# Patient Record
Sex: MALE | Race: BLACK OR AFRICAN AMERICAN | NOT HISPANIC OR LATINO
[De-identification: names, ages, dates, MRNs, and addresses within clinical notes are randomized per-mention and may not be internally consistent; named-entity substitution may affect disease eponyms.]

---

## 2017-05-02 ENCOUNTER — RX RENEWAL (OUTPATIENT)
Age: 32
End: 2017-05-02

## 2017-05-26 ENCOUNTER — INPATIENT (INPATIENT)
Facility: HOSPITAL | Age: 32
LOS: 10 days | Discharge: HOME | DRG: 252 | End: 2017-06-06
Attending: SURGERY | Admitting: SURGERY
Payer: MEDICAID

## 2017-05-26 VITALS
SYSTOLIC BLOOD PRESSURE: 125 MMHG | DIASTOLIC BLOOD PRESSURE: 73 MMHG | OXYGEN SATURATION: 100 % | HEART RATE: 77 BPM | RESPIRATION RATE: 20 BRPM | TEMPERATURE: 98 F | HEIGHT: 75 IN

## 2017-05-26 DIAGNOSIS — I77.0 ARTERIOVENOUS FISTULA, ACQUIRED: Chronic | ICD-10-CM

## 2017-05-26 LAB
ANION GAP SERPL CALC-SCNC: 15 MMOL/L — SIGNIFICANT CHANGE UP (ref 5–17)
APTT BLD: 27.7 SEC — SIGNIFICANT CHANGE UP (ref 27.5–37.4)
BASOPHILS # BLD AUTO: 0 K/UL — SIGNIFICANT CHANGE UP (ref 0–0.2)
BASOPHILS NFR BLD AUTO: 0.5 % — SIGNIFICANT CHANGE UP (ref 0–2)
BUN SERPL-MCNC: 30 MG/DL — HIGH (ref 7–23)
CALCIUM SERPL-MCNC: 9.8 MG/DL — SIGNIFICANT CHANGE UP (ref 8.4–10.5)
CHLORIDE SERPL-SCNC: 97 MMOL/L — SIGNIFICANT CHANGE UP (ref 96–108)
CO2 SERPL-SCNC: 28 MMOL/L — SIGNIFICANT CHANGE UP (ref 22–31)
CREAT SERPL-MCNC: 11.69 MG/DL — HIGH (ref 0.5–1.3)
EOSINOPHIL # BLD AUTO: 0.1 K/UL — SIGNIFICANT CHANGE UP (ref 0–0.5)
EOSINOPHIL NFR BLD AUTO: 2.4 % — SIGNIFICANT CHANGE UP (ref 0–6)
GLUCOSE SERPL-MCNC: 92 MG/DL — SIGNIFICANT CHANGE UP (ref 70–99)
HCT VFR BLD CALC: 43.9 % — SIGNIFICANT CHANGE UP (ref 39–50)
HGB BLD-MCNC: 14.4 G/DL — SIGNIFICANT CHANGE UP (ref 13–17)
INR BLD: 1.04 RATIO — SIGNIFICANT CHANGE UP (ref 0.88–1.16)
LYMPHOCYTES # BLD AUTO: 1.6 K/UL — SIGNIFICANT CHANGE UP (ref 1–3.3)
LYMPHOCYTES # BLD AUTO: 34.3 % — SIGNIFICANT CHANGE UP (ref 13–44)
MCHC RBC-ENTMCNC: 32.8 GM/DL — SIGNIFICANT CHANGE UP (ref 32–36)
MCHC RBC-ENTMCNC: 32.9 PG — SIGNIFICANT CHANGE UP (ref 27–34)
MCV RBC AUTO: 100 FL — SIGNIFICANT CHANGE UP (ref 80–100)
MONOCYTES # BLD AUTO: 0.6 K/UL — SIGNIFICANT CHANGE UP (ref 0–0.9)
MONOCYTES NFR BLD AUTO: 11.5 % — SIGNIFICANT CHANGE UP (ref 2–14)
NEUTROPHILS # BLD AUTO: 2.5 K/UL — SIGNIFICANT CHANGE UP (ref 1.8–7.4)
NEUTROPHILS NFR BLD AUTO: 51.3 % — SIGNIFICANT CHANGE UP (ref 43–77)
PLATELET # BLD AUTO: 159 K/UL — SIGNIFICANT CHANGE UP (ref 150–400)
POTASSIUM SERPL-MCNC: 5.1 MMOL/L — SIGNIFICANT CHANGE UP (ref 3.5–5.3)
POTASSIUM SERPL-SCNC: 5.1 MMOL/L — SIGNIFICANT CHANGE UP (ref 3.5–5.3)
PROTHROM AB SERPL-ACNC: 11.3 SEC — SIGNIFICANT CHANGE UP (ref 9.8–12.7)
RBC # BLD: 4.38 M/UL — SIGNIFICANT CHANGE UP (ref 4.2–5.8)
RBC # FLD: 12.6 % — SIGNIFICANT CHANGE UP (ref 10.3–14.5)
SODIUM SERPL-SCNC: 140 MMOL/L — SIGNIFICANT CHANGE UP (ref 135–145)
WBC # BLD: 4.8 K/UL — SIGNIFICANT CHANGE UP (ref 3.8–10.5)
WBC # FLD AUTO: 4.8 K/UL — SIGNIFICANT CHANGE UP (ref 3.8–10.5)

## 2017-05-26 PROCEDURE — 93990 DOPPLER FLOW TESTING: CPT | Mod: 26

## 2017-05-26 PROCEDURE — 99285 EMERGENCY DEPT VISIT HI MDM: CPT

## 2017-05-26 NOTE — ED ADULT NURSE NOTE - PMH
Chronic renal failure    Hemodialysis access, AV graft    HTN (hypertension)    Leg fracture, right    Morbid obesity    Sleep apnea    Stroke  age 10

## 2017-05-26 NOTE — ED ADULT NURSE NOTE - OBJECTIVE STATEMENT
32 yo male pt with history of chronic renal failure on dialysis, left AV graft HTN presents to ed complaining of AVF malfunction. as per pt he went to dialysis this morning around 11am where he finished his session and when he got home he "could not feel my fistula so I came to the ed". pt states he may have slept on his arm and now is scared he may have a clot. pt complaining of pain to the area. cap refill brisk. no redness noted to left arm. + pulse, -thrill. pt denies fever/chills/n/v/numbness/tingling/chest pain/sob. breath sounds clear and equal bilaterally. skin warm dry and intact. pt ambulates with steady gait. abd nontender and nondistended. 32 yo male pt with history of chronic renal failure on dialysis, left AV graft HTN presents to ed complaining of AVF malfunction. as per pt he went to dialysis this morning around 11am where he finished his session and when he got home he "could not feel my fistula so I came to the ed". pt states he may have slept on his arm and now is scared he may have a clot. pt complaining of pain to the area. cap refill brisk. no redness noted to left arm. + pulse, -thrill. pt denies fever/chills/n/v/numbness/tingling/chest pain/sob. breath sounds clear and equal bilaterally. skin warm dry and intact. pt ambulates with steady gait. abd nontender and nondistended. pink band placed on left arm.

## 2017-05-26 NOTE — ED PROVIDER NOTE - MEDICAL DECISION MAKING DETAILS
31M with ESRD on HD p/w AVF malfunction. Pt likely with thrombosis. Obtain basic labs, Doppler of AV Fistula. Likely Vasc surgery c/s

## 2017-05-26 NOTE — ED PROVIDER NOTE - OBJECTIVE STATEMENT
31M with history of ESRD on HD, HTN, morbid obesity p/w concern of AVF malfunction. 31M with history of ESRD on HD, HTN, morbid obesity p/w concern of AVF malfunction. Pt states that he may have slept on his arm now he is afraid he has a clot. He reports that he had HD today but it was painful in that area. He notes tenderness over the AVF site. Pt denies fevers, chills, sob, n/v/d, or chest pain.    Renal: Donta Aponte Sx: Dr. Ibrahim.

## 2017-05-26 NOTE — ED PROVIDER NOTE - SKIN, MLM
Skin normal color for race, warm, dry and intact. No evidence of rash. No thrill heard over AVF site. Noted to have hard palpable cord.

## 2017-05-26 NOTE — ED PROVIDER NOTE - ATTENDING CONTRIBUTION TO CARE
I have seen and evaluated this patient with the resident.   I agree with the findings  unless other wise stated.  I have made appropriate changes in documentations where needed, After my face to face bedside evaluation, I am further  notinM with ESRD on HD p/w AVF malfunction. Pt likely with thrombosis. Obtain basic labs, Doppler of AV Fistula. Likely Vasc surgery eval done admit for definitive care--Niño

## 2017-05-27 DIAGNOSIS — T82.868A THROMBOSIS DUE TO VASCULAR PROSTHETIC DEVICES, IMPLANTS AND GRAFTS, INITIAL ENCOUNTER: ICD-10-CM

## 2017-05-27 LAB
BLD GP AB SCN SERPL QL: NEGATIVE — SIGNIFICANT CHANGE UP
RH IG SCN BLD-IMP: POSITIVE — SIGNIFICANT CHANGE UP

## 2017-05-27 PROCEDURE — 99232 SBSQ HOSP IP/OBS MODERATE 35: CPT

## 2017-05-27 RX ORDER — MECLIZINE HCL 12.5 MG
25 TABLET ORAL THREE TIMES A DAY
Qty: 0 | Refills: 0 | Status: DISCONTINUED | OUTPATIENT
Start: 2017-05-27 | End: 2017-05-28

## 2017-05-27 RX ORDER — HEPARIN SODIUM 5000 [USP'U]/ML
5000 INJECTION INTRAVENOUS; SUBCUTANEOUS ONCE
Qty: 0 | Refills: 0 | Status: COMPLETED | OUTPATIENT
Start: 2017-05-27 | End: 2017-05-27

## 2017-05-27 RX ORDER — LABETALOL HCL 100 MG
200 TABLET ORAL
Qty: 0 | Refills: 0 | Status: DISCONTINUED | OUTPATIENT
Start: 2017-05-27 | End: 2017-05-28

## 2017-05-27 RX ORDER — HEPARIN SODIUM 5000 [USP'U]/ML
1500 INJECTION INTRAVENOUS; SUBCUTANEOUS
Qty: 25000 | Refills: 0 | Status: DISCONTINUED | OUTPATIENT
Start: 2017-05-27 | End: 2017-05-28

## 2017-05-27 RX ORDER — SEVELAMER CARBONATE 2400 MG/1
1600 POWDER, FOR SUSPENSION ORAL
Qty: 0 | Refills: 0 | Status: DISCONTINUED | OUTPATIENT
Start: 2017-05-27 | End: 2017-05-28

## 2017-05-27 RX ORDER — SODIUM CHLORIDE 9 MG/ML
1000 INJECTION INTRAMUSCULAR; INTRAVENOUS; SUBCUTANEOUS
Qty: 0 | Refills: 0 | Status: DISCONTINUED | OUTPATIENT
Start: 2017-05-27 | End: 2017-05-27

## 2017-05-27 RX ORDER — HEPARIN SODIUM 5000 [USP'U]/ML
5000 INJECTION INTRAVENOUS; SUBCUTANEOUS EVERY 8 HOURS
Qty: 0 | Refills: 0 | Status: DISCONTINUED | OUTPATIENT
Start: 2017-05-27 | End: 2017-05-27

## 2017-05-27 RX ADMIN — HEPARIN SODIUM 5000 UNIT(S): 5000 INJECTION INTRAVENOUS; SUBCUTANEOUS at 18:51

## 2017-05-27 RX ADMIN — SODIUM CHLORIDE 30 MILLILITER(S): 9 INJECTION INTRAMUSCULAR; INTRAVENOUS; SUBCUTANEOUS at 06:34

## 2017-05-27 RX ADMIN — Medication 200 MILLIGRAM(S): at 06:29

## 2017-05-27 RX ADMIN — SODIUM CHLORIDE 30 MILLILITER(S): 9 INJECTION INTRAMUSCULAR; INTRAVENOUS; SUBCUTANEOUS at 02:19

## 2017-05-27 RX ADMIN — HEPARIN SODIUM 5000 UNIT(S): 5000 INJECTION INTRAVENOUS; SUBCUTANEOUS at 14:57

## 2017-05-27 RX ADMIN — HEPARIN SODIUM 15 UNIT(S)/HR: 5000 INJECTION INTRAVENOUS; SUBCUTANEOUS at 18:51

## 2017-05-27 NOTE — H&P ADULT. - HISTORY OF PRESENT ILLNESS
31M with history of HTN, ESRD on HD (MWF) p/w bulge over AVF. He received HD yesterday without incident. He went to sleep and woke up with a bulge and pulsating sensation over the fistula. No pain or weakness in the hand. He said he could not feel a thrill.

## 2017-05-27 NOTE — H&P ADULT. - EXTREMITIES COMMENTS
L wrist radiocephalic AVF, no thrill, only pulse over proximal fistula, no signs of steal    +doppler signal over vasquez arch and digits

## 2017-05-28 LAB
ANION GAP SERPL CALC-SCNC: 19 MMOL/L — HIGH (ref 5–17)
APTT BLD: 56.3 SEC — HIGH (ref 27.5–37.4)
APTT BLD: 74.3 SEC — HIGH (ref 27.5–37.4)
BLD GP AB SCN SERPL QL: NEGATIVE — SIGNIFICANT CHANGE UP
BUN SERPL-MCNC: 67 MG/DL — HIGH (ref 7–23)
CALCIUM SERPL-MCNC: 9.4 MG/DL — SIGNIFICANT CHANGE UP (ref 8.4–10.5)
CHLORIDE SERPL-SCNC: 96 MMOL/L — SIGNIFICANT CHANGE UP (ref 96–108)
CO2 SERPL-SCNC: 23 MMOL/L — SIGNIFICANT CHANGE UP (ref 22–31)
CREAT SERPL-MCNC: 17.55 MG/DL — HIGH (ref 0.5–1.3)
GLUCOSE SERPL-MCNC: 92 MG/DL — SIGNIFICANT CHANGE UP (ref 70–99)
HCT VFR BLD CALC: 38.3 % — LOW (ref 39–50)
HCT VFR BLD CALC: 40.8 % — SIGNIFICANT CHANGE UP (ref 39–50)
HGB BLD-MCNC: 13.2 G/DL — SIGNIFICANT CHANGE UP (ref 13–17)
HGB BLD-MCNC: 13.4 G/DL — SIGNIFICANT CHANGE UP (ref 13–17)
MAGNESIUM SERPL-MCNC: 2.2 MG/DL — SIGNIFICANT CHANGE UP (ref 1.6–2.6)
MCHC RBC-ENTMCNC: 32.8 PG — SIGNIFICANT CHANGE UP (ref 27–34)
MCHC RBC-ENTMCNC: 32.9 GM/DL — SIGNIFICANT CHANGE UP (ref 32–36)
MCHC RBC-ENTMCNC: 34.5 GM/DL — SIGNIFICANT CHANGE UP (ref 32–36)
MCHC RBC-ENTMCNC: 34.6 PG — HIGH (ref 27–34)
MCV RBC AUTO: 100 FL — SIGNIFICANT CHANGE UP (ref 80–100)
MCV RBC AUTO: 99.9 FL — SIGNIFICANT CHANGE UP (ref 80–100)
PHOSPHATE SERPL-MCNC: 6.3 MG/DL — HIGH (ref 2.5–4.5)
PLATELET # BLD AUTO: 143 K/UL — LOW (ref 150–400)
PLATELET # BLD AUTO: 154 K/UL — SIGNIFICANT CHANGE UP (ref 150–400)
POTASSIUM SERPL-MCNC: 5.1 MMOL/L — SIGNIFICANT CHANGE UP (ref 3.5–5.3)
POTASSIUM SERPL-SCNC: 5.1 MMOL/L — SIGNIFICANT CHANGE UP (ref 3.5–5.3)
RBC # BLD: 3.82 M/UL — LOW (ref 4.2–5.8)
RBC # BLD: 4.09 M/UL — LOW (ref 4.2–5.8)
RBC # FLD: 12.5 % — SIGNIFICANT CHANGE UP (ref 10.3–14.5)
RBC # FLD: 12.6 % — SIGNIFICANT CHANGE UP (ref 10.3–14.5)
RH IG SCN BLD-IMP: POSITIVE — SIGNIFICANT CHANGE UP
SODIUM SERPL-SCNC: 138 MMOL/L — SIGNIFICANT CHANGE UP (ref 135–145)
WBC # BLD: 6.2 K/UL — SIGNIFICANT CHANGE UP (ref 3.8–10.5)
WBC # BLD: 6.4 K/UL — SIGNIFICANT CHANGE UP (ref 3.8–10.5)
WBC # FLD AUTO: 6.2 K/UL — SIGNIFICANT CHANGE UP (ref 3.8–10.5)
WBC # FLD AUTO: 6.4 K/UL — SIGNIFICANT CHANGE UP (ref 3.8–10.5)

## 2017-05-28 PROCEDURE — 76937 US GUIDE VASCULAR ACCESS: CPT | Mod: 26,GC

## 2017-05-28 PROCEDURE — 75820 VEIN X-RAY ARM/LEG: CPT | Mod: 26,GC

## 2017-05-28 RX ORDER — ONDANSETRON 8 MG/1
4 TABLET, FILM COATED ORAL ONCE
Qty: 0 | Refills: 0 | Status: DISCONTINUED | OUTPATIENT
Start: 2017-05-28 | End: 2017-05-28

## 2017-05-28 RX ORDER — LABETALOL HCL 100 MG
200 TABLET ORAL
Qty: 0 | Refills: 0 | Status: DISCONTINUED | OUTPATIENT
Start: 2017-05-28 | End: 2017-05-29

## 2017-05-28 RX ORDER — FENTANYL CITRATE 50 UG/ML
50 INJECTION INTRAVENOUS
Qty: 0 | Refills: 0 | Status: DISCONTINUED | OUTPATIENT
Start: 2017-05-28 | End: 2017-05-28

## 2017-05-28 RX ORDER — ACETAMINOPHEN 500 MG
1000 TABLET ORAL ONCE
Qty: 0 | Refills: 0 | Status: COMPLETED | OUTPATIENT
Start: 2017-05-28 | End: 2017-05-28

## 2017-05-28 RX ORDER — ALTEPLASE 100 MG
2 KIT INTRAVENOUS ONCE
Qty: 0 | Refills: 0 | Status: DISCONTINUED | OUTPATIENT
Start: 2017-05-28 | End: 2017-05-31

## 2017-05-28 RX ORDER — SEVELAMER CARBONATE 2400 MG/1
800 POWDER, FOR SUSPENSION ORAL
Qty: 0 | Refills: 0 | Status: DISCONTINUED | OUTPATIENT
Start: 2017-05-28 | End: 2017-05-31

## 2017-05-28 RX ORDER — HYDROMORPHONE HYDROCHLORIDE 2 MG/ML
0.5 INJECTION INTRAMUSCULAR; INTRAVENOUS; SUBCUTANEOUS
Qty: 0 | Refills: 0 | Status: DISCONTINUED | OUTPATIENT
Start: 2017-05-28 | End: 2017-05-28

## 2017-05-28 RX ADMIN — Medication 400 MILLIGRAM(S): at 20:15

## 2017-05-28 RX ADMIN — Medication 200 MILLIGRAM(S): at 22:49

## 2017-05-28 RX ADMIN — Medication 1000 MILLIGRAM(S): at 20:45

## 2017-05-28 RX ADMIN — HEPARIN SODIUM 17 UNIT(S)/HR: 5000 INJECTION INTRAVENOUS; SUBCUTANEOUS at 01:26

## 2017-05-28 RX ADMIN — Medication 200 MILLIGRAM(S): at 05:55

## 2017-05-28 RX ADMIN — HEPARIN SODIUM 17 UNIT(S)/HR: 5000 INJECTION INTRAVENOUS; SUBCUTANEOUS at 07:41

## 2017-05-28 NOTE — BRIEF OPERATIVE NOTE - OPERATION/FINDINGS
failed AVF thrombectomy  pt has palp radial Fistulagram, AVF thrombectomy,  Failed attempt, pt has palp radial, will need brachio-cephalic fistula

## 2017-05-29 LAB
ANION GAP SERPL CALC-SCNC: 21 MMOL/L — HIGH (ref 5–17)
APTT BLD: 25 SEC — LOW (ref 27.5–37.4)
BUN SERPL-MCNC: 85 MG/DL — HIGH (ref 7–23)
CALCIUM SERPL-MCNC: 9.2 MG/DL — SIGNIFICANT CHANGE UP (ref 8.4–10.5)
CHLORIDE SERPL-SCNC: 95 MMOL/L — LOW (ref 96–108)
CO2 SERPL-SCNC: 23 MMOL/L — SIGNIFICANT CHANGE UP (ref 22–31)
CREAT SERPL-MCNC: 20.44 MG/DL — HIGH (ref 0.5–1.3)
GLUCOSE SERPL-MCNC: 97 MG/DL — SIGNIFICANT CHANGE UP (ref 70–99)
HCT VFR BLD CALC: 39.1 % — SIGNIFICANT CHANGE UP (ref 39–50)
HCT VFR BLD CALC: 39.2 % — SIGNIFICANT CHANGE UP (ref 39–50)
HGB BLD-MCNC: 12.7 G/DL — LOW (ref 13–17)
HGB BLD-MCNC: 13 G/DL — SIGNIFICANT CHANGE UP (ref 13–17)
INR BLD: 1 RATIO — SIGNIFICANT CHANGE UP (ref 0.88–1.16)
MAGNESIUM SERPL-MCNC: 2.5 MG/DL — SIGNIFICANT CHANGE UP (ref 1.6–2.6)
MCHC RBC-ENTMCNC: 32.5 GM/DL — SIGNIFICANT CHANGE UP (ref 32–36)
MCHC RBC-ENTMCNC: 33 GM/DL — SIGNIFICANT CHANGE UP (ref 32–36)
MCHC RBC-ENTMCNC: 33 PG — SIGNIFICANT CHANGE UP (ref 27–34)
MCHC RBC-ENTMCNC: 33.1 PG — SIGNIFICANT CHANGE UP (ref 27–34)
MCV RBC AUTO: 100 FL — SIGNIFICANT CHANGE UP (ref 80–100)
MCV RBC AUTO: 101 FL — HIGH (ref 80–100)
PHOSPHATE SERPL-MCNC: 4.7 MG/DL — HIGH (ref 2.5–4.5)
PLATELET # BLD AUTO: 164 K/UL — SIGNIFICANT CHANGE UP (ref 150–400)
PLATELET # BLD AUTO: 177 K/UL — SIGNIFICANT CHANGE UP (ref 150–400)
POTASSIUM SERPL-MCNC: 5.7 MMOL/L — HIGH (ref 3.5–5.3)
POTASSIUM SERPL-SCNC: 5.7 MMOL/L — HIGH (ref 3.5–5.3)
PROTHROM AB SERPL-ACNC: 10.9 SEC — SIGNIFICANT CHANGE UP (ref 9.8–12.7)
RBC # BLD: 3.85 M/UL — LOW (ref 4.2–5.8)
RBC # BLD: 3.92 M/UL — LOW (ref 4.2–5.8)
RBC # FLD: 12.5 % — SIGNIFICANT CHANGE UP (ref 10.3–14.5)
RBC # FLD: 12.6 % — SIGNIFICANT CHANGE UP (ref 10.3–14.5)
SODIUM SERPL-SCNC: 139 MMOL/L — SIGNIFICANT CHANGE UP (ref 135–145)
WBC # BLD: 8.1 K/UL — SIGNIFICANT CHANGE UP (ref 3.8–10.5)
WBC # BLD: 9.2 K/UL — SIGNIFICANT CHANGE UP (ref 3.8–10.5)
WBC # FLD AUTO: 8.1 K/UL — SIGNIFICANT CHANGE UP (ref 3.8–10.5)
WBC # FLD AUTO: 9.2 K/UL — SIGNIFICANT CHANGE UP (ref 3.8–10.5)

## 2017-05-29 PROCEDURE — 99232 SBSQ HOSP IP/OBS MODERATE 35: CPT

## 2017-05-29 PROCEDURE — 71010: CPT | Mod: 26

## 2017-05-29 PROCEDURE — 99223 1ST HOSP IP/OBS HIGH 75: CPT | Mod: GC

## 2017-05-29 RX ORDER — LABETALOL HCL 100 MG
100 TABLET ORAL
Qty: 0 | Refills: 0 | Status: DISCONTINUED | OUTPATIENT
Start: 2017-05-29 | End: 2017-05-31

## 2017-05-29 RX ORDER — SODIUM CHLORIDE 9 MG/ML
250 INJECTION INTRAMUSCULAR; INTRAVENOUS; SUBCUTANEOUS ONCE
Qty: 0 | Refills: 0 | Status: COMPLETED | OUTPATIENT
Start: 2017-05-29 | End: 2017-05-29

## 2017-05-29 RX ORDER — ACETAMINOPHEN 500 MG
650 TABLET ORAL ONCE
Qty: 0 | Refills: 0 | Status: DISCONTINUED | OUTPATIENT
Start: 2017-05-29 | End: 2017-05-31

## 2017-05-29 RX ADMIN — Medication 200 MILLIGRAM(S): at 05:27

## 2017-05-29 RX ADMIN — SODIUM CHLORIDE 500 MILLILITER(S): 9 INJECTION INTRAMUSCULAR; INTRAVENOUS; SUBCUTANEOUS at 19:35

## 2017-05-29 NOTE — PROVIDER CONTACT NOTE (OTHER) - ASSESSMENT
alert and oriented x 4. vitals: temp 98.1, heart rate 52, bp 88/43, resp rate 18, O2 sat 100% on room air. pt states "I feel dizzy"

## 2017-05-30 LAB
ANION GAP SERPL CALC-SCNC: 21 MMOL/L — HIGH (ref 5–17)
APTT BLD: 24.5 SEC — LOW (ref 27.5–37.4)
BUN SERPL-MCNC: 76 MG/DL — HIGH (ref 7–23)
CALCIUM SERPL-MCNC: 9 MG/DL — SIGNIFICANT CHANGE UP (ref 8.4–10.5)
CHLORIDE SERPL-SCNC: 94 MMOL/L — LOW (ref 96–108)
CO2 SERPL-SCNC: 22 MMOL/L — SIGNIFICANT CHANGE UP (ref 22–31)
CREAT SERPL-MCNC: 18.54 MG/DL — HIGH (ref 0.5–1.3)
GLUCOSE SERPL-MCNC: 84 MG/DL — SIGNIFICANT CHANGE UP (ref 70–99)
HCT VFR BLD CALC: 36.5 % — LOW (ref 39–50)
HGB BLD-MCNC: 12.5 G/DL — LOW (ref 13–17)
INR BLD: 1.04 RATIO — SIGNIFICANT CHANGE UP (ref 0.88–1.16)
MAGNESIUM SERPL-MCNC: 2.2 MG/DL — SIGNIFICANT CHANGE UP (ref 1.6–2.6)
MCHC RBC-ENTMCNC: 34.3 GM/DL — SIGNIFICANT CHANGE UP (ref 32–36)
MCHC RBC-ENTMCNC: 34.7 PG — HIGH (ref 27–34)
MCV RBC AUTO: 101 FL — HIGH (ref 80–100)
PHOSPHATE SERPL-MCNC: 8.3 MG/DL — HIGH (ref 2.5–4.5)
PLATELET # BLD AUTO: 150 K/UL — SIGNIFICANT CHANGE UP (ref 150–400)
POTASSIUM SERPL-MCNC: 5.3 MMOL/L — SIGNIFICANT CHANGE UP (ref 3.5–5.3)
POTASSIUM SERPL-SCNC: 5.3 MMOL/L — SIGNIFICANT CHANGE UP (ref 3.5–5.3)
PROTHROM AB SERPL-ACNC: 11.2 SEC — SIGNIFICANT CHANGE UP (ref 9.8–12.7)
RBC # BLD: 3.61 M/UL — LOW (ref 4.2–5.8)
RBC # FLD: 12.7 % — SIGNIFICANT CHANGE UP (ref 10.3–14.5)
SODIUM SERPL-SCNC: 137 MMOL/L — SIGNIFICANT CHANGE UP (ref 135–145)
WBC # BLD: 6.2 K/UL — SIGNIFICANT CHANGE UP (ref 3.8–10.5)
WBC # FLD AUTO: 6.2 K/UL — SIGNIFICANT CHANGE UP (ref 3.8–10.5)

## 2017-05-30 PROCEDURE — 99231 SBSQ HOSP IP/OBS SF/LOW 25: CPT | Mod: 57

## 2017-05-30 RX ORDER — HEPARIN SODIUM 5000 [USP'U]/ML
5000 INJECTION INTRAVENOUS; SUBCUTANEOUS EVERY 8 HOURS
Qty: 0 | Refills: 0 | Status: DISCONTINUED | OUTPATIENT
Start: 2017-05-30 | End: 2017-05-31

## 2017-05-30 RX ORDER — CALCIUM CARBONATE 500(1250)
1 TABLET ORAL THREE TIMES A DAY
Qty: 0 | Refills: 0 | Status: DISCONTINUED | OUTPATIENT
Start: 2017-05-30 | End: 2017-05-31

## 2017-05-30 RX ADMIN — Medication 1 TABLET(S): at 17:40

## 2017-05-30 RX ADMIN — Medication 100 MILLIGRAM(S): at 05:13

## 2017-05-30 RX ADMIN — HEPARIN SODIUM 5000 UNIT(S): 5000 INJECTION INTRAVENOUS; SUBCUTANEOUS at 14:42

## 2017-05-30 RX ADMIN — HEPARIN SODIUM 5000 UNIT(S): 5000 INJECTION INTRAVENOUS; SUBCUTANEOUS at 22:02

## 2017-05-31 LAB
ANION GAP SERPL CALC-SCNC: 18 MMOL/L — HIGH (ref 5–17)
ANION GAP SERPL CALC-SCNC: 22 MMOL/L — HIGH (ref 5–17)
APTT BLD: 27.2 SEC — LOW (ref 27.5–37.4)
APTT BLD: 27.9 SEC — SIGNIFICANT CHANGE UP (ref 27.5–37.4)
BLD GP AB SCN SERPL QL: NEGATIVE — SIGNIFICANT CHANGE UP
BUN SERPL-MCNC: 64 MG/DL — HIGH (ref 7–23)
BUN SERPL-MCNC: 99 MG/DL — HIGH (ref 7–23)
CALCIUM SERPL-MCNC: 9.3 MG/DL — SIGNIFICANT CHANGE UP (ref 8.4–10.5)
CALCIUM SERPL-MCNC: 9.6 MG/DL — SIGNIFICANT CHANGE UP (ref 8.4–10.5)
CHLORIDE SERPL-SCNC: 94 MMOL/L — LOW (ref 96–108)
CHLORIDE SERPL-SCNC: 97 MMOL/L — SIGNIFICANT CHANGE UP (ref 96–108)
CO2 SERPL-SCNC: 24 MMOL/L — SIGNIFICANT CHANGE UP (ref 22–31)
CO2 SERPL-SCNC: 24 MMOL/L — SIGNIFICANT CHANGE UP (ref 22–31)
CREAT SERPL-MCNC: 16.68 MG/DL — HIGH (ref 0.5–1.3)
CREAT SERPL-MCNC: 22.6 MG/DL — HIGH (ref 0.5–1.3)
GLUCOSE SERPL-MCNC: 82 MG/DL — SIGNIFICANT CHANGE UP (ref 70–99)
GLUCOSE SERPL-MCNC: 91 MG/DL — SIGNIFICANT CHANGE UP (ref 70–99)
HCT VFR BLD CALC: 36.9 % — LOW (ref 39–50)
HCT VFR BLD CALC: 39.4 % — SIGNIFICANT CHANGE UP (ref 39–50)
HGB BLD-MCNC: 12.3 G/DL — LOW (ref 13–17)
HGB BLD-MCNC: 12.9 G/DL — LOW (ref 13–17)
INR BLD: 1.02 RATIO — SIGNIFICANT CHANGE UP (ref 0.88–1.16)
INR BLD: 1.07 RATIO — SIGNIFICANT CHANGE UP (ref 0.88–1.16)
MAGNESIUM SERPL-MCNC: 2.3 MG/DL — SIGNIFICANT CHANGE UP (ref 1.6–2.6)
MAGNESIUM SERPL-MCNC: 3.1 MG/DL — HIGH (ref 1.6–2.6)
MCHC RBC-ENTMCNC: 32.6 GM/DL — SIGNIFICANT CHANGE UP (ref 32–36)
MCHC RBC-ENTMCNC: 33.2 PG — SIGNIFICANT CHANGE UP (ref 27–34)
MCHC RBC-ENTMCNC: 33.3 GM/DL — SIGNIFICANT CHANGE UP (ref 32–36)
MCHC RBC-ENTMCNC: 33.4 PG — SIGNIFICANT CHANGE UP (ref 27–34)
MCV RBC AUTO: 100 FL — SIGNIFICANT CHANGE UP (ref 80–100)
MCV RBC AUTO: 102 FL — HIGH (ref 80–100)
PHOSPHATE SERPL-MCNC: 6.4 MG/DL — HIGH (ref 2.5–4.5)
PHOSPHATE SERPL-MCNC: 8.8 MG/DL — HIGH (ref 2.5–4.5)
PLATELET # BLD AUTO: 160 K/UL — SIGNIFICANT CHANGE UP (ref 150–400)
PLATELET # BLD AUTO: 160 K/UL — SIGNIFICANT CHANGE UP (ref 150–400)
POTASSIUM SERPL-MCNC: 3.7 MMOL/L — SIGNIFICANT CHANGE UP (ref 3.5–5.3)
POTASSIUM SERPL-MCNC: 5.5 MMOL/L — HIGH (ref 3.5–5.3)
POTASSIUM SERPL-MCNC: 6 MMOL/L — HIGH (ref 3.5–5.3)
POTASSIUM SERPL-SCNC: 3.7 MMOL/L — SIGNIFICANT CHANGE UP (ref 3.5–5.3)
POTASSIUM SERPL-SCNC: 5.5 MMOL/L — HIGH (ref 3.5–5.3)
POTASSIUM SERPL-SCNC: 6 MMOL/L — HIGH (ref 3.5–5.3)
PROTHROM AB SERPL-ACNC: 11.1 SEC — SIGNIFICANT CHANGE UP (ref 9.8–12.7)
PROTHROM AB SERPL-ACNC: 11.6 SEC — SIGNIFICANT CHANGE UP (ref 9.8–12.7)
RBC # BLD: 3.68 M/UL — LOW (ref 4.2–5.8)
RBC # BLD: 3.87 M/UL — LOW (ref 4.2–5.8)
RBC # FLD: 12.6 % — SIGNIFICANT CHANGE UP (ref 10.3–14.5)
RBC # FLD: 12.8 % — SIGNIFICANT CHANGE UP (ref 10.3–14.5)
RH IG SCN BLD-IMP: POSITIVE — SIGNIFICANT CHANGE UP
SODIUM SERPL-SCNC: 139 MMOL/L — SIGNIFICANT CHANGE UP (ref 135–145)
SODIUM SERPL-SCNC: 140 MMOL/L — SIGNIFICANT CHANGE UP (ref 135–145)
WBC # BLD: 6 K/UL — SIGNIFICANT CHANGE UP (ref 3.8–10.5)
WBC # BLD: 7.8 K/UL — SIGNIFICANT CHANGE UP (ref 3.8–10.5)
WBC # FLD AUTO: 6 K/UL — SIGNIFICANT CHANGE UP (ref 3.8–10.5)
WBC # FLD AUTO: 7.8 K/UL — SIGNIFICANT CHANGE UP (ref 3.8–10.5)

## 2017-05-31 PROCEDURE — 36821 AV FUSION DIRECT ANY SITE: CPT | Mod: GC

## 2017-05-31 PROCEDURE — 90935 HEMODIALYSIS ONE EVALUATION: CPT

## 2017-05-31 RX ORDER — SEVELAMER CARBONATE 2400 MG/1
1600 POWDER, FOR SUSPENSION ORAL
Qty: 0 | Refills: 0 | Status: DISCONTINUED | OUTPATIENT
Start: 2017-05-31 | End: 2017-06-06

## 2017-05-31 RX ORDER — SEVELAMER CARBONATE 2400 MG/1
1600 POWDER, FOR SUSPENSION ORAL
Qty: 0 | Refills: 0 | Status: DISCONTINUED | OUTPATIENT
Start: 2017-05-31 | End: 2017-05-31

## 2017-05-31 RX ORDER — HEPARIN SODIUM 5000 [USP'U]/ML
5000 INJECTION INTRAVENOUS; SUBCUTANEOUS EVERY 8 HOURS
Qty: 0 | Refills: 0 | Status: DISCONTINUED | OUTPATIENT
Start: 2017-05-31 | End: 2017-06-06

## 2017-05-31 RX ORDER — ONDANSETRON 8 MG/1
4 TABLET, FILM COATED ORAL ONCE
Qty: 0 | Refills: 0 | Status: DISCONTINUED | OUTPATIENT
Start: 2017-05-31 | End: 2017-05-31

## 2017-05-31 RX ORDER — HYDROMORPHONE HYDROCHLORIDE 2 MG/ML
0.5 INJECTION INTRAMUSCULAR; INTRAVENOUS; SUBCUTANEOUS
Qty: 0 | Refills: 0 | Status: DISCONTINUED | OUTPATIENT
Start: 2017-05-31 | End: 2017-05-31

## 2017-05-31 RX ORDER — ACETAMINOPHEN 500 MG
650 TABLET ORAL ONCE
Qty: 0 | Refills: 0 | Status: DISCONTINUED | OUTPATIENT
Start: 2017-05-31 | End: 2017-06-06

## 2017-05-31 RX ORDER — CALCIUM CARBONATE 500(1250)
1 TABLET ORAL THREE TIMES A DAY
Qty: 0 | Refills: 0 | Status: DISCONTINUED | OUTPATIENT
Start: 2017-05-31 | End: 2017-06-04

## 2017-05-31 RX ORDER — LABETALOL HCL 100 MG
100 TABLET ORAL
Qty: 0 | Refills: 0 | Status: DISCONTINUED | OUTPATIENT
Start: 2017-05-31 | End: 2017-06-06

## 2017-05-31 RX ORDER — SODIUM CHLORIDE 9 MG/ML
1000 INJECTION INTRAMUSCULAR; INTRAVENOUS; SUBCUTANEOUS
Qty: 0 | Refills: 0 | Status: DISCONTINUED | OUTPATIENT
Start: 2017-05-31 | End: 2017-06-01

## 2017-05-31 RX ADMIN — HEPARIN SODIUM 5000 UNIT(S): 5000 INJECTION INTRAVENOUS; SUBCUTANEOUS at 21:49

## 2017-05-31 RX ADMIN — HEPARIN SODIUM 5000 UNIT(S): 5000 INJECTION INTRAVENOUS; SUBCUTANEOUS at 06:15

## 2017-06-01 LAB
ANION GAP SERPL CALC-SCNC: 18 MMOL/L — HIGH (ref 5–17)
ANION GAP SERPL CALC-SCNC: 20 MMOL/L — HIGH (ref 5–17)
BASOPHILS # BLD AUTO: 0 K/UL — SIGNIFICANT CHANGE UP (ref 0–0.2)
BASOPHILS NFR BLD AUTO: 0.6 % — SIGNIFICANT CHANGE UP (ref 0–2)
BUN SERPL-MCNC: 72 MG/DL — HIGH (ref 7–23)
BUN SERPL-MCNC: 75 MG/DL — HIGH (ref 7–23)
CALCIUM SERPL-MCNC: 9 MG/DL — SIGNIFICANT CHANGE UP (ref 8.4–10.5)
CALCIUM SERPL-MCNC: 9.5 MG/DL — SIGNIFICANT CHANGE UP (ref 8.4–10.5)
CHLORIDE SERPL-SCNC: 95 MMOL/L — LOW (ref 96–108)
CHLORIDE SERPL-SCNC: 96 MMOL/L — SIGNIFICANT CHANGE UP (ref 96–108)
CO2 SERPL-SCNC: 21 MMOL/L — LOW (ref 22–31)
CO2 SERPL-SCNC: 26 MMOL/L — SIGNIFICANT CHANGE UP (ref 22–31)
CREAT SERPL-MCNC: 17.36 MG/DL — HIGH (ref 0.5–1.3)
CREAT SERPL-MCNC: 18.7 MG/DL — HIGH (ref 0.5–1.3)
EOSINOPHIL # BLD AUTO: 0.1 K/UL — SIGNIFICANT CHANGE UP (ref 0–0.5)
EOSINOPHIL NFR BLD AUTO: 1.7 % — SIGNIFICANT CHANGE UP (ref 0–6)
GLUCOSE SERPL-MCNC: 111 MG/DL — HIGH (ref 70–99)
GLUCOSE SERPL-MCNC: 76 MG/DL — SIGNIFICANT CHANGE UP (ref 70–99)
HCT VFR BLD CALC: 36.1 % — LOW (ref 39–50)
HGB BLD-MCNC: 12.2 G/DL — LOW (ref 13–17)
LYMPHOCYTES # BLD AUTO: 1.6 K/UL — SIGNIFICANT CHANGE UP (ref 1–3.3)
LYMPHOCYTES # BLD AUTO: 26.6 % — SIGNIFICANT CHANGE UP (ref 13–44)
MCHC RBC-ENTMCNC: 33.9 GM/DL — SIGNIFICANT CHANGE UP (ref 32–36)
MCHC RBC-ENTMCNC: 34 PG — SIGNIFICANT CHANGE UP (ref 27–34)
MCV RBC AUTO: 100 FL — SIGNIFICANT CHANGE UP (ref 80–100)
MONOCYTES # BLD AUTO: 0.8 K/UL — SIGNIFICANT CHANGE UP (ref 0–0.9)
MONOCYTES NFR BLD AUTO: 12.2 % — SIGNIFICANT CHANGE UP (ref 2–14)
NEUTROPHILS # BLD AUTO: 3.6 K/UL — SIGNIFICANT CHANGE UP (ref 1.8–7.4)
NEUTROPHILS NFR BLD AUTO: 58.8 % — SIGNIFICANT CHANGE UP (ref 43–77)
PLATELET # BLD AUTO: 162 K/UL — SIGNIFICANT CHANGE UP (ref 150–400)
POTASSIUM SERPL-MCNC: 4.7 MMOL/L — SIGNIFICANT CHANGE UP (ref 3.5–5.3)
POTASSIUM SERPL-MCNC: 5.8 MMOL/L — HIGH (ref 3.5–5.3)
POTASSIUM SERPL-SCNC: 4.7 MMOL/L — SIGNIFICANT CHANGE UP (ref 3.5–5.3)
POTASSIUM SERPL-SCNC: 5.8 MMOL/L — HIGH (ref 3.5–5.3)
RBC # BLD: 3.6 M/UL — LOW (ref 4.2–5.8)
RBC # FLD: 12.6 % — SIGNIFICANT CHANGE UP (ref 10.3–14.5)
SODIUM SERPL-SCNC: 137 MMOL/L — SIGNIFICANT CHANGE UP (ref 135–145)
SODIUM SERPL-SCNC: 139 MMOL/L — SIGNIFICANT CHANGE UP (ref 135–145)
WBC # BLD: 6.2 K/UL — SIGNIFICANT CHANGE UP (ref 3.8–10.5)
WBC # FLD AUTO: 6.2 K/UL — SIGNIFICANT CHANGE UP (ref 3.8–10.5)

## 2017-06-01 PROCEDURE — 99233 SBSQ HOSP IP/OBS HIGH 50: CPT | Mod: GC

## 2017-06-01 PROCEDURE — 93010 ELECTROCARDIOGRAM REPORT: CPT

## 2017-06-01 PROCEDURE — 93971 EXTREMITY STUDY: CPT | Mod: 26

## 2017-06-01 RX ORDER — CALCIUM GLUCONATE 100 MG/ML
1 VIAL (ML) INTRAVENOUS ONCE
Qty: 0 | Refills: 0 | Status: COMPLETED | OUTPATIENT
Start: 2017-06-01 | End: 2017-06-01

## 2017-06-01 RX ORDER — DEXTROSE 50 % IN WATER 50 %
50 SYRINGE (ML) INTRAVENOUS ONCE
Qty: 0 | Refills: 0 | Status: COMPLETED | OUTPATIENT
Start: 2017-06-01 | End: 2017-06-01

## 2017-06-01 RX ORDER — INSULIN HUMAN 100 [IU]/ML
10 INJECTION, SOLUTION SUBCUTANEOUS ONCE
Qty: 0 | Refills: 0 | Status: COMPLETED | OUTPATIENT
Start: 2017-06-01 | End: 2017-06-01

## 2017-06-01 RX ADMIN — Medication 100 MILLIGRAM(S): at 05:45

## 2017-06-01 RX ADMIN — Medication 100 MILLIGRAM(S): at 18:16

## 2017-06-01 RX ADMIN — Medication 200 GRAM(S): at 05:16

## 2017-06-01 RX ADMIN — INSULIN HUMAN 10 UNIT(S): 100 INJECTION, SOLUTION SUBCUTANEOUS at 05:09

## 2017-06-01 RX ADMIN — Medication 1 TABLET(S): at 08:44

## 2017-06-01 RX ADMIN — Medication 50 MILLILITER(S): at 05:01

## 2017-06-02 ENCOUNTER — TRANSCRIPTION ENCOUNTER (OUTPATIENT)
Age: 32
End: 2017-06-02

## 2017-06-02 LAB
ANION GAP SERPL CALC-SCNC: 16 MMOL/L — SIGNIFICANT CHANGE UP (ref 5–17)
ANION GAP SERPL CALC-SCNC: 22 MMOL/L — HIGH (ref 5–17)
APTT BLD: 27.8 SEC — SIGNIFICANT CHANGE UP (ref 27.5–37.4)
BLD GP AB SCN SERPL QL: NEGATIVE — SIGNIFICANT CHANGE UP
BUN SERPL-MCNC: 54 MG/DL — HIGH (ref 7–23)
BUN SERPL-MCNC: 97 MG/DL — HIGH (ref 7–23)
CALCIUM SERPL-MCNC: 8.9 MG/DL — SIGNIFICANT CHANGE UP (ref 8.4–10.5)
CALCIUM SERPL-MCNC: 9.1 MG/DL — SIGNIFICANT CHANGE UP (ref 8.4–10.5)
CHLORIDE SERPL-SCNC: 92 MMOL/L — LOW (ref 96–108)
CHLORIDE SERPL-SCNC: 95 MMOL/L — LOW (ref 96–108)
CO2 SERPL-SCNC: 22 MMOL/L — SIGNIFICANT CHANGE UP (ref 22–31)
CO2 SERPL-SCNC: 25 MMOL/L — SIGNIFICANT CHANGE UP (ref 22–31)
CREAT SERPL-MCNC: 13.98 MG/DL — HIGH (ref 0.5–1.3)
CREAT SERPL-MCNC: 22.2 MG/DL — HIGH (ref 0.5–1.3)
GLUCOSE SERPL-MCNC: 125 MG/DL — HIGH (ref 70–99)
GLUCOSE SERPL-MCNC: 75 MG/DL — SIGNIFICANT CHANGE UP (ref 70–99)
HCT VFR BLD CALC: 38.6 % — LOW (ref 39–50)
HGB BLD-MCNC: 13 G/DL — SIGNIFICANT CHANGE UP (ref 13–17)
INR BLD: 0.99 RATIO — SIGNIFICANT CHANGE UP (ref 0.88–1.16)
MAGNESIUM SERPL-MCNC: 2 MG/DL — SIGNIFICANT CHANGE UP (ref 1.6–2.6)
MAGNESIUM SERPL-MCNC: 2.5 MG/DL — SIGNIFICANT CHANGE UP (ref 1.6–2.6)
MCHC RBC-ENTMCNC: 33.6 GM/DL — SIGNIFICANT CHANGE UP (ref 32–36)
MCHC RBC-ENTMCNC: 34 PG — SIGNIFICANT CHANGE UP (ref 27–34)
MCV RBC AUTO: 101 FL — HIGH (ref 80–100)
PHOSPHATE SERPL-MCNC: 6 MG/DL — HIGH (ref 2.5–4.5)
PHOSPHATE SERPL-MCNC: 9.5 MG/DL — HIGH (ref 2.5–4.5)
PLATELET # BLD AUTO: 171 K/UL — SIGNIFICANT CHANGE UP (ref 150–400)
POTASSIUM SERPL-MCNC: 4.9 MMOL/L — SIGNIFICANT CHANGE UP (ref 3.5–5.3)
POTASSIUM SERPL-MCNC: 7.3 MMOL/L — CRITICAL HIGH (ref 3.5–5.3)
POTASSIUM SERPL-SCNC: 4.9 MMOL/L — SIGNIFICANT CHANGE UP (ref 3.5–5.3)
POTASSIUM SERPL-SCNC: 7.3 MMOL/L — CRITICAL HIGH (ref 3.5–5.3)
PROTHROM AB SERPL-ACNC: 10.7 SEC — SIGNIFICANT CHANGE UP (ref 9.8–12.7)
RBC # BLD: 3.81 M/UL — LOW (ref 4.2–5.8)
RBC # FLD: 12.7 % — SIGNIFICANT CHANGE UP (ref 10.3–14.5)
RH IG SCN BLD-IMP: POSITIVE — SIGNIFICANT CHANGE UP
SODIUM SERPL-SCNC: 136 MMOL/L — SIGNIFICANT CHANGE UP (ref 135–145)
SODIUM SERPL-SCNC: 136 MMOL/L — SIGNIFICANT CHANGE UP (ref 135–145)
WBC # BLD: 5.6 K/UL — SIGNIFICANT CHANGE UP (ref 3.8–10.5)
WBC # FLD AUTO: 5.6 K/UL — SIGNIFICANT CHANGE UP (ref 3.8–10.5)

## 2017-06-02 PROCEDURE — 99233 SBSQ HOSP IP/OBS HIGH 50: CPT | Mod: GC

## 2017-06-02 RX ADMIN — Medication 100 MILLIGRAM(S): at 17:15

## 2017-06-03 ENCOUNTER — TRANSCRIPTION ENCOUNTER (OUTPATIENT)
Age: 32
End: 2017-06-03

## 2017-06-03 LAB
ANION GAP SERPL CALC-SCNC: 18 MMOL/L — HIGH (ref 5–17)
BUN SERPL-MCNC: 67 MG/DL — HIGH (ref 7–23)
CALCIUM SERPL-MCNC: 9.9 MG/DL — SIGNIFICANT CHANGE UP (ref 8.4–10.5)
CHLORIDE SERPL-SCNC: 96 MMOL/L — SIGNIFICANT CHANGE UP (ref 96–108)
CO2 SERPL-SCNC: 25 MMOL/L — SIGNIFICANT CHANGE UP (ref 22–31)
CREAT SERPL-MCNC: 15.79 MG/DL — HIGH (ref 0.5–1.3)
GLUCOSE SERPL-MCNC: 108 MG/DL — HIGH (ref 70–99)
HCT VFR BLD CALC: 35.3 % — LOW (ref 39–50)
HGB BLD-MCNC: 12.1 G/DL — LOW (ref 13–17)
MAGNESIUM SERPL-MCNC: 2.3 MG/DL — SIGNIFICANT CHANGE UP (ref 1.6–2.6)
MCHC RBC-ENTMCNC: 34.1 PG — HIGH (ref 27–34)
MCHC RBC-ENTMCNC: 34.2 GM/DL — SIGNIFICANT CHANGE UP (ref 32–36)
MCV RBC AUTO: 99.9 FL — SIGNIFICANT CHANGE UP (ref 80–100)
PHOSPHATE SERPL-MCNC: 7.7 MG/DL — HIGH (ref 2.5–4.5)
PLATELET # BLD AUTO: 155 K/UL — SIGNIFICANT CHANGE UP (ref 150–400)
POTASSIUM SERPL-MCNC: 5.3 MMOL/L — SIGNIFICANT CHANGE UP (ref 3.5–5.3)
POTASSIUM SERPL-SCNC: 5.3 MMOL/L — SIGNIFICANT CHANGE UP (ref 3.5–5.3)
RBC # BLD: 3.54 M/UL — LOW (ref 4.2–5.8)
RBC # FLD: 12.5 % — SIGNIFICANT CHANGE UP (ref 10.3–14.5)
SODIUM SERPL-SCNC: 139 MMOL/L — SIGNIFICANT CHANGE UP (ref 135–145)
WBC # BLD: 5.6 K/UL — SIGNIFICANT CHANGE UP (ref 3.8–10.5)
WBC # FLD AUTO: 5.6 K/UL — SIGNIFICANT CHANGE UP (ref 3.8–10.5)

## 2017-06-03 RX ADMIN — Medication 1 TABLET(S): at 08:38

## 2017-06-04 LAB
ANION GAP SERPL CALC-SCNC: 19 MMOL/L — HIGH (ref 5–17)
ANION GAP SERPL CALC-SCNC: 20 MMOL/L — HIGH (ref 5–17)
APTT BLD: 31 SEC — SIGNIFICANT CHANGE UP (ref 27.5–37.4)
BUN SERPL-MCNC: 57 MG/DL — HIGH (ref 7–23)
BUN SERPL-MCNC: 93 MG/DL — HIGH (ref 7–23)
CALCIUM SERPL-MCNC: 9.1 MG/DL — SIGNIFICANT CHANGE UP (ref 8.4–10.5)
CALCIUM SERPL-MCNC: 9.2 MG/DL — SIGNIFICANT CHANGE UP (ref 8.4–10.5)
CHLORIDE SERPL-SCNC: 95 MMOL/L — LOW (ref 96–108)
CHLORIDE SERPL-SCNC: 95 MMOL/L — LOW (ref 96–108)
CO2 SERPL-SCNC: 22 MMOL/L — SIGNIFICANT CHANGE UP (ref 22–31)
CO2 SERPL-SCNC: 27 MMOL/L — SIGNIFICANT CHANGE UP (ref 22–31)
CREAT SERPL-MCNC: 13.12 MG/DL — HIGH (ref 0.5–1.3)
CREAT SERPL-MCNC: 20.04 MG/DL — HIGH (ref 0.5–1.3)
GLUCOSE SERPL-MCNC: 104 MG/DL — HIGH (ref 70–99)
GLUCOSE SERPL-MCNC: 78 MG/DL — SIGNIFICANT CHANGE UP (ref 70–99)
HCT VFR BLD CALC: 34.4 % — LOW (ref 39–50)
HGB BLD-MCNC: 11.6 G/DL — LOW (ref 13–17)
INR BLD: 0.99 RATIO — SIGNIFICANT CHANGE UP (ref 0.88–1.16)
MAGNESIUM SERPL-MCNC: 2.1 MG/DL — SIGNIFICANT CHANGE UP (ref 1.6–2.6)
MAGNESIUM SERPL-MCNC: 2.5 MG/DL — SIGNIFICANT CHANGE UP (ref 1.6–2.6)
MCHC RBC-ENTMCNC: 33.6 PG — SIGNIFICANT CHANGE UP (ref 27–34)
MCHC RBC-ENTMCNC: 33.7 GM/DL — SIGNIFICANT CHANGE UP (ref 32–36)
MCV RBC AUTO: 99.8 FL — SIGNIFICANT CHANGE UP (ref 80–100)
PHOSPHATE SERPL-MCNC: 5.7 MG/DL — HIGH (ref 2.5–4.5)
PHOSPHATE SERPL-MCNC: 9.1 MG/DL — HIGH (ref 2.5–4.5)
PLATELET # BLD AUTO: 159 K/UL — SIGNIFICANT CHANGE UP (ref 150–400)
POTASSIUM SERPL-MCNC: 4.8 MMOL/L — SIGNIFICANT CHANGE UP (ref 3.5–5.3)
POTASSIUM SERPL-MCNC: 7.2 MMOL/L — CRITICAL HIGH (ref 3.5–5.3)
POTASSIUM SERPL-SCNC: 4.8 MMOL/L — SIGNIFICANT CHANGE UP (ref 3.5–5.3)
POTASSIUM SERPL-SCNC: 7.2 MMOL/L — CRITICAL HIGH (ref 3.5–5.3)
PROTHROM AB SERPL-ACNC: 10.7 SEC — SIGNIFICANT CHANGE UP (ref 9.8–12.7)
RBC # BLD: 3.44 M/UL — LOW (ref 4.2–5.8)
RBC # FLD: 12.4 % — SIGNIFICANT CHANGE UP (ref 10.3–14.5)
SODIUM SERPL-SCNC: 137 MMOL/L — SIGNIFICANT CHANGE UP (ref 135–145)
SODIUM SERPL-SCNC: 141 MMOL/L — SIGNIFICANT CHANGE UP (ref 135–145)
WBC # BLD: 6.5 K/UL — SIGNIFICANT CHANGE UP (ref 3.8–10.5)
WBC # FLD AUTO: 6.5 K/UL — SIGNIFICANT CHANGE UP (ref 3.8–10.5)

## 2017-06-04 PROCEDURE — 93010 ELECTROCARDIOGRAM REPORT: CPT

## 2017-06-04 PROCEDURE — 99233 SBSQ HOSP IP/OBS HIGH 50: CPT | Mod: GC

## 2017-06-04 RX ORDER — CALCIUM CARBONATE 500(1250)
2 TABLET ORAL THREE TIMES A DAY
Qty: 0 | Refills: 0 | Status: DISCONTINUED | OUTPATIENT
Start: 2017-06-04 | End: 2017-06-06

## 2017-06-04 RX ORDER — DEXTROSE 50 % IN WATER 50 %
50 SYRINGE (ML) INTRAVENOUS ONCE
Qty: 0 | Refills: 0 | Status: COMPLETED | OUTPATIENT
Start: 2017-06-04 | End: 2017-06-04

## 2017-06-04 RX ORDER — FUROSEMIDE 40 MG
80 TABLET ORAL ONCE
Qty: 0 | Refills: 0 | Status: COMPLETED | OUTPATIENT
Start: 2017-06-04 | End: 2017-06-04

## 2017-06-04 RX ORDER — SODIUM POLYSTYRENE SULFONATE 4.1 MEQ/G
30 POWDER, FOR SUSPENSION ORAL ONCE
Qty: 0 | Refills: 0 | Status: DISCONTINUED | OUTPATIENT
Start: 2017-06-04 | End: 2017-06-04

## 2017-06-04 RX ORDER — CALCIUM GLUCONATE 100 MG/ML
1 VIAL (ML) INTRAVENOUS ONCE
Qty: 0 | Refills: 0 | Status: COMPLETED | OUTPATIENT
Start: 2017-06-04 | End: 2017-06-04

## 2017-06-04 RX ORDER — INSULIN HUMAN 100 [IU]/ML
10 INJECTION, SOLUTION SUBCUTANEOUS ONCE
Qty: 0 | Refills: 0 | Status: COMPLETED | OUTPATIENT
Start: 2017-06-04 | End: 2017-06-04

## 2017-06-04 RX ADMIN — Medication 50 MILLILITER(S): at 10:00

## 2017-06-04 RX ADMIN — Medication 1 TABLET(S): at 08:21

## 2017-06-04 RX ADMIN — Medication 200 GRAM(S): at 10:13

## 2017-06-04 RX ADMIN — Medication 80 MILLIGRAM(S): at 09:51

## 2017-06-04 RX ADMIN — INSULIN HUMAN 10 UNIT(S): 100 INJECTION, SOLUTION SUBCUTANEOUS at 10:16

## 2017-06-04 RX ADMIN — HEPARIN SODIUM 5000 UNIT(S): 5000 INJECTION INTRAVENOUS; SUBCUTANEOUS at 21:44

## 2017-06-04 RX ADMIN — Medication 2 TABLET(S): at 18:21

## 2017-06-04 RX ADMIN — Medication 100 MILLIGRAM(S): at 18:21

## 2017-06-04 RX ADMIN — Medication 100 MILLIGRAM(S): at 05:38

## 2017-06-05 ENCOUNTER — TRANSCRIPTION ENCOUNTER (OUTPATIENT)
Age: 32
End: 2017-06-05

## 2017-06-05 LAB
ANION GAP SERPL CALC-SCNC: 17 MMOL/L — SIGNIFICANT CHANGE UP (ref 5–17)
APTT BLD: 28.2 SEC — SIGNIFICANT CHANGE UP (ref 27.5–37.4)
BUN SERPL-MCNC: 73 MG/DL — HIGH (ref 7–23)
CALCIUM SERPL-MCNC: 9 MG/DL — SIGNIFICANT CHANGE UP (ref 8.4–10.5)
CHLORIDE SERPL-SCNC: 96 MMOL/L — SIGNIFICANT CHANGE UP (ref 96–108)
CO2 SERPL-SCNC: 25 MMOL/L — SIGNIFICANT CHANGE UP (ref 22–31)
CREAT SERPL-MCNC: 15.64 MG/DL — HIGH (ref 0.5–1.3)
GLUCOSE SERPL-MCNC: 80 MG/DL — SIGNIFICANT CHANGE UP (ref 70–99)
HCT VFR BLD CALC: 33.2 % — LOW (ref 39–50)
HGB BLD-MCNC: 11 G/DL — LOW (ref 13–17)
INR BLD: 1.05 RATIO — SIGNIFICANT CHANGE UP (ref 0.88–1.16)
MAGNESIUM SERPL-MCNC: 2.5 MG/DL — SIGNIFICANT CHANGE UP (ref 1.6–2.6)
MCHC RBC-ENTMCNC: 33.2 GM/DL — SIGNIFICANT CHANGE UP (ref 32–36)
MCHC RBC-ENTMCNC: 33.2 PG — SIGNIFICANT CHANGE UP (ref 27–34)
MCV RBC AUTO: 99.8 FL — SIGNIFICANT CHANGE UP (ref 80–100)
PHOSPHATE SERPL-MCNC: 7.6 MG/DL — HIGH (ref 2.5–4.5)
PLATELET # BLD AUTO: 156 K/UL — SIGNIFICANT CHANGE UP (ref 150–400)
POTASSIUM SERPL-MCNC: 5.5 MMOL/L — HIGH (ref 3.5–5.3)
POTASSIUM SERPL-SCNC: 5.5 MMOL/L — HIGH (ref 3.5–5.3)
PROTHROM AB SERPL-ACNC: 11.5 SEC — SIGNIFICANT CHANGE UP (ref 9.8–12.7)
RBC # BLD: 3.32 M/UL — LOW (ref 4.2–5.8)
RBC # FLD: 12.3 % — SIGNIFICANT CHANGE UP (ref 10.3–14.5)
SODIUM SERPL-SCNC: 138 MMOL/L — SIGNIFICANT CHANGE UP (ref 135–145)
WBC # BLD: 5.7 K/UL — SIGNIFICANT CHANGE UP (ref 3.8–10.5)
WBC # FLD AUTO: 5.7 K/UL — SIGNIFICANT CHANGE UP (ref 3.8–10.5)

## 2017-06-05 PROCEDURE — 80048 BASIC METABOLIC PNL TOTAL CA: CPT

## 2017-06-05 PROCEDURE — 85730 THROMBOPLASTIN TIME PARTIAL: CPT

## 2017-06-05 PROCEDURE — 86850 RBC ANTIBODY SCREEN: CPT

## 2017-06-05 PROCEDURE — 93971 EXTREMITY STUDY: CPT

## 2017-06-05 PROCEDURE — 93990 DOPPLER FLOW TESTING: CPT

## 2017-06-05 PROCEDURE — 84132 ASSAY OF SERUM POTASSIUM: CPT

## 2017-06-05 PROCEDURE — 86900 BLOOD TYPING SEROLOGIC ABO: CPT

## 2017-06-05 PROCEDURE — C1769: CPT

## 2017-06-05 PROCEDURE — 99285 EMERGENCY DEPT VISIT HI MDM: CPT | Mod: 25

## 2017-06-05 PROCEDURE — 83735 ASSAY OF MAGNESIUM: CPT

## 2017-06-05 PROCEDURE — 77001 FLUOROGUIDE FOR VEIN DEVICE: CPT

## 2017-06-05 PROCEDURE — 85610 PROTHROMBIN TIME: CPT

## 2017-06-05 PROCEDURE — 84100 ASSAY OF PHOSPHORUS: CPT

## 2017-06-05 PROCEDURE — C1889: CPT

## 2017-06-05 PROCEDURE — 36558 INSERT TUNNELED CV CATH: CPT

## 2017-06-05 PROCEDURE — 93005 ELECTROCARDIOGRAM TRACING: CPT

## 2017-06-05 PROCEDURE — C1887: CPT

## 2017-06-05 PROCEDURE — 99261: CPT

## 2017-06-05 PROCEDURE — C1894: CPT

## 2017-06-05 PROCEDURE — 76937 US GUIDE VASCULAR ACCESS: CPT

## 2017-06-05 PROCEDURE — C1750: CPT

## 2017-06-05 PROCEDURE — 85027 COMPLETE CBC AUTOMATED: CPT

## 2017-06-05 PROCEDURE — 77001 FLUOROGUIDE FOR VEIN DEVICE: CPT | Mod: 26

## 2017-06-05 PROCEDURE — 76937 US GUIDE VASCULAR ACCESS: CPT | Mod: 26

## 2017-06-05 PROCEDURE — 76000 FLUOROSCOPY <1 HR PHYS/QHP: CPT

## 2017-06-05 PROCEDURE — 86901 BLOOD TYPING SEROLOGIC RH(D): CPT

## 2017-06-05 PROCEDURE — 71045 X-RAY EXAM CHEST 1 VIEW: CPT

## 2017-06-05 RX ORDER — OXYCODONE HYDROCHLORIDE 5 MG/1
1 TABLET ORAL
Qty: 25 | Refills: 0 | OUTPATIENT
Start: 2017-06-05

## 2017-06-05 RX ORDER — CALCIUM CARBONATE 500(1250)
2 TABLET ORAL
Qty: 0 | Refills: 0 | COMMUNITY
Start: 2017-06-05

## 2017-06-05 RX ADMIN — Medication 100 MILLIGRAM(S): at 05:32

## 2017-06-05 RX ADMIN — HEPARIN SODIUM 5000 UNIT(S): 5000 INJECTION INTRAVENOUS; SUBCUTANEOUS at 14:22

## 2017-06-05 RX ADMIN — Medication 2 TABLET(S): at 14:18

## 2017-06-05 RX ADMIN — HEPARIN SODIUM 5000 UNIT(S): 5000 INJECTION INTRAVENOUS; SUBCUTANEOUS at 05:33

## 2017-06-05 NOTE — DISCHARGE NOTE ADULT - MEDICATION SUMMARY - MEDICATIONS TO TAKE
I will START or STAY ON the medications listed below when I get home from the hospital:    calcium carbonate 500 mg (200 mg elemental calcium) oral tablet, chewable  -- 2 tab(s) by mouth 3 times a day  -- Indication: For dialysis    meclizine 25 mg oral tablet  -- 1 tab(s) by mouth 3 times a day  -- May cause drowsiness.  Alcohol may intensify this effect.  Use care when operating dangerous machinery.    -- Indication: For dizziness    labetalol 200 mg oral tablet  -- 1 tab(s) by mouth 2 times a day  -- Indication: For high blood pressure    Renagel 800 mg oral tablet  -- 2 tab(s) by mouth 3 times a day (with meals)  -- Indication: For dialysis    ergocalciferol 50,000 intl units oral capsule  -- 1 cap(s) by mouth once a week on Thursday  -- Indication: For Anemia

## 2017-06-05 NOTE — DISCHARGE NOTE ADULT - CARE PROVIDER_API CALL
Rufina Farr), Surgery  [address] [place] [address]  Phone: [phone]  Fax: [phone] Rufina Farr), Surgery  [address] [place] [address]  Phone: [phone]  Fax: [phone]

## 2017-06-05 NOTE — DISCHARGE NOTE ADULT - PLAN OF CARE
recover from surgery Follow up with Dr. Farr (Vascular surgeon) in one week . Please call to schedule an appointment.   NOTIFY YOUR SURGEON IF: You have any bleeding that does not stop, any pus draining from your wound, any fever (over 100.4 F) or chills, persistent nausea/vomiting, persistent diarrhea, or if your pain is not controlled on your discharge pain medications. Please follow up with your primary care physician regarding your hospitalization. Please schedule an appointment with your primary care provider within two weeks after your discharge to review your hospital course.

## 2017-06-05 NOTE — DISCHARGE NOTE ADULT - NS AS ACTIVITY OBS
Stairs allowed/Walking-Indoors allowed/Showering allowed/No Heavy lifting/straining/Walking-Outdoors allowed

## 2017-06-05 NOTE — DISCHARGE NOTE ADULT - CARE PLAN
Principal Discharge DX:	AV fistula thrombosis  Goal:	recover from surgery  Instructions for follow-up, activity and diet:	Follow up with Dr. Farr (Vascular surgeon) in one week . Please call to schedule an appointment.   NOTIFY YOUR SURGEON IF: You have any bleeding that does not stop, any pus draining from your wound, any fever (over 100.4 F) or chills, persistent nausea/vomiting, persistent diarrhea, or if your pain is not controlled on your discharge pain medications.  Instructions for follow-up, activity and diet:	Please follow up with your primary care physician regarding your hospitalization. Please schedule an appointment with your primary care provider within two weeks after your discharge to review your hospital course.

## 2017-06-05 NOTE — DISCHARGE NOTE ADULT - PATIENT PORTAL LINK FT
“You can access the [facility] Patient Portal, offered by [facility], by registering with the following website: [email or web address]”

## 2017-06-05 NOTE — DISCHARGE NOTE ADULT - HOSPITAL COURSE
31M with history of HTN, ESRD on HD (MWF) p/w bulge over AVF. He received HD yesterday without incident. He went to sleep and woke up with a bulge and pulsating sensation over the fistula. No pain or weakness in the hand. He said he could not feel a thrill. Duplex revealed Occluded AV fistula with thrombus extending from the anastomosis at the venous outflow tract extending into the left cephalic vein to the level of the mid forearm.    Repeat duplex on 6/1 revealed Partially occlusive thrombus is seen along the posterior wall of the right internal jugular vein. The appearance suggests chronic venous change and scarring.   Pt underwent IR permacath on 6/5/2017. 31M with history of HTN, ESRD on HD (MWF) p/w bulge over AVF. He received HD yesterday without incident. He went to sleep and woke up with a bulge and pulsating sensation over the fistula. No pain or weakness in the hand. He said he could not feel a thrill. Duplex revealed Occluded AV fistula with thrombus extending from the anastomosis at the venous outflow tract extending into the left cephalic vein to the level of the mid forearm. Pt was taken to the OR on 5/28 and underwent a fistulogram, AVF thrombectomy which failed. Renal consulted to start HD. Negarley placed for access. Pt had an episode of hypotension on 5/29 after dialysis requiring a 250 cc bolus with resolution of hypotension. Pt underwent a LUE AVF creation on 5/31. He tolerated the procedure well. Repeat duplex on 6/1 revealed Partially occlusive thrombus is seen along the posterior wall of the right internal jugular vein. The appearance suggests chronic venous change and scarring. Pt underwent IR permacath on 6/5/2017. Pt currently tolerating a regular diet, ambulating, voiding and pain controlled. Pt stable for discharge. Shiley removed prior to d/c home. He was instructed to follow up with his nephrologist and Dr. Farr.

## 2017-06-05 NOTE — DISCHARGE NOTE ADULT - CONDITIONS AT DISCHARGE
Pt a/ox4, vss, no s/s of distress, Pt educated on discharge information and medications. Pt with L forearm fistula not in use, with sutures at L wrist, and L upper arm AV fistula with strong bruit and thrill, and sutures at L AC. L permacath in place, dressing c/d/i - pt reports that it is "uncomfortable" - MD Nina at bedside, no interventions required. Gauze and tegaderm to R groin from shiley removal site, c/d/i Pt a/ox4, vss, no s/s of distress, Pt educated on discharge information and medications. Pt with L forearm fistula not in use, with sutures at L wrist, and L upper arm AV fistula with strong bruit and thrill, and sutures at L AC. L permacath in place, dressing c/d/i - pt reports that it is "uncomfortable" - MD Nina at bedside, no interventions required. Gauze and tegaderm to R groin from shiley removal site, c/d/i.

## 2017-06-06 VITALS
SYSTOLIC BLOOD PRESSURE: 116 MMHG | DIASTOLIC BLOOD PRESSURE: 76 MMHG | RESPIRATION RATE: 17 BRPM | OXYGEN SATURATION: 99 % | TEMPERATURE: 99 F | HEART RATE: 81 BPM

## 2017-06-12 ENCOUNTER — EMERGENCY (EMERGENCY)
Facility: HOSPITAL | Age: 32
LOS: 1 days | Discharge: HOME | End: 2017-06-12
Attending: EMERGENCY MEDICINE | Admitting: INTERNAL MEDICINE
Payer: MEDICAID

## 2017-06-12 VITALS
SYSTOLIC BLOOD PRESSURE: 130 MMHG | HEART RATE: 78 BPM | OXYGEN SATURATION: 98 % | RESPIRATION RATE: 18 BRPM | TEMPERATURE: 98 F | DIASTOLIC BLOOD PRESSURE: 71 MMHG

## 2017-06-12 VITALS
SYSTOLIC BLOOD PRESSURE: 131 MMHG | TEMPERATURE: 99 F | DIASTOLIC BLOOD PRESSURE: 59 MMHG | RESPIRATION RATE: 16 BRPM | HEART RATE: 79 BPM | OXYGEN SATURATION: 98 %

## 2017-06-12 DIAGNOSIS — I77.0 ARTERIOVENOUS FISTULA, ACQUIRED: Chronic | ICD-10-CM

## 2017-06-12 DIAGNOSIS — R07.9 CHEST PAIN, UNSPECIFIED: ICD-10-CM

## 2017-06-12 LAB
ALBUMIN SERPL ELPH-MCNC: 4.4 G/DL — SIGNIFICANT CHANGE UP (ref 3.3–5)
ALP SERPL-CCNC: 88 U/L — SIGNIFICANT CHANGE UP (ref 40–120)
ALT FLD-CCNC: 12 U/L — SIGNIFICANT CHANGE UP (ref 4–41)
APTT BLD: 40.4 SEC — HIGH (ref 27.5–37.4)
AST SERPL-CCNC: 55 U/L — HIGH (ref 4–40)
BASE EXCESS BLDV CALC-SCNC: 1.8 MMOL/L — SIGNIFICANT CHANGE UP
BASOPHILS # BLD AUTO: 0.03 K/UL — SIGNIFICANT CHANGE UP (ref 0–0.2)
BASOPHILS NFR BLD AUTO: 0.5 % — SIGNIFICANT CHANGE UP (ref 0–2)
BILIRUB SERPL-MCNC: 0.5 MG/DL — SIGNIFICANT CHANGE UP (ref 0.2–1.2)
BLOOD GAS VENOUS - CREATININE: 15 MG/DL — HIGH (ref 0.5–1.3)
BUN SERPL-MCNC: 48 MG/DL — HIGH (ref 7–23)
CALCIUM SERPL-MCNC: 9.7 MG/DL — SIGNIFICANT CHANGE UP (ref 8.4–10.5)
CHLORIDE BLDV-SCNC: 100 MMOL/L — SIGNIFICANT CHANGE UP (ref 96–108)
CHLORIDE SERPL-SCNC: 97 MMOL/L — LOW (ref 98–107)
CK MB BLD-MCNC: 0.1 — SIGNIFICANT CHANGE UP (ref 0–2.5)
CK MB BLD-MCNC: 4 NG/ML — SIGNIFICANT CHANGE UP (ref 1–6.6)
CK MB BLD-MCNC: 4.34 NG/ML — SIGNIFICANT CHANGE UP (ref 1–6.6)
CK SERPL-CCNC: 6214 U/L — HIGH (ref 30–200)
CK SERPL-CCNC: 7895 U/L — HIGH (ref 30–200)
CO2 SERPL-SCNC: 24 MMOL/L — SIGNIFICANT CHANGE UP (ref 22–31)
CREAT SERPL-MCNC: 14.07 MG/DL — HIGH (ref 0.5–1.3)
EOSINOPHIL # BLD AUTO: 0.14 K/UL — SIGNIFICANT CHANGE UP (ref 0–0.5)
EOSINOPHIL NFR BLD AUTO: 2.5 % — SIGNIFICANT CHANGE UP (ref 0–6)
GAS PNL BLDV: 139 MMOL/L — SIGNIFICANT CHANGE UP (ref 136–146)
GLUCOSE BLDV-MCNC: 79 — SIGNIFICANT CHANGE UP (ref 70–99)
GLUCOSE SERPL-MCNC: 79 MG/DL — SIGNIFICANT CHANGE UP (ref 70–99)
HCO3 BLDV-SCNC: 25 MMOL/L — SIGNIFICANT CHANGE UP (ref 20–27)
HCT VFR BLD CALC: 36.2 % — LOW (ref 39–50)
HCT VFR BLDV CALC: 36.9 % — LOW (ref 39–51)
HGB BLD-MCNC: 11.8 G/DL — LOW (ref 13–17)
HGB BLDV-MCNC: 12 G/DL — LOW (ref 13–17)
IMM GRANULOCYTES NFR BLD AUTO: 0.2 % — SIGNIFICANT CHANGE UP (ref 0–1.5)
INR BLD: 1.04 — SIGNIFICANT CHANGE UP (ref 0.88–1.17)
LACTATE BLDV-MCNC: 1 MMOL/L — SIGNIFICANT CHANGE UP (ref 0.5–2)
LYMPHOCYTES # BLD AUTO: 1.64 K/UL — SIGNIFICANT CHANGE UP (ref 1–3.3)
LYMPHOCYTES # BLD AUTO: 29.8 % — SIGNIFICANT CHANGE UP (ref 13–44)
MCHC RBC-ENTMCNC: 32.1 PG — SIGNIFICANT CHANGE UP (ref 27–34)
MCHC RBC-ENTMCNC: 32.6 % — SIGNIFICANT CHANGE UP (ref 32–36)
MCV RBC AUTO: 98.4 FL — SIGNIFICANT CHANGE UP (ref 80–100)
MONOCYTES # BLD AUTO: 0.44 K/UL — SIGNIFICANT CHANGE UP (ref 0–0.9)
MONOCYTES NFR BLD AUTO: 8 % — SIGNIFICANT CHANGE UP (ref 2–14)
NEUTROPHILS # BLD AUTO: 3.24 K/UL — SIGNIFICANT CHANGE UP (ref 1.8–7.4)
NEUTROPHILS NFR BLD AUTO: 59 % — SIGNIFICANT CHANGE UP (ref 43–77)
PCO2 BLDV: 49 MMHG — SIGNIFICANT CHANGE UP (ref 41–51)
PH BLDV: 7.36 PH — SIGNIFICANT CHANGE UP (ref 7.32–7.43)
PLATELET # BLD AUTO: 232 K/UL — SIGNIFICANT CHANGE UP (ref 150–400)
PMV BLD: 10.8 FL — SIGNIFICANT CHANGE UP (ref 7–13)
PO2 BLDV: 45 MMHG — HIGH (ref 35–40)
POTASSIUM BLDV-SCNC: 3.9 MMOL/L — SIGNIFICANT CHANGE UP (ref 3.4–4.5)
POTASSIUM SERPL-MCNC: 4.1 MMOL/L — SIGNIFICANT CHANGE UP (ref 3.5–5.3)
POTASSIUM SERPL-SCNC: 4.1 MMOL/L — SIGNIFICANT CHANGE UP (ref 3.5–5.3)
PROT SERPL-MCNC: 8.3 G/DL — SIGNIFICANT CHANGE UP (ref 6–8.3)
PROTHROM AB SERPL-ACNC: 11.7 SEC — SIGNIFICANT CHANGE UP (ref 9.8–13.1)
RBC # BLD: 3.68 M/UL — LOW (ref 4.2–5.8)
RBC # FLD: 13.3 % — SIGNIFICANT CHANGE UP (ref 10.3–14.5)
SAO2 % BLDV: 74.2 % — SIGNIFICANT CHANGE UP (ref 60–85)
SODIUM SERPL-SCNC: 141 MMOL/L — SIGNIFICANT CHANGE UP (ref 135–145)
TROPONIN T SERPL-MCNC: 0.06 NG/ML — SIGNIFICANT CHANGE UP (ref 0–0.06)
TROPONIN T SERPL-MCNC: 0.06 NG/ML — SIGNIFICANT CHANGE UP (ref 0–0.06)
WBC # BLD: 5.5 K/UL — SIGNIFICANT CHANGE UP (ref 3.8–10.5)
WBC # FLD AUTO: 5.5 K/UL — SIGNIFICANT CHANGE UP (ref 3.8–10.5)

## 2017-06-12 PROCEDURE — 93010 ELECTROCARDIOGRAM REPORT: CPT

## 2017-06-12 PROCEDURE — 99285 EMERGENCY DEPT VISIT HI MDM: CPT | Mod: 25

## 2017-06-12 NOTE — ED ADULT NURSE NOTE - OBJECTIVE STATEMENT
Patient received into room 22 c/o chest pain that started after 3h of dialysis, pt. was scheduled for 5hrs. Pt. states CP lasted approximately 10m and resolved with no interventions. VSS on RA. NSR upon scope of monitor. Patient presents to ED with right CW and L AVF that is maturing. Patient ambulates independently. 20g PIV in place to right AC, labs drawn - will continue to monitor.

## 2017-06-12 NOTE — ED PROVIDER NOTE - OBJECTIVE STATEMENT
30 y/o male with pmhx of ESRD on hemodialysis (M/W/F) secondary to HTN, AV fistula, presents to ED with episode of CP and SOB while having hemodialysis. 32 y/o male with pmhx of ESRD on hemodialysis (M/W/F) secondary to HTN, AV fistula, obesity, presents to ED with episode of sternal CP and SOB while having hemodialysis at rest this morning. Described as a "heaviness" associated with diaphoresis. Non-pleuritic, no radiation, nonsmoker. States was about 3 hours into 5 hour session, cp and sob lasted 10 minutes and resolved on its own without intervention. Pt states he had AV fistula repair 10 days ago, was admitted to hospital for 8 days. No fever, chills, cp, sob, cough, palpitations, headache, abdominal pain, n/v/d, weakness, leg swelling or calf pain, recent travel, family hx of MI or blood clots. 30 y/o male with pmhx of ESRD on hemodialysis (M/W/F) secondary to HTN, AV fistula, obesity, presents to ED with episode of sternal CP and SOB while having hemodialysis at rest this morning. Described as a "heaviness" associated with diaphoresis. Non-pleuritic, no radiation, nonsmoker. States was about 3 hours into 5 hour session, cp and sob lasted 10 minutes and resolved on its own without intervention. Pt states he had AV fistula repair 10 days ago, was admitted to hospital for 8 days. Pt took 2 baby aspirin. No fever, chills, cp, sob, cough, palpitations, headache, abdominal pain, n/v/d, weakness, leg swelling or calf pain, recent travel, family hx of MI or blood clots.

## 2017-06-12 NOTE — ED PROVIDER NOTE - PROGRESS NOTE DETAILS
MAXWELL Carnes - discussed ase with Dr. Gurrola, agrees with plan, suggesting no dimer necessary at this time. pt able. awaiting results. MAXWELL Carnes - spoke with head nurse at Dr. Maral Castaneda's office (dialysis center). aware pt most likely will be admitted, confirmed pt had 3 hours of dialysis of 5 hour session. pt's dialysis time increased to 5 hours due to creatine levels. MAXWELL Carnes - as per lab, machine was broken. awaiting cardiac enzymes, states should be resulted shortly within 15-30 minutes. MAXWELL Carnes - spoke with Dr. Mcdowell, will admit to tele. Tele PA aware. MAXWELL Carnes - pt was admitted to tele and eloped without being seen or evaluated by Dr. Mcdowell or tele PA in the ED. Will change tele admission to eloped as per Dr. Cameron.

## 2017-06-12 NOTE — ED PROVIDER NOTE - ATTENDING CONTRIBUTION TO CARE
Locurto  pt ESRD  HD episode of chest tightness SOB while on dialysis  lasted about 10 min  no h/o prior pain in past      exam  pt in NAD clear lungs  shunt in LUE with thrill  no LE tendeness no abd tenderness EKG  no acute STTW changes

## 2017-06-12 NOTE — ED ADULT NURSE NOTE - CHIEF COMPLAINT QUOTE
Patient brought to Er from dialysis center by EMS for c/o chest pain. Pt had 3hours and 10 minutes out of 5 hours of dialysis. Pt had 2 baby aspirin but the chest pain lasted 10 minutes.

## 2017-06-12 NOTE — ED PROVIDER NOTE - MEDICAL DECISION MAKING DETAILS
32 y/o male with pmhx of ESRD on hemodialysis (M/W/F) secondary to HTN, AV fistula, obesity, presents to ED with episode of sternal CP and SOB while having hemodialysis at rest this morning. r/o ACS. Plan: labs, cxr, ekg, CE, cardiology consult

## 2017-06-12 NOTE — ED ADULT TRIAGE NOTE - CHIEF COMPLAINT QUOTE
Patient brought to Er from dialysis center by EMS for c/o chest pain. Pt had 3hours and 10 minutes out of 5 hours of dialysis. Patient brought to Er from dialysis center by EMS for c/o chest pain. Pt had 3hours and 10 minutes out of 5 hours of dialysis. Pt had 2 baby aspirin but the chest pain lasted 10 minutes.

## 2017-06-20 ENCOUNTER — APPOINTMENT (OUTPATIENT)
Dept: VASCULAR SURGERY | Facility: CLINIC | Age: 32
End: 2017-06-20

## 2017-06-27 ENCOUNTER — EMERGENCY (EMERGENCY)
Facility: HOSPITAL | Age: 32
LOS: 1 days | Discharge: HOME | End: 2017-06-27
Attending: EMERGENCY MEDICINE | Admitting: EMERGENCY MEDICINE
Payer: MEDICAID

## 2017-06-27 VITALS
HEART RATE: 98 BPM | SYSTOLIC BLOOD PRESSURE: 140 MMHG | TEMPERATURE: 100 F | RESPIRATION RATE: 18 BRPM | OXYGEN SATURATION: 96 % | DIASTOLIC BLOOD PRESSURE: 60 MMHG

## 2017-06-27 VITALS
RESPIRATION RATE: 17 BRPM | TEMPERATURE: 99 F | OXYGEN SATURATION: 96 % | HEART RATE: 90 BPM | SYSTOLIC BLOOD PRESSURE: 165 MMHG | DIASTOLIC BLOOD PRESSURE: 75 MMHG

## 2017-06-27 DIAGNOSIS — I77.0 ARTERIOVENOUS FISTULA, ACQUIRED: Chronic | ICD-10-CM

## 2017-06-27 LAB — HIV 1 & 2 AB SERPL IA.RAPID: SIGNIFICANT CHANGE UP

## 2017-06-27 PROCEDURE — 86703 HIV-1/HIV-2 1 RESULT ANTBDY: CPT

## 2017-06-27 PROCEDURE — 99284 EMERGENCY DEPT VISIT MOD MDM: CPT

## 2017-06-27 PROCEDURE — 99284 EMERGENCY DEPT VISIT MOD MDM: CPT | Mod: 25

## 2017-06-27 PROCEDURE — 86780 TREPONEMA PALLIDUM: CPT

## 2017-06-27 NOTE — ED PROVIDER NOTE - MEDICAL DECISION MAKING DETAILS
31 y M c pmh as above, 1 mon s/p fistula placement L AC, 2 wk s/p appt for SR, does not appear infected; no hx of fevers/chills, well appearing.  Surgery for SR, d/c.  --BMM

## 2017-06-27 NOTE — ED ADULT NURSE NOTE - OBJECTIVE STATEMENT
pt states "I need stitches taken out from left upper arm. they were suppose to be taken out a few weeks ago. one time there was a very small amount of discharge from the inside end area, no fevers" pt states "I need stitches taken out from left upper arm ( fistula site). they were suppose to be taken out a few weeks ago. one time there was a very small amount of discharge from the inside end area, no fevers"

## 2017-06-27 NOTE — ED ADULT NURSE REASSESSMENT NOTE - NS ED NURSE REASSESS COMMENT FT1
surgery consult at bedside for suture removal
pt states "I do not urinate x few years now." Dr RILEY Alicea made aware and pt cleared for discharge home per Dr Alicea

## 2017-06-27 NOTE — ED PROVIDER NOTE - OBJECTIVE STATEMENT
30yo M with PMH obesity, HTN, chronic renal failure, AV fistula surgery 2/2 clot on 5/28/17 presenting to ED for suture removal in L arm. Pt reports he missed his follow up appointment at surgery clinic. Denies fever/chills, L arm pain, drainage from site    Vasc Surg - Polic 30yo M with PMH obesity, HTN, chronic renal failure, AV fistula surgery 2/2 clot on 5/28/17 presenting to ED for suture removal in L arm AV fistula site. Pt reports he missed his follow up appointment at surgery clinic. Denies fever/chills, L arm pain, drainage from site, erythema.     Vasc Surg - [facility]

## 2017-06-27 NOTE — ED PROVIDER NOTE - SKIN WOUND DESCRIPTION
L arm AV fistula site with healed incision, 5 interrupted sutures in place, no drainage, no surrounding erythema

## 2017-06-27 NOTE — ED ADULT NURSE NOTE - CHPI ED SYMPTOMS NEG
no purulent drainage/no bleeding/no pain/no redness/no fever/no bleeding at site/no chills/no drainage/no inflammation/no red streaks

## 2017-06-28 LAB — T PALLIDUM AB TITR SER: NEGATIVE — SIGNIFICANT CHANGE UP

## 2017-07-17 ENCOUNTER — APPOINTMENT (OUTPATIENT)
Dept: VASCULAR SURGERY | Facility: CLINIC | Age: 32
End: 2017-07-17

## 2017-07-24 ENCOUNTER — APPOINTMENT (OUTPATIENT)
Dept: VASCULAR SURGERY | Facility: CLINIC | Age: 32
End: 2017-07-24

## 2017-07-31 ENCOUNTER — APPOINTMENT (OUTPATIENT)
Dept: VASCULAR SURGERY | Facility: CLINIC | Age: 32
End: 2017-07-31
Payer: MEDICAID

## 2017-07-31 VITALS
TEMPERATURE: 98.4 F | BODY MASS INDEX: 40.43 KG/M2 | WEIGHT: 315 LBS | HEIGHT: 74 IN | SYSTOLIC BLOOD PRESSURE: 137 MMHG | DIASTOLIC BLOOD PRESSURE: 74 MMHG | HEART RATE: 81 BPM

## 2017-07-31 PROCEDURE — 99024 POSTOP FOLLOW-UP VISIT: CPT

## 2017-07-31 PROCEDURE — 93931 UPPER EXTREMITY STUDY: CPT | Mod: LT

## 2017-08-07 ENCOUNTER — APPOINTMENT (OUTPATIENT)
Dept: VASCULAR SURGERY | Facility: CLINIC | Age: 32
End: 2017-08-07
Payer: MEDICAID

## 2017-08-07 VITALS
DIASTOLIC BLOOD PRESSURE: 83 MMHG | WEIGHT: 315 LBS | SYSTOLIC BLOOD PRESSURE: 151 MMHG | HEART RATE: 80 BPM | HEIGHT: 74 IN | TEMPERATURE: 98.9 F | BODY MASS INDEX: 40.43 KG/M2

## 2017-08-07 PROCEDURE — 99024 POSTOP FOLLOW-UP VISIT: CPT

## 2017-08-10 ENCOUNTER — OUTPATIENT (OUTPATIENT)
Dept: OUTPATIENT SERVICES | Facility: HOSPITAL | Age: 32
LOS: 1 days | Discharge: HOME | End: 2017-08-10
Payer: MEDICAID

## 2017-08-10 DIAGNOSIS — I77.0 ARTERIOVENOUS FISTULA, ACQUIRED: Chronic | ICD-10-CM

## 2017-08-10 PROCEDURE — 76937 US GUIDE VASCULAR ACCESS: CPT | Mod: 26,59,GC

## 2017-08-10 PROCEDURE — 36011 PLACE CATHETER IN VEIN: CPT | Mod: 59,GC

## 2017-08-10 PROCEDURE — 75820 VEIN X-RAY ARM/LEG: CPT | Mod: 26,59,GC

## 2017-08-10 PROCEDURE — 75710 ARTERY X-RAYS ARM/LEG: CPT | Mod: 26,GC

## 2017-08-10 PROCEDURE — 75827 VEIN X-RAY CHEST: CPT | Mod: 26,59,GC

## 2017-08-10 PROCEDURE — 36902 INTRO CATH DIALYSIS CIRCUIT: CPT | Mod: 58

## 2017-08-10 PROCEDURE — 36012 PLACE CATHETER IN VEIN: CPT | Mod: 59

## 2017-08-10 RX ORDER — SODIUM CHLORIDE 9 MG/ML
3 INJECTION INTRAMUSCULAR; INTRAVENOUS; SUBCUTANEOUS EVERY 8 HOURS
Qty: 0 | Refills: 0 | Status: DISCONTINUED | OUTPATIENT
Start: 2017-08-10 | End: 2017-08-25

## 2017-08-10 NOTE — H&P CARDIOLOGY - PMH
Anemia, unspecified type    Chronic renal failure    ESRD (end stage renal disease) on dialysis  since 2013, M-W-F  Hemodialysis access, AV graft    HTN (hypertension)    Leg fracture, right    Morbid obesity    Sleep apnea    Stroke  age 10

## 2017-08-13 ENCOUNTER — EMERGENCY (EMERGENCY)
Facility: HOSPITAL | Age: 32
LOS: 1 days | Discharge: HOME | End: 2017-08-13
Attending: EMERGENCY MEDICINE | Admitting: EMERGENCY MEDICINE
Payer: MEDICAID

## 2017-08-13 VITALS
OXYGEN SATURATION: 99 % | TEMPERATURE: 99 F | DIASTOLIC BLOOD PRESSURE: 58 MMHG | HEART RATE: 92 BPM | WEIGHT: 315 LBS | SYSTOLIC BLOOD PRESSURE: 125 MMHG | RESPIRATION RATE: 17 BRPM

## 2017-08-13 VITALS
DIASTOLIC BLOOD PRESSURE: 89 MMHG | OXYGEN SATURATION: 100 % | TEMPERATURE: 99 F | RESPIRATION RATE: 18 BRPM | HEART RATE: 63 BPM | SYSTOLIC BLOOD PRESSURE: 129 MMHG

## 2017-08-13 DIAGNOSIS — I77.0 ARTERIOVENOUS FISTULA, ACQUIRED: Chronic | ICD-10-CM

## 2017-08-13 LAB
ALBUMIN SERPL ELPH-MCNC: 4 G/DL — SIGNIFICANT CHANGE UP (ref 3.3–5)
ALP SERPL-CCNC: 102 U/L — SIGNIFICANT CHANGE UP (ref 40–120)
ALT FLD-CCNC: 8 U/L RC — LOW (ref 10–45)
ANION GAP SERPL CALC-SCNC: 17 MMOL/L — SIGNIFICANT CHANGE UP (ref 5–17)
AST SERPL-CCNC: 4 U/L — LOW (ref 10–40)
BASOPHILS # BLD AUTO: 0.1 K/UL — SIGNIFICANT CHANGE UP (ref 0–0.2)
BASOPHILS NFR BLD AUTO: 1.6 % — SIGNIFICANT CHANGE UP (ref 0–2)
BILIRUB SERPL-MCNC: 0.3 MG/DL — SIGNIFICANT CHANGE UP (ref 0.2–1.2)
BUN SERPL-MCNC: 64 MG/DL — HIGH (ref 7–23)
CALCIUM SERPL-MCNC: 8.3 MG/DL — LOW (ref 8.4–10.5)
CHLORIDE SERPL-SCNC: 100 MMOL/L — SIGNIFICANT CHANGE UP (ref 96–108)
CO2 SERPL-SCNC: 25 MMOL/L — SIGNIFICANT CHANGE UP (ref 22–31)
CREAT SERPL-MCNC: 15.48 MG/DL — HIGH (ref 0.5–1.3)
EOSINOPHIL # BLD AUTO: 0.1 K/UL — SIGNIFICANT CHANGE UP (ref 0–0.5)
EOSINOPHIL NFR BLD AUTO: 2 % — SIGNIFICANT CHANGE UP (ref 0–6)
GAS PNL BLDV: SIGNIFICANT CHANGE UP
GLUCOSE SERPL-MCNC: 92 MG/DL — SIGNIFICANT CHANGE UP (ref 70–99)
HCT VFR BLD CALC: 35.3 % — LOW (ref 39–50)
HGB BLD-MCNC: 12.2 G/DL — LOW (ref 13–17)
INR BLD: 1.06 RATIO — SIGNIFICANT CHANGE UP (ref 0.88–1.16)
LYMPHOCYTES # BLD AUTO: 2.2 K/UL — SIGNIFICANT CHANGE UP (ref 1–3.3)
LYMPHOCYTES # BLD AUTO: 39.5 % — SIGNIFICANT CHANGE UP (ref 13–44)
MCHC RBC-ENTMCNC: 34.6 GM/DL — SIGNIFICANT CHANGE UP (ref 32–36)
MCHC RBC-ENTMCNC: 34.8 PG — HIGH (ref 27–34)
MCV RBC AUTO: 101 FL — HIGH (ref 80–100)
MONOCYTES # BLD AUTO: 0.6 K/UL — SIGNIFICANT CHANGE UP (ref 0–0.9)
MONOCYTES NFR BLD AUTO: 10.5 % — SIGNIFICANT CHANGE UP (ref 2–14)
NEUTROPHILS # BLD AUTO: 2.5 K/UL — SIGNIFICANT CHANGE UP (ref 1.8–7.4)
NEUTROPHILS NFR BLD AUTO: 46.4 % — SIGNIFICANT CHANGE UP (ref 43–77)
PLATELET # BLD AUTO: 137 K/UL — LOW (ref 150–400)
POTASSIUM SERPL-MCNC: 4.6 MMOL/L — SIGNIFICANT CHANGE UP (ref 3.5–5.3)
POTASSIUM SERPL-SCNC: 4.6 MMOL/L — SIGNIFICANT CHANGE UP (ref 3.5–5.3)
PROT SERPL-MCNC: 7.3 G/DL — SIGNIFICANT CHANGE UP (ref 6–8.3)
PROTHROM AB SERPL-ACNC: 11.6 SEC — SIGNIFICANT CHANGE UP (ref 9.8–12.7)
RBC # BLD: 3.51 M/UL — LOW (ref 4.2–5.8)
RBC # FLD: 11.9 % — SIGNIFICANT CHANGE UP (ref 10.3–14.5)
SODIUM SERPL-SCNC: 142 MMOL/L — SIGNIFICANT CHANGE UP (ref 135–145)
WBC # BLD: 5.5 K/UL — SIGNIFICANT CHANGE UP (ref 3.8–10.5)
WBC # FLD AUTO: 5.5 K/UL — SIGNIFICANT CHANGE UP (ref 3.8–10.5)

## 2017-08-13 PROCEDURE — 83605 ASSAY OF LACTIC ACID: CPT

## 2017-08-13 PROCEDURE — 85014 HEMATOCRIT: CPT

## 2017-08-13 PROCEDURE — 71010: CPT | Mod: 26,76

## 2017-08-13 PROCEDURE — 99284 EMERGENCY DEPT VISIT MOD MDM: CPT | Mod: 25

## 2017-08-13 PROCEDURE — 85610 PROTHROMBIN TIME: CPT

## 2017-08-13 PROCEDURE — 82803 BLOOD GASES ANY COMBINATION: CPT

## 2017-08-13 PROCEDURE — 71045 X-RAY EXAM CHEST 1 VIEW: CPT

## 2017-08-13 PROCEDURE — 80053 COMPREHEN METABOLIC PANEL: CPT

## 2017-08-13 PROCEDURE — 84295 ASSAY OF SERUM SODIUM: CPT

## 2017-08-13 PROCEDURE — 82330 ASSAY OF CALCIUM: CPT

## 2017-08-13 PROCEDURE — 84132 ASSAY OF SERUM POTASSIUM: CPT

## 2017-08-13 PROCEDURE — 82947 ASSAY GLUCOSE BLOOD QUANT: CPT

## 2017-08-13 PROCEDURE — 82435 ASSAY OF BLOOD CHLORIDE: CPT

## 2017-08-13 PROCEDURE — 85027 COMPLETE CBC AUTOMATED: CPT

## 2017-08-13 RX ORDER — ACETAMINOPHEN 500 MG
650 TABLET ORAL ONCE
Qty: 0 | Refills: 0 | Status: COMPLETED | OUTPATIENT
Start: 2017-08-13 | End: 2017-08-13

## 2017-08-13 RX ADMIN — Medication 650 MILLIGRAM(S): at 04:54

## 2017-08-13 NOTE — ED PROVIDER NOTE - SHIFT CHANGE DETAILS
[] Vascular surgery consult evaluate left chest dialysis port  [] dc if cleared by surgery vs admission for IR consult to medicine  ROXI

## 2017-08-13 NOTE — ED PROVIDER NOTE - CARE PLAN
Instructions for follow-up, activity and diet:	Patient wishes to leave the emergency department at this time.  The patient understands that they are leaving against medical advice despite the risk of missing a potentially serious diagnosis which may lead to injury, disability and/or death.  The patient demonstrates understanding of all risks, is awake and alert and demonstrates competence to make medical decisions.  I was unable to convince the patient to stay for further workup and monitoring or discussion with pt's nephrologist.  The patient was given an opportunity to ask any questions.   The patient understands that they may return at any time if desired.  I discussed the importance of close, prompt medical follow-up. Instructions for follow-up, activity and diet:	Patient wishes to leave the emergency department at this time.  The patient understands that they are leaving against medical advice despite the risk of missing a potentially serious diagnosis which may lead to injury, disability and/or death.  The patient demonstrates understanding of all risks, is awake and alert and demonstrates competence to make medical decisions.  I was unable to convince the patient to stay for further workup and monitoring or discussion with pt's nephrologist.  Pt states he is planning to see nephrologist Dr. Pritchard tomorrow at his regularly scheduled dialysis.  The patient was given an opportunity to ask any questions.   The patient understands that they may return at any time if desired.  I discussed the importance of close, prompt medical follow-up. Principal Discharge DX:	Hemodialysis catheter dysfunction, initial encounter  Instructions for follow-up, activity and diet:	Patient wishes to leave the emergency department at this time.  The patient understands that they are leaving against medical advice despite the risk of missing a potentially serious diagnosis which may lead to injury, disability and/or death.  The patient demonstrates understanding of all risks, is awake and alert and demonstrates competence to make medical decisions.  I was unable to convince the patient to stay for further workup and monitoring or discussion with pt's nephrologist.  Pt states he is planning to see nephrologist Dr. Pritchard tomorrow at his regularly scheduled dialysis.  The patient was given an opportunity to ask any questions.   The patient understands that they may return at any time if desired.  I discussed the importance of close, prompt medical follow-up.

## 2017-08-13 NOTE — ED PROVIDER NOTE - NS ED ROS FT
Sarita Ayoub, DO: ROS: denies HA, weakness, dizziness, fevers/chills, nausea/vomiting, chest pain, SOB, diaphoresis, abdominal pain, diarrhea, joint pain, neuro deficits, dysuria/hematuria, rash Sarita Ayoub DO: ROS: denies HA, weakness, dizziness, fevers/chills, nausea/vomiting, chest pain, SOB, diaphoresis, abdominal pain, diarrhea, joint pain, neuro deficits, dysuria/hematuria, rash.

## 2017-08-13 NOTE — ED PROVIDER NOTE - PROGRESS NOTE DETAILS
Sarita Ayoub DO: Surgery at bedside. Recommending CTAP to further elucidate abscess given persistent symptoms despite recent surgery. Sarita Ayoub DO: vascular consulted - will see pt. attending tierney: Received sign out from Dr. Leal. Patient reports chest port catheter fell out. patient reassessed. NAD, equal breath sounds bilaterally. Tip of catheter appears jagged. Occlusive dressing placed. STAT cxr ordered. Vascular re-paged for emergent consultation. Patient wishes to leave the emergency department at this time.  The patient understands that they are leaving against medical advice despite the risk of missing a potentially serious diagnosis which may lead to injury, disability and/or death.  The patient demonstrates understanding of all risks, is awake and alert and demonstrates competence to make medical decisions.  I was unable to convince the patient to stay for further workup and monitoring or discussion with pt's nephrologist.  Pt states he is planning to see nephrologist Dr. Pritchard tomorrow at his regularly scheduled dialysis.  The patient was given an opportunity to ask any questions.   The patient understands that they may return at any time if desired.  I discussed the importance of close, prompt medical follow-up.

## 2017-08-13 NOTE — ED PROVIDER NOTE - OBJECTIVE STATEMENT
Sarita Ayoub, DO: 31F with HTN & ESRD on dialysis MWF via L chest cath with recent fistula dilation in LUE here for burning sensation at cath site s/p dialysis yesterday. Pt completed dialysis MWF this week. No CP/SOB, leg edema. No pustular discharge/drainage, no fevers/chills, nausea/vomiting.     PMD: No PMD  Nephro: Dr. Pritchard Sarita Ayoub, DO: 31F with HTN & ESRD on dialysis MWF via L chest cath with recent fistula dilation in LUE here for burning sensation at cath site s/p dialysis yesterday. Pt completed dialysis MWF this week. No CP/SOB, leg edema. No pustular discharge/drainage, no fevers/chills, nausea/vomiting.   Vascular: Dr. America Farr  PMD: No PMD  Nephro: Dr. Pritchard  IR: Dr. Waters

## 2017-08-13 NOTE — ED PROVIDER NOTE - PLAN OF CARE
Patient wishes to leave the emergency department at this time.  The patient understands that they are leaving against medical advice despite the risk of missing a potentially serious diagnosis which may lead to injury, disability and/or death.  The patient demonstrates understanding of all risks, is awake and alert and demonstrates competence to make medical decisions.  I was unable to convince the patient to stay for further workup and monitoring or discussion with pt's nephrologist.  The patient was given an opportunity to ask any questions.   The patient understands that they may return at any time if desired.  I discussed the importance of close, prompt medical follow-up. Patient wishes to leave the emergency department at this time.  The patient understands that they are leaving against medical advice despite the risk of missing a potentially serious diagnosis which may lead to injury, disability and/or death.  The patient demonstrates understanding of all risks, is awake and alert and demonstrates competence to make medical decisions.  I was unable to convince the patient to stay for further workup and monitoring or discussion with pt's nephrologist.  Pt states he is planning to see nephrologist Dr. Pritchard tomorrow at his regularly scheduled dialysis.  The patient was given an opportunity to ask any questions.   The patient understands that they may return at any time if desired.  I discussed the importance of close, prompt medical follow-up.

## 2017-08-13 NOTE — PROGRESS NOTE ADULT - ASSESSMENT
31M with h/o ESRD on HD, history of failed AVF, s/p recent dilation of LUE AVF, presents with burning pain at site of Permacath and now unintentionally removed by the patient.  - recommend trial of HD via AVF  - if unable to complete session, will need new Permacath  - pt seen and examined with fellow Annette    - Addendum: pt decided to leave AMA. Pt reportedly following up with Dr. Pritchard at HD tomorrow.

## 2017-08-13 NOTE — ED PROVIDER NOTE - ATTENDING CONTRIBUTION TO CARE
I was physically present for the E/M service provided. I agree with above history, physical, and plan which I have reviewed and edited where appropriate. I was physically present for the key portions of the service provided.    31M PMH of HTN and renal failure on HD p/w c/o burning at dialysis port site sine dialysis on Friday. No SOB or fever.  -NAD, afebrile, no chest wall TTP, port upper left chest insertion site clean no erythema or drainage, dressing hanging off end of catheter sutures out, palpable thrill right biceps new fistula site has sutures and appears to be healing well  -No leukocytosis, catheter tip in place at [facility]  -Vascular surgery consult to evaluate catheter

## 2017-08-13 NOTE — ED ADULT NURSE NOTE - CHPI ED SYMPTOMS NEG
no chills/no vomiting/no pain/no nausea/no dizziness/no fever/no numbness/no decreased eating/drinking/no tingling/no weakness

## 2017-08-13 NOTE — PROGRESS NOTE ADULT - SUBJECTIVE AND OBJECTIVE BOX
CC: Patient is a 31y old male who presents with a chief complaint of Permacath displacement.      Patient is a 31y year old male with HTN, ESRD on HD (MWF) who recently had a fistulogram Thurs 8/10.  The patient has had history of occluded AVFs; s/p new LUE AVF 5/31, IR permacath 6/5/2017. The patient presents with burning at the site of his Permacath. Prior to vascular surgery evaluation in the ED, the catheter was pulled out by the patient unintentionally while he was sleeping. Currently no complaints of chest pain. STAT CXR in the ED did not reveal any remaining catheter in his chest.        PMH  Anemia, unspecified type  ESRD (end stage renal disease) on dialysis  Hemodialysis access, AV graft  Chronic renal failure  Leg fracture, right  Sleep apnea  Morbid obesity  Stroke  HTN (hypertension)    PSH  AV fistula  Recent fistulogram 8/10    MEDS  Sensipar 60 mg daily  Tums 1000 mg daily    Allergies  No Known Allergies or Intolerances        Physical Exam  T(C): 37.1 (08-13-17 @ 07:42), Max: 37.2 (08-13-17 @ 02:39)  HR: 63 (08-13-17 @ 07:42) (63 - 92)  BP: 129/89 (08-13-17 @ 07:42) (121/73 - 129/89)  RR: 18 (08-13-17 @ 07:42) (17 - 19)  SpO2: 100% (08-13-17 @ 07:42) (99% - 100%)  Wt(kg): --  Tmax: T(C): , Max: 37.2 (08-13-17 @ 02:39)  Wt(kg): --    Gen: NAD  HEENT: normocephalic, atraumatic, no scleral icterus  CV: S1, S2, RRR  Pulm: CTA B/L  Chest: L upper chest with site of Permacath clean, no bloody or purulent drainage. Skin intact otherwise  Abd: Soft, obese, NTP  Ext: warm, no edema. LUE with strong thrill over AVF.      Labs:                        12.2   5.5   )-----------( 137      ( 13 Aug 2017 04:48 )             35.3     08-13    142  |  100  |  64<H>  ----------------------------<  92  4.6   |  25  |  15.48<H>    Ca    8.3<L>      13 Aug 2017 04:48    TPro  7.3  /  Alb  4.0  /  TBili  0.3  /  DBili  x   /  AST  4<L>  /  ALT  8<L>  /  AlkPhos  102  08-13    PT/INR - ( 13 Aug 2017 04:48 )   PT: 11.6 sec;   INR: 1.06 ratio                   Imaging:  CXR shows clear lungs

## 2017-08-13 NOTE — ED ADULT TRIAGE NOTE - CHIEF COMPLAINT QUOTE
left upper chest wall tessio cath feels burning and thinks it pulled out some; dressing is dry and intact

## 2017-08-13 NOTE — ED PROVIDER NOTE - PHYSICAL EXAMINATION
Sarita Ayoub, DO:   Gen: Well appearing, NAD  Head: NCAT  HEENT: PERRL, MMM, normal conjunctiva, anicteric, neck supple  Lung: CTAB, no adventitious sounds  CV: RRR, no murmurs, rubs or gallops  Abd: soft, NTND, no rebound or guarding, no CVAT  MSK: No edema, no visible deformities  Neuro: No focal neurologic deficits. CN II-XII grossly intact. 5/5 strength and normal sensation in all extremities.  Skin: Warm and dry, no evidence of rash, L anterior chest dialysis catheter port in place with no surrounding erythema, edema, warmth or crepitus. LUE AV fistula with thrill & sutures in place.   Psych: normal mood and affect

## 2017-08-13 NOTE — ED ADULT NURSE NOTE - OBJECTIVE STATEMENT
pt states "last dialysis was on Friday and later that day started feeling a burning to skin where the tessio cath goes into chest. No fever/pain/drainage." LCW Tessio Cath site C/D/I scant dry blood noted to disc and no sutures in place attaching cath to chest wall. Pt stated that the stitches came out a few days ago

## 2017-08-14 ENCOUNTER — EMERGENCY (EMERGENCY)
Facility: HOSPITAL | Age: 32
LOS: 1 days | Discharge: HOME | End: 2017-08-14
Attending: EMERGENCY MEDICINE | Admitting: SURGERY
Payer: MEDICAID

## 2017-08-14 ENCOUNTER — TRANSCRIPTION ENCOUNTER (OUTPATIENT)
Age: 32
End: 2017-08-14

## 2017-08-14 ENCOUNTER — APPOINTMENT (OUTPATIENT)
Age: 32
End: 2017-08-14

## 2017-08-14 VITALS
OXYGEN SATURATION: 100 % | SYSTOLIC BLOOD PRESSURE: 155 MMHG | HEART RATE: 80 BPM | DIASTOLIC BLOOD PRESSURE: 72 MMHG | TEMPERATURE: 99 F | RESPIRATION RATE: 18 BRPM

## 2017-08-14 VITALS
SYSTOLIC BLOOD PRESSURE: 147 MMHG | OXYGEN SATURATION: 98 % | TEMPERATURE: 98 F | RESPIRATION RATE: 18 BRPM | DIASTOLIC BLOOD PRESSURE: 62 MMHG | HEART RATE: 73 BPM

## 2017-08-14 DIAGNOSIS — D64.9 ANEMIA, UNSPECIFIED: ICD-10-CM

## 2017-08-14 DIAGNOSIS — I77.0 ARTERIOVENOUS FISTULA, ACQUIRED: Chronic | ICD-10-CM

## 2017-08-14 DIAGNOSIS — N18.6 END STAGE RENAL DISEASE: ICD-10-CM

## 2017-08-14 DIAGNOSIS — T82.590A OTHER MECHANICAL COMPLICATION OF SURGICALLY CREATED ARTERIOVENOUS FISTULA, INITIAL ENCOUNTER: ICD-10-CM

## 2017-08-14 LAB
ALBUMIN SERPL ELPH-MCNC: 3.7 G/DL — SIGNIFICANT CHANGE UP (ref 3.3–5)
ALP SERPL-CCNC: 94 U/L — SIGNIFICANT CHANGE UP (ref 40–120)
ALT FLD-CCNC: 7 U/L — SIGNIFICANT CHANGE UP (ref 4–41)
APTT BLD: 31.4 SEC — SIGNIFICANT CHANGE UP (ref 27.5–37.4)
AST SERPL-CCNC: 6 U/L — SIGNIFICANT CHANGE UP (ref 4–40)
BASOPHILS # BLD AUTO: 0.03 K/UL — SIGNIFICANT CHANGE UP (ref 0–0.2)
BASOPHILS NFR BLD AUTO: 0.5 % — SIGNIFICANT CHANGE UP (ref 0–2)
BILIRUB SERPL-MCNC: 0.3 MG/DL — SIGNIFICANT CHANGE UP (ref 0.2–1.2)
BUN SERPL-MCNC: 89 MG/DL — HIGH (ref 7–23)
CALCIUM SERPL-MCNC: 8.6 MG/DL — SIGNIFICANT CHANGE UP (ref 8.4–10.5)
CHLORIDE SERPL-SCNC: 99 MMOL/L — SIGNIFICANT CHANGE UP (ref 98–107)
CO2 SERPL-SCNC: 24 MMOL/L — SIGNIFICANT CHANGE UP (ref 22–31)
CREAT SERPL-MCNC: 19.9 MG/DL — HIGH (ref 0.5–1.3)
EOSINOPHIL # BLD AUTO: 0.16 K/UL — SIGNIFICANT CHANGE UP (ref 0–0.5)
EOSINOPHIL NFR BLD AUTO: 2.8 % — SIGNIFICANT CHANGE UP (ref 0–6)
GLUCOSE SERPL-MCNC: 80 MG/DL — SIGNIFICANT CHANGE UP (ref 70–99)
HCT VFR BLD CALC: 32.5 % — LOW (ref 39–50)
HGB BLD-MCNC: 10.7 G/DL — LOW (ref 13–17)
IMM GRANULOCYTES # BLD AUTO: 0.01 # — SIGNIFICANT CHANGE UP
IMM GRANULOCYTES NFR BLD AUTO: 0.2 % — SIGNIFICANT CHANGE UP (ref 0–1.5)
INR BLD: 0.98 — SIGNIFICANT CHANGE UP (ref 0.88–1.17)
LYMPHOCYTES # BLD AUTO: 2.19 K/UL — SIGNIFICANT CHANGE UP (ref 1–3.3)
LYMPHOCYTES # BLD AUTO: 38.2 % — SIGNIFICANT CHANGE UP (ref 13–44)
MAGNESIUM SERPL-MCNC: 2.4 MG/DL — SIGNIFICANT CHANGE UP (ref 1.6–2.6)
MCHC RBC-ENTMCNC: 31.8 PG — SIGNIFICANT CHANGE UP (ref 27–34)
MCHC RBC-ENTMCNC: 32.9 % — SIGNIFICANT CHANGE UP (ref 32–36)
MCV RBC AUTO: 96.7 FL — SIGNIFICANT CHANGE UP (ref 80–100)
MONOCYTES # BLD AUTO: 0.62 K/UL — SIGNIFICANT CHANGE UP (ref 0–0.9)
MONOCYTES NFR BLD AUTO: 10.8 % — SIGNIFICANT CHANGE UP (ref 2–14)
NEUTROPHILS # BLD AUTO: 2.73 K/UL — SIGNIFICANT CHANGE UP (ref 1.8–7.4)
NEUTROPHILS NFR BLD AUTO: 47.5 % — SIGNIFICANT CHANGE UP (ref 43–77)
NRBC # FLD: 0 — SIGNIFICANT CHANGE UP
PHOSPHATE SERPL-MCNC: 4.9 MG/DL — HIGH (ref 2.5–4.5)
PLATELET # BLD AUTO: 150 K/UL — SIGNIFICANT CHANGE UP (ref 150–400)
PMV BLD: 11.9 FL — SIGNIFICANT CHANGE UP (ref 7–13)
POTASSIUM SERPL-MCNC: 4.5 MMOL/L — SIGNIFICANT CHANGE UP (ref 3.5–5.3)
POTASSIUM SERPL-SCNC: 4.5 MMOL/L — SIGNIFICANT CHANGE UP (ref 3.5–5.3)
PROT SERPL-MCNC: 6.8 G/DL — SIGNIFICANT CHANGE UP (ref 6–8.3)
PROTHROM AB SERPL-ACNC: 11 SEC — SIGNIFICANT CHANGE UP (ref 9.8–13.1)
RBC # BLD: 3.36 M/UL — LOW (ref 4.2–5.8)
RBC # FLD: 12.4 % — SIGNIFICANT CHANGE UP (ref 10.3–14.5)
SODIUM SERPL-SCNC: 142 MMOL/L — SIGNIFICANT CHANGE UP (ref 135–145)
WBC # BLD: 5.74 K/UL — SIGNIFICANT CHANGE UP (ref 3.8–10.5)
WBC # FLD AUTO: 5.74 K/UL — SIGNIFICANT CHANGE UP (ref 3.8–10.5)

## 2017-08-14 PROCEDURE — 99285 EMERGENCY DEPT VISIT HI MDM: CPT

## 2017-08-14 PROCEDURE — 99285 EMERGENCY DEPT VISIT HI MDM: CPT | Mod: GC

## 2017-08-14 RX ORDER — CALCIUM CARBONATE 500(1250)
1000 TABLET ORAL DAILY
Qty: 0 | Refills: 0 | Status: DISCONTINUED | OUTPATIENT
Start: 2017-08-14 | End: 2017-08-14

## 2017-08-14 RX ORDER — HEPARIN SODIUM 5000 [USP'U]/ML
5000 INJECTION INTRAVENOUS; SUBCUTANEOUS EVERY 8 HOURS
Qty: 0 | Refills: 0 | Status: DISCONTINUED | OUTPATIENT
Start: 2017-08-14 | End: 2017-08-14

## 2017-08-14 RX ORDER — CINACALCET 30 MG/1
60 TABLET, FILM COATED ORAL DAILY
Qty: 0 | Refills: 0 | Status: DISCONTINUED | OUTPATIENT
Start: 2017-08-14 | End: 2017-08-14

## 2017-08-14 NOTE — ED PROVIDER NOTE - ATTENDING CONTRIBUTION TO CARE
I performed a face to face evaluation of this patient and performed a full history and physical examination on the patient.  I agree with the resident's history, physical examination, and plan of the patient unless otherwise noted. My brief assessment is as follows: 32 y/o male on HD (MWF) sent in for AV fistula complication, recent hx of requiring permacath (which was accidentally removed). dialysis unable to access fistula, no fluid overload, vascular surgery aware of pt, to be admitted for u/s attempt to access fistula vs permacath placement and new AV graft. nephrology contacted for pt to have dialysis today. admit.

## 2017-08-14 NOTE — DISCHARGE NOTE ADULT - HOSPITAL COURSE
31M with HTN, ESRD on HD (MWF) presented initially to [facility] ED from HD with a nonfunctioning AVF. He also complained of itching at the site of his permacath. Somehow, the permacath became dislodged while the patient was asleep in the [place] emergency department. He was evaluated by surgery there and then left AMA to come to the [facility] ED.    The patient has a history of multiple occluded AVFs and most recently had fistulogram of his LUE AVF on Thursday, 8/10 with Dr. Farr. This fistula was placed on 5/31 and permacath placed on 6/5.     In the ED, patient's vitals are normal. He has no complaints. Former area of permacath clean with no evidence of hematoma. Patient with no chest pain or shortness of breath. Fistula with palpable thrill, one stitch from fistulogram.    The patient was admitted to [facility] for hemodialysis. Following hemodialysis, the patient was deemed appropriate for discharge and outpatient follow up.

## 2017-08-14 NOTE — CONSULT NOTE ADULT - ASSESSMENT
31M with h/o HTN, ESRD on HD (MWF), sleep apnea, morbid obesity, HTN admitted for malfunctioning AV graft; missed HD session today.

## 2017-08-14 NOTE — DISCHARGE NOTE ADULT - NS AS ACTIVITY OBS
Bathing allowed/Stairs allowed/Driving allowed/Sex allowed/Walking-Outdoors allowed/Return to Work/School allowed/Walking-Indoors allowed/Showering allowed

## 2017-08-14 NOTE — DISCHARGE NOTE ADULT - PATIENT PORTAL LINK FT
“You can access the [employer] Patient Portal, offered by [facility], by registering with the following website: [email or web address]”

## 2017-08-14 NOTE — CONSULT NOTE ADULT - PROBLEM SELECTOR RECOMMENDATION 9
ESRD on HD: Last HD was on 8/11/17 and tolerated it well. Patient currently not in fluid overload and electrolytes are acceptable. Discussed with vascular surgery team; they will attempt to access AV fistula via ultrasound guidance at dialysis center. If unsuccessful, then will need new tunneled HD catheter placement. Will order dialysis session for today with goal of 1-2 kg fluid removal as tolerated. Monitor renal function and BP.

## 2017-08-14 NOTE — ED PROVIDER NOTE - SKIN, MLM
Permacath site L upper chest well-healing, no drainage/bleeding/cellulitis. LUE AVF with palpable thrill, no bleeding/redness/swelling/warmth.

## 2017-08-14 NOTE — DISCHARGE NOTE ADULT - MEDICATION SUMMARY - MEDICATIONS TO TAKE
I will START or STAY ON the medications listed below when I get home from the hospital:    Discharge Instructions  -- Please follow up with your doctor in 1 week  -- Indication: For Outpatient Follow up    Tums Extra Strength  -- 1000 milligram(s) by mouth once a day  -- Indication: For GERD    Sensipar 60 mg oral tablet  -- 1 tab(s) by mouth once a day  -- Indication: For Chronic Kidney Disease

## 2017-08-14 NOTE — ED ADULT NURSE NOTE - OBJECTIVE STATEMENT
pt with a c/o Inability to access fistula. pt gets Dialysis M-W-F, pt states last dialysis Friday, pt states they were unable to access his fistula today. Pt denies any chest pain or sob.

## 2017-08-14 NOTE — ED PROVIDER NOTE - MEDICAL DECISION MAKING DETAILS
31M with AV fistula malfunction, missed HD today. No bleeding or signs of infection at dialysis site, however pt pulled out his Permacath yesterday and his AV fistula infiltrated at HD this morning. Discussed with vascular surgery, who will perform US-guided AVF access. Pt to be admitted to vascular surgery for urgent dialysis and AVF monitoring, possible placement of new Permacath if AVF cannot be accessed.

## 2017-08-14 NOTE — ED PROVIDER NOTE - OBJECTIVE STATEMENT
31M h/o HTN, ESRD on HD (MWF) sent from HD due AVF complication. Pt 31M h/o HTN, ESRD on HD (MWF) sent from HD due to AVF malfunction. Pt has been receiving HD via Permacath since LUE AVF creation on 5/31/17 (pt had thrombosis of old AVF). Pt seen at [facility] ED yesterday as Permacath was causing him discomfort and pt pulled out Permacath (accidentally while sleeping as per EMR). Pt went to dialysis this morning with plan to use AVF but did not get HD b/c the AVF infiltrated. Referred to ED by Dr Farr with plan for HD with US-guided AVF access vs possible new Permacath placement. Pt denies fevers/chills, CP, SOB, leg swelling, or numbness/weakness.  Nephrology: Dr Tremaine Longo

## 2017-08-14 NOTE — H&P ADULT - ASSESSMENT
31M h/o HTN, ESRD on HD (MWF) sent from HD due to AVF malfunction. Pt has been receiving HD via Permacath since LUE AVF creation on 5/31/17    -plan to attempt HD with U/S guidance today  -nephrology consulted and dialysis ordered  -spoke with dialysis suite  -If fistula cannot be accessed successfully today, patient will need a new permacath with IR

## 2017-08-14 NOTE — CONSULT NOTE ADULT - SUBJECTIVE AND OBJECTIVE BOX
[facility] Division of Kidney Diseases & Hypertension  INITIAL CONSULT NOTE  [phone]--------------------------------------------------------------------------------  HPI:    31M with h/o HTN, ESRD on HD (MWF), sleep apnea, morbid obesity, HTN came with vascular acesss problems. He has history of thrombosed AV graft and had tunneled catheter placed in for HD access. Patient came to [facility] ER on 8/13 as his HD catheter was causing him discomfort. As per patient, he was sleeping in ER and when he woke up, his HD catheter had came out and was lying in bed. He left AMA yesterday and today morning, he went to dialysis center and attempt was made to cannulate the AV fistula but was not successful and was sent to ER for further management. Currently he has no complaints of SOB, CP, abdominal pain, nausea, vomiting, headache, dizziness. He come to [facility] dialysis center and last HD was on 8/11 which he tolerated well.     PAST HISTORY  --------------------------------------------------------------------------------  PAST MEDICAL & SURGICAL HISTORY:  Anemia, unspecified type  ESRD (end stage renal disease) on dialysis: since 2013, M-W-F  Hemodialysis access, AV graft  Chronic renal failure  Leg fracture, right  Sleep apnea  Morbid obesity  Stroke: age 10  HTN (hypertension)  AV fistula: L arm    FAMILY HISTORY:  No pertinent family history in first degree relatives    PAST SOCIAL HISTORY:    Has history of smoking; denies alcohol or drug use.     ALLERGIES & MEDICATIONS  --------------------------------------------------------------------------------  Allergies    No Known Allergies    Intolerances      Standing Inpatient Medications    PRN Inpatient Medications      REVIEW OF SYSTEMS  --------------------------------------------------------------------------------  Gen: No  fevers/chills, weakness  Skin: No rashes  Head/Eyes/Ears/Mouth: No headache; Normal hearing; Normal vision w/o blurriness;   Respiratory: No dyspnea, cough, wheezing, hemoptysis  CV: No chest pain,   GI: No abdominal pain, diarrhea, constipation, nausea, vomiting  : No increased frequency, dysuria, hematuria, nocturia  MSK: No joint pain/swelling;   Neuro: No dizziness/lightheadedness, weakness, seizures    All other systems were reviewed and are negative, except as noted.    VITALS/PHYSICAL EXAM  --------------------------------------------------------------------------------  T(C): 36.7 (08-14-17 @ 14:20), Max: 37 (08-14-17 @ 12:57)  HR: 78 (08-14-17 @ 14:20) (78 - 80)  BP: 152/71 (08-14-17 @ 14:20) (152/71 - 155/72)  RR: 16 (08-14-17 @ 14:20) (16 - 18)  SpO2: 100% (08-14-17 @ 14:20) (100% - 100%)  Wt(kg): --    Weight (kg): 154 (08-13-17 @ 02:39)      Physical Exam:  	Gen: NAD, obese   	Pulm: CTA B/L  	CV:  S1S2  	Abd: +BS, soft   	Ext: No B/L Lower ext edema  	Vascular access: Left AV graft; thrill present.     LABS/STUDIES  --------------------------------------------------------------------------------              10.7   5.74  >-----------<  150      [08-14-17 @ 15:05]              32.5     142  |  100  |  64  ----------------------------<  92      [08-13-17 @ 04:48]  4.6   |  25  |  15.48        Ca     8.3     [08-13-17 @ 04:48]    TPro  7.3  /  Alb  4.0  /  TBili  0.3  /  DBili  x   /  AST  4   /  ALT  8   /  AlkPhos  102  [08-13-17 @ 04:48]    PT/INR: PT 11.6 , INR 1.06       [08-13-17 @ 04:48]      Creatinine Trend:  SCr 15.48 [08-13 @ 04:48]

## 2017-08-14 NOTE — H&P ADULT - NSHPPHYSICALEXAM_GEN_ALL_CORE
General: WN/WD NAD  Neurology: A&Ox3, nonfocal, PARTIDA x 4  Respiratory: CTA B/L  CV: RRR, S1S2, no murmurs, rubs or gallops  Abdominal: Soft, obese, NT, ND +BS, Last BM  Extremities: No edema, + peripheral pulses, LUE with palpable thrill   Left neck with dressing C/D/I, no pain, no evidence of hematoma

## 2017-08-14 NOTE — DISCHARGE NOTE ADULT - CARE PROVIDER_API CALL
Rufina Farr), Surgery  [address]  [address]  [place] [address]  Phone: [phone]  Fax: [phone]

## 2017-08-14 NOTE — DISCHARGE NOTE ADULT - PLAN OF CARE
Had hemodialysis today. Follow up with nephrology and resume regular hemodialysis schedule. See your regular doctor who coordinates hemodialysis.

## 2017-08-14 NOTE — H&P ADULT - HISTORY OF PRESENT ILLNESS
31M with HTN, ESRD on HD (MWF) presented initially to [facility] ED from HD with a nonfunctioning AVF. He also complained of itching at the site of his permacath. Somehow, the permacath became dislodged while the patient was asleep in the [place] emergency department. He was evaluated by surgery there and then left AMA to come to the [facility] ED.  The patient has a history of multiple occluded AVFs and most recently had fistulogram of his LUE AVF on Thursday, 8/10 with Dr. Farr. This fistula was placed on 5/31 and permacath placed on 6/5. .  In the ED, patient's vitals are normal. He has no complaints. Former area of permacath clean with no evidence of hematoma. Patient with no chest pain or shortness of breath. Fistula with palpable thrill, one stitch from fistulogram.

## 2017-08-14 NOTE — ED ADULT TRIAGE NOTE - CHIEF COMPLAINT QUOTE
Pt sent in for problem with fistula. Had procedure done to fistula last week, had dialysis today and "they messed up my fistula." as per pt, his MD is expecting him. Pt refusing to provide full story/hx in triage. L arm precaution. Dialysis M/W/F

## 2017-08-14 NOTE — DISCHARGE NOTE ADULT - CARE PLAN
Principal Discharge DX:	Hemodialysis access, AV graft  Goal:	Had hemodialysis today. Follow up with nephrology and resume regular hemodialysis schedule.  Instructions for follow-up, activity and diet:	See your regular doctor who coordinates hemodialysis.

## 2017-08-28 ENCOUNTER — EMERGENCY (EMERGENCY)
Facility: HOSPITAL | Age: 32
LOS: 1 days | Discharge: HOME | End: 2017-08-28
Attending: EMERGENCY MEDICINE | Admitting: INTERNAL MEDICINE
Payer: MEDICAID

## 2017-08-28 VITALS
TEMPERATURE: 99 F | SYSTOLIC BLOOD PRESSURE: 159 MMHG | OXYGEN SATURATION: 100 % | RESPIRATION RATE: 18 BRPM | DIASTOLIC BLOOD PRESSURE: 102 MMHG | HEART RATE: 70 BPM

## 2017-08-28 DIAGNOSIS — I77.0 ARTERIOVENOUS FISTULA, ACQUIRED: Chronic | ICD-10-CM

## 2017-08-28 PROCEDURE — 93010 ELECTROCARDIOGRAM REPORT: CPT | Mod: 59

## 2017-08-28 PROCEDURE — 99284 EMERGENCY DEPT VISIT MOD MDM: CPT | Mod: 25

## 2017-08-28 NOTE — ED ADULT TRIAGE NOTE - CHIEF COMPLAINT QUOTE
pt. states he went for a dialysis treatment earlier today but states it couldn't be completed bc the center did not have needles long enough in order to complete the treatment.  Swelling noted to pt.'s L arm at the site of the AV fistula.  Pt. denies CP/SOB.  PMHx HTN, anemia, ESRD, dialysis MWF.

## 2017-08-28 NOTE — ED PROVIDER NOTE - CARDIAC, MLM
Normal rate, regular rhythm.  Heart sounds S1, S2.  No murmurs, rubs or gallops. Normal rate, regular rhythm.  Heart sounds S1, S2.  No murmurs, rubs or gallops. AVF with thrill and multiple sites where he was "stuck" for access.

## 2017-08-28 NOTE — ED PROVIDER NOTE - OBJECTIVE STATEMENT
31 male history of HTN, ESRD on dialysis MWF, recent AVF creation 2-3 mo ago here for "they can't find my fistula" at dialysis center. Sent in from [facility] b/c unable to find fistula. No fevers/chills. No other complaints, including no chest pain, no shortness of breath, no palpitations. 31 male history of HTN, ESRD on dialysis MWF, recent AVF creation 2-3 mo ago here for "they can't find my fistula" at dialysis center. Sent in from [facility] b/c unable to find fistula. No fevers/chills. No other complaints, including no chest pain, no shortness of breath, no palpitations.  Nephrologist: Tremaine Longo  Vascular: Dr. Ibrahim

## 2017-08-28 NOTE — ED PROVIDER NOTE - ATTENDING CONTRIBUTION TO CARE
31 year old male w hx of HTN, ESRD, on dialysis MWF says that he was not able to get dialysis today because they could not find or access his shunt.  Pt has no acute medical complaints.  Is here for dialysis.  PE Left arm+ thrill to left arm..neuro intact  lungs cta b/l abd nt/nd neuro nonfocal exam  Impression:  Missed dialysis

## 2017-08-28 NOTE — ED PROVIDER NOTE - MEDICAL DECISION MAKING DETAILS
Patient sent here for dialysis, will do basic labs, ekg, cxr, admit. Patient sent here for dialysis, will do basic labs, ekg, cxr, admit for HD inpatient.

## 2017-08-29 ENCOUNTER — TRANSCRIPTION ENCOUNTER (OUTPATIENT)
Age: 32
End: 2017-08-29

## 2017-08-29 VITALS
HEART RATE: 74 BPM | SYSTOLIC BLOOD PRESSURE: 162 MMHG | TEMPERATURE: 98 F | OXYGEN SATURATION: 100 % | DIASTOLIC BLOOD PRESSURE: 102 MMHG | RESPIRATION RATE: 18 BRPM

## 2017-08-29 DIAGNOSIS — N18.6 END STAGE RENAL DISEASE: ICD-10-CM

## 2017-08-29 DIAGNOSIS — Z99.2 DEPENDENCE ON RENAL DIALYSIS: ICD-10-CM

## 2017-08-29 DIAGNOSIS — Z29.9 ENCOUNTER FOR PROPHYLACTIC MEASURES, UNSPECIFIED: ICD-10-CM

## 2017-08-29 DIAGNOSIS — I10 ESSENTIAL (PRIMARY) HYPERTENSION: ICD-10-CM

## 2017-08-29 DIAGNOSIS — D64.9 ANEMIA, UNSPECIFIED: ICD-10-CM

## 2017-08-29 LAB
ALBUMIN SERPL ELPH-MCNC: 3.8 G/DL — SIGNIFICANT CHANGE UP (ref 3.3–5)
ALP SERPL-CCNC: 90 U/L — SIGNIFICANT CHANGE UP (ref 40–120)
ALT FLD-CCNC: 8 U/L — SIGNIFICANT CHANGE UP (ref 4–41)
AST SERPL-CCNC: 12 U/L — SIGNIFICANT CHANGE UP (ref 4–40)
BASOPHILS # BLD AUTO: 0.04 K/UL — SIGNIFICANT CHANGE UP (ref 0–0.2)
BASOPHILS NFR BLD AUTO: 0.6 % — SIGNIFICANT CHANGE UP (ref 0–2)
BILIRUB SERPL-MCNC: 0.3 MG/DL — SIGNIFICANT CHANGE UP (ref 0.2–1.2)
BUN SERPL-MCNC: 101 MG/DL — HIGH (ref 7–23)
BUN SERPL-MCNC: 97 MG/DL — HIGH (ref 7–23)
CALCIUM SERPL-MCNC: 8.5 MG/DL — SIGNIFICANT CHANGE UP (ref 8.4–10.5)
CALCIUM SERPL-MCNC: 8.8 MG/DL — SIGNIFICANT CHANGE UP (ref 8.4–10.5)
CHLORIDE SERPL-SCNC: 98 MMOL/L — SIGNIFICANT CHANGE UP (ref 98–107)
CHLORIDE SERPL-SCNC: 99 MMOL/L — SIGNIFICANT CHANGE UP (ref 98–107)
CO2 SERPL-SCNC: 19 MMOL/L — LOW (ref 22–31)
CO2 SERPL-SCNC: 20 MMOL/L — LOW (ref 22–31)
CREAT SERPL-MCNC: 21.34 MG/DL — HIGH (ref 0.5–1.3)
CREAT SERPL-MCNC: 21.57 MG/DL — HIGH (ref 0.5–1.3)
EOSINOPHIL # BLD AUTO: 0.44 K/UL — SIGNIFICANT CHANGE UP (ref 0–0.5)
EOSINOPHIL NFR BLD AUTO: 6.8 % — HIGH (ref 0–6)
GLUCOSE SERPL-MCNC: 71 MG/DL — SIGNIFICANT CHANGE UP (ref 70–99)
GLUCOSE SERPL-MCNC: 78 MG/DL — SIGNIFICANT CHANGE UP (ref 70–99)
HCT VFR BLD CALC: 31.7 % — LOW (ref 39–50)
HGB BLD-MCNC: 10.4 G/DL — LOW (ref 13–17)
IMM GRANULOCYTES # BLD AUTO: 0.01 # — SIGNIFICANT CHANGE UP
IMM GRANULOCYTES NFR BLD AUTO: 0.2 % — SIGNIFICANT CHANGE UP (ref 0–1.5)
LYMPHOCYTES # BLD AUTO: 2.27 K/UL — SIGNIFICANT CHANGE UP (ref 1–3.3)
LYMPHOCYTES # BLD AUTO: 35 % — SIGNIFICANT CHANGE UP (ref 13–44)
MCHC RBC-ENTMCNC: 32 PG — SIGNIFICANT CHANGE UP (ref 27–34)
MCHC RBC-ENTMCNC: 32.8 % — SIGNIFICANT CHANGE UP (ref 32–36)
MCV RBC AUTO: 97.5 FL — SIGNIFICANT CHANGE UP (ref 80–100)
MONOCYTES # BLD AUTO: 0.48 K/UL — SIGNIFICANT CHANGE UP (ref 0–0.9)
MONOCYTES NFR BLD AUTO: 7.4 % — SIGNIFICANT CHANGE UP (ref 2–14)
NEUTROPHILS # BLD AUTO: 3.24 K/UL — SIGNIFICANT CHANGE UP (ref 1.8–7.4)
NEUTROPHILS NFR BLD AUTO: 50 % — SIGNIFICANT CHANGE UP (ref 43–77)
NRBC # FLD: 0 — SIGNIFICANT CHANGE UP
PLATELET # BLD AUTO: 161 K/UL — SIGNIFICANT CHANGE UP (ref 150–400)
PMV BLD: 12 FL — SIGNIFICANT CHANGE UP (ref 7–13)
POTASSIUM SERPL-MCNC: 4.8 MMOL/L — SIGNIFICANT CHANGE UP (ref 3.5–5.3)
POTASSIUM SERPL-MCNC: 5.7 MMOL/L — HIGH (ref 3.5–5.3)
POTASSIUM SERPL-SCNC: 4.8 MMOL/L — SIGNIFICANT CHANGE UP (ref 3.5–5.3)
POTASSIUM SERPL-SCNC: 5.7 MMOL/L — HIGH (ref 3.5–5.3)
PROT SERPL-MCNC: 7 G/DL — SIGNIFICANT CHANGE UP (ref 6–8.3)
RBC # BLD: 3.25 M/UL — LOW (ref 4.2–5.8)
RBC # FLD: 12.6 % — SIGNIFICANT CHANGE UP (ref 10.3–14.5)
SODIUM SERPL-SCNC: 141 MMOL/L — SIGNIFICANT CHANGE UP (ref 135–145)
SODIUM SERPL-SCNC: 143 MMOL/L — SIGNIFICANT CHANGE UP (ref 135–145)
WBC # BLD: 6.48 K/UL — SIGNIFICANT CHANGE UP (ref 3.8–10.5)
WBC # FLD AUTO: 6.48 K/UL — SIGNIFICANT CHANGE UP (ref 3.8–10.5)

## 2017-08-29 RX ORDER — LOSARTAN POTASSIUM 100 MG/1
1 TABLET, FILM COATED ORAL
Qty: 14 | Refills: 0
Start: 2017-08-29 | End: 2017-09-12

## 2017-08-29 RX ORDER — CALCIUM CARBONATE 500(1250)
1000 TABLET ORAL
Qty: 0 | Refills: 0 | COMMUNITY

## 2017-08-29 RX ORDER — CINACALCET 30 MG/1
60 TABLET, FILM COATED ORAL DAILY
Qty: 0 | Refills: 0 | Status: DISCONTINUED | OUTPATIENT
Start: 2017-08-29 | End: 2017-08-29

## 2017-08-29 RX ADMIN — CINACALCET 60 MILLIGRAM(S): 30 TABLET, FILM COATED ORAL at 17:59

## 2017-08-29 NOTE — CONSULT NOTE ADULT - ASSESSMENT
31 year old male with ESRD on HD (MWF) sent from HD due to inability to access AVF, despite prior successful sessions and strong thrill on exam.  - please recheck BMP for non-hemolyzed K  - nephrology consult for HD  - if difficulty finding where to access AVF, may need US guidance  - will d/w hoa Farr

## 2017-08-29 NOTE — H&P ADULT - NSHPLABSRESULTS_GEN_ALL_CORE
10.4   6.48  )-----------( 161      ( 29 Aug 2017 00:18 )             31.7     08-29    143  |  99  |  101<H>  ----------------------------<  78  4.8   |  20<L>  |  21.57<H>    Ca    8.8      29 Aug 2017 04:50    TPro  7.0  /  Alb  3.8  /  TBili  0.3  /  DBili  x   /  AST  12  /  ALT  8   /  AlkPhos  90  08-29

## 2017-08-29 NOTE — DISCHARGE NOTE ADULT - ADDITIONAL INSTRUCTIONS
Follow up with your PCP regarding high blood pressure and possible need for blood pressure medications as your last filled Losartan 2/2/17 Follow up with your PCP regarding high blood pressure and possible need for blood pressure medications as your last filled Losartan 2/2/17  See Dr. Farr in 2-3 days in office for repeat doppler of fistula

## 2017-08-29 NOTE — CONSULT NOTE ADULT - ASSESSMENT
31M with h/o HTN, ESRD on HD (MWF), sleep apnea, morbid obesity, HTN admitted for ?malfunctioning AV fistula

## 2017-08-29 NOTE — CONSULT NOTE ADULT - PROBLEM SELECTOR RECOMMENDATION 9
ESRD on HD: Patient currently not in fluid overload or uremic. Will order session of HD today with goal of 3 kg fluid removal and 2 K+ bath. Monitor renal function and BP.

## 2017-08-29 NOTE — H&P ADULT - NSHPPHYSICALEXAM_GEN_ALL_CORE
PHYSICAL EXAM:  GENERAL: NAD, well-developed  HEAD:  Atraumatic, Normocephalic  EYES: EOMI, PERRLA, conjunctiva and sclera clear  NECK: Supple, No JVD  CHEST/LUNG: Clear to auscultation bilaterally; No wheeze  HEART: Regular rate and rhythm; No murmurs, rubs, or gallops  ABDOMEN: Soft, Nontender, Nondistended; Bowel sounds present  EXTREMITIES:  No clubbing, cyanosis, or edema. + LUE AVF  PSYCH: AAOx3  NEUROLOGY: non-focal  SKIN: No rashes or lesions

## 2017-08-29 NOTE — CONSULT NOTE ADULT - SUBJECTIVE AND OBJECTIVE BOX
CC: Patient is a 31 year old male who presents with a chief complaint of inability for HD center to access AVF      Patient is a 31y year old male with HTN, ESRD on HD (MWF) with history of multiple occluded AVFs, s/p LUE AVF 5/31, fistulogram 8/10. The patient recently had a Permacath in place which became dislodged 8/13, but the patient has had multiple sessions of HD without issue via his AVF.  The patient reports that he was at [facility], but his AVF was unable to be accessed; the pt reports being stuck multiple times in the arm. Currently denies SOB, chest pain. Notes he is approx 9 kg above dry weight.      PMH  ESRD (end stage renal disease) on dialysis  Chronic renal failure  Sleep apnea  Morbid obesity  Stroke  HTN (hypertension)    PSH  AV fistula  Fistulogram    MEDS  Sensipar  Tums    Allergies  No Known Allergies or Intolerances        Social  Lives with aunt  Works for moving [employer]  Denies tobacco  Reports daily marijuana use  Denies other illicits      Physical Exam  T(C): 37.1 (08-29-17 @ 00:28), Max: 37.1 (08-28-17 @ 21:29)  HR: 70 (08-29-17 @ 00:28) (70 - 70)  BP: 181/86 (08-29-17 @ 00:28) (159/102 - 181/86)  RR: 16 (08-29-17 @ 00:28) (16 - 18)  SpO2: 100% (08-29-17 @ 00:28) (100% - 100%)  Wt(kg): --  Tmax: T(C): , Max: 37.1 (08-28-17 @ 21:29)  Wt(kg): --    Gen: NAD  HEENT: normocephalic, atraumatic, no scleral icterus  CV: S1, S2, RRR  Pulm: CTA B/L  Abd: Soft, ND, NTP, no rebound, no guarding, no palpable organomegaly/masses  Ext: warm, no edema, LUE with strong thrill in upper arm, motorsensory intact.       Labs:                        10.4   6.48  )-----------( 161      ( 29 Aug 2017 00:18 )             31.7     08-29    141  |  98  |  97<H>  ----------------------------<  71  5.7<H>   |  19<L>  |  21.34<H>    Ca    8.5      29 Aug 2017 00:18    TPro  7.0  /  Alb  3.8  /  TBili  0.3  /  DBili  x   /  AST  12  /  ALT  8   /  AlkPhos  90  08-29

## 2017-08-29 NOTE — CONSULT NOTE ADULT - ATTENDING COMMENTS
Patient seen and evaluated at hemodialysis. Undergoing HD without issue. Normal flows and pressures. Continue HD via fistula.
recurrent fistula problems with blood clots. likely needs revision or thrombectomy. increase heparin with Hd for npow. vascular on board

## 2017-08-29 NOTE — DISCHARGE NOTE ADULT - CARE PLAN
Principal Discharge DX:	Hemodialysis access, AV graft  Goal:	accessed by HD  Instructions for follow-up, activity and diet:	continue dialysis schedule. Follow up with your PCP for further evaluation and management. Please call to make an appointment within 1-2 weeks of discharge.  Secondary Diagnosis:	HTN (hypertension)  Instructions for follow-up, activity and diet:	Follow up with your PCP in 2-3 days for BP check. last filled losartan 2/2/17

## 2017-08-29 NOTE — DISCHARGE NOTE ADULT - MEDICATION SUMMARY - MEDICATIONS TO TAKE
I will START or STAY ON the medications listed below when I get home from the hospital:    Sensipar 60 mg oral tablet  -- 1 tab(s) by mouth once a day  -- Indication: For ESRD (end stage renal disease) on dialysis I will START or STAY ON the medications listed below when I get home from the hospital:    losartan 100 mg oral tablet  -- 1 tab(s) by mouth once a day  -- Do not take this drug if you are pregnant.  It is very important that you take or use this exactly as directed.  Do not skip doses or discontinue unless directed by your doctor.  Some non-prescription drugs may aggravate your condition.  Read all labels carefully.  If a warning appears, check with your doctor before taking.    -- Indication: For HTN (hypertension)    Sensipar 60 mg oral tablet  -- 1 tab(s) by mouth once a day  -- Indication: For ESRD (end stage renal disease) on dialysis

## 2017-08-29 NOTE — ED ADULT NURSE REASSESSMENT NOTE - NS ED NURSE REASSESS COMMENT FT1
Pt discharged home per orders at this time. IV DC'd per orders, VS documented per flow prior to discharge. ADS NP Farzaneh Made aware of documented VS, Safety maintained in main ED, pt ambulatory to discharge.

## 2017-08-29 NOTE — H&P ADULT - HISTORY OF PRESENT ILLNESS
31M PMH of HTN, ESRD on HD (MWF) with history of multiple occluded AVFs, s/p LUE AVF 5/31, fistulogram 8/10. The patient recently had a Permacath in place which became dislodged 8/13, but the patient has had multiple sessions of HD without issue via his AVF. The patient reports that he was at [facility], but his AVF was unable to be accessed; the pt reports being stuck multiple times in the arm. Currently pt feels well, denies SOB, chest pain. Ambulatory.   Notes he is approx 9 kg above dry weight.  While in ER, K was 4.8. Normal EKG. Pt was seen by vascular and nephrology in ER.

## 2017-08-29 NOTE — ED ADULT NURSE NOTE - OBJECTIVE STATEMENT
Pt received to room 16. Presents alert and oriented to person, place, and time. Respirations appear even and unlabored. Pt comes in from dialysis center due to them being unable to access new left upper arm fistula. Pt has a nonworking fistula to the left lower arm. Denies any discomfort at present. Labs sent. Will continue to monitor.

## 2017-08-29 NOTE — H&P ADULT - ASSESSMENT
31M PMH of HTN, ESRD on HD (MWF) with history of multiple occluded AVFs, s/p LUE AVF 5/31, fistulogram 8/10. The patient recently had a Permacath in place which became dislodged 8/13, but the patient has had multiple sessions of HD without issue via his AVF. The patient reports that he was at [facility], but his AVF was unable to be accessed

## 2017-08-29 NOTE — CONSULT NOTE ADULT - SUBJECTIVE AND OBJECTIVE BOX
[facility] Division of Kidney Diseases & Hypertension  INITIAL CONSULT NOTE  [phone]--------------------------------------------------------------------------------  HPI:    31M with h/o HTN, ESRD on HD (MWF), sleep apnea, morbid obesity, HTN came with vascular access problems. Patient is well known to nephrology service and gets dialysis at [facility] dialysis center. His last HD session was on 8/25 and removed about 3 kg. Yesterday he went to dialysis center for regular dialysis appointment; but dialysis staff was unable to cannulate AV fistula despite multiple attempts. Therefore he was sent to ER for further management. Currently patient has no SOB, CP, nausea, vomiting, or orthopnea. He was admitted recently for similar problem as well.     PAST HISTORY  --------------------------------------------------------------------------------  PAST MEDICAL & SURGICAL HISTORY:  Anemia, unspecified type  ESRD (end stage renal disease) on dialysis: since 2013, M-W-F  Hemodialysis access, AV graft  Chronic renal failure  Leg fracture, right  Sleep apnea  Morbid obesity  Stroke: age 10  HTN (hypertension)  AV fistula: L arm    FAMILY HISTORY:  No pertinent family history in first degree relatives    PAST SOCIAL HISTORY:    ALLERGIES & MEDICATIONS  --------------------------------------------------------------------------------  Allergies    No Known Allergies    Intolerances      Standing Inpatient Medications  cinacalcet 60 milliGRAM(s) Oral daily    PRN Inpatient Medications      REVIEW OF SYSTEMS  --------------------------------------------------------------------------------  Gen: No  fevers/chills, weakness  Skin: No rashes  Head/Eyes/Ears/Mouth: No headache; Normal hearing; Normal vision w/o blurriness;   Respiratory: No dyspnea, cough, wheezing, hemoptysis  CV: No chest pain,   GI: No abdominal pain, diarrhea, constipation, nausea, vomiting  : No increased frequency, dysuria, hematuria, nocturia  MSK: No joint pain/swelling;   Neuro: No dizziness/lightheadedness, weakness, seizures    All other systems were reviewed and are negative, except as noted.    VITALS/PHYSICAL EXAM  --------------------------------------------------------------------------------  T(C): 36.9 (08-29-17 @ 11:50), Max: 37.2 (08-29-17 @ 06:54)  HR: 69 (08-29-17 @ 11:50) (69 - 75)  BP: 177/86 (08-29-17 @ 11:50) (159/102 - 193/89)  RR: 18 (08-29-17 @ 11:50) (16 - 18)  SpO2: 100% (08-29-17 @ 08:40) (100% - 100%)  Wt(kg): --        Physical Exam:  	Gen: NAD, well-appearing  	HEENT: PERRL, supple neck  	Pulm: CTA B/L  	CV:  S1S2  	Abd: +BS, soft   	Ext: No B/L Lower ext edema  	Neuro: No focal deficits  	Skin: Warm, without rashes  	Vascular access:     LABS/STUDIES  --------------------------------------------------------------------------------              10.4   6.48  >-----------<  161      [08-29-17 @ 00:18]              31.7     143  |  99  |  101  ----------------------------<  78      [08-29-17 @ 04:50]  4.8   |  20  |  21.57        Ca     8.8     [08-29-17 @ 04:50]    TPro  7.0  /  Alb  3.8  /  TBili  0.3  /  DBili  x   /  AST  12  /  ALT  8   /  AlkPhos  90  [08-29-17 @ 00:18]          Creatinine Trend:  SCr 21.57 [08-29 @ 04:50]  SCr 21.34 [08-29 @ 00:18]  SCr 19.90 [08-14 @ 15:05]  SCr 15.48 [08-13 @ 04:48] [facility] Division of Kidney Diseases & Hypertension  INITIAL CONSULT NOTE  [phone]--------------------------------------------------------------------------------  HPI:    31M with h/o HTN, ESRD on HD (MWF), sleep apnea, morbid obesity, HTN came with vascular access problems. Patient is well known to nephrology service and gets dialysis at [facility] dialysis center. His last HD session was on 8/25 and removed about 3 kg. Yesterday he went to dialysis center for regular dialysis appointment; but dialysis staff was unable to cannulate AV fistula despite multiple attempts. Therefore he was sent to ER for further management. Currently patient has no SOB, CP, nausea, vomiting, or orthopnea. He was admitted recently for similar vascular access problem as well.     PAST HISTORY  --------------------------------------------------------------------------------  PAST MEDICAL & SURGICAL HISTORY:  Anemia, unspecified type  ESRD (end stage renal disease) on dialysis: since 2013, M-W-F  Hemodialysis access, AV graft  Chronic renal failure  Leg fracture, right  Sleep apnea  Morbid obesity  Stroke: age 10  HTN (hypertension)  AV fistula: L arm    FAMILY HISTORY:  No pertinent family history in first degree relatives    PAST SOCIAL HISTORY:    ALLERGIES & MEDICATIONS  --------------------------------------------------------------------------------  Allergies    No Known Allergies    Intolerances      Standing Inpatient Medications  cinacalcet 60 milliGRAM(s) Oral daily    PRN Inpatient Medications      REVIEW OF SYSTEMS  --------------------------------------------------------------------------------  Gen: No  weakness  Skin: No rashes  Head/Eyes/Ears/Mouth: No headache;  Respiratory: No dyspnea  CV: No chest pain,   GI: No abdominal pain, nausea, vomiting  MSK: No joint pain/swelling;   Neuro: No dizziness/lightheadedness    All other systems were reviewed and are negative, except as noted.    VITALS/PHYSICAL EXAM  --------------------------------------------------------------------------------  T(C): 36.9 (08-29-17 @ 11:50), Max: 37.2 (08-29-17 @ 06:54)  HR: 69 (08-29-17 @ 11:50) (69 - 75)  BP: 177/86 (08-29-17 @ 11:50) (159/102 - 193/89)  RR: 18 (08-29-17 @ 11:50) (16 - 18)  SpO2: 100% (08-29-17 @ 08:40) (100% - 100%)  Wt(kg): --        Physical Exam:  	Gen: Obese male   	Pulm: CTA B/L  	CV:  S1S2  	Abd: +BS, soft   	Ext:  B/L Lower ext edema present   	Skin: Warm, without rashes  	Vascular access: Left AV fistula with good thrill     LABS/STUDIES  --------------------------------------------------------------------------------              10.4   6.48  >-----------<  161      [08-29-17 @ 00:18]              31.7     143  |  99  |  101  ----------------------------<  78      [08-29-17 @ 04:50]  4.8   |  20  |  21.57        Ca     8.8     [08-29-17 @ 04:50]    TPro  7.0  /  Alb  3.8  /  TBili  0.3  /  DBili  x   /  AST  12  /  ALT  8   /  AlkPhos  90  [08-29-17 @ 00:18]          Creatinine Trend:  SCr 21.57 [08-29 @ 04:50]  SCr 21.34 [08-29 @ 00:18]  SCr 19.90 [08-14 @ 15:05]  SCr 15.48 [08-13 @ 04:48]

## 2017-08-29 NOTE — CHART NOTE - NSCHARTNOTEFT_GEN_A_CORE
This note is to confirm that the Vascular surgery team spoke to the patient and This note is to confirm that the Vascular surgery team reviewed the duplex study and spoke to the patient regarding the findings. Plan is to have patient follow-up outpatient with Dr. Farr this Friday where a repeat study will be done. Patient was in agreement with this plan.     Pager #[phone]

## 2017-08-29 NOTE — DISCHARGE NOTE ADULT - PATIENT PORTAL LINK FT
“You can access the Follow[facility] Patient Portal, offered by [facility], by registering with the following website: [email or web address]”

## 2017-08-29 NOTE — DISCHARGE NOTE ADULT - PROVIDER TOKENS
FREE:[LAST:[pcp],PHONE:[(   )    -],FAX:[(   )    -],ADDRESS:[your PCP]] FREE:[LAST:[pcp],PHONE:[(   )    -],FAX:[(   )    -],ADDRESS:[your PCP]],TOKEN:'[id number]'

## 2017-08-29 NOTE — DISCHARGE NOTE ADULT - PLAN OF CARE
accessed by [facility] continue dialysis schedule. Follow up with your PCP for further evaluation and management. Please call to make an appointment within 1-2 weeks of discharge. Follow up with your PCP in 2-3 days for BP check. last filled losartan 2/2/17

## 2017-08-29 NOTE — DISCHARGE NOTE ADULT - HOSPITAL COURSE
31M PMH of HTN, ESRD on HD (MWF) with history of multiple occluded AVFs, s/p LUE AVF 5/31, fistulogram 8/10. The patient recently had a Permacath in place which became dislodged 8/13, but the patient has had multiple sessions of HD without issue via his AVF. The patient reports that he was at [facility], but his AVF was unable to be accessed    AV Graft accessed and HD was given w/out any complications. Patient stable for discharge home. Advised to follow up with PCP for BP check and evaluation if BP meds are needed as he last filled losartan 2/2/17 with Dr. Shekhar Parekh for discharge home 31M PMH of HTN, ESRD on HD (MWF) with history of multiple occluded AVFs, s/p LUE AVF 5/31, fistulogram 8/10. The patient recently had a Permacath in place which became dislodged 8/13, but the patient has had multiple sessions of HD without issue via his AVF. The patient reports that he was at [facility], but his AVF was unable to be accessed    AV Graft accessed and HD was given w/out any complications. Patient stable for discharge home. Advised to follow up with PCP for BP check and evaluation if BP meds are needed as he last filled losartan 2/2/17 with Dr. Corrigan     VA duplex reviewing; findings showed------ 31M PMH of HTN, ESRD on HD (MWF) with history of multiple occluded AVFs, s/p LUE AVF 5/31, fistulogram 8/10. The patient recently had a Permacath in place which became dislodged 8/13, but the patient has had multiple sessions of HD without issue via his AVF. The patient reports that he was at [facility], but his AVF was unable to be accessed    AV Graft accessed and HD was given w/out any complications. Patient stable for discharge home. Advised to follow up with PCP for BP check and evaluation if BP meds are needed as he last filled losartan 2/2/17 with Dr. Corrigan     VA duplex reviewing; findings showed non specific findings; patient to follow up with Dr. Farr in office for repeat exam in 2-3 days from discharge    stable for discharge home 31M PMH of HTN, ESRD on HD (MWF) with history of multiple occluded AVFs, s/p LUE AVF 5/31, fistulogram 8/10. The patient recently had a Permacath in place which became dislodged 8/13, but the patient has had multiple sessions of HD without issue via his AVF. The patient reports that he was at [facility], but his AVF was unable to be accessed    AV Graft accessed and HD was given w/out any complications. Patient stable for discharge home. Advised to follow up with PCP for BP check and evaluation if BP meds are needed as he last filled losartan 2/2/17 with Dr. Corrigan     VA duplex of SURYA: Hyperechoic material and narrowing within the usable segment of the basilic (outlow) vein of the left upper extremity brachiobasilic arteriovenous fuistula noted, suggestive of stenosis/fibrosis and associated partial thrombosis.  Inflow and outflow vessels were otherwise patent.  Findings was discussed with Dr. Farr. Plan is to let pt repeat doppler in Dr. Farr's office in one or two days. Pt understands the plan.     stable for discharge home

## 2017-08-29 NOTE — DISCHARGE NOTE ADULT - CARE PROVIDER_API CALL
pcp,   your PCP  Phone: (   )    -  Fax: (   )    - pcp,   your PCP  Phone: (   )    -  Fax: (   )    -    Rufina Farr), Surgery  [address]  [address] [address]  Phone: [phone]  Fax: [phone]

## 2017-08-29 NOTE — DISCHARGE NOTE ADULT - MEDICATION SUMMARY - MEDICATIONS TO STOP TAKING
I will STOP taking the medications listed below when I get home from the hospital:    Tums Extra Strength  -- 1000 milligram(s) by mouth once a day

## 2017-09-13 ENCOUNTER — APPOINTMENT (OUTPATIENT)
Age: 32
End: 2017-09-13
Payer: MEDICAID

## 2017-09-13 PROCEDURE — 99213 OFFICE O/P EST LOW 20 MIN: CPT

## 2017-09-26 ENCOUNTER — APPOINTMENT (OUTPATIENT)
Age: 32
End: 2017-09-26
Payer: MEDICAID

## 2017-09-26 PROCEDURE — 36909Z: CUSTOM

## 2017-09-26 PROCEDURE — 36902Z: CUSTOM | Mod: 59

## 2017-09-26 PROCEDURE — 36012Z: CUSTOM | Mod: 59

## 2017-10-17 ENCOUNTER — APPOINTMENT (OUTPATIENT)
Age: 32
End: 2017-10-17

## 2017-10-20 NOTE — ED PROVIDER NOTE - CONSULTATIONS
additional...
pt c/o nausea and back pain, pt wanting to lay back down. Pt encourged to sit up in recliner but declined due to symptoms

## 2017-10-31 ENCOUNTER — RX RENEWAL (OUTPATIENT)
Age: 32
End: 2017-10-31

## 2017-12-20 NOTE — ED ADULT TRIAGE NOTE - ESI TRIAGE ACUITY LEVEL, MLM
Is abo O. Interested in PEP. Will be gone 1st week in January.Will now send all Phase 1 orders with pkt.   4

## 2018-03-19 ENCOUNTER — RX RENEWAL (OUTPATIENT)
Age: 33
End: 2018-03-19

## 2018-03-20 ENCOUNTER — EMERGENCY (EMERGENCY)
Facility: HOSPITAL | Age: 33
LOS: 1 days | Discharge: HOME | End: 2018-03-20
Attending: EMERGENCY MEDICINE
Payer: MEDICAID

## 2018-03-20 VITALS
DIASTOLIC BLOOD PRESSURE: 99 MMHG | RESPIRATION RATE: 18 BRPM | SYSTOLIC BLOOD PRESSURE: 169 MMHG | TEMPERATURE: 99 F | HEIGHT: 75 IN | OXYGEN SATURATION: 100 % | HEART RATE: 82 BPM | WEIGHT: 315 LBS

## 2018-03-20 VITALS
RESPIRATION RATE: 18 BRPM | HEART RATE: 77 BPM | OXYGEN SATURATION: 99 % | DIASTOLIC BLOOD PRESSURE: 89 MMHG | SYSTOLIC BLOOD PRESSURE: 156 MMHG

## 2018-03-20 DIAGNOSIS — I77.0 ARTERIOVENOUS FISTULA, ACQUIRED: Chronic | ICD-10-CM

## 2018-03-20 LAB
ALBUMIN SERPL ELPH-MCNC: 4.1 G/DL — SIGNIFICANT CHANGE UP (ref 3.3–5)
ALP SERPL-CCNC: 75 U/L — SIGNIFICANT CHANGE UP (ref 40–120)
ALT FLD-CCNC: 8 U/L RC — LOW (ref 10–45)
ANION GAP SERPL CALC-SCNC: 14 MMOL/L — SIGNIFICANT CHANGE UP (ref 5–17)
APTT BLD: 31.2 SEC — SIGNIFICANT CHANGE UP (ref 27.5–37.4)
AST SERPL-CCNC: 10 U/L — SIGNIFICANT CHANGE UP (ref 10–40)
BASOPHILS # BLD AUTO: 0 K/UL — SIGNIFICANT CHANGE UP (ref 0–0.2)
BASOPHILS NFR BLD AUTO: 0.6 % — SIGNIFICANT CHANGE UP (ref 0–2)
BILIRUB SERPL-MCNC: 0.4 MG/DL — SIGNIFICANT CHANGE UP (ref 0.2–1.2)
BUN SERPL-MCNC: 36 MG/DL — HIGH (ref 7–23)
CALCIUM SERPL-MCNC: 9.7 MG/DL — SIGNIFICANT CHANGE UP (ref 8.4–10.5)
CHLORIDE SERPL-SCNC: 97 MMOL/L — SIGNIFICANT CHANGE UP (ref 96–108)
CO2 SERPL-SCNC: 32 MMOL/L — HIGH (ref 22–31)
CREAT SERPL-MCNC: 11.29 MG/DL — HIGH (ref 0.5–1.3)
EOSINOPHIL # BLD AUTO: 0.2 K/UL — SIGNIFICANT CHANGE UP (ref 0–0.5)
EOSINOPHIL NFR BLD AUTO: 4.4 % — SIGNIFICANT CHANGE UP (ref 0–6)
GAS PNL BLDV: SIGNIFICANT CHANGE UP
GLUCOSE SERPL-MCNC: 86 MG/DL — SIGNIFICANT CHANGE UP (ref 70–99)
HCT VFR BLD CALC: 35.3 % — LOW (ref 39–50)
HGB BLD-MCNC: 12.1 G/DL — LOW (ref 13–17)
INR BLD: 0.98 RATIO — SIGNIFICANT CHANGE UP (ref 0.88–1.16)
LYMPHOCYTES # BLD AUTO: 1.7 K/UL — SIGNIFICANT CHANGE UP (ref 1–3.3)
LYMPHOCYTES # BLD AUTO: 40.1 % — SIGNIFICANT CHANGE UP (ref 13–44)
MCHC RBC-ENTMCNC: 34 PG — SIGNIFICANT CHANGE UP (ref 27–34)
MCHC RBC-ENTMCNC: 34.2 GM/DL — SIGNIFICANT CHANGE UP (ref 32–36)
MCV RBC AUTO: 99.3 FL — SIGNIFICANT CHANGE UP (ref 80–100)
MONOCYTES # BLD AUTO: 0.7 K/UL — SIGNIFICANT CHANGE UP (ref 0–0.9)
MONOCYTES NFR BLD AUTO: 16.3 % — HIGH (ref 2–14)
NEUTROPHILS # BLD AUTO: 1.6 K/UL — LOW (ref 1.8–7.4)
NEUTROPHILS NFR BLD AUTO: 38.5 % — LOW (ref 43–77)
PLATELET # BLD AUTO: 140 K/UL — LOW (ref 150–400)
POTASSIUM SERPL-MCNC: 4.5 MMOL/L — SIGNIFICANT CHANGE UP (ref 3.5–5.3)
POTASSIUM SERPL-SCNC: 4.5 MMOL/L — SIGNIFICANT CHANGE UP (ref 3.5–5.3)
PROT SERPL-MCNC: 7.9 G/DL — SIGNIFICANT CHANGE UP (ref 6–8.3)
PROTHROM AB SERPL-ACNC: 10.7 SEC — SIGNIFICANT CHANGE UP (ref 9.8–12.7)
RBC # BLD: 3.55 M/UL — LOW (ref 4.2–5.8)
RBC # FLD: 12.8 % — SIGNIFICANT CHANGE UP (ref 10.3–14.5)
SODIUM SERPL-SCNC: 143 MMOL/L — SIGNIFICANT CHANGE UP (ref 135–145)
WBC # BLD: 4.1 K/UL — SIGNIFICANT CHANGE UP (ref 3.8–10.5)
WBC # FLD AUTO: 4.1 K/UL — SIGNIFICANT CHANGE UP (ref 3.8–10.5)

## 2018-03-20 PROCEDURE — 82435 ASSAY OF BLOOD CHLORIDE: CPT

## 2018-03-20 PROCEDURE — 85027 COMPLETE CBC AUTOMATED: CPT

## 2018-03-20 PROCEDURE — 99283 EMERGENCY DEPT VISIT LOW MDM: CPT

## 2018-03-20 PROCEDURE — 82803 BLOOD GASES ANY COMBINATION: CPT

## 2018-03-20 PROCEDURE — 82330 ASSAY OF CALCIUM: CPT

## 2018-03-20 PROCEDURE — 83605 ASSAY OF LACTIC ACID: CPT

## 2018-03-20 PROCEDURE — 84295 ASSAY OF SERUM SODIUM: CPT

## 2018-03-20 PROCEDURE — 85610 PROTHROMBIN TIME: CPT

## 2018-03-20 PROCEDURE — 82947 ASSAY GLUCOSE BLOOD QUANT: CPT

## 2018-03-20 PROCEDURE — 80053 COMPREHEN METABOLIC PANEL: CPT

## 2018-03-20 PROCEDURE — 85730 THROMBOPLASTIN TIME PARTIAL: CPT

## 2018-03-20 PROCEDURE — 84132 ASSAY OF SERUM POTASSIUM: CPT

## 2018-03-20 PROCEDURE — 99284 EMERGENCY DEPT VISIT MOD MDM: CPT

## 2018-03-20 PROCEDURE — 85014 HEMATOCRIT: CPT

## 2018-03-20 NOTE — ED PROVIDER NOTE - ATTENDING CONTRIBUTION TO CARE
33 yo male, ESRD on dialysis with blood mixed with stool earlier and noticed small amount of blood when wiping.  Very well appearing on exam here.  Hemodynamically stable, CBC unremarkable.  Very much doubt emergent intraabdominal pathology, and I firmly believe further workup/testing for these etiologies will expose patient to more risk/harm (including the risk of false positive testing) than benefit.  Will give referral to GI for follow-up and possible colonoscopy if indicated.

## 2018-03-20 NOTE — ED PROVIDER NOTE - GASTROINTESTINAL, MLM
Abdomen soft, non-tender, no guarding. Obese. No gross blood on rectal, no ext hemorrhoids. FOBT Positive Maverick Sanchez

## 2018-03-20 NOTE — ED PROVIDER NOTE - MEDICAL DECISION MAKING DETAILS
Kai French (Resident): 33 yo dialysis patient, 1 episodes of formed stool w/ blood, FOBT positive, vitals stable, no CP, SOB, lightheadedness - likely 2/2 to his heparin during dialysis - will check labs, coags, and likely d/c with GI follow up if not anemia and vitals stable

## 2018-03-20 NOTE — ED PROVIDER NOTE - OBJECTIVE STATEMENT
31 y/o male, ESRD, on dialysis for 4.5 years for CKD 2/2 to HTN, on dialysis MWF, last dialysis yesterday p/w bloody stool. Per patient, had some formed stool with some blood mixed in. Never had this before. Does get heparin with his dialysis. Not on daily blood thinners. No family hx of bleeding disorder. No chest pain, lightheadedness, hematuria (makes small urine), SOB, N/V. Never has receptive anal sex. No recent travel. No EtOH.   Never had a colonoscopy.  No family hx of colon cancer.

## 2018-03-20 NOTE — ED ADULT NURSE NOTE - OBJECTIVE STATEMENT
33 yo male with PMH ESRD on HD M/W/F presenting to ED with one episode of hard formed stool with red blood mixed in today. Patient concerned because he receives heparin with his dialysis. Patient denies dizziness, syncope, abdominal pain, n/v/d, or fever. No active bleeding.

## 2019-03-19 NOTE — ED PROVIDER NOTE - CONSTITUTIONAL [-], MLM
[FreeTextEntry1] : discussed results and diet and exercise. Discussed meds but wnts to try lifestyle changes first
no fever

## 2022-10-04 ENCOUNTER — INPATIENT (INPATIENT)
Facility: HOSPITAL | Age: 37
LOS: 2 days | Discharge: HOME | DRG: 682 | End: 2022-10-07
Attending: INTERNAL MEDICINE | Admitting: INTERNAL MEDICINE
Payer: MEDICAID

## 2022-10-04 VITALS
HEART RATE: 105 BPM | WEIGHT: 240.3 LBS | OXYGEN SATURATION: 95 % | HEIGHT: 75 IN | SYSTOLIC BLOOD PRESSURE: 170 MMHG | TEMPERATURE: 100 F | DIASTOLIC BLOOD PRESSURE: 92 MMHG | RESPIRATION RATE: 22 BRPM

## 2022-10-04 DIAGNOSIS — R06.02 SHORTNESS OF BREATH: ICD-10-CM

## 2022-10-04 DIAGNOSIS — I10 ESSENTIAL (PRIMARY) HYPERTENSION: ICD-10-CM

## 2022-10-04 DIAGNOSIS — D64.9 ANEMIA, UNSPECIFIED: ICD-10-CM

## 2022-10-04 DIAGNOSIS — I77.0 ARTERIOVENOUS FISTULA, ACQUIRED: Chronic | ICD-10-CM

## 2022-10-04 DIAGNOSIS — E83.39 OTHER DISORDERS OF PHOSPHORUS METABOLISM: ICD-10-CM

## 2022-10-04 DIAGNOSIS — N18.6 END STAGE RENAL DISEASE: ICD-10-CM

## 2022-10-04 LAB
ALBUMIN SERPL ELPH-MCNC: 3.6 G/DL — SIGNIFICANT CHANGE UP (ref 3.3–5)
ALP SERPL-CCNC: 1084 U/L — HIGH (ref 40–120)
ALT FLD-CCNC: <5 U/L — LOW (ref 10–45)
ANION GAP SERPL CALC-SCNC: 21 MMOL/L — HIGH (ref 5–17)
APTT BLD: 28.3 SEC — SIGNIFICANT CHANGE UP (ref 27.5–35.5)
AST SERPL-CCNC: 7 U/L — LOW (ref 10–40)
BASOPHILS # BLD AUTO: 0.04 K/UL — SIGNIFICANT CHANGE UP (ref 0–0.2)
BASOPHILS NFR BLD AUTO: 0.9 % — SIGNIFICANT CHANGE UP (ref 0–2)
BILIRUB SERPL-MCNC: 0.2 MG/DL — SIGNIFICANT CHANGE UP (ref 0.2–1.2)
BLD GP AB SCN SERPL QL: NEGATIVE — SIGNIFICANT CHANGE UP
BUN SERPL-MCNC: 102 MG/DL — HIGH (ref 7–23)
CALCIUM SERPL-MCNC: 9.4 MG/DL — SIGNIFICANT CHANGE UP (ref 8.4–10.5)
CHLORIDE SERPL-SCNC: 101 MMOL/L — SIGNIFICANT CHANGE UP (ref 96–108)
CO2 SERPL-SCNC: 20 MMOL/L — LOW (ref 22–31)
CREAT SERPL-MCNC: 14.26 MG/DL — HIGH (ref 0.5–1.3)
DACRYOCYTES BLD QL SMEAR: SLIGHT — SIGNIFICANT CHANGE UP
EGFR: 4 ML/MIN/1.73M2 — LOW
EOSINOPHIL # BLD AUTO: 0.09 K/UL — SIGNIFICANT CHANGE UP (ref 0–0.5)
EOSINOPHIL NFR BLD AUTO: 1.8 % — SIGNIFICANT CHANGE UP (ref 0–6)
GLUCOSE SERPL-MCNC: 91 MG/DL — SIGNIFICANT CHANGE UP (ref 70–99)
HAV IGM SER-ACNC: SIGNIFICANT CHANGE UP
HBV CORE IGM SER-ACNC: SIGNIFICANT CHANGE UP
HBV SURFACE AG SER-ACNC: SIGNIFICANT CHANGE UP
HCT VFR BLD CALC: 21 % — CRITICAL LOW (ref 39–50)
HCV AB S/CO SERPL IA: 0.29 S/CO — SIGNIFICANT CHANGE UP (ref 0–0.99)
HCV AB SERPL-IMP: SIGNIFICANT CHANGE UP
HGB BLD-MCNC: 6.4 G/DL — CRITICAL LOW (ref 13–17)
INR BLD: 1.09 RATIO — SIGNIFICANT CHANGE UP (ref 0.88–1.16)
LYMPHOCYTES # BLD AUTO: 1.23 K/UL — SIGNIFICANT CHANGE UP (ref 1–3.3)
LYMPHOCYTES # BLD AUTO: 25.9 % — SIGNIFICANT CHANGE UP (ref 13–44)
MAGNESIUM SERPL-MCNC: 2.3 MG/DL — SIGNIFICANT CHANGE UP (ref 1.6–2.6)
MANUAL SMEAR VERIFICATION: SIGNIFICANT CHANGE UP
MCHC RBC-ENTMCNC: 29 PG — SIGNIFICANT CHANGE UP (ref 27–34)
MCHC RBC-ENTMCNC: 30.5 GM/DL — LOW (ref 32–36)
MCV RBC AUTO: 95 FL — SIGNIFICANT CHANGE UP (ref 80–100)
MONOCYTES # BLD AUTO: 0.17 K/UL — SIGNIFICANT CHANGE UP (ref 0–0.9)
MONOCYTES NFR BLD AUTO: 3.6 % — SIGNIFICANT CHANGE UP (ref 2–14)
NEUTROPHILS # BLD AUTO: 3.21 K/UL — SIGNIFICANT CHANGE UP (ref 1.8–7.4)
NEUTROPHILS NFR BLD AUTO: 67.8 % — SIGNIFICANT CHANGE UP (ref 43–77)
OVALOCYTES BLD QL SMEAR: SLIGHT — SIGNIFICANT CHANGE UP
PHOSPHATE SERPL-MCNC: 6.6 MG/DL — HIGH (ref 2.5–4.5)
PLAT MORPH BLD: NORMAL — SIGNIFICANT CHANGE UP
PLATELET # BLD AUTO: 212 K/UL — SIGNIFICANT CHANGE UP (ref 150–400)
POIKILOCYTOSIS BLD QL AUTO: SLIGHT — SIGNIFICANT CHANGE UP
POTASSIUM SERPL-MCNC: 5.4 MMOL/L — HIGH (ref 3.5–5.3)
POTASSIUM SERPL-SCNC: 5.4 MMOL/L — HIGH (ref 3.5–5.3)
PROT SERPL-MCNC: 6.5 G/DL — SIGNIFICANT CHANGE UP (ref 6–8.3)
PROTHROM AB SERPL-ACNC: 12.5 SEC — SIGNIFICANT CHANGE UP (ref 10.5–13.4)
RAPID RVP RESULT: DETECTED
RBC # BLD: 2.21 M/UL — LOW (ref 4.2–5.8)
RBC # FLD: 16.3 % — HIGH (ref 10.3–14.5)
RBC BLD AUTO: ABNORMAL
RH IG SCN BLD-IMP: POSITIVE — SIGNIFICANT CHANGE UP
RV+EV RNA SPEC QL NAA+PROBE: DETECTED
SARS-COV-2 RNA SPEC QL NAA+PROBE: SIGNIFICANT CHANGE UP
SODIUM SERPL-SCNC: 142 MMOL/L — SIGNIFICANT CHANGE UP (ref 135–145)
TARGETS BLD QL SMEAR: SLIGHT — SIGNIFICANT CHANGE UP
WBC # BLD: 4.74 K/UL — SIGNIFICANT CHANGE UP (ref 3.8–10.5)
WBC # FLD AUTO: 4.74 K/UL — SIGNIFICANT CHANGE UP (ref 3.8–10.5)

## 2022-10-04 PROCEDURE — 71046 X-RAY EXAM CHEST 2 VIEWS: CPT | Mod: 26

## 2022-10-04 PROCEDURE — 93010 ELECTROCARDIOGRAM REPORT: CPT

## 2022-10-04 PROCEDURE — 99285 EMERGENCY DEPT VISIT HI MDM: CPT

## 2022-10-04 PROCEDURE — 99255 IP/OBS CONSLTJ NEW/EST HI 80: CPT | Mod: GC

## 2022-10-04 RX ORDER — ACETAMINOPHEN 500 MG
650 TABLET ORAL ONCE
Refills: 0 | Status: COMPLETED | OUTPATIENT
Start: 2022-10-04 | End: 2022-10-04

## 2022-10-04 RX ORDER — LABETALOL HCL 100 MG
200 TABLET ORAL
Refills: 0 | Status: DISCONTINUED | OUTPATIENT
Start: 2022-10-04 | End: 2022-10-07

## 2022-10-04 RX ORDER — HEPARIN SODIUM 5000 [USP'U]/ML
5000 INJECTION INTRAVENOUS; SUBCUTANEOUS EVERY 12 HOURS
Refills: 0 | Status: DISCONTINUED | OUTPATIENT
Start: 2022-10-04 | End: 2022-10-04

## 2022-10-04 RX ORDER — CINACALCET 30 MG/1
1 TABLET, FILM COATED ORAL
Qty: 0 | Refills: 0 | DISCHARGE

## 2022-10-04 RX ORDER — DIPHENHYDRAMINE HCL 50 MG
50 CAPSULE ORAL ONCE
Refills: 0 | Status: COMPLETED | OUTPATIENT
Start: 2022-10-04 | End: 2022-10-04

## 2022-10-04 RX ADMIN — Medication 50 MILLIGRAM(S): at 17:09

## 2022-10-04 RX ADMIN — Medication 650 MILLIGRAM(S): at 17:08

## 2022-10-04 RX ADMIN — Medication 200 MILLIGRAM(S): at 19:24

## 2022-10-04 NOTE — CONSULT NOTE ADULT - PROBLEM SELECTOR RECOMMENDATION 3
Phos elevated at 6.6  Patient reports being on phos binders at home but were too expensive, so he has not taken any for 1.5 months. Can check with  to seee which binder will be cheapest for him with his current insurance       If you have any questions, please feel free to contact me  Bill Loomis  Nephrology Fellow  [phone]; Prefer [employer] TEAMS  (After 5pm or on weekends please page the on-call fellow).    Patient was discussed with Dr. Frias  who agrees with my A/P. Addendum to follow

## 2022-10-04 NOTE — H&P ADULT - NSHPREVIEWOFSYSTEMS_GEN_ALL_CORE
Gen: no loss of wt no loss of appetite  ENT: no dizziness no hearing loss  Ophth: no blurring of vision no loss of vision  Resp: + cough no sputum production  + shortness of breath   CVS: No chest pain no palpitations no orthopnea  GI: no nausea, vomiting or diarrhea   : no dysuria, hematuria  Endo: no polyuria no excessive sweating  Neuro: no weakness no paresthesias  Heme: No petechiae no easy bruising  Msk: No joint pain no swelling  Skin: No rash no itching

## 2022-10-04 NOTE — ED PROVIDER NOTE - CLINICAL SUMMARY MEDICAL DECISION MAKING FREE TEXT BOX
Attending/MD Jayce. 37 yo M, with HTN, ESRD, traveller, last session was Friday, p/w sob, likely needs HD, otherwise, very minimum temp, no signs of infection, labs, check potassium, ekg within normal range, will get xray, r/o over fluid, and potassium, to evaluate urgent need or HD, otherwise admission for hd,

## 2022-10-04 NOTE — CONSULT NOTE ADULT - PROBLEM SELECTOR RECOMMENDATION 2
Hgb noted to be 6.4, could be dilutional as patient has not received HD since Friday    [] please ensure iron studies collected before blood transfusion Hgb noted to be 6.4. Check iron studies. Blood transfusion as per primary team. Will resume outpatient dose of CUCA. Monitor CBC.     [] please ensure iron studies collected before blood transfusion

## 2022-10-04 NOTE — H&P ADULT - NSHPPHYSICALEXAM_GEN_ALL_CORE
PHYSICAL EXAM: vital signs noted on [place]  in no apparent distress  HEENT: MIC EOMI  Neck: Supple, no JVD, no thyromegaly  Lungs: no wheeze, no crackles  CVS: S1 S2 no M/R/G  Abdomen: no tenderness, no organomegaly, BS present  Neuro: AO x 3 no focal weakness, no sensory abnormalities  Psych: appropriate affect  Skin: warm, dry  Ext: no cyanosis or clubbing, no edema  Msk: no joint swelling or deformities  Back: no CVA tenderness, no kyphosis/scoliosis

## 2022-10-04 NOTE — CONSULT NOTE ADULT - SUBJECTIVE AND OBJECTIVE BOX
[facility] DIVISION OF KIDNEY DISEASES AND HYPERTENSION -- [phone]  -- INITIAL CONSULT NOTE  --------------------------------------------------------------------------------  HPI:  36M with h/o HTN, ESRD on HD (MWF), sleep apnea, morbid obesity, HTN came in with SOB after missing his HD treatment. Patient used to be seen at [facility] up until 2019 but then moved to [facility]. Patient is now traveling between [place] and here and would like to seek further care on [place]. Labs on presentation: Hgb 6.4, K 5.4, BUN/Cr 102/14, Alk phos 1084,  Phos 6.6    Nephrology consulted for ESRD HD management. Patient reports last HD being Friday, Nov 30 without any issues. Does not miss HD sessions, but now wants to establish care on [place]. Complains of SOB, volume overload, and full abdomen from eating too much. No Fevers/chills.       Nephrologist: patient does not remember  Access: LUE fistula  Last HD: Friday, 9/30      PAST HISTORY  --------------------------------------------------------------------------------  PAST MEDICAL & SURGICAL HISTORY:  HTN (hypertension)      Stroke  age 10      Morbid obesity      Sleep apnea      Leg fracture, right      Chronic renal failure      Hemodialysis access, AV graft      ESRD (end stage renal disease) on dialysis  since 2013, M-W-F      Anemia, unspecified type      AV fistula  L arm        FAMILY HISTORY:  No pertinent family history in first degree relatives      PAST SOCIAL HISTORY:    ALLERGIES & MEDICATIONS  --------------------------------------------------------------------------------  Allergies    No Known Allergies    Intolerances      Standing Inpatient Medications    PRN Inpatient Medications      REVIEW OF SYSTEMS  --------------------------------------------------------------------------------  Gen: No fevers/chills  Skin: No rashes  Head/Eyes/Ears: Normal hearing,   Respiratory: No dyspnea, cough +SOB  CV: No chest pain  GI: No abdominal pain, diarrhea +feeling of fullness   : No dysuria, hematuria  MSK: No  edema  Heme: No easy bruising or bleeding  Psych: No significant depression    All other systems were reviewed and are negative, except as noted.    VITALS/PHYSICAL EXAM  --------------------------------------------------------------------------------  T(C): 37.6 (10-04-22 @ 09:38), Max: 37.6 (10-04-22 @ 09:38)  HR: 99 (10-04-22 @ 11:15) (99 - 105)  BP: 168/90 (10-04-22 @ 11:15) (168/90 - 170/92)  RR: 20 (10-04-22 @ 11:15) (20 - 22)  SpO2: 99% (10-04-22 @ 11:15) (95% - 99%)  Wt(kg): --  Height (cm): 190.5 (10-04-22 @ 09:38)  Weight (kg): 109 (10-04-22 @ 09:38)  BMI (kg/m2): 30 (10-04-22 @ 09:38)  BSA (m2): 2.37 (10-04-22 @ 09:38)      Physical Exam:  	Gen: NAD  	HEENT: MMM  	Pulm: CTA B/L  	CV: S1S2  	Abd: Soft, +BS   	Ext: No LE edema B/L  	Neuro: Awake  	Skin: Warm and dry  	Vascular access: LUE fistula; does have dilatation, does not appear to have any scabs or scarring    LABS/STUDIES  --------------------------------------------------------------------------------              6.4    4.74  >-----------<  212      [10-04-22 @ 11:01]              21.0     142  |  101  |  102  ----------------------------<  91      [10-04-22 @ 11:01]  5.4   |  20  |  14.26        Ca     9.4     [10-04-22 @ 11:01]      Mg     2.3     [10-04-22 @ 11:01]      Phos  6.6     [10-04-22 @ 11:01]    TPro  6.5  /  Alb  3.6  /  TBili  0.2  /  DBili  x   /  AST  7   /  ALT  <5  /  AlkPhos  1084  [10-04-22 @ 11:01]    PT/INR: PT 12.5 , INR 1.09       [10-04-22 @ 11:01]  PTT: 28.3       [10-04-22 @ 11:01]      Creatinine Trend:  SCr 14.26 [10-04 @ 11:01]

## 2022-10-04 NOTE — ED PROVIDER NOTE - NSICDXPASTMEDICALHX_GEN_ALL_CORE_FT
Click to Modify Medication Indication   on Note Save PAST MEDICAL HISTORY:  Anemia, unspecified type     Chronic renal failure     ESRD (end stage renal disease) on dialysis since 2013, M-W-F    Hemodialysis access, AV graft     HTN (hypertension)     Leg fracture, right     Morbid obesity     Sleep apnea     Stroke age 10

## 2022-10-04 NOTE — CONSULT NOTE ADULT - PROBLEM SELECTOR RECOMMENDATION 9
ESRD ON HD MWF, last outpatient HD Friday, Sept 30    []Consent done and spoke to HD unit about HD today  [] Will plan for HD today using LUE fistula      Hyperkalemia - will resolve with low K bath during HD

## 2022-10-04 NOTE — ED PROVIDER NOTE - PHYSICAL EXAMINATION
Attending/MD Jayce.   VITALS: reviewed  GEN: No apparent distress, A & O x 4  HEAD/EYES: NC/AT, PERRL, EOMI, anicteric sclerae, no conjunctival pallor  ENT: mucus membranes moist, trachea midline, no JVD, neck is supple  RESP: lungs CTA with equal breath sounds bilaterally, chest wall nontender and atraumatic  CV: heart with reg rhythm S1, S2, no murmur; distal pulses intact and symmetric bilaterally  ABDOMEN: normoactive bowel sounds, soft, nondistended, nontender  MSK: extremities atraumatic and nontender, no edema, no asymmetry.   SKIN: warm, dry, no rash, no bruising, no cyanosis. + left upper arm, + AV fistula with a glood thiril  NEURO: alert, mentating appropriately, no facial asymmetry.  PSYCH: Affect appropriate

## 2022-10-04 NOTE — H&P ADULT - ASSESSMENT
37 yo M, with HTN, ESRD, on HD (TTS), traveller from [place], last session was last Friday, here p/w shortness of breath ;i secondary to missed hemodialysis  Principal Discharge DX:	Wound  Assessment and plan of treatment:	1. TAKE ALL MEDICATIONS AS DIRECTED.    2. FOR PAIN OR FEVER YOU CAN TAKE IBUPROFEN (MOTRIN, ADVIL) OR ACETAMINOPHEN (TYLENOL) AS NEEDED, AS DIRECTED ON PACKAGING.  3. FOLLOW UP WITH YOUR PRIMARY DOCTOR WITHIN 5 DAYS AS DIRECTED.  4. IF YOU HAD LABS OR IMAGING DONE, YOU WERE GIVEN COPIES OF ALL LABS AND/OR IMAGING RESULTS FROM YOUR ER VISIT--PLEASE TAKE THEM WITH YOU TO YOUR FOLLOW UP APPOINTMENTS.  5. IF NEEDED, CALL PATIENT ACCESS SERVICES AT [phone] TO FIND A PRIMARY CARE PHYSICIAN.  OR CALL [phone] TO MAKE AN APPOINTMENT WITH THE CLINIC.  6. RETURN TO THE ER FOR ANY WORSENING SYMPTOMS OR CONCERNS.  7. Follow wound care instructions given at discharge.  8. Follow up with your Podiatrist and PMD.

## 2022-10-04 NOTE — ED PROVIDER NOTE - PROGRESS NOTE DETAILS
Attending/MD Jayce. cecelia communicated, low Hb, pt gave use consent, denies dark stool, pt had transfusion before, nephro offering HD today, potassium 5.4, without ekg change, given that pt is getting dialysis today, hold for hyperpotassium cocktail at this time, if any change in HD plan, pt needs kelete and Glucose Insuline cocktail, admission.

## 2022-10-04 NOTE — ED PROVIDER NOTE - OBJECTIVE STATEMENT
Attending/MD Jayce. 35 yo M, with HTN, ESRD, on HD (TTS), traveller from [place], last session was last Friday, here p/w sob, denies fever, cough, pt says he ate a bit over,  kg on Friday, denies dizziness, fever, diarrhea.

## 2022-10-04 NOTE — H&P ADULT - HISTORY OF PRESENT ILLNESS
35 yo M, with HTN, ESRD, on HD (TTS), traveller from [place], last session was last Friday, here p/w sob, denies fever, cough, pt says he ate a bit over, Denies dizziness, fever, diarrhea. Just describes a generalized weakness and mild nonproductive cough

## 2022-10-04 NOTE — ED PROVIDER NOTE - CARE PLAN
1 Principal Discharge DX:	ESRD needing dialysis   Principal Discharge DX:	ESRD needing dialysis  Secondary Diagnosis:	Symptomatic anemia

## 2022-10-04 NOTE — CONSULT NOTE ADULT - ATTENDING COMMENTS
Pt. with ESRD on HD TIW, presented with SOB/ hypervolemia, hyperkalemia in the setting of missed HD sessions. Plan for urgent HD today for SOB and hyperkalemia. Recommend to give lokelma 10 grams.

## 2022-10-04 NOTE — ED ADULT NURSE NOTE - OBJECTIVE STATEMENT
36 yr old male amb to ED with HTN ESRD on HD from [place] Missed dialysis Saturday Last day of dialysis was Friday before travelling to [place]. Pt had 4Hours of dialysis C/o pedal edema and facial swelling. Dialysis T-t-S, Denies fever or chills Denies abd pain Denies N/V Does not urinate. o2 sat 96% r/a

## 2022-10-04 NOTE — ED PROVIDER NOTE - EKG ADDITIONAL INFORMATION FREE TEXT
NSR, no ST segement alterations. sinus rhythm without life-threatening arrhythmic patter such as short or long CT interval, ipsilon wave form, or Brugada pattern.

## 2022-10-04 NOTE — ED ADULT NURSE NOTE - NSICDXPASTMEDICALHX_GEN_ALL_CORE_FT
PAST MEDICAL HISTORY:  Anemia, unspecified type     Chronic renal failure     ESRD (end stage renal disease) on dialysis since 2013, M-W-F    Hemodialysis access, AV graft     HTN (hypertension)     Leg fracture, right     Morbid obesity     Sleep apnea     Stroke age 10

## 2022-10-05 LAB
ANION GAP SERPL CALC-SCNC: 18 MMOL/L — HIGH (ref 5–17)
BUN SERPL-MCNC: 68 MG/DL — HIGH (ref 7–23)
CALCIUM SERPL-MCNC: 9.4 MG/DL — SIGNIFICANT CHANGE UP (ref 8.4–10.5)
CHLORIDE SERPL-SCNC: 100 MMOL/L — SIGNIFICANT CHANGE UP (ref 96–108)
CO2 SERPL-SCNC: 23 MMOL/L — SIGNIFICANT CHANGE UP (ref 22–31)
CREAT SERPL-MCNC: 10.4 MG/DL — HIGH (ref 0.5–1.3)
EGFR: 6 ML/MIN/1.73M2 — LOW
FERRITIN SERPL-MCNC: 108 NG/ML — SIGNIFICANT CHANGE UP (ref 30–400)
GLUCOSE SERPL-MCNC: 89 MG/DL — SIGNIFICANT CHANGE UP (ref 70–99)
HCT VFR BLD CALC: 21.2 % — LOW (ref 39–50)
HCT VFR BLD CALC: 23.6 % — LOW (ref 39–50)
HGB BLD-MCNC: 6.6 G/DL — CRITICAL LOW (ref 13–17)
HGB BLD-MCNC: 7.4 G/DL — LOW (ref 13–17)
IRON SATN MFR SERPL: 15 % — LOW (ref 16–55)
IRON SATN MFR SERPL: 29 UG/DL — LOW (ref 45–165)
MCHC RBC-ENTMCNC: 28.6 PG — SIGNIFICANT CHANGE UP (ref 27–34)
MCHC RBC-ENTMCNC: 29.2 PG — SIGNIFICANT CHANGE UP (ref 27–34)
MCHC RBC-ENTMCNC: 31.1 GM/DL — LOW (ref 32–36)
MCHC RBC-ENTMCNC: 31.4 GM/DL — LOW (ref 32–36)
MCV RBC AUTO: 91.1 FL — SIGNIFICANT CHANGE UP (ref 80–100)
MCV RBC AUTO: 93.8 FL — SIGNIFICANT CHANGE UP (ref 80–100)
NRBC # BLD: 0 /100 WBCS — SIGNIFICANT CHANGE UP (ref 0–0)
NRBC # BLD: 0 /100 WBCS — SIGNIFICANT CHANGE UP (ref 0–0)
PLATELET # BLD AUTO: 181 K/UL — SIGNIFICANT CHANGE UP (ref 150–400)
PLATELET # BLD AUTO: 186 K/UL — SIGNIFICANT CHANGE UP (ref 150–400)
POTASSIUM SERPL-MCNC: 4.7 MMOL/L — SIGNIFICANT CHANGE UP (ref 3.5–5.3)
POTASSIUM SERPL-SCNC: 4.7 MMOL/L — SIGNIFICANT CHANGE UP (ref 3.5–5.3)
RBC # BLD: 2.26 M/UL — LOW (ref 4.2–5.8)
RBC # BLD: 2.59 M/UL — LOW (ref 4.2–5.8)
RBC # FLD: 15.9 % — HIGH (ref 10.3–14.5)
RBC # FLD: 15.9 % — HIGH (ref 10.3–14.5)
SODIUM SERPL-SCNC: 141 MMOL/L — SIGNIFICANT CHANGE UP (ref 135–145)
TIBC SERPL-MCNC: 192 UG/DL — LOW (ref 220–430)
UIBC SERPL-MCNC: 163 UG/DL — SIGNIFICANT CHANGE UP (ref 110–370)
VIT B12 SERPL-MCNC: 654 PG/ML — SIGNIFICANT CHANGE UP (ref 232–1245)
WBC # BLD: 3.6 K/UL — LOW (ref 3.8–10.5)
WBC # BLD: 4.77 K/UL — SIGNIFICANT CHANGE UP (ref 3.8–10.5)
WBC # FLD AUTO: 3.6 K/UL — LOW (ref 3.8–10.5)
WBC # FLD AUTO: 4.77 K/UL — SIGNIFICANT CHANGE UP (ref 3.8–10.5)

## 2022-10-05 PROCEDURE — 99233 SBSQ HOSP IP/OBS HIGH 50: CPT | Mod: GC

## 2022-10-05 RX ORDER — IRON SUCROSE 20 MG/ML
200 INJECTION, SOLUTION INTRAVENOUS
Refills: 0 | Status: DISCONTINUED | OUTPATIENT
Start: 2022-10-05 | End: 2022-10-05

## 2022-10-05 RX ORDER — ACETAMINOPHEN 500 MG
650 TABLET ORAL ONCE
Refills: 0 | Status: COMPLETED | OUTPATIENT
Start: 2022-10-05 | End: 2022-10-05

## 2022-10-05 RX ORDER — DIPHENHYDRAMINE HCL 50 MG
25 CAPSULE ORAL ONCE
Refills: 0 | Status: COMPLETED | OUTPATIENT
Start: 2022-10-05 | End: 2022-10-05

## 2022-10-05 RX ADMIN — IRON SUCROSE 200 MILLIGRAM(S): 20 INJECTION, SOLUTION INTRAVENOUS at 08:36

## 2022-10-05 RX ADMIN — Medication 25 MILLIGRAM(S): at 02:01

## 2022-10-05 RX ADMIN — Medication 650 MILLIGRAM(S): at 02:01

## 2022-10-05 RX ADMIN — Medication 200 MILLIGRAM(S): at 05:08

## 2022-10-05 RX ADMIN — Medication 200 MILLIGRAM(S): at 21:13

## 2022-10-05 RX ADMIN — Medication 25 MILLIGRAM(S): at 17:57

## 2022-10-05 NOTE — PHYSICAL THERAPY INITIAL EVALUATION ADULT - PERTINENT HX OF CURRENT PROBLEM, REHAB EVAL
35 yo M, with HTN, ESRD, on HD (TTS), traveller from [place], last session was last Friday, here p/w shortness of breath ;i secondary to missed hemodialysis

## 2022-10-05 NOTE — PATIENT PROFILE ADULT - NSPROGENOTHERPROVIDER_GEN_A_NUR
eMERGENCY dEPARTMENT eNCOUnter      CHIEF COMPLAINT    Chief Complaint   Patient presents with   • Low Blood Sugar Symptomatic       HPI    Natalie Andrade is a 60 year old female who presents following low blood sugar. Blood sugar was 35. Patient has been dieting and taking her normal mom insulin in her normal diabetic pills. She has not had a history of recurrent hypoglycemic episodes infected usually high. According to the family she had ice cream at [employer] last night.         ALLERGIES    ALLERGIES:  No Known Allergies    CURRENT MEDICATIONS    Current Facility-Administered Medications   Medication Dose Route Frequency Provider Last Rate Last Dose   • DEXTROSE 50 % IV SOLN Pyxis Override              Current Outpatient Medications   Medication Sig Dispense Refill   • levothyroxine (SYNTHROID, LEVOTHROID) 200 MCG tablet Take 1 tablet by mouth daily. 90 tablet 1   • buPROPion (WELLBUTRIN XL) 300 MG 24 hr tablet Take 1 tablet by mouth daily. 90 tablet 1   • cyclobenzaprine (FLEXERIL) 10 MG tablet Take 1 tablet by mouth 3 times daily as needed for Muscle spasms. 30 tablet 1   • diclofenac (VOLTAREN) 75 MG EC tablet Take 1 tablet by mouth 2 times daily. 30 tablet 0   • traMADol (ULTRAM) 50 MG tablet Take 1-2 tabs t.i.d. p.r.n. 30 tablet 0   • losartan (COZAAR) 25 MG tablet Take 1 tablet by mouth daily. 30 tablet 5   • traMADol (ULTRAM) 50 MG tablet Take 1 tab PO q 8 PRN pain. 12 tablet 0   • HYDROcodone-acetaminophen (NORCO) 5-325 MG per tablet Take 1-2 tablets every 4-6 hours as needed 10 tablet 0   • atorvastatin (LIPITOR) 10 MG tablet Take one tablet by mouth daily at bedtime 30 tablet 5   • insulin glargine (LANTUS SOLOSTAR) 100 UNIT/ML injection Inject 50 Units into the skin nightly. 15 mL 6   • liraglutide 18 MG/3ML Solution Pen-injector Inject 0.6 mg into the skin daily. 3 mL 6   • Lancets Misc. Kit Tests BID and PRN.  Dx: 250.00. (Patient taking differently: Tests BID and PRN.  Dx: 250.00.) 1 kit  6   • DISPENSE One Touch Ultra Lancets. Pt tests bid and prn.  Dx: 250.00. 100 each 3   • triamcinolone (ARISTOCORT) 0.1 % cream Apply topically BID as needed. 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[facility])   • Hyperlipidemia   • Hypothyroidism   • Diabetes mellitus type 2, insulin dependent (CMS/[facility])   • Chronic insomnia   • Class 2 severe obesity due to excess calories with serious comorbidity and body mass index (BMI) of 36.0 to 36.9 in adult (CMS/[facility])       SURGICAL HISTORY    Past Surgical History:   Procedure Laterality Date   • Appendectomy      as child   • Cholecystectomy     • Colonoscopy  3/9/2015    repeat in 5 years   • Removal gallbladder         SOCIAL HISTORY    Social History     Tobacco Use   • Smoking status: Former Smoker     Packs/day: 2.00     Years: 3.00     Pack years: 6.00     Types: Cigarettes     Last attempt to quit: 1979     Years since quittin.6   • Smokeless tobacco: Never Used   Substance Use Topics   • Alcohol use: Yes     Comment: wine once and twice a month and alcohol one a week   • Drug use: No       FAMILY HISTORY    Family History   Problem Relation Age of Onset   • Diabetes Father    • High blood pressure Mother    • Stroke Brother    • Diabetes Brother    • Diabetes Daughter         Insulin-dependent   • Diabetes Maternal Aunt    • Diabetes Maternal Grandfather    • Diabetes Paternal Grandmother    • Diabetes Paternal Grandfather        REVIEW OF SYSTEMS    Negative not pertinent noncontributory for all remaining 13 systems other than as stated above    PHYSICAL EXAM    ED Triage Vitals [19 0714]   BP (!) 210/92   Pulse (!) 42   Resp 16   Temp    SpO2 99 %         Patient is alert orientated to person, place and time although slow to  respond    Head:  normal cephalic, atraumatic    Eyes:  PERRLA, EOMI    Mucous Membranes: moist, no lesions    Oral pharynx:  noninjected    Neck: supple, no adenopathy    Heart:  Regular rate and rhythm, S1 S2, No murmers rubs or clicks    Lungs: clear to auscultation. No wheezes, rales or rhonchi    Abdomen:  Soft, nontender. No hepatosplenomegaly. No bruits. Bowel sounds present in all 4 quadrants   Negative Gamino sign, negative McBurney's point tenderness  No rebound no guarding    Extremities: No clubbing, cyanosis or edema     Skin: Is without cutaneous manifestations of systemic disease    Neurological: Cranial Nerves 2-12 grossly intact.  No motor or sensory deficients                 EKG         RADIOLOGY    No orders to display       LABS    Results for orders placed or performed during the hospital encounter of 05/18/19   Comprehensive Metabolic Panel   Result Value    Sodium 142    Potassium 3.1 (L)     Comment: Slight hemolysis, result may be falsely increased.    Chloride 103    Carbon Dioxide 27    Anion Gap 15    Glucose 45 (LL)     Comment: CALLED TO, READ BACK AND CONFIRMED  S.CHELSEA AT 0824 ON 5/18/19 BY [id number]      BUN 11    Creatinine 0.51    GFR Estimate,  >90     Comment: eGFR results = or >90 mL/min/1.73m2 = Normal kidney function.    GFR Estimate, Non  >90     Comment: eGFR results = or >90 mL/min/1.73m2 = Normal kidney function.    BUN/Creatinine Ratio 22    CALCIUM 10.3 (H)    TOTAL BILIRUBIN 0.8    AST/SGOT 24     Comment: Slight hemolysis, result may be falsely increased.    ALT/SGPT 24    ALK PHOSPHATASE 165 (H)    TOTAL PROTEIN 8.5 (H)    Albumin 3.5 (L)    GLOBULIN 5.0 (H)    A/G Ratio, Serum 0.7 (L)   Metered blood glucose   Result Value    Glucose Bedside POC 35 (LL)   Metered blood glucose   Result Value    Glucose Bedside POC 37 (LL)     Comment: Notified MD   Metered blood glucose   Result Value    Glucose Bedside  (H)   Metered blood  glucose   Result Value    Glucose Bedside  (H)   Metered blood glucose   Result Value    Glucose Bedside  (H)   Metered blood glucose   Result Value    Glucose Bedside  (H)         ED MEDICATIONS  ED Medication Orders (From admission, onward)    Start Ordered     Status Ordering Provider    05/18/19 0919 05/18/19 0918  potassium CHLORIDE (KLOR-CON M) eduarda ER tablet 40 mEq  ONCE      Last MAR action:  Given TIN PHELPS M    05/18/19 0731 05/18/19 0730  dextrose 50 % injection 25 g  ONCE      Last MAR action:  Given TIN PHELPS M    05/18/19 0730 05/18/19 0730  DEXTROSE 50 % IV SOLN Pyxis Override     Note to Pharmacy:  Dianne Nevarez: cabinet override    Ordered           PROCEDURES        CONSULTS:      ED COURSE & MEDICAL DECISION MAKING       patient low blood sugar and subsequently after D50 was ordered after IV established. Tray of food was also given to her rechecks her blood sugars every 30 minutes for 2 hours  Patient remained stable will be discharged to modify her caloric intake and her insulin after discussion with her primary care doctor on Monday in the meantime she is to check her blood sugars frequently      RECHECKS:      FINAL IMPRESSION        The encounter diagnosis was Hypoglycemia.        FOLLOW UP:  Brice Miller MD  [address] [place] [id number] [phone]           Patient was instructed to return to the ED immediately if symptoms worsen or any new unusual symptoms arise. All questions and concerns were addressed.       TREATMENT:     Summary of your Discharge Medications      You have not been prescribed any medications.         Closure:  The patient understands that this is a provisional diagnosis. Provisional diagnosis can and do change. The diagnosis that you are discharged with today is based on the symptoms with which you presented today. Disease processes can and do change with time.  If any new symptoms occur or worsen, you should seek  immediate attention for re-evaluation.       Henrique Alvarez MD  05/18/19 5692     none

## 2022-10-05 NOTE — PROGRESS NOTE ADULT - SUBJECTIVE AND OBJECTIVE BOX
Patient is a 36y old  Male who presents with a chief complaint of shortness of breath (05 Oct 2022 13:05)      DATE OF SERVICE: 10-05-22 @ 15:17    SUBJECTIVE / OVERNIGHT EVENTS: overnight events noted  "I feel better"     ROS:  Resp: No cough no sputum production  CVS: No chest pain no palpitations no orthopnea  GI: no N/V/D  : no dysuria, no hematuria          MEDICATIONS  (STANDING):  labetalol 200 milliGRAM(s) Oral two times a day    MEDICATIONS  (PRN):        CAPILLARY BLOOD GLUCOSE        I&O's Summary    04 Oct 2022 07:01  -  05 Oct 2022 07:00  --------------------------------------------------------  IN: 1100 mL / OUT: 4100 mL / NET: -3000 mL        Vital Signs Last 24 Hrs  T(C): 37 (05 Oct 2022 06:10), Max: 37.3 (05 Oct 2022 04:34)  T(F): 98.6 (05 Oct 2022 06:10), Max: 99.2 (05 Oct 2022 04:34)  HR: 89 (05 Oct 2022 06:10) (79 - 94)  BP: 162/79 (05 Oct 2022 06:10) (132/70 - 184/98)  BP(mean): --  RR: 20 (05 Oct 2022 06:10) (17 - 20)  SpO2: 98% (05 Oct 2022 06:10) (96% - 98%)    PHYSICAL EXAM:  HEENT: MIC EOMI  Neck: Supple  Lungs: clears  CVS: S1 S2 no M/R/G  Abdomen: no tenderness  Neuro: AO x 3 nonfocal   Skin: warm, dry  Ext: no edema  Msk: no joint swelling    LABS:                        7.4    4.77  )-----------( 181      ( 05 Oct 2022 11:58 )             23.6     10-05    141  |  100  |  68<H>  ----------------------------<  89  4.7   |  23  |  10.40<H>    Ca    9.4      05 Oct 2022 11:58  Phos  6.6     10-04  Mg     2.3     10-04    TPro  6.5  /  Alb  3.6  /  TBili  0.2  /  DBili  x   /  AST  7<L>  /  ALT  <5<L>  /  AlkPhos  1084<H>  10-04    PT/INR - ( 04 Oct 2022 11:01 )   PT: 12.5 sec;   INR: 1.09 ratio         PTT - ( 04 Oct 2022 11:01 )  PTT:28.3 sec            All consultant(s) notes reviewed and care discussed with other providers        Contact Number, Dr Glass [phone]

## 2022-10-05 NOTE — PATIENT PROFILE ADULT - NSTRANSFERBELONGINGSDISPO_GEN_A_NUR
Nutrition Assessment    Type and Reason for Visit: Initial, Positive Nutrition Screen(poor appetite/po intake/wt loss)    Nutrition Recommendations:   Continue general diet wit snacks between meals. Start high calorie ONS TID (chocolate)    Nutrition Assessment: Pt severely malnourished on admission evidenced by weight loss (10% in 1 month) poor po intake with moderate subcutaneous fat and muscle loss. Will start a nutrition supplement. Malnutrition Assessment:  · Malnutrition Status: Meets the criteria for severe malnutrition  · Context: Chronic illness  · Findings of the 6 clinical characteristics of malnutrition (Minimum of 2 out of 6 clinical characteristics is required to make the diagnosis of moderate or severe Protein Calorie Malnutrition based on AND/ASPEN Guidelines):  1. Energy Intake-Less than or equal to 50% of estimated energy requirement, Greater than or equal to 1 month    2. Weight Loss-10% loss or greater, in 1 month  3. Fat Loss-Moderate subcutaneous fat loss, Fat overlying the ribs  4. Muscle Loss-Moderate muscle mass loss, Clavicles (pectoralis and deltoids)  5. Fluid Accumulation-No significant fluid accumulation,    6.  Strength-Not measured    Nutrition Risk Level: High    Nutrient Needs:  · Estimated Daily Total Kcal: 0490-4467 (kg x 30-33)  · Estimated Daily Protein (g): 63-76 (kg x 1.5-1.8)  · Estimated Daily Total Fluid (ml/day): ~1400 ml (1 ml/kcal)    Nutrition Diagnosis:   · Problem: Unintended weight loss  · Etiology: related to Insufficient energy/nutrient consumption, Catabolic illness, Impaired respiratory function-inability to consume food     Signs and symptoms:  as evidenced by Intake 0-25%, Diet history of poor intake, Weight loss greater than or equal to 5% in 1 month    Objective Information:  · Nutrition-Focused Physical Findings: PMH: lung Ca, chemotherapy, COPD. Peripheral line.  BM 4/20, no edema noted  · Wound Type: None  · Current Nutrition Therapies:  · Oral Diet Orders: General   · Oral Diet intake: 1-25%  · Oral Nutrition Supplement (ONS) Orders: None  · Anthropometric Measures:  · Ht: 5' 2\" (157.5 cm)   · Admission Body Wt: 94 lb (42.6 kg)([facility])  · Usual Body Wt: 105 lb (47.6 kg)(3/29/19)  · % Weight Change:  ,  10%  weight loss in 1 month  · Ideal Body Wt: 110 lb (49.9 kg), % Ideal Body 85%  · BMI Classification: BMI <18.5 Underweight    Nutrition Interventions:   Continue current diet, Start ONS(Continue general diet wit snacks between meals. Start high calorie ONS TID (chocolate))  Continued Inpatient Monitoring, Education Not Indicated    Nutrition Evaluation:   · Evaluation: Goals set   · Goals: po intake > 75% of meals and supplements.  weight gain > 94 lb    · Monitoring: Meal Intake, Supplement Intake, Weight, Pertinent Labs      Electronically signed by Jem Izquierdo RD, LD on 4/20/19 at 1:29 PM with patient

## 2022-10-05 NOTE — PROGRESS NOTE ADULT - SUBJECTIVE AND OBJECTIVE BOX
[facility] Division of Kidney Diseases & Hypertension  FOLLOW UP NOTE  [phone]--------------------------------------------------------------------------------  Chief Complaint:End-stage renal disease      24 hour events/subjective:  Patient received HD yesterday Will plan for another session today.       PAST HISTORY  --------------------------------------------------------------------------------  No significant changes to PMH, PSH, FHx, SHx, unless otherwise noted    ALLERGIES & MEDICATIONS  --------------------------------------------------------------------------------  Allergies    No Known Allergies    Intolerances      Standing Inpatient Medications  labetalol 200 milliGRAM(s) Oral two times a day    PRN Inpatient Medications      REVIEW OF SYSTEMS  --------------------------------------------------------------------------------  Skin: No rashes  Head/Eyes/Ears: Normal hearing,   Respiratory: No dyspnea, cough No SOB  CV: No chest pain  GI: No abdominal pain, diarrhea +feeling of fullness   : No dysuria, hematuria  MSK: No  edema  Heme: No easy bruising or bleeding  Psych: No significant depression    All other systems were reviewed and are negative, except as noted.    VITALS/PHYSICAL EXAM  --------------------------------------------------------------------------------  T(C): 37 (10-05-22 @ 06:10), Max: 37.3 (10-05-22 @ 04:34)  HR: 89 (10-05-22 @ 06:10) (79 - 94)  BP: 162/79 (10-05-22 @ 06:10) (132/70 - 184/98)  RR: 20 (10-05-22 @ 06:10) (17 - 20)  SpO2: 98% (10-05-22 @ 06:10) (96% - 100%)  Wt(kg): --  Height (cm): 190.5 (10-04-22 @ 09:38)  Weight (kg): 109 (10-04-22 @ 09:38)  BMI (kg/m2): 30 (10-04-22 @ 09:38)  BSA (m2): 2.37 (10-04-22 @ 09:38)      10-04-22 @ 07:01  -  10-05-22 @ 07:00  --------------------------------------------------------  IN: 1100 mL / OUT: 4100 mL / NET: -3000 mL      Physical Exam:  	Gen: NAD  	HEENT: MMM  	Pulm: CTA B/L  	CV: S1S2  	Abd: Soft, +BS   	Ext: No LE edema B/L  	Neuro: Awake  	Skin: Warm and dry  	Vascular access: LUE fistula; does have dilatation, does not appear to have any scabs or    LABS/STUDIES  --------------------------------------------------------------------------------              7.4    4.77  >-----------<  181      [10-05-22 @ 11:58]              23.6     141  |  100  |  68  ----------------------------<  89      [10-05-22 @ 11:58]  4.7   |  23  |  10.40        Ca     9.4     [10-05-22 @ 11:58]      Mg     2.3     [10-04-22 @ 11:01]      Phos  6.6     [10-04-22 @ 11:01]    TPro  6.5  /  Alb  3.6  /  TBili  0.2  /  DBili  x   /  AST  7   /  ALT  <5  /  AlkPhos  1084  [10-04-22 @ 11:01]    PT/INR: PT 12.5 , INR 1.09       [10-04-22 @ 11:01]  PTT: 28.3       [10-04-22 @ 11:01]      Creatinine Trend:  SCr 10.40 [10-05 @ 11:58]  SCr 14.26 [10-04 @ 11:01]        Iron 29, TIBC 192, %sat 15      [10-04-22 @ 16:34]  Ferritin 108      [10-04-22 @ 16:34]    HBsAg Nonreact      [10-04-22 @ 10:49]  HCV 0.29, Nonreact      [10-04-22 @ 10:49]

## 2022-10-05 NOTE — PHYSICAL THERAPY INITIAL EVALUATION ADULT - ADDITIONAL COMMENTS
Patient reports he lives in [place] in a house with 3 steps to enter. He has a roommate and was independent prior.

## 2022-10-06 LAB
ANION GAP SERPL CALC-SCNC: 20 MMOL/L — HIGH (ref 5–17)
BUN SERPL-MCNC: 50 MG/DL — HIGH (ref 7–23)
CALCIUM SERPL-MCNC: 10.1 MG/DL — SIGNIFICANT CHANGE UP (ref 8.4–10.5)
CHLORIDE SERPL-SCNC: 96 MMOL/L — SIGNIFICANT CHANGE UP (ref 96–108)
CO2 SERPL-SCNC: 23 MMOL/L — SIGNIFICANT CHANGE UP (ref 22–31)
CREAT SERPL-MCNC: 7.8 MG/DL — HIGH (ref 0.5–1.3)
EGFR: 8 ML/MIN/1.73M2 — LOW
GLUCOSE SERPL-MCNC: 66 MG/DL — LOW (ref 70–99)
HCT VFR BLD CALC: 26.9 % — LOW (ref 39–50)
HGB BLD-MCNC: 8.5 G/DL — LOW (ref 13–17)
MAGNESIUM SERPL-MCNC: 2.1 MG/DL — SIGNIFICANT CHANGE UP (ref 1.6–2.6)
MCHC RBC-ENTMCNC: 28.2 PG — SIGNIFICANT CHANGE UP (ref 27–34)
MCHC RBC-ENTMCNC: 31.6 GM/DL — LOW (ref 32–36)
MCV RBC AUTO: 89.4 FL — SIGNIFICANT CHANGE UP (ref 80–100)
NRBC # BLD: 0 /100 WBCS — SIGNIFICANT CHANGE UP (ref 0–0)
PHOSPHATE SERPL-MCNC: 6.9 MG/DL — HIGH (ref 2.5–4.5)
PLATELET # BLD AUTO: 195 K/UL — SIGNIFICANT CHANGE UP (ref 150–400)
POTASSIUM SERPL-MCNC: 4.3 MMOL/L — SIGNIFICANT CHANGE UP (ref 3.5–5.3)
POTASSIUM SERPL-SCNC: 4.3 MMOL/L — SIGNIFICANT CHANGE UP (ref 3.5–5.3)
RBC # BLD: 3.01 M/UL — LOW (ref 4.2–5.8)
RBC # FLD: 16.1 % — HIGH (ref 10.3–14.5)
SARS-COV-2 RNA SPEC QL NAA+PROBE: SIGNIFICANT CHANGE UP
SODIUM SERPL-SCNC: 139 MMOL/L — SIGNIFICANT CHANGE UP (ref 135–145)
WBC # BLD: 4.57 K/UL — SIGNIFICANT CHANGE UP (ref 3.8–10.5)
WBC # FLD AUTO: 4.57 K/UL — SIGNIFICANT CHANGE UP (ref 3.8–10.5)

## 2022-10-06 RX ADMIN — Medication 200 MILLIGRAM(S): at 05:23

## 2022-10-06 RX ADMIN — Medication 200 MILLIGRAM(S): at 18:46

## 2022-10-06 NOTE — PROGRESS NOTE ADULT - SUBJECTIVE AND OBJECTIVE BOX
Patient is a 36y old  Male who presents with a chief complaint of shortness of breath (05 Oct 2022 15:17)      DATE OF SERVICE: 10-06-22 @ 14:42    SUBJECTIVE / OVERNIGHT EVENTS: overnight events noted    ROS:  Resp: No cough no sputum production  CVS: No chest pain no palpitations no orthopnea  GI: no N/V/D  : no dysuria, no hematuria        MEDICATIONS  (STANDING):  labetalol 200 milliGRAM(s) Oral two times a day    MEDICATIONS  (PRN):        CAPILLARY BLOOD GLUCOSE        I&O's Summary    05 Oct 2022 07:01  -  06 Oct 2022 07:00  --------------------------------------------------------  IN: 240 mL / OUT: 3000 mL / NET: -2760 mL    06 Oct 2022 07:01  -  06 Oct 2022 14:42  --------------------------------------------------------  IN: 480 mL / OUT: 0 mL / NET: 480 mL        Vital Signs Last 24 Hrs  T(C): 37.3 (06 Oct 2022 12:17), Max: 37.3 (05 Oct 2022 21:10)  T(F): 99.2 (06 Oct 2022 12:17), Max: 99.2 (06 Oct 2022 00:08)  HR: 85 (06 Oct 2022 12:17) (85 - 87)  BP: 163/89 (06 Oct 2022 12:17) (150/83 - 182/97)  BP(mean): --  RR: 18 (06 Oct 2022 12:17) (18 - 19)  SpO2: 98% (06 Oct 2022 12:17) (97% - 99%)    PHYSICAL EXAM:  HEENT: MIC EOMI  Neck: Supple  Lungs: clears  CVS: S1 S2 no M/R/G  Abdomen: no tenderness  Neuro: AO x 3 nonfocal   Skin: warm, dry  Ext: no edema  Msk: no joint swelling    LABS:                        8.5    4.57  )-----------( 195      ( 06 Oct 2022 06:49 )             26.9     10-06    139  |  96  |  50<H>  ----------------------------<  66<L>  4.3   |  23  |  7.80<H>    Ca    10.1      06 Oct 2022 06:47  Phos  6.9     10-06  Mg     2.1     10-06                  All consultant(s) notes reviewed and care discussed with other providers        Contact Number, Dr Glass [phone]

## 2022-10-07 ENCOUNTER — TRANSCRIPTION ENCOUNTER (OUTPATIENT)
Age: 37
End: 2022-10-07

## 2022-10-07 VITALS
RESPIRATION RATE: 16 BRPM | HEART RATE: 86 BPM | OXYGEN SATURATION: 99 % | TEMPERATURE: 99 F | WEIGHT: 247.14 LBS | DIASTOLIC BLOOD PRESSURE: 92 MMHG | SYSTOLIC BLOOD PRESSURE: 173 MMHG

## 2022-10-07 LAB
ANION GAP SERPL CALC-SCNC: 20 MMOL/L — HIGH (ref 5–17)
BUN SERPL-MCNC: 75 MG/DL — HIGH (ref 7–23)
CALCIUM SERPL-MCNC: 9.7 MG/DL — SIGNIFICANT CHANGE UP (ref 8.4–10.5)
CHLORIDE SERPL-SCNC: 95 MMOL/L — LOW (ref 96–108)
CO2 SERPL-SCNC: 24 MMOL/L — SIGNIFICANT CHANGE UP (ref 22–31)
CREAT SERPL-MCNC: 10.95 MG/DL — HIGH (ref 0.5–1.3)
EGFR: 6 ML/MIN/1.73M2 — LOW
GLUCOSE SERPL-MCNC: 64 MG/DL — LOW (ref 70–99)
HCT VFR BLD CALC: 27.6 % — LOW (ref 39–50)
HGB BLD-MCNC: 8.6 G/DL — LOW (ref 13–17)
MCHC RBC-ENTMCNC: 28.6 PG — SIGNIFICANT CHANGE UP (ref 27–34)
MCHC RBC-ENTMCNC: 31.2 GM/DL — LOW (ref 32–36)
MCV RBC AUTO: 91.7 FL — SIGNIFICANT CHANGE UP (ref 80–100)
NRBC # BLD: 0 /100 WBCS — SIGNIFICANT CHANGE UP (ref 0–0)
PHOSPHATE SERPL-MCNC: 6.6 MG/DL — HIGH (ref 2.5–4.5)
PLATELET # BLD AUTO: 192 K/UL — SIGNIFICANT CHANGE UP (ref 150–400)
POTASSIUM SERPL-MCNC: 4.4 MMOL/L — SIGNIFICANT CHANGE UP (ref 3.5–5.3)
POTASSIUM SERPL-SCNC: 4.4 MMOL/L — SIGNIFICANT CHANGE UP (ref 3.5–5.3)
RBC # BLD: 3.01 M/UL — LOW (ref 4.2–5.8)
RBC # FLD: 15.7 % — HIGH (ref 10.3–14.5)
SODIUM SERPL-SCNC: 139 MMOL/L — SIGNIFICANT CHANGE UP (ref 135–145)
WBC # BLD: 4.43 K/UL — SIGNIFICANT CHANGE UP (ref 3.8–10.5)
WBC # FLD AUTO: 4.43 K/UL — SIGNIFICANT CHANGE UP (ref 3.8–10.5)

## 2022-10-07 PROCEDURE — U0003: CPT

## 2022-10-07 PROCEDURE — 85027 COMPLETE CBC AUTOMATED: CPT

## 2022-10-07 PROCEDURE — 83735 ASSAY OF MAGNESIUM: CPT

## 2022-10-07 PROCEDURE — 71046 X-RAY EXAM CHEST 2 VIEWS: CPT

## 2022-10-07 PROCEDURE — 90935 HEMODIALYSIS ONE EVALUATION: CPT | Mod: GC

## 2022-10-07 PROCEDURE — P9016: CPT

## 2022-10-07 PROCEDURE — 86850 RBC ANTIBODY SCREEN: CPT

## 2022-10-07 PROCEDURE — 36415 COLL VENOUS BLD VENIPUNCTURE: CPT

## 2022-10-07 PROCEDURE — 85610 PROTHROMBIN TIME: CPT

## 2022-10-07 PROCEDURE — 86901 BLOOD TYPING SEROLOGIC RH(D): CPT

## 2022-10-07 PROCEDURE — 36430 TRANSFUSION BLD/BLD COMPNT: CPT

## 2022-10-07 PROCEDURE — 80048 BASIC METABOLIC PNL TOTAL CA: CPT

## 2022-10-07 PROCEDURE — 80053 COMPREHEN METABOLIC PANEL: CPT

## 2022-10-07 PROCEDURE — 0225U NFCT DS DNA&RNA 21 SARSCOV2: CPT

## 2022-10-07 PROCEDURE — 99261: CPT

## 2022-10-07 PROCEDURE — 85025 COMPLETE CBC W/AUTO DIFF WBC: CPT

## 2022-10-07 PROCEDURE — 83550 IRON BINDING TEST: CPT

## 2022-10-07 PROCEDURE — 82728 ASSAY OF FERRITIN: CPT

## 2022-10-07 PROCEDURE — 82607 VITAMIN B-12: CPT

## 2022-10-07 PROCEDURE — 83540 ASSAY OF IRON: CPT

## 2022-10-07 PROCEDURE — 80074 ACUTE HEPATITIS PANEL: CPT

## 2022-10-07 PROCEDURE — 96374 THER/PROPH/DIAG INJ IV PUSH: CPT

## 2022-10-07 PROCEDURE — 97161 PT EVAL LOW COMPLEX 20 MIN: CPT

## 2022-10-07 PROCEDURE — 99285 EMERGENCY DEPT VISIT HI MDM: CPT

## 2022-10-07 PROCEDURE — 86923 COMPATIBILITY TEST ELECTRIC: CPT

## 2022-10-07 PROCEDURE — U0005: CPT

## 2022-10-07 PROCEDURE — 86900 BLOOD TYPING SEROLOGIC ABO: CPT

## 2022-10-07 PROCEDURE — 84100 ASSAY OF PHOSPHORUS: CPT

## 2022-10-07 PROCEDURE — 85730 THROMBOPLASTIN TIME PARTIAL: CPT

## 2022-10-07 RX ORDER — LABETALOL HCL 100 MG
1 TABLET ORAL
Qty: 0 | Refills: 0 | DISCHARGE

## 2022-10-07 RX ORDER — LABETALOL HCL 100 MG
1 TABLET ORAL
Qty: 60 | Refills: 0
Start: 2022-10-07 | End: 2022-11-05

## 2022-10-07 NOTE — PROGRESS NOTE ADULT - SUBJECTIVE AND OBJECTIVE BOX
Patient is a 36y old  Male who presents with a chief complaint of shortness of breath (07 Oct 2022 13:03)      DATE OF SERVICE: 10-07-22 @ 15:40    SUBJECTIVE / OVERNIGHT EVENTS: overnight events noted    ROS:  Resp: No cough no sputum production  CVS: No chest pain no palpitations no orthopnea  GI: no N/V/D  : no dysuria, no hematuria  "I want to go home"       MEDICATIONS  (STANDING):  labetalol 200 milliGRAM(s) Oral two times a day    MEDICATIONS  (PRN):        CAPILLARY BLOOD GLUCOSE        I&O's Summary    06 Oct 2022 07:01  -  07 Oct 2022 07:00  --------------------------------------------------------  IN: 960 mL / OUT: 0 mL / NET: 960 mL    07 Oct 2022 07:01  -  07 Oct 2022 15:40  --------------------------------------------------------  IN: 0 mL / OUT: 3000 mL / NET: -3000 mL        Vital Signs Last 24 Hrs  T(C): 37.3 (07 Oct 2022 10:03), Max: 37.3 (07 Oct 2022 10:03)  T(F): 99.1 (07 Oct 2022 10:03), Max: 99.1 (07 Oct 2022 10:03)  HR: 86 (07 Oct 2022 10:03) (81 - 90)  BP: 173/92 (07 Oct 2022 10:03) (151/84 - 175/77)  BP(mean): --  RR: 16 (07 Oct 2022 10:03) (16 - 18)  SpO2: 99% (07 Oct 2022 10:03) (97% - 99%)    PHYSICAL EXAM:  Neck: Supple  Lungs: clears  CVS: S1 S2 no M/R/G  Abdomen: no tenderness  Neuro: AO x 3 nonfocal   Skin: warm, dry  Ext: no edema    LABS:                        8.6    4.43  )-----------( 192      ( 07 Oct 2022 07:26 )             27.6     10-07    139  |  95<L>  |  75<H>  ----------------------------<  64<L>  4.4   |  24  |  10.95<H>    Ca    9.7      07 Oct 2022 07:26  Phos  6.6     10-07  Mg     2.1     10-06                  All consultant(s) notes reviewed and care discussed with other providers        Contact Number, Dr Glass [phone]

## 2022-10-07 NOTE — PROGRESS NOTE ADULT - PROBLEM SELECTOR PLAN 1
likely secondary to missed hemodialysis  renal help appreciated  continue hemodialysis
likely secondary to missed hemodialysis  renal help appreciated  repeat hemodialysis today
ESRD ON HD MWF, last outpatient HD Friday, Sept 30    []HD done 10/4 and 10/5  [] Will plan for HD today again per his outpatient schedule  [] will use LUE fistula  [] SW has shared information where he can call when coming back to [place] from [place] to establish outpatient HD
likely secondary to missed hemodialysis  renal help appreciated  continue hemodialysis
ESRD ON HD MWF, last outpatient HD Friday, Sept 30    []HD done 10/4, will plan for another HD today per his outpatient schedule  [] will use LUE fistula

## 2022-10-07 NOTE — PROGRESS NOTE ADULT - PROBLEM SELECTOR PROBLEM 1
SOB (shortness of breath)
ESRD on dialysis
SOB (shortness of breath)
ESRD on dialysis
SOB (shortness of breath)

## 2022-10-07 NOTE — PROGRESS NOTE ADULT - PROBLEM SELECTOR PLAN 3
renal help requested  continue to follow recommendations
PHosphorus elevated at 6.6  Please start renvela 800 TID with meals  Ensure low phos diet.     If you have any questions, please feel free to contact me  Bill Loomis  Nephrology Fellow  [phone]; Prefer [employer] TEAMS  (After 5pm or on weekends please page the on-call fellow).    Patient was discussed with Dr. Frias who agrees with my A/P. Addendum to follow
Phosphorus elevated at 6.6  Please start renvela 800 TID with meals  Ensure low phos diet.     If you have any questions, please feel free to contact me  Bill Loomis  Nephrology Fellow  [phone]; Prefer [employer] TEAMS  (After 5pm or on weekends please page the on-call fellow).    Patient was discussed with Dr. Frias who agrees with my A/P. Addendum to follow

## 2022-10-07 NOTE — PROGRESS NOTE ADULT - SUBJECTIVE AND OBJECTIVE BOX
[facility] Division of Kidney Diseases & Hypertension  FOLLOW UP NOTE  [phone]--------------------------------------------------------------------------------  Chief Complaint:End-stage renal disease        24 hour events/subjective:  Plan for HD today  dinorah plans to go back to [place] for now      PAST HISTORY  --------------------------------------------------------------------------------  No significant changes to PMH, PSH, FHx, SHx, unless otherwise noted    ALLERGIES & MEDICATIONS  --------------------------------------------------------------------------------  Allergies    No Known Allergies    Intolerances      Standing Inpatient Medications  labetalol 200 milliGRAM(s) Oral two times a day    PRN Inpatient Medications      REVIEW OF SYSTEMS  --------------------------------------------------------------------------------  Gen: No  fevers/chills  Skin: No rashes  Head/Eyes/Ears/Mouth: No headache; Normal hearing; Normal vision w/o blurriness  Respiratory: No dyspnea, cough, wheezing, hemoptysis  CV: No chest pain, PND, orthopnea  GI: No abdominal pain, diarrhea, constipation, nausea, vomiting  : No increased frequency, dysuria, hematuria, nocturia  MSK: No joint pain/swelling; no back pain; no edema  Neuro: No dizziness/lightheadedness, weakness, seizures, numbness, tingling      All other systems were reviewed and are negative, except as noted.    VITALS/PHYSICAL EXAM  --------------------------------------------------------------------------------  T(C): 37.3 (10-07-22 @ 10:03), Max: 37.3 (10-07-22 @ 10:03)  HR: 86 (10-07-22 @ 10:03) (81 - 90)  BP: 173/92 (10-07-22 @ 10:03) (151/84 - 175/77)  RR: 16 (10-07-22 @ 10:03) (16 - 18)  SpO2: 99% (10-07-22 @ 10:03) (97% - 99%)  Wt(kg): --        10-06-22 @ 07:01  -  10-07-22 @ 07:00  --------------------------------------------------------  IN: 960 mL / OUT: 0 mL / NET: 960 mL      Physical Exam:  	Gen: NAD, well-appearing  	HEENT: PERRL, supple neck, clear oropharynx  	Pulm: CTA B/L  	CV: RRR, S1S2;  	Back: No spinal or CVA tenderness  	Abd: +BS, soft, nontender/nondistended  	: No suprapubic tenderness                      Extremities: no bilateral LE edema noted.                       Neuro: No focal deficits, intact gait  	Skin: Warm, without rashes  	Vascular access:    LABS/STUDIES  --------------------------------------------------------------------------------              8.6    4.43  >-----------<  192      [10-07-22 @ 07:26]              27.6     139  |  95  |  75  ----------------------------<  64      [10-07-22 @ 07:26]  4.4   |  24  |  10.95        Ca     9.7     [10-07-22 @ 07:26]      Mg     2.1     [10-06-22 @ 06:47]      Phos  6.6     [10-07-22 @ 07:26]            Creatinine Trend:  SCr 10.95 [10-07 @ 07:26]  SCr 7.80 [10-06 @ 06:47]  SCr 10.40 [10-05 @ 11:58]  SCr 14.26 [10-04 @ 11:01]        Iron 29, TIBC 192, %sat 15      [10-04-22 @ 16:34]  Ferritin 108      [10-04-22 @ 16:34]    HBsAg Nonreact      [10-04-22 @ 10:49]  HCV 0.29, Nonreact      [10-04-22 @ 10:49]     [facility] Division of Kidney Diseases & Hypertension  FOLLOW UP NOTE  [phone]--------------------------------------------------------------------------------  Chief Complaint:End-stage renal disease        24 hour events/subjective:  Plan for HD today  dinorah plans to go back to [place] for now      PAST HISTORY  --------------------------------------------------------------------------------  No significant changes to PMH, PSH, FHx, SHx, unless otherwise noted    ALLERGIES & MEDICATIONS  --------------------------------------------------------------------------------  Allergies    No Known Allergies    Intolerances      Standing Inpatient Medications  labetalol 200 milliGRAM(s) Oral two times a day    PRN Inpatient Medications      REVIEW OF SYSTEMS  --------------------------------------------------------------------------------  Skin: No rashes  Head/Eyes/Ears: Normal hearing,   Respiratory: No dyspnea, cough No SOB  CV: No chest pain  GI: No abdominal pain, diarrhea +feeling of fullness   : No dysuria, hematuria  MSK: No  edema  Heme: No easy bruising or bleeding  Psych: No significant depression    All other systems were reviewed and are negative, except as noted.    VITALS/PHYSICAL EXAM  --------------------------------------------------------------------------------  T(C): 37.3 (10-07-22 @ 10:03), Max: 37.3 (10-07-22 @ 10:03)  HR: 86 (10-07-22 @ 10:03) (81 - 90)  BP: 173/92 (10-07-22 @ 10:03) (151/84 - 175/77)  RR: 16 (10-07-22 @ 10:03) (16 - 18)  SpO2: 99% (10-07-22 @ 10:03) (97% - 99%)  Wt(kg): --        10-06-22 @ 07:01  -  10-07-22 @ 07:00  --------------------------------------------------------  IN: 960 mL / OUT: 0 mL / NET: 960 mL      Physical Exam:  	Gen: NAD  	HEENT: MMM  	Pulm: CTA B/L  	CV: S1S2  	Abd: Soft, +BS   	Ext: No LE edema B/L  	Neuro: Awake  	Skin: Warm and dry  	Vascular access: LUE fistula; does have dilatation, does not appear to have any scabs or      LABS/STUDIES  --------------------------------------------------------------------------------              8.6    4.43  >-----------<  192      [10-07-22 @ 07:26]              27.6     139  |  95  |  75  ----------------------------<  64      [10-07-22 @ 07:26]  4.4   |  24  |  10.95        Ca     9.7     [10-07-22 @ 07:26]      Mg     2.1     [10-06-22 @ 06:47]      Phos  6.6     [10-07-22 @ 07:26]            Creatinine Trend:  SCr 10.95 [10-07 @ 07:26]  SCr 7.80 [10-06 @ 06:47]  SCr 10.40 [10-05 @ 11:58]  SCr 14.26 [10-04 @ 11:01]        Iron 29, TIBC 192, %sat 15      [10-04-22 @ 16:34]  Ferritin 108      [10-04-22 @ 16:34]    HBsAg Nonreact      [10-04-22 @ 10:49]  HCV 0.29, Nonreact      [10-04-22 @ 10:49]

## 2022-10-07 NOTE — DISCHARGE NOTE PROVIDER - HOSPITAL COURSE
35 yo M, with HTN, ESRD, on HD (TTS), traveller from [place], last session was last Friday, here p/w shortness of breath ;i secondary to missed hemodialysis      Problem/Plan - 1:  ·  Problem: SOB (shortness of breath).   ·  Plan: likely secondary to missed hemodialysis  renal help appreciated  continue hemodialysis.     Problem/Plan - 2:  ·  Problem: HTN (hypertension).   ·  Plan: continue labetalol  acceptable for now.     Problem/Plan - 3:  ·  Problem: ESRD on dialysis.   ·  Plan: renal help requested  continue to follow recommendations.     Problem/Plan - 4:  ·  Problem: Anemia.   ·  Plan: on admission severe  continue to monitor   no further transfusion needed.       Additional Information:  Additional Information: discussed with patient in detail, expresses understanding of treatment plans.  he is insistent that he will leave today and will be back in [place] on Monday and his hemodialysis is confirmed by s/w as being in place for Tu/Thu/Sat schedule  I offered patient to stay in [facility] tomorrow and we would dialyze him short session tomorrow but he is adamant about wanting to leave today  promises that he already has a plane ticket to [place] which his friend has purchased already  cleared by s/w for discharge    discharge on his own insistence    discussed with patient in detail, expresses understanding of treatment plans.  discussed with s/w   discussed with covering ACP   Yes

## 2022-10-07 NOTE — PROGRESS NOTE ADULT - PROBLEM SELECTOR PLAN 4
on admission severe  continue to monitor   no further transfusion needed
on admission severe  continue to monitor   no further transfusion needed
severe   improved after one unit  agree with IV iron   likely secondary to CKD  patient has víctor had a work up

## 2022-10-07 NOTE — PROGRESS NOTE ADULT - ASSESSMENT
35 yo M, with HTN, ESRD, on HD (TTS), traveller from [place], last session was last Friday, here p/w shortness of breath ;i secondary to missed hemodialysis 
35 yo M, with HTN, ESRD, on HD (TTS), traveller from [place], last session was last Friday, here p/w shortness of breath ;i secondary to missed hemodialysis 
ESRD on HD MWF
35 yo M, with HTN, ESRD, on HD (TTS), traveller from [place], last session was last Friday, here p/w shortness of breath ;i secondary to missed hemodialysis 
ESRD on HD MWF

## 2022-10-07 NOTE — DISCHARGE NOTE NURSING/CASE MANAGEMENT/SOCIAL WORK - PATIENT PORTAL LINK FT
You can access the FollowMyHealth Patient Portal offered by [facility] by registering at the following website: [email or web address]. By joining [facility]’s FollowMyHealth portal, you will also be able to view your health information using other applications (apps) compatible with our system.

## 2022-10-07 NOTE — PROGRESS NOTE ADULT - ATTENDING COMMENTS
Racquel Frias MD  Office : [phone]  Contact me on [employer] Teams.
Pt. seen on maintenance HD, tolerating HD well. BP stable and AVF functioning well during HD rounds. plan for next maintenance HD on Monday.

## 2022-10-07 NOTE — PROGRESS NOTE ADULT - NSPROGADDITIONALINFOA_GEN_ALL_CORE
discussed with patient in detail, expresses understanding of treatment plans.   to see as patient has no insurance and states he wants to stay in [place] now
discharge planning
discussed with patient in detail, expresses understanding of treatment plans.  he is insistent that he will leave today and will be back in [place] on Monday and his hemodialysis is confirmed by s/w as being in place for Tu/Thu/Sat schedule  I offered patient to stay in till tomorrow and we would dialyze him short session tomorrow but he is adamant about wanting to leave today  promises that he already has a plane ticket to [place] which his friend has purchased already  cleared by s/w for discharge    discharge on his own insistence    discussed with patient in detail, expresses understanding of treatment plans.  discussed with s/w   discussed with covering ACP

## 2022-10-07 NOTE — DISCHARGE NOTE PROVIDER - NSDCCPCAREPLAN_GEN_ALL_CORE_FT
PRINCIPAL DISCHARGE DIAGNOSIS  Diagnosis: ESRD needing dialysis  Assessment and Plan of Treatment:       SECONDARY DISCHARGE DIAGNOSES  Diagnosis: SOB (shortness of breath)  Assessment and Plan of Treatment:     Diagnosis: Symptomatic anemia  Assessment and Plan of Treatment:

## 2022-10-07 NOTE — DISCHARGE NOTE NURSING/CASE MANAGEMENT/SOCIAL WORK - NSDCVIVACCINE_GEN_ALL_CORE_FT
Influenza, seasonal, injectable; 30-Dec-2013 15:27; Jamila Hartley (JAYCEE); [id number]; IntraMuscular; Deltoid Right.; 0.5 milliLiter(s);

## 2022-10-07 NOTE — DISCHARGE NOTE PROVIDER - CARE PROVIDER_API CALL
Hemodialysis center, [place]  Phone: (   )    -  Fax: (   )    -  Scheduled Appointment: 10/11/2022

## 2022-10-07 NOTE — PROGRESS NOTE ADULT - PROBLEM SELECTOR PLAN 2
Patient's iron studies reviewed, does qualify for iron  Will see how his hgb trends after transfusion from yesturday and decide for iron repletion
continue labetalol  acceptable for now
Patient's iron studies reviewed, does qualify for iron  Patient received a transfusion when admitted. can resume outpatient epo/iron dosing

## 2022-11-13 NOTE — DISCHARGE NOTE ADULT - DISCHARGE DATE
Hospitalist Progress Note      PCP: HANS Redd - CNP    Chief Complaint. Patient is a 44-year-old male who presents to the hospital due to chest pain. According to patient he has been having chest pain especially with exertion, he also had shortness of breath as well as chills. He mentions his chest pain radiated across his chest felt heavy and constant it was there for around 30 minutes. Patient denied associated nausea vomiting diarrhea constipation dysuria. Date of Admission: 11/11/2022    Subjective:  tolerating diet, talking on the phone in the bed, he is requesting to go to inpt rehab. He do not have any complaints today. denies nausea, vomiting, shortness of breath, fever or chills.     Medications:  Reviewed    Infusion Medications    sodium chloride Stopped (11/12/22 1305)    dextrose Stopped (11/12/22 1305)     Scheduled Medications    ipratropium-albuterol  1 ampule Inhalation 4x daily    insulin lispro  4 Units SubCUTAneous Once    insulin glargine  10 Units SubCUTAneous Nightly    sodium chloride flush  5-40 mL IntraVENous 2 times per day    aspirin  81 mg Oral Daily    atorvastatin  80 mg Oral Nightly    carvedilol  25 mg Oral BID WC    chlorthalidone  25 mg Oral Daily    DULoxetine  30 mg Oral Daily    gabapentin  800 mg Oral TID    itraconazole  200 mg Oral BID    lisinopril  20 mg Oral Daily    insulin lispro  0-8 Units SubCUTAneous TID WC    insulin lispro  0-4 Units SubCUTAneous Nightly    insulin glargine  40 Units SubCUTAneous Nightly     PRN Meds: sodium chloride flush, sodium chloride, ondansetron **OR** ondansetron, acetaminophen **OR** acetaminophen, polyethylene glycol, albuterol sulfate HFA, glucose, dextrose bolus **OR** dextrose bolus, glucagon (rDNA), dextrose    No intake or output data in the 24 hours ending 11/13/22 7633    Physical Exam Performed:    /64   Pulse 97   Temp 98 °F (36.7 °C) (Oral)   Resp 18   Wt 243 lb 2.7 oz (110.3 kg)   SpO2 98% BMI 32.08 kg/m²     General appearance: No apparent distress, on RA  HEENT:  Conjunctivae/corneas clear. Neck: Supple, with full range of motion. Respiratory:  Normal respiratory effort. Clear to auscultation, bilaterally without Rales/Wheezes/Rhonchi. Cardiovascular: Regular rate and rhythm with normal S1/S2 without murmurs or rubs  Abdomen: Soft, non-tender, non-distended, normal bowel sounds. Musculoskeletal: No cyanosis or edema bilaterally  Neurologic:  without any focal sensory/motor deficits. grossly non-focal.  Psychiatric: Alert and oriented, Normal mood  Peripheral Pulses: +2 palpable, equal bilaterally       Labs:   Recent Labs     11/11/22 2012 11/12/22  0506 11/13/22  1243   WBC 5.7 5.0 7.0   HGB 15.4 13.6 14.0   HCT 47.5 40.9 42.8    278 285       Recent Labs     11/11/22  1156 11/13/22  1243    134*   K 3.4* 3.4*   CL 98* 99   CO2 26 27   BUN 21* 33*   CREATININE 1.0 1.4*   CALCIUM 9.6 8.8       Recent Labs     11/11/22  1156   AST 15   ALT 15   BILITOT 0.3   ALKPHOS 87       No results for input(s): INR in the last 72 hours. Recent Labs     11/11/22  1626 11/11/22 2012 11/12/22  0506   TROPONINI 0.03* 0.01 0.02*         Urinalysis:      Lab Results   Component Value Date/Time    NITRU Negative 07/05/2022 11:49 AM    WBCUA 1 07/05/2022 11:49 AM    BACTERIA None Seen 07/05/2022 11:49 AM    RBCUA 1 07/05/2022 11:49 AM    BLOODU Negative 07/05/2022 11:49 AM    SPECGRAV 1.024 07/05/2022 11:49 AM    GLUCOSEU 500 07/05/2022 11:49 AM    GLUCOSEU NEGATIVE 01/02/2011 02:44 PM       Radiology:  XR CHEST PORTABLE   Final Result   No significant findings in the chest.         MRI CARDIAC W WO CONTRAST    (Results Pending)         Assessment/Plan:    Active Hospital Problems    Diagnosis     Chest pain [R07.9]      Priority: Medium       Patient is a 49-year-old male who presents to the hospital due to chest pain.   According to patient he has been having chest pain especially with exertion, he also had shortness of breath as well as chills. He mentions his chest pain radiated across his chest felt heavy and constant it was there for around 30 minutes. Patient denied associated nausea vomiting diarrhea constipation dysuria. Assessment  Chest pain, elevated troponin-cannot rule out NSTEMI rule out ACS  Sinus tachycardia  Hypokalemia  Elevated troponin likely demand ischemia  Diabetes mellitus  Hyperlipidemia     Plan  DC IV heparin, start lovenox for DVT PPx  Consult cardiology - Cardiac MRI as OP, ok to dc per cardiology  Monitor cardiac telemetry  Monitor troponin  Recently had echo 5/2022 did show EF 40 to 45% with mildly decreased ejection fraction  Monitor and replace electrolytes  DVT prophylaxis-on lovenox  Resume home medications  Monitor replace electrolytes  Diet: ADULT DIET; Regular; 4 carb choices (60 gm/meal);  No Caffeine  Code Status: Full Code    PT/OT Eval Status: ordered    Dispo/Plan of care - consult PT/OT, cardiac MRI as OP, patient mother wishes to complete cardiac w/u as inpt in rehab, informed patient will consult PT/OT    Casimiro Paget, MD 14-Aug-2017

## 2023-01-01 NOTE — ED PROVIDER NOTE - CPE EDP ENMT NORM
For information on Fall & Injury Prevention, visit: [email or web address] OR  [email or web address] OR  https://www.cdc.gov/steadi/patient.html
normal...

## 2023-01-11 NOTE — ED ADULT NURSE NOTE - BREATHING, MLM
[Mother] : mother [Normal] : Normal [Brushing teeth twice/d] : brushing teeth twice per day [Yes] : Patient goes to dentist yearly [Premenarche] : premenarche [Playtime (60 min/d)] : playtime 60 min a day Spontaneous, unlabored and symmetrical [Participates in after-school activities] : participates in after-school activities [Grade ___] : Grade [unfilled] [Adequate social interactions] : adequate social interactions [Adequate behavior] : adequate behavior [Adequate performance] : adequate performance [Adequate attention] : adequate attention [No difficulties with Homework] : no difficulties with homework [Appropriately restrained in motor vehicle] : appropriately restrained in motor vehicle [Wears helmet and pads] : wears helmet and pads [No] : No cigarette smoke exposure [Up to date] : Up to date [FreeTextEntry7] : 9 Year C [de-identified] : Eats a variety of foods including fruits, vegetables, and proteins. Drinks mostly water, has calcium source [FreeTextEntry9] : gymnastics

## 2023-02-24 ENCOUNTER — INPATIENT (INPATIENT)
Facility: HOSPITAL | Age: 38
LOS: 5 days | Discharge: HOME | DRG: 682 | End: 2023-03-02
Attending: INTERNAL MEDICINE | Admitting: INTERNAL MEDICINE
Payer: MEDICAID

## 2023-02-24 VITALS
TEMPERATURE: 98 F | HEIGHT: 75 IN | HEART RATE: 79 BPM | DIASTOLIC BLOOD PRESSURE: 64 MMHG | OXYGEN SATURATION: 100 % | RESPIRATION RATE: 18 BRPM | SYSTOLIC BLOOD PRESSURE: 134 MMHG | WEIGHT: 216.93 LBS

## 2023-02-24 DIAGNOSIS — N18.6 END STAGE RENAL DISEASE: ICD-10-CM

## 2023-02-24 DIAGNOSIS — D64.9 ANEMIA, UNSPECIFIED: ICD-10-CM

## 2023-02-24 DIAGNOSIS — N25.81 SECONDARY HYPERPARATHYROIDISM OF RENAL ORIGIN: ICD-10-CM

## 2023-02-24 DIAGNOSIS — I77.0 ARTERIOVENOUS FISTULA, ACQUIRED: Chronic | ICD-10-CM

## 2023-02-24 LAB
ALBUMIN SERPL ELPH-MCNC: 3.5 G/DL — SIGNIFICANT CHANGE UP (ref 3.3–5)
ALP SERPL-CCNC: 1995 U/L — HIGH (ref 40–120)
ALT FLD-CCNC: <5 U/L — LOW (ref 10–45)
ANION GAP SERPL CALC-SCNC: 16 MMOL/L — SIGNIFICANT CHANGE UP (ref 5–17)
APTT BLD: 26.6 SEC — LOW (ref 27.5–35.5)
AST SERPL-CCNC: 6 U/L — LOW (ref 10–40)
BASE EXCESS BLDV CALC-SCNC: -3.3 MMOL/L — LOW (ref -2–3)
BASE EXCESS BLDV CALC-SCNC: -4.2 MMOL/L — LOW (ref -2–3)
BASOPHILS # BLD AUTO: 0.02 K/UL — SIGNIFICANT CHANGE UP (ref 0–0.2)
BASOPHILS NFR BLD AUTO: 0.5 % — SIGNIFICANT CHANGE UP (ref 0–2)
BILIRUB SERPL-MCNC: 0.3 MG/DL — SIGNIFICANT CHANGE UP (ref 0.2–1.2)
BLOOD GAS VENOUS - CREATININE: SIGNIFICANT CHANGE UP MG/DL (ref 0.5–1.3)
BUN SERPL-MCNC: 76 MG/DL — HIGH (ref 7–23)
CA-I BLD-SCNC: 0.94 MMOL/L — LOW (ref 1.15–1.33)
CA-I SERPL-SCNC: 1.04 MMOL/L — LOW (ref 1.15–1.33)
CALCIUM SERPL-MCNC: 7.8 MG/DL — LOW (ref 8.4–10.5)
CHLORIDE BLDV-SCNC: 106 MMOL/L — SIGNIFICANT CHANGE UP (ref 96–108)
CHLORIDE SERPL-SCNC: 103 MMOL/L — SIGNIFICANT CHANGE UP (ref 96–108)
CO2 BLDV-SCNC: 24 MMOL/L — SIGNIFICANT CHANGE UP (ref 22–26)
CO2 BLDV-SCNC: 26 MMOL/L — SIGNIFICANT CHANGE UP (ref 22–26)
CO2 SERPL-SCNC: 23 MMOL/L — SIGNIFICANT CHANGE UP (ref 22–31)
CREAT SERPL-MCNC: 12.73 MG/DL — HIGH (ref 0.5–1.3)
EGFR: 5 ML/MIN/1.73M2 — LOW
EOSINOPHIL # BLD AUTO: 0.13 K/UL — SIGNIFICANT CHANGE UP (ref 0–0.5)
EOSINOPHIL NFR BLD AUTO: 3.4 % — SIGNIFICANT CHANGE UP (ref 0–6)
FLUAV AG NPH QL: SIGNIFICANT CHANGE UP
FLUBV AG NPH QL: SIGNIFICANT CHANGE UP
GAS PNL BLDV: 142 MMOL/L — SIGNIFICANT CHANGE UP (ref 136–145)
GAS PNL BLDV: SIGNIFICANT CHANGE UP
GLUCOSE BLDV-MCNC: 80 MG/DL — SIGNIFICANT CHANGE UP (ref 70–99)
GLUCOSE SERPL-MCNC: 109 MG/DL — HIGH (ref 70–99)
HCO3 BLDV-SCNC: 22 MMOL/L — SIGNIFICANT CHANGE UP (ref 22–29)
HCO3 BLDV-SCNC: 24 MMOL/L — SIGNIFICANT CHANGE UP (ref 22–29)
HCT VFR BLD CALC: 23.2 % — LOW (ref 39–50)
HCT VFR BLDA CALC: 21 % — CRITICAL LOW (ref 39–51)
HGB BLD CALC-MCNC: 7.1 G/DL — LOW (ref 12.6–17.4)
HGB BLD-MCNC: 7.3 G/DL — LOW (ref 13–17)
IMM GRANULOCYTES NFR BLD AUTO: 0.3 % — SIGNIFICANT CHANGE UP (ref 0–0.9)
INR BLD: 1.11 RATIO — SIGNIFICANT CHANGE UP (ref 0.88–1.16)
LACTATE BLDV-MCNC: 1 MMOL/L — SIGNIFICANT CHANGE UP (ref 0.5–2)
LYMPHOCYTES # BLD AUTO: 1.42 K/UL — SIGNIFICANT CHANGE UP (ref 1–3.3)
LYMPHOCYTES # BLD AUTO: 36.8 % — SIGNIFICANT CHANGE UP (ref 13–44)
MCHC RBC-ENTMCNC: 30 PG — SIGNIFICANT CHANGE UP (ref 27–34)
MCHC RBC-ENTMCNC: 31.5 GM/DL — LOW (ref 32–36)
MCV RBC AUTO: 95.5 FL — SIGNIFICANT CHANGE UP (ref 80–100)
MONOCYTES # BLD AUTO: 0.44 K/UL — SIGNIFICANT CHANGE UP (ref 0–0.9)
MONOCYTES NFR BLD AUTO: 11.4 % — SIGNIFICANT CHANGE UP (ref 2–14)
NEUTROPHILS # BLD AUTO: 1.84 K/UL — SIGNIFICANT CHANGE UP (ref 1.8–7.4)
NEUTROPHILS NFR BLD AUTO: 47.6 % — SIGNIFICANT CHANGE UP (ref 43–77)
NRBC # BLD: 0 /100 WBCS — SIGNIFICANT CHANGE UP (ref 0–0)
NT-PROBNP SERPL-SCNC: 3390 PG/ML — HIGH (ref 0–300)
PCO2 BLDV: 46 MMHG — SIGNIFICANT CHANGE UP (ref 42–55)
PCO2 BLDV: 53 MMHG — SIGNIFICANT CHANGE UP (ref 42–55)
PH BLDV: 7.27 — LOW (ref 7.32–7.43)
PH BLDV: 7.29 — LOW (ref 7.32–7.43)
PHOSPHATE SERPL-MCNC: 5.3 MG/DL — HIGH (ref 2.5–4.5)
PLATELET # BLD AUTO: 171 K/UL — SIGNIFICANT CHANGE UP (ref 150–400)
PO2 BLDV: 32 MMHG — SIGNIFICANT CHANGE UP (ref 25–45)
PO2 BLDV: 52 MMHG — HIGH (ref 25–45)
POTASSIUM BLDV-SCNC: 5.2 MMOL/L — HIGH (ref 3.5–5.1)
POTASSIUM SERPL-MCNC: 4.9 MMOL/L — SIGNIFICANT CHANGE UP (ref 3.5–5.3)
POTASSIUM SERPL-SCNC: 4.9 MMOL/L — SIGNIFICANT CHANGE UP (ref 3.5–5.3)
PROT SERPL-MCNC: 6.1 G/DL — SIGNIFICANT CHANGE UP (ref 6–8.3)
PROTHROM AB SERPL-ACNC: 12.8 SEC — SIGNIFICANT CHANGE UP (ref 10.5–13.4)
RBC # BLD: 2.43 M/UL — LOW (ref 4.2–5.8)
RBC # FLD: 16.3 % — HIGH (ref 10.3–14.5)
RSV RNA NPH QL NAA+NON-PROBE: SIGNIFICANT CHANGE UP
SAO2 % BLDV: 48.4 % — LOW (ref 67–88)
SAO2 % BLDV: 80.8 % — SIGNIFICANT CHANGE UP (ref 67–88)
SARS-COV-2 RNA SPEC QL NAA+PROBE: SIGNIFICANT CHANGE UP
SODIUM SERPL-SCNC: 142 MMOL/L — SIGNIFICANT CHANGE UP (ref 135–145)
TROPONIN T, HIGH SENSITIVITY RESULT: 56 NG/L — HIGH (ref 0–51)
WBC # BLD: 3.86 K/UL — SIGNIFICANT CHANGE UP (ref 3.8–10.5)
WBC # FLD AUTO: 3.86 K/UL — SIGNIFICANT CHANGE UP (ref 3.8–10.5)

## 2023-02-24 PROCEDURE — 99285 EMERGENCY DEPT VISIT HI MDM: CPT

## 2023-02-24 PROCEDURE — 71046 X-RAY EXAM CHEST 2 VIEWS: CPT | Mod: 26

## 2023-02-24 RX ORDER — LABETALOL HCL 100 MG
300 TABLET ORAL
Refills: 0 | Status: DISCONTINUED | OUTPATIENT
Start: 2023-02-24 | End: 2023-03-02

## 2023-02-24 RX ORDER — CALCIUM GLUCONATE 100 MG/ML
2 VIAL (ML) INTRAVENOUS ONCE
Refills: 0 | Status: COMPLETED | OUTPATIENT
Start: 2023-02-24 | End: 2023-02-24

## 2023-02-24 RX ORDER — CALCITRIOL 0.5 UG/1
0.5 CAPSULE ORAL DAILY
Refills: 0 | Status: DISCONTINUED | OUTPATIENT
Start: 2023-02-24 | End: 2023-03-02

## 2023-02-24 RX ORDER — HEPARIN SODIUM 5000 [USP'U]/ML
5000 INJECTION INTRAVENOUS; SUBCUTANEOUS EVERY 8 HOURS
Refills: 0 | Status: DISCONTINUED | OUTPATIENT
Start: 2023-02-24 | End: 2023-03-02

## 2023-02-24 RX ORDER — CINACALCET 30 MG/1
60 TABLET, FILM COATED ORAL DAILY
Refills: 0 | Status: DISCONTINUED | OUTPATIENT
Start: 2023-02-24 | End: 2023-03-02

## 2023-02-24 RX ADMIN — Medication 200 GRAM(S): at 13:06

## 2023-02-24 RX ADMIN — Medication 300 MILLIGRAM(S): at 19:00

## 2023-02-24 NOTE — CONSULT NOTE ADULT - PROBLEM SELECTOR RECOMMENDATION 9
Pt. with ESRD on HD TTS who presents to the hospital with shortness of breath after having missed his dialysis session. last outpatient HD was on Tuesday 2/21. He undergoes HD at [facility] in [place]. He is currently using a RIJ tunneled HD catheter; LUE fistula stopped working about one month ago. Labs reviewed. Will arrange for HD today. Recommend LUE duplex to evaluate the AVF.   Pt in process of moving to [place] and would like outpatient HD unit set up prior to discharge; recommend SW consult for help.   Monitor labs and urine output. Avoid nephrotoxins. Dose medications as per eGFR.

## 2023-02-24 NOTE — H&P ADULT - HISTORY OF PRESENT ILLNESS
37 year old male with PMH of ESRD on HD TTS for the past 10 years, Secondary hyperparathyroidism s/p parathyroidectomy about one month ago, HTN, and obesity who presented to the hospital for shortness of breath on exertion. The nephrology team was consulted for management of dialysis. The patient was seen and examined earlier in the ED. The patient states he has been on dialysis for the past 10 years and missed his session yesterday because he was driving to [place] from [place]. His last outpatient HD was on Tuesday 2/21. He undergoes HD at [facility] in [place]. He is currently using a RIJ tunneled HD catheter for HD because his LUE fistula stopped working about one month ago as well. He states the shortness of breath started this morning and is worse with exertion. He also states that about 2 months ago he underwent a parathyroidectomy due to his history of secondary hyperparathyroidism. He states he was experiencing severe bone pains which he still occasionally does (using Percocet for pain control at this time). He denied any chest pain, nausea, vomiting, or abdominal pain. He admits he is currently under the process of moving to [place] and will need an outpatient HD unit established.

## 2023-02-24 NOTE — H&P ADULT - ASSESSMENT
37y male hx ESRD on HD t/th/s x10y. Per pt is ESRD because of HTN. presents with sob/lopez in the setting of missed HD yesterday while traveling to [place] from [place]. Pt is moving and has no medical follow up here. denies chest pain diaphoresis nausea vomiting. no fevers chills or cough. worsening LE swelling.    1 ESRD  - renal called for HD  - HD as scheduled  - will need to find a new dialysis center in [place]    2 HTN  - cw home meds  - DASH diet  - will monitor     3 Heparin sc for DVT prophylaxis

## 2023-02-24 NOTE — ED PROVIDER NOTE - OBJECTIVE STATEMENT
37y male hx ESRD on HD t/th/s x10y. Per pt is ESRD because of HTN. presents with sob/lopez in the setting of missed HD yesterday while traveling to [place] from [place]. Pt is moving and has no medical follow up here. denies chest pain diaphoresis nausea vomiting. no fevers chills or cough. worsening LE swelling. no calf pain or pleurisy.

## 2023-02-24 NOTE — CONSULT NOTE ADULT - PROBLEM SELECTOR RECOMMENDATION 2
Pt. with anemia in setting of renal disease. Check iron studies. Hg below goal right now. Will give CUCA during HD today and confirm outpatient CUCA dose from his HD unit. Monitor Hg.

## 2023-02-24 NOTE — ED PROVIDER NOTE - NS ED ROS FT
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   ROS otherwise neg except per hpi

## 2023-02-24 NOTE — CONSULT NOTE ADULT - PROBLEM SELECTOR RECOMMENDATION 3
Pt. with secondary hyperparathyroidism in setting of renal disease. Reported history of parathyroidectomy about one month ago. Please check PTH and phos levels.   Continue with home dose of Renvela 1600mg TID with meals.   Also on Cinacalcet and calcitriol as outpatient but unaware of dose. Will confirm.     If any questions, please feel free to contact me     Erik Horner  Nephrology Fellow  [facility] Pager: [phone]

## 2023-02-24 NOTE — H&P ADULT - NSHPLABSRESULTS_GEN_ALL_CORE
Lab Results:  CBC  CBC Full  -  ( 24 Feb 2023 11:32 )  WBC Count : 3.86 K/uL  RBC Count : 2.43 M/uL  Hemoglobin : 7.3 g/dL  Hematocrit : 23.2 %  Platelet Count - Automated : 171 K/uL  Mean Cell Volume : 95.5 fl  Mean Cell Hemoglobin : 30.0 pg  Mean Cell Hemoglobin Concentration : 31.5 gm/dL  Auto Neutrophil # : 1.84 K/uL  Auto Lymphocyte # : 1.42 K/uL  Auto Monocyte # : 0.44 K/uL  Auto Eosinophil # : 0.13 K/uL  Auto Basophil # : 0.02 K/uL  Auto Neutrophil % : 47.6 %  Auto Lymphocyte % : 36.8 %  Auto Monocyte % : 11.4 %  Auto Eosinophil % : 3.4 %  Auto Basophil % : 0.5 %    .		Differential:	[] Automated		[] Manual  Chemistry                        7.3    3.86  )-----------( 171      ( 24 Feb 2023 11:32 )             23.2     02-24    142  |  103  |  76<H>  ----------------------------<  109<H>  4.9   |  23  |  12.73<H>    Ca    7.8<L>      24 Feb 2023 11:32  Phos  5.3     02-24    TPro  6.1  /  Alb  3.5  /  TBili  0.3  /  DBili  x   /  AST  6<L>  /  ALT  <5<L>  /  AlkPhos  1995<H>  02-24    LIVER FUNCTIONS - ( 24 Feb 2023 11:32 )  Alb: 3.5 g/dL / Pro: 6.1 g/dL / ALK PHOS: 1995 U/L / ALT: <5 U/L / AST: 6 U/L / GGT: x           PT/INR - ( 24 Feb 2023 13:10 )   PT: 12.8 sec;   INR: 1.11 ratio         PTT - ( 24 Feb 2023 13:10 )  PTT:26.6 sec          MICROBIOLOGY/CULTURES:      RADIOLOGY RESULTS: reviewed

## 2023-02-24 NOTE — CONSULT NOTE ADULT - PROBLEM/RECOMMENDATION-1
DISPLAY PLAN FREE TEXT Dr. Mónica Caceres Arthroscopic Rotator Cuff Repair What is the surgery? - This is an outpatient procedure at either [facility] [address] or [address] will be completely asleep for the procedure. Dr. Mónica Caceres will make somewhere between 2-5 small incisions in your shoulder depending on the amount of work to be completed. He will take a tour of your shoulder with the camera and fix everything that needs to be fixed during your surgery. - Total surgery takes about 45 mins to an hour and half depending on how much needed to be repaired What can you expect after surgery? - You will have a bulky dressing on your shoulder that you can remove 2 days after surgery. You will be able to shower 2 days after surgery but no soaking in a bath, hot tub, ocean or pool x 2 weeks to allow for full wound healing - You will be in a sling for 5-6 weeks depending on your repair. You will wear this sling whenever you are active, up moving around, and sleeping at night. This sling is to keep you from moving your arm on your own. You are essentially one armed until you are out of your sling. This means no reaching, pulling, grabbing or lifting with the operative arm.  
- Dr. Mónica Caceres will start physical therapy for you the first business day after surgery. While you cannot move your arm we allow physical therapy to gently move your shoulder. We call this passive range of motion. The goal of this is to decrease your stiffness and in turn decrease your post operative pain. - Plan on being in physical therapy for 10-12 weeks When can I return to work? - Most patients return to desk work only after 2 weeks. You are able to type but there is no overhead work or lifting - You will start some gentle lifting up to 5-10lbs at about 8-10 weeks post operatively.  You will gradually increase how much you are able to lift after this point under the guidance of Dr. Mónica Caceres, his physician assistant and physical therapy. - At 6 months you are able to do all activities as tolerated but it may take you a full 9-12 months to fully recover from your surgery Not all shoulder arthroscopies are the same. The specifics of your individual case will be discussed at length with you by Dr. Collin Colón and his Physician Assistant. Ge Sellers Surgical Coordinator [facility]. [facility]. [address] [place] [address] [email or web address] 
P: [phone] F: [phone]

## 2023-02-24 NOTE — ED PROVIDER NOTE - PHYSICAL EXAMINATION
General: non-toxic, NAD  HEENT: NCAT, PERRL, moon facies  Cardiac: RRR, holosystolic murmurs heard best at aortic area, 2+ peripheral pulses. lift at PMI  Resp: CTAB  Abdomen: soft, non-distended, bowel sounds present, no ttp, no rebound or guarding. no organomegaly  Extremities: B LE peripheral edema, no calf tenderness, or leg size discrepancies. L sided nonfunctional AV fistula  Skin: no rashes R chest wall permacath  Neuro: AAOx4, 5+motor, sensation grossly intact  Psych: mood and affect appropriate

## 2023-02-24 NOTE — ED PROVIDER NOTE - CLINICAL SUMMARY MEDICAL DECISION MAKING FREE TEXT BOX
young male with long standing renal failure presents with LE edema and SOB/RIZVI in the setting of missed HD session. has a clean dry permacath in R chest wall and palpable lifts in PMI. found to have peaked T waves on ecg and hypocalcemia. 2g calcium gluconate given. no symptoms of infection or VTE. VS stable throughout ED stay. will need to be admitted for medical management/establishing care and HD for fluid overload. 37 y old young male with long standing renal failure on hemodialysis  presents with LE edema and SOB/RIZVI in the setting of missed HD session.   has a clean dry PermCath in R chest wall and palpable lifts in PMI. found to have peaked T waves on ecg and hypocalcemia  . 2g calcium gluconate given. no symptoms of infection or VTE. VS stable throughout ED stay. will need to be admitted for medical management/establishing care and HD for fluid overload. with nephrology cons ZR

## 2023-02-24 NOTE — ED ADULT NURSE NOTE - NSIMPLEMENTINTERV_GEN_ALL_ED
Implemented All Universal Safety Interventions:  [place] to call system. Call bell, personal items and telephone within reach. Instruct patient to call for assistance. Room bathroom lighting operational. Non-slip footwear when patient is off stretcher. Physically safe environment: no spills, clutter or unnecessary equipment. Stretcher in lowest position, wheels locked, appropriate side rails in place.

## 2023-02-24 NOTE — ED PROVIDER NOTE - PROGRESS NOTE DETAILS
MICHAEL Mobley PGY2 nephrology paged for HD. MICHAEL Mobley PGY2 ecg with peaked T waves calcium ordered. MICHAEL Mobley PGY2 cardiorenal fellow at bedside, no additional recommendations other than HD. tba unattached.

## 2023-02-24 NOTE — ED ADULT TRIAGE NOTE - CHIEF COMPLAINT QUOTE
complaining of sob- needs dialysis- missed yesterday due to moving from [place]. Does not currently have a center in [place].

## 2023-02-24 NOTE — ED ADULT NURSE NOTE - OBJECTIVE STATEMENT
37y m pt states missed dialysis yesterday having moved from [place]; pt states fistula stopped working and has been receiving dialysis through right chest access; pt also requesting social work to set up new dialysis center; pt also states needs to get medications as some have run out; pt reports partial parathyroidectomy in january and now lab results are very high; aox3; no resp distress; pt reports sob easily on exertion; no chest pain; no abd pain; no n/v/d; denies fever/chills; no cough/congestion; pt states does not produce urine; vss; call bell in reach

## 2023-02-25 LAB
ALBUMIN SERPL ELPH-MCNC: 3.1 G/DL — LOW (ref 3.3–5)
ALP SERPL-CCNC: 1757 U/L — HIGH (ref 40–120)
ALT FLD-CCNC: 5 U/L — LOW (ref 10–45)
ANION GAP SERPL CALC-SCNC: 15 MMOL/L — SIGNIFICANT CHANGE UP (ref 5–17)
AST SERPL-CCNC: 6 U/L — LOW (ref 10–40)
BILIRUB SERPL-MCNC: 0.2 MG/DL — SIGNIFICANT CHANGE UP (ref 0.2–1.2)
BUN SERPL-MCNC: 48 MG/DL — HIGH (ref 7–23)
CALCIUM SERPL-MCNC: 8.2 MG/DL — LOW (ref 8.4–10.5)
CHLORIDE SERPL-SCNC: 100 MMOL/L — SIGNIFICANT CHANGE UP (ref 96–108)
CO2 SERPL-SCNC: 25 MMOL/L — SIGNIFICANT CHANGE UP (ref 22–31)
CREAT SERPL-MCNC: 8.64 MG/DL — HIGH (ref 0.5–1.3)
DIGOXIN SERPL-MCNC: <0.5 NG/ML — LOW (ref 0.8–2)
EGFR: 7 ML/MIN/1.73M2 — LOW
GLUCOSE SERPL-MCNC: 79 MG/DL — SIGNIFICANT CHANGE UP (ref 70–99)
HBV SURFACE AB SER-ACNC: 28.5 MIU/ML — SIGNIFICANT CHANGE UP
HBV SURFACE AB SER-ACNC: REACTIVE
HBV SURFACE AG SER-ACNC: SIGNIFICANT CHANGE UP
HCT VFR BLD CALC: 21.6 % — LOW (ref 39–50)
HCV AB S/CO SERPL IA: 0.26 S/CO — SIGNIFICANT CHANGE UP (ref 0–0.99)
HCV AB SERPL-IMP: SIGNIFICANT CHANGE UP
HGB BLD-MCNC: 6.8 G/DL — CRITICAL LOW (ref 13–17)
MCHC RBC-ENTMCNC: 30 PG — SIGNIFICANT CHANGE UP (ref 27–34)
MCHC RBC-ENTMCNC: 31.5 GM/DL — LOW (ref 32–36)
MCV RBC AUTO: 95.2 FL — SIGNIFICANT CHANGE UP (ref 80–100)
NRBC # BLD: 0 /100 WBCS — SIGNIFICANT CHANGE UP (ref 0–0)
PLATELET # BLD AUTO: 158 K/UL — SIGNIFICANT CHANGE UP (ref 150–400)
POTASSIUM SERPL-MCNC: 4.2 MMOL/L — SIGNIFICANT CHANGE UP (ref 3.5–5.3)
POTASSIUM SERPL-SCNC: 4.2 MMOL/L — SIGNIFICANT CHANGE UP (ref 3.5–5.3)
PROT SERPL-MCNC: 5.7 G/DL — LOW (ref 6–8.3)
RBC # BLD: 2.27 M/UL — LOW (ref 4.2–5.8)
RBC # FLD: 16.3 % — HIGH (ref 10.3–14.5)
SODIUM SERPL-SCNC: 140 MMOL/L — SIGNIFICANT CHANGE UP (ref 135–145)
WBC # BLD: 2.99 K/UL — LOW (ref 3.8–10.5)
WBC # FLD AUTO: 2.99 K/UL — LOW (ref 3.8–10.5)

## 2023-02-25 RX ORDER — INFLUENZA VIRUS VACCINE 15; 15; 15; 15 UG/.5ML; UG/.5ML; UG/.5ML; UG/.5ML
0.5 SUSPENSION INTRAMUSCULAR ONCE
Refills: 0 | Status: DISCONTINUED | OUTPATIENT
Start: 2023-02-25 | End: 2023-03-02

## 2023-02-25 RX ORDER — SEVELAMER CARBONATE 2400 MG/1
1600 POWDER, FOR SUSPENSION ORAL
Refills: 0 | Status: DISCONTINUED | OUTPATIENT
Start: 2023-02-25 | End: 2023-03-02

## 2023-02-25 RX ADMIN — Medication 300 MILLIGRAM(S): at 05:07

## 2023-02-25 RX ADMIN — SEVELAMER CARBONATE 1600 MILLIGRAM(S): 2400 POWDER, FOR SUSPENSION ORAL at 17:46

## 2023-02-25 RX ADMIN — CINACALCET 60 MILLIGRAM(S): 30 TABLET, FILM COATED ORAL at 12:39

## 2023-02-25 RX ADMIN — SEVELAMER CARBONATE 1600 MILLIGRAM(S): 2400 POWDER, FOR SUSPENSION ORAL at 12:40

## 2023-02-25 RX ADMIN — HEPARIN SODIUM 5000 UNIT(S): 5000 INJECTION INTRAVENOUS; SUBCUTANEOUS at 17:46

## 2023-02-25 RX ADMIN — HEPARIN SODIUM 5000 UNIT(S): 5000 INJECTION INTRAVENOUS; SUBCUTANEOUS at 09:51

## 2023-02-25 RX ADMIN — HEPARIN SODIUM 5000 UNIT(S): 5000 INJECTION INTRAVENOUS; SUBCUTANEOUS at 01:51

## 2023-02-25 RX ADMIN — CALCITRIOL 0.5 MICROGRAM(S): 0.5 CAPSULE ORAL at 12:40

## 2023-02-25 RX ADMIN — Medication 300 MILLIGRAM(S): at 17:46

## 2023-02-25 NOTE — PATIENT PROFILE ADULT - NSPROMEDSADMININFO_GEN_A_NUR
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 Patient offered next available for PSR in July, patient declined. Patient would like to know if he can be seen for a video visit before then? Patient states he's had th Mucocele for 4 weeks and states it is starting to get smaller and would prefer to have advisements before July. Please advise.      WEIGHT AND HEIGHT:   Wt Readings from Last 1 Encounters:   11/20/19 70.3 kg (155 lb)     Ht Readings from Last 1 Encounters:   11/20/19 5' 8\" (1.727 m)     BMI Readings from Last 1 Encounters:   11/20/19 23.57 kg/m²       ALLERGIES: no known allergies.  No current outpatient medications on file.     No current facility-administered medications for this visit.      PHARMACY to use:           Pharmacy preference(s) on file:   [id number] [place] [address] AT [facility] [address]  [address]  [facility] [address]  Phone: [phone] Fax: [phone]      CALL BACK INFO: Ok to leave response (including medical information) on answering machine  ROUTING: Patient's physician/staff        PCP: Gaurav Hawthorne MD         INS: Payor: TITI/DOUGLAS / Plan: [id number] / Product Type: PPO MISC   PATIENT ADDRESS:  [address] [place] [id number]     no concerns

## 2023-02-25 NOTE — PROGRESS NOTE ADULT - SUBJECTIVE AND OBJECTIVE BOX
Patient is a 37y old  Male who presents with a chief complaint of dialysis (24 Feb 2023 17:51)    Date of servie : 02-25-23 @ 13:15  INTERVAL HPI/OVERNIGHT EVENTS:  T(C): 36.5 (02-25-23 @ 04:55), Max: 37.1 (02-24-23 @ 21:10)  HR: 78 (02-25-23 @ 04:55) (68 - 90)  BP: 133/72 (02-25-23 @ 04:55) (125/69 - 165/94)  RR: 18 (02-25-23 @ 04:55) (16 - 18)  SpO2: 99% (02-25-23 @ 04:55) (98% - 100%)  Wt(kg): --  I&O's Summary    24 Feb 2023 07:01  -  25 Feb 2023 07:00  --------------------------------------------------------  IN: 0 mL / OUT: 3000 mL / NET: -3000 mL        LABS:                        6.8    2.99  )-----------( 158      ( 25 Feb 2023 06:44 )             21.6     02-25    140  |  100  |  48<H>  ----------------------------<  79  4.2   |  25  |  8.64<H>    Ca    8.2<L>      25 Feb 2023 06:43  Phos  5.3     02-24    TPro  5.7<L>  /  Alb  3.1<L>  /  TBili  0.2  /  DBili  x   /  AST  6<L>  /  ALT  5<L>  /  AlkPhos  1757<H>  02-25    PT/INR - ( 24 Feb 2023 13:10 )   PT: 12.8 sec;   INR: 1.11 ratio         PTT - ( 24 Feb 2023 13:10 )  PTT:26.6 sec    CAPILLARY BLOOD GLUCOSE                MEDICATIONS  (STANDING):  calcitriol   Capsule 0.5 MICROGram(s) Oral daily  cinacalcet 60 milliGRAM(s) Oral daily  heparin   Injectable 5000 Unit(s) SubCutaneous every 8 hours  influenza   Vaccine 0.5 milliLiter(s) IntraMuscular once  labetalol 300 milliGRAM(s) Oral two times a day  sevelamer carbonate 1600 milliGRAM(s) Oral three times a day with meals    MEDICATIONS  (PRN):  oxycodone    5 mG/acetaminophen 325 mG 1 Tablet(s) Oral every 4 hours PRN Moderate Pain (4 - 6)          PHYSICAL EXAM:  GENERAL: NAD, well-groomed, well-developed  HEAD:  Atraumatic, Normocephalic  CHEST/LUNG: Clear to percussion bilaterally; No rales, rhonchi, wheezing, or rubs  HEART: Regular rate and rhythm; No murmurs, rubs, or gallops  ABDOMEN: Soft, Nontender, Nondistended; Bowel sounds present  EXTREMITIES:  2+ Peripheral Pulses, No clubbing, cyanosis, or edema  LYMPH: No lymphadenopathy noted  SKIN: No rashes or lesions    Care Discussed with Consultants/Other Providers [ ] YES  [ ] NO

## 2023-02-26 LAB
ALBUMIN SERPL ELPH-MCNC: 3.1 G/DL — LOW (ref 3.3–5)
ALP SERPL-CCNC: 1673 U/L — HIGH (ref 40–120)
ALT FLD-CCNC: <5 U/L — LOW (ref 10–45)
ANION GAP SERPL CALC-SCNC: 15 MMOL/L — SIGNIFICANT CHANGE UP (ref 5–17)
AST SERPL-CCNC: 7 U/L — LOW (ref 10–40)
BILIRUB SERPL-MCNC: 0.2 MG/DL — SIGNIFICANT CHANGE UP (ref 0.2–1.2)
BUN SERPL-MCNC: 64 MG/DL — HIGH (ref 7–23)
CALCIUM SERPL-MCNC: 7.5 MG/DL — LOW (ref 8.4–10.5)
CALCIUM SERPL-MCNC: 7.9 MG/DL — LOW (ref 8.4–10.5)
CHLORIDE SERPL-SCNC: 100 MMOL/L — SIGNIFICANT CHANGE UP (ref 96–108)
CO2 SERPL-SCNC: 24 MMOL/L — SIGNIFICANT CHANGE UP (ref 22–31)
CREAT SERPL-MCNC: 11.04 MG/DL — HIGH (ref 0.5–1.3)
EGFR: 6 ML/MIN/1.73M2 — LOW
GLUCOSE SERPL-MCNC: 84 MG/DL — SIGNIFICANT CHANGE UP (ref 70–99)
HCT VFR BLD CALC: 22.1 % — LOW (ref 39–50)
HGB BLD-MCNC: 6.9 G/DL — CRITICAL LOW (ref 13–17)
MCHC RBC-ENTMCNC: 29.6 PG — SIGNIFICANT CHANGE UP (ref 27–34)
MCHC RBC-ENTMCNC: 31.2 GM/DL — LOW (ref 32–36)
MCV RBC AUTO: 94.8 FL — SIGNIFICANT CHANGE UP (ref 80–100)
NRBC # BLD: 0 /100 WBCS — SIGNIFICANT CHANGE UP (ref 0–0)
PHOSPHATE SERPL-MCNC: 5.7 MG/DL — HIGH (ref 2.5–4.5)
PLATELET # BLD AUTO: 157 K/UL — SIGNIFICANT CHANGE UP (ref 150–400)
POTASSIUM SERPL-MCNC: 4.6 MMOL/L — SIGNIFICANT CHANGE UP (ref 3.5–5.3)
POTASSIUM SERPL-SCNC: 4.6 MMOL/L — SIGNIFICANT CHANGE UP (ref 3.5–5.3)
PROT SERPL-MCNC: 5.6 G/DL — LOW (ref 6–8.3)
PTH-INTACT FLD-MCNC: 3507 PG/ML — HIGH (ref 15–65)
RBC # BLD: 2.33 M/UL — LOW (ref 4.2–5.8)
RBC # FLD: 15.9 % — HIGH (ref 10.3–14.5)
SODIUM SERPL-SCNC: 139 MMOL/L — SIGNIFICANT CHANGE UP (ref 135–145)
WBC # BLD: 3.53 K/UL — LOW (ref 3.8–10.5)
WBC # FLD AUTO: 3.53 K/UL — LOW (ref 3.8–10.5)

## 2023-02-26 PROCEDURE — 99223 1ST HOSP IP/OBS HIGH 75: CPT | Mod: GC

## 2023-02-26 PROCEDURE — 93990 DOPPLER FLOW TESTING: CPT | Mod: 26

## 2023-02-26 RX ADMIN — HEPARIN SODIUM 5000 UNIT(S): 5000 INJECTION INTRAVENOUS; SUBCUTANEOUS at 17:35

## 2023-02-26 RX ADMIN — CALCITRIOL 0.5 MICROGRAM(S): 0.5 CAPSULE ORAL at 12:02

## 2023-02-26 RX ADMIN — SEVELAMER CARBONATE 1600 MILLIGRAM(S): 2400 POWDER, FOR SUSPENSION ORAL at 17:35

## 2023-02-26 RX ADMIN — SEVELAMER CARBONATE 1600 MILLIGRAM(S): 2400 POWDER, FOR SUSPENSION ORAL at 08:24

## 2023-02-26 RX ADMIN — CINACALCET 60 MILLIGRAM(S): 30 TABLET, FILM COATED ORAL at 12:03

## 2023-02-26 RX ADMIN — HEPARIN SODIUM 5000 UNIT(S): 5000 INJECTION INTRAVENOUS; SUBCUTANEOUS at 08:25

## 2023-02-26 RX ADMIN — Medication 300 MILLIGRAM(S): at 06:02

## 2023-02-26 RX ADMIN — SEVELAMER CARBONATE 1600 MILLIGRAM(S): 2400 POWDER, FOR SUSPENSION ORAL at 12:03

## 2023-02-26 RX ADMIN — Medication 300 MILLIGRAM(S): at 17:35

## 2023-02-26 NOTE — PROGRESS NOTE ADULT - SUBJECTIVE AND OBJECTIVE BOX
[facility] DIVISION OF KIDNEY DISEASES AND HYPERTENSION -- FOLLOW UP NOTE  --------------------------------------------------------------------------------  HPI:    Patient is a 37 year old male with PMH of ESRD on HD TTS for the past 10 years, Secondary hyperparathyroidism s/p parathyroidectomy about one month ago, HTN, and obesity who presented to the hospital for shortness of breath on exertion. The nephrology team was consulted for management of dialysis. The patient was seen and examined earlier in the ED. The patient states he has been on dialysis for the past 10 years and missed his session yesterday because he was driving to [place] from [place]. His last outpatient HD was on Tuesday 2/21. He undergoes HD at [facility] in [place]. He is currently using a RIJ tunneled HD catheter for HD because his LUE fistula stopped working about one month ago as well. He states the shortness of breath started this morning and is worse with exertion. He also states that about 2 months ago he underwent a parathyroidectomy due to his history of secondary hyperparathyroidism. He states he was experiencing severe bone pains which he still occasionally does (using Percocet for pain control at this time). He denied any chest pain, nausea, vomiting, or abdominal pain. He admits he is currently under the process of moving to [place] and will need an outpatient HD unit established.     Pt. seen this afternoon. Tolerated last HD session.         PAST HISTORY  --------------------------------------------------------------------------------  No significant changes to PMH, PSH, FHx, SHx, unless otherwise noted    ALLERGIES & MEDICATIONS  --------------------------------------------------------------------------------  Allergies    No Known Allergies    Intolerances      Standing Inpatient Medications  calcitriol   Capsule 0.5 MICROGram(s) Oral daily  cinacalcet 60 milliGRAM(s) Oral daily  heparin   Injectable 5000 Unit(s) SubCutaneous every 8 hours  influenza   Vaccine 0.5 milliLiter(s) IntraMuscular once  labetalol 300 milliGRAM(s) Oral two times a day  sevelamer carbonate 1600 milliGRAM(s) Oral three times a day with meals    PRN Inpatient Medications  oxycodone    5 mG/acetaminophen 325 mG 1 Tablet(s) Oral every 4 hours PRN      REVIEW OF SYSTEMS  --------------------------------------------------------------------------------  As per HPI    VITALS/PHYSICAL EXAM  --------------------------------------------------------------------------------  T(C): 37 (02-26-23 @ 05:00), Max: 37.2 (02-25-23 @ 17:42)  HR: 68 (02-26-23 @ 05:00) (64 - 77)  BP: 137/69 (02-26-23 @ 05:00) (119/67 - 137/69)  RR: 18 (02-26-23 @ 05:00) (18 - 18)  SpO2: 97% (02-26-23 @ 05:00) (97% - 98%)  Wt(kg): --  Height (cm): 190.5 (02-25-23 @ 01:36)  Weight (kg): 102.8 (02-25-23 @ 01:36)  BMI (kg/m2): 28.3 (02-25-23 @ 01:36)  BSA (m2): 2.31 (02-25-23 @ 01:36)      02-25-23 @ 07:01  -  02-26-23 @ 07:00  --------------------------------------------------------  IN: 460 mL / OUT: 0 mL / NET: 460 mL      Physical Exam:  	Gen: NAD, obese  	HEENT: MMM  	Pulm: CTA B/L  	CV: S1S2  	Abd: Soft, +BS   	Ext: trace LE edema B/L  	Neuro: Awake and alert  	Skin: Warm and dry  	Vascular access: Nonfunctional SURYA AVF , RISHAY tunneled HD catheter        LABS/STUDIES  --------------------------------------------------------------------------------              6.9    3.53  >-----------<  157      [02-26-23 @ 06:48]              22.1     139  |  100  |  64  ----------------------------<  84      [02-26-23 @ 06:48]  4.6   |  24  |  11.04        Ca     7.9     [02-26-23 @ 06:48]      Phos  5.7     [02-26-23 @ 06:48]    TPro  5.6  /  Alb  3.1  /  TBili  0.2  /  DBili  x   /  AST  7   /  ALT  <5  /  AlkPhos  1673  [02-26-23 @ 06:48]          Creatinine Trend:  SCr 11.04 [02-26 @ 06:48]  SCr 8.64 [02-25 @ 06:43]  SCr 12.73 [02-24 @ 11:32]        Iron 29, TIBC 192, %sat 15      [10-04-22 @ 16:34]  Ferritin 108      [10-04-22 @ 16:34]  PTH -- (Ca 7.5)      [02-26-23 @ 06:48]   3507    HBsAb 28.5      [02-24-23 @ 22:27]  HBsAb Reactive      [02-24-23 @ 22:27]  HBsAg Nonreact      [02-24-23 @ 22:27]  HCV 0.26, Nonreact      [02-24-23 @ 22:27]     [facility] DIVISION OF KIDNEY DISEASES AND HYPERTENSION -- FOLLOW UP NOTE  --------------------------------------------------------------------------------  HPI:    Patient is a 37 year old male with PMH of ESRD on HD TTS for the past 10 years, Secondary hyperparathyroidism s/p parathyroidectomy about one month ago, HTN, and obesity who presented to the hospital for shortness of breath on exertion. The nephrology team was consulted for management of dialysis. The patient was seen and examined earlier in the ED. The patient states he has been on dialysis for the past 10 years and missed his session yesterday because he was driving to [place] from [place]. His last outpatient HD was on Tuesday 2/21. He undergoes HD at [facility] in [place]. He is currently using a RIJ tunneled HD catheter for HD because his LUE fistula stopped working about one month ago as well. He states the shortness of breath started this morning and is worse with exertion. He also states that about 2 months ago he underwent a parathyroidectomy due to his history of secondary hyperparathyroidism. He states he was experiencing severe bone pains which he still occasionally does (using Percocet for pain control at this time). He denied any chest pain, nausea, vomiting, or abdominal pain. He admits he is currently under the process of moving to [place] and will need an outpatient HD unit established.     Pt. seen this afternoon. Tolerated last HD session. No acute complaints. Discussed with Pt. that He had 3/4 parathyroid glands removed 2months ago but that his PTH was markedly elevated to over 8500. This was when Sensipar was titrated and it was last checked about 2 weeks ago when it was improving. He was relieved to heat that it was now as low as 3500. Denies any bone pain, chest pressure or shortness of breath. He has had the AVF for over 5 years.         PAST HISTORY  --------------------------------------------------------------------------------  No significant changes to PMH, PSH, FHx, SHx, unless otherwise noted    ALLERGIES & MEDICATIONS  --------------------------------------------------------------------------------  Allergies    No Known Allergies    Intolerances      Standing Inpatient Medications  calcitriol   Capsule 0.5 MICROGram(s) Oral daily  cinacalcet 60 milliGRAM(s) Oral daily  heparin   Injectable 5000 Unit(s) SubCutaneous every 8 hours  influenza   Vaccine 0.5 milliLiter(s) IntraMuscular once  labetalol 300 milliGRAM(s) Oral two times a day  sevelamer carbonate 1600 milliGRAM(s) Oral three times a day with meals    PRN Inpatient Medications  oxycodone    5 mG/acetaminophen 325 mG 1 Tablet(s) Oral every 4 hours PRN      REVIEW OF SYSTEMS  --------------------------------------------------------------------------------  As per HPI    VITALS/PHYSICAL EXAM  --------------------------------------------------------------------------------  T(C): 37 (02-26-23 @ 05:00), Max: 37.2 (02-25-23 @ 17:42)  HR: 68 (02-26-23 @ 05:00) (64 - 77)  BP: 137/69 (02-26-23 @ 05:00) (119/67 - 137/69)  RR: 18 (02-26-23 @ 05:00) (18 - 18)  SpO2: 97% (02-26-23 @ 05:00) (97% - 98%)  Wt(kg): --  Height (cm): 190.5 (02-25-23 @ 01:36)  Weight (kg): 102.8 (02-25-23 @ 01:36)  BMI (kg/m2): 28.3 (02-25-23 @ 01:36)  BSA (m2): 2.31 (02-25-23 @ 01:36)      02-25-23 @ 07:01  -  02-26-23 @ 07:00  --------------------------------------------------------  IN: 460 mL / OUT: 0 mL / NET: 460 mL      Physical Exam:  	Gen: NAD, obese  	HEENT: MMM  	Pulm: CTA B/L  	CV: S1S2  	Abd: Soft, +BS   	Ext: trace LE edema B/L  	Neuro: Awake and alert  	Skin: Warm and dry  	Vascular access: Nonfunctional SUZANNEE AVF , RISHAY tunneled HD catheter        LABS/STUDIES  --------------------------------------------------------------------------------              6.9    3.53  >-----------<  157      [02-26-23 @ 06:48]              22.1     139  |  100  |  64  ----------------------------<  84      [02-26-23 @ 06:48]  4.6   |  24  |  11.04        Ca     7.9     [02-26-23 @ 06:48]      Phos  5.7     [02-26-23 @ 06:48]    TPro  5.6  /  Alb  3.1  /  TBili  0.2  /  DBili  x   /  AST  7   /  ALT  <5  /  AlkPhos  1673  [02-26-23 @ 06:48]          Creatinine Trend:  SCr 11.04 [02-26 @ 06:48]  SCr 8.64 [02-25 @ 06:43]  SCr 12.73 [02-24 @ 11:32]        Iron 29, TIBC 192, %sat 15      [10-04-22 @ 16:34]  Ferritin 108      [10-04-22 @ 16:34]  PTH -- (Ca 7.5)      [02-26-23 @ 06:48]   3507    HBsAb 28.5      [02-24-23 @ 22:27]  HBsAb Reactive      [02-24-23 @ 22:27]  HBsAg Nonreact      [02-24-23 @ 22:27]  HCV 0.26, Nonreact      [02-24-23 @ 22:27]

## 2023-02-26 NOTE — PROGRESS NOTE ADULT - SUBJECTIVE AND OBJECTIVE BOX
Patient is a 37y old  Male who presents with a chief complaint of dialysis (26 Feb 2023 13:40)    Date of servie : 02-26-23 @ 16:23  INTERVAL HPI/OVERNIGHT EVENTS:  T(C): 37.1 (02-26-23 @ 12:45), Max: 37.2 (02-25-23 @ 17:42)  HR: 69 (02-26-23 @ 12:45) (64 - 77)  BP: 142/78 (02-26-23 @ 12:45) (119/67 - 142/78)  RR: 18 (02-26-23 @ 12:45) (18 - 18)  SpO2: 96% (02-26-23 @ 12:45) (96% - 98%)  Wt(kg): --  I&O's Summary    25 Feb 2023 07:01  -  26 Feb 2023 07:00  --------------------------------------------------------  IN: 460 mL / OUT: 0 mL / NET: 460 mL        LABS:                        6.9    3.53  )-----------( 157      ( 26 Feb 2023 06:48 )             22.1     02-26    139  |  100  |  64<H>  ----------------------------<  84  4.6   |  24  |  11.04<H>    Ca    7.9<L>      26 Feb 2023 06:48  Phos  5.7     02-26    TPro  5.6<L>  /  Alb  3.1<L>  /  TBili  0.2  /  DBili  x   /  AST  7<L>  /  ALT  <5<L>  /  AlkPhos  1673<H>  02-26        CAPILLARY BLOOD GLUCOSE                MEDICATIONS  (STANDING):  calcitriol   Capsule 0.5 MICROGram(s) Oral daily  cinacalcet 60 milliGRAM(s) Oral daily  heparin   Injectable 5000 Unit(s) SubCutaneous every 8 hours  influenza   Vaccine 0.5 milliLiter(s) IntraMuscular once  labetalol 300 milliGRAM(s) Oral two times a day  sevelamer carbonate 1600 milliGRAM(s) Oral three times a day with meals    MEDICATIONS  (PRN):  oxycodone    5 mG/acetaminophen 325 mG 1 Tablet(s) Oral every 4 hours PRN Moderate Pain (4 - 6)          PHYSICAL EXAM:  GENERAL: NAD, well-groomed, well-developed  HEAD:  Atraumatic, Normocephalic  CHEST/LUNG: Clear to percussion bilaterally; No rales, rhonchi, wheezing, or rubs  HEART: Regular rate and rhythm; No murmurs, rubs, or gallops  ABDOMEN: Soft, Nontender, Nondistended; Bowel sounds present  EXTREMITIES:  2+ Peripheral Pulses, No clubbing, cyanosis, or edema  LYMPH: No lymphadenopathy noted  SKIN: No rashes or lesions    Care Discussed with Consultants/Other Providers [ ] YES  [ ] NO

## 2023-02-27 ENCOUNTER — TRANSCRIPTION ENCOUNTER (OUTPATIENT)
Age: 38
End: 2023-02-27

## 2023-02-27 LAB
ALBUMIN SERPL ELPH-MCNC: 3.2 G/DL — LOW (ref 3.3–5)
ALP SERPL-CCNC: 1741 U/L — HIGH (ref 40–120)
ALT FLD-CCNC: 5 U/L — LOW (ref 10–45)
ANION GAP SERPL CALC-SCNC: 17 MMOL/L — SIGNIFICANT CHANGE UP (ref 5–17)
AST SERPL-CCNC: 5 U/L — LOW (ref 10–40)
BILIRUB SERPL-MCNC: 0.2 MG/DL — SIGNIFICANT CHANGE UP (ref 0.2–1.2)
BLD GP AB SCN SERPL QL: POSITIVE — SIGNIFICANT CHANGE UP
BUN SERPL-MCNC: 86 MG/DL — HIGH (ref 7–23)
CALCIUM SERPL-MCNC: 8 MG/DL — LOW (ref 8.4–10.5)
CHLORIDE SERPL-SCNC: 98 MMOL/L — SIGNIFICANT CHANGE UP (ref 96–108)
CO2 SERPL-SCNC: 22 MMOL/L — SIGNIFICANT CHANGE UP (ref 22–31)
CREAT SERPL-MCNC: 13.72 MG/DL — HIGH (ref 0.5–1.3)
EGFR: 4 ML/MIN/1.73M2 — LOW
GLUCOSE SERPL-MCNC: 71 MG/DL — SIGNIFICANT CHANGE UP (ref 70–99)
HCT VFR BLD CALC: 22.1 % — LOW (ref 39–50)
HGB BLD-MCNC: 6.8 G/DL — CRITICAL LOW (ref 13–17)
MAGNESIUM SERPL-MCNC: 2.2 MG/DL — SIGNIFICANT CHANGE UP (ref 1.6–2.6)
MCHC RBC-ENTMCNC: 29.1 PG — SIGNIFICANT CHANGE UP (ref 27–34)
MCHC RBC-ENTMCNC: 30.8 GM/DL — LOW (ref 32–36)
MCV RBC AUTO: 94.4 FL — SIGNIFICANT CHANGE UP (ref 80–100)
NRBC # BLD: 0 /100 WBCS — SIGNIFICANT CHANGE UP (ref 0–0)
NT-PROBNP SERPL-SCNC: 5158 PG/ML — HIGH (ref 0–300)
PHOSPHATE SERPL-MCNC: 6 MG/DL — HIGH (ref 2.5–4.5)
PLATELET # BLD AUTO: 159 K/UL — SIGNIFICANT CHANGE UP (ref 150–400)
POTASSIUM SERPL-MCNC: 5.5 MMOL/L — HIGH (ref 3.5–5.3)
POTASSIUM SERPL-SCNC: 5.5 MMOL/L — HIGH (ref 3.5–5.3)
PROT SERPL-MCNC: 5.6 G/DL — LOW (ref 6–8.3)
RBC # BLD: 2.34 M/UL — LOW (ref 4.2–5.8)
RBC # FLD: 15.8 % — HIGH (ref 10.3–14.5)
RH IG SCN BLD-IMP: POSITIVE — SIGNIFICANT CHANGE UP
SODIUM SERPL-SCNC: 137 MMOL/L — SIGNIFICANT CHANGE UP (ref 135–145)
WBC # BLD: 3.47 K/UL — LOW (ref 3.8–10.5)
WBC # FLD AUTO: 3.47 K/UL — LOW (ref 3.8–10.5)

## 2023-02-27 PROCEDURE — 99232 SBSQ HOSP IP/OBS MODERATE 35: CPT | Mod: GC

## 2023-02-27 PROCEDURE — 86077 PHYS BLOOD BANK SERV XMATCH: CPT

## 2023-02-27 RX ORDER — ACETAMINOPHEN 500 MG
650 TABLET ORAL ONCE
Refills: 0 | Status: DISCONTINUED | OUTPATIENT
Start: 2023-02-27 | End: 2023-03-02

## 2023-02-27 RX ORDER — ERYTHROPOIETIN 10000 [IU]/ML
10000 INJECTION, SOLUTION INTRAVENOUS; SUBCUTANEOUS ONCE
Refills: 0 | Status: COMPLETED | OUTPATIENT
Start: 2023-02-27 | End: 2023-02-27

## 2023-02-27 RX ORDER — DIPHENHYDRAMINE HCL 50 MG
25 CAPSULE ORAL ONCE
Refills: 0 | Status: COMPLETED | OUTPATIENT
Start: 2023-02-27 | End: 2023-02-27

## 2023-02-27 RX ADMIN — Medication 300 MILLIGRAM(S): at 05:08

## 2023-02-27 RX ADMIN — ERYTHROPOIETIN 10000 UNIT(S): 10000 INJECTION, SOLUTION INTRAVENOUS; SUBCUTANEOUS at 13:58

## 2023-02-27 RX ADMIN — Medication 25 MILLIGRAM(S): at 14:00

## 2023-02-27 RX ADMIN — CINACALCET 60 MILLIGRAM(S): 30 TABLET, FILM COATED ORAL at 12:56

## 2023-02-27 RX ADMIN — SEVELAMER CARBONATE 1600 MILLIGRAM(S): 2400 POWDER, FOR SUSPENSION ORAL at 18:31

## 2023-02-27 RX ADMIN — Medication 300 MILLIGRAM(S): at 18:31

## 2023-02-27 RX ADMIN — SEVELAMER CARBONATE 1600 MILLIGRAM(S): 2400 POWDER, FOR SUSPENSION ORAL at 12:56

## 2023-02-27 RX ADMIN — CALCITRIOL 0.5 MICROGRAM(S): 0.5 CAPSULE ORAL at 12:56

## 2023-02-27 RX ADMIN — SEVELAMER CARBONATE 1600 MILLIGRAM(S): 2400 POWDER, FOR SUSPENSION ORAL at 08:40

## 2023-02-27 NOTE — PROVIDER CONTACT NOTE (CRITICAL VALUE NOTIFICATION) - ACTION/TREATMENT ORDERED:
Provider notified, would like to continue to monitor since pt had epogen last night.
NP contacted and aware.

## 2023-02-27 NOTE — PROVIDER CONTACT NOTE (CRITICAL VALUE NOTIFICATION) - BACKGROUND
Pt had HD last night 11p-1a
Patient admitted on 2/24/23 for end stage renal disease. PMH of anemia and HTN.

## 2023-02-27 NOTE — PROGRESS NOTE ADULT - SUBJECTIVE AND OBJECTIVE BOX
Patient is a 37y old  Male who presents with a chief complaint of dialysis (27 Feb 2023 08:35)    Date of servie : 02-27-23 @ 13:36  INTERVAL HPI/OVERNIGHT EVENTS:  T(C): 37.1 (02-27-23 @ 11:55), Max: 37.1 (02-26-23 @ 21:19)  HR: 73 (02-27-23 @ 11:55) (70 - 73)  BP: 147/85 (02-27-23 @ 11:55) (140/70 - 147/85)  RR: 18 (02-27-23 @ 11:55) (16 - 18)  SpO2: 97% (02-27-23 @ 11:55) (97% - 99%)  Wt(kg): --  I&O's Summary    27 Feb 2023 07:01  -  27 Feb 2023 13:36  --------------------------------------------------------  IN: 700 mL / OUT: 0 mL / NET: 700 mL        LABS:                        6.8    3.47  )-----------( 159      ( 27 Feb 2023 07:06 )             22.1     02-27    137  |  98  |  86<H>  ----------------------------<  71  5.5<H>   |  22  |  13.72<H>    Ca    8.0<L>      27 Feb 2023 07:04  Phos  6.0     02-27  Mg     2.2     02-27    TPro  5.6<L>  /  Alb  3.2<L>  /  TBili  0.2  /  DBili  x   /  AST  5<L>  /  ALT  5<L>  /  AlkPhos  1741<H>  02-27        CAPILLARY BLOOD GLUCOSE                MEDICATIONS  (STANDING):  acetaminophen     Tablet .. 650 milliGRAM(s) Oral once  calcitriol   Capsule 0.5 MICROGram(s) Oral daily  cinacalcet 60 milliGRAM(s) Oral daily  epoetin carito-epbx (RETACRIT) Injectable 33832 Unit(s) IV Push once  heparin   Injectable 5000 Unit(s) SubCutaneous every 8 hours  influenza   Vaccine 0.5 milliLiter(s) IntraMuscular once  labetalol 300 milliGRAM(s) Oral two times a day  sevelamer carbonate 1600 milliGRAM(s) Oral three times a day with meals    MEDICATIONS  (PRN):  diphenhydrAMINE 25 milliGRAM(s) Oral once PRN Premedication for blood transfusion  oxycodone    5 mG/acetaminophen 325 mG 1 Tablet(s) Oral every 4 hours PRN Moderate Pain (4 - 6)          PHYSICAL EXAM:  GENERAL: NAD, well-groomed, well-developed  HEAD:  Atraumatic, Normocephalic  CHEST/LUNG: Clear to percussion bilaterally; No rales, rhonchi, wheezing, or rubs  HEART: Regular rate and rhythm; No murmurs, rubs, or gallops  ABDOMEN: Soft, Nontender, Nondistended; Bowel sounds present  EXTREMITIES:  2+ Peripheral Pulses, No clubbing, cyanosis, or edema  LYMPH: No lymphadenopathy noted  SKIN: No rashes or lesions    Care Discussed with Consultants/Other Providers [ ] YES  [ ] NO

## 2023-02-27 NOTE — PROGRESS NOTE ADULT - SUBJECTIVE AND OBJECTIVE BOX
[facility] DIVISION OF KIDNEY DISEASES AND HYPERTENSION -- FOLLOW UP NOTE  --------------------------------------------------------------------------------    Patient is a 37 year old male with PMH of ESRD on HD TTS for the past 10 years, Secondary hyperparathyroidism s/p parathyroidectomy about one month ago, HTN, and obesity who presented to the hospital for shortness of breath on exertion. The nephrology team was consulted for management of dialysis.     Pt seen and examined today. Reports feeling okay but with generalized body aches as per his usual. Denied any chest pain, shortness of breath, nausea, or vomiting.         PAST HISTORY  --------------------------------------------------------------------------------  No significant changes to PMH, PSH, FHx, SHx, unless otherwise noted    ALLERGIES & MEDICATIONS  --------------------------------------------------------------------------------  Allergies    No Known Allergies    Intolerances      Standing Inpatient Medications  calcitriol   Capsule 0.5 MICROGram(s) Oral daily  cinacalcet 60 milliGRAM(s) Oral daily  heparin   Injectable 5000 Unit(s) SubCutaneous every 8 hours  influenza   Vaccine 0.5 milliLiter(s) IntraMuscular once  labetalol 300 milliGRAM(s) Oral two times a day  sevelamer carbonate 1600 milliGRAM(s) Oral three times a day with meals    PRN Inpatient Medications  oxycodone    5 mG/acetaminophen 325 mG 1 Tablet(s) Oral every 4 hours PRN      REVIEW OF SYSTEMS    All other systems were reviewed and are negative, except as noted.    VITALS/PHYSICAL EXAM  --------------------------------------------------------------------------------  T(C): 36.9 (02-27-23 @ 04:48), Max: 37.1 (02-26-23 @ 12:45)  HR: 70 (02-27-23 @ 04:48) (69 - 71)  BP: 142/83 (02-27-23 @ 04:48) (140/70 - 142/83)  RR: 18 (02-27-23 @ 04:48) (16 - 18)  SpO2: 97% (02-27-23 @ 04:48) (96% - 99%)  Wt(kg): --      Physical Exam:  	Gen: NAD, obese  	HEENT: MMM  	Pulm: CTA B/L  	CV: S1S2  	Abd: Soft, +BS   	Ext: trace LE edema B/L  	Neuro: Awake and alert  	Skin: Warm and dry  	Vascular access: Nonfunctional SUZANNEE AVF , JAIDEN tunneled HD catheter      LABS/STUDIES  --------------------------------------------------------------------------------              6.8    3.47  >-----------<  159      [02-27-23 @ 07:06]              22.1     137  |  98  |  86  ----------------------------<  71      [02-27-23 @ 07:04]  5.5   |  22  |  13.72        Ca     8.0     [02-27-23 @ 07:04]      Mg     2.2     [02-27-23 @ 07:04]      Phos  6.0     [02-27-23 @ 07:04]    TPro  5.6  /  Alb  3.2  /  TBili  0.2  /  DBili  x   /  AST  5   /  ALT  5   /  AlkPhos  1741  [02-27-23 @ 07:04]          Creatinine Trend:  SCr 13.72 [02-27 @ 07:04]  SCr 11.04 [02-26 @ 06:48]  SCr 8.64 [02-25 @ 06:43]  SCr 12.73 [02-24 @ 11:32]        Iron 29, TIBC 192, %sat 15      [10-04-22 @ 16:34]  Ferritin 108      [10-04-22 @ 16:34]  PTH -- (Ca 7.5)      [02-26-23 @ 06:48]   3507    HBsAb 28.5      [02-24-23 @ 22:27]  HBsAb Reactive      [02-24-23 @ 22:27]  HBsAg Nonreact      [02-24-23 @ 22:27]  HCV 0.26, Nonreact      [02-24-23 @ 22:27]

## 2023-02-28 ENCOUNTER — TRANSCRIPTION ENCOUNTER (OUTPATIENT)
Age: 38
End: 2023-02-28

## 2023-02-28 LAB
ALBUMIN SERPL ELPH-MCNC: 3.3 G/DL — SIGNIFICANT CHANGE UP (ref 3.3–5)
ALP SERPL-CCNC: 1736 U/L — HIGH (ref 40–120)
ALT FLD-CCNC: 5 U/L — LOW (ref 10–45)
ANION GAP SERPL CALC-SCNC: 19 MMOL/L — HIGH (ref 5–17)
AST SERPL-CCNC: 7 U/L — LOW (ref 10–40)
BILIRUB SERPL-MCNC: 0.2 MG/DL — SIGNIFICANT CHANGE UP (ref 0.2–1.2)
BUN SERPL-MCNC: 66 MG/DL — HIGH (ref 7–23)
CALCIUM SERPL-MCNC: 7.8 MG/DL — LOW (ref 8.4–10.5)
CHLORIDE SERPL-SCNC: 98 MMOL/L — SIGNIFICANT CHANGE UP (ref 96–108)
CO2 SERPL-SCNC: 23 MMOL/L — SIGNIFICANT CHANGE UP (ref 22–31)
CREAT SERPL-MCNC: 10.23 MG/DL — HIGH (ref 0.5–1.3)
EGFR: 6 ML/MIN/1.73M2 — LOW
GLUCOSE SERPL-MCNC: 128 MG/DL — HIGH (ref 70–99)
HCT VFR BLD CALC: 23.8 % — LOW (ref 39–50)
HGB BLD-MCNC: 7.5 G/DL — LOW (ref 13–17)
MCHC RBC-ENTMCNC: 29.4 PG — SIGNIFICANT CHANGE UP (ref 27–34)
MCHC RBC-ENTMCNC: 31.5 GM/DL — LOW (ref 32–36)
MCV RBC AUTO: 93.3 FL — SIGNIFICANT CHANGE UP (ref 80–100)
NRBC # BLD: 0 /100 WBCS — SIGNIFICANT CHANGE UP (ref 0–0)
PHOSPHATE SERPL-MCNC: 5.2 MG/DL — HIGH (ref 2.5–4.5)
PLATELET # BLD AUTO: 169 K/UL — SIGNIFICANT CHANGE UP (ref 150–400)
POTASSIUM SERPL-MCNC: 4.5 MMOL/L — SIGNIFICANT CHANGE UP (ref 3.5–5.3)
POTASSIUM SERPL-SCNC: 4.5 MMOL/L — SIGNIFICANT CHANGE UP (ref 3.5–5.3)
PROT SERPL-MCNC: 5.9 G/DL — LOW (ref 6–8.3)
RBC # BLD: 2.55 M/UL — LOW (ref 4.2–5.8)
RBC # FLD: 15.7 % — HIGH (ref 10.3–14.5)
SODIUM SERPL-SCNC: 140 MMOL/L — SIGNIFICANT CHANGE UP (ref 135–145)
WBC # BLD: 3.58 K/UL — LOW (ref 3.8–10.5)
WBC # FLD AUTO: 3.58 K/UL — LOW (ref 3.8–10.5)

## 2023-02-28 PROCEDURE — 99253 IP/OBS CNSLTJ NEW/EST LOW 45: CPT

## 2023-02-28 PROCEDURE — 90935 HEMODIALYSIS ONE EVALUATION: CPT | Mod: GC

## 2023-02-28 RX ORDER — SEVELAMER CARBONATE 2400 MG/1
2 POWDER, FOR SUSPENSION ORAL
Qty: 180 | Refills: 0
Start: 2023-02-28 | End: 2023-03-29

## 2023-02-28 RX ADMIN — SEVELAMER CARBONATE 1600 MILLIGRAM(S): 2400 POWDER, FOR SUSPENSION ORAL at 08:03

## 2023-02-28 RX ADMIN — Medication 300 MILLIGRAM(S): at 19:19

## 2023-02-28 RX ADMIN — SEVELAMER CARBONATE 1600 MILLIGRAM(S): 2400 POWDER, FOR SUSPENSION ORAL at 13:15

## 2023-02-28 RX ADMIN — CINACALCET 60 MILLIGRAM(S): 30 TABLET, FILM COATED ORAL at 13:14

## 2023-02-28 RX ADMIN — CALCITRIOL 0.5 MICROGRAM(S): 0.5 CAPSULE ORAL at 13:15

## 2023-02-28 NOTE — DISCHARGE NOTE PROVIDER - HOSPITAL COURSE
37 year old male with PMH of ESRD on HD TTS for the past 10 years, Secondary hyperparathyroidism s/p parathyroidectomy about one month ago, and HTN, who presented to the hospital for shortness of breath on exertion. His last outpatient HD was on Tuesday 2/21/23 in [place] prior to driving himself to [facility]. He undergoes HD at [facility] in [place]. He is currently using a RIJ tunneled HD catheter planned in [place] for HD because his LUE fistula stopped working about one month ago as well. He states the shortness of breath started on morning of presentation to the ED and is worse with exertion. The patient is currently relocating from [place] to [facility] is in need of outpatient HD unit. Prior LUE RC AVF, thrombosed and then brachiocephalic AVF, subsequent failure 1 month ago, aneurysmal and without adequate flow.  Now HD via permacath, HD successful x1 month with permacath.  secured HD center in which patient will continue HD management as outpt. Patient cleared for discharge and will follow up with Vascular sx for outpt AVF revision/new AVF.

## 2023-02-28 NOTE — PROGRESS NOTE ADULT - SUBJECTIVE AND OBJECTIVE BOX
[facility] DIVISION OF KIDNEY DISEASES AND HYPERTENSION -- FOLLOW UP NOTE  --------------------------------------------------------------------------------  Patient is a 37 year old male with PMH of ESRD on HD TTS for the past 10 years, Secondary hyperparathyroidism s/p parathyroidectomy about one month ago, HTN, and obesity who presented to the hospital for shortness of breath on exertion. The nephrology team was consulted for management of dialysis.     Pt seen and examined today. Reports feeling okay but with some back pain. Denied any chest pain, shortness of breath, nausea, or vomiting.         PAST HISTORY  --------------------------------------------------------------------------------  No significant changes to PMH, PSH, FHx, SHx, unless otherwise noted    ALLERGIES & MEDICATIONS  --------------------------------------------------------------------------------  Allergies    No Known Allergies    Intolerances      Standing Inpatient Medications  acetaminophen     Tablet .. 650 milliGRAM(s) Oral once  calcitriol   Capsule 0.5 MICROGram(s) Oral daily  cinacalcet 60 milliGRAM(s) Oral daily  heparin   Injectable 5000 Unit(s) SubCutaneous every 8 hours  influenza   Vaccine 0.5 milliLiter(s) IntraMuscular once  labetalol 300 milliGRAM(s) Oral two times a day  sevelamer carbonate 1600 milliGRAM(s) Oral three times a day with meals    PRN Inpatient Medications  oxycodone    5 mG/acetaminophen 325 mG 1 Tablet(s) Oral every 4 hours PRN      REVIEW OF SYSTEMS    All other systems were reviewed and are negative, except as noted.    VITALS/PHYSICAL EXAM  --------------------------------------------------------------------------------  T(C): 37.2 (02-28-23 @ 04:58), Max: 37.4 (02-27-23 @ 20:14)  HR: 75 (02-28-23 @ 04:58) (71 - 87)  BP: 133/74 (02-28-23 @ 04:58) (130/77 - 154/76)  RR: 18 (02-28-23 @ 04:58) (17 - 18)  SpO2: 99% (02-28-23 @ 04:58) (96% - 100%)  Wt(kg): --    02-27-23 @ 07:01  -  02-28-23 @ 06:21  --------------------------------------------------------  IN: 700 mL / OUT: 2500 mL / NET: -1800 mL    Physical Exam:  	Gen: NAD, obese  	HEENT: MMM  	Pulm: CTA B/L  	CV: S1S2  	Abd: Soft, +BS   	Ext: trace LE edema B/L  	Neuro: Awake and alert  	Skin: Warm and dry  	Vascular access: Nonfunctional SURYA MEJIA , JAIDEN tunneled HD catheter    LABS/STUDIES  --------------------------------------------------------------------------------              6.8    3.47  >-----------<  159      [02-27-23 @ 07:06]              22.1     137  |  98  |  86  ----------------------------<  71      [02-27-23 @ 07:04]  5.5   |  22  |  13.72        Ca     8.0     [02-27-23 @ 07:04]      Mg     2.2     [02-27-23 @ 07:04]      Phos  6.0     [02-27-23 @ 07:04]    TPro  5.6  /  Alb  3.2  /  TBili  0.2  /  DBili  x   /  AST  5   /  ALT  5   /  AlkPhos  1741  [02-27-23 @ 07:04]      Creatinine Trend:  SCr 13.72 [02-27 @ 07:04]  SCr 11.04 [02-26 @ 06:48]  SCr 8.64 [02-25 @ 06:43]  SCr 12.73 [02-24 @ 11:32]      Iron 29, TIBC 192, %sat 15      [10-04-22 @ 16:34]  Ferritin 108      [10-04-22 @ 16:34]  PTH -- (Ca 7.5)      [02-26-23 @ 06:48]   3507    HBsAb 28.5      [02-24-23 @ 22:27]  HBsAb Reactive      [02-24-23 @ 22:27]  HBsAg Nonreact      [02-24-23 @ 22:27]  HCV 0.26, Nonreact      [02-24-23 @ 22:27]

## 2023-02-28 NOTE — DISCHARGE NOTE PROVIDER - NSDCFUADDAPPT_GEN_ALL_CORE_FT
APPTS ARE READY TO BE MADE: [ ] YES    Best Family or Patient Contact (if needed):    Additional Information about above appointments (if needed):    1: Nephrology   2: Medicine Clinic   3: ENT     Other comments or requests:

## 2023-02-28 NOTE — DISCHARGE NOTE PROVIDER - CARE PROVIDER_API CALL
Rufina Farr)  Surgery  [address], [address]  [place] [address]  Phone: [phone]  Fax: [phone]  Follow Up Time:

## 2023-02-28 NOTE — DISCHARGE NOTE PROVIDER - NSFOLLOWUPCLINICSTOKEN_GEN_ALL_ED_FT
[id number]: || ||00\01||False; [id number]: || ||00\01||False;[id number]: || ||00\01||False;[id number]: || ||00\01||False;

## 2023-02-28 NOTE — DISCHARGE NOTE PROVIDER - NSFOLLOWUPCLINICS_GEN_ALL_ED_FT
[facility]  Nephrology  [address] [place] [address], [address] [place] [address]  Phone: [phone]  Fax:      [facility]  Nephrology  [address] [facility], [address] [place] [address]  Phone: [phone]  Fax:     [facility] - ENT  Otolaryngology (ENT)  [address] [place] [address]  Phone: [phone]  Fax:     [facility] General Internal Medicine  General Internal Medicine  [address] [place] [address]  Phone: [phone]  Fax:

## 2023-02-28 NOTE — CONSULT NOTE ADULT - SUBJECTIVE AND OBJECTIVE BOX
VASCULAR SURGERY CONSULT NOTE  --------------------------------------------------------------------------------------------    Patient is a 37y old  Male who presents with a chief complaint of missed HD.       HPI:   37 year old male with PMH of ESRD on HD TTS for the past 10 years, Secondary hyperparathyroidism s/p parathyroidectomy about one month ago, HTN, and obesity who presented to the hospital for shortness of breath on exertion. The nephrology team was consulted for management of dialysis. The patient was seen and examined earlier in the ED. The patient states he has been on dialysis for the past 10 years and missed his session yesterday because he was driving to [place] from [place]. His last outpatient HD was on Tuesday 2/21. He undergoes HD at [facility] in [place]. He is currently using a RIJ tunneled HD catheter for HD because his LUE fistula stopped working about one month ago as well. He states the shortness of breath started this morning and is worse with exertion. He also states that about 2 months ago he underwent a parathyroidectomy due to his history of secondary hyperparathyroidism. He states he was experiencing severe bone pains which he still occasionally does (using Percocet for pain control at this time). He denied any chest pain, nausea, vomiting, or abdominal pain. He admits he is currently under the process of moving to [place] and will need an outpatient HD unit established. Prior LUE RC AVF, thrombosed and then brachiocephalic AVF, subsequent failure 1 month ago, aneurysmal and without adequate flow.  Now HD via permacath, HD successful x1 month with permacath.     ROS: 10-system review is otherwise negative except HPI above.      PAST MEDICAL & SURGICAL HISTORY:  HTN (hypertension)      Stroke  age 10      Morbid obesity      Sleep apnea      Leg fracture, right      Chronic renal failure      Hemodialysis access, AV graft      ESRD (end stage renal disease) on dialysis  since 2013, M-W-F      Anemia, unspecified type      AV fistula  L arm        FAMILY HISTORY:  No pertinent family history in first degree relatives        SOCIAL HISTORY:      ALLERGIES: No Known Allergies      HOME MEDICATIONS:     CURRENT MEDICATIONS  MEDICATIONS (STANDING): acetaminophen     Tablet .. 650 milliGRAM(s) Oral once  calcitriol   Capsule 0.5 MICROGram(s) Oral daily  cinacalcet 60 milliGRAM(s) Oral daily  heparin   Injectable 5000 Unit(s) SubCutaneous every 8 hours  influenza   Vaccine 0.5 milliLiter(s) IntraMuscular once  labetalol 300 milliGRAM(s) Oral two times a day  sevelamer carbonate 1600 milliGRAM(s) Oral three times a day with meals    MEDICATIONS (PRN):oxycodone    5 mG/acetaminophen 325 mG 1 Tablet(s) Oral every 4 hours PRN Moderate Pain (4 - 6)    --------------------------------------------------------------------------------------------    Vitals:   T(C): 36.3 (02-28-23 @ 12:15), Max: 37.4 (02-27-23 @ 20:14)  HR: 68 (02-28-23 @ 12:15) (66 - 87)  BP: 143/67 (02-28-23 @ 12:15) (130/77 - 154/76)  RR: 18 (02-28-23 @ 12:15) (17 - 18)  SpO2: 98% (02-28-23 @ 12:15) (96% - 99%)  CAPILLARY BLOOD GLUCOSE    02-27 @ 07:01  -  02-28 @ 07:00  --------------------------------------------------------  IN:    Oral Fluid: 700 mL  Total IN: 700 mL    OUT:    Other (mL): 2500 mL  Total OUT: 2500 mL    Total NET: -1800 mL      02-28 @ 07:01  -  02-28 @ 14:33  --------------------------------------------------------  IN:  Total IN: 0 mL    OUT:    Other (mL): 2000 mL  Total OUT: 2000 mL    Total NET: -2000 mL        Height (cm): 190.5 (02-25 @ 01:36)  Weight (kg): 102.8 (02-25 @ 01:36)  BMI (kg/m2): 28.3 (02-25 @ 01:36)  BSA (m2): 2.31 (02-25 @ 01:36)    PHYSICAL EXAM:   General: NAD, getting HD   Neuro: A+Ox3  HEENT: NC/AT, EOMI  Neck: Soft, supple  Cardio: RRR, well perfused   Resp: Good effort, room air   GI/Abd: Soft, NT/ND, no rebound/guarding, no masses palpated  Vascular: LUE AVF in AC, aneurysmal, nontender, no thrill, radial pulse 2+  Musculoskeletal: All 4 extremities moving spontaneously, no limitations.  --------------------------------------------------------------------------------------------    LABS  CBC (02-28 @ 11:55)                              7.5<L>                         3.58<L>  )----------------(  169        --    % Neutrophils, --    % Lymphocytes, ANC: --                                  23.8<L>  CBC (02-27 @ 07:06)                              6.8<LL>                         3.47<L>  )----------------(  159        --    % Neutrophils, --    % Lymphocytes, ANC: --                                  22.1<L>    BMP (02-28 @ 11:03)             140     |  98      |  66<H> 		Ca++ --      Ca 7.8<L>             ---------------------------------( 128<H>		Mg --                 4.5     |  23      |  10.23<H>			Ph 5.2<H>  BMP (02-27 @ 07:04)             137     |  98      |  86<H> 		Ca++ --      Ca 8.0<L>             ---------------------------------( 71    		Mg 2.2                5.5<H>  |  22      |  13.72<H>			Ph 6.0<H>    LFTs (02-28 @ 11:03)      TPro 5.9<L> / Alb 3.3 / TBili 0.2 / DBili -- / AST 7<L> / ALT 5<L> / AlkPhos 1736<H>  LFTs (02-27 @ 07:04)      TPro 5.6<L> / Alb 3.2<L> / TBili 0.2 / DBili -- / AST 5<L> / ALT 5<L> / AlkPhos 1741<H>            --------------------------------------------------------------------------------------------    MICROBIOLOGY      --------------------------------------------------------------------------------------------    IMAGING    
[facility] DIVISION OF KIDNEY DISEASES AND HYPERTENSION -- [phone]  -- INITIAL CONSULT NOTE  --------------------------------------------------------------------------------  HPI:    Patient is a 37 year old male with PMH of ESRD on HD TTS for the past 10 years, Secondary hyperparathyroidism s/p parathyroidectomy about one month ago, HTN, and obesity who presented to the hospital for shortness of breath on exertion. The nephrology team was consulted for management of dialysis. The patient was seen and examined earlier in the ED. The patient states he has been on dialysis for the past 10 years and missed his session yesterday because he was driving to [place] from [place]. His last outpatient HD was on Tuesday 2/21. He undergoes HD at [facility] in [place]. He is currently using a RIJ tunneled HD catheter for HD because his LUE fistula stopped working about one month ago as well. He states the shortness of breath started this morning and is worse with exertion. He also states that about 2 months ago he underwent a parathyroidectomy due to his history of secondary hyperparathyroidism. He states he was experiencing severe bone pains which he still occasionally does (using Percocet for pain control at this time). He denied any chest pain, nausea, vomiting, or abdominal pain. He admits he is currently under the process of moving to [place] and will need an outpatient HD unit established.     PAST HISTORY  --------------------------------------------------------------------------------  PAST MEDICAL & SURGICAL HISTORY:  HTN (hypertension)      Stroke  age 10      Morbid obesity      Sleep apnea      Leg fracture, right      Chronic renal failure      Hemodialysis access, AV graft      ESRD (end stage renal disease) on dialysis  since 2013, M-W-F      Anemia, unspecified type      AV fistula  L arm        FAMILY HISTORY:  No pertinent family history in first degree relatives      PAST SOCIAL HISTORY:    ALLERGIES & MEDICATIONS  --------------------------------------------------------------------------------  Allergies    No Known Allergies    Intolerances      Standing Inpatient Medications    PRN Inpatient Medications      REVIEW OF SYSTEMS    All other systems were reviewed and are negative, except as noted.    VITALS/PHYSICAL EXAM  --------------------------------------------------------------------------------  T(C): 36.7 (02-24-23 @ 11:27), Max: 36.9 (02-24-23 @ 10:10)  HR: 77 (02-24-23 @ 11:27) (75 - 79)  BP: 136/90 (02-24-23 @ 11:27) (134/64 - 137/86)  RR: 18 (02-24-23 @ 11:27) (18 - 18)  SpO2: 99% (02-24-23 @ 11:27) (99% - 100%)  Wt(kg): --  Height (cm): 190.5 (02-24-23 @ 10:10)  Weight (kg): 98.4 (02-24-23 @ 10:10)  BMI (kg/m2): 27.1 (02-24-23 @ 10:10)  BSA (m2): 2.27 (02-24-23 @ 10:10)      Physical Exam:  	Gen: NAD, obese  	HEENT: MMM  	Pulm: CTA B/L  	CV: S1S2  	Abd: Soft, +BS   	Ext: trace LE edema B/L  	Neuro: Awake and alert  	Skin: Warm and dry  	Vascular access: Nonfunctional LUE AVF , RIJ tunneled HD catheter    LABS/STUDIES  --------------------------------------------------------------------------------              7.3    3.86  >-----------<  171      [02-24-23 @ 11:32]              23.2     142  |  103  |  76  ----------------------------<  109      [02-24-23 @ 11:32]  4.9   |  23  |  12.73        Ca     7.8     [02-24-23 @ 11:32]      iCa    0.94     [02-24 @ 11:38]    TPro  6.1  /  Alb  3.5  /  TBili  0.3  /  DBili  x   /  AST  6   /  ALT  <5  /  AlkPhos  1995  [02-24-23 @ 11:32]    PT/INR: PT 12.8 , INR 1.11       [02-24-23 @ 13:10]  PTT: 26.6       [02-24-23 @ 13:10]      Creatinine Trend:  SCr 12.73 [02-24 @ 11:32]        Iron 29, TIBC 192, %sat 15      [10-04-22 @ 16:34]  Ferritin 108      [10-04-22 @ 16:34]    HBsAg Nonreact      [10-04-22 @ 10:49]  HCV 0.29, Nonreact      [10-04-22 @ 10:49]

## 2023-02-28 NOTE — CONSULT NOTE ADULT - ATTENDING COMMENTS
Patient seen/examined. Patient known to me s/p remote left BC AVF creation, which reportedly thrombosed one month ago. Patient seen/examined. Patient known to me s/p remote left BC AVF creation, which reportedly thrombosed one month ago, while patient was in [place]. Patient now on HD via tunnelled catheter in right chest and relocating back to [place]. No palpable thrill over fistula. Duplex shows thrombosed fistula. Patient reports that he is being discharged today and would like to follow up outpatient. Contact information for my office ([facility] [address] [place]; Tel ) was provided to patient. If he remains hospitalized, please obtain vein mapping of bilateral upper extremities.

## 2023-02-28 NOTE — PROGRESS NOTE ADULT - SUBJECTIVE AND OBJECTIVE BOX
Patient is a 37y old  Male who presents with a chief complaint of dialysis (28 Feb 2023 06:21)    Date of servie : 02-28-23 @ 14:37  INTERVAL HPI/OVERNIGHT EVENTS:  T(C): 36.3 (02-28-23 @ 12:15), Max: 37.4 (02-27-23 @ 20:14)  HR: 68 (02-28-23 @ 12:15) (66 - 87)  BP: 143/67 (02-28-23 @ 12:15) (130/77 - 154/76)  RR: 18 (02-28-23 @ 12:15) (17 - 18)  SpO2: 98% (02-28-23 @ 12:15) (96% - 99%)  Wt(kg): --  I&O's Summary    27 Feb 2023 07:01  -  28 Feb 2023 07:00  --------------------------------------------------------  IN: 700 mL / OUT: 2500 mL / NET: -1800 mL    28 Feb 2023 07:01  -  28 Feb 2023 14:37  --------------------------------------------------------  IN: 0 mL / OUT: 2000 mL / NET: -2000 mL        LABS:                        7.5    3.58  )-----------( 169      ( 28 Feb 2023 11:55 )             23.8     02-28    140  |  98  |  66<H>  ----------------------------<  128<H>  4.5   |  23  |  10.23<H>    Ca    7.8<L>      28 Feb 2023 11:03  Phos  5.2     02-28  Mg     2.2     02-27    TPro  5.9<L>  /  Alb  3.3  /  TBili  0.2  /  DBili  x   /  AST  7<L>  /  ALT  5<L>  /  AlkPhos  1736<H>  02-28        CAPILLARY BLOOD GLUCOSE                MEDICATIONS  (STANDING):  acetaminophen     Tablet .. 650 milliGRAM(s) Oral once  calcitriol   Capsule 0.5 MICROGram(s) Oral daily  cinacalcet 60 milliGRAM(s) Oral daily  heparin   Injectable 5000 Unit(s) SubCutaneous every 8 hours  influenza   Vaccine 0.5 milliLiter(s) IntraMuscular once  labetalol 300 milliGRAM(s) Oral two times a day  sevelamer carbonate 1600 milliGRAM(s) Oral three times a day with meals    MEDICATIONS  (PRN):  oxycodone    5 mG/acetaminophen 325 mG 1 Tablet(s) Oral every 4 hours PRN Moderate Pain (4 - 6)          PHYSICAL EXAM:  GENERAL: NAD, well-groomed, well-developed  HEAD:  Atraumatic, Normocephalic  CHEST/LUNG: Clear to percussion bilaterally; No rales, rhonchi, wheezing, or rubs  HEART: Regular rate and rhythm; No murmurs, rubs, or gallops  ABDOMEN: Soft, Nontender, Nondistended; Bowel sounds present  EXTREMITIES:  2+ Peripheral Pulses, No clubbing, cyanosis, or edema  LYMPH: No lymphadenopathy noted  SKIN: No rashes or lesions    Care Discussed with Consultants/Other Providers [ ] YES  [ ] NO

## 2023-02-28 NOTE — DISCHARGE NOTE PROVIDER - NSDCMRMEDTOKEN_GEN_ALL_CORE_FT
calcitriol 0.5 mcg oral capsule: 1 cap(s) orally once a day  calcium carbonate 500 mg (200 mg elemental calcium) oral tablet, chewable: 4 tab(s) orally 4 times a day  cinacalcet 60 mg oral tablet: 1 tab(s) orally once a day  labetalol 300 mg oral tablet: 1 tab(s) orally 2 times a day  Percocet 10/325 oral tablet: 1 tab(s) orally 2 times a day  sevelamer carbonate 800 mg oral tablet: 2 tab(s) orally 3 times a day (with meals)   calcitriol 0.5 mcg oral capsule: 1 cap(s) orally once a day  calcium carbonate 500 mg (200 mg elemental calcium) oral tablet, chewable: 4 tab(s) orally 4 times a day  cinacalcet 60 mg oral tablet: 1 tab(s) orally once a day  labetalol 300 mg oral tablet: 1 tab(s) orally 2 times a day  Percocet 10/325 oral tablet: 1 tab(s) orally every 8 hours, As Needed  -for mild pain - for moderate pain MDD:3 doses  sevelamer carbonate 800 mg oral tablet: 2 tab(s) orally 3 times a day (with meals)

## 2023-02-28 NOTE — CONSULT NOTE ADULT - ASSESSMENT
Assessment: 37 year old man with thrombosed AVF in E x2, now with HD via permacath.     Recs:  - Duplex reviewed   - HD per permacath  - No contraindication to discharge   - Duplex reviewed  - Outpatient revision/new AVF with Dr. Farr  - D/w fellow and attending     Tino Ricketts MD, PGY2  Vascular Surgery   [id number]
Pt. with ESRD on HD TTS

## 2023-02-28 NOTE — DISCHARGE NOTE PROVIDER - CARE PROVIDERS DIRECT ADDRESSES
[email or web address]

## 2023-02-28 NOTE — DISCHARGE NOTE PROVIDER - NSDCCPCAREPLAN_GEN_ALL_CORE_FT
PRINCIPAL DISCHARGE DIAGNOSIS  Diagnosis: ESRD on hemodialysis  Assessment and Plan of Treatment: Hemodialysis is the most common method used to treat advanced and permanent kidney failure. But even with better procedures and equipment, hemodialysis is still a complicated and inconvenient therapy that requires a coordinated effort from your whole health care team, including your nephrologist, dialysis nurse, dialysis technician, dietitian, and . The most important members of your health care team are you and your family.   Healthy kidneys clean your blood by removing excess fluid, minerals, and wastes. When your kidneys fail, harmful wastes build up in your body, your blood pressure may rise, and your body may retain excess fluid and not make enough red blood cells. When this happens, you need treatment to replace the work of your failed kidneys. In hemodialysis, your blood is allowed to flow, a few ounces at a time, through a special filter that removes wastes and extra fluids. The clean blood is then returned to your body. One of the biggest adjustments you must make when you start hemodialysis treatments is following a strict schedule. Most patients go to a dialysis center three times a week for 3 to 5 or more hours each visit.   Please follow up with Vascular for Outpatient revision/new AVF with Dr. Farr. Continue HD via Permcath for now.   Some of the more common conditions caused by kidney failure are extreme tiredness, bone problems, joint problems, itching, and "restless legs”.  Many people treated with hemodialysis complain of itchy skin, which is often worse during or just after treatment.  Patients on dialysis often have insomnia, and some people have a specific problem called the sleep apnea syndrome.  Over time, these sleep disturbances can lead to "day-night reversal" (insomnia at night, sleepiness during the day), headache, depression, and decreased alertness.   Sleep disorders may seem unimportant, but they can impair your quality of life. Don't hesitate to raise these problems with your nurse, doctor, or       SECONDARY DISCHARGE DIAGNOSES  Diagnosis: Secondary hyperparathyroidism  Assessment and Plan of Treatment: This is in setting of renal disease, with parathyroidectomy Dec 2022, your PTH remains elevated to 3500 but per patient markedly improved from 8500 2 weeks ago checked in [place]. Please continue with home dose of Renvela 1600mg thress times a day with meals and Cinacalcet 60mg and calcitriol 0.5mcg as per his outpatient doses.      Diagnosis: Anemia  Assessment and Plan of Treatment: Your anemia is in setting of renal disease. Your hemoglobin is below goal right now. You received 10K units of EPO with HD 2/27. Your Anemia is likely exacerbated by markedly elevated PTH. This will improve as PTH improves. Please continue close follow up with Nephrology.       Diagnosis: Hypertension  Assessment and Plan of Treatment: Hypertension  Hypertension, commonly called high blood pressure, is when the force of blood pumping through your arteries is too strong. Hypertension forces your heart to work harder to pump blood. Your arteries may become narrow or stiff. Having untreated or uncontrolled hypertension for a long period of time can cause heart attack, stroke, kidney disease, and other problems. If started on a medication, take exactly as prescribed by your health care professional. Maintain a healthy lifestyle and follow up with your primary care physician.  Your hypertension is stable please continue medication regimen Labetalol 300mg twice daily.   SEEK IMMEDIATE MEDICAL CARE IF YOU HAVE ANY OF THE FOLLOWING SYMPTOMS: severe headache, confusion, chest pain, abdominal pain, vomiting, or shortness of breath.

## 2023-03-01 LAB
ANION GAP SERPL CALC-SCNC: 17 MMOL/L — SIGNIFICANT CHANGE UP (ref 5–17)
BUN SERPL-MCNC: 46 MG/DL — HIGH (ref 7–23)
CALCIUM SERPL-MCNC: 8.1 MG/DL — LOW (ref 8.4–10.5)
CHLORIDE SERPL-SCNC: 98 MMOL/L — SIGNIFICANT CHANGE UP (ref 96–108)
CO2 SERPL-SCNC: 23 MMOL/L — SIGNIFICANT CHANGE UP (ref 22–31)
CREAT SERPL-MCNC: 8.2 MG/DL — HIGH (ref 0.5–1.3)
EGFR: 8 ML/MIN/1.73M2 — LOW
GLUCOSE SERPL-MCNC: 67 MG/DL — LOW (ref 70–99)
HCT VFR BLD CALC: 24.9 % — LOW (ref 39–50)
HGB BLD-MCNC: 8 G/DL — LOW (ref 13–17)
MCHC RBC-ENTMCNC: 29.6 PG — SIGNIFICANT CHANGE UP (ref 27–34)
MCHC RBC-ENTMCNC: 32.1 GM/DL — SIGNIFICANT CHANGE UP (ref 32–36)
MCV RBC AUTO: 92.2 FL — SIGNIFICANT CHANGE UP (ref 80–100)
NRBC # BLD: 0 /100 WBCS — SIGNIFICANT CHANGE UP (ref 0–0)
PLATELET # BLD AUTO: 149 K/UL — LOW (ref 150–400)
POTASSIUM SERPL-MCNC: 4.8 MMOL/L — SIGNIFICANT CHANGE UP (ref 3.5–5.3)
POTASSIUM SERPL-SCNC: 4.8 MMOL/L — SIGNIFICANT CHANGE UP (ref 3.5–5.3)
RBC # BLD: 2.7 M/UL — LOW (ref 4.2–5.8)
RBC # FLD: 15.3 % — HIGH (ref 10.3–14.5)
SODIUM SERPL-SCNC: 138 MMOL/L — SIGNIFICANT CHANGE UP (ref 135–145)
WBC # BLD: 3.78 K/UL — LOW (ref 3.8–10.5)
WBC # FLD AUTO: 3.78 K/UL — LOW (ref 3.8–10.5)

## 2023-03-01 RX ORDER — CALCIUM CARBONATE 500(1250)
4 TABLET ORAL
Qty: 0 | Refills: 0 | DISCHARGE

## 2023-03-01 RX ORDER — LABETALOL HCL 100 MG
1 TABLET ORAL
Qty: 60 | Refills: 0
Start: 2023-03-01 | End: 2023-03-30

## 2023-03-01 RX ORDER — CALCIUM CARBONATE 500(1250)
4 TABLET ORAL
Qty: 480 | Refills: 0
Start: 2023-03-01 | End: 2023-03-30

## 2023-03-01 RX ORDER — OXYCODONE AND ACETAMINOPHEN 5; 325 MG/1; MG/1
1 TABLET ORAL
Qty: 9 | Refills: 0
Start: 2023-03-01 | End: 2023-03-03

## 2023-03-01 RX ORDER — LABETALOL HCL 100 MG
1 TABLET ORAL
Qty: 0 | Refills: 0 | DISCHARGE

## 2023-03-01 RX ORDER — OXYCODONE AND ACETAMINOPHEN 5; 325 MG/1; MG/1
1 TABLET ORAL
Qty: 0 | Refills: 0 | DISCHARGE

## 2023-03-01 RX ORDER — CALCITRIOL 0.5 UG/1
1 CAPSULE ORAL
Qty: 0 | Refills: 0 | DISCHARGE

## 2023-03-01 RX ORDER — CALCITRIOL 0.5 UG/1
1 CAPSULE ORAL
Qty: 30 | Refills: 0
Start: 2023-03-01 | End: 2023-03-30

## 2023-03-01 RX ORDER — CINACALCET 30 MG/1
1 TABLET, FILM COATED ORAL
Qty: 0 | Refills: 0 | DISCHARGE

## 2023-03-01 RX ORDER — CINACALCET 30 MG/1
1 TABLET, FILM COATED ORAL
Qty: 30 | Refills: 0
Start: 2023-03-01 | End: 2023-03-30

## 2023-03-01 RX ADMIN — CALCITRIOL 0.5 MICROGRAM(S): 0.5 CAPSULE ORAL at 12:39

## 2023-03-01 RX ADMIN — SEVELAMER CARBONATE 1600 MILLIGRAM(S): 2400 POWDER, FOR SUSPENSION ORAL at 17:29

## 2023-03-01 RX ADMIN — SEVELAMER CARBONATE 1600 MILLIGRAM(S): 2400 POWDER, FOR SUSPENSION ORAL at 08:17

## 2023-03-01 RX ADMIN — SEVELAMER CARBONATE 1600 MILLIGRAM(S): 2400 POWDER, FOR SUSPENSION ORAL at 12:38

## 2023-03-01 RX ADMIN — Medication 300 MILLIGRAM(S): at 17:29

## 2023-03-01 RX ADMIN — Medication 300 MILLIGRAM(S): at 05:47

## 2023-03-01 RX ADMIN — CINACALCET 60 MILLIGRAM(S): 30 TABLET, FILM COATED ORAL at 12:39

## 2023-03-01 NOTE — PROGRESS NOTE ADULT - SUBJECTIVE AND OBJECTIVE BOX
Patient is a 37y old  Male who presents with a chief complaint of Dialysis (28 Feb 2023 17:25)    Date of servie : 03-01-23 @ 16:36  INTERVAL HPI/OVERNIGHT EVENTS:  T(C): 36.9 (03-01-23 @ 11:34), Max: 37.2 (03-01-23 @ 05:09)  HR: 72 (03-01-23 @ 11:34) (72 - 79)  BP: 142/96 (03-01-23 @ 11:34) (126/61 - 142/96)  RR: 18 (03-01-23 @ 11:34) (17 - 18)  SpO2: 98% (03-01-23 @ 11:34) (94% - 99%)  Wt(kg): --  I&O's Summary    28 Feb 2023 07:01  -  01 Mar 2023 07:00  --------------------------------------------------------  IN: 0 mL / OUT: 2000 mL / NET: -2000 mL        LABS:                        8.0    3.78  )-----------( 149      ( 01 Mar 2023 06:41 )             24.9     03-01    138  |  98  |  46<H>  ----------------------------<  67<L>  4.8   |  23  |  8.20<H>    Ca    8.1<L>      01 Mar 2023 06:43  Phos  5.2     02-28    TPro  5.9<L>  /  Alb  3.3  /  TBili  0.2  /  DBili  x   /  AST  7<L>  /  ALT  5<L>  /  AlkPhos  1736<H>  02-28        CAPILLARY BLOOD GLUCOSE                MEDICATIONS  (STANDING):  acetaminophen     Tablet .. 650 milliGRAM(s) Oral once  calcitriol   Capsule 0.5 MICROGram(s) Oral daily  cinacalcet 60 milliGRAM(s) Oral daily  heparin   Injectable 5000 Unit(s) SubCutaneous every 8 hours  influenza   Vaccine 0.5 milliLiter(s) IntraMuscular once  labetalol 300 milliGRAM(s) Oral two times a day  sevelamer carbonate 1600 milliGRAM(s) Oral three times a day with meals    MEDICATIONS  (PRN):  oxycodone    5 mG/acetaminophen 325 mG 1 Tablet(s) Oral every 4 hours PRN Moderate Pain (4 - 6)          PHYSICAL EXAM:  GENERAL: NAD, well-groomed, well-developed  HEAD:  Atraumatic, Normocephalic  CHEST/LUNG: Clear to percussion bilaterally; No rales, rhonchi, wheezing, or rubs  HEART: Regular rate and rhythm; No murmurs, rubs, or gallops  ABDOMEN: Soft, Nontender, Nondistended; Bowel sounds present  EXTREMITIES:  2+ Peripheral Pulses, No clubbing, cyanosis, or edema  LYMPH: No lymphadenopathy noted  SKIN: No rashes or lesions    Care Discussed with Consultants/Other Providers [ ] YES  [ ] NO

## 2023-03-02 ENCOUNTER — TRANSCRIPTION ENCOUNTER (OUTPATIENT)
Age: 38
End: 2023-03-02

## 2023-03-02 VITALS
TEMPERATURE: 99 F | OXYGEN SATURATION: 99 % | HEART RATE: 78 BPM | RESPIRATION RATE: 18 BRPM | DIASTOLIC BLOOD PRESSURE: 83 MMHG | SYSTOLIC BLOOD PRESSURE: 148 MMHG

## 2023-03-02 PROCEDURE — 87340 HEPATITIS B SURFACE AG IA: CPT

## 2023-03-02 PROCEDURE — 99261: CPT

## 2023-03-02 PROCEDURE — 36415 COLL VENOUS BLD VENIPUNCTURE: CPT

## 2023-03-02 PROCEDURE — 82947 ASSAY GLUCOSE BLOOD QUANT: CPT

## 2023-03-02 PROCEDURE — 71046 X-RAY EXAM CHEST 2 VIEWS: CPT

## 2023-03-02 PROCEDURE — P9016: CPT

## 2023-03-02 PROCEDURE — 36430 TRANSFUSION BLD/BLD COMPNT: CPT

## 2023-03-02 PROCEDURE — 80048 BASIC METABOLIC PNL TOTAL CA: CPT

## 2023-03-02 PROCEDURE — 99233 SBSQ HOSP IP/OBS HIGH 50: CPT | Mod: GC

## 2023-03-02 PROCEDURE — 83970 ASSAY OF PARATHORMONE: CPT

## 2023-03-02 PROCEDURE — 82310 ASSAY OF CALCIUM: CPT

## 2023-03-02 PROCEDURE — 86706 HEP B SURFACE ANTIBODY: CPT

## 2023-03-02 PROCEDURE — 85730 THROMBOPLASTIN TIME PARTIAL: CPT

## 2023-03-02 PROCEDURE — 82565 ASSAY OF CREATININE: CPT

## 2023-03-02 PROCEDURE — 85610 PROTHROMBIN TIME: CPT

## 2023-03-02 PROCEDURE — 84132 ASSAY OF SERUM POTASSIUM: CPT

## 2023-03-02 PROCEDURE — 93990 DOPPLER FLOW TESTING: CPT

## 2023-03-02 PROCEDURE — 86803 HEPATITIS C AB TEST: CPT

## 2023-03-02 PROCEDURE — 96374 THER/PROPH/DIAG INJ IV PUSH: CPT

## 2023-03-02 PROCEDURE — 85025 COMPLETE CBC W/AUTO DIFF WBC: CPT

## 2023-03-02 PROCEDURE — 86902 BLOOD TYPE ANTIGEN DONOR EA: CPT

## 2023-03-02 PROCEDURE — 85018 HEMOGLOBIN: CPT

## 2023-03-02 PROCEDURE — 83735 ASSAY OF MAGNESIUM: CPT

## 2023-03-02 PROCEDURE — 86880 COOMBS TEST DIRECT: CPT

## 2023-03-02 PROCEDURE — 82803 BLOOD GASES ANY COMBINATION: CPT

## 2023-03-02 PROCEDURE — 99285 EMERGENCY DEPT VISIT HI MDM: CPT

## 2023-03-02 PROCEDURE — 84100 ASSAY OF PHOSPHORUS: CPT

## 2023-03-02 PROCEDURE — 83605 ASSAY OF LACTIC ACID: CPT

## 2023-03-02 PROCEDURE — 87637 SARSCOV2&INF A&B&RSV AMP PRB: CPT

## 2023-03-02 PROCEDURE — 86900 BLOOD TYPING SEROLOGIC ABO: CPT

## 2023-03-02 PROCEDURE — 83880 ASSAY OF NATRIURETIC PEPTIDE: CPT

## 2023-03-02 PROCEDURE — 80162 ASSAY OF DIGOXIN TOTAL: CPT

## 2023-03-02 PROCEDURE — 86905 BLOOD TYPING RBC ANTIGENS: CPT

## 2023-03-02 PROCEDURE — 86901 BLOOD TYPING SEROLOGIC RH(D): CPT

## 2023-03-02 PROCEDURE — 82330 ASSAY OF CALCIUM: CPT

## 2023-03-02 PROCEDURE — 84484 ASSAY OF TROPONIN QUANT: CPT

## 2023-03-02 PROCEDURE — 85027 COMPLETE CBC AUTOMATED: CPT

## 2023-03-02 PROCEDURE — 82435 ASSAY OF BLOOD CHLORIDE: CPT

## 2023-03-02 PROCEDURE — 85014 HEMATOCRIT: CPT

## 2023-03-02 PROCEDURE — 86850 RBC ANTIBODY SCREEN: CPT

## 2023-03-02 PROCEDURE — 84295 ASSAY OF SERUM SODIUM: CPT

## 2023-03-02 PROCEDURE — 80053 COMPREHEN METABOLIC PANEL: CPT

## 2023-03-02 PROCEDURE — 86922 COMPATIBILITY TEST ANTIGLOB: CPT

## 2023-03-02 PROCEDURE — 86870 RBC ANTIBODY IDENTIFICATION: CPT

## 2023-03-02 RX ORDER — OXYCODONE AND ACETAMINOPHEN 5; 325 MG/1; MG/1
1 TABLET ORAL
Qty: 9 | Refills: 0
Start: 2023-03-02 | End: 2023-03-04

## 2023-03-02 RX ORDER — OXYCODONE AND ACETAMINOPHEN 5; 325 MG/1; MG/1
2 TABLET ORAL
Qty: 24 | Refills: 0
Start: 2023-03-02 | End: 2023-03-04

## 2023-03-02 RX ORDER — ERYTHROPOIETIN 10000 [IU]/ML
8000 INJECTION, SOLUTION INTRAVENOUS; SUBCUTANEOUS
Refills: 0 | Status: DISCONTINUED | OUTPATIENT
Start: 2023-03-02 | End: 2023-03-02

## 2023-03-02 RX ADMIN — HEPARIN SODIUM 5000 UNIT(S): 5000 INJECTION INTRAVENOUS; SUBCUTANEOUS at 01:10

## 2023-03-02 RX ADMIN — ERYTHROPOIETIN 8000 UNIT(S): 10000 INJECTION, SOLUTION INTRAVENOUS; SUBCUTANEOUS at 09:28

## 2023-03-02 RX ADMIN — CINACALCET 60 MILLIGRAM(S): 30 TABLET, FILM COATED ORAL at 12:41

## 2023-03-02 RX ADMIN — SEVELAMER CARBONATE 1600 MILLIGRAM(S): 2400 POWDER, FOR SUSPENSION ORAL at 08:16

## 2023-03-02 RX ADMIN — SEVELAMER CARBONATE 1600 MILLIGRAM(S): 2400 POWDER, FOR SUSPENSION ORAL at 13:18

## 2023-03-02 RX ADMIN — CALCITRIOL 0.5 MICROGRAM(S): 0.5 CAPSULE ORAL at 12:41

## 2023-03-02 NOTE — PROGRESS NOTE ADULT - PROBLEM SELECTOR PLAN 2
Pt. with anemia in setting of renal disease. Please check iron studies. Hg below goal right now. CUCA with HD.   Anemia likely exacerbated by markedly elevated PTH. Will improve as PTH improves.
Pt. with anemia in setting of renal disease. Please check iron studies. Hg below goal right now. Will give CUCA during HD today. Monitor Hg.   Anemia likely exacerbated by markedly elevated PTH. Will improve as PTH improves.
Pt. with anemia in setting of renal disease. Please check iron studies. Hg below goal right now. Received 10K units of EPO with HD 2/27.   Anemia likely exacerbated by markedly elevated PTH. Will improve as PTH improves.
Pt. with anemia in setting of renal disease. Check TSAT and Ferritin. Hg below goal right now. Will give CUCA during HD today and confirm outpatient CUCA dose from his HD unit. Monitor Hg. Anemia likel exacerbated by markedly elevated PTH. Will improve as PTH improves.

## 2023-03-02 NOTE — PROGRESS NOTE ADULT - PROVIDER SPECIALTY LIST ADULT
Hospitalist
Nephrology
Hospitalist
Nephrology

## 2023-03-02 NOTE — PROGRESS NOTE ADULT - ATTENDING COMMENTS
#esrd on hd  hd tthsat  but outpt mwf HD being set up  plan hd today  #anemia in ckd- restart soo; monitorhb  #secondary hyperpth renal origin- rec increase rocaltrol to 1mcg daily; cont sensipar  # hyperphosphatemia- cont renvela; monitor phos level
ESRD on HD TTS  Admitted w SOB p missed HD tx  RIJ tunneled HD catheter; SURYA fistula stopped working about one month ago.   Last HD Friday Will arrange for HD today and repeat again tomorrow  Vascular surgery consult for AVF evaluation  Moving to [place] for subsequent HD
On HD  BP stable   access working well  Recent parathyroidectomy    C/w Cinacalcet 60mg and calcitriol 0.5mcg as per his outpatient doses
Patient is a 37 year old male with PMH of ESRD on HD TTS for the past 10 years, Secondary hyperparathyroidism s/p parathyroidectomy about one month ago, HTN, and obesity who presented to the hospital for shortness of breath on exertion. The nephrology team was consulted for management of dialysis.  Feeling OK today and filled in key part of history including PTH >8K before parathyroid surgery  1.  ESRD--HD TIW via catheter.  Needs permanent access  2.  Secondary hyperparathyroidism-- may need additional surgery.  Ionized Ca however subnormal limiting role of cinacalcet.  Can check scan, Ectopic production?  3.  Anemia--check Fe studies but effectiveness of CUCA likely to be limited given severity of 2.    discussed with med team  Sherif Mueller MD  contact me on TEAMS

## 2023-03-02 NOTE — DISCHARGE NOTE NURSING/CASE MANAGEMENT/SOCIAL WORK - NSDCVIVACCINE_GEN_ALL_CORE_FT
Influenza, seasonal, injectable; 30-Dec-2013 15:27; Jamila Hartley (JAYCEE); [id number]; IntraMuscular; Deltoid Right.; 0.5 milliLiter(s);   
Influenza, seasonal, injectable; 30-Dec-2013 15:27; Jamila Hartley (JAYCEE); [id number]; IntraMuscular; Deltoid Right.; 0.5 milliLiter(s);

## 2023-03-02 NOTE — PROVIDER CONTACT NOTE (MEDICATION) - ASSESSMENT
Patient is A&Ox 4.
Pt is A&Ox4 independent. Pt states receiving heparin SC too frequently, stomach still hurts from previous injection. Pt is aware of benefits of heparin and risks or refusing. education again provided, offered injection at different site and ice pack prior and after, however pt still adamantly refused. Pt states would like to skip this dose. Advise pt to ambulate frequently, pt agrees.
Patient is alert and oriented x 4.
Pt AOx4. VSS on RA. Pt educated on benefits/ risks of heparin but still refused. Denies chest pain, SOB or distress at this time.
Pt is A&Ox4 independent refusing heparin SC. Pt is aware of benefits of heparin and risks or refusing. education provided, however pt still adamantly refused. Pt states would like to skip this dose. Advise pt to ambulate frequently, pt agrees.
pt AOx4. VSS on RA. Pt educated on risk/ benefits of heparin but still refused

## 2023-03-02 NOTE — DISCHARGE NOTE NURSING/CASE MANAGEMENT/SOCIAL WORK - NSDCPEFALRISK_GEN_ALL_CORE
For information on Fall & Injury Prevention, visit: [email or web address] OR  [email or web address] OR  https://www.cdc.gov/steadi/patient.html
For information on Fall & Injury Prevention, visit: [email or web address] OR  [email or web address] OR  https://www.cdc.gov/steadi/patient.html

## 2023-03-02 NOTE — PROGRESS NOTE ADULT - PROBLEM SELECTOR PLAN 3
Pt. with secondary hyperparathyroidism in setting of renal disease. Reported history of parathyroidectomy about one month ago.   PTH elevated to 3500 but per patient markedly improved from 8500 2 weeks ago.   Continue with home dose of Renvela 1600mg TID with meals.   C/w Cinacalcet 60mg and calcitriol 0.5mcg as per his outpatient doses.    If any questions, please feel free to contact me     Erik Horner  Nephrology Fellow  [facility] Pager: [phone].
Pt. with secondary hyperparathyroidism in setting of renal disease. Reported history of parathyroidectomy about one month ago.   PTH elevated to 3500 but per patient markedly improved from 8500 2 weeks ago.   Continue with home dose of Renvela 1600mg TID with meals.   C/w Cinacalcet 60mg and calcitriol 0.5mcg as per his outpatient doses.    If any questions, please feel free to contact me     Erik Horner  Nephrology Fellow  [facility] Pager: [phone]
Pt. with secondary hyperparathyroidism in setting of renal disease. Reported history of parathyroidectomy about one month ago. PTH elevates to 3500 but per patient markedly improved from 8500 2 weeks ago.   Continue with home dose of Renvela 1600mg TID with meals.   C/w Cinacalcet 60mg  and calcitriol as outpatient.    If you have any questions, please feel free to contact me  Tommy Dillon  Nephrology Fellow  [phone]; Prefer [employer] TEAMS  (After 5pm or on weekends please page the on-call fellow)
Pt. with secondary hyperparathyroidism in setting of renal disease. Reported history of parathyroidectomy about one month ago.   PTH elevated to 3500 but per patient markedly improved from 8500 2-3 weeks ago.   Continue with home dose of Renvela 1600mg TID with meals.   C/w Cinacalcet 60mg and calcitriol 0.5mcg as per his outpatient doses.    If any questions, please feel free to contact me     Erik Horner  Nephrology Fellow  [facility] Pager: [phone].

## 2023-03-02 NOTE — PROVIDER CONTACT NOTE (MEDICATION) - REASON
Patient refuses Heparin subcutaneous injection
Pt refused heparin SC
Pt refused heparin SC
Pt refused Heparin
Pt refused Heparin injection
Patient refuses Heparin subcutaneous injection.

## 2023-03-02 NOTE — DISCHARGE NOTE NURSING/CASE MANAGEMENT/SOCIAL WORK - PATIENT PORTAL LINK FT
You can access the FollowMyHealth Patient Portal offered by [facility] by registering at the following website: [email or web address]. By joining [employer]’s FollowMyHealth portal, you will also be able to view your health information using other applications (apps) compatible with our system.
You can access the FollowMyHealth Patient Portal offered by [facility] by registering at the following website: [email or web address]. By joining [employer]’s FollowMyHealth portal, you will also be able to view your health information using other applications (apps) compatible with our system.

## 2023-03-02 NOTE — PROVIDER CONTACT NOTE (MEDICATION) - SITUATION
Pt refused Heparin
Patient refuses Heparin subcutaneous injection.
Patient refuses Heparin subcutaneous injection.
Pt refused Heparin injection
Pt refused heparin SC
Pt refused heparin SC

## 2023-03-02 NOTE — PROGRESS NOTE ADULT - ASSESSMENT
37y male hx ESRD on HD t/th/s x10y. Per pt is ESRD because of HTN. presents with sob/lopez in the setting of missed HD yesterday while traveling to [place] from [place]. Pt is moving and has no medical follow up here. denies chest pain diaphoresis nausea vomiting. no fevers chills or cough. worsening LE swelling.    1 ESRD  - renal fu  for HD  - HD as scheduled  - will need to find a new dialysis center in [place]    2 HTN  - cw home meds  - DASH diet  - will monitor     3 Heparin sc for DVT prophylaxis
37y male hx ESRD on HD t/th/s x10y. Per pt is ESRD because of HTN. presents with sob/lopez in the setting of missed HD yesterday while traveling to [place] from [place]. Pt is moving and has no medical follow up here. denies chest pain diaphoresis nausea vomiting. no fevers chills or cough. worsening LE swelling.    1 ESRD  - renal fu  for HD  - HD as scheduled  - will need to find a new dialysis center in [place]    2 HTN  - cw home meds  - DASH diet  - will monitor     3 Heparin sc for DVT prophylaxis  
37y male hx ESRD on HD t/th/s x10y. Per pt is ESRD because of HTN. presents with sob/lopez in the setting of missed HD yesterday while traveling to [place] from [place]. Pt is moving and has no medical follow up here. denies chest pain diaphoresis nausea vomiting. no fevers chills or cough. worsening LE swelling.    1 ESRD  - renal fu  for HD  - HD as scheduled  - will need to find a new dialysis center in [place]    2 HTN  - cw home meds  - DASH diet  - will monitor     3 AOCD  - sec to esrd  - sp transfusion  - monitor cbc     Heparin sc for DVT prophylaxis  dc planning once outpt dialysis is instated 
37y male hx ESRD on HD t/th/s x10y. Per pt is ESRD because of HTN. presents with sob/lopez in the setting of missed HD yesterday while traveling to [place] from [place]. Pt is moving and has no medical follow up here. denies chest pain diaphoresis nausea vomiting. no fevers chills or cough. worsening LE swelling.    1 ESRD  - renal fu  for HD  - HD as scheduled  - will need to find a new dialysis center in [place]    2 HTN  - cw home meds  - DASH diet  - will monitor     3 AOCD  - sec to esrd  - transfuse 1 unit with HD  - monitor cbc     Heparin sc for DVT prophylaxis
Pt. with ESRD on HD TTS
37y male hx ESRD on HD t/th/s x10y. Per pt is ESRD because of HTN. presents with sob/lopez in the setting of missed HD yesterday while traveling to [place] from [place]. Pt is moving and has no medical follow up here. denies chest pain diaphoresis nausea vomiting. no fevers chills or cough. worsening LE swelling.    1 ESRD  - renal fu  for HD  - HD as scheduled  - will need to find a new dialysis center in [place]    2 HTN  - cw home meds  - DASH diet  - will monitor     3 AOCD  - sec to esrd  - sp transfusion  - monitor cbc     Heparin sc for DVT prophylaxis  dc planning 
Pt. with ESRD on HD TTS

## 2023-03-02 NOTE — PROGRESS NOTE ADULT - SUBJECTIVE AND OBJECTIVE BOX
[facility] DIVISION OF KIDNEY DISEASES AND HYPERTENSION -- FOLLOW UP NOTE  --------------------------------------------------------------------------------  Patient is a 37 year old male with PMH of ESRD on HD TTS for the past 10 years, Secondary hyperparathyroidism s/p parathyroidectomy about one month ago, HTN, and obesity who presented to the hospital for shortness of breath on exertion. The nephrology team was consulted for management of dialysis.     Pt seen and examined today. Reports feeling okay but with some back pain. Denied any chest pain, shortness of breath, nausea, or vomiting.       PAST HISTORY  --------------------------------------------------------------------------------  No significant changes to PMH, PSH, FHx, SHx, unless otherwise noted    ALLERGIES & MEDICATIONS  --------------------------------------------------------------------------------  Allergies    No Known Allergies    Intolerances      Standing Inpatient Medications  acetaminophen     Tablet .. 650 milliGRAM(s) Oral once  calcitriol   Capsule 0.5 MICROGram(s) Oral daily  cinacalcet 60 milliGRAM(s) Oral daily  heparin   Injectable 5000 Unit(s) SubCutaneous every 8 hours  influenza   Vaccine 0.5 milliLiter(s) IntraMuscular once  labetalol 300 milliGRAM(s) Oral two times a day  sevelamer carbonate 1600 milliGRAM(s) Oral three times a day with meals    PRN Inpatient Medications  oxycodone    5 mG/acetaminophen 325 mG 1 Tablet(s) Oral every 4 hours PRN      REVIEW OF SYSTEMS    All other systems were reviewed and are negative, except as noted.    VITALS/PHYSICAL EXAM  --------------------------------------------------------------------------------  T(C): 37.1 (03-02-23 @ 05:21), Max: 37.1 (03-02-23 @ 05:21)  HR: 75 (03-02-23 @ 05:21) (72 - 86)  BP: 147/74 (03-02-23 @ 05:21) (132/74 - 147/74)  RR: 17 (03-02-23 @ 05:21) (17 - 18)  SpO2: 99% (03-02-23 @ 05:21) (98% - 99%)  Wt(kg): --        02-28-23 @ 07:01  -  03-01-23 @ 07:00  --------------------------------------------------------  IN: 0 mL / OUT: 2000 mL / NET: -2000 mL    03-01-23 @ 07:01  -  03-02-23 @ 06:28  --------------------------------------------------------  IN: 840 mL / OUT: 0 mL / NET: 840 mL        Physical Exam:  	Gen: NAD, obese  	HEENT: MMM  	Pulm: CTA B/L  	CV: S1S2  	Abd: Soft, +BS   	Ext: trace LE edema B/L  	Neuro: Awake and alert  	Skin: Warm and dry  	Vascular access: Nonfunctional LUE AVF , RIJ tunneled HD catheter    LABS/STUDIES  --------------------------------------------------------------------------------              8.0    3.78  >-----------<  149      [03-01-23 @ 06:41]              24.9     138  |  98  |  46  ----------------------------<  67      [03-01-23 @ 06:43]  4.8   |  23  |  8.20        Ca     8.1     [03-01-23 @ 06:43]      Phos  5.2     [02-28-23 @ 11:03]    TPro  5.9  /  Alb  3.3  /  TBili  0.2  /  DBili  x   /  AST  7   /  ALT  5   /  AlkPhos  1736  [02-28-23 @ 11:03]          Creatinine Trend:  SCr 8.20 [03-01 @ 06:43]  SCr 10.23 [02-28 @ 11:03]  SCr 13.72 [02-27 @ 07:04]  SCr 11.04 [02-26 @ 06:48]  SCr 8.64 [02-25 @ 06:43]        Iron 29, TIBC 192, %sat 15      [10-04-22 @ 16:34]  Ferritin 108      [10-04-22 @ 16:34]  PTH -- (Ca 7.5)      [02-26-23 @ 06:48]   3507    HBsAb 28.5      [02-24-23 @ 22:27]  HBsAb Reactive      [02-24-23 @ 22:27]  HBsAg Nonreact      [02-24-23 @ 22:27]  HCV 0.26, Nonreact      [02-24-23 @ 22:27]

## 2023-03-02 NOTE — PROVIDER CONTACT NOTE (MEDICATION) - RECOMMENDATIONS
Notify provider
provider notification
NP contacted and aware.
Notify provider
provider notification
NP Debbie Guzman contacted and aware.

## 2023-03-02 NOTE — PROGRESS NOTE ADULT - PROBLEM SELECTOR PLAN 1
Pt. with ESRD on HD TTS who presents to the hospital with shortness of breath after having missed his dialysis session. last outpatient HD was on Tuesday 2/21. He undergoes HD at [facility] in [place]. He is currently using a RIJ tunneled HD catheter; LUE fistula stopped working about one month ago. Last HD 2/27. Labs reviewed. Pt clinically volume overloaded. Will arrange for HD with UF today.   LUE AVF with thrombosis. Recommend Vascular surgery consult for AVF evaluation  Pt in process of moving to [place] and would like outpatient HD unit set up prior to discharge; recommend  consult for help.   Monitor labs and urine output. Avoid nephrotoxins. Dose medications as per eGFR.
Pt. with ESRD on HD TTS who presents to the hospital with shortness of breath after having missed his dialysis session. last outpatient HD was on Tuesday 2/21. He undergoes HD at [facility] [place] in [place]. He is currently using a RIJ tunneled HD catheter; LUE fistula stopped working about one month ago. Last HD 2/24. Labs reviewed. Will arrange for HD today.   Pending result of LUE duplex to evaluate the AVF. Recommend Vascular surgery consult for AVF mapping.   Pt in process of moving to [place] and would like outpatient HD unit set up prior to discharge; recommend  consult for help.   Monitor labs and urine output. Avoid nephrotoxins. Dose medications as per eGFR.
Pt. with ESRD on HD TTS who presents to the hospital with shortness of breath after having missed his dialysis session. last outpatient HD was on Tuesday 2/21. He undergoes HD at [facility] [place] in [place]. He is currently using a RIJ tunneled HD catheter; LUE fistula stopped working about one month ago. Last HD 2/28. Labs reviewed. Pt clinically volume overloaded. Will arrange for HD with UF today.   LUE AVF with thrombosis. Vascular surgery note reviewed, appreciate recommendations  Pt in process of having an outpatient HD unit set up prior to discharge.  Monitor labs and urine output. Avoid nephrotoxins. Dose medications as per eGFR.
Pt. with ESRD on HD TTS who presents to the hospital with shortness of breath after having missed his dialysis session. last outpatient HD was on Tuesday 2/21. He undergoes HD at [facility] [place] in [place]. He is currently using a RIJ tunneled HD catheter; LUE fistula stopped working about one month ago. Labs reviewed. Will arrange for HD 2/27. Recommend LUE duplex to evaluate the AVF. Vascualr consult for AVF mapping.   Pt in process of moving to [place] and would like outpatient HD unit set up prior to discharge; recommend SW consult for help.   Monitor labs and urine output. Avoid nephrotoxins. Dose medications as per eGFR.

## 2023-03-02 NOTE — PROVIDER CONTACT NOTE (MEDICATION) - ACTION/TREATMENT ORDERED:
ACP Cindy Reyes made aware.
ACP Cindy Reyes made aware.
NP contacted and aware. Patient educated on indication of medication and still refuses.
NP Debbie Guzman contacted and aware. Patient educated on indication of medication and still refuses.
Provider aware
Provider aware

## 2023-03-02 NOTE — PROVIDER CONTACT NOTE (MEDICATION) - BACKGROUND
Pt diagnosed with end stage renal disease
Patient admitted on 2/24/23 for ESRD
Pt admitted for missed dialysis, ESRD on HD
Patient admitted on 2/24/23 for ESRD.
Pt admitted for missed dialysis, ESRD on HD
Pt diagnosed with end stage renal disease

## 2023-06-27 ENCOUNTER — APPOINTMENT (OUTPATIENT)
Dept: SURGERY | Facility: CLINIC | Age: 38
End: 2023-06-27
Payer: MEDICAID

## 2023-06-27 DIAGNOSIS — N25.81 SECONDARY HYPERPARATHYROIDISM OF RENAL ORIGIN: ICD-10-CM

## 2023-06-27 DIAGNOSIS — Z78.9 OTHER SPECIFIED HEALTH STATUS: ICD-10-CM

## 2023-06-27 DIAGNOSIS — E21.2 OTHER HYPERPARATHYROIDISM: ICD-10-CM

## 2023-06-27 PROCEDURE — 31575 DIAGNOSTIC LARYNGOSCOPY: CPT

## 2023-06-27 PROCEDURE — 99204 OFFICE O/P NEW MOD 45 MIN: CPT | Mod: 25

## 2023-06-27 RX ORDER — LABETALOL HYDROCHLORIDE 300 MG/1
300 TABLET, FILM COATED ORAL
Refills: 0 | Status: ACTIVE | COMMUNITY

## 2023-06-27 RX ORDER — IRON SUCROSE 20 MG/ML
INJECTION, SOLUTION INTRAVENOUS
Refills: 0 | Status: ACTIVE | COMMUNITY

## 2023-06-27 RX ORDER — OXYCODONE HYDROCHLORIDE AND ACETAMINOPHEN 5; 325 MG/1; MG/1
5-325 TABLET ORAL
Refills: 0 | Status: ACTIVE | COMMUNITY

## 2023-06-27 RX ORDER — DARBEPOETIN ALFA IN ALBUMN SOL 60 MCG/ML
60 VIAL (ML) INJECTION
Refills: 0 | Status: ACTIVE | COMMUNITY

## 2023-06-27 RX ORDER — DARBEPOETIN ALFA IN ALBUMN SOL 40 MCG/ML
40 VIAL (ML) INJECTION
Refills: 0 | Status: ACTIVE | COMMUNITY

## 2023-06-27 RX ORDER — CALCIUM ACETATE 668 MG
TABLET ORAL
Refills: 0 | Status: ACTIVE | COMMUNITY

## 2023-06-28 NOTE — PHYSICAL EXAM
[de-identified] : well healed scar [de-identified] : no palpable thyroid nodules [Nasal Endoscopy Performed] : nasal endoscopy was performed, see procedure section for findings [Laryngoscopy Performed] : laryngoscopy was performed, see procedure section for findings [Midline] : located in midline position [de-identified] : diffuse jaw enlargement [Normal] : orientation to person, place, and time: normal [de-identified] : fiberoptic laryngoscopy shows normal vocal cord mobility bilaterally with no lesions noted

## 2023-06-28 NOTE — HISTORY OF PRESENT ILLNESS
[de-identified] : Pt on Dialysis had subtotal parathyroidectomy in [place]  12/22 (right inferior, left superior and left inferior parathyroids).   now has elevated PTH level,  bone pain and fatigue.  denies dysphagia, hoarseness, SOB or RT exposure.   did not tolerate Sensipar\par Ca 8.8,   PTH 7545 \par sonogram:  (11/22)  parathyroid Right 1.6 cm and left 1.8 cm\par SPECT scan: bilateral lower parathyroids\par I have reviewed all old and new data and available images. Additional information was obtained from others present at the time of visit to ensure the completeness of the history\par \par Surgery path 12/22 right inferior, left superior and left inferior

## 2023-06-28 NOTE — CONSULT LETTER
[Dear  ___] : Dear ~KELLY, [Consult Letter:] : I had the pleasure of evaluating your patient, [unfilled]. [Please see my note below.] : Please see my note below. [Consult Closing:] : Thank you very much for allowing me to participate in the care of this patient.  If you have any questions, please do not hesitate to contact me. [Sincerely,] : Sincerely, [FreeTextEntry2] : [facility] [FreeTextEntry3] : Eric Royal MD, FACS\par System Director, Endocrine Surgery\par [facility]\par Associate  Professor of Surgery\par [facility] [employer] [facility]

## 2023-06-28 NOTE — ASSESSMENT
[FreeTextEntry1] : requested 4D CT and sonogram. to call next week for results. patient has been given the opportunity to ask questions, and all of the patient's questions have been answered to their satisfaction\par

## 2023-06-28 NOTE — REASON FOR VISIT
[Initial Consultation] : an initial consultation for [Family Member] : family member [FreeTextEntry2] : Hyperparathyroidism

## 2023-07-01 ENCOUNTER — APPOINTMENT (OUTPATIENT)
Dept: ULTRASOUND IMAGING | Facility: IMAGING CENTER | Age: 38
End: 2023-07-01
Payer: MEDICAID

## 2023-07-01 ENCOUNTER — OUTPATIENT (OUTPATIENT)
Dept: OUTPATIENT SERVICES | Facility: HOSPITAL | Age: 38
LOS: 1 days | End: 2023-07-01
Payer: MEDICAID

## 2023-07-01 DIAGNOSIS — I77.0 ARTERIOVENOUS FISTULA, ACQUIRED: Chronic | ICD-10-CM

## 2023-07-01 DIAGNOSIS — N25.81 SECONDARY HYPERPARATHYROIDISM OF RENAL ORIGIN: ICD-10-CM

## 2023-07-01 PROCEDURE — 76536 US EXAM OF HEAD AND NECK: CPT

## 2023-07-01 PROCEDURE — 76536 US EXAM OF HEAD AND NECK: CPT | Mod: 26

## 2023-07-08 ENCOUNTER — TRANSCRIPTION ENCOUNTER (OUTPATIENT)
Age: 38
End: 2023-07-08

## 2023-07-09 ENCOUNTER — OUTPATIENT (OUTPATIENT)
Dept: OUTPATIENT SERVICES | Facility: HOSPITAL | Age: 38
LOS: 1 days | End: 2023-07-09
Payer: MEDICAID

## 2023-07-09 ENCOUNTER — RESULT REVIEW (OUTPATIENT)
Age: 38
End: 2023-07-09

## 2023-07-09 ENCOUNTER — APPOINTMENT (OUTPATIENT)
Dept: CT IMAGING | Facility: IMAGING CENTER | Age: 38
End: 2023-07-09
Payer: MEDICAID

## 2023-07-09 DIAGNOSIS — I77.0 ARTERIOVENOUS FISTULA, ACQUIRED: Chronic | ICD-10-CM

## 2023-07-09 DIAGNOSIS — N25.81 SECONDARY HYPERPARATHYROIDISM OF RENAL ORIGIN: ICD-10-CM

## 2023-07-09 PROCEDURE — 70492 CT SFT TSUE NCK W/O & W/DYE: CPT

## 2023-07-09 PROCEDURE — 70491 CT SOFT TISSUE NECK W/DYE: CPT | Mod: 26

## 2023-07-12 NOTE — BRIEF OPERATIVE NOTE - PRE-OP DX
AVF (arteriovenous fistula)  05/28/2017  clotted , left  Active  Dion David
Attending Attestation (For Attendings USE Only)...

## 2023-07-20 ENCOUNTER — INPATIENT (INPATIENT)
Facility: HOSPITAL | Age: 38
LOS: 3 days | Discharge: HOME | DRG: 157 | End: 2023-07-24
Attending: INTERNAL MEDICINE | Admitting: INTERNAL MEDICINE
Payer: MEDICAID

## 2023-07-20 VITALS
RESPIRATION RATE: 28 BRPM | OXYGEN SATURATION: 95 % | HEIGHT: 74 IN | TEMPERATURE: 99 F | SYSTOLIC BLOOD PRESSURE: 171 MMHG | WEIGHT: 209.44 LBS | HEART RATE: 95 BPM | DIASTOLIC BLOOD PRESSURE: 91 MMHG

## 2023-07-20 DIAGNOSIS — N18.6 END STAGE RENAL DISEASE: ICD-10-CM

## 2023-07-20 DIAGNOSIS — R74.8 ABNORMAL LEVELS OF OTHER SERUM ENZYMES: ICD-10-CM

## 2023-07-20 DIAGNOSIS — K12.2 CELLULITIS AND ABSCESS OF MOUTH: ICD-10-CM

## 2023-07-20 DIAGNOSIS — I77.0 ARTERIOVENOUS FISTULA, ACQUIRED: Chronic | ICD-10-CM

## 2023-07-20 DIAGNOSIS — Z29.9 ENCOUNTER FOR PROPHYLACTIC MEASURES, UNSPECIFIED: ICD-10-CM

## 2023-07-20 DIAGNOSIS — E21.3 HYPERPARATHYROIDISM, UNSPECIFIED: ICD-10-CM

## 2023-07-20 DIAGNOSIS — I10 ESSENTIAL (PRIMARY) HYPERTENSION: ICD-10-CM

## 2023-07-20 LAB
ALBUMIN SERPL ELPH-MCNC: 3.9 G/DL — SIGNIFICANT CHANGE UP (ref 3.3–5)
ALP SERPL-CCNC: 1107 U/L — HIGH (ref 40–120)
ALT FLD-CCNC: 5 U/L — LOW (ref 10–45)
ANION GAP SERPL CALC-SCNC: 22 MMOL/L — HIGH (ref 5–17)
APTT BLD: 29 SEC — SIGNIFICANT CHANGE UP (ref 27.5–35.5)
AST SERPL-CCNC: 8 U/L — LOW (ref 10–40)
BASE EXCESS BLDV CALC-SCNC: 1.1 MMOL/L — SIGNIFICANT CHANGE UP (ref -2–3)
BASOPHILS # BLD AUTO: 0.04 K/UL — SIGNIFICANT CHANGE UP (ref 0–0.2)
BASOPHILS NFR BLD AUTO: 0.6 % — SIGNIFICANT CHANGE UP (ref 0–2)
BILIRUB SERPL-MCNC: 0.4 MG/DL — SIGNIFICANT CHANGE UP (ref 0.2–1.2)
BUN SERPL-MCNC: 74 MG/DL — HIGH (ref 7–23)
CA-I SERPL-SCNC: 1.05 MMOL/L — LOW (ref 1.15–1.33)
CALCIUM SERPL-MCNC: 9 MG/DL — SIGNIFICANT CHANGE UP (ref 8.4–10.5)
CHLORIDE BLDV-SCNC: 98 MMOL/L — SIGNIFICANT CHANGE UP (ref 96–108)
CHLORIDE SERPL-SCNC: 92 MMOL/L — LOW (ref 96–108)
CO2 BLDV-SCNC: 29 MMOL/L — HIGH (ref 22–26)
CO2 SERPL-SCNC: 24 MMOL/L — SIGNIFICANT CHANGE UP (ref 22–31)
CREAT SERPL-MCNC: 11.05 MG/DL — HIGH (ref 0.5–1.3)
EGFR: 6 ML/MIN/1.73M2 — LOW
EOSINOPHIL # BLD AUTO: 0.05 K/UL — SIGNIFICANT CHANGE UP (ref 0–0.5)
EOSINOPHIL NFR BLD AUTO: 0.8 % — SIGNIFICANT CHANGE UP (ref 0–6)
FLUAV AG NPH QL: SIGNIFICANT CHANGE UP
FLUBV AG NPH QL: SIGNIFICANT CHANGE UP
GAS PNL BLDV: 134 MMOL/L — LOW (ref 136–145)
GAS PNL BLDV: SIGNIFICANT CHANGE UP
GLUCOSE BLDC GLUCOMTR-MCNC: 111 MG/DL — HIGH (ref 70–99)
GLUCOSE BLDV-MCNC: 73 MG/DL — SIGNIFICANT CHANGE UP (ref 70–99)
GLUCOSE SERPL-MCNC: 79 MG/DL — SIGNIFICANT CHANGE UP (ref 70–99)
HCO3 BLDV-SCNC: 27 MMOL/L — SIGNIFICANT CHANGE UP (ref 22–29)
HCT VFR BLD CALC: 26.5 % — LOW (ref 39–50)
HCT VFR BLDA CALC: 26 % — LOW (ref 39–51)
HGB BLD CALC-MCNC: 8.7 G/DL — LOW (ref 12.6–17.4)
HGB BLD-MCNC: 8.3 G/DL — LOW (ref 13–17)
HSV+VZV DNA SPEC QL NAA+PROBE: SIGNIFICANT CHANGE UP
IMM GRANULOCYTES NFR BLD AUTO: 0.5 % — SIGNIFICANT CHANGE UP (ref 0–0.9)
INR BLD: 1.03 RATIO — SIGNIFICANT CHANGE UP (ref 0.88–1.16)
LACTATE BLDV-MCNC: 2 MMOL/L — SIGNIFICANT CHANGE UP (ref 0.5–2)
LYMPHOCYTES # BLD AUTO: 1.1 K/UL — SIGNIFICANT CHANGE UP (ref 1–3.3)
LYMPHOCYTES # BLD AUTO: 17.2 % — SIGNIFICANT CHANGE UP (ref 13–44)
MCHC RBC-ENTMCNC: 29.6 PG — SIGNIFICANT CHANGE UP (ref 27–34)
MCHC RBC-ENTMCNC: 31.3 GM/DL — LOW (ref 32–36)
MCV RBC AUTO: 94.6 FL — SIGNIFICANT CHANGE UP (ref 80–100)
MONOCYTES # BLD AUTO: 0.72 K/UL — SIGNIFICANT CHANGE UP (ref 0–0.9)
MONOCYTES NFR BLD AUTO: 11.3 % — SIGNIFICANT CHANGE UP (ref 2–14)
NEUTROPHILS # BLD AUTO: 4.45 K/UL — SIGNIFICANT CHANGE UP (ref 1.8–7.4)
NEUTROPHILS NFR BLD AUTO: 69.6 % — SIGNIFICANT CHANGE UP (ref 43–77)
NRBC # BLD: 0 /100 WBCS — SIGNIFICANT CHANGE UP (ref 0–0)
PCO2 BLDV: 49 MMHG — SIGNIFICANT CHANGE UP (ref 42–55)
PH BLDV: 7.35 — SIGNIFICANT CHANGE UP (ref 7.32–7.43)
PLATELET # BLD AUTO: 233 K/UL — SIGNIFICANT CHANGE UP (ref 150–400)
PO2 BLDV: 41 MMHG — SIGNIFICANT CHANGE UP (ref 25–45)
POTASSIUM BLDV-SCNC: 5.6 MMOL/L — HIGH (ref 3.5–5.1)
POTASSIUM SERPL-MCNC: 5.4 MMOL/L — HIGH (ref 3.5–5.3)
POTASSIUM SERPL-SCNC: 5.4 MMOL/L — HIGH (ref 3.5–5.3)
PROT SERPL-MCNC: 7.2 G/DL — SIGNIFICANT CHANGE UP (ref 6–8.3)
PROTHROM AB SERPL-ACNC: 11.9 SEC — SIGNIFICANT CHANGE UP (ref 10.5–13.4)
RBC # BLD: 2.8 M/UL — LOW (ref 4.2–5.8)
RBC # FLD: 14.4 % — SIGNIFICANT CHANGE UP (ref 10.3–14.5)
RSV RNA NPH QL NAA+NON-PROBE: SIGNIFICANT CHANGE UP
SAO2 % BLDV: 64 % — LOW (ref 67–88)
SARS-COV-2 RNA SPEC QL NAA+PROBE: SIGNIFICANT CHANGE UP
SODIUM SERPL-SCNC: 138 MMOL/L — SIGNIFICANT CHANGE UP (ref 135–145)
SPECIMEN SOURCE: SIGNIFICANT CHANGE UP
WBC # BLD: 6.39 K/UL — SIGNIFICANT CHANGE UP (ref 3.8–10.5)
WBC # FLD AUTO: 6.39 K/UL — SIGNIFICANT CHANGE UP (ref 3.8–10.5)

## 2023-07-20 PROCEDURE — 71045 X-RAY EXAM CHEST 1 VIEW: CPT | Mod: 26

## 2023-07-20 PROCEDURE — 70490 CT SOFT TISSUE NECK W/O DYE: CPT | Mod: 26,MA

## 2023-07-20 PROCEDURE — 99254 IP/OBS CNSLTJ NEW/EST MOD 60: CPT | Mod: GC

## 2023-07-20 PROCEDURE — 99223 1ST HOSP IP/OBS HIGH 75: CPT

## 2023-07-20 PROCEDURE — 99285 EMERGENCY DEPT VISIT HI MDM: CPT

## 2023-07-20 RX ORDER — GLUCAGON INJECTION, SOLUTION 0.5 MG/.1ML
1 INJECTION, SOLUTION SUBCUTANEOUS ONCE
Refills: 0 | Status: DISCONTINUED | OUTPATIENT
Start: 2023-07-20 | End: 2023-07-21

## 2023-07-20 RX ORDER — OXYCODONE HYDROCHLORIDE 5 MG/1
5 TABLET ORAL EVERY 6 HOURS
Refills: 0 | Status: DISCONTINUED | OUTPATIENT
Start: 2023-07-20 | End: 2023-07-24

## 2023-07-20 RX ORDER — INSULIN LISPRO 100/ML
VIAL (ML) SUBCUTANEOUS EVERY 6 HOURS
Refills: 0 | Status: DISCONTINUED | OUTPATIENT
Start: 2023-07-20 | End: 2023-07-21

## 2023-07-20 RX ORDER — DEXTROSE 50 % IN WATER 50 %
25 SYRINGE (ML) INTRAVENOUS ONCE
Refills: 0 | Status: DISCONTINUED | OUTPATIENT
Start: 2023-07-20 | End: 2023-07-21

## 2023-07-20 RX ORDER — ACETAMINOPHEN 500 MG
1000 TABLET ORAL ONCE
Refills: 0 | Status: COMPLETED | OUTPATIENT
Start: 2023-07-20 | End: 2023-07-20

## 2023-07-20 RX ORDER — DEXAMETHASONE 0.5 MG/5ML
10 ELIXIR ORAL ONCE
Refills: 0 | Status: COMPLETED | OUTPATIENT
Start: 2023-07-20 | End: 2023-07-20

## 2023-07-20 RX ORDER — DEXAMETHASONE 0.5 MG/5ML
10 ELIXIR ORAL EVERY 8 HOURS
Refills: 0 | Status: COMPLETED | OUTPATIENT
Start: 2023-07-20 | End: 2023-07-21

## 2023-07-20 RX ORDER — SODIUM CHLORIDE 9 MG/ML
1000 INJECTION, SOLUTION INTRAVENOUS
Refills: 0 | Status: DISCONTINUED | OUTPATIENT
Start: 2023-07-20 | End: 2023-07-21

## 2023-07-20 RX ORDER — LABETALOL HCL 100 MG
200 TABLET ORAL
Refills: 0 | Status: DISCONTINUED | OUTPATIENT
Start: 2023-07-20 | End: 2023-07-24

## 2023-07-20 RX ORDER — LABETALOL HCL 100 MG
300 TABLET ORAL
Refills: 0 | Status: DISCONTINUED | OUTPATIENT
Start: 2023-07-20 | End: 2023-07-20

## 2023-07-20 RX ORDER — HEPARIN SODIUM 5000 [USP'U]/ML
5000 INJECTION INTRAVENOUS; SUBCUTANEOUS EVERY 8 HOURS
Refills: 0 | Status: DISCONTINUED | OUTPATIENT
Start: 2023-07-20 | End: 2023-07-24

## 2023-07-20 RX ORDER — CEFTRIAXONE 500 MG/1
2000 INJECTION, POWDER, FOR SOLUTION INTRAMUSCULAR; INTRAVENOUS EVERY 24 HOURS
Refills: 0 | Status: DISCONTINUED | OUTPATIENT
Start: 2023-07-21 | End: 2023-07-24

## 2023-07-20 RX ORDER — VANCOMYCIN HCL 1 G
1250 VIAL (EA) INTRAVENOUS ONCE
Refills: 0 | Status: COMPLETED | OUTPATIENT
Start: 2023-07-20 | End: 2023-07-20

## 2023-07-20 RX ORDER — CEFTRIAXONE 500 MG/1
2000 INJECTION, POWDER, FOR SOLUTION INTRAMUSCULAR; INTRAVENOUS ONCE
Refills: 0 | Status: COMPLETED | OUTPATIENT
Start: 2023-07-20 | End: 2023-07-20

## 2023-07-20 RX ORDER — DEXTROSE 50 % IN WATER 50 %
15 SYRINGE (ML) INTRAVENOUS ONCE
Refills: 0 | Status: DISCONTINUED | OUTPATIENT
Start: 2023-07-20 | End: 2023-07-21

## 2023-07-20 RX ORDER — DEXTROSE 50 % IN WATER 50 %
12.5 SYRINGE (ML) INTRAVENOUS ONCE
Refills: 0 | Status: DISCONTINUED | OUTPATIENT
Start: 2023-07-20 | End: 2023-07-21

## 2023-07-20 RX ADMIN — OXYCODONE HYDROCHLORIDE 5 MILLIGRAM(S): 5 TABLET ORAL at 21:27

## 2023-07-20 RX ADMIN — Medication 166.67 MILLIGRAM(S): at 20:56

## 2023-07-20 RX ADMIN — HEPARIN SODIUM 5000 UNIT(S): 5000 INJECTION INTRAVENOUS; SUBCUTANEOUS at 21:31

## 2023-07-20 RX ADMIN — OXYCODONE HYDROCHLORIDE 5 MILLIGRAM(S): 5 TABLET ORAL at 22:25

## 2023-07-20 RX ADMIN — Medication 100 MILLIGRAM(S): at 10:50

## 2023-07-20 RX ADMIN — CEFTRIAXONE 100 MILLIGRAM(S): 500 INJECTION, POWDER, FOR SOLUTION INTRAMUSCULAR; INTRAVENOUS at 10:18

## 2023-07-20 RX ADMIN — Medication 400 MILLIGRAM(S): at 10:17

## 2023-07-20 RX ADMIN — Medication 1000 MILLIGRAM(S): at 11:00

## 2023-07-20 RX ADMIN — Medication 102 MILLIGRAM(S): at 21:27

## 2023-07-20 RX ADMIN — Medication 102 MILLIGRAM(S): at 11:26

## 2023-07-20 NOTE — H&P ADULT - PROBLEM SELECTOR PLAN 5
See above.   Would consider formal endocrinology evaluation in the AM.   Will defer to Daytime Provider patient's cinacalcet in the AM.

## 2023-07-20 NOTE — H&P ADULT - NSHPLABSRESULTS_GEN_ALL_CORE
EKG tracing interpreted by me with NSR at 89.    WBC 6.3  69N    Hgb 8.3    Platelets of 233K    INR 1.02    K+ 5.4 and Cr 11.05 pre HD    Random glucose of 79    Chest radiograph interpreted by me with no infiltrate or effusion.    COVID-19 and RVP PCR>>negative.    Alk phos 1107    VBG 7.35/49/41/64%

## 2023-07-20 NOTE — H&P ADULT - NSHPADDITIONALINFOADULT_GEN_ALL_CORE
NIGHT HOSPITALIST:    Patient/ brother aware of course and agree with plan/care as above.  Given patient's comorbidities, patient's long term prognosis is guarded.   Emotional support provided to patient.   Care reviewed with covering NP/PA for endorsement to Dr. Sanders.    Jareth Morales MD  Available on [employer] Teams.

## 2023-07-20 NOTE — CONSULT NOTE ADULT - ASSESSMENT
37-year-old male MH of HTN, prior stroke HTN, SOLITARIO, ESRD with left upper extremity AV fistula on HD MWF (last yesterday) presents to ED complaining of dyspnea, voice changes, sore throat and fever since last night.     Plan:  - continue IV antibiotics and steroids per primary team  - no stridor or evidence of respiratory distress or compromise on exam  - f/u ENT recs for treatment  - not a MICU candidate at this time as pt doesn't exhibit signs of stridor or respiratory compromise necessitating intubation

## 2023-07-20 NOTE — CHART NOTE - NSCHARTNOTEFT_GEN_A_CORE
Patient here with uvulitis being given abx and steroids in ED. He has HTN and ESRD on HD x 10 years, last session yesterday. He gets HD at [facility] but does not recall who is nephrologist is. Has RUE AVF. Typically has 3-hour sessions MWF with 3-3.5 UF per patient. Nephrology service consulted for continuing chronic HD. Labs and vitals reviewed. BP elevated 175/90s. K 5.5, BUN 74. Will order HD for tomorrow with 3.5L UF. Consent obtained. Hep panel up to date. Formal consult to follow. Patient here with uvulitis being given abx and steroids in ED. He has HTN and ESRD on HD x 10 years, last session yesterday. He gets HD at [facility] but does not recall who is nephrologist is. Has RUE AVF. Typically has 3-hour sessions MWF with 3-3.5 UF per patient. Nephrology service consulted for continuing chronic HD. Labs and vitals reviewed. BP elevated 175/90s. No evidence of hypervolemia on exam. K 5.5, BUN 74. Will order HD for tomorrow with 3.5L UF. Consent obtained. Hep panel up to date. Formal consult to follow. Patient here with uvulitis being given abx and steroids in ED. He has HTN and ESRD on HD x 10 years, last session yesterday. He gets HD at [facility] but does not recall who is nephrologist is. Has RUE AVF. Typically has 3-hour sessions MWF with 3-3.5 UF per patient. Nephrology service consulted for continuing chronic HD. Labs and vitals reviewed. BP elevated 175/90s. No evidence of hypervolemia on exam. K 5.5, BUN 74. Will order HD for tomorrow with 3.5L UF. Consent obtained. Hep panel up to date. Formal consult to follow.    Janina Dunlap DO, PGY-4  Nephrology Fellow

## 2023-07-20 NOTE — CONSULT NOTE ADULT - SUBJECTIVE AND OBJECTIVE BOX
CHIEF COMPLAINT:    HPI:  38 y/o M--patient with a history of ESRD on HD apparently started at age 27, previously had his care in [place], secondary hyperparathyroidism S/P parathyroidectomy in [place] in 12/2022 (seen by Eric Royal MD of  [phone]), essential HTN maintained on high doses of labetalol, last admission to [facility] in Mar 2023 following missed HD session following patient driving himself from [place] to [place], with patient discharged Mar 2 2023,         PAST MEDICAL & SURGICAL HISTORY:  HTN (hypertension)  Stroke  age 10  Morbid obesity  Sleep apnea      Leg fracture, right      Chronic renal failure  Hemodialysis access, AV graft  ESRD (end stage renal disease) on dialysis  since 2013, M-W-F  Anemia, unspecified type  AV fistula  L arm      FAMILY HISTORY:  No pertinent family history in first degree relatives      Allergies    shellfish (Hives)  No Known Drug Allergies    Intolerances        HOME MEDICATIONS:    REVIEW OF SYSTEMS:  Constitutional: No weakness, fevers, chills, sick contacts  Eyes: No visual changes or vertigo  ENT: No throat pain, rhinorrhea, or hearing loss  Respiratory: No cough, wheezing, hemoptysis, no shortness of breath  Cardiovascular: No chest pain or palpitations  Gastrointestinal: No abdominal or epigastric pain. No nausea, vomiting or hematemesis; No diarrhea or constipation; No melena or hematochezia  Neurological: No numbness or weakness  Skin: No rashes or bruises  Psych: Good mood, no substance use  [ ] All other systems negative  [ ] Unable to assess ROS because ________    OBJECTIVE:  ICU Vital Signs Last 24 Hrs  T(C): 36.8 (20 Jul 2023 19:30), Max: 38.1 (20 Jul 2023 09:50)  T(F): 98.2 (20 Jul 2023 19:30), Max: 100.6 (20 Jul 2023 09:50)  HR: 78 (20 Jul 2023 19:30) (78 - 95)  BP: 142/77 (20 Jul 2023 19:30) (142/77 - 171/91)  BP(mean): --  ABP: --  ABP(mean): --  RR: 18 (20 Jul 2023 19:30) (18 - 28)  SpO2: 98% (20 Jul 2023 19:30) (95% - 99%)    O2 Parameters below as of 20 Jul 2023 19:30  Patient On (Oxygen Delivery Method): room air              CAPILLARY BLOOD GLUCOSE          PHYSICAL EXAM:  GENERAL: NAD, lying in bed comfortably  HEAD:  Atraumatic, Normocephalic  EYES: EOMI, PERRLA, conjunctiva and sclera clear  ENT: Moist mucous membranes  NECK: Supple, No JVD  CHEST/LUNG: Clear to auscultation bilaterally; No rales, rhonchi, wheezing, or rubs. Unlabored respirations  HEART: Regular rate and rhythm; No murmurs, rubs, or gallops  ABDOMEN: BSx4; Soft, nontender, nondistended  EXTREMITIES:  2+ Peripheral Pulses, brisk capillary refill. No clubbing, cyanosis, or edema  NERVOUS SYSTEM:  A&Ox3, no focal deficits   SKIN: No rashes or lesions  Psych: Normal speech, normal behavior, normal affect    HOSPITAL MEDICATIONS:  MEDICATIONS  (STANDING):  dexAMETHasone  IVPB 10 milliGRAM(s) IV Intermittent every 8 hours  dextrose 5%. 1000 milliLiter(s) (100 mL/Hr) IV Continuous <Continuous>  dextrose 5%. 1000 milliLiter(s) (50 mL/Hr) IV Continuous <Continuous>  dextrose 50% Injectable 25 Gram(s) IV Push once  dextrose 50% Injectable 25 Gram(s) IV Push once  dextrose 50% Injectable 12.5 Gram(s) IV Push once  glucagon  Injectable 1 milliGRAM(s) IntraMuscular once  heparin   Injectable 5000 Unit(s) SubCutaneous every 8 hours  insulin lispro (ADMELOG) corrective regimen sliding scale   SubCutaneous every 6 hours  labetalol 200 milliGRAM(s) Oral two times a day  vancomycin  IVPB 1250 milliGRAM(s) IV Intermittent once    MEDICATIONS  (PRN):  dextrose Oral Gel 15 Gram(s) Oral once PRN Blood Glucose LESS THAN 70 milliGRAM(s)/deciliter      LABS:                        8.3    6.39  )-----------( 233      ( 20 Jul 2023 10:13 )             26.5     07-20    138  |  92<L>  |  74<H>  ----------------------------<  79  5.4<H>   |  24  |  11.05<H>    Ca    9.0      20 Jul 2023 10:13    TPro  7.2  /  Alb  3.9  /  TBili  0.4  /  DBili  x   /  AST  8<L>  /  ALT  5<L>  /  AlkPhos  1107<H>  07-20    PT/INR - ( 20 Jul 2023 10:13 )   PT: 11.9 sec;   INR: 1.03 ratio         PTT - ( 20 Jul 2023 10:13 )  PTT:29.0 sec  Urinalysis Basic - ( 20 Jul 2023 10:13 )    Color: x / Appearance: x / SG: x / pH: x  Gluc: 79 mg/dL / Ketone: x  / Bili: x / Urobili: x   Blood: x / Protein: x / Nitrite: x   Leuk Esterase: x / RBC: x / WBC x   Sq Epi: x / Non Sq Epi: x / Bacteria: x        Venous Blood Gas:  07-20 @ 10:13  7.35/49/41/27/64.0  VBG Lactate: 2.0 CHIEF COMPLAINT:    HPI:  37-year-old male MH of HTN, prior stroke HTN, SOLITARIO, ESRD with left upper extremity AV fistula on HD MWF (last yesterday) presents to ED complaining of dyspnea, voice changes, sore throat and fever since last night.  Symptoms have been progressively worsening.  Brother at bedside reports that patient has a voice change does not usually sound awake sounds in the ED today.  +dysphagia and odynophagia, denies drooling. Patient reports difficulty breathing secondary to feeling as though his uvula is blocking his throat.  Patient denies cough, chest pain, abdominal pain, trauma to the area, new strange or abnormal foods.   Presented this time for persistent odynophagia and changing of voice. Admitted to medicine for tonsillitis. ENT consulted, recommended IV antibiotics, no urgent need for intubation as there was no evidence of pooling.       PAST MEDICAL & SURGICAL HISTORY:  HTN (hypertension)  Stroke  age 10  Morbid obesity  Sleep apnea      Leg fracture, right      Chronic renal failure  Hemodialysis access, AV graft  ESRD (end stage renal disease) on dialysis  since 2013, M-W-F  Anemia, unspecified type  AV fistula  L arm      FAMILY HISTORY:  No pertinent family history in first degree relatives      Allergies    shellfish (Hives)  No Known Drug Allergies    Intolerances        HOME MEDICATIONS:    REVIEW OF SYSTEMS:  Constitutional: No weakness, fevers, chills, sick contacts  Eyes: No visual changes or vertigo  ENT: No stridor; jaw line enlarged   Respiratory: No cough, wheezing, hemoptysis, no shortness of breath  Cardiovascular: No chest pain or palpitations  Gastrointestinal: No abdominal or epigastric pain. No nausea, vomiting or hematemesis; No diarrhea or constipation; No melena or hematochezia  Neurological: No numbness or weakness  Skin: No rashes or bruises  Psych: Good mood, no substance use  [ ] All other systems negative  [ ] Unable to assess ROS because ________    OBJECTIVE:  ICU Vital Signs Last 24 Hrs  T(C): 36.8 (20 Jul 2023 19:30), Max: 38.1 (20 Jul 2023 09:50)  T(F): 98.2 (20 Jul 2023 19:30), Max: 100.6 (20 Jul 2023 09:50)  HR: 78 (20 Jul 2023 19:30) (78 - 95)  BP: 142/77 (20 Jul 2023 19:30) (142/77 - 171/91)  BP(mean): --  ABP: --  ABP(mean): --  RR: 18 (20 Jul 2023 19:30) (18 - 28)  SpO2: 98% (20 Jul 2023 19:30) (95% - 99%)    O2 Parameters below as of 20 Jul 2023 19:30  Patient On (Oxygen Delivery Method): room air              CAPILLARY BLOOD GLUCOSE          PHYSICAL EXAM:  GENERAL: NAD, lying in bed comfortably  HEAD:  Atraumatic, Normocephalic  EYES: conjunctiva and sclera clear  ENT: No stridor; jaw line enlarged   NECK: Supple, No JVD  CHEST/LUNG: Unlabored respirations  HEART: Regular rate and rhythm  ABDOMEN: BSx4; Soft, nontender, nondistended  NERVOUS SYSTEM:  A&Ox3, no focal deficits   SKIN: No rashes or lesions  Psych: Normal speech, normal behavior, normal affect    HOSPITAL MEDICATIONS:  MEDICATIONS  (STANDING):  dexAMETHasone  IVPB 10 milliGRAM(s) IV Intermittent every 8 hours  dextrose 5%. 1000 milliLiter(s) (100 mL/Hr) IV Continuous <Continuous>  dextrose 5%. 1000 milliLiter(s) (50 mL/Hr) IV Continuous <Continuous>  dextrose 50% Injectable 25 Gram(s) IV Push once  dextrose 50% Injectable 25 Gram(s) IV Push once  dextrose 50% Injectable 12.5 Gram(s) IV Push once  glucagon  Injectable 1 milliGRAM(s) IntraMuscular once  heparin   Injectable 5000 Unit(s) SubCutaneous every 8 hours  insulin lispro (ADMELOG) corrective regimen sliding scale   SubCutaneous every 6 hours  labetalol 200 milliGRAM(s) Oral two times a day  vancomycin  IVPB 1250 milliGRAM(s) IV Intermittent once    MEDICATIONS  (PRN):  dextrose Oral Gel 15 Gram(s) Oral once PRN Blood Glucose LESS THAN 70 milliGRAM(s)/deciliter      LABS:                        8.3    6.39  )-----------( 233      ( 20 Jul 2023 10:13 )             26.5     07-20    138  |  92<L>  |  74<H>  ----------------------------<  79  5.4<H>   |  24  |  11.05<H>    Ca    9.0      20 Jul 2023 10:13    TPro  7.2  /  Alb  3.9  /  TBili  0.4  /  DBili  x   /  AST  8<L>  /  ALT  5<L>  /  AlkPhos  1107<H>  07-20    PT/INR - ( 20 Jul 2023 10:13 )   PT: 11.9 sec;   INR: 1.03 ratio         PTT - ( 20 Jul 2023 10:13 )  PTT:29.0 sec  Urinalysis Basic - ( 20 Jul 2023 10:13 )    Color: x / Appearance: x / SG: x / pH: x  Gluc: 79 mg/dL / Ketone: x  / Bili: x / Urobili: x   Blood: x / Protein: x / Nitrite: x   Leuk Esterase: x / RBC: x / WBC x   Sq Epi: x / Non Sq Epi: x / Bacteria: x        Venous Blood Gas:  07-20 @ 10:13  7.35/49/41/27/64.0  VBG Lactate: 2.0

## 2023-07-20 NOTE — H&P ADULT - ASSESSMENT
NIGHT HOSPITALIST:  Seen by ENT in the ER with fiberoptic indirect laryngoscopy with LEFT palatine tonsil oedema, NO epiglottitis.  + uvulitis.  CTT neck with enlargement of the LEFT palatine tonsil limited study due to lack of contrast.   S/P dose of Decadron and maintenance Q8H x 1 day.   I have contacted MICU for evaluation and I have reviewed with ID the antibiotic regimen>>ID agrees with the IV Ceftriaxone 2 gm Q24H but will change ot one dose of IV Vancomycin 1250 mg x 1 (given ESRD on HD).    Will maintain NPO for now except for medications>>will continue with patient's labetalol but would review the other medications in the AM.    Would consider contacting Endocrinology in the AM with patient's history of hyperparathyroidism.    Will assume aspiration risk for now.   FS S/S to monitor for hypoglycemia.    Unclear to the elevation of the alkaline phosphatase>>will obtain RUQ US.    Patient agrees to pharmacologic DVT prophylaxis.

## 2023-07-20 NOTE — H&P ADULT - PROBLEM SELECTOR PLAN 2
HD per renal.   Would review with renal in the AM patient's sevelamer, calcitriol and calcium carbonate tabs.

## 2023-07-20 NOTE — ED ADULT TRIAGE NOTE - CHIEF COMPLAINT QUOTE
dialysis yesterday. voice muffled.  tachypneic. On dialysis x 10 yrs. PMH HTN dialysis yesterday. voice muffled.  tachypneic. Restless. On dialysis x 10 yrs. PMH HTN

## 2023-07-20 NOTE — ED PROVIDER NOTE - OBJECTIVE STATEMENT
37-year-old male MH of HTN, prior stroke HTN, SOLITARIO, ESRD with left upper extremity AV fistula on HD MWF (last yesterday) presents to ED complaining of dyspnea, voice changes, sore throat and fever since last night.  Symptoms have been progressively worsening.  Brother at bedside reports that patient has a voice change does not usually sound awake sounds in the ED today.  +dysphagia and odynophagia, denies drooling. Patient reports difficulty breathing secondary to feeling as though his uvula is blocking his throat.  Patient denies cough, chest pain, abdominal pain, trauma to the area, new strange or abnormal foods.  Patient is allergic to shellfish and reports she did not eat anything containing shellfish and recent history.  Patient denies rash.  Patient does not make urine. +1 episode of vomiting this morning, denies current nausea.

## 2023-07-20 NOTE — CONSULT NOTE ADULT - NS ATTEND AMEND GEN_ALL_CORE FT
ENT consulted for sore throat / dysphonia .    Physical exam shows uvular edema and ecchymosis noted, reductant tissue and erythema of  posterior pharynx. HSV PCR and bacterial culture obtained from uvula.     Indirect laryngoscopy performed which showed swollen uvula touching epiglottis. Airway patent. Vocal fold mobile and symmetrical     CT Neck reviewed shows enlargement and edematous changes involving the left palatine tonsil and large right-sided parathyroid adenoma    Recommend:  Uvulitis   - Admit to medicine  - C/w IV abx    - Decadron 10mg q8h x24 hours    - F/u HSV PCR and bacterial culture  - If no improvement over the next 48 hours then recommend repeat CT neck with contrast   - ENT will continue to follow    - Call with questions or concerns    Parathyroid Adenoma  -Patient will need to follow up with Head and Neck Surgeon any of the following Dr. Renetta Newby, Dr. Anderson Vallejo, Dr. Neville Garcia, Dr. Ortiz Duran for parathyroid adenoma  [phone]  [address] [id number] ENT consulted for sore throat / dysphonia .    Physical exam shows uvular edema and ecchymosis noted, reductant tissue and erythema of  posterior pharynx. HSV PCR and bacterial culture obtained from uvula.     Indirect laryngoscopy performed which showed swollen uvula touching epiglottis. Airway patent. Vocal fold mobile and symmetrical     CT Neck reviewed shows enlargement and edematous changes involving the left palatine tonsil and large right-sided parathyroid adenoma    Recommend:  Uvulitis   - Admit to medicine  - C/w IV abx    - Decadron 10mg q8h x24 hours    - F/u HSV PCR and bacterial culture  - If no improvement over the next 48 hours then recommend repeat CT neck with contrast   - ENT will continue to follow    - Call with questions or concerns    Parathyroid Adenoma  -Patient following Dr. Eric Royal for Parathyroid Adenoma. Patient will need further follow up outpatient

## 2023-07-20 NOTE — ED PROVIDER NOTE - PROGRESS NOTE DETAILS
pt feeling improvement after decadron and abx, no evidence of epiglottitis on ENT exam or CT. pt will require continued IV abx and steroids, needs HD tomorrow so not CDU candidate. K 5.6 but pt asx at this time ECG compared to ECGs dating back to 2017 does not reveal notably peaked T waves, d/w attending, will not give additional tx for hyperkalemia at this time. -Yossi Montenegro PA-C

## 2023-07-20 NOTE — ED PROVIDER NOTE - PHYSICAL EXAMINATION
GEN: Pt chronically ill appearing, appears uncomfortable, no tripod positioning, able to speak in full sentences.  PSYCH: Affect appropriate.  EYES: Sclera white w/o injection.   ENT: Head NCAT. MMM. +hot potato voice. +mild trismus. +beefy red, ecchymotis, and enlarged uvula, tonsils mildly erythematous, no exudates. Pt tolerating oral secretions, no purulence on the floor of the mouth, no submental ro submandibular fullness or tenderness. Neck supple FROM, trachea midline, no stridor.  RESP: SpO2 100% on room air. CTA b/l, no wheezes, rales, or rhonchi.   CARDIAC: RRR, clear distinct S1, S2, no appreciable murmurs.  ABD: Abdomen soft, non-tender.  VASC: AV fistula +thrill LUE.  SKIN: No rashes on the face. GEN: Pt chronically ill appearing, appears uncomfortable, no tripod positioning, able to speak in full sentences, tolerating secretions  PSYCH: Affect appropriate.  EYES: Sclera white w/o injection.   ENT: Head NCAT. MMM. +hot potato voice. +mild trismus. +beefy red, ecchymotic, and enlarged uvula, tonsils mildly erythematous, no exudates. Pt tolerating oral secretions, no purulence on the floor of the mouth, no submental ro submandibular fullness or tenderness. Neck supple FROM, trachea midline, no stridor.  RESP: SpO2 100% on room air. CTA b/l, no wheezes, rales, or rhonchi.   CARDIAC: RRR, clear distinct S1, S2, no appreciable murmurs.  ABD: Abdomen soft, non-tender.  VASC: AV fistula +thrill LUE.  SKIN: No rashes on the face.

## 2023-07-20 NOTE — CONSULT NOTE ADULT - ASSESSMENT
37-year-old male PMH of HTN, prior stroke HTN, SOLITARIO, ESRD with left upper extremity AV fistula on HD MWF (last yesterday) presents to ED complaining of dyspnea, voice changes, sore throat and fever since last night. On exam, uvular edema and ecchymosis noted, reductant tissue and erythema of  posterior pharynx. Indirect laryngoscopy performed which showed uvular edema L>R, otherwise no laryngeal edema noted, airway patent.  37-year-old male PMH of HTN, prior stroke HTN, SOLITARIO, ESRD with left upper extremity AV fistula on HD MWF (last yesterday) presents to ED complaining of dyspnea, voice changes, sore throat and fever since last night. On exam, uvular edema and ecchymosis noted, reductant tissue and erythema of  posterior pharynx. HSV PCR and bacterial culture obtained from uvula. Indirect laryngoscopy performed which showed uvular edema L>R, otherwise no laryngeal edema noted, airway patent.  37-year-old male PMH of HTN, prior stroke HTN, SOLITARIO, ESRD with left upper extremity AV fistula on HD MWF (last yesterday) presents to ED complaining of dyspnea, voice changes, sore throat and fever since last night. On exam, uvular edema and ecchymosis noted, reductant tissue and erythema of  posterior pharynx. HSV PCR and bacterial culture obtained from uvula. Indirect laryngoscopy performed which showed uvular edema L>R, otherwise no laryngeal edema noted, airway patent. CT Neck described above.  37-year-old male PMH of HTN, prior stroke HTN, SOLITARIO, ESRD with left upper extremity AV fistula on HD MWF (last yesterday) presents to ED complaining of dyspnea, voice changes, sore throat and fever since last night. On exam, uvular edema and ecchymosis noted, reductant tissue and erythema of  posterior pharynx. HSV PCR and bacterial culture obtained from uvula. Indirect laryngoscopy performed which showed L palatine tonsil edema, otherwise no laryngeal edema noted, airway patent. CT Neck described above.  37-year-old male PMH of HTN, prior stroke HTN, SOLITARIO, ESRD with left upper extremity AV fistula on HD MWF (last yesterday) presents to ED complaining of dyspnea, voice changes, sore throat and fever since last night. On exam, uvular edema and ecchymosis noted, reductant tissue and erythema of  posterior pharynx. HSV PCR and bacterial culture obtained from uvula. Indirect laryngoscopy performed swollen uvula touching epiglottis. Airway patent. Vocal fold mobile and symmetrical . CT Neck described above.

## 2023-07-20 NOTE — H&P ADULT - HISTORY OF PRESENT ILLNESS
NIGHT HOSPITALIST:   Patient UNKNOWN to me previously, assigned to me at this point via the ER and by Dr. Sanders to admit this 38 y/o M--patient with a history of ESRD on HD apparently started at age 27, previously had his care in [place], secondary hyperparathyroidism S/P parathyroidectomy in [place] in 12/2022 (seen by Eric Royal MD of  [phone]), essential HTN maintained on high doses of labetalol, last admission to [facility] in Mar 2023 following missed HD session following patient driving himself from [place] to [place], with patient discharged Mar 2 2023,  NIGHT HOSPITALIST:   Patient UNKNOWN to me previously, assigned to me at this point via the ER and by Dr. Sanders to admit this 36 y/o M--patient with a history of ESRD on HD apparently started at age 27, previously had his care in [place], secondary hyperparathyroidism S/P parathyroidectomy in [place] in 12/2022 (seen by Eric Royal MD of  [phone]), essential HTN maintained on high doses of labetalol, last admission to [place] in Mar 2023 following missed HD session following patient driving himself from [place] to [place], with patient discharged Mar 2 2023, with patient self referring to the ER following fever, sore throat, voice changes and dyspnoea since last night.  No drooling.  NO chest pain/pressure.  NO palpitations.  Allergy to shellfish but patient did not intake any shellfish containing food.

## 2023-07-20 NOTE — CONSULT NOTE ADULT - SUBJECTIVE AND OBJECTIVE BOX
CC: sore throat          HPI: 37-year-old male PMH of HTN, prior stroke HTN, SOLITARIO, ESRD with left upper extremity AV fistula on HD MWF (last yesterday) presents to ED complaining of dyspnea, voice changes, sore throat and fever since last night.  Symptoms have been progressively worsening.  Brother at bedside reports that patient has a voice change does not usually sound awake sounds in the ED today.  +dysphagia and odynophagia, denies drooling. Patient reports difficulty breathing secondary to feeling as though his uvula is blocking his throat.  Patient denies cough, chest pain, abdominal pain, trauma to the area, new strange or abnormal foods.  Patient is allergic to shellfish and reports she did not eat anything containing shellfish and recent history.  Patient denies rash.  Patient does not make urine. +1 episode of vomiting this morning, denies current nausea               PAST MEDICAL & SURGICAL HISTORY:     HTN (hypertension)               Stroke     age 10               Morbid obesity               Sleep apnea               Leg fracture, right               Chronic renal failure               Hemodialysis access, AV graft               ESRD (end stage renal disease) on dialysis     since 2013, M-W-F               Anemia, unspecified type               AV fistula     L arm                    Allergies          shellfish (Hives)     No Known Drug Allergies          Intolerances               MEDICATIONS  (STANDING):     clindamycin IVPB         clindamycin IVPB 600 milliGRAM(s) IV Intermittent every 8 hours          MEDICATIONS  (PRN):               FAMILY HISTORY:     No pertinent family history in first degree relatives. No pertinent family history of: esrd.           Social History:     · Substance use  No     · Social History (marital status, living situation, occupation, and sexual history)       neg           Tobacco Screening:     · Core Measure Site        No               ROS:      ENT: all negative except as noted in HPI      CV: denies palpitations     Pulm: denies SOB, cough, hemoptysis     GI: denies change in appetite, indigestion, n/v     : denies pertinent urinary symptoms, urgency     Neuro: denies numbness/tingling, loss of sensation     Psych: denies anxiety     MS: denies muscle weakness, instability     Heme: denies easy bruising or bleeding     Endo: denies heat/cold intolerance, excessive sweating     Vascular: denies LE edema          Vital Signs Last 24 Hrs     T(C): 38.1 (20 Jul 2023 09:50), Max: 38.1 (20 Jul 2023 09:50)     T(F): 100.6 (20 Jul 2023 09:50), Max: 100.6 (20 Jul 2023 09:50)     HR: 93 (20 Jul 2023 09:50) (93 - 95)     BP: 158/94 (20 Jul 2023 09:50) (158/94 - 171/91)     BP(mean): --     RR: 23 (20 Jul 2023 09:50) (23 - 28)     SpO2: 99% (20 Jul 2023 09:50) (95% - 99%)          Parameters below as of 20 Jul 2023 09:50     Patient On (Oxygen Delivery Method): room air                                             8.3       6.39  )-----------( 233      ( 20 Jul 2023 10:13 )                26.5       07-20          138  |  92<L>  |  74<H>     ----------------------------<  79     5.4<H>   |  24  |  11.05<H>          Ca    9.0      20 Jul 2023 10:13          TPro  7.2  /  Alb  3.9  /  TBili  0.4  /  DBili  x   /  AST  8<L>  /  ALT  5<L>  /  AlkPhos  1107<H>  07-20      PT/INR - ( 20 Jul 2023 10:13 )   PT: 11.9 sec;   INR: 1.03 ratio                  PTT - ( 20 Jul 2023 10:13 )  PTT:29.0 sec          PHYSICAL EXAM:     Gen: NAD     Skin: No rashes, bruises, or lesions     Head: Normocephalic, Atraumatic     Face: no edema, erythema, or fluctuance. Parotid glands soft without mass     Eyes: no scleral injection     Nose: Nares bilaterally patent, no discharge     Mouth: +uvular edema and ecchymosis, erythema and redundant tissue of posterior pharynx. No Stridor / Drooling / Trismus.  Mucosa moist, tongue midline     Neck: Flat, supple, no lymphadenopathy, trachea midline, no masses     Lymphatic: No lymphadenopathy     Resp: breathing easily, no stridor     CV: no peripheral edema/cyanosis     GI: nondistended      Peripheral vascular: no JVD or edema     Neuro: facial nerve intact, no facial droop               Fiberoptic Indirect laryngoscopy:  (Scope #2 used)     Reason for Laryngoscopy: uvular edema          Patient was unable to cooperate with mirror.     +uvular edema L>R. Nasopharynx, oropharynx, and hypopharynx clear, no bleeding. Tongue base, posterior pharyngeal wall, vallecula, epiglottis, and subglottis appear normal. No erythema, pooling of secretions, masses or lesions. Airway patent, no foreign body visualized. No glottic/supraglottic edema. True vocal cords, arytenoids, vestibular folds, ventricles, pyriform sinuses, and aryepiglottic folds appear normal bilaterally. Vocal cords mobile with good contact b/l.                    IMAGING/ADDITIONAL STUDIES:       CC: sore throat          HPI: 37-year-old male PMH of HTN, prior stroke HTN, SOLITARIO, ESRD with left upper extremity AV fistula on HD MWF (last yesterday) presents to ED complaining of dyspnea, voice changes, sore throat and fever since last night.  Symptoms have been progressively worsening.  Brother at bedside reports that patient has a voice change does not usually sound awake sounds in the ED today.  +dysphagia and odynophagia, denies drooling. Patient reports difficulty breathing secondary to feeling as though his uvula is blocking his throat.  Patient denies cough, chest pain, abdominal pain, trauma to the area, new strange or abnormal foods.  Patient is allergic to shellfish and reports she did not eat anything containing shellfish and recent history.  Patient denies rash.  Patient does not make urine. +1 episode of vomiting this morning, denies current nausea               PAST MEDICAL & SURGICAL HISTORY:     HTN (hypertension)               Stroke     age 10               Morbid obesity               Sleep apnea               Leg fracture, right               Chronic renal failure               Hemodialysis access, AV graft               ESRD (end stage renal disease) on dialysis     since 2013, M-W-F               Anemia, unspecified type               AV fistula     L arm                    Allergies          shellfish (Hives)     No Known Drug Allergies          Intolerances               MEDICATIONS  (STANDING):     clindamycin IVPB         clindamycin IVPB 600 milliGRAM(s) IV Intermittent every 8 hours          MEDICATIONS  (PRN):               FAMILY HISTORY:     No pertinent family history in first degree relatives. No pertinent family history of: esrd.           Social History:     · Substance use  No     · Social History (marital status, living situation, occupation, and sexual history)       neg           Tobacco Screening:     · Core Measure Site        No               ROS:      ENT: all negative except as noted in HPI      CV: denies palpitations     Pulm: denies SOB, cough, hemoptysis     GI: denies change in appetite, indigestion, n/v     : denies pertinent urinary symptoms, urgency     Neuro: denies numbness/tingling, loss of sensation     Psych: denies anxiety     MS: denies muscle weakness, instability     Heme: denies easy bruising or bleeding     Endo: denies heat/cold intolerance, excessive sweating     Vascular: denies LE edema          Vital Signs Last 24 Hrs     T(C): 38.1 (20 Jul 2023 09:50), Max: 38.1 (20 Jul 2023 09:50)     T(F): 100.6 (20 Jul 2023 09:50), Max: 100.6 (20 Jul 2023 09:50)     HR: 93 (20 Jul 2023 09:50) (93 - 95)     BP: 158/94 (20 Jul 2023 09:50) (158/94 - 171/91)     BP(mean): --     RR: 23 (20 Jul 2023 09:50) (23 - 28)     SpO2: 99% (20 Jul 2023 09:50) (95% - 99%)          Parameters below as of 20 Jul 2023 09:50     Patient On (Oxygen Delivery Method): room air                                             8.3       6.39  )-----------( 233      ( 20 Jul 2023 10:13 )                26.5       07-20          138  |  92<L>  |  74<H>     ----------------------------<  79     5.4<H>   |  24  |  11.05<H>          Ca    9.0      20 Jul 2023 10:13          TPro  7.2  /  Alb  3.9  /  TBili  0.4  /  DBili  x   /  AST  8<L>  /  ALT  5<L>  /  AlkPhos  1107<H>  07-20      PT/INR - ( 20 Jul 2023 10:13 )   PT: 11.9 sec;   INR: 1.03 ratio                  PTT - ( 20 Jul 2023 10:13 )  PTT:29.0 sec          PHYSICAL EXAM:     Gen: NAD     Skin: No rashes, bruises, or lesions     Head: Normocephalic, Atraumatic     Face: no edema, erythema, or fluctuance. Parotid glands soft without mass     Eyes: no scleral injection     Nose: Nares bilaterally patent, no discharge     Mouth: +uvular edema and ecchymosis with small amount of exudate, HSV PCR and bacterial culture obtained, erythema and redundant tissue of posterior pharynx. No Stridor / Drooling / Trismus.  Mucosa moist, tongue midline     Neck: Flat, supple, no lymphadenopathy, trachea midline, no masses     Lymphatic: No lymphadenopathy     Resp: breathing easily, no stridor     CV: no peripheral edema/cyanosis     GI: nondistended      Peripheral vascular: no JVD or edema     Neuro: facial nerve intact, no facial droop               Fiberoptic Indirect laryngoscopy:  (Scope #2 used)     Reason for Laryngoscopy: uvular edema          Patient was unable to cooperate with mirror.     +uvular edema L>R. Nasopharynx, oropharynx, and hypopharynx clear, no bleeding. Tongue base, posterior pharyngeal wall, vallecula, epiglottis, and subglottis appear normal. No erythema, pooling of secretions, masses or lesions. Airway patent, no foreign body visualized. No glottic/supraglottic edema. True vocal cords, arytenoids, vestibular folds, ventricles, pyriform sinuses, and aryepiglottic folds appear normal bilaterally. Vocal cords mobile with good contact b/l.                    IMAGING/ADDITIONAL STUDIES:       CC: sore throat          HPI: 37-year-old male PMH of HTN, prior stroke HTN, SOLITARIO, ESRD with left upper extremity AV fistula on HD MWF (last yesterday) presents to ED complaining of dyspnea, voice changes, sore throat and fever since last night.  Symptoms have been progressively worsening.  Brother at bedside reports that patient has a voice change does not usually sound awake sounds in the ED today.  +dysphagia and odynophagia, denies drooling. Patient reports difficulty breathing secondary to feeling as though his uvula is blocking his throat.  Patient denies cough, chest pain, abdominal pain, trauma to the area, new strange or abnormal foods.  Patient is allergic to shellfish and reports she did not eat anything containing shellfish and recent history.  Patient denies rash.  Patient does not make urine. +1 episode of vomiting this morning, denies current nausea               PAST MEDICAL & SURGICAL HISTORY:     HTN (hypertension)               Stroke     age 10               Morbid obesity               Sleep apnea               Leg fracture, right               Chronic renal failure               Hemodialysis access, AV graft               ESRD (end stage renal disease) on dialysis     since 2013, M-W-F               Anemia, unspecified type               AV fistula     L arm                    Allergies          shellfish (Hives)     No Known Drug Allergies          Intolerances               MEDICATIONS  (STANDING):     clindamycin IVPB         clindamycin IVPB 600 milliGRAM(s) IV Intermittent every 8 hours          MEDICATIONS  (PRN):               FAMILY HISTORY:     No pertinent family history in first degree relatives. No pertinent family history of: esrd.           Social History:     · Substance use  No     · Social History (marital status, living situation, occupation, and sexual history)       neg           Tobacco Screening:     · Core Measure Site        No               ROS:      ENT: all negative except as noted in HPI      CV: denies palpitations     Pulm: denies SOB, cough, hemoptysis     GI: denies change in appetite, indigestion, n/v     : denies pertinent urinary symptoms, urgency     Neuro: denies numbness/tingling, loss of sensation     Psych: denies anxiety     MS: denies muscle weakness, instability     Heme: denies easy bruising or bleeding     Endo: denies heat/cold intolerance, excessive sweating     Vascular: denies LE edema          Vital Signs Last 24 Hrs     T(C): 38.1 (20 Jul 2023 09:50), Max: 38.1 (20 Jul 2023 09:50)     T(F): 100.6 (20 Jul 2023 09:50), Max: 100.6 (20 Jul 2023 09:50)     HR: 93 (20 Jul 2023 09:50) (93 - 95)     BP: 158/94 (20 Jul 2023 09:50) (158/94 - 171/91)     BP(mean): --     RR: 23 (20 Jul 2023 09:50) (23 - 28)     SpO2: 99% (20 Jul 2023 09:50) (95% - 99%)          Parameters below as of 20 Jul 2023 09:50     Patient On (Oxygen Delivery Method): room air                                             8.3       6.39  )-----------( 233      ( 20 Jul 2023 10:13 )                26.5       07-20          138  |  92<L>  |  74<H>     ----------------------------<  79     5.4<H>   |  24  |  11.05<H>          Ca    9.0      20 Jul 2023 10:13          TPro  7.2  /  Alb  3.9  /  TBili  0.4  /  DBili  x   /  AST  8<L>  /  ALT  5<L>  /  AlkPhos  1107<H>  07-20      PT/INR - ( 20 Jul 2023 10:13 )   PT: 11.9 sec;   INR: 1.03 ratio                  PTT - ( 20 Jul 2023 10:13 )  PTT:29.0 sec          PHYSICAL EXAM:     Gen: NAD     Skin: No rashes, bruises, or lesions     Head: Normocephalic, Atraumatic     Face: no edema, erythema, or fluctuance. Parotid glands soft without mass     Eyes: no scleral injection     Nose: Nares bilaterally patent, no discharge     Mouth: +uvular edema and ecchymosis with small amount of exudate, HSV PCR and bacterial culture obtained, erythema and redundant tissue of posterior pharynx. No Stridor / Drooling / Trismus.  Mucosa moist, tongue midline     Neck: Flat, supple, no lymphadenopathy, trachea midline, no masses     Lymphatic: No lymphadenopathy     Resp: breathing easily, no stridor     CV: no peripheral edema/cyanosis     GI: nondistended      Peripheral vascular: no JVD or edema     Neuro: facial nerve intact, no facial droop               Fiberoptic Indirect laryngoscopy:  (Scope #2 used)     Reason for Laryngoscopy: uvular edema          Patient was unable to cooperate with mirror.     +uvular edema L>R. Nasopharynx, oropharynx, and hypopharynx clear, no bleeding. Tongue base, posterior pharyngeal wall, vallecula, epiglottis, and subglottis appear normal. No erythema, pooling of secretions, masses or lesions. Airway patent, no foreign body visualized. No glottic/supraglottic edema. True vocal cords, arytenoids, vestibular folds, ventricles, pyriform sinuses, and aryepiglottic folds appear normal bilaterally. Vocal cords mobile with good contact b/l.                    IMAGING/ADDITIONAL STUDIES:  CT NECK  IMPRESSION:    1. Limited study secondary to exclusion of IV contrast.    2. New enlargement and edematous changes involving the left palatine tonsil suspicious for possible palatine tonsillitis, when compared with 7/9/2023. Evaluation for underlying abscess is extremely limited secondary to exclusion of IV contrast.    3. Reactively enlarged bilateral cervical lymph nodes.    4. Redemonstration of extensive renal osteodystrophy.    5. Previously seen large right-sided parathyroid adenoma was better evaluated on the prior contrast enhanced CT examination of the neck.    --- End of Report ---       CC: sore throat          HPI: 37-year-old male PMH of HTN, prior stroke HTN, SOLITARIO, ESRD with left upper extremity AV fistula on HD MWF (last yesterday) presents to ED complaining of dyspnea, voice changes, sore throat and fever since last night.  Symptoms have been progressively worsening.  Brother at bedside reports that patient has a voice change does not usually sound awake sounds in the ED today.  +dysphagia and odynophagia, denies drooling. Patient reports difficulty breathing secondary to feeling as though his uvula is blocking his throat.  Patient denies cough, chest pain, abdominal pain, trauma to the area, new strange or abnormal foods.  Patient is allergic to shellfish and reports she did not eat anything containing shellfish and recent history.  Patient denies rash.  Patient does not make urine. +1 episode of vomiting this morning, denies current nausea               PAST MEDICAL & SURGICAL HISTORY:     HTN (hypertension)               Stroke     age 10               Morbid obesity               Sleep apnea               Leg fracture, right               Chronic renal failure               Hemodialysis access, AV graft               ESRD (end stage renal disease) on dialysis     since 2013, M-W-F               Anemia, unspecified type               AV fistula     L arm                    Allergies          shellfish (Hives)     No Known Drug Allergies          Intolerances               MEDICATIONS  (STANDING):     clindamycin IVPB         clindamycin IVPB 600 milliGRAM(s) IV Intermittent every 8 hours          MEDICATIONS  (PRN):               FAMILY HISTORY:     No pertinent family history in first degree relatives. No pertinent family history of: esrd.           Social History:     · Substance use  No     · Social History (marital status, living situation, occupation, and sexual history)       neg           Tobacco Screening:     · Core Measure Site        No               ROS:      ENT: all negative except as noted in HPI      CV: denies palpitations     Pulm: denies SOB, cough, hemoptysis     GI: denies change in appetite, indigestion, n/v     : denies pertinent urinary symptoms, urgency     Neuro: denies numbness/tingling, loss of sensation     Psych: denies anxiety     MS: denies muscle weakness, instability     Heme: denies easy bruising or bleeding     Endo: denies heat/cold intolerance, excessive sweating     Vascular: denies LE edema          Vital Signs Last 24 Hrs     T(C): 38.1 (20 Jul 2023 09:50), Max: 38.1 (20 Jul 2023 09:50)     T(F): 100.6 (20 Jul 2023 09:50), Max: 100.6 (20 Jul 2023 09:50)     HR: 93 (20 Jul 2023 09:50) (93 - 95)     BP: 158/94 (20 Jul 2023 09:50) (158/94 - 171/91)     BP(mean): --     RR: 23 (20 Jul 2023 09:50) (23 - 28)     SpO2: 99% (20 Jul 2023 09:50) (95% - 99%)          Parameters below as of 20 Jul 2023 09:50     Patient On (Oxygen Delivery Method): room air                                             8.3       6.39  )-----------( 233      ( 20 Jul 2023 10:13 )                26.5       07-20          138  |  92<L>  |  74<H>     ----------------------------<  79     5.4<H>   |  24  |  11.05<H>          Ca    9.0      20 Jul 2023 10:13          TPro  7.2  /  Alb  3.9  /  TBili  0.4  /  DBili  x   /  AST  8<L>  /  ALT  5<L>  /  AlkPhos  1107<H>  07-20      PT/INR - ( 20 Jul 2023 10:13 )   PT: 11.9 sec;   INR: 1.03 ratio                  PTT - ( 20 Jul 2023 10:13 )  PTT:29.0 sec          PHYSICAL EXAM:     Gen: NAD     Skin: No rashes, bruises, or lesions     Head: Normocephalic, Atraumatic     Face: no edema, erythema, or fluctuance. Parotid glands soft without mass     Eyes: no scleral injection     Nose: Nares bilaterally patent, no discharge     Mouth: +uvular edema and ecchymosis with small amount of exudate, HSV PCR and bacterial culture obtained, erythema and redundant tissue of posterior pharynx. No Stridor / Drooling / Trismus.  Mucosa moist, tongue midline     Neck: Flat, supple, no lymphadenopathy, trachea midline, no masses     Lymphatic: No lymphadenopathy     Resp: breathing easily, no stridor     CV: no peripheral edema/cyanosis     GI: nondistended      Peripheral vascular: no JVD or edema     Neuro: facial nerve intact, no facial droop               Fiberoptic Indirect laryngoscopy:  (Scope #2 used)     Reason for Laryngoscopy: uvular edema          Patient was unable to cooperate with mirror.     +Left palatine tonsil edema. Nasopharynx, oropharynx, and hypopharynx clear, no bleeding. Tongue base, posterior pharyngeal wall, vallecula, epiglottis, and subglottis appear normal. No erythema, pooling of secretions, masses or lesions. Airway patent, no foreign body visualized. No glottic/supraglottic edema. True vocal cords, arytenoids, vestibular folds, ventricles, pyriform sinuses, and aryepiglottic folds appear normal bilaterally. Vocal cords mobile with good contact b/l.                    IMAGING/ADDITIONAL STUDIES:  CT NECK  IMPRESSION:    1. Limited study secondary to exclusion of IV contrast.    2. New enlargement and edematous changes involving the left palatine tonsil suspicious for possible palatine tonsillitis, when compared with 7/9/2023. Evaluation for underlying abscess is extremely limited secondary to exclusion of IV contrast.    3. Reactively enlarged bilateral cervical lymph nodes.    4. Redemonstration of extensive renal osteodystrophy.    5. Previously seen large right-sided parathyroid adenoma was better evaluated on the prior contrast enhanced CT examination of the neck.    --- End of Report ---       CC: sore throat          HPI: 37-year-old male PMH of HTN, prior stroke HTN, SOLITARIO, ESRD with left upper extremity AV fistula on HD MWF (last yesterday) presents to ED complaining of dyspnea, voice changes, sore throat and fever since last night.  Symptoms have been progressively worsening.  Brother at bedside reports that patient has a voice change does not usually sound awake sounds in the ED today.  +dysphagia and odynophagia, denies drooling. Patient reports difficulty breathing secondary to feeling as though his uvula is blocking his throat.  Patient denies cough, chest pain, abdominal pain, trauma to the area, new strange or abnormal foods.  Patient is allergic to shellfish and reports she did not eat anything containing shellfish and recent history.  Patient denies rash.  Patient does not make urine. +1 episode of vomiting this morning, denies current nausea               PAST MEDICAL & SURGICAL HISTORY:     HTN (hypertension)               Stroke     age 10               Morbid obesity               Sleep apnea               Leg fracture, right               Chronic renal failure               Hemodialysis access, AV graft               ESRD (end stage renal disease) on dialysis     since 2013, M-W-F               Anemia, unspecified type               AV fistula     L arm                    Allergies          shellfish (Hives)     No Known Drug Allergies          Intolerances               MEDICATIONS  (STANDING):     clindamycin IVPB         clindamycin IVPB 600 milliGRAM(s) IV Intermittent every 8 hours          MEDICATIONS  (PRN):               FAMILY HISTORY:     No pertinent family history in first degree relatives. No pertinent family history of: esrd.           Social History:     · Substance use  No     · Social History (marital status, living situation, occupation, and sexual history)       neg           Tobacco Screening:     · Core Measure Site        No               ROS:      ENT: all negative except as noted in HPI      CV: denies palpitations     Pulm: denies SOB, cough, hemoptysis     GI: denies change in appetite, indigestion, n/v     : denies pertinent urinary symptoms, urgency     Neuro: denies numbness/tingling, loss of sensation     Psych: denies anxiety     MS: denies muscle weakness, instability     Heme: denies easy bruising or bleeding     Endo: denies heat/cold intolerance, excessive sweating     Vascular: denies LE edema          Vital Signs Last 24 Hrs     T(C): 38.1 (20 Jul 2023 09:50), Max: 38.1 (20 Jul 2023 09:50)     T(F): 100.6 (20 Jul 2023 09:50), Max: 100.6 (20 Jul 2023 09:50)     HR: 93 (20 Jul 2023 09:50) (93 - 95)     BP: 158/94 (20 Jul 2023 09:50) (158/94 - 171/91)     BP(mean): --     RR: 23 (20 Jul 2023 09:50) (23 - 28)     SpO2: 99% (20 Jul 2023 09:50) (95% - 99%)          Parameters below as of 20 Jul 2023 09:50     Patient On (Oxygen Delivery Method): room air                                             8.3       6.39  )-----------( 233      ( 20 Jul 2023 10:13 )                26.5       07-20          138  |  92<L>  |  74<H>     ----------------------------<  79     5.4<H>   |  24  |  11.05<H>          Ca    9.0      20 Jul 2023 10:13          TPro  7.2  /  Alb  3.9  /  TBili  0.4  /  DBili  x   /  AST  8<L>  /  ALT  5<L>  /  AlkPhos  1107<H>  07-20      PT/INR - ( 20 Jul 2023 10:13 )   PT: 11.9 sec;   INR: 1.03 ratio                  PTT - ( 20 Jul 2023 10:13 )  PTT:29.0 sec          PHYSICAL EXAM:     Gen: NAD     Skin: No rashes, bruises, or lesions     Head: Normocephalic, Atraumatic     Face: no edema, erythema, or fluctuance. Parotid glands soft without mass     Eyes: no scleral injection     Nose: Nares bilaterally patent, no discharge     Mouth: +uvular edema and ecchymosis with small amount of exudate, HSV PCR and bacterial culture obtained, erythema and redundant tissue of posterior pharynx. No Stridor / Drooling / Trismus.  Mucosa moist, tongue midline     Neck: Flat, supple, no lymphadenopathy, trachea midline, no masses     Lymphatic: No lymphadenopathy     Resp: breathing easily, no stridor     CV: no peripheral edema/cyanosis     GI: nondistended      Peripheral vascular: no JVD or edema     Neuro: facial nerve intact, no facial droop               Fiberoptic Indirect laryngoscopy:  (Scope #2 used)     Reason for Laryngoscopy: uvular edema          Patient was unable to cooperate with mirror.     swollen uvula touching epiglottis. Airway patent. Vocal fold mobile and symmetrical . Nasopharynx, oropharynx, and hypopharynx clear, no bleeding. Tongue base, posterior pharyngeal wall, vallecula, epiglottis, and subglottis appear normal. No erythema, pooling of secretions, masses or lesions. Airway patent, no foreign body visualized. No glottic/supraglottic edema. True vocal cords, arytenoids, vestibular folds, ventricles, pyriform sinuses, and aryepiglottic folds appear normal bilaterally. Vocal cords mobile with good contact b/l.                    IMAGING/ADDITIONAL STUDIES:  CT NECK  IMPRESSION:    1. Limited study secondary to exclusion of IV contrast.    2. New enlargement and edematous changes involving the left palatine tonsil suspicious for possible palatine tonsillitis, when compared with 7/9/2023. Evaluation for underlying abscess is extremely limited secondary to exclusion of IV contrast.    3. Reactively enlarged bilateral cervical lymph nodes.    4. Redemonstration of extensive renal osteodystrophy.    5. Previously seen large right-sided parathyroid adenoma was better evaluated on the prior contrast enhanced CT examination of the neck.    --- End of Report ---

## 2023-07-20 NOTE — ED ADULT NURSE NOTE - NSFALLUNIVINTERV_ED_ALL_ED
Bed/Stretcher in lowest position, wheels locked, appropriate side rails in place/Call bell, personal items and telephone in reach/Instruct patient to call for assistance before getting out of bed/chair/stretcher/Non-slip footwear applied when patient is off stretcher/[place] to call system/Physically safe environment - no spills, clutter or unnecessary equipment/Purposeful proactive rounding/Room/bathroom lighting operational, light cord in reach

## 2023-07-20 NOTE — CONSULT NOTE ADULT - PROBLEM SELECTOR RECOMMENDATION 9
- CDU   - C/w IV abx    - Decadron 10mg q8h x24 hours    - F/u CT neck   - ENT will continue to follow    - Call with questions or concerns - CDU   - C/w IV abx    - Decadron 10mg q8h x24 hours    - F/u CT neck   - F/u HSV PCR and bacterial culture  - ENT will continue to follow    - Call with questions or concerns - Admit to medicine  - C/w IV abx    - Decadron 10mg q8h x24 hours    - F/u HSV PCR and bacterial culture  - If no improvement over the next 48 hours then recommend repeat CT with contrast   - ENT will continue to follow    - Call with questions or concerns - Admit to medicine  - C/w IV abx    - Decadron 10mg q8h x24 hours    - F/u HSV PCR and bacterial culture  - If no improvement over the next 48 hours then recommend repeat CT neck with contrast   - ENT will continue to follow    - Call with questions or concerns    Parathyroid Adenoma  -Patient will need to follow up with Head and Neck Surgeon any of the following Dr. Renetta Newby, Dr. Anderson Vallejo, Dr. Neville Garcia, Dr. Ortiz Duran for parathyroid adenoma  [phone]  [address] [id number] - Admit to medicine  - C/w IV abx    - Decadron 10mg q8h x24 hours    - F/u HSV PCR and bacterial culture  - If no improvement over the next 48 hours then recommend repeat CT neck with contrast   - ENT will continue to follow    - Call with questions or concerns    Parathyroid Adenoma  -Patient following Dr. Eric Royal for Parathyroid Adenoma. Patient will need further follow up outpatient

## 2023-07-20 NOTE — ED ADULT NURSE NOTE - OBJECTIVE STATEMENT
Patient c/o throat swelling, muffled voice, dyspnea, and fever x 1 day. Patient denies eating anything new or trying new medications. Patient states that he feels like he has a ball in back of his throat that causes pain when swallowing. Patient has voice changes that is progressively worsening. Denies chest pain, n/v/d, drooling. Patient A&Ox4, family at bedside, ambulatory at baseline. Placed on cardiac monitor. Plan of care in progress.

## 2023-07-20 NOTE — H&P ADULT - PROBLEM SELECTOR PLAN 6
See above.   MICU consult called with potential airway risk.   IV Rocephin 2 gm Q24H and one dose of IV Vancomycin 1250 mg x 1 per ID. See above.   MICU consult called with potential airway risk.   IV Rocephin 2 gm Q24H and one dose of IV Vancomycin 1250 mg x 1 per ID.    Will assume aspiration risk for now.  NPO x medications.  IV Decadron as above.

## 2023-07-20 NOTE — H&P ADULT - NSHPSOURCEINFOTX_GEN_ALL_CORE
Brother present earlier in the ER.   Reviewed Medex with patient from discharge from Mar 2 2023.   The patient did not wish for me to contact his brother at this hour. Brother present earlier in the ER.   Reviewed Medex with patient from discharge from Mar 2 2023.   Patient's brother present via patient's  speakerphone with patient's permission.

## 2023-07-20 NOTE — H&P ADULT - TIME BILLING
Review of prior medical records, evaluation and management, review with the ID and MICU consultants and coordination of care with provider team.

## 2023-07-20 NOTE — PATIENT PROFILE ADULT - FALL HARM RISK - UNIVERSAL INTERVENTIONS
Bed in lowest position, wheels locked, appropriate side rails in place/Call bell, personal items and telephone in reach/Instruct patient to call for assistance before getting out of bed or chair/Non-slip footwear when patient is out of bed/[place] to call system/Physically safe environment - no spills, clutter or unnecessary equipment/Purposeful Proactive Rounding/Room/bathroom lighting operational, light cord in reach

## 2023-07-20 NOTE — ED PROVIDER NOTE - CLINICAL SUMMARY MEDICAL DECISION MAKING FREE TEXT BOX
DDx includues but not limited to: Uvulitis, tonsillitis, no clinical evidence of PTA, viral syndrome, epiglottitis in the setting of degree of discomfort.  No clinical signs of Benny's at this time.  Patient meets sepsis criteria, will give IV antibiotics.  Given limitation of IV contrast on CT, will obtain an ENT consult for scope to evaluate the uvula.  Patient is not in any respiratory distress at this time, looks like he feels uncomfortable 2/2 pharyngeal findings.

## 2023-07-20 NOTE — ED PROVIDER NOTE - OTHER FINDINGS
T waves appear peaked V2 and V3, however may be conduction delay, appears similar to prior ECGs from 2017

## 2023-07-21 DIAGNOSIS — N18.9 CHRONIC KIDNEY DISEASE, UNSPECIFIED: ICD-10-CM

## 2023-07-21 LAB
A1C WITH ESTIMATED AVERAGE GLUCOSE RESULT: 4.8 % — SIGNIFICANT CHANGE UP (ref 4–5.6)
ANION GAP SERPL CALC-SCNC: 21 MMOL/L — HIGH (ref 5–17)
BUN SERPL-MCNC: 98 MG/DL — HIGH (ref 7–23)
CALCIUM SERPL-MCNC: 7.7 MG/DL — LOW (ref 8.4–10.5)
CHLORIDE SERPL-SCNC: 90 MMOL/L — LOW (ref 96–108)
CO2 SERPL-SCNC: 22 MMOL/L — SIGNIFICANT CHANGE UP (ref 22–31)
CREAT SERPL-MCNC: 13.22 MG/DL — HIGH (ref 0.5–1.3)
EGFR: 4 ML/MIN/1.73M2 — LOW
ESTIMATED AVERAGE GLUCOSE: 91 MG/DL — SIGNIFICANT CHANGE UP (ref 68–114)
GLUCOSE BLDC GLUCOMTR-MCNC: 115 MG/DL — HIGH (ref 70–99)
GLUCOSE SERPL-MCNC: 115 MG/DL — HIGH (ref 70–99)
GRAM STN FLD: SIGNIFICANT CHANGE UP
HCT VFR BLD CALC: 24.4 % — LOW (ref 39–50)
HGB BLD-MCNC: 7.7 G/DL — LOW (ref 13–17)
MCHC RBC-ENTMCNC: 29.6 PG — SIGNIFICANT CHANGE UP (ref 27–34)
MCHC RBC-ENTMCNC: 31.6 GM/DL — LOW (ref 32–36)
MCV RBC AUTO: 93.8 FL — SIGNIFICANT CHANGE UP (ref 80–100)
NRBC # BLD: 0 /100 WBCS — SIGNIFICANT CHANGE UP (ref 0–0)
PLATELET # BLD AUTO: 207 K/UL — SIGNIFICANT CHANGE UP (ref 150–400)
POTASSIUM SERPL-MCNC: 6 MMOL/L — HIGH (ref 3.5–5.3)
POTASSIUM SERPL-SCNC: 6 MMOL/L — HIGH (ref 3.5–5.3)
RBC # BLD: 2.6 M/UL — LOW (ref 4.2–5.8)
RBC # FLD: 14 % — SIGNIFICANT CHANGE UP (ref 10.3–14.5)
SODIUM SERPL-SCNC: 133 MMOL/L — LOW (ref 135–145)
SPECIMEN SOURCE: SIGNIFICANT CHANGE UP
WBC # BLD: 5.22 K/UL — SIGNIFICANT CHANGE UP (ref 3.8–10.5)
WBC # FLD AUTO: 5.22 K/UL — SIGNIFICANT CHANGE UP (ref 3.8–10.5)

## 2023-07-21 PROCEDURE — 99223 1ST HOSP IP/OBS HIGH 75: CPT | Mod: GC

## 2023-07-21 PROCEDURE — 99255 IP/OBS CONSLTJ NEW/EST HI 80: CPT | Mod: GC

## 2023-07-21 PROCEDURE — 76705 ECHO EXAM OF ABDOMEN: CPT | Mod: 26

## 2023-07-21 PROCEDURE — 99232 SBSQ HOSP IP/OBS MODERATE 35: CPT

## 2023-07-21 RX ORDER — ALPRAZOLAM 0.25 MG
0.25 TABLET ORAL ONCE
Refills: 0 | Status: DISCONTINUED | OUTPATIENT
Start: 2023-07-21 | End: 2023-07-21

## 2023-07-21 RX ORDER — ERYTHROPOIETIN 10000 [IU]/ML
10000 INJECTION, SOLUTION INTRAVENOUS; SUBCUTANEOUS
Refills: 0 | Status: DISCONTINUED | OUTPATIENT
Start: 2023-07-21 | End: 2023-07-24

## 2023-07-21 RX ORDER — CHLORHEXIDINE GLUCONATE 213 G/1000ML
1 SOLUTION TOPICAL
Refills: 0 | Status: DISCONTINUED | OUTPATIENT
Start: 2023-07-21 | End: 2023-07-24

## 2023-07-21 RX ADMIN — HEPARIN SODIUM 5000 UNIT(S): 5000 INJECTION INTRAVENOUS; SUBCUTANEOUS at 14:12

## 2023-07-21 RX ADMIN — Medication 200 MILLIGRAM(S): at 18:31

## 2023-07-21 RX ADMIN — Medication 102 MILLIGRAM(S): at 06:25

## 2023-07-21 RX ADMIN — CEFTRIAXONE 100 MILLIGRAM(S): 500 INJECTION, POWDER, FOR SOLUTION INTRAMUSCULAR; INTRAVENOUS at 14:12

## 2023-07-21 RX ADMIN — Medication 0.25 MILLIGRAM(S): at 01:38

## 2023-07-21 RX ADMIN — HEPARIN SODIUM 5000 UNIT(S): 5000 INJECTION INTRAVENOUS; SUBCUTANEOUS at 06:30

## 2023-07-21 RX ADMIN — ERYTHROPOIETIN 10000 UNIT(S): 10000 INJECTION, SOLUTION INTRAVENOUS; SUBCUTANEOUS at 12:42

## 2023-07-21 RX ADMIN — HEPARIN SODIUM 5000 UNIT(S): 5000 INJECTION INTRAVENOUS; SUBCUTANEOUS at 21:48

## 2023-07-21 RX ADMIN — Medication 102 MILLIGRAM(S): at 14:12

## 2023-07-21 NOTE — DIETITIAN INITIAL EVALUATION ADULT - ORAL INTAKE PTA/DIET HISTORY
Chart reviewed, events noted. Chart reviewed, events noted. Pt reports poor appetite/PO intake x 1 month PTA. Endorses worsening throat pain x 1 week with more severe issues swallowing (tonsil issue) x 2 days PTA. Pt states he has been dealing with hyperparathyroidism which has caused him to lose weight from 339 pounds to ~200 pounds x 5 months.

## 2023-07-21 NOTE — CONSULT NOTE ADULT - NS ATTEND AMEND GEN_ALL_CORE FT
37 m with HTN, ESRD on HD apparently started at age 27, secondary hyperparathyroidism S/P parathyroidectomy in [place] in 12/2022, p/w rhinorrhea, sore throat fever, neck pain and then dyspnea and voice change   here afebrile, normal WBC  was seen by ENT and found to have +uvular edema and ecchymosis with small amount of exudate, HSV PCR and bacterial culture obtained  CT neck non con- New enlargement and edematous changes involving the left palatine tonsil suspicious for possible palatine tonsillitis, Evaluation for underlying abscess is extremely limited secondary to exclusion of IV contrast.  Reactively enlarged bilateral cervical lymph nodes.    fever, uvulitis and tonsilitis, concerning for abscess as the CT was without contrast and is showing L palatine tonsil enlargement     * f/u the throat cx   * pt feels much better after dexa  * c/w ceftriaxone for now  * if no improvement will need a CT with contrast  * f/u with ENT    The above assessment and plan was discussed with the primary team    Emilee Mathew MD  contact on teams  After 5pm and on weekends call [phone]

## 2023-07-21 NOTE — DIETITIAN INITIAL EVALUATION ADULT - PERTINENT MEDS FT
MEDICATIONS  (STANDING):  cefTRIAXone   IVPB 2000 milliGRAM(s) IV Intermittent every 24 hours  chlorhexidine 2% Cloths 1 Application(s) Topical <User Schedule>  dexAMETHasone  IVPB 10 milliGRAM(s) IV Intermittent every 8 hours  dextrose 5%. 1000 milliLiter(s) (50 mL/Hr) IV Continuous <Continuous>  dextrose 5%. 1000 milliLiter(s) (100 mL/Hr) IV Continuous <Continuous>  dextrose 50% Injectable 25 Gram(s) IV Push once  dextrose 50% Injectable 12.5 Gram(s) IV Push once  dextrose 50% Injectable 25 Gram(s) IV Push once  epoetin carito (PROCRIT) Injectable 61013 Unit(s) SubCutaneous <User Schedule>  glucagon  Injectable 1 milliGRAM(s) IntraMuscular once  heparin   Injectable 5000 Unit(s) SubCutaneous every 8 hours  insulin lispro (ADMELOG) corrective regimen sliding scale   SubCutaneous every 6 hours  labetalol 200 milliGRAM(s) Oral two times a day    MEDICATIONS  (PRN):  dextrose Oral Gel 15 Gram(s) Oral once PRN Blood Glucose LESS THAN 70 milliGRAM(s)/deciliter  oxyCODONE    IR 5 milliGRAM(s) Oral every 6 hours PRN Moderate Pain (4 - 6)

## 2023-07-21 NOTE — PHARMACOTHERAPY INTERVENTION NOTE - COMMENTS
Performed medication reconciliation and home medication list updated in prescription writer/ outpatient medication review. Medications verified with patient.    Home Medications:  calcium acetate 667 mg oral tablet: 2 orally 3 times a day (with meals)   labetalol 200 mg oral tablet: 1 orally 2 times a day   Percocet 10 mg-325 mg oral tablet: 2 tablet orally every 6 hours as needed for  severe pain     Removed medications -- As per patient, was no longer taking these medications prior to admission:  calcitriol 0.5 mcg capsule  calcium carbonate 500mg (200 elemental Ca) chewable  cinacalcet 60 mg tablet    Norah Nixon  PharmD Candidate of 2024  [facility]

## 2023-07-21 NOTE — DIETITIAN INITIAL EVALUATION ADULT - PROBLEM SELECTOR PLAN 6
See above.   MICU consult called with potential airway risk.   IV Rocephin 2 gm Q24H and one dose of IV Vancomycin 1250 mg x 1 per ID.    Will assume aspiration risk for now.  NPO x medications.  IV Decadron as above.

## 2023-07-21 NOTE — DIETITIAN INITIAL EVALUATION ADULT - OTHER INFO
dosing wt: 95 kg (7/20)  no wt hx per chart review/Zohreh BAILON dosing wt: 95 kg (7/20)  daily wt: 93.1 kg (7/21 post-HD), 96.6 kg (7/21 pre-HD)  wt hx per chart review: 176.4 kg (12/25/13), no recent wt hx as [place]  [facility] wt hx to assess

## 2023-07-21 NOTE — DIETITIAN INITIAL EVALUATION ADULT - NSFNSGIIOFT_GEN_A_CORE
Pt not on bowel regimen. Pt denies any N/V/D/C. No BM documented since admission. Pt not on bowel regimen.

## 2023-07-21 NOTE — PROGRESS NOTE ADULT - PROBLEM SELECTOR PLAN 1
- Clear for regular diet from ENT standpoint   - C/w IV abx    - Decadron 10mg q8h x24 hours (stop after 3 doses)  - If symptoms worsen consider CT neck w/ contrast    - ENT will continue to follow    - Call with questions or concerns. - Clear for regular diet from ENT standpoint   - C/w IV abx    - Decadron 10mg q8h x24 hours (stop after 3 doses)  - ENT will continue to follow    - Call with questions or concerns. - Clear for regular diet from ENT standpoint   - C/w IV abx    - Decadron 10mg q8h x24 hours (stop after 3 doses)  - No further ENT intervention at this time   - Call with questions or concerns.

## 2023-07-21 NOTE — CONSULT NOTE ADULT - ASSESSMENT
38 y/o M--patient with a history of ESRD on HD apparently started at age 27, secondary hyperparathyroidism S/P parathyroidectomy in [place] in 12/2022 (seen by Eric Royal MD of  [phone]), essential HTN, now admitted for dyspnea, voice changes, sore throat and fever. Evaluated by ENt and found to have uvulitis, and placed on decadron 36 y/o M--patient with a history of ESRD on HD apparently started at age 27, secondary hyperparathyroidism S/P parathyroidectomy in [place] in 12/2022 (seen by Eric Royal MD of  [phone]), essential HTN, now admitted for dyspnea, voice changes, sore throat and fever. Evaluated by ENt and found to have uvulitis, and placed on decadron. ID consulted    Afebrile  WBC -wnl  Culture of uvula taken-Gm stain with GPC in pairs And Gm Variable rods  CT neck non con- reviewed no abscess documented   Tender left neck lymph node  ENT eval confirms uvulitis and placed on [id number] iv q 8 x 24h  Currently on ceftriaxone 2000 mg qd since 7/20, received 1 dose of vanco 1250 and 1 dose clinda 600mg  recent cold like symptoms prior to this onset of symptoms  Pt states significant improvement.  ESRD on HD MWF  abnormal LFT-Imaging reviewed non infectious etiology    Plan   36 y/o M--patient with a history of ESRD on HD apparently started at age 27, secondary hyperparathyroidism S/P parathyroidectomy in [place] in 12/2022 (seen by Eric Royal MD of  [phone]), essential HTN, now admitted for dyspnea, voice changes, sore throat and fever. Evaluated by ENt and found to have uvulitis, and placed on decadron. ID consulted    Afebrile  WBC -wnl  Culture of uvula taken-Gm stain with GPC in pairs And Gm Variable rods  CT neck non con- reviewed no abscess documented Left palatine tonsilitis   Tender left neck lymph node  Blood cultures sent 7/20  ENT eval confirms uvulitis and placed on [id number] iv q 8 x 24h  Currently on ceftriaxone 2000 mg qd since 7/20, received 1 dose of vanco 1250 and 1 dose clinda 600mg  recent cold like symptoms prior to this onset of symptoms--> + RVP for enterovirus  Pt states significant improvement.  ESRD on HD MWF  abnormal LFT-Imaging reviewed non infectious etiology    Plan  Overall Pt is feeling significantly better in the setting of recent steroids and broad abx coverage  Swab of uvula done by ent likely to contain oral organisms  Imaging does show Left palatine tonsilar enlargement  Continue Ceftriaxone for now at current dosing until blood culture results. If negative will decrease dose to 1g qd  If symptoms worsen consider CT scan with IV contrast of neck to r/o tonsilar abscess  Continue to trend WBC  Monitor temp  Follow up Blood cultures  Plan d/w Dr. Stearns

## 2023-07-21 NOTE — CONSULT NOTE ADULT - ATTENDING COMMENTS
37-year-old male MH of HTN, prior stroke HTN, SOLITARIO, ESRD with left upper extremity AV fistula on HD MWF (last yesterday) presents to ED complaining of dyspnea, voice changes, sore throat and fever since last night.   He feels better, is able to communicate clearly, has no difficulty just pain with swallowing, has had an ENT laryngoscopy with no airway compromise (before he even felt better) and currently has no significant sweeling though some erythema in a fairly open oropharynx (Mallampati class 2)  He is not in need of ICU for airway monitoring.
Rest as per Dr. Carlos Henry MD  O: [phone]  Contact me on teams

## 2023-07-21 NOTE — DIETITIAN INITIAL EVALUATION ADULT - ADD RECOMMEND
1)  1) recommend renal, soft+bite sized diet  2) advance diet per MD/SLP recs  3) recommend Nepro 3 x/day (350 kcal, 20 g Pro/8oz)  4) recommend nephrovite daily  5) check Phosphorus level and HbA1c  6) Malnutrition sticker placed, RD to follow-up as per protocol   7) Monitor PO intake, weight, labs, skin, GI status, diet

## 2023-07-21 NOTE — CHART NOTE - NSCHARTNOTEFT_GEN_A_CORE
37-year-old male MH of HTN, prior stroke HTN, SOLITARIO, ESRD with left upper extremity AV fistula on HD MWF (last yesterday) presents to ED complaining of dyspnea, voice changes, sore throat and fever since last night.     Patient admitted for uvulitis, treated with IV antibiotics, seen and evaluated by ENT/MICU. Continuing empiric antibiotics with ceftriaxone, remains on IV decadron, noted to have improvement per discussion with ENT.   As documented by MICU note on physical exam "ENT laryngoscopy with no airway compromise (before he even felt better) and currently has no significant swelling though some erythema in a fairly open oropharynx (Mallampati class 2. He is not in need of ICU for airway monitoring".   Per discussion with ENT this morning, patient noted to have improvement on exam, no signs of edema.   Patient on room air satting well, no signs of stridor, trismus.     Per ENT documentation, "Indirect laryngoscopy performed which showed L palatine tonsil edema, otherwise no laryngeal edema noted, airway patent".     Patient is currently NPO, requesting to have diet as he has not eaten in 2 days.    Per H&P, was made NPO given risk of aspiration/intubation, however with notable improvement of exam from above specialists, will trial patient on dysphagia diet and increase as tolerated.   D/W Dr. Sanders.     Gwendolyn Dueñas, PA-C  [id number] 37-year-old male MH of HTN, prior stroke HTN, SOLITARIO, ESRD with left upper extremity AV fistula on HD MWF (last yesterday) presents to ED complaining of dyspnea, voice changes, sore throat and fever since last night.     Patient admitted for uvulitis, treated with IV antibiotics, seen and evaluated by ENT/MICU. Continuing empiric antibiotics with ceftriaxone, remains on IV decadron, noted to have improvement per discussion with ENT.   As documented by MICU note on physical exam "ENT laryngoscopy with no airway compromise (before he even felt better) and currently has no significant swelling though some erythema in a fairly open oropharynx (Mallampati class 2). He is not in need of ICU for airway monitoring".   Per discussion with ENT this morning, patient noted to have improvement on exam, no signs of edema.   Patient on room air satting well, no signs of stridor, trismus.     Per ENT documentation, "Indirect laryngoscopy performed which showed L palatine tonsil edema, otherwise no laryngeal edema noted, airway patent".        Patient seen and examined in HD. Patient is currently NPO, requesting to have diet as he has not eaten in 2 days. Agree with MICU of Mallampati class 2, able to visualize uvula, mild edema still present on visual exam of this writer.   Risks and benefits discussed with patient of aspiration risk and risk of intubation if needed, he understands trial of soft foods for now.   Per H&P, was made NPO given risk of aspiration/intubation, however with notable improvement of exam from above specialists, will trial patient on dysphagia diet (soft and bite sized) and increase as tolerated.   D/W Dr. Sanders.     Gwendolyn Dueñas, PA-C  [id number]

## 2023-07-21 NOTE — CONSULT NOTE ADULT - ASSESSMENT
38 y/o M with a history of ESRD on HD x10 years, secondary hyperparathyroidism S/P parathyroidectomy in [place] in 12/2022, essential HTN, admitted for uvulitis, nephrology consulted for ESRD/HD management.

## 2023-07-21 NOTE — DIETITIAN INITIAL EVALUATION ADULT - NSFNSADHERENCEPTAFT_GEN_A_CORE
Pt states he has been on HD x 10 years. Pt reports he follows a renal diet, but is not strict about it. Often takes extra phos binder pills if he knows he is eating something non-compliant. Has taken Ensure supplements in the past with a phos binder. Reports receiving LPS protein supplement at HD. Pt currently hyperkalemic, no phosphorus level available to assess.

## 2023-07-21 NOTE — CONSULT NOTE ADULT - PROBLEM SELECTOR RECOMMENDATION 2
Hb 8.3 -> 7.7 this admission. MCV 93.8. Hb ~6-8s over last year per chart review.   - Procrit 10,000 units SQ with HD M/W/F  - trend Hb  - iron studies to r/o ZEENAT Hb 8.3 -> 7.7 this admission. MCV 93.8. Hb ~6-8s over last year per chart review.   - Procrit 10,000 units IV  with HD M/W/F  - trend Hb  - iron studies to r/o ZEENAT

## 2023-07-21 NOTE — DIETITIAN INITIAL EVALUATION ADULT - PERTINENT LABORATORY DATA
07-21    133<L>  |  90<L>  |  98<H>  ----------------------------<  115<H>  6.0<H>   |  22  |  13.22<H>    Ca    7.7<L>      21 Jul 2023 10:27    TPro  7.2  /  Alb  3.9  /  TBili  0.4  /  DBili  x   /  AST  8<L>  /  ALT  5<L>  /  AlkPhos  1107<H>  07-20  POCT Blood Glucose.: 115 mg/dL (07-21-23 @ 06:23)

## 2023-07-21 NOTE — DIETITIAN INITIAL EVALUATION ADULT - REASON FOR ADMISSION
Per chart, Pt is a 38 y/o M with PMH: "HTN, prior stroke HTN, SOLITARIO, ESRD with left upper extremity AV fistula on HD MWF (last yesterday) presents to ED complaining of dyspnea, voice changes, sore throat and fever since last night. Found to have uvulitis." Started on IV Abx and Decadron. ENT following. Per chart, Pt is a 38 y/o M with PMH: "HTN, prior stroke HTN, SOLITARIO, secondary hyperparathyroidism S/P parathyroidectomy in [place] in 12/2022, ESRD with left upper extremity AV fistula on HD MWF presents to ED complaining of dyspnea, voice changes, sore throat and fever since last night. Found to have uvulitis." Started on IV Abx and Decadron. ENT following.

## 2023-07-21 NOTE — CONSULT NOTE ADULT - REASON FOR ADMISSION
Fever, sore throat, voice changes, dyspnoea since last night.

## 2023-07-21 NOTE — DIETITIAN INITIAL EVALUATION ADULT - EDUCATION DIETARY MODIFICATIONS
renal diet principles; adequate protein intake/(1) partially meets; needs review/practice/verbalization

## 2023-07-21 NOTE — CONSULT NOTE ADULT - SUBJECTIVE AND OBJECTIVE BOX
Patient is a 37y old  Male who presents with a chief complaint of Fever, sore throat, voice changes, dyspnoea since last night. (21 Jul 2023 08:39)    HPI:   36 y/o M--patient with a history of ESRD on HD apparently started at age 27, previously had his care in [place], secondary hyperparathyroidism S/P parathyroidectomy in [place] in 12/2022 (seen by Eric Royal MD of  [phone]), essential HTN maintained on high doses of labetalol, last admission to [place] in Mar 2023 following missed HD session following patient driving himself from [place] to [place], with patient discharged Mar 2 2023, with patient self referring to the ER following fever, sore throat, voice changes and dyspnoea since last night.  No drooling.  NO chest pain/pressure.  NO palpitations.  Allergy to shellfish but patient did not intake any shellfish containing food.    REVIEW OF SYSTEMS  [  ] ROS unobtainable because:    [ x ] All other systems negative except as noted below  Constitutional:  [ ] fever [ ] chills  [ ] weight loss  [ ]night sweat  [ ]poor appetite/PO intake [ ]fatigue   Skin:  [ ] rash [ ] phlebitis	  Eyes: [ ] icterus [ ] pain  [ ] discharge	  ENMT: [ ] sore throat  [ ] thrush [ ] ulcers [ ] exudates [ ]anosmia  Respiratory: [ ] dyspnea [ ] hemoptysis [ ] cough [ ] sputum	  Cardiovascular:  [ ] chest pain [ ] palpitations [ ] edema	  Gastrointestinal:  [ ] nausea [ ] vomiting [ ] diarrhea [ ] constipation [ ] pain	  Genitourinary:  [ ] dysuria [ ] frequency [ ] hematuria [ ] discharge [ ] flank pain  [ ] incontinence  Musculoskeletal:  [ ] myalgias [ ] arthralgias [ ] arthritis  [ ] back pain  Neurological:  [ ] headache [ ] weakness [ ] seizures  [ ] confusion/altered mental status  prior hospital charts reviewed [V]  primary team notes reviewed [V]  other consultant notes reviewed [V]    PAST MEDICAL & SURGICAL HISTORY:  HTN (hypertension)      Stroke  age 10      Morbid obesity      Sleep apnea      Leg fracture, right      Chronic renal failure      Hemodialysis access, AV graft      ESRD (end stage renal disease) on dialysis  since 2013, M-W-F      Anemia, unspecified type      AV fistula  L arm          SOCIAL HISTORY:  - Denied smoking/vaping/alcohol/recreational drug use    FAMILY HISTORY:  No pertinent family history in first degree relatives        Allergies  shellfish (Hives)  No Known Drug Allergies        ANTIMICROBIALS:  cefTRIAXone   IVPB 2000 every 24 hours      ANTIMICROBIALS (past 90 days):  MEDICATIONS  (STANDING):  cefTRIAXone   IVPB   100 mL/Hr IV Intermittent (07-20-23 @ 10:18)    clindamycin IVPB   100 mL/Hr IV Intermittent (07-20-23 @ 10:50)    vancomycin  IVPB   166.67 mL/Hr IV Intermittent (07-20-23 @ 20:56)        OTHER MEDS:   MEDICATIONS  (STANDING):  dexAMETHasone  IVPB 10 every 8 hours  dextrose 50% Injectable 25 once  dextrose 50% Injectable 25 once  dextrose 50% Injectable 12.5 once  dextrose Oral Gel 15 once PRN  glucagon  Injectable 1 once  heparin   Injectable 5000 every 8 hours  insulin lispro (ADMELOG) corrective regimen sliding scale  every 6 hours  labetalol 200 two times a day  oxyCODONE    IR 5 every 6 hours PRN      VITALS:  Vital Signs Last 24 Hrs  T(F): 98.1 (07-21-23 @ 04:17), Max: 100.6 (07-20-23 @ 09:50)    Vital Signs Last 24 Hrs  HR: 77 (07-21-23 @ 04:17) (77 - 95)  BP: 160/85 (07-21-23 @ 04:17) (142/77 - 171/91)  RR: 18 (07-21-23 @ 04:17)  SpO2: 97% (07-21-23 @ 04:17) (95% - 99%)  Wt(kg): --    EXAM:    GA: NAD, AOx3  HEENT: oral cavity no lesion  CV: nl S1/S2, no RMG  Lungs: CTAB, No distress  Abd: BS+, soft, nontender, no rebounding pain  Ext: no edema  Neuro: No focal deficits  Skin: Intact  IV: no phlebitis  Labs:                        8.3    6.39  )-----------( 233      ( 20 Jul 2023 10:13 )             26.5     07-20    138  |  92<L>  |  74<H>  ----------------------------<  79  5.4<H>   |  24  |  11.05<H>    Ca    9.0      20 Jul 2023 10:13    TPro  7.2  /  Alb  3.9  /  TBili  0.4  /  DBili  x   /  AST  8<L>  /  ALT  5<L>  /  AlkPhos  1107<H>  07-20    WBC Trend:  WBC Count: 6.39 (07-20-23 @ 10:13)    Auto Neutrophil #: 4.45 K/uL (07-20-23 @ 10:13)  Auto Neutrophil #: 1.84 K/uL (02-24-23 @ 11:32)  Auto Neutrophil #: 3.21 K/uL (10-04-22 @ 11:01)    Creatine Trend:  Creatinine: 11.05 (07-20)    Liver Biochemical Testing Trend:  Alanine Aminotransferase (ALT/SGPT): 5 *L* (07-20)  Alanine Aminotransferase (ALT/SGPT): 5 *L* (02-28)  Alanine Aminotransferase (ALT/SGPT): 5 *L* (02-27)  Alanine Aminotransferase (ALT/SGPT): <5 *L* (02-26)  Alanine Aminotransferase (ALT/SGPT): 5 *L* (02-25)  Aspartate Aminotransferase (AST/SGOT): 8 (07-20-23 @ 10:13)  Aspartate Aminotransferase (AST/SGOT): 7 (02-28-23 @ 11:03)  Aspartate Aminotransferase (AST/SGOT): 5 (02-27-23 @ 07:04)  Aspartate Aminotransferase (AST/SGOT): 7 (02-26-23 @ 06:48)  Aspartate Aminotransferase (AST/SGOT): 6 (02-25-23 @ 06:43)  Bilirubin Total: 0.4 (07-20)  Bilirubin Total, Serum: 0.2 (02-28)  Bilirubin Total, Serum: 0.2 (02-27)  Bilirubin Total, Serum: 0.2 (02-26)  Bilirubin Total, Serum: 0.2 (02-25)    Trend LDH    Auto Eosinophil %: 0.8 % (07-20-23 @ 10:13)    Urinalysis Basic - ( 20 Jul 2023 10:13 )    Color: x / Appearance: x / SG: x / pH: x  Gluc: 79 mg/dL / Ketone: x  / Bili: x / Urobili: x   Blood: x / Protein: x / Nitrite: x   Leuk Esterase: x / RBC: x / WBC x   Sq Epi: x / Non Sq Epi: x / Bacteria: x      MICROBIOLOGY:        Culture - Abscess with Gram Stain (collected 20 Jul 2023 15:44)  Source: .Abscess uvula                                            Blood Gas Venous - Lactate: 2.0 (07-20 @ 10:13)        CSF:                RADIOLOGY:   Patient is a 37y old  Male who presents with a chief complaint of Fever, sore throat, voice changes, dyspnoea since last night. (21 Jul 2023 08:39)    HPI:   38 y/o M--patient with a history of ESRD on HD apparently started at age 27, previously had his care in [place], secondary hyperparathyroidism S/P parathyroidectomy in [place] in 12/2022 (seen by Eric Royal MD of  [phone]), essential HTN maintained on high doses of labetalol, last admission to [place] in Mar 2023 following missed HD session following patient driving himself from [place] to [place], with patient discharged Mar 2 2023, with patient self referring to the ER following fever, sore throat, voice changes and dyspnoea since last night.  No drooling.  NO chest pain/pressure.  NO palpitations.  Allergy to shellfish but patient did not intake any shellfish containing food.    REVIEW OF SYSTEMS  [  ] ROS unobtainable because:    [ x ] All other systems negative except as noted below  Constitutional:  [x ] fever [ ] chills  [ ] weight loss  [ ]night sweat  [ ]poor appetite/PO intake [ ]fatigue   Skin:  [ ] rash [ ] phlebitis	  Eyes: [ ] icterus [ ] pain  [ ] discharge	  ENMT: [ x] sore throat  [ ] thrush [ ] ulcers [ ] exudates [ ]anosmia  Respiratory: [ x] dyspnea [ ] hemoptysis [ ] cough [ ] sputum	  Cardiovascular:  [ ] chest pain [ ] palpitations [ ] edema	  Gastrointestinal:  [ ] nausea [ ] vomiting [ ] diarrhea [ ] constipation [ ] pain	  Genitourinary:  [ ] dysuria [ ] frequency [ ] hematuria [ ] discharge [ ] flank pain  [ ] incontinence  Musculoskeletal:  [ ] myalgias [ ] arthralgias [ ] arthritis  [ ] back pain  Neurological:  [ ] headache [ ] weakness [ ] seizures  [ ] confusion/altered mental status  prior hospital charts reviewed [V]  primary team notes reviewed [V]  other consultant notes reviewed [V]    PAST MEDICAL & SURGICAL HISTORY:  HTN (hypertension)  Stroke  age 10  Morbid obesity  Sleep apnea  Leg fracture, right  Chronic renal failure  Hemodialysis access, AV graft  ESRD (end stage renal disease) on dialysis  since 2013, M-W-F  Anemia, unspecified type  AV fistula  L arm    SOCIAL HISTORY:  - Denied smoking/vaping/alcohol/recreational drug use    FAMILY HISTORY:  No pertinent family history in first degree relatives        Allergies  shellfish (Hives)  No Known Drug Allergies        ANTIMICROBIALS:  cefTRIAXone   IVPB 2000 every 24 hours      ANTIMICROBIALS (past 90 days):  MEDICATIONS  (STANDING):  cefTRIAXone   IVPB   100 mL/Hr IV Intermittent (07-20-23 @ 10:18)    clindamycin IVPB   100 mL/Hr IV Intermittent (07-20-23 @ 10:50)    vancomycin  IVPB   166.67 mL/Hr IV Intermittent (07-20-23 @ 20:56)        OTHER MEDS:   MEDICATIONS  (STANDING):  dexAMETHasone  IVPB 10 every 8 hours  dextrose 50% Injectable 25 once  dextrose 50% Injectable 25 once  dextrose 50% Injectable 12.5 once  dextrose Oral Gel 15 once PRN  glucagon  Injectable 1 once  heparin   Injectable 5000 every 8 hours  insulin lispro (ADMELOG) corrective regimen sliding scale  every 6 hours  labetalol 200 two times a day  oxyCODONE    IR 5 every 6 hours PRN      VITALS:  Vital Signs Last 24 Hrs  T(F): 98.1 (07-21-23 @ 04:17), Max: 100.6 (07-20-23 @ 09:50)    Vital Signs Last 24 Hrs  HR: 77 (07-21-23 @ 04:17) (77 - 95)  BP: 160/85 (07-21-23 @ 04:17) (142/77 - 171/91)  RR: 18 (07-21-23 @ 04:17)  SpO2: 97% (07-21-23 @ 04:17) (95% - 99%)  Wt(kg): --    EXAM:    GA: NAD, AOx3  HEENT: oral cavity no lesion, improving edema to uvula, tender on papl to left neck lymphnode  CV: nl S1/S2, no RMG  Lungs: CTAB, No distress  Abd: BS+, soft, nontender, no rebounding pain  Ext: no edema  Neuro: No focal deficits  Skin: Intact, fistula + bruit no C/D/I  IV: no phlebitis  Labs:                        8.3    6.39  )-----------( 233      ( 20 Jul 2023 10:13 )             26.5     07-20    138  |  92<L>  |  74<H>  ----------------------------<  79  5.4<H>   |  24  |  11.05<H>    Ca    9.0      20 Jul 2023 10:13    TPro  7.2  /  Alb  3.9  /  TBili  0.4  /  DBili  x   /  AST  8<L>  /  ALT  5<L>  /  AlkPhos  1107<H>  07-20    WBC Trend:  WBC Count: 6.39 (07-20-23 @ 10:13)    Auto Neutrophil #: 4.45 K/uL (07-20-23 @ 10:13)  Auto Neutrophil #: 1.84 K/uL (02-24-23 @ 11:32)  Auto Neutrophil #: 3.21 K/uL (10-04-22 @ 11:01)    Creatine Trend:  Creatinine: 11.05 (07-20)    Liver Biochemical Testing Trend:  Alanine Aminotransferase (ALT/SGPT): 5 *L* (07-20)  Alanine Aminotransferase (ALT/SGPT): 5 *L* (02-28)  Alanine Aminotransferase (ALT/SGPT): 5 *L* (02-27)  Alanine Aminotransferase (ALT/SGPT): <5 *L* (02-26)  Alanine Aminotransferase (ALT/SGPT): 5 *L* (02-25)  Aspartate Aminotransferase (AST/SGOT): 8 (07-20-23 @ 10:13)  Aspartate Aminotransferase (AST/SGOT): 7 (02-28-23 @ 11:03)  Aspartate Aminotransferase (AST/SGOT): 5 (02-27-23 @ 07:04)  Aspartate Aminotransferase (AST/SGOT): 7 (02-26-23 @ 06:48)  Aspartate Aminotransferase (AST/SGOT): 6 (02-25-23 @ 06:43)  Bilirubin Total: 0.4 (07-20)  Bilirubin Total, Serum: 0.2 (02-28)  Bilirubin Total, Serum: 0.2 (02-27)  Bilirubin Total, Serum: 0.2 (02-26)  Bilirubin Total, Serum: 0.2 (02-25)    Trend LDH    Auto Eosinophil %: 0.8 % (07-20-23 @ 10:13)    Urinalysis Basic - ( 20 Jul 2023 10:13 )    Color: x / Appearance: x / SG: x / pH: x  Gluc: 79 mg/dL / Ketone: x  / Bili: x / Urobili: x   Blood: x / Protein: x / Nitrite: x   Leuk Esterase: x / RBC: x / WBC x   Sq Epi: x / Non Sq Epi: x / Bacteria: x      MICROBIOLOGY:  Culture - Abscess with Gram Stain (collected 20 Jul 2023 15:44)  Source: .Abscess uvula  Blood Gas Venous - Lactate: 2.0 (07-20 @ 10:13)  CSF:    RADIOLOGY:  < from: US Abdomen Upper Quadrant Right (07.21.23 @ 08:50) >  COMPARISON: Renal Doppler 12/19/2013    TECHNIQUE: Sonography of the right upper quadrant.    FINDINGS:  Liver: 18 cm Within normal limits.  Bile ducts: Normal caliber. Common bile duct measures 3.1 mm.  Gallbladder: Within normal limits.  Pancreas: Limited evaluation of pancreatic head due to overlying bowel   gas. Dilated main pancreatic duct in the body of the pancreas measuring   3.8 mm  Right kidney: 7.4 cm, atrophic in appearance with multiple simple cysts,   largest measuring 0.9 x 0.7 x 0.6 cm. When compared to renal Doppler   12/19/2013 right kidney has decreased in size, previously measuring 11.5   cm without any visualized cysts. No hydronephrosis.  Ascites: Trace  IVC: Visualized portions are within normal limits.    IMPRESSION:    1.  Mildly dilated main pancreatic duct as described above. Due to   limited evaluation of pancreatic head cannot exclude obstruction.   Recommend MRCP for better visualization of the pancreas.  2.  Common bile duct, gallbladder and intrahepatic biliary tree within   normal limits  3.  Atrophic right kidney with multiple simple cysts as described above      < end of copied text >  < from: CT Neck Soft Tissue No Cont (07.20.23 @ 12:17) >  FINDINGS: Evaluation is limited secondary to exclusion of IV contrast.    There is new asymmetric enlargement of the left palatine tonsil is seen   compared to the contralateral side. Mild edematous changes also seen in   conjunction.    Otherwise, the noncontrast appearance to the aerodigestive tract appears   unremarkable.    Enlarged bilateral cervical lymph nodes are seen which are likely   reactive.    The noncontrast appearance to the salivary glands appear unremarkable as   well as the thyroid gland. Previously seen right-sided parathyroid   adenoma was better evaluated on the prior contrast enhanced CT exam.    Evaluation of the neck vessels is limited signal to exclude contrast.    The imaged intracranial structures and orbital compartments appear   unremarkable.    There is a diffuse heterogeneous mixed lucent and sclerotic appearance to   the osseous structures, compatible with renal osteodystrophy. Expansion   of the mandible, maxilla, and calvarium is seen. Underlying brown tumor   formation is not excluded within the mandible and maxilla.    The mandible, maxilla, and zygomatic arches appear intact. TMJ   arthropathy is notable.    Mild convex superior endplate deformities are seen within the lower   cervicaland upper thoracic vertebral bodies.    The imaged portions of the lungs are clear.    IMPRESSION:    1. Limited study secondary to exclusion of IV contrast.    2. New enlargement and edematous changes involving the left palatine   tonsil suspicious for possible palatine tonsillitis, when compared with   7/9/2023. Evaluation for underlying abscess is extremely limited   secondary to exclusion of IV contrast.    3. Reactively enlarged bilateral cervical lymph nodes.    4. Redemonstration of extensive renal osteodystrophy.    5. Previously seen large right-sided parathyroid adenoma was better   evaluated on the prior contrast enhanced CT examination of the neck.    --- End of Report ---    < end of copied text >  < from: Xray Chest 1 View- PORTABLE-Urgent (07.20.23 @ 11:16) >    INTERPRETATION:  CLINICAL INFORMATION: Cough    TECHNIQUE: Frontal radiograph of the chest    COMPARISON: Chest radiograph8/13/2017    FINDINGS:    No focal consolidation. No pleural effusion. No pneumothorax. Cardiac   silhouette size cannot be accurately assessed on this projection. No   acute osseous findings.    IMPRESSION:    No focal consolidation.    < end of copied text >  < from: CT 4D Parathyroid w/ IV Cont (07.09.23 @ 15:32) >    FINDINGS:    PARATHYROID GLANDS:  There is a 2.3 x 1.7 x 3.9 cm hypodense nodule, located along the right   lateral trachea, deep to the right thyroid lower pole (4-57, 605-71). It   demonstrates arterial phase enhancement with delayed phase washout and is   associated with a polar vessel. This is consistent with parathyroid   adenoma. The upper border of this lesion is located 0.7 cm inferior to   the undersurface of the cricoid cartilage.    There are no additional lesions along orthotopic or ectopic parathyroid   locations which demonstrate imaging characteristics consistent with an   additional parathyroid adenoma or supernumerary parathyroid gland.    THYROID GLAND: Unremarkable.    VASCULAR: Unremarkable.    AERODIGESTIVE TRACT: Unremarkable.    LYMPH NODES: There are nonspecific subcentimeter nodes which do not meet   imaging criteria for pathologic lymphadenopathy..    VISUALIZED PAROTID AND SUBMANDIBULAR GLANDS: Unremarkable.    BONE: Extensive sclerotic appearance of the osseous structures,   compatible with renal osteodystrophy in this dialysis patient.  In addition, there are expansile hypodense components involving the   bilateral anterior mandible and maxilla as well as lytic lesions   throughout the cervical spine (4-36, 41, 46).    VISUALIZED LUNG APICES: Within normal limits.    VISUALIZED INTRACRANIAL CONTENTS: Unremarkable.      IMPRESSION:  There is a 2.3 x 1.7 x 3.9 cm hypodense nodule, located along the right   lateral trachea, deep to the right thyroid lower pole (4-57, 605-71). It   demonstrates arterial phase enhancement with delayed phase washout and is   associated with a polar vessel. This is consistent with parathyroid   adenoma.    Diffuse renal osteodystrophy as well as expansile hypodense/lytic areas   involving the the anterior mandible and maxilla and lytic lesions in the   cervical spine suggestive of extensive brown tumor formation.      < end of copied text >   Patient is a 37y old  Male who presents with a chief complaint of Fever, sore throat, voice changes, dyspnoea since last night. (21 Jul 2023 08:39)    HPI:   38 y/o M--patient with a history of ESRD on HD apparently started at age 27, previously had his care in [place], secondary hyperparathyroidism S/P parathyroidectomy in [place] in 12/2022 (seen by Eric Royal MD of  [phone]), essential HTN maintained on high doses of labetalol, last admission to [facility] in Mar 2023 following missed HD session following patient driving himself from [place] to [place], with patient discharged Mar 2 2023, with patient self referring to the ER following fever, sore throat, voice changes and dyspnoea since last night.  No drooling.  NO chest pain/pressure.  NO palpitations.  Allergy to shellfish but patient did not intake any shellfish containing food.    REVIEW OF SYSTEMS  [  ] ROS unobtainable because:    [ x ] All other systems negative except as noted below  Constitutional:  [x ] fever [ ] chills  [ ] weight loss  [ ]night sweat  [ ]poor appetite/PO intake [ ]fatigue   Skin:  [ ] rash [ ] phlebitis	  Eyes: [ ] icterus [ ] pain  [ ] discharge	  ENMT: [ x] sore throat  [ ] thrush [ ] ulcers [ ] exudates [ ]anosmia  Respiratory: [ x] dyspnea [ ] hemoptysis [ ] cough [ ] sputum	  Cardiovascular:  [ ] chest pain [ ] palpitations [ ] edema	  Gastrointestinal:  [ ] nausea [ ] vomiting [ ] diarrhea [ ] constipation [ ] pain	  Genitourinary:  [ ] dysuria [ ] frequency [ ] hematuria [ ] discharge [ ] flank pain  [ ] incontinence  Musculoskeletal:  [ ] myalgias [ ] arthralgias [ ] arthritis  [ ] back pain  Neurological:  [ ] headache [ ] weakness [ ] seizures  [ ] confusion/altered mental status  psych: no anxiety  prior hospital charts reviewed [V]  primary team notes reviewed [V]  other consultant notes reviewed [V]    PAST MEDICAL & SURGICAL HISTORY:  HTN (hypertension)  Stroke  age 10  Morbid obesity  Sleep apnea  Leg fracture, right  Chronic renal failure  Hemodialysis access, AV graft  ESRD (end stage renal disease) on dialysis  since 2013, M-W-F  Anemia, unspecified type  AV fistula  L arm    SOCIAL HISTORY:   Denied smoking/vaping/alcohol/recreational drug use    FAMILY HISTORY:  no recent febrile illness in family memebers        Allergies  shellfish (Hives)  No Known Drug Allergies        ANTIMICROBIALS:  cefTRIAXone   IVPB 2000 every 24 hours      ANTIMICROBIALS (past 90 days):  MEDICATIONS  (STANDING):  cefTRIAXone   IVPB   100 mL/Hr IV Intermittent (07-20-23 @ 10:18)    clindamycin IVPB   100 mL/Hr IV Intermittent (07-20-23 @ 10:50)    vancomycin  IVPB   166.67 mL/Hr IV Intermittent (07-20-23 @ 20:56)        OTHER MEDS:   MEDICATIONS  (STANDING):  dexAMETHasone  IVPB 10 every 8 hours  dextrose 50% Injectable 25 once  dextrose 50% Injectable 25 once  dextrose 50% Injectable 12.5 once  dextrose Oral Gel 15 once PRN  glucagon  Injectable 1 once  heparin   Injectable 5000 every 8 hours  insulin lispro (ADMELOG) corrective regimen sliding scale  every 6 hours  labetalol 200 two times a day  oxyCODONE    IR 5 every 6 hours PRN      VITALS:  Vital Signs Last 24 Hrs  T(F): 98.1 (07-21-23 @ 04:17), Max: 100.6 (07-20-23 @ 09:50)    Vital Signs Last 24 Hrs  HR: 77 (07-21-23 @ 04:17) (77 - 95)  BP: 160/85 (07-21-23 @ 04:17) (142/77 - 171/91)  RR: 18 (07-21-23 @ 04:17)  SpO2: 97% (07-21-23 @ 04:17) (95% - 99%)  Wt(kg): --    EXAM:    GA: NAD, AOx3  Head: atraumatic, normocephalic  Eyes: anicteric  ENT: edema to uvula and pharynx, tender on papl to left neck lymph node  CV: nl S1/S2, no RMG  Lungs: CTAB, No distress  Abd: BS+, soft, nontender, no rebounding pain  : no suprapubic or CVA tendernes  Ext: no edema  Neuro: No focal deficits  Skin: no rash  vascular: RUE fistula + bruit no C/D/I  psych: normal affect    Labs:                        8.3    6.39  )-----------( 233      ( 20 Jul 2023 10:13 )             26.5     07-20    138  |  92<L>  |  74<H>  ----------------------------<  79  5.4<H>   |  24  |  11.05<H>    Ca    9.0      20 Jul 2023 10:13    TPro  7.2  /  Alb  3.9  /  TBili  0.4  /  DBili  x   /  AST  8<L>  /  ALT  5<L>  /  AlkPhos  1107<H>  07-20    WBC Trend:  WBC Count: 6.39 (07-20-23 @ 10:13)    Auto Neutrophil #: 4.45 K/uL (07-20-23 @ 10:13)  Auto Neutrophil #: 1.84 K/uL (02-24-23 @ 11:32)  Auto Neutrophil #: 3.21 K/uL (10-04-22 @ 11:01)    Creatine Trend:  Creatinine: 11.05 (07-20)    Liver Biochemical Testing Trend:  Alanine Aminotransferase (ALT/SGPT): 5 *L* (07-20)  Alanine Aminotransferase (ALT/SGPT): 5 *L* (02-28)  Alanine Aminotransferase (ALT/SGPT): 5 *L* (02-27)  Alanine Aminotransferase (ALT/SGPT): <5 *L* (02-26)  Alanine Aminotransferase (ALT/SGPT): 5 *L* (02-25)  Aspartate Aminotransferase (AST/SGOT): 8 (07-20-23 @ 10:13)  Aspartate Aminotransferase (AST/SGOT): 7 (02-28-23 @ 11:03)  Aspartate Aminotransferase (AST/SGOT): 5 (02-27-23 @ 07:04)  Aspartate Aminotransferase (AST/SGOT): 7 (02-26-23 @ 06:48)  Aspartate Aminotransferase (AST/SGOT): 6 (02-25-23 @ 06:43)  Bilirubin Total: 0.4 (07-20)  Bilirubin Total, Serum: 0.2 (02-28)  Bilirubin Total, Serum: 0.2 (02-27)  Bilirubin Total, Serum: 0.2 (02-26)  Bilirubin Total, Serum: 0.2 (02-25)    Trend LDH    Auto Eosinophil %: 0.8 % (07-20-23 @ 10:13)    Urinalysis Basic - ( 20 Jul 2023 10:13 )    Color: x / Appearance: x / SG: x / pH: x  Gluc: 79 mg/dL / Ketone: x  / Bili: x / Urobili: x   Blood: x / Protein: x / Nitrite: x   Leuk Esterase: x / RBC: x / WBC x   Sq Epi: x / Non Sq Epi: x / Bacteria: x      MICROBIOLOGY:  Culture - Abscess with Gram Stain (collected 20 Jul 2023 15:44)  Source: .Abscess uvula  Blood Gas Venous - Lactate: 2.0 (07-20 @ 10:13)  CSF:    RADIOLOGY:  < from: US Abdomen Upper Quadrant Right (07.21.23 @ 08:50) >  COMPARISON: Renal Doppler 12/19/2013    TECHNIQUE: Sonography of the right upper quadrant.    FINDINGS:  Liver: 18 cm Within normal limits.  Bile ducts: Normal caliber. Common bile duct measures 3.1 mm.  Gallbladder: Within normal limits.  Pancreas: Limited evaluation of pancreatic head due to overlying bowel   gas. Dilated main pancreatic duct in the body of the pancreas measuring   3.8 mm  Right kidney: 7.4 cm, atrophic in appearance with multiple simple cysts,   largest measuring 0.9 x 0.7 x 0.6 cm. When compared to renal Doppler   12/19/2013 right kidney has decreased in size, previously measuring 11.5   cm without any visualized cysts. No hydronephrosis.  Ascites: Trace  IVC: Visualized portions are within normal limits.    IMPRESSION:    1.  Mildly dilated main pancreatic duct as described above. Due to   limited evaluation of pancreatic head cannot exclude obstruction.   Recommend MRCP for better visualization of the pancreas.  2.  Common bile duct, gallbladder and intrahepatic biliary tree within   normal limits  3.  Atrophic right kidney with multiple simple cysts as described above      < end of copied text >  < from: CT Neck Soft Tissue No Cont (07.20.23 @ 12:17) >  FINDINGS: Evaluation is limited secondary to exclusion of IV contrast.    There is new asymmetric enlargement of the left palatine tonsil is seen   compared to the contralateral side. Mild edematous changes also seen in   conjunction.    Otherwise, the noncontrast appearance to the aerodigestive tract appears   unremarkable.    Enlarged bilateral cervical lymph nodes are seen which are likely   reactive.    The noncontrast appearance to the salivary glands appear unremarkable as   well as the thyroid gland. Previously seen right-sided parathyroid   adenoma was better evaluated on the prior contrast enhanced CT exam.    Evaluation of the neck vessels is limited signal to exclude contrast.    The imaged intracranial structures and orbital compartments appear   unremarkable.    There is a diffuse heterogeneous mixed lucent and sclerotic appearance to   the osseous structures, compatible with renal osteodystrophy. Expansion   of the mandible, maxilla, and calvarium is seen. Underlying brown tumor   formation is not excluded within the mandible and maxilla.    The mandible, maxilla, and zygomatic arches appear intact. TMJ   arthropathy is notable.    Mild convex superior endplate deformities are seen within the lower   cervicaland upper thoracic vertebral bodies.    The imaged portions of the lungs are clear.    IMPRESSION:    1. Limited study secondary to exclusion of IV contrast.    2. New enlargement and edematous changes involving the left palatine   tonsil suspicious for possible palatine tonsillitis, when compared with   7/9/2023. Evaluation for underlying abscess is extremely limited   secondary to exclusion of IV contrast.    3. Reactively enlarged bilateral cervical lymph nodes.    4. Redemonstration of extensive renal osteodystrophy.    5. Previously seen large right-sided parathyroid adenoma was better   evaluated on the prior contrast enhanced CT examination of the neck.    --- End of Report ---    < end of copied text >  < from: Xray Chest 1 View- PORTABLE-Urgent (07.20.23 @ 11:16) >    INTERPRETATION:  CLINICAL INFORMATION: Cough    TECHNIQUE: Frontal radiograph of the chest    COMPARISON: Chest radiograph8/13/2017    FINDINGS:    No focal consolidation. No pleural effusion. No pneumothorax. Cardiac   silhouette size cannot be accurately assessed on this projection. No   acute osseous findings.    IMPRESSION:    No focal consolidation.    < end of copied text >  < from: CT 4D Parathyroid w/ IV Cont (07.09.23 @ 15:32) >    FINDINGS:    PARATHYROID GLANDS:  There is a 2.3 x 1.7 x 3.9 cm hypodense nodule, located along the right   lateral trachea, deep to the right thyroid lower pole (4-57, 605-71). It   demonstrates arterial phase enhancement with delayed phase washout and is   associated with a polar vessel. This is consistent with parathyroid   adenoma. The upper border of this lesion is located 0.7 cm inferior to   the undersurface of the cricoid cartilage.    There are no additional lesions along orthotopic or ectopic parathyroid   locations which demonstrate imaging characteristics consistent with an   additional parathyroid adenoma or supernumerary parathyroid gland.    THYROID GLAND: Unremarkable.    VASCULAR: Unremarkable.    AERODIGESTIVE TRACT: Unremarkable.    LYMPH NODES: There are nonspecific subcentimeter nodes which do not meet   imaging criteria for pathologic lymphadenopathy..    VISUALIZED PAROTID AND SUBMANDIBULAR GLANDS: Unremarkable.    BONE: Extensive sclerotic appearance of the osseous structures,   compatible with renal osteodystrophy in this dialysis patient.  In addition, there are expansile hypodense components involving the   bilateral anterior mandible and maxilla as well as lytic lesions   throughout the cervical spine (4-36, 41, 46).    VISUALIZED LUNG APICES: Within normal limits.    VISUALIZED INTRACRANIAL CONTENTS: Unremarkable.      IMPRESSION:  There is a 2.3 x 1.7 x 3.9 cm hypodense nodule, located along the right   lateral trachea, deep to the right thyroid lower pole (4-57, 605-71). It   demonstrates arterial phase enhancement with delayed phase washout and is   associated with a polar vessel. This is consistent with parathyroid   adenoma.    Diffuse renal osteodystrophy as well as expansile hypodense/lytic areas   involving the the anterior mandible and maxilla and lytic lesions in the   cervical spine suggestive of extensive brown tumor formation.      < end of copied text >

## 2023-07-21 NOTE — DIETITIAN INITIAL EVALUATION ADULT - ETIOLOGY
likely suboptimal energy/protein intake iso increased needs and hyperparathyroidism increased demand

## 2023-07-21 NOTE — CONSULT NOTE ADULT - PROBLEM SELECTOR RECOMMENDATION 9
ESRD 2/2 HTN, on HD x10 years. HD M/W/F via LUE AV graft, no missed HD sessions prior to admission. SCr 13.22, Na 133, K 6.0, SCr 13.22, BUN 98. Ca 7.7, phosphorous level not obtained.   - HD M/W/F. HD today 7/21, will target 3.5 L removal  - Trend BMP, Mg, Phos  - please obtain phosphorous, vitamin D levels  - continue home phoslo 667 mg 2 tabs PO TID with meals  - optimize BPs with HD, can continue labetalol 200 mg BID. Patient states he titrates labetalol dosing at home based on home BP readings (typically takes 100 mg BID, takes up to 200 mg BID when needed, sometimes holds doses entirely if BPs well controlled) - consider adjusting rx upon d/c ESRD 2/2 HTN, on HD x10 years. HD M/W/F via LUE AV graft, no missed HD sessions prior to admission. SCr 13.22, Na 133, K 6.0, SCr 13.22, BUN 98. Ca 7.7, phosphorous level not obtained.   - HD M/W/F. HD today 7/21, will target 3.5 L removal  - Trend BMP, Mg, Phos  - please obtain phosphorous, iPTH level  - continue home phoslo 667 mg 2 tabs PO TID with meals  - optimize BPs with HD, can continue labetalol 200 mg BID. Patient states he titrates labetalol dosing at home based on home BP readings (typically takes 100 mg BID, takes up to 200 mg BID when needed, sometimes holds doses entirely if BPs well controlled) - consider adjusting rx upon d/c

## 2023-07-21 NOTE — DIETITIAN INITIAL EVALUATION ADULT - ENERGY INTAKE
Poor (<50%) Pt NPO on admission with ice chips/sips of water 2/2 dysphagia/risk of aspiration. Pt unhappy with NPO status and attempting to refuse HD today if he couldn't eat. Now advanced to soft+bite sized diet due to reports of improvement in symptoms. Per ENT documentation, "Indirect laryngoscopy performed which showed L palatine tonsil edema, otherwise no laryngeal edema noted, airway patent"." Pt NPO on admission with ice chips/sips of water 2/2 dysphagia/risk of aspiration. Pt unhappy with NPO status and almost refused HD today if he couldn't eat. Now advanced to soft+bite sized diet due to reports of improvement in symptoms. Per ENT documentation, "Indirect laryngoscopy performed which showed L palatine tonsil edema, otherwise no laryngeal edema noted, airway patent." Pt reports feeling hungry and is amenable to Nepro supplements. Renal menu provided and instructed Pt on how to call diet office to place preferred orders. Pt appreciative.

## 2023-07-21 NOTE — CONSULT NOTE ADULT - SUBJECTIVE AND OBJECTIVE BOX
[facility] DIVISION OF KIDNEY DISEASES AND HYPERTENSION -- [phone]  -- INITIAL CONSULT NOTE  --------------------------------------------------------------------------------  HPI:  38 y/o M--patient with a history of ESRD on HD x10 years, secondary hyperparathyroidism S/P parathyroidectomy in [place] in 12/2022, essential HTN maintained on high doses of labetalol, last admission to [place] in Mar 2023 following missed HD session following patient driving himself from [place] to [place], with patient discharged Mar 2 2023. Patient presented to ED 7/20 for fever, sore throat, voice changes and dyspnea x1 day. No drooling, no chest pain/pressure, no palpitations. Allergy to shellfish but patient did not intake any shellfish containing food. Admitted to medicine, found to have uvulitis and started on IV ceftriaxone, decadron.     Nephrology consulted for ESRD/HD management. Patient gets HD via LUE AV graft M/W/F, last HD 7/19. ESRD 2/2 HTN. BP managed with labetalol, at home usually takes 100 mg BID however titrates dose based on BPs at home - sometimes takes up to 200 BID and sometimes holds labetalol doses. No family history ESRD, states he is scheduled for upcoming initial appointment for kidney transplant evaluation at [facility]. Takes phoslo 667 TID with meals, gets EPO with HD. 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     PAST HISTORY  --------------------------------------------------------------------------------  PAST MEDICAL & SURGICAL HISTORY:  HTN (hypertension)      Stroke  age 10      Morbid obesity      Sleep apnea      Leg fracture, right      Chronic renal failure      Hemodialysis access, AV graft      ESRD (end stage renal disease) on dialysis  since 2013, M-W-F      Anemia, unspecified type      AV fistula  L arm        FAMILY HISTORY:  No pertinent family history in first degree relatives      PAST SOCIAL HISTORY:    ALLERGIES & MEDICATIONS  --------------------------------------------------------------------------------  Allergies    shellfish (Hives)  No Known Drug Allergies    Intolerances      Standing Inpatient Medications  cefTRIAXone   IVPB 2000 milliGRAM(s) IV Intermittent every 24 hours  chlorhexidine 2% Cloths 1 Application(s) Topical <User Schedule>  dexAMETHasone  IVPB 10 milliGRAM(s) IV Intermittent every 8 hours  dextrose 5%. 1000 milliLiter(s) IV Continuous <Continuous>  dextrose 5%. 1000 milliLiter(s) IV Continuous <Continuous>  dextrose 50% Injectable 25 Gram(s) IV Push once  dextrose 50% Injectable 12.5 Gram(s) IV Push once  dextrose 50% Injectable 25 Gram(s) IV Push once  epoetin carito (PROCRIT) Injectable 86742 Unit(s) SubCutaneous <User Schedule>  glucagon  Injectable 1 milliGRAM(s) IntraMuscular once  heparin   Injectable 5000 Unit(s) SubCutaneous every 8 hours  insulin lispro (ADMELOG) corrective regimen sliding scale   SubCutaneous every 6 hours  labetalol 200 milliGRAM(s) Oral two times a day    PRN Inpatient Medications  dextrose Oral Gel 15 Gram(s) Oral once PRN  oxyCODONE    IR 5 milliGRAM(s) Oral every 6 hours PRN      REVIEW OF SYSTEMS  --------------------------------------------------------------------------------  Gen: No fevers/chills  Head/Eyes/Ears: No HA  Respiratory: No dyspnea, cough  CV: No chest pain  GI: No abdominal pain, diarrhea  : No dysuria, hematuria  MSK: No  edema  Skin: No rashes  Heme: No easy bruising or bleeding    All other systems were reviewed and are negative, except as noted.    VITALS/PHYSICAL EXAM  --------------------------------------------------------------------------------  T(C): 36.6 (07-21-23 @ 10:30), Max: 37.2 (07-20-23 @ 14:12)  HR: 65 (07-21-23 @ 10:30) (65 - 89)  BP: 159/101 (07-21-23 @ 10:30) (142/77 - 160/85)  RR: 18 (07-21-23 @ 10:30) (18 - 20)  SpO2: 98% (07-21-23 @ 10:30) (97% - 98%)  Wt(kg): --  Height (cm): 188 (07-20-23 @ 09:27)  Weight (kg): 95 (07-20-23 @ 09:27)  BMI (kg/m2): 26.9 (07-20-23 @ 09:27)  BSA (m2): 2.22 (07-20-23 @ 09:27)    Physical Exam:  	Gen: NAD, chronically ill-appearing  	HEENT: Anicteric  	Pulm: CTA B/L  	CV: S1S2+  	Abd: Soft, +BS    	Ext: No LE edema B/L  	Neuro: Awake, A&Ox3, no focal deficits  	Skin: Warm and dry  	Dialysis access: LUE AV graft    LABS/STUDIES  --------------------------------------------------------------------------------              7.7    5.22  >-----------<  207      [07-21-23 @ 10:27]              24.4     133  |  90  |  98  ----------------------------<  115      [07-21-23 @ 10:27]  6.0   |  22  |  13.22        Ca     7.7     [07-21-23 @ 10:27]    TPro  7.2  /  Alb  3.9  /  TBili  0.4  /  DBili  x   /  AST  8   /  ALT  5   /  AlkPhos  1107  [07-20-23 @ 10:13]    PT/INR: PT 11.9 , INR 1.03       [07-20-23 @ 10:13]  PTT: 29.0       [07-20-23 @ 10:13]      Creatinine Trend:  SCr 13.22 [07-21 @ 10:27]  SCr 11.05 [07-20 @ 10:13]    Urinalysis - [07-21-23 @ 10:27]      Color  / Appearance  / SG  / pH       Gluc 115 / Ketone   / Bili  / Urobili        Blood  / Protein  / Leuk Est  / Nitrite       RBC  / WBC  / Hyaline  / Gran  / Sq Epi  / Non Sq Epi  / Bacteria       HBsAb 28.5      [02-24-23 @ 22:27]  HBsAb Reactive      [02-24-23 @ 22:27]  HBsAg Nonreact      [02-24-23 @ 22:27]  HCV 0.26, Nonreact      [02-24-23 @ 22:27]      Tacrolimus  Cyclosporine  Sirolimus  Mycophenolate  BK PCR  CMV PCR  Parvo PCR  EBV PCR

## 2023-07-22 LAB
CULTURE RESULTS: SIGNIFICANT CHANGE UP
MRSA PCR RESULT.: SIGNIFICANT CHANGE UP
POTASSIUM SERPL-MCNC: 4.5 MMOL/L — SIGNIFICANT CHANGE UP (ref 3.5–5.3)
POTASSIUM SERPL-SCNC: 4.5 MMOL/L — SIGNIFICANT CHANGE UP (ref 3.5–5.3)
S AUREUS DNA NOSE QL NAA+PROBE: SIGNIFICANT CHANGE UP
SPECIMEN SOURCE: SIGNIFICANT CHANGE UP

## 2023-07-22 RX ADMIN — OXYCODONE HYDROCHLORIDE 5 MILLIGRAM(S): 5 TABLET ORAL at 13:24

## 2023-07-22 RX ADMIN — HEPARIN SODIUM 5000 UNIT(S): 5000 INJECTION INTRAVENOUS; SUBCUTANEOUS at 21:49

## 2023-07-22 RX ADMIN — OXYCODONE HYDROCHLORIDE 5 MILLIGRAM(S): 5 TABLET ORAL at 02:04

## 2023-07-22 RX ADMIN — OXYCODONE HYDROCHLORIDE 5 MILLIGRAM(S): 5 TABLET ORAL at 16:08

## 2023-07-22 RX ADMIN — CHLORHEXIDINE GLUCONATE 1 APPLICATION(S): 213 SOLUTION TOPICAL at 06:23

## 2023-07-22 RX ADMIN — OXYCODONE HYDROCHLORIDE 5 MILLIGRAM(S): 5 TABLET ORAL at 02:48

## 2023-07-22 RX ADMIN — HEPARIN SODIUM 5000 UNIT(S): 5000 INJECTION INTRAVENOUS; SUBCUTANEOUS at 13:37

## 2023-07-22 RX ADMIN — CEFTRIAXONE 100 MILLIGRAM(S): 500 INJECTION, POWDER, FOR SOLUTION INTRAMUSCULAR; INTRAVENOUS at 13:32

## 2023-07-22 RX ADMIN — OXYCODONE HYDROCHLORIDE 5 MILLIGRAM(S): 5 TABLET ORAL at 22:06

## 2023-07-22 RX ADMIN — HEPARIN SODIUM 5000 UNIT(S): 5000 INJECTION INTRAVENOUS; SUBCUTANEOUS at 06:49

## 2023-07-22 RX ADMIN — OXYCODONE HYDROCHLORIDE 5 MILLIGRAM(S): 5 TABLET ORAL at 21:53

## 2023-07-22 RX ADMIN — Medication 1 TABLET(S): at 11:13

## 2023-07-22 RX ADMIN — Medication 200 MILLIGRAM(S): at 06:49

## 2023-07-22 RX ADMIN — Medication 200 MILLIGRAM(S): at 18:48

## 2023-07-23 LAB
ANION GAP SERPL CALC-SCNC: 19 MMOL/L — HIGH (ref 5–17)
BUN SERPL-MCNC: 94 MG/DL — HIGH (ref 7–23)
CALCIUM SERPL-MCNC: 7.4 MG/DL — LOW (ref 8.4–10.5)
CHLORIDE SERPL-SCNC: 94 MMOL/L — LOW (ref 96–108)
CO2 SERPL-SCNC: 25 MMOL/L — SIGNIFICANT CHANGE UP (ref 22–31)
CREAT SERPL-MCNC: 11.32 MG/DL — HIGH (ref 0.5–1.3)
EGFR: 5 ML/MIN/1.73M2 — LOW
GLUCOSE SERPL-MCNC: 94 MG/DL — SIGNIFICANT CHANGE UP (ref 70–99)
POTASSIUM SERPL-MCNC: 4.9 MMOL/L — SIGNIFICANT CHANGE UP (ref 3.5–5.3)
POTASSIUM SERPL-SCNC: 4.9 MMOL/L — SIGNIFICANT CHANGE UP (ref 3.5–5.3)
SARS-COV-2 RNA SPEC QL NAA+PROBE: SIGNIFICANT CHANGE UP
SODIUM SERPL-SCNC: 138 MMOL/L — SIGNIFICANT CHANGE UP (ref 135–145)

## 2023-07-23 RX ORDER — ALPRAZOLAM 0.25 MG
0.25 TABLET ORAL ONCE
Refills: 0 | Status: DISCONTINUED | OUTPATIENT
Start: 2023-07-23 | End: 2023-07-23

## 2023-07-23 RX ADMIN — CHLORHEXIDINE GLUCONATE 1 APPLICATION(S): 213 SOLUTION TOPICAL at 05:35

## 2023-07-23 RX ADMIN — HEPARIN SODIUM 5000 UNIT(S): 5000 INJECTION INTRAVENOUS; SUBCUTANEOUS at 21:31

## 2023-07-23 RX ADMIN — HEPARIN SODIUM 5000 UNIT(S): 5000 INJECTION INTRAVENOUS; SUBCUTANEOUS at 05:40

## 2023-07-23 RX ADMIN — OXYCODONE HYDROCHLORIDE 5 MILLIGRAM(S): 5 TABLET ORAL at 22:40

## 2023-07-23 RX ADMIN — Medication 1 TABLET(S): at 12:11

## 2023-07-23 RX ADMIN — CEFTRIAXONE 100 MILLIGRAM(S): 500 INJECTION, POWDER, FOR SOLUTION INTRAMUSCULAR; INTRAVENOUS at 14:00

## 2023-07-23 RX ADMIN — Medication 0.25 MILLIGRAM(S): at 05:37

## 2023-07-23 RX ADMIN — OXYCODONE HYDROCHLORIDE 5 MILLIGRAM(S): 5 TABLET ORAL at 23:10

## 2023-07-23 RX ADMIN — Medication 200 MILLIGRAM(S): at 19:12

## 2023-07-23 RX ADMIN — Medication 0.25 MILLIGRAM(S): at 22:40

## 2023-07-23 NOTE — PROGRESS NOTE ADULT - NUTRITIONAL ASSESSMENT
This patient has been assessed with a concern for Malnutrition and has been determined to have a diagnosis/diagnoses of Severe protein-calorie malnutrition.    This patient is being managed with:   Diet Soft and Bite Sized-  Supplement Feeding Modality:  Oral  Nepro Cans or Servings Per Day:  1       Frequency:  Three Times a day  Entered: Jul 21 2023  3:41PM  

## 2023-07-24 ENCOUNTER — TRANSCRIPTION ENCOUNTER (OUTPATIENT)
Age: 38
End: 2023-07-24

## 2023-07-24 VITALS
SYSTOLIC BLOOD PRESSURE: 147 MMHG | RESPIRATION RATE: 18 BRPM | HEART RATE: 72 BPM | TEMPERATURE: 98 F | DIASTOLIC BLOOD PRESSURE: 79 MMHG | OXYGEN SATURATION: 97 %

## 2023-07-24 LAB
ANION GAP SERPL CALC-SCNC: 22 MMOL/L — HIGH (ref 5–17)
BUN SERPL-MCNC: 104 MG/DL — HIGH (ref 7–23)
CALCIUM SERPL-MCNC: 7.3 MG/DL — LOW (ref 8.4–10.5)
CHLORIDE SERPL-SCNC: 91 MMOL/L — LOW (ref 96–108)
CO2 SERPL-SCNC: 22 MMOL/L — SIGNIFICANT CHANGE UP (ref 22–31)
CREAT SERPL-MCNC: 12.39 MG/DL — HIGH (ref 0.5–1.3)
EGFR: 5 ML/MIN/1.73M2 — LOW
GLUCOSE SERPL-MCNC: 61 MG/DL — LOW (ref 70–99)
HCT VFR BLD CALC: 25.6 % — LOW (ref 39–50)
HGB BLD-MCNC: 8 G/DL — LOW (ref 13–17)
MCHC RBC-ENTMCNC: 29.9 PG — SIGNIFICANT CHANGE UP (ref 27–34)
MCHC RBC-ENTMCNC: 31.3 GM/DL — LOW (ref 32–36)
MCV RBC AUTO: 95.5 FL — SIGNIFICANT CHANGE UP (ref 80–100)
NRBC # BLD: 0 /100 WBCS — SIGNIFICANT CHANGE UP (ref 0–0)
PHOSPHATE SERPL-MCNC: 7.1 MG/DL — HIGH (ref 2.5–4.5)
PLATELET # BLD AUTO: 261 K/UL — SIGNIFICANT CHANGE UP (ref 150–400)
POTASSIUM SERPL-MCNC: 5.6 MMOL/L — HIGH (ref 3.5–5.3)
POTASSIUM SERPL-SCNC: 5.6 MMOL/L — HIGH (ref 3.5–5.3)
RBC # BLD: 2.68 M/UL — LOW (ref 4.2–5.8)
RBC # FLD: 14.5 % — SIGNIFICANT CHANGE UP (ref 10.3–14.5)
SODIUM SERPL-SCNC: 135 MMOL/L — SIGNIFICANT CHANGE UP (ref 135–145)
WBC # BLD: 4.83 K/UL — SIGNIFICANT CHANGE UP (ref 3.8–10.5)
WBC # FLD AUTO: 4.83 K/UL — SIGNIFICANT CHANGE UP (ref 3.8–10.5)

## 2023-07-24 PROCEDURE — 80053 COMPREHEN METABOLIC PANEL: CPT

## 2023-07-24 PROCEDURE — 96375 TX/PRO/DX INJ NEW DRUG ADDON: CPT

## 2023-07-24 PROCEDURE — 85027 COMPLETE CBC AUTOMATED: CPT

## 2023-07-24 PROCEDURE — 87637 SARSCOV2&INF A&B&RSV AMP PRB: CPT

## 2023-07-24 PROCEDURE — 90935 HEMODIALYSIS ONE EVALUATION: CPT | Mod: GC

## 2023-07-24 PROCEDURE — 87641 MR-STAPH DNA AMP PROBE: CPT

## 2023-07-24 PROCEDURE — 99261: CPT

## 2023-07-24 PROCEDURE — 93005 ELECTROCARDIOGRAM TRACING: CPT

## 2023-07-24 PROCEDURE — 84132 ASSAY OF SERUM POTASSIUM: CPT

## 2023-07-24 PROCEDURE — 84100 ASSAY OF PHOSPHORUS: CPT

## 2023-07-24 PROCEDURE — 85014 HEMATOCRIT: CPT

## 2023-07-24 PROCEDURE — 87040 BLOOD CULTURE FOR BACTERIA: CPT

## 2023-07-24 PROCEDURE — 96376 TX/PRO/DX INJ SAME DRUG ADON: CPT

## 2023-07-24 PROCEDURE — 83036 HEMOGLOBIN GLYCOSYLATED A1C: CPT

## 2023-07-24 PROCEDURE — 82330 ASSAY OF CALCIUM: CPT

## 2023-07-24 PROCEDURE — 87070 CULTURE OTHR SPECIMN AEROBIC: CPT

## 2023-07-24 PROCEDURE — 99232 SBSQ HOSP IP/OBS MODERATE 35: CPT

## 2023-07-24 PROCEDURE — 87205 SMEAR GRAM STAIN: CPT

## 2023-07-24 PROCEDURE — 84295 ASSAY OF SERUM SODIUM: CPT

## 2023-07-24 PROCEDURE — 82435 ASSAY OF BLOOD CHLORIDE: CPT

## 2023-07-24 PROCEDURE — 87798 DETECT AGENT NOS DNA AMP: CPT

## 2023-07-24 PROCEDURE — 71045 X-RAY EXAM CHEST 1 VIEW: CPT

## 2023-07-24 PROCEDURE — 85018 HEMOGLOBIN: CPT

## 2023-07-24 PROCEDURE — 80048 BASIC METABOLIC PNL TOTAL CA: CPT

## 2023-07-24 PROCEDURE — 99285 EMERGENCY DEPT VISIT HI MDM: CPT | Mod: 25

## 2023-07-24 PROCEDURE — 87077 CULTURE AEROBIC IDENTIFY: CPT

## 2023-07-24 PROCEDURE — 70490 CT SOFT TISSUE NECK W/O DYE: CPT | Mod: MA

## 2023-07-24 PROCEDURE — 87640 STAPH A DNA AMP PROBE: CPT

## 2023-07-24 PROCEDURE — 83690 ASSAY OF LIPASE: CPT

## 2023-07-24 PROCEDURE — 96374 THER/PROPH/DIAG INJ IV PUSH: CPT

## 2023-07-24 PROCEDURE — 76705 ECHO EXAM OF ABDOMEN: CPT

## 2023-07-24 PROCEDURE — 87529 HSV DNA AMP PROBE: CPT

## 2023-07-24 PROCEDURE — 85025 COMPLETE CBC W/AUTO DIFF WBC: CPT

## 2023-07-24 PROCEDURE — 82947 ASSAY GLUCOSE BLOOD QUANT: CPT

## 2023-07-24 PROCEDURE — 82803 BLOOD GASES ANY COMBINATION: CPT

## 2023-07-24 PROCEDURE — 82962 GLUCOSE BLOOD TEST: CPT

## 2023-07-24 PROCEDURE — 85730 THROMBOPLASTIN TIME PARTIAL: CPT

## 2023-07-24 PROCEDURE — 36415 COLL VENOUS BLD VENIPUNCTURE: CPT

## 2023-07-24 PROCEDURE — 83605 ASSAY OF LACTIC ACID: CPT

## 2023-07-24 PROCEDURE — 85610 PROTHROMBIN TIME: CPT

## 2023-07-24 PROCEDURE — 87635 SARS-COV-2 COVID-19 AMP PRB: CPT

## 2023-07-24 RX ORDER — CALCIUM ACETATE 667 MG
1334 TABLET ORAL
Refills: 0 | Status: DISCONTINUED | OUTPATIENT
Start: 2023-07-24 | End: 2023-07-24

## 2023-07-24 RX ADMIN — ERYTHROPOIETIN 10000 UNIT(S): 10000 INJECTION, SOLUTION INTRAVENOUS; SUBCUTANEOUS at 10:11

## 2023-07-24 RX ADMIN — Medication 1 TABLET(S): at 13:51

## 2023-07-24 RX ADMIN — CHLORHEXIDINE GLUCONATE 1 APPLICATION(S): 213 SOLUTION TOPICAL at 07:31

## 2023-07-24 RX ADMIN — HEPARIN SODIUM 5000 UNIT(S): 5000 INJECTION INTRAVENOUS; SUBCUTANEOUS at 06:24

## 2023-07-24 RX ADMIN — HEPARIN SODIUM 5000 UNIT(S): 5000 INJECTION INTRAVENOUS; SUBCUTANEOUS at 13:51

## 2023-07-24 RX ADMIN — Medication 1334 MILLIGRAM(S): at 18:06

## 2023-07-24 RX ADMIN — CEFTRIAXONE 100 MILLIGRAM(S): 500 INJECTION, POWDER, FOR SOLUTION INTRAMUSCULAR; INTRAVENOUS at 14:11

## 2023-07-24 NOTE — PROGRESS NOTE ADULT - SUBJECTIVE AND OBJECTIVE BOX
[facility] DIVISION OF KIDNEY DISEASES AND HYPERTENSION   FOLLOW UP NOTE    --------------------------------------------------------------------------------  Chief Complaint:    24 hour events/subjective: Pt. was seen and examined today. Overnight events noted. Patient a bit frustrated due to missing his transplant appt today but is doing well overall.      PAST HISTORY  --------------------------------------------------------------------------------  No significant changes to PMH, PSH, FHx, SHx, unless otherwise noted    ALLERGIES & MEDICATIONS  --------------------------------------------------------------------------------  Allergies    shellfish (Hives)  No Known Drug Allergies    Intolerances      Standing Inpatient Medications  calcium acetate 1334 milliGRAM(s) Oral three times a day with meals  cefTRIAXone   IVPB 2000 milliGRAM(s) IV Intermittent every 24 hours  chlorhexidine 2% Cloths 1 Application(s) Topical <User Schedule>  epoetin carito (PROCRIT) Injectable 43119 Unit(s) SubCutaneous <User Schedule>  heparin   Injectable 5000 Unit(s) SubCutaneous every 8 hours  labetalol 200 milliGRAM(s) Oral two times a day  Nephro-pat 1 Tablet(s) Oral daily    PRN Inpatient Medications  oxyCODONE    IR 5 milliGRAM(s) Oral every 6 hours PRN      REVIEW OF SYSTEMS  --------------------------------------------------------------------------------  Constitutional: No fevers/chills  HEENT: No HA, sore throat   Respiratory: No dyspnea, cough  Cardiovascular: No chest pain  Gastrointestinal: No abdominal pain, diarrhea, nausea, vomiting  Genitourinary: No dysuria, hematuria, urgency  Extremities: No edema  Skin: No rashes  Heme: No easy bruising or bleeding    All other systems were reviewed and are negative, except as noted.    VITALS/PHYSICAL EXAM  --------------------------------------------------------------------------------  T(C): 36.6 (07-24-23 @ 12:46), Max: 37 (07-24-23 @ 09:06)  HR: 69 (07-24-23 @ 12:46) (69 - 80)  BP: 132/84 (07-24-23 @ 12:46) (120/70 - 140/78)  RR: 18 (07-24-23 @ 12:46) (18 - 18)  SpO2: 97% (07-24-23 @ 12:46) (97% - 100%)  Wt(kg): --      07-23-23 @ 07:01  -  07-24-23 @ 07:00  --------------------------------------------------------  IN: 240 mL / OUT: 0 mL / NET: 240 mL    07-24-23 @ 07:01  -  07-24-23 @ 13:59  --------------------------------------------------------  IN: 0 mL / OUT: 3500 mL / NET: -3500 mL      Physical Exam:  	Gen: NAD, chronically ill-appearing  	HEENT: Anicteric  	Pulm: CTA B/L  	CV: S1S2+  	Abd: Soft, +BS    	Ext: No LE edema B/L  	Neuro: Awake, A&Ox3, no focal deficits  	Skin: Warm and dry  	Dialysis access: LUE AV graft      LABS/STUDIES  --------------------------------------------------------------------------------              8.0    4.83  >-----------<  261      [07-24-23 @ 05:56]              25.6     135  |  91  |  104  ----------------------------<  61      [07-24-23 @ 05:58]  5.6   |  22  |  12.39        Ca     7.3     [07-24-23 @ 05:58]      Phos  7.1     [07-24-23 @ 05:58]            Creatinine Trend:  SCr 12.39 [07-24 @ 05:58]  SCr 11.32 [07-23 @ 12:07]  SCr 13.22 [07-21 @ 10:27]  SCr 11.05 [07-20 @ 10:13]    Urinalysis - [07-24-23 @ 05:58]      Color  / Appearance  / SG  / pH       Gluc 61 / Ketone   / Bili  / Urobili        Blood  / Protein  / Leuk Est  / Nitrite       RBC  / WBC  / Hyaline  / Gran  / Sq Epi  / Non Sq Epi  / Bacteria       
  Follow Up:  uvulitis, tonsillitis     Interval History/ROS: pt afebrile, improved neck pain and no SOB or sore throat        Allergies  shellfish (Hives)  No Known Drug Allergies        ANTIMICROBIALS:  cefTRIAXone   IVPB 2000 every 24 hours      OTHER MEDS:  calcium acetate 1334 milliGRAM(s) Oral three times a day with meals  chlorhexidine 2% Cloths 1 Application(s) Topical <User Schedule>  epoetin carito (PROCRIT) Injectable 62271 Unit(s) SubCutaneous <User Schedule>  heparin   Injectable 5000 Unit(s) SubCutaneous every 8 hours  labetalol 200 milliGRAM(s) Oral two times a day  Nephro-pat 1 Tablet(s) Oral daily  oxyCODONE    IR 5 milliGRAM(s) Oral every 6 hours PRN      Vital Signs Last 24 Hrs  T(C): 36.6 (24 Jul 2023 12:46), Max: 37 (24 Jul 2023 09:06)  T(F): 97.9 (24 Jul 2023 12:46), Max: 98.6 (24 Jul 2023 09:06)  HR: 69 (24 Jul 2023 12:46) (69 - 80)  BP: 132/84 (24 Jul 2023 12:46) (120/70 - 140/78)  BP(mean): --  RR: 18 (24 Jul 2023 12:46) (18 - 18)  SpO2: 97% (24 Jul 2023 12:46) (97% - 100%)    Parameters below as of 24 Jul 2023 12:46  Patient On (Oxygen Delivery Method): room air        Physical Exam:  General:    NAD,  non toxic  ENT:    improved edema of uvula  Respiratory:    comfortable on RA  abd:     soft,   BS +,   no tenderness  :   no CVAT,  no suprapubic tenderness,   no  mayes  Musculoskeletal:   no joint swelling  vascular: no phlebitis  Skin:    no rash                          8.0    4.83  )-----------( 261      ( 24 Jul 2023 05:56 )             25.6       07-24    135  |  91<L>  |  104<H>  ----------------------------<  61<L>  5.6<H>   |  22  |  12.39<H>    Ca    7.3<L>      24 Jul 2023 05:58  Phos  7.1     07-24        Urinalysis Basic - ( 24 Jul 2023 05:58 )    Color: x / Appearance: x / SG: x / pH: x  Gluc: 61 mg/dL / Ketone: x  / Bili: x / Urobili: x   Blood: x / Protein: x / Nitrite: x   Leuk Esterase: x / RBC: x / WBC x   Sq Epi: x / Non Sq Epi: x / Bacteria: x        MICROBIOLOGY:  v  .Abscess uvula  07-20-23   Few Prevotella melaninogenica "Susceptibilities not performed"  Routine mouth lyubov  --    No polymorphonuclear cells seen per low power field  Numerous Gram positive cocci in pairs seen per oil power field  Numerous Gram Variable Rods seen per oil power field      .Blood Blood-Peripheral  07-20-23   No growth at 4 days  --  --      .Blood Blood-Peripheral  07-20-23   No growth at 4 days  --  --                RADIOLOGY:  Images independently visualized and reviewed personally, findings as below  < from: US Abdomen Upper Quadrant Right (07.21.23 @ 08:50) >  IMPRESSION:    1.  Mildly dilated main pancreatic duct as described above. Due to   limited evaluation of pancreatic head cannot exclude obstruction.   Recommend MRCP for better visualization of the pancreas.  2.  Common bile duct, gallbladder and intrahepatic biliary tree within   normal limits  3.  Atrophic right kidney with multiple simple cysts as described above    < end of copied text >  < from: CT Neck Soft Tissue No Cont (07.20.23 @ 12:17) >    1. Limited study secondary to exclusion of IV contrast.    2. New enlargement and edematous changes involving the left palatine   tonsil suspicious for possible palatine tonsillitis, when compared with   7/9/2023. Evaluation for underlying abscess is extremely limited   secondary to exclusion of IV contrast.    3. Reactively enlarged bilateral cervical lymph nodes.    4. Redemonstration of extensive renal osteodystrophy.    5. Previously seen large right-sided parathyroid adenoma was better   evaluated on the prior contrast enhanced CT examination of the neck.      < end of copied text >  
Patient is a 37y old  Male who presents with a chief complaint of Fever, sore throat, voice changes, dyspnoea since last night. (21 Jul 2023 17:35)    Date of servie : 07-22-23 @ 10:12  INTERVAL HPI/OVERNIGHT EVENTS:  T(C): 36.6 (07-22-23 @ 06:27), Max: 36.8 (07-21-23 @ 20:47)  HR: 82 (07-22-23 @ 06:27) (65 - 83)  BP: 151/86 (07-22-23 @ 06:27) (123/67 - 159/101)  RR: 18 (07-22-23 @ 06:27) (18 - 18)  SpO2: 97% (07-21-23 @ 20:47) (97% - 99%)  Wt(kg): --  I&O's Summary    21 Jul 2023 07:01  -  22 Jul 2023 07:00  --------------------------------------------------------  IN: 1000 mL / OUT: 4300 mL / NET: -3300 mL        LABS:                        7.7    5.22  )-----------( 207      ( 21 Jul 2023 10:27 )             24.4     07-22    x   |  x   |  x   ----------------------------<  x   4.5   |  x   |  x     Ca    7.7<L>      21 Jul 2023 10:27        Urinalysis Basic - ( 21 Jul 2023 10:27 )    Color: x / Appearance: x / SG: x / pH: x  Gluc: 115 mg/dL / Ketone: x  / Bili: x / Urobili: x   Blood: x / Protein: x / Nitrite: x   Leuk Esterase: x / RBC: x / WBC x   Sq Epi: x / Non Sq Epi: x / Bacteria: x      CAPILLARY BLOOD GLUCOSE            Urinalysis Basic - ( 21 Jul 2023 10:27 )    Color: x / Appearance: x / SG: x / pH: x  Gluc: 115 mg/dL / Ketone: x  / Bili: x / Urobili: x   Blood: x / Protein: x / Nitrite: x   Leuk Esterase: x / RBC: x / WBC x   Sq Epi: x / Non Sq Epi: x / Bacteria: x        MEDICATIONS  (STANDING):  cefTRIAXone   IVPB 2000 milliGRAM(s) IV Intermittent every 24 hours  chlorhexidine 2% Cloths 1 Application(s) Topical <User Schedule>  epoetin carito (PROCRIT) Injectable 57461 Unit(s) SubCutaneous <User Schedule>  heparin   Injectable 5000 Unit(s) SubCutaneous every 8 hours  labetalol 200 milliGRAM(s) Oral two times a day  Nephro-pat 1 Tablet(s) Oral daily    MEDICATIONS  (PRN):  oxyCODONE    IR 5 milliGRAM(s) Oral every 6 hours PRN Moderate Pain (4 - 6)          PHYSICAL EXAM:  GENERAL: NAD, well-groomed, well-developed  HEAD:  Atraumatic, Normocephalic  CHEST/LUNG: Clear to percussion bilaterally; No rales, rhonchi, wheezing, or rubs  HEART: Regular rate and rhythm; No murmurs, rubs, or gallops  ABDOMEN: Soft, Nontender, Nondistended; Bowel sounds present  EXTREMITIES:  2+ Peripheral Pulses, No clubbing, cyanosis, or edema  LYMPH: No lymphadenopathy noted  SKIN: No rashes or lesions    Care Discussed with Consultants/Other Providers [ ] YES  [ ] NO
Patient is a 37y old  Male who presents with a chief complaint of Per chart, Pt is a 38 y/o M with PMH: "HTN, prior stroke HTN, SOLITARIO, secondary hyperparathyroidism S/P parathyroidectomy in [place] in 12/2022, ESRD with left upper extremity AV fistula on HD MWF presents to ED complaining of dyspnea, voice changes, sore throat and fever since last night. Found to have uvulitis." Started on IV Abx and Decadron. ENT following. (21 Jul 2023 13:40)    Date of servie : 07-21-23 @ 17:35  INTERVAL HPI/OVERNIGHT EVENTS:  T(C): 36.6 (07-21-23 @ 13:30), Max: 36.9 (07-20-23 @ 17:38)  HR: 83 (07-21-23 @ 13:30) (65 - 83)  BP: 135/71 (07-21-23 @ 13:30) (135/71 - 160/85)  RR: 18 (07-21-23 @ 13:30) (18 - 18)  SpO2: 99% (07-21-23 @ 13:30) (97% - 99%)  Wt(kg): --  I&O's Summary    21 Jul 2023 07:01  -  21 Jul 2023 17:35  --------------------------------------------------------  IN: 800 mL / OUT: 4300 mL / NET: -3500 mL        LABS:                        7.7    5.22  )-----------( 207      ( 21 Jul 2023 10:27 )             24.4     07-21    133<L>  |  90<L>  |  98<H>  ----------------------------<  115<H>  6.0<H>   |  22  |  13.22<H>    Ca    7.7<L>      21 Jul 2023 10:27    TPro  7.2  /  Alb  3.9  /  TBili  0.4  /  DBili  x   /  AST  8<L>  /  ALT  5<L>  /  AlkPhos  1107<H>  07-20    PT/INR - ( 20 Jul 2023 10:13 )   PT: 11.9 sec;   INR: 1.03 ratio         PTT - ( 20 Jul 2023 10:13 )  PTT:29.0 sec  Urinalysis Basic - ( 21 Jul 2023 10:27 )    Color: x / Appearance: x / SG: x / pH: x  Gluc: 115 mg/dL / Ketone: x  / Bili: x / Urobili: x   Blood: x / Protein: x / Nitrite: x   Leuk Esterase: x / RBC: x / WBC x   Sq Epi: x / Non Sq Epi: x / Bacteria: x      CAPILLARY BLOOD GLUCOSE      POCT Blood Glucose.: 115 mg/dL (21 Jul 2023 06:23)  POCT Blood Glucose.: 111 mg/dL (20 Jul 2023 23:45)        Urinalysis Basic - ( 21 Jul 2023 10:27 )    Color: x / Appearance: x / SG: x / pH: x  Gluc: 115 mg/dL / Ketone: x  / Bili: x / Urobili: x   Blood: x / Protein: x / Nitrite: x   Leuk Esterase: x / RBC: x / WBC x   Sq Epi: x / Non Sq Epi: x / Bacteria: x        MEDICATIONS  (STANDING):  cefTRIAXone   IVPB 2000 milliGRAM(s) IV Intermittent every 24 hours  chlorhexidine 2% Cloths 1 Application(s) Topical <User Schedule>  dextrose 5%. 1000 milliLiter(s) (100 mL/Hr) IV Continuous <Continuous>  dextrose 5%. 1000 milliLiter(s) (50 mL/Hr) IV Continuous <Continuous>  dextrose 50% Injectable 25 Gram(s) IV Push once  dextrose 50% Injectable 12.5 Gram(s) IV Push once  dextrose 50% Injectable 25 Gram(s) IV Push once  epoetin carito (PROCRIT) Injectable 67572 Unit(s) SubCutaneous <User Schedule>  glucagon  Injectable 1 milliGRAM(s) IntraMuscular once  heparin   Injectable 5000 Unit(s) SubCutaneous every 8 hours  insulin lispro (ADMELOG) corrective regimen sliding scale   SubCutaneous every 6 hours  labetalol 200 milliGRAM(s) Oral two times a day  Nephro-pat 1 Tablet(s) Oral daily    MEDICATIONS  (PRN):  dextrose Oral Gel 15 Gram(s) Oral once PRN Blood Glucose LESS THAN 70 milliGRAM(s)/deciliter  oxyCODONE    IR 5 milliGRAM(s) Oral every 6 hours PRN Moderate Pain (4 - 6)          PHYSICAL EXAM:  GENERAL: NAD, well-groomed, well-developed  HEAD:  Atraumatic, Normocephalic  CHEST/LUNG: Clear to percussion bilaterally; No rales, rhonchi, wheezing, or rubs  HEART: Regular rate and rhythm; No murmurs, rubs, or gallops  ABDOMEN: Soft, Nontender, Nondistended; Bowel sounds present  EXTREMITIES:  2+ Peripheral Pulses, No clubbing, cyanosis, or edema  LYMPH: No lymphadenopathy noted  SKIN: No rashes or lesions    Care Discussed with Consultants/Other Providers [ ] YES  [ ] NO
ENT ISSUE/POD: sore throat     HPI: 37-year-old male PMH of HTN, prior stroke HTN, SOLITARIO, ESRD with left upper extremity AV fistula on HD MWF (last yesterday) presented to ED complaining of dyspnea, voice changes, sore throat and fever. Found to have uvulitis on exam. Has been on IV abx and decadron, reports significant improvement in pain and swelling. Denies dysphonia, sob, dyspnea, changes in voice or inability to tolerate secretions.       PAST MEDICAL & SURGICAL HISTORY:  HTN (hypertension)      Stroke  age 10      Morbid obesity      Sleep apnea      Leg fracture, right      Chronic renal failure      Hemodialysis access, AV graft      ESRD (end stage renal disease) on dialysis  since 2013, M-W-F      Anemia, unspecified type      AV fistula  L arm        Allergies    shellfish (Hives)  No Known Drug Allergies    Intolerances      MEDICATIONS  (STANDING):  cefTRIAXone   IVPB 2000 milliGRAM(s) IV Intermittent every 24 hours  dexAMETHasone  IVPB 10 milliGRAM(s) IV Intermittent every 8 hours  dextrose 5%. 1000 milliLiter(s) (100 mL/Hr) IV Continuous <Continuous>  dextrose 5%. 1000 milliLiter(s) (50 mL/Hr) IV Continuous <Continuous>  dextrose 50% Injectable 25 Gram(s) IV Push once  dextrose 50% Injectable 12.5 Gram(s) IV Push once  dextrose 50% Injectable 25 Gram(s) IV Push once  glucagon  Injectable 1 milliGRAM(s) IntraMuscular once  heparin   Injectable 5000 Unit(s) SubCutaneous every 8 hours  insulin lispro (ADMELOG) corrective regimen sliding scale   SubCutaneous every 6 hours  labetalol 200 milliGRAM(s) Oral two times a day    MEDICATIONS  (PRN):  dextrose Oral Gel 15 Gram(s) Oral once PRN Blood Glucose LESS THAN 70 milliGRAM(s)/deciliter  oxyCODONE    IR 5 milliGRAM(s) Oral every 6 hours PRN Moderate Pain (4 - 6)      Social History: see consult    Family history: see consult    ROS:   ENT: all negative except as noted in HPI   Pulm: denies SOB, cough, hemoptysis  Neuro: denies numbness/tingling, loss of sensation  Endo: denies heat/cold intolerance, excessive sweating      Vital Signs Last 24 Hrs  T(C): 36.7 (21 Jul 2023 04:17), Max: 38.1 (20 Jul 2023 09:50)  T(F): 98.1 (21 Jul 2023 04:17), Max: 100.6 (20 Jul 2023 09:50)  HR: 77 (21 Jul 2023 04:17) (77 - 95)  BP: 160/85 (21 Jul 2023 04:17) (142/77 - 171/91)  BP(mean): --  RR: 18 (21 Jul 2023 04:17) (18 - 28)  SpO2: 97% (21 Jul 2023 04:17) (95% - 99%)    Parameters below as of 21 Jul 2023 04:17  Patient On (Oxygen Delivery Method): room air                              8.3    6.39  )-----------( 233      ( 20 Jul 2023 10:13 )             26.5    07-20    138  |  92<L>  |  74<H>  ----------------------------<  79  5.4<H>   |  24  |  11.05<H>    Ca    9.0      20 Jul 2023 10:13    TPro  7.2  /  Alb  3.9  /  TBili  0.4  /  DBili  x   /  AST  8<L>  /  ALT  5<L>  /  AlkPhos  1107<H>  07-20   PT/INR - ( 20 Jul 2023 10:13 )   PT: 11.9 sec;   INR: 1.03 ratio         PTT - ( 20 Jul 2023 10:13 )  PTT:29.0 sec    PHYSICAL EXAM:  Gen: NAD  Skin: No rashes, bruises, or lesions  Head: Normocephalic, Atraumatic  Face: no edema, erythema, or fluctuance. Parotid glands soft without mass  Eyes: no scleral injection  Nose: Nares bilaterally patent, no discharge  Mouth: improving uvular edema, minimal ecchymosis, redundant tissue of posterior pharynx. No Stridor / Drooling / Trismus.  Mucosa moist, tongue midline   Neck: Flat, supple, no lymphadenopathy, trachea midline, no masses  Lymphatic: No lymphadenopathy  Resp: breathing easily, no stridor  Neuro: facial nerve intact, no facial droop        
Patient is a 37y old  Male who presents with a chief complaint of Fever, sore throat, voice changes, dyspnoea since last night. (22 Jul 2023 10:12)    Date of servie : 07-23-23 @ 11:33  INTERVAL HPI/OVERNIGHT EVENTS:  T(C): 36.9 (07-23-23 @ 11:26), Max: 37 (07-23-23 @ 05:10)  HR: 87 (07-23-23 @ 11:26) (75 - 87)  BP: 124/69 (07-23-23 @ 11:26) (118/67 - 137/76)  RR: 18 (07-23-23 @ 11:26) (18 - 18)  SpO2: 95% (07-23-23 @ 11:26) (95% - 99%)  Wt(kg): --  I&O's Summary    22 Jul 2023 07:01  -  23 Jul 2023 07:00  --------------------------------------------------------  IN: 1060 mL / OUT: 0 mL / NET: 1060 mL        LABS:    07-22    x   |  x   |  x   ----------------------------<  x   4.5   |  x   |  x             CAPILLARY BLOOD GLUCOSE                MEDICATIONS  (STANDING):  cefTRIAXone   IVPB 2000 milliGRAM(s) IV Intermittent every 24 hours  chlorhexidine 2% Cloths 1 Application(s) Topical <User Schedule>  epoetin carito (PROCRIT) Injectable 56519 Unit(s) SubCutaneous <User Schedule>  heparin   Injectable 5000 Unit(s) SubCutaneous every 8 hours  labetalol 200 milliGRAM(s) Oral two times a day  Nephro-pat 1 Tablet(s) Oral daily    MEDICATIONS  (PRN):  oxyCODONE    IR 5 milliGRAM(s) Oral every 6 hours PRN Moderate Pain (4 - 6)          PHYSICAL EXAM:  GENERAL: NAD, well-groomed, well-developed  HEAD:  Atraumatic, Normocephalic  CHEST/LUNG: Clear to percussion bilaterally; No rales, rhonchi, wheezing, or rubs  HEART: Regular rate and rhythm; No murmurs, rubs, or gallops  ABDOMEN: Soft, Nontender, Nondistended; Bowel sounds present  EXTREMITIES:  2+ Peripheral Pulses, No clubbing, cyanosis, or edema  LYMPH: No lymphadenopathy noted  SKIN: No rashes or lesions    Care Discussed with Consultants/Other Providers [ ] YES  [ ] NO

## 2023-07-24 NOTE — DISCHARGE NOTE NURSING/CASE MANAGEMENT/SOCIAL WORK - NSDCPEFALRISK_GEN_ALL_CORE
For information on Fall & Injury Prevention, visit: [email or web address] OR  [email or web address] OR  https://www.cdc.gov/steadi/patient.html

## 2023-07-24 NOTE — DISCHARGE NOTE PROVIDER - NSFOLLOWUPCLINICS_GEN_ALL_ED_FT
[facility] - Infectious Disease  Infectious Disease  [address], [place] [address]  Phone: [phone]  Fax:   Follow Up Time: Routine    [facility] General Internal Medicine  General Internal Medicine  [address] [place] [id number]  Phone: [phone]  Fax:   Follow Up Time: 1 week

## 2023-07-24 NOTE — PROGRESS NOTE ADULT - REASON FOR ADMISSION
Fever, sore throat, voice changes, dyspnoea since last night.

## 2023-07-24 NOTE — PROGRESS NOTE ADULT - ATTENDING COMMENTS
ESRD:   Seen at HD  Maintain current prescription. Increase time to 3.5 hrs as BUN is up  Change dialysate Ca to 3 as ca level is low . 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   Maintain phoslo with meals  CUCA with HD      Rest as per Dr. Carlos Henry MD  O: [phone]  Contact me on teams

## 2023-07-24 NOTE — DISCHARGE NOTE NURSING/CASE MANAGEMENT/SOCIAL WORK - PATIENT PORTAL LINK FT
You can access the FollowMyHealth Patient Portal offered by [facility] by registering at the following website: [email or web address]. By joining [employer]’s FollowMyHealth portal, you will also be able to view your health information using other applications (apps) compatible with our system.

## 2023-07-24 NOTE — DISCHARGE NOTE PROVIDER - DETAILS OF MALNUTRITION DIAGNOSIS/DIAGNOSES
This patient has been assessed with a concern for Malnutrition and was treated during this hospitalization for the following Nutrition diagnosis/diagnoses:     -  07/21/2023: Severe protein-calorie malnutrition

## 2023-07-24 NOTE — DISCHARGE NOTE PROVIDER - CARE PROVIDER_API CALL
Follow up,   with your Nephrologist and Primary Care provider or at the Internal Medicine location provided.  Phone: (   )    -  Fax: (   )    -  Follow Up Time: 1 week

## 2023-07-24 NOTE — PHYSICAL THERAPY INITIAL EVALUATION ADULT - PERTINENT HX OF CURRENT PROBLEM, REHAB EVAL
this 36 y/o M--patient with a history of ESRD on HD apparently started at age 27, previously had his care in [place], secondary hyperparathyroidism S/P parathyroidectomy in [place] in 12/2022 (seen by Eric Royal MD of  [phone]), essential HTN maintained on high doses of labetalol, last admission to [place] in Mar 2023 following missed HD session following patient driving himself from [place] to [place], with patient discharged Mar 2 2023, with patient self referring to the ER following fever, sore throat, voice changes and dyspnoea since last night this 36 y/o M--patient with a history of ESRD on HD apparently started at age 27, previously had his care in [place], secondary hyperparathyroidism S/P parathyroidectomy in [place] in 12/2022 (seen by Eric Royal MD of  [phone]), essential HTN maintained on high doses of labetalol, last admission to [place] in Mar 2023 following missed HD session following patient driving himself from [place] to [place], with patient discharged Mar 2 2023, with patient self referring to the ER following fever, sore throat, voice changes and dyspnoea since last night/HOSPITALIST:  Seen by ENT in the ER with fiberoptic indirect laryngoscopy with LEFT palatine tonsil oedema, NO epiglottitis.  + uvulitis.  CTT neck with enlargement of the LEFT palatine tonsil limited study due to lack of contrast.   S/P dose of Decadron and maintenance Q8H x 1 day.   I have contacted MICU for evaluation and I have reviewed with ID the antibiotic regimen>>

## 2023-07-24 NOTE — DISCHARGE NOTE PROVIDER - PROVIDER TOKENS
FREE:[LAST:[Follow up],PHONE:[(   )    -],FAX:[(   )    -],ADDRESS:[with your Nephrologist and Primary Care provider or at the Internal Medicine location provided.],FOLLOWUP:[1 week]]

## 2023-07-24 NOTE — DISCHARGE NOTE PROVIDER - NSDCCPCAREPLAN_GEN_ALL_CORE_FT
PRINCIPAL DISCHARGE DIAGNOSIS  Diagnosis: Uvulitis  Assessment and Plan of Treatment: Continue taking the antibiotics as prescribed until all pills are gone.    Seek urgent medical care if you have strouble swallowing, shortness of breath, hoarse voice, drooling, fever or cough.         SECONDARY DISCHARGE DIAGNOSES  Diagnosis: ESRD on hemodialysis  Assessment and Plan of Treatment: Continue with you regular schedule of HD.   Follow up with Nephrology.    Diagnosis: Hyperkalemia  Assessment and Plan of Treatment: Low Potssium diet, continue with Hemodialysis.

## 2023-07-24 NOTE — PROGRESS NOTE ADULT - ASSESSMENT
36 y/o M with a history of ESRD on HD x10 years, secondary hyperparathyroidism S/P parathyroidectomy in [place] in 12/2022, essential HTN, admitted for uvulitis, nephrology consulted for ESRD/HD management.
37-year-old male PMH of HTN, prior stroke HTN, SOLITARIO, ESRD with left upper extremity AV fistula on HD MWF (last yesterday) presents to ED complaining of dyspnea, voice changes, sore throat and fever. Found to have uvulitis, pt reports significant improvement in pain and swelling today. WBC 6.39, HSV PCR negative, bacterial culture notable for gram positive cocci in pairs and gram variable rods. 
Problem/Plan - 1:  ·  Problem: Essential hypertension.   ·  Plan: cw current meds  - DASH diet     Problem/Plan - 2:  ·  Problem: ESRD on hemodialysis.   ·  Plan: HD per renal.    renal fu     Problem/Plan - 3:  ·  Problem:: Uvulitis.   ·  Plan:       - abx as per ID  - ENT fu   
Problem/Plan - 1:  ·  Problem: Essential hypertension.   ·  Plan: cw current meds  - DASH diet     Problem/Plan - 2:  ·  Problem: ESRD on hemodialysis.   ·  Plan: HD per renal.    renal fu     Problem/Plan - 3:  ·  Problem:: Uvulitis.   ·  Plan:    MICU consult called with potential airway risk.     - abx as per ID  - ENT fu 
{\rtf1\feilwn17414\ansi\eftifer9915\ftnbj\uc1\deff0  {\fonttbl{\f0 \fnil Segoe UI;}{\f1 \fnil \fcharset0 Segoe UI;}{\f2 \fnil Times New Win;}}  {\colortbl ;\tqe710\ifykd232\qdaa515 ;\red0\green0\blue0 ;\red0\green0\thow113 ;\red0\green0\blue0 ;}  {\stylesheet{\f0\fs20 Normal;}{\cs1 Default Paragraph Font;}{\cs2\f0\fs16 Line Number;}{\cs3\f2\fs24\ul\cf3 Hyperlink;}}  {\*\revtbl{Unknown;}}  \qbasle39310\uliunj55644\nycue0316\dszzy0177\akcwo4232\folve1178\ximjzqa015\tysjfsp773\nogrowautofit\drurcx421\formshade\nofeaturethrottle1\dntblnsbdb\fet4\aendnotes\aftnnrlc\pgbrdrhead\pgbrdrfoot  \sectd\nvviaf18323\oncpta41935\guttersxn0\mhyxjyya8124\dvbhynff3412\vrwzmuhq0571\frhndxql2718\iupnlep971\wmokubm963\sbkpage\pgncont\pgndec  \plain\plain\f0\fs24\ql\plain\f0\fs24\plain\f1\fs16\lavl7833\hich\f1\dbch\f1\loch\f1\cf2\fs16\par  \plain\f1\fs16\bowe1998\hich\f1\dbch\f1\loch\f1\cf2\fs16\b\ul{\field{\*\fldinst HYPERLINK [id number] }{\fldrslt Problem/Plan - 1:}}\plain\f1\fs16\ajsz6066\hich\f1\dbch\f1\loch\f1\cf2\fs16\ql\par  \'b7  {\*\bkmkstart [id number]}{\*\bkmkend ff20880213067}Problem: {\*\bkmkstart nu54511889621}{\*\bkmkend gi90724409233}Essential hypertension. \par  \'b7  {\*\bkmkstart hz49984938710}{\*\bkmkend qi64346563622}Plan:\plain\f1\fs16\pbvp5409\hich\f1\dbch\f1\loch\f1\cf2\fs16\strike\plain\f1\fs16\nyyq5382\hich\f1\dbch\f1\loch\f1\cf2\fs16  cw current meds\par  - DASH diet \par  \par  \plain\f1\fs16\nxkb3489\hich\f1\dbch\f1\loch\f1\cf2\fs16\b\ul{\field{\*\fldinst HYPERLINK [id number] }{\fldrslt Problem/Plan - 2:}}\plain\f1\fs16\wzki3635\hich\f1\dbch\f1\loch\f1\cf2\fs16\ql\par  \'b7  {\*\bkmkstart hi29334257415}{\*\bkmkend fw96996671187}Problem: {\*\bkmkstart eo53581801758}{\*\bkmkend gj29540532440}ESRD on hemodialysis. \par  \'b7  {\*\bkmkstart oj85106614868}{\*\bkmkend rw94452095551}Plan:\plain\f1\fs16\mimf9262\hich\f1\dbch\f1\loch\f1\cf2\fs16\strike\plain\f1\fs16\isry1433\hich\f1\dbch\f1\loch\f1\cf2\fs16  {\*\bkmkstart wk61302832032}{\*\bkmkend ig57260815097}HD per renal.    \par  renal fu \par  \par  \plain\f1\fs16\bxou6571\hich\f1\dbch\f1\loch\f1\cf2\fs16\b\ul{\field{\*\fldinst HYPERLINK [id number] }{\fldrslt Problem/Plan - 3:}}\plain\f1\fs16\knlj5477\hich\f1\dbch\f1\loch\f1\cf2\fs16\ql\par  \'b7  {\*\bkmkstart yq46078091100}{\*\bkmkend lm36074344178}Problem:: {\*\bkmkstart hp45274846259}{\*\bkmkend zi41618096253}Uvulitis. \par  \'b7  {\*\bkmkstart ri35553826233}{\*\bkmkend sj85891503833}Plan:    MICU consult called with potential airway risk.   \par  - abx as per ID\par  - ENT fu \par  \plain\f0\fs20\sfve7140\hich\f0\dbch\f0\loch\f0\fs20\par  }  
37 m with HTN, ESRD on HD apparently started at age 27, secondary hyperparathyroidism S/P parathyroidectomy in [place] in 12/2022, p/w rhinorrhea, sore throat fever, neck pain and then dyspnea and voice change   here afebrile, normal WBC  was seen by ENT and found to have +uvular edema and ecchymosis with small amount of exudate, HSV PCR and bacterial culture obtained  CT neck non con- New enlargement and edematous changes involving the left palatine tonsil suspicious for possible palatine tonsillitis, Evaluation for underlying abscess is extremely limited secondary to exclusion of IV contrast.  Reactively enlarged bilateral cervical lymph nodes.    fever, uvulitis and tonsilitis,  CT with no abscess but was without contrast and is showing L palatine tonsil enlargement   COVID negative, the cx sent by ENT is growing prevotella but it was a swab of the tonsil/uvula    * pt feels much better after dexa  * c/w ceftriaxone while in the hospital, started 7/20 now day 5  * will complete a total 2 week course, on discharge switch to augmentin 500 qd to finish the course  * f/u with ENT    The above assessment and plan was discussed with the primary team    Emilee Mathew MD  contact on teams  After 5pm and on weekends call [phone]

## 2023-07-24 NOTE — DISCHARGE NOTE PROVIDER - HOSPITAL COURSE
37-year-old male MH of HTN, prior stroke HTN, SOLITARIO, ESRD with left upper extremity AV fistula on HD MWF (last yesterday) presents to ED complaining of dyspnea, voice changes, sore throat and fever since last night.  Symptoms have been progressively worsening.  Brother at bedside reports that patient has a voice change does not usually sound awake sounds in the ED today.  +dysphagia and odynophagia, denies drooling. Patient reports difficulty breathing secondary to feeling as though his uvula is blocking his throat.  Patient is allergic to shellfish and reports she did not eat anything containing shellfish and recent history.  Patient denies rash. +1 episode of vomiting this morning, denies current nausea    He was treated for:   Uvulitis with Rocephin, clindamycin and vanc, s/p xoatrvjk43zw q8h x24 hours (stopped after 3 doses).  He will complete 2 weeks of Antibiotics with Augmentin.    Hyperkalemia was monitored  ESRD on HD, nephrology continued with  HD MWF    Patient is cleared for discharged as per Dr. Sanders.

## 2023-07-24 NOTE — PROGRESS NOTE ADULT - PROBLEM SELECTOR PLAN 1
HD M/W/F. HD today   optimize BPs with HD, can continue labetalol 200 mg BID. Patient states he titrates labetalol dosing at home based on home BP readings (typically takes 100 mg BID, takes up to 200 mg BID when needed, sometimes holds doses entirely if BPs well controlled) - consider adjusting rx upon d/c.

## 2023-07-24 NOTE — DISCHARGE NOTE PROVIDER - NSDCMRMEDTOKEN_GEN_ALL_CORE_FT
Augmentin 500 mg-125 mg oral tablet: 1 tab(s) orally once a day  calcium acetate 667 mg oral tablet: 2 orally 3 times a day (with meals)  labetalol 200 mg oral tablet: 1 orally 2 times a day  Nephro-Bianca oral tablet: 1 tab(s) orally once a day  Percocet 10 mg-325 mg oral tablet: 2 tablet orally every 6 hours as needed for  severe pain

## 2023-07-24 NOTE — DISCHARGE NOTE NURSING/CASE MANAGEMENT/SOCIAL WORK - NSDCVIVACCINE_GEN_ALL_CORE_FT
Influenza, seasonal, injectable; 30-Dec-2013 15:27; Jamila Hartley (JAYCEE); [id number]; IntraMuscular; Deltoid Right.; 0.5 milliLiter(s);

## 2023-07-25 LAB
CULTURE RESULTS: SIGNIFICANT CHANGE UP
CULTURE RESULTS: SIGNIFICANT CHANGE UP
SPECIMEN SOURCE: SIGNIFICANT CHANGE UP
SPECIMEN SOURCE: SIGNIFICANT CHANGE UP

## 2023-07-27 ENCOUNTER — NON-APPOINTMENT (OUTPATIENT)
Age: 38
End: 2023-07-27

## 2023-07-27 ENCOUNTER — EMERGENCY (EMERGENCY)
Facility: HOSPITAL | Age: 38
LOS: 1 days | Discharge: HOME | End: 2023-07-27
Attending: EMERGENCY MEDICINE
Payer: MEDICAID

## 2023-07-27 VITALS
HEART RATE: 92 BPM | SYSTOLIC BLOOD PRESSURE: 137 MMHG | DIASTOLIC BLOOD PRESSURE: 77 MMHG | OXYGEN SATURATION: 96 % | HEIGHT: 74 IN | RESPIRATION RATE: 20 BRPM | TEMPERATURE: 99 F

## 2023-07-27 DIAGNOSIS — I77.0 ARTERIOVENOUS FISTULA, ACQUIRED: Chronic | ICD-10-CM

## 2023-07-27 PROCEDURE — 73090 X-RAY EXAM OF FOREARM: CPT | Mod: 26,LT

## 2023-07-27 PROCEDURE — 73110 X-RAY EXAM OF WRIST: CPT | Mod: 26,LT

## 2023-07-27 PROCEDURE — 99284 EMERGENCY DEPT VISIT MOD MDM: CPT | Mod: 25

## 2023-07-27 PROCEDURE — 29125 APPL SHORT ARM SPLINT STATIC: CPT | Mod: LT

## 2023-07-27 RX ORDER — ACETAMINOPHEN 500 MG
975 TABLET ORAL ONCE
Refills: 0 | Status: COMPLETED | OUTPATIENT
Start: 2023-07-27 | End: 2023-07-27

## 2023-07-27 RX ORDER — OXYCODONE HYDROCHLORIDE 5 MG/1
5 TABLET ORAL ONCE
Refills: 0 | Status: DISCONTINUED | OUTPATIENT
Start: 2023-07-27 | End: 2023-07-27

## 2023-07-27 NOTE — ED PROVIDER NOTE - RAPID ASSESSMENT
Dr. Ortega Note: pt with acute pain LEFT forearm with pop sound, pain and swelling L forearm.  On dialysis.  Patient was rapidly assessed via a telemedicine and/or role of Quick Triage Doctor; a limited history, physical exam and assessment was performed. The patient will be seen and further evaluated in the main emergency department. The remainder of care and evaluation will be conducted by the primary emergency medicine team. Receiving team will follow up on labs, imaging and serially reassess patient as indicated. All further decisions regarding patient care, evaluation and disposition are at the discretion of the receiving primary emergency department team.
Daily Assessment

## 2023-07-27 NOTE — ED PROVIDER NOTE - NS ED ROS FT
GENERAL: No fever or chills, EYES: no change in vision, HEENT: no trouble swallowing or speaking, CARDIAC: no chest pain, PULMONARY: no cough or SOB, GI: no abdominal pain, no nausea, no vomiting, no diarrhea or constipation, : No changes in urination, SKIN: no rashes, NEURO: no headache,  MSK: +L forearm pain    All other ROS negative unless otherwise specified in HPI.     ~Delia Frey M.D. Resident

## 2023-07-27 NOTE — ED PROVIDER NOTE - NSFOLLOWUPCLINICS_GEN_ALL_ED_FT
[facility] Orthopedic [facility]  Orthopedic Surgery  [address], 3rd & 4th [place] [address]  Phone: [phone]  Fax:     [facility]  Orthopedic Surgery  [address] [address]  Phone: [phone]  Fax:

## 2023-07-27 NOTE — ED PROVIDER NOTE - PROGRESS NOTE DETAILS
Pt splinted w/sugar tong, pain well controlled at this time, medically cleared for DCTH w/ortho follow-up, splint care and pain management reviewed, ortho follow up reviewed, return precautions given, results reviewed and printed in DC paperwork. - Delia Frey, PGY-3

## 2023-07-27 NOTE — ED PROVIDER NOTE - OBJECTIVE STATEMENT
37yoM w/Hx of HTN, hypothyroidism, SOLITARIO, ESRD with left upper extremity AV fistula on HD MWF (last yesterday) p/w L forearm pain. Pt ambulates with cane at baseline, moves slowly, was lowering himself into a chair with both arms when he suddenly heard a loud pop, began experiencing severe L forearm pain which did not go away yesterday so he came to ED today for further evaluation. Denies trauma/falls. Completed dialysis yesterday via RIGHT upper extremity fistula. Reports some numbness in L fingers, otherwise no complaints, has not trialed any medications for pain relief.

## 2023-07-27 NOTE — ED PROVIDER NOTE - PHYSICAL EXAMINATION
General: WN/WD NAD  Head: Atraumatic  Eyes: EOM grossly in tact, no scleral icterus  ENT: dry mucous membranes  Neurology: A&Ox3, nonfocal, PARTIDA x 4  Respiratory: normal respiratory effort  CV: Extremities warm and well perfused; +ttp L distal forearm w/o edema or erythema, sensation and strength L hand intact, cap refill <2 sec, palpable symmetric radial pulses 2+ b/l; +palpable thrill RUE fistula; +LUE old fistula no thrill  Abdominal: Soft, non-distended  Extremities: No edema  Skin: No rashes

## 2023-07-27 NOTE — ED PROVIDER NOTE - NSFOLLOWUPINSTRUCTIONS_ED_ALL_ED_FT
Please follow up with AN ORTHOPEDIC SURGEON within 1 week. Bring copies of your results with you (provided in your discharge paperwork).     You may take 500-1000 mg acetaminophen (tylenol) every 6 hours, as needed for pain.  You have been prescribed oxycodone for severe pain not responsive to tylenol at home. Please take as directed. This is an opiate (narcotic) medication. Please do not drink alcohol, drive, or operate heavy machinery while taking this medication.     Contact a health care provider if you have:  Pain that does not get better with medicine.  Swelling that gets worse.  A bad smell coming from your cast.    Get help right away if:  You cannot move your fingers.  You have severe pain.  Your fingers or your hand:  Become numb, cold, or pale.  Turn a bluish color.    An ulnar fracture is a break in the ulna bone. The ulna is a bone in the forearm, on the same side as the little finger. The forearm is the part of the arm that is between the elbow and the wrist. It is made up of two bones: the radius and the ulna. You can feel the ulna on the outside of the wrist and at the point of the elbow.    An ulnar fracture can happen near the wrist, near the elbow, or in the middle of the forearm. In many cases of ulnar fracture, the radius is also fractured.    How is this treated?  Treatment depends on how severe your fracture is, where it is, and how the pieces of the broken bone line up with each other (alignment).  The first step in treatment may be for you to wear a temporary splint for a few days. After the swelling goes down, you may get a cast, get a different type of splint, or have surgery.  If your broken bone is in good alignment, you will need to wear a splint or cast for several weeks.  If your broken bone is not aligned (is displaced), your health care provider will need to align the bone pieces. After alignment, you will need to wear a splint or cast for up to 6 weeks. To align your broken bone, your health care provider may:  Move the bones back into position without surgery (closed reduction).  Perform surgery to align the fracture and fix the bone pieces into place with metal screws, plates, or wires (open reduction and internal fixation, ORIF).  Perform surgery to align the fracture and fix the bone pieces into place with pins that are attached to a stabilizing bar outside your skin (external fixation).  Treatment may also include:  Having your cast changed after 2–3 weeks.  Physical therapy.  Follow-up visits and X-rays to make sure you are healing.    Follow these instructions at home:    If you have a splint:  Wear it as told by your health care provider. Remove it only as told by your health care provider.  Loosen the splint if your fingers tingle, become numb, or turn cold and blue.  Keep the splint clean and dry.    Bathing    Do not take baths, swim, or use a hot tub until your health care provider approves. Ask your health care provider if you may take showers. You may only be allowed to take sponge baths.    If your splint or cast is not waterproof:  Do not let it get wet.  Cover it with a watertight covering when you take a bath or a shower.    Activity    Do not lift anything with your injured arm.  Do not use the injured arm to support your body weight until your health care provider says that you can.  Ask your health care provider what activities are safe for you during recovery, and ask what activities you need to avoid.    Managing pain, stiffness, and swelling    If directed, put ice on painful areas:  If you have a removable splint, remove it as told by your health care provider.  Put ice in a plastic bag.  Place a towel between your skin and the bag, or between your cast and the bag.  Leave the ice on for 20 minutes, 2–3 times a day.  Move your fingers often to avoid stiffness and to lessen swelling.  Raise (elevate) the injured area above the level of your heart while you are sitting or lying down.    General instructions    Do not put pressure on any part of the cast or splint until it is fully hardened. This may take several hours.  Take over-the-counter and prescription medicines only as told by your health care provider.  Do not drive until your health care provider approves. You should not drive or use heavy machinery while taking prescription pain medicine.  Do not use any products that contain nicotine or tobacco, such as cigarettes and e-cigarettes. These can delay bone healing. If you need help quitting, ask your health care provider.  Keep all follow-up visits as told by your health care provider. This is important.    Summary  An ulnar fracture is a break in the ulna bone, which is the bone in your forearm that is on the same side as your little finger.  You may need to wear a splint or a cast for at least several weeks. Sometimes surgery is needed.  Keep all follow-up visits as told by your health care provider.

## 2023-07-27 NOTE — ED ADULT TRIAGE NOTE - BP NONINVASIVE DIASTOLIC (MM HG)
77 Anesthesia Type: 1% lidocaine with 1:100,000 epinephrine and a 1:10 solution of 8.4% sodium bicarbonate

## 2023-07-27 NOTE — ED PROVIDER NOTE - CARE PLAN
1 Principal Discharge DX:	Ulnar fracture   Principal Discharge DX:	Ulnar fracture  Goal:	L closed mid shift ulnar fracture

## 2023-07-27 NOTE — ED PROVIDER NOTE - CARE PROVIDER_API CALL
Jarod Jhaveri  Orthopaedic Trauma  [address], [address]  [place] [address]  Phone: [phone]  Fax: [phone]  Follow Up Time:

## 2023-07-27 NOTE — ED PROVIDER NOTE - NSICDXPASTMEDICALHX_GEN_ALL_CORE_FT
PAST MEDICAL HISTORY:  Anemia, unspecified type     Chronic renal failure     ESRD (end stage renal disease) on dialysis since 2013, M-W-F    Hemodialysis access, AV graft     HTN (hypertension)     Hypothyroidism     Leg fracture, right     Morbid obesity     Sleep apnea     Stroke age 10

## 2023-07-27 NOTE — ED PROVIDER NOTE - ATTENDING CONTRIBUTION TO CARE
36 yo male ESRD p/w L forearm pain when shifting position in a chair, no other injury, L forearm pain noted on exam, x-ray with nondisplaced fx.  forearm splinted.  ortho referral for outpatient management.

## 2023-07-27 NOTE — ED PROVIDER NOTE - PATIENT PORTAL LINK FT
You can access the FollowMyHealth Patient Portal offered by [facility] by registering at the following website: [email or web address]. By joining [employer]’s FollowMyHealth portal, you will also be able to view your health information using other applications (apps) compatible with our system.

## 2023-07-27 NOTE — ED PROVIDER NOTE - CLINICAL SUMMARY MEDICAL DECISION MAKING FREE TEXT BOX
37yoM w/Hx of HTN, hypothyroidism, SOLITARIO, ESRD with left upper extremity AV fistula on HD MWF (last yesterday) p/w L forearm pain, vs unremarkable, phys exam w/L forearm tenderness FROM sensation and strength grossly intact, will XR forearm/wrist to eval for fracture/dislocation, pain control, likely TBDC home w/ortho follow-up pending w/u. This represents an initial assessment; workup and plan subject to change. - Delia Frey, PGY-3

## 2023-07-28 VITALS
HEART RATE: 87 BPM | DIASTOLIC BLOOD PRESSURE: 86 MMHG | RESPIRATION RATE: 18 BRPM | OXYGEN SATURATION: 96 % | SYSTOLIC BLOOD PRESSURE: 137 MMHG | TEMPERATURE: 98 F

## 2023-07-28 PROCEDURE — 29125 APPL SHORT ARM SPLINT STATIC: CPT | Mod: LT

## 2023-07-28 PROCEDURE — 99284 EMERGENCY DEPT VISIT MOD MDM: CPT | Mod: 25

## 2023-07-28 PROCEDURE — 73110 X-RAY EXAM OF WRIST: CPT

## 2023-07-28 PROCEDURE — 73090 X-RAY EXAM OF FOREARM: CPT

## 2023-07-28 RX ORDER — OXYCODONE HYDROCHLORIDE 5 MG/1
1 TABLET ORAL
Qty: 9 | Refills: 0
Start: 2023-07-28 | End: 2023-07-30

## 2023-07-28 RX ADMIN — Medication 975 MILLIGRAM(S): at 00:05

## 2023-07-28 RX ADMIN — OXYCODONE HYDROCHLORIDE 5 MILLIGRAM(S): 5 TABLET ORAL at 00:05

## 2023-07-28 NOTE — ED ADULT NURSE NOTE - OBJECTIVE STATEMENT
37y male presents to the ED AAOx4 ambulatory with complaints of left army injury. PMH CKD on HD MWF, RUE AV Fistual (pink banded). Pt states was lowering himself onto a chair and states he felt a pop in his left arm, co of left arm pain. Pt able to move his move his left hand, brisk cap refil. NO obvious deformity noted other than no longer active AV Fistula. Pt Denies headache, dizziness, vision changes, chest pain, shortness of breath, abdominal pain, nausea, vomiting, diarrhea, fevers, chills, dysuria, hematuria, recent illness travel or fall.

## 2023-07-28 NOTE — ED POST DISCHARGE NOTE - DETAILS
7/28: no answer, vm full, will reattempt - Yee Porter PA-C 7/29 left vm for c/b. -Marce Valera PA-C 7/30/23: spoke with pt and explained additional findings. pt currently in sugar tong splint, denies ext numbness/tingling/discoloration, pain adequately controlled. patient was given strict followup instructions and return precautions. pt verbalized understanding and was appreciative of call. - Gregg Perkins PA-C

## 2023-07-28 NOTE — ED POST DISCHARGE NOTE - RESULT SUMMARY
peervue - xray wrist with age indeterminate radial neck impaction, subperiosteal reorption of the scaphoid and thumb (ulna fx identified by ED and placed in sugar tong) peervue/incidental finding - xray wrist with age indeterminate radial neck impaction, subperiosteal reorption of the scaphoid and thumb (ulna fx identified by ED and placed in sugar tong)

## 2023-08-07 NOTE — H&P ADULT - NSHPREVIEWOFSYSTEMS_GEN_ALL_CORE
Review of Systems:   CONSTITUTIONAL: No fever, weight loss, or fatigue  EYES: No eye pain, visual disturbances, or discharge  ENMT:  No difficulty hearing, tinnitus, vertigo; No sinus or throat pain  NECK: No pain or stiffness  BREASTS: No pain, masses, or nipple discharge  RESPIRATORY: No cough, wheezing, chills or hemoptysis; No shortness of breath  CARDIOVASCULAR: No chest pain, palpitations, dizziness, or leg swelling  GASTROINTESTINAL: No abdominal or epigastric pain. No nausea, vomiting, or hematemesis; No diarrhea or constipation. No melena or hematochezia.  GENITOURINARY: No dysuria, frequency, hematuria, or incontinence  NEUROLOGICAL: No headaches, memory loss, loss of strength, numbness, or tremors  SKIN: No itching, burning, rashes, or lesions   LYMPH NODES: No enlarged glands  ENDOCRINE: No heat or cold intolerance; No hair loss  MUSCULOSKELETAL: No joint pain or swelling; No muscle, back, or extremity pain  PSYCHIATRIC: No depression, anxiety, mood swings, or difficulty sleeping  HEME/LYMPH: No easy bruising, or bleeding gums  ALLERY AND IMMUNOLOGIC: No hives or eczema
Yes

## 2023-08-14 ENCOUNTER — INPATIENT (INPATIENT)
Facility: HOSPITAL | Age: 38
LOS: 9 days | Discharge: NURSING, REHAB OR LONG-TERM CARE | DRG: 521 | End: 2023-08-24
Attending: INTERNAL MEDICINE | Admitting: INTERNAL MEDICINE
Payer: MEDICAID

## 2023-08-14 ENCOUNTER — TRANSCRIPTION ENCOUNTER (OUTPATIENT)
Age: 38
End: 2023-08-14

## 2023-08-14 ENCOUNTER — APPOINTMENT (OUTPATIENT)
Dept: ORTHOPEDIC SURGERY | Facility: CLINIC | Age: 38
End: 2023-08-14

## 2023-08-14 VITALS
HEIGHT: 74 IN | DIASTOLIC BLOOD PRESSURE: 89 MMHG | SYSTOLIC BLOOD PRESSURE: 173 MMHG | HEART RATE: 74 BPM | RESPIRATION RATE: 18 BRPM | TEMPERATURE: 98 F | OXYGEN SATURATION: 98 %

## 2023-08-14 DIAGNOSIS — N18.6 END STAGE RENAL DISEASE: ICD-10-CM

## 2023-08-14 DIAGNOSIS — K12.2 CELLULITIS AND ABSCESS OF MOUTH: ICD-10-CM

## 2023-08-14 DIAGNOSIS — E87.5 HYPERKALEMIA: ICD-10-CM

## 2023-08-14 DIAGNOSIS — N25.0 RENAL OSTEODYSTROPHY: ICD-10-CM

## 2023-08-14 DIAGNOSIS — S72.002A FRACTURE OF UNSPECIFIED PART OF NECK OF LEFT FEMUR, INITIAL ENCOUNTER FOR CLOSED FRACTURE: ICD-10-CM

## 2023-08-14 DIAGNOSIS — Z29.9 ENCOUNTER FOR PROPHYLACTIC MEASURES, UNSPECIFIED: ICD-10-CM

## 2023-08-14 DIAGNOSIS — E03.9 HYPOTHYROIDISM, UNSPECIFIED: ICD-10-CM

## 2023-08-14 DIAGNOSIS — S52.92XA UNSPECIFIED FRACTURE OF LEFT FOREARM, INITIAL ENCOUNTER FOR CLOSED FRACTURE: ICD-10-CM

## 2023-08-14 DIAGNOSIS — I10 ESSENTIAL (PRIMARY) HYPERTENSION: ICD-10-CM

## 2023-08-14 DIAGNOSIS — S72.142A DISPLACED INTERTROCHANTERIC FRACTURE OF LEFT FEMUR, INITIAL ENCOUNTER FOR CLOSED FRACTURE: ICD-10-CM

## 2023-08-14 DIAGNOSIS — I77.0 ARTERIOVENOUS FISTULA, ACQUIRED: Chronic | ICD-10-CM

## 2023-08-14 DIAGNOSIS — Z91.89 OTHER SPECIFIED PERSONAL RISK FACTORS, NOT ELSEWHERE CLASSIFIED: ICD-10-CM

## 2023-08-14 DIAGNOSIS — R74.8 ABNORMAL LEVELS OF OTHER SERUM ENZYMES: ICD-10-CM

## 2023-08-14 LAB
ALBUMIN SERPL ELPH-MCNC: 3.7 G/DL — SIGNIFICANT CHANGE UP (ref 3.3–5)
ALP SERPL-CCNC: 1061 U/L — HIGH (ref 40–120)
ALT FLD-CCNC: 6 U/L — LOW (ref 10–45)
ANION GAP SERPL CALC-SCNC: 21 MMOL/L — HIGH (ref 5–17)
ANION GAP SERPL CALC-SCNC: 21 MMOL/L — HIGH (ref 5–17)
APTT BLD: 26.7 SEC — SIGNIFICANT CHANGE UP (ref 24.5–35.6)
AST SERPL-CCNC: 18 U/L — SIGNIFICANT CHANGE UP (ref 10–40)
BASOPHILS # BLD AUTO: 0.04 K/UL — SIGNIFICANT CHANGE UP (ref 0–0.2)
BASOPHILS NFR BLD AUTO: 0.7 % — SIGNIFICANT CHANGE UP (ref 0–2)
BILIRUB SERPL-MCNC: 0.4 MG/DL — SIGNIFICANT CHANGE UP (ref 0.2–1.2)
BUN SERPL-MCNC: 92 MG/DL — HIGH (ref 7–23)
BUN SERPL-MCNC: 95 MG/DL — HIGH (ref 7–23)
CA-I BLD-SCNC: 1.03 MMOL/L — LOW (ref 1.15–1.33)
CALCIUM SERPL-MCNC: 8.8 MG/DL — SIGNIFICANT CHANGE UP (ref 8.4–10.5)
CALCIUM SERPL-MCNC: 9 MG/DL — SIGNIFICANT CHANGE UP (ref 8.4–10.5)
CHLORIDE SERPL-SCNC: 94 MMOL/L — LOW (ref 96–108)
CHLORIDE SERPL-SCNC: 95 MMOL/L — LOW (ref 96–108)
CO2 SERPL-SCNC: 22 MMOL/L — SIGNIFICANT CHANGE UP (ref 22–31)
CO2 SERPL-SCNC: 23 MMOL/L — SIGNIFICANT CHANGE UP (ref 22–31)
CREAT SERPL-MCNC: 12.17 MG/DL — HIGH (ref 0.5–1.3)
CREAT SERPL-MCNC: 12.2 MG/DL — HIGH (ref 0.5–1.3)
EGFR: 5 ML/MIN/1.73M2 — LOW
EGFR: 5 ML/MIN/1.73M2 — LOW
EOSINOPHIL # BLD AUTO: 0.13 K/UL — SIGNIFICANT CHANGE UP (ref 0–0.5)
EOSINOPHIL NFR BLD AUTO: 2.4 % — SIGNIFICANT CHANGE UP (ref 0–6)
GLUCOSE BLDC GLUCOMTR-MCNC: 72 MG/DL — SIGNIFICANT CHANGE UP (ref 70–99)
GLUCOSE BLDC GLUCOMTR-MCNC: 79 MG/DL — SIGNIFICANT CHANGE UP (ref 70–99)
GLUCOSE BLDC GLUCOMTR-MCNC: 91 MG/DL — SIGNIFICANT CHANGE UP (ref 70–99)
GLUCOSE SERPL-MCNC: 66 MG/DL — LOW (ref 70–99)
GLUCOSE SERPL-MCNC: 82 MG/DL — SIGNIFICANT CHANGE UP (ref 70–99)
HCT VFR BLD CALC: 25.3 % — LOW (ref 39–50)
HGB BLD-MCNC: 7.6 G/DL — LOW (ref 13–17)
IMM GRANULOCYTES NFR BLD AUTO: 0.4 % — SIGNIFICANT CHANGE UP (ref 0–0.9)
INR BLD: 1.06 RATIO — SIGNIFICANT CHANGE UP (ref 0.85–1.18)
LYMPHOCYTES # BLD AUTO: 1.75 K/UL — SIGNIFICANT CHANGE UP (ref 1–3.3)
LYMPHOCYTES # BLD AUTO: 32.5 % — SIGNIFICANT CHANGE UP (ref 13–44)
MAGNESIUM SERPL-MCNC: 2.4 MG/DL — SIGNIFICANT CHANGE UP (ref 1.6–2.6)
MCHC RBC-ENTMCNC: 29.6 PG — SIGNIFICANT CHANGE UP (ref 27–34)
MCHC RBC-ENTMCNC: 30 GM/DL — LOW (ref 32–36)
MCV RBC AUTO: 98.4 FL — SIGNIFICANT CHANGE UP (ref 80–100)
MONOCYTES # BLD AUTO: 0.57 K/UL — SIGNIFICANT CHANGE UP (ref 0–0.9)
MONOCYTES NFR BLD AUTO: 10.6 % — SIGNIFICANT CHANGE UP (ref 2–14)
NEUTROPHILS # BLD AUTO: 2.87 K/UL — SIGNIFICANT CHANGE UP (ref 1.8–7.4)
NEUTROPHILS NFR BLD AUTO: 53.4 % — SIGNIFICANT CHANGE UP (ref 43–77)
NRBC # BLD: 0 /100 WBCS — SIGNIFICANT CHANGE UP (ref 0–0)
PLATELET # BLD AUTO: 289 K/UL — SIGNIFICANT CHANGE UP (ref 150–400)
POTASSIUM SERPL-MCNC: 5.8 MMOL/L — HIGH (ref 3.5–5.3)
POTASSIUM SERPL-MCNC: 5.8 MMOL/L — HIGH (ref 3.5–5.3)
POTASSIUM SERPL-SCNC: 5.8 MMOL/L — HIGH (ref 3.5–5.3)
POTASSIUM SERPL-SCNC: 5.8 MMOL/L — HIGH (ref 3.5–5.3)
PROT SERPL-MCNC: 6.6 G/DL — SIGNIFICANT CHANGE UP (ref 6–8.3)
PROTHROM AB SERPL-ACNC: 11.6 SEC — SIGNIFICANT CHANGE UP (ref 9.5–13)
RBC # BLD: 2.57 M/UL — LOW (ref 4.2–5.8)
RBC # FLD: 16 % — HIGH (ref 10.3–14.5)
SODIUM SERPL-SCNC: 137 MMOL/L — SIGNIFICANT CHANGE UP (ref 135–145)
SODIUM SERPL-SCNC: 139 MMOL/L — SIGNIFICANT CHANGE UP (ref 135–145)
WBC # BLD: 5.38 K/UL — SIGNIFICANT CHANGE UP (ref 3.8–10.5)
WBC # FLD AUTO: 5.38 K/UL — SIGNIFICANT CHANGE UP (ref 3.8–10.5)

## 2023-08-14 PROCEDURE — 73502 X-RAY EXAM HIP UNI 2-3 VIEWS: CPT | Mod: 26,LT

## 2023-08-14 PROCEDURE — 99254 IP/OBS CNSLTJ NEW/EST MOD 60: CPT | Mod: 57

## 2023-08-14 PROCEDURE — 72170 X-RAY EXAM OF PELVIS: CPT | Mod: 26

## 2023-08-14 PROCEDURE — 73552 X-RAY EXAM OF FEMUR 2/>: CPT | Mod: 26,LT

## 2023-08-14 PROCEDURE — 99222 1ST HOSP IP/OBS MODERATE 55: CPT | Mod: GC

## 2023-08-14 PROCEDURE — 99285 EMERGENCY DEPT VISIT HI MDM: CPT

## 2023-08-14 PROCEDURE — 86077 PHYS BLOOD BANK SERV XMATCH: CPT

## 2023-08-14 PROCEDURE — 73080 X-RAY EXAM OF ELBOW: CPT | Mod: 26,LT

## 2023-08-14 PROCEDURE — 72192 CT PELVIS W/O DYE: CPT | Mod: 26

## 2023-08-14 PROCEDURE — 76377 3D RENDER W/INTRP POSTPROCES: CPT | Mod: 26

## 2023-08-14 PROCEDURE — 99223 1ST HOSP IP/OBS HIGH 75: CPT

## 2023-08-14 PROCEDURE — 36000 PLACE NEEDLE IN VEIN: CPT

## 2023-08-14 PROCEDURE — 71045 X-RAY EXAM CHEST 1 VIEW: CPT | Mod: 26

## 2023-08-14 PROCEDURE — 76942 ECHO GUIDE FOR BIOPSY: CPT | Mod: 26

## 2023-08-14 RX ORDER — HYDROMORPHONE HYDROCHLORIDE 2 MG/ML
1 INJECTION INTRAMUSCULAR; INTRAVENOUS; SUBCUTANEOUS ONCE
Refills: 0 | Status: DISCONTINUED | OUTPATIENT
Start: 2023-08-14 | End: 2023-08-14

## 2023-08-14 RX ORDER — CALCIUM GLUCONATE 100 MG/ML
2 VIAL (ML) INTRAVENOUS ONCE
Refills: 0 | Status: COMPLETED | OUTPATIENT
Start: 2023-08-14 | End: 2023-08-14

## 2023-08-14 RX ORDER — HYDROMORPHONE HYDROCHLORIDE 2 MG/ML
0.5 INJECTION INTRAMUSCULAR; INTRAVENOUS; SUBCUTANEOUS ONCE
Refills: 0 | Status: DISCONTINUED | OUTPATIENT
Start: 2023-08-14 | End: 2023-08-14

## 2023-08-14 RX ORDER — ONDANSETRON 8 MG/1
4 TABLET, FILM COATED ORAL EVERY 8 HOURS
Refills: 0 | Status: DISCONTINUED | OUTPATIENT
Start: 2023-08-14 | End: 2023-08-15

## 2023-08-14 RX ORDER — ACETAMINOPHEN 500 MG
1000 TABLET ORAL ONCE
Refills: 0 | Status: COMPLETED | OUTPATIENT
Start: 2023-08-14 | End: 2023-08-14

## 2023-08-14 RX ORDER — HYDROMORPHONE HYDROCHLORIDE 2 MG/ML
0.5 INJECTION INTRAMUSCULAR; INTRAVENOUS; SUBCUTANEOUS EVERY 4 HOURS
Refills: 0 | Status: DISCONTINUED | OUTPATIENT
Start: 2023-08-14 | End: 2023-08-15

## 2023-08-14 RX ORDER — OXYCODONE HYDROCHLORIDE 5 MG/1
5 TABLET ORAL EVERY 8 HOURS
Refills: 0 | Status: DISCONTINUED | OUTPATIENT
Start: 2023-08-14 | End: 2023-08-14

## 2023-08-14 RX ORDER — INSULIN HUMAN 100 [IU]/ML
5 INJECTION, SOLUTION SUBCUTANEOUS ONCE
Refills: 0 | Status: COMPLETED | OUTPATIENT
Start: 2023-08-14 | End: 2023-08-14

## 2023-08-14 RX ORDER — DEXTROSE 50 % IN WATER 50 %
50 SYRINGE (ML) INTRAVENOUS ONCE
Refills: 0 | Status: COMPLETED | OUTPATIENT
Start: 2023-08-14 | End: 2023-08-14

## 2023-08-14 RX ORDER — ACETAMINOPHEN 500 MG
1000 TABLET ORAL EVERY 8 HOURS
Refills: 0 | Status: DISCONTINUED | OUTPATIENT
Start: 2023-08-14 | End: 2023-08-15

## 2023-08-14 RX ORDER — HEPARIN SODIUM 5000 [USP'U]/ML
5000 INJECTION INTRAVENOUS; SUBCUTANEOUS EVERY 8 HOURS
Refills: 0 | Status: COMPLETED | OUTPATIENT
Start: 2023-08-14 | End: 2023-08-14

## 2023-08-14 RX ORDER — MORPHINE SULFATE 50 MG/1
2 CAPSULE, EXTENDED RELEASE ORAL ONCE
Refills: 0 | Status: DISCONTINUED | OUTPATIENT
Start: 2023-08-14 | End: 2023-08-14

## 2023-08-14 RX ORDER — POLYETHYLENE GLYCOL 3350 17 G/17G
17 POWDER, FOR SOLUTION ORAL DAILY
Refills: 0 | Status: DISCONTINUED | OUTPATIENT
Start: 2023-08-14 | End: 2023-08-15

## 2023-08-14 RX ORDER — NALOXONE HYDROCHLORIDE 4 MG/.1ML
0.4 SPRAY NASAL ONCE
Refills: 0 | Status: DISCONTINUED | OUTPATIENT
Start: 2023-08-14 | End: 2023-08-15

## 2023-08-14 RX ORDER — LANOLIN ALCOHOL/MO/W.PET/CERES
3 CREAM (GRAM) TOPICAL AT BEDTIME
Refills: 0 | Status: DISCONTINUED | OUTPATIENT
Start: 2023-08-14 | End: 2023-08-15

## 2023-08-14 RX ORDER — SENNA PLUS 8.6 MG/1
2 TABLET ORAL AT BEDTIME
Refills: 0 | Status: DISCONTINUED | OUTPATIENT
Start: 2023-08-14 | End: 2023-08-15

## 2023-08-14 RX ORDER — CALCIUM ACETATE 667 MG
667 TABLET ORAL
Refills: 0 | Status: DISCONTINUED | OUTPATIENT
Start: 2023-08-14 | End: 2023-08-15

## 2023-08-14 RX ORDER — LABETALOL HCL 100 MG
200 TABLET ORAL
Refills: 0 | Status: DISCONTINUED | OUTPATIENT
Start: 2023-08-14 | End: 2023-08-15

## 2023-08-14 RX ORDER — HYDROMORPHONE HYDROCHLORIDE 2 MG/ML
0.2 INJECTION INTRAMUSCULAR; INTRAVENOUS; SUBCUTANEOUS EVERY 4 HOURS
Refills: 0 | Status: DISCONTINUED | OUTPATIENT
Start: 2023-08-14 | End: 2023-08-15

## 2023-08-14 RX ADMIN — HYDROMORPHONE HYDROCHLORIDE 0.5 MILLIGRAM(S): 2 INJECTION INTRAMUSCULAR; INTRAVENOUS; SUBCUTANEOUS at 15:20

## 2023-08-14 RX ADMIN — Medication 200 GRAM(S): at 07:42

## 2023-08-14 RX ADMIN — Medication 1000 MILLIGRAM(S): at 22:07

## 2023-08-14 RX ADMIN — Medication 1000 MILLIGRAM(S): at 13:47

## 2023-08-14 RX ADMIN — INSULIN HUMAN 5 UNIT(S): 100 INJECTION, SOLUTION SUBCUTANEOUS at 07:39

## 2023-08-14 RX ADMIN — Medication 50 MILLILITER(S): at 07:38

## 2023-08-14 RX ADMIN — HYDROMORPHONE HYDROCHLORIDE 0.5 MILLIGRAM(S): 2 INJECTION INTRAMUSCULAR; INTRAVENOUS; SUBCUTANEOUS at 06:30

## 2023-08-14 RX ADMIN — MORPHINE SULFATE 2 MILLIGRAM(S): 50 CAPSULE, EXTENDED RELEASE ORAL at 20:50

## 2023-08-14 RX ADMIN — Medication 200 MILLIGRAM(S): at 13:47

## 2023-08-14 RX ADMIN — Medication 400 MILLIGRAM(S): at 11:05

## 2023-08-14 RX ADMIN — HYDROMORPHONE HYDROCHLORIDE 0.2 MILLIGRAM(S): 2 INJECTION INTRAMUSCULAR; INTRAVENOUS; SUBCUTANEOUS at 18:14

## 2023-08-14 RX ADMIN — Medication 400 MILLIGRAM(S): at 06:36

## 2023-08-14 RX ADMIN — Medication 1000 MILLIGRAM(S): at 21:07

## 2023-08-14 RX ADMIN — HYDROMORPHONE HYDROCHLORIDE 1 MILLIGRAM(S): 2 INJECTION INTRAMUSCULAR; INTRAVENOUS; SUBCUTANEOUS at 12:36

## 2023-08-14 RX ADMIN — HYDROMORPHONE HYDROCHLORIDE 0.2 MILLIGRAM(S): 2 INJECTION INTRAMUSCULAR; INTRAVENOUS; SUBCUTANEOUS at 18:29

## 2023-08-14 RX ADMIN — MORPHINE SULFATE 2 MILLIGRAM(S): 50 CAPSULE, EXTENDED RELEASE ORAL at 21:20

## 2023-08-14 RX ADMIN — Medication 667 MILLIGRAM(S): at 13:47

## 2023-08-14 RX ADMIN — Medication 50 MILLILITER(S): at 10:12

## 2023-08-14 RX ADMIN — Medication 3 MILLIGRAM(S): at 21:07

## 2023-08-14 RX ADMIN — HEPARIN SODIUM 5000 UNIT(S): 5000 INJECTION INTRAVENOUS; SUBCUTANEOUS at 21:18

## 2023-08-14 RX ADMIN — HEPARIN SODIUM 5000 UNIT(S): 5000 INJECTION INTRAVENOUS; SUBCUTANEOUS at 13:48

## 2023-08-14 RX ADMIN — HYDROMORPHONE HYDROCHLORIDE 0.5 MILLIGRAM(S): 2 INJECTION INTRAMUSCULAR; INTRAVENOUS; SUBCUTANEOUS at 06:14

## 2023-08-14 NOTE — ED PROVIDER NOTE - PROGRESS NOTE DETAILS
MICHAEL Mobley PGY3 meds given fascia iliaca block performed. Chaim Reyes DO: Received signout on patient. Fascia iliaca block performed, no arrhythmias no other compplaints, pain improved, ortho evaluated, pt tba medicine for dialysis, hyperk cocktail given for peaked t waves although was seen on ekg previously. Lorie Ignacio DO (PGY3): Patient signed out to me by night team. Spoke with nephrology who will arrange for patient to have dialysis today.  Patient to be admitted to medicine, spoke with Dr. Sanders, will admit to his service.

## 2023-08-14 NOTE — H&P ADULT - HISTORY OF PRESENT ILLNESS
37 year old male with past medical history of End stage renal disease(M,W,F), hypertension, hypothyroidism, stroke at age of 10, sleep apnea, obesity and recent hospitalization for  left radial fracture 7/27/2023 presenting to [facility] emergency department after a mechanical fall in the shower which led him to land on his left hip. Patient is accompanied at the bedside by family members who provide collateral history. Patient states that he completed antibiotics for uvulitis that were prescribed at previous admission. Patient had has fascia iliaca block done in emergency room but states that the pain is refractory. He denies any head trauma or loss of consciousness secondary to the mechanical fall. Denies headache, nausea, vomiting, loss of sensation in lower extremities, or urinary/fecal incontinence. 
Breathing spontaneous and unlabored. Breath sounds clear and equal bilaterally with regular rhythm.

## 2023-08-14 NOTE — ED ADULT NURSE NOTE - NSFALLRISKINTERV_ED_ALL_ED

## 2023-08-14 NOTE — CONSULT NOTE ADULT - PROBLEM SELECTOR RECOMMENDATION 9
Continue HD via R AVF per schedule (MWF)  Monitor Renal functions   HD consent obtained Continue HD via R AVF per schedule (MWF)  -Renal diet  -HD Consent obtained

## 2023-08-14 NOTE — ED PROVIDER NOTE - PHYSICAL EXAMINATION
General: non-toxic, NAD  HEENT: NCAT, PERRL  Cardiac: RRR, no murmurs, 2+ peripheral pulses  Resp: CTAB  Abdomen: soft, non-distended, bowel sounds present, no ttp, no rebound or guarding. no organomegaly  Extremities: no midline spinal tenderness. L hip externally rotated and fixed. DP/PT pulses and sensation intact to lower extremity.   Skin: no rashes  Neuro: AAOx4, 5+motor, sensation grossly intact  Psych: mood and affect appropriate

## 2023-08-14 NOTE — H&P ADULT - NSHPLABSRESULTS_GEN_ALL_CORE
Xray Hip/Pelvis personally reviewed by me significant for left femoral neck fracture  CT Pelvis personally reviewed by me shows left femoral neck fracture with anterolisthesis of L5-S1.   CBC personally reviewed by me WBC 5.38 no leukocytosis  CMP personally reviewed by me K 5.8 elevated, ALP 1061 elevated,

## 2023-08-14 NOTE — H&P ADULT - PROBLEM SELECTOR PLAN 8
Chief Complaint   Patient presents with   • Hospital Follow-up     Possible miscarriage    Chief complaint: Miscarriage    History of present illness:   20 y.o.  presents with above chief complaint.  Patient was seen in the emergency room on 2022 secondary to vaginal bleeding.  hCG at that time 1947.  This was repeated 2 days later and decreased to 298.  Patient reports that she began to have some spotting approximately a week before .  During that day she began to have heavier clots and bleeding associated with some cramping.  She went to the emergency room and passed a very large clot there.  She continued have some spotting since then.  Repeat beta-hCG yesterday decreased to 11.1    Patient also reports some depression, lethargy and mood swings approximate 1 week before her cycles.  She also reports her cycles are extremely painful.    ROS: Pertinent positives documented in HPI and all other systems reviewed & are negative    POBHx:  1 para 0-0-1-0    PGYNHx: As above, last pap never    All PMH, PSH, meds, allergies, social history and FH reviewed and updated today:  Past Medical History:   Diagnosis Date   • Healthy pediatric patient        History reviewed. No pertinent surgical history.    Allergies: No Known Allergies    Social History     Socioeconomic History   • Marital status: Single     Spouse name: Not on file   • Number of children: Not on file   • Years of education: Not on file   • Highest education level: Not on file   Occupational History   • Not on file   Tobacco Use   • Smoking status: Never Smoker   • Smokeless tobacco: Never Used   Vaping Use   • Vaping Use: Never used   Substance and Sexual Activity   • Alcohol use: Yes   • Drug use: No   • Sexual activity: Never   Other Topics Concern   • Behavioral problems Not Asked   • Interpersonal relationships Not Asked   • Sad or not enjoying activities Not Asked   • Suicidal thoughts Not Asked   • Poor school  performance Not Asked   • Reading difficulties Not Asked   • Speech difficulties No   • Writing difficulties Not Asked   • Inadequate sleep Not Asked   • Excessive TV viewing Not Asked   • Excessive video game use Not Asked   • Inadequate exercise Not Asked   • Sports related Not Asked   • Poor diet Not Asked   • Family concerns for drug/alcohol abuse Not Asked   • Poor oral hygiene Not Asked   • Bike safety Not Asked   • Family concerns vehicle safety Not Asked   Social History Narrative   • Not on file     Social Determinants of Health     Financial Resource Strain: Not on file   Food Insecurity: Not on file   Transportation Needs: Not on file   Physical Activity: Not on file   Stress: Not on file   Social Connections: Not on file   Intimate Partner Violence: Not on file   Housing Stability: Not on file       Family History   Problem Relation Age of Onset   • No Known Problems Mother    • No Known Problems Father    • No Known Problems Sister    • No Known Problems Brother        Physical exam:  /72 (BP Location: Left arm, Patient Position: Sitting, BP Cuff Size: Adult)   Wt 50.3 kg (111 lb)     GENERAL APPEARANCE: healthy, alert, no distress  ABDOMEN Abdomen soft, non-tender. BS normal. No masses,  No organomegaly  FEMALE GYN: normal female external genitalia without lesions, BUS normal without lesions, vaginal mucosa pink and moist, no vaginal discharge noted, cervix normal in appearance without lesions, no CMT, urethra is normal without discharge or scarring, no bladder fullness or masses, normal anus and perineum. Uterus mobile, normal size, and nontender. Ovaries normal size and nontender bilaterally. No palpable masses.  No inguinal hernia present .  EXTREMITIES:negative clubbing, cyanosis, edema    NEURO Awake, alert and oriented x 3, Normal gait, no sensory deficits  SKIN No rashes, or ulcers or lesions seen  PSYCHIATRIC: Patient shows appropriate affect, is alert and oriented x3, intact judgment  and insight.      Assessment:  1. Miscarriage  HCG QUANTITATIVE       Plan:    Discussed with patient that this likely represents a resolving miscarriage.  We will repeat hCG levels in 1 week    Also discussed with the patient signs and symptoms of PMS/PMDD.  Educational materials were given today.  We will discuss options and plan of care at next visit.    All questions answered   Patient with Oxycodone [id number] and Percocet 5q6hrs at home for previous radial fracture   Patient will require increased pain medication regiment   Hydromorphone PRN ordered in setting of second fracture with 10/10 pain and ESRD  Downtitrate pain regiment as tolerated  Tylenol PRN for mild pain.

## 2023-08-14 NOTE — H&P ADULT - PROBLEM SELECTOR PLAN 10
Patient completed course of Augmentin from recent prior admission  Denies any worsening of symptoms  Monitor for fevers.

## 2023-08-14 NOTE — H&P ADULT - PROBLEM SELECTOR PLAN 5
CT pelvis shows multiple areas  which feature   advanced renal osteodystrophy   with diffuse sclerosis and lucency in a mixed pattern throughout the   visualized skeleton.   Nephrology followup  Follow PTH and intact PTH.   Patient with previous imaging study July 2023 showing small parathyroid adenoma.

## 2023-08-14 NOTE — PATIENT PROFILE ADULT - FALL HARM RISK - HARM RISK INTERVENTIONS
Assistance with ambulation/Assistance OOB with selected safe patient handling equipment/Communicate Risk of Fall with Harm to all staff/Discuss with provider need for PT consult/Monitor gait and stability/Reinforce activity limits and safety measures with patient and family/Tailored Fall Risk Interventions/Visual Cue: Yellow wristband and red socks/Bed in lowest position, wheels locked, appropriate side rails in place/Call bell, personal items and telephone in reach/Instruct patient to call for assistance before getting out of bed or chair/Non-slip footwear when patient is out of bed/[place] to call system/Physically safe environment - no spills, clutter or unnecessary equipment/Purposeful Proactive Rounding/Room/bathroom lighting operational, light cord in reach

## 2023-08-14 NOTE — H&P ADULT - TIME BILLING
Time-based billing (NON-critical care).     The necessity of the time spent during the encounter on this date of service was due to:     - Ordering, reviewing, and interpreting labs, testing, and imaging.  - Independently obtaining a review of systems and performing a physical exam.   - Reviewing prior hospitalization and where necessary, outpatient records.  - Counselling and educating patient and family regarding interpretation of aforementioned items and plan of care.

## 2023-08-14 NOTE — ED PROVIDER NOTE - EKG ADDITIONAL INFORMATION FREE TEXT
----- Message from Palmira Iraheta sent at 6/10/2020  2:13 PM CDT -----  Type:  RX Refill Request    Who Called:  Wife/Sabra  Refill or New Rx:  Refill  RX Name and Strength:  azithromycin (Z-GARY) 250 MG tablet     Preferred Pharmacy with phone number:      [address] [address]  Phone: [phone] Fax: [phone]    Local or Mail Order:  local  Ordering Provider:  same  Best Call Back Number:  [phone]  Additional Information:  States the Z-gary was doing him good but after he finished taking it he started having the bad cough with pain again. They are asking if office will order him another z-gary or its like. Please call to advise.       
Sent message to Cira Sanchez about z-uri refill, also called to let patient known and told if they do not here anything from pharmacy please call office back.  
no overt acute ischemic changes

## 2023-08-14 NOTE — ED ADULT NURSE NOTE - OBJECTIVE STATEMENT
36YO male with PMH HTN, Dialysis mon/wed/fri via EMS from home presenting with complaints of fall. As per patient, was showering for dialysis apt, slipped and fell on left hip. Family members aided pt off the bathroom floor. Pt has a cast on left forearm from a pervious fall, cast was suppose to be taken off today. Family disclosed to EMS, pt has been progressively getting weaker with lower ext and walking. Pt Axox4, respirations even, & non-labored. pedal pulses strong and equal bilaterally. Pt is unable to straighten left leg, extremely painful to touch and positioning, pt has AV fistula on right arm with a do not use band on wrist. Abdomen soft, non-tender, non-distended. Pt denies HA, head injury, LOC, fevers, nausea or vomiting. Pt placed in position of comfort. Bed in lowest position, wheels locked, appropriate side rails raised. Pt denies needs at this time. 36YO male with PMH HTN, Dialysis mon/wed/fri via EMS from home presenting with complaints of fall. As per patient, was showering for dialysis apt, slipped and fell on left hip. Family members aided pt off the bathroom floor. Pt has a cast on left forearm from a pervious fall: Ulnar fx with old not inuse fistula. Cast was suppose to be taken off today. Family disclosed to EMS, pt has been progressively getting weaker with lower ext and walking. Pt Axox4, respirations even, & non-labored. pedal pulses strong and equal bilaterally. Pt is unable to straighten left leg, extremely painful to touch and positioning, pt has AV fistula on right arm with a do not use band on wrist. Abdomen soft, non-tender, non-distended. Pt denies HA, head injury, LOC, fevers, nausea or vomiting. Pt placed in position of comfort. Bed in lowest position, wheels locked, appropriate side rails raised. Pt denies needs at this time.

## 2023-08-14 NOTE — CONSULT NOTE ADULT - SUBJECTIVE AND OBJECTIVE BOX
37y Male community ambulatory presents c/o L hip pain and inability to ambulate sp mechanical fall in shower last night. Denies HS/LOC. Denies numbness/tingling. Denies fever/chills. Denies pain/injury elsewhere. Hx of L radial hx fx 7/27, not seen by ortho.       MEDICATIONS  (STANDING):    Allergies    No Known Drug Allergies  shellfish (Hives)    Intolerances                            7.6    5.38  )-----------( 289      ( 14 Aug 2023 06:32 )             25.3     14 Aug 2023 06:33    137    |  94     |  92     ----------------------------<  82     5.8     |  22     |  12.20    Ca    8.8        14 Aug 2023 06:33  Mg     2.4       14 Aug 2023 06:33    TPro  6.6    /  Alb  3.7    /  TBili  0.4    /  DBili  x      /  AST  18     /  ALT  6      /  AlkPhos  1061   14 Aug 2023 06:33    PT/INR - ( 14 Aug 2023 06:32 )   PT: 11.6 sec;   INR: 1.06 ratio         PTT - ( 14 Aug 2023 06:32 )  PTT:26.7 sec  Vital Signs Last 24 Hrs  T(C): 36.9 (08-14-23 @ 06:16), Max: 36.9 (08-14-23 @ 06:16)  T(F): 98.5 (08-14-23 @ 06:16), Max: 98.5 (08-14-23 @ 06:16)  HR: 95 (08-14-23 @ 07:52) (74 - 95)  BP: 200/76 (08-14-23 @ 07:52) (173/89 - 200/76)  BP(mean): 101 (08-14-23 @ 07:52) (101 - 101)  RR: 20 (08-14-23 @ 07:52) (18 - 20)  SpO2: 99% (08-14-23 @ 07:52) (98% - 99%)  Imaging: XR demonstates R/L hip fracture    Physical Exam  General: NAD, Alert, Awake and oriented  Neurologic: No gross deficits, moving all 4s.  Head: NCAT without abrasions, lacerations, or ecchymosis to head, face, or scalp  Eyes: PERRL  Neck/C-Spine: FAROM. No bony TTP.  T/L Spine: No bony TTP, no palpable step-off.    HIPS and PELVIS: Unable to SLR L Hip.       LLE:   No erythema, ecchymosis, edema  shorteneed / ER  NTTP over the bony prominences of the knee/ankle/foot   painless passive/active ROM of the knee/ankle/foot  L2-S1 SILT  motor grossly intact throughout hip flexors/quads/hams/TA/EHL/FHL/GSC  + DP/PT pulses   compartments soft and compressible, calves nontender     LUE: Skin intact, no erythema, ecchymosis, edema, gross deformity  minimal TTP over radial head  NTTP over the bony prominences of the shoulder/elbow/wrist/hand  painless passive/active ROM of the shoulder/elbow/wrist/hand  C5-T1 SILT  motor grossly intact throughout axillary/musculocutaenous/radial/median/ulnar nerves  + radial pulse        A/P: 37y Male with L femoral neck hip fracture    Plan for Danis vs SALUD 8/15 AM   NPO after midnight for OR 8/15  Preop labs after midnight (CBC/BMP/PT/PTT/T&S x2)  CXR/EKG  FU Nephro C/s for HD M/W/F  Pain control  NWB L LE, bedrest  NWB LUE in sling for comfort for L RH Fx from 7/27  FU labs/imaging  Ca/Vit D  Outpt osteoporosis workup  Admit to medical team  Please document Medicaloptimization for OR  Will discuss with attending   37y Male community ambulatory presents c/o L hip pain and inability to ambulate sp mechanical fall in shower last night. Denies HS/LOC. Denies numbness/tingling. Denies fever/chills. Denies pain/injury elsewhere. Hx of L radial hx fx 7/27, not seen by ortho.       MEDICATIONS  (STANDING):    Allergies    No Known Drug Allergies  shellfish (Hives)    Intolerances                            7.6    5.38  )-----------( 289      ( 14 Aug 2023 06:32 )             25.3     14 Aug 2023 06:33    137    |  94     |  92     ----------------------------<  82     5.8     |  22     |  12.20    Ca    8.8        14 Aug 2023 06:33  Mg     2.4       14 Aug 2023 06:33    TPro  6.6    /  Alb  3.7    /  TBili  0.4    /  DBili  x      /  AST  18     /  ALT  6      /  AlkPhos  1061   14 Aug 2023 06:33    PT/INR - ( 14 Aug 2023 06:32 )   PT: 11.6 sec;   INR: 1.06 ratio         PTT - ( 14 Aug 2023 06:32 )  PTT:26.7 sec  Vital Signs Last 24 Hrs  T(C): 36.9 (08-14-23 @ 06:16), Max: 36.9 (08-14-23 @ 06:16)  T(F): 98.5 (08-14-23 @ 06:16), Max: 98.5 (08-14-23 @ 06:16)  HR: 95 (08-14-23 @ 07:52) (74 - 95)  BP: 200/76 (08-14-23 @ 07:52) (173/89 - 200/76)  BP(mean): 101 (08-14-23 @ 07:52) (101 - 101)  RR: 20 (08-14-23 @ 07:52) (18 - 20)  SpO2: 99% (08-14-23 @ 07:52) (98% - 99%)  Imaging: XR demonstates R/L hip fracture    Physical Exam  General: NAD, Alert, Awake and oriented  Neurologic: No gross deficits, moving all 4s.  Head: NCAT without abrasions, lacerations, or ecchymosis to head, face, or scalp  Eyes: PERRL  Neck/C-Spine: FAROM. No bony TTP.  T/L Spine: No bony TTP, no palpable step-off.    HIPS and PELVIS: Unable to SLR L Hip.       LLE:   No erythema, ecchymosis, edema  shorteneed / ER  NTTP over the bony prominences of the knee/ankle/foot   painless passive/active ROM of the knee/ankle/foot  L2-S1 SILT  motor grossly intact throughout hip flexors/quads/hams/TA/EHL/FHL/GSC  + DP/PT pulses   compartments soft and compressible, calves nontender     LUE: Skin intact, no erythema, ecchymosis, edema, gross deformity  minimal TTP over radial head  NTTP over the bony prominences of the shoulder/elbow/wrist/hand  painless passive/active ROM of the shoulder/elbow/wrist/hand  C5-T1 SILT  motor grossly intact throughout axillary/musculocutaenous/radial/median/ulnar nerves  + radial pulse        A/P: 37y Male with L femoral neck hip fracture    Plan for Danis vs SALUD 8/15 AM   NPO after midnight for OR 8/15  Preop labs after midnight (CBC/BMP/PT/PTT/T&S x2)  CXR/EKG  FU Nephro C/s for HD M/W/F  Pain control  NWB L LE, bedrest  NWB LUE in sling for comfort for L RH Fx from 7/27  FU labs/imaging  Ca/Vit D  Outpt osteoporosis workup  Admit to medical team  Please document Medical optimization for OR  Would rec PRBC transfusion   Will discuss with attending

## 2023-08-14 NOTE — H&P ADULT - PROBLEM SELECTOR PLAN 3
Schedule: Monday, Wednesday, Friday  Nephrology consulted for continuation of treatment while inhouse  Plan for HD today.

## 2023-08-14 NOTE — ED PROVIDER NOTE - CLINICAL SUMMARY MEDICAL DECISION MAKING FREE TEXT BOX
chronically ill male presents after mechanical fall with fixed externally rotated L hip. neurovascularly intact. no concerns for syncope. ecg and labs to screen for lyte issues pre-HD and preops. pain control and admission for treatment and HD. chronically ill male presents after mechanical fall with fixed externally rotated L hip. neurovascularly intact. no concerns for syncope. ecg and labs to screen for lyte issues pre-HD and preops. pain control and admission for treatment and HD.    pettet attending- see attending attestation for my mdm

## 2023-08-14 NOTE — H&P ADULT - ASSESSMENT
37 year old male with history of ESRD, renal osteodystrophy presents with new left femoral fracture. Patient evaluated by orthopedic service who recommend medicine admission for optimization prior to potential orthopedic procedure. Plan for surgery in AM.

## 2023-08-14 NOTE — CONSULT NOTE ADULT - ATTENDING COMMENTS
ESRD. Renal osteodystrophy. L FN fx.  Minimal household ambulator.  Labile BP at baseline. For hemiarthroplasty
37 year old male with past medical history of End stage renal disease (M,W,F), hypertension, hypothyroidism, stroke at age of 10, sleep apnea, obesity and recent hospitalization for left radial fracture 7/27/2023 presenting to ER after a fall.   Patient is ESRD on HD via R AVF (MWF).  In ED with family, alert, conversant.  + arm pain  1.  ESRD--HD today with 2 and on TIW regimen as noted  2.  HYperkalemia--binder--> HD with goal <4.5  3.  Hypertension--resume outpt meds and synergize with UF today with goal systolic <140  4.  Hyperphosphatemia--goal bind <5.5    discussed with med team  Sherif Mueller MD  contact me on TEAMS

## 2023-08-14 NOTE — ED PROVIDER NOTE - WR ORDER DATE AND TIME 5
Pt is requesting a refill for Imitrex, she does have an appt on 12/3  Pt would like refill called in to [facility] # [phone] 14-Aug-2023 07:38

## 2023-08-14 NOTE — CONSULT NOTE ADULT - SUBJECTIVE AND OBJECTIVE BOX
[facility] DIVISION OF KIDNEY DISEASES AND HYPERTENSION -- [phone]  -- INITIAL CONSULT NOTE  --------------------------------------------------------------------------------  HPI:  37 year old male with past medical history of End stage renal disease (M,W,F), hypertension, hypothyroidism, stroke at age of 10, sleep apnea, obesity and recent hospitalization for left radial fracture 7/27/2023 presenting to ER after a fall.   Patient is ESRD on HD via R AVF ([facility]).   Cr 12.17, BUN 95, K 5.8, Bicarb 23.  Nephrology consulted for recommendations.      PAST HISTORY  --------------------------------------------------------------------------------  PAST MEDICAL & SURGICAL HISTORY:  HTN (hypertension)      Stroke  age 10      Morbid obesity      Sleep apnea      Leg fracture, right      Chronic renal failure      Hemodialysis access, AV graft      ESRD (end stage renal disease) on dialysis  since 2013, M-W-F      Anemia, unspecified type      Hypothyroidism      AV fistula  R arm; L arm clotted        FAMILY HISTORY:  No pertinent family history in first degree relatives      PAST SOCIAL HISTORY:    ALLERGIES & MEDICATIONS  --------------------------------------------------------------------------------  Allergies    No Known Drug Allergies  shellfish (Hives)    Intolerances      Standing Inpatient Medications  acetaminophen     Tablet .. 1000 milliGRAM(s) Oral every 8 hours  calcium acetate 667 milliGRAM(s) Oral three times a day with meals  heparin   Injectable 5000 Unit(s) SubCutaneous every 8 hours  labetalol 200 milliGRAM(s) Oral two times a day  naloxone Injectable 0.4 milliGRAM(s) IV Push once  polyethylene glycol 3350 17 Gram(s) Oral daily  senna 2 Tablet(s) Oral at bedtime    PRN Inpatient Medications  aluminum hydroxide/magnesium hydroxide/simethicone Suspension 30 milliLiter(s) Oral every 4 hours PRN  bisacodyl 5 milliGRAM(s) Oral daily PRN  HYDROmorphone  Injectable 0.2 milliGRAM(s) IV Push every 4 hours PRN  HYDROmorphone  Injectable 0.5 milliGRAM(s) IV Push every 4 hours PRN  melatonin 3 milliGRAM(s) Oral at bedtime PRN  ondansetron Injectable 4 milliGRAM(s) IV Push every 8 hours PRN      REVIEW OF SYSTEMS  --------------------------------------------------------------------------------  Gen: No fevers/chills  Head/Eyes/Ears: No HA  Respiratory: No dyspnea, cough  CV: No chest pain  GI: No abdominal pain, diarrhea  : No dysuria, hematuria  MSK: Edema RLE, Pain LLE  Skin: No rashes  Heme: No easy bruising or bleeding    All other systems were reviewed and are negative, except as noted.    VITALS/PHYSICAL EXAM  --------------------------------------------------------------------------------  T(C): 37 (08-14-23 @ 12:24), Max: 37.1 (08-14-23 @ 07:52)  HR: 76 (08-14-23 @ 13:21) (73 - 95)  BP: 170/98 (08-14-23 @ 13:21) (136/70 - 200/76)  RR: 19 (08-14-23 @ 13:21) (14 - 20)  SpO2: 100% (08-14-23 @ 13:21) (98% - 100%)  Wt(kg): --  Height (cm): 188 (08-14-23 @ 05:55)  Weight (kg): 98.1 (08-14-23 @ 06:16)  BMI (kg/m2): 27.8 (08-14-23 @ 06:16)  BSA (m2): 2.25 (08-14-23 @ 06:16)        Physical Exam:  	Gen: NAD  	HEENT: Anicteric  	Pulm: CTA B/L  	CV: S1S2+  	Abd: Soft, +BS    	Ext: No LE edema B/L  	Neuro: Awake  	Skin: Warm and dry  	Dialysis access: R AVF with good thrill     LABS/STUDIES  --------------------------------------------------------------------------------              7.6    5.38  >-----------<  289      [08-14-23 @ 06:32]              25.3     139  |  95  |  95  ----------------------------<  66      [08-14-23 @ 10:29]  5.8   |  23  |  12.17        Ca     9.0     [08-14-23 @ 10:29]      iCa    1.03     [08-14 @ 06:34]      Mg     2.4     [08-14-23 @ 06:33]    TPro  6.6  /  Alb  3.7  /  TBili  0.4  /  DBili  x   /  AST  18  /  ALT  6   /  AlkPhos  1061  [08-14-23 @ 06:33]    PT/INR: PT 11.6 , INR 1.06       [08-14-23 @ 06:32]  PTT: 26.7       [08-14-23 @ 06:32]      Creatinine Trend:  SCr 12.17 [08-14 @ 10:29]  SCr 12.20 [08-14 @ 06:33]  SCr 12.39 [07-24 @ 05:58]  SCr 11.32 [07-23 @ 12:07]  SCr 13.22 [07-21 @ 10:27]    Urinalysis - [08-14-23 @ 10:29]      Color  / Appearance  / SG  / pH       Gluc 66 / Ketone   / Bili  / Urobili        Blood  / Protein  / Leuk Est  / Nitrite       RBC  / WBC  / Hyaline  / Gran  / Sq Epi  / Non Sq Epi  / Bacteria       HBsAb 28.5      [02-24-23 @ 22:27]  HBsAb Reactive      [02-24-23 @ 22:27]  HBsAg Nonreact      [02-24-23 @ 22:27]  HCV 0.26, Nonreact      [02-24-23 @ 22:27]      Tacrolimus  Cyclosporine  Sirolimus  Mycophenolate  BK PCR  CMV PCR  Parvo PCR  EBV PCR

## 2023-08-14 NOTE — ED PROVIDER NOTE - OBJECTIVE STATEMENT
37y male hx ESRD on MWF HD (R UE fistula) HTN hypothyroidism presents after mechanical fall in the shower onto L hip. no head strike LOC neck or back pain. no numbness or tingling. felt in his normal state of health prior to the fall.

## 2023-08-14 NOTE — H&P ADULT - NSHPPHYSICALEXAM_GEN_ALL_CORE
Vital Signs Last 24 Hrs  T(C): 36.9 (14 Aug 2023 06:16), Max: 36.9 (14 Aug 2023 06:16)  T(F): 98.5 (14 Aug 2023 06:16), Max: 98.5 (14 Aug 2023 06:16)  HR: 73 (14 Aug 2023 10:40) (73 - 95)  BP: 158/75 (14 Aug 2023 10:40) (158/75 - 200/76)  BP(mean): 97 (14 Aug 2023 10:40) (97 - 101)  RR: 20 (14 Aug 2023 10:40) (18 - 20)  SpO2: 99% (14 Aug 2023 10:40) (98% - 99%)    Parameters below as of 14 Aug 2023 10:40  Patient On (Oxygen Delivery Method): room air        CONSTITUTIONAL: Obese male in moderate distress upon initial examination.    EYES: No conjunctival or scleral injection, non-icteric  ENMT: normal dentition; no pharyngeal injection or exudates, oral mucosa with moist membranes  NECK: Trachea midline without palpable neck mass; nontender  RESPIRATORY: Breathing comfortably; Decreased breath sounds bilaterally without wheeze/rhonchi/rales  CARDIOVASCULAR: +S1S2, RRR, no M/G/R; pedal pulses full and symmetric; no lower extremity edema  GASTROINTESTINAL: No palpable masses or tenderness, +BS throughout, no rebound/guarding  LYMPHATIC: No cervical LAD or tenderness.   MUSCULOSKELETAL: Left arm sling appreciated, from radial fracture.   SKIN: No rashes or ulcers noted; no subcutaneous nodules or induration palpable  NEUROLOGIC: CN II-XII intact; sensation intact in LEs b/l to light touch; moving all extremities spontaneously   PSYCHIATRIC: A+O x 3; mood and affect appropriate; appropriate insight and judgment Vital Signs Last 24 Hrs  T(C): 36.9 (14 Aug 2023 06:16), Max: 36.9 (14 Aug 2023 06:16)  T(F): 98.5 (14 Aug 2023 06:16), Max: 98.5 (14 Aug 2023 06:16)  HR: 73 (14 Aug 2023 10:40) (73 - 95)  BP: 158/75 (14 Aug 2023 10:40) (158/75 - 200/76)  BP(mean): 97 (14 Aug 2023 10:40) (97 - 101)  RR: 20 (14 Aug 2023 10:40) (18 - 20)  SpO2: 99% (14 Aug 2023 10:40) (98% - 99%)    Parameters below as of 14 Aug 2023 10:40  Patient On (Oxygen Delivery Method): room air        CONSTITUTIONAL: Obese male in moderate distress upon initial examination.    EYES: No conjunctival or scleral injection, non-icteric  ENMT: normal dentition; no pharyngeal injection or exudates, oral mucosa with moist membranes  NECK: Trachea midline without palpable neck mass; nontender  RESPIRATORY: Breathing comfortably; Decreased breath sounds bilaterally without wheeze/rhonchi/rales.   CARDIOVASCULAR: +S1S2, RRR, no M/G/R; pedal pulses full and symmetric; no lower extremity edema  GASTROINTESTINAL: No palpable masses or tenderness, +BS throughout, no rebound/guarding  LYMPHATIC: No cervical LAD or tenderness.   MUSCULOSKELETAL: Left arm sling appreciated, from radial fracture.   SKIN: No rashes or ulcers noted; no subcutaneous nodules or induration palpable  NEUROLOGIC: No gross neurologic deficits noted.  sensation intact in LEs b/l to light touch;   PSYCHIATRIC: A+O x 3; mood and affect appropriate; appropriate insight and judgment

## 2023-08-14 NOTE — ED PROVIDER NOTE - NS_EDPROVIDERDISPOUSERTYPE_ED_A_ED
Hpi Title: Evaluation of Skin Lesions
How Severe Are Your Spot(S)?: mild
Have Your Spot(S) Been Treated In The Past?: has not been treated
Attending Attestation (For Attendings USE Only)...

## 2023-08-14 NOTE — H&P ADULT - NSHPREVIEWOFSYSTEMS_GEN_ALL_CORE
Review of Systems:   CONSTITUTIONAL: No fever, weight loss  EYES: No eye pain, visual disturbances, or discharge  ENMT:  No difficulty hearing, tinnitus, vertigo; No sinus or throat pain  RESPIRATORY: No SOB. No cough, wheezing, chills or hemoptysis  CARDIOVASCULAR: No chest pain, palpitations, dizziness, or leg swelling  GASTROINTESTINAL: No abdominal or epigastric pain. No nausea, vomiting, or hematemesis; No diarrhea or constipation. No melena or hematochezia.  GENITOURINARY: No dysuria, frequency, hematuria, or incontinence  NEUROLOGICAL: No headaches, memory loss, loss of strength, numbness, or tremors  MUSCULOSKELETAL: Significant pain in left hip needs to elevate bed in order to be in more comfortable position.   SKIN: No itching, burning, rashes, or lesions   LYMPH NODES: No enlarged glands  ENDOCRINE: No heat or cold intolerance; No hair loss  MUSCULOSKELETAL: No joint pain or swelling; No muscle, back pain  PSYCHIATRIC: No depression, anxiety, mood swings, or difficulty sleeping  HEME/LYMPH: No easy bruising, or bleeding gums Review of Systems:   CONSTITUTIONAL: No fever, weight loss  EYES: No eye pain, visual disturbances, or discharge  ENMT:  No difficulty hearing, tinnitus, vertigo; No sinus or throat pain  RESPIRATORY: No SOB. No cough, wheezing, chills or hemoptysis  CARDIOVASCULAR: No chest pain, palpitations, dizziness, or leg swelling  GASTROINTESTINAL: No abdominal or epigastric pain. No nausea, vomiting, or hematemesis; No diarrhea or constipation. No melena or hematochezia.  GENITOURINARY: No dysuria, frequency, hematuria, or incontinence  NEUROLOGICAL: Patient fatigued, arousable. No gross neurologic deficits noted.   MUSCULOSKELETAL: Significant pain in left hip needs to elevate bed in order to be in more comfortable position. R AV fistula appreciated.   SKIN: No itching, burning, rashes, or lesions   LYMPH NODES: No enlarged glands  ENDOCRINE: No heat or cold intolerance; No hair loss  MUSCULOSKELETAL: No joint pain or swelling; No muscle, back pain  PSYCHIATRIC: No depression, anxiety, mood swings, or difficulty sleeping  HEME/LYMPH: No easy bruising, or bleeding gums

## 2023-08-14 NOTE — H&P ADULT - PROBLEM SELECTOR PLAN 7
Patient on Labetalol 200mg BID  Continue home medications  BP initially elevated on admission is multifactorial secondary to pain and noncompliance with medication due to fall in am.

## 2023-08-14 NOTE — H&P ADULT - PROBLEM SELECTOR PLAN 4
Patient with initial K of 5.8  Received Calcium gluconate, Insulin/Dextrose.   Plan for HD today  Continue to monitor K prior to surgery in AM.

## 2023-08-14 NOTE — ED PROVIDER NOTE - ATTENDING CONTRIBUTION TO CARE
I, Stephon Hester, performed a history and physical exam of the patient and discussed their management with the resident and/or advanced care provider. I reviewed the resident and/or advanced care provider's note and agree with the documented findings and plan of care. I was present and available for all procedures.    37y male hx ESRD on MWF HD (R UE fistula) HTN hypothyroidism presents after mechanical fall in the shower onto L hip. no head strike LOC neck or back pain. no numbness or tingling. felt in his normal state of health prior to the fall.    Well appearing and in NAD, head normal appearing atraumatic, trachea midline, no respiratory distress, lungs cta bilaterally, rrr no murmurs, soft NT ND abdomen, Right upper extremity palpable thrill to fistula left upper extremity in splint secondary to ulnar fracture neurovascular intact distally right lower extremity unremarkable full range of motion pelvis stable left lower extremity tenderness palpation at the greater trochanter decreased range of motion secondary to pain in the greater trochanter but neurovascular intact distally no CTL spine midline tenderness palpation, Alert and oriented, non focal neuro exam, skin warm and dry, normal affect and mood, no leg swelling, calf ttp or jvd    Fall concerning for left hip deformity consider fracture versus dislocation otherwise well-appearing reassuring vital signs and exam otherwise anticipate admission for further orthopedics evaluation and repair otherwise unlikely ACS PE pneumothorax dissection AAA pneumonia needs dialysis today as regularly scheduled will obtain EKG screening labs medical optimization for possible operative repair discussed patient agreeable plan

## 2023-08-14 NOTE — H&P ADULT - PROBLEM SELECTOR PLAN 1
XR Hip/Pelvis and CT Pelvis confirm left femoral neck fracture  Etiology of fracture due to bone disease 2/2 to ESRD.   Orthopedics consulted who recommend plan for Danis vs Total hip arthroplasty. NPO after midnight for OR 8/15  Pre-op labs ordered.   NWB L LE, bedrest  Ca/Vit D  Physical therapy consulted.  Outpt osteoporosis workup    NSQIP risk calculation of 11.8% for serious complication in  total hip arthroplasty. Patient has been medically optimized for procedure.

## 2023-08-14 NOTE — ED ADULT NURSE REASSESSMENT NOTE - NS ED NURSE REASSESS COMMENT FT1
Reached out to AGUSTÍN Keita regarding stat labetalol due to pt BP. Reached out to ACP Angel Keita regarding stat labetalol due to pt BP. ACP said will have to get back to RN regarding administrating medication. Medication is being held for now.

## 2023-08-14 NOTE — H&P ADULT - PROBLEM SELECTOR PLAN 2
ROSALIE LUE in sling for comfort for L RH Fx from 7/27  Physical therapy consulted.  Sling in place.

## 2023-08-15 ENCOUNTER — TRANSCRIPTION ENCOUNTER (OUTPATIENT)
Age: 38
End: 2023-08-15

## 2023-08-15 ENCOUNTER — RESULT REVIEW (OUTPATIENT)
Age: 38
End: 2023-08-15

## 2023-08-15 ENCOUNTER — APPOINTMENT (OUTPATIENT)
Dept: ORTHOPEDIC SURGERY | Facility: HOSPITAL | Age: 38
End: 2023-08-15

## 2023-08-15 LAB
A1C WITH ESTIMATED AVERAGE GLUCOSE RESULT: 4.8 % — SIGNIFICANT CHANGE UP (ref 4–5.6)
ALBUMIN SERPL ELPH-MCNC: 3 G/DL — LOW (ref 3.3–5)
ALBUMIN SERPL ELPH-MCNC: 3.3 G/DL — SIGNIFICANT CHANGE UP (ref 3.3–5)
ALP SERPL-CCNC: 920 U/L — HIGH (ref 40–120)
ALP SERPL-CCNC: 979 U/L — HIGH (ref 40–120)
ALT FLD-CCNC: 6 U/L — LOW (ref 10–45)
ALT FLD-CCNC: 6 U/L — LOW (ref 10–45)
ANION GAP SERPL CALC-SCNC: 14 MMOL/L — SIGNIFICANT CHANGE UP (ref 5–17)
ANION GAP SERPL CALC-SCNC: 18 MMOL/L — HIGH (ref 5–17)
ANION GAP SERPL CALC-SCNC: 19 MMOL/L — HIGH (ref 5–17)
ANION GAP SERPL CALC-SCNC: 19 MMOL/L — HIGH (ref 5–17)
APTT BLD: 26.5 SEC — SIGNIFICANT CHANGE UP (ref 24.5–35.6)
APTT BLD: 26.6 SEC — SIGNIFICANT CHANGE UP (ref 24.5–35.6)
AST SERPL-CCNC: 18 U/L — SIGNIFICANT CHANGE UP (ref 10–40)
AST SERPL-CCNC: 25 U/L — SIGNIFICANT CHANGE UP (ref 10–40)
BASE EXCESS BLDA CALC-SCNC: -1.4 MMOL/L — SIGNIFICANT CHANGE UP (ref -2–3)
BASOPHILS # BLD AUTO: 0.03 K/UL — SIGNIFICANT CHANGE UP (ref 0–0.2)
BASOPHILS NFR BLD AUTO: 0.7 % — SIGNIFICANT CHANGE UP (ref 0–2)
BILIRUB SERPL-MCNC: 0.4 MG/DL — SIGNIFICANT CHANGE UP (ref 0.2–1.2)
BILIRUB SERPL-MCNC: 0.7 MG/DL — SIGNIFICANT CHANGE UP (ref 0.2–1.2)
BLD GP AB SCN SERPL QL: POSITIVE — SIGNIFICANT CHANGE UP
BUN SERPL-MCNC: 57 MG/DL — HIGH (ref 7–23)
BUN SERPL-MCNC: 64 MG/DL — HIGH (ref 7–23)
BUN SERPL-MCNC: 66 MG/DL — HIGH (ref 7–23)
BUN SERPL-MCNC: 70 MG/DL — HIGH (ref 7–23)
CALCIUM SERPL-MCNC: 8.5 MG/DL — SIGNIFICANT CHANGE UP (ref 8.4–10.5)
CALCIUM SERPL-MCNC: 8.7 MG/DL — SIGNIFICANT CHANGE UP (ref 8.4–10.5)
CALCIUM SERPL-MCNC: 9 MG/DL — SIGNIFICANT CHANGE UP (ref 8.4–10.5)
CHLORIDE SERPL-SCNC: 93 MMOL/L — LOW (ref 96–108)
CHLORIDE SERPL-SCNC: 95 MMOL/L — LOW (ref 96–108)
CHLORIDE SERPL-SCNC: 96 MMOL/L — SIGNIFICANT CHANGE UP (ref 96–108)
CHLORIDE SERPL-SCNC: 98 MMOL/L — SIGNIFICANT CHANGE UP (ref 96–108)
CHOLEST SERPL-MCNC: 190 MG/DL — SIGNIFICANT CHANGE UP
CO2 BLDA-SCNC: 30 MMOL/L — HIGH (ref 19–24)
CO2 SERPL-SCNC: 20 MMOL/L — LOW (ref 22–31)
CO2 SERPL-SCNC: 23 MMOL/L — SIGNIFICANT CHANGE UP (ref 22–31)
CREAT SERPL-MCNC: 8.67 MG/DL — HIGH (ref 0.5–1.3)
CREAT SERPL-MCNC: 8.94 MG/DL — HIGH (ref 0.5–1.3)
CREAT SERPL-MCNC: 9.06 MG/DL — HIGH (ref 0.5–1.3)
CREAT SERPL-MCNC: 9.21 MG/DL — HIGH (ref 0.5–1.3)
EGFR: 7 ML/MIN/1.73M2 — LOW
EOSINOPHIL # BLD AUTO: 0.04 K/UL — SIGNIFICANT CHANGE UP (ref 0–0.5)
EOSINOPHIL NFR BLD AUTO: 1 % — SIGNIFICANT CHANGE UP (ref 0–6)
ESTIMATED AVERAGE GLUCOSE: 91 MG/DL — SIGNIFICANT CHANGE UP (ref 68–114)
GAS PNL BLDA: SIGNIFICANT CHANGE UP
GLUCOSE BLDC GLUCOMTR-MCNC: 101 MG/DL — HIGH (ref 70–99)
GLUCOSE BLDC GLUCOMTR-MCNC: 103 MG/DL — HIGH (ref 70–99)
GLUCOSE BLDC GLUCOMTR-MCNC: 111 MG/DL — HIGH (ref 70–99)
GLUCOSE BLDC GLUCOMTR-MCNC: 116 MG/DL — HIGH (ref 70–99)
GLUCOSE BLDC GLUCOMTR-MCNC: 125 MG/DL — HIGH (ref 70–99)
GLUCOSE BLDC GLUCOMTR-MCNC: 133 MG/DL — HIGH (ref 70–99)
GLUCOSE BLDC GLUCOMTR-MCNC: 47 MG/DL — CRITICAL LOW (ref 70–99)
GLUCOSE BLDC GLUCOMTR-MCNC: 60 MG/DL — LOW (ref 70–99)
GLUCOSE BLDC GLUCOMTR-MCNC: 80 MG/DL — SIGNIFICANT CHANGE UP (ref 70–99)
GLUCOSE BLDC GLUCOMTR-MCNC: 95 MG/DL — SIGNIFICANT CHANGE UP (ref 70–99)
GLUCOSE BLDC GLUCOMTR-MCNC: 96 MG/DL — SIGNIFICANT CHANGE UP (ref 70–99)
GLUCOSE BLDC GLUCOMTR-MCNC: 97 MG/DL — SIGNIFICANT CHANGE UP (ref 70–99)
GLUCOSE SERPL-MCNC: 82 MG/DL — SIGNIFICANT CHANGE UP (ref 70–99)
GLUCOSE SERPL-MCNC: 91 MG/DL — SIGNIFICANT CHANGE UP (ref 70–99)
GLUCOSE SERPL-MCNC: 95 MG/DL — SIGNIFICANT CHANGE UP (ref 70–99)
GLUCOSE SERPL-MCNC: 99 MG/DL — SIGNIFICANT CHANGE UP (ref 70–99)
HCO3 BLDA-SCNC: 28 MMOL/L — SIGNIFICANT CHANGE UP (ref 21–28)
HCT VFR BLD CALC: 23.3 % — LOW (ref 39–50)
HCT VFR BLD CALC: 24.7 % — LOW (ref 39–50)
HCT VFR BLD CALC: 25 % — LOW (ref 39–50)
HCT VFR BLD CALC: 25.3 % — LOW (ref 39–50)
HDLC SERPL-MCNC: 57 MG/DL — SIGNIFICANT CHANGE UP
HGB BLD-MCNC: 7.3 G/DL — LOW (ref 13–17)
HGB BLD-MCNC: 7.9 G/DL — LOW (ref 13–17)
HGB BLD-MCNC: 7.9 G/DL — LOW (ref 13–17)
HGB BLD-MCNC: 8.3 G/DL — LOW (ref 13–17)
IMM GRANULOCYTES NFR BLD AUTO: 0.5 % — SIGNIFICANT CHANGE UP (ref 0–0.9)
INR BLD: 1.11 RATIO — SIGNIFICANT CHANGE UP (ref 0.85–1.18)
INR BLD: 1.13 RATIO — SIGNIFICANT CHANGE UP (ref 0.85–1.18)
LIPID PNL WITH DIRECT LDL SERPL: 113 MG/DL — HIGH
LYMPHOCYTES # BLD AUTO: 0.83 K/UL — LOW (ref 1–3.3)
LYMPHOCYTES # BLD AUTO: 20.5 % — SIGNIFICANT CHANGE UP (ref 13–44)
MAGNESIUM SERPL-MCNC: 2.1 MG/DL — SIGNIFICANT CHANGE UP (ref 1.6–2.6)
MAGNESIUM SERPL-MCNC: 2.2 MG/DL — SIGNIFICANT CHANGE UP (ref 1.6–2.6)
MAGNESIUM SERPL-MCNC: 2.4 MG/DL — SIGNIFICANT CHANGE UP (ref 1.6–2.6)
MCHC RBC-ENTMCNC: 29.5 PG — SIGNIFICANT CHANGE UP (ref 27–34)
MCHC RBC-ENTMCNC: 30.1 PG — SIGNIFICANT CHANGE UP (ref 27–34)
MCHC RBC-ENTMCNC: 30.2 PG — SIGNIFICANT CHANGE UP (ref 27–34)
MCHC RBC-ENTMCNC: 30.2 PG — SIGNIFICANT CHANGE UP (ref 27–34)
MCHC RBC-ENTMCNC: 31.2 GM/DL — LOW (ref 32–36)
MCHC RBC-ENTMCNC: 31.3 GM/DL — LOW (ref 32–36)
MCHC RBC-ENTMCNC: 32 GM/DL — SIGNIFICANT CHANGE UP (ref 32–36)
MCHC RBC-ENTMCNC: 33.2 GM/DL — SIGNIFICANT CHANGE UP (ref 32–36)
MCV RBC AUTO: 90.6 FL — SIGNIFICANT CHANGE UP (ref 80–100)
MCV RBC AUTO: 94.3 FL — SIGNIFICANT CHANGE UP (ref 80–100)
MCV RBC AUTO: 94.4 FL — SIGNIFICANT CHANGE UP (ref 80–100)
MCV RBC AUTO: 96.3 FL — SIGNIFICANT CHANGE UP (ref 80–100)
MONOCYTES # BLD AUTO: 0.58 K/UL — SIGNIFICANT CHANGE UP (ref 0–0.9)
MONOCYTES NFR BLD AUTO: 14.3 % — HIGH (ref 2–14)
NEUTROPHILS # BLD AUTO: 2.55 K/UL — SIGNIFICANT CHANGE UP (ref 1.8–7.4)
NEUTROPHILS NFR BLD AUTO: 63 % — SIGNIFICANT CHANGE UP (ref 43–77)
NON HDL CHOLESTEROL: 133 MG/DL — HIGH
NRBC # BLD: 0 /100 WBCS — SIGNIFICANT CHANGE UP (ref 0–0)
PCO2 BLDA: 76 MMHG — CRITICAL HIGH (ref 35–48)
PH BLDA: 7.17 — CRITICAL LOW (ref 7.35–7.45)
PHOSPHATE SERPL-MCNC: 6.1 MG/DL — HIGH (ref 2.5–4.5)
PHOSPHATE SERPL-MCNC: 6.2 MG/DL — HIGH (ref 2.5–4.5)
PHOSPHATE SERPL-MCNC: 6.2 MG/DL — HIGH (ref 2.5–4.5)
PLATELET # BLD AUTO: 238 K/UL — SIGNIFICANT CHANGE UP (ref 150–400)
PLATELET # BLD AUTO: 246 K/UL — SIGNIFICANT CHANGE UP (ref 150–400)
PLATELET # BLD AUTO: 250 K/UL — SIGNIFICANT CHANGE UP (ref 150–400)
PLATELET # BLD AUTO: 259 K/UL — SIGNIFICANT CHANGE UP (ref 150–400)
PO2 BLDA: 149 MMHG — HIGH (ref 83–108)
POTASSIUM SERPL-MCNC: 4.9 MMOL/L — SIGNIFICANT CHANGE UP (ref 3.5–5.3)
POTASSIUM SERPL-MCNC: 5.3 MMOL/L — SIGNIFICANT CHANGE UP (ref 3.5–5.3)
POTASSIUM SERPL-MCNC: 5.5 MMOL/L — HIGH (ref 3.5–5.3)
POTASSIUM SERPL-MCNC: 5.7 MMOL/L — HIGH (ref 3.5–5.3)
POTASSIUM SERPL-SCNC: 4.9 MMOL/L — SIGNIFICANT CHANGE UP (ref 3.5–5.3)
POTASSIUM SERPL-SCNC: 5.3 MMOL/L — SIGNIFICANT CHANGE UP (ref 3.5–5.3)
POTASSIUM SERPL-SCNC: 5.5 MMOL/L — HIGH (ref 3.5–5.3)
POTASSIUM SERPL-SCNC: 5.7 MMOL/L — HIGH (ref 3.5–5.3)
PROT SERPL-MCNC: 5.6 G/DL — LOW (ref 6–8.3)
PROT SERPL-MCNC: 6.1 G/DL — SIGNIFICANT CHANGE UP (ref 6–8.3)
PROTHROM AB SERPL-ACNC: 11.6 SEC — SIGNIFICANT CHANGE UP (ref 9.5–13)
PROTHROM AB SERPL-ACNC: 12.4 SEC — SIGNIFICANT CHANGE UP (ref 9.5–13)
PTH-INTACT FLD-MCNC: 4567 PG/ML — HIGH (ref 15–65)
RBC # BLD: 2.42 M/UL — LOW (ref 4.2–5.8)
RBC # BLD: 2.62 M/UL — LOW (ref 4.2–5.8)
RBC # BLD: 2.68 M/UL — LOW (ref 4.2–5.8)
RBC # BLD: 2.76 M/UL — LOW (ref 4.2–5.8)
RBC # FLD: 16.1 % — HIGH (ref 10.3–14.5)
RBC # FLD: 17.5 % — HIGH (ref 10.3–14.5)
RBC # FLD: 17.6 % — HIGH (ref 10.3–14.5)
RBC # FLD: 17.9 % — HIGH (ref 10.3–14.5)
RH IG SCN BLD-IMP: POSITIVE — SIGNIFICANT CHANGE UP
SAO2 % BLDA: 99.5 % — HIGH (ref 94–98)
SODIUM SERPL-SCNC: 134 MMOL/L — LOW (ref 135–145)
SODIUM SERPL-SCNC: 135 MMOL/L — SIGNIFICANT CHANGE UP (ref 135–145)
SODIUM SERPL-SCNC: 135 MMOL/L — SIGNIFICANT CHANGE UP (ref 135–145)
SODIUM SERPL-SCNC: 137 MMOL/L — SIGNIFICANT CHANGE UP (ref 135–145)
T3 SERPL-MCNC: 95 NG/DL — SIGNIFICANT CHANGE UP (ref 80–200)
T4 AB SER-ACNC: 5.1 UG/DL — SIGNIFICANT CHANGE UP (ref 4.6–12)
TRIGL SERPL-MCNC: 114 MG/DL — SIGNIFICANT CHANGE UP
TSH SERPL-MCNC: 2.21 UIU/ML — SIGNIFICANT CHANGE UP (ref 0.27–4.2)
WBC # BLD: 3.55 K/UL — LOW (ref 3.8–10.5)
WBC # BLD: 4.05 K/UL — SIGNIFICANT CHANGE UP (ref 3.8–10.5)
WBC # BLD: 5.72 K/UL — SIGNIFICANT CHANGE UP (ref 3.8–10.5)
WBC # BLD: 7.16 K/UL — SIGNIFICANT CHANGE UP (ref 3.8–10.5)
WBC # FLD AUTO: 3.55 K/UL — LOW (ref 3.8–10.5)
WBC # FLD AUTO: 4.05 K/UL — SIGNIFICANT CHANGE UP (ref 3.8–10.5)
WBC # FLD AUTO: 5.72 K/UL — SIGNIFICANT CHANGE UP (ref 3.8–10.5)
WBC # FLD AUTO: 7.16 K/UL — SIGNIFICANT CHANGE UP (ref 3.8–10.5)

## 2023-08-15 PROCEDURE — 88313 SPECIAL STAINS GROUP 2: CPT | Mod: 26

## 2023-08-15 PROCEDURE — 72170 X-RAY EXAM OF PELVIS: CPT | Mod: 26

## 2023-08-15 PROCEDURE — 27236 TREAT THIGH FRACTURE: CPT | Mod: LT

## 2023-08-15 PROCEDURE — 71045 X-RAY EXAM CHEST 1 VIEW: CPT | Mod: 26

## 2023-08-15 PROCEDURE — 88311 DECALCIFY TISSUE: CPT | Mod: 26

## 2023-08-15 PROCEDURE — 99291 CRITICAL CARE FIRST HOUR: CPT

## 2023-08-15 PROCEDURE — 88305 TISSUE EXAM BY PATHOLOGIST: CPT | Mod: 26

## 2023-08-15 DEVICE — HEAD FEMORAL SHORT NECK 28MM: Type: IMPLANTABLE DEVICE | Site: LEFT | Status: FUNCTIONAL

## 2023-08-15 DEVICE — CEMENT SIMPLEX WITH TOBRAMYCIN: Type: IMPLANTABLE DEVICE | Site: LEFT | Status: FUNCTIONAL

## 2023-08-15 DEVICE — IMPLANTABLE DEVICE: Type: IMPLANTABLE DEVICE | Site: LEFT | Status: FUNCTIONAL

## 2023-08-15 DEVICE — RESTRIC CEM BUCK 25MM: Type: IMPLANTABLE DEVICE | Site: LEFT | Status: FUNCTIONAL

## 2023-08-15 DEVICE — HEAD UNI REPL BIPOLAR 28X53MM: Type: IMPLANTABLE DEVICE | Site: LEFT | Status: FUNCTIONAL

## 2023-08-15 RX ORDER — CHLORHEXIDINE GLUCONATE 213 G/1000ML
15 SOLUTION TOPICAL EVERY 12 HOURS
Refills: 0 | Status: DISCONTINUED | OUTPATIENT
Start: 2023-08-15 | End: 2023-08-16

## 2023-08-15 RX ORDER — BENZOCAINE AND MENTHOL 5; 1 G/100ML; G/100ML
1 LIQUID ORAL EVERY 8 HOURS
Refills: 0 | Status: DISCONTINUED | OUTPATIENT
Start: 2023-08-15 | End: 2023-08-15

## 2023-08-15 RX ORDER — TRAMADOL HYDROCHLORIDE 50 MG/1
50 TABLET ORAL EVERY 6 HOURS
Refills: 0 | Status: DISCONTINUED | OUTPATIENT
Start: 2023-08-15 | End: 2023-08-15

## 2023-08-15 RX ORDER — POVIDONE-IODINE 5 %
1 AEROSOL (ML) TOPICAL ONCE
Refills: 0 | Status: DISCONTINUED | OUTPATIENT
Start: 2023-08-15 | End: 2023-08-15

## 2023-08-15 RX ORDER — MAGNESIUM HYDROXIDE 400 MG/1
30 TABLET, CHEWABLE ORAL DAILY
Refills: 0 | Status: DISCONTINUED | OUTPATIENT
Start: 2023-08-15 | End: 2023-08-15

## 2023-08-15 RX ORDER — ASPIRIN/CALCIUM CARB/MAGNESIUM 324 MG
325 TABLET ORAL
Refills: 0 | Status: DISCONTINUED | OUTPATIENT
Start: 2023-08-15 | End: 2023-08-15

## 2023-08-15 RX ORDER — POLYETHYLENE GLYCOL 3350 17 G/17G
17 POWDER, FOR SOLUTION ORAL DAILY
Refills: 0 | Status: DISCONTINUED | OUTPATIENT
Start: 2023-08-15 | End: 2023-08-15

## 2023-08-15 RX ORDER — CEFAZOLIN SODIUM 1 G
2000 VIAL (EA) INJECTION EVERY 8 HOURS
Refills: 0 | Status: COMPLETED | OUTPATIENT
Start: 2023-08-15 | End: 2023-08-15

## 2023-08-15 RX ORDER — LANOLIN ALCOHOL/MO/W.PET/CERES
3 CREAM (GRAM) TOPICAL AT BEDTIME
Refills: 0 | Status: DISCONTINUED | OUTPATIENT
Start: 2023-08-15 | End: 2023-08-15

## 2023-08-15 RX ORDER — OXYCODONE HYDROCHLORIDE 5 MG/1
10 TABLET ORAL EVERY 4 HOURS
Refills: 0 | Status: DISCONTINUED | OUTPATIENT
Start: 2023-08-15 | End: 2023-08-15

## 2023-08-15 RX ORDER — HYDROMORPHONE HYDROCHLORIDE 2 MG/ML
0.5 INJECTION INTRAMUSCULAR; INTRAVENOUS; SUBCUTANEOUS
Refills: 0 | Status: DISCONTINUED | OUTPATIENT
Start: 2023-08-15 | End: 2023-08-16

## 2023-08-15 RX ORDER — OXYCODONE HYDROCHLORIDE 5 MG/1
5 TABLET ORAL EVERY 4 HOURS
Refills: 0 | Status: DISCONTINUED | OUTPATIENT
Start: 2023-08-15 | End: 2023-08-15

## 2023-08-15 RX ORDER — ONDANSETRON 8 MG/1
4 TABLET, FILM COATED ORAL EVERY 6 HOURS
Refills: 0 | Status: DISCONTINUED | OUTPATIENT
Start: 2023-08-15 | End: 2023-08-15

## 2023-08-15 RX ORDER — HEPARIN SODIUM 5000 [USP'U]/ML
5000 INJECTION INTRAVENOUS; SUBCUTANEOUS EVERY 8 HOURS
Refills: 0 | Status: DISCONTINUED | OUTPATIENT
Start: 2023-08-16 | End: 2023-08-24

## 2023-08-15 RX ORDER — CALCIUM ACETATE 667 MG
667 TABLET ORAL
Refills: 0 | Status: DISCONTINUED | OUTPATIENT
Start: 2023-08-15 | End: 2023-08-15

## 2023-08-15 RX ORDER — CALCIUM GLUCONATE 100 MG/ML
1 VIAL (ML) INTRAVENOUS ONCE
Refills: 0 | Status: COMPLETED | OUTPATIENT
Start: 2023-08-15 | End: 2023-08-15

## 2023-08-15 RX ORDER — POLYETHYLENE GLYCOL 3350 17 G/17G
17 POWDER, FOR SOLUTION ORAL AT BEDTIME
Refills: 0 | Status: DISCONTINUED | OUTPATIENT
Start: 2023-08-15 | End: 2023-08-15

## 2023-08-15 RX ORDER — SENNA PLUS 8.6 MG/1
2 TABLET ORAL AT BEDTIME
Refills: 0 | Status: DISCONTINUED | OUTPATIENT
Start: 2023-08-15 | End: 2023-08-15

## 2023-08-15 RX ORDER — PANTOPRAZOLE SODIUM 20 MG/1
40 TABLET, DELAYED RELEASE ORAL DAILY
Refills: 0 | Status: DISCONTINUED | OUTPATIENT
Start: 2023-08-15 | End: 2023-08-17

## 2023-08-15 RX ORDER — ACETAMINOPHEN 500 MG
1000 TABLET ORAL EVERY 6 HOURS
Refills: 0 | Status: COMPLETED | OUTPATIENT
Start: 2023-08-15 | End: 2023-08-16

## 2023-08-15 RX ORDER — ROCURONIUM BROMIDE 10 MG/ML
100 VIAL (ML) INTRAVENOUS ONCE
Refills: 0 | Status: DISCONTINUED | OUTPATIENT
Start: 2023-08-15 | End: 2023-08-15

## 2023-08-15 RX ORDER — DEXTROSE 50 % IN WATER 50 %
50 SYRINGE (ML) INTRAVENOUS ONCE
Refills: 0 | Status: COMPLETED | OUTPATIENT
Start: 2023-08-15 | End: 2023-08-15

## 2023-08-15 RX ORDER — LABETALOL HCL 100 MG
200 TABLET ORAL
Refills: 0 | Status: DISCONTINUED | OUTPATIENT
Start: 2023-08-15 | End: 2023-08-15

## 2023-08-15 RX ORDER — PANTOPRAZOLE SODIUM 20 MG/1
40 TABLET, DELAYED RELEASE ORAL
Refills: 0 | Status: DISCONTINUED | OUTPATIENT
Start: 2023-08-15 | End: 2023-08-15

## 2023-08-15 RX ORDER — PROPOFOL 10 MG/ML
20 INJECTION, EMULSION INTRAVENOUS
Qty: 1000 | Refills: 0 | Status: DISCONTINUED | OUTPATIENT
Start: 2023-08-15 | End: 2023-08-16

## 2023-08-15 RX ORDER — HYDROMORPHONE HYDROCHLORIDE 2 MG/ML
0.5 INJECTION INTRAMUSCULAR; INTRAVENOUS; SUBCUTANEOUS
Refills: 0 | Status: DISCONTINUED | OUTPATIENT
Start: 2023-08-15 | End: 2023-08-15

## 2023-08-15 RX ORDER — KETAMINE HYDROCHLORIDE 100 MG/ML
100 INJECTION INTRAMUSCULAR; INTRAVENOUS ONCE
Refills: 0 | Status: DISCONTINUED | OUTPATIENT
Start: 2023-08-15 | End: 2023-08-15

## 2023-08-15 RX ORDER — CHLORHEXIDINE GLUCONATE 213 G/1000ML
1 SOLUTION TOPICAL EVERY 12 HOURS
Refills: 0 | Status: DISCONTINUED | OUTPATIENT
Start: 2023-08-15 | End: 2023-08-15

## 2023-08-15 RX ORDER — ACETAMINOPHEN 500 MG
1000 TABLET ORAL ONCE
Refills: 0 | Status: DISCONTINUED | OUTPATIENT
Start: 2023-08-15 | End: 2023-08-15

## 2023-08-15 RX ORDER — ONDANSETRON 8 MG/1
4 TABLET, FILM COATED ORAL ONCE
Refills: 0 | Status: DISCONTINUED | OUTPATIENT
Start: 2023-08-15 | End: 2023-08-15

## 2023-08-15 RX ORDER — HYDROMORPHONE HYDROCHLORIDE 2 MG/ML
0.25 INJECTION INTRAMUSCULAR; INTRAVENOUS; SUBCUTANEOUS
Refills: 0 | Status: DISCONTINUED | OUTPATIENT
Start: 2023-08-15 | End: 2023-08-15

## 2023-08-15 RX ORDER — ACETAMINOPHEN 500 MG
650 TABLET ORAL EVERY 6 HOURS
Refills: 0 | Status: DISCONTINUED | OUTPATIENT
Start: 2023-08-15 | End: 2023-08-15

## 2023-08-15 RX ORDER — DEXTROSE 50 % IN WATER 50 %
25 SYRINGE (ML) INTRAVENOUS ONCE
Refills: 0 | Status: COMPLETED | OUTPATIENT
Start: 2023-08-15 | End: 2023-08-15

## 2023-08-15 RX ORDER — DEXTROSE 10 % IN WATER 10 %
1000 INTRAVENOUS SOLUTION INTRAVENOUS
Refills: 0 | Status: DISCONTINUED | OUTPATIENT
Start: 2023-08-15 | End: 2023-08-16

## 2023-08-15 RX ADMIN — CHLORHEXIDINE GLUCONATE 1 APPLICATION(S): 213 SOLUTION TOPICAL at 06:47

## 2023-08-15 RX ADMIN — HYDROMORPHONE HYDROCHLORIDE 0.25 MILLIGRAM(S): 2 INJECTION INTRAMUSCULAR; INTRAVENOUS; SUBCUTANEOUS at 16:59

## 2023-08-15 RX ADMIN — Medication 100 GRAM(S): at 13:19

## 2023-08-15 RX ADMIN — Medication 100 MILLIGRAM(S): at 21:45

## 2023-08-15 RX ADMIN — Medication 50 MILLILITER(S): at 12:41

## 2023-08-15 RX ADMIN — PANTOPRAZOLE SODIUM 40 MILLIGRAM(S): 20 TABLET, DELAYED RELEASE ORAL at 17:42

## 2023-08-15 RX ADMIN — Medication 100 MILLIGRAM(S): at 15:10

## 2023-08-15 RX ADMIN — Medication 1000 MILLIGRAM(S): at 23:35

## 2023-08-15 RX ADMIN — HYDROMORPHONE HYDROCHLORIDE 0.5 MILLIGRAM(S): 2 INJECTION INTRAMUSCULAR; INTRAVENOUS; SUBCUTANEOUS at 00:44

## 2023-08-15 RX ADMIN — CHLORHEXIDINE GLUCONATE 15 MILLILITER(S): 213 SOLUTION TOPICAL at 17:42

## 2023-08-15 RX ADMIN — Medication 400 MILLIGRAM(S): at 23:20

## 2023-08-15 RX ADMIN — Medication 1000 MILLIGRAM(S): at 06:48

## 2023-08-15 RX ADMIN — HYDROMORPHONE HYDROCHLORIDE 0.5 MILLIGRAM(S): 2 INJECTION INTRAMUSCULAR; INTRAVENOUS; SUBCUTANEOUS at 03:46

## 2023-08-15 RX ADMIN — HYDROMORPHONE HYDROCHLORIDE 0.5 MILLIGRAM(S): 2 INJECTION INTRAMUSCULAR; INTRAVENOUS; SUBCUTANEOUS at 20:21

## 2023-08-15 RX ADMIN — Medication 200 MILLIGRAM(S): at 06:48

## 2023-08-15 RX ADMIN — Medication 25 MILLILITER(S): at 13:30

## 2023-08-15 RX ADMIN — HYDROMORPHONE HYDROCHLORIDE 0.5 MILLIGRAM(S): 2 INJECTION INTRAMUSCULAR; INTRAVENOUS; SUBCUTANEOUS at 00:14

## 2023-08-15 RX ADMIN — Medication 50 MILLILITER(S): at 14:40

## 2023-08-15 RX ADMIN — PROPOFOL 11.8 MICROGRAM(S)/KG/MIN: 10 INJECTION, EMULSION INTRAVENOUS at 20:21

## 2023-08-15 RX ADMIN — Medication 50 MILLILITER(S): at 20:20

## 2023-08-15 RX ADMIN — HYDROMORPHONE HYDROCHLORIDE 0.5 MILLIGRAM(S): 2 INJECTION INTRAMUSCULAR; INTRAVENOUS; SUBCUTANEOUS at 23:19

## 2023-08-15 RX ADMIN — HYDROMORPHONE HYDROCHLORIDE 0.5 MILLIGRAM(S): 2 INJECTION INTRAMUSCULAR; INTRAVENOUS; SUBCUTANEOUS at 20:36

## 2023-08-15 RX ADMIN — HYDROMORPHONE HYDROCHLORIDE 0.5 MILLIGRAM(S): 2 INJECTION INTRAMUSCULAR; INTRAVENOUS; SUBCUTANEOUS at 23:34

## 2023-08-15 RX ADMIN — PROPOFOL 11.8 MICROGRAM(S)/KG/MIN: 10 INJECTION, EMULSION INTRAVENOUS at 14:40

## 2023-08-15 RX ADMIN — HYDROMORPHONE HYDROCHLORIDE 0.5 MILLIGRAM(S): 2 INJECTION INTRAMUSCULAR; INTRAVENOUS; SUBCUTANEOUS at 04:16

## 2023-08-15 RX ADMIN — HYDROMORPHONE HYDROCHLORIDE 0.25 MILLIGRAM(S): 2 INJECTION INTRAMUSCULAR; INTRAVENOUS; SUBCUTANEOUS at 17:14

## 2023-08-15 RX ADMIN — Medication 50 MILLILITER(S): at 13:20

## 2023-08-15 RX ADMIN — PROPOFOL 11.8 MICROGRAM(S)/KG/MIN: 10 INJECTION, EMULSION INTRAVENOUS at 20:35

## 2023-08-15 RX ADMIN — KETAMINE HYDROCHLORIDE 100 MILLIGRAM(S): 100 INJECTION INTRAMUSCULAR; INTRAVENOUS at 14:41

## 2023-08-15 NOTE — PROGRESS NOTE ADULT - SUBJECTIVE AND OBJECTIVE BOX
Patient seen and examined at bedside.  No acute complaints at this time. Pain well controlled. Denies chest pain, shortness of breath, nausea or vomiting. HD completed 8/14.     PE:  Vital Signs Last 24 Hrs  T(C): 37.3 (08-14-23 @ 22:36), Max: 37.3 (08-14-23 @ 22:36)  T(F): 99.2 (08-14-23 @ 22:36), Max: 99.2 (08-14-23 @ 22:36)  HR: 80 (08-14-23 @ 22:36) (72 - 95)  BP: 126/60 (08-14-23 @ 22:36) (119/63 - 200/76)  BP(mean): 88 (08-14-23 @ 12:51) (88 - 101)  RR: 18 (08-14-23 @ 22:36) (14 - 20)  SpO2: 97% (08-14-23 @ 22:36) (97% - 100%)    General: NAD, resting comfortably in bed      Compartments soft and compressible  No calf tenderness bilaterally  +TA/EHL/FHL/GSC  SILT L3-S1  + DP    LUE:   TTP over radial head, pain w/ pronosupination  NTTP over the bony prominences of the shoulder/elbow/wrist/hand  painless passive/active ROM of the shoulder/wrist/hand  C5-T1 SILT  motor grossly intact throughout axillary/musculocutaenous/radial/median/ulnar nerves  + radial pulse                            7.6    5.38  )-----------( 289      ( 14 Aug 2023 06:32 )             25.3     08-14    139  |  95<L>  |  95<H>  ----------------------------<  66<L>  5.8<H>   |  23  |  12.17<H>    Ca    9.0      14 Aug 2023 10:29  Mg     2.4     08-14    TPro  6.6  /  Alb  3.7  /  TBili  0.4  /  DBili  x   /  AST  18  /  ALT  6<L>  /  AlkPhos  1061<H>  08-14    PT/INR - ( 14 Aug 2023 06:32 )   PT: 11.6 sec;   INR: 1.06 ratio         PTT - ( 14 Aug 2023 06:32 )  PTT:26.7 sec      A/P: 37y Male with L femoral neck hip fracture; L Radial head fx     Plan for Danis 8/15 AM   NPO after midnight for OR 8/15  Preop labs after midnight (CBC/BMP/PT/PTT/T&S x2)  CXR/EKG  FU Nephro C/s for HD M/W/F  Pain control  NWB L LE, bedrest  NWB LUE in sling for comfort for L RH Fx from 7/27  FU labs  Please continue to document Medical optimization for OR  Medical mgmt greatly appreciated   Would rec PRBC transfusion  Patient seen and examined at bedside.  No acute complaints at this time. Pain well controlled. Denies chest pain, shortness of breath, nausea or vomiting. HD completed 8/14.     PE:  Vital Signs Last 24 Hrs  T(C): 37.3 (08-14-23 @ 22:36), Max: 37.3 (08-14-23 @ 22:36)  T(F): 99.2 (08-14-23 @ 22:36), Max: 99.2 (08-14-23 @ 22:36)  HR: 80 (08-14-23 @ 22:36) (72 - 95)  BP: 126/60 (08-14-23 @ 22:36) (119/63 - 200/76)  BP(mean): 88 (08-14-23 @ 12:51) (88 - 101)  RR: 18 (08-14-23 @ 22:36) (14 - 20)  SpO2: 97% (08-14-23 @ 22:36) (97% - 100%)    General: NAD, resting comfortably in bed      Compartments soft and compressible  No calf tenderness bilaterally  +TA/EHL/FHL/GSC  SILT L3-S1  + DP    LUE:   TTP over radial head, pain w/ pronosupination  NTTP over the bony prominences of the shoulder/elbow/wrist/hand  painless passive/active ROM of the shoulder/wrist/hand  C5-T1 SILT  motor grossly intact throughout axillary/musculocutaenous/radial/median/ulnar nerves  + radial pulse                            7.6    5.38  )-----------( 289      ( 14 Aug 2023 06:32 )             25.3     08-14    139  |  95<L>  |  95<H>  ----------------------------<  66<L>  5.8<H>   |  23  |  12.17<H>    Ca    9.0      14 Aug 2023 10:29  Mg     2.4     08-14    TPro  6.6  /  Alb  3.7  /  TBili  0.4  /  DBili  x   /  AST  18  /  ALT  6<L>  /  AlkPhos  1061<H>  08-14    PT/INR - ( 14 Aug 2023 06:32 )   PT: 11.6 sec;   INR: 1.06 ratio         PTT - ( 14 Aug 2023 06:32 )  PTT:26.7 sec      A/P: 37y Male with L femoral neck hip fracture; L Radial head fx     Plan for Danis 8/15 AM   NPO after midnight for OR 8/15  Preop labs after midnight (CBC/BMP/PT/PTT/T&S x2)  Will order 1U of PRBC and recheck H/h for response (8/15 0200)  CXR/EKG  FU Nephro C/s for HD M/W/F  Pain control  NWB L LE, bedrest  NWB LUE in sling for comfort for L RH Fx from 7/27  FU labs  Please continue to document Medical optimization for OR  Medical mgmt greatly appreciated   Would rec PRBC transfusion

## 2023-08-15 NOTE — BRIEF OPERATIVE NOTE - VENOUS THROMBOEMBOLISM PROPHYLAXIS THERAPY
What Type Of Note Output Would You Prefer (Optional)?: Standard Output How Severe Is Your Skin Lesion?: mild Has Your Skin Lesion Been Treated?: been treated Is This A New Presentation, Or A Follow-Up?: Skin Lesion none

## 2023-08-15 NOTE — PROGRESS NOTE ADULT - SUBJECTIVE AND OBJECTIVE BOX
ORTHO ATTENDING POST OP    s/p  L hip hemiarthroplasty  WBAT L  LE  Anterior hip precautions   BID  venodynes  ancef x 24h  OOB to chair in AM  rehab consult  CBC in RR and AM   SICU consult due to pressor requirements  f/u medicine

## 2023-08-15 NOTE — CONSULT NOTE ADULT - NS PANP COMMENT GEN_ALL_CORE FT
post operative for hip fracture, which was complicated by hyperkalemia intra op for which insulin was given with resultant hypoglycemia. patient also had respiratory failure post extubation and was placed on bipap. upon arrival to SICU, patient promptly desaturated when lied flat for bed transfer. due to acute altered mental status change, tracheal intubation emergently was elected, patient was optimized with positive pressure with BVM, and pre oxygenated and denitrogenated, ketamine and bob was used and was intubated with an 8.0 tube without any adverse effects. pt tolerated the procedure well. based on adjusted body Vt was set given high ETCO2 and high min vent demand on bipap prior to intubation. awaiting blood gas repeat. pt is HD dependent so minimize transfusion and IV fluids. currently on d10. if serum blood sugar normalizes, will decrease rate.   cont care as above.

## 2023-08-15 NOTE — CHART NOTE - NSCHARTNOTEFT_GEN_A_CORE
Patient seen and evaluated in his room in the SICU. Patient is currently sedated with Propofol and intubated secondary to hypoxemia to the 50%s with worsening mental status and intolerance to BIPAP. In addition, pt was hypoglycemic in PACU with a glucose ranging in the 50s which was managed by the Anesthesia team with Dextrose. Of note, patient was also transfused 1U of PRBC intraoperatively. Patient currently unresponsive at this time for bedside assessment.     T(C): 36.2 (08-15-23 @ 12:00), Max: 37.9 (08-15-23 @ 06:44)  HR: 67 (08-15-23 @ 15:30) (65 - 100)  BP: 129/62 (08-15-23 @ 12:40) (119/63 - 229/122)  RR: 20 (08-15-23 @ 15:30) (11 - 20)  SpO2: 100% (08-15-23 @ 15:30) (94% - 100%)  Wt(kg): --    Exam:  Alert and Oriented, No Acute Distress.   Card: +S1/S2, RRR  Pulm: CTAB  Laterality:   L Upper Extremity  Sling in place   Skin intact, no erythema, ecchymosis, edema, gross deformity  Compartments soft and compressible  Sensory: unable to assess sensory function at this time secondary to sedation   Motor: unable to assess motor function at this time secondary to sedation  + radial pulse    L lower extremity  Aquacel dressing on L Hip clean, dry and intact.   Sensory: unable to assess sensory function at this time secondary to sedation   Motor: unable to assess motor function at this time secondary to sedation; flicker of LLE digits 1-5 at this time, responsive to noxious stimuli.   Calves soft, compressible   2+ DP/PT pulse  Lower extremities warm and well-perfused, cap refill < 3 sec.     Xray:   IMPRESSION: s/p L hip hemiarthroplasty   Upper pelvis is off the field-of-view.  Status post left hip hemiarthroplasty, which is in expected position. No definite acute displaced fracture seen. Postoperative swelling/gas about the left hip.  Redemonstrated mottled appearance of the bones with subperiosteal resorption at the pubic symphysis, likely reflecting renal osteodystrophy/secondary hyperparathyroidism. Periosteal new bone formation seen along the proximal femurs bilaterally.    --- End of Report ---      Labs:                     7.9    7.16  )-----------( 259      ( 15 Aug 2023 14:26 )             25.3    08-15    135  |  98  |  66<H>  ----------------------------<  95  5.3   |  23  |  9.06<H>    Ca    8.7      15 Aug 2023 14:26  Phos  6.2     08-15  Mg     2.4     08-15    TPro  5.6<L>  /  Alb  3.0<L>  /  TBili  0.4  /  DBili  x   /  AST  25  /  ALT  6<L>  /  AlkPhos  920<H>  08-15    A/P: 37yMale s/p L hip hemiarthroplasty. VSS. NAD.  -PT/OT- WBAT on LLE/OOB with Anterior hip precautions, NWB in LUE in sling for comfort for L RH Fx from 7/27  -IS bedside   -DVT PPx: Aspirin 325 mg BID, SCD, Early OOB and Amb  -GI PPx: Protonix 40 mg QD  -Pain Control  -Continue Current Tx  -f/u SICU labs ordered & AM labs.   -appreciate Nephro recs for hx of ESRD, HD on M/W/F  -Dispo planning: TBD, pending PT/OT eval, appreciate SICU care, continue care as per primary team.     Jean Carlos Verdugo PA-C  Orthopedic Surgery Team  Team Pager #[phone]

## 2023-08-15 NOTE — PHYSICAL THERAPY INITIAL EVALUATION ADULT - PRECAUTIONS/LIMITATIONS, REHAB EVAL
fall precautions/left hip precautions anterior hip precautions/fall precautions/left hip precautions

## 2023-08-15 NOTE — CHART NOTE - NSCHARTNOTEFT_GEN_A_CORE
Patient presented to SICU from PACU. During patient transfer to bed, patient became hypoxic to 50s% with worsening mental status, not tolerating bipap. Decision was made to intubate. Pt was preoxygenated to 100% SpO2, medicated with ketamine and rocuronium, and then successfully intubated with 1 attempt using glidescope with visualization of epiglottis and posterior arytenoids using 8.0 ETT secured with tube rodriguez at 22cm at the lip and good color change on capnography with bilateral breath sounds and equal chest rise noted, with respiratory therapist at bedside, placed on mechanical ventilation with setting RR 20, , PEEP 8, FiO2 40%. Pt tolerated procedure well, not requiring vasopressor support. Put on propofol for sedation. Emergency contact notified and updated regarding intubation. CXR and ABG ordered. Patient presented to SICU from PACU. During patient transfer to bed, patient became hypoxic to 50s% with worsening mental status, not tolerating bipap. Decision was made to intubate by attending at bedside. Pt was preoxygenated to 100% SpO2, medicated with ketamine and rocuronium, and then successfully intubated with 1 attempt using glidescope with visualization of epiglottis and posterior arytenoids using 8.0 ETT secured with tube rodriguez at 22cm at the lip and good color change on capnography with bilateral breath sounds and equal chest rise noted, with respiratory therapist at bedside, placed on mechanical ventilation with setting RR 20, , PEEP 8, FiO2 40%. Pt tolerated procedure well, not requiring vasopressor support. Put on propofol for sedation. Emergency contact notified and updated regarding intubation. CXR and ABG ordered.

## 2023-08-15 NOTE — PHYSICAL THERAPY INITIAL EVALUATION ADULT - PASSIVE RANGE OF MOTION EXAMINATION, REHAB EVAL
LUE with (+) radial fx, LLE limited by pain/Right UE Passive ROM was WFL (within functional limits)/Right LE Passive ROM was WFL (within functional limits)

## 2023-08-15 NOTE — PHYSICAL THERAPY INITIAL EVALUATION ADULT - ADDITIONAL COMMENTS
Pt lives with family in private home ~15 steps to enter and first floor set-up; pt states he has been using a cane prior to admission. Had access-a-ride for dialysis.

## 2023-08-15 NOTE — CONSULT NOTE ADULT - ASSESSMENT
BRIDGET NORTH is a 37y Male with h/o HTN, ESRD on HD (MWF), Stroke (age 10) now admitted on 8/14 s/p fall with L femoral neck fracture. Patient underwent L hip hemiarthroplasty.  Intra-operatively patient was on Norepinephrine and vasopressin, received 1 unit of PRBCs and K was shifted 3x with Insulin and D50.  At the end of the case patient was off of vasopressor support and extubated.  Patient transferred to PACU, where he became lethargic.  ABG obtained which showed respiratory acidosis and glucose in the 40s.  He was given an amp of D50 and placed on Bipap with improvement in mental status.  Patient had hypoxia and worsening mental status upon arrival to the SICU and was intubated.  Initial AMS and hypoglycemia likely secondary to sensitivity to insulin shifting in setting of ESRD.      Neuro: AMS likely secondary to hypoglycemia  - Propofol gtt for sedation  - Dilaudid and Tylenol prn pain     Resp: Acute hypoxic and hypercarbic respiratory failure  - PRVC 20/600/8/40%   - ETT adjusted after CXR, f/u daily CXR  - Wean ventilator as tolerated    CV: h/o HTN  - Currently hemodynamically stable, but did require vasopressor support intra-op  - Lactate 1.6  - Hold anti-hypertensives for now     GI:  - NPO/OGT  - Protonix    /Renal: h/o ESRD (MWF)  - f/u family/nephrologist in regards to last HD session  - Electrolytes okay at this time, will continue to trend BMP q 6hrs  - Bladder scan  - RUE Fistula     ID:  - No acute need for abx  - Monitor WBC    Heme: VTE prophylaxis  - Plan for ASA 325mg BID for VTE prophylaxis as per ortho  - H/H stable (transfused 1 unit PRBC intra-op)     Endo: Hypoglycemia  - D10@ 50mL/hr  - Check blood glucose q 30 minutes, slowly space out as patient stabilizes.     Lines:  - LUE QUINTIN  - 18 gauge PIV  - L radial A-line (8/15)     Dispo:  Full code.  SICU care.  Case discussed with Dr. Vizcaino.    Mireya Alvarez, PACydneyC #[id number] 
37 year old male with past medical history of End stage renal disease(M,W,F), hypertension, hypothyroidism, stroke at age of 10, sleep apnea, obesity and recent hospitalization for left radial fracture 7/27/2023 presenting to ER after a fall.   ESRD on HD via R AVF (MWF)

## 2023-08-15 NOTE — PHYSICAL THERAPY INITIAL EVALUATION ADULT - PERTINENT HX OF CURRENT PROBLEM, REHAB EVAL
36 yo M with PMHx of End stage renal disease(M,W,F), hypertension, hypothyroidism, stroke at age of 10, sleep apnea, obesity and recent hospitalization for  left radial fracture 7/27/2023 presenting to [facility] emergency department after a mechanical fall in the shower which led him to land on his left hip. Patient is accompanied at the bedside by family members who provide collateral history. Patient states that he completed antibiotics for uvulitis that were prescribed at previous admission. Patient had has fascia iliaca block done in emergency room but states that the pain is refractory. He denies any head trauma or loss of consciousness secondary to the mechanical fall. Denies headache, nausea, vomiting, loss of sensation in lower extremities, or urinary/fecal incontinence. Pt admitted with new left femoral fracture; now presents POD#0 s/p L hip dipika with MD León.    CT Pelvis 8/14/23: Acute angulated fragility fracture of the left femoral neck in the setting of end-stage renal osteodystrophy, with detailed findings as above. Moderate joint space narrowing at the hips bilaterally. Grade 1 anterolisthesis at L5-S1 with bilateral L5 spondylolysis. Clustered foci of soft tissue gas in the right inguinal region which may be related to a recent line placement; advise correlation with clinical history. Anasarca.    X-Ray L Elbow 8/14/23:Exam limited by positioning. No acute fracture is demonstrated. Redemonstrated findings consistent with renal osteodystrophy. Multiple soft tissue/vascular calcifications and surgical clips and embolization coils in the anterior soft tissues overlying the humerus.

## 2023-08-15 NOTE — CONSULT NOTE ADULT - SUBJECTIVE AND OBJECTIVE BOX
HISTORY OF PRESENT ILLNESS:  BRIDGET NORTH is a 37y Male with h/o HTN, ESRD on HD (MWF), Stroke (age 10) now admitted on 8/14 s/p fall with L femoral neck fracture. Patient underwent L hip hemiarthroplasty.  Intra-operatively patient was on Norepinephrine and vasopressin, received 1 unit of PRBCs and K was shifted 3x with Insulin and D50.  At the end of the case patient was off of vasopressor support and extubated.  Patient transferred to PACU, where he became lethargic.  ABG obtained which showed respiratory acidosis and glucose in the 40s.  He was given an amp of D50 and placed on Bipap with improvement in mental status.  Patient required an additional amp of D50 and was placed on D10 @ 50mL/hr.  Patient evaluated by SICU team in the PACU and accepted.  Upon admission to SICU, patient became hypoxic when he was laid flat for transfer to the SICU bed and had worsening mental status.  Saturation improved with ambu bagging, but patient still had poor mental status.  Decision made to intubate.  Patient tolerated procedure well and was placed on mechanical ventilation.      PAST MEDICAL HISTORY:   HTN   Stroke (age 10)   Morbid obesity  Sleep apnea  ESRD on HD (MWF) since 2013   Anemia, unspecified type  Hypothyroidism      PAST SURGICAL HISTORY: AV fistula      FAMILY HISTORY: Family history of diabetes mellitus      SOCIAL HISTORY:    CODE STATUS: Full code     HOME MEDICATIONS: Calcium acetate, Labetalol, Nephro-pat    ALLERGIES: No Known Drug Allergies  shellfish (Hives)      VITAL SIGNS:  ICU Vital Signs Last 24 Hrs  T(C): 36.2 (15 Aug 2023 12:00), Max: 37.9 (15 Aug 2023 06:44)  T(F): 97.2 (15 Aug 2023 12:00), Max: 100.2 (15 Aug 2023 06:44)  HR: 72 (15 Aug 2023 15:00) (65 - 100)  BP: 129/62 (15 Aug 2023 12:40) (119/63 - 229/122)  BP(mean): 89 (15 Aug 2023 12:40) (83 - 166)  ABP: 136/59 (15 Aug 2023 15:00) (127/48 - 216/116)  ABP(mean): 81 (15 Aug 2023 15:00) (74 - 159)  RR: 20 (15 Aug 2023 15:00) (11 - 20)  SpO2: 100% (15 Aug 2023 15:00) (94% - 100%)        NEURO  Exam: Previously agitated, not following commands. JAQUAN.  Now sedated on Propofol.   propofol Infusion 20 MICROgram(s)/kG/Min IV Continuous <Continuous>  rocuronium Injectable 100 milliGRAM(s) IV Push once      RESPIRATORY  Mechanical Ventilation: Mode: PRVC, RR (machine): 20, RR (patient): 20, TV (machine): 600, FiO2: 40, PEEP: 8, ITime: 0.9, MAP: 13, PIP: 25  ABG - ( 15 Aug 2023 14:20 )  pH: 7.19  /  pCO2: 68    /  pO2: 169   / HCO3: 26    / Base Excess: -2.5  /  SaO2: 99.4    Lactate: x      Exam: CTA B/L. Now intubated       CARDIOVASCULAR  Exam: RRR. +S1, +S2.   Cardiac Rhythm: Sinus       GI/NUTRITION  Exam: Soft, non-tender, non-distended.   Diet: NPO  pantoprazole  Injectable 40 milliGRAM(s) IV Push daily      EXT:  +L lateral thigh dressing clean, dry, intact. Warm, no signs of hematoma. +DP/PT in B/L LEs. PARTIDA.       GENITOURINARY/RENAL  dextrose 10%. 1000 milliLiter(s) IV Continuous <Continuous>      08-14 @ 07:01  -  08-15 @ 07:00  --------------------------------------------------------  IN:    Oral Fluid: 240 mL    PRBCs (Packed Red Blood Cells): 300 mL  Total IN: 540 mL    OUT:    Other (mL): 3000 mL    Voided (mL): 0 mL  Total OUT: 3000 mL    Total NET: -2460 mL        Weight (kg): 98.1 (08-15 @ 08:12)  08-15    135  |  98  |  66<H>  ----------------------------<  95  5.3   |  23  |  9.06<H>    Ca    8.7      15 Aug 2023 14:26  Phos  6.2     08-15  Mg     2.4     08-15    TPro  5.6<L>  /  Alb  3.0<L>  /  TBili  0.4  /  DBili  x   /  AST  25  /  ALT  6<L>  /  AlkPhos  920<H>  08-15    [ ] Lee catheter, indication:     HEMATOLOGIC   [x ] VTE Prophylaxis: aspirin enteric coated 325 milliGRAM(s) Oral two times a day                          7.9    7.16  )-----------( 259      ( 15 Aug 2023 14:26 )             25.3     PT/INR - ( 15 Aug 2023 14:26 )   PT: 12.4 sec;   INR: 1.13 ratio         PTT - ( 15 Aug 2023 14:26 )  PTT:26.5 sec  Transfusion: [ ] PRBC	[ ] Platelets	[ ] FFP	[ ] Cryoprecipitate      INFECTIOUS DISEASES  ceFAZolin   IVPB 2000 milliGRAM(s) IV Intermittent every 8 hours    RECENT CULTURES:      ENDOCRINE    CAPILLARY BLOOD GLUCOSE  POCT Blood Glucose.: 103 mg/dL (15 Aug 2023 13:57)  POCT Blood Glucose.: 116 mg/dL (15 Aug 2023 13:29)  POCT Blood Glucose.: 80 mg/dL (15 Aug 2023 13:14)  POCT Blood Glucose.: 96 mg/dL (15 Aug 2023 13:00)  POCT Blood Glucose.: 125 mg/dL (15 Aug 2023 12:45)  POCT Blood Glucose.: 60 mg/dL (15 Aug 2023 12:28)  POCT Blood Glucose.: 95 mg/dL (15 Aug 2023 12:10)  POCT Blood Glucose.: 133 mg/dL (15 Aug 2023 11:53)  POCT Blood Glucose.: 47 mg/dL (15 Aug 2023 11:41)      PATIENT CARE ACCESS DEVICES:  [ x] Peripheral IV  [ ] Central Venous Line	[ ] R	[ ] L	[ ] IJ	[ ] Fem	[ ] SC	Placed:   [x ] Arterial Line		[ ] R	[x ] L	[ ] Fem	[x ] Rad	[ ] Ax	Placed: 8/15  [ ] PICC:					[ ] Mediport  [ ] Urinary Catheter, Date Placed:   [x] Necessity of urinary, arterial, and venous catheters discussed    OTHER MEDICATIONS: chlorhexidine 0.12% Liquid 15 milliLiter(s) Oral Mucosa every 12 hours      IMAGING STUDIES:

## 2023-08-15 NOTE — CHART NOTE - NSCHARTNOTEFT_GEN_A_CORE
Patient received from Anesthesia team- Deneen Chaves CRNA and Dr. Wells s/p left hip hemiarthroplasty, pt extubated in OR. Upon arrival to PACU, pt had episode of agitation then became unresponsive both to verbal and painful stimuli. Desaturation to 70%, oral and nasal airway placed and BVM initiated by anesthesia team. FS 47, 1 amp of D50 given and started on D10 gtt @50. Pt became alert, responsive, A&Ox3, repeat , placed on Bipap. SICU consult called by primary team and accepted to SICU bed 9. Patient received from Anesthesia team- Deneen Chaves CRNA and Dr. Wells/Dr. Boyd s/p left hip hemiarthroplasty, pt extubated in OR. Upon arrival to PACU, pt had episode of agitation then became unresponsive both to verbal and painful stimuli. Desaturation to 70%, oral and nasal airway placed and BVM initiated by anesthesia team. FS 47, 1 amp of D50 given and started on D10 gtt @50. Pt became alert, responsive, A&Ox3, repeat , placed on Bipap. PT HTN to 213/113, Cardene IVP given by Dr. Boyd with improvement in BP.  SICU consult called by primary team and accepted to SICU bed 9.

## 2023-08-15 NOTE — AIRWAY PLACEMENT NOTE ADULT - AIRWAY COMMENTS:
Intubated by Dr. Denise.  Positive color change, ETCO2 55, Positive breath sounds and visible chest rise.

## 2023-08-15 NOTE — PHYSICAL THERAPY INITIAL EVALUATION ADULT - IMPAIRMENTS FOUND, PT EVAL
aerobic capacity/endurance/gait, locomotion, and balance/muscle strength Muscle Hinge Flap Text: The defect edges were debeveled with a #15 scalpel blade.  Given the size, depth and location of the defect and the proximity to free margins a muscle hinge flap was deemed most appropriate.  Using a sterile surgical marker, an appropriate hinge flap was drawn incorporating the defect. The area thus outlined was incised with a #15 scalpel blade.  The skin margins were undermined to an appropriate distance in all directions utilizing iris scissors.

## 2023-08-15 NOTE — PRE-ANESTHESIA EVALUATION ADULT - NSANTHPMHFT_GEN_ALL_CORE
37 year old male with past medical history of End stage renal disease (M,W,F), hypertension, hypothyroidism, stroke at age of 10 no residual, sleep apnea not on CPAP, obesity and recent hospitalization for left radial fracture 7/27/2023, with Left hip Fracture for hemiarthroplasty.  Pt, dialyized yesterday, K improved, s/p 1 UPRBC this Am for anemia to Hgb 7.  Appreciate medical clearance.

## 2023-08-15 NOTE — ED ADULT NURSE NOTE - PAIN: BODY LOCATION
[facility] Medicine Services  PROGRESS NOTE    Patient Name: Dutch Arellano  : 1951  MRN: [id number]    Date of Admission: 2023  Primary Care Physician: Provider, No Known    Subjective   Subjective     CC: S/P CABG    HPI: Notes soreness/fatigue. Dry cough noted. No f/c. Denies n/v. Tolerating PO. Slight [place].    ROS:  As above     Objective   Objective     Vital Signs:   Temp:  [97.6 øF (36.4 øC)-98 øF (36.7 øC)] 97.9 øF (36.6 øC)  Heart Rate:  [68-76] 68  Resp:  [16-18] 16  BP: (102-145)/(54-80) 114/65  Flow (L/min):  [4] 4     Physical Exam:  NAD, alert and oriented  OP clear, MMM  Neck supple  No LAD  RRR  Decreased at bases, otherwise clear, worse on L  +BS, ND, NT, soft  LLE with vein site incision with staples, clean/intact  LEE  RLE amputation/AKA    Results Reviewed:  LAB RESULTS:      Lab 08/15/23  0502 23  0231 23  0310 23  0219 23  1517 23  1126 23  0309 08/10/23  1627 08/10/23  0954 08/10/23  0104   WBC  --  10.07 12.95* 11.41* 12.10* 10.20   < >  --   --  7.35   HEMOGLOBIN 8.6* 8.6* 9.4* 10.0* 10.7* 11.0*   < >  --   --  14.1   HEMATOCRIT 25.2* 26.2* 27.1* 28.8* 30.1* 31.4*   < >  --   --  39.7   PLATELETS  --  126* 155 160 160 141   < >  --   --  184   NEUTROS ABS  --   --   --  9.75*  --   --   --   --   --  4.13   IMMATURE GRANS (ABS)  --   --   --  0.04  --   --   --   --   --  0.04   LYMPHS ABS  --   --   --  0.79  --   --   --   --   --  2.45   MONOS ABS  --   --   --  0.77  --   --   --   --   --  0.42   EOS ABS  --   --   --  0.03  --   --   --   --   --  0.25   MCV  --  96.3 92.8 92.0 92.0 91.3   < >  --   --  90.6   PROTIME  --   --   --  14.8*  --  18.3*  --   --   --  13.9   APTT  --   --   --   --   --  31.6  --   --   --  44.2*   HEPARIN ANTI-XA  --   --   --   --   --   --   --  0.22* 0.10* 0.18*    < > = values in this interval not displayed.         Lab 08/15/23  0502 23  0932 23  0231 23  0920  08/13/23 0310 08/12/23 0219 08/11/23 1517 08/11/23 1203 08/11/23  1126 08/11/23  0309 08/10/23  0104   SODIUM 137  --  134*  --  136 139 142 140  --    < > 140   POTASSIUM 4.2 3.9 3.8 4.0 3.8 4.2 3.8  3.8 3.7  --    < > 4.1   CHLORIDE 100  --  98  --  100 101 104 103  --    < > 102   CO2 28.0  --  28.0  --  24.0 25.0 25.0 23.0  --    < > 29.0   ANION GAP 9.0  --  8.0  --  12.0 13.0 13.0 14.0  --    < > 9.0   BUN 22  --  22  --  17 14 14 12  --    < > 16   CREATININE 0.89  --  1.16  --  1.14 1.23 1.07 1.07  --    < > 0.98   EGFR 91.1  --  66.9  --  68.3 62.4 73.7 73.7  --    < > 81.9   GLUCOSE 112*  --  141*  --  158* 183* 136* 142*  --    < > 171*   CALCIUM 8.7  --  9.2  --  8.8 9.1 9.3 9.6  --    < > 9.6   IONIZED CALCIUM  --   --   --   --   --   --   --   --  1.38*  --   --    MAGNESIUM  --   --   --   --   --  2.0 2.3 2.4  --   --   --    PHOSPHORUS  --   --   --   --   --  5.2* 2.2* 2.3*  --   --   --    HEMOGLOBIN A1C  --   --   --   --   --   --   --   --   --   --  7.10*    < > = values in this interval not displayed.         Lab 08/12/23  0219 08/11/23  1517 08/11/23  1203 08/10/23  0104   TOTAL PROTEIN 6.1  --   --  6.7   ALBUMIN 4.7 4.7 4.5 4.1   GLOBULIN 1.4  --   --  2.6   ALT (SGPT) 32  --   --  40   AST (SGOT) 66*  --   --  27   BILIRUBIN 0.7  --   --  0.3   ALK PHOS 39  --   --  58         Lab 08/12/23  0219 08/11/23  1126 08/10/23  0104   PROTIME 14.8* 18.3* 13.9   INR 1.15* 1.50* 1.06         Lab 08/10/23  0104   CHOLESTEROL 192   LDL CHOL 86   HDL CHOL 29*   TRIGLYCERIDES 474*         Lab 08/10/23  0128 08/10/23  0104   ABO TYPING A A   RH TYPING Positive Positive   ANTIBODY SCREEN  --  Negative         Lab 08/11/23  1530 08/11/23  1132   PH, ARTERIAL 7.360 7.415   PCO2, ARTERIAL 42.6 39.4   PO2 ART 91.4 93.6   FIO2 40 100   HCO3 ART 24.0 25.2   BASE EXCESS ART -1.4* 0.6   CARBOXYHEMOGLOBIN 1.5 1.5     Brief Urine Lab Results  (Last result in the past 365 days)        Color   Clarity   Blood    Leuk Est   Nitrite   Protein   CREAT   Urine HCG        08/10/23 0147 Yellow   Clear   Negative   Negative   Negative   Negative                   Microbiology Results Abnormal       None            No radiology results from the last 24 hrs    Results for orders placed during the hospital encounter of 08/09/23    Adult Transthoracic Echocardiogram Complete    Interpretation Summary    Left ventricular systolic function is normal. Estimated left ventricular EF = 70%    The cardiac valves are anatomically and functionally normal.    Estimated right ventricular systolic pressure from tricuspid regurgitation is normal (<35 mmHg).      Current medications:  Scheduled Meds:acetaminophen, 650 mg, Oral, Q8H  amiodarone, 200 mg, Oral, Q8H   Followed by  [START ON 8/20/2023] amiodarone, 200 mg, Oral, Q12H   Followed by  [START ON 9/3/2023] amiodarone, 200 mg, Oral, Daily  aspirin, 81 mg, Oral, Daily  atorvastatin, 40 mg, Oral, Nightly  carvedilol, 12.5 mg, Oral, BID With Meals  clopidogrel, 75 mg, Oral, Daily  gabapentin, 300 mg, Oral, Q12H  insulin detemir, 15 Units, Subcutaneous, Nightly  insulin lispro, 3-14 Units, Subcutaneous, 4x Daily AC & at Bedtime  ipratropium-albuterol, 3 mL, Nebulization, 4x Daily - RT  pantoprazole, 40 mg, Oral, Q AM  pharmacy consult - MTM, , Does not apply, Daily  senna-docusate sodium, 2 tablet, Oral, BID  senna-docusate sodium, 2 tablet, Oral, BID  sodium chloride, 10 mL, Intravenous, Q12H      Continuous Infusions:   PRN Meds:.  albumin human    senna-docusate sodium **AND** polyethylene glycol **AND** bisacodyl **AND** bisacodyl    Calcium Replacement - Follow Nurse / BPA Driven Protocol    HYDROcodone-acetaminophen    ipratropium-albuterol    Magnesium Cardiology Dose Replacement - Follow Nurse / BPA Driven Protocol    Morphine **AND** naloxone    [DISCONTINUED] fentaNYL citrate (PF) **AND** naloxone    nitroglycerin    ondansetron    oxyCODONE    Phosphorus Replacement - Follow Nurse /  BPA Driven Protocol    Potassium Replacement - Follow Nurse / BPA Driven Protocol    sodium chloride    sodium chloride    Assessment & Plan   Assessment & Plan     Active Hospital Problems    Diagnosis  POA    T2DM [E11.9]  Yes    HTN (hypertension) [I10]  Yes    Former smoker [Z87.891]  Not Applicable    Vapes nicotine containing substance [Z72.0]  Yes    GERD [K21.9]  Yes    MV CAD [I25.10]  Yes      Resolved Hospital Problems   No resolved problems to display.        Brief Hospital Course to date:  Dutch Arellano is a 72 y.o. male with uncontrolled DM, HTN, CAD, s/p CABG x 4, PO ligation.    MVCAD  -s/p CABG  -PO clip  -on asa/plavix/statin curently    HTN  -on BB    HL  -LDL 86,   -on statin    DM  -uncontrolled, A1C 7.1  -acceptable control here    PAF  -on amiodarone, converted to NSR  -AC per cardiology/surgery team, but period brief and s/p PO clip    Anemia  -post-op  -monitor    Expected Discharge Location and Transportation: Home  Expected Discharge   Expected Discharge Date: 8/17/2023; Expected Discharge Time:      DVT prophylaxis:  Mechanical DVT prophylaxis orders are present.     AM-PAC 6 Clicks Score (PT): 11 (08/14/23 1142)    CODE STATUS:   Code Status and Medical Interventions:   Ordered at: 08/10/23 0820     Code Status (Patient has no pulse and is not breathing):    CPR (Attempt to Resuscitate)     Medical Interventions (Patient has pulse or is breathing):    Full Support       Hamilton Pollard MD  08/15/23       cath site

## 2023-08-16 LAB
ALBUMIN SERPL ELPH-MCNC: 3 G/DL — LOW (ref 3.3–5)
ALP SERPL-CCNC: 865 U/L — HIGH (ref 40–120)
ALT FLD-CCNC: 5 U/L — LOW (ref 10–45)
ANION GAP SERPL CALC-SCNC: 17 MMOL/L — SIGNIFICANT CHANGE UP (ref 5–17)
ANION GAP SERPL CALC-SCNC: 19 MMOL/L — HIGH (ref 5–17)
ANION GAP SERPL CALC-SCNC: 21 MMOL/L — HIGH (ref 5–17)
APTT BLD: 26.2 SEC — SIGNIFICANT CHANGE UP (ref 24.5–35.6)
AST SERPL-CCNC: 25 U/L — SIGNIFICANT CHANGE UP (ref 10–40)
BILIRUB SERPL-MCNC: 0.3 MG/DL — SIGNIFICANT CHANGE UP (ref 0.2–1.2)
BUN SERPL-MCNC: 53 MG/DL — HIGH (ref 7–23)
BUN SERPL-MCNC: 73 MG/DL — HIGH (ref 7–23)
BUN SERPL-MCNC: 74 MG/DL — HIGH (ref 7–23)
CALCIUM SERPL-MCNC: 8.6 MG/DL — SIGNIFICANT CHANGE UP (ref 8.4–10.5)
CALCIUM SERPL-MCNC: 8.7 MG/DL — SIGNIFICANT CHANGE UP (ref 8.4–10.5)
CALCIUM SERPL-MCNC: 9.3 MG/DL — SIGNIFICANT CHANGE UP (ref 8.4–10.5)
CHLORIDE SERPL-SCNC: 93 MMOL/L — LOW (ref 96–108)
CHLORIDE SERPL-SCNC: 94 MMOL/L — LOW (ref 96–108)
CHLORIDE SERPL-SCNC: 96 MMOL/L — SIGNIFICANT CHANGE UP (ref 96–108)
CO2 SERPL-SCNC: 19 MMOL/L — LOW (ref 22–31)
CO2 SERPL-SCNC: 21 MMOL/L — LOW (ref 22–31)
CO2 SERPL-SCNC: 22 MMOL/L — SIGNIFICANT CHANGE UP (ref 22–31)
CREAT SERPL-MCNC: 10.46 MG/DL — HIGH (ref 0.5–1.3)
CREAT SERPL-MCNC: 7.36 MG/DL — HIGH (ref 0.5–1.3)
CREAT SERPL-MCNC: 9.76 MG/DL — HIGH (ref 0.5–1.3)
EGFR: 6 ML/MIN/1.73M2 — LOW
EGFR: 6 ML/MIN/1.73M2 — LOW
EGFR: 9 ML/MIN/1.73M2 — LOW
GAS PNL BLDA: SIGNIFICANT CHANGE UP
GLUCOSE BLDC GLUCOMTR-MCNC: 102 MG/DL — HIGH (ref 70–99)
GLUCOSE BLDC GLUCOMTR-MCNC: 103 MG/DL — HIGH (ref 70–99)
GLUCOSE BLDC GLUCOMTR-MCNC: 104 MG/DL — HIGH (ref 70–99)
GLUCOSE BLDC GLUCOMTR-MCNC: 112 MG/DL — HIGH (ref 70–99)
GLUCOSE BLDC GLUCOMTR-MCNC: 98 MG/DL — SIGNIFICANT CHANGE UP (ref 70–99)
GLUCOSE BLDC GLUCOMTR-MCNC: 98 MG/DL — SIGNIFICANT CHANGE UP (ref 70–99)
GLUCOSE SERPL-MCNC: 105 MG/DL — HIGH (ref 70–99)
GLUCOSE SERPL-MCNC: 111 MG/DL — HIGH (ref 70–99)
GLUCOSE SERPL-MCNC: 97 MG/DL — SIGNIFICANT CHANGE UP (ref 70–99)
HCT VFR BLD CALC: 23.5 % — LOW (ref 39–50)
HCT VFR BLD CALC: 25.4 % — LOW (ref 39–50)
HCT VFR BLD CALC: 25.7 % — LOW (ref 39–50)
HGB BLD-MCNC: 7.8 G/DL — LOW (ref 13–17)
HGB BLD-MCNC: 8.3 G/DL — LOW (ref 13–17)
HGB BLD-MCNC: 8.4 G/DL — LOW (ref 13–17)
INR BLD: 1.15 RATIO — SIGNIFICANT CHANGE UP (ref 0.85–1.18)
MAGNESIUM SERPL-MCNC: 2.1 MG/DL — SIGNIFICANT CHANGE UP (ref 1.6–2.6)
MAGNESIUM SERPL-MCNC: 2.2 MG/DL — SIGNIFICANT CHANGE UP (ref 1.6–2.6)
MAGNESIUM SERPL-MCNC: 2.2 MG/DL — SIGNIFICANT CHANGE UP (ref 1.6–2.6)
MCHC RBC-ENTMCNC: 29.6 PG — SIGNIFICANT CHANGE UP (ref 27–34)
MCHC RBC-ENTMCNC: 29.9 PG — SIGNIFICANT CHANGE UP (ref 27–34)
MCHC RBC-ENTMCNC: 30.4 PG — SIGNIFICANT CHANGE UP (ref 27–34)
MCHC RBC-ENTMCNC: 32.3 GM/DL — SIGNIFICANT CHANGE UP (ref 32–36)
MCHC RBC-ENTMCNC: 33.1 GM/DL — SIGNIFICANT CHANGE UP (ref 32–36)
MCHC RBC-ENTMCNC: 33.2 GM/DL — SIGNIFICANT CHANGE UP (ref 32–36)
MCV RBC AUTO: 89.4 FL — SIGNIFICANT CHANGE UP (ref 80–100)
MCV RBC AUTO: 91.4 FL — SIGNIFICANT CHANGE UP (ref 80–100)
MCV RBC AUTO: 92.4 FL — SIGNIFICANT CHANGE UP (ref 80–100)
NRBC # BLD: 0 /100 WBCS — SIGNIFICANT CHANGE UP (ref 0–0)
PHOSPHATE SERPL-MCNC: 5.4 MG/DL — HIGH (ref 2.5–4.5)
PHOSPHATE SERPL-MCNC: 6 MG/DL — HIGH (ref 2.5–4.5)
PHOSPHATE SERPL-MCNC: 6.7 MG/DL — HIGH (ref 2.5–4.5)
PLATELET # BLD AUTO: 250 K/UL — SIGNIFICANT CHANGE UP (ref 150–400)
PLATELET # BLD AUTO: 267 K/UL — SIGNIFICANT CHANGE UP (ref 150–400)
PLATELET # BLD AUTO: 277 K/UL — SIGNIFICANT CHANGE UP (ref 150–400)
POTASSIUM SERPL-MCNC: 4.6 MMOL/L — SIGNIFICANT CHANGE UP (ref 3.5–5.3)
POTASSIUM SERPL-MCNC: 5.8 MMOL/L — HIGH (ref 3.5–5.3)
POTASSIUM SERPL-MCNC: 5.8 MMOL/L — HIGH (ref 3.5–5.3)
POTASSIUM SERPL-SCNC: 4.6 MMOL/L — SIGNIFICANT CHANGE UP (ref 3.5–5.3)
POTASSIUM SERPL-SCNC: 5.8 MMOL/L — HIGH (ref 3.5–5.3)
POTASSIUM SERPL-SCNC: 5.8 MMOL/L — HIGH (ref 3.5–5.3)
PROT SERPL-MCNC: 5.8 G/DL — LOW (ref 6–8.3)
PROTHROM AB SERPL-ACNC: 12.6 SEC — SIGNIFICANT CHANGE UP (ref 9.5–13)
RBC # BLD: 2.57 M/UL — LOW (ref 4.2–5.8)
RBC # BLD: 2.78 M/UL — LOW (ref 4.2–5.8)
RBC # BLD: 2.84 M/UL — LOW (ref 4.2–5.8)
RBC # FLD: 17.6 % — HIGH (ref 10.3–14.5)
RBC # FLD: 18 % — HIGH (ref 10.3–14.5)
RBC # FLD: 18.1 % — HIGH (ref 10.3–14.5)
SODIUM SERPL-SCNC: 133 MMOL/L — LOW (ref 135–145)
SODIUM SERPL-SCNC: 134 MMOL/L — LOW (ref 135–145)
SODIUM SERPL-SCNC: 135 MMOL/L — SIGNIFICANT CHANGE UP (ref 135–145)
WBC # BLD: 6.4 K/UL — SIGNIFICANT CHANGE UP (ref 3.8–10.5)
WBC # BLD: 6.95 K/UL — SIGNIFICANT CHANGE UP (ref 3.8–10.5)
WBC # BLD: 7.01 K/UL — SIGNIFICANT CHANGE UP (ref 3.8–10.5)
WBC # FLD AUTO: 6.4 K/UL — SIGNIFICANT CHANGE UP (ref 3.8–10.5)
WBC # FLD AUTO: 6.95 K/UL — SIGNIFICANT CHANGE UP (ref 3.8–10.5)
WBC # FLD AUTO: 7.01 K/UL — SIGNIFICANT CHANGE UP (ref 3.8–10.5)

## 2023-08-16 PROCEDURE — 99232 SBSQ HOSP IP/OBS MODERATE 35: CPT | Mod: GC

## 2023-08-16 PROCEDURE — 99291 CRITICAL CARE FIRST HOUR: CPT

## 2023-08-16 PROCEDURE — 71045 X-RAY EXAM CHEST 1 VIEW: CPT | Mod: 26

## 2023-08-16 RX ORDER — HYDROMORPHONE HYDROCHLORIDE 2 MG/ML
0.5 INJECTION INTRAMUSCULAR; INTRAVENOUS; SUBCUTANEOUS
Refills: 0 | Status: DISCONTINUED | OUTPATIENT
Start: 2023-08-16 | End: 2023-08-17

## 2023-08-16 RX ORDER — CALCIUM GLUCONATE 100 MG/ML
2 VIAL (ML) INTRAVENOUS ONCE
Refills: 0 | Status: COMPLETED | OUTPATIENT
Start: 2023-08-16 | End: 2023-08-16

## 2023-08-16 RX ORDER — DEXTROSE 10 % IN WATER 10 %
1000 INTRAVENOUS SOLUTION INTRAVENOUS
Refills: 0 | Status: DISCONTINUED | OUTPATIENT
Start: 2023-08-16 | End: 2023-08-17

## 2023-08-16 RX ORDER — PHENYLEPHRINE HYDROCHLORIDE 10 MG/ML
0.5 INJECTION INTRAVENOUS
Qty: 40 | Refills: 0 | Status: DISCONTINUED | OUTPATIENT
Start: 2023-08-16 | End: 2023-08-16

## 2023-08-16 RX ORDER — CHLORHEXIDINE GLUCONATE 213 G/1000ML
15 SOLUTION TOPICAL EVERY 12 HOURS
Refills: 0 | Status: DISCONTINUED | OUTPATIENT
Start: 2023-08-16 | End: 2023-08-17

## 2023-08-16 RX ORDER — CALCIUM ACETATE 667 MG
1334 TABLET ORAL
Refills: 0 | Status: DISCONTINUED | OUTPATIENT
Start: 2023-08-16 | End: 2023-08-17

## 2023-08-16 RX ORDER — DEXMEDETOMIDINE HYDROCHLORIDE IN 0.9% SODIUM CHLORIDE 4 UG/ML
0.5 INJECTION INTRAVENOUS
Qty: 200 | Refills: 0 | Status: DISCONTINUED | OUTPATIENT
Start: 2023-08-16 | End: 2023-08-17

## 2023-08-16 RX ORDER — POLYETHYLENE GLYCOL 3350 17 G/17G
17 POWDER, FOR SOLUTION ORAL DAILY
Refills: 0 | Status: DISCONTINUED | OUTPATIENT
Start: 2023-08-16 | End: 2023-08-17

## 2023-08-16 RX ORDER — SENNA PLUS 8.6 MG/1
10 TABLET ORAL AT BEDTIME
Refills: 0 | Status: DISCONTINUED | OUTPATIENT
Start: 2023-08-16 | End: 2023-08-17

## 2023-08-16 RX ORDER — DEXAMETHASONE 0.5 MG/5ML
10 ELIXIR ORAL ONCE
Refills: 0 | Status: DISCONTINUED | OUTPATIENT
Start: 2023-08-16 | End: 2023-08-16

## 2023-08-16 RX ORDER — OXYCODONE AND ACETAMINOPHEN 5; 325 MG/1; MG/1
2 TABLET ORAL
Refills: 0 | DISCHARGE

## 2023-08-16 RX ORDER — PHENYLEPHRINE HYDROCHLORIDE 10 MG/ML
0.8 INJECTION INTRAVENOUS
Qty: 40 | Refills: 0 | Status: DISCONTINUED | OUTPATIENT
Start: 2023-08-16 | End: 2023-08-17

## 2023-08-16 RX ORDER — CHLORHEXIDINE GLUCONATE 213 G/1000ML
1 SOLUTION TOPICAL
Refills: 0 | Status: DISCONTINUED | OUTPATIENT
Start: 2023-08-16 | End: 2023-08-18

## 2023-08-16 RX ORDER — ALBUMIN HUMAN 25 %
250 VIAL (ML) INTRAVENOUS ONCE
Refills: 0 | Status: COMPLETED | OUTPATIENT
Start: 2023-08-16 | End: 2023-08-16

## 2023-08-16 RX ORDER — SODIUM ZIRCONIUM CYCLOSILICATE 10 G/10G
10 POWDER, FOR SUSPENSION ORAL ONCE
Refills: 0 | Status: COMPLETED | OUTPATIENT
Start: 2023-08-16 | End: 2023-08-16

## 2023-08-16 RX ORDER — DEXAMETHASONE 0.5 MG/5ML
10 ELIXIR ORAL ONCE
Refills: 0 | Status: COMPLETED | OUTPATIENT
Start: 2023-08-16 | End: 2023-08-16

## 2023-08-16 RX ADMIN — HYDROMORPHONE HYDROCHLORIDE 0.5 MILLIGRAM(S): 2 INJECTION INTRAMUSCULAR; INTRAVENOUS; SUBCUTANEOUS at 08:32

## 2023-08-16 RX ADMIN — CHLORHEXIDINE GLUCONATE 15 MILLILITER(S): 213 SOLUTION TOPICAL at 17:13

## 2023-08-16 RX ADMIN — HYDROMORPHONE HYDROCHLORIDE 0.5 MILLIGRAM(S): 2 INJECTION INTRAMUSCULAR; INTRAVENOUS; SUBCUTANEOUS at 20:38

## 2023-08-16 RX ADMIN — Medication 200 GRAM(S): at 11:35

## 2023-08-16 RX ADMIN — HYDROMORPHONE HYDROCHLORIDE 0.5 MILLIGRAM(S): 2 INJECTION INTRAMUSCULAR; INTRAVENOUS; SUBCUTANEOUS at 02:42

## 2023-08-16 RX ADMIN — PANTOPRAZOLE SODIUM 40 MILLIGRAM(S): 20 TABLET, DELAYED RELEASE ORAL at 11:36

## 2023-08-16 RX ADMIN — CHLORHEXIDINE GLUCONATE 1 APPLICATION(S): 213 SOLUTION TOPICAL at 18:40

## 2023-08-16 RX ADMIN — Medication 400 MILLIGRAM(S): at 17:13

## 2023-08-16 RX ADMIN — HYDROMORPHONE HYDROCHLORIDE 0.5 MILLIGRAM(S): 2 INJECTION INTRAMUSCULAR; INTRAVENOUS; SUBCUTANEOUS at 02:57

## 2023-08-16 RX ADMIN — Medication 1334 MILLIGRAM(S): at 13:40

## 2023-08-16 RX ADMIN — HYDROMORPHONE HYDROCHLORIDE 0.5 MILLIGRAM(S): 2 INJECTION INTRAMUSCULAR; INTRAVENOUS; SUBCUTANEOUS at 09:02

## 2023-08-16 RX ADMIN — SENNA PLUS 10 MILLILITER(S): 8.6 TABLET ORAL at 22:21

## 2023-08-16 RX ADMIN — Medication 200 GRAM(S): at 03:03

## 2023-08-16 RX ADMIN — Medication 102 MILLIGRAM(S): at 11:35

## 2023-08-16 RX ADMIN — POLYETHYLENE GLYCOL 3350 17 GRAM(S): 17 POWDER, FOR SOLUTION ORAL at 13:40

## 2023-08-16 RX ADMIN — Medication 400 MILLIGRAM(S): at 05:17

## 2023-08-16 RX ADMIN — HEPARIN SODIUM 5000 UNIT(S): 5000 INJECTION INTRAVENOUS; SUBCUTANEOUS at 05:17

## 2023-08-16 RX ADMIN — Medication 400 MILLIGRAM(S): at 11:35

## 2023-08-16 RX ADMIN — PROPOFOL 11.8 MICROGRAM(S)/KG/MIN: 10 INJECTION, EMULSION INTRAVENOUS at 01:07

## 2023-08-16 RX ADMIN — HYDROMORPHONE HYDROCHLORIDE 0.5 MILLIGRAM(S): 2 INJECTION INTRAMUSCULAR; INTRAVENOUS; SUBCUTANEOUS at 15:00

## 2023-08-16 RX ADMIN — Medication 1334 MILLIGRAM(S): at 18:40

## 2023-08-16 RX ADMIN — Medication 125 MILLILITER(S): at 01:36

## 2023-08-16 RX ADMIN — Medication 200 GRAM(S): at 18:55

## 2023-08-16 RX ADMIN — HEPARIN SODIUM 5000 UNIT(S): 5000 INJECTION INTRAVENOUS; SUBCUTANEOUS at 22:21

## 2023-08-16 RX ADMIN — CHLORHEXIDINE GLUCONATE 15 MILLILITER(S): 213 SOLUTION TOPICAL at 05:17

## 2023-08-16 RX ADMIN — HYDROMORPHONE HYDROCHLORIDE 0.5 MILLIGRAM(S): 2 INJECTION INTRAMUSCULAR; INTRAVENOUS; SUBCUTANEOUS at 12:05

## 2023-08-16 RX ADMIN — Medication 25 MILLILITER(S): at 20:31

## 2023-08-16 RX ADMIN — HYDROMORPHONE HYDROCHLORIDE 0.5 MILLIGRAM(S): 2 INJECTION INTRAMUSCULAR; INTRAVENOUS; SUBCUTANEOUS at 14:30

## 2023-08-16 RX ADMIN — SODIUM ZIRCONIUM CYCLOSILICATE 10 GRAM(S): 10 POWDER, FOR SUSPENSION ORAL at 02:43

## 2023-08-16 RX ADMIN — DEXMEDETOMIDINE HYDROCHLORIDE IN 0.9% SODIUM CHLORIDE 12.3 MICROGRAM(S)/KG/HR: 4 INJECTION INTRAVENOUS at 01:37

## 2023-08-16 RX ADMIN — HYDROMORPHONE HYDROCHLORIDE 0.5 MILLIGRAM(S): 2 INJECTION INTRAMUSCULAR; INTRAVENOUS; SUBCUTANEOUS at 21:08

## 2023-08-16 RX ADMIN — Medication 1000 MILLIGRAM(S): at 12:05

## 2023-08-16 RX ADMIN — HEPARIN SODIUM 5000 UNIT(S): 5000 INJECTION INTRAVENOUS; SUBCUTANEOUS at 13:40

## 2023-08-16 RX ADMIN — PHENYLEPHRINE HYDROCHLORIDE 29.4 MICROGRAM(S)/KG/MIN: 10 INJECTION INTRAVENOUS at 20:31

## 2023-08-16 RX ADMIN — DEXMEDETOMIDINE HYDROCHLORIDE IN 0.9% SODIUM CHLORIDE 12.3 MICROGRAM(S)/KG/HR: 4 INJECTION INTRAVENOUS at 20:30

## 2023-08-16 RX ADMIN — Medication 1000 MILLIGRAM(S): at 17:45

## 2023-08-16 RX ADMIN — Medication 1000 MILLIGRAM(S): at 05:32

## 2023-08-16 RX ADMIN — HYDROMORPHONE HYDROCHLORIDE 0.5 MILLIGRAM(S): 2 INJECTION INTRAMUSCULAR; INTRAVENOUS; SUBCUTANEOUS at 11:35

## 2023-08-16 NOTE — PROGRESS NOTE ADULT - ATTENDING COMMENTS
Tolerated additional UF yesterday.     # ESRD - MWF. HD today as planned      The rest of the recommendations as per fellow's note.    Maile Peoples MD  Attending Nephrologist  [phone] or via [phone]

## 2023-08-16 NOTE — PROGRESS NOTE ADULT - SUBJECTIVE AND OBJECTIVE BOX
24 HOUR EVENTS:  - S/p HD with 2L fluid removal   - Trickle feeds started  - Glucose 111, will stop D10 gtt with trickle feeds if    SUBJECTIVE/ROS:  [ ] A ten-point review of systems was otherwise negative except as noted.  [ X ] Due to altered mental status/intubation, subjective information were not able to be obtained from the patient. History was obtained, to the extent possible, from review of the chart and collateral sources of information.      NEURO  Exam: Intubated, sedated    Meds: acetaminophen   IVPB .. 1000 milliGRAM(s) IV Intermittent every 6 hours  HYDROmorphone  Injectable 0.5 milliGRAM(s) IV Push every 3 hours  propofol Infusion 20 MICROgram(s)/kG/Min IV Continuous <Continuous>    [x] Adequacy of sedation and pain control has been assessed and adjusted      RESPIRATORY  RR: 20 (08-16-23 @ 00:30) (11 - 23)  SpO2: 100% (08-16-23 @ 00:30) (94% - 100%)  Wt(kg): --  Exam: CTA b/l. No murmurs, rubs, gallops appreciated.   Mechanical Ventilation: Mode: AC/ CMV (Assist Control/ Continuous Mandatory Ventilation), RR (machine): 20, RR (patient): 20, TV (machine): 600, FiO2: 40, PEEP: 8, ITime: 1, MAP: 15, PIP: 33  ABG - ( 15 Aug 2023 16:32 )  pH: 7.42  /  pCO2: 36    /  pO2: 154   / HCO3: 23    / Base Excess: -0.9  /  SaO2: 98.5    Lactate: x                [ ] Extubation Readiness Assessed  Meds:       CARDIOVASCULAR  HR: 85 (08-16-23 @ 00:30) (65 - 100)  BP: 85/43 (08-16-23 @ 00:30) (85/43 - 229/122)  BP(mean): 59 (08-16-23 @ 00:30) (59 - 166)  ABP: 108/42 (08-16-23 @ 00:30) (108/42 - 216/116)  ABP(mean): 60 (08-16-23 @ 00:30) (60 - 159)  Wt(kg): --  CVP(cm H2O): --      Exam: S1S2. No murmurs, rubs, gallops appreciated.  Cardiac Rhythm: NSR  Meds:       GI/NUTRITION  Exam: Soft, non-distended, non-tender.   Diet:  Meds: pantoprazole  Injectable 40 milliGRAM(s) IV Push daily      GENITOURINARY  I&O's Detail    08-14 @ 07:01  -  08-15 @ 07:00  --------------------------------------------------------  IN:    Oral Fluid: 240 mL    PRBCs (Packed Red Blood Cells): 300 mL  Total IN: 540 mL    OUT:    Other (mL): 3000 mL    Voided (mL): 0 mL  Total OUT: 3000 mL    Total NET: -2460 mL      08-15 @ 07:01  -  08-16 @ 00:56  --------------------------------------------------------  IN:    dextrose 10%: 550 mL    IV PiggyBack: 150 mL    Propofol: 200.2 mL  Total IN: 900.2 mL    OUT:    Other (mL): 2000 mL    Voided (mL): 0 mL  Total OUT: 2000 mL    Total NET: -1099.8 mL        Weight (kg): 98.1 (08-15 @ 08:12)  08-15    134<L>  |  95<L>  |  70<H>  ----------------------------<  99  5.7<H>   |  20<L>  |  9.21<H>    Ca    8.5      15 Aug 2023 20:47  Phos  6.1     08-15  Mg     2.1     08-15    TPro  5.6<L>  /  Alb  3.0<L>  /  TBili  0.4  /  DBili  x   /  AST  25  /  ALT  6<L>  /  AlkPhos  920<H>  08-15    [ ] Lee catheter, indication: N/A  Meds: dextrose 10%. 1000 milliLiter(s) IV Continuous <Continuous>        HEMATOLOGIC  Meds: heparin   Injectable 5000 Unit(s) SubCutaneous every 8 hours    [x] VTE Prophylaxis                        8.3    5.72  )-----------( 238      ( 15 Aug 2023 20:47 )             25.0     PT/INR - ( 15 Aug 2023 14:26 )   PT: 12.4 sec;   INR: 1.13 ratio         PTT - ( 15 Aug 2023 14:26 )  PTT:26.5 sec  Transfusion     [ ] PRBC   [ ] Platelets   [ ] FFP   [ ] Cryoprecipitate      INFECTIOUS DISEASES  T(C): 37 (08-15-23 @ 23:10), Max: 37.9 (08-15-23 @ 06:44)  Wt(kg): --  WBC Count: 5.72 K/uL (08-15 @ 20:47)  WBC Count: 7.16 K/uL (08-15 @ 14:26)  WBC Count: 4.05 K/uL (08-15 @ 07:24)    Recent Cultures:    Meds:       ENDOCRINE  Capillary Blood Glucose    Meds:       ACCESS DEVICES:  [ ] Peripheral IV  [ ] Central Venous Line	[ ] R	[ ] L	[ ] IJ	[ ] Fem	[ ] SC	Placed:   [ ] Arterial Line		[ ] R	[ ] L	[ ] Fem	[ ] Rad	[ ] Ax	Placed:   [ ] PICC:					[ ] Mediport  [ ] Urinary Catheter, Date Placed:   [ ] Necessity of urinary, arterial, and venous catheters discussed    OTHER MEDICATIONS:  chlorhexidine 0.12% Liquid 15 milliLiter(s) Oral Mucosa every 12 hours      CODE STATUS:     IMAGING: 24 HOUR EVENTS:  - S/p HD with 2L fluid removal   - Trickle feeds started  - Glucose 111, will stop D10 gtt with trickle feeds if glucose remains okay    SUBJECTIVE/ROS:  [ ] A ten-point review of systems was otherwise negative except as noted.  [ X ] Due to altered mental status/intubation, subjective information were not able to be obtained from the patient. History was obtained, to the extent possible, from review of the chart and collateral sources of information.      NEURO  Exam: Intubated, sedated    Meds: acetaminophen   IVPB .. 1000 milliGRAM(s) IV Intermittent every 6 hours  HYDROmorphone  Injectable 0.5 milliGRAM(s) IV Push every 3 hours  propofol Infusion 20 MICROgram(s)/kG/Min IV Continuous <Continuous>    [x] Adequacy of sedation and pain control has been assessed and adjusted      RESPIRATORY  RR: 20 (08-16-23 @ 00:30) (11 - 23)  SpO2: 100% (08-16-23 @ 00:30) (94% - 100%)  Wt(kg): --  Exam: CTA b/l. No murmurs, rubs, gallops appreciated.   Mechanical Ventilation: Mode: AC/ CMV (Assist Control/ Continuous Mandatory Ventilation), RR (machine): 20, RR (patient): 20, TV (machine): 600, FiO2: 40, PEEP: 8, ITime: 1, MAP: 15, PIP: 33  ABG - ( 15 Aug 2023 16:32 )  pH: 7.42  /  pCO2: 36    /  pO2: 154   / HCO3: 23    / Base Excess: -0.9  /  SaO2: 98.5    Lactate: x                [ ] Extubation Readiness Assessed  Meds:       CARDIOVASCULAR  HR: 85 (08-16-23 @ 00:30) (65 - 100)  BP: 85/43 (08-16-23 @ 00:30) (85/43 - 229/122)  BP(mean): 59 (08-16-23 @ 00:30) (59 - 166)  ABP: 108/42 (08-16-23 @ 00:30) (108/42 - 216/116)  ABP(mean): 60 (08-16-23 @ 00:30) (60 - 159)  Wt(kg): --  CVP(cm H2O): --      Exam: S1S2. No murmurs, rubs, gallops appreciated.  Cardiac Rhythm: NSR  Meds:       GI/NUTRITION  Exam: Soft, non-distended, non-tender.   Diet:  Meds: pantoprazole  Injectable 40 milliGRAM(s) IV Push daily      GENITOURINARY  I&O's Detail    08-14 @ 07:01  -  08-15 @ 07:00  --------------------------------------------------------  IN:    Oral Fluid: 240 mL    PRBCs (Packed Red Blood Cells): 300 mL  Total IN: 540 mL    OUT:    Other (mL): 3000 mL    Voided (mL): 0 mL  Total OUT: 3000 mL    Total NET: -2460 mL      08-15 @ 07:01  -  08-16 @ 00:56  --------------------------------------------------------  IN:    dextrose 10%: 550 mL    IV PiggyBack: 150 mL    Propofol: 200.2 mL  Total IN: 900.2 mL    OUT:    Other (mL): 2000 mL    Voided (mL): 0 mL  Total OUT: 2000 mL    Total NET: -1099.8 mL        Weight (kg): 98.1 (08-15 @ 08:12)  08-15    134<L>  |  95<L>  |  70<H>  ----------------------------<  99  5.7<H>   |  20<L>  |  9.21<H>    Ca    8.5      15 Aug 2023 20:47  Phos  6.1     08-15  Mg     2.1     08-15    TPro  5.6<L>  /  Alb  3.0<L>  /  TBili  0.4  /  DBili  x   /  AST  25  /  ALT  6<L>  /  AlkPhos  920<H>  08-15    [ ] Lee catheter, indication: N/A  Meds: dextrose 10%. 1000 milliLiter(s) IV Continuous <Continuous>        HEMATOLOGIC  Meds: heparin   Injectable 5000 Unit(s) SubCutaneous every 8 hours    [x] VTE Prophylaxis                        8.3    5.72  )-----------( 238      ( 15 Aug 2023 20:47 )             25.0     PT/INR - ( 15 Aug 2023 14:26 )   PT: 12.4 sec;   INR: 1.13 ratio         PTT - ( 15 Aug 2023 14:26 )  PTT:26.5 sec  Transfusion     [ ] PRBC   [ ] Platelets   [ ] FFP   [ ] Cryoprecipitate      INFECTIOUS DISEASES  T(C): 37 (08-15-23 @ 23:10), Max: 37.9 (08-15-23 @ 06:44)  Wt(kg): --  WBC Count: 5.72 K/uL (08-15 @ 20:47)  WBC Count: 7.16 K/uL (08-15 @ 14:26)  WBC Count: 4.05 K/uL (08-15 @ 07:24)    Recent Cultures:    Meds:       ENDOCRINE  Capillary Blood Glucose    Meds:       ACCESS DEVICES:  [ ] Peripheral IV  [ ] Central Venous Line	[ ] R	[ ] L	[ ] IJ	[ ] Fem	[ ] SC	Placed:   [ ] Arterial Line		[ ] R	[ ] L	[ ] Fem	[ ] Rad	[ ] Ax	Placed:   [ ] PICC:					[ ] Mediport  [ ] Urinary Catheter, Date Placed:   [ ] Necessity of urinary, arterial, and venous catheters discussed    OTHER MEDICATIONS:  chlorhexidine 0.12% Liquid 15 milliLiter(s) Oral Mucosa every 12 hours      CODE STATUS:     IMAGING:

## 2023-08-16 NOTE — DIETITIAN INITIAL EVALUATION ADULT - PERTINENT MEDS FT
MEDICATIONS  (STANDING):  acetaminophen   IVPB .. 1000 milliGRAM(s) IV Intermittent every 6 hours  calcium acetate 1334 milliGRAM(s) Oral three times a day with meals  calcium gluconate IVPB 2 Gram(s) IV Intermittent once  chlorhexidine 0.12% Liquid 15 milliLiter(s) Oral Mucosa every 12 hours  chlorhexidine 2% Cloths 1 Application(s) Topical <User Schedule>  dexAMETHasone  IVPB 10 milliGRAM(s) IV Intermittent once  dexMEDEtomidine Infusion 0.5 MICROgram(s)/kG/Hr (12.3 mL/Hr) IV Continuous <Continuous>  dextrose 10%. 1000 milliLiter(s) (25 mL/Hr) IV Continuous <Continuous>  heparin   Injectable 5000 Unit(s) SubCutaneous every 8 hours  pantoprazole  Injectable 40 milliGRAM(s) IV Push daily  phenylephrine    Infusion 0.8 MICROgram(s)/kG/Min (29.4 mL/Hr) IV Continuous <Continuous>  polyethylene glycol 3350 17 Gram(s) Oral daily  senna Syrup 10 milliLiter(s) Oral at bedtime    MEDICATIONS  (PRN):  HYDROmorphone  Injectable 0.5 milliGRAM(s) IV Push every 3 hours PRN Severe Pain (7 - 10)

## 2023-08-16 NOTE — DIETITIAN INITIAL EVALUATION ADULT - ENERGY INTAKE
NPO since admission 8/14 (day 2)  Plan to initiate EN 8/16, per team. Recommend Nepro in setting of ESRD with elevated potassium and phosphorus.

## 2023-08-16 NOTE — DIETITIAN INITIAL EVALUATION ADULT - NUTRITIONGOAL OUTCOME1
Pt to meet >80% estimated nutrition needs via tolerated route, maintain  -180mg/dl, electrolytes to trend WDL

## 2023-08-16 NOTE — DIETITIAN INITIAL EVALUATION ADULT - OTHER INFO
Nutrition Status:  - s/p hip arthroplasty 8/15; reintubated due to respiratory failure and hypoglycemia. Propofol discontinued.  - Renal: h/o ESRD (MWF); s/p HD (2/15) with 2L fluid removal   - Hypoglycemia; addressed with D10 @ 25 ml/hr  - PhosLo ordered  - Calcium gluconate ordered  - Decadron ordered

## 2023-08-16 NOTE — DIETITIAN INITIAL EVALUATION ADULT - NS FNS ENTERAL CURRENT ORDER NOT
Increase Elavil to 100 mg at bedtime. We will ask Evy Rudolph to meet with you. Continue the remainder of your medications. We recommend that you see behavioral health and our office ([facility]). Follow up again in 3 months. Current diet order does not meet estimated nutrient requirements

## 2023-08-16 NOTE — PROGRESS NOTE ADULT - SUBJECTIVE AND OBJECTIVE BOX
Patient seen and examined at bedside.  Patient intubated and in soft wrist restraints but alert and following commands. On pressors. Denies pain, chest pain, shortness of breath, nausea or vomiting.     PE:  ICU Vital Signs Last 24 Hrs  T(C): 37.2 (16 Aug 2023 03:00), Max: 37.9 (15 Aug 2023 06:44)  T(F): 99 (16 Aug 2023 03:00), Max: 100.2 (15 Aug 2023 06:44)  HR: 79 (16 Aug 2023 05:15) (65 - 100)  BP: 85/43 (16 Aug 2023 00:30) (85/43 - 229/122)  BP(mean): 59 (16 Aug 2023 00:30) (59 - 166)  ABP: 96/43 (16 Aug 2023 05:15) (79/37 - 216/116)  ABP(mean): 58 (16 Aug 2023 05:15) (48 - 159)  RR: 20 (16 Aug 2023 05:15) (11 - 23)  SpO2: 100% (16 Aug 2023 05:15) (94% - 100%)    O2 Parameters below as of 15 Aug 2023 23:10  Patient On (Oxygen Delivery Method): ventilator      General: NAD, resting comfortably in bed, alert, following commands  Lungs: Intubated  Compartments soft and compressible  No calf tenderness bilaterally  +TA/EHL/FHL/GSC  SILT L3-S1  + DP    LUE: limited exam due to restraints   NTTP over the bony prominences of the shoulder/elbow/wrist/hand  C5-T1 SILT  Motor grossly intact throughout axillary/musculocutaenous/radial/median/ulnar nerves  + radial pulse      LABS:  cret                        8.3    6.40  )-----------( 267      ( 16 Aug 2023 01:44 )             25.7     08-16    134<L>  |  94<L>  |  73<H>  ----------------------------<  111<H>  5.8<H>   |  19<L>  |  9.76<H>    Ca    8.6      16 Aug 2023 01:45  Phos  6.0     08-16  Mg     2.1     08-16    TPro  5.8<L>  /  Alb  3.0<L>  /  TBili  0.3  /  DBili  x   /  AST  25  /  ALT  5<L>  /  AlkPhos  865<H>  08-16    PT/INR - ( 16 Aug 2023 01:44 )   PT: 12.6 sec;   INR: 1.15 ratio         PTT - ( 16 Aug 2023 01:44 )  PTT:26.2 sec

## 2023-08-16 NOTE — PROGRESS NOTE ADULT - ASSESSMENT
37 year old male with past medical history of End stage renal disease(M,W,F), hypertension, hypothyroidism, stroke at age of 10, sleep apnea, obesity and recent hospitalization for left radial fracture 7/27/2023 presenting to ER after a fall.  (Aug 15) Pt underwent L hip hemiarthroplasty and intubated for desaturation post-op.     ESRD on HD via R AVF (MWF)

## 2023-08-16 NOTE — DIETITIAN INITIAL EVALUATION ADULT - NAME AND PHONE
Marielena Borja MS RD CDN [facility];   Available on TEAMS (preferred);   Pager #[phone]

## 2023-08-16 NOTE — PROGRESS NOTE ADULT - SUBJECTIVE AND OBJECTIVE BOX
[facility] DIVISION OF KIDNEY DISEASES AND HYPERTENSION   FOLLOW UP NOTE    --------------------------------------------------------------------------------  Chief Complaint:  37 year old male with past medical history of End stage renal disease (M,W,F), hypertension, hypothyroidism, stroke at age of 10, sleep apnea, obesity and recent hospitalization for left radial fracture 7/27/2023 presenting to ER after a fall.   Patient is ESRD on HD via R AVF (MWF).     (Aug 15) Pt underwent L hip hemiarthroplasty and intubated for desaturation post-op.     Pt remains intubated in ICU. Denies any acute complaints.         PAST HISTORY  --------------------------------------------------------------------------------  No significant changes to PMH, PSH, FHx, SHx, unless otherwise noted    ALLERGIES & MEDICATIONS  --------------------------------------------------------------------------------  Allergies    No Known Drug Allergies  shellfish (Hives)    Intolerances      Standing Inpatient Medications  acetaminophen   IVPB .. 1000 milliGRAM(s) IV Intermittent every 6 hours  calcium acetate 1334 milliGRAM(s) Oral three times a day with meals  chlorhexidine 0.12% Liquid 15 milliLiter(s) Oral Mucosa every 12 hours  chlorhexidine 2% Cloths 1 Application(s) Topical <User Schedule>  dexMEDEtomidine Infusion 0.5 MICROgram(s)/kG/Hr IV Continuous <Continuous>  dextrose 10%. 1000 milliLiter(s) IV Continuous <Continuous>  heparin   Injectable 5000 Unit(s) SubCutaneous every 8 hours  pantoprazole  Injectable 40 milliGRAM(s) IV Push daily  phenylephrine    Infusion 0.8 MICROgram(s)/kG/Min IV Continuous <Continuous>  polyethylene glycol 3350 17 Gram(s) Oral daily  senna Syrup 10 milliLiter(s) Oral at bedtime    PRN Inpatient Medications  HYDROmorphone  Injectable 0.5 milliGRAM(s) IV Push every 3 hours PRN      REVIEW OF SYSTEMS  --------------------------------------------------------------------------------  Gen: No fevers/chills  Head/Eyes/Ears: No HA   Respiratory: No dyspnea, cough  CV: No chest pain  GI: No abdominal pain, diarrhea  : No dysuria, hematuria  MSK: No  edema  Skin: No rashes  Heme: No easy bruising or bleeding    All other systems were reviewed and are negative, except as noted.    VITALS/PHYSICAL EXAM  --------------------------------------------------------------------------------  T(C): 36.8 (08-16-23 @ 11:00), Max: 37.3 (08-15-23 @ 23:00)  HR: 75 (08-16-23 @ 14:34) (67 - 96)  BP: 117/58 (08-16-23 @ 08:11) (85/43 - 117/58)  RR: 21 (08-16-23 @ 14:00) (16 - 23)  SpO2: 100% (08-16-23 @ 14:34) (100% - 100%)  Wt(kg): --  Height (cm): 188 (08-15-23 @ 08:12)  Weight (kg): 98.1 (08-15-23 @ 08:12)  BMI (kg/m2): 27.8 (08-15-23 @ 08:12)  BSA (m2): 2.25 (08-15-23 @ 08:12)      08-15-23 @ 07:01  -  08-16-23 @ 07:00  --------------------------------------------------------  IN: 1731.5 mL / OUT: 2000 mL / NET: -268.5 mL    08-16-23 @ 07:01  -  08-16-23 @ 14:37  --------------------------------------------------------  IN: 784.7 mL / OUT: 0 mL / NET: 784.7 mL        Physical Exam:  	Gen: NAD  	HEENT: Anicteric  	Pulm: CTA B/L-intubated   	CV: S1S2+  	Abd: Soft, +BS   	Ext: No LE edema B/L  	Neuro: Awake  	Skin: Warm and dry  	Dialysis access: R AVF      LABS/STUDIES  --------------------------------------------------------------------------------              7.8    6.95  >-----------<  250      [08-16-23 @ 08:24]              23.5     133  |  93  |  74  ----------------------------<  97      [08-16-23 @ 08:24]  5.8   |  21  |  10.46        Ca     8.7     [08-16-23 @ 08:24]      Mg     2.2     [08-16-23 @ 08:24]      Phos  6.7     [08-16-23 @ 08:24]    TPro  5.8  /  Alb  3.0  /  TBili  0.3  /  DBili  x   /  AST  25  /  ALT  5   /  AlkPhos  865  [08-16-23 @ 01:45]    PT/INR: PT 12.6 , INR 1.15       [08-16-23 @ 01:44]  PTT: 26.2       [08-16-23 @ 01:44]      Creatinine Trend:  SCr 10.46 [08-16 @ 08:24]  SCr 9.76 [08-16 @ 01:45]  SCr 9.21 [08-15 @ 20:47]  SCr 9.06 [08-15 @ 14:26]  SCr 8.94 [08-15 @ 07:24]    Urinalysis - [08-16-23 @ 08:24]      Color  / Appearance  / SG  / pH       Gluc 97 / Ketone   / Bili  / Urobili        Blood  / Protein  / Leuk Est  / Nitrite       RBC  / WBC  / Hyaline  / Gran  / Sq Epi  / Non Sq Epi  / Bacteria           Tacrolimus  Cyclosporine  Sirolimus  Mycophenolate  BK PCR  CMV PCR  Parvo PCR  EBV PCR

## 2023-08-16 NOTE — PROGRESS NOTE ADULT - ASSESSMENT
A/P: 37y Male with L femoral neck hip fracture; L Radial head fx s/p dipika on 8/15.     Pain control  WBAT  DVT ppx: SQH  PT/OT  FU Nephro C/s for HD M/W/F  Medical mgmt greatly appreciated per SICU      For all questions related to patient care, please reach out via the on-call pager.*     Shanice Quick PGY1  Orthopedic Surgery  [facility]: [phone]  [facility]: [phone]  [facility]: [phone]

## 2023-08-16 NOTE — DIETITIAN INITIAL EVALUATION ADULT - NS FNS WEIGHT CHANGE REASON
Weight history per last admission:  95 kg (7/20/23, dosing )  96.6 kg (7/21/23 pre-HD)  93.1 kg (7/21/23 post-HD)    Weight this admission:  98.1kg (8/15 dosing, pre-HD)  81.3kg (8/16, post-HD)/unintentional

## 2023-08-16 NOTE — CHART NOTE - NSCHARTNOTEFT_GEN_A_CORE
SICU team requested extra UF yesterday in order to optimize patient for SBT today so patient had 2hr/2L UF session on 8/15 with no issues. Failed SBT today due to failing cuff leak test. Plan HD today 8/16 with 3L UF. See progress note for today.      Janina Dunlap DO  Nephrology Fellow  Contact me directly on TEAMS  (After 5pm or on weekends, please page the on-call fellow)

## 2023-08-16 NOTE — DIETITIAN INITIAL EVALUATION ADULT - PERTINENT LABORATORY DATA
08-16    133<L>  |  93<L>  |  74<H>  ----------------------------<  97  5.8<H>   |  21<L>  |  10.46<H>    Ca    8.7      16 Aug 2023 08:24  Phos  6.7     08-16  Mg     2.2     08-16    TPro  5.8<L>  /  Alb  3.0<L>  /  TBili  0.3  /  DBili  x   /  AST  25  /  ALT  5<L>  /  AlkPhos  865<H>  08-16  POCT Blood Glucose.: 98 mg/dL (08-16-23 @ 10:06)  A1C with Estimated Average Glucose Result: 4.8 % (08-15-23 @ 07:24)  A1C with Estimated Average Glucose Result: 4.8 % (07-21-23 @ 10:27)

## 2023-08-16 NOTE — PROGRESS NOTE ADULT - ATTENDING COMMENTS
L hip dipika. R/B/A discussed s/p L hip dipika. complex medical hx.  Labile BP.  Continue ICU care. DVT ppx

## 2023-08-16 NOTE — DIETITIAN INITIAL EVALUATION ADULT - ENTERAL
Initiate Nepro1.8 @ 10ml/hr, advance as tolerated to goal 50ml/hr x 24 hrs to provide 1200ml formula, 2124 kcal (26kcal/kg), 97 g protein (1.2 g/kg), 872ml free water; based on current/post-HD weight 81.3kg (8/16).

## 2023-08-16 NOTE — DIETITIAN INITIAL EVALUATION ADULT - REASON
Pt appears appropriately developed with no overt signs of muscle or fat depletion; no malnutrition risk factors.  Pt appears appropriately developed with no overt signs of muscle or fat depletion; no malnutrition risk factors identified at this time.

## 2023-08-16 NOTE — DIETITIAN INITIAL EVALUATION ADULT - OTHER CALCULATIONS
Calculations with consideration for intubation and HD.  Fluid needs: defer to team, on HD. Calculations with consideration for intubation and HD.  The [place] State Equation (PSU) 2003b was used to calculate resting energy expenditure: 2010 kcal  Fluid needs: defer to team, on HD.

## 2023-08-16 NOTE — PROGRESS NOTE ADULT - ASSESSMENT
BRIDGET NORTH is a 37y Male with h/o HTN, ESRD on HD (MWF), Stroke (age 10) now admitted on 8/14 s/p fall with L femoral neck fracture. Patient underwent L hip hemiarthroplasty.  Intra-operatively patient was on Norepinephrine and vasopressin, received 1 unit of PRBCs and K was shifted 3x with Insulin and D50.  At the end of the case patient was off of vasopressor support and extubated.  Patient transferred to PACU, where he became lethargic.  ABG obtained which showed respiratory acidosis and glucose in the 40s.  He was given an amp of D50 and placed on Bipap with improvement in mental status.  Patient had hypoxia and worsening mental status upon arrival to the SICU and was intubated.  Initial AMS and hypoglycemia likely secondary to sensitivity to insulin shifting in setting of ESRD.      Neuro: AMS likely secondary to hypoglycemia  - Propofol gtt for sedation  - Dilaudid and Tylenol prn pain     Resp: Acute hypoxic and hypercarbic respiratory failure  - PRVC 20/600/8/40%   - ETT adjusted after CXR, f/u daily CXR  - Wean ventilator as tolerated    CV: h/o HTN  - Currently hemodynamically stable, but did require vasopressor support intra-op  - Lactate 1.6  - Hold anti-hypertensives for now     GI:  - NPO/OGT  - Protonix    /Renal: h/o ESRD (MWF)  - f/u family/nephrologist in regards to last HD session  - Electrolytes okay at this time, will continue to trend BMP q 6hrs  - Bladder scan  - RUE Fistula     ID:  - No acute need for abx  - Monitor WBC    Heme: VTE prophylaxis  - Plan for ASA 325mg BID for VTE prophylaxis as per ortho  - H/H stable (transfused 1 unit PRBC intra-op)     Endo: Hypoglycemia  - D10@ 50mL/hr  - Check blood glucose q 30 minutes, slowly space out as patient stabilizes.     Lines:  - LUE QUINTIN  - 18 gauge PIV  - L radial A-line (8/15)     Dispo:  Full code.  SICU care. BRIDGET NORTH is a 37y Male with h/o HTN, ESRD on HD (MWF), Stroke (age 10) now admitted on 8/14 s/p fall with L femoral neck fracture. Patient underwent L hip hemiarthroplasty.  Intra-operatively patient was on Norepinephrine and vasopressin, received 1 unit of PRBCs and K was shifted 3x with Insulin and D50.  At the end of the case patient was off of vasopressor support and extubated.  Patient transferred to PACU, where he became lethargic.  ABG obtained which showed respiratory acidosis and glucose in the 40s.  He was given an amp of D50 and placed on Bipap with improvement in mental status.  Patient had hypoxia and worsening mental status upon arrival to the SICU and was intubated.  Initial AMS and hypoglycemia likely secondary to sensitivity to insulin shifting in setting of ESRD.      Neuro: AMS likely secondary to hypoglycemia  - Propofol gtt for sedation  - Dilaudid and Tylenol PRN for pain     Resp: Acute hypoxic and hypercarbic respiratory failure  - PRVC 20/600/8/40%   - ETT adjusted after CXR, f/u daily CXR  - Wean ventilator as tolerated    CV: h/o HTN  - Currently hemodynamically stable, but did require vasopressor support intra-op  - Lactate 1.6  - Hold anti-hypertensives for now     GI:  - NPO/OGT  - Trickle feeds started  - Protonix    /Renal: h/o ESRD (MWF)  - S/p HD with 2L fluid removal   - Electrolytes okay at this time, will continue to trend BMP q 6hrs  - Bladder scan  - RUE Fistula     ID:  - No acute need for abx  - Monitor WBC    Heme: VTE prophylaxis  - Plan for ASA 325mg BID for VTE prophylaxis as per ortho  - H/H stable (transfused 1 unit PRBC intra-op)     Endo: Hypoglycemia  - D10@ 50mL/hr  - Glucose 111, will stop D10 gtt with trickle feeds if glucose remains okay  - Check blood glucose q 30 minutes, slowly space out as patient stabilizes.     Lines:  - SUZANNEE QUINTIN  - 18 gauge PIV  - L radial A-line (8/15)     Dispo:  Full code.  SICU care.

## 2023-08-16 NOTE — DIETITIAN INITIAL EVALUATION ADULT - REASON INDICATOR FOR ASSESSMENT
Nutrition Assessment warranted for length of stay on SICU  Information obtained from: medical record, 8ICU Interdisciplinary Rounds   Pt intubated, sedated.

## 2023-08-16 NOTE — DIETITIAN INITIAL EVALUATION ADULT - PROBLEM SELECTOR PLAN 8
Patient with Oxycodone 57e3fhm and Percocet 5q6hrs at home for previous radial fracture   Patient will require increased pain medication regiment   Hydromorphone PRN ordered in setting of second fracture with 10/10 pain and ESRD  Downtitrate pain regiment as tolerated  Tylenol PRN for mild pain.

## 2023-08-16 NOTE — DIETITIAN INITIAL EVALUATION ADULT - REASON FOR ADMISSION
Displaced intertrochanteric fracture of left femur, initial encounter for closed fracture    Per chart: " 37y Male with h/o HTN, ESRD on HD (MWF), Stroke (age 10) now admitted on 8/14 s/p fall with L femoral neck fracture. Patient underwent L hip hemiarthroplasty.  Intra-operatively patient was on Norepinephrine and vasopressin, received 1 unit of PRBCs and K was shifted 3x with Insulin and D50.  At the end of the case patient was off of vasopressor support and extubated.  Patient transferred to PACU, where he became lethargic.  ABG obtained which showed respiratory acidosis and glucose in the 40s.  He was given an amp of D50 and placed on Bipap with improvement in mental status.  Patient had hypoxia and worsening mental status upon arrival to the SICU and was intubated.  Initial AMS and hypoglycemia likely secondary to sensitivity to insulin shifting in setting of ESRD."

## 2023-08-17 LAB
ANION GAP SERPL CALC-SCNC: 18 MMOL/L — HIGH (ref 5–17)
ANION GAP SERPL CALC-SCNC: 18 MMOL/L — HIGH (ref 5–17)
ANION GAP SERPL CALC-SCNC: 22 MMOL/L — HIGH (ref 5–17)
BUN SERPL-MCNC: 54 MG/DL — HIGH (ref 7–23)
BUN SERPL-MCNC: 58 MG/DL — HIGH (ref 7–23)
BUN SERPL-MCNC: 65 MG/DL — HIGH (ref 7–23)
CALCIUM SERPL-MCNC: 8.9 MG/DL — SIGNIFICANT CHANGE UP (ref 8.4–10.5)
CALCIUM SERPL-MCNC: 9.2 MG/DL — SIGNIFICANT CHANGE UP (ref 8.4–10.5)
CALCIUM SERPL-MCNC: 9.4 MG/DL — SIGNIFICANT CHANGE UP (ref 8.4–10.5)
CHLORIDE SERPL-SCNC: 92 MMOL/L — LOW (ref 96–108)
CHLORIDE SERPL-SCNC: 94 MMOL/L — LOW (ref 96–108)
CHLORIDE SERPL-SCNC: 94 MMOL/L — LOW (ref 96–108)
CO2 SERPL-SCNC: 22 MMOL/L — SIGNIFICANT CHANGE UP (ref 22–31)
CO2 SERPL-SCNC: 23 MMOL/L — SIGNIFICANT CHANGE UP (ref 22–31)
CO2 SERPL-SCNC: 23 MMOL/L — SIGNIFICANT CHANGE UP (ref 22–31)
CREAT SERPL-MCNC: 7.38 MG/DL — HIGH (ref 0.5–1.3)
CREAT SERPL-MCNC: 7.47 MG/DL — HIGH (ref 0.5–1.3)
CREAT SERPL-MCNC: 7.78 MG/DL — HIGH (ref 0.5–1.3)
EGFR: 8 ML/MIN/1.73M2 — LOW
EGFR: 9 ML/MIN/1.73M2 — LOW
EGFR: 9 ML/MIN/1.73M2 — LOW
GAS PNL BLDA: SIGNIFICANT CHANGE UP
GLUCOSE BLDC GLUCOMTR-MCNC: 109 MG/DL — HIGH (ref 70–99)
GLUCOSE BLDC GLUCOMTR-MCNC: 117 MG/DL — HIGH (ref 70–99)
GLUCOSE SERPL-MCNC: 101 MG/DL — HIGH (ref 70–99)
GLUCOSE SERPL-MCNC: 102 MG/DL — HIGH (ref 70–99)
GLUCOSE SERPL-MCNC: 109 MG/DL — HIGH (ref 70–99)
HCT VFR BLD CALC: 21.5 % — LOW (ref 39–50)
HCT VFR BLD CALC: 22.7 % — LOW (ref 39–50)
HCT VFR BLD CALC: 23.5 % — LOW (ref 39–50)
HCT VFR BLD CALC: 24.6 % — LOW (ref 39–50)
HGB BLD-MCNC: 7.2 G/DL — LOW (ref 13–17)
HGB BLD-MCNC: 7.5 G/DL — LOW (ref 13–17)
HGB BLD-MCNC: 7.8 G/DL — LOW (ref 13–17)
HGB BLD-MCNC: 8.1 G/DL — LOW (ref 13–17)
MAGNESIUM SERPL-MCNC: 2 MG/DL — SIGNIFICANT CHANGE UP (ref 1.6–2.6)
MAGNESIUM SERPL-MCNC: 2 MG/DL — SIGNIFICANT CHANGE UP (ref 1.6–2.6)
MAGNESIUM SERPL-MCNC: 2.2 MG/DL — SIGNIFICANT CHANGE UP (ref 1.6–2.6)
MCHC RBC-ENTMCNC: 29.9 PG — SIGNIFICANT CHANGE UP (ref 27–34)
MCHC RBC-ENTMCNC: 30 PG — SIGNIFICANT CHANGE UP (ref 27–34)
MCHC RBC-ENTMCNC: 30.1 PG — SIGNIFICANT CHANGE UP (ref 27–34)
MCHC RBC-ENTMCNC: 30.3 PG — SIGNIFICANT CHANGE UP (ref 27–34)
MCHC RBC-ENTMCNC: 32.9 GM/DL — SIGNIFICANT CHANGE UP (ref 32–36)
MCHC RBC-ENTMCNC: 33 GM/DL — SIGNIFICANT CHANGE UP (ref 32–36)
MCHC RBC-ENTMCNC: 33.2 GM/DL — SIGNIFICANT CHANGE UP (ref 32–36)
MCHC RBC-ENTMCNC: 33.5 GM/DL — SIGNIFICANT CHANGE UP (ref 32–36)
MCV RBC AUTO: 90 FL — SIGNIFICANT CHANGE UP (ref 80–100)
MCV RBC AUTO: 90.3 FL — SIGNIFICANT CHANGE UP (ref 80–100)
MCV RBC AUTO: 91.1 FL — SIGNIFICANT CHANGE UP (ref 80–100)
MCV RBC AUTO: 91.2 FL — SIGNIFICANT CHANGE UP (ref 80–100)
NRBC # BLD: 0 /100 WBCS — SIGNIFICANT CHANGE UP (ref 0–0)
PHOSPHATE SERPL-MCNC: 6.4 MG/DL — HIGH (ref 2.5–4.5)
PHOSPHATE SERPL-MCNC: 6.5 MG/DL — HIGH (ref 2.5–4.5)
PHOSPHATE SERPL-MCNC: 6.7 MG/DL — HIGH (ref 2.5–4.5)
PLATELET # BLD AUTO: 221 K/UL — SIGNIFICANT CHANGE UP (ref 150–400)
PLATELET # BLD AUTO: 230 K/UL — SIGNIFICANT CHANGE UP (ref 150–400)
PLATELET # BLD AUTO: 231 K/UL — SIGNIFICANT CHANGE UP (ref 150–400)
PLATELET # BLD AUTO: 249 K/UL — SIGNIFICANT CHANGE UP (ref 150–400)
POTASSIUM SERPL-MCNC: 4.9 MMOL/L — SIGNIFICANT CHANGE UP (ref 3.5–5.3)
POTASSIUM SERPL-MCNC: 5.1 MMOL/L — SIGNIFICANT CHANGE UP (ref 3.5–5.3)
POTASSIUM SERPL-MCNC: 5.2 MMOL/L — SIGNIFICANT CHANGE UP (ref 3.5–5.3)
POTASSIUM SERPL-SCNC: 4.9 MMOL/L — SIGNIFICANT CHANGE UP (ref 3.5–5.3)
POTASSIUM SERPL-SCNC: 5.1 MMOL/L — SIGNIFICANT CHANGE UP (ref 3.5–5.3)
POTASSIUM SERPL-SCNC: 5.2 MMOL/L — SIGNIFICANT CHANGE UP (ref 3.5–5.3)
RBC # BLD: 2.38 M/UL — LOW (ref 4.2–5.8)
RBC # BLD: 2.49 M/UL — LOW (ref 4.2–5.8)
RBC # BLD: 2.61 M/UL — LOW (ref 4.2–5.8)
RBC # BLD: 2.7 M/UL — LOW (ref 4.2–5.8)
RBC # FLD: 17.2 % — HIGH (ref 10.3–14.5)
RBC # FLD: 17.5 % — HIGH (ref 10.3–14.5)
RBC # FLD: 17.6 % — HIGH (ref 10.3–14.5)
RBC # FLD: 17.7 % — HIGH (ref 10.3–14.5)
SODIUM SERPL-SCNC: 135 MMOL/L — SIGNIFICANT CHANGE UP (ref 135–145)
SODIUM SERPL-SCNC: 135 MMOL/L — SIGNIFICANT CHANGE UP (ref 135–145)
SODIUM SERPL-SCNC: 136 MMOL/L — SIGNIFICANT CHANGE UP (ref 135–145)
WBC # BLD: 5.55 K/UL — SIGNIFICANT CHANGE UP (ref 3.8–10.5)
WBC # BLD: 5.61 K/UL — SIGNIFICANT CHANGE UP (ref 3.8–10.5)
WBC # BLD: 6 K/UL — SIGNIFICANT CHANGE UP (ref 3.8–10.5)
WBC # BLD: 6.13 K/UL — SIGNIFICANT CHANGE UP (ref 3.8–10.5)
WBC # FLD AUTO: 5.55 K/UL — SIGNIFICANT CHANGE UP (ref 3.8–10.5)
WBC # FLD AUTO: 5.61 K/UL — SIGNIFICANT CHANGE UP (ref 3.8–10.5)
WBC # FLD AUTO: 6 K/UL — SIGNIFICANT CHANGE UP (ref 3.8–10.5)
WBC # FLD AUTO: 6.13 K/UL — SIGNIFICANT CHANGE UP (ref 3.8–10.5)

## 2023-08-17 PROCEDURE — 99291 CRITICAL CARE FIRST HOUR: CPT | Mod: GC

## 2023-08-17 PROCEDURE — 99232 SBSQ HOSP IP/OBS MODERATE 35: CPT | Mod: GC

## 2023-08-17 PROCEDURE — 71045 X-RAY EXAM CHEST 1 VIEW: CPT | Mod: 26

## 2023-08-17 RX ORDER — OXYCODONE HYDROCHLORIDE 5 MG/1
5 TABLET ORAL EVERY 6 HOURS
Refills: 0 | Status: DISCONTINUED | OUTPATIENT
Start: 2023-08-17 | End: 2023-08-18

## 2023-08-17 RX ORDER — ALBUMIN HUMAN 25 %
250 VIAL (ML) INTRAVENOUS ONCE
Refills: 0 | Status: COMPLETED | OUTPATIENT
Start: 2023-08-17 | End: 2023-08-17

## 2023-08-17 RX ORDER — CALCIUM GLUCONATE 100 MG/ML
2 VIAL (ML) INTRAVENOUS ONCE
Refills: 0 | Status: COMPLETED | OUTPATIENT
Start: 2023-08-17 | End: 2023-08-17

## 2023-08-17 RX ORDER — SENNA PLUS 8.6 MG/1
2 TABLET ORAL AT BEDTIME
Refills: 0 | Status: DISCONTINUED | OUTPATIENT
Start: 2023-08-17 | End: 2023-08-24

## 2023-08-17 RX ORDER — ACETAMINOPHEN 500 MG
1000 TABLET ORAL EVERY 6 HOURS
Refills: 0 | Status: COMPLETED | OUTPATIENT
Start: 2023-08-17 | End: 2023-08-18

## 2023-08-17 RX ORDER — CALCIUM ACETATE 667 MG
1334 TABLET ORAL
Refills: 0 | Status: DISCONTINUED | OUTPATIENT
Start: 2023-08-17 | End: 2023-08-24

## 2023-08-17 RX ORDER — POLYETHYLENE GLYCOL 3350 17 G/17G
17 POWDER, FOR SOLUTION ORAL DAILY
Refills: 0 | Status: DISCONTINUED | OUTPATIENT
Start: 2023-08-18 | End: 2023-08-24

## 2023-08-17 RX ORDER — ACETAMINOPHEN 500 MG
975 TABLET ORAL EVERY 6 HOURS
Refills: 0 | Status: DISCONTINUED | OUTPATIENT
Start: 2023-08-18 | End: 2023-08-24

## 2023-08-17 RX ORDER — OXYCODONE HYDROCHLORIDE 5 MG/1
10 TABLET ORAL EVERY 6 HOURS
Refills: 0 | Status: DISCONTINUED | OUTPATIENT
Start: 2023-08-17 | End: 2023-08-18

## 2023-08-17 RX ADMIN — HYDROMORPHONE HYDROCHLORIDE 0.5 MILLIGRAM(S): 2 INJECTION INTRAMUSCULAR; INTRAVENOUS; SUBCUTANEOUS at 08:00

## 2023-08-17 RX ADMIN — HYDROMORPHONE HYDROCHLORIDE 0.5 MILLIGRAM(S): 2 INJECTION INTRAMUSCULAR; INTRAVENOUS; SUBCUTANEOUS at 03:55

## 2023-08-17 RX ADMIN — HEPARIN SODIUM 5000 UNIT(S): 5000 INJECTION INTRAVENOUS; SUBCUTANEOUS at 05:02

## 2023-08-17 RX ADMIN — Medication 1000 MILLIGRAM(S): at 17:20

## 2023-08-17 RX ADMIN — Medication 400 MILLIGRAM(S): at 22:40

## 2023-08-17 RX ADMIN — HYDROMORPHONE HYDROCHLORIDE 0.5 MILLIGRAM(S): 2 INJECTION INTRAMUSCULAR; INTRAVENOUS; SUBCUTANEOUS at 07:40

## 2023-08-17 RX ADMIN — Medication 1000 MILLIGRAM(S): at 09:50

## 2023-08-17 RX ADMIN — HEPARIN SODIUM 5000 UNIT(S): 5000 INJECTION INTRAVENOUS; SUBCUTANEOUS at 22:39

## 2023-08-17 RX ADMIN — Medication 200 GRAM(S): at 09:49

## 2023-08-17 RX ADMIN — Medication 1334 MILLIGRAM(S): at 11:31

## 2023-08-17 RX ADMIN — OXYCODONE HYDROCHLORIDE 10 MILLIGRAM(S): 5 TABLET ORAL at 17:40

## 2023-08-17 RX ADMIN — Medication 1334 MILLIGRAM(S): at 07:41

## 2023-08-17 RX ADMIN — OXYCODONE HYDROCHLORIDE 10 MILLIGRAM(S): 5 TABLET ORAL at 12:00

## 2023-08-17 RX ADMIN — CHLORHEXIDINE GLUCONATE 1 APPLICATION(S): 213 SOLUTION TOPICAL at 05:04

## 2023-08-17 RX ADMIN — HEPARIN SODIUM 5000 UNIT(S): 5000 INJECTION INTRAVENOUS; SUBCUTANEOUS at 13:29

## 2023-08-17 RX ADMIN — PHENYLEPHRINE HYDROCHLORIDE 29.4 MICROGRAM(S)/KG/MIN: 10 INJECTION INTRAVENOUS at 07:41

## 2023-08-17 RX ADMIN — DEXMEDETOMIDINE HYDROCHLORIDE IN 0.9% SODIUM CHLORIDE 12.3 MICROGRAM(S)/KG/HR: 4 INJECTION INTRAVENOUS at 07:41

## 2023-08-17 RX ADMIN — OXYCODONE HYDROCHLORIDE 10 MILLIGRAM(S): 5 TABLET ORAL at 17:04

## 2023-08-17 RX ADMIN — CHLORHEXIDINE GLUCONATE 15 MILLILITER(S): 213 SOLUTION TOPICAL at 05:02

## 2023-08-17 RX ADMIN — HYDROMORPHONE HYDROCHLORIDE 0.5 MILLIGRAM(S): 2 INJECTION INTRAMUSCULAR; INTRAVENOUS; SUBCUTANEOUS at 03:25

## 2023-08-17 RX ADMIN — Medication 1334 MILLIGRAM(S): at 17:04

## 2023-08-17 RX ADMIN — OXYCODONE HYDROCHLORIDE 10 MILLIGRAM(S): 5 TABLET ORAL at 11:24

## 2023-08-17 RX ADMIN — HYDROMORPHONE HYDROCHLORIDE 0.5 MILLIGRAM(S): 2 INJECTION INTRAMUSCULAR; INTRAVENOUS; SUBCUTANEOUS at 00:09

## 2023-08-17 RX ADMIN — Medication 400 MILLIGRAM(S): at 09:50

## 2023-08-17 RX ADMIN — Medication 400 MILLIGRAM(S): at 17:03

## 2023-08-17 RX ADMIN — Medication 125 MILLILITER(S): at 01:25

## 2023-08-17 RX ADMIN — Medication 25 MILLILITER(S): at 07:40

## 2023-08-17 RX ADMIN — HYDROMORPHONE HYDROCHLORIDE 0.5 MILLIGRAM(S): 2 INJECTION INTRAMUSCULAR; INTRAVENOUS; SUBCUTANEOUS at 00:39

## 2023-08-17 RX ADMIN — Medication 1000 MILLIGRAM(S): at 23:10

## 2023-08-17 RX ADMIN — Medication 200 GRAM(S): at 01:29

## 2023-08-17 RX ADMIN — OXYCODONE HYDROCHLORIDE 10 MILLIGRAM(S): 5 TABLET ORAL at 23:41

## 2023-08-17 RX ADMIN — Medication 1 TABLET(S): at 11:24

## 2023-08-17 NOTE — OCCUPATIONAL THERAPY INITIAL EVALUATION ADULT - ASR WT BEARING STATUS EVAL
awaiting clarification from ortho regarding potential use of platform walker for lue/Left UE/Left LE

## 2023-08-17 NOTE — PROGRESS NOTE ADULT - ASSESSMENT
A/P: 37y Male with L femoral neck hip fracture s/p dipika on 8/15; L Radial head fx     Pain control  WBAT LLE  NWB LUE  DVT ppx: per primary   PT/OT  FU Nephro C/s for HD M/W/F  Medical mgmt greatly appreciated per SICU  Dispo pending hospital course / PT recs  No further acute orthopaedic surgical intervention indicated  Patient to be downgraded from sicu back to medical team when applicable  A/P: 37y Male with L femoral neck hip fracture s/p dipika on 8/15; L Radial head fx     Pain control  WBAT LLE  NWB LUE  DVT ppx: per primary   PT/OT  FU Nephro C/s for HD M/W/F  Medical mgmt greatly appreciated per SICU  Dispo pending hospital course / PT recs  No further acute orthopaedic surgical intervention indicated  Patient to be downgraded from sicu back to medical team when applicable   Rec 1U w/ h/h this AM

## 2023-08-17 NOTE — PROGRESS NOTE ADULT - SUBJECTIVE AND OBJECTIVE BOX
24 HOUR EVENTS:  - No cuff leak, given decadron  - Started TF, increasing to goal   - s/p HD 8/16, net -3L      - increasing pressor req OVN, given 250cc albumin        SUBJECTIVE/ROS:  Patient seen at bedside this AM.       OBJECTIVE:    VITALS:  Vital Signs Last 24 Hrs  T(C): 37.4 (16 Aug 2023 23:00), Max: 37.6 (16 Aug 2023 15:46)  T(F): 99.3 (16 Aug 2023 23:00), Max: 99.7 (16 Aug 2023 15:46)  HR: 73 (17 Aug 2023 01:30) (67 - 93)  BP: 126/69 (16 Aug 2023 19:00) (117/58 - 126/69)  BP(mean): 92 (16 Aug 2023 19:00) (83 - 92)  RR: 18 (17 Aug 2023 01:30) (16 - 25)  SpO2: 100% (17 Aug 2023 01:30) (94% - 100%)    Parameters below as of 16 Aug 2023 19:00  Patient On (Oxygen Delivery Method): ventilator    O2 Concentration (%): 30  Mode: AC/ CMV (Assist Control/ Continuous Mandatory Ventilation)  RR (machine): 16  TV (machine): 600  FiO2: 30  PEEP: 5  ITime: 0.9  MAP: 10  PIP: 24    I&O's Summary    15 Aug 2023 07:01  -  16 Aug 2023 07:00  --------------------------------------------------------  IN: 1731.5 mL / OUT: 2000 mL / NET: -268.5 mL    16 Aug 2023 07:01  -  17 Aug 2023 01:57  --------------------------------------------------------  IN: 1652.2 mL / OUT: 3000 mL / NET: -1347.8 mL        PHYSICAL EXAM:    NEURO  Exam:  AOx4    RESPIRATORY  Exam:  intubated   Mechanical Ventilation: Mode: AC/ CMV (Assist Control/ Continuous Mandatory Ventilation), RR (machine): 16, RR (patient): 19, TV (machine): 600, FiO2: 30, PEEP: 5, ITime: 0.9, MAP: 10, PIP: 24    CARDIOVASCULAR  Exam:  regular rate, rhythm   Cardiac Rhythm:      GI/NUTRITION  Exam:  TF, increase to goal. Abdomen soft, nontender     ACCESS DEVICES:  [x] Peripheral IV  [ ] Central Venous Line	[ ] R	[ ] L	[ ] IJ	[ ] Fem	[ ] SC	Placed:   [x] Arterial Line		[ ] R	[x] L	[ ] Fem	[x] Rad	[ ] Ax	Placed:   [x] QUINTIN: 				[ ] Mediport  [ ] Urinary Catheter, Date Placed:   [x] Necessity of urinary, arterial, and venous catheters discussed      LABS:                        7.5    6.13  )-----------( 249      ( 17 Aug 2023 01:01 )             22.7   08-17    135  |  94<L>  |  54<H>  ----------------------------<  109<H>  5.2   |  23  |  7.47<H>    Ca    8.9      17 Aug 2023 01:01  Phos  6.7     08-17  Mg     2.0     08-17    TPro  5.8<L>  /  Alb  3.0<L>  /  TBili  0.3  /  DBili  x   /  AST  25  /  ALT  5<L>  /  AlkPhos  865<H>  08-16    CAPILLARY BLOOD GLUCOSE      POCT Blood Glucose.: 117 mg/dL (17 Aug 2023 00:34)  POCT Blood Glucose.: 98 mg/dL (16 Aug 2023 12:18)  POCT Blood Glucose.: 102 mg/dL (16 Aug 2023 12:01)  POCT Blood Glucose.: 98 mg/dL (16 Aug 2023 10:06)  POCT Blood Glucose.: 104 mg/dL (16 Aug 2023 08:16)  POCT Blood Glucose.: 103 mg/dL (16 Aug 2023 05:15)      MEDICATIONS:   MEDICATIONS  (STANDING):  calcium acetate 1334 milliGRAM(s) Oral three times a day with meals  chlorhexidine 0.12% Liquid 15 milliLiter(s) Oral Mucosa every 12 hours  chlorhexidine 2% Cloths 1 Application(s) Topical <User Schedule>  dexMEDEtomidine Infusion 0.5 MICROgram(s)/kG/Hr (12.3 mL/Hr) IV Continuous <Continuous>  dextrose 10%. 1000 milliLiter(s) (25 mL/Hr) IV Continuous <Continuous>  heparin   Injectable 5000 Unit(s) SubCutaneous every 8 hours  pantoprazole  Injectable 40 milliGRAM(s) IV Push daily  phenylephrine    Infusion 0.8 MICROgram(s)/kG/Min (29.4 mL/Hr) IV Continuous <Continuous>  polyethylene glycol 3350 17 Gram(s) Oral daily  senna Syrup 10 milliLiter(s) Oral at bedtime    MEDICATIONS  (PRN):  HYDROmorphone  Injectable 0.5 milliGRAM(s) IV Push every 3 hours PRN Severe Pain (7 - 10)

## 2023-08-17 NOTE — PROCEDURE NOTE - NSPROCDETAILS_GEN_ALL_CORE
patient pre-oxygenated, tube inserted, placement confirmed
location identified, draped/prepped, sterile technique used/sterile dressing applied/ultrasound guidance

## 2023-08-17 NOTE — AIRWAY REMOVAL NOTE  ADULT & PEDS - ARTIFICAL AIRWAY REMOVAL COMMENTS
Written order for extubation verified. The patient was identified by full name and birth date compared to the identification band. Present during the procedure was Karla REA

## 2023-08-17 NOTE — PROGRESS NOTE ADULT - ASSESSMENT
37 year old male with past medical history of End stage renal disease(M,W,F), hypertension, hypothyroidism, stroke at age of 10, sleep apnea, obesity and recent hospitalization for left radial fracture 7/27/2023 presenting to ER after a fall.  (Aug 15) Pt underwent L hip hemiarthroplasty and intubated for desaturation post-op.     ESRD on HD (MWF) via AVF

## 2023-08-17 NOTE — OCCUPATIONAL THERAPY INITIAL EVALUATION ADULT - LEVEL OF INDEPENDENCE: SIT/STAND, REHAB EVAL
to be further assessed as pt was unable to weight bear during PT eval maximum assist (25% patients effort)

## 2023-08-17 NOTE — PROGRESS NOTE ADULT - SUBJECTIVE AND OBJECTIVE BOX
Patient seen and examined at bedside this AM.  Patient intubated/mildly sedated w/ precedex and in soft wrist restraints but alert and following commands. ROS negative. Pain well controlled per pt. Patient had no cuff leak, so was not extubated, will try again tomorrow steroids given per sicu mgmt    PE:      General: NAD, resting comfortably in bed, alert, following commands; Intubated  Compartments soft and compressible  No calf tenderness bilaterally  LLE  +TA/EHL/FHL/GSC  SILT L3-S1  + DP    LUE: limited exam due to restraints   TTP over radial head, o/w  NTTP over the bony prominences of the shoulder/elbow/wrist/hand  C5-T1 SILT  Motor grossly intact throughout axillary/musculocutaenous/radial/median/ulnar nerves  + radial pulse        LABS:                        8.4    7.01  )-----------( 277      ( 16 Aug 2023 17:24 )             25.4     08-16    135  |  96  |  53<H>  ----------------------------<  105<H>  4.6   |  22  |  7.36<H>    Ca    9.3      16 Aug 2023 17:24  Phos  5.4     08-16  Mg     2.2     08-16    TPro  5.8<L>  /  Alb  3.0<L>  /  TBili  0.3  /  DBili  x   /  AST  25  /  ALT  5<L>  /  AlkPhos  865<H>  08-16    PT/INR - ( 16 Aug 2023 01:44 )   PT: 12.6 sec;   INR: 1.15 ratio         PTT - ( 16 Aug 2023 01:44 )  PTT:26.2 sec  Urinalysis Basic - ( 16 Aug 2023 17:24 )    Color: x / Appearance: x / SG: x / pH: x  Gluc: 105 mg/dL / Ketone: x  / Bili: x / Urobili: x   Blood: x / Protein: x / Nitrite: x   Leuk Esterase: x / RBC: x / WBC x   Sq Epi: x / Non Sq Epi: x / Bacteria: x        VITAL SIGNS:  T(C): 37.4 (08-16-23 @ 23:00), Max: 37.6 (08-16-23 @ 15:46)  HR: 74 (08-16-23 @ 23:15) (67 - 93)  BP: 126/69 (08-16-23 @ 19:00) (117/58 - 126/69)  RR: 19 (08-16-23 @ 23:15) (16 - 25)  SpO2: 100% (08-16-23 @ 23:15) (94% - 100%)

## 2023-08-17 NOTE — AIRWAY REMOVAL NOTE  ADULT & PEDS - RESPIRATORY EXPANSION/ACCESSORY MUSCLES/RETRACTIONS
Date of Service: 11/08/2017    CHIEF COMPLAINT:  Followup emergency department visit.    HISTORY OF PRESENT ILLNESS:  This is a 58-year-old male accompanied by his wife today with the above concerns.  The patient had gone to the [facility] Emergency Department on 11/05/2017 due to persisting right upper quadrant abdominal pain.  The patient has been having constant right upper quadrant abdominal pain, which he rates up to 7/10 on the pain scale.  He has been taking Acetaminophen for his symptoms, which is ineffective.  He feels like he is constantly holding his right upper abdomen and his right flank, which gives him minimal relief.  He subsequently had gone to the [facility] Emergency Department for further evaluation and treatment for this.  The patient prior to the Emergency Department visit had a CT scan of his chest, which did show a 6 cm right adrenal gland mass.  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  The patient also did have pulmonary nodules detected on his CT scan as well.  The patient states when the pain has gotten severe that he had nausea and vomiting.  He has not had any problems with crushing chest pain or shortness of breath, but often gets chest discomfort, which he attributes to his gastroesophageal reflux disease symptoms.  Also the patient in the past several weeks has been feeling more anxious, feeling more preoccupied, having more racing thoughts and feels more short tempered.  He is not taking medications for this.  While in the Emergency Department, he had a CT of the chest and abdomen and pelvis on 11/05/2017, in which the impression was no CT evidence for PE or additional acute process.  Stable appearance of tiny, approximately 3 mm noncalcified nodular to linear densities, one on each side in the right middle lobe and right lower lobe.  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  Generalized low attenuation of the liver compatible with fatty infiltration.  A few sub-centimeter foci of low attenuation demonstrated within both kidneys, which are too small to accurately characterize on this study.  The patient subsequently had an appointment made with Dr. Eliseo Paulino, general surgery, in regards to his right adrenal mass.  He initially had an appointment with Dr. Rosi Roy on 11/27/2017, but the patient and his wife felt that he would like to be seen sooner.    The patient is following up with Dr. Dav Martin in regard to the lung nodules.  The patient had taken Percocet, which was prescribed the Emergency Department half tablet 1 time, which did help with his right upper quadrant abdominal pain.  He states that the pain in his right upper quadrant also could radiate to his back.  He is trying not taking the Percocet on a daily basis, only if his pain becomes severe.    ALLERGIES AND MEDICATIONS:  Reviewed in Epic on 11/08/2017.    SOCIAL HISTORY:  Negative for tobacco use.    OBJECTIVE:  VITAL SIGNS:  Blood pressure 122/82, pulse 88, weight 113.6 kilograms.  CARDIOVASCULAR:  S1, S2, no murmurs, regular rate and rhythm.  LUNGS:  Clear to auscultation bilaterally.  Negative crackles, wheezes or rhonchi, unlabored respiration.    LABORATORY DATA:  On 11/05/2017 WBC 10.1, hemoglobin 13.8, hematocrit 41.7, platelets 266,000.  Sodium 139, potassium 3.9, chloride 106, carbon dioxide 28, glucose is 102, BUN 12, creatinine 0.84, AST 23, ALT 37, alkaline phosphatase 60, troponin I less than 0.02.  B-type natriuretic peptide less than 5, lipase 92, magnesium 1.9.  Urinalysis:  Specific gravity less than 1.005, negative blood, negative nitrite, negative leukocyte esterase.    ASSESSMENT:  1.  Adrenal mass, right.  2.  Abdominal pain, right upper  quadrant.  3.  Anxiety.    PLAN:  Will check a cortisol post-Dexamethasone suppression, Dexamethasone level, plasma metanephrines, aldosterone serum.  Clonazepam 0.5 mg 1/2-1 tablet p.o. q.8h.  p.r.n.  For severe abdominal pain the patient may take Oxycodone/Acetaminophen 5/325 mg 1/2-1 tablet p.o. q.6h.  p.r.n.  If the patient becomes sedated from these medications, he is not to drive or operate machinery.     A total of 45 minutes was spent counseling and reviewing the pathophysiology of these issues with the patient and his wife.  Two minutes was spent examining the patient.  The patient is to follow up with Dr. Paulino, surgery, as scheduled.  We will contact Dr. Paulino's office if there is any appointment that is more readily available.  The patient may follow up with me in 1 week and as needed.      Dictated By: Los Ochoa MD  Signing Provider: MD LINDA Beasley/maryan ([id number])  DD: 11/08/2017 15:41:54 TD: 11/09/2017 06:43:52    Copy Sent To:    no use of accessory muscles/no retractions/expansion symmetric

## 2023-08-17 NOTE — PROGRESS NOTE ADULT - SUBJECTIVE AND OBJECTIVE BOX
[facility] DIVISION OF KIDNEY DISEASES AND HYPERTENSION   FOLLOW UP NOTE    --------------------------------------------------------------------------------  Chief Complaint:    37 year old male with PMH of End stage renal disease (M,W,F), hypertension, hypothyroidism, stroke at age of 10, sleep apnea, obesity and recent hospitalization for left radial fracture 7/27/2023 presenting to ER after a fall.   (Aug 15) Pt underwent L hip hemiarthroplasty and intubated for desaturation post-op, extubated.     Patient extubated; conversant. Denies any complaints.     PAST HISTORY  --------------------------------------------------------------------------------  No significant changes to PMH, PSH, FHx, SHx, unless otherwise noted    ALLERGIES & MEDICATIONS  --------------------------------------------------------------------------------  Allergies    No Known Drug Allergies  shellfish (Hives)    Intolerances      Standing Inpatient Medications  acetaminophen   IVPB .. 1000 milliGRAM(s) IV Intermittent every 6 hours  calcium acetate 1334 milliGRAM(s) Oral three times a day with meals  chlorhexidine 2% Cloths 1 Application(s) Topical <User Schedule>  heparin   Injectable 5000 Unit(s) SubCutaneous every 8 hours  Nephro-pat 1 Tablet(s) Oral daily  senna 2 Tablet(s) Oral at bedtime    PRN Inpatient Medications  oxyCODONE    IR 5 milliGRAM(s) Oral every 6 hours PRN  oxyCODONE    IR 10 milliGRAM(s) Oral every 6 hours PRN      REVIEW OF SYSTEMS  --------------------------------------------------------------------------------  Gen: No fevers/chills  Head/Eyes/Ears: No HA   Respiratory: No dyspnea, cough  CV: No chest pain  GI: No abdominal pain, diarrhea  : No dysuria, hematuria  MSK: No  edema  Skin: No rashes  Heme: No easy bruising or bleeding    All other systems were reviewed and are negative, except as noted.    VITALS/PHYSICAL EXAM  --------------------------------------------------------------------------------  T(C): 37 (08-17-23 @ 15:00), Max: 37.6 (08-16-23 @ 15:46)  HR: 98 (08-17-23 @ 15:00) (67 - 98)  BP: 138/65 (08-17-23 @ 15:00) (102/50 - 138/65)  RR: 28 (08-17-23 @ 15:00) (11 - 36)  SpO2: 95% (08-17-23 @ 15:00) (94% - 100%)  Wt(kg): --        08-16-23 @ 07:01  -  08-17-23 @ 07:00  --------------------------------------------------------  IN: 2596.3 mL / OUT: 3000 mL / NET: -403.7 mL    08-17-23 @ 07:01  -  08-17-23 @ 15:21  --------------------------------------------------------  IN: 649.4 mL / OUT: 0 mL / NET: 649.4 mL        Physical Exam:  	Gen: NAD  	HEENT: Anicteric  	Pulm: CTA B/l  	CV: S1S2+  	Abd: Soft, +BS   	Ext: No LE edema B/L  	Neuro: Awake  	Skin: Warm and dry  	Dialysis access: R AVF      LABS/STUDIES  --------------------------------------------------------------------------------              7.8    5.61  >-----------<  221      [08-17-23 @ 11:31]              23.5     135  |  94  |  65  ----------------------------<  101      [08-17-23 @ 11:31]  4.9   |  23  |  7.78        Ca     9.4     [08-17-23 @ 11:31]      Mg     2.2     [08-17-23 @ 11:31]      Phos  6.5     [08-17-23 @ 11:31]    TPro  5.8  /  Alb  3.0  /  TBili  0.3  /  DBili  x   /  AST  25  /  ALT  5   /  AlkPhos  865  [08-16-23 @ 01:45]    PT/INR: PT 12.6 , INR 1.15       [08-16-23 @ 01:44]  PTT: 26.2       [08-16-23 @ 01:44]      Creatinine Trend:  SCr 7.78 [08-17 @ 11:31]  SCr 7.38 [08-17 @ 06:09]  SCr 7.47 [08-17 @ 01:01]  SCr 7.36 [08-16 @ 17:24]  SCr 10.46 [08-16 @ 08:24]    Urinalysis - [08-17-23 @ 11:31]      Color  / Appearance  / SG  / pH       Gluc 101 / Ketone   / Bili  / Urobili        Blood  / Protein  / Leuk Est  / Nitrite       RBC  / WBC  / Hyaline  / Gran  / Sq Epi  / Non Sq Epi  / Bacteria           Tacrolimus  Cyclosporine  Sirolimus  Mycophenolate  BK PCR  CMV PCR  Parvo PCR  EBV PCR

## 2023-08-17 NOTE — PROGRESS NOTE ADULT - ATTENDING COMMENTS
Extubated this AM.  # ESRD - MWF. Still volume overloaded. For additional intermittent UF today 2L.     # Medication toxicity monitoring: medication dose reviewed. Please dose medications to CrCl<15    The rest of the recommendations as per fellow's note.    Maile Peoples MD  Attending Nephrologist  [phone] or via [employer]

## 2023-08-17 NOTE — OCCUPATIONAL THERAPY INITIAL EVALUATION ADULT - PERTINENT HX OF CURRENT PROBLEM, REHAB EVAL
36 yo M with PMHx of End stage renal disease(M,W,F), hypertension, hypothyroidism, stroke at age of 10, sleep apnea, obesity and recent hospitalization for  left radial fracture 7/27/2023 presenting to [facility] emergency department after a mechanical fall in the shower which led him to land on his left hip. Patient is accompanied at the bedside by family members who provide collateral history. Patient states that he completed antibiotics for uvulitis that were prescribed at previous admission. Patient had has fascia iliaca block done in emergency room but states that the pain is refractory. He denies any head trauma or loss of consciousness secondary to the mechanical fall. Denies headache, nausea, vomiting, loss of sensation in lower extremities, or urinary/fecal incontinence. Pt admitted with new left femoral fracture; now presents POD#0 s/p L hip dipika with MD León.    CT Pelvis 8/14/23: Acute angulated fragility fracture of the left femoral neck in the setting of end-stage renal osteodystrophy, with detailed findings as above. Moderate joint space narrowing at the hips bilaterally. Grade 1 anterolisthesis at L5-S1 with bilateral L5 spondylolysis. Clustered foci of soft tissue gas in the right inguinal region which may be related to a recent line placement; advise correlation with clinical history. Anasarca.    X-Ray L Elbow 8/14/23:Exam limited by positioning. No acute fracture is demonstrated. Redemonstrated findings consistent with renal osteodystrophy. Multiple soft tissue/vascular calcifications and surgical clips and embolization coils in the anterior soft tissues overlying the humerus.

## 2023-08-17 NOTE — PROGRESS NOTE ADULT - ASSESSMENT
BRIDGET NORTH is a 37y Male with h/o HTN, ESRD on HD (MWF), Stroke (age 10) now admitted on 8/14 s/p fall with L femoral neck fracture. Patient underwent L hip hemiarthroplasty.  Intra-operatively patient was on Norepinephrine and vasopressin, received 1 unit of PRBCs and K was shifted 3x with Insulin and D50.  At the end of the case patient was off of vasopressor support and extubated.  Patient transferred to PACU, where he became lethargic.  ABG obtained which showed respiratory acidosis and glucose in the 40s.  He was given an amp of D50 and placed on Bipap with improvement in mental status.  Patient had hypoxia and worsening mental status upon arrival to the SICU and was intubated.  Initial AMS and hypoglycemia likely secondary to sensitivity to insulin shifting in setting of ESRD.        PLAN:   Neuro: AMS likely secondary to hypoglycemia  - Precedex, wakes up and follows commands  - Pain control: Dilaudid and Tylenol PRN     Resp: Acute hypoxic and hypercarbic respiratory failure  - PRVC 16/600/5/30%   - S/p steroids, re-evaluated cuff leak today   - Daily CXR     CV: h/o HTN  - On miranda 0.4, wean as tolerated   - Lactate 3, trend   - Hold anti-hypertensives for now     GI:  - Diet: TF to goal   - Protonix    /Renal: h/o ESRD (MWF), RUE fistula   - D10 @25 for hypoglycemia   - S/p HD with 3L fluid removal     ID:  - No acute need for abx  - Monitor WBC    Heme: VTE prophylaxis  - Chemical VTE ppx: SQH  - Mechanical VTE ppx: SCDs     Endo: Hypoglycemia  - D10 @25  - D/c D10 when feeds at goal if glucose remains stable     Lines:  - SUZANNEE QUINTIN  - 18 gauge PIV  - L radial A-line (8/15)     Code: FULL   Dispo: SICU

## 2023-08-17 NOTE — PROGRESS NOTE ADULT - PROBLEM SELECTOR PLAN 3
Continue with HD      Thank you for this consult.  Javon Elmore  Nephrology Fellow  Please contact me on TEAMS  After 5 pm please contact the on-call Fellow.

## 2023-08-17 NOTE — OCCUPATIONAL THERAPY INITIAL EVALUATION ADULT - ADDITIONAL COMMENTS
Pt reports that prior to admission he lived with family members in a PH.  3 rosi, 2+4+3 steps within the house. There is a tub in bathroom and he owns a cane. He was independent in feeding if he utilized a bowl. He required some assist with dressing sec to difficulty with closures as a result of poor fmc.

## 2023-08-17 NOTE — PROGRESS NOTE ADULT - ATTENDING COMMENTS
37yr M hx of HTN ESRD, on HD s/p hip arthroplasty and had to be reintubated due to resp failure and hypoglycemia    ON: brief hypotension    on precedex, pause for SAT. follows commands  PRVC 5/30/16/600 PIP 12  SBT now, and given steroids, will attempt to extubate  AM CXR no acute findings  good gas exchange  s/p steroids for no cuff leak  miranda 0.4 goal SBP 90  NPO  NG tube feeding, restart if successfully extubated  protonix  HD 8/16, may need repeat for volume as well as mild hyperkalemia today  hyperkalemic post HD, started on lokelma  dc D10 infusion  sqh ppx  off antibiotics, afebrile  left arthroplasty  PT OT when able  full code  keep family updated    peripheral lines, rt rad a line , LUE QUINTIN

## 2023-08-17 NOTE — OCCUPATIONAL THERAPY INITIAL EVALUATION ADULT - TRANSFER TRAINING, PT EVAL
GOAL: Patient will be independent with functional transfer with use of dipika walker or platform walker if cleared by MD  in two weeks

## 2023-08-18 LAB
ANION GAP SERPL CALC-SCNC: 19 MMOL/L — HIGH (ref 5–17)
ANION GAP SERPL CALC-SCNC: 20 MMOL/L — HIGH (ref 5–17)
BLD GP AB SCN SERPL QL: POSITIVE — SIGNIFICANT CHANGE UP
BUN SERPL-MCNC: 79 MG/DL — HIGH (ref 7–23)
BUN SERPL-MCNC: 91 MG/DL — HIGH (ref 7–23)
CALCIUM SERPL-MCNC: 8.7 MG/DL — SIGNIFICANT CHANGE UP (ref 8.4–10.5)
CALCIUM SERPL-MCNC: 9 MG/DL — SIGNIFICANT CHANGE UP (ref 8.4–10.5)
CHLORIDE SERPL-SCNC: 91 MMOL/L — LOW (ref 96–108)
CHLORIDE SERPL-SCNC: 94 MMOL/L — LOW (ref 96–108)
CO2 SERPL-SCNC: 23 MMOL/L — SIGNIFICANT CHANGE UP (ref 22–31)
CO2 SERPL-SCNC: 24 MMOL/L — SIGNIFICANT CHANGE UP (ref 22–31)
CREAT SERPL-MCNC: 8.74 MG/DL — HIGH (ref 0.5–1.3)
CREAT SERPL-MCNC: 9.72 MG/DL — HIGH (ref 0.5–1.3)
EGFR: 6 ML/MIN/1.73M2 — LOW
EGFR: 7 ML/MIN/1.73M2 — LOW
GLUCOSE BLDC GLUCOMTR-MCNC: 117 MG/DL — HIGH (ref 70–99)
GLUCOSE BLDC GLUCOMTR-MCNC: 75 MG/DL — SIGNIFICANT CHANGE UP (ref 70–99)
GLUCOSE BLDC GLUCOMTR-MCNC: 86 MG/DL — SIGNIFICANT CHANGE UP (ref 70–99)
GLUCOSE SERPL-MCNC: 82 MG/DL — SIGNIFICANT CHANGE UP (ref 70–99)
GLUCOSE SERPL-MCNC: 89 MG/DL — SIGNIFICANT CHANGE UP (ref 70–99)
MAGNESIUM SERPL-MCNC: 2.2 MG/DL — SIGNIFICANT CHANGE UP (ref 1.6–2.6)
MAGNESIUM SERPL-MCNC: 2.3 MG/DL — SIGNIFICANT CHANGE UP (ref 1.6–2.6)
PHOSPHATE SERPL-MCNC: 6.2 MG/DL — HIGH (ref 2.5–4.5)
PHOSPHATE SERPL-MCNC: 6.3 MG/DL — HIGH (ref 2.5–4.5)
POTASSIUM SERPL-MCNC: 5.3 MMOL/L — SIGNIFICANT CHANGE UP (ref 3.5–5.3)
POTASSIUM SERPL-MCNC: 5.5 MMOL/L — HIGH (ref 3.5–5.3)
POTASSIUM SERPL-SCNC: 5.3 MMOL/L — SIGNIFICANT CHANGE UP (ref 3.5–5.3)
POTASSIUM SERPL-SCNC: 5.5 MMOL/L — HIGH (ref 3.5–5.3)
RH IG SCN BLD-IMP: POSITIVE — SIGNIFICANT CHANGE UP
SODIUM SERPL-SCNC: 135 MMOL/L — SIGNIFICANT CHANGE UP (ref 135–145)
SODIUM SERPL-SCNC: 136 MMOL/L — SIGNIFICANT CHANGE UP (ref 135–145)

## 2023-08-18 PROCEDURE — 99222 1ST HOSP IP/OBS MODERATE 55: CPT

## 2023-08-18 PROCEDURE — 71045 X-RAY EXAM CHEST 1 VIEW: CPT | Mod: 26

## 2023-08-18 PROCEDURE — 99232 SBSQ HOSP IP/OBS MODERATE 35: CPT | Mod: GC

## 2023-08-18 PROCEDURE — 99233 SBSQ HOSP IP/OBS HIGH 50: CPT

## 2023-08-18 RX ORDER — SODIUM ZIRCONIUM CYCLOSILICATE 10 G/10G
10 POWDER, FOR SUSPENSION ORAL DAILY
Refills: 0 | Status: COMPLETED | OUTPATIENT
Start: 2023-08-18 | End: 2023-08-20

## 2023-08-18 RX ORDER — DEXTROSE 50 % IN WATER 50 %
50 SYRINGE (ML) INTRAVENOUS ONCE
Refills: 0 | Status: COMPLETED | OUTPATIENT
Start: 2023-08-18 | End: 2023-08-18

## 2023-08-18 RX ORDER — METOPROLOL TARTRATE 50 MG
25 TABLET ORAL
Refills: 0 | Status: DISCONTINUED | OUTPATIENT
Start: 2023-08-18 | End: 2023-08-24

## 2023-08-18 RX ORDER — ALPRAZOLAM 0.25 MG
0.25 TABLET ORAL EVERY 6 HOURS
Refills: 0 | Status: DISCONTINUED | OUTPATIENT
Start: 2023-08-18 | End: 2023-08-24

## 2023-08-18 RX ORDER — MORPHINE SULFATE 50 MG/1
15 CAPSULE, EXTENDED RELEASE ORAL EVERY 4 HOURS
Refills: 0 | Status: DISCONTINUED | OUTPATIENT
Start: 2023-08-18 | End: 2023-08-18

## 2023-08-18 RX ORDER — HYDROMORPHONE HYDROCHLORIDE 2 MG/ML
0.5 INJECTION INTRAMUSCULAR; INTRAVENOUS; SUBCUTANEOUS ONCE
Refills: 0 | Status: DISCONTINUED | OUTPATIENT
Start: 2023-08-18 | End: 2023-08-18

## 2023-08-18 RX ORDER — MORPHINE SULFATE 50 MG/1
15 CAPSULE, EXTENDED RELEASE ORAL EVERY 8 HOURS
Refills: 0 | Status: DISCONTINUED | OUTPATIENT
Start: 2023-08-18 | End: 2023-08-18

## 2023-08-18 RX ORDER — DEXTROSE 50 % IN WATER 50 %
25 SYRINGE (ML) INTRAVENOUS ONCE
Refills: 0 | Status: DISCONTINUED | OUTPATIENT
Start: 2023-08-18 | End: 2023-08-18

## 2023-08-18 RX ORDER — SODIUM ZIRCONIUM CYCLOSILICATE 10 G/10G
10 POWDER, FOR SUSPENSION ORAL ONCE
Refills: 0 | Status: COMPLETED | OUTPATIENT
Start: 2023-08-18 | End: 2023-08-18

## 2023-08-18 RX ORDER — INSULIN HUMAN 100 [IU]/ML
5 INJECTION, SOLUTION SUBCUTANEOUS ONCE
Refills: 0 | Status: COMPLETED | OUTPATIENT
Start: 2023-08-18 | End: 2023-08-18

## 2023-08-18 RX ADMIN — HEPARIN SODIUM 5000 UNIT(S): 5000 INJECTION INTRAVENOUS; SUBCUTANEOUS at 05:39

## 2023-08-18 RX ADMIN — Medication 25 MILLIGRAM(S): at 05:40

## 2023-08-18 RX ADMIN — Medication 400 MILLIGRAM(S): at 03:12

## 2023-08-18 RX ADMIN — HEPARIN SODIUM 5000 UNIT(S): 5000 INJECTION INTRAVENOUS; SUBCUTANEOUS at 22:41

## 2023-08-18 RX ADMIN — HYDROMORPHONE HYDROCHLORIDE 0.5 MILLIGRAM(S): 2 INJECTION INTRAMUSCULAR; INTRAVENOUS; SUBCUTANEOUS at 05:39

## 2023-08-18 RX ADMIN — INSULIN HUMAN 5 UNIT(S): 100 INJECTION, SOLUTION SUBCUTANEOUS at 05:40

## 2023-08-18 RX ADMIN — HEPARIN SODIUM 5000 UNIT(S): 5000 INJECTION INTRAVENOUS; SUBCUTANEOUS at 15:34

## 2023-08-18 RX ADMIN — Medication 975 MILLIGRAM(S): at 12:00

## 2023-08-18 RX ADMIN — Medication 1 TABLET(S): at 11:31

## 2023-08-18 RX ADMIN — OXYCODONE HYDROCHLORIDE 10 MILLIGRAM(S): 5 TABLET ORAL at 00:11

## 2023-08-18 RX ADMIN — Medication 0.25 MILLIGRAM(S): at 17:42

## 2023-08-18 RX ADMIN — CHLORHEXIDINE GLUCONATE 1 APPLICATION(S): 213 SOLUTION TOPICAL at 05:46

## 2023-08-18 RX ADMIN — Medication 50 MILLILITER(S): at 05:40

## 2023-08-18 RX ADMIN — Medication 1334 MILLIGRAM(S): at 11:32

## 2023-08-18 RX ADMIN — Medication 975 MILLIGRAM(S): at 11:32

## 2023-08-18 RX ADMIN — Medication 975 MILLIGRAM(S): at 17:42

## 2023-08-18 RX ADMIN — Medication 25 MILLIGRAM(S): at 01:25

## 2023-08-18 RX ADMIN — HYDROMORPHONE HYDROCHLORIDE 0.5 MILLIGRAM(S): 2 INJECTION INTRAMUSCULAR; INTRAVENOUS; SUBCUTANEOUS at 06:09

## 2023-08-18 RX ADMIN — Medication 1000 MILLIGRAM(S): at 03:42

## 2023-08-18 RX ADMIN — POLYETHYLENE GLYCOL 3350 17 GRAM(S): 17 POWDER, FOR SOLUTION ORAL at 11:31

## 2023-08-18 RX ADMIN — SODIUM ZIRCONIUM CYCLOSILICATE 10 GRAM(S): 10 POWDER, FOR SUSPENSION ORAL at 05:39

## 2023-08-18 RX ADMIN — SODIUM ZIRCONIUM CYCLOSILICATE 10 GRAM(S): 10 POWDER, FOR SUSPENSION ORAL at 13:13

## 2023-08-18 RX ADMIN — Medication 975 MILLIGRAM(S): at 18:42

## 2023-08-18 RX ADMIN — SENNA PLUS 2 TABLET(S): 8.6 TABLET ORAL at 22:40

## 2023-08-18 RX ADMIN — Medication 1334 MILLIGRAM(S): at 11:08

## 2023-08-18 NOTE — BH CONSULTATION LIAISON ASSESSMENT NOTE - NSBHATTESTCOMMENTATTENDFT_PSY_A_CORE
This is a 37-y.o. AAM, PMHx renal osteodystrophy, ESRD on dialysis, HTN, hx of CVA at age 10, recently hospitalized for radial fracture on 07/27/2023, presented to [facility] s/p fall in shower causing him to land on his left hip, psychiatry consulted for severe anxiety.    I have seen and evaluated this patient myself. Chart, labs, meds reviewed. I agree with resident's assessment and plan.

## 2023-08-18 NOTE — PROGRESS NOTE ADULT - ASSESSMENT
BRIDGET NORTH is a 37y Male with h/o HTN, ESRD on HD (MWF), Stroke (age 10) now admitted on 8/14 s/p fall with L femoral neck fracture. Patient underwent L hip hemiarthroplasty.  Intra-operatively patient was on Norepinephrine and vasopressin, received 1 unit of PRBCs and K was shifted 3x with Insulin and D50.  At the end of the case patient was off of vasopressor support and extubated.  Patient transferred to PACU, where he became lethargic.  ABG obtained which showed respiratory acidosis and glucose in the 40s.  He was given an amp of D50 and placed on Bipap with improvement in mental status.  Patient had hypoxia and worsening mental status upon arrival to the SICU and was intubated.  Initial AMS and hypoglycemia likely secondary to sensitivity to insulin shifting in setting of ESRD.        PLAN:   Neuro:   - Pain control: Dilaudid and Tylenol PRN     Resp:   - Room air  - Atelectasis, encourage IS and OOB to chair  - Daily CXR     CV: h/o HTN  - Hemodynamically stable  - lactate cleared  - Hold anti-hypertensives for now     GI:  - Diet: regular diet   - bowel regimen with senna and miralax    /Renal: h/o ESRD (MWF), RUE fistula   - S/p HD 8/17 with 2L fluid removal   - anuric     ID:  - No acute need for abx  - Monitor WBC    Heme: VTE prophylaxis  - Chemical VTE ppx: SQH  - Mechanical VTE ppx: SCDs     Endo:   - monitor glucose    Lines:  - LUE QUINTIN  - LUE midline  - 18 gauge PIV     Code: FULL   Dispo: SICU    BRIDGET NORTH is a 37y Male with h/o HTN, ESRD on HD (MWF), Stroke (age 10) now admitted on 8/14 s/p fall with L femoral neck fracture. Patient underwent L hip hemiarthroplasty.  Intra-operatively patient was on Norepinephrine and vasopressin, received 1 unit of PRBCs and K was shifted 3x with Insulin and D50.  At the end of the case patient was off of vasopressor support and extubated.  Patient transferred to PACU, where he became lethargic.  ABG obtained which showed respiratory acidosis and glucose in the 40s.  He was given an amp of D50 and placed on Bipap with improvement in mental status.  Patient had hypoxia and worsening mental status upon arrival to the SICU and was intubated.  Initial AMS and hypoglycemia likely secondary to sensitivity to insulin shifting in setting of ESRD.        PLAN:   Neuro:   - Pain control: Dilaudid and Tylenol PRN     Resp:   - Room air  - Atelectasis, encourage IS and OOB to chair  - Daily CXR     CV: h/o HTN  - Hemodynamically stable  - lactate cleared  - metoprolol 25 BID    GI:  - Diet: regular diet   - bowel regimen with senna and miralax    /Renal: h/o ESRD (MWF), RUE fistula   - S/p HD 8/17 with 2L fluid removal   - anuric     ID:  - No acute need for abx  - Monitor WBC    Heme: VTE prophylaxis  - Chemical VTE ppx: SQH  - Mechanical VTE ppx: SCDs     Endo:   - monitor glucose    Lines:  - LUE QUINTIN  - LUE midline  - 18 gauge PIV     Code: FULL   Dispo: SICU

## 2023-08-18 NOTE — BH CONSULTATION LIAISON ASSESSMENT NOTE - CURRENT MEDICATION
MEDICATIONS  (STANDING):  acetaminophen     Tablet .. 975 milliGRAM(s) Oral every 6 hours  calcium acetate 1334 milliGRAM(s) Oral three times a day with meals  chlorhexidine 2% Cloths 1 Application(s) Topical <User Schedule>  heparin   Injectable 5000 Unit(s) SubCutaneous every 8 hours  metoprolol tartrate 25 milliGRAM(s) Oral two times a day  Nephro-pat 1 Tablet(s) Oral daily  polyethylene glycol 3350 17 Gram(s) Oral daily  senna 2 Tablet(s) Oral at bedtime  sodium zirconium cyclosilicate 10 Gram(s) Oral daily    MEDICATIONS  (PRN):  morphine ER Tablet 15 milliGRAM(s) Oral every 8 hours PRN mod/severe pain

## 2023-08-18 NOTE — PROGRESS NOTE ADULT - SUBJECTIVE AND OBJECTIVE BOX
24 HOUR EVENTS:  - extubated  - given 1 PRBC  - off vasopressors  - L. radial a-line d/c  - got HD with 2L remove  - IVL  - advanced to regular diet with a bowel regimen   - midline placed    SUBJECTIVE/ROS:  [ ] A ten-point review of systems was otherwise negative except as noted.  [ ] Due to altered mental status/intubation, subjective information were not able to be obtained from the patient. History was obtained, to the extent possible, from review of the chart and collateral sources of information.      NEURO  Exam: awake, alert, oriented  Meds: acetaminophen     Tablet .. 975 milliGRAM(s) Oral every 6 hours  acetaminophen   IVPB .. 1000 milliGRAM(s) IV Intermittent every 6 hours  oxyCODONE    IR 5 milliGRAM(s) Oral every 6 hours PRN Moderate Pain (4 - 6)  oxyCODONE    IR 10 milliGRAM(s) Oral every 6 hours PRN Severe Pain (7 - 10)  [x] Adequacy of sedation and pain control has been assessed and adjusted      RESPIRATORY  RR: 22 (08-17-23 @ 23:00) (11 - 36)  SpO2: 96% (08-17-23 @ 23:00) (94% - 100%)  Exam: unlabored, clear to auscultation bilaterally  Mechanical Ventilation: Mode: CPAP with PS, RR (patient): 20, FiO2: 30, PEEP: 5, PS: 5, MAP: 10, PIP: 21  ABG - ( 17 Aug 2023 11:20 )  pH: 7.37  /  pCO2: 44    /  pO2: 80    / HCO3: 25    / Base Excess: 0.0   /  SaO2: 97.4        CARDIOVASCULAR  HR: 103 (08-17-23 @ 23:00) (69 - 103)  BP: 158/78 (08-17-23 @ 23:00) (102/50 - 168/76)  BP(mean): 111 (08-17-23 @ 23:00) (72 - 111)  ABP: 103/49 (08-17-23 @ 11:00) (87/36 - 123/49)  ABP(mean): 64 (08-17-23 @ 11:00) (49 - 73)      Exam: regular rate and rhythm  Cardiac Rhythm: sinus  Perfusion     [x]Adequate   [ ]Inadequate  Mentation   [x]Normal       [ ]Reduced  Extremities  [x]Warm         [ ]Cool  Volume Status [ ]Hypervolemic [x]Euvolemic [ ]Hypovolemic      GI/NUTRITION  Exam: soft, nontender, nondistended  Diet: regular diet  Meds: polyethylene glycol 3350 17 Gram(s) Oral daily  senna 2 Tablet(s) Oral at bedtime      GENITOURINARY  I&O's Brian    08-16 @ 07:01  -  08-17 @ 07:00  --------------------------------------------------------  IN:    Albumin 5%  - 250 mL: 250 mL    Dexmedetomidine: 310.8 mL    dextrose 10%: 100 mL    dextrose 10%: 550 mL    Enteral Tube Flush: 60 mL    IV PiggyBack: 100 mL    IV PiggyBack: 350 mL    Nepro with Carb Steady: 370 mL    Phenylephrine: 425.5 mL    Vital1.5: 80 mL  Total IN: 2596.3 mL    OUT:    Other (mL): 3000 mL    Voided (mL): 0 mL  Total OUT: 3000 mL    Total NET: -403.7 mL      08-17 @ 07:01  -  08-18 @ 00:26  --------------------------------------------------------  IN:    Dexmedetomidine: 14.7 mL    dextrose 10%: 50 mL    Enteral Tube Flush: 20 mL    IV PiggyBack: 100 mL    IV PiggyBack: 300 mL    Nepro with Carb Steady: 50 mL    Oral Fluid: 240 mL    Other (mL): 800 mL    Phenylephrine: 14.7 mL    PRBCs (Packed Red Blood Cells): 300 mL  Total IN: 1889.4 mL    OUT:    Other (mL): 2800 mL  Total OUT: 2800 mL    Total NET: -910.6 mL          08-17    136  |  94<L>  |  79<H>  ----------------------------<  89  5.5<H>   |  23  |  8.74<H>    Ca    8.7      17 Aug 2023 23:53  Phos  6.2     08-17  Mg     2.2     08-17    TPro  5.8<L>  /  Alb  3.0<L>  /  TBili  0.3  /  DBili  x   /  AST  25  /  ALT  5<L>  /  AlkPhos  865<H>  08-16    [ ] Lee catheter, indication: N/A  Meds: calcium acetate 1334 milliGRAM(s) Oral three times a day with meals  Nephro-pat 1 Tablet(s) Oral daily        HEMATOLOGIC  Meds: heparin   Injectable 5000 Unit(s) SubCutaneous every 8 hours    [x] VTE Prophylaxis                        8.1    6.00  )-----------( 230      ( 17 Aug 2023 23:53 )             24.6     PT/INR - ( 16 Aug 2023 01:44 )   PT: 12.6 sec;   INR: 1.15 ratio         PTT - ( 16 Aug 2023 01:44 )  PTT:26.2 sec  Transfusion     [ ] PRBC   [ ] Platelets   [ ] FFP   [ ] Cryoprecipitate      INFECTIOUS DISEASES  WBC Count: 6.00 K/uL (08-17 @ 23:53)  WBC Count: 5.61 K/uL (08-17 @ 11:31)  WBC Count: 5.55 K/uL (08-17 @ 06:09)  WBC Count: 6.13 K/uL (08-17 @ 01:01)    RECENT CULTURES:    Meds:       ENDOCRINE  CAPILLARY BLOOD GLUCOSE      POCT Blood Glucose.: 109 mg/dL (17 Aug 2023 06:04)  POCT Blood Glucose.: 117 mg/dL (17 Aug 2023 00:34)    Meds:       ACCESS DEVICES:  [ ] Peripheral IV  [ ] Central Venous Line	[ ] R	[ ] L	[ ] IJ	[ ] Fem	[ ] SC	Placed:   [ ] Arterial Line		[ ] R	[ ] L	[ ] Fem	[ ] Rad	[ ] Ax	Placed:   [ ] PICC:					[ ] Mediport  [ ] Urinary Catheter, Date Placed:   [x] Necessity of urinary, arterial, and venous catheters discussed    OTHER MEDICATIONS:  chlorhexidine 2% Cloths 1 Application(s) Topical <User Schedule>      CODE STATUS:      IMAGING: 24 HOUR EVENTS:  - extubated  - given 1 PRBC  - off vasopressors  - L. radial a-line d/c  - got HD with 2L remove  - IVL  - advanced to regular diet with a bowel regimen   - midline placed  - tachycardic, started on metoprolol 25 BID with holding parameters    SUBJECTIVE/ROS:  [ ] A ten-point review of systems was otherwise negative except as noted.  [ ] Due to altered mental status/intubation, subjective information were not able to be obtained from the patient. History was obtained, to the extent possible, from review of the chart and collateral sources of information.      NEURO  Exam: awake, alert, oriented  Meds: acetaminophen     Tablet .. 975 milliGRAM(s) Oral every 6 hours  acetaminophen   IVPB .. 1000 milliGRAM(s) IV Intermittent every 6 hours  oxyCODONE    IR 5 milliGRAM(s) Oral every 6 hours PRN Moderate Pain (4 - 6)  oxyCODONE    IR 10 milliGRAM(s) Oral every 6 hours PRN Severe Pain (7 - 10)  [x] Adequacy of sedation and pain control has been assessed and adjusted      RESPIRATORY  RR: 22 (08-17-23 @ 23:00) (11 - 36)  SpO2: 96% (08-17-23 @ 23:00) (94% - 100%)  Exam: unlabored, clear to auscultation bilaterally  Mechanical Ventilation: Mode: CPAP with PS, RR (patient): 20, FiO2: 30, PEEP: 5, PS: 5, MAP: 10, PIP: 21  ABG - ( 17 Aug 2023 11:20 )  pH: 7.37  /  pCO2: 44    /  pO2: 80    / HCO3: 25    / Base Excess: 0.0   /  SaO2: 97.4        CARDIOVASCULAR  HR: 103 (08-17-23 @ 23:00) (69 - 103)  BP: 158/78 (08-17-23 @ 23:00) (102/50 - 168/76)  BP(mean): 111 (08-17-23 @ 23:00) (72 - 111)  ABP: 103/49 (08-17-23 @ 11:00) (87/36 - 123/49)  ABP(mean): 64 (08-17-23 @ 11:00) (49 - 73)      Exam: regular rate and rhythm  Cardiac Rhythm: sinus  Perfusion     [x]Adequate   [ ]Inadequate  Mentation   [x]Normal       [ ]Reduced  Extremities  [x]Warm         [ ]Cool  Volume Status [ ]Hypervolemic [x]Euvolemic [ ]Hypovolemic      GI/NUTRITION  Exam: soft, nontender, nondistended  Diet: regular diet  Meds: polyethylene glycol 3350 17 Gram(s) Oral daily  senna 2 Tablet(s) Oral at bedtime      GENITOURINARY  I&O's Detail    08-16 @ 07:01 - 08-17 @ 07:00  --------------------------------------------------------  IN:    Albumin 5%  - 250 mL: 250 mL    Dexmedetomidine: 310.8 mL    dextrose 10%: 100 mL    dextrose 10%: 550 mL    Enteral Tube Flush: 60 mL    IV PiggyBack: 100 mL    IV PiggyBack: 350 mL    Nepro with Carb Steady: 370 mL    Phenylephrine: 425.5 mL    Vital1.5: 80 mL  Total IN: 2596.3 mL    OUT:    Other (mL): 3000 mL    Voided (mL): 0 mL  Total OUT: 3000 mL    Total NET: -403.7 mL      08-17 @ 07:01 - 08-18 @ 00:26  --------------------------------------------------------  IN:    Dexmedetomidine: 14.7 mL    dextrose 10%: 50 mL    Enteral Tube Flush: 20 mL    IV PiggyBack: 100 mL    IV PiggyBack: 300 mL    Nepro with Carb Steady: 50 mL    Oral Fluid: 240 mL    Other (mL): 800 mL    Phenylephrine: 14.7 mL    PRBCs (Packed Red Blood Cells): 300 mL  Total IN: 1889.4 mL    OUT:    Other (mL): 2800 mL  Total OUT: 2800 mL    Total NET: -910.6 mL          08-17    136  |  94<L>  |  79<H>  ----------------------------<  89  5.5<H>   |  23  |  8.74<H>    Ca    8.7      17 Aug 2023 23:53  Phos  6.2     08-17  Mg     2.2     08-17    TPro  5.8<L>  /  Alb  3.0<L>  /  TBili  0.3  /  DBili  x   /  AST  25  /  ALT  5<L>  /  AlkPhos  865<H>  08-16    [ ] Lee catheter, indication: N/A  Meds: calcium acetate 1334 milliGRAM(s) Oral three times a day with meals  Nephro-pat 1 Tablet(s) Oral daily        HEMATOLOGIC  Meds: heparin   Injectable 5000 Unit(s) SubCutaneous every 8 hours    [x] VTE Prophylaxis                        8.1    6.00  )-----------( 230      ( 17 Aug 2023 23:53 )             24.6     PT/INR - ( 16 Aug 2023 01:44 )   PT: 12.6 sec;   INR: 1.15 ratio         PTT - ( 16 Aug 2023 01:44 )  PTT:26.2 sec  Transfusion     [ ] PRBC   [ ] Platelets   [ ] FFP   [ ] Cryoprecipitate      INFECTIOUS DISEASES  WBC Count: 6.00 K/uL (08-17 @ 23:53)  WBC Count: 5.61 K/uL (08-17 @ 11:31)  WBC Count: 5.55 K/uL (08-17 @ 06:09)  WBC Count: 6.13 K/uL (08-17 @ 01:01)    RECENT CULTURES:    Meds:       ENDOCRINE  CAPILLARY BLOOD GLUCOSE      POCT Blood Glucose.: 109 mg/dL (17 Aug 2023 06:04)  POCT Blood Glucose.: 117 mg/dL (17 Aug 2023 00:34)    Meds:       ACCESS DEVICES:  [ ] Peripheral IV  [ ] Central Venous Line	[ ] R	[ ] L	[ ] IJ	[ ] Fem	[ ] SC	Placed:   [ ] Arterial Line		[ ] R	[ ] L	[ ] Fem	[ ] Rad	[ ] Ax	Placed:   [ ] PICC:					[ ] Mediport  [ ] Urinary Catheter, Date Placed:   [x] Necessity of urinary, arterial, and venous catheters discussed    OTHER MEDICATIONS:  chlorhexidine 2% Cloths 1 Application(s) Topical <User Schedule>      CODE STATUS:      IMAGING:

## 2023-08-18 NOTE — PROGRESS NOTE ADULT - ASSESSMENT
37 year old male with PMH of End stage renal disease (M,W,F), hypertension, hypothyroidism, stroke at age of 10, sleep apnea, obesity and recent hospitalization for left radial fracture 7/27/2023 presenting to ER after a fall.     (Aug 15) Pt underwent L hip hemiarthroplasty and intubated for desaturation post-op, extubated.    ESRD on HD (MWF) -R-AVF

## 2023-08-18 NOTE — CONSULT NOTE ADULT - SUBJECTIVE AND OBJECTIVE BOX
Patient is a 37y old  Male who presents with a chief complaint of Left femoral neck fracture (18 Aug 2023 09:22)    Admission HPI:  37 year old male with past medical history of End stage renal disease(M,W,F), hypertension, hypothyroidism, stroke at age of 10, sleep apnea, obesity and recent hospitalization for  left radial fracture 7/27/2023 presenting to [facility] emergency department after a mechanical fall in the shower which led him to land on his left hip. Patient is accompanied at the bedside by family members who provide collateral history. Patient states that he completed antibiotics for uvulitis that were prescribed at previous admission. Patient had has fascia iliaca block done in emergency room but states that the pain is refractory. He denies any head trauma or loss of consciousness secondary to the mechanical fall. Denies headache, nausea, vomiting, loss of sensation in lower extremities, or urinary/fecal incontinence.  (14 Aug 2023 12:01)    Interval History:  Patient dx with L femoral neck fracture.   Went to OR on 8/15 for hemiarthroplasty of L hip.  Post-op c/b hypoxia that required intubation.   Recent L radial fx- currently NWB [place]US[place].    REVIEW OF SYSTEMS: No chest pain, shortness of breath, nausea, vomiting or diarhea; other ROS neg     PAST MEDICAL & SURGICAL HISTORY  HTN (hypertension)    Stroke    Morbid obesity    Sleep apnea    Leg fracture, right    Chronic renal failure    Hemodialysis access, AV graft    ESRD (end stage renal disease) on dialysis    Anemia, unspecified type    Hypothyroidism    AV fistula    FUNCTIONAL HISTORY:   Lives in house w 15 [address]  PTA Independent amb w cane    CURRENT FUNCTIONAL STATUS:  Max A transfers    FAMILY HISTORY   Family history of diabetes mellitus    MEDICATIONS   acetaminophen     Tablet .. 975 milliGRAM(s) Oral every 6 hours  calcium acetate 1334 milliGRAM(s) Oral three times a day with meals  chlorhexidine 2% Cloths 1 Application(s) Topical <User Schedule>  heparin   Injectable 5000 Unit(s) SubCutaneous every 8 hours  metoprolol tartrate 25 milliGRAM(s) Oral two times a day  morphine  IR 15 milliGRAM(s) Oral every 4 hours PRN  Nephro-pat 1 Tablet(s) Oral daily  polyethylene glycol 3350 17 Gram(s) Oral daily  senna 2 Tablet(s) Oral at bedtime  sodium zirconium cyclosilicate 10 Gram(s) Oral daily    ALLERGIES  No Known Drug Allergies  shellfish (Hives)    VITALS  T(C): 36.6 (08-18-23 @ 11:00), Max: 37.5 (08-17-23 @ 23:00)  HR: 93 (08-18-23 @ 12:00) (79 - 109)  BP: 157/71 (08-18-23 @ 12:00) (124/61 - 190/85)  RR: 16 (08-18-23 @ 12:00) (12 - 36)  SpO2: 93% (08-18-23 @ 12:00) (92% - 99%)  Wt(kg): --    PHYSICAL EXAM  Constitutional - NAD, Comfortable  HEENT - NCAT, EOMI  Neck - Supple  Chest - No distress, no use of accessory muscles for respiration  Cardiovascular -Well perfused  Abdomen - BS+, Soft, NTND  Extremities - No C/C/E, No calf tenderness   Neurologic Exam -                    Cognitive - Awake, Alert, AAO to self, place, date, year, situation     Communication - Fluent, No dysarthria, no aphasia     Cranial Nerves - CN 2-12 intact     Motor - No focal deficits      Sensory - Intact to LT     Reflexes - DTR Intact, No primitive reflexive  Psychiatric - Mood stable, Affect WNL    RECENT LABS/IMAGING  CBC Full  -  ( 17 Aug 2023 23:53 )  WBC Count : 6.00 K/uL  RBC Count : 2.70 M/uL  Hemoglobin : 8.1 g/dL  Hematocrit : 24.6 %  Platelet Count - Automated : 230 K/uL  Mean Cell Volume : 91.1 fl  Mean Cell Hemoglobin : 30.0 pg  Mean Cell Hemoglobin Concentration : 32.9 gm/dL  Auto Neutrophil # : x  Auto Lymphocyte # : x  Auto Monocyte # : x  Auto Eosinophil # : x  Auto Basophil # : x  Auto Neutrophil % : x  Auto Lymphocyte % : x  Auto Monocyte % : x  Auto Eosinophil % : x  Auto Basophil % : x    08-18    135  |  91<L>  |  91<H>  ----------------------------<  82  5.3   |  24  |  9.72<H>    Ca    9.0      18 Aug 2023 11:58  Phos  6.3     08-18  Mg     2.3     08-18      Urinalysis Basic - ( 18 Aug 2023 11:58 )    Color: x / Appearance: x / SG: x / pH: x  Gluc: 82 mg/dL / Ketone: x  / Bili: x / Urobili: x   Blood: x / Protein: x / Nitrite: x   Leuk Esterase: x / RBC: x / WBC x   Sq Epi: x / Non Sq Epi: x / Bacteria: x    Impression:  36 yo with functional deficits secondary to diagnosis of L Femoral Neck fx, recent L radial fx    Plan:  PT- ROM, Bed Mob, Transfers, Amb w AD and bracing as needed  OT- ADLs, bracing  Prec- Falls, Cardiac, Pulm  DVT Prophylaxis- Heparin  Skin- Turn q2 h  Dispo-  Patient is a 37y old  Male who presents with a chief complaint of Left femoral neck fracture (18 Aug 2023 09:22)    Admission HPI:  37 year old male with past medical history of End stage renal disease(M,W,F), hypertension, hypothyroidism, stroke at age of 10, sleep apnea, obesity and recent hospitalization for  left radial fracture 7/27/2023 presenting to [facility] emergency department after a mechanical fall in the shower which led him to land on his left hip. Patient is accompanied at the bedside by family members who provide collateral history. Patient states that he completed antibiotics for uvulitis that were prescribed at previous admission. Patient had has fascia iliaca block done in emergency room but states that the pain is refractory. He denies any head trauma or loss of consciousness secondary to the mechanical fall. Denies headache, nausea, vomiting, loss of sensation in lower extremities, or urinary/fecal incontinence.  (14 Aug 2023 12:01)    Interval History:  Patient dx with L femoral neck fracture.   Went to OR on 8/15 for hemiarthroplasty of L hip.  Post-op c/b hypoxia that required intubation.   Recent L radial fx- currently NWRILEY [place].    REVIEW OF SYSTEMS: Pain, controlled w meds, + difficulty walking, No chest pain, shortness of breath, nausea, vomiting or diarhea; other ROS neg     PAST MEDICAL & SURGICAL HISTORY  HTN (hypertension)    Stroke    Morbid obesity    Sleep apnea    Leg fracture, right    Chronic renal failure    Hemodialysis access, AV graft    ESRD (end stage renal disease) on dialysis    Anemia, unspecified type    Hypothyroidism    AV fistula    FUNCTIONAL HISTORY:   Lives in house w [address]  PTA Independent amb w cane    CURRENT FUNCTIONAL STATUS:  Max A transfers    FAMILY HISTORY   Family history of diabetes mellitus    MEDICATIONS   acetaminophen     Tablet .. 975 milliGRAM(s) Oral every 6 hours  calcium acetate 1334 milliGRAM(s) Oral three times a day with meals  chlorhexidine 2% Cloths 1 Application(s) Topical <User Schedule>  heparin   Injectable 5000 Unit(s) SubCutaneous every 8 hours  metoprolol tartrate 25 milliGRAM(s) Oral two times a day  morphine  IR 15 milliGRAM(s) Oral every 4 hours PRN  Nephro-pat 1 Tablet(s) Oral daily  polyethylene glycol 3350 17 Gram(s) Oral daily  senna 2 Tablet(s) Oral at bedtime  sodium zirconium cyclosilicate 10 Gram(s) Oral daily    ALLERGIES  No Known Drug Allergies  shellfish (Hives)    VITALS  T(C): 36.6 (08-18-23 @ 11:00), Max: 37.5 (08-17-23 @ 23:00)  HR: 93 (08-18-23 @ 12:00) (79 - 109)  BP: 157/71 (08-18-23 @ 12:00) (124/61 - 190/85)  RR: 16 (08-18-23 @ 12:00) (12 - 36)  SpO2: 93% (08-18-23 @ 12:00) (92% - 99%)  Wt(kg): --    PHYSICAL EXAM  Constitutional - NAD, Comfortable  HEENT - NCAT, EOMI  Neck - Supple  Chest - No distress, no use of accessory muscles for respiration  Cardiovascular -Well perfused  Abdomen - BS+, Soft, NTND  Extremities - Trace edema, No calf tenderness   Neurologic Exam -                 AAO x 3  Motor nml grade (LLE prox and LUE dec due to pain)  Sensation intact  No clonus   Psychiatric - Mood stable, Affect WNL    RECENT LABS/IMAGING  CBC Full  -  ( 17 Aug 2023 23:53 )  WBC Count : 6.00 K/uL  RBC Count : 2.70 M/uL  Hemoglobin : 8.1 g/dL  Hematocrit : 24.6 %  Platelet Count - Automated : 230 K/uL  Mean Cell Volume : 91.1 fl  Mean Cell Hemoglobin : 30.0 pg  Mean Cell Hemoglobin Concentration : 32.9 gm/dL  Auto Neutrophil # : x  Auto Lymphocyte # : x  Auto Monocyte # : x  Auto Eosinophil # : x  Auto Basophil # : x  Auto Neutrophil % : x  Auto Lymphocyte % : x  Auto Monocyte % : x  Auto Eosinophil % : x  Auto Basophil % : x    08-18    135  |  91<L>  |  91<H>  ----------------------------<  82  5.3   |  24  |  9.72<H>    Ca    9.0      18 Aug 2023 11:58  Phos  6.3     08-18  Mg     2.3     08-18      Urinalysis Basic - ( 18 Aug 2023 11:58 )    Color: x / Appearance: x / SG: x / pH: x  Gluc: 82 mg/dL / Ketone: x  / Bili: x / Urobili: x   Blood: x / Protein: x / Nitrite: x   Leuk Esterase: x / RBC: x / WBC x   Sq Epi: x / Non Sq Epi: x / Bacteria: x    Impression:  36 yo with functional deficits secondary to diagnosis of L Femoral Neck fx, recent L radial fx    Plan:  PT- ROM, Bed Mob, Transfers, Amb w AD and bracing as needed  OT- ADLs, bracing  Prec- Falls, Cardiac, Pulm, NWB LUE, WBAT LLE  DVT Prophylaxis- Heparin  Monitor renal function and anemia  Skin- Turn q2 h  Dispo- Acute Rehab- patient requires active and ongoing therapeutic interventions of multiple disciplines and can tolerate 3 hours of therapies. Can actively participate and benefit from  an intensive rehabilitation program. Requires supervision by a rehabilitation physician to monitor pain, anemia and renal function, and a coordinated interdisciplinary approach to providing rehabilitation.

## 2023-08-18 NOTE — BH CONSULTATION LIAISON ASSESSMENT NOTE - NSBHCHARTREVIEWVS_PSY_A_CORE FT
Vital Signs Last 24 Hrs  T(C): 36.4 (18 Aug 2023 07:00), Max: 37.5 (17 Aug 2023 23:00)  T(F): 97.5 (18 Aug 2023 07:00), Max: 99.5 (17 Aug 2023 23:00)  HR: 84 (18 Aug 2023 09:00) (79 - 109)  BP: 155/78 (18 Aug 2023 09:00) (102/50 - 190/85)  BP(mean): 107 (18 Aug 2023 09:00) (72 - 124)  RR: 18 (18 Aug 2023 09:00) (12 - 36)  SpO2: 93% (18 Aug 2023 09:00) (92% - 98%)    Parameters below as of 18 Aug 2023 07:00  Patient On (Oxygen Delivery Method): room air

## 2023-08-18 NOTE — PROGRESS NOTE ADULT - SUBJECTIVE AND OBJECTIVE BOX
[facility] DIVISION OF KIDNEY DISEASES AND HYPERTENSION   FOLLOW UP NOTE    --------------------------------------------------------------------------------  Chief Complaint:    24 hour events/subjective: Denies any complaints.       PAST HISTORY  --------------------------------------------------------------------------------  No significant changes to PMH, PSH, FHx, SHx, unless otherwise noted    ALLERGIES & MEDICATIONS  --------------------------------------------------------------------------------  Allergies    No Known Drug Allergies  shellfish (Hives)    Intolerances      Standing Inpatient Medications  acetaminophen     Tablet .. 975 milliGRAM(s) Oral every 6 hours  calcium acetate 1334 milliGRAM(s) Oral three times a day with meals  chlorhexidine 2% Cloths 1 Application(s) Topical <User Schedule>  heparin   Injectable 5000 Unit(s) SubCutaneous every 8 hours  metoprolol tartrate 25 milliGRAM(s) Oral two times a day  Nephro-pat 1 Tablet(s) Oral daily  polyethylene glycol 3350 17 Gram(s) Oral daily  senna 2 Tablet(s) Oral at bedtime    PRN Inpatient Medications  oxyCODONE    IR 5 milliGRAM(s) Oral every 6 hours PRN  oxyCODONE    IR 10 milliGRAM(s) Oral every 6 hours PRN      REVIEW OF SYSTEMS  --------------------------------------------------------------------------------  Gen: No fevers/chills  Head/Eyes/Ears: No HA   Respiratory: No dyspnea, cough  CV: No chest pain  GI: No abdominal pain, diarrhea  : No dysuria, hematuria  MSK: No  edema  Skin: No rashes  Heme: No easy bruising or bleeding    All other systems were reviewed and are negative, except as noted.    VITALS/PHYSICAL EXAM  --------------------------------------------------------------------------------  T(C): 36.4 (08-18-23 @ 07:00), Max: 37.5 (08-17-23 @ 23:00)  HR: 84 (08-18-23 @ 09:00) (79 - 109)  BP: 155/78 (08-18-23 @ 09:00) (102/50 - 190/85)  RR: 18 (08-18-23 @ 09:00) (12 - 36)  SpO2: 93% (08-18-23 @ 09:00) (92% - 98%)  Wt(kg): --        08-17-23 @ 07:01  -  08-18-23 @ 07:00  --------------------------------------------------------  IN: 2709.4 mL / OUT: 2800 mL / NET: -90.6 mL        Physical Exam:  	Gen: NAD  	HEENT: Anicteric  	Pulm: CTA B/L  	CV: S1S2+  	Abd: Soft, +BS           Transplant site: RLQ non tender, well healed surgical scar+  	Ext: No LE edema B/L  	Neuro: Awake          :Lee cath+ with clear urine  	Skin: Warm and dry  	Dialysis access:      LABS/STUDIES  --------------------------------------------------------------------------------              8.1    6.00  >-----------<  230      [08-17-23 @ 23:53]              24.6     136  |  94  |  79  ----------------------------<  89      [08-17-23 @ 23:53]  5.5   |  23  |  8.74        Ca     8.7     [08-17-23 @ 23:53]      Mg     2.2     [08-17-23 @ 23:53]      Phos  6.2     [08-17-23 @ 23:53]            Creatinine Trend:  SCr 8.74 [08-17 @ 23:53]  SCr 7.78 [08-17 @ 11:31]  SCr 7.38 [08-17 @ 06:09]  SCr 7.47 [08-17 @ 01:01]  SCr 7.36 [08-16 @ 17:24]    Urinalysis - [08-17-23 @ 23:53]      Color  / Appearance  / SG  / pH       Gluc 89 / Ketone   / Bili  / Urobili        Blood  / Protein  / Leuk Est  / Nitrite       RBC  / WBC  / Hyaline  / Gran  / Sq Epi  / Non Sq Epi  / Bacteria           Tacrolimus  Cyclosporine  Sirolimus  Mycophenolate  BK PCR  CMV PCR  Parvo PCR  EBV PCR

## 2023-08-18 NOTE — CHART NOTE - NSCHARTNOTEFT_GEN_A_CORE
SICU Transfer Note    Transfer from: SICU  Transfer to:  ( x ) Medicine    (  ) Telemetry    (  ) RCU    (  ) Palliative    (  ) Stroke Unit    (  ) _______________  Accepting Physican: Brandon Sanders  SIgn-Out Given:     HOSPITAL COURSE:  38 y/o male w/ a PMHx of ESRD (M/W/F via LUE AVF), SOLITARIO, HTN, secondary hyperparathyroidism s/p parathyroidectomy (Dec 2022), obesity, stroke at age 10 w/ no residual deficits, and recent left radial fracture sustained after trying to lower himself into a chair s/p splint who presented on 8/14 after a fall while in the shower. Patient sustained a left femoral neck fracture s/p left hemiarthroplasty on 8/15. Immediately post-op in PACU, patient was lethargic and hypoxemic requiring reintubation so he was admitted to SICU post-operatively. Patient was hypotensive after intubation requiring pressor support. Patient without cuff leak so he was given Decadron. He was successfully extubated on 8/17 despite no cuff leak and was quickly weaned off pressors afterwards. Patient has remained hemodynamically stable and is ready for transfer to the floors. Patient reports incisional left thigh pain but otherwise denies headache, fevers, chills, dizziness, weakness, shortness of breath, chest pain, abdominal pain, or nausea/vomiting.    MEDICATIONS:  STANDING MEDICATIONS  acetaminophen     Tablet .. 975 milliGRAM(s) Oral every 6 hours  calcium acetate 1334 milliGRAM(s) Oral three times a day with meals  chlorhexidine 2% Cloths 1 Application(s) Topical <User Schedule>  heparin   Injectable 5000 Unit(s) SubCutaneous every 8 hours  metoprolol tartrate 25 milliGRAM(s) Oral two times a day  Nephro-pat 1 Tablet(s) Oral daily  polyethylene glycol 3350 17 Gram(s) Oral daily  senna 2 Tablet(s) Oral at bedtime  sodium zirconium cyclosilicate 10 Gram(s) Oral daily    PRN MEDICATIONS  ALPRAZolam 0.25 milliGRAM(s) Oral every 6 hours PRN    VITAL SIGNS: Last 24 Hours  T(C): 36.4 (18 Aug 2023 15:00), Max: 37.5 (17 Aug 2023 23:00)  T(F): 97.5 (18 Aug 2023 15:00), Max: 99.5 (17 Aug 2023 23:00)  HR: 100 (18 Aug 2023 15:00) (79 - 109)  BP: 154/74 (18 Aug 2023 15:00) (124/61 - 190/85)  BP(mean): 106 (18 Aug 2023 15:00) (85 - 124)  RR: 25 (18 Aug 2023 15:00) (12 - 27)  SpO2: 99% (18 Aug 2023 15:00) (92% - 99%)    LABS:                        8.1    6.00  )-----------( 230      ( 17 Aug 2023 23:53 )             24.6    135  |  91<L>  |  91<H>  ----------------------------<  82  5.3   |  24  |  9.72<H>    Ca    9.0      18 Aug 2023 11:58  Phos  6.3  Mg     2.3    ASSESSMENT & PLAN:  38 y/o male w/ a PMHx of ESRD (M/W/F via LUE AVF), SOLITARIO, HTN, secondary hyperparathyroidism s/p parathyroidectomy (Dec 2022), obesity, stroke at age 10 w/ no residual deficits, and recent left radial fracture sustained after trying to lower himself into a chair s/p splint presenting w/ a left femoral neck fracture s/p left hemiarthroplasty c/b acute hypoxemic respiratory failure immediately post-operatively requiring reintubation w/ successful extubation on 8/17    Neuro:  - Multimodal pain control w/ standing acetaminophen  - Patient requested to speak to psychiatry, who is recommended Xanax 0.25 mg q6hrs PRN anxiety while inpatient but Atarax 25 mg PO q6hrs PRN anxiety upon discharge    Resp:  - RA  - IS    CV:  - Required vasopressor support while intubated likely secondary to sedation-induced hypotension & has been hemodynamically stable after extubation  - Metoprolol 25 mg PO BID for HTN    GI:  - Regular diet as tolerated  - Nepro cans & Nephro-pat for supplementation  - Bowel regimen w/ Miralax & senna    Renal:  - Nephrology following: HD on 8/15 for hyperkalemia and UF on 8/16 and 8/17  - Plan is to restart routine M/W/F dialysis w/ dialysis scheduled for today  - Lokelma 10 g daily x3 days for hyperkalemia  - Calcium acetate for hyperphosphatemia    Heme:  - SQH for VTE prophylaxis (hemodialysis patient)    ID: no acute issues    Endo: no acute issues    MSK:  - NWB LUE w/ sling as needed for comfort  - LLE WBAT w/ anterior precautions    For Follow-Up:  [ ] Dispo to acute rehab - will need hemodialysis set up at acute rehab  [ ] Discharge w/ Atarax 25 mg PO q6hrs PRN anxiety as per psych  [ ] HD as per nephro

## 2023-08-18 NOTE — BH CONSULTATION LIAISON ASSESSMENT NOTE - NSBHCHARTREVIEWLAB_PSY_A_CORE FT
8.1    6.00  )-----------( 230      ( 17 Aug 2023 23:53 )             24.6   08-17    136  |  94<L>  |  79<H>  ----------------------------<  89  5.5<H>   |  23  |  8.74<H>    Ca    8.7      17 Aug 2023 23:53  Phos  6.2     08-17  Mg     2.2     08-17

## 2023-08-18 NOTE — PROGRESS NOTE ADULT - SUBJECTIVE AND OBJECTIVE BOX
Patient seen and examined at bedside this AM. Pain well controlled. No acute complaints at this time. Denies CP/SOB/F/C.     PE:      General: NAD, resting comfortably in bed, alert, following commands  Compartments soft and compressible  No calf tenderness bilaterally  LLE  +TA/EHL/FHL/GSC  SILT L3-S1  + DP    LUE:   TTP over radial head, o/w  NTTP over the bony prominences of the shoulder/elbow/wrist/hand  C5-T1 SILT  Motor grossly intact throughout axillary/musculocutaenous/radial/median/ulnar nerves  + radial pulse            LABS:                        8.1    6.00  )-----------( 230      ( 17 Aug 2023 23:53 )             24.6     08-17    136  |  94<L>  |  79<H>  ----------------------------<  89  5.5<H>   |  23  |  8.74<H>    Ca    8.7      17 Aug 2023 23:53  Phos  6.2     08-17  Mg     2.2     08-17        Urinalysis Basic - ( 17 Aug 2023 23:53 )    Color: x / Appearance: x / SG: x / pH: x  Gluc: 89 mg/dL / Ketone: x  / Bili: x / Urobili: x   Blood: x / Protein: x / Nitrite: x   Leuk Esterase: x / RBC: x / WBC x   Sq Epi: x / Non Sq Epi: x / Bacteria: x        VITAL SIGNS:  T(C): 37.5 (08-17-23 @ 23:00), Max: 37.6 (08-17-23 @ 07:00)  HR: 104 (08-18-23 @ 02:00) (69 - 109)  BP: 149/77 (08-18-23 @ 02:00) (102/50 - 168/76)  RR: 20 (08-18-23 @ 02:00) (11 - 36)  SpO2: 92% (08-18-23 @ 02:00) (92% - 100%)

## 2023-08-18 NOTE — PROGRESS NOTE ADULT - ATTENDING COMMENTS
# ESRD - MWF. HD today as planned.     # Hyperphosphatemia - continue calcium acetate.     # Medication toxicity monitoring: medication dose reviewed. Please dose medications to CrCl<15    The rest of the recommendations as per fellow's note.    Maile Peoples MD  Attending Nephrologist  [phone] or via [employer] .

## 2023-08-18 NOTE — BH CONSULTATION LIAISON ASSESSMENT NOTE - HPI (INCLUDE ILLNESS QUALITY, SEVERITY, DURATION, TIMING, CONTEXT, MODIFYING FACTORS, ASSOCIATED SIGNS AND SYMPTOMS)
Pt is a 38 y/o M, PMHx renal osteodystrophy, ESRD on dialysis, HTN, hx of CVA at age 10, recently hospitalized for radial fracture on 2023, presented to [facility] s/p fall in shower causing him to land on his left hip, psychiatry consulted for severe anxiety. On interview with patient, he endorses some anxiety, but attributes most of his anxiety to his difficulty breathing. He states yesterday when he was getting extubated he "thought he had already " because he felt like he couldn't breathe. He also states that being in the hospital has made him feel "less than" because of his loss of independence, having people help him go to the bathroom and moving him for physical therapy, and that this increases his anxiety. He stated he had "an episode" this morning because he was told by someone that if he did not participate in therapy, his lungs would collapse, which triggered a panic attack because he thought that he was going to stop breathing again. Denies SI, HI, AVH. Denies depressed mood, endorses anxiety. He states the episode this morning was accompanied by palpitations and an intense feeling of worry, resolved on its own. Pt with no hx of psychiatric admissions, no history of taking psychotropic medications. No suicide attempts.

## 2023-08-18 NOTE — BH CONSULTATION LIAISON ASSESSMENT NOTE - DESCRIPTION
Domiciled at home with his cousin Mu and his fiance Verna. He denies smoking cigarettes, drinking alcohol. Occasional marijuana use. Pt is not working due to physical disability, family supports and provides for him. He is mobile at home with no support, uses a cane if he is walking outside.

## 2023-08-18 NOTE — PROGRESS NOTE ADULT - ASSESSMENT
A/P: 37y Male with L femoral neck hip fracture s/p dipika on 8/15; L Radial head fx     Pain control  WBAT LLE  NWB LUE  DVT ppx: per primary   PT/OT  FU Nephro C/s for HD M/W/F  Medical mgmt greatly appreciated per SICU  Dispo pending hospital course / PT recs  No further acute orthopaedic surgical intervention indicated, stable for dc when medically stable  Patient to be downgraded from sicu back to medical team when applicable

## 2023-08-18 NOTE — PROGRESS NOTE ADULT - SUBJECTIVE AND OBJECTIVE BOX
[facility] DIVISION OF KIDNEY DISEASES AND HYPERTENSION   FOLLOW UP NOTE    --------------------------------------------------------------------------------  Chief Complaint:    24 hour events/subjective: Pt. was seen and examined today. Overnight events  noted.       PAST HISTORY  --------------------------------------------------------------------------------  No significant changes to PMH, PSH, FHx, SHx, unless otherwise noted    ALLERGIES & MEDICATIONS  --------------------------------------------------------------------------------  Allergies    No Known Drug Allergies  shellfish (Hives)    Intolerances      Standing Inpatient Medications  acetaminophen     Tablet .. 975 milliGRAM(s) Oral every 6 hours  calcium acetate 1334 milliGRAM(s) Oral three times a day with meals  chlorhexidine 2% Cloths 1 Application(s) Topical <User Schedule>  heparin   Injectable 5000 Unit(s) SubCutaneous every 8 hours  metoprolol tartrate 25 milliGRAM(s) Oral two times a day  Nephro-pat 1 Tablet(s) Oral daily  polyethylene glycol 3350 17 Gram(s) Oral daily  senna 2 Tablet(s) Oral at bedtime  sodium zirconium cyclosilicate 10 Gram(s) Oral daily    PRN Inpatient Medications  ALPRAZolam 0.25 milliGRAM(s) Oral every 6 hours PRN      REVIEW OF SYSTEMS  --------------------------------------------------------------------------------  Gen: No fevers/chills  Head/Eyes/Ears: No HA   Respiratory: No dyspnea, cough  CV: No chest pain  GI: No abdominal pain, diarrhea  : No dysuria, hematuria  MSK: No  edema  Skin: No rashes  Heme: No easy bruising or bleeding    All other systems were reviewed and are negative, except as noted.    VITALS/PHYSICAL EXAM  --------------------------------------------------------------------------------  T(C): 36.4 (08-18-23 @ 15:00), Max: 37.5 (08-17-23 @ 23:00)  HR: 100 (08-18-23 @ 15:00) (79 - 109)  BP: 154/74 (08-18-23 @ 15:00) (124/61 - 190/85)  RR: 25 (08-18-23 @ 15:00) (12 - 27)  SpO2: 99% (08-18-23 @ 15:00) (92% - 99%)  Wt(kg): --        08-17-23 @ 07:01  -  08-18-23 @ 07:00  --------------------------------------------------------  IN: 2709.4 mL / OUT: 2800 mL / NET: -90.6 mL    08-18-23 @ 07:01  -  08-18-23 @ 15:46  --------------------------------------------------------  IN: 500 mL / OUT: 0 mL / NET: 500 mL        Physical Exam:  	Gen: NAD  	HEENT: Anicteric  	Pulm: CTA B/l  	CV: S1S2+  	Abd: Soft, +BS   	Ext: No LE edema B/L  	Neuro: Awake  	Skin: Warm and dry      LABS/STUDIES  --------------------------------------------------------------------------------              8.1    6.00  >-----------<  230      [08-17-23 @ 23:53]              24.6     135  |  91  |  91  ----------------------------<  82      [08-18-23 @ 11:58]  5.3   |  24  |  9.72        Ca     9.0     [08-18-23 @ 11:58]      Mg     2.3     [08-18-23 @ 11:58]      Phos  6.3     [08-18-23 @ 11:58]            Creatinine Trend:  SCr 9.72 [08-18 @ 11:58]  SCr 8.74 [08-17 @ 23:53]  SCr 7.78 [08-17 @ 11:31]  SCr 7.38 [08-17 @ 06:09]  SCr 7.47 [08-17 @ 01:01]    Urinalysis - [08-18-23 @ 11:58]      Color  / Appearance  / SG  / pH       Gluc 82 / Ketone   / Bili  / Urobili        Blood  / Protein  / Leuk Est  / Nitrite       RBC  / WBC  / Hyaline  / Gran  / Sq Epi  / Non Sq Epi  / Bacteria           Tacrolimus  Cyclosporine  Sirolimus  Mycophenolate  BK PCR  CMV PCR  Parvo PCR  EBV PCR

## 2023-08-18 NOTE — PROGRESS NOTE ADULT - NS ATTEND AMEND GEN_ALL_CORE FT
37yr M hx of HTN ESRD, on HD s/p hip arthroplasty and had to be reintubated due to resp failure and hypoglycemia    ON: no major events    alert and awake, complaining of some anxiety, will consult psych  on RA  AM CXR no acute findings  started on metop, slightly hypertensive  lact 2.3  renal diet  had BM  protonix  HD 8/17, hyperkalemia, hyperphos  plan for HD today  lokelma, phos binders standing  q12hr labs  nephorology following  sqh ppx  off antibiotics, afebrile  left arthroplasty  PT OT working with him during rounds  full code    peripheral lines
37yr M hx of HTN ESRD, on HD s/p hip arthroplasty and had to be reintubated due to resp failure and hypoglycemia    ON: no emergent changes    s/p propofol, now on precedex, wakes up and follows command on SAT  SBT now, PRVC before down to 5/30/16/600 PIP 23  AM CXR slightly improved pulm edema  good gas exchange  no cuff leak, will do steroids and reassess tomorrow  on miranda, likely sec to sedative infusion  NPO  NG output minimal, will start with trickle feeding  protonix  HD 8/15 w 2 liter volume removed  hyperkalemic post HD, started on lokelma  repeat BMP pending  D10 @ 25/hr for hypoglycemia  sqh ppx  off antibiotics, afebrile  left arthroplasty  PT OT when able  full code  will update family member    peripheral lines, rt rad a line SURYA QUINTIN

## 2023-08-18 NOTE — BH CONSULTATION LIAISON ASSESSMENT NOTE - SUMMARY
Pt is a 38 y/o M, PMHx renal osteodystrophy, ESRD on dialysis, HTN, hx of CVA at age 10, recently hospitalized for radial fracture on 07/27/2023, presented to [facility] s/p fall in shower causing him to land on his left hip, psychiatry consulted for severe anxiety. On interview, patient not noticeably anxious, appeared calm, endorsed several episodes of anxiety where he "acted out" out of fear of not being able to breathe. Pt with no past psychiatric history, anxiety in setting of acute stressors. Patient with panic attacks at home, but often related to frustration out of not being as functionally independent as he wants to be, no unprovoked anxiety attacks.

## 2023-08-18 NOTE — BH CONSULTATION LIAISON ASSESSMENT NOTE - RISK ASSESSMENT
Patient at low risk of acute self harm, with no hx of suicidal ideation and no hx of suicide attempts, identifies himself as a "joyful person", states he wants to live for his family

## 2023-08-18 NOTE — BH CONSULTATION LIAISON ASSESSMENT NOTE - NSBHCONSULTFOLLOWAFTERCARE_PSY_A_CORE FT
- can present to [facility] crisis if acute psychiatric emergency, should not be discharged on Ativan. If patient requesting medication for home, can d/c with Atarax 25 mg PO q6h for anxiety PRN

## 2023-08-19 LAB
ANION GAP SERPL CALC-SCNC: 18 MMOL/L — HIGH (ref 5–17)
BUN SERPL-MCNC: 55 MG/DL — HIGH (ref 7–23)
CALCIUM SERPL-MCNC: 9.1 MG/DL — SIGNIFICANT CHANGE UP (ref 8.4–10.5)
CHLORIDE SERPL-SCNC: 93 MMOL/L — LOW (ref 96–108)
CO2 SERPL-SCNC: 25 MMOL/L — SIGNIFICANT CHANGE UP (ref 22–31)
CREAT SERPL-MCNC: 7.02 MG/DL — HIGH (ref 0.5–1.3)
EGFR: 10 ML/MIN/1.73M2 — LOW
GLUCOSE SERPL-MCNC: 78 MG/DL — SIGNIFICANT CHANGE UP (ref 70–99)
HCT VFR BLD CALC: 23.4 % — LOW (ref 39–50)
HGB BLD-MCNC: 7.5 G/DL — LOW (ref 13–17)
MAGNESIUM SERPL-MCNC: 2.2 MG/DL — SIGNIFICANT CHANGE UP (ref 1.6–2.6)
MCHC RBC-ENTMCNC: 29.8 PG — SIGNIFICANT CHANGE UP (ref 27–34)
MCHC RBC-ENTMCNC: 32.1 GM/DL — SIGNIFICANT CHANGE UP (ref 32–36)
MCV RBC AUTO: 92.9 FL — SIGNIFICANT CHANGE UP (ref 80–100)
NRBC # BLD: 0 /100 WBCS — SIGNIFICANT CHANGE UP (ref 0–0)
PHOSPHATE SERPL-MCNC: 5.8 MG/DL — HIGH (ref 2.5–4.5)
PLATELET # BLD AUTO: 243 K/UL — SIGNIFICANT CHANGE UP (ref 150–400)
POTASSIUM SERPL-MCNC: 4.3 MMOL/L — SIGNIFICANT CHANGE UP (ref 3.5–5.3)
POTASSIUM SERPL-SCNC: 4.3 MMOL/L — SIGNIFICANT CHANGE UP (ref 3.5–5.3)
RBC # BLD: 2.52 M/UL — LOW (ref 4.2–5.8)
RBC # FLD: 16.9 % — HIGH (ref 10.3–14.5)
SODIUM SERPL-SCNC: 136 MMOL/L — SIGNIFICANT CHANGE UP (ref 135–145)
WBC # BLD: 4.37 K/UL — SIGNIFICANT CHANGE UP (ref 3.8–10.5)
WBC # FLD AUTO: 4.37 K/UL — SIGNIFICANT CHANGE UP (ref 3.8–10.5)

## 2023-08-19 PROCEDURE — 99233 SBSQ HOSP IP/OBS HIGH 50: CPT | Mod: GC

## 2023-08-19 RX ORDER — TRAMADOL HYDROCHLORIDE 50 MG/1
25 TABLET ORAL EVERY 8 HOURS
Refills: 0 | Status: DISCONTINUED | OUTPATIENT
Start: 2023-08-19 | End: 2023-08-24

## 2023-08-19 RX ADMIN — HEPARIN SODIUM 5000 UNIT(S): 5000 INJECTION INTRAVENOUS; SUBCUTANEOUS at 21:21

## 2023-08-19 RX ADMIN — TRAMADOL HYDROCHLORIDE 25 MILLIGRAM(S): 50 TABLET ORAL at 14:24

## 2023-08-19 RX ADMIN — Medication 1334 MILLIGRAM(S): at 11:50

## 2023-08-19 RX ADMIN — HEPARIN SODIUM 5000 UNIT(S): 5000 INJECTION INTRAVENOUS; SUBCUTANEOUS at 13:23

## 2023-08-19 RX ADMIN — Medication 1334 MILLIGRAM(S): at 09:56

## 2023-08-19 RX ADMIN — POLYETHYLENE GLYCOL 3350 17 GRAM(S): 17 POWDER, FOR SOLUTION ORAL at 11:50

## 2023-08-19 RX ADMIN — Medication 975 MILLIGRAM(S): at 01:05

## 2023-08-19 RX ADMIN — Medication 25 MILLIGRAM(S): at 05:26

## 2023-08-19 RX ADMIN — SODIUM ZIRCONIUM CYCLOSILICATE 10 GRAM(S): 10 POWDER, FOR SUSPENSION ORAL at 11:50

## 2023-08-19 RX ADMIN — HEPARIN SODIUM 5000 UNIT(S): 5000 INJECTION INTRAVENOUS; SUBCUTANEOUS at 05:26

## 2023-08-19 RX ADMIN — Medication 975 MILLIGRAM(S): at 18:17

## 2023-08-19 RX ADMIN — Medication 0.25 MILLIGRAM(S): at 00:08

## 2023-08-19 RX ADMIN — Medication 25 MILLIGRAM(S): at 17:17

## 2023-08-19 RX ADMIN — Medication 975 MILLIGRAM(S): at 12:50

## 2023-08-19 RX ADMIN — Medication 1334 MILLIGRAM(S): at 17:17

## 2023-08-19 RX ADMIN — Medication 975 MILLIGRAM(S): at 05:26

## 2023-08-19 RX ADMIN — Medication 975 MILLIGRAM(S): at 06:26

## 2023-08-19 RX ADMIN — SENNA PLUS 2 TABLET(S): 8.6 TABLET ORAL at 21:21

## 2023-08-19 RX ADMIN — TRAMADOL HYDROCHLORIDE 25 MILLIGRAM(S): 50 TABLET ORAL at 13:24

## 2023-08-19 RX ADMIN — Medication 975 MILLIGRAM(S): at 11:50

## 2023-08-19 RX ADMIN — Medication 1 TABLET(S): at 11:49

## 2023-08-19 RX ADMIN — Medication 975 MILLIGRAM(S): at 17:17

## 2023-08-19 RX ADMIN — Medication 975 MILLIGRAM(S): at 00:08

## 2023-08-19 NOTE — PROGRESS NOTE ADULT - SUBJECTIVE AND OBJECTIVE BOX
ORTHO PROGRESS NOTE:    Patient seen and examined at bedside this AM. Pain well controlled. No acute complaints at this time. Denies CP/SOB/F/C.     Vital Signs Last 24 Hrs  T(C): 37.4 (19 Aug 2023 04:32), Max: 37.4 (19 Aug 2023 04:32)  T(F): 99.3 (19 Aug 2023 04:32), Max: 99.3 (19 Aug 2023 04:32)  HR: 90 (19 Aug 2023 06:20) (79 - 102)  BP: 127/72 (19 Aug 2023 06:20) (107/58 - 170/84)  BP(mean): 106 (18 Aug 2023 15:00) (100 - 122)  RR: 18 (19 Aug 2023 06:20) (16 - 25)  SpO2: 96% (19 Aug 2023 06:20) (92% - 100%)    Parameters below as of 19 Aug 2023 06:20  Patient On (Oxygen Delivery Method): room air      PE:  General: NAD, resting comfortably in bed, alert, following commands  Compartments soft and compressible  No calf tenderness bilaterally  Aquacel clean dry and intact to left posterior hip.   +TA/EHL/FHL/GSC BL LE  Sensation intact to light touch bilateral LE.   + DP    LUE:   TTP over radial head, o/w  NTTP over the bony prominences of the shoulder/elbow/wrist/hand  C5-T1 SILT  Motor grossly intact throughout axillary/musculocutaenous/radial/median/ulnar nerves  + radial pulse      Labs:  CBC Full  -  ( 19 Aug 2023 07:02 )  WBC Count : 4.37 K/uL  RBC Count : 2.52 M/uL  Hemoglobin : 7.5 g/dL  Hematocrit : 23.4 %  Platelet Count - Automated : 243 K/uL  Mean Cell Volume : 92.9 fl  Mean Cell Hemoglobin : 29.8 pg  Mean Cell Hemoglobin Concentration : 32.1 gm/dL        08-18    135  |  91<L>  |  91<H>  ----------------------------<  82  5.3   |  24  |  9.72<H>    Ca    9.0      18 Aug 2023 11:58  Phos  6.3     08-18  Mg     2.3     08-18

## 2023-08-19 NOTE — PROGRESS NOTE ADULT - ASSESSMENT
Fall, Left hip fracture  - sp repair  - ortho fu   - pain control  - will monitor     ESRD  - HD as scheduled  - renal fu     HTN  - cw home meds

## 2023-08-19 NOTE — PROGRESS NOTE ADULT - ATTENDING COMMENTS
37 year old male with past medical history of End stage renal disease (M,W,F), hypertension, hypothyroidism, stroke at age of 10, sleep apnea, obesity and recent hospitalization for left radial fracture 7/27/2023 presenting to ER after a fall.  +cough  Patient is ESRD on HD via R AVF (MWF).   1.  ESRD--HD yesterday with 2 and on TIW regimen as noted.  UF today for respiratory symptoms  2.  HYperkalemia--binder--> HD with goal <4.5  3.  Hypertension--resumed outpt meds and synergize with UF today with goal systolic <140  4.  Hyperphosphatemia--goal bind <5.5  5.  Volume overload--extra UF today    discussed with med team  Sherif Mueller MD  contact me on TEAMS

## 2023-08-19 NOTE — PROGRESS NOTE ADULT - SUBJECTIVE AND OBJECTIVE BOX
[facility] Division of Kidney Diseases & Hypertension  FOLLOW UP NOTE  [phone]--------------------------------------------------------------------------------    Chief Complaint: ESRD MWF    24 hour events/subjective: Pt. was seen and evaluated at bedside this morning. He reports cough, mild SOB, and feeling of body heaviness. Otherwise feels well. Denies any headaches, fevers/chills, chest pain, abdominal pain, and LE edema.        PAST HISTORY  --------------------------------------------------------------------------------  No significant changes to PMH, PSH, FHx, SHx, unless otherwise noted    ALLERGIES & MEDICATIONS  --------------------------------------------------------------------------------  Allergies    No Known Drug Allergies  shellfish (Hives)    Intolerances      Standing Inpatient Medications  acetaminophen     Tablet .. 975 milliGRAM(s) Oral every 6 hours  calcium acetate 1334 milliGRAM(s) Oral three times a day with meals  heparin   Injectable 5000 Unit(s) SubCutaneous every 8 hours  metoprolol tartrate 25 milliGRAM(s) Oral two times a day  Nephro-pat 1 Tablet(s) Oral daily  polyethylene glycol 3350 17 Gram(s) Oral daily  senna 2 Tablet(s) Oral at bedtime  sodium zirconium cyclosilicate 10 Gram(s) Oral daily    PRN Inpatient Medications  ALPRAZolam 0.25 milliGRAM(s) Oral every 6 hours PRN  traMADol 25 milliGRAM(s) Oral every 8 hours PRN      REVIEW OF SYSTEMS  --------------------------------------------------------------------------------  See HPI    VITALS/PHYSICAL EXAM  --------------------------------------------------------------------------------  T(C): 36.7 (08-19-23 @ 16:38), Max: 37.7 (08-19-23 @ 12:36)  HR: 85 (08-19-23 @ 16:38) (80 - 102)  BP: 149/84 (08-19-23 @ 16:38) (107/58 - 149/84)  RR: 18 (08-19-23 @ 16:38) (18 - 18)  SpO2: 99% (08-19-23 @ 16:38) (94% - 100%)  Wt(kg): --      08-18-23 @ 07:01  -  08-19-23 @ 07:00  --------------------------------------------------------  IN: 1200 mL / OUT: 3500 mL / NET: -2300 mL    08-19-23 @ 07:01  -  08-19-23 @ 20:19  --------------------------------------------------------  IN: 600 mL / OUT: 0 mL / NET: 600 mL      Physical Exam:  Gen: NAD  HEENT: Anicteric  Pulm: CTA B/L  CV: S1S2+  Abd: Soft, +BS   Ext: No LE edema B/L  Neuro: Awake  :Lee cath+ with clear urine  Skin: Warm and dry  Dialysis access: +AVF with palpable thrill    LABS/STUDIES  --------------------------------------------------------------------------------              7.5    4.37  >-----------<  243      [08-19-23 @ 07:02]              23.4     136  |  93  |  55  ----------------------------<  78      [08-19-23 @ 06:59]  4.3   |  25  |  7.02        Ca     9.1     [08-19-23 @ 06:59]      Mg     2.2     [08-19-23 @ 06:59]      Phos  5.8     [08-19-23 @ 06:59]    Creatinine Trend:  SCr 7.02 [08-19 @ 06:59]  SCr 9.72 [08-18 @ 11:58]  SCr 8.74 [08-17 @ 23:53]  SCr 7.78 [08-17 @ 11:31]  SCr 7.38 [08-17 @ 06:09]    PTH -- (Ca 8.7)      [08-15-23 @ 07:24]   4567  TSH 2.21      [08-15-23 @ 07:24]  Lipid: chol 190, , HDL 57, LDL --      [08-15-23 @ 07:24]

## 2023-08-19 NOTE — PROGRESS NOTE ADULT - SUBJECTIVE AND OBJECTIVE BOX
Patient is a 37y old  Male who presents with a chief complaint of Left femoral neck fracture (19 Aug 2023 07:42)    Date of servie : 08-19-23 @ 17:06  INTERVAL HPI/OVERNIGHT EVENTS:  T(C): 36.7 (08-19-23 @ 16:38), Max: 37.7 (08-19-23 @ 12:36)  HR: 85 (08-19-23 @ 16:38) (80 - 102)  BP: 149/84 (08-19-23 @ 16:38) (107/58 - 149/84)  RR: 18 (08-19-23 @ 16:38) (18 - 19)  SpO2: 99% (08-19-23 @ 16:38) (94% - 100%)  Wt(kg): --  I&O's Summary    18 Aug 2023 07:01  -  19 Aug 2023 07:00  --------------------------------------------------------  IN: 1200 mL / OUT: 3500 mL / NET: -2300 mL        LABS:                        7.5    4.37  )-----------( 243      ( 19 Aug 2023 07:02 )             23.4     08-19    136  |  93<L>  |  55<H>  ----------------------------<  78  4.3   |  25  |  7.02<H>    Ca    9.1      19 Aug 2023 06:59  Phos  5.8     08-19  Mg     2.2     08-19        Urinalysis Basic - ( 19 Aug 2023 06:59 )    Color: x / Appearance: x / SG: x / pH: x  Gluc: 78 mg/dL / Ketone: x  / Bili: x / Urobili: x   Blood: x / Protein: x / Nitrite: x   Leuk Esterase: x / RBC: x / WBC x   Sq Epi: x / Non Sq Epi: x / Bacteria: x      CAPILLARY BLOOD GLUCOSE            Urinalysis Basic - ( 19 Aug 2023 06:59 )    Color: x / Appearance: x / SG: x / pH: x  Gluc: 78 mg/dL / Ketone: x  / Bili: x / Urobili: x   Blood: x / Protein: x / Nitrite: x   Leuk Esterase: x / RBC: x / WBC x   Sq Epi: x / Non Sq Epi: x / Bacteria: x        MEDICATIONS  (STANDING):  acetaminophen     Tablet .. 975 milliGRAM(s) Oral every 6 hours  calcium acetate 1334 milliGRAM(s) Oral three times a day with meals  heparin   Injectable 5000 Unit(s) SubCutaneous every 8 hours  metoprolol tartrate 25 milliGRAM(s) Oral two times a day  Nephro-pat 1 Tablet(s) Oral daily  polyethylene glycol 3350 17 Gram(s) Oral daily  senna 2 Tablet(s) Oral at bedtime  sodium zirconium cyclosilicate 10 Gram(s) Oral daily    MEDICATIONS  (PRN):  ALPRAZolam 0.25 milliGRAM(s) Oral every 6 hours PRN Anxiety  traMADol 25 milliGRAM(s) Oral every 8 hours PRN Moderate Pain (4 - 6)          PHYSICAL EXAM:  GENERAL: NAD, well-groomed, well-developed  HEAD:  Atraumatic, Normocephalic  CHEST/LUNG: Clear to percussion bilaterally; No rales, rhonchi, wheezing, or rubs  HEART: Regular rate and rhythm; No murmurs, rubs, or gallops  ABDOMEN: Soft, Nontender, Nondistended; Bowel sounds present  EXTREMITIES:  2+ Peripheral Pulses, No clubbing, cyanosis, or edema  LYMPH: No lymphadenopathy noted  SKIN: No rashes or lesions    Care Discussed with Consultants/Other Providers [ ] YES  [ ] NO

## 2023-08-19 NOTE — PROGRESS NOTE ADULT - ASSESSMENT
A/P: 37y Male with L femoral neck hip fracture s/p dipika on 8/15; L Radial head fx     Pain control  WBAT LLE  NWB LUE  DVT ppx: per primary   PT/OT/OOB  Patient on M/W/F HD schedule.   Medical mgmt greatly appreciated   Dispo pending hospital course / PT recs  No further acute orthopaedic surgical intervention indicated, stable for dc when medically stable    Floridalma Pulido PA-C  Orthopedic Surgery  Team Pager: [phone]

## 2023-08-19 NOTE — PROGRESS NOTE ADULT - PROBLEM SELECTOR PLAN 3
Pt. with hyperkalemia in the setting of ESRD, resolved with HD. Monitor serum potassium.    If you have any questions, please feel free to contact me  Brittney Pedroza  Nephrology Fellow  [phone] / [employer] Teams(Preferred)  (After 5pm or on weekends please page the on-call fellow)

## 2023-08-20 LAB
ANION GAP SERPL CALC-SCNC: 17 MMOL/L — SIGNIFICANT CHANGE UP (ref 5–17)
ANION GAP SERPL CALC-SCNC: 20 MMOL/L — HIGH (ref 5–17)
BUN SERPL-MCNC: 78 MG/DL — HIGH (ref 7–23)
BUN SERPL-MCNC: 79 MG/DL — HIGH (ref 7–23)
CALCIUM SERPL-MCNC: 8.9 MG/DL — SIGNIFICANT CHANGE UP (ref 8.4–10.5)
CALCIUM SERPL-MCNC: 9.2 MG/DL — SIGNIFICANT CHANGE UP (ref 8.4–10.5)
CHLORIDE SERPL-SCNC: 93 MMOL/L — LOW (ref 96–108)
CHLORIDE SERPL-SCNC: 93 MMOL/L — LOW (ref 96–108)
CO2 SERPL-SCNC: 22 MMOL/L — SIGNIFICANT CHANGE UP (ref 22–31)
CO2 SERPL-SCNC: 24 MMOL/L — SIGNIFICANT CHANGE UP (ref 22–31)
CREAT SERPL-MCNC: 8.76 MG/DL — HIGH (ref 0.5–1.3)
CREAT SERPL-MCNC: 9.31 MG/DL — HIGH (ref 0.5–1.3)
EGFR: 7 ML/MIN/1.73M2 — LOW
EGFR: 7 ML/MIN/1.73M2 — LOW
GLUCOSE SERPL-MCNC: 75 MG/DL — SIGNIFICANT CHANGE UP (ref 70–99)
GLUCOSE SERPL-MCNC: 78 MG/DL — SIGNIFICANT CHANGE UP (ref 70–99)
HCT VFR BLD CALC: 23.7 % — LOW (ref 39–50)
HGB BLD-MCNC: 7.5 G/DL — LOW (ref 13–17)
MAGNESIUM SERPL-MCNC: 2.3 MG/DL — SIGNIFICANT CHANGE UP (ref 1.6–2.6)
MCHC RBC-ENTMCNC: 29.8 PG — SIGNIFICANT CHANGE UP (ref 27–34)
MCHC RBC-ENTMCNC: 31.6 GM/DL — LOW (ref 32–36)
MCV RBC AUTO: 94 FL — SIGNIFICANT CHANGE UP (ref 80–100)
NRBC # BLD: 0 /100 WBCS — SIGNIFICANT CHANGE UP (ref 0–0)
PHOSPHATE SERPL-MCNC: 6.4 MG/DL — HIGH (ref 2.5–4.5)
PLATELET # BLD AUTO: 232 K/UL — SIGNIFICANT CHANGE UP (ref 150–400)
POTASSIUM SERPL-MCNC: 4.4 MMOL/L — SIGNIFICANT CHANGE UP (ref 3.5–5.3)
POTASSIUM SERPL-MCNC: 6.4 MMOL/L — CRITICAL HIGH (ref 3.5–5.3)
POTASSIUM SERPL-SCNC: 4.4 MMOL/L — SIGNIFICANT CHANGE UP (ref 3.5–5.3)
POTASSIUM SERPL-SCNC: 6.4 MMOL/L — CRITICAL HIGH (ref 3.5–5.3)
RBC # BLD: 2.52 M/UL — LOW (ref 4.2–5.8)
RBC # FLD: 16.3 % — HIGH (ref 10.3–14.5)
SODIUM SERPL-SCNC: 132 MMOL/L — LOW (ref 135–145)
SODIUM SERPL-SCNC: 137 MMOL/L — SIGNIFICANT CHANGE UP (ref 135–145)
WBC # BLD: 3.9 K/UL — SIGNIFICANT CHANGE UP (ref 3.8–10.5)
WBC # FLD AUTO: 3.9 K/UL — SIGNIFICANT CHANGE UP (ref 3.8–10.5)

## 2023-08-20 PROCEDURE — 93010 ELECTROCARDIOGRAM REPORT: CPT

## 2023-08-20 RX ADMIN — TRAMADOL HYDROCHLORIDE 25 MILLIGRAM(S): 50 TABLET ORAL at 03:03

## 2023-08-20 RX ADMIN — Medication 1334 MILLIGRAM(S): at 17:39

## 2023-08-20 RX ADMIN — Medication 975 MILLIGRAM(S): at 17:39

## 2023-08-20 RX ADMIN — Medication 0.25 MILLIGRAM(S): at 21:35

## 2023-08-20 RX ADMIN — Medication 975 MILLIGRAM(S): at 18:39

## 2023-08-20 RX ADMIN — Medication 25 MILLIGRAM(S): at 17:39

## 2023-08-20 RX ADMIN — Medication 975 MILLIGRAM(S): at 11:54

## 2023-08-20 RX ADMIN — HEPARIN SODIUM 5000 UNIT(S): 5000 INJECTION INTRAVENOUS; SUBCUTANEOUS at 05:17

## 2023-08-20 RX ADMIN — SODIUM ZIRCONIUM CYCLOSILICATE 10 GRAM(S): 10 POWDER, FOR SUSPENSION ORAL at 11:53

## 2023-08-20 RX ADMIN — Medication 1334 MILLIGRAM(S): at 08:36

## 2023-08-20 RX ADMIN — HEPARIN SODIUM 5000 UNIT(S): 5000 INJECTION INTRAVENOUS; SUBCUTANEOUS at 13:44

## 2023-08-20 RX ADMIN — SENNA PLUS 2 TABLET(S): 8.6 TABLET ORAL at 21:35

## 2023-08-20 RX ADMIN — Medication 1 TABLET(S): at 11:54

## 2023-08-20 RX ADMIN — Medication 1334 MILLIGRAM(S): at 11:54

## 2023-08-20 RX ADMIN — HEPARIN SODIUM 5000 UNIT(S): 5000 INJECTION INTRAVENOUS; SUBCUTANEOUS at 21:35

## 2023-08-20 RX ADMIN — Medication 25 MILLIGRAM(S): at 05:17

## 2023-08-20 RX ADMIN — POLYETHYLENE GLYCOL 3350 17 GRAM(S): 17 POWDER, FOR SOLUTION ORAL at 11:54

## 2023-08-20 RX ADMIN — Medication 975 MILLIGRAM(S): at 12:54

## 2023-08-20 RX ADMIN — TRAMADOL HYDROCHLORIDE 25 MILLIGRAM(S): 50 TABLET ORAL at 02:03

## 2023-08-20 NOTE — PROGRESS NOTE ADULT - SUBJECTIVE AND OBJECTIVE BOX
Patient is a 37y old  Male who presents with a chief complaint of Left femoral neck fracture (20 Aug 2023 07:31)    Date of servie : 08-20-23 @ 14:57  INTERVAL HPI/OVERNIGHT EVENTS:  T(C): 36.9 (08-20-23 @ 08:42), Max: 36.9 (08-19-23 @ 20:20)  HR: 54 (08-20-23 @ 08:42) (54 - 92)  BP: 108/64 (08-20-23 @ 08:42) (108/64 - 149/84)  RR: 18 (08-20-23 @ 01:38) (18 - 18)  SpO2: 97% (08-20-23 @ 01:38) (97% - 100%)  Wt(kg): --  I&O's Summary    19 Aug 2023 07:01  -  20 Aug 2023 07:00  --------------------------------------------------------  IN: 1460 mL / OUT: 2400 mL / NET: -940 mL        LABS:                        7.5    3.90  )-----------( 232      ( 20 Aug 2023 07:25 )             23.7     08-20    137  |  93<L>  |  79<H>  ----------------------------<  78  4.4   |  24  |  9.31<H>    Ca    9.2      20 Aug 2023 09:15  Phos  6.4     08-20  Mg     2.3     08-20        Urinalysis Basic - ( 20 Aug 2023 09:15 )    Color: x / Appearance: x / SG: x / pH: x  Gluc: 78 mg/dL / Ketone: x  / Bili: x / Urobili: x   Blood: x / Protein: x / Nitrite: x   Leuk Esterase: x / RBC: x / WBC x   Sq Epi: x / Non Sq Epi: x / Bacteria: x      CAPILLARY BLOOD GLUCOSE            Urinalysis Basic - ( 20 Aug 2023 09:15 )    Color: x / Appearance: x / SG: x / pH: x  Gluc: 78 mg/dL / Ketone: x  / Bili: x / Urobili: x   Blood: x / Protein: x / Nitrite: x   Leuk Esterase: x / RBC: x / WBC x   Sq Epi: x / Non Sq Epi: x / Bacteria: x        MEDICATIONS  (STANDING):  acetaminophen     Tablet .. 975 milliGRAM(s) Oral every 6 hours  calcium acetate 1334 milliGRAM(s) Oral three times a day with meals  heparin   Injectable 5000 Unit(s) SubCutaneous every 8 hours  metoprolol tartrate 25 milliGRAM(s) Oral two times a day  Nephro-pat 1 Tablet(s) Oral daily  polyethylene glycol 3350 17 Gram(s) Oral daily  senna 2 Tablet(s) Oral at bedtime    MEDICATIONS  (PRN):  ALPRAZolam 0.25 milliGRAM(s) Oral every 6 hours PRN Anxiety  traMADol 25 milliGRAM(s) Oral every 8 hours PRN Moderate Pain (4 - 6)          PHYSICAL EXAM:  GENERAL: NAD, well-groomed, well-developed  HEAD:  Atraumatic, Normocephalic  CHEST/LUNG: Clear to percussion bilaterally; No rales, rhonchi, wheezing, or rubs  HEART: Regular rate and rhythm; No murmurs, rubs, or gallops  ABDOMEN: Soft, Nontender, Nondistended; Bowel sounds present  EXTREMITIES:  2+ Peripheral Pulses, No clubbing, cyanosis, or edema  LYMPH: No lymphadenopathy noted  SKIN: No rashes or lesions    Care Discussed with Consultants/Other Providers [ ] YES  [ ] NO

## 2023-08-20 NOTE — PROGRESS NOTE ADULT - SUBJECTIVE AND OBJECTIVE BOX
ORTHO PROGRESS NOTE:    Patient seen and examined at bedside this AM. Pain well controlled. No acute complaints at this time. Denies CP/SOB/F/C. Reports was out of bed yesterday.     Vital Signs Last 24 Hrs  T(C): 36.7 (20 Aug 2023 01:38), Max: 37.7 (19 Aug 2023 12:36)  T(F): 98.1 (20 Aug 2023 01:38), Max: 99.8 (19 Aug 2023 12:36)  HR: 92 (20 Aug 2023 01:38) (80 - 92)  BP: 148/77 (20 Aug 2023 01:38) (131/72 - 149/84)  BP(mean): --  RR: 18 (20 Aug 2023 01:38) (18 - 18)  SpO2: 97% (20 Aug 2023 01:38) (95% - 100%)    Parameters below as of 20 Aug 2023 01:38  Patient On (Oxygen Delivery Method): room air      PE:  General: NAD, resting comfortably in bed, alert, following commands  Compartments soft and compressible  No calf tenderness bilaterally  Aquacel clean dry and intact to left posterior hip.   +TA/EHL/FHL/GSC BL LE  Sensation intact to light touch bilateral LE.   +DP    LUE:   TTP over radial head, o/w  NTTP over the bony prominences of the shoulder/elbow/wrist/hand  C5-T1 SILT  Motor grossly intact throughout axillary/musculocutaenous/radial/median/ulnar nerves  + radial pulse      Labs:  CBC Full  -  ( 19 Aug 2023 07:02 )  WBC Count : 4.37 K/uL  RBC Count : 2.52 M/uL  Hemoglobin : 7.5 g/dL  Hematocrit : 23.4 %  Platelet Count - Automated : 243 K/uL  Mean Cell Volume : 92.9 fl  Mean Cell Hemoglobin : 29.8 pg  Mean Cell Hemoglobin Concentration : 32.1 gm/dL        08-19    136  |  93<L>  |  55<H>  ----------------------------<  78  4.3   |  25  |  7.02<H>    Ca    9.1      19 Aug 2023 06:59  Phos  5.8     08-19  Mg     2.2     08-19

## 2023-08-21 DIAGNOSIS — N25.0 RENAL OSTEODYSTROPHY: ICD-10-CM

## 2023-08-21 DIAGNOSIS — D64.9 ANEMIA, UNSPECIFIED: ICD-10-CM

## 2023-08-21 LAB
ANION GAP SERPL CALC-SCNC: 19 MMOL/L — HIGH (ref 5–17)
BLD GP AB SCN SERPL QL: POSITIVE — SIGNIFICANT CHANGE UP
BUN SERPL-MCNC: 104 MG/DL — HIGH (ref 7–23)
CALCIUM SERPL-MCNC: 9.3 MG/DL — SIGNIFICANT CHANGE UP (ref 8.4–10.5)
CHLORIDE SERPL-SCNC: 93 MMOL/L — LOW (ref 96–108)
CO2 SERPL-SCNC: 25 MMOL/L — SIGNIFICANT CHANGE UP (ref 22–31)
CREAT SERPL-MCNC: 10.87 MG/DL — HIGH (ref 0.5–1.3)
EGFR: 6 ML/MIN/1.73M2 — LOW
GLUCOSE SERPL-MCNC: 77 MG/DL — SIGNIFICANT CHANGE UP (ref 70–99)
HCT VFR BLD CALC: 22.8 % — LOW (ref 39–50)
HGB BLD-MCNC: 7.3 G/DL — LOW (ref 13–17)
MAGNESIUM SERPL-MCNC: 2.4 MG/DL — SIGNIFICANT CHANGE UP (ref 1.6–2.6)
MCHC RBC-ENTMCNC: 29.3 PG — SIGNIFICANT CHANGE UP (ref 27–34)
MCHC RBC-ENTMCNC: 32 GM/DL — SIGNIFICANT CHANGE UP (ref 32–36)
MCV RBC AUTO: 91.6 FL — SIGNIFICANT CHANGE UP (ref 80–100)
NRBC # BLD: 0 /100 WBCS — SIGNIFICANT CHANGE UP (ref 0–0)
PHOSPHATE SERPL-MCNC: 6.5 MG/DL — HIGH (ref 2.5–4.5)
PLATELET # BLD AUTO: 247 K/UL — SIGNIFICANT CHANGE UP (ref 150–400)
POTASSIUM SERPL-MCNC: 4.4 MMOL/L — SIGNIFICANT CHANGE UP (ref 3.5–5.3)
POTASSIUM SERPL-SCNC: 4.4 MMOL/L — SIGNIFICANT CHANGE UP (ref 3.5–5.3)
RBC # BLD: 2.49 M/UL — LOW (ref 4.2–5.8)
RBC # FLD: 15.9 % — HIGH (ref 10.3–14.5)
RH IG SCN BLD-IMP: POSITIVE — SIGNIFICANT CHANGE UP
SODIUM SERPL-SCNC: 137 MMOL/L — SIGNIFICANT CHANGE UP (ref 135–145)
WBC # BLD: 4.69 K/UL — SIGNIFICANT CHANGE UP (ref 3.8–10.5)
WBC # FLD AUTO: 4.69 K/UL — SIGNIFICANT CHANGE UP (ref 3.8–10.5)

## 2023-08-21 PROCEDURE — 86077 PHYS BLOOD BANK SERV XMATCH: CPT

## 2023-08-21 PROCEDURE — 99232 SBSQ HOSP IP/OBS MODERATE 35: CPT | Mod: GC

## 2023-08-21 RX ORDER — ERYTHROPOIETIN 10000 [IU]/ML
5000 INJECTION, SOLUTION INTRAVENOUS; SUBCUTANEOUS
Refills: 0 | Status: DISCONTINUED | OUTPATIENT
Start: 2023-08-21 | End: 2023-08-24

## 2023-08-21 RX ADMIN — Medication 1334 MILLIGRAM(S): at 12:12

## 2023-08-21 RX ADMIN — ERYTHROPOIETIN 5000 UNIT(S): 10000 INJECTION, SOLUTION INTRAVENOUS; SUBCUTANEOUS at 11:50

## 2023-08-21 RX ADMIN — HEPARIN SODIUM 5000 UNIT(S): 5000 INJECTION INTRAVENOUS; SUBCUTANEOUS at 21:06

## 2023-08-21 RX ADMIN — Medication 1334 MILLIGRAM(S): at 16:55

## 2023-08-21 RX ADMIN — HEPARIN SODIUM 5000 UNIT(S): 5000 INJECTION INTRAVENOUS; SUBCUTANEOUS at 05:29

## 2023-08-21 RX ADMIN — Medication 975 MILLIGRAM(S): at 12:41

## 2023-08-21 RX ADMIN — Medication 975 MILLIGRAM(S): at 18:24

## 2023-08-21 RX ADMIN — Medication 1 TABLET(S): at 12:11

## 2023-08-21 RX ADMIN — Medication 975 MILLIGRAM(S): at 05:30

## 2023-08-21 RX ADMIN — Medication 975 MILLIGRAM(S): at 12:11

## 2023-08-21 RX ADMIN — Medication 975 MILLIGRAM(S): at 06:30

## 2023-08-21 RX ADMIN — Medication 25 MILLIGRAM(S): at 17:53

## 2023-08-21 RX ADMIN — HEPARIN SODIUM 5000 UNIT(S): 5000 INJECTION INTRAVENOUS; SUBCUTANEOUS at 13:41

## 2023-08-21 RX ADMIN — Medication 1334 MILLIGRAM(S): at 08:04

## 2023-08-21 RX ADMIN — Medication 975 MILLIGRAM(S): at 17:54

## 2023-08-21 RX ADMIN — Medication 25 MILLIGRAM(S): at 05:30

## 2023-08-21 NOTE — PROGRESS NOTE ADULT - ASSESSMENT
A/P: 36 y/o  M POD#6 s/p L Hemiarthroplasty    DVT ppx- Heparin 5000units SQ TID  LLE WBAT  PT  OT  Pain management prn  FU AM labs  Discharge planning to Rehab  D/w attending      MAXWELL Dow  Orthopedic Surgery  [id number]

## 2023-08-21 NOTE — PROGRESS NOTE ADULT - ASSESSMENT
Fall, Left hip fracture  - sp repair  - ortho fu   - pain control  - will monitor     ESRD  - HD as scheduled  - renal fu     HTN  - cw home meds       PT and dc planning

## 2023-08-21 NOTE — PROGRESS NOTE ADULT - SUBJECTIVE AND OBJECTIVE BOX
Patient is a 37y old  Male who presents with a chief complaint of Left femoral neck fracture (21 Aug 2023 10:54)    Date of servie : 08-21-23 @ 13:55  INTERVAL HPI/OVERNIGHT EVENTS:  T(C): 37.2 (08-21-23 @ 12:30), Max: 37.2 (08-21-23 @ 00:00)  HR: 85 (08-21-23 @ 12:30) (71 - 97)  BP: 106/63 (08-21-23 @ 12:30) (106/63 - 155/74)  RR: 18 (08-21-23 @ 12:30) (18 - 18)  SpO2: 98% (08-21-23 @ 12:30) (97% - 100%)  Wt(kg): --  I&O's Summary    20 Aug 2023 07:01  -  21 Aug 2023 07:00  --------------------------------------------------------  IN: 450 mL / OUT: 0 mL / NET: 450 mL    21 Aug 2023 07:01  -  21 Aug 2023 13:55  --------------------------------------------------------  IN: 0 mL / OUT: 3000 mL / NET: -3000 mL        LABS:                        7.3    4.69  )-----------( 247      ( 21 Aug 2023 09:57 )             22.8     08-21    137  |  93<L>  |  104<H>  ----------------------------<  77  4.4   |  25  |  10.87<H>    Ca    9.3      21 Aug 2023 09:57  Phos  6.5     08-21  Mg     2.4     08-21        Urinalysis Basic - ( 21 Aug 2023 09:57 )    Color: x / Appearance: x / SG: x / pH: x  Gluc: 77 mg/dL / Ketone: x  / Bili: x / Urobili: x   Blood: x / Protein: x / Nitrite: x   Leuk Esterase: x / RBC: x / WBC x   Sq Epi: x / Non Sq Epi: x / Bacteria: x      CAPILLARY BLOOD GLUCOSE            Urinalysis Basic - ( 21 Aug 2023 09:57 )    Color: x / Appearance: x / SG: x / pH: x  Gluc: 77 mg/dL / Ketone: x  / Bili: x / Urobili: x   Blood: x / Protein: x / Nitrite: x   Leuk Esterase: x / RBC: x / WBC x   Sq Epi: x / Non Sq Epi: x / Bacteria: x        MEDICATIONS  (STANDING):  acetaminophen     Tablet .. 975 milliGRAM(s) Oral every 6 hours  calcium acetate 1334 milliGRAM(s) Oral three times a day with meals  epoetin carito (EPOGEN) Injectable 5000 Unit(s) IV Push <User Schedule>  heparin   Injectable 5000 Unit(s) SubCutaneous every 8 hours  metoprolol tartrate 25 milliGRAM(s) Oral two times a day  Nephro-pat 1 Tablet(s) Oral daily  polyethylene glycol 3350 17 Gram(s) Oral daily  senna 2 Tablet(s) Oral at bedtime    MEDICATIONS  (PRN):  ALPRAZolam 0.25 milliGRAM(s) Oral every 6 hours PRN Anxiety  traMADol 25 milliGRAM(s) Oral every 8 hours PRN Moderate Pain (4 - 6)          PHYSICAL EXAM:  GENERAL: frail  CHEST/LUNG: Clear to percussion bilaterally; No rales, rhonchi, wheezing, or rubs  HEART: Regular rate and rhythm; No murmurs, rubs, or gallops  ABDOMEN: Soft, Nontender, Nondistended; Bowel sounds present  EXTREMITIES:  edema+    Care Discussed with Consultants/Other Providers [ ] YES  [ ] NO

## 2023-08-21 NOTE — PROGRESS NOTE ADULT - PROBLEM SELECTOR PLAN 3
Phos near goal. C/w home phoslo with meals. Ensure low phos diet.    If you have any questions, please feel free to contact me  Bill Loomis  Nephrology Fellow  [phone]; Prefer [employer] TEAMS  (After 5pm or on weekends please page the on-call fellow). Phos near goal. C/w home phoslo with meals. Ensure low phos diet.    Patient complaining of enlarged jaw. Will have primary team obtain vitamin D 1,25 and 25OH, IGF-1, GGT. May need outpatient endocrine and bone scan to determine etiology.     If you have any questions, please feel free to contact me  Bill Loomis  Nephrology Fellow  [phone]; Prefer [employer] TE[employer]  (After 5pm or on weekends please page the on-call fellow).

## 2023-08-21 NOTE — PROGRESS NOTE ADULT - SUBJECTIVE AND OBJECTIVE BOX
Patient comfortable  No complaints    T(C): 36.7 (08-21-23 @ 05:45), Max: 37.2 (08-21-23 @ 00:00)  HR: 97 (08-21-23 @ 05:45) (54 - 97)  BP: 147/78 (08-21-23 @ 05:45) (108/64 - 155/74)  RR: 18 (08-21-23 @ 05:45) (18 - 18)  SpO2: 100% (08-21-23 @ 05:45) (97% - 100%)      PHYSICAL EXAM:  NAD, Alert and oriented X3  Left Hip: Aquacel Dressing C/D/I; sensation grossly intact to light touch; (+) DF/PF; (+) Distal Pulses; No Calf tenderness B/L, PAS     LABS:                        7.5    3.90  )-----------( 232      ( 20 Aug 2023 07:25 )             23.7     08-20    137  |  93<L>  |  79<H>  ----------------------------<  78  4.4   |  24  |  9.31<H>    Ca    9.2      20 Aug 2023 09:15  Phos  6.4     08-20  Mg     2.3     08-20

## 2023-08-21 NOTE — PROGRESS NOTE ADULT - SUBJECTIVE AND OBJECTIVE BOX
[facility] Division of Kidney Diseases & Hypertension  FOLLOW UP NOTE  [phone]--------------------------------------------------------------------------------    Chief Complaint: ESRD MWF    24 hour events/subjective: Pt. was seen and evaluated at bedside this morning. Denies any headaches, fevers/chills, chest pain, abdominal pain, and LE edema.    PAST HISTORY  --------------------------------------------------------------------------------  No significant changes to PMH, PSH, FHx, SHx, unless otherwise noted    ALLERGIES & MEDICATIONS  --------------------------------------------------------------------------------  Allergies    No Known Drug Allergies  shellfish (Hives)    Intolerances      Standing Inpatient Medications  acetaminophen     Tablet .. 975 milliGRAM(s) Oral every 6 hours  calcium acetate 1334 milliGRAM(s) Oral three times a day with meals  heparin   Injectable 5000 Unit(s) SubCutaneous every 8 hours  metoprolol tartrate 25 milliGRAM(s) Oral two times a day  Nephro-pat 1 Tablet(s) Oral daily  polyethylene glycol 3350 17 Gram(s) Oral daily  senna 2 Tablet(s) Oral at bedtime    PRN Inpatient Medications  ALPRAZolam 0.25 milliGRAM(s) Oral every 6 hours PRN  traMADol 25 milliGRAM(s) Oral every 8 hours PRN      REVIEW OF SYSTEMS  --------------------------------------------------------------------------------  per above     VITALS/PHYSICAL EXAM  --------------------------------------------------------------------------------  T(C): 37 (08-21-23 @ 08:40), Max: 37.2 (08-21-23 @ 00:00)  HR: 71 (08-21-23 @ 08:40) (71 - 97)  BP: 148/86 (08-21-23 @ 08:40) (141/92 - 155/74)  RR: 18 (08-21-23 @ 08:40) (18 - 18)  SpO2: 100% (08-21-23 @ 08:40) (97% - 100%)  Wt(kg): --        08-20-23 @ 07:01  -  08-21-23 @ 07:00  --------------------------------------------------------  IN: 450 mL / OUT: 0 mL / NET: 450 mL      Physical Exam:  Gen: NAD  HEENT: Anicteric  Pulm: CTA B/L  CV: S1S2+  Abd: Soft, +BS   Ext: No LE edema B/L  Neuro: Awake  :Lee cath+ with clear urine  Skin: Warm and dry  Dialysis access: +AVF with palpable thrill    LABS/STUDIES  --------------------------------------------------------------------------------              7.3    4.69  >-----------<  247      [08-21-23 @ 09:57]              22.8     137  |  93  |  104  ----------------------------<  77      [08-21-23 @ 09:57]  4.4   |  25  |  10.87        Ca     9.3     [08-21-23 @ 09:57]      Mg     2.4     [08-21-23 @ 09:57]      Phos  6.5     [08-21-23 @ 09:57]            Creatinine Trend:  SCr 10.87 [08-21 @ 09:57]  SCr 9.31 [08-20 @ 09:15]  SCr 8.76 [08-20 @ 07:26]  SCr 7.02 [08-19 @ 06:59]  SCr 9.72 [08-18 @ 11:58]    Urinalysis - [08-21-23 @ 09:57]      Color  / Appearance  / SG  / pH       Gluc 77 / Ketone   / Bili  / Urobili        Blood  / Protein  / Leuk Est  / Nitrite       RBC  / WBC  / Hyaline  / Gran  / Sq Epi  / Non Sq Epi  / Bacteria       PTH -- (Ca 8.7)      [08-15-23 @ 07:24]   4567  TSH 2.21      [08-15-23 @ 07:24]  Lipid: chol 190, , HDL 57, LDL --      [08-15-23 @ 07:24]

## 2023-08-21 NOTE — PROGRESS NOTE ADULT - ATTENDING COMMENTS
# ESRD - HD MWF - dialysis today as planned.     # Jaw enlargement. Patient states that his jaw has been growing (teeth far apart) over the past 6 months. Facial features different compared to old photos. Need to consider acromegaly. Consider endocrine consult.     # Medication toxicity monitoring: medication dose reviewed. Please dose medications to CrCl<15    The rest of the recommendations as per fellow's note.    Maile Peoples MD  Attending Nephrologist  [phone] or via [place]

## 2023-08-21 NOTE — CHART NOTE - NSCHARTNOTEFT_GEN_A_CORE
Medicine PA Note    Notified by RN that patient had a fever of 101.2. Pt was assessed at bedside, no acute distress is noted. Pt states that he just felt warm, denies chest pain, palpitations, nausea/vomiting, abdominal pain, and diaphoresis.     Of note, the patient was admitted after a fall s/p L femoral neck repair. Patient is currently pending auth to Rehab    ICU Vital Signs Last 24 Hrs  T(C): 37.9 (21 Aug 2023 20:00), Max: 37.9 (21 Aug 2023 20:00)  T(F): 100.2 (21 Aug 2023 20:00), Max: 100.2 (21 Aug 2023 20:00)  HR: 96 (21 Aug 2023 20:00) (71 - 97)  BP: 146/72 (21 Aug 2023 20:00) (106/63 - 155/74)  BP(mean): --  ABP: --  ABP(mean): --  RR: 18 (21 Aug 2023 20:00) (18 - 18)  SpO2: 98% (21 Aug 2023 20:00) (97% - 100%)    O2 Parameters below as of 21 Aug 2023 20:00  Patient On (Oxygen Delivery Method): room air    PHYSICAL EXAM  GENERAL: frail  CHEST/LUNG: Clear to percussion bilaterally; No rales, rhonchi, wheezing, or rubs  HEART: Regular rate and rhythm; No murmurs, rubs, or gallops  ABDOMEN: Soft, Nontender, Nondistended; Bowel sounds present  EXTREMITIES:  edema+    IMPRESSION: Febrile most likely 2/2 to infectious causes   -Pt is asymptomatic, mentating well with stable vitals   -RVP test ordered   -BCx ordered   -Continue to monitor patient's vitals closely overnight   -Endorse/sign out to day team on overnight events   -RN aware of management     Petty HERNANDEZC   Dept of Medicine   [id number]

## 2023-08-22 LAB
ANION GAP SERPL CALC-SCNC: 17 MMOL/L — SIGNIFICANT CHANGE UP (ref 5–17)
BUN SERPL-MCNC: 80 MG/DL — HIGH (ref 7–23)
CALCIUM SERPL-MCNC: 9.3 MG/DL — SIGNIFICANT CHANGE UP (ref 8.4–10.5)
CHLORIDE SERPL-SCNC: 95 MMOL/L — LOW (ref 96–108)
CO2 SERPL-SCNC: 26 MMOL/L — SIGNIFICANT CHANGE UP (ref 22–31)
CREAT SERPL-MCNC: 8.83 MG/DL — HIGH (ref 0.5–1.3)
EGFR: 7 ML/MIN/1.73M2 — LOW
GLUCOSE SERPL-MCNC: 82 MG/DL — SIGNIFICANT CHANGE UP (ref 70–99)
HCT VFR BLD CALC: 27.1 % — LOW (ref 39–50)
HGB BLD-MCNC: 8.8 G/DL — LOW (ref 13–17)
MAGNESIUM SERPL-MCNC: 2.4 MG/DL — SIGNIFICANT CHANGE UP (ref 1.6–2.6)
MCHC RBC-ENTMCNC: 29.7 PG — SIGNIFICANT CHANGE UP (ref 27–34)
MCHC RBC-ENTMCNC: 32.5 GM/DL — SIGNIFICANT CHANGE UP (ref 32–36)
MCV RBC AUTO: 91.6 FL — SIGNIFICANT CHANGE UP (ref 80–100)
NRBC # BLD: 0 /100 WBCS — SIGNIFICANT CHANGE UP (ref 0–0)
PHOSPHATE SERPL-MCNC: 5.6 MG/DL — HIGH (ref 2.5–4.5)
PLATELET # BLD AUTO: 297 K/UL — SIGNIFICANT CHANGE UP (ref 150–400)
POTASSIUM SERPL-MCNC: 4.3 MMOL/L — SIGNIFICANT CHANGE UP (ref 3.5–5.3)
POTASSIUM SERPL-SCNC: 4.3 MMOL/L — SIGNIFICANT CHANGE UP (ref 3.5–5.3)
RAPID RVP RESULT: SIGNIFICANT CHANGE UP
RBC # BLD: 2.96 M/UL — LOW (ref 4.2–5.8)
RBC # FLD: 16.2 % — HIGH (ref 10.3–14.5)
SARS-COV-2 RNA SPEC QL NAA+PROBE: SIGNIFICANT CHANGE UP
SODIUM SERPL-SCNC: 138 MMOL/L — SIGNIFICANT CHANGE UP (ref 135–145)
WBC # BLD: 4.88 K/UL — SIGNIFICANT CHANGE UP (ref 3.8–10.5)
WBC # FLD AUTO: 4.88 K/UL — SIGNIFICANT CHANGE UP (ref 3.8–10.5)

## 2023-08-22 PROCEDURE — 71045 X-RAY EXAM CHEST 1 VIEW: CPT | Mod: 26

## 2023-08-22 RX ADMIN — Medication 1334 MILLIGRAM(S): at 13:47

## 2023-08-22 RX ADMIN — TRAMADOL HYDROCHLORIDE 25 MILLIGRAM(S): 50 TABLET ORAL at 03:02

## 2023-08-22 RX ADMIN — Medication 1334 MILLIGRAM(S): at 18:20

## 2023-08-22 RX ADMIN — Medication 975 MILLIGRAM(S): at 05:21

## 2023-08-22 RX ADMIN — Medication 1 TABLET(S): at 13:49

## 2023-08-22 RX ADMIN — TRAMADOL HYDROCHLORIDE 25 MILLIGRAM(S): 50 TABLET ORAL at 11:00

## 2023-08-22 RX ADMIN — Medication 1334 MILLIGRAM(S): at 09:56

## 2023-08-22 RX ADMIN — Medication 25 MILLIGRAM(S): at 05:21

## 2023-08-22 RX ADMIN — HEPARIN SODIUM 5000 UNIT(S): 5000 INJECTION INTRAVENOUS; SUBCUTANEOUS at 05:22

## 2023-08-22 RX ADMIN — Medication 975 MILLIGRAM(S): at 06:21

## 2023-08-22 RX ADMIN — TRAMADOL HYDROCHLORIDE 25 MILLIGRAM(S): 50 TABLET ORAL at 02:02

## 2023-08-22 RX ADMIN — TRAMADOL HYDROCHLORIDE 25 MILLIGRAM(S): 50 TABLET ORAL at 10:11

## 2023-08-22 RX ADMIN — Medication 0.25 MILLIGRAM(S): at 15:03

## 2023-08-22 RX ADMIN — Medication 25 MILLIGRAM(S): at 18:20

## 2023-08-22 RX ADMIN — HEPARIN SODIUM 5000 UNIT(S): 5000 INJECTION INTRAVENOUS; SUBCUTANEOUS at 13:49

## 2023-08-22 RX ADMIN — HEPARIN SODIUM 5000 UNIT(S): 5000 INJECTION INTRAVENOUS; SUBCUTANEOUS at 22:03

## 2023-08-22 NOTE — PROGRESS NOTE ADULT - SUBJECTIVE AND OBJECTIVE BOX
Patient resting without complaints.   Reports was oob to chair yesterday.  Currently getting 1 U PRBC.  Denies chest pain, SOB, N/V.    T(C): 37.1 (08-22-23 @ 06:00), Max: 37.9 (08-21-23 @ 20:00)  HR: 89 (08-22-23 @ 06:00) (71 - 96)  BP: 146/78 (08-22-23 @ 06:00) (106/63 - 155/78)  RR: 15 (08-22-23 @ 06:00) (14 - 18)  SpO2: 98% (08-22-23 @ 06:00) (97% - 100%)      Exam:  Alert and Oriented, No Acute Distress  Left Hip: Aquacel Dressing C/D/I; sensation grossly intact to light touch; (+) DF/PF; (+) Distal Pulses; No Calf tenderness B/L, PAS       Labs:                        7.3    4.69  )-----------( 247      ( 21 Aug 2023 09:57 )             22.8    08-21    137  |  93<L>  |  104<H>  ----------------------------<  77  4.4   |  25  |  10.87<H>    Ca    9.3      21 Aug 2023 09:57  Phos  6.5     08-21  Mg     2.4     08-21

## 2023-08-22 NOTE — PROGRESS NOTE ADULT - ASSESSMENT
A/P: 36 y/o  M POD#7 s/p L Hemiarthroplasty    DVT ppx- Heparin 5000units SQ TID  LLE WBAT  PT  OT  Pain management prn  Discharge planning to Rehab  D/w attending      MAXWELL Dow  Orthopedic Surgery  [id number]

## 2023-08-22 NOTE — PROVIDER CONTACT NOTE (OTHER) - ASSESSMENT
VSS WNL no fever. VSS WNL no fever. 155/73 (BP), 86 (HR), 98.5 (Temp) Alert and oriented to person, place, time/situation. normal mood and affect. no apparent risk to self or others.

## 2023-08-22 NOTE — PROVIDER CONTACT NOTE (OTHER) - ACTION/TREATMENT ORDERED:
Hold blood transfusion. Monitor temp through out night, apply ice packs, labs ordered Hold blood transfusion. Apply ice packs, labs ordered. Monitor temp through out night, if stable in morning (0500), initiate blood transfusion.

## 2023-08-22 NOTE — PROGRESS NOTE ADULT - SUBJECTIVE AND OBJECTIVE BOX
Patient is a 37y old  Male who presents with a chief complaint of Left femoral neck fracture (22 Aug 2023 06:49)    Date of servie : 08-22-23 @ 15:44  INTERVAL HPI/OVERNIGHT EVENTS:  T(C): 36.8 (08-22-23 @ 08:16), Max: 37.9 (08-21-23 @ 20:00)  HR: 71 (08-22-23 @ 08:16) (71 - 96)  BP: 154/73 (08-22-23 @ 08:16) (130/70 - 155/78)  RR: 16 (08-22-23 @ 08:16) (14 - 18)  SpO2: 98% (08-22-23 @ 08:16) (97% - 100%)  Wt(kg): --  I&O's Summary    21 Aug 2023 07:01  -  22 Aug 2023 07:00  --------------------------------------------------------  IN: 300 mL / OUT: 3000 mL / NET: -2700 mL        LABS:                        8.8    4.88  )-----------( 297      ( 22 Aug 2023 14:38 )             27.1     08-22    138  |  95<L>  |  80<H>  ----------------------------<  82  4.3   |  26  |  8.83<H>    Ca    9.3      22 Aug 2023 14:38  Phos  5.6     08-22  Mg     2.4     08-22        Urinalysis Basic - ( 22 Aug 2023 14:38 )    Color: x / Appearance: x / SG: x / pH: x  Gluc: 82 mg/dL / Ketone: x  / Bili: x / Urobili: x   Blood: x / Protein: x / Nitrite: x   Leuk Esterase: x / RBC: x / WBC x   Sq Epi: x / Non Sq Epi: x / Bacteria: x      CAPILLARY BLOOD GLUCOSE            Urinalysis Basic - ( 22 Aug 2023 14:38 )    Color: x / Appearance: x / SG: x / pH: x  Gluc: 82 mg/dL / Ketone: x  / Bili: x / Urobili: x   Blood: x / Protein: x / Nitrite: x   Leuk Esterase: x / RBC: x / WBC x   Sq Epi: x / Non Sq Epi: x / Bacteria: x        MEDICATIONS  (STANDING):  acetaminophen     Tablet .. 975 milliGRAM(s) Oral every 6 hours  calcium acetate 1334 milliGRAM(s) Oral three times a day with meals  epoetin carito (EPOGEN) Injectable 5000 Unit(s) IV Push <User Schedule>  heparin   Injectable 5000 Unit(s) SubCutaneous every 8 hours  metoprolol tartrate 25 milliGRAM(s) Oral two times a day  Nephro-pat 1 Tablet(s) Oral daily  polyethylene glycol 3350 17 Gram(s) Oral daily  senna 2 Tablet(s) Oral at bedtime    MEDICATIONS  (PRN):  ALPRAZolam 0.25 milliGRAM(s) Oral every 6 hours PRN Anxiety  traMADol 25 milliGRAM(s) Oral every 8 hours PRN Moderate Pain (4 - 6)          PHYSICAL EXAM:  GENERAL: NAD, well-groomed, well-developed  HEAD:  Atraumatic, Normocephalic  CHEST/LUNG: Clear to percussion bilaterally; No rales, rhonchi, wheezing, or rubs  HEART: Regular rate and rhythm; No murmurs, rubs, or gallops  ABDOMEN: Soft, Nontender, Nondistended; Bowel sounds present  EXTREMITIES:  2+ Peripheral Pulses, No clubbing, cyanosis, or edema  LYMPH: No lymphadenopathy noted  SKIN: No rashes or lesions    Care Discussed with Consultants/Other Providers [ ] YES  [ ] NO

## 2023-08-22 NOTE — PROGRESS NOTE ADULT - ASSESSMENT
Fall, Left hip fracture  - sp repair  - ortho fu   - pain control  - will monitor     ESRD  - HD as scheduled  - renal fu     HTN  - cw home meds     Fever  - monitor cultures  - check COVID 19  - check CXR

## 2023-08-23 PROCEDURE — 99232 SBSQ HOSP IP/OBS MODERATE 35: CPT | Mod: GC

## 2023-08-23 RX ORDER — ERYTHROPOIETIN 10000 [IU]/ML
8000 INJECTION, SOLUTION INTRAVENOUS; SUBCUTANEOUS ONCE
Refills: 0 | Status: COMPLETED | OUTPATIENT
Start: 2023-08-23 | End: 2023-08-23

## 2023-08-23 RX ADMIN — Medication 1334 MILLIGRAM(S): at 17:07

## 2023-08-23 RX ADMIN — Medication 1334 MILLIGRAM(S): at 12:41

## 2023-08-23 RX ADMIN — TRAMADOL HYDROCHLORIDE 25 MILLIGRAM(S): 50 TABLET ORAL at 14:01

## 2023-08-23 RX ADMIN — Medication 975 MILLIGRAM(S): at 06:42

## 2023-08-23 RX ADMIN — TRAMADOL HYDROCHLORIDE 25 MILLIGRAM(S): 50 TABLET ORAL at 03:31

## 2023-08-23 RX ADMIN — Medication 975 MILLIGRAM(S): at 05:42

## 2023-08-23 RX ADMIN — HEPARIN SODIUM 5000 UNIT(S): 5000 INJECTION INTRAVENOUS; SUBCUTANEOUS at 22:11

## 2023-08-23 RX ADMIN — TRAMADOL HYDROCHLORIDE 25 MILLIGRAM(S): 50 TABLET ORAL at 23:11

## 2023-08-23 RX ADMIN — TRAMADOL HYDROCHLORIDE 25 MILLIGRAM(S): 50 TABLET ORAL at 12:38

## 2023-08-23 RX ADMIN — Medication 1 TABLET(S): at 12:41

## 2023-08-23 RX ADMIN — TRAMADOL HYDROCHLORIDE 25 MILLIGRAM(S): 50 TABLET ORAL at 02:31

## 2023-08-23 RX ADMIN — TRAMADOL HYDROCHLORIDE 25 MILLIGRAM(S): 50 TABLET ORAL at 22:11

## 2023-08-23 RX ADMIN — HEPARIN SODIUM 5000 UNIT(S): 5000 INJECTION INTRAVENOUS; SUBCUTANEOUS at 05:42

## 2023-08-23 RX ADMIN — HEPARIN SODIUM 5000 UNIT(S): 5000 INJECTION INTRAVENOUS; SUBCUTANEOUS at 14:25

## 2023-08-23 RX ADMIN — Medication 975 MILLIGRAM(S): at 12:39

## 2023-08-23 RX ADMIN — ERYTHROPOIETIN 8000 UNIT(S): 10000 INJECTION, SOLUTION INTRAVENOUS; SUBCUTANEOUS at 10:37

## 2023-08-23 RX ADMIN — Medication 975 MILLIGRAM(S): at 13:59

## 2023-08-23 RX ADMIN — POLYETHYLENE GLYCOL 3350 17 GRAM(S): 17 POWDER, FOR SOLUTION ORAL at 12:41

## 2023-08-23 RX ADMIN — Medication 25 MILLIGRAM(S): at 12:42

## 2023-08-23 NOTE — PROGRESS NOTE ADULT - SUBJECTIVE AND OBJECTIVE BOX
Patient resting without complaints.  No chest pain, SOB, N/V.    T(C): 36.9 (08-23-23 @ 05:00), Max: 37.2 (08-23-23 @ 00:54)  HR: 85 (08-23-23 @ 05:00) (71 - 85)  BP: 172/89 (08-23-23 @ 05:00) (140/68 - 172/89)  RR: 18 (08-23-23 @ 05:00) (16 - 18)  SpO2: 94% (08-23-23 @ 05:00) (94% - 100%)      Exam:  Alert and Oriented, No Acute Distress  Left Hip: Aquacel Dressing C/D/I; sensation grossly intact to light touch; (+) DF/PF; (+) Distal Pulses; No Calf tenderness B/L, PAS     Labs:                    8.8    4.88  )-----------( 297      ( 22 Aug 2023 14:38 )             27.1    08-22    138  |  95<L>  |  80<H>  ----------------------------<  82  4.3   |  26  |  8.83<H>    Ca    9.3      22 Aug 2023 14:38  Phos  5.6     08-22  Mg     2.4     08-22

## 2023-08-23 NOTE — PROGRESS NOTE ADULT - PROBLEM SELECTOR PLAN 1
Pt. with h/o ESRD on HD MWF. Currently admitted for hip fracture, underwent L hip hemiarthroplasty on 8/15/23. Last HD treatment was on 8/18. 2L UF done 8/19. Pt clinically stable. Labs reviewed. Plan for maintenance HD today. Monitor labs and vitals. Avoid nephrotoxins. Dose medications to HD.
On HD schedule (MWF)  Tolerated UF session yesterday (2L removed)  Plan for regular HD session today
Pt. with h/o ESRD on HD MWF. Currently admitted for hip fracture, underwent L hip hemiarthroplasty on 8/15/23. Last HD treatment was on 8/18. 2L UF done 8/19. Pt clinically stable. Labs reviewed. Plan for maintenance HD today. Monitor labs and vitals. Avoid nephrotoxins. Dose medications to HD.
On HD schedule (MWF)  Plan for UF session today with goal 2L
On HD schedule (MWF)  Continue with regular HD session   Plan for HD session today
On HD schedule (MWF)  Plan for HD session today per schedule
Pt. with h/o ESRD on HD MWF. Currently admitted for hip fracture, underwent L hip hemiarthroplasty on 8/15/23. Last HD treatment was on 8/18. Pt clinically stable, although reports cough and mild SOB. Labs reviewed. Plan for 2L UF today. Plan for next HD on Monday. Monitor labs and vitals. Avoid nephrotoxins. Dose medications to HD.

## 2023-08-23 NOTE — PROGRESS NOTE ADULT - ATTENDING COMMENTS
# ESRD - HD MWF - dialysis today as planned.     # Jaw enlargement. Patient states that his jaw has been growing (teeth far apart) over the past 6 months. Facial features different compared to old photos. Need to consider acromegaly. Check IGF-1, GGT. Consider outpatient endocrine consult.     # Medication toxicity monitoring: medication dose reviewed. Please dose medications to CrCl<15    The rest of the recommendations as per fellow's note.    Maile Peoples MD  Attending Nephrologist  [phone] or via [employer] .

## 2023-08-23 NOTE — PROGRESS NOTE ADULT - SUBJECTIVE AND OBJECTIVE BOX
Patient is a 37y old  Male who presents with a chief complaint of Left femoral neck fracture (23 Aug 2023 13:09)    Date of servie : 08-23-23 @ 16:09  INTERVAL HPI/OVERNIGHT EVENTS:  T(C): 36.4 (08-23-23 @ 12:42), Max: 37.2 (08-23-23 @ 00:54)  HR: 88 (08-23-23 @ 12:42) (75 - 91)  BP: 133/74 (08-23-23 @ 12:42) (126/73 - 172/89)  RR: 18 (08-23-23 @ 12:42) (18 - 18)  SpO2: 100% (08-23-23 @ 12:42) (94% - 100%)  Wt(kg): --  I&O's Summary    22 Aug 2023 07:01  -  23 Aug 2023 07:00  --------------------------------------------------------  IN: 200 mL / OUT: 0 mL / NET: 200 mL    23 Aug 2023 07:01  -  23 Aug 2023 16:09  --------------------------------------------------------  IN: 950 mL / OUT: 3800 mL / NET: -2850 mL        LABS:                        8.8    4.88  )-----------( 297      ( 22 Aug 2023 14:38 )             27.1     08-22    138  |  95<L>  |  80<H>  ----------------------------<  82  4.3   |  26  |  8.83<H>    Ca    9.3      22 Aug 2023 14:38  Phos  5.6     08-22  Mg     2.4     08-22        Urinalysis Basic - ( 22 Aug 2023 14:38 )    Color: x / Appearance: x / SG: x / pH: x  Gluc: 82 mg/dL / Ketone: x  / Bili: x / Urobili: x   Blood: x / Protein: x / Nitrite: x   Leuk Esterase: x / RBC: x / WBC x   Sq Epi: x / Non Sq Epi: x / Bacteria: x      CAPILLARY BLOOD GLUCOSE            Urinalysis Basic - ( 22 Aug 2023 14:38 )    Color: x / Appearance: x / SG: x / pH: x  Gluc: 82 mg/dL / Ketone: x  / Bili: x / Urobili: x   Blood: x / Protein: x / Nitrite: x   Leuk Esterase: x / RBC: x / WBC x   Sq Epi: x / Non Sq Epi: x / Bacteria: x        MEDICATIONS  (STANDING):  acetaminophen     Tablet .. 975 milliGRAM(s) Oral every 6 hours  calcium acetate 1334 milliGRAM(s) Oral three times a day with meals  epoetin carito (EPOGEN) Injectable 5000 Unit(s) IV Push <User Schedule>  heparin   Injectable 5000 Unit(s) SubCutaneous every 8 hours  metoprolol tartrate 25 milliGRAM(s) Oral two times a day  Nephro-pat 1 Tablet(s) Oral daily  polyethylene glycol 3350 17 Gram(s) Oral daily  senna 2 Tablet(s) Oral at bedtime    MEDICATIONS  (PRN):  ALPRAZolam 0.25 milliGRAM(s) Oral every 6 hours PRN Anxiety  traMADol 25 milliGRAM(s) Oral every 8 hours PRN Moderate Pain (4 - 6)          PHYSICAL EXAM:  GENERAL: NAD, well-groomed, well-developed  HEAD:  Atraumatic, Normocephalic  CHEST/LUNG: Clear to percussion bilaterally; No rales, rhonchi, wheezing, or rubs  HEART: Regular rate and rhythm; No murmurs, rubs, or gallops  ABDOMEN: Soft, Nontender, Nondistended; Bowel sounds present  EXTREMITIES:  2+ Peripheral Pulses, No clubbing, cyanosis, or edema  LYMPH: No lymphadenopathy noted  SKIN: No rashes or lesions    Care Discussed with Consultants/Other Providers [ x] YES  [ ] NO

## 2023-08-23 NOTE — PROGRESS NOTE ADULT - PROBLEM SELECTOR PROBLEM 3
Hyperkalemia
Hyperkalemia
Renal bone disease
Hyperkalemia
Renal bone disease
Hyperkalemia
Hyperkalemia

## 2023-08-23 NOTE — PROGRESS NOTE ADULT - ASSESSMENT
Fall, Left hip fracture  - sp repair  - ortho fu   - pain control  - will monitor     ESRD  - HD as scheduled  - renal fu     HTN  - cw home meds     Fever  - resolved  - fu cultures    dc planning in am if remains afebrile

## 2023-08-23 NOTE — PROGRESS NOTE ADULT - PROBLEM SELECTOR PROBLEM 1
End stage renal disease

## 2023-08-23 NOTE — PROGRESS NOTE ADULT - SUBJECTIVE AND OBJECTIVE BOX
[facility] DIVISION OF KIDNEY DISEASES AND HYPERTENSION   FOLLOW UP NOTE    --------------------------------------------------------------------------------  Chief Complaint:    24 hour events/subjective: Pt. was seen and examined today. Overnight events  noted. Denies any shortness of breath, chest pain, nausea or vomiting, abd pain.        PAST HISTORY  --------------------------------------------------------------------------------  No significant changes to PMH, PSH, FHx, SHx, unless otherwise noted    ALLERGIES & MEDICATIONS  --------------------------------------------------------------------------------  Allergies    No Known Drug Allergies  shellfish (Hives)    Intolerances      Standing Inpatient Medications  acetaminophen     Tablet .. 975 milliGRAM(s) Oral every 6 hours  calcium acetate 1334 milliGRAM(s) Oral three times a day with meals  epoetin carito (EPOGEN) Injectable 5000 Unit(s) IV Push <User Schedule>  heparin   Injectable 5000 Unit(s) SubCutaneous every 8 hours  metoprolol tartrate 25 milliGRAM(s) Oral two times a day  Nephro-pat 1 Tablet(s) Oral daily  polyethylene glycol 3350 17 Gram(s) Oral daily  senna 2 Tablet(s) Oral at bedtime    PRN Inpatient Medications  ALPRAZolam 0.25 milliGRAM(s) Oral every 6 hours PRN  traMADol 25 milliGRAM(s) Oral every 8 hours PRN      REVIEW OF SYSTEMS  --------------------------------------------------------------------------------  Gen: No fevers/chills  Head/Eyes/Ears: No HA   Respiratory: No dyspnea, cough  CV: No chest pain  GI: No abdominal pain, diarrhea  : No dysuria, hematuria  MSK: No  edema  Skin: No rashes  Heme: No easy bruising or bleeding    All other systems were reviewed and are negative, except as noted.    VITALS/PHYSICAL EXAM  --------------------------------------------------------------------------------  T(C): 36.2 (08-23-23 @ 11:35), Max: 37.2 (08-23-23 @ 00:54)  HR: 91 (08-23-23 @ 11:35) (75 - 91)  BP: 126/73 (08-23-23 @ 11:35) (126/73 - 172/89)  RR: 18 (08-23-23 @ 11:35) (18 - 18)  SpO2: 100% (08-23-23 @ 11:35) (94% - 100%)  Wt(kg): --        08-22-23 @ 07:01  -  08-23-23 @ 07:00  --------------------------------------------------------  IN: 200 mL / OUT: 0 mL / NET: 200 mL    08-23-23 @ 07:01  -  08-23-23 @ 13:09  --------------------------------------------------------  IN: 800 mL / OUT: 3800 mL / NET: -3000 mL        Physical Exam:  Gen: NAD  HEENT: Anicteric  Pulm: CTA B/L  CV: S1S2+  Abd: Soft, +BS   Ext: No LE edema B/L  Neuro: Awake  :Lee cath+ with clear urine  Skin: Warm and dry  Dialysis access: +AVF with palpable thrill      LABS/STUDIES  --------------------------------------------------------------------------------              8.8    4.88  >-----------<  297      [08-22-23 @ 14:38]              27.1     138  |  95  |  80  ----------------------------<  82      [08-22-23 @ 14:38]  4.3   |  26  |  8.83        Ca     9.3     [08-22-23 @ 14:38]      Mg     2.4     [08-22-23 @ 14:38]      Phos  5.6     [08-22-23 @ 14:38]            Creatinine Trend:  SCr 8.83 [08-22 @ 14:38]  SCr 10.87 [08-21 @ 09:57]  SCr 9.31 [08-20 @ 09:15]  SCr 8.76 [08-20 @ 07:26]  SCr 7.02 [08-19 @ 06:59]    Urinalysis - [08-22-23 @ 14:38]      Color  / Appearance  / SG  / pH       Gluc 82 / Ketone   / Bili  / Urobili        Blood  / Protein  / Leuk Est  / Nitrite       RBC  / WBC  / Hyaline  / Gran  / Sq Epi  / Non Sq Epi  / Bacteria           Tacrolimus  Cyclosporine  Sirolimus  Mycophenolate  BK PCR  CMV PCR  Parvo PCR  EBV PCR

## 2023-08-23 NOTE — PROGRESS NOTE ADULT - ASSESSMENT
A/P: 36 y/o  M POD#8 s/p L Hemiarthroplasty    DVT ppx- Heparin 5000units SQ TID  LLE WBAT  PT  OT  Pain management prn  HD M/W/F  Discharge planning to Rehab  as per primary team  D/w attending      MAXWELL Dow  Orthopedic Surgery  [phone]

## 2023-08-23 NOTE — PROGRESS NOTE ADULT - PROBLEM SELECTOR PLAN 3
Phos near goal. C/w home phoslo with meals. Ensure low phos diet.    Patient complained of enlarged jaw recently. Consider work up: vitamin D 1,25 and 25OH, IGF-1, GG, outpatient endocrine and bone scan to determine etiology.         Thank you for this consult.  Javon Elmore  Nephrology Fellow  Please contact me on TEAMS  After 5 pm please contact the on-call Fellow.

## 2023-08-23 NOTE — PROGRESS NOTE ADULT - PROBLEM SELECTOR PLAN 2
Better controlled now  In 130's now
Hgb below goal. Pt needs EPO. Will give 1 dose today with HD, but needs iron studies.    Please Obtain iron studies.
Better controlled now  Continue with HD
Hgb below goal. On EPO with HD. Obtain iron studies. Monitor Hgb, goal 10-11
Better controlled now  Continue with HD

## 2023-08-24 ENCOUNTER — INPATIENT (INPATIENT)
Facility: HOSPITAL | Age: 38
LOS: 25 days | Discharge: NURSING, REHAB OR LONG-TERM CARE | DRG: 559 | End: 2023-09-19
Attending: PHYSICAL MEDICINE & REHABILITATION | Admitting: PHYSICAL MEDICINE & REHABILITATION
Payer: MEDICAID

## 2023-08-24 ENCOUNTER — TRANSCRIPTION ENCOUNTER (OUTPATIENT)
Age: 38
End: 2023-08-24

## 2023-08-24 VITALS
TEMPERATURE: 98 F | RESPIRATION RATE: 18 BRPM | DIASTOLIC BLOOD PRESSURE: 73 MMHG | OXYGEN SATURATION: 97 % | HEART RATE: 71 BPM | SYSTOLIC BLOOD PRESSURE: 134 MMHG

## 2023-08-24 VITALS
HEART RATE: 74 BPM | TEMPERATURE: 99 F | SYSTOLIC BLOOD PRESSURE: 137 MMHG | DIASTOLIC BLOOD PRESSURE: 79 MMHG | RESPIRATION RATE: 16 BRPM | OXYGEN SATURATION: 97 %

## 2023-08-24 DIAGNOSIS — L89.152 PRESSURE ULCER OF SACRAL REGION, STAGE 2: ICD-10-CM

## 2023-08-24 DIAGNOSIS — N18.6 END STAGE RENAL DISEASE: ICD-10-CM

## 2023-08-24 DIAGNOSIS — D63.1 ANEMIA IN CHRONIC KIDNEY DISEASE: ICD-10-CM

## 2023-08-24 DIAGNOSIS — I77.0 ARTERIOVENOUS FISTULA, ACQUIRED: Chronic | ICD-10-CM
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
## 2023-08-24 DIAGNOSIS — S72.90XA UNSPECIFIED FRACTURE OF UNSPECIFIED FEMUR, INITIAL ENCOUNTER FOR CLOSED FRACTURE: ICD-10-CM

## 2023-08-24 DIAGNOSIS — M62.81 MUSCLE WEAKNESS (GENERALIZED): ICD-10-CM

## 2023-08-24 DIAGNOSIS — G47.33 OBSTRUCTIVE SLEEP APNEA (ADULT) (PEDIATRIC): ICD-10-CM

## 2023-08-24 DIAGNOSIS — E88.09 OTHER DISORDERS OF PLASMA-PROTEIN METABOLISM, NOT ELSEWHERE CLASSIFIED: ICD-10-CM

## 2023-08-24 DIAGNOSIS — E43 UNSPECIFIED SEVERE PROTEIN-CALORIE MALNUTRITION: ICD-10-CM

## 2023-08-24 DIAGNOSIS — Z86.73 PERSONAL HISTORY OF TRANSIENT ISCHEMIC ATTACK (TIA), AND CEREBRAL INFARCTION WITHOUT RESIDUAL DEFICITS: ICD-10-CM

## 2023-08-24 DIAGNOSIS — R26.9 UNSPECIFIED ABNORMALITIES OF GAIT AND MOBILITY: ICD-10-CM

## 2023-08-24 DIAGNOSIS — R34 ANURIA AND OLIGURIA: ICD-10-CM

## 2023-08-24 DIAGNOSIS — Z99.2 DEPENDENCE ON RENAL DIALYSIS: ICD-10-CM

## 2023-08-24 DIAGNOSIS — S72.002D FRACTURE OF UNSPECIFIED PART OF NECK OF LEFT FEMUR, SUBSEQUENT ENCOUNTER FOR CLOSED FRACTURE WITH ROUTINE HEALING: ICD-10-CM
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
## 2023-08-24 DIAGNOSIS — S52.202D UNSPECIFIED FRACTURE OF SHAFT OF LEFT ULNA, SUBSEQUENT ENCOUNTER FOR CLOSED FRACTURE WITH ROUTINE HEALING: ICD-10-CM

## 2023-08-24 DIAGNOSIS — S52.122D DISPLACED FRACTURE OF HEAD OF LEFT RADIUS, SUBSEQUENT ENCOUNTER FOR CLOSED FRACTURE WITH ROUTINE HEALING: ICD-10-CM

## 2023-08-24 DIAGNOSIS — Z96.642 PRESENCE OF LEFT ARTIFICIAL HIP JOINT: ICD-10-CM

## 2023-08-24 DIAGNOSIS — Z51.89 ENCOUNTER FOR OTHER SPECIFIED AFTERCARE: ICD-10-CM

## 2023-08-24 DIAGNOSIS — I12.0 HYPERTENSIVE CHRONIC KIDNEY DISEASE WITH STAGE 5 CHRONIC KIDNEY DISEASE OR END STAGE RENAL DISEASE: ICD-10-CM

## 2023-08-24 DIAGNOSIS — R74.8 ABNORMAL LEVELS OF OTHER SERUM ENZYMES: ICD-10-CM

## 2023-08-24 DIAGNOSIS — G47.00 INSOMNIA, UNSPECIFIED: ICD-10-CM

## 2023-08-24 DIAGNOSIS — S52.602D UNSPECIFIED FRACTURE OF LOWER END OF LEFT ULNA, SUBSEQUENT ENCOUNTER FOR CLOSED FRACTURE WITH ROUTINE HEALING: ICD-10-CM

## 2023-08-24 DIAGNOSIS — N25.0 RENAL OSTEODYSTROPHY: ICD-10-CM

## 2023-08-24 DIAGNOSIS — R53.83 OTHER FATIGUE: ICD-10-CM

## 2023-08-24 DIAGNOSIS — E87.1 HYPO-OSMOLALITY AND HYPONATREMIA: ICD-10-CM

## 2023-08-24 DIAGNOSIS — N25.81 SECONDARY HYPERPARATHYROIDISM OF RENAL ORIGIN: ICD-10-CM

## 2023-08-24 PROCEDURE — 97110 THERAPEUTIC EXERCISES: CPT

## 2023-08-24 PROCEDURE — 36430 TRANSFUSION BLD/BLD COMPNT: CPT

## 2023-08-24 PROCEDURE — 86850 RBC ANTIBODY SCREEN: CPT

## 2023-08-24 PROCEDURE — 80061 LIPID PANEL: CPT

## 2023-08-24 PROCEDURE — 84295 ASSAY OF SERUM SODIUM: CPT

## 2023-08-24 PROCEDURE — 80053 COMPREHEN METABOLIC PANEL: CPT

## 2023-08-24 PROCEDURE — 82435 ASSAY OF BLOOD CHLORIDE: CPT

## 2023-08-24 PROCEDURE — 88313 SPECIAL STAINS GROUP 2: CPT

## 2023-08-24 PROCEDURE — 73552 X-RAY EXAM OF FEMUR 2/>: CPT

## 2023-08-24 PROCEDURE — 72192 CT PELVIS W/O DYE: CPT | Mod: MA

## 2023-08-24 PROCEDURE — 87040 BLOOD CULTURE FOR BACTERIA: CPT

## 2023-08-24 PROCEDURE — P9016: CPT

## 2023-08-24 PROCEDURE — 83036 HEMOGLOBIN GLYCOSYLATED A1C: CPT

## 2023-08-24 PROCEDURE — 96374 THER/PROPH/DIAG INJ IV PUSH: CPT

## 2023-08-24 PROCEDURE — 97530 THERAPEUTIC ACTIVITIES: CPT

## 2023-08-24 PROCEDURE — 83605 ASSAY OF LACTIC ACID: CPT

## 2023-08-24 PROCEDURE — 86901 BLOOD TYPING SEROLOGIC RH(D): CPT

## 2023-08-24 PROCEDURE — 72170 X-RAY EXAM OF PELVIS: CPT

## 2023-08-24 PROCEDURE — C1889: CPT

## 2023-08-24 PROCEDURE — 76942 ECHO GUIDE FOR BIOPSY: CPT

## 2023-08-24 PROCEDURE — 80048 BASIC METABOLIC PNL TOTAL CA: CPT

## 2023-08-24 PROCEDURE — 73080 X-RAY EXAM OF ELBOW: CPT

## 2023-08-24 PROCEDURE — C1713: CPT

## 2023-08-24 PROCEDURE — P9040: CPT

## 2023-08-24 PROCEDURE — 96375 TX/PRO/DX INJ NEW DRUG ADDON: CPT

## 2023-08-24 PROCEDURE — 88311 DECALCIFY TISSUE: CPT

## 2023-08-24 PROCEDURE — 82565 ASSAY OF CREATININE: CPT

## 2023-08-24 PROCEDURE — 99261: CPT

## 2023-08-24 PROCEDURE — 82962 GLUCOSE BLOOD TEST: CPT

## 2023-08-24 PROCEDURE — P9045: CPT

## 2023-08-24 PROCEDURE — 76377 3D RENDER W/INTRP POSTPROCES: CPT

## 2023-08-24 PROCEDURE — 0225U NFCT DS DNA&RNA 21 SARSCOV2: CPT

## 2023-08-24 PROCEDURE — 85025 COMPLETE CBC W/AUTO DIFF WBC: CPT

## 2023-08-24 PROCEDURE — 84480 ASSAY TRIIODOTHYRONINE (T3): CPT

## 2023-08-24 PROCEDURE — 82310 ASSAY OF CALCIUM: CPT

## 2023-08-24 PROCEDURE — 71045 X-RAY EXAM CHEST 1 VIEW: CPT

## 2023-08-24 PROCEDURE — C9399: CPT

## 2023-08-24 PROCEDURE — 86870 RBC ANTIBODY IDENTIFICATION: CPT

## 2023-08-24 PROCEDURE — 86902 BLOOD TYPE ANTIGEN DONOR EA: CPT

## 2023-08-24 PROCEDURE — 88305 TISSUE EXAM BY PATHOLOGIST: CPT

## 2023-08-24 PROCEDURE — 86880 COOMBS TEST DIRECT: CPT

## 2023-08-24 PROCEDURE — 94660 CPAP INITIATION&MGMT: CPT

## 2023-08-24 PROCEDURE — 86922 COMPATIBILITY TEST ANTIGLOB: CPT

## 2023-08-24 PROCEDURE — 83970 ASSAY OF PARATHORMONE: CPT

## 2023-08-24 PROCEDURE — C1776: CPT

## 2023-08-24 PROCEDURE — 73502 X-RAY EXAM HIP UNI 2-3 VIEWS: CPT

## 2023-08-24 PROCEDURE — 93005 ELECTROCARDIOGRAM TRACING: CPT

## 2023-08-24 PROCEDURE — 85014 HEMATOCRIT: CPT

## 2023-08-24 PROCEDURE — 94003 VENT MGMT INPAT SUBQ DAY: CPT

## 2023-08-24 PROCEDURE — 82803 BLOOD GASES ANY COMBINATION: CPT

## 2023-08-24 PROCEDURE — 84100 ASSAY OF PHOSPHORUS: CPT

## 2023-08-24 PROCEDURE — 85730 THROMBOPLASTIN TIME PARTIAL: CPT

## 2023-08-24 PROCEDURE — 82330 ASSAY OF CALCIUM: CPT

## 2023-08-24 PROCEDURE — 97162 PT EVAL MOD COMPLEX 30 MIN: CPT

## 2023-08-24 PROCEDURE — 83735 ASSAY OF MAGNESIUM: CPT

## 2023-08-24 PROCEDURE — 99285 EMERGENCY DEPT VISIT HI MDM: CPT

## 2023-08-24 PROCEDURE — 86900 BLOOD TYPING SEROLOGIC ABO: CPT

## 2023-08-24 PROCEDURE — 82947 ASSAY GLUCOSE BLOOD QUANT: CPT

## 2023-08-24 PROCEDURE — 97166 OT EVAL MOD COMPLEX 45 MIN: CPT

## 2023-08-24 PROCEDURE — 36415 COLL VENOUS BLD VENIPUNCTURE: CPT

## 2023-08-24 PROCEDURE — 84436 ASSAY OF TOTAL THYROXINE: CPT

## 2023-08-24 PROCEDURE — 85610 PROTHROMBIN TIME: CPT

## 2023-08-24 PROCEDURE — 94002 VENT MGMT INPAT INIT DAY: CPT

## 2023-08-24 PROCEDURE — 85027 COMPLETE CBC AUTOMATED: CPT

## 2023-08-24 PROCEDURE — 84132 ASSAY OF SERUM POTASSIUM: CPT

## 2023-08-24 PROCEDURE — 84443 ASSAY THYROID STIM HORMONE: CPT

## 2023-08-24 PROCEDURE — 85018 HEMOGLOBIN: CPT

## 2023-08-24 RX ORDER — ALPRAZOLAM 0.25 MG
0.25 TABLET ORAL EVERY 6 HOURS
Refills: 0 | Status: DISCONTINUED | OUTPATIENT
Start: 2023-08-24 | End: 2023-08-26

## 2023-08-24 RX ORDER — LABETALOL HCL 100 MG
1 TABLET ORAL
Refills: 0 | DISCHARGE

## 2023-08-24 RX ORDER — POLYETHYLENE GLYCOL 3350 17 G/17G
17 POWDER, FOR SOLUTION ORAL
Qty: 0 | Refills: 0 | DISCHARGE
Start: 2023-08-24

## 2023-08-24 RX ORDER — SENNA PLUS 8.6 MG/1
2 TABLET ORAL AT BEDTIME
Refills: 0 | Status: DISCONTINUED | OUTPATIENT
Start: 2023-08-24 | End: 2023-09-19

## 2023-08-24 RX ORDER — METOPROLOL TARTRATE 50 MG
1 TABLET ORAL
Qty: 0 | Refills: 0 | DISCHARGE
Start: 2023-08-24

## 2023-08-24 RX ORDER — ALPRAZOLAM 0.25 MG
1 TABLET ORAL
Qty: 0 | Refills: 0 | DISCHARGE
Start: 2023-08-24

## 2023-08-24 RX ORDER — TRAMADOL HYDROCHLORIDE 50 MG/1
0.5 TABLET ORAL
Qty: 0 | Refills: 0 | DISCHARGE
Start: 2023-08-24

## 2023-08-24 RX ORDER — ACETAMINOPHEN 500 MG
3 TABLET ORAL
Qty: 0 | Refills: 0 | DISCHARGE
Start: 2023-08-24

## 2023-08-24 RX ORDER — OXYCODONE HYDROCHLORIDE 5 MG/1
1 TABLET ORAL
Qty: 0 | Refills: 0 | DISCHARGE
Start: 2023-08-24

## 2023-08-24 RX ORDER — OXYCODONE HYDROCHLORIDE 5 MG/1
10 TABLET ORAL EVERY 6 HOURS
Refills: 0 | Status: DISCONTINUED | OUTPATIENT
Start: 2023-08-24 | End: 2023-08-31

## 2023-08-24 RX ORDER — CHLORHEXIDINE GLUCONATE 213 G/1000ML
1 SOLUTION TOPICAL DAILY
Refills: 0 | Status: DISCONTINUED | OUTPATIENT
Start: 2023-08-24 | End: 2023-08-25

## 2023-08-24 RX ORDER — OXYCODONE HYDROCHLORIDE 5 MG/1
10 TABLET ORAL THREE TIMES A DAY
Refills: 0 | Status: DISCONTINUED | OUTPATIENT
Start: 2023-08-24 | End: 2023-08-24

## 2023-08-24 RX ORDER — METOPROLOL TARTRATE 50 MG
25 TABLET ORAL
Refills: 0 | Status: DISCONTINUED | OUTPATIENT
Start: 2023-08-24 | End: 2023-09-19

## 2023-08-24 RX ORDER — TRAMADOL HYDROCHLORIDE 50 MG/1
25 TABLET ORAL EVERY 8 HOURS
Refills: 0 | Status: DISCONTINUED | OUTPATIENT
Start: 2023-08-24 | End: 2023-08-25

## 2023-08-24 RX ORDER — ACETAMINOPHEN 500 MG
975 TABLET ORAL EVERY 6 HOURS
Refills: 0 | Status: DISCONTINUED | OUTPATIENT
Start: 2023-08-24 | End: 2023-09-19

## 2023-08-24 RX ORDER — CHLORHEXIDINE GLUCONATE 213 G/1000ML
1 SOLUTION TOPICAL DAILY
Refills: 0 | Status: DISCONTINUED | OUTPATIENT
Start: 2023-08-24 | End: 2023-08-24

## 2023-08-24 RX ORDER — HEPARIN SODIUM 5000 [USP'U]/ML
5000 INJECTION INTRAVENOUS; SUBCUTANEOUS EVERY 8 HOURS
Refills: 0 | Status: DISCONTINUED | OUTPATIENT
Start: 2023-08-24 | End: 2023-09-19

## 2023-08-24 RX ORDER — SENNA PLUS 8.6 MG/1
2 TABLET ORAL
Qty: 0 | Refills: 0 | DISCHARGE
Start: 2023-08-24

## 2023-08-24 RX ORDER — CALCIUM ACETATE 667 MG
1334 TABLET ORAL
Refills: 0 | Status: DISCONTINUED | OUTPATIENT
Start: 2023-08-24 | End: 2023-09-06

## 2023-08-24 RX ORDER — NALOXONE HYDROCHLORIDE 4 MG/.1ML
4 SPRAY NASAL
Qty: 1 | Refills: 0
Start: 2023-08-24

## 2023-08-24 RX ORDER — POLYETHYLENE GLYCOL 3350 17 G/17G
17 POWDER, FOR SOLUTION ORAL DAILY
Refills: 0 | Status: DISCONTINUED | OUTPATIENT
Start: 2023-08-24 | End: 2023-09-10

## 2023-08-24 RX ADMIN — Medication 975 MILLIGRAM(S): at 05:11

## 2023-08-24 RX ADMIN — Medication 0.25 MILLIGRAM(S): at 21:11

## 2023-08-24 RX ADMIN — TRAMADOL HYDROCHLORIDE 25 MILLIGRAM(S): 50 TABLET ORAL at 13:54

## 2023-08-24 RX ADMIN — OXYCODONE HYDROCHLORIDE 10 MILLIGRAM(S): 5 TABLET ORAL at 17:31

## 2023-08-24 RX ADMIN — POLYETHYLENE GLYCOL 3350 17 GRAM(S): 17 POWDER, FOR SOLUTION ORAL at 12:14

## 2023-08-24 RX ADMIN — Medication 975 MILLIGRAM(S): at 00:52

## 2023-08-24 RX ADMIN — TRAMADOL HYDROCHLORIDE 25 MILLIGRAM(S): 50 TABLET ORAL at 12:54

## 2023-08-24 RX ADMIN — Medication 25 MILLIGRAM(S): at 00:52

## 2023-08-24 RX ADMIN — Medication 975 MILLIGRAM(S): at 12:13

## 2023-08-24 RX ADMIN — CHLORHEXIDINE GLUCONATE 1 APPLICATION(S): 213 SOLUTION TOPICAL at 14:50

## 2023-08-24 RX ADMIN — Medication 25 MILLIGRAM(S): at 12:14

## 2023-08-24 RX ADMIN — Medication 25 MILLIGRAM(S): at 21:11

## 2023-08-24 RX ADMIN — HEPARIN SODIUM 5000 UNIT(S): 5000 INJECTION INTRAVENOUS; SUBCUTANEOUS at 06:25

## 2023-08-24 RX ADMIN — Medication 1334 MILLIGRAM(S): at 09:07

## 2023-08-24 RX ADMIN — Medication 975 MILLIGRAM(S): at 01:52

## 2023-08-24 RX ADMIN — Medication 1 TABLET(S): at 12:13

## 2023-08-24 RX ADMIN — Medication 975 MILLIGRAM(S): at 06:36

## 2023-08-24 RX ADMIN — HEPARIN SODIUM 5000 UNIT(S): 5000 INJECTION INTRAVENOUS; SUBCUTANEOUS at 21:11

## 2023-08-24 RX ADMIN — HEPARIN SODIUM 5000 UNIT(S): 5000 INJECTION INTRAVENOUS; SUBCUTANEOUS at 13:56

## 2023-08-24 RX ADMIN — Medication 1334 MILLIGRAM(S): at 12:14

## 2023-08-24 NOTE — DISCHARGE NOTE PROVIDER - PROVIDER TOKENS
PROVIDER:[TOKEN:[[id number]],FOLLOWUP:[1 week]] PROVIDER:[TOKEN:[[id number]],FOLLOWUP:[1 week]],FREE:[LAST:[Dr Wallace PCP],PHONE:[(   )    -],FAX:[(   )    -],ESTABLISHEDPATIENT:[T]] PROVIDER:[TOKEN:[[id number]],FOLLOWUP:[1 week]],FREE:[LAST:[Dr Wallace PCP],PHONE:[(   )    -],FAX:[(   )    -],ESTABLISHEDPATIENT:[T]],PROVIDER:[TOKEN:[[id number]],ESTABLISHEDPATIENT:[T]]

## 2023-08-24 NOTE — H&P ADULT - NSHPSOCIALHISTORY_GEN_ALL_CORE
SOCIAL HISTORY  Smoking - Denied  EtOH - Denied   Drugs - Denied    FUNCTIONAL HISTORY  Lives with family in private home ~[address] and 1st fl set-up.  Prior Level of Function: Independent in ADLs and ambulated with cane.  Had access -a-ride for HD    CURRENT FUNCTIONAL STATUS  - Bed Mobility: mod A  - Transfers: mod A  - ADLs: mod A dressing/bathing/toileting

## 2023-08-24 NOTE — DISCHARGE NOTE NURSING/CASE MANAGEMENT/SOCIAL WORK - PATIENT PORTAL LINK FT
You can access the FollowMyHealth Patient Portal offered by [facility] by registering at the following website: [email or web address]. By joining [facility]’s FollowMyHealth portal, you will also be able to view your health information using other applications (apps) compatible with our system.

## 2023-08-24 NOTE — PATIENT PROFILE ADULT - FALL HARM RISK - HARM RISK INTERVENTIONS
Assistance with ambulation/Assistance OOB with selected safe patient handling equipment/Communicate Risk of Fall with Harm to all staff/Discuss with provider need for PT consult/Monitor gait and stability/Reinforce activity limits and safety measures with patient and family/Tailored Fall Risk Interventions/Visual Cue: Yellow wristband and red socks/Bed in lowest position, wheels locked, appropriate side rails in place/Call bell, personal items and telephone in reach/Instruct patient to call for assistance before getting out of bed or chair/Non-slip footwear when patient is out of bed/[place] to call system/Physically safe environment - no spills, clutter or unnecessary equipment/Purposeful Proactive Rounding/Room/bathroom lighting operational, light cord in reach

## 2023-08-24 NOTE — DISCHARGE NOTE NURSING/CASE MANAGEMENT/SOCIAL WORK - NSDCVIVACCINE_GEN_ALL_CORE_FT
Influenza, seasonal, injectable; 30-Dec-2013 15:27; Jamila Hartley (JAYCEE); [id number]; IntraMuscular; Deltoid Right.; 0.5 milliLiter(s);

## 2023-08-24 NOTE — DISCHARGE NOTE PROVIDER - NSDCFUSCHEDAPPT_GEN_ALL_CORE_FT
[facility]  SHERRI CONTRERAS  Scheduled Appointment: 08/29/2023    [facility]  ORTHOSURDAMIAN [address]  Scheduled Appointment: 08/30/2023    [facility]  GENSURDAMIAN HERNANDEZ [phone]  Scheduled Appointment: 09/11/2023    [facility]  SHERRI IPAK [facility] [place]  Scheduled Appointment: 09/11/2023    [facility]  GENSURDAMIAN [address]  Scheduled Appointment: 09/21/2023

## 2023-08-24 NOTE — H&P ADULT - NS ATTEND AMEND GEN_ALL_CORE FT
Rehab Attending- Patient seen and examined by me on 8/25- Case discussed, above note reviewed by me with modifications made    patient seen and evaluated bedside  sleepy - reports generalized weakness For HD today  no voiding , moved bowels 8/24  sl pain left elbow- no sling at present  no dizziness, no headaches, no SOB, no chest pain , no nausea, no vomiting    MEDICATIONS  (STANDING):  acetaminophen     Tablet .. 975 milliGRAM(s) Oral every 6 hours  calcium acetate 1334 milliGRAM(s) Oral three times a day with meals  chlorhexidine 2% Cloths 1 Application(s) Topical daily  epoetin carito (PROCRIT) Injectable 48845 Unit(s) IV Push <User Schedule>  heparin   Injectable 5000 Unit(s) SubCutaneous every 8 hours  metoprolol tartrate 25 milliGRAM(s) Oral two times a day  Nephro-pat 1 Tablet(s) Oral daily  polyethylene glycol 3350 17 Gram(s) Oral daily  senna 2 Tablet(s) Oral at bedtime    MEDICATIONS  (PRN):  ALPRAZolam 0.25 milliGRAM(s) Oral every 6 hours PRN Anxiety  oxyCODONE    IR 10 milliGRAM(s) Oral every 6 hours PRN Severe Pain (7 - 10)  traMADol 25 milliGRAM(s) Oral every 8 hours PRN Moderate Pain (4 - 6)                    9.0    4.77  )-----------( 312      ( 25 Aug 2023 06:06 )             27.8     08-25    137  |  94<L>  |  102<H>  ----------------------------<  78  4.6   |  25  |  10.16<H>    Ca    9.4      25 Aug 2023 06:06    TPro  6.8  /  Alb  2.3<L>  /  TBili  0.4  /  DBili  x   /  AST  18  /  ALT  <6<L>  /  AlkPhos  731<H>  08-25    Vital Signs Last 24 Hrs  T(C): 37.2 (25 Aug 2023 07:58), Max: 37.2 (25 Aug 2023 07:58)  T(F): 98.9 (25 Aug 2023 07:58), Max: 98.9 (25 Aug 2023 07:58)  HR: 72 (25 Aug 2023 07:58) (72 - 79)  BP: 165/90 (25 Aug 2023 07:58) (137/79 - 165/90)  BP(mean): --  RR: 16 (25 Aug 2023 07:58) (16 - 16)  SpO2: 97% (25 Aug 2023 07:58) (97% - 98%)Rm air    Gen - NAD, Comfortable  HEENT - NCAT, EOMI, MMM  Neck - Supple, No limited ROM  Pulm - CTAB, No wheeze, No rhonchi, No crackles  Cardiovascular - RRR, S1S2, No murmurs  Chest - good chest expansion, good respiratory effort  Abdomen - Soft, NT/ND, +BS  Extremities - No Cyanosis, no clubbing, no edema, no calf tenderness, Right AV fistula, old fistula to left upper arm  Neuro-     Cognitive - awake, alert, oriented to person, place, date, year.  Able  to follow command     Communication - Fluent, Comprehensible     Attention: Intact     Judgement: Good evidence of judgement     Memory: memory intact     Cranial Nerves - CN 2-12 intact     Motor -                     LEFT    UE - 4-/5                    RIGHT UE - 4/5                    LEFT    LE - HF- not tested, KE 3/5, DF 4/5, PF 4/5                    RIGHT LE - 4-/5        Sensory - Intact  to LT      Reflexes - DTR Intact, No primitive reflexive     Coordination - FTN intact   MSK: muscle weakness  Psychiatric - Mood stable, Affect WNL  Skin:  stage 2 pressure ulcer to coccyx    Imp Rehab Left Femoral neck Fx SP Hemiarthroplasty- good candidate for intensive rehab - will tolerate three hours rehab daily  WBAT LLE- Post hip precautions LLE  Left radial head fx- per ortho NWB LUE- sling for comfort- can range- noted distal ulnar fx on xray 7/27- non displaced  ESRD- cont hemp  Bowel Program- senna, miralax  pain management- tylenol ATC, Tramadol PRN, Oxy PRN severe pain  DVT Proph- Heparin subcut q8h

## 2023-08-24 NOTE — CHART NOTE - NSCHARTNOTEFT_GEN_A_CORE
ISTOP    PDI	My Rx	Current Rx	Drug Type	Rx Written	Rx Dispensed	Drug	Quantity	Days Supply	Prescriber Name	Prescriber JOSE #	Payment Method	Dispenser  A	N	Y	O	08/09/2023	08/12/2023	oxycodone hcl (ir) 10 mg tab	90	30	Dante Seth MD	[id number]	Medicaid	[facility]  B	N	N	O	03/02/2023	03/08/2023	oxycodone-acetaminophen 5-325 mg tablet	24	3	Reyes, Cindy	[id number]	[facility]  C	N	N	O	07/10/2023	07/13/2023	oxycodone hcl (ir) 10 mg tab	90	30	Dante Seth MD	[id number]	Medicaid	[facility]  C	N	N	O	05/26/2023	05/27/2023	oxycodone hcl (ir) 5 mg tablet	90	30	Cruz Gomez MD	[id number]	Medicaid	[facility]

## 2023-08-24 NOTE — PROGRESS NOTE ADULT - ASSESSMENT
A/P: 38 y/o  M POD#9 s/p L Hemiarthroplasty    DVT ppx- Heparin 5000units SQ TID  LLE WBAT  PT  OT  Pain management prn  HD M/W/F  Discharge planning to Rehab as per primary team  D/w attending      MAXWELL Dow  Orthopedic Surgery  [id number]

## 2023-08-24 NOTE — H&P ADULT - HISTORY OF PRESENT ILLNESS
36 y/o male w/ a PMHx of ESRD (M/W/F via LUE AVF), SOLITARIO, HTN, secondary hyperparathyroidism s/p parathyroidectomy (Dec 2022), obesity, stroke at age 10 w/ no residual deficits, and recent left radial fracture sustained after trying to lower himself into a chair s/p splint who presented on 8/14 after a fall while in the shower. Patient sustained a left femoral neck fracture s/p left hemiarthroplasty on 8/15. Immediately post-op in PACU, patient was lethargic and hypoxemic requiring reintubation so he was admitted to SICU post-operatively. Patient was hypotensive after intubation requiring pressor support. Patient without cuff leak so he was given Decadron. He was successfully extubated on 8/17 despite no cuff leak and was quickly weaned off pressors afterwards. Had a fever to 101.2 (8/22), infectious workup unrevealing.    Patient was evaluated by PM&R and therapy for functional deficits and gait/ADL impairments and recommend acute rehabilitation.  Patient was medically optimized for discharge to [facility] on 8/24/2023

## 2023-08-24 NOTE — PROGRESS NOTE ADULT - ATTENDING SUPERVISION STATEMENT
Resident
Fellow

## 2023-08-24 NOTE — DISCHARGE NOTE PROVIDER - CARE PROVIDER_API CALL
Jason León  Orthopaedic Surgery  [address], [address]  [place] [address]  Phone: [phone]  Fax: [phone]  Follow Up Time: 1 week   Jason León  Orthopaedic Surgery  [address], [address]  [place] [address]  Phone: [phone]  Fax: [phone]  Follow Up Time: 1 week    Dr Wallace PCP,   Phone: (   )    -  Fax: (   )    -  Established Patient  Follow Up Time:    Jason León  Orthopaedic Surgery  [address], [address]  [place] [address]  Phone: [phone]  Fax: [phone]  Follow Up Time: 1 week    Dr Wallace PCP,   Phone: (   )    -  Fax: (   )    -  Established Patient  Follow Up Time:     Dante Seth  Pain Medicine  [address] [place] [address]  Phone: [phone]  Fax: [phone]  Established Patient  Follow Up Time:

## 2023-08-24 NOTE — DISCHARGE NOTE PROVIDER - NSDCCPCAREPLAN_GEN_ALL_CORE_FT
PRINCIPAL DISCHARGE DIAGNOSIS  Diagnosis: Intertrochanteric fracture of left hip  Assessment and Plan of Treatment: You sustained a left femoral neck fracture s/p left hemiarthroplasty on 8/15. Course was complicated by low blood pressure and oxygen levels reqiuring intubaton and ICU stay. You were extubated and deemed hemodynamically stable to transfer to medicine floor for continued monitoring    Please follow up with PCP/ ortho upon discharge      SECONDARY DISCHARGE DIAGNOSES  Diagnosis: End stage renal disease  Assessment and Plan of Treatment: Continu dialysis as scheduled     PRINCIPAL DISCHARGE DIAGNOSIS  Diagnosis: Intertrochanteric fracture of left hip  Assessment and Plan of Treatment: You sustained a left femoral neck fracture s/p left hemiarthroplasty on 8/15. Course was complicated by low blood pressure and oxygen levels reqiuring intubaton and ICU stay. You were extubated and deemed hemodynamically stable to transfer to medicine floor for continued monitoring    Please follow up with PCP/ ortho upon discharge      SECONDARY DISCHARGE DIAGNOSES  Diagnosis: End stage renal disease  Assessment and Plan of Treatment: Continue dialysis as scheduled     PRINCIPAL DISCHARGE DIAGNOSIS  Diagnosis: Intertrochanteric fracture of left hip  Assessment and Plan of Treatment: You sustained a left femoral neck fracture s/p left hemiarthroplasty on 8/15. Course was complicated by low blood pressure and oxygen levels reqiuring intubaton and ICU stay. You were extubated and deemed hemodynamically stable to transfer to medicine floor for continued monitoring    rehab  Please follow up with PCP/ ortho upon discharge      SECONDARY DISCHARGE DIAGNOSES  Diagnosis: End stage renal disease  Assessment and Plan of Treatment: Continue dialysis as scheduled at rehab

## 2023-08-24 NOTE — H&P ADULT - ASSESSMENT
ASSESSMENT/PLAN  36 y/o male w/ a PMHx of ESRD (M/W/F via LUE AVF), SOLITARIO, HTN, secondary hyperparathyroidism s/p parathyroidectomy (Dec 2022), obesity, stroke at age 10 w/ no residual deficits, and recent left radial fracture sustained after trying to lower himself into a chair s/p splint who presented on 8/14 after a fall where he sustained a left femoral neck fracture, s/p left hemiarthroplasty (8/15). Post op, he was lethargic and hypoxemic requiring reintubation. Later became hypOtensive and was on pressor. Patient without cuff leak so he was given Decadron. He was successfully extubated on 8/17 despite no cuff leak and was quickly weaned off pressors afterwards. Had a fever to 101.2 (8/22), infectious workup unrevealing.    COMORBIDITES/ACTIVE MEDICAL ISSUES     #Femoral neck fracture  - S/p Left Hemiarthroplasty with Dr. Jason León (8/15)  - Weight bearing as tolerated  - Gait Instability, ADL impairments and Functional impairments: start Comprehensive Rehab Program of PT/OT     #Recent Left radial head Fracture  - NWB to LUE  - Left arm in sling as needed for comfort    #ESRD - on HD (MWF)  #Anemia of chronic disease  - Right AVF   - Epo with HD  - Nephro-pat supplement    #Renal bone disease   - Home diet: phoslo with meals  - Ensure low phos diet  - Outpatient follow up with endo and bone scan to determin etiology    #HTN  - Metoprolol 25 BID    #Mood  - Xanax 0.25 PRN    #Pain control  - Tylenol 975 q6h, Tramadol PRN    #GI/Bowel Mgmt   - At risk for constipation due to neurologic diagnosis, immobility and/or medication use  - Senna,  Miralax QD    #Bladder management  - PVR q 8 hours (SC if > 400)  - Encourage timed voids Q4hrs while awake for independence and to promote continence during therapy    #DVT ppx  - Heparin TID  - SCD, TEDs     #Skin:  -No active issues at this time  -At risk for pressure injury due to neurologic diagnosis and relative immobility  -Bilateral heels - soft heel protectors, apply liquid barrier film daily and monitor for tissue type changes  - Turn Q2 hr while in bed, air mattress  - Skin barrier cream as needed  - Nursing to monitor skin Qshift    #Diet   - Regular + Thins  [Renal Diet]    Precautions / PROPHYLAXIS:   - Falls  - ortho: Weight bearing status: LLE WBAT; LUE NWB  - Lungs: Aspiration, Incentive Spirometer   - Pressure injury/Skin: Turn Q2hrs while in bed, OOB to Chair, PT/OT      Follow ups:      MEDICAL PROGNOSIS: GOOD            REHAB POTENTIAL: GOOD             ESTIMATED DISPOSITION: HOME WITH HOME CARE            ELOS: 10-14 Days   EXPECTED THERAPY:     P.T. 2hr/day       O.T. 1hr/day      S.L.P. NA   P&O Unnecessary     EXP FREQUENCY: 5 days per 7 day period     PRESCREEN COMPARISION:   I have reviewed the prescreen information and I have found no relevant changes between the preadmission screening and my post admission evaluation     RATIONALE FOR INPATIENT ADMISSION - Patient demonstrates the following: (check all that apply)  [X] Medically appropriate for rehabilitation admission  [X] Has attainable rehab goals with an appropriate initial discharge plan  [X] Has rehabilitation potential (expected to make a significant improvement within a reasonable period of time)   [X] Requires close medical management by a rehab physician, rehab nursing care, Hospitalist and comprehensive interdisciplinary team (including PT, OT, & or SLP, Prosthetics and Orthotics)   ASSESSMENT/PLAN  36 y/o male w/ a PMHx of ESRD (M/W/F via LUE AVF), SOLITARIO, HTN, secondary hyperparathyroidism s/p parathyroidectomy (Dec 2022), obesity, stroke at age 10 w/ no residual deficits, and recent left radial fracture sustained after trying to lower himself into a chair s/p splint who presented on 8/14 after a fall where he sustained a left femoral neck fracture, s/p left hemiarthroplasty (8/15). Post op, he was lethargic and hypoxemic requiring reintubation. Later became hypOtensive and was on pressor. Patient without cuff leak so he was given Decadron. He was successfully extubated on 8/17 despite no cuff leak and was quickly weaned off pressors afterwards. Had a fever to 101.2 (8/22), infectious workup unrevealing.    COMORBIDITES/ACTIVE MEDICAL ISSUES     #Femoral neck fracture  - S/p Left Hemiarthroplasty with Dr. Jason León (8/15)  - Weight bearing as tolerated  - Gait Instability, ADL impairments and Functional impairments: start Comprehensive Rehab Program of PT/OT     #Recent Left radial head Fracture  - NWB to LUE  - Left arm in sling as needed for comfort    #ESRD - on HD (MWF)  #Anemia of chronic disease  - Right AVF   - Epo with HD  - Nephro-pat supplement    #Renal bone disease   - Home diet: phoslo with meals  - Ensure low phos diet  - Outpatient follow up with endo and bone scan to determin etiology    #HTN  - Metoprolol 25 BID    #Mood  - Xanax 0.25 PRN    #Pain control  - Tylenol 975 q6h, Tramadol PRN    #GI/Bowel Mgmt   - At risk for constipation due to neurologic diagnosis, immobility and/or medication use  - Senna,  Miralax QD    #Bladder management  - PVR q 8 hours (SC if > 400)  - Encourage timed voids Q4hrs while awake for independence and to promote continence during therapy    #DVT ppx  - Heparin TID  - SCD, TEDs     #Skin:  - stage 2 pressure ulcer to coccyx: cleanse with NS, pat dry with gauze and apply foam dressing daily  -At risk for pressure injury due to neurologic diagnosis and relative immobility  -Bilateral heels - soft heel protectors, apply liquid barrier film daily and monitor for tissue type changes  - Turn Q2 hr while in bed, air mattress  - Skin barrier cream as needed  - Nursing to monitor skin Q shift    #Diet   - Regular + Thins  [Renal Diet]    Precautions / PROPHYLAXIS:   - Falls  - ortho: Weight bearing status: LLE WBAT; LUE NWB  - Lungs: Aspiration, Incentive Spirometer   - Pressure injury/Skin: Turn Q2hrs while in bed, OOB to Chair, PT/OT      Follow ups:      MEDICAL PROGNOSIS: GOOD            REHAB POTENTIAL: GOOD             ESTIMATED DISPOSITION: HOME WITH HOME CARE            ELOS: 10-14 Days   EXPECTED THERAPY:     P.T. 2hr/day       O.T. 1hr/day      S.L.P. NA   P&O Unnecessary     EXP FREQUENCY: 5 days per 7 day period     PRESCREEN COMPARISION:   I have reviewed the prescreen information and I have found no relevant changes between the preadmission screening and my post admission evaluation     RATIONALE FOR INPATIENT ADMISSION - Patient demonstrates the following: (check all that apply)  [X] Medically appropriate for rehabilitation admission  [X] Has attainable rehab goals with an appropriate initial discharge plan  [X] Has rehabilitation potential (expected to make a significant improvement within a reasonable period of time)   [X] Requires close medical management by a rehab physician, rehab nursing care, Hospitalist and comprehensive interdisciplinary team (including PT, OT, & or SLP, Prosthetics and Orthotics)

## 2023-08-24 NOTE — DISCHARGE NOTE PROVIDER - CARE PROVIDERS DIRECT ADDRESSES
,[email or web address] ,[email or web address],DirectAddress_Unknown ,[email or web address],DirectAddress_Unknown,DirectAddress_Unknown

## 2023-08-24 NOTE — H&P ADULT - NSHPLABSRESULTS_GEN_ALL_CORE
8.8    4.88  )-----------( 297      ( 22 Aug 2023 14:38 )             27.1     08-22    138  |  95<L>  |  80<H>  ----------------------------<  82  4.3   |  26  |  8.83<H>    Ca    9.3      22 Aug 2023 14:38  Phos  5.6     08-22  Mg     2.4     08-22    RADIOLOGY, EKG & ADDITIONAL TESTS:   Xray Chest (08.22.23 @ 16:59) >  Left lower lung field linear atelectasis. Otherwise lungs are clear.    Xray Chest  (Xray Chest 1 View- PORTABLE-Routine in AM.) (08.18.23 @ 07:02) >  Right basilar patchy atelectasis.    Xray Pelvis AP only (08.15.23 @ 11:18) >  Upper pelvis is off the field-of-view.  Status post left hip hemiarthroplasty, which is in expected position. No   definite acute displaced fracture seen. Postoperative swelling/gas about the left hip.    Redemonstrated mottled appearance of the bones with subperiosteal   resorption at the pubic symphysis, likely reflecting renal   osteodystrophy/secondary hyperparathyroidism. Periosteal new bone formation seen along the proximal femurs bilaterally.    CT 3D Reconstruct w/ Workstation (08.14.23 @ 09:43) >  1.  Acute angulated fragility fracture of the left femoral neck in the   setting of end-stage renal osteodystrophy  2.  Moderate joint space narrowing at the hips bilaterally.  3.  Grade 1 anterolisthesis at L5-S1 with bilateral L5 spondylolysis.  4.  Clustered foci of soft tissue gas in the right inguinal region which   may be related to a recent line placement; advise correlation with   clinical history.  5.  Anasarca.    Xray Elbow AP + Lateral + Oblique, Left (08.14.23 @ 09:13) >  Exam limited by positioning. No acute fracture is demonstrated.   Redemonstrated findings consistent with renal osteodystrophy.  Multiple soft tissue/vascular calcifications and surgical clips and   embolization coils in the anterior soft tissues overlying the humerus.    Xray Chest (08.14.23 @ 09:13) >  Bilateral patchy airspace opacities, left greater than right. Right   linear atelectasis.    Xray Femur 2 Views, Left (08.14.23 @ 09:12) >  Redemonstrated acute left femoral neck fracture with varus angulation,   similar to prior. Redemonstrated findings consistent with renal   osteodystrophy. No other acute fractures.    Xray Hip 2-3 Views, Left (08.14.23 @ 07:12) >  Acute left femoral neck fracture in the background of renal   osteodystrophy changes as described. 8.8    4.88  )-----------( 297      ( 22 Aug 2023 14:38 )             27.1     08-22    138  |  95<L>  |  80<H>  ----------------------------<  82  4.3   |  26  |  8.83<H>    Ca    9.3      22 Aug 2023 14:38  Phos  5.6     08-22  Mg     2.4     08-22    RADIOLOGY, EKG & ADDITIONAL TESTS:   Xray Chest (08.22.23 @ 16:59) >  Left lower lung field linear atelectasis. Otherwise lungs are clear.    Xray Chest  (Xray Chest 1 View- PORTABLE-Routine in AM.) (08.18.23 @ 07:02) >  Right basilar patchy atelectasis.    Xray Pelvis AP only (08.15.23 @ 11:18) >  Upper pelvis is off the field-of-view.  Status post left hip hemiarthroplasty, which is in expected position. No   definite acute displaced fracture seen. Postoperative swelling/gas about the left hip.    Redemonstrated mottled appearance of the bones with subperiosteal   resorption at the pubic symphysis, likely reflecting renal   osteodystrophy/secondary hyperparathyroidism. Periosteal new bone formation seen along the proximal femurs bilaterally.    CT 3D Reconstruct w/ Workstation (08.14.23 @ 09:43) >  1.  Acute angulated fragility fracture of the left femoral neck in the   setting of end-stage renal osteodystrophy  2.  Moderate joint space narrowing at the hips bilaterally.  3.  Grade 1 anterolisthesis at L5-S1 with bilateral L5 spondylolysis.  4.  Clustered foci of soft tissue gas in the right inguinal region which   may be related to a recent line placement; advise correlation with   clinical history.  5.  Anasarca.    Xray Elbow AP + Lateral + Oblique, Left (08.14.23 @ 09:13) >  Exam limited by positioning. No acute fracture is demonstrated.   Redemonstrated findings consistent with renal osteodystrophy.  Multiple soft tissue/vascular calcifications and surgical clips and   embolization coils in the anterior soft tissues overlying the humerus.    X-ray Chest (08.14.23 @ 09:13) >  Bilateral patchy airspace opacities, left greater than right. Right   linear atelectasis.    X-ray Femur 2 Views, Left (08.14.23 @ 09:12) >  Redemonstrated acute left femoral neck fracture with varus angulation,   similar to prior. Redemonstrated findings consistent with renal   osteodystrophy. No other acute fractures.    X-ray Hip 2-3 Views, Left (08.14.23 @ 07:12) >  Acute left femoral neck fracture in the background of renal   osteodystrophy changes as described.

## 2023-08-24 NOTE — PROGRESS NOTE ADULT - PROVIDER SPECIALTY LIST ADULT
Orthopedics
Hospitalist
Orthopedics
SICU
Hospitalist
Hospitalist
Orthopedics
SICU
Hospitalist
Hospitalist
Orthopedics
SICU
Nephrology-Cardiorenal
Orthopedics
Nephrology-Cardiorenal
Nephrology-Cardiorenal
Nephrology
Nephrology-Cardiorenal
Nephrology-Cardiorenal
Nephrology

## 2023-08-24 NOTE — DISCHARGE NOTE NURSING/CASE MANAGEMENT/SOCIAL WORK - NSDCPEFALRISK_GEN_ALL_CORE
For information on Fall & Injury Prevention, visit: [email or web address] OR  [email or web address] OR  https://www.cdc.gov/steadi/patient.html

## 2023-08-24 NOTE — PROGRESS NOTE ADULT - REASON FOR ADMISSION
Left femoral neck fracture

## 2023-08-24 NOTE — H&P ADULT - NSHPREVIEWOFSYSTEMS_GEN_ALL_CORE
REVIEW OF SYSTEMS  Constitutional: No fever, No Chills, + fatigue  HEENT: No eye pain, No visual disturbances, No difficulty hearing  Pulm: No cough,  No shortness of breath  Cardio: No chest pain, No palpitations  GI:  No abdominal pain, No nausea, No vomiting, No diarrhea, No constipation  : anuric  Neuro: No headaches, No memory loss, + loss of strength, no numbness, No tremors  Skin: No itching, No rashes, No lesions   Endo: No temperature intolerance  MSK: + joint pain- left hip, + joint swelling-left hip, No muscle pain, No Neck pain,  No back pain  Psych:  No depression, No anxiety

## 2023-08-24 NOTE — DISCHARGE NOTE PROVIDER - NSDCMRMEDTOKEN_GEN_ALL_CORE_FT
calcium acetate 667 mg oral tablet: 2 orally 3 times a day (with meals)  labetalol 200 mg oral tablet: 1 orally 2 times a day  Nephro-Bianca oral tablet: 1 tab(s) orally once a day   acetaminophen 325 mg oral tablet: 3 tab(s) orally every 6 hours  ALPRAZolam 0.25 mg oral tablet: 1 tab(s) orally every 6 hours As needed Anxiety  calcium acetate 667 mg oral tablet: 2 orally 3 times a day (with meals)  metoprolol tartrate 25 mg oral tablet: 1 tab(s) orally 2 times a day  Narcan 4 mg/0.1 mL nasal spray: 4 milligram(s) intranasally once a day as needed for sedation  Nephro-Bianca oral tablet: 1 tab(s) orally once a day  oxyCODONE 10 mg oral tablet: 1 tab(s) orally 3 times a day as needed for Severe Pain (7 - 10)  polyethylene glycol 3350 oral powder for reconstitution: 17 gram(s) orally once a day  senna leaf extract oral tablet: 2 tab(s) orally once a day (at bedtime)  traMADol 50 mg oral tablet: 0.5 tab(s) orally every 8 hours As needed Moderate Pain (4 - 6)

## 2023-08-24 NOTE — DISCHARGE NOTE PROVIDER - NSDCFUADDAPPT_GEN_ALL_CORE_FT
APPTS ARE READY TO BE MADE: [x] YES    Best Family or Patient Contact (if needed):    Additional Information about above appointments (if needed):    1:   2:   3:     Other comments or requests:    APPTS ARE READY TO BE MADE: [x] YES    Best Family or Patient Contact (if needed):    Additional Information about above appointments (if needed):    1: pcp  2: ortho  3: renal    Other comments or requests:    APPTS ARE READY TO BE MADE: [x] YES    Best Family or Patient Contact (if needed):    Additional Information about above appointments (if needed):    1: pcp  2: ortho  3: renal    Other comments or requests:       Patient is being discharged to rehab. Caregiver will arrange follow up.

## 2023-08-24 NOTE — H&P ADULT - NSHPPHYSICALEXAM_GEN_ALL_CORE
PHYSICAL EXAM  VITALS  T(C): 37 (08-24-23 @ 18:38), Max: 37.1 (08-24-23 @ 00:26)  HR: 74 (08-24-23 @ 18:38) (68 - 96)  BP: 137/79 (08-24-23 @ 18:38) (134/73 - 162/76)  RR: 16 (08-24-23 @ 18:38) (16 - 18)  SpO2: 97% (08-24-23 @ 18:38) (97% - 100%)    Gen - NAD, Comfortable  HEENT - NCAT, EOMI, MMM  Neck - Supple, No limited ROM  Pulm - CTAB, No wheeze, No rhonchi, No crackles  Cardiovascular - RRR, S1S2, No murmurs  Chest - good chest expansion, good respiratory effort  Abdomen - Soft, NT/ND, +BS  Extremities - No Cyanosis, no clubbing, no edema, no calf tenderness, Right AV fistula, old fistula to left upper arm  Neuro-     Cognitive - awake, alert, oriented to person, place, date, year.  Able  to follow command     Communication - Fluent, Comprehensible     Attention: Intact     Judgement: Good evidence of judgement     Memory: memory intact     Cranial Nerves - CN 2-12 intact     Motor -                     LEFT    UE - 4-/5                    RIGHT UE - 4/5                    LEFT    LE - HF- not tested, KE 3/5, DF 4/5, PF 4/5                    RIGHT LE - 4-/5        Sensory - Intact  to LT      Reflexes - DTR Intact, No primitive reflexive     Coordination - FTN intact   MSK: muscle weakness  Psychiatric - Mood stable, Affect WNL  Skin:  stage 2 pressure ulcer to coccyx

## 2023-08-24 NOTE — DISCHARGE NOTE NURSING/CASE MANAGEMENT/SOCIAL WORK - NSDCFUADDAPPT_GEN_ALL_CORE_FT
APPTS ARE READY TO BE MADE: [x] YES    Best Family or Patient Contact (if needed):    Additional Information about above appointments (if needed):    1: pcp  2: ortho  3: renal    Other comments or requests:

## 2023-08-24 NOTE — PATIENT PROFILE ADULT - FALL HARM RISK - PATIENT NEEDS ASSISTANCE
Standing/Walking/Toileting/Moving from bed to chair O-Z Plasty Text: The defect edges were debeveled with a #15 scalpel blade.  Given the location of the defect, shape of the defect and the proximity to free margins an O-Z plasty (double transposition flap) was deemed most appropriate.  Using a sterile surgical marker, the appropriate transposition flaps were drawn incorporating the defect and placing the expected incisions within the relaxed skin tension lines where possible.    The area thus outlined was incised deep to adipose tissue with a #15 scalpel blade.  The skin margins were undermined to an appropriate distance in all directions utilizing iris scissors.  Hemostasis was achieved with electrocautery.  The flaps were then transposed into place, one clockwise and the other counterclockwise, and anchored with interrupted buried subcutaneous sutures.

## 2023-08-24 NOTE — DISCHARGE NOTE PROVIDER - HOSPITAL COURSE
38 y/o male w/ a PMHx of ESRD (M/W/F via LUE AVF), SOLITARIO, HTN, secondary hyperparathyroidism s/p parathyroidectomy (Dec 2022), obesity, stroke at age 10 w/ no residual deficits, and recent left radial fracture sustained after trying to lower himself into a chair s/p splint who presented on 8/14 after a fall while in the shower. Patient sustained a left femoral neck fracture s/p left hemiarthroplasty on 8/15. Immediately post-op in PACU, patient was lethargic and hypoxemic requiring reintubation so he was admitted to SICU post-operatively. Patient was hypotensive after intubation requiring pressor support. Patient without cuff leak so he was given Decadron. He was successfully extubated on 8/17 despite no cuff leak and was quickly weaned off pressors afterwards. Patient deemed hemodynamically stable to transfer to medicine floor. On 8/22 night patient had a fever to 101.2 ; infectious workup unrevealing.     Discharge/Dispo/Med rec discussed with attending  ____. Patient medically cleared for discharge ____ with outpatient follow up        36 y/o male w/ a PMHx of ESRD (M/W/F via LUE AVF), SOLITARIO, HTN, secondary hyperparathyroidism s/p parathyroidectomy (Dec 2022), obesity, stroke at age 10 w/ no residual deficits, and recent left radial fracture sustained after trying to lower himself into a chair s/p splint who presented on 8/14 after a fall while in the shower. Patient sustained a left femoral neck fracture s/p left hemiarthroplasty on 8/15. Immediately post-op in PACU, patient was lethargic and hypoxemic requiring reintubation so he was admitted to SICU post-operatively. Patient was hypotensive after intubation requiring pressor support. Patient without cuff leak so he was given Decadron. He was successfully extubated on 8/17 despite no cuff leak and was quickly weaned off pressors afterwards. Patient deemed hemodynamically stable to transfer to medicine floor. On 8/22 night patient had a fever to 101.2 ; infectious workup unrevealing.     Discharge/Dispo/Med rec discussed with attending  ____. Patient medically cleared for discharge to [facility] with follow up with PCP, renal and ortho       38 y/o male w/ a PMHx of ESRD (M/W/F via LUE AVF), SOLITARIO, HTN, secondary hyperparathyroidism s/p parathyroidectomy (Dec 2022), obesity, stroke at age 10 w/ no residual deficits, and recent left radial fracture sustained after trying to lower himself into a chair s/p splint who presented on 8/14 after a fall while in the shower. Patient sustained a left femoral neck fracture s/p left hemiarthroplasty on 8/15. Immediately post-op in PACU, patient was lethargic and hypoxemic requiring reintubation so he was admitted to SICU post-operatively. Patient was hypotensive after intubation requiring pressor support. Patient without cuff leak so he was given Decadron. He was successfully extubated on 8/17 despite no cuff leak and was quickly weaned off pressors afterwards. Patient deemed hemodynamically stable to transfer to medicine floor. On 8/22 night patient had a fever to 101.2 ; infectious workup unrevealing.     Discharge/Dispo/Med rec discussed with attending Dr. Sanders. Patient medically cleared for discharge to [facility] with follow up with PCP, renal and ortho

## 2023-08-24 NOTE — PROGRESS NOTE ADULT - SUBJECTIVE AND OBJECTIVE BOX
Patient resting without complaints.  No chest pain, SOB, N/V.    T(C): 37.1 (08-24-23 @ 04:57), Max: 37.1 (08-24-23 @ 00:26)  HR: 73 (08-24-23 @ 04:57) (73 - 96)  BP: 161/77 (08-24-23 @ 04:57) (126/73 - 161/77)  RR: 18 (08-24-23 @ 04:57) (18 - 18)  SpO2: 98% (08-24-23 @ 04:57) (96% - 100%)      Exam:  Alert and Oriented, No Acute Distress  Left Hip: Aquacel Dressing C/D/I; sensation grossly intact to light touch; (+) DF/PF; (+) Distal Pulses; No Calf tenderness B/L, PAS     Labs:                          8.8    4.88  )-----------( 297      ( 22 Aug 2023 14:38 )             27.1    08-22    138  |  95<L>  |  80<H>  ----------------------------<  82  4.3   |  26  |  8.83<H>    Ca    9.3      22 Aug 2023 14:38  Phos  5.6     08-22  Mg     2.4     08-22      Culture - Blood (08.22.23 @ 14:46)   Specimen Source: .Blood Blood-Venous   Culture Results:   No growth at 24 hours

## 2023-08-24 NOTE — PATIENT PROFILE ADULT - FUNCTIONAL ASSESSMENT - BASIC MOBILITY 6.
4-calculated by average/Not able to assess (calculate score using [facility] averaging method)

## 2023-08-25 LAB
ALBUMIN SERPL ELPH-MCNC: 2.3 G/DL — LOW (ref 3.3–5)
ALP SERPL-CCNC: 731 U/L — HIGH (ref 40–120)
ALT FLD-CCNC: <6 U/L — LOW (ref 10–45)
ANION GAP SERPL CALC-SCNC: 18 MMOL/L — HIGH (ref 5–17)
AST SERPL-CCNC: 18 U/L — SIGNIFICANT CHANGE UP (ref 10–40)
BASOPHILS # BLD AUTO: 0.04 K/UL — SIGNIFICANT CHANGE UP (ref 0–0.2)
BASOPHILS NFR BLD AUTO: 0.8 % — SIGNIFICANT CHANGE UP (ref 0–2)
BILIRUB SERPL-MCNC: 0.4 MG/DL — SIGNIFICANT CHANGE UP (ref 0.2–1.2)
BUN SERPL-MCNC: 102 MG/DL — HIGH (ref 7–23)
CALCIUM SERPL-MCNC: 9.4 MG/DL — SIGNIFICANT CHANGE UP (ref 8.4–10.5)
CHLORIDE SERPL-SCNC: 94 MMOL/L — LOW (ref 96–108)
CO2 SERPL-SCNC: 25 MMOL/L — SIGNIFICANT CHANGE UP (ref 22–31)
CREAT SERPL-MCNC: 10.16 MG/DL — HIGH (ref 0.5–1.3)
EGFR: 6 ML/MIN/1.73M2 — LOW
EOSINOPHIL # BLD AUTO: 0.14 K/UL — SIGNIFICANT CHANGE UP (ref 0–0.5)
EOSINOPHIL NFR BLD AUTO: 2.9 % — SIGNIFICANT CHANGE UP (ref 0–6)
GLUCOSE SERPL-MCNC: 78 MG/DL — SIGNIFICANT CHANGE UP (ref 70–99)
HCT VFR BLD CALC: 27.8 % — LOW (ref 39–50)
HGB BLD-MCNC: 9 G/DL — LOW (ref 13–17)
IMM GRANULOCYTES NFR BLD AUTO: 0.6 % — SIGNIFICANT CHANGE UP (ref 0–0.9)
LYMPHOCYTES # BLD AUTO: 1.58 K/UL — SIGNIFICANT CHANGE UP (ref 1–3.3)
LYMPHOCYTES # BLD AUTO: 33.1 % — SIGNIFICANT CHANGE UP (ref 13–44)
MCHC RBC-ENTMCNC: 29.9 PG — SIGNIFICANT CHANGE UP (ref 27–34)
MCHC RBC-ENTMCNC: 32.4 GM/DL — SIGNIFICANT CHANGE UP (ref 32–36)
MCV RBC AUTO: 92.4 FL — SIGNIFICANT CHANGE UP (ref 80–100)
MONOCYTES # BLD AUTO: 0.62 K/UL — SIGNIFICANT CHANGE UP (ref 0–0.9)
MONOCYTES NFR BLD AUTO: 13 % — SIGNIFICANT CHANGE UP (ref 2–14)
NEUTROPHILS # BLD AUTO: 2.36 K/UL — SIGNIFICANT CHANGE UP (ref 1.8–7.4)
NEUTROPHILS NFR BLD AUTO: 49.6 % — SIGNIFICANT CHANGE UP (ref 43–77)
NRBC # BLD: 0 /100 WBCS — SIGNIFICANT CHANGE UP (ref 0–0)
PLATELET # BLD AUTO: 312 K/UL — SIGNIFICANT CHANGE UP (ref 150–400)
POTASSIUM SERPL-MCNC: 4.6 MMOL/L — SIGNIFICANT CHANGE UP (ref 3.5–5.3)
POTASSIUM SERPL-SCNC: 4.6 MMOL/L — SIGNIFICANT CHANGE UP (ref 3.5–5.3)
PROT SERPL-MCNC: 6.8 G/DL — SIGNIFICANT CHANGE UP (ref 6–8.3)
RBC # BLD: 3.01 M/UL — LOW (ref 4.2–5.8)
RBC # FLD: 16 % — HIGH (ref 10.3–14.5)
SARS-COV-2 RNA SPEC QL NAA+PROBE: SIGNIFICANT CHANGE UP
SODIUM SERPL-SCNC: 137 MMOL/L — SIGNIFICANT CHANGE UP (ref 135–145)
WBC # BLD: 4.77 K/UL — SIGNIFICANT CHANGE UP (ref 3.8–10.5)
WBC # FLD AUTO: 4.77 K/UL — SIGNIFICANT CHANGE UP (ref 3.8–10.5)

## 2023-08-25 PROCEDURE — 99255 IP/OBS CONSLTJ NEW/EST HI 80: CPT

## 2023-08-25 PROCEDURE — 99222 1ST HOSP IP/OBS MODERATE 55: CPT | Mod: GC

## 2023-08-25 RX ORDER — ERYTHROPOIETIN 10000 [IU]/ML
10000 INJECTION, SOLUTION INTRAVENOUS; SUBCUTANEOUS
Refills: 0 | Status: DISCONTINUED | OUTPATIENT
Start: 2023-08-25 | End: 2023-09-19

## 2023-08-25 RX ORDER — ERYTHROPOIETIN 10000 [IU]/ML
10000 INJECTION, SOLUTION INTRAVENOUS; SUBCUTANEOUS
Refills: 0 | Status: DISCONTINUED | OUTPATIENT
Start: 2023-08-25 | End: 2023-08-25

## 2023-08-25 RX ORDER — CHLORHEXIDINE GLUCONATE 213 G/1000ML
1 SOLUTION TOPICAL DAILY
Refills: 0 | Status: DISCONTINUED | OUTPATIENT
Start: 2023-08-25 | End: 2023-09-19

## 2023-08-25 RX ADMIN — TRAMADOL HYDROCHLORIDE 25 MILLIGRAM(S): 50 TABLET ORAL at 19:30

## 2023-08-25 RX ADMIN — Medication 1 TABLET(S): at 12:37

## 2023-08-25 RX ADMIN — Medication 1334 MILLIGRAM(S): at 12:37

## 2023-08-25 RX ADMIN — OXYCODONE HYDROCHLORIDE 10 MILLIGRAM(S): 5 TABLET ORAL at 06:42

## 2023-08-25 RX ADMIN — SENNA PLUS 2 TABLET(S): 8.6 TABLET ORAL at 21:38

## 2023-08-25 RX ADMIN — Medication 1334 MILLIGRAM(S): at 18:12

## 2023-08-25 RX ADMIN — Medication 0.25 MILLIGRAM(S): at 21:38

## 2023-08-25 RX ADMIN — Medication 25 MILLIGRAM(S): at 06:11

## 2023-08-25 RX ADMIN — CHLORHEXIDINE GLUCONATE 1 APPLICATION(S): 213 SOLUTION TOPICAL at 18:15

## 2023-08-25 RX ADMIN — TRAMADOL HYDROCHLORIDE 25 MILLIGRAM(S): 50 TABLET ORAL at 18:59

## 2023-08-25 RX ADMIN — HEPARIN SODIUM 5000 UNIT(S): 5000 INJECTION INTRAVENOUS; SUBCUTANEOUS at 21:38

## 2023-08-25 RX ADMIN — Medication 1334 MILLIGRAM(S): at 08:32

## 2023-08-25 RX ADMIN — Medication 975 MILLIGRAM(S): at 13:06

## 2023-08-25 RX ADMIN — OXYCODONE HYDROCHLORIDE 10 MILLIGRAM(S): 5 TABLET ORAL at 06:11

## 2023-08-25 RX ADMIN — Medication 975 MILLIGRAM(S): at 06:11

## 2023-08-25 RX ADMIN — HEPARIN SODIUM 5000 UNIT(S): 5000 INJECTION INTRAVENOUS; SUBCUTANEOUS at 14:11

## 2023-08-25 RX ADMIN — Medication 975 MILLIGRAM(S): at 06:42

## 2023-08-25 RX ADMIN — HEPARIN SODIUM 5000 UNIT(S): 5000 INJECTION INTRAVENOUS; SUBCUTANEOUS at 06:11

## 2023-08-25 RX ADMIN — Medication 25 MILLIGRAM(S): at 18:12

## 2023-08-25 NOTE — CONSULT NOTE ADULT - SUBJECTIVE AND OBJECTIVE BOX
HPI:  36 y/o male w/ a PMHx of ESRD (M/W/F via LUE AVF), SOLITARIO, HTN, secondary hyperparathyroidism s/p parathyroidectomy (Dec 2022), obesity, stroke at age 10 w/ no residual deficits, and recent left radial fracture sustained after trying to lower himself into a chair s/p splint who presented on 8/14 after a fall while in the shower. Patient sustained a left femoral neck fracture s/p left hemiarthroplasty on 8/15. Immediately post-op in PACU, patient was lethargic and hypoxemic requiring reintubation so he was admitted to SICU post-operatively. Patient was hypotensive after intubation requiring pressor support. Patient without cuff leak so he was given Decadron. He was successfully extubated on 8/17 despite no cuff leak and was quickly weaned off pressors afterwards. Had a fever to 101.2 (8/22), infectious workup unrevealing.    Patient was evaluated by PM&R and therapy for functional deficits and gait/ADL impairments and recommend acute rehabilitation.  Patient was medically optimized for discharge to [facility] on 8/24/2023 (24 Aug 2023 14:05)     HPI:  36 y/o male w/ a PMHx of ESRD (M/W/F via LUE AVF), SOLITARIO, HTN, secondary hyperparathyroidism s/p parathyroidectomy (Dec 2022), obesity, stroke at age 10 w/ no residual deficits, and recent left radial fracture sustained after trying to lower himself into a chair s/p splint who presented on 8/14 after a fall while in the shower. Patient sustained a left femoral neck fracture s/p left hemiarthroplasty on 8/15. Immediately post-op in PACU, patient was lethargic and hypoxemic requiring reintubation so he was admitted to SICU post-operatively. Patient was hypotensive after intubation requiring pressor support. Patient without cuff leak so he was given Decadron. He was successfully extubated on 8/17 despite no cuff leak and was quickly weaned off pressors afterwards. Had a fever to 101.2 (8/22), infectious workup unrevealing.    Patient was evaluated by PM&R and therapy for functional deficits and gait/ADL impairments and recommend acute rehabilitation.  Patient was medically optimized for discharge to [facility] on 8/24/2023 (24 Aug 2023 14:05)    Patient is a 37y old  Male who presents with a chief complaint of Left femoral neck fracture (25 Aug 2023 14:13)      Subjective and overnight events:  Patient seen and examined at bedside. pt reports left hip pain, 10/10 at its worse, comes and goes and worse with movement. pt requesting for more pain medications. no numbness/tingling. no fever, chills, sob, cp, abd pain.    ALLERGIES:  No Known Drug Allergies  shellfish (Hives)    MEDICATIONS  (STANDING):  acetaminophen     Tablet .. 975 milliGRAM(s) Oral every 6 hours  calcium acetate 1334 milliGRAM(s) Oral three times a day with meals  chlorhexidine 2% Cloths 1 Application(s) Topical daily  epoetin carito (PROCRIT) Injectable 57864 Unit(s) IV Push <User Schedule>  heparin   Injectable 5000 Unit(s) SubCutaneous every 8 hours  metoprolol tartrate 25 milliGRAM(s) Oral two times a day  Nephro-pat 1 Tablet(s) Oral daily  polyethylene glycol 3350 17 Gram(s) Oral daily  senna 2 Tablet(s) Oral at bedtime    MEDICATIONS  (PRN):  ALPRAZolam 0.25 milliGRAM(s) Oral every 6 hours PRN Anxiety  oxyCODONE    IR 10 milliGRAM(s) Oral every 6 hours PRN Severe Pain (7 - 10)  traMADol 25 milliGRAM(s) Oral every 8 hours PRN Moderate Pain (4 - 6)    Vital Signs Last 24 Hrs  T(F): 98.9 (25 Aug 2023 07:58), Max: 98.9 (25 Aug 2023 07:58)  HR: 72 (25 Aug 2023 07:58) (72 - 79)  BP: 165/90 (25 Aug 2023 07:58) (137/79 - 165/90)  RR: 16 (25 Aug 2023 07:58) (16 - 16)  SpO2: 97% (25 Aug 2023 07:58) (97% - 98%)  I&O's Summary    PHYSICAL EXAM:  General: NAD, A/O x 3  ENT: MMM  Neck: Supple, No JVD  Lungs: Clear to auscultation bilaterally  Cardio: RRR, S1/S2, No murmurs  Abdomen: Soft, Nontender, Nondistended; Bowel sounds present  Extremities: No calf tenderness, trace bilateral pitting edema    LABS:                        9.0    4.77  )-----------( 312      ( 25 Aug 2023 06:06 )             27.8     08-25    137  |  94  |  102  ----------------------------<  78  4.6   |  25  |  10.16    Ca    9.4      25 Aug 2023 06:06    TPro  6.8  /  Alb  2.3  /  TBili  0.4  /  DBili  x   /  AST  18  /  ALT  <6  /  AlkPhos  731  08-25          08-15 Chol 190 mg/dL LDL -- HDL 57 mg/dL Trig 114 mg/dL          Urinalysis Basic - ( 25 Aug 2023 06:06 )    Color: x / Appearance: x / SG: x / pH: x  Gluc: 78 mg/dL / Ketone: x  / Bili: x / Urobili: x   Blood: x / Protein: x / Nitrite: x   Leuk Esterase: x / RBC: x / WBC x   Sq Epi: x / Non Sq Epi: x / Bacteria: x        Culture - Blood (collected 22 Aug 2023 14:46)  Source: .Blood Blood-Venous  Preliminary Report (24 Aug 2023 23:02):    No growth at 48 Hours    Culture - Blood (collected 22 Aug 2023 14:46)  Source: .Blood Blood-Peripheral  Preliminary Report (24 Aug 2023 23:01):    No growth at 48 Hours      COVID-19 PCR: NotDetec (08-25-23 @ 08:15)      RADIOLOGY & ADDITIONAL TESTS:    Care Discussed with Consultants/Other Providers:

## 2023-08-25 NOTE — DIETITIAN INITIAL EVALUATION ADULT - PERTINENT LABORATORY DATA
08-25    137  |  94<L>  |  102<H>  ----------------------------<  78  4.6   |  25  |  10.16<H>    Ca    9.4      25 Aug 2023 06:06    TPro  6.8  /  Alb  2.3<L>  /  TBili  0.4  /  DBili  x   /  AST  18  /  ALT  <6<L>  /  AlkPhos  731<H>  08-25  A1C with Estimated Average Glucose Result: 4.8 % (08-15-23 @ 07:24)  A1C with Estimated Average Glucose Result: 4.8 % (07-21-23 @ 10:27)

## 2023-08-25 NOTE — DIETITIAN INITIAL EVALUATION ADULT - ORAL NUTRITION SUPPLEMENTS
on Nepro 8oz Daily (Provides 425kcal & 19grams of Protein)  Recommend Increase Supplement to Nepro 8oz TID (Provides 1,275kcal & 57grams of Protein)

## 2023-08-25 NOTE — DIETITIAN INITIAL EVALUATION ADULT - PERTINENT MEDS FT
MEDICATIONS  (STANDING):  acetaminophen     Tablet .. 975 milliGRAM(s) Oral every 6 hours  calcium acetate 1334 milliGRAM(s) Oral three times a day with meals  chlorhexidine 4% Liquid 1 Application(s) Topical daily  heparin   Injectable 5000 Unit(s) SubCutaneous every 8 hours  metoprolol tartrate 25 milliGRAM(s) Oral two times a day  Nephro-pat 1 Tablet(s) Oral daily  polyethylene glycol 3350 17 Gram(s) Oral daily  senna 2 Tablet(s) Oral at bedtime    MEDICATIONS  (PRN):  ALPRAZolam 0.25 milliGRAM(s) Oral every 6 hours PRN Anxiety  oxyCODONE    IR 10 milliGRAM(s) Oral every 6 hours PRN Severe Pain (7 - 10)  traMADol 25 milliGRAM(s) Oral every 8 hours PRN Moderate Pain (4 - 6)

## 2023-08-25 NOTE — CONSULT NOTE ADULT - SUBJECTIVE AND OBJECTIVE BOX
NEPHROLOGY CONSULTATION    CHIEF COMPLAINT: L hip fx    HPI:  Pt is 38 yo m w/PMH of ESRD (M/W/F via LUE AVF), SOLITARIO, HTN, secondary hyperparathyroidism s/p parathyroidectomy (Dec 2022), obesity, stroke at age 10 w/no residual deficits, recent L radial fracture sustained after trying to lower himself into a chair s/p splint who presented on 8/14 after a fall while in the shower. Patient sustained L femoral neck fracture s/p L hemiarthroplasty on 8/15. Immediately post-op in PACU, patient was lethargic and hypoxemic requiring reintubation so he was admitted to SICU post-operatively. Patient was hypotensive after intubation requiring pressor support. Adm to AR at [facility] on 8/24/2023. Due for HD today.     ROS:  as above    Allergies:  No Known Drug Allergies  shellfish (Hives)    PAST MEDICAL & SURGICAL HISTORY:  HTN (hypertension)  Stroke  Morbid obesity  Sleep apnea  Leg fracture, R  ESRD (end stage renal disease) on dialysis  since 2013, M-W-F  Anemia, unspecified type  Hypothyroidism  AV fistula  R arm; L arm clotted    SOCIAL HISTORY:  negative    FAMILY HISTORY:  No pertinent family history in first degree relatives    MEDICATIONS  (STANDING):  acetaminophen     Tablet .. 975 milliGRAM(s) Oral every 6 hours  calcium acetate 1334 milliGRAM(s) Oral three times a day with meals  chlorhexidine 2% Cloths 1 Application(s) Topical daily  heparin   Injectable 5000 Unit(s) SubCutaneous every 8 hours  metoprolol tartrate 25 milliGRAM(s) Oral two times a day  Nephro-pat 1 Tablet(s) Oral daily  polyethylene glycol 3350 17 Gram(s) Oral daily  senna 2 Tablet(s) Oral at bedtime    Vital Signs Last 24 Hrs  T(C): 37.2 (08-25-23 @ 07:58), Max: 37.2 (08-25-23 @ 07:58)  T(F): 98.9 (08-25-23 @ 07:58), Max: 98.9 (08-25-23 @ 07:58)  HR: 72 (08-25-23 @ 07:58) (71 - 79)  BP: 165/90 (08-25-23 @ 07:58) (134/73 - 165/90)  RR: 16 (08-25-23 @ 07:58) (16 - 18)  SpO2: 97% (08-25-23 @ 07:58) (97% - 98%)    LABS:                        9.0    4.77  )-----------( 312      ( 25 Aug 2023 06:06 )             27.8     08-25    137  |  94<L>  |  102<H>  ----------------------------<  78  4.6   |  25  |  10.16<H>    Ca    9.4      25 Aug 2023 06:06    TPro  6.8  /  Alb  2.3<L>  /  TBili  0.4  /  DBili  x   /  AST  18  /  ALT  <6<L>  /  AlkPhos  731<H>  08-25    LIVER FUNCTIONS - ( 25 Aug 2023 06:06 )  Alb: 2.3 g/dL / Pro: 6.8 g/dL / ALK PHOS: 731 U/L / ALT: <6 U/L / AST: 18 U/L / GGT: x           Culture - Blood (collected 22 Aug 2023 14:46)  Source: .Blood Blood-Venous  Preliminary Report (24 Aug 2023 23:02):    No growth at 48 Hours    Culture - Blood (collected 22 Aug 2023 14:46)  Source: .Blood Blood-Peripheral  Preliminary Report (24 Aug 2023 23:01):    No growth at 48 Hours    A/P:    full consult to follow    [phone]       NEPHROLOGY CONSULTATION    CHIEF COMPLAINT: L hip fx    HPI:  Pt is 36 yo m w/PMH of ESRD (M/W/F via LUE AVF), SOLITARIO, HTN, secondary hyperparathyroidism s/p parathyroidectomy (Dec 2022), obesity, stroke at age 10 w/no residual deficits, recent L radial fracture sustained after trying to lower himself into a chair s/p splint who presented on 8/14 after a fall while in the shower. Patient sustained L femoral neck fracture s/p L hemiarthroplasty on 8/15. Immediately post-op in PACU, patient was lethargic and hypoxemic requiring reintubation so he was admitted to SICU post-operatively. Patient was hypotensive after intubation requiring pressor support. Adm to AR at [facility] on 8/24/2023. Due for HD today.     ROS:  as above    Allergies:  No Known Drug Allergies  shellfish (Hives)    PAST MEDICAL & SURGICAL HISTORY:  HTN (hypertension)  Stroke  Morbid obesity  Sleep apnea  Leg fracture, R  ESRD (end stage renal disease) on dialysis  since 2013, M-W-F  Anemia, unspecified type  Hypothyroidism  AV fistula  R arm; L arm clotted    SOCIAL HISTORY:  negative    FAMILY HISTORY:  No pertinent family history in first degree relatives    MEDICATIONS  (STANDING):  acetaminophen     Tablet .. 975 milliGRAM(s) Oral every 6 hours  calcium acetate 1334 milliGRAM(s) Oral three times a day with meals  chlorhexidine 2% Cloths 1 Application(s) Topical daily  heparin   Injectable 5000 Unit(s) SubCutaneous every 8 hours  metoprolol tartrate 25 milliGRAM(s) Oral two times a day  Nephro-pat 1 Tablet(s) Oral daily  polyethylene glycol 3350 17 Gram(s) Oral daily  senna 2 Tablet(s) Oral at bedtime    Vital Signs Last 24 Hrs  T(C): 37.2 (08-25-23 @ 07:58), Max: 37.2 (08-25-23 @ 07:58)  T(F): 98.9 (08-25-23 @ 07:58), Max: 98.9 (08-25-23 @ 07:58)  HR: 72 (08-25-23 @ 07:58) (71 - 79)  BP: 165/90 (08-25-23 @ 07:58) (134/73 - 165/90)  RR: 16 (08-25-23 @ 07:58) (16 - 18)  SpO2: 97% (08-25-23 @ 07:58) (97% - 98%)    LABS:                        9.0    4.77  )-----------( 312      ( 25 Aug 2023 06:06 )             27.8     08-25    137  |  94<L>  |  102<H>  ----------------------------<  78  4.6   |  25  |  10.16<H>    Ca    9.4      25 Aug 2023 06:06    TPro  6.8  /  Alb  2.3<L>  /  TBili  0.4  /  DBili  x   /  AST  18  /  ALT  <6<L>  /  AlkPhos  731<H>  08-25    LIVER FUNCTIONS - ( 25 Aug 2023 06:06 )  Alb: 2.3 g/dL / Pro: 6.8 g/dL / ALK PHOS: 731 U/L / ALT: <6 U/L / AST: 18 U/L / GGT: x           Culture - Blood (collected 22 Aug 2023 14:46)  Source: .Blood Blood-Venous  Preliminary Report (24 Aug 2023 23:02):    No growth at 48 Hours    Culture - Blood (collected 22 Aug 2023 14:46)  Source: .Blood Blood-Peripheral  Preliminary Report (24 Aug 2023 23:01):    No growth at 48 Hours    A/P:    S/p fall, L femoral neck fracture, s/p L hemiarthroplasty on 8/15  Adm to AR  ESRD on HD MWF  HD today  TMP as able  Renal diet    [phone]       NEPHROLOGY CONSULTATION    CHIEF COMPLAINT: L hip fx    HPI:  Pt is 38 yo m w/PMH of ESRD (M/W/F via LUE AVF), SOLITARIO, HTN, secondary hyperparathyroidism s/p parathyroidectomy (Dec 2022), obesity, stroke at age 10 w/no residual deficits, recent L radial fracture sustained after trying to lower himself into a chair s/p splint who presented on 8/14 after a fall while in the shower. Patient sustained L femoral neck fracture s/p L hemiarthroplasty on 8/15. Immediately post-op in PACU, patient was lethargic and hypoxemic requiring reintubation so he was admitted to SICU post-operatively. Patient was hypotensive after intubation requiring pressor support. Adm to AR at [facility] on 8/24/2023. Due for HD today.     ROS:  as above    Allergies:  No Known Drug Allergies  shellfish (Hives)    PAST MEDICAL & SURGICAL HISTORY:  HTN (hypertension)  Stroke  Morbid obesity  Sleep apnea  Leg fracture, R  ESRD (end stage renal disease) on dialysis  since 2013, M-W-F  Anemia, unspecified type  Hypothyroidism  AV fistula  R arm; L arm clotted    SOCIAL HISTORY:  negative    FAMILY HISTORY:  No pertinent family history in first degree relatives    MEDICATIONS  (STANDING):  acetaminophen     Tablet .. 975 milliGRAM(s) Oral every 6 hours  calcium acetate 1334 milliGRAM(s) Oral three times a day with meals  chlorhexidine 2% Cloths 1 Application(s) Topical daily  heparin   Injectable 5000 Unit(s) SubCutaneous every 8 hours  metoprolol tartrate 25 milliGRAM(s) Oral two times a day  Nephro-pat 1 Tablet(s) Oral daily  polyethylene glycol 3350 17 Gram(s) Oral daily  senna 2 Tablet(s) Oral at bedtime    Vital Signs Last 24 Hrs  T(C): 37.2 (08-25-23 @ 07:58), Max: 37.2 (08-25-23 @ 07:58)  T(F): 98.9 (08-25-23 @ 07:58), Max: 98.9 (08-25-23 @ 07:58)  HR: 72 (08-25-23 @ 07:58) (71 - 79)  BP: 165/90 (08-25-23 @ 07:58) (134/73 - 165/90)  RR: 16 (08-25-23 @ 07:58) (16 - 18)  SpO2: 97% (08-25-23 @ 07:58) (97% - 98%)    s1s2  b/l air entry  soft, ND  no edema    LABS:                        9.0    4.77  )-----------( 312      ( 25 Aug 2023 06:06 )             27.8     08-25    137  |  94<L>  |  102<H>  ----------------------------<  78  4.6   |  25  |  10.16<H>    Ca    9.4      25 Aug 2023 06:06    TPro  6.8  /  Alb  2.3<L>  /  TBili  0.4  /  DBili  x   /  AST  18  /  ALT  <6<L>  /  AlkPhos  731<H>  08-25    LIVER FUNCTIONS - ( 25 Aug 2023 06:06 )  Alb: 2.3 g/dL / Pro: 6.8 g/dL / ALK PHOS: 731 U/L / ALT: <6 U/L / AST: 18 U/L / GGT: x           Culture - Blood (collected 22 Aug 2023 14:46)  Source: .Blood Blood-Venous  Preliminary Report (24 Aug 2023 23:02):    No growth at 48 Hours    Culture - Blood (collected 22 Aug 2023 14:46)  Source: .Blood Blood-Peripheral  Preliminary Report (24 Aug 2023 23:01):    No growth at 48 Hours    A/P:    S/p fall, L femoral neck fracture, s/p L hemiarthroplasty on 8/15  Adm to AR  ESRD on HD MWF  HD today  TMP as able  Renal diet    [phone]

## 2023-08-25 NOTE — DIETITIAN INITIAL EVALUATION ADULT - OTHER INFO
Initial Nutrition Assessment   37yr Old Male   States Shellfish Food Allergy/Intolerance  Tolerates Diet Well - No Chewing/Swallowing Complications (Per Patient)  No Pressure Ulcers (as Per Nursing Flow Sheets)  No Edema Noted (as Per Nursing Flow Sheets)  No Recent Nausea/Vomiting/Diarrhea/Constipation (as Per Patient)

## 2023-08-25 NOTE — DIETITIAN INITIAL EVALUATION ADULT - ADD RECOMMEND
1) Monitor Weights, Intake, Tolerance, Skin, POCT & Labwork  2) Recommend Change Nepro 8oz to TID   3) Education Provided on Need for Supplementation & Renal Diet  4) Continue Nutrition Plan of Care

## 2023-08-25 NOTE — DIETITIAN INITIAL EVALUATION ADULT - NS FNS DIET ORDER
on Renal Diet w/ Thin Liquids (IDDSI Level 0) & Nepro 8oz Daily (Provides 425kcal & 19grams of Protein)   Recommend Increase Supplement to Nepro 8oz TID (Provides 1,275kcal & 57grams of Protein)   Education Provided on Need for Supplementation & Renal Diet

## 2023-08-25 NOTE — DIETITIAN INITIAL EVALUATION ADULT - ORAL INTAKE PTA/DIET HISTORY
Patient Does Not Follow Diet @Home  Consumes 2 Meals a Day   Family Usually Cooks For Patient   Does Take Protein Supplements @Home

## 2023-08-25 NOTE — DIETITIAN NUTRITION RISK NOTIFICATION - TREATMENT: THE FOLLOWING DIET HAS BEEN RECOMMENDED
Recommend Increase Supplement to Nepro 8oz TID (Provides 1,275kcal & 57grams of Protein)   Nutrition Education Provided

## 2023-08-25 NOTE — DIETITIAN INITIAL EVALUATION ADULT - FACTORS AFF FOOD INTAKE
States Fair Intake over Last Week  Consuming Less than Prior to Admission to Hospital  (Per Patient)/persistent lack of appetite

## 2023-08-26 PROCEDURE — 99232 SBSQ HOSP IP/OBS MODERATE 35: CPT

## 2023-08-26 RX ADMIN — OXYCODONE HYDROCHLORIDE 10 MILLIGRAM(S): 5 TABLET ORAL at 13:19

## 2023-08-26 RX ADMIN — Medication 25 MILLIGRAM(S): at 18:32

## 2023-08-26 RX ADMIN — HEPARIN SODIUM 5000 UNIT(S): 5000 INJECTION INTRAVENOUS; SUBCUTANEOUS at 05:38

## 2023-08-26 RX ADMIN — Medication 1334 MILLIGRAM(S): at 18:27

## 2023-08-26 RX ADMIN — HEPARIN SODIUM 5000 UNIT(S): 5000 INJECTION INTRAVENOUS; SUBCUTANEOUS at 21:44

## 2023-08-26 RX ADMIN — HEPARIN SODIUM 5000 UNIT(S): 5000 INJECTION INTRAVENOUS; SUBCUTANEOUS at 13:19

## 2023-08-26 RX ADMIN — OXYCODONE HYDROCHLORIDE 10 MILLIGRAM(S): 5 TABLET ORAL at 21:44

## 2023-08-26 RX ADMIN — Medication 0.25 MILLIGRAM(S): at 22:52

## 2023-08-26 RX ADMIN — Medication 1334 MILLIGRAM(S): at 13:16

## 2023-08-26 RX ADMIN — CHLORHEXIDINE GLUCONATE 1 APPLICATION(S): 213 SOLUTION TOPICAL at 14:35

## 2023-08-26 RX ADMIN — Medication 25 MILLIGRAM(S): at 05:44

## 2023-08-26 RX ADMIN — Medication 1334 MILLIGRAM(S): at 08:50

## 2023-08-26 RX ADMIN — OXYCODONE HYDROCHLORIDE 10 MILLIGRAM(S): 5 TABLET ORAL at 22:14

## 2023-08-26 RX ADMIN — Medication 1 TABLET(S): at 13:17

## 2023-08-26 RX ADMIN — OXYCODONE HYDROCHLORIDE 10 MILLIGRAM(S): 5 TABLET ORAL at 13:49

## 2023-08-26 NOTE — PROGRESS NOTE ADULT - SUBJECTIVE AND OBJECTIVE BOX
Patient is a 37y old  Male who presents with a chief complaint of Left femoral neck fracture (25 Aug 2023 14:13)      HPI:  36 y/o male w/ a PMHx of ESRD (M/W/F via LUE AVF), SOLITARIO, HTN, secondary hyperparathyroidism s/p parathyroidectomy (Dec 2022), obesity, stroke at age 10 w/ no residual deficits, and recent left radial fracture sustained after trying to lower himself into a chair s/p splint who presented on  after a fall while in the shower. Patient sustained a left femoral neck fracture s/p left hemiarthroplasty on 8/15. Immediately post-op in PACU, patient was lethargic and hypoxemic requiring reintubation so he was admitted to SICU post-operatively. Patient was hypotensive after intubation requiring pressor support. Patient without cuff leak so he was given Decadron. He was successfully extubated on  despite no cuff leak and was quickly weaned off pressors afterwards. Had a fever to 101.2 (), infectious workup unrevealing.    Patient was evaluated by PM&R and therapy for functional deficits and gait/ADL impairments and recommend acute rehabilitation.  Patient was medically optimized for discharge to [facility] on 2023 (24 Aug 2023 14:05)      PAST MEDICAL & SURGICAL HISTORY:  HTN (hypertension)      Stroke  age 10      Morbid obesity      Sleep apnea      Leg fracture, right      Chronic renal failure      Hemodialysis access, AV graft      ESRD (end stage renal disease) on dialysis  since , M-W-F      Anemia, unspecified type      Hypothyroidism      AV fistula  R arm; L arm clotted          MEDICATIONS  (STANDING):  acetaminophen     Tablet .. 975 milliGRAM(s) Oral every 6 hours  calcium acetate 1334 milliGRAM(s) Oral three times a day with meals  chlorhexidine 2% Cloths 1 Application(s) Topical daily  epoetin carito-epbx (RETACRIT) Injectable 91495 Unit(s) IV Push <User Schedule>  heparin   Injectable 5000 Unit(s) SubCutaneous every 8 hours  metoprolol tartrate 25 milliGRAM(s) Oral two times a day  Nephro-pat 1 Tablet(s) Oral daily  polyethylene glycol 3350 17 Gram(s) Oral daily  senna 2 Tablet(s) Oral at bedtime    MEDICATIONS  (PRN):  ALPRAZolam 0.25 milliGRAM(s) Oral every 6 hours PRN Anxiety  oxyCODONE    IR 10 milliGRAM(s) Oral every 6 hours PRN Severe Pain (7 - 10)  traMADol 25 milliGRAM(s) Oral every 8 hours PRN Moderate Pain (4 - 6)      Allergies    No Known Drug Allergies  shellfish (Hives)    Intolerances          VITALS  37y  Vital Signs Last 24 Hrs  T(C): 37.1 (26 Aug 2023 08:51), Max: 37.1 (26 Aug 2023 08:51)  T(F): 98.7 (26 Aug 2023 08:51), Max: 98.7 (26 Aug 2023 08:51)  HR: 73 (26 Aug 2023 08:51) (73 - 88)  BP: 132/74 (26 Aug 2023 08:51) (114/66 - 132/74)  BP(mean): --  RR: 16 (26 Aug 2023 08:51) (16 - 16)  SpO2: 96% (26 Aug 2023 08:51) (96% - 99%)    Parameters below as of 26 Aug 2023 08:51  Patient On (Oxygen Delivery Method): room air      Daily     Daily Weight in k (25 Aug 2023 17:30)        RECENT LABS:                          9.0    4.77  )-----------( 312      ( 25 Aug 2023 06:06 )             27.8     08-25    137  |  94<L>  |  102<H>  ----------------------------<  78  4.6   |  25  |  10.16<H>    Ca    9.4      25 Aug 2023 06:06    TPro  6.8  /  Alb  2.3<L>  /  TBili  0.4  /  DBili  x   /  AST  18  /  ALT  <6<L>  /  AlkPhos  731<H>  08-25    LIVER FUNCTIONS - ( 25 Aug 2023 06:06 )  Alb: 2.3 g/dL / Pro: 6.8 g/dL / ALK PHOS: 731 U/L / ALT: <6 U/L / AST: 18 U/L / GGT: x             Urinalysis Basic - ( 25 Aug 2023 06:06 )    Color: x / Appearance: x / SG: x / pH: x  Gluc: 78 mg/dL / Ketone: x  / Bili: x / Urobili: x   Blood: x / Protein: x / Nitrite: x   Leuk Esterase: x / RBC: x / WBC x   Sq Epi: x / Non Sq Epi: x / Bacteria: x        Culture - Blood (collected 23 @ 14:46)  Source: .Blood Blood-Venous  Preliminary Report (23 @ 23:01):    No growth at 72 Hours    Culture - Blood (collected 23 @ 14:46)  Source: .Blood Blood-Peripheral  Preliminary Report (23 @ 23:01):    No growth at 72 Hours        CAPILLARY BLOOD GLUCOSE

## 2023-08-26 NOTE — PROGRESS NOTE ADULT - ASSESSMENT
36 y/o male PMH HTN, secondary hyperparathyroidism, SOLITARIO, ESRD on HD, CVA in childhood, recent left radial fracture s/p fall with left femoral neck fracture, s/p left hemiarthroplasty (8/15). Post op significant for hypoxemia, hypotension, and fever to 101.2 (8/22), infectious workup unrevealing.    # Femoral neck fracture S/p Left Hemiarthroplasty   - aquacel dressing  - Weight bearing as tolerated. ANTERIOR hip precautions  - continue Comprehensive Rehab Program of PT/OT     # Recent Left radial head Fracture  - NWB to LUE  - Left arm in sling as needed for comfort    # HTN  - Metoprolol 25 BID  - BP (114/66 - 132/74) 8/26    # ESRD - on HD (MWF)  # Anemia of chronic disease  - Right AVF   - Epo with HD  - Nephro-pat supplement    # Renal bone disease   - Home diet: phoslo with meals  - Ensure low phos diet  - Outpatient follow up with endo and bone scan to determin etiology    # Mood  - Xanax 0.25 PRN    # Pain control  - Tylenol 975 q6h  - Tramadol PRN    # GI/Bowel Mgmt   - Senna,  Miralax QD    # Skin:  - stage 2 pressure ulcer to coccyx: cleanse with NS, pat dry with gauze and apply foam dressing daily  - Turn Q2 hr while in bed, air mattress  - Skin barrier cream as needed    # Diet   - Regular + Thins  [Renal Diet]    # DVT ppx  - Heparin TID  - SCD, TEDs

## 2023-08-27 PROCEDURE — 99232 SBSQ HOSP IP/OBS MODERATE 35: CPT

## 2023-08-27 RX ADMIN — CHLORHEXIDINE GLUCONATE 1 APPLICATION(S): 213 SOLUTION TOPICAL at 12:29

## 2023-08-27 RX ADMIN — Medication 25 MILLIGRAM(S): at 05:31

## 2023-08-27 RX ADMIN — HEPARIN SODIUM 5000 UNIT(S): 5000 INJECTION INTRAVENOUS; SUBCUTANEOUS at 05:30

## 2023-08-27 RX ADMIN — Medication 25 MILLIGRAM(S): at 17:23

## 2023-08-27 RX ADMIN — Medication 1334 MILLIGRAM(S): at 09:13

## 2023-08-27 RX ADMIN — HEPARIN SODIUM 5000 UNIT(S): 5000 INJECTION INTRAVENOUS; SUBCUTANEOUS at 14:42

## 2023-08-27 RX ADMIN — HEPARIN SODIUM 5000 UNIT(S): 5000 INJECTION INTRAVENOUS; SUBCUTANEOUS at 22:16

## 2023-08-27 RX ADMIN — OXYCODONE HYDROCHLORIDE 10 MILLIGRAM(S): 5 TABLET ORAL at 22:47

## 2023-08-27 RX ADMIN — OXYCODONE HYDROCHLORIDE 10 MILLIGRAM(S): 5 TABLET ORAL at 12:31

## 2023-08-27 RX ADMIN — OXYCODONE HYDROCHLORIDE 10 MILLIGRAM(S): 5 TABLET ORAL at 22:17

## 2023-08-27 RX ADMIN — Medication 1334 MILLIGRAM(S): at 17:23

## 2023-08-27 RX ADMIN — OXYCODONE HYDROCHLORIDE 10 MILLIGRAM(S): 5 TABLET ORAL at 13:01

## 2023-08-27 RX ADMIN — Medication 1 TABLET(S): at 12:33

## 2023-08-27 NOTE — PROGRESS NOTE ADULT - ASSESSMENT
36 y/o male PMH HTN, secondary hyperparathyroidism, SOLITARIO, ESRD on HD, CVA in childhood, recent left radial fracture s/p fall with left femoral neck fracture, s/p left hemiarthroplasty (8/15). Post op significant for hypoxemia, hypotension, and fever to 101.2 (8/22), infectious workup unrevealing.    # Femoral neck fracture S/p Left Hemiarthroplasty   - aquacel dressing  - Weight bearing as tolerated. ANTERIOR hip precautions  - continue Comprehensive Rehab Program of PT/OT     # Recent Left radial head Fracture  - NWB to LUE  - Left arm in sling as needed for comfort    # HTN  - Metoprolol 25 BID  - BP (112/64 - 166/88) 8/27    # hyperparathyroidism  - endo: Dr Royal [phone], f/u re: lab work that was supposed to be drawn next week in preparation for procedure, will pass to primary team    # ESRD - on HD (MWF)  # Anemia of chronic disease  - Right AVF   - Epo with HD  - Nephro-pat supplement    # Renal bone disease   - Home diet: phoslo with meals  - Ensure low phos diet  - Outpatient follow up with endo and bone scan to determin etiology    # Mood  - Xanax 0.25 PRN  - HR 75-86 8/27    # Pain control  - Tylenol 975 q6h  - Tramadol PRN  - adequate pain management at this stage especially given history and prior pain medication regimen discussed.   - Pain management: Param Seth [phone]    # GI/Bowel Mgmt   - Senna,  Miralax QD    # Skin:  - stage 2 pressure ulcer to coccyx: cleanse with NS, pat dry with gauze and apply foam dressing daily  - Turn Q2 hr while in bed, air mattress  - Skin barrier cream as needed    # Diet   - Regular + Thins  [Renal Diet]    # DVT ppx  - Heparin TID  - SCD, TEDs     # LABS  during HD  f/u with Dr Royal mon re: any necessary labwork

## 2023-08-27 NOTE — PROGRESS NOTE ADULT - MOTOR
left hand;' keeps elevated, no swelling or warmth  left thigh: trace swelling, no induration or erythema or warmth  calves soft bilaterally no pedal edema. no calf TTP bilaterally
no pedal edema DP pulse 1+ bilaterally no discoloration feet  no erythema or warmth calves  left thigh soft, no significant swelling  no left hand swelling

## 2023-08-27 NOTE — PROGRESS NOTE ADULT - COMMENTS
Patient reports that he has difficulty sleeping which he attributes to his thyroid condition, which he was originally going to have a procedure for in September, and that some of his anxiety pushes past the xanax. He admits he still has difficulty taking his pain medications as he wants to try to wean from them, but controlling pain so it doesn't escalate too high or interfere with activity discussed. He agrees to try to address /askl for medication as he needs, and agrees to hold off on adjusting xanax for now, to see potential SE/interaction with pain meds
Patient reports sleeping well. He has pain in left lower leg that he describes as tingling, in both feet, as well as discomfort in left hip, however does not interfere with sleep. No fever. Denies constipation

## 2023-08-27 NOTE — PROGRESS NOTE ADULT - RESPIRATORY
clear to auscultation bilaterally/no respiratory distress
normal/clear to auscultation bilaterally/no wheezes/no rales/no rhonchi

## 2023-08-27 NOTE — PROGRESS NOTE ADULT - SUBJECTIVE AND OBJECTIVE BOX
Patient is a 37y old  Male who presents with a chief complaint of Left femoral neck fracture (26 Aug 2023 09:10)      HPI:  38 y/o male w/ a PMHx of ESRD (M/W/F via LUE AVF), SOLITARIO, HTN, secondary hyperparathyroidism s/p parathyroidectomy (Dec 2022), obesity, stroke at age 10 w/ no residual deficits, and recent left radial fracture sustained after trying to lower himself into a chair s/p splint who presented on 8/14 after a fall while in the shower. Patient sustained a left femoral neck fracture s/p left hemiarthroplasty on 8/15. Immediately post-op in PACU, patient was lethargic and hypoxemic requiring reintubation so he was admitted to SICU post-operatively. Patient was hypotensive after intubation requiring pressor support. Patient without cuff leak so he was given Decadron. He was successfully extubated on 8/17 despite no cuff leak and was quickly weaned off pressors afterwards. Had a fever to 101.2 (8/22), infectious workup unrevealing.    Patient was evaluated by PM&R and therapy for functional deficits and gait/ADL impairments and recommend acute rehabilitation.  Patient was medically optimized for discharge to [facility] on 8/24/2023 (24 Aug 2023 14:05)      PAST MEDICAL & SURGICAL HISTORY:  HTN (hypertension)      Stroke  age 10      Morbid obesity      Sleep apnea      Leg fracture, right      Chronic renal failure      Hemodialysis access, AV graft      ESRD (end stage renal disease) on dialysis  since 2013, M-W-F      Anemia, unspecified type      Hypothyroidism      AV fistula  R arm; L arm clotted          MEDICATIONS  (STANDING):  acetaminophen     Tablet .. 975 milliGRAM(s) Oral every 6 hours  calcium acetate 1334 milliGRAM(s) Oral three times a day with meals  chlorhexidine 2% Cloths 1 Application(s) Topical daily  epoetin carito-epbx (RETACRIT) Injectable 82964 Unit(s) IV Push <User Schedule>  heparin   Injectable 5000 Unit(s) SubCutaneous every 8 hours  metoprolol tartrate 25 milliGRAM(s) Oral two times a day  Nephro-pat 1 Tablet(s) Oral daily  polyethylene glycol 3350 17 Gram(s) Oral daily  senna 2 Tablet(s) Oral at bedtime    MEDICATIONS  (PRN):  ALPRAZolam 0.25 milliGRAM(s) Oral every 6 hours PRN Anxiety  oxyCODONE    IR 10 milliGRAM(s) Oral every 6 hours PRN Severe Pain (7 - 10)  traMADol 25 milliGRAM(s) Oral every 8 hours PRN Moderate Pain (4 - 6)      Allergies    No Known Drug Allergies  shellfish (Hives)    Intolerances          VITALS  37y  Vital Signs Last 24 Hrs  T(C): 37 (27 Aug 2023 08:59), Max: 37.1 (26 Aug 2023 21:47)  T(F): 98.6 (27 Aug 2023 08:59), Max: 98.8 (26 Aug 2023 21:47)  HR: 76 (27 Aug 2023 08:59) (75 - 86)  BP: 165/80 (27 Aug 2023 08:59) (112/64 - 166/88)  BP(mean): --  RR: 16 (27 Aug 2023 08:59) (16 - 16)  SpO2: 96% (27 Aug 2023 08:59) (96% - 100%)    Parameters below as of 27 Aug 2023 08:59  Patient On (Oxygen Delivery Method): room air      Daily     Daily         RECENT LABS:                    Culture - Blood (collected 08-22-23 @ 14:46)  Source: .Blood Blood-Peripheral  Preliminary Report (08-26-23 @ 23:00):    No growth at 4 days    Culture - Blood (collected 08-22-23 @ 14:46)  Source: .Blood Blood-Venous  Preliminary Report (08-26-23 @ 23:00):    No growth at 4 days        CAPILLARY BLOOD GLUCOSE

## 2023-08-28 LAB
ALBUMIN SERPL ELPH-MCNC: 2.5 G/DL — LOW (ref 3.3–5)
ALP SERPL-CCNC: 763 U/L — HIGH (ref 40–120)
ALT FLD-CCNC: 8 U/L — LOW (ref 10–45)
ANION GAP SERPL CALC-SCNC: 19 MMOL/L — HIGH (ref 5–17)
AST SERPL-CCNC: 18 U/L — SIGNIFICANT CHANGE UP (ref 10–40)
BILIRUB SERPL-MCNC: 0.4 MG/DL — SIGNIFICANT CHANGE UP (ref 0.2–1.2)
BUN SERPL-MCNC: 146 MG/DL — HIGH (ref 7–23)
CALCIUM SERPL-MCNC: 9.7 MG/DL — SIGNIFICANT CHANGE UP (ref 8.4–10.5)
CHLORIDE SERPL-SCNC: 93 MMOL/L — LOW (ref 96–108)
CO2 SERPL-SCNC: 24 MMOL/L — SIGNIFICANT CHANGE UP (ref 22–31)
CREAT SERPL-MCNC: 12.77 MG/DL — HIGH (ref 0.5–1.3)
EGFR: 5 ML/MIN/1.73M2 — LOW
GLUCOSE SERPL-MCNC: 88 MG/DL — SIGNIFICANT CHANGE UP (ref 70–99)
HCT VFR BLD CALC: 28 % — LOW (ref 39–50)
HGB BLD-MCNC: 9 G/DL — LOW (ref 13–17)
MCHC RBC-ENTMCNC: 29.4 PG — SIGNIFICANT CHANGE UP (ref 27–34)
MCHC RBC-ENTMCNC: 32.1 GM/DL — SIGNIFICANT CHANGE UP (ref 32–36)
MCV RBC AUTO: 91.5 FL — SIGNIFICANT CHANGE UP (ref 80–100)
NRBC # BLD: 0 /100 WBCS — SIGNIFICANT CHANGE UP (ref 0–0)
PLATELET # BLD AUTO: 322 K/UL — SIGNIFICANT CHANGE UP (ref 150–400)
POTASSIUM SERPL-MCNC: 5.6 MMOL/L — HIGH (ref 3.5–5.3)
POTASSIUM SERPL-SCNC: 5.6 MMOL/L — HIGH (ref 3.5–5.3)
PROT SERPL-MCNC: 7.1 G/DL — SIGNIFICANT CHANGE UP (ref 6–8.3)
RBC # BLD: 3.06 M/UL — LOW (ref 4.2–5.8)
RBC # FLD: 16.3 % — HIGH (ref 10.3–14.5)
SODIUM SERPL-SCNC: 136 MMOL/L — SIGNIFICANT CHANGE UP (ref 135–145)
WBC # BLD: 7.49 K/UL — SIGNIFICANT CHANGE UP (ref 3.8–10.5)
WBC # FLD AUTO: 7.49 K/UL — SIGNIFICANT CHANGE UP (ref 3.8–10.5)

## 2023-08-28 PROCEDURE — 99232 SBSQ HOSP IP/OBS MODERATE 35: CPT | Mod: GC

## 2023-08-28 RX ADMIN — OXYCODONE HYDROCHLORIDE 10 MILLIGRAM(S): 5 TABLET ORAL at 21:40

## 2023-08-28 RX ADMIN — Medication 25 MILLIGRAM(S): at 17:47

## 2023-08-28 RX ADMIN — Medication 1 TABLET(S): at 11:49

## 2023-08-28 RX ADMIN — OXYCODONE HYDROCHLORIDE 10 MILLIGRAM(S): 5 TABLET ORAL at 20:49

## 2023-08-28 RX ADMIN — HEPARIN SODIUM 5000 UNIT(S): 5000 INJECTION INTRAVENOUS; SUBCUTANEOUS at 21:10

## 2023-08-28 RX ADMIN — Medication 975 MILLIGRAM(S): at 17:48

## 2023-08-28 RX ADMIN — POLYETHYLENE GLYCOL 3350 17 GRAM(S): 17 POWDER, FOR SOLUTION ORAL at 17:47

## 2023-08-28 RX ADMIN — CHLORHEXIDINE GLUCONATE 1 APPLICATION(S): 213 SOLUTION TOPICAL at 11:46

## 2023-08-28 RX ADMIN — Medication 1334 MILLIGRAM(S): at 08:20

## 2023-08-28 RX ADMIN — Medication 25 MILLIGRAM(S): at 06:00

## 2023-08-28 RX ADMIN — Medication 1334 MILLIGRAM(S): at 17:47

## 2023-08-28 RX ADMIN — HEPARIN SODIUM 5000 UNIT(S): 5000 INJECTION INTRAVENOUS; SUBCUTANEOUS at 06:00

## 2023-08-28 RX ADMIN — ERYTHROPOIETIN 10000 UNIT(S): 10000 INJECTION, SOLUTION INTRAVENOUS; SUBCUTANEOUS at 16:14

## 2023-08-28 RX ADMIN — OXYCODONE HYDROCHLORIDE 10 MILLIGRAM(S): 5 TABLET ORAL at 14:26

## 2023-08-28 RX ADMIN — OXYCODONE HYDROCHLORIDE 10 MILLIGRAM(S): 5 TABLET ORAL at 15:00

## 2023-08-28 RX ADMIN — Medication 975 MILLIGRAM(S): at 17:47

## 2023-08-28 RX ADMIN — Medication 1334 MILLIGRAM(S): at 11:49

## 2023-08-28 NOTE — PROGRESS NOTE ADULT - SUBJECTIVE AND OBJECTIVE BOX
Seen on HD    Vital Signs Last 24 Hrs  T(C): 37 (08-28-23 @ 20:40), Max: 37.2 (08-27-23 @ 22:13)  T(F): 98.6 (08-28-23 @ 20:40), Max: 98.9 (08-27-23 @ 22:13)  HR: 71 (08-28-23 @ 20:40) (53 - 87)  BP: 132/65 (08-28-23 @ 20:40) (124/66 - 157/84)  RR: 16 (08-28-23 @ 20:40) (14 - 16)  SpO2: 98% (08-28-23 @ 20:40) (96% - 98%)    I&O's Detail    28 Aug 2023 07:01  -  28 Aug 2023 22:11  --------------------------------------------------------  IN:    Other (mL): 3800 mL  Total IN: 3800 mL    OUT:    Other (mL): 800 mL  Total OUT: 800 mL    s1s2  b/l air entry  soft, ND  no edema                        9.0    7.49  )-----------( 322      ( 28 Aug 2023 14:15 )             28.0     28 Aug 2023 14:15    136    |  93     |  146    ----------------------------<  88     5.6     |  24     |  12.77    Ca    9.7        28 Aug 2023 14:15    TPro  7.1    /  Alb  2.5    /  TBili  0.4    /  DBili  x      /  AST  18     /  ALT  8      /  AlkPhos  763    28 Aug 2023 14:15    LIVER FUNCTIONS - ( 28 Aug 2023 14:15 )  Alb: 2.5 g/dL / Pro: 7.1 g/dL / ALK PHOS: 763 U/L / ALT: 8 U/L / AST: 18 U/L / GGT: x           acetaminophen     Tablet .. 975 milliGRAM(s) Oral every 6 hours  ALPRAZolam 0.25 milliGRAM(s) Oral every 6 hours PRN  calcium acetate 1334 milliGRAM(s) Oral three times a day with meals  chlorhexidine 2% Cloths 1 Application(s) Topical daily  epoetin carito-epbx (RETACRIT) Injectable 63104 Unit(s) IV Push <User Schedule>  heparin   Injectable 5000 Unit(s) SubCutaneous every 8 hours  metoprolol tartrate 25 milliGRAM(s) Oral two times a day  Nephro-pat 1 Tablet(s) Oral daily  oxyCODONE    IR 10 milliGRAM(s) Oral every 6 hours PRN  polyethylene glycol 3350 17 Gram(s) Oral daily  senna 2 Tablet(s) Oral at bedtime  traMADol 25 milliGRAM(s) Oral every 8 hours PRN    A/P:    S/p fall, L femoral neck fracture, s/p L hemiarthroplasty on 8/15  Adm to [facility]  ESRD on HD MWF  HD today  TMP as able  Renal diet  Rehab    [phone]

## 2023-08-28 NOTE — PROGRESS NOTE ADULT - SUBJECTIVE AND OBJECTIVE BOX
Chief complaint- pain in left arm , Left Hip with difficulty ambulating    36 y/o male w/ a PMHx of ESRD (M/W/F via LUE AVF), SOLITARIO, HTN, secondary hyperparathyroidism s/p parathyroidectomy (Dec 2022), obesity, stroke at age 10 w/ no residual deficits, and recent left radial fracture sustained after trying to lower himself into a chair s/p splint who presented on 8/14 after a fall while in the shower. Patient sustained a left femoral neck fracture s/p left hemiarthroplasty on 8/15. Immediately post-op in PACU, patient was lethargic and hypoxemic requiring reintubation so he was admitted to SICU post-operatively. Patient was hypotensive after intubation requiring pressor support. Patient without cuff leak so he was given Decadron. He was successfully extubated on 8/17 despite no cuff leak and was quickly weaned off pressors afterwards. Had a fever to 101.2 (8/22), infectious workup unrevealing.    Patient was evaluated by PM&R and therapy for functional deficits and gait/ADL impairments and recommend acute rehabilitation.  Patient was medically optimized for discharge to [facility] on 8/24/2023    ROS/Subjective  Patient seen and evaluated bedside  reports persistent in left forearm as well as back and left hip- will xray arm  scheduled for surgery 9/11?- will contact endocrinology regarding necessary Blood work  Attended HD today  No void   Moving Bowels   no dizziness, no headaches  no nausea, no vomiting  no SOB, No chest pain    MEDICATIONS  (STANDING):  acetaminophen     Tablet .. 975 milliGRAM(s) Oral every 6 hours  calcium acetate 1334 milliGRAM(s) Oral three times a day with meals  chlorhexidine 2% Cloths 1 Application(s) Topical daily  epoetin carito-epbx (RETACRIT) Injectable 09852 Unit(s) IV Push <User Schedule>  heparin   Injectable 5000 Unit(s) SubCutaneous every 8 hours  metoprolol tartrate 25 milliGRAM(s) Oral two times a day  Nephro-pat 1 Tablet(s) Oral daily  polyethylene glycol 3350 17 Gram(s) Oral daily  senna 2 Tablet(s) Oral at bedtime    MEDICATIONS  (PRN):  ALPRAZolam 0.25 milliGRAM(s) Oral every 6 hours PRN Anxiety  oxyCODONE    IR 10 milliGRAM(s) Oral every 6 hours PRN Severe Pain (7 - 10)  traMADol 25 milliGRAM(s) Oral every 8 hours PRN Moderate Pain (4 - 6)                            9.0    7.49  )-----------( 322      ( 28 Aug 2023 14:15 )             28.0     08-28    136  |  93<L>  |  146<H>  ----------------------------<  88  5.6<H>   |  24  |  12.77<H>    Ca    9.7      28 Aug 2023 14:15    TPro  7.1  /  Alb  2.5<L>  /  TBili  0.4  /  DBili  x   /  AST  18  /  ALT  8<L>  /  AlkPhos  763<H>  08-28    Urinalysis Basic - ( 28 Aug 2023 14:15 )    Color: x / Appearance: x / SG: x / pH: x  Gluc: 88 mg/dL / Ketone: x  / Bili: x / Urobili: x   Blood: x / Protein: x / Nitrite: x   Leuk Esterase: x / RBC: x / WBC x   Sq Epi: x / Non Sq Epi: x / Bacteria: x        CAPILLARY BLOOD GLUCOSE          Vital Signs Last 24 Hrs  T(C): 36.8 (28 Aug 2023 17:00), Max: 37.2 (27 Aug 2023 22:13)  T(F): 98.2 (28 Aug 2023 17:00), Max: 98.9 (27 Aug 2023 22:13)  HR: 85 (28 Aug 2023 17:00) (53 - 87)  BP: 124/66 (28 Aug 2023 17:00) (124/66 - 157/84)  BP(mean): --  RR: 16 (28 Aug 2023 17:00) (14 - 16)  SpO2: 97% (28 Aug 2023 17:00) (96% - 97%)    Parameters below as of 28 Aug 2023 17:00  Patient On (Oxygen Delivery Method): room air      Gen - NAD, Comfortable  HEENT - NCAT, EOMI, MMM  Neck - Supple, No limited ROM  Pulm - CTAB, No wheeze, No rhonchi, No crackles  Cardiovascular - RRR, S1S2, No murmurs  Chest - good chest expansion, good respiratory effort  Abdomen - Soft, NT/ND, +BS  Extremities - No Cyanosis, no clubbing, no edema, no calf tenderness, Right AV fistula, old fistula to left upper arm  Neuro-     Cognitive - awake, alert, oriented to person, place, date, year.  Able  to follow command     Cranial Nerves - CN 2-12 intact     Motor -                     LEFT    UE - 4-/5                    RIGHT UE - 4/5                    LEFT    LE - HF- not tested, KE 3/5, DF 4/5, PF 4/5                    RIGHT LE - 4-/5        Sensory - Intact  to LT      Reflexes - DTR Intact, No primitive reflexive     Coordination - FTN intact   MSK: muscle weakness  Psychiatric - Mood stable, Affect WNL  Skin:  stage 2 pressure ulcer to coccyx    IDT 8/28  lives with aunt and cousin- cousin works  3STE one rail 15 Steps one rail upstairs ? first floor setup    OT   max A Transfers max A ADL    PT   Max A 1-2 standing in sarasteady    tentative Dc 9/12 Home Vs [facility]

## 2023-08-28 NOTE — PROGRESS NOTE ADULT - ASSESSMENT
36 y/o male w/ a PMHx of ESRD (M/W/F via LUE AVF), SOLITARIO, HTN, secondary hyperparathyroidism s/p parathyroidectomy (Dec 2022), obesity, stroke at age 10 w/ no residual deficits, and recent left radial fracture sustained after trying to lower himself into a chair s/p splint who presented on 8/14 after a fall where he sustained a left femoral neck fracture, s/p left hemiarthroplasty (8/15). Post op, he was lethargic and hypoxemic requiring reintubation. Later became hypOtensive and was on pressor. Patient without cuff leak so he was given Decadron. He was successfully extubated on 8/17 despite no cuff leak and was quickly weaned off pressors afterwards. Had a fever to 101.2 (8/22), infectious workup unrevealing.    COMORBIDITES/ACTIVE MEDICAL ISSUES     #Femoral neck fracture  - S/p Left Hemiarthroplasty with Dr. Jason León (8/15)  - Weight bearing as tolerated  - Gait Instability, ADL impairments and Functional impairments: start Comprehensive Rehab Program of PT/OT     #Recent Left radial head Fracture  - NWB to LUE  - Left arm in sling as needed for comfort  will rexray Left Forearm secondary to pain- hx ulna shaft Fx as well    #ESRD - on HD (MWF)  #Anemia of chronic disease  - Right AVF   - Epo with HD  - Nephro-pat supplement    #Renal bone disease   - Home diet: phoslo with meals  - Ensure low phos diet  - Outpatient follow up with endo and bone scan to determin etiology  Contact endocrinology regarding lab work- ? parathyroidectomy 9/11    #HTN  - Metoprolol 25 BID    #Mood  - Xanax 0.25 PRN    #Pain control  - Tylenol 975 q6h, Tramadol PRN    #GI/Bowel Mgmt   - At risk for constipation due to neurologic diagnosis, immobility and/or medication use  - Senna,  Miralax QD    #Bladder management  -anuric    #DVT ppx  - Heparin TID  - SCD, TEDs     #Skin:  - stage 2 pressure ulcer to coccyx: cleanse with NS, pat dry with gauze and apply foam dressing daily  -At risk for pressure injury due to neurologic diagnosis and relative immobility  -Bilateral heels - soft heel protectors, apply liquid barrier film daily and monitor for tissue type changes  - Turn Q2 hr while in bed, air mattress  - Skin barrier cream as needed  - Nursing to monitor skin Q shift    #Diet   - Regular + Thins  [Renal Diet]    Precautions / PROPHYLAXIS:   - Falls  - ortho: Weight bearing status: LLE WBAT; LUE NWB  - Lungs: Aspiration, Incentive Spirometer   - Pressure injury/Skin: Turn Q2hrs while in bed, OOB to Chair, PT/OT

## 2023-08-29 PROCEDURE — 73090 X-RAY EXAM OF FOREARM: CPT | Mod: 26,LT

## 2023-08-29 PROCEDURE — 99232 SBSQ HOSP IP/OBS MODERATE 35: CPT

## 2023-08-29 PROCEDURE — 73080 X-RAY EXAM OF ELBOW: CPT | Mod: 26,LT

## 2023-08-29 PROCEDURE — 99232 SBSQ HOSP IP/OBS MODERATE 35: CPT | Mod: GC

## 2023-08-29 RX ADMIN — OXYCODONE HYDROCHLORIDE 10 MILLIGRAM(S): 5 TABLET ORAL at 22:50

## 2023-08-29 RX ADMIN — OXYCODONE HYDROCHLORIDE 10 MILLIGRAM(S): 5 TABLET ORAL at 21:51

## 2023-08-29 RX ADMIN — HEPARIN SODIUM 5000 UNIT(S): 5000 INJECTION INTRAVENOUS; SUBCUTANEOUS at 21:51

## 2023-08-29 RX ADMIN — Medication 25 MILLIGRAM(S): at 17:14

## 2023-08-29 RX ADMIN — Medication 25 MILLIGRAM(S): at 06:24

## 2023-08-29 RX ADMIN — Medication 975 MILLIGRAM(S): at 12:10

## 2023-08-29 RX ADMIN — Medication 1334 MILLIGRAM(S): at 12:10

## 2023-08-29 RX ADMIN — Medication 1 TABLET(S): at 12:10

## 2023-08-29 RX ADMIN — Medication 975 MILLIGRAM(S): at 13:10

## 2023-08-29 RX ADMIN — HEPARIN SODIUM 5000 UNIT(S): 5000 INJECTION INTRAVENOUS; SUBCUTANEOUS at 14:08

## 2023-08-29 RX ADMIN — OXYCODONE HYDROCHLORIDE 10 MILLIGRAM(S): 5 TABLET ORAL at 12:12

## 2023-08-29 RX ADMIN — Medication 1334 MILLIGRAM(S): at 17:15

## 2023-08-29 RX ADMIN — CHLORHEXIDINE GLUCONATE 1 APPLICATION(S): 213 SOLUTION TOPICAL at 14:04

## 2023-08-29 RX ADMIN — HEPARIN SODIUM 5000 UNIT(S): 5000 INJECTION INTRAVENOUS; SUBCUTANEOUS at 06:24

## 2023-08-29 RX ADMIN — Medication 1334 MILLIGRAM(S): at 08:16

## 2023-08-29 RX ADMIN — OXYCODONE HYDROCHLORIDE 10 MILLIGRAM(S): 5 TABLET ORAL at 13:12

## 2023-08-29 NOTE — PROGRESS NOTE ADULT - SUBJECTIVE AND OBJECTIVE BOX
Resting    Vital Signs Last 24 Hrs  T(C): 37.3 (08-29-23 @ 20:06), Max: 37.3 (08-29-23 @ 20:06)  T(F): 99.1 (08-29-23 @ 20:06), Max: 99.1 (08-29-23 @ 20:06)  HR: 76 (08-29-23 @ 20:06) (74 - 95)  BP: 138/68 (08-29-23 @ 20:06) (138/68 - 174/70)  RR: 16 (08-29-23 @ 20:06) (15 - 16)  SpO2: 98% (08-29-23 @ 20:06) (98% - 98%)    I&O's Detail    28 Aug 2023 07:01  -  29 Aug 2023 07:00  --------------------------------------------------------  IN:    Other (mL): 3800 mL  Total IN: 3800 mL    OUT:    Other (mL): 800 mL  Total OUT: 800 mL    Total NET: 3000 mL                        9.0    7.49  )-----------( 322      ( 28 Aug 2023 14:15 )             28.0     28 Aug 2023 14:15    136    |  93     |  146    ----------------------------<  88     5.6     |  24     |  12.77    Ca    9.7        28 Aug 2023 14:15    TPro  7.1    /  Alb  2.5    /  TBili  0.4    /  DBili  x      /  AST  18     /  ALT  8      /  AlkPhos  763    28 Aug 2023 14:15    LIVER FUNCTIONS - ( 28 Aug 2023 14:15 )  Alb: 2.5 g/dL / Pro: 7.1 g/dL / ALK PHOS: 763 U/L / ALT: 8 U/L / AST: 18 U/L / GGT: x           acetaminophen     Tablet .. 975 milliGRAM(s) Oral every 6 hours  ALPRAZolam 0.25 milliGRAM(s) Oral every 6 hours PRN  calcium acetate 1334 milliGRAM(s) Oral three times a day with meals  chlorhexidine 2% Cloths 1 Application(s) Topical daily  epoetin carito-epbx (RETACRIT) Injectable 95213 Unit(s) IV Push <User Schedule>  heparin   Injectable 5000 Unit(s) SubCutaneous every 8 hours  metoprolol tartrate 25 milliGRAM(s) Oral two times a day  Nephro-pat 1 Tablet(s) Oral daily  oxyCODONE    IR 10 milliGRAM(s) Oral every 6 hours PRN  polyethylene glycol 3350 17 Gram(s) Oral daily  senna 2 Tablet(s) Oral at bedtime  traMADol 25 milliGRAM(s) Oral every 8 hours PRN    A/P:    S/p fall, L femoral neck fracture, s/p L hemiarthroplasty on 8/15  Adm to [facility]  ESRD on HD MWF  HD tomorrow as ordered   TMP as able  Renal diet  Rehab  Bld work w/HD    [phone]

## 2023-08-29 NOTE — PROGRESS NOTE ADULT - ASSESSMENT
38 y/o male w/ a PMHx of ESRD (M/W/F via LUE AVF), SOLITARIO, HTN, secondary hyperparathyroidism s/p parathyroidectomy (Dec 2022), obesity, stroke at age 10 w/ no residual deficits, and recent left radial fracture sustained after trying to lower himself into a chair s/p splint who presented on 8/14 after a fall where he sustained a left femoral neck fracture, s/p left hemiarthroplasty (8/15). Post op, he was lethargic and hypoxemic requiring reintubation. Later became hypOtensive and was on pressor. Patient without cuff leak so he was given Decadron. He was successfully extubated on 8/17 despite no cuff leak and was quickly weaned off pressors afterwards. Had a fever to 101.2 (8/22), infectious workup unrevealing.    COMORBIDITES/ACTIVE MEDICAL ISSUES     #Femoral neck fracture  - S/p Left Hemiarthroplasty with Dr. Jason León (8/15)  - Weight bearing as tolerated  - Gait Instability, ADL impairments and Functional impairments: start Comprehensive Rehab Program of PT/OT     #Recent Left radial head Fracture  - NWB to LUE  - Left arm in sling as needed for comfort  will rexray Left Forearm secondary to pain- hx ulna shaft Fx as well- Pending    #ESRD - on HD (MWF)  #Anemia of chronic disease  - Right AVF   - Epo with HD  - Nephro-pat supplement    #Renal bone disease   - Home diet: phoslo with meals  - Ensure low phos diet  - Outpatient follow up with endo and bone scan to determine etiology   parathyroidectomy 9/11 cancelled- needs to wait till at least 6 weeks post op    #HTN  - Metoprolol 25 BID    #Mood  - Xanax 0.25 PRN    #Pain control  - Tylenol 975 q6h, Tramadol PRN    #GI/Bowel Mgmt   - At risk for constipation due to neurologic diagnosis, immobility and/or medication use  - Senna,  Miralax QD    #Bladder management  -anuric    #DVT ppx  - Heparin TID  - SCD, TEDs     #Skin:  - stage 2 pressure ulcer to coccyx: cleanse with NS, pat dry with gauze and apply foam dressing daily  -At risk for pressure injury due to neurologic diagnosis and relative immobility  -Bilateral heels - soft heel protectors, apply liquid barrier film daily and monitor for tissue type changes  - Turn Q2 hr while in bed, air mattress  - Skin barrier cream as needed  - Nursing to monitor skin Q shift    #Diet   - Regular + Thins  [Renal Diet]    Precautions / PROPHYLAXIS:   - Falls  - ortho: Weight bearing status: LLE WBAT; LUE NWB  - Lungs: Aspiration, Incentive Spirometer   - Pressure injury/Skin: Turn Q2hrs while in bed, OOB to Chair, PT/OT

## 2023-08-29 NOTE — PROGRESS NOTE ADULT - SUBJECTIVE AND OBJECTIVE BOX
Chief complaint- pain in left arm , Left Hip with difficulty ambulating    38 y/o male w/ a PMHx of ESRD (M/W/F via LUE AVF), SOLITARIO, HTN, secondary hyperparathyroidism s/p parathyroidectomy (Dec 2022), obesity, stroke at age 10 w/ no residual deficits, and recent left radial fracture sustained after trying to lower himself into a chair s/p splint who presented on 8/14 after a fall while in the shower. Patient sustained a left femoral neck fracture s/p left hemiarthroplasty on 8/15. Immediately post-op in PACU, patient was lethargic and hypoxemic requiring reintubation so he was admitted to SICU post-operatively. Patient was hypotensive after intubation requiring pressor support. Patient without cuff leak so he was given Decadron. He was successfully extubated on 8/17 despite no cuff leak and was quickly weaned off pressors afterwards. Had a fever to 101.2 (8/22), infectious workup unrevealing.    Patient was evaluated by PM&R and therapy for functional deficits and gait/ADL impairments and recommend acute rehabilitation.  Patient was medically optimized for discharge to [facility] on 8/24/2023    ROS/Subjective  Patient seen and evaluated in OT  reports persistent in left forearm as well as back and left hip- xray Not done yet  reportedly scheduled for parathyroid surgery 9/11- called surgery Dr Royal- case postponed till at least 6 weeks post Fx-  Attended HD yesterday  No void   Moving Bowels   no dizziness, no headaches  no nausea, no vomiting  no SOB, No chest pain              MEDICATIONS  (STANDING):  acetaminophen     Tablet .. 975 milliGRAM(s) Oral every 6 hours  calcium acetate 1334 milliGRAM(s) Oral three times a day with meals  chlorhexidine 2% Cloths 1 Application(s) Topical daily  epoetin carito-epbx (RETACRIT) Injectable 98166 Unit(s) IV Push <User Schedule>  heparin   Injectable 5000 Unit(s) SubCutaneous every 8 hours  metoprolol tartrate 25 milliGRAM(s) Oral two times a day  Nephro-pat 1 Tablet(s) Oral daily  polyethylene glycol 3350 17 Gram(s) Oral daily  senna 2 Tablet(s) Oral at bedtime    MEDICATIONS  (PRN):  ALPRAZolam 0.25 milliGRAM(s) Oral every 6 hours PRN Anxiety  oxyCODONE    IR 10 milliGRAM(s) Oral every 6 hours PRN Severe Pain (7 - 10)  traMADol 25 milliGRAM(s) Oral every 8 hours PRN Moderate Pain (4 - 6)                            9.0    7.49  )-----------( 322      ( 28 Aug 2023 14:15 )             28.0     08-28    136  |  93<L>  |  146<H>  ----------------------------<  88  5.6<H>   |  24  |  12.77<H>    Ca    9.7      28 Aug 2023 14:15    TPro  7.1  /  Alb  2.5<L>  /  TBili  0.4  /  DBili  x   /  AST  18  /  ALT  8<L>  /  AlkPhos  763<H>  08-28    Urinalysis Basic - ( 28 Aug 2023 14:15 )    Color: x / Appearance: x / SG: x / pH: x  Gluc: 88 mg/dL / Ketone: x  / Bili: x / Urobili: x   Blood: x / Protein: x / Nitrite: x   Leuk Esterase: x / RBC: x / WBC x   Sq Epi: x / Non Sq Epi: x / Bacteria: x        CAPILLARY BLOOD GLUCOSE          Vital Signs Last 24 Hrs  T(C): 37.2 (29 Aug 2023 08:29), Max: 37.2 (29 Aug 2023 08:29)  T(F): 99 (29 Aug 2023 08:29), Max: 99 (29 Aug 2023 08:29)  HR: 74 (29 Aug 2023 08:29) (71 - 95)  BP: 152/72 (29 Aug 2023 08:29) (124/66 - 174/70)  BP(mean): --  RR: 15 (29 Aug 2023 08:29) (14 - 16)  SpO2: 98% (29 Aug 2023 08:29) (97% - 98%)    Parameters below as of 29 Aug 2023 08:29  Patient On (Oxygen Delivery Method): room air          Gen - NAD, Comfortable  HEENT - NCAT, EOMI, MMM  Neck - Supple, No limited ROM  Pulm - CTAB, No wheeze, No rhonchi, No crackles  Cardiovascular - RRR, S1S2, No murmurs  Chest - good chest expansion, good respiratory effort  Abdomen - Soft, NT/ND, +BS  Extremities - No Cyanosis, no clubbing, no edema, no calf tenderness, Right AV fistula, old fistula to left upper arm  Neuro-     Cognitive - awake, alert, oriented to person, place, date, year.  Able  to follow command     Cranial Nerves - CN 2-12 intact     Motor -                     LEFT    UE - 4-/5                    RIGHT UE - 4/5                    LEFT    LE - HF- not tested, KE 3/5, DF 4/5, PF 4/5                    RIGHT LE - 4-/5        Sensory - Intact  to LT      Reflexes - DTR Intact, No primitive reflexive     Coordination - FTN intact   MSK: muscle weakness  Psychiatric - Mood stable, Affect WNL  Skin:  stage 2 pressure ulcer to coccyx    IDT 8/28  lives with aunt and cousin- cousin works  3STE one rail 15 Steps one rail upstairs ? first floor setup    OT   max A Transfers max A ADL    PT   Max A 1-2 standing in sarasteady    tentative Dc 9/12 Home Vs [facility]

## 2023-08-29 NOTE — PROGRESS NOTE ADULT - SUBJECTIVE AND OBJECTIVE BOX
Patient is a 37y old  Male who presents with a chief complaint of Left femoral neck fracture (28 Aug 2023 22:10)      INTERVAL HPI:  OVERNIGHT EVENTS:  T(F): 99 (08-29-23 @ 08:29), Max: 99 (08-29-23 @ 08:29)  HR: 74 (08-29-23 @ 08:29) (71 - 95)  BP: 152/72 (08-29-23 @ 08:29) (124/66 - 174/70)  RR: 15 (08-29-23 @ 08:29) (14 - 16)  SpO2: 98% (08-29-23 @ 08:29) (97% - 98%)  I&O's Summary    28 Aug 2023 07:01  -  29 Aug 2023 07:00  --------------------------------------------------------  IN: 3800 mL / OUT: 800 mL / NET: 3000 mL          PHYSICAL EXAM:  GENERAL: NAD, well-groomed, well-developed  HEAD:  Atraumatic, Normocephalic  EYES: EOMI, PERRLA, conjunctiva and sclera clear  ENMT: No tonsillar erythema, exudates, or enlargement; Moist mucous membranes, Good dentition, No lesions  NECK: Supple, No JVD, Normal thyroid  NERVOUS SYSTEM:  Alert & Oriented X3, Good concentration; Motor Strength 5/5 B/L upper and lower extremities; DTRs 2+ intact and symmetric  CHEST/LUNG: Clear to percussion bilaterally; No rales, rhonchi, wheezing, or rubs  HEART: Regular rate and rhythm; No murmurs, rubs, or gallops  ABDOMEN: Soft, Nontender, Nondistended; Bowel sounds present  EXTREMITIES:  2+ Peripheral Pulses, No clubbing, cyanosis, or edema  LYMPH: No lymphadenopathy noted  SKIN: No rashes or lesions    LABS:                        9.0    7.49  )-----------( 322      ( 28 Aug 2023 14:15 )             28.0     08-28    136  |  93<L>  |  146<H>  ----------------------------<  88  5.6<H>   |  24  |  12.77<H>    Ca    9.7      28 Aug 2023 14:15    TPro  7.1  /  Alb  2.5<L>  /  TBili  0.4  /  DBili  x   /  AST  18  /  ALT  8<L>  /  AlkPhos  763<H>  08-28      Urinalysis Basic - ( 28 Aug 2023 14:15 )    Color: x / Appearance: x / SG: x / pH: x  Gluc: 88 mg/dL / Ketone: x  / Bili: x / Urobili: x   Blood: x / Protein: x / Nitrite: x   Leuk Esterase: x / RBC: x / WBC x   Sq Epi: x / Non Sq Epi: x / Bacteria: x      CAPILLARY BLOOD GLUCOSE                  MEDICATIONS  (STANDING):  acetaminophen     Tablet .. 975 milliGRAM(s) Oral every 6 hours  calcium acetate 1334 milliGRAM(s) Oral three times a day with meals  chlorhexidine 2% Cloths 1 Application(s) Topical daily  epoetin carito-epbx (RETACRIT) Injectable 45920 Unit(s) IV Push <User Schedule>  heparin   Injectable 5000 Unit(s) SubCutaneous every 8 hours  metoprolol tartrate 25 milliGRAM(s) Oral two times a day  Nephro-pat 1 Tablet(s) Oral daily  polyethylene glycol 3350 17 Gram(s) Oral daily  senna 2 Tablet(s) Oral at bedtime    MEDICATIONS  (PRN):  ALPRAZolam 0.25 milliGRAM(s) Oral every 6 hours PRN Anxiety  oxyCODONE    IR 10 milliGRAM(s) Oral every 6 hours PRN Severe Pain (7 - 10)  traMADol 25 milliGRAM(s) Oral every 8 hours PRN Moderate Pain (4 - 6)       Patient is a 37y old  Male who presents with a chief complaint of Left femoral neck fracture (28 Aug 2023 22:10)      INTERVAL HPI: Pt seen and examined, States he feels ok, finally  had a bm but still has some difficulty.     OVERNIGHT EVENTS: none noted  T(F): 99 (08-29-23 @ 08:29), Max: 99 (08-29-23 @ 08:29)  HR: 74 (08-29-23 @ 08:29) (71 - 95)  BP: 152/72 (08-29-23 @ 08:29) (124/66 - 174/70)  RR: 15 (08-29-23 @ 08:29) (14 - 16)  SpO2: 98% (08-29-23 @ 08:29) (97% - 98%)  I&O's Summary    28 Aug 2023 07:01  -  29 Aug 2023 07:00  --------------------------------------------------------  IN: 3800 mL / OUT: 800 mL / NET: 3000 mL          PHYSICAL EXAM:  General: NAD, A/O x 3, severe protein calorie malnutrition  ENT: MMM  Neck: Supple, No JVD  Lungs: Clear to auscultation bilaterally  Cardio: RRR, S1/S2, No murmurs  Abdomen: Soft, Nontender, Nondistended; Bowel sounds present  Extremities: No calf tenderness, trace bilateral pitting edema    LABS:                        9.0    7.49  )-----------( 322      ( 28 Aug 2023 14:15 )             28.0     08-28    136  |  93<L>  |  146<H>  ----------------------------<  88  5.6<H>   |  24  |  12.77<H>    Ca    9.7      28 Aug 2023 14:15    TPro  7.1  /  Alb  2.5<L>  /  TBili  0.4  /  DBili  x   /  AST  18  /  ALT  8<L>  /  AlkPhos  763<H>  08-28      Urinalysis Basic - ( 28 Aug 2023 14:15 )    Color: x / Appearance: x / SG: x / pH: x  Gluc: 88 mg/dL / Ketone: x  / Bili: x / Urobili: x   Blood: x / Protein: x / Nitrite: x   Leuk Esterase: x / RBC: x / WBC x   Sq Epi: x / Non Sq Epi: x / Bacteria: x      CAPILLARY BLOOD GLUCOSE                  MEDICATIONS  (STANDING):  acetaminophen     Tablet .. 975 milliGRAM(s) Oral every 6 hours  calcium acetate 1334 milliGRAM(s) Oral three times a day with meals  chlorhexidine 2% Cloths 1 Application(s) Topical daily  epoetin carito-epbx (RETACRIT) Injectable 02197 Unit(s) IV Push <User Schedule>  heparin   Injectable 5000 Unit(s) SubCutaneous every 8 hours  metoprolol tartrate 25 milliGRAM(s) Oral two times a day  Nephro-pat 1 Tablet(s) Oral daily  polyethylene glycol 3350 17 Gram(s) Oral daily  senna 2 Tablet(s) Oral at bedtime    MEDICATIONS  (PRN):  ALPRAZolam 0.25 milliGRAM(s) Oral every 6 hours PRN Anxiety  oxyCODONE    IR 10 milliGRAM(s) Oral every 6 hours PRN Severe Pain (7 - 10)  traMADol 25 milliGRAM(s) Oral every 8 hours PRN Moderate Pain (4 - 6)

## 2023-08-29 NOTE — PROGRESS NOTE ADULT - ASSESSMENT
38 y/o male w/ a PMHx of ESRD (M/W/F via LUE AVF), SOLITARIO, HTN, secondary hyperparathyroidism s/p parathyroidectomy (Dec 2022), obesity, stroke at age 10 w/ no residual deficits, and recent left radial fracture sustained after trying to lower himself into a chair s/p splint who presented on 8/14 after a fall where he sustained a left femoral neck fracture, s/p left hemiarthroplasty (8/15). Post op, he was lethargic and hypoxemic requiring reintubation. Later became hypOtensive and was on pressor. Patient without cuff leak so he was given Decadron. He was successfully extubated on 8/17 despite no cuff leak and was quickly weaned off pressors afterwards. Had a fever to 101.2 (8/22), infectious workup unrevealing.      Left Femoral neck fracture  - S/p Left Hemiarthroplasty with Dr. Jason León (8/15)  - Weight bearing as tolerated  - pain management as per rehab team    #Recent Left radial head Fracture  - NWB to LUE  - Left arm in sling as needed for comfort    #Recent febrile illness at prior hospital  - work up unrevealing   - fever free  - cont to monitor fever curve    #ESRD - on HD (MWF)  #Anemia of chronic disease  - routine HD  - Epo with HD    #HTN  - Metoprolol 25 BID    #Mood  - Xanax 0.25 PRN    #DVT ppx  - Heparin TID  - SCD, TEDs

## 2023-08-30 ENCOUNTER — APPOINTMENT (OUTPATIENT)
Age: 38
End: 2023-08-30

## 2023-08-30 LAB
ALBUMIN SERPL ELPH-MCNC: 2.4 G/DL — LOW (ref 3.3–5)
ALP SERPL-CCNC: 764 U/L — HIGH (ref 40–120)
ALT FLD-CCNC: 6 U/L — LOW (ref 10–45)
ANION GAP SERPL CALC-SCNC: 15 MMOL/L — SIGNIFICANT CHANGE UP (ref 5–17)
AST SERPL-CCNC: 15 U/L — SIGNIFICANT CHANGE UP (ref 10–40)
BILIRUB SERPL-MCNC: 0.3 MG/DL — SIGNIFICANT CHANGE UP (ref 0.2–1.2)
BUN SERPL-MCNC: 137 MG/DL — HIGH (ref 7–23)
CALCIUM SERPL-MCNC: 9.7 MG/DL — SIGNIFICANT CHANGE UP (ref 8.4–10.5)
CHLORIDE SERPL-SCNC: 95 MMOL/L — LOW (ref 96–108)
CO2 SERPL-SCNC: 25 MMOL/L — SIGNIFICANT CHANGE UP (ref 22–31)
CREAT SERPL-MCNC: 12.29 MG/DL — HIGH (ref 0.5–1.3)
EGFR: 5 ML/MIN/1.73M2 — LOW
GLUCOSE SERPL-MCNC: 118 MG/DL — HIGH (ref 70–99)
HCT VFR BLD CALC: 27.8 % — LOW (ref 39–50)
HGB BLD-MCNC: 9.1 G/DL — LOW (ref 13–17)
MCHC RBC-ENTMCNC: 30.2 PG — SIGNIFICANT CHANGE UP (ref 27–34)
MCHC RBC-ENTMCNC: 32.7 GM/DL — SIGNIFICANT CHANGE UP (ref 32–36)
MCV RBC AUTO: 92.4 FL — SIGNIFICANT CHANGE UP (ref 80–100)
NRBC # BLD: 0 /100 WBCS — SIGNIFICANT CHANGE UP (ref 0–0)
PLATELET # BLD AUTO: 305 K/UL — SIGNIFICANT CHANGE UP (ref 150–400)
POTASSIUM SERPL-MCNC: 5.6 MMOL/L — HIGH (ref 3.5–5.3)
POTASSIUM SERPL-SCNC: 5.6 MMOL/L — HIGH (ref 3.5–5.3)
PROT SERPL-MCNC: 6.6 G/DL — SIGNIFICANT CHANGE UP (ref 6–8.3)
RBC # BLD: 3.01 M/UL — LOW (ref 4.2–5.8)
RBC # FLD: 16.5 % — HIGH (ref 10.3–14.5)
SODIUM SERPL-SCNC: 135 MMOL/L — SIGNIFICANT CHANGE UP (ref 135–145)
WBC # BLD: 6.59 K/UL — SIGNIFICANT CHANGE UP (ref 3.8–10.5)
WBC # FLD AUTO: 6.59 K/UL — SIGNIFICANT CHANGE UP (ref 3.8–10.5)

## 2023-08-30 PROCEDURE — 99232 SBSQ HOSP IP/OBS MODERATE 35: CPT | Mod: GC

## 2023-08-30 PROCEDURE — 99232 SBSQ HOSP IP/OBS MODERATE 35: CPT

## 2023-08-30 RX ADMIN — Medication 1334 MILLIGRAM(S): at 12:29

## 2023-08-30 RX ADMIN — POLYETHYLENE GLYCOL 3350 17 GRAM(S): 17 POWDER, FOR SOLUTION ORAL at 12:30

## 2023-08-30 RX ADMIN — ERYTHROPOIETIN 10000 UNIT(S): 10000 INJECTION, SOLUTION INTRAVENOUS; SUBCUTANEOUS at 16:20

## 2023-08-30 RX ADMIN — Medication 25 MILLIGRAM(S): at 17:56

## 2023-08-30 RX ADMIN — HEPARIN SODIUM 5000 UNIT(S): 5000 INJECTION INTRAVENOUS; SUBCUTANEOUS at 06:52

## 2023-08-30 RX ADMIN — Medication 975 MILLIGRAM(S): at 12:40

## 2023-08-30 RX ADMIN — CHLORHEXIDINE GLUCONATE 1 APPLICATION(S): 213 SOLUTION TOPICAL at 12:33

## 2023-08-30 RX ADMIN — Medication 1334 MILLIGRAM(S): at 17:55

## 2023-08-30 RX ADMIN — OXYCODONE HYDROCHLORIDE 10 MILLIGRAM(S): 5 TABLET ORAL at 11:10

## 2023-08-30 RX ADMIN — OXYCODONE HYDROCHLORIDE 10 MILLIGRAM(S): 5 TABLET ORAL at 10:37

## 2023-08-30 RX ADMIN — Medication 975 MILLIGRAM(S): at 12:29

## 2023-08-30 RX ADMIN — HEPARIN SODIUM 5000 UNIT(S): 5000 INJECTION INTRAVENOUS; SUBCUTANEOUS at 13:35

## 2023-08-30 RX ADMIN — HEPARIN SODIUM 5000 UNIT(S): 5000 INJECTION INTRAVENOUS; SUBCUTANEOUS at 21:24

## 2023-08-30 RX ADMIN — Medication 25 MILLIGRAM(S): at 06:52

## 2023-08-30 RX ADMIN — Medication 1 TABLET(S): at 12:29

## 2023-08-30 RX ADMIN — Medication 1334 MILLIGRAM(S): at 08:22

## 2023-08-30 NOTE — PROGRESS NOTE ADULT - SUBJECTIVE AND OBJECTIVE BOX
Resting    Vital Signs Last 24 Hrs  T(C): 37.2 (08-30-23 @ 19:49), Max: 37.3 (08-30-23 @ 08:23)  T(F): 98.9 (08-30-23 @ 19:49), Max: 99.1 (08-30-23 @ 08:23)  HR: 63 (08-30-23 @ 19:49) (63 - 79)  BP: 138/77 (08-30-23 @ 19:49) (119/50 - 175/98)  RR: 16 (08-30-23 @ 19:49) (16 - 16)  SpO2: 97% (08-30-23 @ 19:49) (97% - 99%)    I&O's Detail    30 Aug 2023 07:01  -  30 Aug 2023 20:31  --------------------------------------------------------  IN:    Other (mL): 800 mL  Total IN: 800 mL    OUT:    Other (mL): 2800 mL  Total OUT: 2800 mL    s1s2  b/l air entry  soft, ND  tr edema                               9.1    6.59  )-----------( 305      ( 30 Aug 2023 14:00 )             27.8     30 Aug 2023 14:00    135    |  95     |  137    ----------------------------<  118    5.6     |  25     |  12.29    Ca    9.7        30 Aug 2023 14:00    TPro  6.6    /  Alb  2.4    /  TBili  0.3    /  DBili  x      /  AST  15     /  ALT  6      /  AlkPhos  764    30 Aug 2023 14:00    LIVER FUNCTIONS - ( 30 Aug 2023 14:00 )  Alb: 2.4 g/dL / Pro: 6.6 g/dL / ALK PHOS: 764 U/L / ALT: 6 U/L / AST: 15 U/L / GGT: x           acetaminophen     Tablet .. 975 milliGRAM(s) Oral every 6 hours  ALPRAZolam 0.25 milliGRAM(s) Oral every 6 hours PRN  calcium acetate 1334 milliGRAM(s) Oral three times a day with meals  chlorhexidine 2% Cloths 1 Application(s) Topical daily  epoetin carito-epbx (RETACRIT) Injectable 61592 Unit(s) IV Push <User Schedule>  heparin   Injectable 5000 Unit(s) SubCutaneous every 8 hours  metoprolol tartrate 25 milliGRAM(s) Oral two times a day  Nephro-pat 1 Tablet(s) Oral daily  oxyCODONE    IR 10 milliGRAM(s) Oral every 6 hours PRN  polyethylene glycol 3350 17 Gram(s) Oral daily  senna 2 Tablet(s) Oral at bedtime  traMADol 25 milliGRAM(s) Oral every 8 hours PRN    A/P:    S/p fall, L femoral neck fracture, s/p L hemiarthroplasty on 8/15  Adm to [facility]  ESRD on HD MWF  HD today as ordered   TMP 2kg w/HD  Renal diet  Rehab  Bld work w/HD    [phone]

## 2023-08-30 NOTE — PROGRESS NOTE ADULT - ASSESSMENT
36 y/o male w/ a PMHx of ESRD (M/W/F via LUE AVF), SOLITARIO, HTN, secondary hyperparathyroidism s/p parathyroidectomy (Dec 2022), obesity, stroke at age 10 w/ no residual deficits, and recent left radial fracture sustained after trying to lower himself into a chair s/p splint who presented on 8/14 after a fall where he sustained a left femoral neck fracture, s/p left hemiarthroplasty (8/15). Post op, he was lethargic and hypoxemic requiring reintubation. Later became hypOtensive and was on pressor. Patient without cuff leak so he was given Decadron. He was successfully extubated on 8/17 despite no cuff leak and was quickly weaned off pressors afterwards. Had a fever to 101.2 (8/22), infectious workup unrevealing.      Left Femoral neck fracture  - S/p Left Hemiarthroplasty with Dr. Jason León (8/15)    #Recent Left radial head Fracture  - NWB to LUE  - Left arm in sling as needed for comfort    #Recent febrile illness at prior hospital  - work up unrevealing     #ESRD - on HD (MWF)  #Anemia of chronic disease  - routine HD  - Epo with HD    #HTN  - Metoprolol 25 BID    #Mood  - Xanax 0.25 PRN    #DVT ppx: Heparin TID

## 2023-08-30 NOTE — PROGRESS NOTE ADULT - SUBJECTIVE AND OBJECTIVE BOX
Chief complaint- pain in left arm , Left Hip with difficulty ambulating    36 y/o male w/ a PMHx of ESRD (M/W/F via LUE AVF), SOLITARIO, HTN, secondary hyperparathyroidism s/p parathyroidectomy (Dec 2022), obesity, stroke at age 10 w/ no residual deficits, and recent left radial fracture sustained after trying to lower himself into a chair s/p splint who presented on 8/14 after a fall while in the shower. Patient sustained a left femoral neck fracture s/p left hemiarthroplasty on 8/15. Immediately post-op in PACU, patient was lethargic and hypoxemic requiring reintubation so he was admitted to SICU post-operatively. Patient was hypotensive after intubation requiring pressor support. Patient without cuff leak so he was given Decadron. He was successfully extubated on 8/17 despite no cuff leak and was quickly weaned off pressors afterwards. Had a fever to 101.2 (8/22), infectious workup unrevealing.    Patient was evaluated by PM&R and therapy for functional deficits and gait/ADL impairments and recommend acute rehabilitation.  Patient was medically optimized for discharge to [facility] on 8/24/2023    ROS/Subjective  Patient seen and evaluated in OT  reports persistent in left forearm as well as back and left hip- x-rays reviewed-+ midshaft ulnar Fx- Ortho to see  reportedly scheduled for parathyroid surgery 9/11- called surgery Dr Royal- case postponed till at least 6 weeks post Fx-  HD today  No void   Moved Bowels 8/28  no dizziness, no headaches  no nausea, no vomiting  no SOB, No chest pain      MEDICATIONS  (STANDING):  acetaminophen     Tablet .. 975 milliGRAM(s) Oral every 6 hours  calcium acetate 1334 milliGRAM(s) Oral three times a day with meals  chlorhexidine 2% Cloths 1 Application(s) Topical daily  epoetin carito-epbx (RETACRIT) Injectable 31208 Unit(s) IV Push <User Schedule>  heparin   Injectable 5000 Unit(s) SubCutaneous every 8 hours  metoprolol tartrate 25 milliGRAM(s) Oral two times a day  Nephro-pat 1 Tablet(s) Oral daily  polyethylene glycol 3350 17 Gram(s) Oral daily  senna 2 Tablet(s) Oral at bedtime    MEDICATIONS  (PRN):  ALPRAZolam 0.25 milliGRAM(s) Oral every 6 hours PRN Anxiety  oxyCODONE    IR 10 milliGRAM(s) Oral every 6 hours PRN Severe Pain (7 - 10)  traMADol 25 milliGRAM(s) Oral every 8 hours PRN Moderate Pain (4 - 6)                            9.1    6.59  )-----------( 305      ( 30 Aug 2023 14:00 )             27.8     08-30    135  |  95<L>  |  137<H>  ----------------------------<  118<H>  5.6<H>   |  25  |  12.29<H>    Ca    9.7      30 Aug 2023 14:00    TPro  6.6  /  Alb  2.4<L>  /  TBili  0.3  /  DBili  x   /  AST  15  /  ALT  6<L>  /  AlkPhos  764<H>  08-30    Urinalysis Basic - ( 30 Aug 2023 14:00 )    Color: x / Appearance: x / SG: x / pH: x  Gluc: 118 mg/dL / Ketone: x  / Bili: x / Urobili: x   Blood: x / Protein: x / Nitrite: x   Leuk Esterase: x / RBC: x / WBC x   Sq Epi: x / Non Sq Epi: x / Bacteria: x        CAPILLARY BLOOD GLUCOSE        Vital Signs Last 24 Hrs  T(C): 37.3 (30 Aug 2023 08:23), Max: 37.3 (29 Aug 2023 20:06)  T(F): 99.1 (30 Aug 2023 08:23), Max: 99.1 (29 Aug 2023 20:06)  HR: 68 (30 Aug 2023 10:41) (68 - 78)  BP: 154/75 (30 Aug 2023 10:41) (138/68 - 175/98)  BP(mean): --  RR: 16 (30 Aug 2023 08:23) (16 - 16)  SpO2: 99% (30 Aug 2023 08:23) (98% - 99%)    Parameters below as of 30 Aug 2023 08:23  Patient On (Oxygen Delivery Method): room air      Gen - NAD, Comfortable  HEENT - NCAT, EOMI, MMM  Neck - Supple, No limited ROM  Pulm - CTAB, No wheeze, No rhonchi, No crackles  Cardiovascular - RRR, S1S2, No murmurs  Chest - good chest expansion, good respiratory effort  Abdomen - Soft, NT/ND, +BS  Extremities - No Cyanosis, no clubbing, no edema, no calf tenderness, Right AV fistula, old fistula to left upper arm  Neuro-     Cognitive - awake, alert, oriented to person, place, date, year.  Able  to follow command     Cranial Nerves - CN 2-12 intact     Motor -                     LEFT    UE - 4-/5                    RIGHT UE - 4/5                    LEFT    LE - HF- not tested, KE 3/5, DF 4/5, PF 4/5                    RIGHT LE - 4-/5        Sensory - Intact  to LT      Reflexes - DTR Intact, No primitive reflexive     Coordination - FTN intact   MSK: muscle weakness  Psychiatric - Mood stable, Affect WNL  Skin:  stage 2 pressure ulcer to coccyx    IDT 8/28  lives with aunt and cousin- cousin works  3STE one rail 15 Steps one rail upstairs ? first floor setup    OT   max A Transfers max A ADL    PT   Max A 1-2 standing in sarasteady    tentative Dc 9/12 Home Vs [facility]

## 2023-08-30 NOTE — CONSULT NOTE ADULT - SUBJECTIVE AND OBJECTIVE BOX
Reason for Admission: Left femoral neck fracture s/p Left hemiarthroplasty and transferred to rehab.    History of Present Illness:   38 y/o male w/ a PMHx of ESRD (M/W/F via LUE AVF which clotted not Right AV fistula being used,  SOLITARIO, HTN, secondary hyperparathyroidism s/p parathyroidectomy (Dec 2022), obesity, stroke at age 10 w/ no residual deficits, and recent left radial fracture sustained after trying to lower himself into a chair s/p splint who presented on 8/14 after a fall while in the shower. Patient sustained a left femoral neck fracture s/p left hemiarthroplasty on 8/15. Immediately post-op in PACU, patient was lethargic and hypoxemic requiring reintubation so he was admitted to SICU post-operatively. Patient was hypotensive after intubation requiring pressor support. Patient without cuff leak so he was given Decadron. He was successfully extubated on 8/17 despite no cuff leak and was quickly weaned off pressors afterwards. Had a fever to 101.2 (8/22), infectious workup unrevealing .,     Patient evaluated by PM&R and accepted to acute rehab.      Ortho was called to consult when patient again c/o Left forearm pain.   Patient was seen and examined by me this afternoon.   He is presently in dialysis being dialyzed via right arm fistula .   Patient is resting comfortably.   He is c/o pain in left arm.   He has not been WB left arm.   He is flexing and extending but does have pain with motion .    He was placed in a splint when fracture first seen.   When he was in OR for his left hemiarthroplasty, splint was removed and never replaced.      He was transferred from [facility] with no immobilization   Patient discussed with Dr Kwan Laguna and films reviewed .       ALLERGIES:  No Known Drug Allergies  shellfish (Hives)    PAST MEDICAL & SURGICAL HISTORY:  HTN (hypertension)  Stroke  age 10  Morbid obesity  Sleep apnea  Leg fracture, right  Chronic renal failure  Hemodialysis access, AV graft  ESRD (end stage renal disease) on dialysis  since 2013, M-W-F  Anemia, unspecified type  Hypothyroidism  AV fistula  R arm; L arm clotted    MEDICATIONS  (STANDING):  acetaminophen     Tablet .. 975 milliGRAM(s) Oral every 6 hours  calcium acetate 1334 milliGRAM(s) Oral three times a day with meals  chlorhexidine 2% Cloths 1 Application(s) Topical daily  epoetin carito-epbx (RETACRIT) Injectable 90042 Unit(s) IV Push <User Schedule>  heparin   Injectable 5000 Unit(s) SubCutaneous every 8 hours  metoprolol tartrate 25 milliGRAM(s) Oral two times a day  Nephro-pat 1 Tablet(s) Oral daily  polyethylene glycol 3350 17 Gram(s) Oral daily  senna 2 Tablet(s) Oral at bedtime    MEDICATIONS  (PRN):  ALPRAZolam 0.25 milliGRAM(s) Oral every 6 hours PRN Anxiety  oxyCODONE    IR 10 milliGRAM(s) Oral every 6 hours PRN Severe Pain (7 - 10)  traMADol 25 milliGRAM(s) Oral every 8 hours PRN Moderate Pain (4 - 6)    Vital Signs Last 24 Hrs  T(F): 99.1 (30 Aug 2023 08:23), Max: 99.1 (29 Aug 2023 20:06)  HR: 68 (30 Aug 2023 10:41) (68 - 78)  BP: 154/75 (30 Aug 2023 10:41) (138/68 - 175/98)  RR: 16 (30 Aug 2023 08:23) (16 - 16)  SpO2: 99% (30 Aug 2023 08:23) (98% - 99%)  I&O's Summary    PHYSICAL EXAM:  General: NAD, A/O x 3  ENT: MMM  Neck: Supple, No JVD  Lungs: Clear to auscultation bilaterally  Cardio:  S1/S2, No murmurs  Abdomen: Soft, Nontender, Nondistended; Bowel sounds present  Upper Extremities:   Patient presently receiving dialysis right upper extremity ., Left arm without any splint,  Arm without edema, left clotted AV fistula seen, pain with palpation and EOM, non WB left arm. , good radial pulse , fingers warm with good rom.  Extremities: No calf tenderness, No pitting edema    Left leg ,- foot warm, good CR , +EHL/FHL  , no calf pain     LABS:                        9.1    6.59  )-----------( 305      ( 30 Aug 2023 14:00 )             27.8     08-30    135  |  95  |  137  ----------------------------<  118  5.6   |  25  |  12.29    Ca    9.7      30 Aug 2023 14:00    TPro  6.6  /  Alb  2.4  /  TBili  0.3  /  DBili  x   /  AST  15  /  ALT  6   /  AlkPhos  764  08-30      08-15 Chol 190 mg/dL LDL -- HDL 57 mg/dL Trig 114 mg/dL        Urinalysis Basic - ( 30 Aug 2023 14:00 )    Color: x / Appearance: x / SG: x / pH: x  Gluc: 118 mg/dL / Ketone: x  / Bili: x / Urobili: x   Blood: x / Protein: x / Nitrite: x   Leuk Esterase: x / RBC: x / WBC x   Sq Epi: x / Non Sq Epi: x / Bacteria: x        COVID-19 PCR: NotDetec (08-25-23 @ 08:15)      RADIOLOGY & ADDITIONAL TESTS:    Care Discussed with Consultants/Other Providers:     < from: Xray Forearm, Left (08.29.23 @ 16:13) >    ACC: [id number] EXAM:  XR FOREARM 2 VIEWS LT   ORDERED BY: ESA MEDINA     PROCEDURE DATE:  08/29/2023          INTERPRETATION:  Radiographs of the LEFT radius and ulna    CLINICAL INFORMATION:  LEFT forearm  Pain.    TECHNIQUE:  Frontal and lateral views of the radius and ulna.    FINDINGS:  7/27/2023 LEFT forearm radiographs available for review.  Nonosseous union of distal ulnar diaphyseal transverse fracture .  Diffuse bone demineralization.  Large soft tissue calcifications in the anteriorlower upper arm and   adjacent to the radial styloid process.    IMPRESSION:   Nonosseous union of ulnar fracture of.  Large soft tissue calcifications.  Bone demineralization.      --- End of Report ---            LESLIE LARIOS MD; Attending Radiologist  This document has been electronically sign    < end of copied text >        < from: Xray Elbow AP + Lateral + Oblique, Left (08.29.23 @ 16:13) >    ACC: [id number] EXAM:  XR ELBOW COMP MIN 3V LT   ORDERED BY: ESA MEDINA     PROCEDURE DATE:  08/29/2023          INTERPRETATION:  Radiographs of the LEFT elbow    CLINICAL INFORMATION: LEFT elbow pain. History of renal osteodystrophy..    TECHNIQUE:  Frontal, oblique and lateral views of the elbow.    FINDINGS:   No prior examinations are available for review.  Impacted radial neck fracture. No other fracture.    LEFT lower forearm anterior large soft tissue calcifications. Adjacent   multiplesurgical clips..    No evidence of large joint effusion.        IMPRESSION:   Suspect impacted radial neck fracture.    --- End of Report ---            LESLIE LARIOS MD; Attending Radiologist  This document has been electronica    < end of copied text >

## 2023-08-30 NOTE — PROGRESS NOTE ADULT - ASSESSMENT
38 y/o male w/ a PMHx of ESRD (M/W/F via LUE AVF), SOLITARIO, HTN, secondary hyperparathyroidism s/p parathyroidectomy (Dec 2022), obesity, stroke at age 10 w/ no residual deficits, and recent left radial fracture sustained after trying to lower himself into a chair s/p splint who presented on 8/14 after a fall where he sustained a left femoral neck fracture, s/p left hemiarthroplasty (8/15). Post op, he was lethargic and hypoxemic requiring reintubation. Later became hypOtensive and was on pressor. Patient without cuff leak so he was given Decadron. He was successfully extubated on 8/17 despite no cuff leak and was quickly weaned off pressors afterwards. Had a fever to 101.2 (8/22), infectious workup unrevealing.    COMORBIDITES/ACTIVE MEDICAL ISSUES     #Femoral neck fracture  - S/p Left Hemiarthroplasty with Dr. Jason León (8/15)  - Weight bearing as tolerated  - Gait Instability, ADL impairments and Functional impairments: start Comprehensive Rehab Program of PT/OT     #Recent Left radial head Fracture  - NWB to LUE  - Left arm in sling as needed for comfort   re-xray Left Forearm secondary to pain- with ulna shaft Fx - ortho to see    #ESRD - on HD (MWF)  #Anemia of chronic disease  - Right AVF   - Epo with HD  - Nephro-pat supplement    #Renal bone disease   - Home diet: phoslo with meals  - Ensure low phos diet  - Outpatient follow up with endo and bone scan to determine etiology   parathyroidectomy 9/11 cancelled- needs to wait till at least 6 weeks post op    #HTN  - Metoprolol 25 BID    #Mood  - Xanax 0.25 PRN    #Pain control  - Tylenol 975 q6h, Tramadol PRN    #GI/Bowel Mgmt   - At risk for constipation due to neurologic diagnosis, immobility and/or medication use  - Senna,  Miralax QD    #Bladder management  -anuric    #DVT ppx  - Heparin TID  - SCD, TEDs     #Skin:  - stage 2 pressure ulcer to coccyx: cleanse with NS, pat dry with gauze and apply foam dressing daily  -At risk for pressure injury due to neurologic diagnosis and relative immobility  -Bilateral heels - soft heel protectors, apply liquid barrier film daily and monitor for tissue type changes  - Turn Q2 hr while in bed, air mattress  - Skin barrier cream as needed  - Nursing to monitor skin Q shift    #Diet   - Regular + Thins  [Renal Diet]    Precautions / PROPHYLAXIS:   - Falls  - ortho: Weight bearing status: LLE WBAT; LUE NWB- ortho to see  - Lungs: Aspiration, Incentive Spirometer   - Pressure injury/Skin: Turn Q2hrs while in bed, OOB to Chair, PT/OT

## 2023-08-30 NOTE — PROGRESS NOTE ADULT - SUBJECTIVE AND OBJECTIVE BOX
Patient is a 37y old  Male who presents with a chief complaint of Left femoral neck fracture (29 Aug 2023 23:31)      Patient seen and examined at bedside.    ALLERGIES:  No Known Drug Allergies  shellfish (Hives)    MEDICATIONS  (STANDING):  acetaminophen     Tablet .. 975 milliGRAM(s) Oral every 6 hours  calcium acetate 1334 milliGRAM(s) Oral three times a day with meals  chlorhexidine 2% Cloths 1 Application(s) Topical daily  epoetin carito-epbx (RETACRIT) Injectable 73856 Unit(s) IV Push <User Schedule>  heparin   Injectable 5000 Unit(s) SubCutaneous every 8 hours  metoprolol tartrate 25 milliGRAM(s) Oral two times a day  Nephro-pat 1 Tablet(s) Oral daily  polyethylene glycol 3350 17 Gram(s) Oral daily  senna 2 Tablet(s) Oral at bedtime    MEDICATIONS  (PRN):  ALPRAZolam 0.25 milliGRAM(s) Oral every 6 hours PRN Anxiety  oxyCODONE    IR 10 milliGRAM(s) Oral every 6 hours PRN Severe Pain (7 - 10)  traMADol 25 milliGRAM(s) Oral every 8 hours PRN Moderate Pain (4 - 6)    Vital Signs Last 24 Hrs  T(F): 99.1 (30 Aug 2023 08:23), Max: 99.1 (29 Aug 2023 20:06)  HR: 68 (30 Aug 2023 10:41) (68 - 78)  BP: 154/75 (30 Aug 2023 10:41) (138/68 - 175/98)  RR: 16 (30 Aug 2023 08:23) (16 - 16)  SpO2: 99% (30 Aug 2023 08:23) (98% - 99%)  I&O's Summary      PHYSICAL EXAM:  General: NAD, A/O x 3, severe protein calorie malnutrition  ENT: MMM  Neck: Supple, No JVD  Lungs: Clear to auscultation bilaterally  Cardio: RRR, S1/S2, No murmurs  Abdomen: Soft, Nontender, Nondistended; Bowel sounds present  Extremities: No calf tenderness, trace bilateral pitting edema      LABS:                        9.0    7.49  )-----------( 322      ( 28 Aug 2023 14:15 )             28.0       08-28    136  |  93  |  146  ----------------------------<  88  5.6   |  24  |  12.77    Ca    9.7      28 Aug 2023 14:15    TPro  7.1  /  Alb  2.5  /  TBili  0.4  /  DBili  x   /  AST  18  /  ALT  8   /  AlkPhos  763  08-28     08-15 Chol 190 mg/dL LDL -- HDL 57 mg/dL Trig 114 mg/dL    Urinalysis Basic - ( 28 Aug 2023 14:15 )    Color: x / Appearance: x / SG: x / pH: x  Gluc: 88 mg/dL / Ketone: x  / Bili: x / Urobili: x   Blood: x / Protein: x / Nitrite: x   Leuk Esterase: x / RBC: x / WBC x   Sq Epi: x / Non Sq Epi: x / Bacteria: x    COVID-19 PCR: NotDetec (08-25-23 @ 08:15)  COVID-19 PCR: NotDetec (08-25-23 @ 08:15)  COVID-19 PCR: NotDetec (07-23-23 @ 19:22)  COVID-19 PCR: NotDetec (10-06-22 @ 15:55)

## 2023-08-31 ENCOUNTER — NON-APPOINTMENT (OUTPATIENT)
Age: 38
End: 2023-08-31

## 2023-08-31 PROCEDURE — 99232 SBSQ HOSP IP/OBS MODERATE 35: CPT | Mod: GC

## 2023-08-31 RX ADMIN — Medication 25 MILLIGRAM(S): at 18:28

## 2023-08-31 RX ADMIN — OXYCODONE HYDROCHLORIDE 10 MILLIGRAM(S): 5 TABLET ORAL at 23:48

## 2023-08-31 RX ADMIN — OXYCODONE HYDROCHLORIDE 10 MILLIGRAM(S): 5 TABLET ORAL at 09:20

## 2023-08-31 RX ADMIN — HEPARIN SODIUM 5000 UNIT(S): 5000 INJECTION INTRAVENOUS; SUBCUTANEOUS at 21:09

## 2023-08-31 RX ADMIN — Medication 1334 MILLIGRAM(S): at 08:39

## 2023-08-31 RX ADMIN — Medication 1334 MILLIGRAM(S): at 12:41

## 2023-08-31 RX ADMIN — CHLORHEXIDINE GLUCONATE 1 APPLICATION(S): 213 SOLUTION TOPICAL at 12:43

## 2023-08-31 RX ADMIN — OXYCODONE HYDROCHLORIDE 10 MILLIGRAM(S): 5 TABLET ORAL at 08:45

## 2023-08-31 RX ADMIN — Medication 975 MILLIGRAM(S): at 18:21

## 2023-08-31 RX ADMIN — Medication 1334 MILLIGRAM(S): at 18:21

## 2023-08-31 RX ADMIN — HEPARIN SODIUM 5000 UNIT(S): 5000 INJECTION INTRAVENOUS; SUBCUTANEOUS at 06:17

## 2023-08-31 RX ADMIN — Medication 1 TABLET(S): at 12:41

## 2023-08-31 RX ADMIN — Medication 25 MILLIGRAM(S): at 06:16

## 2023-08-31 NOTE — PROGRESS NOTE ADULT - ASSESSMENT
38 y/o male w/ a PMHx of ESRD (M/W/F via LUE AVF), SOLITARIO, HTN, secondary hyperparathyroidism s/p parathyroidectomy (Dec 2022), obesity, stroke at age 10 w/ no residual deficits, and recent left radial fracture sustained after trying to lower himself into a chair s/p splint who presented on 8/14 after a fall where he sustained a left femoral neck fracture, s/p left hemiarthroplasty (8/15). Post op, he was lethargic and hypoxemic requiring reintubation. Later became hypOtensive and was on pressor. Patient without cuff leak so he was given Decadron. He was successfully extubated on 8/17 despite no cuff leak and was quickly weaned off pressors afterwards. Had a fever to 101.2 (8/22), infectious workup unrevealing.    COMORBIDITES/ACTIVE MEDICAL ISSUES     #Femoral neck fracture  - S/p Left Hemiarthroplasty with Dr. Jason León (8/15)  - Weight bearing as tolerated  - Gait Instability, ADL impairments and Functional impairments:  Comprehensive Rehab Program of PT/OT     #Recent Left radial head Fracture  - NWB to LUE  - Left arm in sling as needed for comfort   re-xray Left Forearm secondary to pain- with ulna shaft Fx - ortho to see today with final recs    #ESRD - on HD (MWF)  #Anemia of chronic disease  - Right AVF   - Epo with HD  - Nephro-pat supplement    #Renal bone disease   - Home diet: phoslo with meals  - Ensure low phos diet  - Outpatient follow up with endo and bone scan to determine etiology   parathyroidectomy 9/11 cancelled- needs to wait till at least 6 weeks post op    #HTN  - Metoprolol 25 BID    #Mood  - Xanax 0.25 PRN    #Pain control  - Tylenol 975 q6h, Tramadol PRN    #GI/Bowel Mgmt   - At risk for constipation due to neurologic diagnosis, immobility and/or medication use  - Senna,  Miralax QD    #Bladder management  -anuric    #DVT ppx  - Heparin TID  - SCD, TEDs     #Skin:  - stage 2 pressure ulcer to coccyx: cleanse with NS, pat dry with gauze and apply foam dressing daily  -At risk for pressure injury due to neurologic diagnosis and relative immobility  -Bilateral heels - soft heel protectors, apply liquid barrier film daily and monitor for tissue type changes  - Turn Q2 hr while in bed, air mattress  - Skin barrier cream as needed  - Nursing to monitor skin Q shift    #Diet   - Regular + Thins  [Renal Diet]    Precautions / PROPHYLAXIS:   - Falls  - ortho: Weight bearing status: LLE WBAT; LUE NWB- ortho to see  - Lungs: Aspiration, Incentive Spirometer   - Pressure injury/Skin: Turn Q2hrs while in bed, OOB to Chair, PT/OT

## 2023-08-31 NOTE — PROGRESS NOTE ADULT - SUBJECTIVE AND OBJECTIVE BOX
Resting    Vital Signs Last 24 Hrs  T(C): 37.1 (08-31-23 @ 08:46), Max: 37.2 (08-30-23 @ 19:49)  T(F): 98.7 (08-31-23 @ 08:46), Max: 98.9 (08-30-23 @ 19:49)  HR: 82 (08-31-23 @ 08:46) (63 - 82)  BP: 158/81 (08-31-23 @ 08:46) (121/79 - 158/81)  RR: 16 (08-31-23 @ 08:46) (16 - 16)  SpO2: 98% (08-31-23 @ 08:46) (97% - 98%)    I&O's Detail    30 Aug 2023 07:01  -  31 Aug 2023 07:00  --------------------------------------------------------  IN:    Other (mL): 800 mL  Total IN: 800 mL    OUT:    Other (mL): 2800 mL  Total OUT: 2800 mL                        9.1    6.59  )-----------( 305      ( 30 Aug 2023 14:00 )             27.8     30 Aug 2023 14:00    135    |  95     |  137    ----------------------------<  118    5.6     |  25     |  12.29    Ca    9.7        30 Aug 2023 14:00    TPro  6.6    /  Alb  2.4    /  TBili  0.3    /  DBili  x      /  AST  15     /  ALT  6      /  AlkPhos  764    30 Aug 2023 14:00    LIVER FUNCTIONS - ( 30 Aug 2023 14:00 )  Alb: 2.4 g/dL / Pro: 6.6 g/dL / ALK PHOS: 764 U/L / ALT: 6 U/L / AST: 15 U/L / GGT: x           acetaminophen     Tablet .. 975 milliGRAM(s) Oral every 6 hours  ALPRAZolam 0.25 milliGRAM(s) Oral every 6 hours PRN  calcium acetate 1334 milliGRAM(s) Oral three times a day with meals  chlorhexidine 2% Cloths 1 Application(s) Topical daily  epoetin carito-epbx (RETACRIT) Injectable 71896 Unit(s) IV Push <User Schedule>  heparin   Injectable 5000 Unit(s) SubCutaneous every 8 hours  metoprolol tartrate 25 milliGRAM(s) Oral two times a day  Nephro-pat 1 Tablet(s) Oral daily  oxyCODONE    IR 10 milliGRAM(s) Oral every 6 hours PRN  polyethylene glycol 3350 17 Gram(s) Oral daily  senna 2 Tablet(s) Oral at bedtime  traMADol 25 milliGRAM(s) Oral every 8 hours PRN    A/P:    S/p fall, L femoral neck fracture, s/p L hemiarthroplasty on 8/15  Adm to [facility]  ESRD on HD MWF  HD tomorrow as ordered   TMP 2-3kg w/HD as tolerates  Renal diet  Rehab  Bld work w/HD    [phone]

## 2023-08-31 NOTE — PROGRESS NOTE ADULT - SUBJECTIVE AND OBJECTIVE BOX
Chief complaint- pain in left arm , Left Hip with difficulty ambulating    38 y/o male w/ a PMHx of ESRD (M/W/F via LUE AVF), SOLITARIO, HTN, secondary hyperparathyroidism s/p parathyroidectomy (Dec 2022), obesity, stroke at age 10 w/ no residual deficits, and recent left radial fracture sustained after trying to lower himself into a chair s/p splint who presented on 8/14 after a fall while in the shower. Patient sustained a left femoral neck fracture s/p left hemiarthroplasty on 8/15. Immediately post-op in PACU, patient was lethargic and hypoxemic requiring reintubation so he was admitted to SICU post-operatively. Patient was hypotensive after intubation requiring pressor support. Patient without cuff leak so he was given Decadron. He was successfully extubated on 8/17 despite no cuff leak and was quickly weaned off pressors afterwards. Had a fever to 101.2 (8/22), infectious workup unrevealing.    Patient was evaluated by PM&R and therapy for functional deficits and gait/ADL impairments and recommend acute rehabilitation.  Patient was medically optimized for discharge to [facility] on 8/24/2023    ROS/Subjective  Patient seen and evaluated bedside  ortho PA reviewed xrays yesterday- final recs pending for today- Still ROSALIE LONDON  No surgery on parathyroid gland till at least 6 weeks post op  No void   Moved Bowels 8/30- large BM  no dizziness, no headaches  no nausea, no vomiting  no SOB, No chest pain  pain persists left arm ,low back, left hip      MEDICATIONS  (STANDING):  acetaminophen     Tablet .. 975 milliGRAM(s) Oral every 6 hours  calcium acetate 1334 milliGRAM(s) Oral three times a day with meals  chlorhexidine 2% Cloths 1 Application(s) Topical daily  epoetin carito-epbx (RETACRIT) Injectable 13918 Unit(s) IV Push <User Schedule>  heparin   Injectable 5000 Unit(s) SubCutaneous every 8 hours  metoprolol tartrate 25 milliGRAM(s) Oral two times a day  Nephro-pat 1 Tablet(s) Oral daily  polyethylene glycol 3350 17 Gram(s) Oral daily  senna 2 Tablet(s) Oral at bedtime    MEDICATIONS  (PRN):  ALPRAZolam 0.25 milliGRAM(s) Oral every 6 hours PRN Anxiety  oxyCODONE    IR 10 milliGRAM(s) Oral every 6 hours PRN Severe Pain (7 - 10)  traMADol 25 milliGRAM(s) Oral every 8 hours PRN Moderate Pain (4 - 6)                            9.1    6.59  )-----------( 305      ( 30 Aug 2023 14:00 )             27.8     08-30    135  |  95<L>  |  137<H>  ----------------------------<  118<H>  5.6<H>   |  25  |  12.29<H>    Ca    9.7      30 Aug 2023 14:00    TPro  6.6  /  Alb  2.4<L>  /  TBili  0.3  /  DBili  x   /  AST  15  /  ALT  6<L>  /  AlkPhos  764<H>  08-30    Urinalysis Basic - ( 30 Aug 2023 14:00 )    Color: x / Appearance: x / SG: x / pH: x  Gluc: 118 mg/dL / Ketone: x  / Bili: x / Urobili: x   Blood: x / Protein: x / Nitrite: x   Leuk Esterase: x / RBC: x / WBC x   Sq Epi: x / Non Sq Epi: x / Bacteria: x        CAPILLARY BLOOD GLUCOSE          Vital Signs Last 24 Hrs  T(C): 37.1 (31 Aug 2023 08:46), Max: 37.2 (30 Aug 2023 14:00)  T(F): 98.7 (31 Aug 2023 08:46), Max: 99 (30 Aug 2023 14:00)  HR: 82 (31 Aug 2023 08:46) (63 - 82)  BP: 158/81 (31 Aug 2023 08:46) (119/50 - 158/81)  BP(mean): --  RR: 16 (31 Aug 2023 08:46) (16 - 16)  SpO2: 98% (31 Aug 2023 08:46) (97% - 99%)    Parameters below as of 31 Aug 2023 08:46  Patient On (Oxygen Delivery Method): room air      Gen - NAD, Comfortable  HEENT - NCAT, EOMI, MMM  Neck - Supple, No limited ROM  Pulm - CTAB, No wheeze, No rhonchi, No crackles  Cardiovascular - RRR, S1S2, No murmurs  Abdomen - Soft, NT/ND, +BS  Extremities no edema, no calf tenderness, Right AV fistula, old fistula to left upper arm- pain with palpation left mid forearm  Neuro-     Cognitive - awake, alert, oriented to person, place, date, year.  Able  to follow command     Cranial Nerves - CN 2-12 intact     Motor -                     LEFT    UE - 4-/5                    RIGHT UE - 4/5                    LEFT    LE - HF- 2/5, KE 3/5, DF 4/5, PF 4/5                    RIGHT LE - 4-/5        Sensory - Intact  to LT      Reflexes - DTR Intact, No primitive reflexive     Coordination - FTN intact   MSK: muscle weakness  Psychiatric - Mood stable, Affect WNL  Skin:  stage 2 pressure ulcer to coccyx- improving    IDT 8/28  lives with aunt and cousin- cousin works  3STE one rail 15 Steps one rail upstairs ? first floor setup    OT   max A Transfers max A ADL    PT   Max A 1-2 standing in sarasteady    tentative Dc 9/12 Home Vs [facility]

## 2023-09-01 LAB
ALBUMIN SERPL ELPH-MCNC: 2.6 G/DL — LOW (ref 3.3–5)
ALP SERPL-CCNC: 767 U/L — HIGH (ref 40–120)
ALT FLD-CCNC: 8 U/L — LOW (ref 10–45)
ANION GAP SERPL CALC-SCNC: 15 MMOL/L — SIGNIFICANT CHANGE UP (ref 5–17)
AST SERPL-CCNC: 15 U/L — SIGNIFICANT CHANGE UP (ref 10–40)
BILIRUB SERPL-MCNC: 0.3 MG/DL — SIGNIFICANT CHANGE UP (ref 0.2–1.2)
BUN SERPL-MCNC: 118 MG/DL — HIGH (ref 7–23)
CALCIUM SERPL-MCNC: 9.3 MG/DL — SIGNIFICANT CHANGE UP (ref 8.4–10.5)
CHLORIDE SERPL-SCNC: 97 MMOL/L — SIGNIFICANT CHANGE UP (ref 96–108)
CO2 SERPL-SCNC: 24 MMOL/L — SIGNIFICANT CHANGE UP (ref 22–31)
CREAT SERPL-MCNC: 10.87 MG/DL — HIGH (ref 0.5–1.3)
EGFR: 6 ML/MIN/1.73M2 — LOW
GLUCOSE SERPL-MCNC: 108 MG/DL — HIGH (ref 70–99)
HCT VFR BLD CALC: 28.7 % — LOW (ref 39–50)
HGB BLD-MCNC: 9.3 G/DL — LOW (ref 13–17)
MCHC RBC-ENTMCNC: 30 PG — SIGNIFICANT CHANGE UP (ref 27–34)
MCHC RBC-ENTMCNC: 32.4 GM/DL — SIGNIFICANT CHANGE UP (ref 32–36)
MCV RBC AUTO: 92.6 FL — SIGNIFICANT CHANGE UP (ref 80–100)
NRBC # BLD: 0 /100 WBCS — SIGNIFICANT CHANGE UP (ref 0–0)
PHOSPHATE SERPL-MCNC: 6.4 MG/DL — HIGH (ref 2.5–4.5)
PLATELET # BLD AUTO: 298 K/UL — SIGNIFICANT CHANGE UP (ref 150–400)
POTASSIUM SERPL-MCNC: 5.8 MMOL/L — HIGH (ref 3.5–5.3)
POTASSIUM SERPL-SCNC: 5.8 MMOL/L — HIGH (ref 3.5–5.3)
PROT SERPL-MCNC: 7.3 G/DL — SIGNIFICANT CHANGE UP (ref 6–8.3)
RBC # BLD: 3.1 M/UL — LOW (ref 4.2–5.8)
RBC # FLD: 17.2 % — HIGH (ref 10.3–14.5)
SODIUM SERPL-SCNC: 136 MMOL/L — SIGNIFICANT CHANGE UP (ref 135–145)
WBC # BLD: 6.06 K/UL — SIGNIFICANT CHANGE UP (ref 3.8–10.5)
WBC # FLD AUTO: 6.06 K/UL — SIGNIFICANT CHANGE UP (ref 3.8–10.5)

## 2023-09-01 PROCEDURE — 99232 SBSQ HOSP IP/OBS MODERATE 35: CPT | Mod: GC

## 2023-09-01 PROCEDURE — 99232 SBSQ HOSP IP/OBS MODERATE 35: CPT

## 2023-09-01 RX ORDER — OXYCODONE HYDROCHLORIDE 5 MG/1
10 TABLET ORAL EVERY 6 HOURS
Refills: 0 | Status: DISCONTINUED | OUTPATIENT
Start: 2023-09-01 | End: 2023-09-08

## 2023-09-01 RX ADMIN — Medication 25 MILLIGRAM(S): at 05:52

## 2023-09-01 RX ADMIN — OXYCODONE HYDROCHLORIDE 10 MILLIGRAM(S): 5 TABLET ORAL at 13:59

## 2023-09-01 RX ADMIN — CHLORHEXIDINE GLUCONATE 1 APPLICATION(S): 213 SOLUTION TOPICAL at 08:00

## 2023-09-01 RX ADMIN — SENNA PLUS 2 TABLET(S): 8.6 TABLET ORAL at 22:23

## 2023-09-01 RX ADMIN — ERYTHROPOIETIN 10000 UNIT(S): 10000 INJECTION, SOLUTION INTRAVENOUS; SUBCUTANEOUS at 15:12

## 2023-09-01 RX ADMIN — Medication 1 TABLET(S): at 13:00

## 2023-09-01 RX ADMIN — Medication 1334 MILLIGRAM(S): at 08:22

## 2023-09-01 RX ADMIN — OXYCODONE HYDROCHLORIDE 10 MILLIGRAM(S): 5 TABLET ORAL at 01:09

## 2023-09-01 RX ADMIN — HEPARIN SODIUM 5000 UNIT(S): 5000 INJECTION INTRAVENOUS; SUBCUTANEOUS at 05:52

## 2023-09-01 RX ADMIN — OXYCODONE HYDROCHLORIDE 10 MILLIGRAM(S): 5 TABLET ORAL at 23:41

## 2023-09-01 RX ADMIN — OXYCODONE HYDROCHLORIDE 10 MILLIGRAM(S): 5 TABLET ORAL at 12:59

## 2023-09-01 RX ADMIN — HEPARIN SODIUM 5000 UNIT(S): 5000 INJECTION INTRAVENOUS; SUBCUTANEOUS at 22:23

## 2023-09-01 RX ADMIN — Medication 1334 MILLIGRAM(S): at 13:00

## 2023-09-01 RX ADMIN — HEPARIN SODIUM 5000 UNIT(S): 5000 INJECTION INTRAVENOUS; SUBCUTANEOUS at 17:47

## 2023-09-01 RX ADMIN — Medication 1334 MILLIGRAM(S): at 17:47

## 2023-09-01 NOTE — PROGRESS NOTE ADULT - ASSESSMENT
36 y/o male w/ a PMHx of ESRD (M/W/F via LUE AVF), SOLITARIO, HTN, secondary hyperparathyroidism s/p parathyroidectomy (Dec 2022), obesity, stroke at age 10 w/ no residual deficits, and recent left radial fracture sustained after trying to lower himself into a chair s/p splint who presented on 8/14 after a fall where he sustained a left femoral neck fracture, s/p left hemiarthroplasty (8/15). Post op, he was lethargic and hypoxemic requiring reintubation. Later became hypOtensive and was on pressor. Patient without cuff leak so he was given Decadron. He was successfully extubated on 8/17 despite no cuff leak and was quickly weaned off pressors afterwards. Had a fever to 101.2 (8/22), infectious workup unrevealing.    COMORBIDITES/ACTIVE MEDICAL ISSUES     #Femoral neck fracture  - S/p Left Hemiarthroplasty with Dr. Jason León (8/15)  - Weight bearing as tolerated  - Gait Instability, ADL impairments and Functional impairments:  Comprehensive Rehab Program of PT/OT     #Recent Left radial head Fracture  - NWB to LUE  - Left arm in sling as needed for comfort   re-xray Left Forearm secondary to pain- with ulna shaft Fx - ortho saw patient- ulnar forearm splint placed    #ESRD - on HD (MWF)  #Anemia of chronic disease  - Right AVF   - Epo with HD  - Nephro-pat supplement    #Renal bone disease   - Home diet: phoslo with meals  - Ensure low phos diet  - Outpatient follow up with endo and bone scan to determine etiology   parathyroidectomy 9/11 cancelled- needs to wait till at least 6 weeks post op    #HTN  - Metoprolol 25 BID    #Mood  - Xanax 0.25 PRN    #Pain control  - Tylenol 975 q6h, Tramadol PRN    #GI/Bowel Mgmt   - At risk for constipation due to neurologic diagnosis, immobility and/or medication use  - Senna,  Miralax QD    #Bladder management  -anuric    #DVT ppx  - Heparin TID  - SCD, TEDs     #Skin:  - stage 2 pressure ulcer to coccyx: cleanse with NS, pat dry with gauze and apply foam dressing daily  -At risk for pressure injury due to neurologic diagnosis and relative immobility  -Bilateral heels - soft heel protectors, apply liquid barrier film daily and monitor for tissue type changes  - Turn Q2 hr while in bed, air mattress  - Skin barrier cream as needed  - Nursing to monitor skin Q shift    #Diet   - Regular + Thins  [Renal Diet]    Precautions / PROPHYLAXIS:   - Falls  - ortho: Weight bearing status: LLE WBAT; LUE NWB- ortho to see  - Lungs: Aspiration, Incentive Spirometer   - Pressure injury/Skin: Turn Q2hrs while in bed, OOB to Chair, PT/OT

## 2023-09-01 NOTE — PROGRESS NOTE ADULT - SUBJECTIVE AND OBJECTIVE BOX
Seen on HD    Vital Signs Last 24 Hrs  T(C): 37 (09-01-23 @ 14:25), Max: 37.2 (08-31-23 @ 20:00)  T(F): 98.6 (09-01-23 @ 14:25), Max: 99 (08-31-23 @ 20:00)  HR: 76 (09-01-23 @ 14:25) (69 - 96)  BP: 146/69 (09-01-23 @ 14:25) (122/65 - 165/54)  RR: 16 (09-01-23 @ 14:25) (16 - 16)  SpO2: 98% (09-01-23 @ 14:25) (96% - 100%)    s1s2  b/l air entry  soft, ND  no edema                              9.3    6.06  )-----------( 298      ( 01 Sep 2023 14:00 )             28.7     01 Sep 2023 14:00    136    |  97     |  118    ----------------------------<  108    5.8     |  24     |  10.87    Ca    9.3        01 Sep 2023 14:00  Phos  6.4       01 Sep 2023 14:00    TPro  7.3    /  Alb  2.6    /  TBili  0.3    /  DBili  x      /  AST  15     /  ALT  8      /  AlkPhos  767    01 Sep 2023 14:00    LIVER FUNCTIONS - ( 01 Sep 2023 14:00 )  Alb: 2.6 g/dL / Pro: 7.3 g/dL / ALK PHOS: 767 U/L / ALT: 8 U/L / AST: 15 U/L / GGT: x           acetaminophen     Tablet .. 975 milliGRAM(s) Oral every 6 hours  calcium acetate 1334 milliGRAM(s) Oral three times a day with meals  chlorhexidine 2% Cloths 1 Application(s) Topical daily  epoetin carito-epbx (RETACRIT) Injectable 68678 Unit(s) IV Push <User Schedule>  heparin   Injectable 5000 Unit(s) SubCutaneous every 8 hours  metoprolol tartrate 25 milliGRAM(s) Oral two times a day  Nephro-pat 1 Tablet(s) Oral daily  oxyCODONE    IR 10 milliGRAM(s) Oral every 6 hours PRN  polyethylene glycol 3350 17 Gram(s) Oral daily  senna 2 Tablet(s) Oral at bedtime    A/P:    S/p fall, L femoral neck fracture, s/p L hemiarthroplasty on 8/15  [facility]m to [facility]  ESRD on HD MWF  HD today as ordered   TMP 2-3kg w/HD as tolerates  Renal diet  Rehab  Bld work w/HD    [phone]

## 2023-09-01 NOTE — PROGRESS NOTE ADULT - SUBJECTIVE AND OBJECTIVE BOX
Patient is a 37y old  Male who presents with a chief complaint of Left femoral neck fracture     Patient seen and examined at bedside.    ALLERGIES:  No Known Drug Allergies  shellfish (Hives)    MEDICATIONS  (STANDING):  acetaminophen     Tablet .. 975 milliGRAM(s) Oral every 6 hours  calcium acetate 1334 milliGRAM(s) Oral three times a day with meals  chlorhexidine 2% Cloths 1 Application(s) Topical daily  epoetin carito-epbx (RETACRIT) Injectable 48798 Unit(s) IV Push <User Schedule>  heparin   Injectable 5000 Unit(s) SubCutaneous every 8 hours  metoprolol tartrate 25 milliGRAM(s) Oral two times a day  Nephro-pat 1 Tablet(s) Oral daily  polyethylene glycol 3350 17 Gram(s) Oral daily  senna 2 Tablet(s) Oral at bedtime    MEDICATIONS  (PRN):  ALPRAZolam 0.25 milliGRAM(s) Oral every 6 hours PRN Anxiety  oxyCODONE    IR 10 milliGRAM(s) Oral every 6 hours PRN Severe Pain (7 - 10)  traMADol 25 milliGRAM(s) Oral every 8 hours PRN Moderate Pain (4 - 6)    Vital Signs Last 24 Hrs  T(C): 37.1 (01 Sep 2023 07:45), Max: 37.2 (31 Aug 2023 20:00)  T(F): 98.7 (01 Sep 2023 07:45), Max: 99 (31 Aug 2023 20:00)  HR: 69 (01 Sep 2023 07:45) (69 - 96)  BP: 165/54 (01 Sep 2023 07:45) (122/65 - 165/54)  RR: 16 (01 Sep 2023 07:45) (16 - 16)  SpO2: 96% (01 Sep 2023 07:45) (96% - 100%)    Parameters below as of 01 Sep 2023 07:45  Patient On (Oxygen Delivery Method): room air    PHYSICAL EXAM:  General: NAD, A/O x 3, severe protein calorie malnutrition  ENT: MMM  Neck: Supple, No JVD  Lungs: Clear to auscultation bilaterally  Cardio: RRR, S1/S2, No murmurs  Abdomen: Soft, Nontender, Nondistended; Bowel sounds present  Extremities: No calf tenderness, trace bilateral pitting edema      LABS:                          9.1    6.59  )-----------( 305      ( 30 Aug 2023 14:00 )             27.8     08-30    135  |  95<L>  |  137<H>  ----------------------------<  118<H>  5.6<H>   |  25  |  12.29<H>    Ca    9.7      30 Aug 2023 14:00    TPro  6.6  /  Alb  2.4<L>  /  TBili  0.3  /  DBili  x   /  AST  15  /  ALT  6<L>  /  AlkPhos  764<H>  08-30               Urinalysis Basic - ( 28 Aug 2023 14:15 )    Color: x / Appearance: x / SG: x / pH: x  Gluc: 88 mg/dL / Ketone: x  / Bili: x / Urobili: x   Blood: x / Protein: x / Nitrite: x   Leuk Esterase: x / RBC: x / WBC x   Sq Epi: x / Non Sq Epi: x / Bacteria: x    COVID-19 PCR: NotDetec (08-25-23 @ 08:15)  COVID-19 PCR: NotDetec (08-25-23 @ 08:15)  COVID-19 PCR: NotDetec (07-23-23 @ 19:22)  COVID-19 PCR: NotDetec (10-06-22 @ 15:55)

## 2023-09-01 NOTE — PROGRESS NOTE ADULT - ASSESSMENT
38 y/o male w/ a PMHx of ESRD (M/W/F via LUE AVF), SOLITARIO, HTN, secondary hyperparathyroidism s/p parathyroidectomy (Dec 2022), obesity, stroke at age 10 w/ no residual deficits, and recent left radial fracture sustained after trying to lower himself into a chair s/p splint who presented on 8/14 after a fall where he sustained a left femoral neck fracture, s/p left hemiarthroplasty (8/15). Post op, he was lethargic and hypoxemic requiring reintubation. Later became hypOtensive and was on pressor. Patient without cuff leak so he was given Decadron. He was successfully extubated on 8/17 despite no cuff leak and was quickly weaned off pressors afterwards. Had a fever to 101.2 (8/22), infectious workup unrevealing.      Left Femoral neck fracture  - S/p Left Hemiarthroplasty with Dr. Jason León (8/15)    #Recent Left radial head Fracture  - NWB to LUE  - Left arm in sling as needed for comfort    #Recent febrile illness at prior hospital  - work up unrevealing     #ESRD - on HD (MWF)  #Anemia of chronic disease  - routine HD  - Epo with HD    #HTN  - Metoprolol 25 BID    #Mood  - Xanax 0.25 PRN    #DVT ppx: Heparin TID

## 2023-09-01 NOTE — PROGRESS NOTE ADULT - SUBJECTIVE AND OBJECTIVE BOX
Chief complaint- pain in left arm , Left Hip with difficulty ambulating    38 y/o male w/ a PMHx of ESRD (M/W/F via LUE AVF), SOLITARIO, HTN, secondary hyperparathyroidism s/p parathyroidectomy (Dec 2022), obesity, stroke at age 10 w/ no residual deficits, and recent left radial fracture sustained after trying to lower himself into a chair s/p splint who presented on 8/14 after a fall while in the shower. Patient sustained a left femoral neck fracture s/p left hemiarthroplasty on 8/15. Immediately post-op in PACU, patient was lethargic and hypoxemic requiring reintubation so he was admitted to SICU post-operatively. Patient was hypotensive after intubation requiring pressor support. Patient without cuff leak so he was given Decadron. He was successfully extubated on 8/17 despite no cuff leak and was quickly weaned off pressors afterwards. Had a fever to 101.2 (8/22), infectious workup unrevealing.    Patient was evaluated by PM&R and therapy for functional deficits and gait/ADL impairments and recommend acute rehabilitation.  Patient was medically optimized for discharge to [facility] on 8/24/2023    ROS/Subjective  Patient seen and evaluated in PT  standing in Parallel bars- took 2 steps- pain in left thigh-Recommend [facility]   Final ortho recs- ulnar forearm brace- maintain NWB LUE  No surgery on parathyroid gland till at least 6 weeks post op  No void   Moved Bowels 9/1-  no dizziness, no headaches  no nausea, no vomiting  no SOB, No chest pain  pain persists left arm ,low back, left hip      MEDICATIONS  (STANDING):  acetaminophen     Tablet .. 975 milliGRAM(s) Oral every 6 hours  calcium acetate 1334 milliGRAM(s) Oral three times a day with meals  chlorhexidine 2% Cloths 1 Application(s) Topical daily  epoetin carito-epbx (RETACRIT) Injectable 12199 Unit(s) IV Push <User Schedule>  heparin   Injectable 5000 Unit(s) SubCutaneous every 8 hours  metoprolol tartrate 25 milliGRAM(s) Oral two times a day  Nephro-pat 1 Tablet(s) Oral daily  polyethylene glycol 3350 17 Gram(s) Oral daily  senna 2 Tablet(s) Oral at bedtime    MEDICATIONS  (PRN):  oxyCODONE    IR 10 milliGRAM(s) Oral every 6 hours PRN Severe Pain (7 - 10)                            9.3    6.06  )-----------( 298      ( 01 Sep 2023 14:00 )             28.7     09-01    136  |  97  |  118<H>  ----------------------------<  108<H>  5.8<H>   |  24  |  10.87<H>    Ca    9.3      01 Sep 2023 14:00  Phos  6.4     09-01    TPro  7.3  /  Alb  2.6<L>  /  TBili  0.3  /  DBili  x   /  AST  15  /  ALT  8<L>  /  AlkPhos  767<H>  09-01    Urinalysis Basic - ( 01 Sep 2023 14:00 )    Color: x / Appearance: x / SG: x / pH: x  Gluc: 108 mg/dL / Ketone: x  / Bili: x / Urobili: x   Blood: x / Protein: x / Nitrite: x   Leuk Esterase: x / RBC: x / WBC x   Sq Epi: x / Non Sq Epi: x / Bacteria: x        CAPILLARY BLOOD GLUCOSE          Vital Signs Last 24 Hrs  T(C): 37 (01 Sep 2023 14:25), Max: 37.2 (31 Aug 2023 20:00)  T(F): 98.6 (01 Sep 2023 14:25), Max: 99 (31 Aug 2023 20:00)  HR: 76 (01 Sep 2023 14:25) (69 - 96)  BP: 146/69 (01 Sep 2023 14:25) (122/65 - 165/54)  BP(mean): --  RR: 16 (01 Sep 2023 14:25) (16 - 16)  SpO2: 98% (01 Sep 2023 14:25) (96% - 100%)    Parameters below as of 01 Sep 2023 14:25  Patient On (Oxygen Delivery Method): room air          Gen - NAD, Comfortable  HEENT - NCAT, EOMI, MMM  Neck - Supple, No limited ROM  Pulm - CTAB, No wheeze, No rhonchi, No crackles  Cardiovascular - RRR, S1S2, No murmurs  Abdomen - Soft, NT/ND, +BS  Extremities no edema, no calf tenderness, Right AV fistula, old fistula to left upper arm- pain with palpation left mid forearm  Neuro-     Cognitive - awake, alert, oriented to person, place, date, year.  Able  to follow command     Cranial Nerves - CN 2-12 intact     Motor -                     LEFT    UE - 4-/5                    RIGHT UE - 4/5                    LEFT    LE - HF- 2/5, KE 3/5, DF 4/5, PF 4/5                    RIGHT LE - 4-/5        Sensory - Intact  to LT      Reflexes - DTR Intact, No primitive reflexive     Coordination - FTN intact   MSK: muscle weakness  Psychiatric - Mood stable, Affect WNL  Skin:  stage 2 pressure ulcer to coccyx- improving      IDT 9/1  lives with aunt and cousin- cousin works  3STE one rail 15 Steps one rail upstairs ? first floor setup    OT   max A Transfers max A ADL    PT   Max A 1-2 standing in sarasteady    tentative Dc 9/11 JUSTINE.

## 2023-09-02 PROCEDURE — 99232 SBSQ HOSP IP/OBS MODERATE 35: CPT

## 2023-09-02 RX ADMIN — HEPARIN SODIUM 5000 UNIT(S): 5000 INJECTION INTRAVENOUS; SUBCUTANEOUS at 22:18

## 2023-09-02 RX ADMIN — Medication 975 MILLIGRAM(S): at 07:34

## 2023-09-02 RX ADMIN — Medication 1334 MILLIGRAM(S): at 11:57

## 2023-09-02 RX ADMIN — Medication 975 MILLIGRAM(S): at 06:34

## 2023-09-02 RX ADMIN — Medication 25 MILLIGRAM(S): at 17:10

## 2023-09-02 RX ADMIN — HEPARIN SODIUM 5000 UNIT(S): 5000 INJECTION INTRAVENOUS; SUBCUTANEOUS at 06:35

## 2023-09-02 RX ADMIN — HEPARIN SODIUM 5000 UNIT(S): 5000 INJECTION INTRAVENOUS; SUBCUTANEOUS at 14:12

## 2023-09-02 RX ADMIN — Medication 1 TABLET(S): at 11:57

## 2023-09-02 RX ADMIN — Medication 25 MILLIGRAM(S): at 06:35

## 2023-09-02 RX ADMIN — Medication 1334 MILLIGRAM(S): at 08:18

## 2023-09-02 RX ADMIN — CHLORHEXIDINE GLUCONATE 1 APPLICATION(S): 213 SOLUTION TOPICAL at 11:57

## 2023-09-02 RX ADMIN — OXYCODONE HYDROCHLORIDE 10 MILLIGRAM(S): 5 TABLET ORAL at 00:41

## 2023-09-02 RX ADMIN — Medication 1334 MILLIGRAM(S): at 17:09

## 2023-09-02 NOTE — PROGRESS NOTE ADULT - SUBJECTIVE AND OBJECTIVE BOX
Hospitalist: Larry Bernardo DO    CHIEF COMPLAINT: Patient is a 37y old  male who presents with a chief complaint of Left femoral neck fracture (02 Sep 2023 10:31)      SUBJECTIVE / OVERNIGHT EVENTS: Patient seen and examined. No acute events overnight. Pain well controlled and patient without any complaints.    MEDICATIONS  (STANDING):  acetaminophen     Tablet .. 975 milliGRAM(s) Oral every 6 hours  calcium acetate 1334 milliGRAM(s) Oral three times a day with meals  chlorhexidine 2% Cloths 1 Application(s) Topical daily  epoetin carito-epbx (RETACRIT) Injectable 22745 Unit(s) IV Push <User Schedule>  heparin   Injectable 5000 Unit(s) SubCutaneous every 8 hours  metoprolol tartrate 25 milliGRAM(s) Oral two times a day  Nephro-pat 1 Tablet(s) Oral daily  polyethylene glycol 3350 17 Gram(s) Oral daily  senna 2 Tablet(s) Oral at bedtime    MEDICATIONS  (PRN):  oxyCODONE    IR 10 milliGRAM(s) Oral every 6 hours PRN Severe Pain (7 - 10)      VITALS:  T(F): 98.9 (09-02-23 @ 08:05), Max: 98.9 (09-01-23 @ 19:55)  HR: 93 (09-02-23 @ 08:05) (76 - 96)  BP: 156/98 (09-02-23 @ 08:05) (108/60 - 156/98)  RR: 16 (09-02-23 @ 08:05) (15 - 16)  SpO2: 98% (09-02-23 @ 08:05)      REVIEW OF SYSTEMS:  For ROV please refer back to H&P     PHYSICAL EXAM:  General: NAD, A/O x 3, severe protein calorie malnutrition  ENT: MMM  Neck: Supple, No JVD  Lungs: Clear to auscultation bilaterally  Cardio: RRR, S1/S2, No murmurs  Abdomen: Soft, Nontender, Nondistended; Bowel sounds present  Extremities: No calf tenderness, trace bilateral pitting edema      LABS:              9.3                  136  | 24   | 118          6.06  >-----------< 298     ------------------------< 108                   28.7                 5.8  | 97   | 10.87                                        Ca 9.3   Mg x     Ph 6.4         TPro  7.3  /  Alb  2.6      TBili  0.3  /  DBili  x         AST  15  /  ALT  8             AlkPhos  767        MICROBIOLOGY:  Urinalysis Basic - ( 01 Sep 2023 14:00 )    Color: x / Appearance: x / SG: x / pH: x  Gluc: 108 mg/dL / Ketone: x  / Bili: x / Urobili: x   Blood: x / Protein: x / Nitrite: x   Leuk Esterase: x / RBC: x / WBC x   Sq Epi: x / Non Sq Epi: x / Bacteria: x            RADIOLOGY & ADDITIONAL TESTS:    Imaging Personally Reviewed:    [X] Consultant(s) Notes Reviewed:  [X] Care Discussed with Consultants/Other Providers:

## 2023-09-02 NOTE — PROGRESS NOTE ADULT - SUBJECTIVE AND OBJECTIVE BOX
Pt. seen and examined at bedside.  No overnight events.      REVIEW OF SYSTEMS  Constitutional - No fever,  No fatigue  Neurological - No headaches, ++ loss of strength  Musculoskeletal - ++ joint pain, No joint swelling, No muscle pain    VITALS  T(C): 37.2 (09-02-23 @ 08:05), Max: 37.2 (09-01-23 @ 19:55)  HR: 93 (09-02-23 @ 08:05) (76 - 96)  BP: 156/98 (09-02-23 @ 08:05) (108/60 - 156/98)  RR: 16 (09-02-23 @ 08:05) (15 - 16)  SpO2: 98% (09-02-23 @ 08:05) (96% - 98%)  Wt(kg): --       MEDICATIONS   acetaminophen     Tablet .. 975 milliGRAM(s) every 6 hours  calcium acetate 1334 milliGRAM(s) three times a day with meals  chlorhexidine 2% Cloths 1 Application(s) daily  epoetin carito-epbx (RETACRIT) Injectable 83114 Unit(s) <User Schedule>  heparin   Injectable 5000 Unit(s) every 8 hours  metoprolol tartrate 25 milliGRAM(s) two times a day  Nephro-pat 1 Tablet(s) daily  oxyCODONE    IR 10 milliGRAM(s) every 6 hours PRN  polyethylene glycol 3350 17 Gram(s) daily  senna 2 Tablet(s) at bedtime      RECENT LABS/IMAGING                        9.3    6.06  )-----------( 298      ( 01 Sep 2023 14:00 )             28.7     09-01    136  |  97  |  118<H>  ----------------------------<  108<H>  5.8<H>   |  24  |  10.87<H>    Ca    9.3      01 Sep 2023 14:00  Phos  6.4     09-01    TPro  7.3  /  Alb  2.6<L>  /  TBili  0.3  /  DBili  x   /  AST  15  /  ALT  8<L>  /  AlkPhos  767<H>  09-01      Urinalysis Basic - ( 01 Sep 2023 14:00 )    Color: x / Appearance: x / SG: x / pH: x  Gluc: 108 mg/dL / Ketone: x  / Bili: x / Urobili: x   Blood: x / Protein: x / Nitrite: x   Leuk Esterase: x / RBC: x / WBC x   Sq Epi: x / Non Sq Epi: x / Bacteria: x                ---------  PHYSICAL EXAM  Constitutional - NAD, Comfortable  Pulm - Breathing comfortably, No wheezing  Abd - Soft, NTND  Extremities - No edema, No calf tenderness  Neurologic Exam -                    Cognitive - Awake, Alert     Communication - Fluent     Motor - No focal deficits     Sensory - Intact to LT  Psychiatric - Mood WNL, Affect WNL    ASSESSMENT/PLAN  37y Male with h/o ESRD on HD now w/ functional deficits after left femoral neck fx s/p left hemiarthroplasty.  Continue current medical management  Pain - Tylenol PRN  DVT PPX - heparin   Injectable 5000 Unit(s) every 8 hours  Active issues - labs reviewed; anemia of chronic disease; ALP high.  Continue 3hrs a day of comprehensive rehab program.

## 2023-09-03 PROCEDURE — 99232 SBSQ HOSP IP/OBS MODERATE 35: CPT

## 2023-09-03 RX ADMIN — Medication 1334 MILLIGRAM(S): at 17:58

## 2023-09-03 RX ADMIN — Medication 1334 MILLIGRAM(S): at 13:27

## 2023-09-03 RX ADMIN — OXYCODONE HYDROCHLORIDE 10 MILLIGRAM(S): 5 TABLET ORAL at 00:13

## 2023-09-03 RX ADMIN — CHLORHEXIDINE GLUCONATE 1 APPLICATION(S): 213 SOLUTION TOPICAL at 14:42

## 2023-09-03 RX ADMIN — Medication 25 MILLIGRAM(S): at 18:00

## 2023-09-03 RX ADMIN — HEPARIN SODIUM 5000 UNIT(S): 5000 INJECTION INTRAVENOUS; SUBCUTANEOUS at 21:13

## 2023-09-03 RX ADMIN — HEPARIN SODIUM 5000 UNIT(S): 5000 INJECTION INTRAVENOUS; SUBCUTANEOUS at 13:27

## 2023-09-03 RX ADMIN — Medication 1 TABLET(S): at 13:27

## 2023-09-03 RX ADMIN — OXYCODONE HYDROCHLORIDE 10 MILLIGRAM(S): 5 TABLET ORAL at 01:13

## 2023-09-03 RX ADMIN — Medication 1334 MILLIGRAM(S): at 09:13

## 2023-09-03 RX ADMIN — HEPARIN SODIUM 5000 UNIT(S): 5000 INJECTION INTRAVENOUS; SUBCUTANEOUS at 05:32

## 2023-09-03 RX ADMIN — Medication 25 MILLIGRAM(S): at 05:31

## 2023-09-03 NOTE — PROGRESS NOTE ADULT - SUBJECTIVE AND OBJECTIVE BOX
Pt. seen and examined at bedside.  No overnight events.  Feels well.        REVIEW OF SYSTEMS  Constitutional - No fever,  No fatigue  Neurological - No headaches, ++ loss of strength  Musculoskeletal - ++ joint pain, No joint swelling, No muscle pain    VITALS  T(C): 37 (09-02-23 @ 20:10), Max: 37 (09-02-23 @ 20:10)  HR: 95 (09-03-23 @ 05:25) (80 - 95)  BP: 145/73 (09-03-23 @ 05:25) (132/80 - 149/77)  RR: 16 (09-03-23 @ 05:25) (16 - 16)  SpO2: 97% (09-03-23 @ 05:25) (97% - 99%)  Wt(kg): --       MEDICATIONS   acetaminophen     Tablet .. 975 milliGRAM(s) every 6 hours  calcium acetate 1334 milliGRAM(s) three times a day with meals  chlorhexidine 2% Cloths 1 Application(s) daily  epoetin carito-epbx (RETACRIT) Injectable 58907 Unit(s) <User Schedule>  heparin   Injectable 5000 Unit(s) every 8 hours  metoprolol tartrate 25 milliGRAM(s) two times a day  Nephro-pat 1 Tablet(s) daily  oxyCODONE    IR 10 milliGRAM(s) every 6 hours PRN  polyethylene glycol 3350 17 Gram(s) daily  senna 2 Tablet(s) at bedtime      RECENT LABS/IMAGING                        9.3    6.06  )-----------( 298      ( 01 Sep 2023 14:00 )             28.7     09-01    136  |  97  |  118<H>  ----------------------------<  108<H>  5.8<H>   |  24  |  10.87<H>    Ca    9.3      01 Sep 2023 14:00  Phos  6.4     09-01    TPro  7.3  /  Alb  2.6<L>  /  TBili  0.3  /  DBili  x   /  AST  15  /  ALT  8<L>  /  AlkPhos  767<H>  09-01      Urinalysis Basic - ( 01 Sep 2023 14:00 )    Color: x / Appearance: x / SG: x / pH: x  Gluc: 108 mg/dL / Ketone: x  / Bili: x / Urobili: x   Blood: x / Protein: x / Nitrite: x   Leuk Esterase: x / RBC: x / WBC x   Sq Epi: x / Non Sq Epi: x / Bacteria: x                ---------  PHYSICAL EXAM  Constitutional - NAD, Comfortable  Pulm - Breathing comfortably, No wheezing  Abd - Soft, NTND  Left Hip - incision C/D/I  Extremities - No edema, No calf tenderness  Neurologic Exam -                    Cognitive - Awake, Alert     Communication - Fluent     Motor - LEFT ARM WEAK S/P DRF     Sensory - Intact to LT  Psychiatric - Mood WNL, Affect WNL    ASSESSMENT/PLAN  37y Male with w/ h/o ESRD on HD and remote CVA now w/ functional deficits after left femoral neck fx s/p ORIF + LEFT RADIAL FX.  Continue current medical management  Pain - Tylenol PRN; oxycodone prn  DVT PPX - heparin   Injectable 5000 Unit(s) every 8 hours  Active issues - NONE  Continue 3hrs a day of comprehensive rehab program.

## 2023-09-03 NOTE — PROGRESS NOTE ADULT - SUBJECTIVE AND OBJECTIVE BOX
Patient is a 37y old  Male who presents with a chief complaint of Left femoral neck fracture (03 Sep 2023 09:41)      SUBJECTIVE / OVERNIGHT EVENTS:  Pt seen and examined at bedside. No acute events overnight.  Pt denies cp, palpitations, sob, abd pain, N/V, fever, chills.    ROS:  All other review of systems negative    Allergies    No Known Drug Allergies  shellfish (Hives)    Intolerances        MEDICATIONS  (STANDING):  acetaminophen     Tablet .. 975 milliGRAM(s) Oral every 6 hours  calcium acetate 1334 milliGRAM(s) Oral three times a day with meals  chlorhexidine 2% Cloths 1 Application(s) Topical daily  epoetin carito-epbx (RETACRIT) Injectable 78149 Unit(s) IV Push <User Schedule>  heparin   Injectable 5000 Unit(s) SubCutaneous every 8 hours  metoprolol tartrate 25 milliGRAM(s) Oral two times a day  Nephro-pat 1 Tablet(s) Oral daily  polyethylene glycol 3350 17 Gram(s) Oral daily  senna 2 Tablet(s) Oral at bedtime    MEDICATIONS  (PRN):  oxyCODONE    IR 10 milliGRAM(s) Oral every 6 hours PRN Severe Pain (7 - 10)      Vital Signs Last 24 Hrs  T(C): 37 (02 Sep 2023 20:10), Max: 37 (02 Sep 2023 20:10)  T(F): 98.6 (02 Sep 2023 20:10), Max: 98.6 (02 Sep 2023 20:10)  HR: 95 (03 Sep 2023 05:25) (80 - 95)  BP: 145/73 (03 Sep 2023 05:25) (132/80 - 149/77)  BP(mean): --  RR: 16 (03 Sep 2023 05:25) (16 - 16)  SpO2: 97% (03 Sep 2023 05:25) (97% - 99%)    Parameters below as of 03 Sep 2023 05:25  Patient On (Oxygen Delivery Method): room air      CAPILLARY BLOOD GLUCOSE        I&O's Summary      PHYSICAL EXAM:  GENERAL: NAD, well-developed male  HEAD:  Atraumatic, Normocephalic  NECK: Supple, No JVD  CHEST/LUNG: Clear to auscultation bilaterally; No wheeze, nonlabored breathing  HEART: Regular rate and rhythm; No murmurs, rubs, or gallops  ABDOMEN: Soft, Nontender, Nondistended; Bowel sounds present  EXTREMITIES: No clubbing, cyanosis, or edema  PSYCH: calm, appropriate mood    LABS:                        9.3    6.06  )-----------( 298      ( 01 Sep 2023 14:00 )             28.7     09-01    136  |  97  |  118<H>  ----------------------------<  108<H>  5.8<H>   |  24  |  10.87<H>    Ca    9.3      01 Sep 2023 14:00  Phos  6.4     09-01    TPro  7.3  /  Alb  2.6<L>  /  TBili  0.3  /  DBili  x   /  AST  15  /  ALT  8<L>  /  AlkPhos  767<H>  09-01          Urinalysis Basic - ( 01 Sep 2023 14:00 )    Color: x / Appearance: x / SG: x / pH: x  Gluc: 108 mg/dL / Ketone: x  / Bili: x / Urobili: x   Blood: x / Protein: x / Nitrite: x   Leuk Esterase: x / RBC: x / WBC x   Sq Epi: x / Non Sq Epi: x / Bacteria: x        RADIOLOGY & ADDITIONAL TESTS:  Results Reviewed:   Imaging Personally Reviewed:  Electrocardiogram Personally Reviewed:    COORDINATION OF CARE:  Care Discussed with Consultants/Other Providers [Y/N]:  Prior or Outpatient Records Reviewed [Y/N]:

## 2023-09-04 LAB
ANION GAP SERPL CALC-SCNC: 10 MMOL/L — SIGNIFICANT CHANGE UP (ref 5–17)
ANION GAP SERPL CALC-SCNC: 17 MMOL/L — SIGNIFICANT CHANGE UP (ref 5–17)
BUN SERPL-MCNC: 139 MG/DL — HIGH (ref 7–23)
BUN SERPL-MCNC: 92 MG/DL — HIGH (ref 7–23)
CALCIUM SERPL-MCNC: 9.5 MG/DL — SIGNIFICANT CHANGE UP (ref 8.4–10.5)
CALCIUM SERPL-MCNC: 9.5 MG/DL — SIGNIFICANT CHANGE UP (ref 8.4–10.5)
CHLORIDE SERPL-SCNC: 94 MMOL/L — LOW (ref 96–108)
CHLORIDE SERPL-SCNC: 96 MMOL/L — SIGNIFICANT CHANGE UP (ref 96–108)
CO2 SERPL-SCNC: 24 MMOL/L — SIGNIFICANT CHANGE UP (ref 22–31)
CO2 SERPL-SCNC: 30 MMOL/L — SIGNIFICANT CHANGE UP (ref 22–31)
CREAT SERPL-MCNC: 12.28 MG/DL — HIGH (ref 0.5–1.3)
CREAT SERPL-MCNC: 8.58 MG/DL — HIGH (ref 0.5–1.3)
EGFR: 5 ML/MIN/1.73M2 — LOW
EGFR: 8 ML/MIN/1.73M2 — LOW
GLUCOSE SERPL-MCNC: 102 MG/DL — HIGH (ref 70–99)
GLUCOSE SERPL-MCNC: 94 MG/DL — SIGNIFICANT CHANGE UP (ref 70–99)
HCT VFR BLD CALC: 27.7 % — LOW (ref 39–50)
HGB BLD-MCNC: 8.8 G/DL — LOW (ref 13–17)
MAGNESIUM SERPL-MCNC: 2.4 MG/DL — SIGNIFICANT CHANGE UP (ref 1.6–2.6)
MCHC RBC-ENTMCNC: 29.7 PG — SIGNIFICANT CHANGE UP (ref 27–34)
MCHC RBC-ENTMCNC: 31.8 GM/DL — LOW (ref 32–36)
MCV RBC AUTO: 93.6 FL — SIGNIFICANT CHANGE UP (ref 80–100)
NRBC # BLD: 0 /100 WBCS — SIGNIFICANT CHANGE UP (ref 0–0)
PHOSPHATE SERPL-MCNC: 6.1 MG/DL — HIGH (ref 2.5–4.5)
PHOSPHATE SERPL-MCNC: 6.7 MG/DL — HIGH (ref 2.5–4.5)
PLATELET # BLD AUTO: 309 K/UL — SIGNIFICANT CHANGE UP (ref 150–400)
POTASSIUM SERPL-MCNC: 4.6 MMOL/L — SIGNIFICANT CHANGE UP (ref 3.5–5.3)
POTASSIUM SERPL-MCNC: 6.3 MMOL/L — CRITICAL HIGH (ref 3.5–5.3)
POTASSIUM SERPL-SCNC: 4.6 MMOL/L — SIGNIFICANT CHANGE UP (ref 3.5–5.3)
POTASSIUM SERPL-SCNC: 6.3 MMOL/L — CRITICAL HIGH (ref 3.5–5.3)
RBC # BLD: 2.96 M/UL — LOW (ref 4.2–5.8)
RBC # FLD: 17.6 % — HIGH (ref 10.3–14.5)
SODIUM SERPL-SCNC: 134 MMOL/L — LOW (ref 135–145)
SODIUM SERPL-SCNC: 137 MMOL/L — SIGNIFICANT CHANGE UP (ref 135–145)
WBC # BLD: 5.41 K/UL — SIGNIFICANT CHANGE UP (ref 3.8–10.5)
WBC # FLD AUTO: 5.41 K/UL — SIGNIFICANT CHANGE UP (ref 3.8–10.5)

## 2023-09-04 PROCEDURE — 99232 SBSQ HOSP IP/OBS MODERATE 35: CPT

## 2023-09-04 RX ORDER — ALPRAZOLAM 0.25 MG
0.25 TABLET ORAL ONCE
Refills: 0 | Status: DISCONTINUED | OUTPATIENT
Start: 2023-09-04 | End: 2023-09-04

## 2023-09-04 RX ADMIN — Medication 975 MILLIGRAM(S): at 13:46

## 2023-09-04 RX ADMIN — HEPARIN SODIUM 5000 UNIT(S): 5000 INJECTION INTRAVENOUS; SUBCUTANEOUS at 13:47

## 2023-09-04 RX ADMIN — Medication 975 MILLIGRAM(S): at 18:49

## 2023-09-04 RX ADMIN — Medication 0.25 MILLIGRAM(S): at 21:28

## 2023-09-04 RX ADMIN — ERYTHROPOIETIN 10000 UNIT(S): 10000 INJECTION, SOLUTION INTRAVENOUS; SUBCUTANEOUS at 16:08

## 2023-09-04 RX ADMIN — Medication 1334 MILLIGRAM(S): at 08:43

## 2023-09-04 RX ADMIN — POLYETHYLENE GLYCOL 3350 17 GRAM(S): 17 POWDER, FOR SOLUTION ORAL at 13:46

## 2023-09-04 RX ADMIN — HEPARIN SODIUM 5000 UNIT(S): 5000 INJECTION INTRAVENOUS; SUBCUTANEOUS at 05:18

## 2023-09-04 RX ADMIN — OXYCODONE HYDROCHLORIDE 10 MILLIGRAM(S): 5 TABLET ORAL at 09:04

## 2023-09-04 RX ADMIN — Medication 1334 MILLIGRAM(S): at 18:49

## 2023-09-04 RX ADMIN — Medication 975 MILLIGRAM(S): at 14:00

## 2023-09-04 RX ADMIN — OXYCODONE HYDROCHLORIDE 10 MILLIGRAM(S): 5 TABLET ORAL at 23:39

## 2023-09-04 RX ADMIN — Medication 1 TABLET(S): at 13:46

## 2023-09-04 RX ADMIN — Medication 25 MILLIGRAM(S): at 05:18

## 2023-09-04 RX ADMIN — CHLORHEXIDINE GLUCONATE 1 APPLICATION(S): 213 SOLUTION TOPICAL at 12:37

## 2023-09-04 RX ADMIN — OXYCODONE HYDROCHLORIDE 10 MILLIGRAM(S): 5 TABLET ORAL at 10:19

## 2023-09-04 RX ADMIN — Medication 1334 MILLIGRAM(S): at 13:47

## 2023-09-04 RX ADMIN — HEPARIN SODIUM 5000 UNIT(S): 5000 INJECTION INTRAVENOUS; SUBCUTANEOUS at 21:28

## 2023-09-04 RX ADMIN — Medication 25 MILLIGRAM(S): at 18:49

## 2023-09-04 RX ADMIN — OXYCODONE HYDROCHLORIDE 10 MILLIGRAM(S): 5 TABLET ORAL at 02:38

## 2023-09-04 RX ADMIN — OXYCODONE HYDROCHLORIDE 10 MILLIGRAM(S): 5 TABLET ORAL at 01:38

## 2023-09-04 RX ADMIN — Medication 975 MILLIGRAM(S): at 19:12

## 2023-09-04 NOTE — PROGRESS NOTE ADULT - TIME BILLING
medical complexity
care coordination, plan of care discussed with patient face to face, 1S IDR team

## 2023-09-04 NOTE — PROGRESS NOTE ADULT - SUBJECTIVE AND OBJECTIVE BOX
Pt. seen and examined at bedside.  No overnight events.  c/o left wrist pain into fingers      REVIEW OF SYSTEMS  Constitutional - No fever,  No fatigue  Neurological - No headaches, ++ loss of strength  Musculoskeletal - ++ joint pain, No joint swelling, No muscle pain    VITALS  T(C): 36.8 (09-03-23 @ 21:05), Max: 36.8 (09-03-23 @ 21:05)  HR: 74 (09-04-23 @ 08:49) (74 - 84)  BP: 163/88 (09-04-23 @ 08:49) (134/81 - 174/91)  RR: 17 (09-04-23 @ 08:49) (16 - 17)  SpO2: 97% (09-04-23 @ 08:49) (97% - 98%)  Wt(kg): --       MEDICATIONS   acetaminophen     Tablet .. 975 milliGRAM(s) every 6 hours  calcium acetate 1334 milliGRAM(s) three times a day with meals  chlorhexidine 2% Cloths 1 Application(s) daily  epoetin carito-epbx (RETACRIT) Injectable 77007 Unit(s) <User Schedule>  heparin   Injectable 5000 Unit(s) every 8 hours  metoprolol tartrate 25 milliGRAM(s) two times a day  Nephro-pat 1 Tablet(s) daily  oxyCODONE    IR 10 milliGRAM(s) every 6 hours PRN  polyethylene glycol 3350 17 Gram(s) daily  senna 2 Tablet(s) at bedtime      RECENT LABS/IMAGING                        ---------  PHYSICAL EXAM  Constitutional - NAD, Comfortable  Pulm - Breathing comfortably, No wheezing  Abd - Soft, NTND  Left Hip - incision C/D/I  Extremities - No edema, No calf tenderness  Neurologic Exam -                    Cognitive - Awake, Alert     Communication - Fluent     Motor - No focal deficits     Sensory - Intact to LT  Psychiatric - Mood WNL, Affect WNL    ASSESSMENT/PLAN  37y Male with h/o ESRD on HD, remote CVA now w/ functional deficits after left femoral neck fx s/p ORIF and DRFX.  Continue current medical management  Pain - Tylenol PRN; oxycodone prn  DVT PPX - heparin   Injectable 5000 Unit(s) every 8 hours  Active issues - none  Continue 3hrs a day of comprehensive rehab program.

## 2023-09-04 NOTE — PROGRESS NOTE ADULT - SUBJECTIVE AND OBJECTIVE BOX
Stable on HD    Vital Signs Last 24 Hrs  T(C): 37 (09-04-23 @ 18:00), Max: 37 (09-04-23 @ 18:00)  T(F): 98.6 (09-04-23 @ 18:00), Max: 98.6 (09-04-23 @ 18:00)  HR: 90 (09-04-23 @ 18:47) (74 - 90)  BP: 123/76 (09-04-23 @ 18:47) (118/62 - 163/88)  RR: 16 (09-04-23 @ 18:00) (16 - 17)  SpO2: 99% (09-04-23 @ 18:00) (97% - 99%)    I&O's Detail    04 Sep 2023 07:01  -  04 Sep 2023 23:22  --------------------------------------------------------  IN:    Other (mL): 800 mL  Total IN: 800 mL    OUT:    Other (mL): 3300 mL  Total OUT: 3300 mL    s1s2  b/l air entry  soft, ND  no edema                        8.8    5.41  )-----------( 309      ( 04 Sep 2023 15:00 )             27.7     04 Sep 2023 15:00    137    |  96     |  139    ----------------------------<  102    6.3     |  24     |  12.28    Ca    9.5        04 Sep 2023 15:00  Phos  6.7       04 Sep 2023 15:00    acetaminophen     Tablet .. 975 milliGRAM(s) Oral every 6 hours  calcium acetate 1334 milliGRAM(s) Oral three times a day with meals  chlorhexidine 2% Cloths 1 Application(s) Topical daily  epoetin carito-epbx (RETACRIT) Injectable 58446 Unit(s) IV Push <User Schedule>  heparin   Injectable 5000 Unit(s) SubCutaneous every 8 hours  metoprolol tartrate 25 milliGRAM(s) Oral two times a day  Nephro-pat 1 Tablet(s) Oral daily  oxyCODONE    IR 10 milliGRAM(s) Oral every 6 hours PRN  polyethylene glycol 3350 17 Gram(s) Oral daily  senna 2 Tablet(s) Oral at bedtime    A/P:    S/p fall, L femoral neck fracture, s/p L hemiarthroplasty on 8/15  Adm to [facility]  ESRD on HD MWF  HD today as ordered   [facility] [place]  Renal diet  Rehab    [phone]

## 2023-09-05 LAB
ALBUMIN SERPL ELPH-MCNC: 2.7 G/DL — LOW (ref 3.3–5)
ALP SERPL-CCNC: 819 U/L — HIGH (ref 40–120)
ALT FLD-CCNC: 10 U/L — SIGNIFICANT CHANGE UP (ref 10–45)
ANION GAP SERPL CALC-SCNC: 14 MMOL/L — SIGNIFICANT CHANGE UP (ref 5–17)
AST SERPL-CCNC: 17 U/L — SIGNIFICANT CHANGE UP (ref 10–40)
BILIRUB SERPL-MCNC: 0.4 MG/DL — SIGNIFICANT CHANGE UP (ref 0.2–1.2)
BUN SERPL-MCNC: 98 MG/DL — HIGH (ref 7–23)
CALCIUM SERPL-MCNC: 9.5 MG/DL — SIGNIFICANT CHANGE UP (ref 8.4–10.5)
CHLORIDE SERPL-SCNC: 93 MMOL/L — LOW (ref 96–108)
CO2 SERPL-SCNC: 27 MMOL/L — SIGNIFICANT CHANGE UP (ref 22–31)
CREAT SERPL-MCNC: 9.26 MG/DL — HIGH (ref 0.5–1.3)
EGFR: 7 ML/MIN/1.73M2 — LOW
GGT SERPL-CCNC: 30 U/L — SIGNIFICANT CHANGE UP (ref 9–50)
GLUCOSE SERPL-MCNC: 77 MG/DL — SIGNIFICANT CHANGE UP (ref 70–99)
HCT VFR BLD CALC: 29.9 % — LOW (ref 39–50)
HGB BLD-MCNC: 9.5 G/DL — LOW (ref 13–17)
MCHC RBC-ENTMCNC: 29.9 PG — SIGNIFICANT CHANGE UP (ref 27–34)
MCHC RBC-ENTMCNC: 31.8 GM/DL — LOW (ref 32–36)
MCV RBC AUTO: 94 FL — SIGNIFICANT CHANGE UP (ref 80–100)
NRBC # BLD: 0 /100 WBCS — SIGNIFICANT CHANGE UP (ref 0–0)
PLATELET # BLD AUTO: 268 K/UL — SIGNIFICANT CHANGE UP (ref 150–400)
POTASSIUM SERPL-MCNC: 5.2 MMOL/L — SIGNIFICANT CHANGE UP (ref 3.5–5.3)
POTASSIUM SERPL-SCNC: 5.2 MMOL/L — SIGNIFICANT CHANGE UP (ref 3.5–5.3)
PROT SERPL-MCNC: 6.9 G/DL — SIGNIFICANT CHANGE UP (ref 6–8.3)
RBC # BLD: 3.18 M/UL — LOW (ref 4.2–5.8)
RBC # FLD: 17.8 % — HIGH (ref 10.3–14.5)
SODIUM SERPL-SCNC: 134 MMOL/L — LOW (ref 135–145)
WBC # BLD: 4.89 K/UL — SIGNIFICANT CHANGE UP (ref 3.8–10.5)
WBC # FLD AUTO: 4.89 K/UL — SIGNIFICANT CHANGE UP (ref 3.8–10.5)

## 2023-09-05 PROCEDURE — 99233 SBSQ HOSP IP/OBS HIGH 50: CPT

## 2023-09-05 PROCEDURE — 99232 SBSQ HOSP IP/OBS MODERATE 35: CPT | Mod: GC

## 2023-09-05 RX ORDER — ALPRAZOLAM 0.25 MG
0.25 TABLET ORAL EVERY 6 HOURS
Refills: 0 | Status: DISCONTINUED | OUTPATIENT
Start: 2023-09-05 | End: 2023-09-06

## 2023-09-05 RX ADMIN — HEPARIN SODIUM 5000 UNIT(S): 5000 INJECTION INTRAVENOUS; SUBCUTANEOUS at 21:26

## 2023-09-05 RX ADMIN — Medication 25 MILLIGRAM(S): at 17:41

## 2023-09-05 RX ADMIN — OXYCODONE HYDROCHLORIDE 10 MILLIGRAM(S): 5 TABLET ORAL at 00:49

## 2023-09-05 RX ADMIN — CHLORHEXIDINE GLUCONATE 1 APPLICATION(S): 213 SOLUTION TOPICAL at 13:35

## 2023-09-05 RX ADMIN — Medication 25 MILLIGRAM(S): at 06:04

## 2023-09-05 RX ADMIN — OXYCODONE HYDROCHLORIDE 10 MILLIGRAM(S): 5 TABLET ORAL at 13:15

## 2023-09-05 RX ADMIN — HEPARIN SODIUM 5000 UNIT(S): 5000 INJECTION INTRAVENOUS; SUBCUTANEOUS at 06:04

## 2023-09-05 RX ADMIN — Medication 1 TABLET(S): at 12:42

## 2023-09-05 RX ADMIN — OXYCODONE HYDROCHLORIDE 10 MILLIGRAM(S): 5 TABLET ORAL at 12:45

## 2023-09-05 RX ADMIN — Medication 1334 MILLIGRAM(S): at 17:40

## 2023-09-05 RX ADMIN — HEPARIN SODIUM 5000 UNIT(S): 5000 INJECTION INTRAVENOUS; SUBCUTANEOUS at 13:39

## 2023-09-05 RX ADMIN — Medication 0.25 MILLIGRAM(S): at 21:34

## 2023-09-05 RX ADMIN — Medication 975 MILLIGRAM(S): at 06:04

## 2023-09-05 RX ADMIN — OXYCODONE HYDROCHLORIDE 10 MILLIGRAM(S): 5 TABLET ORAL at 23:50

## 2023-09-05 RX ADMIN — Medication 1334 MILLIGRAM(S): at 12:41

## 2023-09-05 RX ADMIN — Medication 1334 MILLIGRAM(S): at 08:48

## 2023-09-05 NOTE — PROGRESS NOTE ADULT - ASSESSMENT
38 y/o male w/ a PMHx of ESRD (M/W/F via LUE AVF), SOLITARIO, HTN, secondary hyperparathyroidism s/p parathyroidectomy (Dec 2022), obesity, stroke at age 10 w/ no residual deficits, and recent left radial fracture sustained after trying to lower himself into a chair s/p splint who presented on 8/14 after a fall where he sustained a left femoral neck fracture, s/p left hemiarthroplasty (8/15). Post op, he was lethargic and hypoxemic requiring reintubation. Later became hypOtensive and was on pressor. Patient without cuff leak so he was given Decadron. He was successfully extubated on 8/17 despite no cuff leak and was quickly weaned off pressors afterwards. Had a fever to 101.2 (8/22), infectious workup unrevealing.    COMORBIDITES/ACTIVE MEDICAL ISSUES     #Femoral neck fracture  - S/p Left Hemiarthroplasty with Dr. Jason León (8/15), healing well  - Weight bearing as tolerated  - Gait Instability, ADL impairments and Functional impairments:  Comprehensive Rehab Program of PT/OT     #Recent Left radial head Fracture  - NWB to LUE  - Left arm in sling as needed for comfort   re-xray Left Forearm secondary to pain- with ulna shaft Fx - ortho saw patient- ulnar forearm splint placed    #ESRD - on HD (MWF)  #Anemia of chronic disease  - Right AVF   - Epo with HD  - Nephro-pat supplement    #Renal bone disease   - Home diet: phoslo with meals  - Ensure low phos diet  - Outpatient follow up with endo and bone scan to determine etiology   parathyroidectomy 9/11 cancelled- needs to wait till at least 6 weeks post op    #HTN  - Metoprolol 25 BID    #Mood  - Xanax 0.25 PRN    #Pain control  - Tylenol 975 q6h, Tramadol PRN    #GI/Bowel Mgmt   - At risk for constipation due to neurologic diagnosis, immobility and/or medication use  - Senna,  Miralax QD    #Bladder management  -anuric    #DVT ppx  - Heparin TID  - SCD, TEDs     #Skin:  - stage 2 pressure ulcer to coccyx: cleanse with NS, pat dry with gauze and apply foam dressing daily  -At risk for pressure injury due to neurologic diagnosis and relative immobility  -Bilateral heels - soft heel protectors, apply liquid barrier film daily and monitor for tissue type changes  - Turn Q2 hr while in bed, air mattress  - Skin barrier cream as needed  - Nursing to monitor skin Q shift    #Diet   - Regular + Thins  [Renal Diet]    Precautions / PROPHYLAXIS:   - Falls  - ortho: Weight bearing status: LLE WBAT; LUE NWB- ortho to see  - Lungs: Aspiration, Incentive Spirometer   - Pressure injury/Skin: Turn Q2hrs while in bed, OOB to Chair, PT/OT

## 2023-09-05 NOTE — PROGRESS NOTE ADULT - NS ATTEND AMEND GEN_ALL_CORE FT
Rehab Attending- Patient seen and examined by me - Case discussed, above note reviewed by me with modifications made    patient seen and evaluated bedside  no events over weekend  continue tightness in low back  wearing wrist/forearms support on LLE  Ambulated with one person assist using immobilizer on Left knee- then able to walk without immobilizer  labs reviewed by me- stable  to continue intensive rehab program    Vital Signs Last 24 Hrs  T(C): 37.1 (05 Sep 2023 08:51), Max: 37.3 (04 Sep 2023 21:30)  T(F): 98.8 (05 Sep 2023 08:51), Max: 99.1 (04 Sep 2023 21:30)  HR: 74 (05 Sep 2023 08:51) (68 - 90)  BP: 157/76 (05 Sep 2023 08:51) (118/62 - 157/76)  BP(mean): --  RR: 16 (05 Sep 2023 08:51) (16 - 17)  SpO2: 99% (05 Sep 2023 08:51) (97% - 99%)    Parameters below as of 05 Sep 2023 08:51  Patient On (Oxygen Delivery Method): room air    Gen - NAD, Comfortable  HEENT - NCAT, EOMI, MMM  Neck - Supple, No limited ROM  Pulm - CTAB, No wheeze, No rhonchi, No crackles  Cardiovascular - RRR, S1S2, No murmurs  Abdomen - Soft, NT/ND, +BS  Extremities no edema, no calf tenderness, Right AV fistula, old fistula to left upper arm- pain with palpation left mid forearm  Neuro-     Cognitive - awake, alert, oriented to person, place, date, year.  Able  to follow command     Cranial Nerves - CN 2-12 intact     Motor -                     LEFT    UE - 4-/5                    RIGHT UE - 4/5                    LEFT    LE - HF- 2/5, KE 3/5, DF 4/5, PF 4/5                    RIGHT LE - 4-/5        Sensory - Intact  to LT      Reflexes - DTR Intact, No primitive reflexive     Coordination - FTN intact   MSK: muscle weakness  Psychiatric - Mood stable, Affect WNL  Skin:  stage 2 pressure ulcer to coccyx- improving

## 2023-09-05 NOTE — PROGRESS NOTE ADULT - SUBJECTIVE AND OBJECTIVE BOX
HPI:  36 y/o male w/ a PMHx of ESRD (M/W/F via LUE AVF), SOLITARIO, HTN, secondary hyperparathyroidism s/p parathyroidectomy (Dec 2022), obesity, stroke at age 10 w/ no residual deficits, and recent left radial fracture sustained after trying to lower himself into a chair s/p splint who presented on 8/14 after a fall while in the shower. Patient sustained a left femoral neck fracture s/p left hemiarthroplasty on 8/15. Immediately post-op in PACU, patient was lethargic and hypoxemic requiring reintubation so he was admitted to SICU post-operatively. Patient was hypotensive after intubation requiring pressor support. Patient without cuff leak so he was given Decadron. He was successfully extubated on 8/17 despite no cuff leak and was quickly weaned off pressors afterwards. Had a fever to 101.2 (8/22), infectious workup unrevealing.    Patient was evaluated by PM&R and therapy for functional deficits and gait/ADL impairments and recommend acute rehabilitation.  Patient was medically optimized for discharge to [facility] on 8/24/2023       ROS/Subjective  Patient seen and evaluated in bed  Final ortho recs- ulnar forearm brace- maintain NWB LUE  No surgery on parathyroid gland till at least 6 weeks post op  No void   Moved Bowels 9/3  no dizziness, no headaches  no nausea, no vomiting  no SOB, No chest pain  pain persists left arm ,low back, left hip        MEDICATIONS  (STANDING):  acetaminophen     Tablet .. 975 milliGRAM(s) Oral every 6 hours  calcium acetate 1334 milliGRAM(s) Oral three times a day with meals  chlorhexidine 2% Cloths 1 Application(s) Topical daily  epoetin carito-epbx (RETACRIT) Injectable 38446 Unit(s) IV Push <User Schedule>  heparin   Injectable 5000 Unit(s) SubCutaneous every 8 hours  metoprolol tartrate 25 milliGRAM(s) Oral two times a day  Nephro-pat 1 Tablet(s) Oral daily  polyethylene glycol 3350 17 Gram(s) Oral daily  senna 2 Tablet(s) Oral at bedtime    MEDICATIONS  (PRN):  oxyCODONE    IR 10 milliGRAM(s) Oral every 6 hours PRN Severe Pain (7 - 10)                            9.5    4.89  )-----------( 268      ( 05 Sep 2023 08:12 )             29.9     09-05    134<L>  |  93<L>  |  98<H>  ----------------------------<  77  5.2   |  27  |  9.26<H>    Ca    9.5      05 Sep 2023 08:12  Phos  6.1     09-04  Mg     2.4     09-04    TPro  6.9  /  Alb  2.7<L>  /  TBili  0.4  /  DBili  x   /  AST  17  /  ALT  10  /  AlkPhos  819<H>  09-05    Urinalysis Basic - ( 05 Sep 2023 08:12 )    Color: x / Appearance: x / SG: x / pH: x  Gluc: 77 mg/dL / Ketone: x  / Bili: x / Urobili: x   Blood: x / Protein: x / Nitrite: x   Leuk Esterase: x / RBC: x / WBC x   Sq Epi: x / Non Sq Epi: x / Bacteria: x      Vital Signs Last 24 Hrs  T(C): 37.1 (05 Sep 2023 08:51), Max: 37.3 (04 Sep 2023 21:30)  T(F): 98.8 (05 Sep 2023 08:51), Max: 99.1 (04 Sep 2023 21:30)  HR: 74 (05 Sep 2023 08:51) (68 - 90)  BP: 157/76 (05 Sep 2023 08:51) (118/62 - 157/76)  BP(mean): --  RR: 16 (05 Sep 2023 08:51) (16 - 17)  SpO2: 99% (05 Sep 2023 08:51) (97% - 99%)    Parameters below as of 05 Sep 2023 08:51  Patient On (Oxygen Delivery Method): room air    Gen - NAD, Comfortable  HEENT - NCAT, EOMI, MMM  Neck - Supple, No limited ROM  Pulm - CTAB, No wheeze, No rhonchi, No crackles  Cardiovascular - RRR, S1S2, No murmurs  Abdomen - Soft, NT/ND, +BS  Extremities no edema, no calf tenderness, Right AV fistula, old fistula to left upper arm- pain with palpation left mid forearm  Neuro-     Cognitive - awake, alert, oriented to person, place, date, year.  Able  to follow command     Cranial Nerves - CN 2-12 intact     Motor -                     LEFT    UE - 4-/5                    RIGHT UE - 4/5                    LEFT    LE - HF- 2/5, KE 3/5, DF 4/5, PF 4/5                    RIGHT LE - 4-/5        Sensory - Intact  to LT      Reflexes - DTR Intact, No primitive reflexive     Coordination - FTN intact   MSK: muscle weakness  Psychiatric - Mood stable, Affect WNL  Skin:  stage 2 pressure ulcer to coccyx- improving      IDT 9/1  lives with aunt and cousin- cousin works  3STE one rail 15 Steps one rail upstairs ? first floor setup    OT   max A Transfers max A ADL    PT   Max A 1-2 standing in sarasteady    tentative Dc 9/11 JUSTINE.        Continue comprehensive acute rehab program consisting of 3hrs/day of OT/PT.

## 2023-09-05 NOTE — PROGRESS NOTE ADULT - SUBJECTIVE AND OBJECTIVE BOX
Medicine Progress Note    Patient is a 37y old  Male who presents with a chief complaint of Left femoral neck fracture (04 Sep 2023 23:21)      SUBJECTIVE / OVERNIGHT EVENTS:  no complaints  overnight anxious. xanax was given.    ADDITIONAL REVIEW OF SYSTEMS:  occassional postural dizziness. none at [facility] yet. denied fever/chills/CP/SOB/cough/palpitation/dizziness/abdominal pian/nausea/vomiting/diarrhoea/constipation/dysuria/leg or calf pain/headaches.all other ROS neg    MEDICATIONS  (STANDING):  acetaminophen     Tablet .. 975 milliGRAM(s) Oral every 6 hours  calcium acetate 1334 milliGRAM(s) Oral three times a day with meals  chlorhexidine 2% Cloths 1 Application(s) Topical daily  epoetin carito-epbx (RETACRIT) Injectable 58314 Unit(s) IV Push <User Schedule>  heparin   Injectable 5000 Unit(s) SubCutaneous every 8 hours  metoprolol tartrate 25 milliGRAM(s) Oral two times a day  Nephro-pat 1 Tablet(s) Oral daily  polyethylene glycol 3350 17 Gram(s) Oral daily  senna 2 Tablet(s) Oral at bedtime    MEDICATIONS  (PRN):  oxyCODONE    IR 10 milliGRAM(s) Oral every 6 hours PRN Severe Pain (7 - 10)    CAPILLARY BLOOD GLUCOSE        I&O's Summary    04 Sep 2023 07:01  -  05 Sep 2023 07:00  --------------------------------------------------------  IN: 800 mL / OUT: 3300 mL / NET: -2500 mL        PHYSICAL EXAM:  Vital Signs Last 24 Hrs  T(C): 37.1 (05 Sep 2023 08:51), Max: 37.3 (04 Sep 2023 21:30)  T(F): 98.8 (05 Sep 2023 08:51), Max: 99.1 (04 Sep 2023 21:30)  HR: 74 (05 Sep 2023 08:51) (68 - 90)  BP: 157/76 (05 Sep 2023 08:51) (118/62 - 157/76)  BP(mean): --  RR: 16 (05 Sep 2023 08:51) (16 - 17)  SpO2: 99% (05 Sep 2023 08:51) (97% - 99%)    Parameters below as of 05 Sep 2023 08:51  Patient On (Oxygen Delivery Method): room air    GENERAL: Not in distress. Alert    HEENT: clear conjuctiva, MMM. no pallor or icterus  CARDIOVASCULAR: RRR S1, S2. No murmur/rubs/gallop  LUNGS: BLAE+, no rales, no wheezing, no rhonchi.    ABDOMEN: ND. Soft,  NT, no guarding / rebound / rigidity. BS normoactive  BACK: No spine tenderness.  EXTREMITIES: no edema. no leg or calf TP.  SKIN: warm and dry. LUE AVF clean  PSYCHIATRIC: Calm.  No agitation.  CNS: AAO*3. moves limbs, follows commands    LABS:                        9.5    4.89  )-----------( 268      ( 05 Sep 2023 08:12 )             29.9     09-05    134<L>  |  93<L>  |  98<H>  ----------------------------<  77  5.2   |  27  |  9.26<H>    Ca    9.5      05 Sep 2023 08:12  Phos  6.1     09-04  Mg     2.4     09-04    TPro  6.9  /  Alb  2.7<L>  /  TBili  0.4  /  DBili  x   /  AST  17  /  ALT  10  /  AlkPhos  819<H>  09-05          Urinalysis Basic - ( 05 Sep 2023 08:12 )    Color: x / Appearance: x / SG: x / pH: x  Gluc: 77 mg/dL / Ketone: x  / Bili: x / Urobili: x   Blood: x / Protein: x / Nitrite: x   Leuk Esterase: x / RBC: x / WBC x   Sq Epi: x / Non Sq Epi: x / Bacteria: x        COVID-19 PCR: NotDetec (25 Aug 2023 08:15)  SARS-CoV-2: NotDetec (22 Aug 2023 01:12)  COVID-19 PCR: NotDetec (23 Jul 2023 19:22)      RADIOLOGY & ADDITIONAL TESTS:      Imaging from Last 24 Hours:    Electrocardiogram/QTc Interval:    COORDINATION OF CARE:  Care Discussed with Consultants/Other Providers:

## 2023-09-05 NOTE — PROGRESS NOTE ADULT - ASSESSMENT
38 y/o male w/ a PMHx of ESRD (M/W/F via LUE AVF), SOLITAIRO, HTN, secondary hyperparathyroidism s/p parathyroidectomy (Dec 2022), obesity, stroke at age 10 w/ no residual deficits, and recent left radial fracture sustained after trying to lower himself into a chair s/p splint who presented on 8/14 after a fall where he sustained a left femoral neck fracture, s/p left hemiarthroplasty (8/15). Post op, he was lethargic and hypoxemic requiring reintubation. Later became hypOtensive and was on pressor. Patient without cuff leak so he was given Decadron. He was successfully extubated on 8/17 despite no cuff leak and was quickly weaned off pressors afterwards. Had a fever to 101.2 (8/22), infectious workup unrevealing.    # Worsening ALP  - likely related to fractures/bone healing  - RUQ USG on July 2023: 1.  Mildly dilated main pancreatic duct as described above. Due to limited evaluation of pancreatic head cannot exclude obstruction. Recommend MRCP for better visualization of the pancreas.  Common bile duct, gallbladder and intrahepatic biliary tree within normal limits. Atrophic right kidney with multiple simple cysts as described above  - consider GI eval    Left Femoral neck fracture  - S/p Left Hemiarthroplasty with Dr. Jason León (8/15)  - tolerating comprehensive rehab  - pain control per primary  - fall and hip precautions    #Recent Left radial head Fracture  - NWB to LUE  - Left arm in sling as needed for comfort    #Recent febrile illness at prior hospital  - work up unrevealing     #ESRD - on HD (MWF)  #Anemia of chronic disease  - routine HD  - Epo with HD  - renal following    # Hyponatremia  - likely due to hypervolemia due to ESRD  - HD per routine    # Hyperkalemia  - likely due to ESRD  - resolved after HD  - monitor electrolytes periodically    #HTN  # postural dizziness  - Metoprolol 25 BID  - check OH Periodically    #Mood  - Xanax 0.25 PRN    # Hypoalbuminemia  # Severe protein calorie malnutrition  - nutrition eval on going    #DVT ppx: Heparin TID    d/w rehab team 38 y/o male w/ a PMHx of ESRD (M/W/F via LUE AVF), SOLITARIO, HTN, secondary hyperparathyroidism s/p parathyroidectomy (Dec 2022), obesity, stroke at age 10 w/ no residual deficits, and recent left radial fracture sustained after trying to lower himself into a chair s/p splint who presented on 8/14 after a fall where he sustained a left femoral neck fracture, s/p left hemiarthroplasty (8/15). Post op, he was lethargic and hypoxemic requiring reintubation. Later became hypOtensive and was on pressor. Patient without cuff leak so he was given Decadron. He was successfully extubated on 8/17 despite no cuff leak and was quickly weaned off pressors afterwards. Had a fever to 101.2 (8/22), infectious workup unrevealing.    # Worsening ALP  - likely related to fractures/bone healing  - RUQ USG on July 2023: 1.  Mildly dilated main pancreatic duct as described above. Due to limited evaluation of pancreatic head cannot exclude obstruction. Recommend MRCP for better visualization of the pancreas.  Common bile duct, gallbladder and intrahepatic biliary tree within normal limits. Atrophic right kidney with multiple simple cysts as described above  - GI eval [ informed Dr. Lara]    Left Femoral neck fracture  - S/p Left Hemiarthroplasty with Dr. Jason León (8/15)  - tolerating comprehensive rehab  - pain control per primary  - fall and hip precautions    #Recent Left radial head Fracture  - NWB to LUE  - Left arm in sling as needed for comfort    #Recent febrile illness at prior hospital  - work up unrevealing     #ESRD - on HD (MWF)  #Anemia of chronic disease  - routine HD  - Epo with HD  - renal following    # Hyponatremia  - likely due to hypervolemia due to ESRD  - HD per routine    # Hyperkalemia  - likely due to ESRD  - resolved after HD  - monitor electrolytes periodically    #HTN  # postural dizziness  - Metoprolol 25 BID  - check OH Periodically    #Mood  - Xanax 0.25 PRN    # Hypoalbuminemia  # Severe protein calorie malnutrition  - nutrition eval on going    #DVT ppx: Heparin TID    d/w rehab team 38 y/o male w/ a PMHx of ESRD (M/W/F via LUE AVF), SOLITARIO, HTN, secondary hyperparathyroidism s/p parathyroidectomy (Dec 2022), obesity, stroke at age 10 w/ no residual deficits, and recent left radial fracture sustained after trying to lower himself into a chair s/p splint who presented on 8/14 after a fall where he sustained a left femoral neck fracture, s/p left hemiarthroplasty (8/15). Post op, he was lethargic and hypoxemic requiring reintubation. Later became hypOtensive and was on pressor. Patient without cuff leak so he was given Decadron. He was successfully extubated on 8/17 despite no cuff leak and was quickly weaned off pressors afterwards. Had a fever to 101.2 (8/22), infectious workup unrevealing.    # Worsening ALP  - likely related to fractures/bone healing  - RUQ USG on July 2023: 1.  Mildly dilated main pancreatic duct as described above. Due to limited evaluation of pancreatic head cannot exclude obstruction. Recommend MRCP for better visualization of the pancreas.  Common bile duct, gallbladder and intrahepatic biliary tree within normal limits. Atrophic right kidney with multiple simple cysts as described above  - GI eval [ informed Dr. Lara]  - added GGT to am sample  - Repeat LFT in am    Left Femoral neck fracture  - S/p Left Hemiarthroplasty with Dr. Jason León (8/15)  - tolerating comprehensive rehab  - pain control per primary  - fall and hip precautions    #Recent Left radial head Fracture  - NWB to LUE  - Left arm in sling as needed for comfort    #Recent febrile illness at prior hospital  - work up unrevealing     #ESRD - on HD (MWF)  #Anemia of chronic disease  - routine HD  - Epo with HD  - renal following    # Hyponatremia  - likely due to hypervolemia due to ESRD  - HD per routine    # Hyperkalemia  - likely due to ESRD  - resolved after HD  - monitor electrolytes periodically    #HTN  # postural dizziness  - Metoprolol 25 BID  - check OH Periodically    #Mood  - Xanax 0.25 PRN    # Hypoalbuminemia  # Severe protein calorie malnutrition  - nutrition eval on going    #DVT ppx: Heparin TID    d/w rehab team

## 2023-09-05 NOTE — PROGRESS NOTE ADULT - SUBJECTIVE AND OBJECTIVE BOX
No distress    Vital Signs Last 24 Hrs  T(C): 37.4 (09-05-23 @ 20:01), Max: 37.4 (09-05-23 @ 20:01)  T(F): 99.3 (09-05-23 @ 20:01), Max: 99.3 (09-05-23 @ 20:01)  HR: 85 (09-05-23 @ 20:01) (74 - 85)  BP: 135/65 (09-05-23 @ 20:01) (135/65 - 157/76)  RR: 17 (09-05-23 @ 20:01) (16 - 17)  SpO2: 94% (09-05-23 @ 20:01) (94% - 99%)    I&O's Detail    04 Sep 2023 07:01  -  05 Sep 2023 07:00  --------------------------------------------------------  IN:    Other (mL): 800 mL  Total IN: 800 mL    OUT:    Other (mL): 3300 mL  Total OUT: 3300 mL    s1s2  b/l air entry  soft, ND  no edema                                 9.5    4.89  )-----------( 268      ( 05 Sep 2023 08:12 )             29.9     05 Sep 2023 08:12    134    |  93     |  98     ----------------------------<  77     5.2     |  27     |  9.26     Ca    9.5        05 Sep 2023 08:12  Phos  6.1       04 Sep 2023 23:20  Mg     2.4       04 Sep 2023 23:20    TPro  6.9    /  Alb  2.7    /  TBili  0.4    /  DBili  x      /  AST  17     /  ALT  10     /  AlkPhos  819    05 Sep 2023 08:12    LIVER FUNCTIONS - ( 05 Sep 2023 08:12 )  Alb: 2.7 g/dL / Pro: 6.9 g/dL / ALK PHOS: 819 U/L / ALT: 10 U/L / AST: 17 U/L / GGT: 30 U/L       acetaminophen     Tablet .. 975 milliGRAM(s) Oral every 6 hours  ALPRAZolam 0.25 milliGRAM(s) Oral every 6 hours PRN  calcium acetate 1334 milliGRAM(s) Oral three times a day with meals  chlorhexidine 2% Cloths 1 Application(s) Topical daily  epoetin carito-epbx (RETACRIT) Injectable 67321 Unit(s) IV Push <User Schedule>  heparin   Injectable 5000 Unit(s) SubCutaneous every 8 hours  metoprolol tartrate 25 milliGRAM(s) Oral two times a day  Nephro-pat 1 Tablet(s) Oral daily  oxyCODONE    IR 10 milliGRAM(s) Oral every 6 hours PRN  polyethylene glycol 3350 17 Gram(s) Oral daily  senna 2 Tablet(s) Oral at bedtime    A/P:    S/p fall, L femoral neck fracture, s/p L hemiarthroplasty on 8/15  Adm to [facility]  ESRD on HD MWF  HD tomorrow as ordered   TMP as able  Renal diet  Rehab    [phone]

## 2023-09-06 DIAGNOSIS — R74.8 ABNORMAL LEVELS OF OTHER SERUM ENZYMES: ICD-10-CM

## 2023-09-06 LAB
ALBUMIN SERPL ELPH-MCNC: 2.7 G/DL — LOW (ref 3.3–5)
ALP SERPL-CCNC: 794 U/L — HIGH (ref 40–120)
ALT FLD-CCNC: 10 U/L — SIGNIFICANT CHANGE UP (ref 10–45)
ANION GAP SERPL CALC-SCNC: 18 MMOL/L — HIGH (ref 5–17)
AST SERPL-CCNC: 16 U/L — SIGNIFICANT CHANGE UP (ref 10–40)
BILIRUB DIRECT SERPL-MCNC: 0.2 MG/DL — SIGNIFICANT CHANGE UP (ref 0–0.3)
BILIRUB INDIRECT FLD-MCNC: 0.2 MG/DL — SIGNIFICANT CHANGE UP (ref 0.2–1)
BILIRUB SERPL-MCNC: 0.4 MG/DL — SIGNIFICANT CHANGE UP (ref 0.2–1.2)
BUN SERPL-MCNC: 118 MG/DL — HIGH (ref 7–23)
CALCIUM SERPL-MCNC: 9.7 MG/DL — SIGNIFICANT CHANGE UP (ref 8.4–10.5)
CHLORIDE SERPL-SCNC: 94 MMOL/L — LOW (ref 96–108)
CO2 SERPL-SCNC: 23 MMOL/L — SIGNIFICANT CHANGE UP (ref 22–31)
CREAT SERPL-MCNC: 11.32 MG/DL — HIGH (ref 0.5–1.3)
EGFR: 5 ML/MIN/1.73M2 — LOW
GLUCOSE SERPL-MCNC: 65 MG/DL — LOW (ref 70–99)
HCT VFR BLD CALC: 30.7 % — LOW (ref 39–50)
HGB BLD-MCNC: 9.5 G/DL — LOW (ref 13–17)
MCHC RBC-ENTMCNC: 29.4 PG — SIGNIFICANT CHANGE UP (ref 27–34)
MCHC RBC-ENTMCNC: 30.9 GM/DL — LOW (ref 32–36)
MCV RBC AUTO: 95 FL — SIGNIFICANT CHANGE UP (ref 80–100)
NRBC # BLD: 0 /100 WBCS — SIGNIFICANT CHANGE UP (ref 0–0)
PHOSPHATE SERPL-MCNC: 6.9 MG/DL — HIGH (ref 2.5–4.5)
PLATELET # BLD AUTO: 270 K/UL — SIGNIFICANT CHANGE UP (ref 150–400)
POTASSIUM SERPL-MCNC: 5.5 MMOL/L — HIGH (ref 3.5–5.3)
POTASSIUM SERPL-SCNC: 5.5 MMOL/L — HIGH (ref 3.5–5.3)
PROT SERPL-MCNC: 7.2 G/DL — SIGNIFICANT CHANGE UP (ref 6–8.3)
RBC # BLD: 3.23 M/UL — LOW (ref 4.2–5.8)
RBC # FLD: 17.8 % — HIGH (ref 10.3–14.5)
SARS-COV-2 RNA SPEC QL NAA+PROBE: SIGNIFICANT CHANGE UP
SODIUM SERPL-SCNC: 135 MMOL/L — SIGNIFICANT CHANGE UP (ref 135–145)
WBC # BLD: 4.15 K/UL — SIGNIFICANT CHANGE UP (ref 3.8–10.5)
WBC # FLD AUTO: 4.15 K/UL — SIGNIFICANT CHANGE UP (ref 3.8–10.5)

## 2023-09-06 PROCEDURE — 99223 1ST HOSP IP/OBS HIGH 75: CPT

## 2023-09-06 PROCEDURE — 99232 SBSQ HOSP IP/OBS MODERATE 35: CPT | Mod: GC

## 2023-09-06 RX ORDER — SEVELAMER CARBONATE 2400 MG/1
1600 POWDER, FOR SUSPENSION ORAL
Refills: 0 | Status: DISCONTINUED | OUTPATIENT
Start: 2023-09-06 | End: 2023-09-19

## 2023-09-06 RX ORDER — LACTULOSE 10 G/15ML
10 SOLUTION ORAL ONCE
Refills: 0 | Status: COMPLETED | OUTPATIENT
Start: 2023-09-06 | End: 2023-09-06

## 2023-09-06 RX ADMIN — Medication 25 MILLIGRAM(S): at 17:45

## 2023-09-06 RX ADMIN — Medication 25 MILLIGRAM(S): at 06:02

## 2023-09-06 RX ADMIN — Medication 0.25 MILLIGRAM(S): at 21:45

## 2023-09-06 RX ADMIN — Medication 1334 MILLIGRAM(S): at 13:09

## 2023-09-06 RX ADMIN — HEPARIN SODIUM 5000 UNIT(S): 5000 INJECTION INTRAVENOUS; SUBCUTANEOUS at 06:02

## 2023-09-06 RX ADMIN — OXYCODONE HYDROCHLORIDE 10 MILLIGRAM(S): 5 TABLET ORAL at 00:01

## 2023-09-06 RX ADMIN — LACTULOSE 10 GRAM(S): 10 SOLUTION ORAL at 17:46

## 2023-09-06 RX ADMIN — ERYTHROPOIETIN 10000 UNIT(S): 10000 INJECTION, SOLUTION INTRAVENOUS; SUBCUTANEOUS at 15:26

## 2023-09-06 RX ADMIN — HEPARIN SODIUM 5000 UNIT(S): 5000 INJECTION INTRAVENOUS; SUBCUTANEOUS at 21:45

## 2023-09-06 RX ADMIN — Medication 1 TABLET(S): at 13:10

## 2023-09-06 RX ADMIN — Medication 1334 MILLIGRAM(S): at 17:45

## 2023-09-06 RX ADMIN — Medication 1334 MILLIGRAM(S): at 07:56

## 2023-09-06 NOTE — PROGRESS NOTE ADULT - NS ATTEND AMEND GEN_ALL_CORE FT
Rehab Attending- Patient seen and examined by me - Case discussed, above note reviewed by me with modifications made    patient seen and evaluated bedside  no events over weekend  continue tightness in low back  wearing wrist/forearms support on LLE  Ambulated with one person assist using immobilizer on Left knee- then able to walk without immobilizer  labs reviewed by me- stable  to continue intensive rehab program    Vital Signs Last 24 Hrs  T(C): 37.1 (05 Sep 2023 08:51), Max: 37.3 (04 Sep 2023 21:30)  T(F): 98.8 (05 Sep 2023 08:51), Max: 99.1 (04 Sep 2023 21:30)  HR: 74 (05 Sep 2023 08:51) (68 - 90)  BP: 157/76 (05 Sep 2023 08:51) (118/62 - 157/76)  BP(mean): --  RR: 16 (05 Sep 2023 08:51) (16 - 17)  SpO2: 99% (05 Sep 2023 08:51) (97% - 99%)    Parameters below as of 05 Sep 2023 08:51  Patient On (Oxygen Delivery Method): room air    Gen - NAD, Comfortable  HEENT - NCAT, EOMI, MMM  Neck - Supple, No limited ROM  Pulm - CTAB, No wheeze, No rhonchi, No crackles  Cardiovascular - RRR, S1S2, No murmurs  Abdomen - Soft, NT/ND, +BS  Extremities no edema, no calf tenderness, Right AV fistula, old fistula to left upper arm- pain with palpation left mid forearm  Neuro-     Cognitive - awake, alert, oriented to person, place, date, year.  Able  to follow command     Cranial Nerves - CN 2-12 intact     Motor -                     LEFT    UE - 4-/5                    RIGHT UE - 4/5                    LEFT    LE - HF- 2/5, KE 3/5, DF 4/5, PF 4/5                    RIGHT LE - 4-/5        Sensory - Intact  to LT      Reflexes - DTR Intact, No primitive reflexive     Coordination - FTN intact   MSK: muscle weakness  Psychiatric - Mood stable, Affect WNL  Skin:  stage 2 pressure ulcer to coccyx- improving Rehab Attending- Patient seen and examined by me - Case discussed, above note reviewed by me with modifications made    patient seen and evaluated bedside  tired in PT- Knee buckled with attempted ambulation  taught gluteal sets, quad sets in bed today  no Bm since 9/3- lactulose x 1 ordered  for HD today  to continue intensive rehab program    Vital Signs Last 24 Hrs  T(C): 37.2 (06 Sep 2023 07:55), Max: 37.4 (05 Sep 2023 20:01)  T(F): 98.9 (06 Sep 2023 07:55), Max: 99.3 (05 Sep 2023 20:01)  HR: 68 (06 Sep 2023 07:55) (68 - 98)  BP: 133/68 (06 Sep 2023 07:55) (133/68 - 146/77)  BP(mean): --  RR: 14 (06 Sep 2023 07:55) (14 - 17)  SpO2: 97% (06 Sep 2023 07:55) (94% - 97%)    Parameters below as of 06 Sep 2023 07:55  Patient On (Oxygen Delivery Method): room air      Gen - NAD, Comfortable  HEENT - NCAT, EOMI, MMM  Neck - Supple, No limited ROM  Pulm - CTAB, No wheeze, No rhonchi, No crackles  Cardiovascular - RRR, S1S2, No murmurs  Abdomen - Soft, NT/ND, +BS  Extremities no edema, no calf tenderness, Right AV fistula, old fistula to left upper arm- pain with palpation left mid forearm  Neuro-     Cognitive - awake, alert, oriented to person, place, date, year.  Able  to follow commands     Cranial Nerves - CN 2-12 intact     Motor -                     LEFT    UE - 4-/5                    RIGHT UE - 4/5                    LEFT    LE - HF- 2/5, KE 3/5, DF 4/5, PF 4/5                    RIGHT LE - 4-/5        Sensory - Intact  to LT      Reflexes - DTR Intact, No primitive reflexive     Coordination - FTN intact   MSK: muscle weakness  Psychiatric - Mood stable, Affect WNL  Skin:  stage 2 pressure ulcer to coccyx- improving

## 2023-09-06 NOTE — PROGRESS NOTE ADULT - ASSESSMENT
36 y/o male w/ a PMHx of ESRD (M/W/F via LUE AVF), SOLITARIO, HTN, secondary hyperparathyroidism s/p parathyroidectomy (Dec 2022), obesity, stroke at age 10 w/ no residual deficits, and recent left radial fracture sustained after trying to lower himself into a chair s/p splint who presented on 8/14 after a fall where he sustained a left femoral neck fracture, s/p left hemiarthroplasty (8/15). Post op, he was lethargic and hypoxemic requiring reintubation. Later became hypOtensive and was on pressor. Patient without cuff leak so he was given Decadron. He was successfully extubated on 8/17 despite no cuff leak and was quickly weaned off pressors afterwards. Had a fever to 101.2 (8/22), infectious workup unrevealing.    COMORBIDITES/ACTIVE MEDICAL ISSUES     #Femoral neck fracture  - S/p Left Hemiarthroplasty with Dr. Jason León (8/15), healing well  - Weight bearing as tolerated  - Gait Instability, ADL impairments and Functional impairments:  Comprehensive Rehab Program of PT/OT     #Recent Left radial head Fracture  - NWB to LUE  - Left arm in sling as needed for comfort   -re-xray Left Forearm secondary to pain- with ulna shaft Fx - ortho saw patient- ulnar forearm splint placed    #ESRD - on HD (MWF)  #Anemia of chronic disease  - Right AVF   - Epo with HD  - Nephro-pat supplement    #Renal bone disease   - Home diet: phoslo with meals  - Ensure low phos diet  - Outpatient follow up with endo and bone scan to determine etiology   parathyroidectomy 9/11 cancelled- needs to wait till at least 6 weeks post op    #HTN  - Metoprolol 25 BID    #Mood  - Xanax 0.25 PRN    #Pain control  - Tylenol 975 q6h, Tramadol PRN    #GI/Bowel Mgmt   - At risk for constipation due to neurologic diagnosis, immobility and/or medication use  - Senna,  Miralax QD    #Bladder management  -anuric    #DVT ppx  - Heparin TID     #Skin:  - stage 2 pressure ulcer to coccyx: cleanse with NS, pat dry with gauze and apply foam dressing daily  -At risk for pressure injury due to neurologic diagnosis and relative immobility  -Bilateral heels - soft heel protectors, apply liquid barrier film daily and monitor for tissue type changes  - Turn Q2 hr while in bed, air mattress  - Skin barrier cream as needed  - Nursing to monitor skin Q shift    #Diet   - Regular + Thins  [Renal Diet]    Precautions / PROPHYLAXIS:   - Falls  - ortho: Weight bearing status: LLE WBAT; LUE NWB- ortho to see  - Lungs: Aspiration, Incentive Spirometer   - Pressure injury/Skin: Turn Q2hrs while in bed, OOB to Chair, PT/OT

## 2023-09-06 NOTE — PROGRESS NOTE ADULT - SUBJECTIVE AND OBJECTIVE BOX
No distress    Vital Signs Last 24 Hrs  T(C): 37.1 (09-06-23 @ 20:00), Max: 37.2 (09-06-23 @ 07:55)  T(F): 98.7 (09-06-23 @ 20:00), Max: 98.9 (09-06-23 @ 07:55)  HR: 70 (09-06-23 @ 20:00) (68 - 98)  BP: 131/68 (09-06-23 @ 20:00) (126/61 - 146/77)  RR: 16 (09-06-23 @ 20:00) (14 - 16)  SpO2: 96% (09-06-23 @ 20:00) (96% - 100%)    I&O's Detail    06 Sep 2023 07:01  -  06 Sep 2023 20:05  --------------------------------------------------------  OUT:    Other (mL): 2500 mL  Total OUT: 2500 mL    s1s2  b/l air entry  soft, ND  no edema                                         9.5    4.15  )-----------( 270      ( 06 Sep 2023 11:50 )             30.7     06 Sep 2023 06:28    135    |  94     |  118    ----------------------------<  65     5.5     |  23     |  11.32    Ca    9.7        06 Sep 2023 06:28  Phos  6.9       06 Sep 2023 06:28  Mg     2.4       04 Sep 2023 23:20    TPro  7.2    /  Alb  2.7    /  TBili  0.4    /  DBili  0.2    /  AST  16     /  ALT  10     /  AlkPhos  794    06 Sep 2023 06:28    LIVER FUNCTIONS - ( 06 Sep 2023 06:28 )  Alb: 2.7 g/dL / Pro: 7.2 g/dL / ALK PHOS: 794 U/L / ALT: 10 U/L / AST: 16 U/L / GGT: x           acetaminophen     Tablet .. 975 milliGRAM(s) Oral every 6 hours  ALPRAZolam 0.25 milliGRAM(s) Oral every 6 hours PRN  calcium acetate 1334 milliGRAM(s) Oral three times a day with meals  chlorhexidine 2% Cloths 1 Application(s) Topical daily  epoetin carito-epbx (RETACRIT) Injectable 13425 Unit(s) IV Push <User Schedule>  heparin   Injectable 5000 Unit(s) SubCutaneous every 8 hours  metoprolol tartrate 25 milliGRAM(s) Oral two times a day  Nephro-pat 1 Tablet(s) Oral daily  oxyCODONE    IR 10 milliGRAM(s) Oral every 6 hours PRN  polyethylene glycol 3350 17 Gram(s) Oral daily  senna 2 Tablet(s) Oral at bedtime    A/P:    S/p fall, L femoral neck fracture, s/p L hemiarthroplasty on 8/15  Adm to [facility]  ESRD on HD MWF  HD today as ordered   TMP 2.5kg  Renal diet  Rehab    [phone]

## 2023-09-06 NOTE — CONSULT NOTE ADULT - ASSESSMENT
36 y/o male w/ a PMHx of ESRD (M/W/F via LUE AVF which clotted now Right AV fistula being used,  SOLITARIO, HTN, secondary hyperparathyroidism s/p parathyroidectomy (Dec 2022), obesity, stroke at age 10 w/ no residual deficits, and recent left radial fracture sustained after trying to lower himself into a chair s/p splint who presented on 8/14 after a fall while in the shower. Patient sustained a left femoral neck fracture s/p left hemiarthroplasty on 8/15.   Xray reviewed and Discussed with Dr Bowens.          Plan:   Dr Bowens to discuss with Dr León             No surgical intervention needed            Immobilization vs ROM  -   as per Ortho to decide.              Non Weight Bearing left arm               Will follow in am for further orders or recommendations             
36 y/o male w/ a PMHx of ESRD (M/W/F via LUE AVF), SOLITARIO, HTN, secondary hyperparathyroidism s/p parathyroidectomy (Dec 2022), obesity, stroke at age 10 w/ no residual deficits, and recent left radial fracture sustained after trying to lower himself into a chair s/p splint who presented on 8/14 after a fall while in the shower. Patient sustained a left femoral neck fracture s/p left hemiarthroplasty on 8/15.Post op hypoxic, hypotensive intubated, on pressures in ICU . He recovered quickly, extubated.   GI Consultation for elevated Alk Phos. AST, ALT and Bilirubin is normal. Alk phos trending down slowly. 
36 y/o male w/ a PMHx of ESRD (M/W/F via LUE AVF), SOLITARIO, HTN, secondary hyperparathyroidism s/p parathyroidectomy (Dec 2022), obesity, stroke at age 10 w/ no residual deficits, and recent left radial fracture sustained after trying to lower himself into a chair s/p splint who presented on 8/14 after a fall where he sustained a left femoral neck fracture, s/p left hemiarthroplasty (8/15). Post op, he was lethargic and hypoxemic requiring reintubation. Later became hypOtensive and was on pressor. Patient without cuff leak so he was given Decadron. He was successfully extubated on 8/17 despite no cuff leak and was quickly weaned off pressors afterwards. Had a fever to 101.2 (8/22), infectious workup unrevealing.      Left Femoral neck fracture  - S/p Left Hemiarthroplasty with Dr. Jason León (8/15)  - Weight bearing as tolerated  - pain management as per rehab team    #Recent Left radial head Fracture  - NWB to LUE  - Left arm in sling as needed for comfort    #Recent febrile illness at prior hospital  - work up unrevealing   - fever free  - cont to monitor fever curve    #ESRD - on HD (MWF)  #Anemia of chronic disease  - routine HD  - Epo with HD    #HTN  - Metoprolol 25 BID    #Mood  - Xanax 0.25 PRN    #DVT ppx  - Heparin TID  - SCD, TEDs

## 2023-09-06 NOTE — PROGRESS NOTE ADULT - SUBJECTIVE AND OBJECTIVE BOX
HPI:  38 y/o male w/ a PMHx of ESRD (M/W/F via LUE AVF), SOLITARIO, HTN, secondary hyperparathyroidism s/p parathyroidectomy (Dec 2022), obesity, stroke at age 10 w/ no residual deficits, and recent left radial fracture sustained after trying to lower himself into a chair s/p splint who presented on 8/14 after a fall while in the shower. Patient sustained a left femoral neck fracture s/p left hemiarthroplasty on 8/15. Immediately post-op in PACU, patient was lethargic and hypoxemic requiring reintubation so he was admitted to SICU post-operatively. Patient was hypotensive after intubation requiring pressor support. Patient without cuff leak so he was given Decadron. He was successfully extubated on 8/17 despite no cuff leak and was quickly weaned off pressors afterwards. Had a fever to 101.2 (8/22), infectious workup unrevealing.    Patient was evaluated by PM&R and therapy for functional deficits and gait/ADL impairments and recommend acute rehabilitation.  Patient was medically optimized for discharge to [facility] on 8/24/2023       ROS/Subjective  Patient seen and evaluated in bed  Final ortho recs- ulnar forearm brace- maintain NWB LUE  No surgery on parathyroid gland till at least 6 weeks post op  No void   Moved Bowels 9/3, lactulose x1 today  no dizziness, no headaches  no nausea, no vomiting  no SOB, No chest pain  pain persists left arm ,low back, left hip  Covid PCR check for potential exposure-asymptomatic    MEDICATIONS  (STANDING):  acetaminophen     Tablet .. 975 milliGRAM(s) Oral every 6 hours  calcium acetate 1334 milliGRAM(s) Oral three times a day with meals  chlorhexidine 2% Cloths 1 Application(s) Topical daily  epoetin carito-epbx (RETACRIT) Injectable 61093 Unit(s) IV Push <User Schedule>  heparin   Injectable 5000 Unit(s) SubCutaneous every 8 hours  metoprolol tartrate 25 milliGRAM(s) Oral two times a day  Nephro-pat 1 Tablet(s) Oral daily  polyethylene glycol 3350 17 Gram(s) Oral daily  senna 2 Tablet(s) Oral at bedtime    MEDICATIONS  (PRN):  ALPRAZolam 0.25 milliGRAM(s) Oral every 6 hours PRN anxiety  oxyCODONE    IR 10 milliGRAM(s) Oral every 6 hours PRN Severe Pain (7 - 10)                            9.5    4.89  )-----------( 268      ( 05 Sep 2023 08:12 )             29.9     09-05    134<L>  |  93<L>  |  98<H>  ----------------------------<  77  5.2   |  27  |  9.26<H>    Ca    9.5      05 Sep 2023 08:12  Phos  6.1     09-04  Mg     2.4     09-04    TPro  7.2  /  Alb  2.7<L>  /  TBili  0.4  /  DBili  0.2  /  AST  16  /  ALT  10  /  AlkPhos  794<H>  09-06    Urinalysis Basic - ( 05 Sep 2023 08:12 )    Color: x / Appearance: x / SG: x / pH: x  Gluc: 77 mg/dL / Ketone: x  / Bili: x / Urobili: x   Blood: x / Protein: x / Nitrite: x   Leuk Esterase: x / RBC: x / WBC x   Sq Epi: x / Non Sq Epi: x / Bacteria: x        Vital Signs Last 24 Hrs  T(C): 37.2 (06 Sep 2023 07:55), Max: 37.4 (05 Sep 2023 20:01)  T(F): 98.9 (06 Sep 2023 07:55), Max: 99.3 (05 Sep 2023 20:01)  HR: 68 (06 Sep 2023 07:55) (68 - 98)  BP: 133/68 (06 Sep 2023 07:55) (133/68 - 146/77)  BP(mean): --  RR: 14 (06 Sep 2023 07:55) (14 - 17)  SpO2: 97% (06 Sep 2023 07:55) (94% - 97%)    Parameters below as of 06 Sep 2023 07:55  Patient On (Oxygen Delivery Method): room air      Gen - NAD, Comfortable  HEENT - NCAT, EOMI, MMM  Neck - Supple, No limited ROM  Pulm - CTAB, No wheeze, No rhonchi, No crackles  Cardiovascular - RRR, S1S2, No murmurs  Abdomen - Soft, NT/ND, +BS  Extremities no edema, no calf tenderness, Right AV fistula, old fistula to left upper arm- pain with palpation left mid forearm  Neuro-     Cognitive - awake, alert, oriented to person, place, date, year.  Able  to follow command     Cranial Nerves - CN 2-12 intact     Motor -                     LEFT    UE - 4-/5                    RIGHT UE - 4/5                    LEFT    LE - HF- 2/5, KE 3/5, DF 4/5, PF 4/5                    RIGHT LE - 4-/5        Sensory - Intact  to LT      Reflexes - DTR Intact, No primitive reflexive     Coordination - FTN intact   MSK: muscle weakness  Psychiatric - Mood stable, Affect WNL  Skin:  stage 2 pressure ulcer to coccyx- improving      IDT 9/1  lives with aunt and cousin- cousin works  3STE one rail 15 Steps one rail upstairs ? first floor setup    OT   max A Transfers max A ADL    PT   Max A 1-2 standing in sarasteady    tentative Dc 9/11 JUSTINE.        Continue comprehensive acute rehab program consisting of 3hrs/day of OT/PT .

## 2023-09-06 NOTE — CONSULT NOTE ADULT - REASON FOR ADMISSION
Left femoral neck fracture
Left femoral neck fracture S/P Left hip hemiarthroplasty
Left femoral neck fracture
Left femoral neck fracture

## 2023-09-06 NOTE — CONSULT NOTE ADULT - SUBJECTIVE AND OBJECTIVE BOX
INTERVAL HPI/OVERNIGHT EVENTS:  HPI:  38 y/o male w/ a PMHx of ESRD (M/W/F via LUE AVF), SOLITARIO, HTN, secondary hyperparathyroidism s/p parathyroidectomy (Dec 2022), obesity, stroke at age 10 w/ no residual deficits, and recent left radial fracture sustained after trying to lower himself into a chair s/p splint who presented on 8/14 after a fall while in the shower. Patient sustained a left femoral neck fracture s/p left hemiarthroplasty on 8/15. Immediately post-op in PACU, patient was lethargic and hypoxemic requiring reintubation so he was admitted to SICU post-operatively. Patient was hypotensive after intubation requiring pressor support. Patient without cuff leak so he was given Decadron. He was successfully extubated on 8/17 despite no cuff leak and was quickly weaned off pressors afterwards. Had a fever to 101.2 (8/22), infectious workup unrevealing.    GI Consultation for Worsening ALP. abnormal USG july 21    Patient was evaluated by PM&R and therapy for functional deficits and gait/ADL impairments and recommend acute rehabilitation.  Patient was medically optimized for discharge to [facility] on 8/24/2023 (24 Aug 2023 14:05)    MEDICATIONS  (STANDING):  acetaminophen     Tablet .. 975 milliGRAM(s) Oral every 6 hours  calcium acetate 1334 milliGRAM(s) Oral three times a day with meals  chlorhexidine 2% Cloths 1 Application(s) Topical daily  epoetin carito-epbx (RETACRIT) Injectable 14178 Unit(s) IV Push <User Schedule>  heparin   Injectable 5000 Unit(s) SubCutaneous every 8 hours  metoprolol tartrate 25 milliGRAM(s) Oral two times a day  Nephro-pat 1 Tablet(s) Oral daily  polyethylene glycol 3350 17 Gram(s) Oral daily  senna 2 Tablet(s) Oral at bedtime    MEDICATIONS  (PRN):  ALPRAZolam 0.25 milliGRAM(s) Oral every 6 hours PRN anxiety  oxyCODONE    IR 10 milliGRAM(s) Oral every 6 hours PRN Severe Pain (7 - 10)      Allergies    No Known Drug Allergies  shellfish (Hives)    Intolerances        PAST MEDICAL & SURGICAL HISTORY:  HTN (hypertension)      Stroke  age 10      Morbid obesity      Sleep apnea      Leg fracture, right      Chronic renal failure      Hemodialysis access, AV graft      ESRD (end stage renal disease) on dialysis  since 2013, M-W-F      Anemia, unspecified type      Hypothyroidism      AV fistula  R arm; L arm clotted          REVIEW OF SYSTEMS  See HPI     PHYSICAL EXAM:   Vital Signs:  Vital Signs Last 24 Hrs  T(C): 37.2 (06 Sep 2023 07:55), Max: 37.4 (05 Sep 2023 20:01)  T(F): 98.9 (06 Sep 2023 07:55), Max: 99.3 (05 Sep 2023 20:01)  HR: 68 (06 Sep 2023 07:55) (68 - 98)  BP: 133/68 (06 Sep 2023 07:55) (133/68 - 146/77)  BP(mean): --  RR: 14 (06 Sep 2023 07:55) (14 - 17)  SpO2: 97% (06 Sep 2023 07:55) (94% - 97%)    Parameters below as of 06 Sep 2023 07:55  Patient On (Oxygen Delivery Method): room air      Daily     Daily I&O's Summary      GENERAL:  Appears stated age  HEENT:  NC/AT,  conjunctivae clear and pink  CHEST:  Full & symmetric excursion  HEART:  Regular rhythm, S1, S2  ABDOMEN:  Soft, non-tender, non-distended, normoactive bowel sounds  EXTEREMITIES:  no cyanosis, clubbing or edema  SKIN:  No rash/warm/dry  NEURO:  Alert, oriented    LABS:                        9.5    4.15  )-----------( 270      ( 06 Sep 2023 11:50 )             30.7     09-05    134<L>  |  93<L>  |  98<H>  ----------------------------<  77  5.2   |  27  |  9.26<H>    Ca    9.5      05 Sep 2023 08:12  Phos  6.1     09-04  Mg     2.4     09-04    TPro  7.2  /  Alb  2.7<L>  /  TBili  0.4  /  DBili  0.2  /  AST  16  /  ALT  10  /  AlkPhos  794<H>  09-06      Urinalysis Basic - ( 05 Sep 2023 08:12 )    Color: x / Appearance: x / SG: x / pH: x  Gluc: 77 mg/dL / Ketone: x  / Bili: x / Urobili: x   Blood: x / Protein: x / Nitrite: x   Leuk Esterase: x / RBC: x / WBC x   Sq Epi: x / Non Sq Epi: x / Bacteria: x      amylase   lipase  RADIOLOGY & ADDITIONAL TESTS:  ACC: [id number] EXAM:  US ABDOMEN RT UPR QUADRANT   ORDERED BY: ADRIEN ARNDT     PROCEDURE DATE:  07/21/2023          INTERPRETATION:  CLINICAL INFORMATION: 37-year-old male with history of   end-stage renal disease on hemodialysis with elevated alkaline   phosphatase, evaluate biliary tree    COMPARISON: Renal Doppler 12/19/2013    TECHNIQUE: Sonography of the right upper quadrant.    FINDINGS:  Liver: 18 cm Within normal limits.  Bile ducts: Normal caliber. Common bile duct measures 3.1 mm.  Gallbladder: Within normal limits.  Pancreas: Limited evaluation of pancreatic head due to overlying bowel   gas. Dilated main pancreatic duct in the body of the pancreas measuring   3.8 mm  Right kidney: 7.4 cm, atrophic in appearance with multiple simple cysts,   largest measuring 0.9 x 0.7 x 0.6 cm. When compared to renal Doppler   12/19/2013 right kidney has decreased in size, previously measuring 11.5   cm without any visualized cysts. No hydronephrosis.  Ascites: Trace  IVC: Visualized portions are within normal limits.    IMPRESSION:    1.  Mildly dilated main pancreatic duct as described above. Due to   limited evaluation of pancreatic head cannot exclude obstruction.   Recommend MRCP for better visualization of the pancreas.  2.  Common bile duct, gallbladder and intrahepatic biliary tree within   normal limits  3.  Atrophic right kidney with multiple simple cysts as described above    --- End of Report ---          LELE BELLA MD; Resident Radiologist  This document has been electronically signed.  AZRA JAVIER MD; Attending Radiologist  This document has been electronically signed. Jul 21 2023  9:40AM   INTERVAL HPI/OVERNIGHT EVENTS:  HPI:  38 y/o male w/ a PMHx of ESRD (M/W/F via LUE AVF), SOLITARIO, HTN, secondary hyperparathyroidism s/p parathyroidectomy (Dec 2022), obesity, stroke at age 10 w/ no residual deficits, and recent left radial fracture sustained after trying to lower himself into a chair s/p splint who presented on 8/14 after a fall while in the shower. Patient sustained a left femoral neck fracture s/p left hemiarthroplasty on 8/15. Immediately post-op in PACU, patient was lethargic and hypoxemic requiring reintubation so he was admitted to SICU post-operatively. Patient was hypotensive after intubation requiring pressor support. Patient without cuff leak so he was given Decadron. He was successfully extubated on 8/17 despite no cuff leak and was quickly weaned off pressors afterwards. Had a fever to 101.2 (8/22), infectious workup unrevealing.    GI Consultation for Worsening ALP, abnormal USG July 21. Patient seen and examined at bed side. He denies abdominal pain nausea, vomiting diarrhea or constipation. He denies feeling fatigue tired. He is not aware of any GI issues or liver issues in the past.     Patient was evaluated by PM&R and therapy for functional deficits and gait/ADL impairments and recommend acute rehabilitation.  Patient was medically optimized for discharge to [facility] on 8/24/2023 (24 Aug 2023 14:05)    MEDICATIONS  (STANDING):  acetaminophen     Tablet .. 975 milliGRAM(s) Oral every 6 hours  calcium acetate 1334 milliGRAM(s) Oral three times a day with meals  chlorhexidine 2% Cloths 1 Application(s) Topical daily  epoetin carito-epbx (RETACRIT) Injectable 38354 Unit(s) IV Push <User Schedule>  heparin   Injectable 5000 Unit(s) SubCutaneous every 8 hours  metoprolol tartrate 25 milliGRAM(s) Oral two times a day  Nephro-pat 1 Tablet(s) Oral daily  polyethylene glycol 3350 17 Gram(s) Oral daily  senna 2 Tablet(s) Oral at bedtime    MEDICATIONS  (PRN):  ALPRAZolam 0.25 milliGRAM(s) Oral every 6 hours PRN anxiety  oxyCODONE    IR 10 milliGRAM(s) Oral every 6 hours PRN Severe Pain (7 - 10)      Allergies    No Known Drug Allergies  shellfish (Hives)    Intolerances        PAST MEDICAL & SURGICAL HISTORY:  HTN (hypertension)      Stroke  age 10      Morbid obesity      Sleep apnea      Leg fracture, right      Chronic renal failure      Hemodialysis access, AV graft      ESRD (end stage renal disease) on dialysis  since 2013, M-W-F      Anemia, unspecified type      Hypothyroidism      AV fistula  R arm; L arm clotted          REVIEW OF SYSTEMS  See HPI     PHYSICAL EXAM:   Vital Signs:  Vital Signs Last 24 Hrs  T(C): 37.2 (06 Sep 2023 07:55), Max: 37.4 (05 Sep 2023 20:01)  T(F): 98.9 (06 Sep 2023 07:55), Max: 99.3 (05 Sep 2023 20:01)  HR: 68 (06 Sep 2023 07:55) (68 - 98)  BP: 133/68 (06 Sep 2023 07:55) (133/68 - 146/77)  BP(mean): --  RR: 14 (06 Sep 2023 07:55) (14 - 17)  SpO2: 97% (06 Sep 2023 07:55) (94% - 97%)    Parameters below as of 06 Sep 2023 07:55  Patient On (Oxygen Delivery Method): room air      Daily     Daily I&O's Summary      GENERAL:  Appears stated age  HEENT:  NC/AT,  conjunctivae clear and pink  CHEST:  Full & symmetric excursion  HEART:  Regular rhythm, S1, S2  ABDOMEN:  Soft, non-tender, non-distended, normoactive bowel sounds  EXTEREMITIES:  no cyanosis, clubbing or edema  SKIN:  No rash/warm/dry  NEURO:  Alert, oriented    LABS:                        9.5    4.15  )-----------( 270      ( 06 Sep 2023 11:50 )             30.7     09-05    134<L>  |  93<L>  |  98<H>  ----------------------------<  77  5.2   |  27  |  9.26<H>    Ca    9.5      05 Sep 2023 08:12  Phos  6.1     09-04  Mg     2.4     09-04    TPro  7.2  /  Alb  2.7<L>  /  TBili  0.4  /  DBili  0.2  /  AST  16  /  ALT  10  /  AlkPhos  794<H>  09-06      Urinalysis Basic - ( 05 Sep 2023 08:12 )    Color: x / Appearance: x / SG: x / pH: x  Gluc: 77 mg/dL / Ketone: x  / Bili: x / Urobili: x   Blood: x / Protein: x / Nitrite: x   Leuk Esterase: x / RBC: x / WBC x   Sq Epi: x / Non Sq Epi: x / Bacteria: x      amylase   lipase  RADIOLOGY & ADDITIONAL TESTS:  ACC: [id number] EXAM:  US ABDOMEN RT UPR QUADRANT   ORDERED BY: ADRIEN ARNDT     PROCEDURE DATE:  07/21/2023          INTERPRETATION:  CLINICAL INFORMATION: 37-year-old male with history of   end-stage renal disease on hemodialysis with elevated alkaline   phosphatase, evaluate biliary tree    COMPARISON: Renal Doppler 12/19/2013    TECHNIQUE: Sonography of the right upper quadrant.    FINDINGS:  Liver: 18 cm Within normal limits.  Bile ducts: Normal caliber. Common bile duct measures 3.1 mm.  Gallbladder: Within normal limits.  Pancreas: Limited evaluation of pancreatic head due to overlying bowel   gas. Dilated main pancreatic duct in the body of the pancreas measuring   3.8 mm  Right kidney: 7.4 cm, atrophic in appearance with multiple simple cysts,   largest measuring 0.9 x 0.7 x 0.6 cm. When compared to renal Doppler   12/19/2013 right kidney has decreased in size, previously measuring 11.5   cm without any visualized cysts. No hydronephrosis.  Ascites: Trace  IVC: Visualized portions are within normal limits.    IMPRESSION:    1.  Mildly dilated main pancreatic duct as described above. Due to   limited evaluation of pancreatic head cannot exclude obstruction.   Recommend MRCP for better visualization of the pancreas.  2.  Common bile duct, gallbladder and intrahepatic biliary tree within   normal limits  3.  Atrophic right kidney with multiple simple cysts as described above    --- End of Report ---          LELE BELLA MD; Resident Radiologist  This document has been electronically signed.  AZRA JAVIER MD; Attending Radiologist  This document has been electronically signed. Jul 21 2023  9:40AM

## 2023-09-06 NOTE — CONSULT NOTE ADULT - PROBLEM SELECTOR RECOMMENDATION 9
Elevated Alk phos possible multifactorial including ESRD, recent fracture.  Trend LFTs  Fractionate the Alk Phos   Avoid hepatotoxic agents

## 2023-09-06 NOTE — CONSULT NOTE ADULT - CONSULT REASON
Physical Therapy Daily Progress Note    Patient: Miranda Paredes   : 1991  Diagnosis/ICD-10 Code:  Pain, neck [M54.2]  Referring practitioner: Pam VIRGEN*  Date of Initial Visit: Type: THERAPY  Noted: 2019  Today's Date: 2019  Patient seen for 6 sessions         Miranda Paredes reports:  Decreased radiating pain in LUE.      Objective   See Exercise, Manual, and Modality Logs for complete treatment.       Assessment/Plan     Tolerates manual therapy well.  Observed continued hypomobility in cervical spine.  Improved mobility noted after treatment.     Progress per Plan of Care           Manual Therapy:    20     mins  13498;  Therapeutic Exercise:         mins  62786;     Neuromuscular Teddy:        mins  55595;    Therapeutic Activity:          mins  75513;     Gait Training:           mins  56650;     Ultrasound:          mins  37665;    Electrical Stimulation:         mins  37258 ( );  Dry Needling          mins self-pay    Timed Treatment:   20   mins   Total Treatment:     20   mins    Toni Powell PT  Physical Therapist                    
Worsening ALP. abnormal USG july 21
ESRD
Left femoral neck fracture
pain and swelling left forearm

## 2023-09-07 PROCEDURE — 99233 SBSQ HOSP IP/OBS HIGH 50: CPT

## 2023-09-07 PROCEDURE — 99232 SBSQ HOSP IP/OBS MODERATE 35: CPT | Mod: GC

## 2023-09-07 RX ADMIN — Medication 1 TABLET(S): at 13:08

## 2023-09-07 RX ADMIN — SEVELAMER CARBONATE 1600 MILLIGRAM(S): 2400 POWDER, FOR SUSPENSION ORAL at 08:16

## 2023-09-07 RX ADMIN — Medication 25 MILLIGRAM(S): at 06:05

## 2023-09-07 RX ADMIN — Medication 25 MILLIGRAM(S): at 18:21

## 2023-09-07 RX ADMIN — HEPARIN SODIUM 5000 UNIT(S): 5000 INJECTION INTRAVENOUS; SUBCUTANEOUS at 13:07

## 2023-09-07 RX ADMIN — SEVELAMER CARBONATE 1600 MILLIGRAM(S): 2400 POWDER, FOR SUSPENSION ORAL at 18:21

## 2023-09-07 RX ADMIN — OXYCODONE HYDROCHLORIDE 10 MILLIGRAM(S): 5 TABLET ORAL at 01:01

## 2023-09-07 RX ADMIN — OXYCODONE HYDROCHLORIDE 10 MILLIGRAM(S): 5 TABLET ORAL at 00:01

## 2023-09-07 RX ADMIN — OXYCODONE HYDROCHLORIDE 10 MILLIGRAM(S): 5 TABLET ORAL at 12:31

## 2023-09-07 RX ADMIN — HEPARIN SODIUM 5000 UNIT(S): 5000 INJECTION INTRAVENOUS; SUBCUTANEOUS at 21:32

## 2023-09-07 RX ADMIN — CHLORHEXIDINE GLUCONATE 1 APPLICATION(S): 213 SOLUTION TOPICAL at 06:06

## 2023-09-07 RX ADMIN — SEVELAMER CARBONATE 1600 MILLIGRAM(S): 2400 POWDER, FOR SUSPENSION ORAL at 12:25

## 2023-09-07 RX ADMIN — OXYCODONE HYDROCHLORIDE 10 MILLIGRAM(S): 5 TABLET ORAL at 13:10

## 2023-09-07 RX ADMIN — HEPARIN SODIUM 5000 UNIT(S): 5000 INJECTION INTRAVENOUS; SUBCUTANEOUS at 06:05

## 2023-09-07 RX ADMIN — OXYCODONE HYDROCHLORIDE 10 MILLIGRAM(S): 5 TABLET ORAL at 23:36

## 2023-09-07 RX ADMIN — CHLORHEXIDINE GLUCONATE 1 APPLICATION(S): 213 SOLUTION TOPICAL at 12:28

## 2023-09-07 NOTE — PROGRESS NOTE ADULT - ASSESSMENT
36 y/o male w/ a PMHx of ESRD (M/W/F via LUE AVF), SOLITARIO, HTN, secondary hyperparathyroidism s/p parathyroidectomy (Dec 2022), obesity, stroke at age 10 w/ no residual deficits, and recent left radial fracture sustained after trying to lower himself into a chair s/p splint who presented on 8/14 after a fall where he sustained a left femoral neck fracture, s/p left hemiarthroplasty (8/15). Post op, he was lethargic and hypoxemic requiring reintubation. Later became hypOtensive and was on pressor. Patient without cuff leak so he was given Decadron. He was successfully extubated on 8/17 despite no cuff leak and was quickly weaned off pressors afterwards. Had a fever to 101.2 (8/22), infectious workup unrevealing.    COMORBIDITES/ACTIVE MEDICAL ISSUES     #Femoral neck fracture  - S/p Left Hemiarthroplasty with Dr. Jason León (8/15), healing well  - Weight bearing as tolerated  - Gait Instability, ADL impairments and Functional impairments:  Comprehensive Rehab Program of PT/OT     #Recent Left radial head Fracture  - NWB to LUE  - Left arm in sling as needed for comfort   -re-xray Left Forearm secondary to pain- with ulna shaft Fx - ortho saw patient- ulnar forearm splint placed    #ESRD - on HD (MWF)  #Anemia of chronic disease  - Right AVF   - Epo with HD  - Nephro-pat supplement    #Renal bone disease   - Home diet: phoslo with meals  - Ensure low phos diet  - Outpatient follow up with endo and bone scan to determine etiology   parathyroidectomy 9/11 cancelled- needs to wait till at least 6 weeks post op    #HTN  - Metoprolol 25 BID    #Mood  - Xanax 0.25 PRN    #Pain control  - Tylenol 975 q6h, Tramadol PRN    #GI/Bowel Mgmt   - At risk for constipation due to neurologic diagnosis, immobility and/or medication use  - Senna,  Miralax QD    #Bladder management  -anuric    #DVT ppx  - Heparin TID     #Skin:  - stage 2 pressure ulcer to coccyx: cleanse with NS, pat dry with gauze and apply foam dressing daily  -At risk for pressure injury due to neurologic diagnosis and relative immobility  -Bilateral heels - soft heel protectors, apply liquid barrier film daily and monitor for tissue type changes  - Turn Q2 hr while in bed, air mattress  - Skin barrier cream as needed  - Nursing to monitor skin Q shift    #Diet   - Regular + Thins  [Renal Diet]    Precautions / PROPHYLAXIS:   - Falls  - ortho: Weight bearing status: LLE WBAT; LUE NWB- ortho to see  - Lungs: Aspiration, Incentive Spirometer   - Pressure injury/Skin: Turn Q2hrs while in bed, OOB to Chair, PT/OT   38 y/o male w/ a PMHx of ESRD (M/W/F via LUE AVF), SOLITARIO, HTN, secondary hyperparathyroidism s/p parathyroidectomy (Dec 2022), obesity, stroke at age 10 w/ no residual deficits, and recent left radial fracture sustained after trying to lower himself into a chair s/p splint who presented on 8/14 after a fall where he sustained a left femoral neck fracture, s/p left hemiarthroplasty (8/15). Post op, he was lethargic and hypoxemic requiring reintubation. Later became hypOtensive and was on pressor. Patient without cuff leak so he was given Decadron. He was successfully extubated on 8/17 despite no cuff leak and was quickly weaned off pressors afterwards. Had a fever to 101.2 (8/22), infectious workup unrevealing.    COMORBIDITES/ACTIVE MEDICAL ISSUES     #Femoral neck fracture  - S/p Left Hemiarthroplasty with Dr. Jason León (8/15), healing well  - Weight bearing as tolerated  - Gait Instability, ADL impairments and Functional impairments:  Comprehensive Rehab Program of PT/OT     #Recent Left radial head Fracture  - NWB to LUE  - Left arm in sling as needed for comfort   -re-xray Left Forearm secondary to pain- with ulna shaft Fx - ortho saw patient- ulnar forearm splint placed    #ESRD - on HD (MWF)  #Anemia of chronic disease  - Right AVF   - Epo with HD  - Nephro-pat supplement    #Renal bone disease   - Home diet: phoslo with meals  - Ensure low phos diet  - Outpatient follow up with endo and bone scan to determine etiology   parathyroidectomy 9/11 cancelled- needs to wait till at least 6 weeks post op    #HTN  - Metoprolol 25 BID    #Mood  - Xanax 0.25 PRN    #Pain control  - Tylenol 975 q6h, Tramadol PRN    #GI/Bowel Mgmt   - At risk for constipation due to neurologic diagnosis, immobility and/or medication use  - Senna,  Miralax QD    #Bladder management  -anuric    #DVT ppx  - Heparin TID     #Skin:  - stage 2 pressure ulcer to coccyx: cleanse with NS, pat dry with gauze and apply foam dressing daily  -At risk for pressure injury due to neurologic diagnosis and relative immobility  -Bilateral heels - soft heel protectors, apply liquid barrier film daily and monitor for tissue type changes  - Turn Q2 hr while in bed, air mattress  - Skin barrier cream as needed  - Nursing to monitor skin Q shift    #Diet   - Regular + Thins  [Renal Diet]    Precautions / PROPHYLAXIS:   - Falls  - ortho: Weight bearing status: LLE WBAT; LUE NWB  - Lungs: Aspiration, Incentive Spirometer   - Pressure injury/Skin: Turn Q2hrs while in bed, OOB to Chair, PT/OT

## 2023-09-07 NOTE — PROGRESS NOTE ADULT - SUBJECTIVE AND OBJECTIVE BOX
No distress    Vital Signs Last 24 Hrs  T(C): 37.1 (09-07-23 @ 20:18), Max: 37.1 (09-07-23 @ 20:18)  T(F): 98.8 (09-07-23 @ 20:18), Max: 98.8 (09-07-23 @ 20:18)  HR: 93 (09-07-23 @ 20:18) (62 - 93)  BP: 135/70 (09-07-23 @ 20:18) (135/70 - 154/64)  RR: 16 (09-07-23 @ 20:18) (16 - 17)  SpO2: 99% (09-07-23 @ 20:18) (99% - 100%)    I&O's Detail    06 Sep 2023 07:01  -  07 Sep 2023 07:00  --------------------------------------------------------  OUT:    Other (mL): 2500 mL  Total OUT: 2500 mL               9.5    4.15  )-----------( 270      ( 06 Sep 2023 11:50 )             30.7     06 Sep 2023 06:28    135    |  94     |  118    ----------------------------<  65     5.5     |  23     |  11.32    Ca    9.7        06 Sep 2023 06:28  Phos  6.9       06 Sep 2023 06:28    TPro  7.2    /  Alb  2.7    /  TBili  0.4    /  DBili  0.2    /  AST  16     /  ALT  10     /  AlkPhos  794    06 Sep 2023 06:28    LIVER FUNCTIONS - ( 06 Sep 2023 06:28 )  Alb: 2.7 g/dL / Pro: 7.2 g/dL / ALK PHOS: 794 U/L / ALT: 10 U/L / AST: 16 U/L / GGT: x           acetaminophen     Tablet .. 975 milliGRAM(s) Oral every 6 hours  chlorhexidine 2% Cloths 1 Application(s) Topical daily  epoetin carito-epbx (RETACRIT) Injectable 89779 Unit(s) IV Push <User Schedule>  heparin   Injectable 5000 Unit(s) SubCutaneous every 8 hours  metoprolol tartrate 25 milliGRAM(s) Oral two times a day  Nephro-pat 1 Tablet(s) Oral daily  oxyCODONE    IR 10 milliGRAM(s) Oral every 6 hours PRN  polyethylene glycol 3350 17 Gram(s) Oral daily  senna 2 Tablet(s) Oral at bedtime  sevelamer carbonate 1600 milliGRAM(s) Oral three times a day with meals    A/P:    S/p fall, L femoral neck fracture, s/p L hemiarthroplasty on 8/15  Adm to [facility]  ESRD on HD MWF  HD tomorrow as ordered   TMP 2.5kg as able  Renal diet  Renvela for high Phos    [phone]

## 2023-09-07 NOTE — PROGRESS NOTE ADULT - SUBJECTIVE AND OBJECTIVE BOX
HPI:  38 y/o male w/ a PMHx of ESRD (M/W/F via LUE AVF), SOLITARIO, HTN, secondary hyperparathyroidism s/p parathyroidectomy (Dec 2022), obesity, stroke at age 10 w/ no residual deficits, and recent left radial fracture sustained after trying to lower himself into a chair s/p splint who presented on 8/14 after a fall while in the shower. Patient sustained a left femoral neck fracture s/p left hemiarthroplasty on 8/15. Immediately post-op in PACU, patient was lethargic and hypoxemic requiring reintubation so he was admitted to SICU post-operatively. Patient was hypotensive after intubation requiring pressor support. Patient without cuff leak so he was given Decadron. He was successfully extubated on 8/17 despite no cuff leak and was quickly weaned off pressors afterwards. Had a fever to 101.2 (8/22), infectious workup unrevealing.    Patient was evaluated by PM&R and therapy for functional deficits and gait/ADL impairments and recommend acute rehabilitation.  Patient was medically optimized for discharge to [facility] on 8/24/2023       ROS/Subjective  Patient seen and evaluated in bed  Final ortho recs- ulnar forearm brace- maintain NWB LUE  No surgery on parathyroid gland till at least 6 weeks post op  No void   Moved Bowels 9/3, lactulose x1 today  no dizziness, no headaches  no nausea, no vomiting  no SOB, No chest pain  pain persists left arm ,low back, left hip  Covid PCR check for potential exposure-asymptomatic      MEDICATIONS  (STANDING):  acetaminophen     Tablet .. 975 milliGRAM(s) Oral every 6 hours  chlorhexidine 2% Cloths 1 Application(s) Topical daily  epoetin carito-epbx (RETACRIT) Injectable 62251 Unit(s) IV Push <User Schedule>  heparin   Injectable 5000 Unit(s) SubCutaneous every 8 hours  metoprolol tartrate 25 milliGRAM(s) Oral two times a day  Nephro-pat 1 Tablet(s) Oral daily  polyethylene glycol 3350 17 Gram(s) Oral daily  senna 2 Tablet(s) Oral at bedtime  sevelamer carbonate 1600 milliGRAM(s) Oral three times a day with meals    MEDICATIONS  (PRN):  ALPRAZolam 0.25 milliGRAM(s) Oral every 6 hours PRN anxiety  oxyCODONE    IR 10 milliGRAM(s) Oral every 6 hours PRN Severe Pain (7 - 10)                            9.5    4.15  )-----------( 270      ( 06 Sep 2023 11:50 )             30.7     09-06    135  |  94<L>  |  118<H>  ----------------------------<  65<L>  5.5<H>   |  23  |  11.32<H>    Ca    9.7      06 Sep 2023 06:28  Phos  6.9     09-06    TPro  7.2  /  Alb  2.7<L>  /  TBili  0.4  /  DBili  0.2  /  AST  16  /  ALT  10  /  AlkPhos  794<H>  09-06    Urinalysis Basic - ( 06 Sep 2023 06:28 )    Color: x / Appearance: x / SG: x / pH: x  Gluc: 65 mg/dL / Ketone: x  / Bili: x / Urobili: x   Blood: x / Protein: x / Nitrite: x   Leuk Esterase: x / RBC: x / WBC x   Sq Epi: x / Non Sq Epi: x / Bacteria: x        CAPILLARY BLOOD GLUCOSE          Vital Signs Last 24 Hrs  T(C): 36.9 (07 Sep 2023 08:22), Max: 37.1 (06 Sep 2023 20:00)  T(F): 98.4 (07 Sep 2023 08:22), Max: 98.7 (06 Sep 2023 20:00)  HR: 66 (07 Sep 2023 08:22) (62 - 90)  BP: 148/80 (07 Sep 2023 08:22) (126/61 - 154/64)  BP(mean): --  RR: 17 (07 Sep 2023 08:22) (14 - 17)  SpO2: 100% (07 Sep 2023 08:22) (96% - 100%)    Parameters below as of 07 Sep 2023 08:22  Patient On (Oxygen Delivery Method): room air    Gen - NAD, Comfortable  HEENT - NCAT, EOMI, MMM  Neck - Supple, No limited ROM  Pulm - CTAB, No wheeze, No rhonchi, No crackles  Cardiovascular - RRR, S1S2, No murmurs  Abdomen - Soft, NT/ND, +BS  Extremities no edema, no calf tenderness, Right AV fistula, old fistula to left upper arm- pain with palpation left mid forearm  Neuro-     Cognitive - awake, alert, oriented to person, place, date, year.  Able  to follow command     Cranial Nerves - CN 2-12 intact     Motor -                     LEFT    UE - 4-/5                    RIGHT UE - 4/5                    LEFT    LE - HF- 2/5, KE 3/5, DF 4/5, PF 4/5                    RIGHT LE - 4-/5        Sensory - Intact  to LT      Reflexes - DTR Intact, No primitive reflexive     Coordination - FTN intact   MSK: muscle weakness  Psychiatric - Mood stable, Affect WNL  Skin:  stage 2 pressure ulcer to coccyx- improving      IDT 9/1  lives with aunt and cousin- cousin works  3STE one rail 15 Steps one rail upstairs ? first floor setup    OT   max A Transfers max A ADL    PT   Max A 1-2 standing in [facility]    tentative Dc 9/11 JUSTINE.      Continue comprehensive acute rehab program consisting of 3hrs/day of OT/PT and SLP. HPI:  38 y/o male w/ a PMHx of ESRD (M/W/F via LUE AVF), SOLITARIO, HTN, secondary hyperparathyroidism s/p parathyroidectomy (Dec 2022), obesity, stroke at age 10 w/ no residual deficits, and recent left radial fracture sustained after trying to lower himself into a chair s/p splint who presented on 8/14 after a fall while in the shower. Patient sustained a left femoral neck fracture s/p left hemiarthroplasty on 8/15. Immediately post-op in PACU, patient was lethargic and hypoxemic requiring reintubation so he was admitted to SICU post-operatively. Patient was hypotensive after intubation requiring pressor support. Patient without cuff leak so he was given Decadron. He was successfully extubated on 8/17 despite no cuff leak and was quickly weaned off pressors afterwards. Had a fever to 101.2 (8/22), infectious workup unrevealing.    Patient was evaluated by PM&R and therapy for functional deficits and gait/ADL impairments and recommend acute rehabilitation.  Patient was medically optimized for discharge to [facility] on 8/24/2023       ROS/Subjective  Patient seen and evaluated in chair  Final ortho recs- ulnar forearm brace- maintain NWB LUE  No surgery on parathyroid gland till at least 6 weeks post op  No void   Moved Bowels s/p lactulose  no dizziness, no headaches  no nausea, no vomiting  no SOB, No chest pain  pain persists left arm ,low back, left hip  Covid PCR neg-asymptomatic      MEDICATIONS  (STANDING):  acetaminophen     Tablet .. 975 milliGRAM(s) Oral every 6 hours  chlorhexidine 2% Cloths 1 Application(s) Topical daily  epoetin carito-epbx (RETACRIT) Injectable 28495 Unit(s) IV Push <User Schedule>  heparin   Injectable 5000 Unit(s) SubCutaneous every 8 hours  metoprolol tartrate 25 milliGRAM(s) Oral two times a day  Nephro-pat 1 Tablet(s) Oral daily  polyethylene glycol 3350 17 Gram(s) Oral daily  senna 2 Tablet(s) Oral at bedtime  sevelamer carbonate 1600 milliGRAM(s) Oral three times a day with meals    MEDICATIONS  (PRN):  ALPRAZolam 0.25 milliGRAM(s) Oral every 6 hours PRN anxiety  oxyCODONE    IR 10 milliGRAM(s) Oral every 6 hours PRN Severe Pain (7 - 10)                            9.5    4.15  )-----------( 270      ( 06 Sep 2023 11:50 )             30.7     09-06    135  |  94<L>  |  118<H>  ----------------------------<  65<L>  5.5<H>   |  23  |  11.32<H>    Ca    9.7      06 Sep 2023 06:28  Phos  6.9     09-06    TPro  7.2  /  Alb  2.7<L>  /  TBili  0.4  /  DBili  0.2  /  AST  16  /  ALT  10  /  AlkPhos  794<H>  09-06    Urinalysis Basic - ( 06 Sep 2023 06:28 )    Color: x / Appearance: x / SG: x / pH: x  Gluc: 65 mg/dL / Ketone: x  / Bili: x / Urobili: x   Blood: x / Protein: x / Nitrite: x   Leuk Esterase: x / RBC: x / WBC x   Sq Epi: x / Non Sq Epi: x / Bacteria: x        CAPILLARY BLOOD GLUCOSE          Vital Signs Last 24 Hrs  T(C): 36.9 (07 Sep 2023 08:22), Max: 37.1 (06 Sep 2023 20:00)  T(F): 98.4 (07 Sep 2023 08:22), Max: 98.7 (06 Sep 2023 20:00)  HR: 66 (07 Sep 2023 08:22) (62 - 90)  BP: 148/80 (07 Sep 2023 08:22) (126/61 - 154/64)  BP(mean): --  RR: 17 (07 Sep 2023 08:22) (14 - 17)  SpO2: 100% (07 Sep 2023 08:22) (96% - 100%)    Parameters below as of 07 Sep 2023 08:22  Patient On (Oxygen Delivery Method): room air    Gen - NAD, Comfortable  HEENT - NCAT, EOMI, MMM  Neck - Supple, No limited ROM  Pulm - CTAB, No wheeze, No rhonchi, No crackles  Cardiovascular - RRR, S1S2, No murmurs  Abdomen - Soft, NT/ND, +BS  Extremities no edema, no calf tenderness, Right AV fistula, old fistula to left upper arm- pain with palpation left mid forearm  Neuro-     Cognitive - awake, alert, oriented to person, place, date, year.  Able  to follow command     Cranial Nerves - CN 2-12 intact     Motor -                     LEFT    UE - 4-/5                    RIGHT UE - 4/5                    LEFT    LE - HF- 2/5, KE 3/5, DF 4/5, PF 4/5                    RIGHT LE - 4-/5        Sensory - Intact  to LT      Reflexes - DTR Intact, No primitive reflexive     Coordination - FTN intact   MSK: muscle weakness  Psychiatric - Mood stable, Affect WNL  Skin:  stage 2 pressure ulcer to coccyx- improving      IDT 9/1  lives with aunt and cousin- cousin works  3STE one rail 15 Steps one rail upstairs ? first floor setup    OT   max A Transfers max A ADL    [facility]   [facility]    tentative Dc 9/11 JUSTINE.      Continue comprehensive acute rehab program consisting of 3hrs/day of OT/PT.

## 2023-09-07 NOTE — PROGRESS NOTE ADULT - NS ATTEND AMEND GEN_ALL_CORE FT
Rehab Attending- Patient seen and examined by me - Case discussed, above note reviewed by me with modifications made    patient seen and evaluated bedside  tired in PT- Knee buckled with attempted ambulation  taught gluteal sets, quad sets in bed today  no Bm since 9/3- lactulose x 1 ordered  for HD today  to continue intensive rehab program    Vital Signs Last 24 Hrs  T(C): 37.2 (06 Sep 2023 07:55), Max: 37.4 (05 Sep 2023 20:01)  T(F): 98.9 (06 Sep 2023 07:55), Max: 99.3 (05 Sep 2023 20:01)  HR: 68 (06 Sep 2023 07:55) (68 - 98)  BP: 133/68 (06 Sep 2023 07:55) (133/68 - 146/77)  BP(mean): --  RR: 14 (06 Sep 2023 07:55) (14 - 17)  SpO2: 97% (06 Sep 2023 07:55) (94% - 97%)    Parameters below as of 06 Sep 2023 07:55  Patient On (Oxygen Delivery Method): room air      Gen - NAD, Comfortable  HEENT - NCAT, EOMI, MMM  Neck - Supple, No limited ROM  Pulm - CTAB, No wheeze, No rhonchi, No crackles  Cardiovascular - RRR, S1S2, No murmurs  Abdomen - Soft, NT/ND, +BS  Extremities no edema, no calf tenderness, Right AV fistula, old fistula to left upper arm- pain with palpation left mid forearm  Neuro-     Cognitive - awake, alert, oriented to person, place, date, year.  Able  to follow commands     Cranial Nerves - CN 2-12 intact     Motor -                     LEFT    UE - 4-/5                    RIGHT UE - 4/5                    LEFT    LE - HF- 2/5, KE 3/5, DF 4/5, PF 4/5                    RIGHT LE - 4-/5        Sensory - Intact  to LT      Reflexes - DTR Intact, No primitive reflexive     Coordination - FTN intact   MSK: muscle weakness  Psychiatric - Mood stable, Affect WNL  Skin:  stage 2 pressure ulcer to coccyx- improving Rehab Attending- Patient seen and examined by me - Case discussed, above note reviewed by me with modifications made    patient seen and evaluated in PT  ambulating well with lite gait and knee immobilizer  BM x2 with lactulose yesterday  to contact ortho - ? clear for WB on Left elbow- No pain on ROM/palpation- now 6 weeks post Fx  to continue intensive rehab program    Vital Signs Last 24 Hrs  T(C): 36.9 (07 Sep 2023 08:22), Max: 37.1 (06 Sep 2023 20:00)  T(F): 98.4 (07 Sep 2023 08:22), Max: 98.7 (06 Sep 2023 20:00)  HR: 66 (07 Sep 2023 08:22) (62 - 70)  BP: 148/80 (07 Sep 2023 08:22) (131/68 - 154/64)  BP(mean): --  RR: 17 (07 Sep 2023 08:22) (16 - 17)  SpO2: 100% (07 Sep 2023 08:22) (96% - 100%)    Parameters below as of 07 Sep 2023 08:22  Patient On (Oxygen Delivery Method): room air      Gen - NAD, Comfortable  HEENT - NCAT, EOMI, MMM  Neck - Supple, No limited ROM  Pulm - CTAB, No wheeze, No rhonchi, No crackles  Cardiovascular - RRR, S1S2, No murmurs  Abdomen - Soft, NT/ND, +BS  Extremities no edema, no calf tenderness, Right AV fistula, old fistula to left upper arm- pain with palpation left mid forearm  Neuro-     Cognitive - awake, alert, oriented to person, place, date, year.  Able  to follow commands     Cranial Nerves - CN 2-12 intact     Motor -                     LEFT    UE - 4-/5                    RIGHT UE - 4/5                    LEFT    LE - HF- 2/5, KE 3/5, DF 4/5, PF 4/5                    RIGHT LE - 4-/5        Sensory - Intact  to LT      Reflexes - DTR Intact, No primitive reflexive     Coordination - FTN intact   MSK: muscle weakness  Psychiatric - Mood stable, Affect WNL  Skin:  stage 2 pressure ulcer to coccyx- improving

## 2023-09-08 LAB
ANION GAP SERPL CALC-SCNC: 16 MMOL/L — SIGNIFICANT CHANGE UP (ref 5–17)
BUN SERPL-MCNC: 104 MG/DL — HIGH (ref 7–23)
CALCIUM SERPL-MCNC: 9.6 MG/DL — SIGNIFICANT CHANGE UP (ref 8.4–10.5)
CHLORIDE SERPL-SCNC: 94 MMOL/L — LOW (ref 96–108)
CO2 SERPL-SCNC: 28 MMOL/L — SIGNIFICANT CHANGE UP (ref 22–31)
CREAT SERPL-MCNC: 11.14 MG/DL — HIGH (ref 0.5–1.3)
EGFR: 6 ML/MIN/1.73M2 — LOW
GLUCOSE SERPL-MCNC: 92 MG/DL — SIGNIFICANT CHANGE UP (ref 70–99)
HCT VFR BLD CALC: 29.4 % — LOW (ref 39–50)
HGB BLD-MCNC: 9.7 G/DL — LOW (ref 13–17)
MCHC RBC-ENTMCNC: 30.6 PG — SIGNIFICANT CHANGE UP (ref 27–34)
MCHC RBC-ENTMCNC: 33 GM/DL — SIGNIFICANT CHANGE UP (ref 32–36)
MCV RBC AUTO: 92.7 FL — SIGNIFICANT CHANGE UP (ref 80–100)
NRBC # BLD: 0 /100 WBCS — SIGNIFICANT CHANGE UP (ref 0–0)
PHOSPHATE SERPL-MCNC: 7.1 MG/DL — HIGH (ref 2.5–4.5)
PLATELET # BLD AUTO: 275 K/UL — SIGNIFICANT CHANGE UP (ref 150–400)
POTASSIUM SERPL-MCNC: 5.3 MMOL/L — SIGNIFICANT CHANGE UP (ref 3.5–5.3)
POTASSIUM SERPL-SCNC: 5.3 MMOL/L — SIGNIFICANT CHANGE UP (ref 3.5–5.3)
RBC # BLD: 3.17 M/UL — LOW (ref 4.2–5.8)
RBC # FLD: 17.8 % — HIGH (ref 10.3–14.5)
SODIUM SERPL-SCNC: 138 MMOL/L — SIGNIFICANT CHANGE UP (ref 135–145)
WBC # BLD: 4.63 K/UL — SIGNIFICANT CHANGE UP (ref 3.8–10.5)
WBC # FLD AUTO: 4.63 K/UL — SIGNIFICANT CHANGE UP (ref 3.8–10.5)

## 2023-09-08 PROCEDURE — 99232 SBSQ HOSP IP/OBS MODERATE 35: CPT

## 2023-09-08 PROCEDURE — 99232 SBSQ HOSP IP/OBS MODERATE 35: CPT | Mod: GC

## 2023-09-08 RX ORDER — LACTULOSE 10 G/15ML
10 SOLUTION ORAL DAILY
Refills: 0 | Status: DISCONTINUED | OUTPATIENT
Start: 2023-09-08 | End: 2023-09-19

## 2023-09-08 RX ORDER — OXYCODONE HYDROCHLORIDE 5 MG/1
10 TABLET ORAL EVERY 6 HOURS
Refills: 0 | Status: DISCONTINUED | OUTPATIENT
Start: 2023-09-08 | End: 2023-09-15

## 2023-09-08 RX ADMIN — HEPARIN SODIUM 5000 UNIT(S): 5000 INJECTION INTRAVENOUS; SUBCUTANEOUS at 06:56

## 2023-09-08 RX ADMIN — SENNA PLUS 2 TABLET(S): 8.6 TABLET ORAL at 22:45

## 2023-09-08 RX ADMIN — Medication 25 MILLIGRAM(S): at 06:56

## 2023-09-08 RX ADMIN — HEPARIN SODIUM 5000 UNIT(S): 5000 INJECTION INTRAVENOUS; SUBCUTANEOUS at 22:45

## 2023-09-08 RX ADMIN — SEVELAMER CARBONATE 1600 MILLIGRAM(S): 2400 POWDER, FOR SUSPENSION ORAL at 18:04

## 2023-09-08 RX ADMIN — SEVELAMER CARBONATE 1600 MILLIGRAM(S): 2400 POWDER, FOR SUSPENSION ORAL at 08:53

## 2023-09-08 RX ADMIN — OXYCODONE HYDROCHLORIDE 10 MILLIGRAM(S): 5 TABLET ORAL at 00:36

## 2023-09-08 RX ADMIN — Medication 25 MILLIGRAM(S): at 18:04

## 2023-09-08 RX ADMIN — ERYTHROPOIETIN 10000 UNIT(S): 10000 INJECTION, SOLUTION INTRAVENOUS; SUBCUTANEOUS at 14:55

## 2023-09-08 NOTE — PROGRESS NOTE ADULT - SUBJECTIVE AND OBJECTIVE BOX
Seen on HD    Vital Signs Last 24 Hrs  T(C): 36.8 (09-08-23 @ 17:10), Max: 37 (09-08-23 @ 08:54)  T(F): 98.2 (09-08-23 @ 17:10), Max: 98.6 (09-08-23 @ 08:54)  HR: 85 (09-08-23 @ 17:10) (74 - 85)  BP: 111/58 (09-08-23 @ 17:10) (111/58 - 168/75)  RR: 16 (09-08-23 @ 17:10) (16 - 16)  SpO2: 100% (09-08-23 @ 17:10) (98% - 100%)    I&O's Detail    08 Sep 2023 07:01  -  08 Sep 2023 21:29  --------------------------------------------------------  OUT:    Other (mL): 2500 mL  Total OUT: 2500 mL    s1s2  b/l air entry  soft, ND  no edema, AVF patent                        9.7    4.63  )-----------( 275      ( 08 Sep 2023 14:20 )             29.4     08 Sep 2023 14:20    138    |  94     |  104    ----------------------------<  92     5.3     |  28     |  11.14    Ca    9.6        08 Sep 2023 14:20  Phos  7.1       08 Sep 2023 14:20    acetaminophen     Tablet .. 975 milliGRAM(s) Oral every 6 hours  chlorhexidine 2% Cloths 1 Application(s) Topical daily  epoetin carito-epbx (RETACRIT) Injectable 34916 Unit(s) IV Push <User Schedule>  heparin   Injectable 5000 Unit(s) SubCutaneous every 8 hours  lactulose Syrup 10 Gram(s) Oral daily PRN  metoprolol tartrate 25 milliGRAM(s) Oral two times a day  Nephro-pat 1 Tablet(s) Oral daily  oxyCODONE    IR 10 milliGRAM(s) Oral every 6 hours PRN  polyethylene glycol 3350 17 Gram(s) Oral daily  senna 2 Tablet(s) Oral at bedtime  sevelamer carbonate 1600 milliGRAM(s) Oral three times a day with meals    A/P:    S/p fall, L femoral neck fracture, s/p L hemiarthroplasty on 8/15  [facility]m to [facility]  ESRD on HD MWF  TMP 2.5kg w/HD today  Renal diet  Renvela for high Phos    [phone]

## 2023-09-08 NOTE — PROGRESS NOTE ADULT - ASSESSMENT
36 y/o male w/ a PMHx of ESRD (M/W/F via LUE AVF), SOLITARIO, HTN, secondary hyperparathyroidism s/p parathyroidectomy (Dec 2022), obesity, stroke at age 10 w/ no residual deficits, and recent left radial fracture sustained after trying to lower himself into a chair s/p splint who presented on 8/14 after a fall where he sustained a left femoral neck fracture, s/p left hemiarthroplasty (8/15). Post op, he was lethargic and hypoxemic requiring reintubation. Later became hypOtensive and was on pressor. Patient without cuff leak so he was given Decadron. He was successfully extubated on 8/17 despite no cuff leak and was quickly weaned off pressors afterwards. Had a fever to 101.2 (8/22), infectious workup unrevealing.    COMORBIDITES/ACTIVE MEDICAL ISSUES     #Femoral neck fracture  - S/p Left Hemiarthroplasty with Dr. Jason León (8/15), healing well  - Weight bearing as tolerated  - Gait Instability, ADL impairments and Functional impairments:  Comprehensive Rehab Program of PT/OT     #Recent Left radial head Fracture  - NWB to LUE-awaiting ortho input re/weigh bearing  - Left arm in sling as needed for comfort   -re-xray Left Forearm secondary to pain- with ulna shaft Fx - ortho saw patient- ulnar forearm splint placed    #ESRD - on HD (MWF)  #Anemia of chronic disease  - Right AVF   - Epo with HD  - Nephro-pat supplement    #Renal bone disease   - Home diet: phoslo with meals  - Ensure low phos diet  - Outpatient follow up with endo and bone scan to determine etiology   parathyroidectomy 9/11 cancelled- needs to wait till at least 6 weeks post op    #HTN  - Metoprolol 25 BID    #Mood  - Xanax 0.25 PRN    #Pain control  - Tylenol 975 q6h, Tramadol PRN    #GI/Bowel Mgmt   - At risk for constipation due to neurologic diagnosis, immobility and/or medication use  - Senna,  Miralax QD    #Bladder management  -anuric    #DVT ppx  - Heparin TID     #Skin:  - stage 2 pressure ulcer to coccyx: cleanse with NS, pat dry with gauze and apply foam dressing daily  -At risk for pressure injury due to neurologic diagnosis and relative immobility  -Bilateral heels - soft heel protectors, apply liquid barrier film daily and monitor for tissue type changes  - Turn Q2 hr while in bed, air mattress  - Skin barrier cream as needed  - Nursing to monitor skin Q shift    #Diet   - Regular + Thins  [Renal Diet]    Precautions / PROPHYLAXIS:   - Falls  - ortho: Weight bearing status: LLE WBAT; LUE NWB  - Lungs: Aspiration, Incentive Spirometer   - Pressure injury/Skin: Turn Q2hrs while in bed, OOB to Chair, PT/OT

## 2023-09-08 NOTE — PROGRESS NOTE ADULT - SUBJECTIVE AND OBJECTIVE BOX
PROGRESS NOTE:     Patient is a 37y old  Male who presents with a chief complaint of Left femoral neck fracture (08 Sep 2023 12:04)          SUBJECTIVE & OBJECTIVE:   Pt seen and examined at bedside in AM    no overnight events.   no new complaints    REVIEW OF SYSTEMS: remaining ROS negative     PHYSICAL EXAM:  VITALS:  Vital Signs Last 24 Hrs  T(C): 37 (08 Sep 2023 08:54), Max: 37.1 (07 Sep 2023 20:18)  T(F): 98.6 (08 Sep 2023 08:54), Max: 98.8 (07 Sep 2023 20:18)  HR: 77 (08 Sep 2023 08:54) (77 - 93)  BP: 168/75 (08 Sep 2023 08:54) (134/81 - 168/75)  BP(mean): --  RR: 16 (08 Sep 2023 08:54) (16 - 16)  SpO2: 98% (08 Sep 2023 08:54) (98% - 99%)    Parameters below as of 07 Sep 2023 20:18  Patient On (Oxygen Delivery Method): room air          GENERAL: NAD,  no increased WOB  HEAD:  Atraumatic, Normocephalic  EYES: EOMI, PERRLA, conjunctiva and sclera clear  ENMT: Moist mucous membranes  NECK: Supple, No JVD  NERVOUS SYSTEM:  Alert & Oriented X3  CHEST/LUNG: Clear to auscultation bilaterally; No rales, rhonchi, wheezing, or rubs  HEART: Regular rate and rhythm  ABDOMEN: Soft, Nontender, Nondistended; Bowel sounds present  EXTREMITIES:   No clubbing, cyanosis, calf tenderness or edema b/l  SKIN: LUE fistula clean      MEDICATIONS  (STANDING):  acetaminophen     Tablet .. 975 milliGRAM(s) Oral every 6 hours  chlorhexidine 2% Cloths 1 Application(s) Topical daily  epoetin carito-epbx (RETACRIT) Injectable 34350 Unit(s) IV Push <User Schedule>  heparin   Injectable 5000 Unit(s) SubCutaneous every 8 hours  metoprolol tartrate 25 milliGRAM(s) Oral two times a day  Nephro-pat 1 Tablet(s) Oral daily  polyethylene glycol 3350 17 Gram(s) Oral daily  senna 2 Tablet(s) Oral at bedtime  sevelamer carbonate 1600 milliGRAM(s) Oral three times a day with meals    MEDICATIONS  (PRN):  oxyCODONE    IR 10 milliGRAM(s) Oral every 6 hours PRN Severe Pain (7 - 10)      Allergies    No Known Drug Allergies  shellfish (Hives)    Intolerances              LABS:                 CAPILLARY BLOOD GLUCOSE                    RECENT CULTURES:          RADIOLOGY & ADDITIONAL TESTS:        Care Discussed with Consultants/Other Providers [x ] YES  [ ] NO

## 2023-09-08 NOTE — PROGRESS NOTE ADULT - ASSESSMENT
38 y/o male w/ a PMHx of ESRD (M/W/F via LUE AVF), SOLITARIO, HTN, secondary hyperparathyroidism s/p parathyroidectomy (Dec 2022), obesity, stroke at age 10 w/ no residual deficits, and recent left radial fracture sustained after trying to lower himself into a chair s/p splint who presented on 8/14 after a fall where he sustained a left femoral neck fracture, s/p left hemiarthroplasty (8/15). Post op, he was lethargic and hypoxemic requiring reintubation. Later became hypOtensive and was on pressor. Patient without cuff leak so he was given Decadron. He was successfully extubated on 8/17 despite no cuff leak and was quickly weaned off pressors afterwards. Had a fever to 101.2 (8/22), infectious workup unrevealing.        Left Femoral neck fracture  - S/p Left Hemiarthroplasty with Dr. Jason León (8/15)  - tolerating comprehensive rehab  - pain control per primary  - fall and hip precautions    #Recent Left radial head Fracture  - NWB to LUE  - Left arm in sling as needed for comfort    #Recent febrile illness at prior hospital  - work up unrevealing     #ESRD - on HD ([facility])  #Anemia of chronic disease  - routine HD  - Epo with HD  - renal following    Secondary hyperparathyroidism  -secondary to ESRD  -Elevated PTH, normal Ca, high phos  -Elevated Alk, normal GGT, likely secondary to bone turnover than bile acid dysfunction  -Lft wnl, TB wnl  -C/w Sevelamer  -Renal following    # Hyponatremia  - likely due to hypervolemia due to ESRD  - HD per routine    # Hyperkalemia  - likely due to ESRD  - resolved after HD  - monitor electrolytes periodically    #HTN  # postural dizziness  - Metoprolol 25 BID  - check OH Periodically    #Mood  - Xanax 0.25 PRN    # Hypoalbuminemia  # Severe protein calorie malnutrition  - nutrition eval on going    #DVT ppx: Heparin TID    d/w rehab team

## 2023-09-08 NOTE — PROGRESS NOTE ADULT - NS ATTEND AMEND GEN_ALL_CORE FT
Rehab Attending- Patient seen and examined by me - Case discussed, above note reviewed by me with modifications made    patient seen and evaluated in PT  ambulating well with lite gait and knee immobilizer  BM x2 with lactulose yesterday  to contact ortho - ? clear for WB on Left elbow- No pain on ROM/palpation- now 6 weeks post Fx  to continue intensive rehab program    Vital Signs Last 24 Hrs  T(C): 36.9 (07 Sep 2023 08:22), Max: 37.1 (06 Sep 2023 20:00)  T(F): 98.4 (07 Sep 2023 08:22), Max: 98.7 (06 Sep 2023 20:00)  HR: 66 (07 Sep 2023 08:22) (62 - 70)  BP: 148/80 (07 Sep 2023 08:22) (131/68 - 154/64)  BP(mean): --  RR: 17 (07 Sep 2023 08:22) (16 - 17)  SpO2: 100% (07 Sep 2023 08:22) (96% - 100%)    Parameters below as of 07 Sep 2023 08:22  Patient On (Oxygen Delivery Method): room air      Gen - NAD, Comfortable  HEENT - NCAT, EOMI, MMM  Neck - Supple, No limited ROM  Pulm - CTAB, No wheeze, No rhonchi, No crackles  Cardiovascular - RRR, S1S2, No murmurs  Abdomen - Soft, NT/ND, +BS  Extremities no edema, no calf tenderness, Right AV fistula, old fistula to left upper arm- pain with palpation left mid forearm  Neuro-     Cognitive - awake, alert, oriented to person, place, date, year.  Able  to follow commands     Cranial Nerves - CN 2-12 intact     Motor -                     LEFT    UE - 4-/5                    RIGHT UE - 4/5                    LEFT    LE - HF- 2/5, KE 3/5, DF 4/5, PF 4/5                    RIGHT LE - 4-/5        Sensory - Intact  to LT      Reflexes - DTR Intact, No primitive reflexive     Coordination - FTN intact   MSK: muscle weakness  Psychiatric - Mood stable, Affect WNL  Skin:  stage 2 pressure ulcer to coccyx- improving Rehab Attending- Patient seen and examined by me - Case discussed, above note reviewed by me with modifications made    patient seen and evaluated in OT  ambulating well with lite gait and knee immobilizer  last Bm 9/6- lactulose PRN Ordered  reports persistent pain at midshaft ulna  seen by ortho - cleared to wt bear through elbow with splint on- can use platform  discussed in team- ? DC to [facility] on Monday 9/11  to continue intensive rehab program    Vital Signs Last 24 Hrs  T(C): 36.8 (08 Sep 2023 17:10), Max: 37.1 (07 Sep 2023 20:18)  T(F): 98.2 (08 Sep 2023 17:10), Max: 98.8 (07 Sep 2023 20:18)  HR: 85 (08 Sep 2023 17:10) (74 - 93)  BP: 111/58 (08 Sep 2023 17:10) (111/58 - 168/75)  BP(mean): --  RR: 16 (08 Sep 2023 17:10) (16 - 16)  SpO2: 100% (08 Sep 2023 17:10) (98% - 100%)    Parameters below as of 08 Sep 2023 17:10  Patient On (Oxygen Delivery Method): room air    Gen - NAD, Comfortable  HEENT - NCAT, EOMI, MMM  Neck - Supple, No limited ROM  Pulm - CTAB, No wheeze, No rhonchi, No crackles  Cardiovascular - RRR, S1S2, No murmurs  Abdomen - Soft, NT/ND, +BS  Extremities no edema, no calf tenderness, Right AV fistula, old fistula to left upper arm- pain with palpation left mid forearm  Neuro-     Cognitive - awake, alert, oriented to person, place, date, year.  Able  to follow commands     Cranial Nerves - CN 2-12 intact     Motor -                     LEFT    UE - 4-/5                    RIGHT UE - 4/5                    LEFT    LE - HF- 2/5, KE 3/5, DF 4/5, PF 4/5                    RIGHT LE - 4-/5        Sensory - Intact  to LT      Reflexes - DTR Intact, No primitive reflexive     Coordination - FTN intact   MSK: muscle weakness  Psychiatric - Mood stable, Affect WNL  Skin:  stage 2 pressure ulcer to coccyx- improving

## 2023-09-08 NOTE — PROGRESS NOTE ADULT - SUBJECTIVE AND OBJECTIVE BOX
HPI:  36 y/o male w/ a PMHx of ESRD (M/W/F via LUE AVF), SOLITARIO, HTN, secondary hyperparathyroidism s/p parathyroidectomy (Dec 2022), obesity, stroke at age 10 w/ no residual deficits, and recent left radial fracture sustained after trying to lower himself into a chair s/p splint who presented on 8/14 after a fall while in the shower. Patient sustained a left femoral neck fracture s/p left hemiarthroplasty on 8/15. Immediately post-op in PACU, patient was lethargic and hypoxemic requiring reintubation so he was admitted to SICU post-operatively. Patient was hypotensive after intubation requiring pressor support. Patient without cuff leak so he was given Decadron. He was successfully extubated on 8/17 despite no cuff leak and was quickly weaned off pressors afterwards. Had a fever to 101.2 (8/22), infectious workup unrevealing.    Patient was evaluated by PM&R and therapy for functional deficits and gait/ADL impairments and recommend acute rehabilitation.  Patient was medically optimized for discharge to [facility] on 8/24/2023       ROS/Subjective  Patient seen and evaluated in bed  Final ortho recs- ulnar forearm brace- maintain NWB LUE  No surgery on parathyroid gland till at least 6 weeks post op  No void   Moved Bowels s/p lactulose  no dizziness, no headaches  no nausea, no vomiting  no SOB, No chest pain  pain persists left arm ,low back, left hip  Covid PCR neg-asymptomatic      MEDICATIONS  (STANDING):  acetaminophen     Tablet .. 975 milliGRAM(s) Oral every 6 hours  chlorhexidine 2% Cloths 1 Application(s) Topical daily  epoetin carito-epbx (RETACRIT) Injectable 95754 Unit(s) IV Push <User Schedule>  heparin   Injectable 5000 Unit(s) SubCutaneous every 8 hours  metoprolol tartrate 25 milliGRAM(s) Oral two times a day  Nephro-pat 1 Tablet(s) Oral daily  polyethylene glycol 3350 17 Gram(s) Oral daily  senna 2 Tablet(s) Oral at bedtime  sevelamer carbonate 1600 milliGRAM(s) Oral three times a day with meals    MEDICATIONS  (PRN):  oxyCODONE    IR 10 milliGRAM(s) Oral every 6 hours PRN Severe Pain (7 - 10)      Vital Signs Last 24 Hrs  T(C): 37 (08 Sep 2023 08:54), Max: 37.1 (07 Sep 2023 20:18)  T(F): 98.6 (08 Sep 2023 08:54), Max: 98.8 (07 Sep 2023 20:18)  HR: 77 (08 Sep 2023 08:54) (77 - 93)  BP: 168/75 (08 Sep 2023 08:54) (134/81 - 168/75)  BP(mean): --  RR: 16 (08 Sep 2023 08:54) (16 - 16)  SpO2: 98% (08 Sep 2023 08:54) (98% - 99%)    Parameters below as of 07 Sep 2023 20:18  Patient On (Oxygen Delivery Method): room air    Gen - NAD, Comfortable  HEENT - NCAT, EOMI, MMM  Neck - Supple, No limited ROM  Pulm - CTAB, No wheeze, No rhonchi, No crackles  Cardiovascular - RRR, S1S2, No murmurs  Abdomen - Soft, NT/ND, +BS  Extremities no edema, no calf tenderness, Right AV fistula, old fistula to left upper arm- pain with palpation left mid forearm  Neuro-     Cognitive - awake, alert, oriented to person, place, date, year.  Able  to follow command     Cranial Nerves - CN 2-12 intact     Motor -                     LEFT    UE - 4-/5                    RIGHT UE - 4/5                    LEFT    LE - HF- 2/5, KE 3/5, DF 4/5, PF 4/5                    RIGHT LE - 4-/5        Sensory - Intact  to LT      Reflexes - DTR Intact, No primitive reflexive     Coordination - FTN intact   MSK: muscle weakness  Psychiatric - Mood stable, Affect WNL  Skin:  stage 2 pressure ulcer to coccyx- improving      IDT 9/8  lives with aunt and cousin- cousin works  3STE one rail 15 Steps one rail upstairs ? first floor setup    OT   mod A Transfers max A ADL    PT   Max A 1-2 standing in sarasteady, modx2 in bars 10ft    tentative Dc 9/11 [facility].      Continue comprehensive acute rehab program consisting of 3hrs/day of OT/PT and SLP.

## 2023-09-09 LAB
HAV IGM SER-ACNC: SIGNIFICANT CHANGE UP
HBV CORE IGM SER-ACNC: SIGNIFICANT CHANGE UP
HBV SURFACE AG SER-ACNC: SIGNIFICANT CHANGE UP
HCV AB S/CO SERPL IA: 0.37 S/CO — SIGNIFICANT CHANGE UP (ref 0–0.99)
HCV AB SERPL-IMP: SIGNIFICANT CHANGE UP

## 2023-09-09 PROCEDURE — 99233 SBSQ HOSP IP/OBS HIGH 50: CPT

## 2023-09-09 PROCEDURE — 99232 SBSQ HOSP IP/OBS MODERATE 35: CPT

## 2023-09-09 RX ADMIN — Medication 1 TABLET(S): at 12:25

## 2023-09-09 RX ADMIN — Medication 975 MILLIGRAM(S): at 05:34

## 2023-09-09 RX ADMIN — HEPARIN SODIUM 5000 UNIT(S): 5000 INJECTION INTRAVENOUS; SUBCUTANEOUS at 05:33

## 2023-09-09 RX ADMIN — HEPARIN SODIUM 5000 UNIT(S): 5000 INJECTION INTRAVENOUS; SUBCUTANEOUS at 21:27

## 2023-09-09 RX ADMIN — HEPARIN SODIUM 5000 UNIT(S): 5000 INJECTION INTRAVENOUS; SUBCUTANEOUS at 13:25

## 2023-09-09 RX ADMIN — Medication 25 MILLIGRAM(S): at 17:54

## 2023-09-09 RX ADMIN — SENNA PLUS 2 TABLET(S): 8.6 TABLET ORAL at 21:27

## 2023-09-09 RX ADMIN — SEVELAMER CARBONATE 1600 MILLIGRAM(S): 2400 POWDER, FOR SUSPENSION ORAL at 17:53

## 2023-09-09 RX ADMIN — Medication 25 MILLIGRAM(S): at 05:33

## 2023-09-09 RX ADMIN — OXYCODONE HYDROCHLORIDE 10 MILLIGRAM(S): 5 TABLET ORAL at 01:11

## 2023-09-09 RX ADMIN — CHLORHEXIDINE GLUCONATE 1 APPLICATION(S): 213 SOLUTION TOPICAL at 12:24

## 2023-09-09 RX ADMIN — SEVELAMER CARBONATE 1600 MILLIGRAM(S): 2400 POWDER, FOR SUSPENSION ORAL at 09:14

## 2023-09-09 RX ADMIN — SEVELAMER CARBONATE 1600 MILLIGRAM(S): 2400 POWDER, FOR SUSPENSION ORAL at 12:25

## 2023-09-09 RX ADMIN — LACTULOSE 10 GRAM(S): 10 SOLUTION ORAL at 05:32

## 2023-09-09 NOTE — PROGRESS NOTE ADULT - ASSESSMENT
36 y/o male w/ a PMHx of ESRD (M/W/F via LUE AVF), SOLITARIO, HTN, secondary hyperparathyroidism s/p parathyroidectomy (Dec 2022), obesity, stroke at age 10 w/ no residual deficits, and recent left radial fracture sustained after trying to lower himself into a chair s/p splint who presented on 8/14 after a fall where he sustained a left femoral neck fracture, s/p left hemiarthroplasty (8/15). Post op, he was lethargic and hypoxemic requiring reintubation. Later became hypOtensive and was on pressor. Patient without cuff leak so he was given Decadron. He was successfully extubated on 8/17 despite no cuff leak and was quickly weaned off pressors afterwards. Had a fever to 101.2 (8/22), infectious workup unrevealing.        Left Femoral neck fracture  - S/p Left Hemiarthroplasty with Dr. Jason León (8/15)  - tolerating comprehensive rehab  - pain control per primary  - fall and hip precautions    #Recent Left radial head Fracture  - NWB to LUE  - Left arm in sling as needed for comfort    #Recent febrile illness at prior hospital  - work up unrevealing     #ESRD - on HD ([facility])  #Anemia of chronic disease  - routine HD  - Epo with HD  - renal following    Secondary hyperparathyroidism  -secondary to ESRD  -Elevated PTH, normal Ca, high phos  -Elevated Alk, normal GGT, likely secondary to bone turnover than bile acid dysfunction  -Lft wnl, TB wnl  -C/w Sevelamer  -Renal following    # Hyponatremia  - likely due to hypervolemia due to ESRD  - HD per routine    # Hyperkalemia  - likely due to ESRD  - resolved after HD  - monitor electrolytes periodically    #HTN  # postural dizziness  - Metoprolol 25 BID  - check OH Periodically    #Mood  - Xanax 0.25 PRN    #insomnia  - Consider starting on Melatonin    # Hypoalbuminemia  # Severe protein calorie malnutrition  - nutrition eval on going    #DVT ppx: Heparin TID    d/w rehab team

## 2023-09-09 NOTE — PROGRESS NOTE ADULT - SUBJECTIVE AND OBJECTIVE BOX
PROGRESS NOTE:     Patient is a 37y old  Male who presents with a chief complaint of Left femoral neck fracture (08 Sep 2023 12:04)          SUBJECTIVE & OBJECTIVE:   Pt seen and examined at bedside in AM    no overnight events.   complains of insomnia    REVIEW OF SYSTEMS: remaining ROS negative     PHYSICAL EXAM:  VITALS:  Vital Signs Last 24 Hrs  T(C): 36.9 (09 Sep 2023 09:18), Max: 37 (08 Sep 2023 19:41)  T(F): 98.4 (09 Sep 2023 09:18), Max: 98.6 (08 Sep 2023 19:41)  HR: 76 (09 Sep 2023 09:18) (74 - 85)  BP: 128/68 (09 Sep 2023 09:18) (111/58 - 154/74)  BP(mean): --  RR: 16 (09 Sep 2023 09:18) (16 - 16)  SpO2: 95% (09 Sep 2023 09:18) (95% - 100%)    Parameters below as of 09 Sep 2023 09:18  Patient On (Oxygen Delivery Method): room air          GENERAL: NAD,  no increased WOB  HEAD:  Atraumatic, Normocephalic  EYES: EOMI, PERRLA, conjunctiva and sclera clear  ENMT: Moist mucous membranes  NECK: Supple, No JVD  NERVOUS SYSTEM:  Alert & Oriented X3  CHEST/LUNG: Clear to auscultation bilaterally; No rales, rhonchi, wheezing, or rubs  HEART: Regular rate and rhythm  ABDOMEN: Soft, Nontender, Nondistended; Bowel sounds present  EXTREMITIES:   No clubbing, cyanosis, calf tenderness or edema b/l  SKIN: LUE fistula clean      MEDICATIONS  (STANDING):  acetaminophen     Tablet .. 975 milliGRAM(s) Oral every 6 hours  chlorhexidine 2% Cloths 1 Application(s) Topical daily  epoetin carito-epbx (RETACRIT) Injectable 31934 Unit(s) IV Push <User Schedule>  heparin   Injectable 5000 Unit(s) SubCutaneous every 8 hours  metoprolol tartrate 25 milliGRAM(s) Oral two times a day  Nephro-pat 1 Tablet(s) Oral daily  polyethylene glycol 3350 17 Gram(s) Oral daily  senna 2 Tablet(s) Oral at bedtime  sevelamer carbonate 1600 milliGRAM(s) Oral three times a day with meals    MEDICATIONS  (PRN):  oxyCODONE    IR 10 milliGRAM(s) Oral every 6 hours PRN Severe Pain (7 - 10)      Allergies    No Known Drug Allergies  shellfish (Hives)    Intolerances              LABS:                 CAPILLARY BLOOD GLUCOSE                    RECENT CULTURES:          RADIOLOGY & ADDITIONAL TESTS:        Care Discussed with Consultants/Other Providers [x ] YES  [ ] NO

## 2023-09-09 NOTE — PROGRESS NOTE ADULT - SUBJECTIVE AND OBJECTIVE BOX
Cc: Left femoral neck fracture    HPI: Patient seen and examined at bedside. Continued to complain of pain in his lower extremities and low back. Reports poor sleep, and states he has taken xanax for sleep at home in the past. No chest pain, no cough, no N/V, no Fevers/Chills. No other new ROS  Has been tolerating rehabilitation program.    acetaminophen     Tablet .. 975 milliGRAM(s) Oral every 6 hours  chlorhexidine 2% Cloths 1 Application(s) Topical daily  epoetin carito-epbx (RETACRIT) Injectable 94916 Unit(s) IV Push <User Schedule>  heparin   Injectable 5000 Unit(s) SubCutaneous every 8 hours  lactulose Syrup 10 Gram(s) Oral daily PRN  metoprolol tartrate 25 milliGRAM(s) Oral two times a day  Nephro-pat 1 Tablet(s) Oral daily  oxyCODONE    IR 10 milliGRAM(s) Oral every 6 hours PRN  polyethylene glycol 3350 17 Gram(s) Oral daily  senna 2 Tablet(s) Oral at bedtime  sevelamer carbonate 1600 milliGRAM(s) Oral three times a day with meals      T(C): 36.9 (09-09-23 @ 09:18), Max: 37 (09-08-23 @ 19:41)  HR: 76 (09-09-23 @ 09:18) (76 - 85)  BP: 128/68 (09-09-23 @ 09:18) (111/58 - 143/82)  RR: 16 (09-09-23 @ 09:18) (16 - 16)  SpO2: 95% (09-09-23 @ 09:18) (95% - 100%)    In NAD  HEENT- EOMI  Heart- RRR, S1S2  Lungs- CTA bl.  Abd- + BS, NT  Ext- No calf pain, RUE AV fistula, LUE NWB  Neuro- Exam unchanged          Imp: Patient with diagnosis of   Left femoral neck fracture  admitted for comprehensive acute rehabilitation.    Plan:  - Continue PT/OT  - DVT prophylaxis - heparin   - Skin- Turn q2h, check skin daily  - Continue current medications; patient medically stable.   - pain: continue oxycodone PRN, can consider increasing pregabalin dose to 75mg TID. Continue methocarbamol and lidocaine patch.   - Sleep: consider trial of melatonin. Patient reports taking xanax for sleep in the past, however would not recommend in setting of opioid use for pain management.   - Patient is stable to continue current rehabilitation program.    Cc: Left femoral neck fracture    HPI: Patient seen and examined at bedside. Continued to complain of pain in his lower extremities and low back. Reports poor sleep, and states he has taken xanax for sleep at home in the past. No chest pain, no cough, no N/V, no Fevers/Chills. No other new ROS  Has been tolerating rehabilitation program.    acetaminophen     Tablet .. 975 milliGRAM(s) Oral every 6 hours  chlorhexidine 2% Cloths 1 Application(s) Topical daily  epoetin carito-epbx (RETACRIT) Injectable 02008 Unit(s) IV Push <User Schedule>  heparin   Injectable 5000 Unit(s) SubCutaneous every 8 hours  lactulose Syrup 10 Gram(s) Oral daily PRN  metoprolol tartrate 25 milliGRAM(s) Oral two times a day  Nephro-pat 1 Tablet(s) Oral daily  oxyCODONE    IR 10 milliGRAM(s) Oral every 6 hours PRN  polyethylene glycol 3350 17 Gram(s) Oral daily  senna 2 Tablet(s) Oral at bedtime  sevelamer carbonate 1600 milliGRAM(s) Oral three times a day with meals      T(C): 36.9 (09-09-23 @ 09:18), Max: 37 (09-08-23 @ 19:41)  HR: 76 (09-09-23 @ 09:18) (76 - 85)  BP: 128/68 (09-09-23 @ 09:18) (111/58 - 143/82)  RR: 16 (09-09-23 @ 09:18) (16 - 16)  SpO2: 95% (09-09-23 @ 09:18) (95% - 100%)    In NAD  HEENT- EOMI  Heart- RRR, S1S2  Lungs- CTA bl.  Abd- + BS, NT  Ext- No calf pain, RUE AV fistula, LUE NWB  Neuro- Exam unchanged          Imp: Patient with diagnosis of   Left femoral neck fracture  admitted for comprehensive acute rehabilitation.    Plan:  - Continue PT/OT  - DVT prophylaxis - heparin   - Skin- Turn q2h, check skin daily  - Continue current medications; patient medically stable.   - pain: continue oxycodone PRN  - Sleep: consider trial of melatonin. Patient reports taking xanax for sleep in the past, however would not recommend in setting of opioid use for pain management.   - Patient is stable to continue current rehabilitation program.

## 2023-09-09 NOTE — PROGRESS NOTE ADULT - SUBJECTIVE AND OBJECTIVE BOX
INTERVAL HPI/OVERNIGHT EVENTS:  HPI:  38 y/o male w/ a PMHx of ESRD (M/W/F via LUE AVF), SOLITARIO, HTN, secondary hyperparathyroidism s/p parathyroidectomy (Dec 2022), obesity, stroke at age 10 w/ no residual deficits, and recent left radial fracture sustained after trying to lower himself into a chair s/p splint who presented on  after a fall while in the shower. Patient sustained a left femoral neck fracture s/p left hemiarthroplasty on 8/15. Immediately post-op in PACU, patient was lethargic and hypoxemic requiring reintubation so he was admitted to SICU post-operatively. Patient was hypotensive after intubation requiring pressor support. Patient without cuff leak so he was given Decadron. He was successfully extubated on  despite no cuff leak and was quickly weaned off pressors afterwards. Had a fever to 101.2 (), infectious workup unrevealing.  Patient was evaluated by PM&R and therapy for functional deficits and gait/ADL impairments and recommend acute rehabilitation.  Patient was medically optimized for discharge to [facility] on 2023 (24 Aug 2023 14:05)      Patient seen and examined at bedside. Denies any n/v abdominal pain. tolerated 100% of breakfast.   no overnight events reported          MEDICATIONS  (STANDING):  acetaminophen     Tablet .. 975 milliGRAM(s) Oral every 6 hours  chlorhexidine 2% Cloths 1 Application(s) Topical daily  epoetin carito-epbx (RETACRIT) Injectable 50762 Unit(s) IV Push <User Schedule>  heparin   Injectable 5000 Unit(s) SubCutaneous every 8 hours  metoprolol tartrate 25 milliGRAM(s) Oral two times a day  Nephro-pat 1 Tablet(s) Oral daily  polyethylene glycol 3350 17 Gram(s) Oral daily  senna 2 Tablet(s) Oral at bedtime  sevelamer carbonate 1600 milliGRAM(s) Oral three times a day with meals    MEDICATIONS  (PRN):  lactulose Syrup 10 Gram(s) Oral daily PRN constipation  oxyCODONE    IR 10 milliGRAM(s) Oral every 6 hours PRN Severe Pain (7 - 10)      Allergies    No Known Drug Allergies  shellfish (Hives)    Intolerances        PAST MEDICAL & SURGICAL HISTORY:  HTN (hypertension)      Stroke  age 10      Morbid obesity      Sleep apnea      Leg fracture, right      Chronic renal failure      Hemodialysis access, AV graft      ESRD (end stage renal disease) on dialysis  since , M-W-F      Anemia, unspecified type      Hypothyroidism      AV fistula  R arm; L arm clotted          REVIEW OF SYSTEMS    Negative unless indicated in HPI      PHYSICAL EXAM:   Vital Signs:  Vital Signs Last 24 Hrs  T(C): 36.9 (09 Sep 2023 09:18), Max: 37 (08 Sep 2023 19:41)  T(F): 98.4 (09 Sep 2023 09:18), Max: 98.6 (08 Sep 2023 19:41)  HR: 76 (09 Sep 2023 09:18) (74 - 85)  BP: 128/68 (09 Sep 2023 09:18) (111/58 - 154/74)  BP(mean): --  RR: 16 (09 Sep 2023 09:18) (16 - 16)  SpO2: 95% (09 Sep 2023 09:18) (95% - 100%)    Parameters below as of 09 Sep 2023 09:18  Patient On (Oxygen Delivery Method): room air      Daily     Daily Weight in k.5 (08 Sep 2023 17:10)I&O's Summary    08 Sep 2023 07:01  -  09 Sep 2023 07:00  --------------------------------------------------------  IN: 0 mL / OUT: 2500 mL / NET: -2500 mL        GENERAL:  Appears stated age, well-groomed, well-nourished, no distress  HEENT:  NC/AT,  conjunctivae clear and pink, sclera -anicteric  CHEST:  Full & symmetric excursion, no increased effort, breath sounds clear  HEART:  Regular rhythm, S1, S2,   ABDOMEN:  Soft, non-tender, non-distended, normoactive bowel sounds,    EXTEREMITIES:  no cyanosis, or edema  SKIN:  warm/dry  NEURO:  Alert, oriented,    LABS:                        9.7    4.63  )-----------( 275      ( 08 Sep 2023 14:20 )             29.4     -08    138  |  94<L>  |  104<H>  ----------------------------<  92  5.3   |  28  |  11.14<H>    Ca    9.6      08 Sep 2023 14:20  Phos  7.1             Urinalysis Basic - ( 08 Sep 2023 14:20 )    Color: x / Appearance: x / SG: x / pH: x  Gluc: 92 mg/dL / Ketone: x  / Bili: x / Urobili: x   Blood: x / Protein: x / Nitrite: x   Leuk Esterase: x / RBC: x / WBC x   Sq Epi: x / Non Sq Epi: x / Bacteria: x      Alkaline Phosphatase: 794 U/L (23 @ 06:28)      RADIOLOGY & ADDITIONAL TESTS:

## 2023-09-09 NOTE — PROGRESS NOTE ADULT - ASSESSMENT
Patient seen and examined at bedside. Denies any n/v abdominal pain. tolerated 100% of breakfast.   no overnight events reported   elevated Alk Phos. last tested 09/06 repeat fractionated ALP ordered    AST, ALT and Bilirubin is normal.

## 2023-09-10 PROCEDURE — 99232 SBSQ HOSP IP/OBS MODERATE 35: CPT

## 2023-09-10 RX ORDER — POLYETHYLENE GLYCOL 3350 17 G/17G
17 POWDER, FOR SOLUTION ORAL
Refills: 0 | Status: DISCONTINUED | OUTPATIENT
Start: 2023-09-10 | End: 2023-09-19

## 2023-09-10 RX ADMIN — HEPARIN SODIUM 5000 UNIT(S): 5000 INJECTION INTRAVENOUS; SUBCUTANEOUS at 22:37

## 2023-09-10 RX ADMIN — SEVELAMER CARBONATE 1600 MILLIGRAM(S): 2400 POWDER, FOR SUSPENSION ORAL at 11:31

## 2023-09-10 RX ADMIN — HEPARIN SODIUM 5000 UNIT(S): 5000 INJECTION INTRAVENOUS; SUBCUTANEOUS at 13:13

## 2023-09-10 RX ADMIN — Medication 975 MILLIGRAM(S): at 01:00

## 2023-09-10 RX ADMIN — SEVELAMER CARBONATE 1600 MILLIGRAM(S): 2400 POWDER, FOR SUSPENSION ORAL at 08:52

## 2023-09-10 RX ADMIN — Medication 25 MILLIGRAM(S): at 06:02

## 2023-09-10 RX ADMIN — Medication 975 MILLIGRAM(S): at 00:05

## 2023-09-10 RX ADMIN — Medication 25 MILLIGRAM(S): at 18:01

## 2023-09-10 RX ADMIN — POLYETHYLENE GLYCOL 3350 17 GRAM(S): 17 POWDER, FOR SOLUTION ORAL at 18:01

## 2023-09-10 RX ADMIN — SEVELAMER CARBONATE 1600 MILLIGRAM(S): 2400 POWDER, FOR SUSPENSION ORAL at 18:01

## 2023-09-10 RX ADMIN — OXYCODONE HYDROCHLORIDE 10 MILLIGRAM(S): 5 TABLET ORAL at 00:05

## 2023-09-10 RX ADMIN — CHLORHEXIDINE GLUCONATE 1 APPLICATION(S): 213 SOLUTION TOPICAL at 11:34

## 2023-09-10 RX ADMIN — OXYCODONE HYDROCHLORIDE 10 MILLIGRAM(S): 5 TABLET ORAL at 01:00

## 2023-09-10 RX ADMIN — POLYETHYLENE GLYCOL 3350 17 GRAM(S): 17 POWDER, FOR SOLUTION ORAL at 11:23

## 2023-09-10 RX ADMIN — Medication 1 TABLET(S): at 11:31

## 2023-09-10 RX ADMIN — HEPARIN SODIUM 5000 UNIT(S): 5000 INJECTION INTRAVENOUS; SUBCUTANEOUS at 06:02

## 2023-09-10 NOTE — PROGRESS NOTE ADULT - SUBJECTIVE AND OBJECTIVE BOX
Cc: Left femoral neck fracture    HPI: Patient seen and examined at bedside. Continued to complain of pain in his lower extremities and low back. Has taken oxycodone as outpatient from his pain management doc. Doesn't really think it helps with his pain.  No chest pain, no cough, no N/V, no Fevers/Chills. No other new ROS  Has been tolerating rehabilitation program.    acetaminophen     Tablet .. 975 milliGRAM(s) Oral every 6 hours  chlorhexidine 2% Cloths 1 Application(s) Topical daily  epoetin carito-epbx (RETACRIT) Injectable 96948 Unit(s) IV Push <User Schedule>  heparin   Injectable 5000 Unit(s) SubCutaneous every 8 hours  lactulose Syrup 10 Gram(s) Oral daily PRN  metoprolol tartrate 25 milliGRAM(s) Oral two times a day  Nephro-pat 1 Tablet(s) Oral daily  oxyCODONE    IR 10 milliGRAM(s) Oral every 6 hours PRN  polyethylene glycol 3350 17 Gram(s) Oral daily  senna 2 Tablet(s) Oral at bedtime  sevelamer carbonate 1600 milliGRAM(s) Oral three times a day with meals      Vital Signs Last 24 Hrs  T(C): 37.1 (10 Sep 2023 08:59), Max: 37.1 (10 Sep 2023 08:59)  T(F): 98.8 (10 Sep 2023 08:59), Max: 98.8 (10 Sep 2023 08:59)  HR: 80 (10 Sep 2023 08:59) (77 - 81)  BP: 138/81 (10 Sep 2023 08:59) (112/72 - 161/77)  BP(mean): --  RR: 16 (10 Sep 2023 08:59) (16 - 17)  SpO2: 99% (10 Sep 2023 08:59) (96% - 99%)    Parameters below as of 10 Sep 2023 08:59  Patient On (Oxygen Delivery Method): room air        In NAD  HEENT- EOMI  Heart- RRR, S1S2  Lungs- CTA bl.  Abd- + BS, NT  Ext- No calf pain, RUE AV fistula, LUE NWB  Neuro- Exam unchanged          Imp: Patient with diagnosis of   Left femoral neck fracture  admitted for comprehensive acute rehabilitation.    Plan:  - Continue PT/OT  - DVT prophylaxis - heparin   - Skin- Turn q2h, check skin daily  - Continue current medications; patient medically stable.   - pain: continue oxycodone PRN  - Sleep: consider trial of melatonin. Patient reports taking xanax for sleep in the past, however would not recommend in setting of opioid use for pain management.   - Patient is stable to continue current rehabilitation program.

## 2023-09-10 NOTE — PROGRESS NOTE ADULT - SUBJECTIVE AND OBJECTIVE BOX
PROGRESS NOTE:     Patient is a 37y old  Male who presents with a chief complaint of Left femoral neck fracture (08 Sep 2023 12:04)          SUBJECTIVE & OBJECTIVE:   Pt seen and examined at bedside in AM    no overnight events.   no new complaints    REVIEW OF SYSTEMS: remaining ROS negative     PHYSICAL EXAM:  VITALS:  Vital Signs Last 24 Hrs  T(C): 37.1 (10 Sep 2023 08:59), Max: 37.1 (10 Sep 2023 08:59)  T(F): 98.8 (10 Sep 2023 08:59), Max: 98.8 (10 Sep 2023 08:59)  HR: 80 (10 Sep 2023 08:59) (77 - 81)  BP: 138/81 (10 Sep 2023 08:59) (112/72 - 161/77)  BP(mean): --  RR: 16 (10 Sep 2023 08:59) (16 - 17)  SpO2: 99% (10 Sep 2023 08:59) (96% - 99%)    Parameters below as of 10 Sep 2023 08:59  Patient On (Oxygen Delivery Method): room air          GENERAL: NAD,  no increased WOB  HEAD:  Atraumatic, Normocephalic  EYES: EOMI, PERRLA, conjunctiva and sclera clear  ENMT: Moist mucous membranes  NECK: Supple, No JVD  NERVOUS SYSTEM:  Alert & Oriented X3  CHEST/LUNG: Clear to auscultation bilaterally; No rales, rhonchi, wheezing, or rubs  HEART: Regular rate and rhythm  ABDOMEN: Soft, Nontender, Nondistended; Bowel sounds present  EXTREMITIES:   No clubbing, cyanosis, calf tenderness or edema b/l  SKIN: LUE fistula clean      MEDICATIONS  (STANDING):  acetaminophen     Tablet .. 975 milliGRAM(s) Oral every 6 hours  chlorhexidine 2% Cloths 1 Application(s) Topical daily  epoetin carito-epbx (RETACRIT) Injectable 76261 Unit(s) IV Push <User Schedule>  heparin   Injectable 5000 Unit(s) SubCutaneous every 8 hours  metoprolol tartrate 25 milliGRAM(s) Oral two times a day  Nephro-pat 1 Tablet(s) Oral daily  polyethylene glycol 3350 17 Gram(s) Oral daily  senna 2 Tablet(s) Oral at bedtime  sevelamer carbonate 1600 milliGRAM(s) Oral three times a day with meals      MEDICATIONS  (PRN):  oxyCODONE    IR 10 milliGRAM(s) Oral every 6 hours PRN Severe Pain (7 - 10)      Allergies    No Known Drug Allergies  shellfish (Hives)    Intolerances              LABS:                 CAPILLARY BLOOD GLUCOSE                    RECENT CULTURES:          RADIOLOGY & ADDITIONAL TESTS:        Care Discussed with Consultants/Other Providers [x ] YES  [ ] NO

## 2023-09-10 NOTE — PROGRESS NOTE ADULT - ASSESSMENT
36 y/o male w/ a PMHx of ESRD (M/W/F via LUE AVF), SOLITARIO, HTN, secondary hyperparathyroidism s/p parathyroidectomy (Dec 2022), obesity, stroke at age 10 w/ no residual deficits, and recent left radial fracture sustained after trying to lower himself into a chair s/p splint who presented on 8/14 after a fall where he sustained a left femoral neck fracture, s/p left hemiarthroplasty (8/15). Post op, he was lethargic and hypoxemic requiring reintubation. Later became hypOtensive and was on pressor. Patient without cuff leak so he was given Decadron. He was successfully extubated on 8/17 despite no cuff leak and was quickly weaned off pressors afterwards. Had a fever to 101.2 (8/22), infectious workup unrevealing.        Left Femoral neck fracture  - S/p Left Hemiarthroplasty with Dr. Jason León (8/15)  - tolerating comprehensive rehab  - pain control per primary  - fall and hip precautions    #Recent Left radial head Fracture  - NWB to LUE  - Left arm in sling as needed for comfort    #Recent febrile illness at prior hospital  - work up unrevealing     #ESRD - on HD ([facility])  #Anemia of chronic disease  - routine HD  - Epo with HD  - renal following  - labs prior to HD tomorrow    Secondary hyperparathyroidism  -secondary to ESRD  -Elevated PTH, normal Ca, high phos  -Elevated Alk, normal GGT, likely secondary to bone turnover than bile acid dysfunction  -Lft wnl, TB wnl  -C/w Sevelamer  -Renal following    # Hyponatremia  - likely due to hypervolemia due to ESRD  - HD per routine    # Hyperkalemia  - likely due to ESRD  - resolved after HD  - monitor electrolytes periodically    #HTN  # postural dizziness  - Metoprolol 25 BID  - check OH Periodically    #Mood  - Xanax 0.25 PRN    #insomnia  - Consider starting on Melatonin    # Hypoalbuminemia  # Severe protein calorie malnutrition  - nutrition eval on going    #DVT ppx: Heparin TID    d/w rehab team

## 2023-09-11 ENCOUNTER — APPOINTMENT (OUTPATIENT)
Dept: SURGERY | Facility: HOSPITAL | Age: 38
End: 2023-09-11

## 2023-09-11 LAB
ANION GAP SERPL CALC-SCNC: 18 MMOL/L — HIGH (ref 5–17)
BUN SERPL-MCNC: 117 MG/DL — HIGH (ref 7–23)
CALCIUM SERPL-MCNC: 9.4 MG/DL — SIGNIFICANT CHANGE UP (ref 8.4–10.5)
CHLORIDE SERPL-SCNC: 95 MMOL/L — LOW (ref 96–108)
CO2 SERPL-SCNC: 24 MMOL/L — SIGNIFICANT CHANGE UP (ref 22–31)
CREAT SERPL-MCNC: 12.8 MG/DL — HIGH (ref 0.5–1.3)
EGFR: 5 ML/MIN/1.73M2 — LOW
GLUCOSE SERPL-MCNC: 77 MG/DL — SIGNIFICANT CHANGE UP (ref 70–99)
HCT VFR BLD CALC: 29.2 % — LOW (ref 39–50)
HGB BLD-MCNC: 9.4 G/DL — LOW (ref 13–17)
MCHC RBC-ENTMCNC: 30.2 PG — SIGNIFICANT CHANGE UP (ref 27–34)
MCHC RBC-ENTMCNC: 32.2 GM/DL — SIGNIFICANT CHANGE UP (ref 32–36)
MCV RBC AUTO: 93.9 FL — SIGNIFICANT CHANGE UP (ref 80–100)
NRBC # BLD: 0 /100 WBCS — SIGNIFICANT CHANGE UP (ref 0–0)
PHOSPHATE SERPL-MCNC: 7.6 MG/DL — HIGH (ref 2.5–4.5)
PLATELET # BLD AUTO: 268 K/UL — SIGNIFICANT CHANGE UP (ref 150–400)
POTASSIUM SERPL-MCNC: 5.6 MMOL/L — HIGH (ref 3.5–5.3)
POTASSIUM SERPL-SCNC: 5.6 MMOL/L — HIGH (ref 3.5–5.3)
RBC # BLD: 3.11 M/UL — LOW (ref 4.2–5.8)
RBC # FLD: 17.5 % — HIGH (ref 10.3–14.5)
SARS-COV-2 RNA SPEC QL NAA+PROBE: SIGNIFICANT CHANGE UP
SODIUM SERPL-SCNC: 137 MMOL/L — SIGNIFICANT CHANGE UP (ref 135–145)
WBC # BLD: 4.95 K/UL — SIGNIFICANT CHANGE UP (ref 3.8–10.5)
WBC # FLD AUTO: 4.95 K/UL — SIGNIFICANT CHANGE UP (ref 3.8–10.5)

## 2023-09-11 PROCEDURE — 99232 SBSQ HOSP IP/OBS MODERATE 35: CPT | Mod: GC

## 2023-09-11 PROCEDURE — 99232 SBSQ HOSP IP/OBS MODERATE 35: CPT

## 2023-09-11 RX ADMIN — HEPARIN SODIUM 5000 UNIT(S): 5000 INJECTION INTRAVENOUS; SUBCUTANEOUS at 21:10

## 2023-09-11 RX ADMIN — SEVELAMER CARBONATE 1600 MILLIGRAM(S): 2400 POWDER, FOR SUSPENSION ORAL at 11:32

## 2023-09-11 RX ADMIN — SEVELAMER CARBONATE 1600 MILLIGRAM(S): 2400 POWDER, FOR SUSPENSION ORAL at 18:10

## 2023-09-11 RX ADMIN — SEVELAMER CARBONATE 1600 MILLIGRAM(S): 2400 POWDER, FOR SUSPENSION ORAL at 07:54

## 2023-09-11 RX ADMIN — CHLORHEXIDINE GLUCONATE 1 APPLICATION(S): 213 SOLUTION TOPICAL at 12:39

## 2023-09-11 RX ADMIN — Medication 25 MILLIGRAM(S): at 06:59

## 2023-09-11 RX ADMIN — HEPARIN SODIUM 5000 UNIT(S): 5000 INJECTION INTRAVENOUS; SUBCUTANEOUS at 14:01

## 2023-09-11 RX ADMIN — ERYTHROPOIETIN 10000 UNIT(S): 10000 INJECTION, SOLUTION INTRAVENOUS; SUBCUTANEOUS at 16:49

## 2023-09-11 RX ADMIN — OXYCODONE HYDROCHLORIDE 10 MILLIGRAM(S): 5 TABLET ORAL at 01:13

## 2023-09-11 RX ADMIN — OXYCODONE HYDROCHLORIDE 10 MILLIGRAM(S): 5 TABLET ORAL at 00:13

## 2023-09-11 RX ADMIN — LACTULOSE 10 GRAM(S): 10 SOLUTION ORAL at 11:31

## 2023-09-11 RX ADMIN — SENNA PLUS 2 TABLET(S): 8.6 TABLET ORAL at 21:09

## 2023-09-11 RX ADMIN — HEPARIN SODIUM 5000 UNIT(S): 5000 INJECTION INTRAVENOUS; SUBCUTANEOUS at 06:59

## 2023-09-11 RX ADMIN — Medication 25 MILLIGRAM(S): at 18:10

## 2023-09-11 NOTE — PROGRESS NOTE ADULT - SUBJECTIVE AND OBJECTIVE BOX
Patient is a 37y old  Male who presents with a chief complaint of Left femoral neck fracture (11 Sep 2023 11:00)      SUBJECTIVE / OVERNIGHT EVENTS:  Pt seen and examined at bedside. No acute events overnight.  Pt denies cp, palpitations, sob, abd pain, N/V, fever, chills.    ROS:  All other review of systems negative    Allergies    No Known Drug Allergies  shellfish (Hives)    Intolerances        MEDICATIONS  (STANDING):  acetaminophen     Tablet .. 975 milliGRAM(s) Oral every 6 hours  chlorhexidine 2% Cloths 1 Application(s) Topical daily  epoetin carito-epbx (RETACRIT) Injectable 94894 Unit(s) IV Push <User Schedule>  heparin   Injectable 5000 Unit(s) SubCutaneous every 8 hours  metoprolol tartrate 25 milliGRAM(s) Oral two times a day  Nephro-pat 1 Tablet(s) Oral daily  polyethylene glycol 3350 17 Gram(s) Oral two times a day  senna 2 Tablet(s) Oral at bedtime  sevelamer carbonate 1600 milliGRAM(s) Oral three times a day with meals    MEDICATIONS  (PRN):  lactulose Syrup 10 Gram(s) Oral daily PRN constipation  oxyCODONE    IR 10 milliGRAM(s) Oral every 6 hours PRN Severe Pain (7 - 10)      Vital Signs Last 24 Hrs  T(C): 36.9 (11 Sep 2023 08:13), Max: 37 (10 Sep 2023 22:30)  T(F): 98.4 (11 Sep 2023 08:13), Max: 98.6 (10 Sep 2023 22:30)  HR: 70 (11 Sep 2023 08:13) (70 - 80)  BP: 155/92 (11 Sep 2023 08:13) (143/75 - 160/80)  BP(mean): --  RR: 15 (11 Sep 2023 08:13) (15 - 16)  SpO2: 98% (11 Sep 2023 08:13) (97% - 100%)    Parameters below as of 11 Sep 2023 08:13  Patient On (Oxygen Delivery Method): room air      CAPILLARY BLOOD GLUCOSE        I&O's Summary      PHYSICAL EXAM:  GENERAL: NAD, well-developed male  HEAD:  Atraumatic, Normocephalic  NECK: Supple, No JVD  CHEST/LUNG: Clear to auscultation bilaterally; No wheeze, nonlabored breathing  HEART: Regular rate and rhythm; No murmurs, rubs, or gallops  ABDOMEN: Soft, Nontender, Nondistended; Bowel sounds present  EXTREMITIES: No clubbing, cyanosis, or edema  PSYCH: calm, appropriate mood      LABS:                    RADIOLOGY & ADDITIONAL TESTS:  Results Reviewed:   Imaging Personally Reviewed:  Electrocardiogram Personally Reviewed:    COORDINATION OF CARE:  Care Discussed with Consultants/Other Providers [Y/N]:  Prior or Outpatient Records Reviewed [Y/N]:

## 2023-09-11 NOTE — PROGRESS NOTE ADULT - SUBJECTIVE AND OBJECTIVE BOX
NEPHROLOGY PROGRESS NOTE    CHIEF COMPLAINT:  ESRD    HPI:  Seen on dialysis.  Access works well.  Removing 3 liters with stable BP so far.    EXAM:  Vital Signs Last 24 Hrs  T(C): 36.9 (11 Sep 2023 08:13), Max: 37 (10 Sep 2023 22:30)  T(F): 98.4 (11 Sep 2023 08:13), Max: 98.6 (10 Sep 2023 22:30)  HR: 70 (11 Sep 2023 08:13) (70 - 80)  BP: 155/92 (11 Sep 2023 08:13) (143/75 - 160/80)  BP(mean): --  RR: 15 (11 Sep 2023 08:13) (15 - 16)  SpO2: 98% (11 Sep 2023 08:13) (97% - 100%)    Parameters below as of 11 Sep 2023 08:13  Patient On (Oxygen Delivery Method): room air      I&O's Summary    Daily     Daily Weight in k.3 (11 Sep 2023 05:44)    Conversant, in no apparent distress  Normal respiratory effort, lungs clear bilaterally  Heart RRR with no murmur, no peripheral edema    LABS                        9.4    4.95  )-----------( 268      ( 11 Sep 2023 14:15 )             29.2     09-11    137  |  95<L>  |  117<H>  ----------------------------<  77  5.6<H>   |  24  |  12.80<H>    Ca    9.4      11 Sep 2023 14:15  Phos  7.6     09-11      Urinalysis Basic - ( 11 Sep 2023 14:15 )    Color: x / Appearance: x / SG: x / pH: x  Gluc: 77 mg/dL / Ketone: x  / Bili: x / Urobili: x   Blood: x / Protein: x / Nitrite: x   Leuk Esterase: x / RBC: x / WBC x   Sq Epi: x / Non Sq Epi: x / Bacteria: x      Impression  S/p fall, L femoral neck fracture, s/p L hemiarthroplasty on 8/15  ESRD on hemodialysis MWF    Recommend  Complete HD with up to 2.5 kg UF  Renal diet, Renvela as ordered

## 2023-09-11 NOTE — PROGRESS NOTE ADULT - ASSESSMENT
38 y/o male w/ a PMHx of ESRD (M/W/F via LUE AVF), SOLITARIO, HTN, secondary hyperparathyroidism s/p parathyroidectomy (Dec 2022), obesity, stroke at age 10 w/ no residual deficits, and recent left radial fracture sustained after trying to lower himself into a chair s/p splint who presented on 8/14 after a fall where he sustained a left femoral neck fracture, s/p left hemiarthroplasty (8/15). Post op, he was lethargic and hypoxemic requiring reintubation. Later became hypOtensive and was on pressor. Patient without cuff leak so he was given Decadron. He was successfully extubated on 8/17 despite no cuff leak and was quickly weaned off pressors afterwards. Had a fever to 101.2 (8/22), infectious workup unrevealing.    COMORBIDITES/ACTIVE MEDICAL ISSUES     #Femoral neck fracture  - S/p Left Hemiarthroplasty with Dr. Jason León (8/15), healing well  - Weight bearing as tolerated  - Gait Instability, ADL impairments and Functional impairments:  Comprehensive Rehab Program of PT/OT     #Recent Left radial head Fracture  - Left arm in sling as needed for comfort   -re-xray Left Forearm secondary to pain- with ulna shaft Fx - ortho saw patient- ulnar forearm splint placed  cleared by ortho on9/8 for WBAT LUE with splint on- can use platform    #ESRD - on HD (MWF)  #Anemia of chronic disease  - Right AVF   - Epo with HD  - Nephro-pat supplement    #Renal bone disease   - Home diet: phoslo with meals  - Ensure low phos diet  - Outpatient follow up with endo and bone scan to determine etiology   parathyroidectomy 9/11 cancelled- needs to wait till at least 6 weeks post op    #HTN  - Metoprolol 25 BID    #Mood  - Xanax 0.25 PRN    #Pain control  - Tylenol 975 q6h, Tramadol PRN    #GI/Bowel Mgmt   - At risk for constipation due to neurologic diagnosis, immobility and/or medication use  - Senna,  Miralax QD    #Bladder management  -anuric    #DVT ppx  - Heparin TID     #Skin:  - stage 2 pressure ulcer to coccyx: cleanse with NS, pat dry with gauze and apply foam dressing daily- healing  -At risk for pressure injury due to neurologic diagnosis and relative immobility  -Bilateral heels - soft heel protectors, apply liquid barrier film daily and monitor for tissue type changes  - Turn Q2 hr while in bed, air mattress  - Skin barrier cream as needed  - Nursing to monitor skin Q shift    #Diet   - Regular + Thins  [Renal Diet]    Precautions / PROPHYLAXIS:   - Falls  - ortho: Weight bearing status: LLE WBAT; LUE WBAT LUE through platform-wear splint  - Lungs: Aspiration, Incentive Spirometer   - Pressure injury/Skin: Turn Q2hrs while in bed, OOB to Chair, PT/OT

## 2023-09-11 NOTE — PROGRESS NOTE ADULT - ASSESSMENT
36 y/o male w/ a PMHx of ESRD (M/W/F via LUE AVF), SOLITARIO, HTN, secondary hyperparathyroidism s/p parathyroidectomy (Dec 2022), obesity, stroke at age 10 w/ no residual deficits, and recent left radial fracture sustained after trying to lower himself into a chair s/p splint who presented on 8/14 after a fall where he sustained a left femoral neck fracture, s/p left hemiarthroplasty (8/15). Post op, he was lethargic and hypoxemic requiring reintubation. Later became hypOtensive and was on pressor. Patient without cuff leak so he was given Decadron. He was successfully extubated on 8/17 despite no cuff leak and was quickly weaned off pressors afterwards. Had a fever to 101.2 (8/22), infectious workup unrevealing.    Left Femoral neck fracture  - S/p Left Hemiarthroplasty with Dr. Jason León (8/15)  - fall and hip precautions    #Recent Left radial head Fracture  - NWB to LUE  - Left arm in sling as needed for comfort    #Recent febrile illness at prior hospital  - work up unrevealing     #ESRD - on HD ([facility])  #Anemia of chronic disease  - routine HD  - Epo with HD  - renal following    Secondary hyperparathyroidism  -secondary to ESRD  -Elevated PTH, normal Ca, high phos  -Elevated Alk, normal GGT, likely secondary to bone turnover than bile acid dysfunction  -Lft wnl, TB wnl  -C/w Sevelamer  -Renal following    # Hyponatremia  - likely due to hypervolemia due to ESRD  - HD per routine    # Hyperkalemia  - likely due to ESRD  - resolved after HD  - monitor electrolytes periodically    #HTN  # postural dizziness  - Metoprolol 25 BID  - check OH Periodically    #DVT ppx: Heparin TID

## 2023-09-11 NOTE — PROGRESS NOTE ADULT - SUBJECTIVE AND OBJECTIVE BOX
HPI:  36 y/o male w/ a PMHx of ESRD (M/W/F via LUE AVF), SOLITARIO, HTN, secondary hyperparathyroidism s/p parathyroidectomy (Dec 2022), obesity, stroke at age 10 w/ no residual deficits, and recent left radial fracture sustained after trying to lower himself into a chair s/p splint who presented on 8/14 after a fall while in the shower. Patient sustained a left femoral neck fracture s/p left hemiarthroplasty on 8/15. Immediately post-op in PACU, patient was lethargic and hypoxemic requiring reintubation so he was admitted to SICU post-operatively. Patient was hypotensive after intubation requiring pressor support. Patient without cuff leak so he was given Decadron. He was successfully extubated on 8/17 despite no cuff leak and was quickly weaned off pressors afterwards. Had a fever to 101.2 (8/22), infectious workup unrevealing.    Patient was evaluated by PM&R and therapy for functional deficits and gait/ADL impairments and recommend acute rehabilitation.  Patient was medically optimized for discharge to [facility] on 8/24/2023       ROS/Subjective  Patient seen and evaluated in OT  standing with platform walker WBAT LUE- cleared by ortho  No surgery on parathyroid gland till at least 6 weeks post op  No void   no BM x 2 days- need to give lactulose today  no dizziness, no headaches  no nausea, no vomiting  no SOB, No chest pain  repeat Covid PCR  ordered  awaiting JUSTINE      MEDICATIONS  (STANDING):  acetaminophen     Tablet .. 975 milliGRAM(s) Oral every 6 hours  chlorhexidine 2% Cloths 1 Application(s) Topical daily  epoetin carito-epbx (RETACRIT) Injectable 50829 Unit(s) IV Push <User Schedule>  heparin   Injectable 5000 Unit(s) SubCutaneous every 8 hours  metoprolol tartrate 25 milliGRAM(s) Oral two times a day  Nephro-pat 1 Tablet(s) Oral daily  polyethylene glycol 3350 17 Gram(s) Oral two times a day  senna 2 Tablet(s) Oral at bedtime  sevelamer carbonate 1600 milliGRAM(s) Oral three times a day with meals    MEDICATIONS  (PRN):  lactulose Syrup 10 Gram(s) Oral daily PRN constipation  oxyCODONE    IR 10 milliGRAM(s) Oral every 6 hours PRN Severe Pain (7 - 10)      Vital Signs Last 24 Hrs  T(C): 36.9 (11 Sep 2023 08:13), Max: 37 (10 Sep 2023 22:30)  T(F): 98.4 (11 Sep 2023 08:13), Max: 98.6 (10 Sep 2023 22:30)  HR: 70 (11 Sep 2023 08:13) (70 - 80)  BP: 155/92 (11 Sep 2023 08:13) (143/75 - 160/80)  BP(mean): --  RR: 15 (11 Sep 2023 08:13) (15 - 16)  SpO2: 98% (11 Sep 2023 08:13) (97% - 100%)    Parameters below as of 11 Sep 2023 08:13  Patient On (Oxygen Delivery Method): room air      Gen - NAD, Comfortable  HEENT - NCAT, EOMI, MMM  Neck - Supple, No limited ROM  Pulm - CTAB, No wheeze, No rhonchi, No crackles  Cardiovascular - RRR, S1S2, No murmurs  Abdomen - Soft, NT/ND, +BS  Extremities no edema, no calf tenderness, Right AV fistula, old fistula to left upper arm- pain with palpation left mid forearm  Neuro-     Cognitive - awake, alert, oriented to person, place, date, year.  Able  to follow command     Cranial Nerves - CN 2-12 intact     Motor -                     LEFT    UE - 4-/5                    RIGHT UE - 4/5                    LEFT    LE - HF- 2/5, KE 4/5, DF 4/5, PF 4/5                    RIGHT LE - 4-/5        Sensory - Intact  to LT      Reflexes - DTR Intact, No primitive reflexive     Coordination - FTN intact   MSK: muscle weakness  Psychiatric - Mood stable, Affect WNL  Skin:  stage 2 pressure ulcer to coccyx- improving      IDT 9/8  lives with aunt and cousin- cousin works  3STE one rail 15 Steps one rail upstairs ? first floor setup    OT   mod A Transfers max A ADL    PT   Max A 1-2 standing in sarasteady, modx2 in bars 10ft    tentative Dc 9/11 JUSTINE.      Continue comprehensive acute rehab program consisting of 3hrs/day of OT/PT and SLP.

## 2023-09-12 ENCOUNTER — TRANSCRIPTION ENCOUNTER (OUTPATIENT)
Age: 38
End: 2023-09-12

## 2023-09-12 LAB
ALP BONE SERPL-MCNC: 93 % — HIGH (ref 12–68)
ALP FLD-CCNC: 780 IU/L — HIGH (ref 44–121)
ALP INTEST CFR SERPL: 0 % — SIGNIFICANT CHANGE UP (ref 0–18)
ALP LIVER SERPL-CCNC: 7 % — LOW (ref 13–88)

## 2023-09-12 PROCEDURE — 99232 SBSQ HOSP IP/OBS MODERATE 35: CPT | Mod: GC

## 2023-09-12 PROCEDURE — 99232 SBSQ HOSP IP/OBS MODERATE 35: CPT

## 2023-09-12 RX ORDER — ACETAMINOPHEN 500 MG
3 TABLET ORAL
Refills: 0 | DISCHARGE
Start: 2023-09-12

## 2023-09-12 RX ORDER — CALCIUM ACETATE 667 MG
2 TABLET ORAL
Refills: 0 | DISCHARGE

## 2023-09-12 RX ORDER — SEVELAMER CARBONATE 2400 MG/1
2 POWDER, FOR SUSPENSION ORAL
Qty: 0 | Refills: 0 | DISCHARGE
Start: 2023-09-12

## 2023-09-12 RX ADMIN — HEPARIN SODIUM 5000 UNIT(S): 5000 INJECTION INTRAVENOUS; SUBCUTANEOUS at 06:54

## 2023-09-12 RX ADMIN — HEPARIN SODIUM 5000 UNIT(S): 5000 INJECTION INTRAVENOUS; SUBCUTANEOUS at 21:21

## 2023-09-12 RX ADMIN — CHLORHEXIDINE GLUCONATE 1 APPLICATION(S): 213 SOLUTION TOPICAL at 12:15

## 2023-09-12 RX ADMIN — Medication 1 TABLET(S): at 12:13

## 2023-09-12 RX ADMIN — OXYCODONE HYDROCHLORIDE 10 MILLIGRAM(S): 5 TABLET ORAL at 00:59

## 2023-09-12 RX ADMIN — SEVELAMER CARBONATE 1600 MILLIGRAM(S): 2400 POWDER, FOR SUSPENSION ORAL at 17:21

## 2023-09-12 RX ADMIN — Medication 25 MILLIGRAM(S): at 06:54

## 2023-09-12 RX ADMIN — OXYCODONE HYDROCHLORIDE 10 MILLIGRAM(S): 5 TABLET ORAL at 00:00

## 2023-09-12 NOTE — PROGRESS NOTE ADULT - SUBJECTIVE AND OBJECTIVE BOX
INTERVAL HPI/OVERNIGHT EVENTS:  Patient seen and examined at bedside. Denies any n/v abdominal pain. tolerating diet , no overnight events reported    MEDICATIONS  (STANDING):  acetaminophen     Tablet .. 975 milliGRAM(s) Oral every 6 hours  chlorhexidine 2% Cloths 1 Application(s) Topical daily  epoetin carito-epbx (RETACRIT) Injectable 42514 Unit(s) IV Push <User Schedule>  heparin   Injectable 5000 Unit(s) SubCutaneous every 8 hours  metoprolol tartrate 25 milliGRAM(s) Oral two times a day  Nephro-pat 1 Tablet(s) Oral daily  polyethylene glycol 3350 17 Gram(s) Oral two times a day  senna 2 Tablet(s) Oral at bedtime  sevelamer carbonate 1600 milliGRAM(s) Oral three times a day with meals    MEDICATIONS  (PRN):  lactulose Syrup 10 Gram(s) Oral daily PRN constipation  oxyCODONE    IR 10 milliGRAM(s) Oral every 6 hours PRN Severe Pain (7 - 10)      Allergies    No Known Drug Allergies  shellfish (Hives)    Intolerances        PAST MEDICAL & SURGICAL HISTORY:  HTN (hypertension)      Stroke  age 10      Morbid obesity      Sleep apnea      Leg fracture, right      Chronic renal failure      Hemodialysis access, AV graft      ESRD (end stage renal disease) on dialysis  since , M-W-F      Anemia, unspecified type      Hypothyroidism      AV fistula  R arm; L arm clotted    PHYSICAL EXAM:   Vital Signs:  Vital Signs Last 24 Hrs  T(C): 37.2 (12 Sep 2023 09:20), Max: 37.2 (12 Sep 2023 09:20)  T(F): 99 (12 Sep 2023 09:20), Max: 99 (12 Sep 2023 09:20)  HR: 87 (12 Sep 2023 09:20) (72 - 98)  BP: 107/62 (12 Sep 2023 09:20) (107/62 - 127/71)  BP(mean): --  RR: 16 (12 Sep 2023 09:20) (16 - 16)  SpO2: 99% (12 Sep 2023 09:20) (98% - 100%)    Parameters below as of 12 Sep 2023 09:20  Patient On (Oxygen Delivery Method): room air      Daily     Daily Weight in k.8 (11 Sep 2023 17:20)I&O's Summary    11 Sep 2023 07:01  -  12 Sep 2023 07:00  --------------------------------------------------------  IN: 800 mL / OUT: 3300 mL / NET: -2500 mL        GENERAL:  Appears stated age  HEENT:  NC/AT,  conjunctivae clear and pink  CHEST:  Full & symmetric excursion  HEART:  Regular rhythm, S1, S2  ABDOMEN:  Soft, non-tender, non-distended, normoactive bowel sounds  EXTEREMITIES:  no cyanosis,clubbing or edema  SKIN:  No rash/warm/dry  NEURO:  Alert, oriented    LABS:                        9.4    4.95  )-----------( 268      ( 11 Sep 2023 14:15 )             29.2     09-11    137  |  95<L>  |  117<H>  ----------------------------<  77  5.6<H>   |  24  |  12.80<H>    Ca    9.4      11 Sep 2023 14:15  Phos  7.6     09-11        Urinalysis Basic - ( 11 Sep 2023 14:15 )    Color: x / Appearance: x / SG: x / pH: x  Gluc: 77 mg/dL / Ketone: x  / Bili: x / Urobili: x   Blood: x / Protein: x / Nitrite: x   Leuk Esterase: x / RBC: x / WBC x   Sq Epi: x / Non Sq Epi: x / Bacteria: x      amylase   lipase  RADIOLOGY & ADDITIONAL TESTS:

## 2023-09-12 NOTE — PROGRESS NOTE ADULT - PROBLEM SELECTOR PLAN 1
Elevated Alk phos possible multifactorial including ESRD, recent fracture.  Trend LFTs  Fractionate the Alk Phos   Avoid hepatotoxic agents.
Elevated Alk phos possible multifactorial including ESRD, recent fracture.  Trend LFTs  Fractionate the Alk Phos   Pending Alk phos Isoenzyme and 5 Nucleotidase result   Avoid hepatotoxic agents.

## 2023-09-12 NOTE — DISCHARGE NOTE PROVIDER - HOSPITAL COURSE
36 y/o male w/ a PMHx of ESRD (M/W/F via LUE AVF), SOLITARIO, HTN, secondary hyperparathyroidism s/p parathyroidectomy (Dec 2022), obesity, stroke at age 10 w/ no residual deficits, and recent left radial fracture sustained after trying to lower himself into a chair s/p splint who presented on 8/14 after a fall while in the shower. Patient sustained a left femoral neck fracture s/p left hemiarthroplasty on 8/15. Immediately post-op in PACU, patient was lethargic and hypoxemic requiring reintubation so he was admitted to SICU post-operatively. Patient was hypotensive after intubation requiring pressor support. Patient without cuff leak so he was given Decadron. He was successfully extubated on 8/17 despite no cuff leak and was quickly weaned off pressors afterwards. Had a fever to 101.2 (8/22), infectious workup unrevealing.    Patient was evaluated by PM&R and therapy for functional deficits and gait/ADL impairments and recommend acute rehabilitation.  Patient was medically optimized for discharge to [facility] on 8/24/2023      36 y/o male w/ a PMHx of ESRD (M/W/F via LUE AVF), SOLITARIO, HTN, secondary hyperparathyroidism s/p parathyroidectomy (Dec 2022), obesity, stroke at age 10 w/ no residual deficits, and recent left radial fracture sustained after trying to lower himself into a chair s/p splint who presented on 8/14 after a fall while in the shower. Patient sustained a left femoral neck fracture s/p left hemiarthroplasty on 8/15. Immediately post-op in PACU, patient was lethargic and hypoxemic requiring reintubation so he was admitted to SICU post-operatively. Patient was hypotensive after intubation requiring pressor support. Patient without cuff leak so he was given Decadron. He was successfully extubated on 8/17 despite no cuff leak and was quickly weaned off pressors afterwards. Had a fever to 101.2 (8/22), infectious workup unrevealing.    Patient was evaluated by PM&R and therapy for functional deficits and gait/ADL impairments and recommend acute rehabilitation.  Patient was medically optimized for discharge to [facility] on 8/24/2023   At [facility] rehab patient completed comprehensive PT OT program. Reached his rehab goals on 9/19. HD continued. Cleared for dc on 9/19. 36 y/o male w/ a PMHx of ESRD (M/W/F via LUE AVF), SOLITARIO, HTN, secondary hyperparathyroidism s/p parathyroidectomy (Dec 2022), obesity, stroke at age 10 w/ no residual deficits, and recent left radial fracture sustained after trying to lower himself into a chair s/p splint who presented on 8/14 after a fall while in the shower. Patient sustained a left femoral neck fracture s/p left hemiarthroplasty on 8/15. Immediately post-op in PACU, patient was lethargic and hypoxemic requiring reintubation so he was admitted to SICU post-operatively. Patient was hypotensive after intubation requiring pressor support. Patient without cuff leak so he was given Decadron. He was successfully extubated on 8/17 despite no cuff leak and was quickly weaned off pressors afterwards. Had a fever to 101.2 (8/22), infectious workup unrevealing.    Patient was evaluated by PM&R and therapy for functional deficits and gait/ADL impairments and recommend acute rehabilitation.  Patient was medically optimized for discharge to [facility] on 8/24/2023   At [facility] rehab patient completed comprehensive PT OT program. Reached his rehab goals on 9/19. HD continued. Cleared for dc on 9/19.   pain left forearm- noted ulnar shaft fx as well as radial head fx-LUE placed in wrist splint-FU xrays done on 9/18- cleared by ortho for WBAT LUE with splint on

## 2023-09-12 NOTE — PROGRESS NOTE ADULT - SUBJECTIVE AND OBJECTIVE BOX
HPI:  38 y/o male w/ a PMHx of ESRD (M/W/F via LUE AVF), SOLITARIO, HTN, secondary hyperparathyroidism s/p parathyroidectomy (Dec 2022), obesity, stroke at age 10 w/ no residual deficits, and recent left radial fracture sustained after trying to lower himself into a chair s/p splint who presented on 8/14 after a fall while in the shower. Patient sustained a left femoral neck fracture s/p left hemiarthroplasty on 8/15. Immediately post-op in PACU, patient was lethargic and hypoxemic requiring reintubation so he was admitted to SICU post-operatively. Patient was hypotensive after intubation requiring pressor support. Patient without cuff leak so he was given Decadron. He was successfully extubated on 8/17 despite no cuff leak and was quickly weaned off pressors afterwards. Had a fever to 101.2 (8/22), infectious workup unrevealing.    Patient was evaluated by PM&R and therapy for functional deficits and gait/ADL impairments and recommend acute rehabilitation.  Patient was medically optimized for discharge to [facility] on 8/24/2023       ROS/Subjective  Patient seen and evaluated bedside  ambulated good distance with cardiac walker yesterday- no buckling left knee  reports having BMs after lactulose given yesterday- not documented  No surgery on parathyroid gland till at least 6 weeks post op  No void   no dizziness, no headaches  no nausea, no vomiting  no SOB, No chest pain  repeat Covid PCR 9/11 negative  Hep panel 9/8 negative  awaiting JUSTINE      MEDICATIONS  (STANDING):  acetaminophen     Tablet .. 975 milliGRAM(s) Oral every 6 hours  chlorhexidine 2% Cloths 1 Application(s) Topical daily  epoetin carito-epbx (RETACRIT) Injectable 58529 Unit(s) IV Push <User Schedule>  heparin   Injectable 5000 Unit(s) SubCutaneous every 8 hours  metoprolol tartrate 25 milliGRAM(s) Oral two times a day  Nephro-pat 1 Tablet(s) Oral daily  polyethylene glycol 3350 17 Gram(s) Oral two times a day  senna 2 Tablet(s) Oral at bedtime  sevelamer carbonate 1600 milliGRAM(s) Oral three times a day with meals    MEDICATIONS  (PRN):  lactulose Syrup 10 Gram(s) Oral daily PRN constipation  oxyCODONE    IR 10 milliGRAM(s) Oral every 6 hours PRN Severe Pain (7 - 10)                            9.4    4.95  )-----------( 268      ( 11 Sep 2023 14:15 )             29.2     09-11    137  |  95<L>  |  117<H>  ----------------------------<  77  5.6<H>   |  24  |  12.80<H>    Ca    9.4      11 Sep 2023 14:15  Phos  7.6     09-11      Urinalysis Basic - ( 11 Sep 2023 14:15 )    Color: x / Appearance: x / SG: x / pH: x  Gluc: 77 mg/dL / Ketone: x  / Bili: x / Urobili: x   Blood: x / Protein: x / Nitrite: x   Leuk Esterase: x / RBC: x / WBC x   Sq Epi: x / Non Sq Epi: x / Bacteria: x        CAPILLARY BLOOD GLUCOSE          Vital Signs Last 24 Hrs  T(C): 37.2 (12 Sep 2023 09:20), Max: 37.2 (12 Sep 2023 09:20)  T(F): 99 (12 Sep 2023 09:20), Max: 99 (12 Sep 2023 09:20)  HR: 87 (12 Sep 2023 09:20) (72 - 98)  BP: 107/62 (12 Sep 2023 09:20) (107/62 - 143/77)  BP(mean): --  RR: 16 (12 Sep 2023 09:20) (16 - 16)  SpO2: 99% (12 Sep 2023 09:20) (98% - 100%)    Parameters below as of 12 Sep 2023 09:20  Patient On (Oxygen Delivery Method): room air      Gen - NAD, Comfortable  HEENT - NCAT, EOMI, MMM  Neck - Supple, No limited ROM  Pulm - CTAB, No wheeze, No rhonchi, No crackles  Cardiovascular - RRR, S1S2, No murmurs  Abdomen - Soft, NT/ND, +BS  Extremities no edema, no calf tenderness, Right AV fistula, old fistula to left upper arm- pain with palpation left mid forearm  Neuro-     Cognitive - awake, alert, oriented to person, place, date, year.  Able  to follow command     Cranial Nerves - CN 2-12 intact     Motor -                     LEFT    UE - 4-/5                    RIGHT UE - 4/5                    LEFT    LE - HF- 2/5, KE 4/5, DF 4/5, PF 4/5                    RIGHT LE - 4-/5        Sensory - Intact  to LT      Reflexes - DTR Intact, No primitive reflexive     Coordination - FTN intact   MSK: muscle weakness  Psychiatric - Mood stable, Affect WNL  Skin:  stage 2 pressure ulcer to coccyx- improving      IDT 9/8  lives with aunt and cousin- cousin works  3STE one rail 15 Steps one rail upstairs ? first floor setup    OT   mod A Transfers max A ADL    PT   Max A 1-2 standing in [facility], modx2 in bars 10ft    tentative Dc 9/11 JUSTINE.      Continue comprehensive acute rehab program consisting of 3hrs/day of OT/PT and SLP.

## 2023-09-12 NOTE — PROGRESS NOTE ADULT - ASSESSMENT
38 y/o male w/ a PMHx of ESRD (M/W/F via LUE AVF), SOLITARIO, HTN, secondary hyperparathyroidism s/p parathyroidectomy (Dec 2022), obesity, stroke at age 10 w/ no residual deficits, and recent left radial fracture sustained after trying to lower himself into a chair s/p splint who presented on 8/14 after a fall while in the shower. Patient sustained a left femoral neck fracture s/p left hemiarthroplasty on 8/15.Post op hypoxic, hypotensive intubated, on pressures in ICU . He recovered quickly, extubated.   GI Consultation for elevated Alk Phos. AST, ALT and Bilirubin is normal.

## 2023-09-12 NOTE — DISCHARGE NOTE PROVIDER - NSDCMRMEDTOKEN_GEN_ALL_CORE_FT
acetaminophen 325 mg oral tablet: 3 tab(s) orally every 6 hours  ALPRAZolam 0.25 mg oral tablet: 1 tab(s) orally every 6 hours As needed Anxiety  metoprolol tartrate 25 mg oral tablet: 1 tab(s) orally 2 times a day  oxyCODONE 10 mg oral tablet: 1 tab(s) orally 3 times a day as needed for Severe Pain (7 - 10)  polyethylene glycol 3350 oral powder for reconstitution: 17 gram(s) orally once a day  sevelamer carbonate 800 mg oral tablet: 2 tab(s) orally 3 times a day (with meals)

## 2023-09-12 NOTE — PROGRESS NOTE ADULT - SUBJECTIVE AND OBJECTIVE BOX
No distress    Vital Signs Last 24 Hrs  T(C): 37.2 (09-12-23 @ 09:20), Max: 37.2 (09-12-23 @ 09:20)  T(F): 99 (09-12-23 @ 09:20), Max: 99 (09-12-23 @ 09:20)  HR: 82 (09-12-23 @ 17:30) (82 - 93)  BP: 109/66 (09-12-23 @ 17:30) (107/62 - 127/71)  RR: 16 (09-12-23 @ 09:20) (16 - 16)  SpO2: 99% (09-12-23 @ 17:30) (99% - 99%)    I&O's Detail    11 Sep 2023 07:01  -  12 Sep 2023 07:00  --------------------------------------------------------  IN:    Other (mL): 800 mL  Total IN: 800 mL    OUT:    Other (mL): 3300 mL  Total OUT: 3300 mL    s1s2  b/l air entry  soft, ND  no edema, AVF patent                                 9.4    4.95  )-----------( 268      ( 11 Sep 2023 14:15 )             29.2     11 Sep 2023 14:15    137    |  95     |  117    ----------------------------<  77     5.6     |  24     |  12.80    Ca    9.4        11 Sep 2023 14:15  Phos  7.6       11 Sep 2023 14:15    acetaminophen     Tablet .. 975 milliGRAM(s) Oral every 6 hours  chlorhexidine 2% Cloths 1 Application(s) Topical daily  epoetin carito-epbx (RETACRIT) Injectable 83604 Unit(s) IV Push <User Schedule>  heparin   Injectable 5000 Unit(s) SubCutaneous every 8 hours  lactulose Syrup 10 Gram(s) Oral daily PRN  metoprolol tartrate 25 milliGRAM(s) Oral two times a day  Nephro-pat 1 Tablet(s) Oral daily  oxyCODONE    IR 10 milliGRAM(s) Oral every 6 hours PRN  polyethylene glycol 3350 17 Gram(s) Oral two times a day  senna 2 Tablet(s) Oral at bedtime  sevelamer carbonate 1600 milliGRAM(s) Oral three times a day with meals    A/P:    S/p fall, L femoral neck fracture, s/p L hemiarthroplasty on 8/15  Adm to [facility]  ESRD on HD MWF  TMP 2.5kg w/HD as tolerates   Renal diet  Renvela for high Phos    [phone]

## 2023-09-12 NOTE — PROGRESS NOTE ADULT - SUBJECTIVE AND OBJECTIVE BOX
Patient is a 37y old  Male who presents with a chief complaint of Left femoral neck fracture (11 Sep 2023 15:18)    Patient seen and examined at bedside. No acute overnight events. Tolerating therapies. Pain controlled. Had large BM yesterday evening.     ALLERGIES:  No Known Drug Allergies  shellfish (Hives)    MEDICATIONS  (STANDING):  acetaminophen     Tablet .. 975 milliGRAM(s) Oral every 6 hours  chlorhexidine 2% Cloths 1 Application(s) Topical daily  epoetin carito-epbx (RETACRIT) Injectable 84871 Unit(s) IV Push <User Schedule>  heparin   Injectable 5000 Unit(s) SubCutaneous every 8 hours  metoprolol tartrate 25 milliGRAM(s) Oral two times a day  Nephro-pat 1 Tablet(s) Oral daily  polyethylene glycol 3350 17 Gram(s) Oral two times a day  senna 2 Tablet(s) Oral at bedtime  sevelamer carbonate 1600 milliGRAM(s) Oral three times a day with meals    MEDICATIONS  (PRN):  lactulose Syrup 10 Gram(s) Oral daily PRN constipation  oxyCODONE    IR 10 milliGRAM(s) Oral every 6 hours PRN Severe Pain (7 - 10)    Vital Signs Last 24 Hrs  T(F): 98.6 (11 Sep 2023 20:59), Max: 98.6 (11 Sep 2023 20:59)  HR: 93 (12 Sep 2023 06:53) (72 - 98)  BP: 127/71 (12 Sep 2023 06:53) (108/52 - 143/77)  RR: 16 (11 Sep 2023 20:59) (16 - 16)  SpO2: 100% (11 Sep 2023 20:59) (98% - 100%)  I&O's Summary    11 Sep 2023 07:01  -  12 Sep 2023 07:00  --------------------------------------------------------  IN: 800 mL / OUT: 3300 mL / NET: -2500 mL    PHYSICAL EXAM:  General: NAD, A/O x 3  ENT: MMM, no tonsilar exudate  Neck: Supple, No JVD  Lungs: Clear to auscultation bilaterally, no wheezes. Good air entry bilaterally   Cardio: RRR, S1/S2, No murmurs  Abdomen: Soft, Nontender, Nondistended; Bowel sounds present  Extremities: no edema or cyanosis. RUE AVF +thrill     LABS:                        9.4    4.95  )-----------( 268      ( 11 Sep 2023 14:15 )             29.2       09-11    137  |  95  |  117  ----------------------------<  77  5.6   |  24  |  12.80    Ca    9.4      11 Sep 2023 14:15  Phos  7.6     09-11    08-15 Chol 190 mg/dL LDL -- HDL 57 mg/dL Trig 114 mg/dL    Urinalysis Basic - ( 11 Sep 2023 14:15 )    Color: x / Appearance: x / SG: x / pH: x  Gluc: 77 mg/dL / Ketone: x  / Bili: x / Urobili: x   Blood: x / Protein: x / Nitrite: x   Leuk Esterase: x / RBC: x / WBC x   Sq Epi: x / Non Sq Epi: x / Bacteria: x    COVID-19 PCR: NotDetec (09-11-23 @ 11:38)  COVID-19 PCR: NotDetec (09-06-23 @ 13:30)  COVID-19 PCR: NotDetec (08-25-23 @ 08:15)    RADIOLOGY & ADDITIONAL TESTS:     Care Discussed with Consultants/Other Providers:

## 2023-09-12 NOTE — DISCHARGE NOTE PROVIDER - CARE PROVIDER_API CALL
Jason León  Orthopaedic Surgery  [address], [address]  [place] [address]  Phone: [phone]  Fax: [phone]  Follow Up Time:     Dante Seth  Pain Medicine  [address] [place] [address]  Phone: [phone]  Fax: [phone]  Follow Up Time:     Eric Royal  Surgery  [address], [address]  [place] [address]  Phone: [phone]  Fax: [phone]  Follow Up Time:     Lucy Blanco  Nephrology  [address], [address]  [place], [place] [address]  Phone: [phone]  Fax: [phone]  Follow Up Time:

## 2023-09-12 NOTE — DISCHARGE NOTE PROVIDER - NSDCCPCAREPLAN_GEN_ALL_CORE_FT
PRINCIPAL DISCHARGE DIAGNOSIS  Diagnosis: Femur neck fracture  Assessment and Plan of Treatment: Non weigh bearing. Pain medications as needed. Follow up ortho in 1 week      SECONDARY DISCHARGE DIAGNOSES  Diagnosis: ESRD on dialysis  Assessment and Plan of Treatment: resume dialysis, MWF. Follow up renal.    Diagnosis: Fracture, radius  Assessment and Plan of Treatment: weigh bearing ok on LUE through elbow. Follow up ortho in 1 week. Wear splint     PRINCIPAL DISCHARGE DIAGNOSIS  Diagnosis: Femur neck fracture  Assessment and Plan of Treatment: LLE weigh bearing as tolerated. Posterior precautions. Pain medications as needed. Follow up ortho in 1 week      SECONDARY DISCHARGE DIAGNOSES  Diagnosis: ESRD on dialysis  Assessment and Plan of Treatment: resume dialysis, MWF. Follow up renal.    Diagnosis: Fracture, radius  Assessment and Plan of Treatment: weigh bearing ok on LUE through elbow. Follow up ortho in 1 week. Wear splint     PRINCIPAL DISCHARGE DIAGNOSIS  Diagnosis: Femur neck fracture  Assessment and Plan of Treatment: LLE weigh bearing as tolerated. Posterior precautions. Pain medications as needed. Follow up ortho in 1 week      SECONDARY DISCHARGE DIAGNOSES  Diagnosis: ESRD on dialysis  Assessment and Plan of Treatment: resume dialysis, MWF. Follow up renal.    Diagnosis: Fracture, radius  Assessment and Plan of Treatment: weigh bearing ok on LUE . Follow up ortho in 1 week. Wear splint    Diagnosis: Ulnar shaft fracture  Assessment and Plan of Treatment: cleared by ortho WBAT with cockup splint on- can use regular walker

## 2023-09-12 NOTE — PROGRESS NOTE ADULT - ASSESSMENT
36 y/o male w/ a PMHx of ESRD (M/W/F via LUE AVF), SOLITARIO, HTN, secondary hyperparathyroidism s/p parathyroidectomy (Dec 2022), obesity, stroke at age 10 w/ no residual deficits, and recent left radial fracture sustained after trying to lower himself into a chair s/p splint who presented on 8/14 after a fall where he sustained a left femoral neck fracture, s/p left hemiarthroplasty (8/15). Post op, he was lethargic and hypoxemic requiring reintubation. Later became hypOtensive and was on pressor. Patient without cuff leak so he was given Decadron. He was successfully extubated on 8/17 despite no cuff leak and was quickly weaned off pressors afterwards. Had a fever to 101.2 (8/22), infectious workup unrevealing.    COMORBIDITES/ACTIVE MEDICAL ISSUES     #Femoral neck fracture  - S/p Left Hemiarthroplasty with Dr. Jason León (8/15), healing well  - Weight bearing as tolerated  - Gait Instability, ADL impairments and Functional impairments:  Comprehensive Rehab Program of PT/OT   awaiting JUSTINE placement    #Recent Left radial head Fracture  - Left arm in sling as needed for comfort   -re-xray Left Forearm secondary to pain- with ulna shaft Fx - ortho saw patient- ulnar forearm splint placed  cleared by ortho on9/8 for WBAT LUE with splint on- can use platform  did well with cardiac walker/double platform in PT    #ESRD - on HD (MWF)  #Anemia of chronic disease  - Right AVF   - Epo with HD  - Nephro-pat supplement    #Renal bone disease   - Home diet: phoslo with meals  - Ensure low phos diet  - Outpatient follow up with endo and bone scan to determine etiology   parathyroidectomy 9/11 cancelled- needs to wait till at least 6 weeks post op    #HTN  - Metoprolol 25 BID    #Mood  - Xanax 0.25 PRN    #Pain control  - Tylenol 975 q6h, Tramadol PRN    #GI/Bowel Mgmt   - At risk for constipation due to neurologic diagnosis, immobility and/or medication use  - Senna,  Miralax QD    #Bladder management  -anuric    #DVT ppx  - Heparin TID     #Skin:  - stage 2 pressure ulcer to coccyx: cleanse with NS, pat dry with gauze and apply foam dressing daily- healing  -At risk for pressure injury due to neurologic diagnosis and relative immobility  -Bilateral heels - soft heel protectors, apply liquid barrier film daily and monitor for tissue type changes  - Turn Q2 hr while in bed, air mattress  - Skin barrier cream as needed  - Nursing to monitor skin Q shift    #Diet   - Regular + Thins  [Renal Diet]    Precautions / PROPHYLAXIS:   - Falls  - ortho: Weight bearing status: LLE WBAT; LUE WBAT LUE through platform-wear splint  - Lungs: Aspiration, Incentive Spirometer   - Pressure injury/Skin: Turn Q2hrs while in bed, OOB to Chair, PT/OT

## 2023-09-12 NOTE — DISCHARGE NOTE PROVIDER - NSDCFUADDINST_GEN_ALL_CORE_FT
Patient Needs Appt with To consider parathyroid surgery with Dr Royal in approx 2 weeks  Patient can be WBAT LUE with wrist splint on- Use Regular walker  WBAT LLE - Post precautions LLE  Needs Ortho FU with Dr León 1-2 weeks

## 2023-09-12 NOTE — DISCHARGE NOTE PROVIDER - CARE PROVIDERS DIRECT ADDRESSES
[email or web address]

## 2023-09-12 NOTE — DISCHARGE NOTE PROVIDER - PROVIDER TOKENS
PROVIDER:[TOKEN:[[id number]]],PROVIDER:[TOKEN:[[id number]]],PROVIDER:[TOKEN:[[id number]]],PROVIDER:[TOKEN:[[id number]]]

## 2023-09-12 NOTE — DISCHARGE NOTE PROVIDER - DETAILS OF MALNUTRITION DIAGNOSIS/DIAGNOSES
This patient has been assessed with a concern for Malnutrition and was treated during this hospitalization for the following Nutrition diagnosis/diagnoses:     -  08/25/2023: Severe protein-calorie malnutrition

## 2023-09-13 LAB
ALBUMIN SERPL ELPH-MCNC: 2.7 G/DL — LOW (ref 3.3–5)
ANION GAP SERPL CALC-SCNC: 13 MMOL/L — SIGNIFICANT CHANGE UP (ref 5–17)
BUN SERPL-MCNC: 101 MG/DL — HIGH (ref 7–23)
CALCIUM SERPL-MCNC: 8.9 MG/DL — SIGNIFICANT CHANGE UP (ref 8.4–10.5)
CHLORIDE SERPL-SCNC: 95 MMOL/L — LOW (ref 96–108)
CO2 SERPL-SCNC: 29 MMOL/L — SIGNIFICANT CHANGE UP (ref 22–31)
CREAT SERPL-MCNC: 12.26 MG/DL — HIGH (ref 0.5–1.3)
EGFR: 5 ML/MIN/1.73M2 — LOW
GLUCOSE BLDC GLUCOMTR-MCNC: 84 MG/DL — SIGNIFICANT CHANGE UP (ref 70–99)
GLUCOSE SERPL-MCNC: 96 MG/DL — SIGNIFICANT CHANGE UP (ref 70–99)
HBV SURFACE AB SER-ACNC: REACTIVE
HCT VFR BLD CALC: 28.3 % — LOW (ref 39–50)
HGB BLD-MCNC: 9.1 G/DL — LOW (ref 13–17)
MCHC RBC-ENTMCNC: 29.9 PG — SIGNIFICANT CHANGE UP (ref 27–34)
MCHC RBC-ENTMCNC: 32.2 GM/DL — SIGNIFICANT CHANGE UP (ref 32–36)
MCV RBC AUTO: 93.1 FL — SIGNIFICANT CHANGE UP (ref 80–100)
NRBC # BLD: 0 /100 WBCS — SIGNIFICANT CHANGE UP (ref 0–0)
PHOSPHATE SERPL-MCNC: 7.3 MG/DL — HIGH (ref 2.5–4.5)
PLATELET # BLD AUTO: 269 K/UL — SIGNIFICANT CHANGE UP (ref 150–400)
POTASSIUM SERPL-MCNC: 4.9 MMOL/L — SIGNIFICANT CHANGE UP (ref 3.5–5.3)
POTASSIUM SERPL-SCNC: 4.9 MMOL/L — SIGNIFICANT CHANGE UP (ref 3.5–5.3)
RBC # BLD: 3.04 M/UL — LOW (ref 4.2–5.8)
RBC # FLD: 17.2 % — HIGH (ref 10.3–14.5)
SODIUM SERPL-SCNC: 137 MMOL/L — SIGNIFICANT CHANGE UP (ref 135–145)
WBC # BLD: 4.51 K/UL — SIGNIFICANT CHANGE UP (ref 3.8–10.5)
WBC # FLD AUTO: 4.51 K/UL — SIGNIFICANT CHANGE UP (ref 3.8–10.5)

## 2023-09-13 PROCEDURE — 99232 SBSQ HOSP IP/OBS MODERATE 35: CPT | Mod: GC

## 2023-09-13 PROCEDURE — 99232 SBSQ HOSP IP/OBS MODERATE 35: CPT

## 2023-09-13 RX ADMIN — Medication 25 MILLIGRAM(S): at 20:50

## 2023-09-13 RX ADMIN — HEPARIN SODIUM 5000 UNIT(S): 5000 INJECTION INTRAVENOUS; SUBCUTANEOUS at 06:38

## 2023-09-13 RX ADMIN — Medication 975 MILLIGRAM(S): at 07:15

## 2023-09-13 RX ADMIN — Medication 25 MILLIGRAM(S): at 06:39

## 2023-09-13 RX ADMIN — HEPARIN SODIUM 5000 UNIT(S): 5000 INJECTION INTRAVENOUS; SUBCUTANEOUS at 13:01

## 2023-09-13 RX ADMIN — ERYTHROPOIETIN 10000 UNIT(S): 10000 INJECTION, SOLUTION INTRAVENOUS; SUBCUTANEOUS at 15:57

## 2023-09-13 RX ADMIN — SEVELAMER CARBONATE 1600 MILLIGRAM(S): 2400 POWDER, FOR SUSPENSION ORAL at 18:15

## 2023-09-13 RX ADMIN — SEVELAMER CARBONATE 1600 MILLIGRAM(S): 2400 POWDER, FOR SUSPENSION ORAL at 08:41

## 2023-09-13 RX ADMIN — Medication 975 MILLIGRAM(S): at 06:39

## 2023-09-13 RX ADMIN — OXYCODONE HYDROCHLORIDE 10 MILLIGRAM(S): 5 TABLET ORAL at 07:45

## 2023-09-13 RX ADMIN — CHLORHEXIDINE GLUCONATE 1 APPLICATION(S): 213 SOLUTION TOPICAL at 12:44

## 2023-09-13 RX ADMIN — Medication 1 TABLET(S): at 12:44

## 2023-09-13 RX ADMIN — OXYCODONE HYDROCHLORIDE 10 MILLIGRAM(S): 5 TABLET ORAL at 02:55

## 2023-09-13 RX ADMIN — SEVELAMER CARBONATE 1600 MILLIGRAM(S): 2400 POWDER, FOR SUSPENSION ORAL at 12:44

## 2023-09-13 RX ADMIN — HEPARIN SODIUM 5000 UNIT(S): 5000 INJECTION INTRAVENOUS; SUBCUTANEOUS at 22:11

## 2023-09-13 NOTE — PROGRESS NOTE ADULT - ASSESSMENT
38 y/o male w/ a PMHx of ESRD (M/W/F via LUE AVF), SOLITARIO, HTN, secondary hyperparathyroidism s/p parathyroidectomy (Dec 2022), obesity, stroke at age 10 w/ no residual deficits, and recent left radial fracture sustained after trying to lower himself into a chair s/p splint who presented on 8/14 after a fall where he sustained a left femoral neck fracture, s/p left hemiarthroplasty (8/15). Post op, he was lethargic and hypoxemic requiring reintubation. Later became hypOtensive and was on pressor. Patient without cuff leak so he was given Decadron. He was successfully extubated on 8/17 despite no cuff leak and was quickly weaned off pressors afterwards. Had a fever to 101.2 (8/22), infectious workup unrevealing.    COMORBIDITES/ACTIVE MEDICAL ISSUES     #Femoral neck fracture  - S/p Left Hemiarthroplasty with Dr. Jason León (8/15), healing well  - Weight bearing as tolerated  - Gait Instability, ADL impairments and Functional impairments:  Comprehensive Rehab Program of PT/OT   awaiting JUSTINE placement    #Recent Left radial head Fracture  - Left arm in sling as needed for comfort   -re-xray Left Forearm secondary to pain- with ulna shaft Fx - ortho saw patient- ulnar forearm splint placed  cleared by ortho on9/8 for WBAT LUE with splint on- can use platform  did well with cardiac walker/double platform in PT    #ESRD - on HD (MWF)  #Anemia of chronic disease  - Right AVF   - Epo with HD  - Nephro-pat supplement    #Renal bone disease   - Home diet: phoslo with meals  - Ensure low phos diet  - Outpatient follow up with endo and bone scan to determine etiology   parathyroidectomy 9/11 cancelled- needs to wait till at least 6 weeks post op    #HTN  - Metoprolol 25 BID    #Mood  - Xanax 0.25 PRN    #Pain control  - Tylenol 975 q6h, Tramadol PRN    #GI/Bowel Mgmt   - At risk for constipation due to neurologic diagnosis, immobility and/or medication use  - Senna,  Miralax QD    #Bladder management  -anuric    #DVT ppx  - Heparin TID     #Skin:  - stage 2 pressure ulcer to coccyx: cleanse with NS, pat dry with gauze and apply foam dressing daily- healing  -At risk for pressure injury due to neurologic diagnosis and relative immobility  -Bilateral heels - soft heel protectors, apply liquid barrier film daily and monitor for tissue type changes  - Turn Q2 hr while in bed, air mattress  - Skin barrier cream as needed  - Nursing to monitor skin Q shift    #Diet   - Regular + Thins  [Renal Diet]    Precautions / PROPHYLAXIS:   - Falls  - ortho: Weight bearing status: LLE WBAT; LUE WBAT LUE through platform-wear splint  - Lungs: Aspiration, Incentive Spirometer   - Pressure injury/Skin: Turn Q2hrs while in bed, OOB to Chair, PT/OT

## 2023-09-13 NOTE — PROGRESS NOTE ADULT - ASSESSMENT
38 y/o male w/ a PMHx of ESRD (M/W/F via LUE AVF), SOLITARIO, HTN, secondary hyperparathyroidism s/p parathyroidectomy (Dec 2022), obesity, stroke at age 10 w/ no residual deficits, and recent left radial fracture sustained after trying to lower himself into a chair s/p splint who presented on 8/14 after a fall where he sustained a left femoral neck fracture, s/p left hemiarthroplasty (8/15). Post op, he was lethargic and hypoxemic requiring reintubation. Later became hypotensive and was on pressor. Patient without cuff leak so he was given Decadron. He was successfully extubated on 8/17 despite no cuff leak and was quickly weaned off pressors afterwards. Had a fever to 101.2 (8/22), infectious workup unrevealing.    Left Femoral neck fracture  - S/p Left Hemiarthroplasty with Dr. Jason León (8/15)  - fall and hip precautions    #Recent Left radial head Fracture  - NWB to LUE  - Left arm in sling as needed for comfort    #Recent febrile illness at prior hospital  - work up unrevealing     #ESRD - on HD ([facility])  #Anemia of chronic disease  - routine HD  - Epo with HD  - renal following    Secondary hyperparathyroidism  -secondary to ESRD  -Elevated PTH, normal Ca, high phos  -Elevated Alk, normal GGT, likely secondary to bone turnover than bile acid dysfunction  -Lft wnl, TB wnl  -C/w Sevelamer  -Renal following    # Hyponatremia  - likely due to hypervolemia due to ESRD  - HD per routine    # Hyperkalemia  - likely due to ESRD  - resolved after HD  - monitor electrolytes periodically    #HTN  # postural dizziness  - Metoprolol 25 BID  - check OH Periodically    #DVT ppx: Heparin TID

## 2023-09-13 NOTE — PROVIDER CONTACT NOTE (OTHER) - BACKGROUND
Ask MD for an order to draw blood. Dr. Villalba provided order with the OK for blood draw and lab tect to draw blood on the left arm for the vein.
s/p left hip surgery
s/p dialysis

## 2023-09-13 NOTE — PROGRESS NOTE ADULT - SUBJECTIVE AND OBJECTIVE BOX
PROGRESS NOTE:     Patient is a 37y old  Male who presents with a chief complaint of Left femoral neck fracture (12 Sep 2023 23:11)          SUBJECTIVE & OBJECTIVE:   Pt seen and examined at bedside in AM    no overnight events.   no new complaints    REVIEW OF SYSTEMS: remaining ROS negative     PHYSICAL EXAM:  VITALS:  Vital Signs Last 24 Hrs  T(C): 36.4 (12 Sep 2023 22:00), Max: 36.4 (12 Sep 2023 22:00)  T(F): 97.6 (12 Sep 2023 22:00), Max: 97.6 (12 Sep 2023 22:00)  HR: 80 (13 Sep 2023 05:32) (80 - 82)  BP: 150/80 (13 Sep 2023 05:32) (109/66 - 150/80)  BP(mean): --  RR: 18 (12 Sep 2023 22:00) (18 - 18)  SpO2: 98% (12 Sep 2023 22:00) (98% - 99%)    Parameters below as of 12 Sep 2023 22:00  Patient On (Oxygen Delivery Method): room air          GENERAL: NAD,  no increased WOB  HEAD:  Atraumatic, Normocephalic  EYES: EOMI, conjunctiva and sclera clear  ENMT: Moist mucous membranes  NECK: Supple, No JVD  NERVOUS SYSTEM:  Alert & Oriented X3  CHEST/LUNG: Clear to auscultation bilaterally; No rales, rhonchi, wheezing, or rubs  HEART: Regular rate and rhythm  ABDOMEN: Soft, Nontender, Nondistended; Bowel sounds present  EXTREMITIES:  No clubbing, cyanosis, calf tenderness or edema b/l      MEDICATIONS  (STANDING):  acetaminophen     Tablet .. 975 milliGRAM(s) Oral every 6 hours  chlorhexidine 2% Cloths 1 Application(s) Topical daily  epoetin carito-epbx (RETACRIT) Injectable 36964 Unit(s) IV Push <User Schedule>  heparin   Injectable 5000 Unit(s) SubCutaneous every 8 hours  metoprolol tartrate 25 milliGRAM(s) Oral two times a day  Nephro-pat 1 Tablet(s) Oral daily  polyethylene glycol 3350 17 Gram(s) Oral two times a day  senna 2 Tablet(s) Oral at bedtime  sevelamer carbonate 1600 milliGRAM(s) Oral three times a day with meals    MEDICATIONS  (PRN):  lactulose Syrup 10 Gram(s) Oral daily PRN constipation  oxyCODONE    IR 10 milliGRAM(s) Oral every 6 hours PRN Severe Pain (7 - 10)      Allergies    No Known Drug Allergies  shellfish (Hives)    Intolerances              LABS:                           9.4    4.95  )-----------( 268      ( 11 Sep 2023 14:15 )             29.2     09-11    137  |  95<L>  |  117<H>  ----------------------------<  77  5.6<H>   |  24  |  12.80<H>    Ca    9.4      11 Sep 2023 14:15  Phos  7.6     09-11        Urinalysis Basic - ( 11 Sep 2023 14:15 )    Color: x / Appearance: x / SG: x / pH: x  Gluc: 77 mg/dL / Ketone: x  / Bili: x / Urobili: x   Blood: x / Protein: x / Nitrite: x   Leuk Esterase: x / RBC: x / WBC x   Sq Epi: x / Non Sq Epi: x / Bacteria: x      CAPILLARY BLOOD GLUCOSE                    RECENT CULTURES:          RADIOLOGY & ADDITIONAL TESTS:          Care Discussed with Consultants/Other Providers [x ] YES  [ ] NO

## 2023-09-13 NOTE — PROGRESS NOTE ADULT - SUBJECTIVE AND OBJECTIVE BOX
S/p stable HD today    Vital Signs Last 24 Hrs  T(C): 36.9 (09-13-23 @ 20:40), Max: 37.1 (09-13-23 @ 17:06)  T(F): 98.4 (09-13-23 @ 20:40), Max: 98.7 (09-13-23 @ 17:06)  HR: 87 (09-13-23 @ 20:40) (69 - 87)  BP: 141/76 (09-13-23 @ 20:40) (111/68 - 154/84)  RR: 16 (09-13-23 @ 20:40) (16 - 16)  SpO2: 98% (09-13-23 @ 20:40) (98% - 100%)    I&O's Detail    13 Sep 2023 07:01  -  13 Sep 2023 23:48  --------------------------------------------------------  OUT:    Other (mL): 1860 mL  Total OUT: 1860 mL    s1s2  b/l air entry  soft, ND  no edema, AVF patent                                         9.1    4.51  )-----------( 269      ( 13 Sep 2023 14:10 )             28.3     13 Sep 2023 14:10    137    |  95     |  101    ----------------------------<  96     4.9     |  29     |  12.26    Ca    8.9        13 Sep 2023 14:10  Phos  7.3       13 Sep 2023 14:10    TPro  x      /  Alb  2.7    /  TBili  x      /  DBili  x      /  AST  x      /  ALT  x      /  AlkPhos  x      13 Sep 2023 14:10    LIVER FUNCTIONS - ( 13 Sep 2023 14:10 )  Alb: 2.7 g/dL / Pro: x     / ALK PHOS: x     / ALT: x     / AST: x     / GGT: x           acetaminophen     Tablet .. 975 milliGRAM(s) Oral every 6 hours  chlorhexidine 2% Cloths 1 Application(s) Topical daily  epoetin carito-epbx (RETACRIT) Injectable 60303 Unit(s) IV Push <User Schedule>  heparin   Injectable 5000 Unit(s) SubCutaneous every 8 hours  lactulose Syrup 10 Gram(s) Oral daily PRN  metoprolol tartrate 25 milliGRAM(s) Oral two times a day  Nephro-pat 1 Tablet(s) Oral daily  oxyCODONE    IR 10 milliGRAM(s) Oral every 6 hours PRN  polyethylene glycol 3350 17 Gram(s) Oral two times a day  senna 2 Tablet(s) Oral at bedtime  sevelamer carbonate 1600 milliGRAM(s) Oral three times a day with meals    A/P:    S/p fall, L femoral neck fracture, s/p L hemiarthroplasty on 8/15  [facility]m to [facility]  ESRD on HD MWF  TMP 2kg w/HD as tolerates   Renal diet  Renvela for high Phos    [phone]

## 2023-09-13 NOTE — PROGRESS NOTE ADULT - SUBJECTIVE AND OBJECTIVE BOX
HPI:  36 y/o male w/ a PMHx of ESRD (M/W/F via LUE AVF), SOLITARIO, HTN, secondary hyperparathyroidism s/p parathyroidectomy (Dec 2022), obesity, stroke at age 10 w/ no residual deficits, and recent left radial fracture sustained after trying to lower himself into a chair s/p splint who presented on 8/14 after a fall while in the shower. Patient sustained a left femoral neck fracture s/p left hemiarthroplasty on 8/15. Immediately post-op in PACU, patient was lethargic and hypoxemic requiring reintubation so he was admitted to SICU post-operatively. Patient was hypotensive after intubation requiring pressor support. Patient without cuff leak so he was given Decadron. He was successfully extubated on 8/17 despite no cuff leak and was quickly weaned off pressors afterwards. Had a fever to 101.2 (8/22), infectious workup unrevealing.    Patient was evaluated by PM&R and therapy for functional deficits and gait/ADL impairments and recommend acute rehabilitation.  Patient was medically optimized for discharge to [facility] on 8/24/2023       ROS/Subjective  Patient seen and evaluated in OT  awaiting HD today  feels tired - sl SOB  pain left fingers chronic  No surgery on parathyroid gland till at least 6 weeks post op  No void   no dizziness, no headaches  no nausea, no vomiting  no SOB, No chest pain  repeat Covid PCR 9/11 negative  Hep panel 9/8 negative  last Bm 9/11  awaiting [facility]      MEDICATIONS  (STANDING):  acetaminophen     Tablet .. 975 milliGRAM(s) Oral every 6 hours  chlorhexidine 2% Cloths 1 Application(s) Topical daily  epoetin carito-epbx (RETACRIT) Injectable 42548 Unit(s) IV Push <User Schedule>  heparin   Injectable 5000 Unit(s) SubCutaneous every 8 hours  metoprolol tartrate 25 milliGRAM(s) Oral two times a day  Nephro-pat 1 Tablet(s) Oral daily  polyethylene glycol 3350 17 Gram(s) Oral two times a day  senna 2 Tablet(s) Oral at bedtime  sevelamer carbonate 1600 milliGRAM(s) Oral three times a day with meals    MEDICATIONS  (PRN):  lactulose Syrup 10 Gram(s) Oral daily PRN constipation  oxyCODONE    IR 10 milliGRAM(s) Oral every 6 hours PRN Severe Pain (7 - 10)                            9.4    4.95  )-----------( 268      ( 11 Sep 2023 14:15 )             29.2     09-11    137  |  95<L>  |  117<H>  ----------------------------<  77  5.6<H>   |  24  |  12.80<H>    Ca    9.4      11 Sep 2023 14:15  Phos  7.6     09-11      Urinalysis Basic - ( 11 Sep 2023 14:15 )    Color: x / Appearance: x / SG: x / pH: x  Gluc: 77 mg/dL / Ketone: x  / Bili: x / Urobili: x   Blood: x / Protein: x / Nitrite: x   Leuk Esterase: x / RBC: x / WBC x   Sq Epi: x / Non Sq Epi: x / Bacteria: x        CAPILLARY BLOOD GLUCOSE    Vital Signs Last 24 Hrs  T(C): 36.4 (12 Sep 2023 22:00), Max: 36.4 (12 Sep 2023 22:00)  T(F): 97.6 (12 Sep 2023 22:00), Max: 97.6 (12 Sep 2023 22:00)  HR: 80 (13 Sep 2023 05:32) (80 - 82)  BP: 150/80 (13 Sep 2023 05:32) (109/66 - 150/80)  BP(mean): --  RR: 18 (12 Sep 2023 22:00) (18 - 18)  SpO2: 98% (12 Sep 2023 22:00) (98% - 99%)    Parameters below as of 12 Sep 2023 22:00  Patient On (Oxygen Delivery Method): room air      Gen - NAD, Comfortable  HEENT - NCAT, EOMI, MMM  Neck - Supple, No limited ROM  Pulm - CTAB, No wheeze, No rhonchi, No crackles  Cardiovascular - RRR, S1S2, No murmurs  Abdomen - Soft, NT/ND, +BS  Extremities no edema, no calf tenderness, Right AV fistula, old fistula to left upper arm- pain with palpation left mid forearm  Neuro-     Cognitive - awake, alert, oriented to person, place, date, year.  Able  to follow command     Cranial Nerves - CN 2-12 intact     Motor -                     LEFT    UE - 4-/5                    RIGHT UE - 4/5                    LEFT    LE - HF- 2/5, KE 4/5, DF 4/5, PF 4/5                    RIGHT LE - 4-/5        Sensory - Intact  to LT      Reflexes - DTR Intact, No primitive reflexive     Coordination - FTN intact   MSK: muscle weakness  Psychiatric - Mood stable, Affect WNL  Skin:  stage 2 pressure ulcer to coccyx- improving      IDT 9/8  lives with aunt and cousin- cousin works  3STE one rail 15 Steps one rail upstairs ? first floor setup    OT   mod A Transfers max A ADL    PT   Max A 1-2 standing in [place], modx2 in bars 10ft    tentative Dc 9/11 [facility].      Continue comprehensive acute rehab program consisting of 3hrs/day of OT/PT and SLP.

## 2023-09-13 NOTE — PROVIDER CONTACT NOTE (OTHER) - SITUATION
patient has AV fistula on the right and left arm and has pink band on both arms. Lab tect needed to draw blood and said she can not do it do to the pink bands.
Pt refused bp meds, stated bp was too low, want to take it later
Pt refused tylenol

## 2023-09-14 LAB
5NT SERPL-CCNC: 5 IU/L — SIGNIFICANT CHANGE UP (ref 0–10)
ALP BONE SERPL-MCNC: 90 % — HIGH (ref 12–68)
ALP FLD-CCNC: 784 IU/L — HIGH (ref 44–121)
ALP INTEST CFR SERPL: 0 % — SIGNIFICANT CHANGE UP (ref 0–18)
ALP LIVER SERPL-CCNC: 10 % — LOW (ref 13–88)

## 2023-09-14 PROCEDURE — 99232 SBSQ HOSP IP/OBS MODERATE 35: CPT

## 2023-09-14 PROCEDURE — 99232 SBSQ HOSP IP/OBS MODERATE 35: CPT | Mod: GC

## 2023-09-14 RX ADMIN — HEPARIN SODIUM 5000 UNIT(S): 5000 INJECTION INTRAVENOUS; SUBCUTANEOUS at 21:34

## 2023-09-14 RX ADMIN — OXYCODONE HYDROCHLORIDE 10 MILLIGRAM(S): 5 TABLET ORAL at 00:08

## 2023-09-14 RX ADMIN — Medication 25 MILLIGRAM(S): at 17:16

## 2023-09-14 RX ADMIN — Medication 25 MILLIGRAM(S): at 06:05

## 2023-09-14 RX ADMIN — SEVELAMER CARBONATE 1600 MILLIGRAM(S): 2400 POWDER, FOR SUSPENSION ORAL at 09:03

## 2023-09-14 RX ADMIN — HEPARIN SODIUM 5000 UNIT(S): 5000 INJECTION INTRAVENOUS; SUBCUTANEOUS at 06:05

## 2023-09-14 RX ADMIN — LACTULOSE 10 GRAM(S): 10 SOLUTION ORAL at 14:28

## 2023-09-14 RX ADMIN — HEPARIN SODIUM 5000 UNIT(S): 5000 INJECTION INTRAVENOUS; SUBCUTANEOUS at 14:15

## 2023-09-14 RX ADMIN — SEVELAMER CARBONATE 1600 MILLIGRAM(S): 2400 POWDER, FOR SUSPENSION ORAL at 17:16

## 2023-09-14 RX ADMIN — OXYCODONE HYDROCHLORIDE 10 MILLIGRAM(S): 5 TABLET ORAL at 01:00

## 2023-09-14 NOTE — PROGRESS NOTE ADULT - SUBJECTIVE AND OBJECTIVE BOX
PROGRESS NOTE:     Patient is a 37y old  Male who presents with a chief complaint of Left femoral neck fracture (12 Sep 2023 23:11)          SUBJECTIVE & OBJECTIVE:   Pt seen and examined at bedside in AM    no overnight events.   no new complaints    REVIEW OF SYSTEMS: remaining ROS negative     PHYSICAL EXAM:  VITALS:  Vital Signs Last 24 Hrs  T(C): 37.1 (14 Sep 2023 08:00), Max: 37.1 (13 Sep 2023 17:06)  T(F): 98.8 (14 Sep 2023 08:00), Max: 98.8 (14 Sep 2023 08:00)  HR: 71 (14 Sep 2023 08:00) (69 - 87)  BP: 162/63 (14 Sep 2023 08:00) (111/68 - 162/63)  BP(mean): --  RR: 16 (14 Sep 2023 08:00) (16 - 16)  SpO2: 98% (14 Sep 2023 08:00) (97% - 100%)    Parameters below as of 14 Sep 2023 08:00  Patient On (Oxygen Delivery Method): room air        GENERAL: NAD,  no increased WOB  HEAD:  Atraumatic, Normocephalic  EYES: EOMI, conjunctiva and sclera clear  ENMT: Moist mucous membranes  NECK: Supple, No JVD  NERVOUS SYSTEM:  Alert & Oriented X3  CHEST/LUNG: Clear to auscultation bilaterally; No rales, rhonchi, wheezing, or rubs  HEART: Regular rate and rhythm  ABDOMEN: Soft, Nontender, Nondistended; Bowel sounds present  EXTREMITIES:  No clubbing, cyanosis, calf tenderness or edema b/l      MEDICATIONS  (STANDING):  acetaminophen     Tablet .. 975 milliGRAM(s) Oral every 6 hours  chlorhexidine 2% Cloths 1 Application(s) Topical daily  epoetin carito-epbx (RETACRIT) Injectable 06894 Unit(s) IV Push <User Schedule>  heparin   Injectable 5000 Unit(s) SubCutaneous every 8 hours  metoprolol tartrate 25 milliGRAM(s) Oral two times a day  Nephro-pat 1 Tablet(s) Oral daily  polyethylene glycol 3350 17 Gram(s) Oral two times a day  senna 2 Tablet(s) Oral at bedtime  sevelamer carbonate 1600 milliGRAM(s) Oral three times a day with meals    MEDICATIONS  (PRN):  lactulose Syrup 10 Gram(s) Oral daily PRN constipation  oxyCODONE    IR 10 milliGRAM(s) Oral every 6 hours PRN Severe Pain (7 - 10)        Allergies    No Known Drug Allergies  shellfish (Hives)    Intolerances              LABS:                           9.1    4.51  )-----------( 269      ( 13 Sep 2023 14:10 )             28.3     09-13    137  |  95<L>  |  101<H>  ----------------------------<  96  4.9   |  29  |  12.26<H>    Ca    8.9      13 Sep 2023 14:10  Phos  7.3     09-13    TPro  x   /  Alb  2.7<L>  /  TBili  x   /  DBili  x   /  AST  x   /  ALT  x   /  AlkPhos  x   09-13    LIVER FUNCTIONS - ( 13 Sep 2023 14:10 )  Alb: 2.7 g/dL / Pro: x     / ALK PHOS: x     / ALT: x     / AST: x     / GGT: x             Urinalysis Basic - ( 13 Sep 2023 14:10 )    Color: x / Appearance: x / SG: x / pH: x  Gluc: 96 mg/dL / Ketone: x  / Bili: x / Urobili: x   Blood: x / Protein: x / Nitrite: x   Leuk Esterase: x / RBC: x / WBC x   Sq Epi: x / Non Sq Epi: x / Bacteria: x          CAPILLARY BLOOD GLUCOSE                    RECENT CULTURES:          RADIOLOGY & ADDITIONAL TESTS:          Care Discussed with Consultants/Other Providers [x ] YES  [ ] NO

## 2023-09-14 NOTE — PROGRESS NOTE ADULT - ASSESSMENT
38 y/o male w/ a PMHx of ESRD (M/W/F via LUE AVF), SOLITARIO, HTN, secondary hyperparathyroidism s/p parathyroidectomy (Dec 2022), obesity, stroke at age 10 w/ no residual deficits, and recent left radial fracture sustained after trying to lower himself into a chair s/p splint who presented on 8/14 after a fall where he sustained a left femoral neck fracture, s/p left hemiarthroplasty (8/15). Post op, he was lethargic and hypoxemic requiring reintubation. Later became hypOtensive and was on pressor. Patient without cuff leak so he was given Decadron. He was successfully extubated on 8/17 despite no cuff leak and was quickly weaned off pressors afterwards. Had a fever to 101.2 (8/22), infectious workup unrevealing.    COMORBIDITES/ACTIVE MEDICAL ISSUES     #Femoral neck fracture  - S/p Left Hemiarthroplasty with Dr. Jason León (8/15), healing well  - Weight bearing as tolerated  - Gait Instability, ADL impairments and Functional impairments:  Comprehensive Rehab Program of PT/OT   awaiting [facility] placement    #Recent Left radial head Fracture  - Left arm in sling as needed for comfort   -re-xray Left Forearm secondary to pain- with ulna shaft Fx - ortho saw patient- ulnar forearm splint placed  cleared by ortho on9/8 for WBAT LUE with splint on- can use platform  did well with cardiac walker/double platform in PT  REady For DC to [facility]- awaiting approval    #ESRD - on HD (MWF)  #Anemia of chronic disease  - Right AVF   - Epo with HD  - Nephro-pat supplement    #Renal bone disease   - Home diet: phoslo with meals  - Ensure low phos diet  - Outpatient follow up with endo and bone scan to determine etiology   parathyroidectomy 9/11 cancelled- needs to wait till at least 6 weeks post op  Needs to follow with Dr Royal to schedule parathyroid surgery in around two weeks  [facility]- DR León- 1-2 weeks- Need to evaluate left ulnar fx as well as Left Hip hemiarthroplasty    #HTN  - Metoprolol 25 BID    #Mood  - Xanax 0.25 PRN    #Pain control  - Tylenol 975 q6h, Tramadol PRN    #GI/Bowel Mgmt   - At risk for constipation due to neurologic diagnosis, immobility and/or medication use  - Senna,  Miralax QD    #Bladder management  -anuric    #DVT ppx  - Heparin TID     #Skin:  - stage 2 pressure ulcer to coccyx: cleanse with NS, pat dry with gauze and apply foam dressing daily- healing  -At risk for pressure injury due to neurologic diagnosis and relative immobility  -Bilateral heels - soft heel protectors, apply liquid barrier film daily and monitor for tissue type changes  - Turn Q2 hr while in bed, air mattress  - Skin barrier cream as needed  - Nursing to monitor skin Q shift    #Diet   - Regular + Thins  [Renal Diet]    Precautions / PROPHYLAXIS:   - Falls  - ortho: Weight bearing status: LLE WBAT; LUE WBAT LUE through platform-wear splint  - Lungs: Aspiration, Incentive Spirometer   - Pressure injury/Skin: Turn Q2hrs while in bed, OOB to Chair, PT/OT

## 2023-09-14 NOTE — PROGRESS NOTE ADULT - SUBJECTIVE AND OBJECTIVE BOX
No complaints    Vital Signs Last 24 Hrs  T(C): 37.2 (09-14-23 @ 19:29), Max: 37.2 (09-14-23 @ 19:29)  T(F): 98.9 (09-14-23 @ 19:29), Max: 98.9 (09-14-23 @ 19:29)  HR: 70 (09-14-23 @ 19:29) (70 - 77)  BP: 130/63 (09-14-23 @ 19:29) (130/63 - 162/63)  RR: 16 (09-14-23 @ 19:29) (16 - 16)  SpO2: 98% (09-14-23 @ 19:29) (97% - 98%)    I&O's Detail    13 Sep 2023 07:01  -  14 Sep 2023 07:00  --------------------------------------------------------  OUT:    Other (mL): 1860 mL  Total OUT: 1860 mL    s1s2  b/l air entry  soft, ND  no edema, AVF patent                                                  9.1    4.51  )-----------( 269      ( 13 Sep 2023 14:10 )             28.3     13 Sep 2023 14:10    137    |  95     |  101    ----------------------------<  96     4.9     |  29     |  12.26    Ca    8.9        13 Sep 2023 14:10  Phos  7.3       13 Sep 2023 14:10    TPro  x      /  Alb  2.7    /  TBili  x      /  DBili  x      /  AST  x      /  ALT  x      /  AlkPhos  x      13 Sep 2023 14:10    LIVER FUNCTIONS - ( 13 Sep 2023 14:10 )  Alb: 2.7 g/dL / Pro: x     / ALK PHOS: x     / ALT: x     / AST: x     / GGT: x           acetaminophen     Tablet .. 975 milliGRAM(s) Oral every 6 hours  chlorhexidine 2% Cloths 1 Application(s) Topical daily  epoetin carito-epbx (RETACRIT) Injectable 52302 Unit(s) IV Push <User Schedule>  heparin   Injectable 5000 Unit(s) SubCutaneous every 8 hours  lactulose Syrup 10 Gram(s) Oral daily PRN  metoprolol tartrate 25 milliGRAM(s) Oral two times a day  Nephro-pat 1 Tablet(s) Oral daily  oxyCODONE    IR 10 milliGRAM(s) Oral every 6 hours PRN  polyethylene glycol 3350 17 Gram(s) Oral two times a day  senna 2 Tablet(s) Oral at bedtime  sevelamer carbonate 1600 milliGRAM(s) Oral three times a day with meals    A/P:    S/p fall, L femoral neck fracture, s/p L hemiarthroplasty on 8/15  [facility]  [facility]RD on HD   TMP 2kg w/HD as tolerates   Renal diet  Renvela for high Phos  Next HD Friday or Sat pending confirmation of pt op HD schedule     [phone]

## 2023-09-14 NOTE — PROGRESS NOTE ADULT - SUBJECTIVE AND OBJECTIVE BOX
HPI:  36 y/o male w/ a PMHx of ESRD (M/W/F via LUE AVF), SOLITARIO, HTN, secondary hyperparathyroidism s/p parathyroidectomy (Dec 2022), obesity, stroke at age 10 w/ no residual deficits, and recent left radial fracture sustained after trying to lower himself into a chair s/p splint who presented on 8/14 after a fall while in the shower. Patient sustained a left femoral neck fracture s/p left hemiarthroplasty on 8/15. Immediately post-op in PACU, patient was lethargic and hypoxemic requiring reintubation so he was admitted to SICU post-operatively. Patient was hypotensive after intubation requiring pressor support. Patient without cuff leak so he was given Decadron. He was successfully extubated on 8/17 despite no cuff leak and was quickly weaned off pressors afterwards. Had a fever to 101.2 (8/22), infectious workup unrevealing.    Patient was evaluated by PM&R and therapy for functional deficits and gait/ADL impairments and recommend acute rehabilitation.  Patient was medically optimized for discharge to [facility] on 8/24/2023       ROS/Subjective  Patient seen and evaluated bedside  tolerating Dialysis- Tolerating OOB to WC in Rehab - WILL TOLERATE RECLINER AT DIALYSIS  pain left fingers chronic  No surgery on parathyroid gland till at least 6 weeks post op  No void   no dizziness, no headaches  no nausea, no vomiting  no SOB, No chest pain  repeat Covid PCR 9/11 negative  Hep panel 9/8 negative  to take lactulose today  awaiting [facility]    MEDICATIONS  (STANDING):  acetaminophen     Tablet .. 975 milliGRAM(s) Oral every 6 hours  chlorhexidine 2% Cloths 1 Application(s) Topical daily  epoetin carito-epbx (RETACRIT) Injectable 47014 Unit(s) IV Push <User Schedule>  heparin   Injectable 5000 Unit(s) SubCutaneous every 8 hours  metoprolol tartrate 25 milliGRAM(s) Oral two times a day  Nephro-pat 1 Tablet(s) Oral daily  polyethylene glycol 3350 17 Gram(s) Oral two times a day  senna 2 Tablet(s) Oral at bedtime  sevelamer carbonate 1600 milliGRAM(s) Oral three times a day with meals    MEDICATIONS  (PRN):  lactulose Syrup 10 Gram(s) Oral daily PRN constipation  oxyCODONE    IR 10 milliGRAM(s) Oral every 6 hours PRN Severe Pain (7 - 10)                            9.1    4.51  )-----------( 269      ( 13 Sep 2023 14:10 )             28.3     09-13    137  |  95<L>  |  101<H>  ----------------------------<  96  4.9   |  29  |  12.26<H>    Ca    8.9      13 Sep 2023 14:10  Phos  7.3     09-13    TPro  x   /  Alb  2.7<L>  /  TBili  x   /  DBili  x   /  AST  x   /  ALT  x   /  AlkPhos  x   09-13    Urinalysis Basic - ( 13 Sep 2023 14:10 )    Color: x / Appearance: x / SG: x / pH: x  Gluc: 96 mg/dL / Ketone: x  / Bili: x / Urobili: x   Blood: x / Protein: x / Nitrite: x   Leuk Esterase: x / RBC: x / WBC x   Sq Epi: x / Non Sq Epi: x / Bacteria: x        CAPILLARY BLOOD GLUCOSE      POCT Blood Glucose.: 84 mg/dL (13 Sep 2023 16:44)      Vital Signs Last 24 Hrs  T(C): 37.1 (14 Sep 2023 08:00), Max: 37.1 (13 Sep 2023 17:06)  T(F): 98.8 (14 Sep 2023 08:00), Max: 98.8 (14 Sep 2023 08:00)  HR: 71 (14 Sep 2023 08:00) (71 - 87)  BP: 162/63 (14 Sep 2023 08:00) (111/68 - 162/63)  BP(mean): --  RR: 16 (14 Sep 2023 08:00) (16 - 16)  SpO2: 98% (14 Sep 2023 08:00) (97% - 100%)    Parameters below as of 14 Sep 2023 08:00  Patient On (Oxygen Delivery Method): room air      Gen - NAD, Comfortable  HEENT - NCAT, EOMI, MMM  Neck - Supple, No limited ROM  Pulm - CTAB, No wheeze, No rhonchi, No crackles  Cardiovascular - RRR, S1S2, No murmurs  Abdomen - Soft, NT/ND, +BS  Extremities no edema, no calf tenderness, Right AV fistula, old fistula to left upper arm- pain with palpation left mid forearm  Neuro-     Cognitive - awake, alert, oriented to person, place, date, year.  Able  to follow command     Cranial Nerves - CN 2-12 intact     Motor -                     LEFT    UE - 4-/5                    RIGHT UE - 4/5                    LEFT    LE - HF- 2/5, KE 4/5, DF 4/5, PF 4/5                    RIGHT LE - 4-/5        Sensory - Intact  to LT      Reflexes - DTR Intact, No primitive reflexive     Coordination - FTN intact   MSK: muscle weakness  Psychiatric - Mood stable, Affect WNL  Skin:  stage 2 pressure ulcer to coccyx- improving        IDT 9/11  lives with aunt and cousin- cousin works  3STE one rail 15 Steps one rail upstairs ? first floor setup    OT   mod A Transfers max A ADL    PT   Max A 1-2 standing in [facility], modx2 in bars 10ft    tentative Dc 9/13 [facility].

## 2023-09-15 LAB
ALBUMIN SERPL ELPH-MCNC: 2.7 G/DL — LOW (ref 3.3–5)
ANION GAP SERPL CALC-SCNC: 12 MMOL/L — SIGNIFICANT CHANGE UP (ref 5–17)
BUN SERPL-MCNC: 87 MG/DL — HIGH (ref 7–23)
CALCIUM SERPL-MCNC: 9 MG/DL — SIGNIFICANT CHANGE UP (ref 8.4–10.5)
CHLORIDE SERPL-SCNC: 95 MMOL/L — LOW (ref 96–108)
CO2 SERPL-SCNC: 29 MMOL/L — SIGNIFICANT CHANGE UP (ref 22–31)
CREAT SERPL-MCNC: 11.35 MG/DL — HIGH (ref 0.5–1.3)
EGFR: 5 ML/MIN/1.73M2 — LOW
GLUCOSE SERPL-MCNC: 105 MG/DL — HIGH (ref 70–99)
HCT VFR BLD CALC: 28.3 % — LOW (ref 39–50)
HGB BLD-MCNC: 9 G/DL — LOW (ref 13–17)
MCHC RBC-ENTMCNC: 29.9 PG — SIGNIFICANT CHANGE UP (ref 27–34)
MCHC RBC-ENTMCNC: 31.8 GM/DL — LOW (ref 32–36)
MCV RBC AUTO: 94 FL — SIGNIFICANT CHANGE UP (ref 80–100)
NRBC # BLD: 0 /100 WBCS — SIGNIFICANT CHANGE UP (ref 0–0)
PHOSPHATE SERPL-MCNC: 6.8 MG/DL — HIGH (ref 2.5–4.5)
PLATELET # BLD AUTO: 257 K/UL — SIGNIFICANT CHANGE UP (ref 150–400)
POTASSIUM SERPL-MCNC: 5 MMOL/L — SIGNIFICANT CHANGE UP (ref 3.5–5.3)
POTASSIUM SERPL-SCNC: 5 MMOL/L — SIGNIFICANT CHANGE UP (ref 3.5–5.3)
RBC # BLD: 3.01 M/UL — LOW (ref 4.2–5.8)
RBC # FLD: 17.2 % — HIGH (ref 10.3–14.5)
SODIUM SERPL-SCNC: 136 MMOL/L — SIGNIFICANT CHANGE UP (ref 135–145)
WBC # BLD: 5.2 K/UL — SIGNIFICANT CHANGE UP (ref 3.8–10.5)
WBC # FLD AUTO: 5.2 K/UL — SIGNIFICANT CHANGE UP (ref 3.8–10.5)

## 2023-09-15 PROCEDURE — 99232 SBSQ HOSP IP/OBS MODERATE 35: CPT | Mod: GC

## 2023-09-15 RX ADMIN — OXYCODONE HYDROCHLORIDE 10 MILLIGRAM(S): 5 TABLET ORAL at 02:03

## 2023-09-15 RX ADMIN — OXYCODONE HYDROCHLORIDE 10 MILLIGRAM(S): 5 TABLET ORAL at 03:02

## 2023-09-15 RX ADMIN — CHLORHEXIDINE GLUCONATE 1 APPLICATION(S): 213 SOLUTION TOPICAL at 12:34

## 2023-09-15 RX ADMIN — SEVELAMER CARBONATE 1600 MILLIGRAM(S): 2400 POWDER, FOR SUSPENSION ORAL at 08:24

## 2023-09-15 RX ADMIN — SEVELAMER CARBONATE 1600 MILLIGRAM(S): 2400 POWDER, FOR SUSPENSION ORAL at 12:32

## 2023-09-15 RX ADMIN — Medication 25 MILLIGRAM(S): at 05:31

## 2023-09-15 RX ADMIN — HEPARIN SODIUM 5000 UNIT(S): 5000 INJECTION INTRAVENOUS; SUBCUTANEOUS at 13:46

## 2023-09-15 RX ADMIN — HEPARIN SODIUM 5000 UNIT(S): 5000 INJECTION INTRAVENOUS; SUBCUTANEOUS at 05:30

## 2023-09-15 RX ADMIN — Medication 25 MILLIGRAM(S): at 18:36

## 2023-09-15 RX ADMIN — SEVELAMER CARBONATE 1600 MILLIGRAM(S): 2400 POWDER, FOR SUSPENSION ORAL at 18:36

## 2023-09-15 RX ADMIN — ERYTHROPOIETIN 10000 UNIT(S): 10000 INJECTION, SOLUTION INTRAVENOUS; SUBCUTANEOUS at 15:47

## 2023-09-15 RX ADMIN — Medication 1 TABLET(S): at 12:32

## 2023-09-15 NOTE — PROGRESS NOTE ADULT - ASSESSMENT
36 y/o male w/ a PMHx of ESRD (M/W/F via LUE AVF), SOLITARIO, HTN, secondary hyperparathyroidism s/p parathyroidectomy (Dec 2022), obesity, stroke at age 10 w/ no residual deficits, and recent left radial fracture sustained after trying to lower himself into a chair s/p splint who presented on 8/14 after a fall where he sustained a left femoral neck fracture, s/p left hemiarthroplasty (8/15). Post op, he was lethargic and hypoxemic requiring reintubation. Later became hypOtensive and was on pressor. Patient without cuff leak so he was given Decadron. He was successfully extubated on 8/17 despite no cuff leak and was quickly weaned off pressors afterwards. Had a fever to 101.2 (8/22), infectious workup unrevealing.    COMORBIDITES/ACTIVE MEDICAL ISSUES     #Femoral neck fracture  - S/p Left Hemiarthroplasty with Dr. Jason León (8/15), healing well  - Weight bearing as tolerated  - Gait Instability, ADL impairments and Functional impairments:  Comprehensive Rehab Program of PT/OT   awaiting [facility] placement    #Recent Left radial head Fracture  - Left arm in sling as needed for comfort   -re-xray Left Forearm secondary to pain- with ulna shaft Fx - ortho saw patient- ulnar forearm splint placed  cleared by ortho on9/8 for WBAT LUE with splint on- can use platform  did well with cardiac walker/double platform in PT  REady For DC to [facility]- awaiting approval    #ESRD - on HD (MWF)  #Anemia of chronic disease  - Right AVF   - Epo with HD  - Nephro-pat supplement    #Renal bone disease   - Home diet: phoslo with meals  - Ensure low phos diet  - Outpatient follow up with endo and bone scan to determine etiology   parathyroidectomy 9/11 cancelled- needs to wait till at least 6 weeks post op  Needs to follow with Dr Royal to schedule parathyroid surgery in around two weeks  [facility]- DR León- 1-2 weeks- Need to evaluate left ulnar fx as well as Left Hip hemiarthroplasty    #HTN  - Metoprolol 25 BID    #Mood  - Xanax 0.25 PRN    #Pain control  - Tylenol 975 q6h, Tramadol PRN    #GI/Bowel Mgmt   - At risk for constipation due to neurologic diagnosis, immobility and/or medication use  - Senna,  Miralax QD    #Bladder management  -anuric    #DVT ppx  - Heparin TID     #Skin:  - stage 2 pressure ulcer to coccyx: cleanse with NS, pat dry with gauze and apply foam dressing daily- healing  -At risk for pressure injury due to neurologic diagnosis and relative immobility  -Bilateral heels - soft heel protectors, apply liquid barrier film daily and monitor for tissue type changes  - Turn Q2 hr while in bed, air mattress  - Skin barrier cream as needed  - Nursing to monitor skin Q shift    #Diet   - Regular + Thins  [Renal Diet]    Precautions / PROPHYLAXIS:   - Falls  - ortho: Weight bearing status: LLE WBAT; LUE WBAT LUE through platform-wear splint  - Lungs: Aspiration, Incentive Spirometer   - Pressure injury/Skin: Turn Q2hrs while in bed, OOB to Chair, PT/OT

## 2023-09-15 NOTE — PROGRESS NOTE ADULT - SUBJECTIVE AND OBJECTIVE BOX
No distress    Vital Signs Last 24 Hrs  T(C): 36.4 (09-15-23 @ 17:55), Max: 37.2 (09-14-23 @ 19:29)  T(F): 97.6 (09-15-23 @ 17:55), Max: 98.9 (09-14-23 @ 19:29)  HR: 91 (09-15-23 @ 18:33) (70 - 91)  BP: 137/81 (09-15-23 @ 18:33) (125/65 - 139/87)  RR: 16 (09-15-23 @ 17:55) (16 - 16)  SpO2: 98% (09-15-23 @ 17:55) (98% - 99%)    I&O's Detail    15 Sep 2023 07:01  -  15 Sep 2023 19:23  --------------------------------------------------------  OUT:    Other (mL): 2000 mL  Total OUT: 2000 mL    s1s2  b/l air entry  soft, ND  no edema, AVF patent                                                           9.0    5.20  )-----------( 257      ( 15 Sep 2023 15:00 )             28.3     15 Sep 2023 15:00    136    |  95     |  87     ----------------------------<  105    5.0     |  29     |  11.35    Ca    9.0        15 Sep 2023 15:00  Phos  6.8       15 Sep 2023 15:00    TPro  x      /  Alb  2.7    /  TBili  x      /  DBili  x      /  AST  x      /  ALT  x      /  AlkPhos  x      15 Sep 2023 15:00    LIVER FUNCTIONS - ( 15 Sep 2023 15:00 )  Alb: 2.7 g/dL / Pro: x     / ALK PHOS: x     / ALT: x     / AST: x     / GGT: x           acetaminophen     Tablet .. 975 milliGRAM(s) Oral every 6 hours  chlorhexidine 2% Cloths 1 Application(s) Topical daily  epoetin carito-epbx (RETACRIT) Injectable 81270 Unit(s) IV Push <User Schedule>  heparin   Injectable 5000 Unit(s) SubCutaneous every 8 hours  lactulose Syrup 10 Gram(s) Oral daily PRN  metoprolol tartrate 25 milliGRAM(s) Oral two times a day  Nephro-pat 1 Tablet(s) Oral daily  oxyCODONE    IR 10 milliGRAM(s) Oral every 6 hours PRN  polyethylene glycol 3350 17 Gram(s) Oral two times a day  senna 2 Tablet(s) Oral at bedtime  sevelamer carbonate 1600 milliGRAM(s) Oral three times a day with meals    A/P:    S/p fall, L femoral neck fracture, s/p L hemiarthroplasty on 8/15  Adm to [facility]  ESRD on HD   TMP 2kg w/HD today  Renal diet  Renvela for high Phos  Rehab    [phone]

## 2023-09-15 NOTE — PROGRESS NOTE ADULT - SUBJECTIVE AND OBJECTIVE BOX
HPI:  36 y/o male w/ a PMHx of ESRD (M/W/F via LUE AVF), SOLITARIO, HTN, secondary hyperparathyroidism s/p parathyroidectomy (Dec 2022), obesity, stroke at age 10 w/ no residual deficits, and recent left radial fracture sustained after trying to lower himself into a chair s/p splint who presented on 8/14 after a fall while in the shower. Patient sustained a left femoral neck fracture s/p left hemiarthroplasty on 8/15. Immediately post-op in PACU, patient was lethargic and hypoxemic requiring reintubation so he was admitted to SICU post-operatively. Patient was hypotensive after intubation requiring pressor support. Patient without cuff leak so he was given Decadron. He was successfully extubated on 8/17 despite no cuff leak and was quickly weaned off pressors afterwards. Had a fever to 101.2 (8/22), infectious workup unrevealing.    Patient was evaluated by PM&R and therapy for functional deficits and gait/ADL impairments and recommend acute rehabilitation.  Patient was medically optimized for discharge to [facility] on 8/24/2023       ROS/Subjective  Patient seen and evaluated bedside  awaiting dialysis bed at [facility]  pain left fingers chronic  No surgery on parathyroid gland till at least 6 weeks post op  No void   no dizziness, no headaches  no nausea, no vomiting  no SOB, No chest pain  repeat Covid PCR 9/11 negative  Hep panel 9/8 negative  moved bowels with lactulose yesterday        MEDICATIONS  (STANDING):  acetaminophen     Tablet .. 975 milliGRAM(s) Oral every 6 hours  chlorhexidine 2% Cloths 1 Application(s) Topical daily  epoetin carito-epbx (RETACRIT) Injectable 37806 Unit(s) IV Push <User Schedule>  heparin   Injectable 5000 Unit(s) SubCutaneous every 8 hours  metoprolol tartrate 25 milliGRAM(s) Oral two times a day  Nephro-pat 1 Tablet(s) Oral daily  polyethylene glycol 3350 17 Gram(s) Oral two times a day  senna 2 Tablet(s) Oral at bedtime  sevelamer carbonate 1600 milliGRAM(s) Oral three times a day with meals    MEDICATIONS  (PRN):  lactulose Syrup 10 Gram(s) Oral daily PRN constipation  oxyCODONE    IR 10 milliGRAM(s) Oral every 6 hours PRN Severe Pain (7 - 10)                            9.1    4.51  )-----------( 269      ( 13 Sep 2023 14:10 )             28.3     09-13    137  |  95<L>  |  101<H>  ----------------------------<  96  4.9   |  29  |  12.26<H>    Ca    8.9      13 Sep 2023 14:10  Phos  7.3     09-13    TPro  x   /  Alb  2.7<L>  /  TBili  x   /  DBili  x   /  AST  x   /  ALT  x   /  AlkPhos  x   09-13    Urinalysis Basic - ( 13 Sep 2023 14:10 )    Color: x / Appearance: x / SG: x / pH: x  Gluc: 96 mg/dL / Ketone: x  / Bili: x / Urobili: x   Blood: x / Protein: x / Nitrite: x   Leuk Esterase: x / RBC: x / WBC x   Sq Epi: x / Non Sq Epi: x / Bacteria: x        CAPILLARY BLOOD GLUCOSE          Vital Signs Last 24 Hrs  T(C): 37.1 (15 Sep 2023 09:02), Max: 37.2 (14 Sep 2023 19:29)  T(F): 98.8 (15 Sep 2023 09:02), Max: 98.9 (14 Sep 2023 19:29)  HR: 73 (15 Sep 2023 09:02) (70 - 73)  BP: 139/87 (15 Sep 2023 09:02) (130/63 - 139/87)  BP(mean): --  RR: 16 (15 Sep 2023 09:02) (16 - 16)  SpO2: 99% (15 Sep 2023 09:02) (98% - 99%)    Parameters below as of 15 Sep 2023 09:02  Patient On (Oxygen Delivery Method): room air        Gen - NAD, Comfortable  HEENT - NCAT, EOMI, MMM  Neck - Supple, No limited ROM  Pulm - CTAB, No wheeze, No rhonchi, No crackles  Cardiovascular - RRR, S1S2, No murmurs  Abdomen - Soft, NT/ND, +BS  Extremities no edema, no calf tenderness, Right AV fistula, old fistula to left upper arm- pain with palpation left mid forearm  Neuro-     Cognitive - awake, alert, oriented to person, place, date, year.  Able  to follow command     Cranial Nerves - CN 2-12 intact     Motor -                     LEFT    UE - 4-/5                    RIGHT UE - 4/5                    LEFT    LE - HF- 2/5, KE 4/5, DF 4/5, PF 4/5                    RIGHT LE - 4-/5        Sensory - Intact  to LT      Reflexes - DTR Intact, No primitive reflexive     Coordination - FTN intact   MSK: muscle weakness  Psychiatric - Mood stable, Affect WNL  Skin:  stage 2 pressure ulcer to coccyx- improving        IDT 9/11  lives with aunt and cousin- cousin works  3STE one rail 15 Steps one rail upstairs ? first floor setup    OT   mod A Transfers max A ADL    PT   Max A 1-2 standing in sarasteady, modx2 in bars 10ft    tentative Dc 9/13 [facility].

## 2023-09-16 PROCEDURE — 99232 SBSQ HOSP IP/OBS MODERATE 35: CPT

## 2023-09-16 RX ORDER — OXYCODONE HYDROCHLORIDE 5 MG/1
10 TABLET ORAL EVERY 6 HOURS
Refills: 0 | Status: DISCONTINUED | OUTPATIENT
Start: 2023-09-16 | End: 2023-09-19

## 2023-09-16 RX ADMIN — SEVELAMER CARBONATE 1600 MILLIGRAM(S): 2400 POWDER, FOR SUSPENSION ORAL at 08:06

## 2023-09-16 RX ADMIN — Medication 25 MILLIGRAM(S): at 17:14

## 2023-09-16 RX ADMIN — OXYCODONE HYDROCHLORIDE 10 MILLIGRAM(S): 5 TABLET ORAL at 03:00

## 2023-09-16 RX ADMIN — Medication 25 MILLIGRAM(S): at 05:20

## 2023-09-16 RX ADMIN — HEPARIN SODIUM 5000 UNIT(S): 5000 INJECTION INTRAVENOUS; SUBCUTANEOUS at 14:04

## 2023-09-16 RX ADMIN — CHLORHEXIDINE GLUCONATE 1 APPLICATION(S): 213 SOLUTION TOPICAL at 12:26

## 2023-09-16 RX ADMIN — OXYCODONE HYDROCHLORIDE 10 MILLIGRAM(S): 5 TABLET ORAL at 19:15

## 2023-09-16 RX ADMIN — OXYCODONE HYDROCHLORIDE 10 MILLIGRAM(S): 5 TABLET ORAL at 18:36

## 2023-09-16 RX ADMIN — SEVELAMER CARBONATE 1600 MILLIGRAM(S): 2400 POWDER, FOR SUSPENSION ORAL at 17:13

## 2023-09-16 RX ADMIN — Medication 1 TABLET(S): at 11:26

## 2023-09-16 RX ADMIN — OXYCODONE HYDROCHLORIDE 10 MILLIGRAM(S): 5 TABLET ORAL at 02:28

## 2023-09-16 RX ADMIN — HEPARIN SODIUM 5000 UNIT(S): 5000 INJECTION INTRAVENOUS; SUBCUTANEOUS at 23:46

## 2023-09-16 NOTE — PROGRESS NOTE ADULT - SUBJECTIVE AND OBJECTIVE BOX
Patient is a 37y old  Male who presents with a chief complaint of Left femoral neck fracture (15 Sep 2023 19:22)      SUBJECTIVE / OVERNIGHT EVENTS:  Pt seen and examined at bedside. No acute events overnight.  Pt denies cp, palpitations, sob, abd pain, N/V, fever, chills.    ROS:  All other review of systems negative    Allergies    No Known Drug Allergies  shellfish (Hives)    Intolerances        MEDICATIONS  (STANDING):  acetaminophen     Tablet .. 975 milliGRAM(s) Oral every 6 hours  chlorhexidine 2% Cloths 1 Application(s) Topical daily  epoetin carito-epbx (RETACRIT) Injectable 08562 Unit(s) IV Push <User Schedule>  heparin   Injectable 5000 Unit(s) SubCutaneous every 8 hours  metoprolol tartrate 25 milliGRAM(s) Oral two times a day  Nephro-pat 1 Tablet(s) Oral daily  polyethylene glycol 3350 17 Gram(s) Oral two times a day  senna 2 Tablet(s) Oral at bedtime  sevelamer carbonate 1600 milliGRAM(s) Oral three times a day with meals    MEDICATIONS  (PRN):  lactulose Syrup 10 Gram(s) Oral daily PRN constipation  oxyCODONE    IR 10 milliGRAM(s) Oral every 6 hours PRN Severe Pain (7 - 10)      Vital Signs Last 24 Hrs  T(C): 37.2 (16 Sep 2023 08:07), Max: 37.2 (16 Sep 2023 08:07)  T(F): 98.9 (16 Sep 2023 08:07), Max: 98.9 (16 Sep 2023 08:07)  HR: 82 (16 Sep 2023 08:07) (71 - 91)  BP: 133/70 (16 Sep 2023 08:07) (117/87 - 148/71)  BP(mean): --  RR: 16 (16 Sep 2023 08:07) (16 - 16)  SpO2: 97% (16 Sep 2023 08:07) (97% - 99%)    Parameters below as of 16 Sep 2023 08:07  Patient On (Oxygen Delivery Method): room air      CAPILLARY BLOOD GLUCOSE        I&O's Summary    15 Sep 2023 07:01  -  16 Sep 2023 07:00  --------------------------------------------------------  IN: 0 mL / OUT: 2000 mL / NET: -2000 mL        PHYSICAL EXAM:  GENERAL: NAD, well-developed male  HEAD:  Atraumatic, Normocephalic  NECK: Supple, No JVD  CHEST/LUNG: Clear to auscultation bilaterally; No wheeze, nonlabored breathing  HEART: Regular rate and rhythm; No murmurs, rubs, or gallops  ABDOMEN: Soft, Nontender, Nondistended; Bowel sounds present  EXTREMITIES: No clubbing, cyanosis, or edema  PSYCH: calm, appropriate mood    LABS:                        9.0    5.20  )-----------( 257      ( 15 Sep 2023 15:00 )             28.3     09-15    136  |  95<L>  |  87<H>  ----------------------------<  105<H>  5.0   |  29  |  11.35<H>    Ca    9.0      15 Sep 2023 15:00  Phos  6.8     09-15    TPro  x   /  Alb  2.7<L>  /  TBili  x   /  DBili  x   /  AST  x   /  ALT  x   /  AlkPhos  x   09-15          Urinalysis Basic - ( 15 Sep 2023 15:00 )    Color: x / Appearance: x / SG: x / pH: x  Gluc: 105 mg/dL / Ketone: x  / Bili: x / Urobili: x   Blood: x / Protein: x / Nitrite: x   Leuk Esterase: x / RBC: x / WBC x   Sq Epi: x / Non Sq Epi: x / Bacteria: x        RADIOLOGY & ADDITIONAL TESTS:  Results Reviewed:   Imaging Personally Reviewed:  Electrocardiogram Personally Reviewed:    COORDINATION OF CARE:  Care Discussed with Consultants/Other Providers [Y/N]:  Prior or Outpatient Records Reviewed [Y/N]:

## 2023-09-16 NOTE — PROGRESS NOTE ADULT - SUBJECTIVE AND OBJECTIVE BOX
Chief complaint: no new complaints     Patient is a 37y old  Male who presents with a chief complaint of Left femoral neck fracture (16 Sep 2023 11:26)      HEALTH ISSUES - PROBLEM Dx:  Elevated alkaline phosphatase level      PAST MEDICAL & SURGICAL HISTORY:  HTN (hypertension)      Stroke  age 10      Morbid obesity      Sleep apnea      Leg fracture, right      Chronic renal failure      Hemodialysis access, AV graft      ESRD (end stage renal disease) on dialysis  since 2013, M-W-F      Anemia, unspecified type      Hypothyroidism      AV fistula  R arm; L arm clotted        VITALS  Vital Signs Last 24 Hrs  T(C): 37.2 (16 Sep 2023 08:07), Max: 37.2 (16 Sep 2023 08:07)  T(F): 98.9 (16 Sep 2023 08:07), Max: 98.9 (16 Sep 2023 08:07)  HR: 82 (16 Sep 2023 08:07) (71 - 91)  BP: 133/70 (16 Sep 2023 08:07) (117/87 - 148/71)  BP(mean): --  RR: 16 (16 Sep 2023 08:07) (16 - 16)  SpO2: 97% (16 Sep 2023 08:07) (97% - 99%)    Parameters below as of 16 Sep 2023 08:07  Patient On (Oxygen Delivery Method): room air          PHYSICAL EXAM  Constitutional - NAD, Comfortable  HEENT - NCAT, EOMI  Neck - Supple, No limited ROM  Chest - CTA bilaterally  Cardiovascular - RRR, S1S2  Abdomen -  Soft, NTND  Extremities - No calf tenderness                  RECENT LABS                        9.0    5.20  )-----------( 257      ( 15 Sep 2023 15:00 )             28.3     09-15    136  |  95<L>  |  87<H>  ----------------------------<  105<H>  5.0   |  29  |  11.35<H>    Ca    9.0      15 Sep 2023 15:00  Phos  6.8     09-15    TPro  x   /  Alb  2.7<L>  /  TBili  x   /  DBili  x   /  AST  x   /  ALT  x   /  AlkPhos  x   09-15      Urinalysis Basic - ( 15 Sep 2023 15:00 )    Color: x / Appearance: x / SG: x / pH: x  Gluc: 105 mg/dL / Ketone: x  / Bili: x / Urobili: x   Blood: x / Protein: x / Nitrite: x   Leuk Esterase: x / RBC: x / WBC x   Sq Epi: x / Non Sq Epi: x / Bacteria: x              CURRENT MEDICATIONS    MEDICATIONS  (STANDING):  acetaminophen     Tablet .. 975 milliGRAM(s) Oral every 6 hours  chlorhexidine 2% Cloths 1 Application(s) Topical daily  epoetin carito-epbx (RETACRIT) Injectable 77956 Unit(s) IV Push <User Schedule>  heparin   Injectable 5000 Unit(s) SubCutaneous every 8 hours  metoprolol tartrate 25 milliGRAM(s) Oral two times a day  Nephro-pat 1 Tablet(s) Oral daily  polyethylene glycol 3350 17 Gram(s) Oral two times a day  senna 2 Tablet(s) Oral at bedtime  sevelamer carbonate 1600 milliGRAM(s) Oral three times a day with meals    MEDICATIONS  (PRN):  lactulose Syrup 10 Gram(s) Oral daily PRN constipation  oxyCODONE    IR 10 milliGRAM(s) Oral every 6 hours PRN Severe Pain (7 - 10)    ASSESSMENT & PLAN          GI/Bowel Management - Dulcolax PRN, Fleet PRN   Management - Toilet Q2  Skin - Turn Q2  Pain - Tylenol PRN  DVT PPX - Heparin       Continue comprehensive acute rehab program consisting of 3hrs/day of OT/PT and SLP.

## 2023-09-16 NOTE — PROGRESS NOTE ADULT - ASSESSMENT
38 y/o male w/ a PMHx of ESRD (M/W/F via LUE AVF), SOLITARIO, HTN, secondary hyperparathyroidism s/p parathyroidectomy (Dec 2022), obesity, stroke at age 10 w/ no residual deficits, and recent left radial fracture sustained after trying to lower himself into a chair s/p splint who presented on 8/14 after a fall where he sustained a left femoral neck fracture, s/p left hemiarthroplasty (8/15). Post op, he was lethargic and hypoxemic requiring reintubation. Later became hypotensive and was on pressor. Patient without cuff leak so he was given Decadron. He was successfully extubated on 8/17 despite no cuff leak and was quickly weaned off pressors afterwards. Had a fever to 101.2 (8/22), infectious workup unrevealing.    Left Femoral neck fracture  - S/p Left Hemiarthroplasty with Dr. Jason León (8/15)  - fall and hip precautions    #Recent Left radial head Fracture  - NWB to LUE  - Left arm in sling as needed for comfort    #ESRD - on HD (MWF)  #Anemia of chronic disease  - routine HD  - Epo with HD  - renal following    Secondary hyperparathyroidism  -secondary to ESRD  -Elevated PTH, normal Ca, high phos  -Elevated Alk, normal GGT, likely secondary to bone turnover than bile acid dysfunction  -Lft wnl, TB wnl  -C/w Sevelamer  -Renal following    # Hyponatremia  - likely due to hypervolemia due to ESRD  - HD per routine    # Hyperkalemia  - likely due to ESRD  - resolved after HD  - monitor electrolytes periodically    #HTN  # postural dizziness  - Metoprolol 25 BID  - check OH Periodically    #DVT ppx: Heparin TID

## 2023-09-17 PROCEDURE — 99232 SBSQ HOSP IP/OBS MODERATE 35: CPT

## 2023-09-17 RX ADMIN — CHLORHEXIDINE GLUCONATE 1 APPLICATION(S): 213 SOLUTION TOPICAL at 11:54

## 2023-09-17 RX ADMIN — Medication 25 MILLIGRAM(S): at 17:21

## 2023-09-17 RX ADMIN — SEVELAMER CARBONATE 1600 MILLIGRAM(S): 2400 POWDER, FOR SUSPENSION ORAL at 11:53

## 2023-09-17 RX ADMIN — OXYCODONE HYDROCHLORIDE 10 MILLIGRAM(S): 5 TABLET ORAL at 00:39

## 2023-09-17 RX ADMIN — Medication 1 TABLET(S): at 11:53

## 2023-09-17 RX ADMIN — OXYCODONE HYDROCHLORIDE 10 MILLIGRAM(S): 5 TABLET ORAL at 23:59

## 2023-09-17 RX ADMIN — SEVELAMER CARBONATE 1600 MILLIGRAM(S): 2400 POWDER, FOR SUSPENSION ORAL at 08:30

## 2023-09-17 RX ADMIN — SEVELAMER CARBONATE 1600 MILLIGRAM(S): 2400 POWDER, FOR SUSPENSION ORAL at 18:08

## 2023-09-17 RX ADMIN — HEPARIN SODIUM 5000 UNIT(S): 5000 INJECTION INTRAVENOUS; SUBCUTANEOUS at 06:47

## 2023-09-17 RX ADMIN — Medication 975 MILLIGRAM(S): at 06:47

## 2023-09-17 RX ADMIN — Medication 25 MILLIGRAM(S): at 06:47

## 2023-09-17 RX ADMIN — HEPARIN SODIUM 5000 UNIT(S): 5000 INJECTION INTRAVENOUS; SUBCUTANEOUS at 13:35

## 2023-09-17 RX ADMIN — Medication 975 MILLIGRAM(S): at 07:30

## 2023-09-17 NOTE — PROGRESS NOTE ADULT - SUBJECTIVE AND OBJECTIVE BOX
Chief complaint: no new complaints     Patient is a 37y old  Male who presents with a chief complaint of Left femoral neck fracture (17 Sep 2023 08:03)      HEALTH ISSUES - PROBLEM Dx:  Elevated alkaline phosphatase level         PAST MEDICAL & SURGICAL HISTORY:  HTN (hypertension)      Stroke  age 10      Morbid obesity      Sleep apnea      Leg fracture, right      Chronic renal failure      Hemodialysis access, AV graft      ESRD (end stage renal disease) on dialysis  since 2013, M-W-F      Anemia, unspecified type      Hypothyroidism      AV fistula  R arm; L arm clotted      VITALS  Vital Signs Last 24 Hrs  T(C): 37.1 (17 Sep 2023 07:58), Max: 37.1 (17 Sep 2023 07:58)  T(F): 98.8 (17 Sep 2023 07:58), Max: 98.8 (17 Sep 2023 07:58)  HR: 74 (17 Sep 2023 07:58) (74 - 92)  BP: 141/74 (17 Sep 2023 07:58) (126/66 - 162/80)  BP(mean): --  RR: 16 (17 Sep 2023 07:58) (16 - 16)  SpO2: 97% (17 Sep 2023 07:58) (97% - 97%)    Parameters below as of 17 Sep 2023 07:58  Patient On (Oxygen Delivery Method): room air          PHYSICAL EXAM  Constitutional - NAD, Comfortable  HEENT - NCAT, EOMI  Neck - Supple, No limited ROM  Chest - CTA bilaterally  Cardiovascular - RRR, S1S2  Abdomen - B Soft, NTND  Extremities - No No calf tenderness                       RECENT LABS                        9.0    5.20  )-----------( 257      ( 15 Sep 2023 15:00 )             28.3     09-15    136  |  95<L>  |  87<H>  ----------------------------<  105<H>  5.0   |  29  |  11.35<H>    Ca    9.0      15 Sep 2023 15:00  Phos  6.8     09-15    TPro  x   /  Alb  2.7<L>  /  TBili  x   /  DBili  x   /  AST  x   /  ALT  x   /  AlkPhos  x   09-15      Urinalysis Basic - ( 15 Sep 2023 15:00 )    Color: x / Appearance: x / SG: x / pH: x  Gluc: 105 mg/dL / Ketone: x  / Bili: x / Urobili: x   Blood: x / Protein: x / Nitrite: x   Leuk Esterase: x / RBC: x / WBC x   Sq Epi: x / Non Sq Epi: x / Bacteria: x              CURRENT MEDICATIONS    MEDICATIONS  (STANDING):  acetaminophen     Tablet .. 975 milliGRAM(s) Oral every 6 hours  chlorhexidine 2% Cloths 1 Application(s) Topical daily  epoetin carito-epbx (RETACRIT) Injectable 81304 Unit(s) IV Push <User Schedule>  heparin   Injectable 5000 Unit(s) SubCutaneous every 8 hours  metoprolol tartrate 25 milliGRAM(s) Oral two times a day  Nephro-pat 1 Tablet(s) Oral daily  polyethylene glycol 3350 17 Gram(s) Oral two times a day  senna 2 Tablet(s) Oral at bedtime  sevelamer carbonate 1600 milliGRAM(s) Oral three times a day with meals    MEDICATIONS  (PRN):  lactulose Syrup 10 Gram(s) Oral daily PRN constipation  oxyCODONE    IR 10 milliGRAM(s) Oral every 6 hours PRN Severe Pain (7 - 10)    ASSESSMENT & PLAN          GI/Bowel Management    Management - Toilet Q2  Skin - Turn Q2  Pain - Tylenol PRN  DVT PPX - Heparin       Continue comprehensive acute rehab program consisting of 3hrs/day of OT/PT and SLP.

## 2023-09-17 NOTE — PROGRESS NOTE ADULT - SUBJECTIVE AND OBJECTIVE BOX
Patient is a 37y old  Male who presents with a chief complaint of Left femoral neck fracture (16 Sep 2023 11:34)      SUBJECTIVE / OVERNIGHT EVENTS:  Pt seen and examined at bedside. No acute events overnight.  Pt denies cp, palpitations, sob, abd pain, N/V, fever, chills.    ROS:  All other review of systems negative    Allergies    No Known Drug Allergies  shellfish (Hives)    Intolerances        MEDICATIONS  (STANDING):  acetaminophen     Tablet .. 975 milliGRAM(s) Oral every 6 hours  chlorhexidine 2% Cloths 1 Application(s) Topical daily  epoetin carito-epbx (RETACRIT) Injectable 78329 Unit(s) IV Push <User Schedule>  heparin   Injectable 5000 Unit(s) SubCutaneous every 8 hours  metoprolol tartrate 25 milliGRAM(s) Oral two times a day  Nephro-pat 1 Tablet(s) Oral daily  polyethylene glycol 3350 17 Gram(s) Oral two times a day  senna 2 Tablet(s) Oral at bedtime  sevelamer carbonate 1600 milliGRAM(s) Oral three times a day with meals    MEDICATIONS  (PRN):  lactulose Syrup 10 Gram(s) Oral daily PRN constipation  oxyCODONE    IR 10 milliGRAM(s) Oral every 6 hours PRN Severe Pain (7 - 10)      Vital Signs Last 24 Hrs  T(C): 37.1 (17 Sep 2023 07:58), Max: 37.2 (16 Sep 2023 08:07)  T(F): 98.8 (17 Sep 2023 07:58), Max: 98.9 (16 Sep 2023 08:07)  HR: 74 (17 Sep 2023 07:58) (74 - 92)  BP: 141/74 (17 Sep 2023 07:58) (126/66 - 162/80)  BP(mean): --  RR: 16 (17 Sep 2023 07:58) (16 - 16)  SpO2: 97% (17 Sep 2023 07:58) (97% - 97%)    Parameters below as of 17 Sep 2023 07:58  Patient On (Oxygen Delivery Method): room air      CAPILLARY BLOOD GLUCOSE        I&O's Summary      PHYSICAL EXAM:  GENERAL: NAD, well-developed male  HEAD:  Atraumatic, Normocephalic  NECK: Supple, No JVD  CHEST/LUNG: Clear to auscultation bilaterally; No wheeze, nonlabored breathing  HEART: Regular rate and rhythm; No murmurs, rubs, or gallops  ABDOMEN: Soft, Nontender, Nondistended; Bowel sounds present  EXTREMITIES: No clubbing, cyanosis, or edema  PSYCH: calm, appropriate mood      LABS:                        9.0    5.20  )-----------( 257      ( 15 Sep 2023 15:00 )             28.3     09-15    136  |  95<L>  |  87<H>  ----------------------------<  105<H>  5.0   |  29  |  11.35<H>    Ca    9.0      15 Sep 2023 15:00  Phos  6.8     09-15    TPro  x   /  Alb  2.7<L>  /  TBili  x   /  DBili  x   /  AST  x   /  ALT  x   /  AlkPhos  x   09-15          Urinalysis Basic - ( 15 Sep 2023 15:00 )    Color: x / Appearance: x / SG: x / pH: x  Gluc: 105 mg/dL / Ketone: x  / Bili: x / Urobili: x   Blood: x / Protein: x / Nitrite: x   Leuk Esterase: x / RBC: x / WBC x   Sq Epi: x / Non Sq Epi: x / Bacteria: x        RADIOLOGY & ADDITIONAL TESTS:  Results Reviewed:   Imaging Personally Reviewed:  Electrocardiogram Personally Reviewed:    COORDINATION OF CARE:  Care Discussed with Consultants/Other Providers [Y/N]:  Prior or Outpatient Records Reviewed [Y/N]:

## 2023-09-17 NOTE — PROGRESS NOTE ADULT - ASSESSMENT
Patient is going for hemodialysis tomorrow, the department was informed c/o nurse Felicia.   38 y/o male w/ a PMHx of ESRD (M/W/F via LUE AVF), SOLITARIO, HTN, secondary hyperparathyroidism s/p parathyroidectomy (Dec 2022), obesity, stroke at age 10 w/ no residual deficits, and recent left radial fracture sustained after trying to lower himself into a chair s/p splint who presented on 8/14 after a fall where he sustained a left femoral neck fracture, s/p left hemiarthroplasty (8/15). Post op, he was lethargic and hypoxemic requiring reintubation. Later became hypotensive and was on pressor. Patient without cuff leak so he was given Decadron. He was successfully extubated on 8/17 despite no cuff leak and was quickly weaned off pressors afterwards. Had a fever to 101.2 (8/22), infectious workup unrevealing.    Left Femoral neck fracture  - S/p Left Hemiarthroplasty with Dr. Jason León (8/15)  - fall and hip precautions    #Recent Left radial head Fracture  - NWB to LUE  - Left arm in sling as needed for comfort    #ESRD - on HD (MWF)  #Anemia of chronic disease  - routine HD  - Epo with HD  - renal following    Secondary hyperparathyroidism  -secondary to ESRD  -Elevated PTH, normal Ca, high phos  -Elevated Alk, normal GGT, likely secondary to bone turnover than bile acid dysfunction  -Lft wnl, TB wnl  -C/w Sevelamer  -Renal following    # Hyponatremia  - likely due to hypervolemia due to ESRD  - HD per routine    # Hyperkalemia  - likely due to ESRD  - resolved after HD  - monitor electrolytes periodically    #HTN  # postural dizziness  - Metoprolol 25 BID  - check OH Periodically    #DVT ppx: Heparin TID

## 2023-09-18 LAB
ANION GAP SERPL CALC-SCNC: 13 MMOL/L — SIGNIFICANT CHANGE UP (ref 5–17)
BUN SERPL-MCNC: 106 MG/DL — HIGH (ref 7–23)
CALCIUM SERPL-MCNC: 8.8 MG/DL — SIGNIFICANT CHANGE UP (ref 8.4–10.5)
CHLORIDE SERPL-SCNC: 96 MMOL/L — SIGNIFICANT CHANGE UP (ref 96–108)
CO2 SERPL-SCNC: 27 MMOL/L — SIGNIFICANT CHANGE UP (ref 22–31)
CREAT SERPL-MCNC: 13.13 MG/DL — HIGH (ref 0.5–1.3)
EGFR: 5 ML/MIN/1.73M2 — LOW
GLUCOSE SERPL-MCNC: 114 MG/DL — HIGH (ref 70–99)
HCT VFR BLD CALC: 26.8 % — LOW (ref 39–50)
HGB BLD-MCNC: 8.7 G/DL — LOW (ref 13–17)
MCHC RBC-ENTMCNC: 30.5 PG — SIGNIFICANT CHANGE UP (ref 27–34)
MCHC RBC-ENTMCNC: 32.5 GM/DL — SIGNIFICANT CHANGE UP (ref 32–36)
MCV RBC AUTO: 94 FL — SIGNIFICANT CHANGE UP (ref 80–100)
NRBC # BLD: 0 /100 WBCS — SIGNIFICANT CHANGE UP (ref 0–0)
PHOSPHATE SERPL-MCNC: 7.1 MG/DL — HIGH (ref 2.5–4.5)
PLATELET # BLD AUTO: 242 K/UL — SIGNIFICANT CHANGE UP (ref 150–400)
POTASSIUM SERPL-MCNC: 4.9 MMOL/L — SIGNIFICANT CHANGE UP (ref 3.5–5.3)
POTASSIUM SERPL-SCNC: 4.9 MMOL/L — SIGNIFICANT CHANGE UP (ref 3.5–5.3)
RBC # BLD: 2.85 M/UL — LOW (ref 4.2–5.8)
RBC # FLD: 17.2 % — HIGH (ref 10.3–14.5)
SODIUM SERPL-SCNC: 136 MMOL/L — SIGNIFICANT CHANGE UP (ref 135–145)
WBC # BLD: 4.42 K/UL — SIGNIFICANT CHANGE UP (ref 3.8–10.5)
WBC # FLD AUTO: 4.42 K/UL — SIGNIFICANT CHANGE UP (ref 3.8–10.5)

## 2023-09-18 PROCEDURE — 73090 X-RAY EXAM OF FOREARM: CPT | Mod: 26,LT

## 2023-09-18 PROCEDURE — 99232 SBSQ HOSP IP/OBS MODERATE 35: CPT | Mod: GC

## 2023-09-18 PROCEDURE — 99232 SBSQ HOSP IP/OBS MODERATE 35: CPT

## 2023-09-18 RX ADMIN — Medication 25 MILLIGRAM(S): at 18:19

## 2023-09-18 RX ADMIN — OXYCODONE HYDROCHLORIDE 10 MILLIGRAM(S): 5 TABLET ORAL at 00:45

## 2023-09-18 RX ADMIN — ERYTHROPOIETIN 10000 UNIT(S): 10000 INJECTION, SOLUTION INTRAVENOUS; SUBCUTANEOUS at 14:43

## 2023-09-18 RX ADMIN — SEVELAMER CARBONATE 1600 MILLIGRAM(S): 2400 POWDER, FOR SUSPENSION ORAL at 18:19

## 2023-09-18 RX ADMIN — CHLORHEXIDINE GLUCONATE 1 APPLICATION(S): 213 SOLUTION TOPICAL at 07:15

## 2023-09-18 RX ADMIN — HEPARIN SODIUM 5000 UNIT(S): 5000 INJECTION INTRAVENOUS; SUBCUTANEOUS at 06:41

## 2023-09-18 RX ADMIN — OXYCODONE HYDROCHLORIDE 10 MILLIGRAM(S): 5 TABLET ORAL at 09:25

## 2023-09-18 RX ADMIN — SEVELAMER CARBONATE 1600 MILLIGRAM(S): 2400 POWDER, FOR SUSPENSION ORAL at 08:03

## 2023-09-18 RX ADMIN — Medication 1 TABLET(S): at 12:46

## 2023-09-18 RX ADMIN — Medication 25 MILLIGRAM(S): at 06:41

## 2023-09-18 RX ADMIN — OXYCODONE HYDROCHLORIDE 10 MILLIGRAM(S): 5 TABLET ORAL at 08:25

## 2023-09-18 RX ADMIN — SEVELAMER CARBONATE 1600 MILLIGRAM(S): 2400 POWDER, FOR SUSPENSION ORAL at 12:46

## 2023-09-18 RX ADMIN — OXYCODONE HYDROCHLORIDE 10 MILLIGRAM(S): 5 TABLET ORAL at 23:59

## 2023-09-18 NOTE — CHART NOTE - NSCHARTNOTEFT_GEN_A_CORE
[facility] Plan of Care    REHABILITATION DIAGNOSIS:  left femoral neck fracture, initial encounter for closed fracture          COMORBIDITIES/COMPLICATING CONDITIONS IMPACTING REHABILITATION:  HEALTH ISSUES - PROBLEM Dx:  left radial head fracture  ESRD  renal osteodystrophy      PAST MEDICAL & SURGICAL HISTORY:  HTN (hypertension)      Stroke  age 10      Morbid obesity      Sleep apnea      Leg fracture, right      Chronic renal failure      Hemodialysis access, AV graft      ESRD (end stage renal disease) on dialysis  since 2013, M-W-F      Anemia, unspecified type      Hypothyroidism      AV fistula  R arm; L arm clotted          Based upon consideration of the patient's impairments, functional status, complicating conditions and any other contributing factors and after information garnered from the assessments of all therapy disciplines involved in treating the patient and other pertinent clinicians:    INTERDISCIPLINARY REHABILITATION INTERVENTIONS:    [ X  ] Transfer Training  [ X  ] Bed Mobility  [ X  ] Therapeutic Exercise  [ X ] Balance/Coordination Exercises  [ X ] Locomotion retraining  [ X  ] Stairs  [  X ] Functional Transfer Training  [  x ] Bowel/Bladder program  [  x ] Pain Management  [ x  ] Skin/Wound Care  [   ] Visual/Perceptual Training  [   ] Therapeutic Recreation Activities  [   ] Neuromuscular Re-education  [ X  ] Activities of Daily Living  [   ] Speech Exercise  [   ] Swallowing Exercises  [   ] Vital Stim  [ x  ] Dietary Supplements  [   ] Calorie Count  [   ] Cognitive Exercises  [   ] Congnitive/Linguistic Treatment  [   ] Behavior Program  [   ] Neuropsych Therapy  [ X  ] Patient/Family Counseling  [ X ] Family Training  [ X  ] Community Re-entry  [   ] Orthotic Evaluation  [   ] Prosthetic Eval/Training    MEDICAL PROGNOSIS:  good    REHAB POTENTIAL:  good  EXPECTED DAILY THERAPY:         PT:2hr       OT:1hr       ST:       P&O:    EXPECTED INTENSITY OF PROGRAM:  3 hrs / Day    EXPECTED FREQUENCY OF PROGRAM: 5 Days/ Week    ESTIMATED LOS:  [  ] 5-7 Days  [  ] 7-10 Days  [  ] 10- 14 Days  [  ] 14- 18 Days  [x  ] 18- 21 Days    ESTIMATED DISPOSITION:  [  ] Home   [  ] Home with Outpatient Therapies  [ x ] Home with Home Therapies  [  ] Assisted Living  [  ] Nursing Home  [  ] Long Term Acute Care    INTERDISCIPLINARY FUNCTIONAL OUTCOMES/GOALS:         Gait/Mobility: Min A       Transfers: Min A       ADLs: Chidi       Functional Transfers: Min A       Medication Management: Min A       Communication:NA       Cognitive:NA       Dysphagia:NA       BladderNA       Bowel: independent     Functional Independent Measures:   7 = Independent  6 = Modified Independent  5 = Supervision  4 = Minimal Assist/ Contact Guard  3 = Moderate Assistance  2 = Maximum Assistance  1 = Total Assistance  0 = Unable to assess
IDT 8/30  lives with aunt and cousin- cousin works  3STE one rail 15 Steps one rail upstairs ? first floor setup    OT   max A Transfers max A ADL    PT   Max A 1-2 standing in sarasteady    tentative Dc 9/12 Home Vs [facility]
IDT 9/1  lives with aunt and cousin- cousin works  3STE one rail 15 Steps one rail upstairs ? first floor setup    OT   max A Transfers max A ADL    PT   Max A 1-2 standing in sarastea    tentative Dc 9/11 [facility]
IDT 9/11  lives with aunt and cousin- cousin works  3STE one rail 15 Steps one rail upstairs ? first floor setup    OT   mod A Transfers max A ADL    PT   Max A 1-2 standing in sarasteady, modx2 in bars 10ft    tentative Dc 9/13 [facility].
Nutrition Follow Up Note  Hospital Course   (Per Electronic Medical Record)    Source:  Patient [X]  Medical Record [X]      Diet:   Renal Diet w/ Thin Liquids (IDDSI Level 0)  Tolerates Diet Consistency Well  No Chewing/Swallowing Difficulties  No Recent Nausea, Vomiting, Diarrhea & Some Recent Constipation (as Per Patient)  Consumes % of Meals (as Per Documentation) - States Good PO Intake/Appetite (Per Patient)  on Nepro 8oz TID (Provides 1,275kcal & 57grams of Protein)  Patient Takes Nutrition Supplement Well  Education Provided on Proper Nutrition & Renal Diet  Obtained Food Preferences from Patient    Enteral/Parenteral Nutrition: Not Applicable    Current Weight: 209.4lb on 9/4  Weight Fluctuates  Obtain Weights Daily    Pertinent Medications: MEDICATIONS  (STANDING):  acetaminophen     Tablet .. 975 milliGRAM(s) Oral every 6 hours  calcium acetate 1334 milliGRAM(s) Oral three times a day with meals  chlorhexidine 2% Cloths 1 Application(s) Topical daily  epoetin carito-epbx (RETACRIT) Injectable 55503 Unit(s) IV Push <User Schedule>  heparin   Injectable 5000 Unit(s) SubCutaneous every 8 hours  metoprolol tartrate 25 milliGRAM(s) Oral two times a day  Nephro-pat 1 Tablet(s) Oral daily  polyethylene glycol 3350 17 Gram(s) Oral daily  senna 2 Tablet(s) Oral at bedtime    MEDICATIONS  (PRN):  oxyCODONE    IR 10 milliGRAM(s) Oral every 6 hours PRN Severe Pain (7 - 10)    Pertinent Labs:  09-05 Na134 mmol/L<L> Glu 77 mg/dL K+ 5.2 mmol/L Cr  9.26 mg/dL<H> BUN 98 mg/dL<H> 09-04 Phos 6.1 mg/dL<H> 09-05 Alb 2.7 g/dL<L> 08-15 Chol 190 mg/dL LDL --    HDL 57 mg/dL Trig 114 mg/dL    Skin: Stage 2 Pressure Ulcers on Sacrum  Surgical Incision on Left Hip  (as Per Nursing Flow Sheet)     Edema: None Noted Recently (as Per Documentation)     Last Bowel Movement: on 9/2    Estimated Needs:   [X] No Change Since Previous Assessment    Previous Nutrition Diagnosis:   Severe Malnutrition     Nutrition Diagnosis is [X] Ongoing - Continues on Nutrition Supplement & Patient Takes Nutrition Supplement; Nutrition Education Provided     New Nutrition Diagnosis: [X] Not Applicable    Interventions:   1. Education Provided on Proper Nutrition & Renal Diet  2. Recommend Continue Nutrition Plan of Care     Monitoring & Evaluation:   [X] Weights   [X] PO Intake   [X] Skin Integrity   [X] Follow Up (Per Protocol)  [X] Tolerance to Diet Prescription   [X] Other: Labs     Registered Dietitian/Nutritionist Remains Available.  Marc Francisco, SHANNAN, CDN    Phone# [phone]
Nutrition Follow Up Note  Hospital Course   (Per Electronic Medical Record)    Source:  Patient [X]  Medical Record [X]      Diet:   Renal Diet w/ Thin Liquids (IDDSI Level 0)  Tolerates Diet Consistency Well  No Chewing/Swallowing Difficulties  No Recent Nausea, Vomiting, Diarrhea or Constipation (as Per Patient)  Consumes % of Meals Usually (as Per Documentation) - States Fair Intake (Per Patient)  on Nepro 8oz TID (Provides 1,275kcal & 57grams of Protein)  Patient Takes Nutrition Supplement But Only Once a Day  Recommend Change Supplement to Nepro 8oz Daily (Provides 425kcal & 19grams of Protein)   Education Provided on Proper Nutrition  Obtained Food Preferences from Patient    Enteral/Parenteral Nutrition: Not Applicable    Current Weight: 189.1lb on 9/10  Obtain Weights Daily  Weight Fluctuates     Pertinent Medications: MEDICATIONS  (STANDING):  acetaminophen     Tablet .. 975 milliGRAM(s) Oral every 6 hours  chlorhexidine 2% Cloths 1 Application(s) Topical daily  epoetin carito-epbx (RETACRIT) Injectable 21340 Unit(s) IV Push <User Schedule>  heparin   Injectable 5000 Unit(s) SubCutaneous every 8 hours  metoprolol tartrate 25 milliGRAM(s) Oral two times a day  Nephro-pat 1 Tablet(s) Oral daily  polyethylene glycol 3350 17 Gram(s) Oral two times a day  senna 2 Tablet(s) Oral at bedtime  sevelamer carbonate 1600 milliGRAM(s) Oral three times a day with meals    MEDICATIONS  (PRN):  lactulose Syrup 10 Gram(s) Oral daily PRN constipation  oxyCODONE    IR 10 milliGRAM(s) Oral every 6 hours PRN Severe Pain (7 - 10)    Pertinent Labs:  09-11 Na137 mmol/L Glu 77 mg/dL K+ 5.6 mmol/L<H> Cr  12.80 mg/dL<H>  mg/dL<H> 09-11 Phos 7.6 mg/dL<H> 09-06 Alb 2.7 g/dL<L> 08-15 Chol 190 mg/dL LDL --    HDL 57 mg/dL Trig 114 mg/dL    Skin: Stage 2 Pressure Ulcer on Sacrum  Surgical Incision on Left Hip  (as Per Nursing Flow Sheet)     Edema: None Noted (as Per Documentation)     Last Bowel Movement: on 9/11    Estimated Needs:   [X] No Change Since Previous Assessment    Previous Nutrition Diagnosis:   Severe Malnutrition     Nutrition Diagnosis is [X] Ongoing - Recommend Decrease Supplement to Daily (Per Patient Request)     New Nutrition Diagnosis: [X] Not Applicable    Interventions:   1. Recommend Change Nepro to 8oz Daily (Provides 425kcal & 19grams of Protein)   2. Education Provided on Proper Nutrition  3. Recommend Continue Nutrition Plan of Care     Monitoring & Evaluation:   [X] Weights   [X] PO Intake   [X] Skin Integrity   [X] Follow Up (Per Protocol)  [X] Tolerance to Diet Prescription   [X] Other: Labs     Registered Dietitian/Nutritionist Remains Available.  Marc Francisco, SHANNAN, CDN    Phone# [phone]
Continue current management, Alk phos Isoenzyme resulted. Alk phos elevated possible with bone condition. We can sign off if needed please reconsult.
F/U Note:    Patient is a 37y old  Male who presents with a chief complaint of Left femoral neck fracture    Interval Hx;    Notified by RN that patient felt anxious. Given 0.25mg of xanax. Will follow up.    Ordered repeat labs on patient, given high potassium . Patient is post dialysis.     Vital Signs Last 24 Hrs  T(C): 37 (04 Sep 2023 18:00), Max: 37 (04 Sep 2023 18:00)  T(F): 98.6 (04 Sep 2023 18:00), Max: 98.6 (04 Sep 2023 18:00)  HR: 90 (04 Sep 2023 18:47) (74 - 90)  BP: 123/76 (04 Sep 2023 18:47) (118/62 - 163/88)  BP(mean): --  RR: 16 (04 Sep 2023 18:00) (16 - 17)  SpO2: 99% (04 Sep 2023 18:00) (97% - 99%)    Parameters below as of 04 Sep 2023 18:00  Patient On (Oxygen Delivery Method): room air                                8.8    5.41  )-----------( 309      ( 04 Sep 2023 15:00 )             27.7         09-04    137  |  96  |  139<H>  ----------------------------<  102<H>  6.3<HH>   |  24  |  12.28<H>    Ca    9.5      04 Sep 2023 15:00  Phos  6.7     09-04
Nutrition Follow Up Note  Hospital Course   (Per Electronic Medical Record)    Source:  Patient [X]  Family Member [X]   Nursing Staff [X]   Medical Record [X]      Diet:   Renal Diet w/ Thin Liquids (IDDSI Level 0)  Tolerates Diet Consistency Well  No Chewing/Swallowing Difficulties  No Recent Nausea, Vomiting, Diarrhea or Constipation (as Per Patient)  Consumes % of Meals (as Per Documentation) - States Fair Intake (Per Patient)  on Nepro 8oz Daily (Provides 425kcal & 19grams of Protein)  Patient Takes Nutrition Supplement Well  Nutrition Education Provided on Renal Diet  Obtained Food Preferences from Patient    Enteral/Parenteral Nutrition: Not Applicable    Current Weight: 194lb on 9/17  Obtain Weights Weekly  Weight Fluctuates     Pertinent Medications: MEDICATIONS  (STANDING):  acetaminophen     Tablet .. 975 milliGRAM(s) Oral every 6 hours  chlorhexidine 2% Cloths 1 Application(s) Topical daily  epoetin carito-epbx (RETACRIT) Injectable 63722 Unit(s) IV Push <User Schedule>  heparin   Injectable 5000 Unit(s) SubCutaneous every 8 hours  metoprolol tartrate 25 milliGRAM(s) Oral two times a day  Nephro-pat 1 Tablet(s) Oral daily  polyethylene glycol 3350 17 Gram(s) Oral two times a day  senna 2 Tablet(s) Oral at bedtime  sevelamer carbonate 1600 milliGRAM(s) Oral three times a day with meals    MEDICATIONS  (PRN):  lactulose Syrup 10 Gram(s) Oral daily PRN constipation  oxyCODONE    IR 10 milliGRAM(s) Oral every 6 hours PRN Severe Pain (7 - 10)    Pertinent Labs:  09-15 Na136 mmol/L Glu 105 mg/dL<H> K+ 5.0 mmol/L Cr  11.35 mg/dL<H> BUN 87 mg/dL<H> 09-15 Phos 6.8 mg/dL<H> 09-15 Alb 2.7 g/dL<L>    Skin: Stage 2 Pressure Ulcer on Sacrum  Surgical Incision on Left Hip  (as Per Nursing Flow Sheet)     Edema: None Noted Recently (as Per Documentation)     Last Bowel Movement: on 9/17    Estimated Needs:   [X] No Change Since Previous Assessment    Previous Nutrition Diagnosis:   Severe Malnutrition     Nutrition Diagnosis is [X] Ongoing - Continues on Nutrition Supplement & Patient Takes Nutrition Supplement Well    New Nutrition Diagnosis: [X] Not Applicable    Interventions:   1. Nutrition Education Provided on Renal Diet  2. Recommend Continue Nutrition Plan of Care     Monitoring & Evaluation:   [X] Weights   [X] PO Intake   [X] Skin Integrity   [X] Follow Up (Per Protocol)  [X] Tolerance to Diet Prescription   [X] Other: Labs     Registered Dietitian/Nutritionist Remains Available.  Marc Francisco, SHANNAN, CDN    Phone# [phone]
Nutrition Follow Up Note  Hospital Course   (Per Electronic Medical Record)    Source:  Patient [X]  Medical Record [X]      Diet:   Renal Diet w/ Thin Liquids (IDDSI Level 0)  Tolerates Diet Consistency Well  No Chewing/Swallowing Difficulties  No Recent Nausea, Vomiting, Diarrhea & Some Recent Constipation (as Per Patient)  Consumes % of Meals (as Per Documentation) - States Fair Intake (Per Patient)   on Nepro 8oz TID (Provides 1,275kcal & 57grams of Protein)   Patient Takes Nutrition Supplement Well (Per Patient)  Education Provided on Need for Increased Fiber    Enteral/Parenteral Nutrition: Not Applicable    Current Weight: 198.4lb on 8/28  Obtain New Weight to Confirm  Obtain Weights Daily    Pertinent Medications: MEDICATIONS  (STANDING):  acetaminophen     Tablet .. 975 milliGRAM(s) Oral every 6 hours  calcium acetate 1334 milliGRAM(s) Oral three times a day with meals  chlorhexidine 2% Cloths 1 Application(s) Topical daily  epoetin carito-epbx (RETACRIT) Injectable 19418 Unit(s) IV Push <User Schedule>  heparin   Injectable 5000 Unit(s) SubCutaneous every 8 hours  metoprolol tartrate 25 milliGRAM(s) Oral two times a day  Nephro-pat 1 Tablet(s) Oral daily  polyethylene glycol 3350 17 Gram(s) Oral daily  senna 2 Tablet(s) Oral at bedtime    MEDICATIONS  (PRN):  ALPRAZolam 0.25 milliGRAM(s) Oral every 6 hours PRN Anxiety  oxyCODONE    IR 10 milliGRAM(s) Oral every 6 hours PRN Severe Pain (7 - 10)  traMADol 25 milliGRAM(s) Oral every 8 hours PRN Moderate Pain (4 - 6)    Pertinent Labs:  08-28 Na136 mmol/L Glu 88 mg/dL K+ 5.6 mmol/L<H> Cr  12.77 mg/dL<H>  mg/dL<H> 08-22 Phos 5.6 mg/dL<H> 08-28 Alb 2.5 g/dL<L> 08-15 Chol 190 mg/dL LDL --    HDL 57 mg/dL Trig 114 mg/dL    Skin: Stage 2 Pressure Ulcer on Sacrum  Surgical Incision on Left Hip  (as Per Nursing Flow Sheet)    Edema: None Noted Recently (as Per Documentation)     Last Bowel Movement: on 8/28    Estimated Needs:   [X] No Change Since Previous Assessment    Previous Nutrition Diagnosis:   Severe Malnutrition     Nutrition Diagnosis is [X] Ongoing - Continues on Nutrition Supplement & Patient Takes Nutrition Supplement Well    New Nutrition Diagnosis: [X] Not Applicable    Interventions:   1. Education Provided on Need for Increased Fiber   2. Recommend Continue Nutrition Plan of Care     Monitoring & Evaluation:   [X] Weights   [X] PO Intake   [X] Skin Integrity   [X] Follow Up (Per Protocol)  [X] Tolerance to Diet Prescription   [X] Other: Labs     Registered Dietitian/Nutritionist Remains Available.  Marc Francisco, DONATO, CDN    Phone# [phone]

## 2023-09-18 NOTE — PROGRESS NOTE ADULT - SUBJECTIVE AND OBJECTIVE BOX
S/p stable HD today    Vital Signs Last 24 Hrs  T(C): 36.6 (09-18-23 @ 17:35), Max: 37.2 (09-18-23 @ 08:00)  T(F): 97.9 (09-18-23 @ 17:35), Max: 99 (09-18-23 @ 08:00)  HR: 72 (09-18-23 @ 17:35) (66 - 74)  BP: 133/62 (09-18-23 @ 17:35) (133/62 - 152/83)  RR: 16 (09-18-23 @ 17:35) (16 - 16)  SpO2: 98% (09-18-23 @ 17:35) (98% - 99%)    I&O's Detail    18 Sep 2023 07:01  -  18 Sep 2023 22:21  --------------------------------------------------------  OUT:    Other (mL): 2500 mL  Total OUT: 2500 mL    s1s2  b/l air entry  soft, ND  no edema, AVF patent                                                                   8.7    4.42  )-----------( 242      ( 18 Sep 2023 14:20 )             26.8     18 Sep 2023 14:20    136    |  96     |  106    ----------------------------<  114    4.9     |  27     |  13.13    Ca    8.8        18 Sep 2023 14:20  Phos  7.1       18 Sep 2023 14:20    acetaminophen     Tablet .. 975 milliGRAM(s) Oral every 6 hours  chlorhexidine 2% Cloths 1 Application(s) Topical daily  epoetin carito-epbx (RETACRIT) Injectable 37396 Unit(s) IV Push <User Schedule>  heparin   Injectable 5000 Unit(s) SubCutaneous every 8 hours  lactulose Syrup 10 Gram(s) Oral daily PRN  metoprolol tartrate 25 milliGRAM(s) Oral two times a day  Nephro-pat 1 Tablet(s) Oral daily  oxyCODONE    IR 10 milliGRAM(s) Oral every 6 hours PRN  polyethylene glycol 3350 17 Gram(s) Oral two times a day  senna 2 Tablet(s) Oral at bedtime  sevelamer carbonate 1600 milliGRAM(s) Oral three times a day with meals    A/P:    S/p fall, L femoral neck fracture, s/p L hemiarthroplasty on 8/15  Adm to [facility]  ESRD on HD   TMP 2.5kg w/HD today  Renal diet  Renvela for high Phos  D/c planning to [facility]  Op HD at [facility] HD unit, TTS    [phone]

## 2023-09-18 NOTE — PROGRESS NOTE ADULT - ASSESSMENT
38 y/o male w/ a PMHx of ESRD (M/W/F via LUE AVF), SOLITARIO, HTN, secondary hyperparathyroidism s/p parathyroidectomy (Dec 2022), obesity, stroke at age 10 w/ no residual deficits, and recent left radial fracture sustained after trying to lower himself into a chair s/p splint who presented on 8/14 after a fall where he sustained a left femoral neck fracture, s/p left hemiarthroplasty (8/15). Post op, he was lethargic and hypoxemic requiring reintubation. Later became hypOtensive and was on pressor. Patient without cuff leak so he was given Decadron. He was successfully extubated on 8/17 despite no cuff leak and was quickly weaned off pressors afterwards. Had a fever to 101.2 (8/22), infectious workup unrevealing.    COMORBIDITES/ACTIVE MEDICAL ISSUES     #Femoral neck fracture  - S/p Left Hemiarthroplasty with Dr. Jason León (8/15), healing well  - Weight bearing as tolerated  - Gait Instability, ADL impairments and Functional impairments:  Comprehensive Rehab Program of PT/OT   - awaiting [facility] placement    #Recent Left radial head Fracture  - Left arm in sling as needed for comfort-ortho f/up   -re-xray Left Forearm secondary to pain- with ulna shaft Fx - ortho saw patient- ulnar forearm splint placed  -cleared by ortho on 9/8 for WBAT LUE with splint on- can use platform  -REady For DC to [facility]- awaiting approval    #ESRD - on HD (MWF)  #Anemia of chronic disease  - Right AVF   - Epo with HD  - Nephro-pat supplement    #Renal bone disease   - Home diet: phoslo with meals  - Ensure low phos diet  - Outpatient follow up with endo and bone scan to determine etiology   parathyroidectomy 9/11 cancelled- needs to wait till at least 6 weeks post op  Needs to follow with Dr Royal to schedule parathyroid surgery in around two weeks  [facility]- DR León- 1-2 weeks- Need to evaluate left ulnar fx as well as Left Hip hemiarthroplasty    #HTN  - Metoprolol 25 BID    #Mood  - Xanax 0.25 PRN    #Pain control  - Tylenol 975 q6h, Tramadol PRN    #GI/Bowel Mgmt   - At risk for constipation due to neurologic diagnosis, immobility and/or medication use  - Senna,  Miralax QD    #Bladder management  -anuric    #DVT ppx  - Heparin TID     #Skin:  - stage 2 pressure ulcer to coccyx: cleanse with NS, pat dry with gauze and apply foam dressing daily- healing  -At risk for pressure injury due to neurologic diagnosis and relative immobility  -Bilateral heels - soft heel protectors, apply liquid barrier film daily and monitor for tissue type changes  - Turn Q2 hr while in bed, air mattress  - Skin barrier cream as needed  - Nursing to monitor skin Q shift    #Diet   - Regular + Thins  [Renal Diet]    Precautions / PROPHYLAXIS:   - Falls  - ortho: Weight bearing status: LLE WBAT; LUE WBAT LUE through platform-wear splint  - Lungs: Aspiration, Incentive Spirometer   - Pressure injury/Skin: Turn Q2hrs while in bed, OOB to Chair, PT/OT

## 2023-09-18 NOTE — PROGRESS NOTE ADULT - NS ATTEND AMEND GEN_ALL_CORE FT
Rehab Attending- Patient seen and examined by me - Case discussed, above note reviewed by me with modifications made    patient seen and evaluated bedside  patient ready for DC- Bed at dialysis with be ready tomorrow  pain persists at left forearm- x-rays done - still show ? nonunion- ortho to follow and advise on WB  for HD today- will receive HD from [facility] on T[facility], Thurs , Sat  to continue intensive rehab program    Vital Signs Last 24 Hrs  T(C): 37.2 (18 Sep 2023 08:00), Max: 37.2 (18 Sep 2023 08:00)  T(F): 99 (18 Sep 2023 08:00), Max: 99 (18 Sep 2023 08:00)  HR: 74 (18 Sep 2023 08:00) (64 - 77)  BP: 152/83 (18 Sep 2023 08:00) (101/64 - 152/83)  BP(mean): --  RR: 16 (18 Sep 2023 08:00) (16 - 18)  SpO2: 99% (18 Sep 2023 08:00) (98% - 99%)    Parameters below as of 18 Sep 2023 08:00  Patient On (Oxygen Delivery Method): room air        Gen - NAD, Comfortable  HEENT - NCAT, EOMI  Neck - Supple, No limited ROM  Pulm - CTAB, No wheeze, No rhonchi, No crackles  Cardiovascular - RRR, S1S2, No murmurs  Abdomen - Soft, NT/ND, +BS  Extremities no edema, no calf tenderness, Right AV fistula, old fistula to left upper arm- pain with palpation left mid forearm  Neuro-     Cognitive - awake, alert, oriented to person, place, date, year.  Able  to follow command     Cranial Nerves - CN 2-12 intact     Motor -                     LEFT    UE - 4-/5                    RIGHT UE - 4/5                    LEFT    LE - HF- 2/5, KE 4/5, DF 4/5, PF 4/5                    RIGHT LE - 4-/5        Sensory - Intact  to LT      Reflexes - DTR Intact, No primitive reflexive     Coordination - FTN intact   MSK: muscle weakness  Psychiatric - Mood stable, Affect WNL  Skin:  stage 2 pressure ulcer to coccyx- improving

## 2023-09-18 NOTE — PROGRESS NOTE ADULT - SUBJECTIVE AND OBJECTIVE BOX
HPI:  36 y/o male w/ a PMHx of ESRD (M/W/F via LUE AVF), SOLITARIO, HTN, secondary hyperparathyroidism s/p parathyroidectomy (Dec 2022), obesity, stroke at age 10 w/ no residual deficits, and recent left radial fracture sustained after trying to lower himself into a chair s/p splint who presented on 8/14 after a fall while in the shower. Patient sustained a left femoral neck fracture s/p left hemiarthroplasty on 8/15. Immediately post-op in PACU, patient was lethargic and hypoxemic requiring reintubation so he was admitted to SICU post-operatively. Patient was hypotensive after intubation requiring pressor support. Patient without cuff leak so he was given Decadron. He was successfully extubated on 8/17 despite no cuff leak and was quickly weaned off pressors afterwards. Had a fever to 101.2 (8/22), infectious workup unrevealing.    Patient was evaluated by PM&R and therapy for functional deficits and gait/ADL impairments and recommend acute rehabilitation.  Patient was medically optimized for discharge to [facility] on 8/24/2023       ROS/Subjective  Patient seen and evaluated bedside  awaiting dialysis bed at [facility] planning today after HD for HD bed THu  No surgery on parathyroid gland till at least 6 weeks post op  No void   no dizziness, no headaches  no nausea, no vomiting  no SOB, No chest pain  repeat Covid PCR 9/11 negative  moved bowels  Ortho f/up requested re WB LUE    MEDICATIONS  (STANDING):  acetaminophen     Tablet .. 975 milliGRAM(s) Oral every 6 hours  chlorhexidine 2% Cloths 1 Application(s) Topical daily  epoetin carito-epbx (RETACRIT) Injectable 84817 Unit(s) IV Push <User Schedule>  heparin   Injectable 5000 Unit(s) SubCutaneous every 8 hours  metoprolol tartrate 25 milliGRAM(s) Oral two times a day  Nephro-pat 1 Tablet(s) Oral daily  polyethylene glycol 3350 17 Gram(s) Oral two times a day  senna 2 Tablet(s) Oral at bedtime  sevelamer carbonate 1600 milliGRAM(s) Oral three times a day with meals    MEDICATIONS  (PRN):  lactulose Syrup 10 Gram(s) Oral daily PRN constipation  oxyCODONE    IR 10 milliGRAM(s) Oral every 6 hours PRN Severe Pain (7 - 10)        Vital Signs Last 24 Hrs  T(C): 37.2 (18 Sep 2023 08:00), Max: 37.2 (18 Sep 2023 08:00)  T(F): 99 (18 Sep 2023 08:00), Max: 99 (18 Sep 2023 08:00)  HR: 74 (18 Sep 2023 08:00) (64 - 77)  BP: 152/83 (18 Sep 2023 08:00) (101/64 - 152/83)  BP(mean): --  RR: 16 (18 Sep 2023 08:00) (16 - 18)  SpO2: 99% (18 Sep 2023 08:00) (98% - 99%)    Parameters below as of 18 Sep 2023 08:00  Patient On (Oxygen Delivery Method): room air        Gen - NAD, Comfortable  HEENT - NCAT, EOMI  Neck - Supple, No limited ROM  Pulm - CTAB, No wheeze, No rhonchi, No crackles  Cardiovascular - RRR, S1S2, No murmurs  Abdomen - Soft, NT/ND, +BS  Extremities no edema, no calf tenderness, Right AV fistula, old fistula to left upper arm- pain with palpation left mid forearm  Neuro-     Cognitive - awake, alert, oriented to person, place, date, year.  Able  to follow command     Cranial Nerves - CN 2-12 intact     Motor -                     LEFT    UE - 4-/5                    RIGHT UE - 4/5                    LEFT    LE - HF- 2/5, KE 4/5, DF 4/5, PF 4/5                    RIGHT LE - 4-/5        Sensory - Intact  to LT      Reflexes - DTR Intact, No primitive reflexive     Coordination - FTN intact   MSK: muscle weakness  Psychiatric - Mood stable, Affect WNL  Skin:  stage 2 pressure ulcer to coccyx- improving        IDT 9/11  lives with aunt and cousin- cousin works  3STE one rail 15 Steps one rail upstairs ? first floor setup    OT   mod A Transfers max A ADL    PT   Max A 1-2 standing in sarasteady, modx2 in bars 10ft    tentative Dc 9/18 JUSTINE.      Continue comprehensive acute rehab program consisting of 3hrs/day of OT/PT and SLP. HPI:  36 y/o male w/ a PMHx of ESRD (M/W/F via LUE AVF), SOLITARIO, HTN, secondary hyperparathyroidism s/p parathyroidectomy (Dec 2022), obesity, stroke at age 10 w/ no residual deficits, and recent left radial fracture sustained after trying to lower himself into a chair s/p splint who presented on 8/14 after a fall while in the shower. Patient sustained a left femoral neck fracture s/p left hemiarthroplasty on 8/15. Immediately post-op in PACU, patient was lethargic and hypoxemic requiring reintubation so he was admitted to SICU post-operatively. Patient was hypotensive after intubation requiring pressor support. Patient without cuff leak so he was given Decadron. He was successfully extubated on 8/17 despite no cuff leak and was quickly weaned off pressors afterwards. Had a fever to 101.2 (8/22), infectious workup unrevealing.    Patient was evaluated by PM&R and therapy for functional deficits and gait/ADL impairments and recommend acute rehabilitation.  Patient was medically optimized for discharge to [facility] on 8/24/2023       ROS/Subjective  Patient seen and evaluated bedside  awaiting dialysis bed at [facility] planning today after HD for HD bed THu  No surgery on parathyroid gland till at least 6 weeks post op  No void   no dizziness, no headaches  no nausea, no vomiting  no SOB, No chest pain  repeat Covid PCR 9/11 negative  moved bowels  Ortho f/up requested re WB LUE    MEDICATIONS  (STANDING):  acetaminophen     Tablet .. 975 milliGRAM(s) Oral every 6 hours  chlorhexidine 2% Cloths 1 Application(s) Topical daily  epoetin carito-epbx (RETACRIT) Injectable 67442 Unit(s) IV Push <User Schedule>  heparin   Injectable 5000 Unit(s) SubCutaneous every 8 hours  metoprolol tartrate 25 milliGRAM(s) Oral two times a day  Nephro-pat 1 Tablet(s) Oral daily  polyethylene glycol 3350 17 Gram(s) Oral two times a day  senna 2 Tablet(s) Oral at bedtime  sevelamer carbonate 1600 milliGRAM(s) Oral three times a day with meals    MEDICATIONS  (PRN):  lactulose Syrup 10 Gram(s) Oral daily PRN constipation  oxyCODONE    IR 10 milliGRAM(s) Oral every 6 hours PRN Severe Pain (7 - 10)        Vital Signs Last 24 Hrs  T(C): 37.2 (18 Sep 2023 08:00), Max: 37.2 (18 Sep 2023 08:00)  T(F): 99 (18 Sep 2023 08:00), Max: 99 (18 Sep 2023 08:00)  HR: 74 (18 Sep 2023 08:00) (64 - 77)  BP: 152/83 (18 Sep 2023 08:00) (101/64 - 152/83)  BP(mean): --  RR: 16 (18 Sep 2023 08:00) (16 - 18)  SpO2: 99% (18 Sep 2023 08:00) (98% - 99%)    Parameters below as of 18 Sep 2023 08:00  Patient On (Oxygen Delivery Method): room air        Gen - NAD, Comfortable  HEENT - NCAT, EOMI  Neck - Supple, No limited ROM  Pulm - CTAB, No wheeze, No rhonchi, No crackles  Cardiovascular - RRR, S1S2, No murmurs  Abdomen - Soft, NT/ND, +BS  Extremities no edema, no calf tenderness, Right AV fistula, old fistula to left upper arm- pain with palpation left mid forearm  Neuro-     Cognitive - awake, alert, oriented to person, place, date, year.  Able  to follow command     Cranial Nerves - CN 2-12 intact     Motor -                     LEFT    UE - 4-/5                    RIGHT UE - 4/5                    LEFT    LE - HF- 2/5, KE 4/5, DF 4/5, PF 4/5                    RIGHT LE - 4-/5        Sensory - Intact  to LT      Reflexes - DTR Intact, No primitive reflexive     Coordination - FTN intact   MSK: muscle weakness  Psychiatric - Mood stable, Affect WNL  Skin:  stage 2 pressure ulcer to coccyx- improving        IDT 9/18  lives with aunt and cousin- cousin works  3STE one rail 15 Steps one rail upstairs ? first floor setup    OT   min A Transfers min/mod A ADL    PT   Min A 1-2 standing in sarasteady, min A 120ft    tentative Dc 9/19 JUSTINE.      Continue comprehensive acute rehab program consisting of 3hrs/day of OT/PT and SLP. HPI:  38 y/o male w/ a PMHx of ESRD (M/W/F via LUE AVF), SOLITARIO, HTN, secondary hyperparathyroidism s/p parathyroidectomy (Dec 2022), obesity, stroke at age 10 w/ no residual deficits, and recent left radial fracture sustained after trying to lower himself into a chair s/p splint who presented on 8/14 after a fall while in the shower. Patient sustained a left femoral neck fracture s/p left hemiarthroplasty on 8/15. Immediately post-op in PACU, patient was lethargic and hypoxemic requiring reintubation so he was admitted to SICU post-operatively. Patient was hypotensive after intubation requiring pressor support. Patient without cuff leak so he was given Decadron. He was successfully extubated on 8/17 despite no cuff leak and was quickly weaned off pressors afterwards. Had a fever to 101.2 (8/22), infectious workup unrevealing.    Patient was evaluated by PM&R and therapy for functional deficits and gait/ADL impairments and recommend acute rehabilitation.  Patient was medically optimized for discharge to [facility] on 8/24/2023       ROS/Subjective  Patient seen and evaluated bedside  awaiting dialysis bed at [facility] planning today after HD for HD bed TH[facility]  No surgery on parathyroid gland till at least 6 weeks post op  No void   no dizziness, no headaches  no nausea, no vomiting  no SOB, No chest pain  repeat Covid PCR 9/11 negative  moved bowels  Ortho f/up requested re WB LUE    MEDICATIONS  (STANDING):  acetaminophen     Tablet .. 975 milliGRAM(s) Oral every 6 hours  chlorhexidine 2% Cloths 1 Application(s) Topical daily  epoetin carito-epbx (RETACRIT) Injectable 01257 Unit(s) IV Push <User Schedule>  heparin   Injectable 5000 Unit(s) SubCutaneous every 8 hours  metoprolol tartrate 25 milliGRAM(s) Oral two times a day  Nephro-pat 1 Tablet(s) Oral daily  polyethylene glycol 3350 17 Gram(s) Oral two times a day  senna 2 Tablet(s) Oral at bedtime  sevelamer carbonate 1600 milliGRAM(s) Oral three times a day with meals    MEDICATIONS  (PRN):  lactulose Syrup 10 Gram(s) Oral daily PRN constipation  oxyCODONE    IR 10 milliGRAM(s) Oral every 6 hours PRN Severe Pain (7 - 10)                          8.7    4.42  )-----------( 242      ( 18 Sep 2023 14:20 )             26.8   09-18    136  |  96  |  106<H>  ----------------------------<  114<H>  4.9   |  27  |  13.13<H>    Ca    8.8      18 Sep 2023 14:20  Phos  7.1     09-18        Vital Signs Last 24 Hrs  T(C): 37.2 (18 Sep 2023 08:00), Max: 37.2 (18 Sep 2023 08:00)  T(F): 99 (18 Sep 2023 08:00), Max: 99 (18 Sep 2023 08:00)  HR: 74 (18 Sep 2023 08:00) (64 - 77)  BP: 152/83 (18 Sep 2023 08:00) (101/64 - 152/83)  BP(mean): --  RR: 16 (18 Sep 2023 08:00) (16 - 18)  SpO2: 99% (18 Sep 2023 08:00) (98% - 99%)    Parameters below as of 18 Sep 2023 08:00  Patient On (Oxygen Delivery Method): room air        Gen - NAD, Comfortable  HEENT - NCAT, EOMI  Neck - Supple, No limited ROM  Pulm - CTAB, No wheeze, No rhonchi, No crackles  Cardiovascular - RRR, S1S2, No murmurs  Abdomen - Soft, NT/ND, +BS  Extremities no edema, no calf tenderness, Right AV fistula, old fistula to left upper arm- pain with palpation left mid forearm  Neuro-     Cognitive - awake, alert, oriented to person, place, date, year.  Able  to follow command     Cranial Nerves - CN 2-12 intact     Motor -                     LEFT    UE - 4-/5                    RIGHT UE - 4/5                    LEFT    LE - HF- 2/5, KE 4/5, DF 4/5, PF 4/5                    RIGHT LE - 4-/5        Sensory - Intact  to LT      Reflexes - DTR Intact, No primitive reflexive     Coordination - FTN intact   MSK: muscle weakness  Psychiatric - Mood stable, Affect WNL  Skin:  stage 2 pressure ulcer to coccyx- improving        IDT 9/18  lives with aunt and cousin- cousin works  3STE one rail 15 Steps one rail upstairs ? first floor setup    OT   min A Transfers min/mod A ADL    PT   Min A 1-2 standing in sarasteady, min A 120ft    tentative Dc 9/19 JUSTINE.      Continue comprehensive acute rehab program consisting of 3hrs/day of OT/PT and SLP.

## 2023-09-18 NOTE — CHART NOTE - NSCHARTNOTESELECT_GEN_ALL_CORE
Event Note
Nutrition Services
Nutrition Services
IDT/Event Note
Nutrition Services
Nutrition Services
plan of care/Event Note

## 2023-09-18 NOTE — PROGRESS NOTE ADULT - SUBJECTIVE AND OBJECTIVE BOX
Patient is a 37y old  Male who presents with a chief complaint of Left femoral neck fracture (17 Sep 2023 12:13)      SUBJECTIVE / OVERNIGHT EVENTS:  Pt seen and examined at bedside. No acute events overnight.  Pt denies cp, palpitations, sob, abd pain, N/V, fever, chills.    ROS:  All other review of systems negative    Allergies    No Known Drug Allergies  shellfish (Hives)    Intolerances        MEDICATIONS  (STANDING):  acetaminophen     Tablet .. 975 milliGRAM(s) Oral every 6 hours  chlorhexidine 2% Cloths 1 Application(s) Topical daily  epoetin carito-epbx (RETACRIT) Injectable 85797 Unit(s) IV Push <User Schedule>  heparin   Injectable 5000 Unit(s) SubCutaneous every 8 hours  metoprolol tartrate 25 milliGRAM(s) Oral two times a day  Nephro-pat 1 Tablet(s) Oral daily  polyethylene glycol 3350 17 Gram(s) Oral two times a day  senna 2 Tablet(s) Oral at bedtime  sevelamer carbonate 1600 milliGRAM(s) Oral three times a day with meals    MEDICATIONS  (PRN):  lactulose Syrup 10 Gram(s) Oral daily PRN constipation  oxyCODONE    IR 10 milliGRAM(s) Oral every 6 hours PRN Severe Pain (7 - 10)      Vital Signs Last 24 Hrs  T(C): 37.2 (18 Sep 2023 08:00), Max: 37.2 (18 Sep 2023 08:00)  T(F): 99 (18 Sep 2023 08:00), Max: 99 (18 Sep 2023 08:00)  HR: 74 (18 Sep 2023 08:00) (64 - 77)  BP: 152/83 (18 Sep 2023 08:00) (101/64 - 152/83)  BP(mean): --  RR: 16 (18 Sep 2023 08:00) (16 - 18)  SpO2: 99% (18 Sep 2023 08:00) (98% - 99%)    Parameters below as of 18 Sep 2023 08:00  Patient On (Oxygen Delivery Method): room air      CAPILLARY BLOOD GLUCOSE        I&O's Summary      PHYSICAL EXAM:  GENERAL: NAD, well-developed male  HEAD:  Atraumatic, Normocephalic  NECK: Supple, No JVD  CHEST/LUNG: Clear to auscultation bilaterally; No wheeze, nonlabored breathing  HEART: Regular rate and rhythm; No murmurs, rubs, or gallops  ABDOMEN: Soft, Nontender, Nondistended; Bowel sounds present  EXTREMITIES: No clubbing, cyanosis, or edema  PSYCH: calm, appropriate mood    LABS:                    RADIOLOGY & ADDITIONAL TESTS:  Results Reviewed:   Imaging Personally Reviewed:  Electrocardiogram Personally Reviewed:    COORDINATION OF CARE:  Care Discussed with Consultants/Other Providers [Y/N]:  Prior or Outpatient Records Reviewed [Y/N]:

## 2023-09-19 ENCOUNTER — NON-APPOINTMENT (OUTPATIENT)
Age: 38
End: 2023-09-19

## 2023-09-19 ENCOUNTER — TRANSCRIPTION ENCOUNTER (OUTPATIENT)
Age: 38
End: 2023-09-19

## 2023-09-19 VITALS
DIASTOLIC BLOOD PRESSURE: 65 MMHG | OXYGEN SATURATION: 99 % | TEMPERATURE: 100 F | RESPIRATION RATE: 16 BRPM | HEART RATE: 76 BPM | SYSTOLIC BLOOD PRESSURE: 147 MMHG

## 2023-09-19 PROCEDURE — 73080 X-RAY EXAM OF ELBOW: CPT

## 2023-09-19 PROCEDURE — 99261: CPT

## 2023-09-19 PROCEDURE — 80076 HEPATIC FUNCTION PANEL: CPT

## 2023-09-19 PROCEDURE — 97163 PT EVAL HIGH COMPLEX 45 MIN: CPT

## 2023-09-19 PROCEDURE — 36415 COLL VENOUS BLD VENIPUNCTURE: CPT

## 2023-09-19 PROCEDURE — 85025 COMPLETE CBC W/AUTO DIFF WBC: CPT

## 2023-09-19 PROCEDURE — 84100 ASSAY OF PHOSPHORUS: CPT

## 2023-09-19 PROCEDURE — 82962 GLUCOSE BLOOD TEST: CPT

## 2023-09-19 PROCEDURE — 73090 X-RAY EXAM OF FOREARM: CPT

## 2023-09-19 PROCEDURE — 86706 HEP B SURFACE ANTIBODY: CPT

## 2023-09-19 PROCEDURE — 97530 THERAPEUTIC ACTIVITIES: CPT

## 2023-09-19 PROCEDURE — 80074 ACUTE HEPATITIS PANEL: CPT

## 2023-09-19 PROCEDURE — 80069 RENAL FUNCTION PANEL: CPT

## 2023-09-19 PROCEDURE — 97116 GAIT TRAINING THERAPY: CPT

## 2023-09-19 PROCEDURE — 97535 SELF CARE MNGMENT TRAINING: CPT

## 2023-09-19 PROCEDURE — 97112 NEUROMUSCULAR REEDUCATION: CPT

## 2023-09-19 PROCEDURE — 80053 COMPREHEN METABOLIC PANEL: CPT

## 2023-09-19 PROCEDURE — 80048 BASIC METABOLIC PNL TOTAL CA: CPT

## 2023-09-19 PROCEDURE — 99238 HOSP IP/OBS DSCHRG MGMT 30/<: CPT | Mod: GC

## 2023-09-19 PROCEDURE — 97167 OT EVAL HIGH COMPLEX 60 MIN: CPT

## 2023-09-19 PROCEDURE — 85027 COMPLETE CBC AUTOMATED: CPT

## 2023-09-19 PROCEDURE — 83915 ASSAY OF NUCLEOTIDASE: CPT

## 2023-09-19 PROCEDURE — 82977 ASSAY OF GGT: CPT

## 2023-09-19 PROCEDURE — 84080 ASSAY ALKALINE PHOSPHATASES: CPT

## 2023-09-19 PROCEDURE — 83735 ASSAY OF MAGNESIUM: CPT

## 2023-09-19 PROCEDURE — 97110 THERAPEUTIC EXERCISES: CPT

## 2023-09-19 PROCEDURE — 87635 SARS-COV-2 COVID-19 AMP PRB: CPT

## 2023-09-19 RX ADMIN — HEPARIN SODIUM 5000 UNIT(S): 5000 INJECTION INTRAVENOUS; SUBCUTANEOUS at 06:48

## 2023-09-19 RX ADMIN — Medication 25 MILLIGRAM(S): at 06:48

## 2023-09-19 RX ADMIN — OXYCODONE HYDROCHLORIDE 10 MILLIGRAM(S): 5 TABLET ORAL at 00:30

## 2023-09-19 RX ADMIN — SEVELAMER CARBONATE 1600 MILLIGRAM(S): 2400 POWDER, FOR SUSPENSION ORAL at 08:21

## 2023-09-19 NOTE — DISCHARGE NOTE NURSING/CASE MANAGEMENT/SOCIAL WORK - NSDCVIVACCINE_GEN_ALL_CORE_FT
Influenza, seasonal, injectable; 30-Dec-2013 15:27; Jamila Hartley (JAYCEE); [id number]; IntraMuscular; Deltoid Right.; 0.5 milliLiter(s);

## 2023-09-19 NOTE — PROGRESS NOTE ADULT - SUBJECTIVE AND OBJECTIVE BOX
HPI:  36 y/o male w/ a PMHx of ESRD (M/W/F via LUE AVF), SOLITARIO, HTN, secondary hyperparathyroidism s/p parathyroidectomy (Dec 2022), obesity, stroke at age 10 w/ no residual deficits, and recent left radial fracture sustained after trying to lower himself into a chair s/p splint who presented on 8/14 after a fall while in the shower. Patient sustained a left femoral neck fracture s/p left hemiarthroplasty on 8/15. Immediately post-op in PACU, patient was lethargic and hypoxemic requiring reintubation so he was admitted to SICU post-operatively. Patient was hypotensive after intubation requiring pressor support. Patient without cuff leak so he was given Decadron. He was successfully extubated on 8/17 despite no cuff leak and was quickly weaned off pressors afterwards. Had a fever to 101.2 (8/22), infectious workup unrevealing.    Patient was evaluated by PM&R and therapy for functional deficits and gait/ADL impairments and recommend acute rehabilitation.  Patient was medically optimized for discharge to [facility] on 8/24/2023        ROS/Subjective  Patient seen and evaluated bedside  No surgery on parathyroid gland till at least 6 weeks post op  No void   no dizziness, no headaches  no nausea, no vomiting  no SOB, No chest pain  repeat Covid PCR 9/11 negative  moved bowels      MEDICATIONS  (STANDING):  acetaminophen     Tablet .. 975 milliGRAM(s) Oral every 6 hours  chlorhexidine 2% Cloths 1 Application(s) Topical daily  epoetin cairto-epbx (RETACRIT) Injectable 46626 Unit(s) IV Push <User Schedule>  heparin   Injectable 5000 Unit(s) SubCutaneous every 8 hours  metoprolol tartrate 25 milliGRAM(s) Oral two times a day  Nephro-pat 1 Tablet(s) Oral daily  polyethylene glycol 3350 17 Gram(s) Oral two times a day  senna 2 Tablet(s) Oral at bedtime  sevelamer carbonate 1600 milliGRAM(s) Oral three times a day with meals    MEDICATIONS  (PRN):  lactulose Syrup 10 Gram(s) Oral daily PRN constipation  oxyCODONE    IR 10 milliGRAM(s) Oral every 6 hours PRN Severe Pain (7 - 10)                            8.7    4.42  )-----------( 242      ( 18 Sep 2023 14:20 )             26.8     09-18    136  |  96  |  106<H>  ----------------------------<  114<H>  4.9   |  27  |  13.13<H>    Ca    8.8      18 Sep 2023 14:20  Phos  7.1     09-18      Urinalysis Basic - ( 18 Sep 2023 14:20 )    Color: x / Appearance: x / SG: x / pH: x  Gluc: 114 mg/dL / Ketone: x  / Bili: x / Urobili: x   Blood: x / Protein: x / Nitrite: x   Leuk Esterase: x / RBC: x / WBC x   Sq Epi: x / Non Sq Epi: x / Bacteria: x        Vital Signs Last 24 Hrs  T(C): 37.6 (19 Sep 2023 08:25), Max: 37.6 (19 Sep 2023 08:25)  T(F): 99.7 (19 Sep 2023 08:25), Max: 99.7 (19 Sep 2023 08:25)  HR: 76 (19 Sep 2023 08:25) (72 - 76)  BP: 147/65 (19 Sep 2023 08:25) (133/62 - 149/67)  BP(mean): --  RR: 16 (19 Sep 2023 08:25) (16 - 16)  SpO2: 99% (19 Sep 2023 08:25) (98% - 99%)    Parameters below as of 19 Sep 2023 08:25  Patient On (Oxygen Delivery Method): room air      Gen - NAD, Comfortable  HEENT - NCAT, EOMI  Neck - Supple, No limited ROM  Pulm - CTAB, No wheeze, No rhonchi, No crackles  Cardiovascular - RRR, S1S2, No murmurs  Abdomen - Soft, NT/ND, +BS  Extremities no edema, no calf tenderness, Right AV fistula, old fistula to left upper arm- pain with palpation left mid forearm  Neuro-     Cognitive - awake, alert, oriented to person, place, date, year.  Able  to follow command     Cranial Nerves - CN 2-12 intact     Motor -                     LEFT    UE - 4-/5                    RIGHT UE - 4/5                    LEFT    LE - HF- 2/5, KE 4/5, DF 4/5, PF 4/5                    RIGHT LE - 4-/5        Sensory - Intact  to LT      Reflexes - DTR Intact, No primitive reflexive     Coordination - FTN intact   MSK: muscle weakness  Psychiatric - Mood stable, Affect WNL  Skin:  stage 2 pressure ulcer to coccyx- improving        IDT 9/18  lives with aunt and cousin- cousin works  3STE one rail 15 Steps one rail upstairs ? first floor setup    OT   min A Transfers min/mod A ADL    PT   Min A 1-2 standing in sarasteady, min A 120ft     Dc 9/19 to [facility].     completed comprehensive acute rehab program consisting of 3hrs/day of OT/PT and SLP.

## 2023-09-19 NOTE — PROGRESS NOTE ADULT - NS ATTEND AMEND GEN_ALL_CORE FT
Rehab Attending- Patient seen and examined by me - Case discussed, above note reviewed by me with modifications made    patient seen and evaluated bedside  patient ready for DC- Bed at dialysis with be ready tomorrow  pain persists at left forearm- x-rays done - still show ? nonunion- ortho to follow and advise on WB  for HD today- will receive HD from [facility] on Tues, Thurs , Sat  to continue intensive rehab program    Vital Signs Last 24 Hrs  T(C): 37.2 (18 Sep 2023 08:00), Max: 37.2 (18 Sep 2023 08:00)  T(F): 99 (18 Sep 2023 08:00), Max: 99 (18 Sep 2023 08:00)  HR: 74 (18 Sep 2023 08:00) (64 - 77)  BP: 152/83 (18 Sep 2023 08:00) (101/64 - 152/83)  BP(mean): --  RR: 16 (18 Sep 2023 08:00) (16 - 18)  SpO2: 99% (18 Sep 2023 08:00) (98% - 99%)    Parameters below as of 18 Sep 2023 08:00  Patient On (Oxygen Delivery Method): room air        Gen - NAD, Comfortable  HEENT - NCAT, EOMI  Neck - Supple, No limited ROM  Pulm - CTAB, No wheeze, No rhonchi, No crackles  Cardiovascular - RRR, S1S2, No murmurs  Abdomen - Soft, NT/ND, +BS  Extremities no edema, no calf tenderness, Right AV fistula, old fistula to left upper arm- pain with palpation left mid forearm  Neuro-     Cognitive - awake, alert, oriented to person, place, date, year.  Able  to follow command     Cranial Nerves - CN 2-12 intact     Motor -                     LEFT    UE - 4-/5                    RIGHT UE - 4/5                    LEFT    LE - HF- 2/5, KE 4/5, DF 4/5, PF 4/5                    RIGHT LE - 4-/5        Sensory - Intact  to LT      Reflexes - DTR Intact, No primitive reflexive     Coordination - FTN intact   MSK: muscle weakness  Psychiatric - Mood stable, Affect WNL  Skin:  stage 2 pressure ulcer to coccyx- improving Rehab Attending- Patient seen and examined by me - Case discussed, above note reviewed by me with modifications made    patient seen and evaluated bedside  for Dc to [facility]- dialysis on thursday  xrays Left ulnar forearm reviewed by ortho- can WBAT with splint on- can use standard walker  patient instructed to follow up from [facility] with Dr Royal in 2 weeks- ? schedule parathyroid surgery  He was instructed to follow with Dr León ortho 1-2 weeks  maintain post hip precautions LLE- continue DVT proph   to continue intensive rehab program    Vital Signs Last 24 Hrs  T(C): 37.6 (19 Sep 2023 08:25), Max: 37.6 (19 Sep 2023 08:25)  T(F): 99.7 (19 Sep 2023 08:25), Max: 99.7 (19 Sep 2023 08:25)  HR: 76 (19 Sep 2023 08:25) (72 - 76)  BP: 147/65 (19 Sep 2023 08:25) (133/62 - 147/65)  BP(mean): --  RR: 16 (19 Sep 2023 08:25) (16 - 16)  SpO2: 99% (19 Sep 2023 08:25) (98% - 99%)    Parameters below as of 19 Sep 2023 08:25  Patient On (Oxygen Delivery Method): room air            Gen - NAD, Comfortable  HEENT - NCAT, EOMI  Neck - Supple, No limited ROM  Pulm - CTAB, No wheeze, No rhonchi, No crackles  Cardiovascular - RRR, S1S2, No murmurs  Abdomen - Soft, NT/ND, +BS  Extremities no edema, no calf tenderness, Right AV fistula, old fistula to left upper arm- pain with palpation left mid forearm  Neuro-     Cognitive - awake, alert, oriented to person, place, date, year.  Able  to follow command     Cranial Nerves - CN 2-12 intact     Motor -                     LEFT    UE - 4-/5                    RIGHT UE - 4/5                    LEFT    LE - HF- 2/5, KE 4/5, DF 4/5, PF 4/5                    RIGHT LE - 4-/5        Sensory - Intact  to LT      Reflexes - DTR Intact, No primitive reflexive     Coordination - FTN intact   MSK: muscle weakness  Psychiatric - Mood stable, Affect WNL  Skin:  stage 2 pressure ulcer to coccyx- improving Rehab Attending- Patient seen and examined by me - Case discussed, above note reviewed by me with modifications made    patient seen and evaluated bedside  for Dc to [facility]- dialysis on thursday  xrays Left ulnar forearm reviewed by ortho- can WBAT with splint on- can use standard walker  patient instructed to follow up from [facility] with Dr Royal in 2 weeks- ? schedule parathyroid surgery  He was instructed to follow with Dr Romelia flores 1-2 weeks  maintain post hip precautions LLE- DVT proph completed- now 5 weeks post hemiarthroplasty  to continue intensive rehab program    Vital Signs Last 24 Hrs  T(C): 37.6 (19 Sep 2023 08:25), Max: 37.6 (19 Sep 2023 08:25)  T(F): 99.7 (19 Sep 2023 08:25), Max: 99.7 (19 Sep 2023 08:25)  HR: 76 (19 Sep 2023 08:25) (72 - 76)  BP: 147/65 (19 Sep 2023 08:25) (133/62 - 147/65)  BP(mean): --  RR: 16 (19 Sep 2023 08:25) (16 - 16)  SpO2: 99% (19 Sep 2023 08:25) (98% - 99%)    Parameters below as of 19 Sep 2023 08:25  Patient On (Oxygen Delivery Method): room air            Gen - NAD, Comfortable  HEENT - NCAT, EOMI  Neck - Supple, No limited ROM  Pulm - CTAB, No wheeze, No rhonchi, No crackles  Cardiovascular - RRR, S1S2, No murmurs  Abdomen - Soft, NT/ND, +BS  Extremities no edema, no calf tenderness, Right AV fistula, old fistula to left upper arm- pain with palpation left mid forearm  Neuro-     Cognitive - awake, alert, oriented to person, place, date, year.  Able  to follow command     Cranial Nerves - CN 2-12 intact     Motor -                     LEFT    UE - 4-/5                    RIGHT UE - 4/5                    LEFT    LE - HF- 2/5, KE 4/5, DF 4/5, PF 4/5                    RIGHT LE - 4-/5        Sensory - Intact  to LT      Reflexes - DTR Intact, No primitive reflexive     Coordination - FTN intact   MSK: muscle weakness  Psychiatric - Mood stable, Affect WNL  Skin:  stage 2 pressure ulcer to coccyx- improving

## 2023-09-19 NOTE — DISCHARGE NOTE NURSING/CASE MANAGEMENT/SOCIAL WORK - PATIENT PORTAL LINK FT
You can access the FollowMyHealth Patient Portal offered by [facility] by registering at the following website: [email or web address]. By joining [facility]’s FollowMyHealth portal, you will also be able to view your health information using other applications (apps) compatible with our system.

## 2023-09-19 NOTE — PROGRESS NOTE ADULT - SUBJECTIVE AND OBJECTIVE BOX
No CP, SOB    Vital Signs Last 24 Hrs  T(C): 37.6 (09-19-23 @ 08:25), Max: 37.6 (09-19-23 @ 08:25)  T(F): 99.7 (09-19-23 @ 08:25), Max: 99.7 (09-19-23 @ 08:25)  HR: 76 (09-19-23 @ 08:25) (76 - 76)  BP: 147/65 (09-19-23 @ 08:25) (143/80 - 147/65)  RR: 16 (09-19-23 @ 08:25) (16 - 16)  SpO2: 99% (09-19-23 @ 08:25) (99% - 99%)    I&O's Detail    18 Sep 2023 07:01  -  19 Sep 2023 07:00  --------------------------------------------------------  OUT:    Other (mL): 2500 mL  Total OUT: 2500 mL    s1s2  b/l air entry  soft, ND  no edema, AVF patent                                                                          8.7    4.42  )-----------( 242      ( 18 Sep 2023 14:20 )             26.8     18 Sep 2023 14:20    136    |  96     |  106    ----------------------------<  114    4.9     |  27     |  13.13    Ca    8.8        18 Sep 2023 14:20  Phos  7.1       18 Sep 2023 14:20    acetaminophen     Tablet .. 975 milliGRAM(s) Oral every 6 hours  chlorhexidine 2% Cloths 1 Application(s) Topical daily  epoetin carito-epbx (RETACRIT) Injectable 98774 Unit(s) IV Push <User Schedule>  heparin   Injectable 5000 Unit(s) SubCutaneous every 8 hours  lactulose Syrup 10 Gram(s) Oral daily PRN  metoprolol tartrate 25 milliGRAM(s) Oral two times a day  Nephro-pat 1 Tablet(s) Oral daily  oxyCODONE    IR 10 milliGRAM(s) Oral every 6 hours PRN  polyethylene glycol 3350 17 Gram(s) Oral two times a day  senna 2 Tablet(s) Oral at bedtime  sevelamer carbonate 1600 milliGRAM(s) Oral three times a day with meals    A/P:    S/p fall, L femoral neck fracture, s/p L hemiarthroplasty on 8/15  ESRD on HD   TMP 2.5kg w/HD yesterday   Renal diet  Renvela for high Phos  D/c planning to [facility]  Op HD at [facility] HD unit, TTS    [phone]

## 2023-09-19 NOTE — PROGRESS NOTE ADULT - ASSESSMENT
36 y/o male w/ a PMHx of ESRD (M/W/F via LUE AVF), SOLITARIO, HTN, secondary hyperparathyroidism s/p parathyroidectomy (Dec 2022), obesity, stroke at age 10 w/ no residual deficits, and recent left radial fracture sustained after trying to lower himself into a chair s/p splint who presented on 8/14 after a fall where he sustained a left femoral neck fracture, s/p left hemiarthroplasty (8/15). Post op, he was lethargic and hypoxemic requiring reintubation. Later became hypOtensive and was on pressor. Patient without cuff leak so he was given Decadron. He was successfully extubated on 8/17 despite no cuff leak and was quickly weaned off pressors afterwards. Had a fever to 101.2 (8/22), infectious workup unrevealing.    COMORBIDITES/ACTIVE MEDICAL ISSUES     #Femoral neck fracture  - S/p Left Hemiarthroplasty with Dr. Jason León (8/15), healing well  - Weight bearing as tolerated  - Gait Instability, ADL impairments and Functional impairments:  Comprehensive Rehab Program of PT/OT-completed   - [facility] today    #Recent Left radial head Fracture  - Left arm in sling as needed for comfort-ortho f/up   -re-xray Left Forearm secondary to pain- with ulna shaft Fx - ortho saw patient- ulnar forearm splint placed  -cleared by ortho on 9/8 for WBAT LUE with splint on- can use platform    #ESRD - on HD (MWF)  #Anemia of chronic disease  - Right AVF   - Epo with HD  - Nephro-pat supplement    #Renal bone disease   - Home diet: phoslo with meals  - Ensure low phos diet  - Outpatient follow up with endo and bone scan to determine etiology   parathyroidectomy 9/11 cancelled- needs to wait till at least 6 weeks post op  Needs to follow with Dr Royal to schedule parathyroid surgery in around two weeks  [facility]- DR León- 1-2 weeks- Need to evaluate left ulnar fx as well as Left Hip hemiarthroplasty    #HTN  - Metoprolol 25 BID    #Mood  - Xanax 0.25 PRN    #Pain control  - Tylenol 975 q6h, Tramadol PRN    #GI/Bowel Mgmt   - At risk for constipation due to neurologic diagnosis, immobility and/or medication use  - Senna,  Miralax QD    #Bladder management  -anuric    #Skin:  - stage 2 pressure ulcer to coccyx: cleanse with NS, pat dry with gauze and apply foam dressing daily- healing  -At risk for pressure injury due to neurologic diagnosis and relative immobility  -Bilateral heels - soft heel protectors, apply liquid barrier film daily and monitor for tissue type changes  - Turn Q2 hr while in bed, air mattress  - Skin barrier cream as needed  - Nursing to monitor skin Q shift    #Diet   - Regular + Thins  [Renal Diet]

## 2023-09-19 NOTE — PROGRESS NOTE ADULT - REASON FOR ADMISSION
Left femoral neck fracture

## 2023-09-19 NOTE — PROGRESS NOTE ADULT - PROVIDER SPECIALTY LIST ADULT
Hospitalist
Nephrology
Physiatry
Hospitalist
Hospitalist
Nephrology
Neuro Rehabilitation
Neuro Rehabilitation
Physiatry
Gastroenterology
Hospitalist
Nephrology
Physiatry
Hospitalist
Physiatry
Hospitalist
Hospitalist
Rehab Medicine
Gastroenterology
Physiatry

## 2023-09-19 NOTE — DISCHARGE NOTE NURSING/CASE MANAGEMENT/SOCIAL WORK - NSDCFUADDAPPT_GEN_ALL_CORE_FT
Stephanie [place]  Days: Tuesday, Thursday and Saturday  Address: [address] [place] [id number]  Phone:  [phone]

## 2023-09-19 NOTE — PROGRESS NOTE ADULT - NUTRITIONAL ASSESSMENT
This patient has been assessed with a concern for Malnutrition and has been determined to have a diagnosis/diagnoses of Severe protein-calorie malnutrition.    This patient is being managed with:   Diet Renal Restrictions-  For patients receiving Renal Replacement - No Protein Restr No Conc K No Conc Phos Low Sodium  Entered: Sep 15 2023  2:33PM    Diet Renal Restrictions-  For patients receiving Renal Replacement - No Protein Restr No Conc K No Conc Phos Low Sodium  Supplement Feeding Modality:  Oral  Nepro Cans or Servings Per Day:  1       Frequency:  Daily  Entered: Sep 12 2023 10:21AM    The following pending diet order is being considered for treatment of Severe protein-calorie malnutrition:null
This patient has been assessed with a concern for Malnutrition and has been determined to have a diagnosis/diagnoses of Severe protein-calorie malnutrition.    This patient is being managed with:   Diet Renal Restrictions-  For patients receiving Renal Replacement - No Protein Restr No Conc K No Conc Phos Low Sodium  Supplement Feeding Modality:  Oral  Nepro Cans or Servings Per Day:  1       Frequency:  Three Times a day  Entered: Aug 25 2023 10:42AM  
This patient has been assessed with a concern for Malnutrition and has been determined to have a diagnosis/diagnoses of Severe protein-calorie malnutrition.    This patient is being managed with:   Diet Renal Restrictions-  For patients receiving Renal Replacement - No Protein Restr No Conc K No Conc Phos Low Sodium  Supplement Feeding Modality:  Oral  Nepro Cans or Servings Per Day:  1       Frequency:  Daily  Entered: Sep 12 2023 10:21AM  
This patient has been assessed with a concern for Malnutrition and has been determined to have a diagnosis/diagnoses of Severe protein-calorie malnutrition.    This patient is being managed with:   Diet Renal Restrictions-  For patients receiving Renal Replacement - No Protein Restr No Conc K No Conc Phos Low Sodium  Supplement Feeding Modality:  Oral  Nepro Cans or Servings Per Day:  1       Frequency:  Three Times a day  Entered: Aug 25 2023 10:42AM  
This patient has been assessed with a concern for Malnutrition and has been determined to have a diagnosis/diagnoses of Severe protein-calorie malnutrition.    This patient is being managed with:   Diet Renal Restrictions-  For patients receiving Renal Replacement - No Protein Restr No Conc K No Conc Phos Low Sodium  Entered: Sep 15 2023  2:33PM    Diet Renal Restrictions-  For patients receiving Renal Replacement - No Protein Restr No Conc K No Conc Phos Low Sodium  Supplement Feeding Modality:  Oral  Nepro Cans or Servings Per Day:  1       Frequency:  Daily  Entered: Sep 12 2023 10:21AM    The following pending diet order is being considered for treatment of Severe protein-calorie malnutrition:null
This patient has been assessed with a concern for Malnutrition and has been determined to have a diagnosis/diagnoses of Severe protein-calorie malnutrition.    This patient is being managed with:   Diet Renal Restrictions-  For patients receiving Renal Replacement - No Protein Restr No Conc K No Conc Phos Low Sodium  Supplement Feeding Modality:  Oral  Nepro Cans or Servings Per Day:  1       Frequency:  Daily  Entered: Sep 12 2023 10:21AM    Diet Renal Restrictions-  For patients receiving Renal Replacement - No Protein Restr No Conc K No Conc Phos Low Sodium  Supplement Feeding Modality:  Oral  Nepro Cans or Servings Per Day:  1       Frequency:  Three Times a day  Entered: Aug 25 2023 10:42AM    The following pending diet order is being considered for treatment of Severe protein-calorie malnutrition:null
This patient has been assessed with a concern for Malnutrition and has been determined to have a diagnosis/diagnoses of Severe protein-calorie malnutrition.    This patient is being managed with:   Diet Renal Restrictions-  For patients receiving Renal Replacement - No Protein Restr No Conc K No Conc Phos Low Sodium  Supplement Feeding Modality:  Oral  Nepro Cans or Servings Per Day:  1       Frequency:  Three Times a day  Entered: Aug 25 2023 10:42AM  
This patient has been assessed with a concern for Malnutrition and has been determined to have a diagnosis/diagnoses of Severe protein-calorie malnutrition.    This patient is being managed with:   Diet Renal Restrictions-  For patients receiving Renal Replacement - No Protein Restr No Conc K No Conc Phos Low Sodium  Entered: Sep 15 2023  2:33PM    Diet Renal Restrictions-  For patients receiving Renal Replacement - No Protein Restr No Conc K No Conc Phos Low Sodium  Supplement Feeding Modality:  Oral  Nepro Cans or Servings Per Day:  1       Frequency:  Daily  Entered: Sep 12 2023 10:21AM    The following pending diet order is being considered for treatment of Severe protein-calorie malnutrition:null
This patient has been assessed with a concern for Malnutrition and has been determined to have a diagnosis/diagnoses of Severe protein-calorie malnutrition.    This patient is being managed with:   Diet Renal Restrictions-  For patients receiving Renal Replacement - No Protein Restr No Conc K No Conc Phos Low Sodium  Supplement Feeding Modality:  Oral  Nepro Cans or Servings Per Day:  1       Frequency:  Three Times a day  Entered: Aug 25 2023 10:42AM  
This patient has been assessed with a concern for Malnutrition and has been determined to have a diagnosis/diagnoses of Severe protein-calorie malnutrition.    This patient is being managed with:   Diet Renal Restrictions-  For patients receiving Renal Replacement - No Protein Restr No Conc K No Conc Phos Low Sodium  Entered: Sep 15 2023  2:33PM    Diet Renal Restrictions-  For patients receiving Renal Replacement - No Protein Restr No Conc K No Conc Phos Low Sodium  Supplement Feeding Modality:  Oral  Nepro Cans or Servings Per Day:  1       Frequency:  Daily  Entered: Sep 12 2023 10:21AM    The following pending diet order is being considered for treatment of Severe protein-calorie malnutrition:null
This patient has been assessed with a concern for Malnutrition and has been determined to have a diagnosis/diagnoses of Severe protein-calorie malnutrition.    This patient is being managed with:   Diet Renal Restrictions-  For patients receiving Renal Replacement - No Protein Restr No Conc K No Conc Phos Low Sodium  Supplement Feeding Modality:  Oral  Nepro Cans or Servings Per Day:  1       Frequency:  Daily  Entered: Sep 12 2023 10:21AM  
This patient has been assessed with a concern for Malnutrition and has been determined to have a diagnosis/diagnoses of Severe protein-calorie malnutrition.    This patient is being managed with:   Diet Renal Restrictions-  For patients receiving Renal Replacement - No Protein Restr No Conc K No Conc Phos Low Sodium  Supplement Feeding Modality:  Oral  Nepro Cans or Servings Per Day:  1       Frequency:  Daily  Entered: Sep 12 2023 10:21AM  
This patient has been assessed with a concern for Malnutrition and has been determined to have a diagnosis/diagnoses of Severe protein-calorie malnutrition.    This patient is being managed with:   Diet Renal Restrictions-  For patients receiving Renal Replacement - No Protein Restr No Conc K No Conc Phos Low Sodium  Supplement Feeding Modality:  Oral  Nepro Cans or Servings Per Day:  1       Frequency:  Three Times a day  Entered: Aug 25 2023 10:42AM  

## 2023-09-21 ENCOUNTER — APPOINTMENT (OUTPATIENT)
Dept: SURGERY | Facility: CLINIC | Age: 38
End: 2023-09-21

## 2023-10-10 NOTE — PATIENT PROFILE ADULT. - TEACHING/LEARNING FACTORS IMPACT ABILITY TO LEARN
none Complex Repair And Transposition Flap Text: The defect edges were debeveled with a #15 scalpel blade.  The primary defect was closed partially with a complex linear closure.  Given the location of the remaining defect, shape of the defect and the proximity to free margins a transposition flap was deemed most appropriate for complete closure of the defect.  Using a sterile surgical marker, an appropriate advancement flap was drawn incorporating the defect and placing the expected incisions within the relaxed skin tension lines where possible.    The area thus outlined was incised deep to adipose tissue with a #15 scalpel blade.  The skin margins were undermined to an appropriate distance in all directions utilizing iris scissors.

## 2023-10-14 NOTE — DISCHARGE NOTE ADULT - CARE PROVIDERS DIRECT ADDRESSES
Yes - the patient is able to be screened ,DirectAddress_Unknown ,DirectAddress_Unknown,[email or web address]

## 2023-10-25 ENCOUNTER — OUTPATIENT (OUTPATIENT)
Dept: OUTPATIENT SERVICES | Facility: HOSPITAL | Age: 38
LOS: 1 days | End: 2023-10-25

## 2023-10-25 VITALS
TEMPERATURE: 99 F | SYSTOLIC BLOOD PRESSURE: 138 MMHG | HEIGHT: 74.5 IN | HEART RATE: 78 BPM | DIASTOLIC BLOOD PRESSURE: 74 MMHG | OXYGEN SATURATION: 99 % | WEIGHT: 185.85 LBS | RESPIRATION RATE: 16 BRPM

## 2023-10-25 DIAGNOSIS — Z98.890 OTHER SPECIFIED POSTPROCEDURAL STATES: Chronic | ICD-10-CM

## 2023-10-25 DIAGNOSIS — Z96.642 PRESENCE OF LEFT ARTIFICIAL HIP JOINT: Chronic | ICD-10-CM

## 2023-10-25 DIAGNOSIS — I10 ESSENTIAL (PRIMARY) HYPERTENSION: ICD-10-CM

## 2023-10-25 DIAGNOSIS — G47.33 OBSTRUCTIVE SLEEP APNEA (ADULT) (PEDIATRIC): ICD-10-CM

## 2023-10-25 DIAGNOSIS — I77.0 ARTERIOVENOUS FISTULA, ACQUIRED: Chronic | ICD-10-CM

## 2023-10-25 DIAGNOSIS — E21.2 OTHER HYPERPARATHYROIDISM: ICD-10-CM

## 2023-10-25 DIAGNOSIS — R68.89 OTHER GENERAL SYMPTOMS AND SIGNS: ICD-10-CM

## 2023-10-25 LAB
ALBUMIN SERPL ELPH-MCNC: 4 G/DL — SIGNIFICANT CHANGE UP (ref 3.3–5)
ALBUMIN SERPL ELPH-MCNC: 4 G/DL — SIGNIFICANT CHANGE UP (ref 3.3–5)
ALP SERPL-CCNC: 1115 U/L — HIGH (ref 40–120)
ALP SERPL-CCNC: 1115 U/L — HIGH (ref 40–120)
ALT FLD-CCNC: 5 U/L — SIGNIFICANT CHANGE UP (ref 4–41)
ALT FLD-CCNC: 5 U/L — SIGNIFICANT CHANGE UP (ref 4–41)
ANION GAP SERPL CALC-SCNC: 19 MMOL/L — HIGH (ref 7–14)
ANION GAP SERPL CALC-SCNC: 19 MMOL/L — HIGH (ref 7–14)
AST SERPL-CCNC: 10 U/L — SIGNIFICANT CHANGE UP (ref 4–40)
AST SERPL-CCNC: 10 U/L — SIGNIFICANT CHANGE UP (ref 4–40)
BILIRUB SERPL-MCNC: 0.3 MG/DL — SIGNIFICANT CHANGE UP (ref 0.2–1.2)
BILIRUB SERPL-MCNC: 0.3 MG/DL — SIGNIFICANT CHANGE UP (ref 0.2–1.2)
BUN SERPL-MCNC: 78 MG/DL — HIGH (ref 7–23)
BUN SERPL-MCNC: 78 MG/DL — HIGH (ref 7–23)
CALCIUM SERPL-MCNC: 9.5 MG/DL — SIGNIFICANT CHANGE UP (ref 8.4–10.5)
CALCIUM SERPL-MCNC: 9.5 MG/DL — SIGNIFICANT CHANGE UP (ref 8.4–10.5)
CHLORIDE SERPL-SCNC: 96 MMOL/L — LOW (ref 98–107)
CHLORIDE SERPL-SCNC: 96 MMOL/L — LOW (ref 98–107)
CO2 SERPL-SCNC: 22 MMOL/L — SIGNIFICANT CHANGE UP (ref 22–31)
CO2 SERPL-SCNC: 22 MMOL/L — SIGNIFICANT CHANGE UP (ref 22–31)
CREAT SERPL-MCNC: 9.92 MG/DL — HIGH (ref 0.5–1.3)
CREAT SERPL-MCNC: 9.92 MG/DL — HIGH (ref 0.5–1.3)
EGFR: 6 ML/MIN/1.73M2 — LOW
EGFR: 6 ML/MIN/1.73M2 — LOW
GLUCOSE SERPL-MCNC: 61 MG/DL — LOW (ref 70–99)
GLUCOSE SERPL-MCNC: 61 MG/DL — LOW (ref 70–99)
HCT VFR BLD CALC: 25.6 % — LOW (ref 39–50)
HCT VFR BLD CALC: 25.6 % — LOW (ref 39–50)
HGB BLD-MCNC: 8.3 G/DL — LOW (ref 13–17)
HGB BLD-MCNC: 8.3 G/DL — LOW (ref 13–17)
MCHC RBC-ENTMCNC: 30.6 PG — SIGNIFICANT CHANGE UP (ref 27–34)
MCHC RBC-ENTMCNC: 30.6 PG — SIGNIFICANT CHANGE UP (ref 27–34)
MCHC RBC-ENTMCNC: 32.4 GM/DL — SIGNIFICANT CHANGE UP (ref 32–36)
MCHC RBC-ENTMCNC: 32.4 GM/DL — SIGNIFICANT CHANGE UP (ref 32–36)
MCV RBC AUTO: 94.5 FL — SIGNIFICANT CHANGE UP (ref 80–100)
MCV RBC AUTO: 94.5 FL — SIGNIFICANT CHANGE UP (ref 80–100)
NRBC # BLD: 0 /100 WBCS — SIGNIFICANT CHANGE UP (ref 0–0)
NRBC # BLD: 0 /100 WBCS — SIGNIFICANT CHANGE UP (ref 0–0)
NRBC # FLD: 0 K/UL — SIGNIFICANT CHANGE UP (ref 0–0)
NRBC # FLD: 0 K/UL — SIGNIFICANT CHANGE UP (ref 0–0)
PLATELET # BLD AUTO: 274 K/UL — SIGNIFICANT CHANGE UP (ref 150–400)
PLATELET # BLD AUTO: 274 K/UL — SIGNIFICANT CHANGE UP (ref 150–400)
POTASSIUM SERPL-MCNC: 4.8 MMOL/L — SIGNIFICANT CHANGE UP (ref 3.5–5.3)
POTASSIUM SERPL-MCNC: 4.8 MMOL/L — SIGNIFICANT CHANGE UP (ref 3.5–5.3)
POTASSIUM SERPL-SCNC: 4.8 MMOL/L — SIGNIFICANT CHANGE UP (ref 3.5–5.3)
POTASSIUM SERPL-SCNC: 4.8 MMOL/L — SIGNIFICANT CHANGE UP (ref 3.5–5.3)
PROT SERPL-MCNC: 7.2 G/DL — SIGNIFICANT CHANGE UP (ref 6–8.3)
PROT SERPL-MCNC: 7.2 G/DL — SIGNIFICANT CHANGE UP (ref 6–8.3)
RBC # BLD: 2.71 M/UL — LOW (ref 4.2–5.8)
RBC # BLD: 2.71 M/UL — LOW (ref 4.2–5.8)
RBC # FLD: 17.2 % — HIGH (ref 10.3–14.5)
RBC # FLD: 17.2 % — HIGH (ref 10.3–14.5)
SODIUM SERPL-SCNC: 137 MMOL/L — SIGNIFICANT CHANGE UP (ref 135–145)
SODIUM SERPL-SCNC: 137 MMOL/L — SIGNIFICANT CHANGE UP (ref 135–145)
WBC # BLD: 4.77 K/UL — SIGNIFICANT CHANGE UP (ref 3.8–10.5)
WBC # BLD: 4.77 K/UL — SIGNIFICANT CHANGE UP (ref 3.8–10.5)
WBC # FLD AUTO: 4.77 K/UL — SIGNIFICANT CHANGE UP (ref 3.8–10.5)
WBC # FLD AUTO: 4.77 K/UL — SIGNIFICANT CHANGE UP (ref 3.8–10.5)

## 2023-10-25 NOTE — H&P PST ADULT - MUSCULOSKELETAL
no calf tenderness/extremities exam details… right hip ./no calf tenderness/decreased ROM due to pain/extremities exam/abnormal gait left hip ./no calf tenderness/decreased ROM due to pain/extremities exam/abnormal gait

## 2023-10-25 NOTE — H&P PST ADULT - GENITOURINARY COMMENTS
hx hx ESRD (T/TH/SAT via LUE AVF at [facility]. hx ESRD (T/TH/SAT via RUE AVF at [facility]. LUE AVF clotted.

## 2023-10-25 NOTE — H&P PST ADULT - NSICDXPASTMEDICALHX_GEN_ALL_CORE_FT
PAST MEDICAL HISTORY:  Anemia, unspecified type     Chronic renal failure     ESRD (end stage renal disease) on dialysis since 2013, M-W-F    Hemodialysis access, AV graft     HTN (hypertension)     Hypothyroidism     Leg fracture, right     Morbid obesity     Sleep apnea     Stroke age 10     PAST MEDICAL HISTORY:  Anemia, unspecified type     Chronic renal failure     ESRD (end stage renal disease) on dialysis since 2013, M-W-F    Hemodialysis access, AV graft     HTN (hypertension)     Hypothyroidism     Leg fracture, right     Morbid obesity     Other hyperparathyroidism     Sleep apnea     Stroke age 10

## 2023-10-25 NOTE — H&P PST ADULT - FUNCTIONAL STATUS
unable to walk 1 to 2 blocks, climbs 1 flight of stairs, ADLs  - activity intolerance ./less than 4 METS unable to walk 1 to 2 blocks, climbs 1 flight of stairs, ADLs  - activity intolerance due to right hip pain./less than 4 METS unable to walk 1 to 2 blocks, climbs 1 flight of stairs, ADLs  - activity intolerance due to left hip pain./less than 4 METS

## 2023-10-25 NOTE — H&P PST ADULT - NS MD HP INPLANTS MED DEV
left AV fistula ,/Artificial joint/Vascular access device right hip, left AV fistula ,/Artificial joint/Vascular access device left hip, left AV fistula ,/Artificial joint/Vascular access device

## 2023-10-25 NOTE — H&P PST ADULT - NSICDXPASTSURGICALHX_GEN_ALL_CORE_FT
PAST SURGICAL HISTORY:  AV fistula R arm; L arm clotted     PAST SURGICAL HISTORY:  AV fistula R arm; L arm clotted    History of left hip hemiarthroplasty

## 2023-10-25 NOTE — H&P PST ADULT - HISTORY OF PRESENT ILLNESS
38 y/o male w/ a PMHx of ESRD (M/W/F via LUE AVF), SOLITARIO, HTN, secondary hyperparathyroidism s/p parathyroidectomy (Dec 2022), obesity, stroke at age 10 w/ no residual deficits, and recent left radial fracture sustained after trying to lower himself into a chair s/p splint who presented on 8/14 after a fall while in the shower. Patient sustained a left femoral neck fracture s/p left hemiarthroplasty on 8/15. Immediately post-op in PACU, patient was lethargic and hypoxemic requiring reintubation so he was admitted to SICU post-operatively. Patient was hypotensive after intubation requiring pressor support. Patient without cuff leak so he was given Decadron. He was successfully extubated on 8/17 despite no cuff leak and was quickly weaned off pressors afterwards. Had a fever to 101.2 (8/22), infectious workup unrevealing.    Patient was evaluated by PM&R and therapy for functional deficits and gait/ADL impairments and recommend acute rehabilitation.  Patient was medically optimized for discharge to [facility] on 8/24/2023 37 year old male  with hx of subtotal parathyroidectomy in [place] 12/22 (right inferior, left superior and left inferior parathyroids) c/o bone pain , fatigue and  elevated PTH level 7545 , CA 8.8  is diagnosed with  hyperparathyroidism and  is scheduled for reoperative parathyroidectomy , possible parathyroid autotransplantation .

## 2023-10-25 NOTE — H&P PST ADULT - ANESTHESIA, PREVIOUS REACTION, PROFILE
none post-op in PACU, patient was lethargic and hypoxemic requiring reintubation after hemiarthroplasty 08/15 /23 . Patient was hypotensive after intubation requiring pressor support. Extubated 08/17/23 post-op -in PACU, patient was lethargic and hypoxemic requiring reintubation after left hip hemiarthroplasty 08/15 /23 . Patient was hypotensive after intubation requiring pressor support. Extubated 08/17/23

## 2023-10-25 NOTE — H&P PST ADULT - MUSCULOSKELETAL COMMENTS
recent  left radial fracture 08/14/23 with splint and  sustained a left femoral neck fracture s/p left hemiarthroplasty on 8/15/23 at [facility] . Patient was medically optimized for discharge to [facility] rehab  on 8/24/2023

## 2023-10-25 NOTE — H&P PST ADULT - PROBLEM SELECTOR PLAN 3
Unable to assess mets due to activity intolerance .  Requesting medical evaluation from [facility], Donald cove. Unable to assess mets due to activity intolerance .  Requesting medical evaluation from [facility], Donald dubon.  Pt verbalized understanding with return demonstration.

## 2023-10-25 NOTE — H&P PST ADULT - EXTREMITIES COMMENTS
Right lower extremity +1 edema. Right lower extremity +1 edema.  Left wrist with splint . bilateral  lower extremity +1 edema.  Left wrist with splint .

## 2023-10-25 NOTE — H&P PST ADULT - PROBLEM SELECTOR PLAN 1
Patient tentatively scheduled for reoperative parathyroidectomy , possible parathyroid autotransplantation on 10/30/2023.  Pre-op instructions provided. Pt given verbal and written instructions with teach back on chlorhexidine wash.  Pt verbalized understanding with return demonstration.    cbc, cmp results pending.    Requesting orthopedic evaluation as per surgeon request ( pt stated).  Pt has orthopedic appointment on 10/26/2023. Patient tentatively scheduled for reoperative parathyroidectomy , possible parathyroid autotransplantation on 10/30/2023.  Pre-op instructions provided. Pt given verbal and written instructions with teach back on chlorhexidine wash.  Pt verbalized understanding with return demonstration.    cbc, cmp results pending.    Requesting orthopedic evaluation as per surgeon request ( pt stated).  Pt states , he has orthopedic appointment on 10/26/2023.

## 2023-10-25 NOTE — H&P PST ADULT - NSANTHOSAYNRD_GEN_A_CORE
as per pt./No. SOLITARIO screening performed.  STOP BANG Legend: 0-2 = LOW Risk; 3-4 = INTERMEDIATE Risk; 5-8 = HIGH Risk

## 2023-10-25 NOTE — H&P PST ADULT - ENDOCRINE COMMENTS
hx of hyperparathyroidism c/o bone pain and fatigue with elevated PTH level since last few months - hyperparathyroidism

## 2023-10-30 ENCOUNTER — APPOINTMENT (OUTPATIENT)
Dept: SURGERY | Facility: HOSPITAL | Age: 38
End: 2023-10-30

## 2023-11-06 ENCOUNTER — APPOINTMENT (OUTPATIENT)
Dept: ORTHOPEDIC SURGERY | Facility: CLINIC | Age: 38
End: 2023-11-06
Payer: MEDICAID

## 2023-11-06 VITALS — HEIGHT: 74 IN | DIASTOLIC BLOOD PRESSURE: 85 MMHG | SYSTOLIC BLOOD PRESSURE: 150 MMHG | HEART RATE: 83 BPM

## 2023-11-06 PROBLEM — E21.2 OTHER HYPERPARATHYROIDISM: Chronic | Status: ACTIVE | Noted: 2023-10-25

## 2023-11-06 PROCEDURE — 72170 X-RAY EXAM OF PELVIS: CPT

## 2023-11-06 PROCEDURE — 99024 POSTOP FOLLOW-UP VISIT: CPT

## 2023-11-20 ENCOUNTER — INPATIENT (INPATIENT)
Facility: HOSPITAL | Age: 38
LOS: 11 days | Discharge: NURSING, REHAB OR LONG-TERM CARE | DRG: 555 | End: 2023-12-02
Attending: GENERAL ACUTE CARE HOSPITAL | Admitting: GENERAL ACUTE CARE HOSPITAL
Payer: MEDICAID

## 2023-11-20 VITALS
DIASTOLIC BLOOD PRESSURE: 113 MMHG | HEIGHT: 74.5 IN | WEIGHT: 179.9 LBS | HEART RATE: 83 BPM | RESPIRATION RATE: 17 BRPM | TEMPERATURE: 98 F | SYSTOLIC BLOOD PRESSURE: 187 MMHG | OXYGEN SATURATION: 97 %

## 2023-11-20 DIAGNOSIS — I77.0 ARTERIOVENOUS FISTULA, ACQUIRED: Chronic | ICD-10-CM

## 2023-11-20 DIAGNOSIS — E87.5 HYPERKALEMIA: ICD-10-CM

## 2023-11-20 DIAGNOSIS — Z96.642 PRESENCE OF LEFT ARTIFICIAL HIP JOINT: Chronic | ICD-10-CM

## 2023-11-20 LAB
ALBUMIN SERPL ELPH-MCNC: 3.6 G/DL — SIGNIFICANT CHANGE UP (ref 3.3–5)
ALBUMIN SERPL ELPH-MCNC: 3.6 G/DL — SIGNIFICANT CHANGE UP (ref 3.3–5)
ALP SERPL-CCNC: 1109 U/L — HIGH (ref 40–120)
ALP SERPL-CCNC: 1109 U/L — HIGH (ref 40–120)
ALT FLD-CCNC: 7 U/L — LOW (ref 10–45)
ALT FLD-CCNC: 7 U/L — LOW (ref 10–45)
ANION GAP SERPL CALC-SCNC: 18 MMOL/L — HIGH (ref 5–17)
ANION GAP SERPL CALC-SCNC: 18 MMOL/L — HIGH (ref 5–17)
APTT BLD: 30.4 SEC — SIGNIFICANT CHANGE UP (ref 24.5–35.6)
APTT BLD: 30.4 SEC — SIGNIFICANT CHANGE UP (ref 24.5–35.6)
AST SERPL-CCNC: 8 U/L — LOW (ref 10–40)
AST SERPL-CCNC: 8 U/L — LOW (ref 10–40)
BASOPHILS # BLD AUTO: 0.05 K/UL — SIGNIFICANT CHANGE UP (ref 0–0.2)
BASOPHILS # BLD AUTO: 0.05 K/UL — SIGNIFICANT CHANGE UP (ref 0–0.2)
BASOPHILS NFR BLD AUTO: 1 % — SIGNIFICANT CHANGE UP (ref 0–2)
BASOPHILS NFR BLD AUTO: 1 % — SIGNIFICANT CHANGE UP (ref 0–2)
BILIRUB SERPL-MCNC: 0.4 MG/DL — SIGNIFICANT CHANGE UP (ref 0.2–1.2)
BILIRUB SERPL-MCNC: 0.4 MG/DL — SIGNIFICANT CHANGE UP (ref 0.2–1.2)
BUN SERPL-MCNC: 84 MG/DL — HIGH (ref 7–23)
BUN SERPL-MCNC: 84 MG/DL — HIGH (ref 7–23)
BUN SERPL-MCNC: 87 MG/DL — HIGH (ref 7–23)
BUN SERPL-MCNC: 87 MG/DL — HIGH (ref 7–23)
CALCIUM SERPL-MCNC: 10.4 MG/DL — SIGNIFICANT CHANGE UP (ref 8.4–10.5)
CALCIUM SERPL-MCNC: 10.4 MG/DL — SIGNIFICANT CHANGE UP (ref 8.4–10.5)
CALCIUM SERPL-MCNC: 9.4 MG/DL — SIGNIFICANT CHANGE UP (ref 8.4–10.5)
CALCIUM SERPL-MCNC: 9.4 MG/DL — SIGNIFICANT CHANGE UP (ref 8.4–10.5)
CHLORIDE SERPL-SCNC: 96 MMOL/L — SIGNIFICANT CHANGE UP (ref 96–108)
CHLORIDE SERPL-SCNC: 96 MMOL/L — SIGNIFICANT CHANGE UP (ref 96–108)
CHLORIDE SERPL-SCNC: 99 MMOL/L — SIGNIFICANT CHANGE UP (ref 96–108)
CHLORIDE SERPL-SCNC: 99 MMOL/L — SIGNIFICANT CHANGE UP (ref 96–108)
CO2 SERPL-SCNC: 24 MMOL/L — SIGNIFICANT CHANGE UP (ref 22–31)
CREAT SERPL-MCNC: 11.83 MG/DL — HIGH (ref 0.5–1.3)
CREAT SERPL-MCNC: 11.83 MG/DL — HIGH (ref 0.5–1.3)
CREAT SERPL-MCNC: 11.92 MG/DL — HIGH (ref 0.5–1.3)
CREAT SERPL-MCNC: 11.92 MG/DL — HIGH (ref 0.5–1.3)
EGFR: 5 ML/MIN/1.73M2 — LOW
EOSINOPHIL # BLD AUTO: 0.24 K/UL — SIGNIFICANT CHANGE UP (ref 0–0.5)
EOSINOPHIL # BLD AUTO: 0.24 K/UL — SIGNIFICANT CHANGE UP (ref 0–0.5)
EOSINOPHIL NFR BLD AUTO: 4.7 % — SIGNIFICANT CHANGE UP (ref 0–6)
EOSINOPHIL NFR BLD AUTO: 4.7 % — SIGNIFICANT CHANGE UP (ref 0–6)
GLUCOSE SERPL-MCNC: 73 MG/DL — SIGNIFICANT CHANGE UP (ref 70–99)
GLUCOSE SERPL-MCNC: 73 MG/DL — SIGNIFICANT CHANGE UP (ref 70–99)
GLUCOSE SERPL-MCNC: 76 MG/DL — SIGNIFICANT CHANGE UP (ref 70–99)
GLUCOSE SERPL-MCNC: 76 MG/DL — SIGNIFICANT CHANGE UP (ref 70–99)
HBV SURFACE AB SER-ACNC: 48.1 MIU/ML — SIGNIFICANT CHANGE UP
HBV SURFACE AB SER-ACNC: 48.1 MIU/ML — SIGNIFICANT CHANGE UP
HBV SURFACE AG SER-ACNC: SIGNIFICANT CHANGE UP
HBV SURFACE AG SER-ACNC: SIGNIFICANT CHANGE UP
HCT VFR BLD CALC: 30.8 % — LOW (ref 39–50)
HCT VFR BLD CALC: 30.8 % — LOW (ref 39–50)
HGB BLD-MCNC: 9.4 G/DL — LOW (ref 13–17)
HGB BLD-MCNC: 9.4 G/DL — LOW (ref 13–17)
IMM GRANULOCYTES NFR BLD AUTO: 0.2 % — SIGNIFICANT CHANGE UP (ref 0–0.9)
IMM GRANULOCYTES NFR BLD AUTO: 0.2 % — SIGNIFICANT CHANGE UP (ref 0–0.9)
INR BLD: 1.07 RATIO — SIGNIFICANT CHANGE UP (ref 0.85–1.18)
INR BLD: 1.07 RATIO — SIGNIFICANT CHANGE UP (ref 0.85–1.18)
LYMPHOCYTES # BLD AUTO: 1.45 K/UL — SIGNIFICANT CHANGE UP (ref 1–3.3)
LYMPHOCYTES # BLD AUTO: 1.45 K/UL — SIGNIFICANT CHANGE UP (ref 1–3.3)
LYMPHOCYTES # BLD AUTO: 28.7 % — SIGNIFICANT CHANGE UP (ref 13–44)
LYMPHOCYTES # BLD AUTO: 28.7 % — SIGNIFICANT CHANGE UP (ref 13–44)
MCHC RBC-ENTMCNC: 29.9 PG — SIGNIFICANT CHANGE UP (ref 27–34)
MCHC RBC-ENTMCNC: 29.9 PG — SIGNIFICANT CHANGE UP (ref 27–34)
MCHC RBC-ENTMCNC: 30.5 GM/DL — LOW (ref 32–36)
MCHC RBC-ENTMCNC: 30.5 GM/DL — LOW (ref 32–36)
MCV RBC AUTO: 98.1 FL — SIGNIFICANT CHANGE UP (ref 80–100)
MCV RBC AUTO: 98.1 FL — SIGNIFICANT CHANGE UP (ref 80–100)
MONOCYTES # BLD AUTO: 0.54 K/UL — SIGNIFICANT CHANGE UP (ref 0–0.9)
MONOCYTES # BLD AUTO: 0.54 K/UL — SIGNIFICANT CHANGE UP (ref 0–0.9)
MONOCYTES NFR BLD AUTO: 10.7 % — SIGNIFICANT CHANGE UP (ref 2–14)
MONOCYTES NFR BLD AUTO: 10.7 % — SIGNIFICANT CHANGE UP (ref 2–14)
NEUTROPHILS # BLD AUTO: 2.77 K/UL — SIGNIFICANT CHANGE UP (ref 1.8–7.4)
NEUTROPHILS # BLD AUTO: 2.77 K/UL — SIGNIFICANT CHANGE UP (ref 1.8–7.4)
NEUTROPHILS NFR BLD AUTO: 54.7 % — SIGNIFICANT CHANGE UP (ref 43–77)
NEUTROPHILS NFR BLD AUTO: 54.7 % — SIGNIFICANT CHANGE UP (ref 43–77)
NRBC # BLD: 0 /100 WBCS — SIGNIFICANT CHANGE UP (ref 0–0)
NRBC # BLD: 0 /100 WBCS — SIGNIFICANT CHANGE UP (ref 0–0)
PLATELET # BLD AUTO: 245 K/UL — SIGNIFICANT CHANGE UP (ref 150–400)
PLATELET # BLD AUTO: 245 K/UL — SIGNIFICANT CHANGE UP (ref 150–400)
POTASSIUM SERPL-MCNC: 5.3 MMOL/L — SIGNIFICANT CHANGE UP (ref 3.5–5.3)
POTASSIUM SERPL-MCNC: 5.3 MMOL/L — SIGNIFICANT CHANGE UP (ref 3.5–5.3)
POTASSIUM SERPL-MCNC: 6.1 MMOL/L — HIGH (ref 3.5–5.3)
POTASSIUM SERPL-MCNC: 6.1 MMOL/L — HIGH (ref 3.5–5.3)
POTASSIUM SERPL-SCNC: 5.3 MMOL/L — SIGNIFICANT CHANGE UP (ref 3.5–5.3)
POTASSIUM SERPL-SCNC: 5.3 MMOL/L — SIGNIFICANT CHANGE UP (ref 3.5–5.3)
POTASSIUM SERPL-SCNC: 6.1 MMOL/L — HIGH (ref 3.5–5.3)
POTASSIUM SERPL-SCNC: 6.1 MMOL/L — HIGH (ref 3.5–5.3)
PROT SERPL-MCNC: 6.8 G/DL — SIGNIFICANT CHANGE UP (ref 6–8.3)
PROT SERPL-MCNC: 6.8 G/DL — SIGNIFICANT CHANGE UP (ref 6–8.3)
PROTHROM AB SERPL-ACNC: 11.7 SEC — SIGNIFICANT CHANGE UP (ref 9.5–13)
PROTHROM AB SERPL-ACNC: 11.7 SEC — SIGNIFICANT CHANGE UP (ref 9.5–13)
RBC # BLD: 3.14 M/UL — LOW (ref 4.2–5.8)
RBC # BLD: 3.14 M/UL — LOW (ref 4.2–5.8)
RBC # FLD: 15.5 % — HIGH (ref 10.3–14.5)
RBC # FLD: 15.5 % — HIGH (ref 10.3–14.5)
SODIUM SERPL-SCNC: 138 MMOL/L — SIGNIFICANT CHANGE UP (ref 135–145)
SODIUM SERPL-SCNC: 138 MMOL/L — SIGNIFICANT CHANGE UP (ref 135–145)
SODIUM SERPL-SCNC: 141 MMOL/L — SIGNIFICANT CHANGE UP (ref 135–145)
SODIUM SERPL-SCNC: 141 MMOL/L — SIGNIFICANT CHANGE UP (ref 135–145)
WBC # BLD: 5.06 K/UL — SIGNIFICANT CHANGE UP (ref 3.8–10.5)
WBC # BLD: 5.06 K/UL — SIGNIFICANT CHANGE UP (ref 3.8–10.5)
WBC # FLD AUTO: 5.06 K/UL — SIGNIFICANT CHANGE UP (ref 3.8–10.5)
WBC # FLD AUTO: 5.06 K/UL — SIGNIFICANT CHANGE UP (ref 3.8–10.5)

## 2023-11-20 PROCEDURE — 76377 3D RENDER W/INTRP POSTPROCES: CPT | Mod: 26

## 2023-11-20 PROCEDURE — 73552 X-RAY EXAM OF FEMUR 2/>: CPT | Mod: 26,LT

## 2023-11-20 PROCEDURE — 72192 CT PELVIS W/O DYE: CPT | Mod: 26

## 2023-11-20 PROCEDURE — 73502 X-RAY EXAM HIP UNI 2-3 VIEWS: CPT | Mod: 26,LT

## 2023-11-20 PROCEDURE — 86077 PHYS BLOOD BANK SERV XMATCH: CPT

## 2023-11-20 PROCEDURE — 73700 CT LOWER EXTREMITY W/O DYE: CPT | Mod: 26,LT

## 2023-11-20 PROCEDURE — 99285 EMERGENCY DEPT VISIT HI MDM: CPT

## 2023-11-20 RX ORDER — CALCIUM GLUCONATE 100 MG/ML
2 VIAL (ML) INTRAVENOUS ONCE
Refills: 0 | Status: COMPLETED | OUTPATIENT
Start: 2023-11-20 | End: 2023-11-20

## 2023-11-20 RX ORDER — HYDROMORPHONE HYDROCHLORIDE 2 MG/ML
2 INJECTION INTRAMUSCULAR; INTRAVENOUS; SUBCUTANEOUS EVERY 8 HOURS
Refills: 0 | Status: DISCONTINUED | OUTPATIENT
Start: 2023-11-20 | End: 2023-11-20

## 2023-11-20 RX ORDER — METOPROLOL TARTRATE 50 MG
25 TABLET ORAL
Refills: 0 | Status: DISCONTINUED | OUTPATIENT
Start: 2023-11-20 | End: 2023-12-02

## 2023-11-20 RX ORDER — INSULIN HUMAN 100 [IU]/ML
5 INJECTION, SOLUTION SUBCUTANEOUS ONCE
Refills: 0 | Status: COMPLETED | OUTPATIENT
Start: 2023-11-20 | End: 2023-11-20

## 2023-11-20 RX ORDER — OXYCODONE HYDROCHLORIDE 5 MG/1
5 TABLET ORAL ONCE
Refills: 0 | Status: DISCONTINUED | OUTPATIENT
Start: 2023-11-20 | End: 2023-11-20

## 2023-11-20 RX ORDER — SEVELAMER CARBONATE 2400 MG/1
1600 POWDER, FOR SUSPENSION ORAL THREE TIMES A DAY
Refills: 0 | Status: DISCONTINUED | OUTPATIENT
Start: 2023-11-20 | End: 2023-12-02

## 2023-11-20 RX ORDER — HYDRALAZINE HCL 50 MG
25 TABLET ORAL THREE TIMES A DAY
Refills: 0 | Status: DISCONTINUED | OUTPATIENT
Start: 2023-11-20 | End: 2023-11-29

## 2023-11-20 RX ORDER — HEPARIN SODIUM 5000 [USP'U]/ML
5000 INJECTION INTRAVENOUS; SUBCUTANEOUS EVERY 8 HOURS
Refills: 0 | Status: DISCONTINUED | OUTPATIENT
Start: 2023-11-20 | End: 2023-12-02

## 2023-11-20 RX ORDER — SENNA PLUS 8.6 MG/1
1 TABLET ORAL
Refills: 0 | DISCHARGE

## 2023-11-20 RX ORDER — DEXTROSE 50 % IN WATER 50 %
25 SYRINGE (ML) INTRAVENOUS ONCE
Refills: 0 | Status: COMPLETED | OUTPATIENT
Start: 2023-11-20 | End: 2023-11-20

## 2023-11-20 RX ORDER — OXYCODONE HYDROCHLORIDE 5 MG/1
15 TABLET ORAL EVERY 6 HOURS
Refills: 0 | Status: DISCONTINUED | OUTPATIENT
Start: 2023-11-20 | End: 2023-11-27

## 2023-11-20 RX ADMIN — Medication 2 GRAM(S): at 08:25

## 2023-11-20 RX ADMIN — SEVELAMER CARBONATE 1600 MILLIGRAM(S): 2400 POWDER, FOR SUSPENSION ORAL at 22:02

## 2023-11-20 RX ADMIN — Medication 25 MILLIGRAM(S): at 22:02

## 2023-11-20 RX ADMIN — Medication 25 GRAM(S): at 07:53

## 2023-11-20 RX ADMIN — HEPARIN SODIUM 5000 UNIT(S): 5000 INJECTION INTRAVENOUS; SUBCUTANEOUS at 22:02

## 2023-11-20 RX ADMIN — Medication 25 MILLIGRAM(S): at 19:54

## 2023-11-20 RX ADMIN — INSULIN HUMAN 5 UNIT(S): 100 INJECTION, SOLUTION SUBCUTANEOUS at 08:07

## 2023-11-20 RX ADMIN — Medication 25 MILLIGRAM(S): at 17:58

## 2023-11-20 RX ADMIN — Medication 100 GRAM(S): at 07:53

## 2023-11-20 NOTE — PHYSICAL THERAPY INITIAL EVALUATION ADULT - PERTINENT HX OF CURRENT PROBLEM, REHAB EVAL
Pt is 37 year old male with hx of HTN, ESRD with HD M/W/F, presents to the ED with L hip pain s/p fall this morning. Patient is s/p L hip arthroplasty on 8/15 with Dr. Jason León. He states since the surgery he has been ambulating with assistance. He was walking down the stairs today with assistance when he slipped and fell. He slipped down three steps landing on his buttocks and his L leg abducted. No head injury or LOC. Last dialysis session was on Friday, due for HD 11/20. hyperkalemia noted. Hosp course: XR femur/hip (11/20) No acute fracture or dislocation. Osseous stigmata of renal osteodystrophy. Circumferential smooth continuous bilateral femoral shaft periosteal reaction. CT pelvis (11/20) Osseous changes consistent with end-stage renal disease. No acute fracture. CT femur (11/20) Diffuse osseous changes consistent with end-stage renal disease. Periosteal reaction along the left femur also seen previously. No acute fracture is identified. Pt is 37 year old male with hx of HTN, pt report episode when 10 or 11 yrs old had [place] it [place] very cold face whitened and had facial paralysis with twitching and eventually it got better , then issue with thyroid causing weakening x 1 yr ago in bed , ESRD with HD M/W/F, presents to the ED with L hip pain s/p fall this morning. Patient is s/p L hip arthroplasty on 8/15 with Dr. Jason León. He states since the surgery he has been ambulating with assistance. He was walking down the stairs today with assistance when he slipped and fell. He slipped down three steps landing on his buttocks and his L leg abducted. No head injury or LOC. Last dialysis session was on Friday, due for HD 11/20. hyperkalemia noted. Hosp course: XR femur/hip (11/20) No acute fracture or dislocation. Osseous stigmata of renal osteodystrophy. Circumferential smooth continuous bilateral femoral shaft periosteal reaction. CT pelvis (11/20) Osseous changes consistent with end-stage renal disease. No acute fracture. CT femur (11/20) Diffuse osseous changes consistent with end-stage renal disease. Periosteal reaction along the left femur also seen previously. No acute fracture is identified.

## 2023-11-20 NOTE — PHYSICAL THERAPY INITIAL EVALUATION ADULT - GAIT DEVIATIONS NOTED, PT EVAL
decreased cole/increased time in double stance/decreased step length/decreased stride length/decreased weight-shifting ability

## 2023-11-20 NOTE — ED PROVIDER NOTE - NS ED ROS FT
CONSTITUTIONAL: No fevers, no chills, no lightheadedness, no dizziness  EYES: no visual changes, no eye pain  EARS: no ear drainage, no ear pain, no change in hearing  NOSE: no nasal congestion  MOUTH/THROAT: no sore throat  CV: No chest pain, no palpitations  RESP: No SOB, no cough  GI: No n/v/d, no abd pain  : no dysuria, no hematuria, no flank pain  MSK: no back pain, + extremity pain  SKIN: no rashes  NEURO: no headache, no focal weakness, no decreased sensation/parasthesias

## 2023-11-20 NOTE — ED PROVIDER NOTE - NS ED MD DISPO DIVISION
[facility], formerly [facility]

## 2023-11-20 NOTE — ED PROVIDER NOTE - PROGRESS NOTE DETAILS
Austin Jacques MD, PGY2  Received sign out on patient. Pt with negative CT/xrays. Pt hyperkalemic to 6.1 with peak T waves, will give insulin, dextrose, calcium. Pt due for dialysis today, will admit for failure to ambulate and need for dialysis. Paged patients nephrologist Dr. Lucy Blanco. Austin Jacques MD, PGY2  Discussed with Dr. Gardiner team, they are aware of patient and will plan for dialysis. Discussed with unattached hospitalist Dr. Williamson about admission. Requesting order for additional CT scans to evaluate for fracture. TBA. Pt in agreement with plan.

## 2023-11-20 NOTE — PHYSICAL THERAPY INITIAL EVALUATION ADULT - ADDITIONAL COMMENTS
pt lives in house with brother and cousin ; there is 3 steps to enter side entrance or front entrance with HR ; up to bedroom level with multiple steps (a full flight ) cousin and brother were carry pt up in rollator to his bedroom level pt educate re not used for that purpose and unsafe , when goes down steps then brother and cousin assist with HR ; pt bathroom with tub shower and shower seat with tub extension but pt has been sponge bathing as pt had fallen in Aug and broke L LE and then hip replaced ; pt report can amb with rollator in home independent , does not feel comfortable with rollator as it gets away from me per pt , pt does have commode

## 2023-11-20 NOTE — PATIENT PROFILE ADULT - FALL HARM RISK - HARM RISK INTERVENTIONS
Assistance with ambulation/Assistance OOB with selected safe patient handling equipment/Communicate Risk of Fall with Harm to all staff/Discuss with provider need for PT consult/Monitor gait and stability/Reinforce activity limits and safety measures with patient and family/Tailored Fall Risk Interventions/Visual Cue: Yellow wristband and red socks/Bed in lowest position, wheels locked, appropriate side rails in place/Call bell, personal items and telephone in reach/Instruct patient to call for assistance before getting out of bed or chair/Non-slip footwear when patient is out of bed/[place] to call system/Physically safe environment - no spills, clutter or unnecessary equipment/Purposeful Proactive Rounding/Room/bathroom lighting operational, light cord in reach

## 2023-11-20 NOTE — ED ADULT NURSE NOTE - OBJECTIVE STATEMENT
36 y/o M with PMHx of ESRD on hemodialysis M,W,F, secondary to HTN and PSHx of L hip replacement 8/2023 presents to the ED s/p fall today. Patient states he was getting ready for dialysis when he fell down the last 3 steps of staircase at home. Brother was assisting patient, states he was able to brace head, however notes pain to L hip. States leg was rotated after fall and has had difficulty bearing weight following fall. Missed dialysis session today secondary to fall. Notes pain to L hip. Denies fever, chills, recent infection. AOx4 and speaking coherently with brother at bedside. Breathing is unlabored, spontaneous, and symmetrical. Pain to R knee with flexion of leg, patient notes chronic in nature. Notes pain to L thigh with flexion. ROM limited. Ambulatory with assist at baseline. AV fistula noted to R lower arm with palpable thrill.

## 2023-11-20 NOTE — H&P ADULT - ASSESSMENT
37 year old male with hx of HTN, ESRD with HD M/W/F, presents to the ED with left hip pain s/p fall this morning. Patient is s/p left hip arthroplasty on 8/15 with Dr. Jason León. He states since the surgery he has been ambulating with assistance. He was walking down the stairs today with his brother's  assistance when he slipped and fell down the stairs. He slipped down three steps landing on his buttocks and his left leg abducted.   no cp /sob prior   no palpiations   No head injury or LOC.   has been in his USOH otherwise     - R hip pain : CT no fx   pain meds   PT     ESRD HD     HTN cont bp meds

## 2023-11-20 NOTE — H&P ADULT - HISTORY OF PRESENT ILLNESS
37 year old male with hx of HTN, ESRD with HD M/W/F, presents to the ED with left hip pain s/p fall this morning. Patient is s/p left hip arthroplasty on 8/15 with Dr. Jason León. He states since the surgery he has been ambulating with assistance. He was walking down the stairs today with his brother's  assistance when he slipped and fell down the stairs. He slipped down three steps landing on his buttocks and his left leg abducted.   no cp /sob prior   no palpiations   No head injury or LOC.   has been in his USOH otherwise

## 2023-11-20 NOTE — PHYSICAL THERAPY INITIAL EVALUATION ADULT - IMPAIRMENTS CONTRIBUTING TO GAIT DEVIATIONS, PT EVAL
decrease endurance , min unsteady frequent/impaired balance/impaired postural control/decreased sensation/impaired sensory feedback/decreased strength

## 2023-11-20 NOTE — PHYSICAL THERAPY INITIAL EVALUATION ADULT - IMPAIRMENTS CONTRIBUTING IMPAIRED BED MOBILITY, REHAB EVAL
decrease endurance , sat several min EOB close supervision/impaired balance/impaired postural control/decreased sensation/impaired sensory feedback/decreased strength

## 2023-11-20 NOTE — ED ADULT NURSE REASSESSMENT NOTE - NS ED NURSE REASSESS COMMENT FT1
1000-msg relayed to  this morning as patient verbalized his living condition. Lives with a cousin in a 2 story house & having difficulty going down the stairs.

## 2023-11-20 NOTE — PHYSICAL THERAPY INITIAL EVALUATION ADULT - NSPTDISCHREC_GEN_A_CORE
if no coverage for Acute rehab then subacute , pt to restore previous level of functional independent to amb with rolling walker , improve strength, balance, endurance , fall prevention education continued;  if home pt would need assist all fxl activity and adl's and Home PT , pt want to go to inpt rehab/Acute Inpatient Rehab

## 2023-11-20 NOTE — ED ADULT NURSE NOTE - BIRTH SEX
59 year old male with RLQ abd pain, diarrhea, now resolved. Recent admission for similar in which he was retaining 3L urine and had severe hydronephrosis. Will obtain bladder scan post void, labs, urinalysis, CT abd/pelv
Male

## 2023-11-20 NOTE — ED PROVIDER NOTE - NSICDXPASTMEDICALHX_GEN_ALL_CORE_FT
PAST MEDICAL HISTORY:  Anemia, unspecified type     Chronic renal failure     ESRD (end stage renal disease) on dialysis since 2013, M-W-F    Hemodialysis access, AV graft     HTN (hypertension)     Leg fracture, right     Other hyperparathyroidism     Sleep apnea     Stroke age 10

## 2023-11-20 NOTE — ED ADULT NURSE REASSESSMENT NOTE - NS ED NURSE REASSESS COMMENT FT1
Patient with current AV fistula in RUE. Patient states has had prior fistula in LUE both upper and lower parts of arm, however no longer functional. Patient states has had prior peripheral IV access on LUE. Per sonia Waggoner to place IV access in LUE. IV placed, 22g in L hand and documented on flowsheet.

## 2023-11-20 NOTE — PHYSICAL THERAPY INITIAL EVALUATION ADULT - GENERAL OBSERVATIONS, REHAB EVAL
PT RECEIVED IN BED nad 1 top rail up and n=both siderails up call bell,phone and table in reach ; pt report pain has lessened L hip ; pt still with difficulty moving , need assist

## 2023-11-20 NOTE — PATIENT PROFILE ADULT - NSPROEXTENSIONSOFSELF_GEN_A_NUR
cellphone, , earpods (set), larger navy blue and black luggage bag, clothing, black crocks, black pouch bag

## 2023-11-20 NOTE — ED PROVIDER NOTE - OBJECTIVE STATEMENT
37 year old male with hx of HTN, ESRD with HD M/W/F, presents to the ED with left hip pain s/p fall this morning. Patient is s/p left hip arthroplasty on 8/15 with Dr. Jason León. He states since the surgery he has been ambulating with assistance. He was walking down the stairs today with assistance when he slipped and fell. He slipped down three steps landing on his buttocks and his left leg abducted. No head injury or LOC. Last dialysis session was on Friday, missed today's session.

## 2023-11-20 NOTE — ED ADULT TRIAGE NOTE - BP NONINVASIVE DIASTOLIC (MM HG)
113 You can access the FollowMyHealth Patient Portal offered by [facility] by registering at the following website: [email or web address]. By joining [facility]’s FollowMyHealth portal, you will also be able to view your health information using other applications (apps) compatible with our system.

## 2023-11-20 NOTE — ED ADULT NURSE NOTE - NSFALLRISKINTERV_ED_ALL_ED
Assistance OOB with selected safe patient handling equipment if applicable/Assistance with ambulation/Communicate fall risk and risk factors to all staff, patient, and family/Monitor gait and stability/Provide visual cue: yellow wristband, yellow gown, etc/Reinforce activity limits and safety measures with patient and family/Call bell, personal items and telephone in reach/Instruct patient to call for assistance before getting out of bed/chair/stretcher/Non-slip footwear applied when patient is off stretcher/[place] to call system/Physically safe environment - no spills, clutter or unnecessary equipment/Purposeful Proactive Rounding/Room/bathroom lighting operational, light cord in reach

## 2023-11-20 NOTE — PHYSICAL THERAPY INITIAL EVALUATION ADULT - ACTIVE RANGE OF MOTION EXAMINATION, REHAB EVAL
rle arom wfl's , L hip/knee aarom wfl's follow thr Post precautions L , foot/ankle wfl's L/bilateral upper extremity Active ROM was WFL (within functional limits)

## 2023-11-20 NOTE — PHYSICAL THERAPY INITIAL EVALUATION ADULT - IMPAIRED TRANSFERS: SIT/STAND, REHAB EVAL
decrease endurance , sit -stand at Rolling walker vc to wait with change of position , stand upright , wt shift min unsteady/impaired balance/impaired postural control/decreased sensation/impaired sensory feedback/decreased strength

## 2023-11-20 NOTE — ED PROVIDER NOTE - ATTENDING CONTRIBUTION TO CARE
I, Bob Phelan, have performed a history and physical exam on this patient, and discussed their management with the resident. I have fully participated in the care of this patient. I agree with the history, physical exam, and plan as documented by the resident.

## 2023-11-20 NOTE — CONSULT NOTE ADULT - SUBJECTIVE AND OBJECTIVE BOX
NEPHROLOGY CONSULTATION    CHIEF COMPLAINT: fall    HPI:  Pt is 37 year old male with hx of HTN, ESRD with HD M/W/F, presents to the ED with L hip pain s/p fall this morning. Patient is s/p L hip arthroplasty on 8/15 with Dr. Jason León. He states since the surgery he has been ambulating with assistance. He was walking down the stairs today with assistance when he slipped and fell. He slipped down three steps landing on his buttocks and his L leg abducted. No head injury or LOC. Last dialysis session was on Friday, due for HD today, hyperkalemia noted.    ROS:  as above    Allergies:  shellfish (Hives)  No Known Drug Allergies    PAST MEDICAL & SURGICAL HISTORY:  HTN (hypertension)  Stroke  age 10  Sleep apnea  Leg fracture, right  ESRD (end stage renal disease) on dialysis  since 2013, M-W-F  Anemia, unspecified type  Other hyperparathyroidism  AV fistula  R arm; L arm clotted  History of left hip hemiarthroplasty    SOCIAL HISTORY:  negative    FAMILY HISTORY:  NC    MEDICATIONS  (STANDING):  oxyCODONE    IR 5 milliGRAM(s) Oral once    PHYSICAL EXAMINATION:    Vital Signs Last 24 Hrs  T(C): 37.4 (11-20-23 @ 06:17), Max: 37.4 (11-20-23 @ 06:17)  T(F): 99.4 (11-20-23 @ 06:17), Max: 99.4 (11-20-23 @ 06:17)  HR: 83 (11-20-23 @ 06:17) (83 - 83)  BP: 150/103 (11-20-23 @ 06:17) (150/103 - 187/113)  BP(mean): 119 (11-20-23 @ 06:17) (119 - 119)  RR: 18 (11-20-23 @ 06:17) (17 - 18)  SpO2: 100% (11-20-23 @ 06:17) (97% - 100%)    LABS:                        9.4    5.06  )-----------( 245      ( 20 Nov 2023 06:21 )             30.8     11-20    138  |  96  |  84<H>  ----------------------------<  73  6.1<H>   |  24  |  11.83<H>    Ca    9.4      20 Nov 2023 06:21    TPro  6.8  /  Alb  3.6  /  TBili  0.4  /  DBili  x   /  AST  8<L>  /  ALT  7<L>  /  AlkPhos  1109<H>  11-20    LIVER FUNCTIONS - ( 20 Nov 2023 06:21 )  Alb: 3.6 g/dL / Pro: 6.8 g/dL / ALK PHOS: 1109 U/L / ALT: 7 U/L / AST: 8 U/L / GGT: x           PT/INR - ( 20 Nov 2023 06:21 )   PT: 11.7 sec;   INR: 1.07 ratio       PTT - ( 20 Nov 2023 06:21 )  PTT:30.4 sec    A/P:    HD today as ordered  Full consult to follow    [phone] NEPHROLOGY CONSULTATION    CHIEF COMPLAINT: fall    HPI:  Pt is 37 year old male with hx of HTN, ESRD with HD M/W/F, presents to the ED with L hip pain s/p fall this morning. Patient is s/p L hip arthroplasty on 8/15 with Dr. Jason León. He states since the surgery he has been ambulating with assistance. He was walking down the stairs today with assistance when he slipped and fell. He slipped down three steps landing on his buttocks and his L leg abducted. No head injury or LOC. Last dialysis session was on Friday, due for HD today, hyperkalemia noted. No CP, SOB, N/V/D/C/F/C.    ROS:  as above    Allergies:  shellfish (Hives)  No Known Drug Allergies    PAST MEDICAL & SURGICAL HISTORY:  HTN (hypertension)  Stroke  age 10  Sleep apnea  Leg fracture, right  ESRD (end stage renal disease) on dialysis  since 2013, M-W-F  Anemia, unspecified type  Other hyperparathyroidism  AV fistula  R arm; L arm clotted  History of left hip hemiarthroplasty    SOCIAL HISTORY:  negative    FAMILY HISTORY:  NC    MEDICATIONS  (STANDING):  oxyCODONE    IR 5 milliGRAM(s) Oral once    PHYSICAL EXAMINATION:    Vital Signs Last 24 Hrs  T(C): 37.4 (11-20-23 @ 06:17), Max: 37.4 (11-20-23 @ 06:17)  T(F): 99.4 (11-20-23 @ 06:17), Max: 99.4 (11-20-23 @ 06:17)  HR: 83 (11-20-23 @ 06:17) (83 - 83)  BP: 150/103 (11-20-23 @ 06:17) (150/103 - 187/113)  BP(mean): 119 (11-20-23 @ 06:17) (119 - 119)  RR: 18 (11-20-23 @ 06:17) (17 - 18)  SpO2: 100% (11-20-23 @ 06:17) (97% - 100%)    s1s2  b/l air entry  soft, ND  sm edema, AVF patent     LABS:                        9.4    5.06  )-----------( 245      ( 20 Nov 2023 06:21 )             30.8     11-20    138  |  96  |  84<H>  ----------------------------<  73  6.1<H>   |  24  |  11.83<H>    Ca    9.4      20 Nov 2023 06:21    TPro  6.8  /  Alb  3.6  /  TBili  0.4  /  DBili  x   /  AST  8<L>  /  ALT  7<L>  /  AlkPhos  1109<H>  11-20    LIVER FUNCTIONS - ( 20 Nov 2023 06:21 )  Alb: 3.6 g/dL / Pro: 6.8 g/dL / ALK PHOS: 1109 U/L / ALT: 7 U/L / AST: 8 U/L / GGT: x           PT/INR - ( 20 Nov 2023 06:21 )   PT: 11.7 sec;   INR: 1.07 ratio       PTT - ( 20 Nov 2023 06:21 )  PTT:30.4 sec    A/P:    S/p fall, L hip pain   No acute fx on CT  ESRD on HD  HD today  Renal diet  TMP as able   Will need rehab     [phone]

## 2023-11-20 NOTE — ED ADULT TRIAGE NOTE - CHIEF COMPLAINT QUOTE
states was going down the stairs and lost his footing and fell down 3 stairs endorsing left hip pain, denies headstrike, hx of L hip replacement 08/23, hx ERSD, dialysis MWF, missed dialysis this morning

## 2023-11-20 NOTE — PHYSICAL THERAPY INITIAL EVALUATION ADULT - PLANNED THERAPY INTERVENTIONS, PT EVAL
goal stairs : pt will negotiate a flight of steps with Hr in 3-4 weeks with min to cg of 1/balance training/bed mobility training/gait training/strengthening/transfer training

## 2023-11-20 NOTE — ED PROVIDER NOTE - NSICDXPASTSURGICALHX_GEN_ALL_CORE_FT
PAST SURGICAL HISTORY:  AV fistula R arm; L arm clotted    History of left hip hemiarthroplasty

## 2023-11-20 NOTE — ED PROVIDER NOTE - CLINICAL SUMMARY MEDICAL DECISION MAKING FREE TEXT BOX
37 year old male with hx of ESRD, dialysis M/W/F, HTN, s/p left hip hemiarthroplasty 8/15, presents with left hip pain after falling down 3 stairs this morning. Will obtain XRs of hip, pelvis, and femur to evaluate for fracture vs. dislocation vs. 37 year old male with hx of ESRD, dialysis M/W/F, HTN, s/p left hip hemiarthroplasty 8/15, presents with left hip pain after falling down 3 stairs this morning. Will obtain XRs of hip, pelvis, and femur to evaluate for fracture vs. dislocation vs. prosthetic loosening, as well as labs to evaluate kidney function and electrolytes.

## 2023-11-20 NOTE — ED PROVIDER NOTE - PHYSICAL EXAMINATION
GEN: NAD, awake, eyes open spontaneously  EYES: normal conjunctiva, perrl  ENT: NCAT, MMM, Trachea midline  CHEST/LUNGS: Non-tachypneic, CTAB, bilateral breath sounds  CARDIAC: Non-tachycardic, normal perfusion  ABDOMEN: Soft, NTND, No rebound/guarding  MSK: Right forearm fistula, with palpable thrill. Left forearm and upper arm with failed fistulas. Left hip with limited range of motion 2/2 pain, surgical scar healed without signs of infection. Tenderness over left lateral hip.   SKIN: No rashes, no petechiae, no vesicles  NEURO: CN grossly intact, normal coordination, no focal motor or sensory deficits  PSYCH: Alert, appropriate, cooperative, with capacity and insight

## 2023-11-21 RX ORDER — ERYTHROPOIETIN 10000 [IU]/ML
10000 INJECTION, SOLUTION INTRAVENOUS; SUBCUTANEOUS ONCE
Refills: 0 | Status: COMPLETED | OUTPATIENT
Start: 2023-11-22 | End: 2023-11-22

## 2023-11-21 RX ADMIN — HEPARIN SODIUM 5000 UNIT(S): 5000 INJECTION INTRAVENOUS; SUBCUTANEOUS at 06:48

## 2023-11-21 RX ADMIN — Medication 25 MILLIGRAM(S): at 06:48

## 2023-11-21 RX ADMIN — Medication 25 MILLIGRAM(S): at 22:19

## 2023-11-21 RX ADMIN — OXYCODONE HYDROCHLORIDE 15 MILLIGRAM(S): 5 TABLET ORAL at 22:50

## 2023-11-21 RX ADMIN — Medication 25 MILLIGRAM(S): at 06:49

## 2023-11-21 RX ADMIN — Medication 25 MILLIGRAM(S): at 13:01

## 2023-11-21 RX ADMIN — HEPARIN SODIUM 5000 UNIT(S): 5000 INJECTION INTRAVENOUS; SUBCUTANEOUS at 22:19

## 2023-11-21 RX ADMIN — HEPARIN SODIUM 5000 UNIT(S): 5000 INJECTION INTRAVENOUS; SUBCUTANEOUS at 13:01

## 2023-11-21 RX ADMIN — SEVELAMER CARBONATE 1600 MILLIGRAM(S): 2400 POWDER, FOR SUSPENSION ORAL at 13:01

## 2023-11-21 RX ADMIN — SEVELAMER CARBONATE 1600 MILLIGRAM(S): 2400 POWDER, FOR SUSPENSION ORAL at 08:39

## 2023-11-21 RX ADMIN — OXYCODONE HYDROCHLORIDE 15 MILLIGRAM(S): 5 TABLET ORAL at 03:00

## 2023-11-21 RX ADMIN — OXYCODONE HYDROCHLORIDE 15 MILLIGRAM(S): 5 TABLET ORAL at 22:19

## 2023-11-21 RX ADMIN — SEVELAMER CARBONATE 1600 MILLIGRAM(S): 2400 POWDER, FOR SUSPENSION ORAL at 17:15

## 2023-11-21 RX ADMIN — Medication 25 MILLIGRAM(S): at 17:15

## 2023-11-21 RX ADMIN — OXYCODONE HYDROCHLORIDE 15 MILLIGRAM(S): 5 TABLET ORAL at 02:07

## 2023-11-21 NOTE — DIETITIAN INITIAL EVALUATION ADULT - NS FNS DIET ORDER
Diet, Regular:   For patients receiving Renal Replacement - No Protein Restr, No Conc K, No Conc Phos, Low Sodium (RENAL) (11-20-23 @ 11:50)

## 2023-11-21 NOTE — DIETITIAN INITIAL EVALUATION ADULT - ADD RECOMMEND
-Continue Renal diet. Recommend adding Nepro x 2/day (provides 850 kcal, 38 g protein) to assist with meeting protein-energy needs.  -Monitor PO intake, diet, weight, labs, skin, GI symptoms, and BM regularity.  -RD remains available upon request and will follow up per protocol.  -Monitor for malnutrition.

## 2023-11-21 NOTE — DIETITIAN INITIAL EVALUATION ADULT - OTHER INFO
Patient reports UBW 84kg / 200lb (of note 84kg is 185.2lb), reports 100lb weight loss x 8 months - ? accuracy.  Dosing weight: 199lb (11/20, bed).  IBW: 196lb, %IBW: 102% based on dosing weight.  Weight history per [facility]: 185.2lb (11/8/23), 185.8lb (10/20/23), 188.9lb (8/25/23), 216.3lb (8/15/23), 209.4lb (7/21/23), 216.9lb (2/24/23), 240.3lb (10/5/22).  Weight appears downtrending, weight loss x 1 year not significant for malnutrition at this time. RD to continue to monitor weight trends as available/able.     -Previously hyperkalemic - now WDL.  -Ordered for sevelamer carbonate.

## 2023-11-21 NOTE — DIETITIAN INITIAL EVALUATION ADULT - DIET TYPE
Nepro x 2/day (provides 850 kcal, 38 g protein)/renal replacement pts:no protein restr,no conc K & phos, low sodium/supplement (specify)

## 2023-11-21 NOTE — DIETITIAN INITIAL EVALUATION ADULT - PERSON TAUGHT/METHOD
Provided education on renal diet for HD. Provided education on increased demand for kcal and protein intake to help prevent muscle/weight loss, encouraged patient to continue monitoring intake of foods high in potassium, phosphorus, and sodium./verbal instruction/patient instructed

## 2023-11-21 NOTE — CONSULT NOTE ADULT - ASSESSMENT
37 year old male with hx of HTN, ESRD with HD M/W/F, presents to the ED with left hip pain s/p fall this morning.    1. Pre-op assessment  -Cardiology consulted for pre-op eval. Patient states he has hyperthyroidism and is scheduled for surgery and needs cardiac clearance. Denies cardiac hx of MI or stents. Denies chest pain. Has chronic SOB which he attributes to his ESRD and HD.   -please obtain 12lead EKG and TTE    2. s/p fall  -mechanical denies CP, palpitations or dizziness    3. ESRD  -on HD   -f/u renal     4. HTN  -BP improving  -c/w hydral and metoprolol

## 2023-11-21 NOTE — DIETITIAN INITIAL EVALUATION ADULT - ENERGY INTAKE
Adequate (%) Patient reports good, normal appetite and PO intake since admit. No %intake of meals documented per flowsheets.

## 2023-11-21 NOTE — PROGRESS NOTE ADULT - SUBJECTIVE AND OBJECTIVE BOX
Date of service: 11-21-23 @ 23:07      Patient is a 37y old  Male who presents with a chief complaint of Left femoral neck fracture (21 Nov 2023 13:36)                                                               INTERVAL HPI/OVERNIGHT EVENTS:    REVIEW OF SYSTEMS:     CONSTITUTIONAL: No weakness, fevers or chills  EYES/ENT: No visual changes , no ear ache   NECK: No pain or stiffness  RESPIRATORY: No cough, wheezing,  No shortness of breath  CARDIOVASCULAR: No chest pain or palpitations  GASTROINTESTINAL: No abdominal pain  . No nausea, vomiting, or hematemesis; No diarrhea or constipation. No melena or hematochezia.  GENITOURINARY: No dysuria, frequency or hematuria  NEUROLOGICAL: No numbness or weakness  SKIN: No itching, burning, rashes, or lesions                                                                                                                                                                                                                                                                                 Medications:  MEDICATIONS  (STANDING):  heparin   Injectable 5000 Unit(s) SubCutaneous every 8 hours  hydrALAZINE 25 milliGRAM(s) Oral three times a day  metoprolol tartrate 25 milliGRAM(s) Oral two times a day  sevelamer carbonate 1600 milliGRAM(s) Oral three times a day    MEDICATIONS  (PRN):  HYDROmorphone  Injectable 2 milliGRAM(s) IV Push every 8 hours PRN Severe Pain (7 - 10)  oxyCODONE    IR 15 milliGRAM(s) Oral every 6 hours PRN Severe Pain (7 - 10)       Allergies    shellfish (Hives)  No Known Drug Allergies    Intolerances      Vital Signs Last 24 Hrs  T(C): 36.7 (21 Nov 2023 21:25), Max: 36.7 (21 Nov 2023 21:25)  T(F): 98.1 (21 Nov 2023 21:25), Max: 98.1 (21 Nov 2023 21:25)  HR: 76 (21 Nov 2023 21:25) (72 - 80)  BP: 141/81 (21 Nov 2023 21:25) (138/77 - 141/81)  BP(mean): --  RR: 18 (21 Nov 2023 21:25) (18 - 18)  SpO2: 92% (21 Nov 2023 21:25) (92% - 100%)    Parameters below as of 21 Nov 2023 21:25  Patient On (Oxygen Delivery Method): room air      CAPILLARY BLOOD GLUCOSE          11-20 @ 07:01  -  11-21 @ 07:00  --------------------------------------------------------  IN: 800 mL / OUT: 3300 mL / NET: -2500 mL    11-21 @ 07:01  - 11-21 @ 23:07  --------------------------------------------------------  IN: 0 mL / OUT: 0 mL / NET: 0 mL      Physical Exam:    Daily     Daily   General:  Well appearing, NAD, not cachetic  HEENT:  Nonicteric, PERRLA  CV:  RRR, S1S2   Lungs:  CTA B/L, no wheezes, rales, rhonchi  Abdomen:  Soft, non-tender, no distended, positive BS  Extremities:  2+ pulses, no c/c, no edema  Skin:  Warm and dry, no rashes  :  No mayes  Neuro:  AAOx3, non-focal, grossly intact                                                                                                                                                                                                                                                                                                LABS:                               9.4    5.06  )-----------( 245      ( 20 Nov 2023 06:21 )             30.8                      11-20    141  |  99  |  87<H>  ----------------------------<  76  5.3   |  24  |  11.92<H>    Ca    10.4      20 Nov 2023 09:09    TPro  6.8  /  Alb  3.6  /  TBili  0.4  /  DBili  x   /  AST  8<L>  /  ALT  7<L>  /  AlkPhos  1109<H>  11-20                       RADIOLOGY & ADDITIONAL TESTS         I personally reviewed: [  ]EKG   [  ]CXR    [  ] CT      A/P:         Discussed with :     Jen consultants' Notes   Time spent :

## 2023-11-21 NOTE — CONSULT NOTE ADULT - SUBJECTIVE AND OBJECTIVE BOX
Erik Caldwell MD  Interventional Cardiology / Endovascular Specialist  [place] Office : [address] [place] [address] Tel:   [place] Office : [address] [facility] [address] [address]  Tel: [phone]      HISTORY OF PRESENTING ILLNESS:  37 year old male with hx of HTN, ESRD with HD M/W/F, presents to the ED with left hip pain s/p fall this morning. Patient is s/p left hip arthroplasty on 8/15 with Dr. Jason León. He states since the surgery he has been ambulating with assistance. He was walking down the stairs today with his brother's  assistance when he slipped and fell down the stairs. He slipped down three steps landing on his buttocks and his left leg abducted. Cardiology consulted for pre-op eval. Patient states he has hyperthyroidism and is scheduled for surgery and needs cardiac clearance. Denies cardiac hx of MI or stents. Denies chest pain. Has chronic SOB which he attributes to his ESRD and HD.     	  MEDICATIONS:  heparin   Injectable 5000 Unit(s) SubCutaneous every 8 hours  hydrALAZINE 25 milliGRAM(s) Oral three times a day  metoprolol tartrate 25 milliGRAM(s) Oral two times a day        HYDROmorphone  Injectable 2 milliGRAM(s) IV Push every 8 hours PRN  oxyCODONE    IR 15 milliGRAM(s) Oral every 6 hours PRN            PAST MEDICAL/SURGICAL HISTORY  PAST MEDICAL & SURGICAL HISTORY:  HTN (hypertension)      Stroke  age 10      Sleep apnea      Leg fracture, right      Chronic renal failure      Hemodialysis access, AV graft      ESRD (end stage renal disease) on dialysis  since 2013, M-W-F      Anemia, unspecified type      Other hyperparathyroidism      AV fistula  R arm; L arm clotted      History of left hip hemiarthroplasty          SOCIAL HISTORY: Substance Use (street drugs): ( x ) never used  (  ) other:    FAMILY HISTORY:      REVIEW OF SYSTEMS:  CONSTITUTIONAL: No fever, weight loss, or fatigue  EYES: No eye pain, visual disturbances, or discharge  ENMT:  No difficulty hearing, tinnitus, vertigo; No sinus or throat pain  BREASTS: No pain, masses, or nipple discharge  GASTROINTESTINAL: No abdominal or epigastric pain. No nausea, vomiting, or hematemesis; No diarrhea or constipation. No melena or hematochezia.  GENITOURINARY: No dysuria, frequency, hematuria, or incontinence  NEUROLOGICAL: No headaches, memory loss, loss of strength, numbness, or tremors  ENDOCRINE: No heat or cold intolerance; No hair loss  MUSCULOSKELETAL: No joint pain or swelling; No muscle, back, or extremity pain  PSYCHIATRIC: No depression, anxiety, mood swings, or difficulty sleeping  HEME/LYMPH: No easy bruising, or bleeding gums  All others negative    PHYSICAL EXAM:  T(C): 36.6 (11-21-23 @ 13:09), Max: 37 (11-20-23 @ 18:05)  HR: 74 (11-21-23 @ 13:09) (72 - 94)  BP: 140/76 (11-21-23 @ 13:09) (138/77 - 166/96)  RR: 18 (11-21-23 @ 13:09) (16 - 18)  SpO2: 99% (11-21-23 @ 13:09) (98% - 100%)  Wt(kg): --  I&O's Summary    20 Nov 2023 07:01  -  21 Nov 2023 07:00  --------------------------------------------------------  IN: 800 mL / OUT: 3300 mL / NET: -2500 mL      Height (cm): 190.5 (11-20 @ 19:17)  Weight (kg): 90.3 (11-20 @ 19:17)  BMI (kg/m2): 24.9 (11-20 @ 19:17)  BSA (m2): 2.19 (11-20 @ 19:17)    GENERAL: NAD  EYES: EOMI, PERRLA, conjunctiva and sclera clear  ENMT: No tonsillar erythema, exudates, or enlargement  Cardiovascular: Normal S1 S2, No JVD, No murmurs, No edema  Respiratory: Lungs clear to auscultation	  Gastrointestinal:  Soft, Non-tender, + BS	  Extremities: No edema                                     9.4    5.06  )-----------( 245      ( 20 Nov 2023 06:21 )             30.8     11-20    141  |  99  |  87<H>  ----------------------------<  76  5.3   |  24  |  11.92<H>    Ca    10.4      20 Nov 2023 09:09    TPro  6.8  /  Alb  3.6  /  TBili  0.4  /  DBili  x   /  AST  8<L>  /  ALT  7<L>  /  AlkPhos  1109<H>  11-20    proBNP:   Lipid Profile:   HgA1c:   TSH:     Consultant(s) Notes Reviewed:  [x ] YES  [ ] NO    Care Discussed with Consultants/Other Providers [ x] YES  [ ] NO    Imaging Personally Reviewed independently:  [x] YES  [ ] NO    All labs, radiologic studies, vitals, orders and medications list reviewed. Patient is seen and examined at bedside. Case discussed with medical team.

## 2023-11-21 NOTE — DIETITIAN INITIAL EVALUATION ADULT - NSFNSNUTRHOMESUPPLEMENTFT_GEN_A_CORE
Patient reports no vitamins or supplements taken at home PTA. Diana-pat noted per H&P Home Medications.

## 2023-11-21 NOTE — DIETITIAN INITIAL EVALUATION ADULT - NSFNSGIIOFT_GEN_A_CORE
Patient reports no nausea/vomiting; no diarrhea or constipation; last BM yesterday per flowsheets; bowel regimen: none

## 2023-11-21 NOTE — PROGRESS NOTE ADULT - SUBJECTIVE AND OBJECTIVE BOX
Resting    Vital Signs Last 24 Hrs  T(C): 36.6 (11-21-23 @ 13:09), Max: 37 (11-20-23 @ 18:05)  T(F): 97.8 (11-21-23 @ 13:09), Max: 98.6 (11-20-23 @ 18:05)  HR: 74 (11-21-23 @ 13:09) (72 - 94)  BP: 140/76 (11-21-23 @ 13:09) (138/77 - 166/96)  RR: 18 (11-21-23 @ 13:09) (16 - 18)  SpO2: 99% (11-21-23 @ 13:09) (98% - 100%)    I&O's Detail    20 Nov 2023 07:01  -  21 Nov 2023 07:00  --------------------------------------------------------  IN:    Other (mL): 800 mL  Total IN: 800 mL    OUT:    Other (mL): 3300 mL  Total OUT: 3300 mL    s1s2  b/l air entry  soft, ND  tr edema, AVF patent                                                                                  9.4    5.06  )-----------( 245      ( 20 Nov 2023 06:21 )             30.8     20 Nov 2023 09:09    141    |  99     |  87     ----------------------------<  76     5.3     |  24     |  11.92    Ca    10.4       20 Nov 2023 09:09    TPro  6.8    /  Alb  3.6    /  TBili  0.4    /  DBili  x      /  AST  8      /  ALT  7      /  AlkPhos  1109   20 Nov 2023 06:21    LIVER FUNCTIONS - ( 20 Nov 2023 06:21 )  Alb: 3.6 g/dL / Pro: 6.8 g/dL / ALK PHOS: 1109 U/L / ALT: 7 U/L / AST: 8 U/L / GGT: x           PT/INR - ( 20 Nov 2023 06:21 )   PT: 11.7 sec;   INR: 1.07 ratio      heparin   Injectable 5000 Unit(s) SubCutaneous every 8 hours  hydrALAZINE 25 milliGRAM(s) Oral three times a day  HYDROmorphone  Injectable 2 milliGRAM(s) IV Push every 8 hours PRN  metoprolol tartrate 25 milliGRAM(s) Oral two times a day  oxyCODONE    IR 15 milliGRAM(s) Oral every 6 hours PRN  sevelamer carbonate 1600 milliGRAM(s) Oral three times a day    A/P:    Hx fall, L femoral neck fracture, s/p L hemiarthroplasty 8/15  S/p rehab  D/c-d home recently  S/p fall at home, L hip pain   No acute fx on CT  ESRD on HD  HD tomorrow as ordered   Epoetin  TMP as able   Renal dietLanette   Will need rehab    [phone]

## 2023-11-21 NOTE — DIETITIAN INITIAL EVALUATION ADULT - INCREASED NUTRIENT NEEDS
----- Message from Vivian Panchal sent at 5/3/2019  7:41 AM CDT -----  Contact: self  Type: Needs Medical Advice    Who Called:  self  Symptoms (please be specific):  athletic's foot  How long has patient had these symptoms:    Pharmacy name and phone #:  [facility]  Best Call Back Number: [phone]  Additional Information: Patient states the pharmacy did not receive the request for the prescriptions prescribed yesterday. Please call patient when sent in. Thanks!    [facility] [address] [place] [address] & [address] [address]  Phone: [phone] Fax: [phone]     protein-energy

## 2023-11-21 NOTE — DIETITIAN INITIAL EVALUATION ADULT - PERTINENT LABORATORY DATA
11-20    141  |  99  |  87<H>  ----------------------------<  76  5.3   |  24  |  11.92<H>    Ca    10.4      20 Nov 2023 09:09    TPro  6.8  /  Alb  3.6  /  TBili  0.4  /  DBili  x   /  AST  8<L>  /  ALT  7<L>  /  AlkPhos  1109<H>  11-20  A1C with Estimated Average Glucose Result: 4.8 % (08-15-23 @ 07:24)  A1C with Estimated Average Glucose Result: 4.8 % (07-21-23 @ 10:27)

## 2023-11-21 NOTE — DIETITIAN INITIAL EVALUATION ADULT - REASON INDICATOR FOR ASSESSMENT
RD assessment warranted for: Consult for MST score 2 or >. Chart reviewed, events noted.  Source: medical record, patient.

## 2023-11-21 NOTE — DIETITIAN INITIAL EVALUATION ADULT - PERTINENT MEDS FT
MEDICATIONS  (STANDING):  heparin   Injectable 5000 Unit(s) SubCutaneous every 8 hours  hydrALAZINE 25 milliGRAM(s) Oral three times a day  metoprolol tartrate 25 milliGRAM(s) Oral two times a day  sevelamer carbonate 1600 milliGRAM(s) Oral three times a day    MEDICATIONS  (PRN):  HYDROmorphone  Injectable 2 milliGRAM(s) IV Push every 8 hours PRN Severe Pain (7 - 10)  oxyCODONE    IR 15 milliGRAM(s) Oral every 6 hours PRN Severe Pain (7 - 10)

## 2023-11-21 NOTE — DIETITIAN INITIAL EVALUATION ADULT - ORAL INTAKE PTA/DIET HISTORY
Patient reports good, normal appetite and PO intake PTA. Follows renal restrictions in diet at home - sodium, potassium, phosphorus. Confirms allergy to shellfish which causes itching.

## 2023-11-21 NOTE — DIETITIAN INITIAL EVALUATION ADULT - OTHER CALCULATIONS
Defer fluid needs to team.  Estimated nutrient needs based on IBW 196lb, with consideration for ESRD on HD.

## 2023-11-22 LAB
ANION GAP SERPL CALC-SCNC: 18 MMOL/L — HIGH (ref 5–17)
ANION GAP SERPL CALC-SCNC: 18 MMOL/L — HIGH (ref 5–17)
BUN SERPL-MCNC: 76 MG/DL — HIGH (ref 7–23)
BUN SERPL-MCNC: 76 MG/DL — HIGH (ref 7–23)
CALCIUM SERPL-MCNC: 8.6 MG/DL — SIGNIFICANT CHANGE UP (ref 8.4–10.5)
CALCIUM SERPL-MCNC: 8.6 MG/DL — SIGNIFICANT CHANGE UP (ref 8.4–10.5)
CHLORIDE SERPL-SCNC: 94 MMOL/L — LOW (ref 96–108)
CHLORIDE SERPL-SCNC: 94 MMOL/L — LOW (ref 96–108)
CO2 SERPL-SCNC: 24 MMOL/L — SIGNIFICANT CHANGE UP (ref 22–31)
CO2 SERPL-SCNC: 24 MMOL/L — SIGNIFICANT CHANGE UP (ref 22–31)
CREAT SERPL-MCNC: 11.68 MG/DL — HIGH (ref 0.5–1.3)
CREAT SERPL-MCNC: 11.68 MG/DL — HIGH (ref 0.5–1.3)
EGFR: 5 ML/MIN/1.73M2 — LOW
EGFR: 5 ML/MIN/1.73M2 — LOW
GLUCOSE SERPL-MCNC: 98 MG/DL — SIGNIFICANT CHANGE UP (ref 70–99)
GLUCOSE SERPL-MCNC: 98 MG/DL — SIGNIFICANT CHANGE UP (ref 70–99)
HCT VFR BLD CALC: 27.9 % — LOW (ref 39–50)
HCT VFR BLD CALC: 27.9 % — LOW (ref 39–50)
HGB BLD-MCNC: 8.8 G/DL — LOW (ref 13–17)
HGB BLD-MCNC: 8.8 G/DL — LOW (ref 13–17)
MCHC RBC-ENTMCNC: 29.9 PG — SIGNIFICANT CHANGE UP (ref 27–34)
MCHC RBC-ENTMCNC: 29.9 PG — SIGNIFICANT CHANGE UP (ref 27–34)
MCHC RBC-ENTMCNC: 31.5 GM/DL — LOW (ref 32–36)
MCHC RBC-ENTMCNC: 31.5 GM/DL — LOW (ref 32–36)
MCV RBC AUTO: 94.9 FL — SIGNIFICANT CHANGE UP (ref 80–100)
MCV RBC AUTO: 94.9 FL — SIGNIFICANT CHANGE UP (ref 80–100)
NRBC # BLD: 0 /100 WBCS — SIGNIFICANT CHANGE UP (ref 0–0)
NRBC # BLD: 0 /100 WBCS — SIGNIFICANT CHANGE UP (ref 0–0)
PLATELET # BLD AUTO: 211 K/UL — SIGNIFICANT CHANGE UP (ref 150–400)
PLATELET # BLD AUTO: 211 K/UL — SIGNIFICANT CHANGE UP (ref 150–400)
POTASSIUM SERPL-MCNC: 5.7 MMOL/L — HIGH (ref 3.5–5.3)
POTASSIUM SERPL-MCNC: 5.7 MMOL/L — HIGH (ref 3.5–5.3)
POTASSIUM SERPL-SCNC: 5.7 MMOL/L — HIGH (ref 3.5–5.3)
POTASSIUM SERPL-SCNC: 5.7 MMOL/L — HIGH (ref 3.5–5.3)
RBC # BLD: 2.94 M/UL — LOW (ref 4.2–5.8)
RBC # BLD: 2.94 M/UL — LOW (ref 4.2–5.8)
RBC # FLD: 15 % — HIGH (ref 10.3–14.5)
RBC # FLD: 15 % — HIGH (ref 10.3–14.5)
SODIUM SERPL-SCNC: 136 MMOL/L — SIGNIFICANT CHANGE UP (ref 135–145)
SODIUM SERPL-SCNC: 136 MMOL/L — SIGNIFICANT CHANGE UP (ref 135–145)
WBC # BLD: 3.59 K/UL — LOW (ref 3.8–10.5)
WBC # BLD: 3.59 K/UL — LOW (ref 3.8–10.5)
WBC # FLD AUTO: 3.59 K/UL — LOW (ref 3.8–10.5)
WBC # FLD AUTO: 3.59 K/UL — LOW (ref 3.8–10.5)

## 2023-11-22 PROCEDURE — 99222 1ST HOSP IP/OBS MODERATE 55: CPT

## 2023-11-22 RX ORDER — CHLORHEXIDINE GLUCONATE 213 G/1000ML
1 SOLUTION TOPICAL
Refills: 0 | Status: DISCONTINUED | OUTPATIENT
Start: 2023-11-22 | End: 2023-12-02

## 2023-11-22 RX ADMIN — Medication 25 MILLIGRAM(S): at 18:08

## 2023-11-22 RX ADMIN — SEVELAMER CARBONATE 1600 MILLIGRAM(S): 2400 POWDER, FOR SUSPENSION ORAL at 18:08

## 2023-11-22 RX ADMIN — SEVELAMER CARBONATE 1600 MILLIGRAM(S): 2400 POWDER, FOR SUSPENSION ORAL at 14:31

## 2023-11-22 RX ADMIN — SEVELAMER CARBONATE 1600 MILLIGRAM(S): 2400 POWDER, FOR SUSPENSION ORAL at 09:19

## 2023-11-22 RX ADMIN — HEPARIN SODIUM 5000 UNIT(S): 5000 INJECTION INTRAVENOUS; SUBCUTANEOUS at 14:32

## 2023-11-22 RX ADMIN — Medication 25 MILLIGRAM(S): at 22:17

## 2023-11-22 RX ADMIN — CHLORHEXIDINE GLUCONATE 1 APPLICATION(S): 213 SOLUTION TOPICAL at 15:23

## 2023-11-22 RX ADMIN — HEPARIN SODIUM 5000 UNIT(S): 5000 INJECTION INTRAVENOUS; SUBCUTANEOUS at 06:42

## 2023-11-22 RX ADMIN — ERYTHROPOIETIN 10000 UNIT(S): 10000 INJECTION, SOLUTION INTRAVENOUS; SUBCUTANEOUS at 10:57

## 2023-11-22 RX ADMIN — Medication 25 MILLIGRAM(S): at 14:31

## 2023-11-22 NOTE — PROGRESS NOTE ADULT - SUBJECTIVE AND OBJECTIVE BOX
Date of service: 23 @ 15:26      Patient is a 37y old  Male who presents with a chief complaint of Left femoral neck fracture (2023 15:19)                                                               INTERVAL HPI/OVERNIGHT EVENTS:    REVIEW OF SYSTEMS:     CONSTITUTIONAL: No weakness, fevers or chills  RESPIRATORY: No cough, wheezing,  No shortness of breath  CARDIOVASCULAR: No chest pain or palpitations  GASTROINTESTINAL: No abdominal pain  . No nausea, vomiting, or hematemesis; No diarrhea or constipation. No melena or hematochezia.  GENITOURINARY: No dysuria, frequency or hematuria  NEUROLOGICAL: No numbness or weakness  hip pain under better control                                                                                                                                                                                                                                                                                Medications:  MEDICATIONS  (STANDING):  chlorhexidine 2% Cloths 1 Application(s) Topical <User Schedule>  heparin   Injectable 5000 Unit(s) SubCutaneous every 8 hours  hydrALAZINE 25 milliGRAM(s) Oral three times a day  metoprolol tartrate 25 milliGRAM(s) Oral two times a day  sevelamer carbonate 1600 milliGRAM(s) Oral three times a day    MEDICATIONS  (PRN):  HYDROmorphone  Injectable 2 milliGRAM(s) IV Push every 8 hours PRN Severe Pain (7 - 10)  oxyCODONE    IR 15 milliGRAM(s) Oral every 6 hours PRN Severe Pain (7 - 10)       Allergies    shellfish (Hives)  No Known Drug Allergies    Intolerances      Vital Signs Last 24 Hrs  T(C): 36.5 (2023 14:15), Max: 37.7 (2023 09:31)  T(F): 97.7 (2023 14:15), Max: 99.8 (2023 09:31)  HR: 97 (2023 15:18) (76 - 97)  BP: 144/82 (2023 15:18) (137/80 - 158/82)  BP(mean): --  RR: 18 (2023 14:15) (18 - 18)  SpO2: 96% (2023 14:15) (92% - 97%)    Parameters below as of 2023 14:15  Patient On (Oxygen Delivery Method): room air      CAPILLARY BLOOD GLUCOSE          - @ 07:01  -   @ 07:00  --------------------------------------------------------  IN: 0 mL / OUT: 0 mL / NET: 0 mL     @ 07:01  -   @ 15:26  --------------------------------------------------------  IN: 0 mL / OUT: 2500 mL / NET: -2500 mL      Physical Exam:    Daily     Daily Weight in k.9 (2023 14:15)  General:  Well appearing, NAD, not cachetic  HEENT:  Nonicteric, PERRLA  CV:  RRR, S1S2   Lungs:  CTA B/L, no wheezes, rales, rhonchi  Abdomen:  Soft, non-tender, no distended, positive BS  Extremities:  2+ pulses, no c/c, no edema  Skin:  Warm and dry, no rashes  :  No mayes  Neuro:  AAOx3, non-focal, grossly intact                                                                                                                                                                                                                                                                                                LABS:                               8.8    3.59  )-----------( 211      ( 2023 10:30 )             27.9                      11    136  |  94<L>  |  76<H>  ----------------------------<  98  5.7<H>   |  24  |  11.68<H>    Ca    8.6      2023 10:30                         RADIOLOGY & ADDITIONAL TESTS         I personally reviewed: [  ]EKG   [  ]CXR    [  ] CT      A/P:         Discussed with :     Jen consultants' Notes   Time spent :   <--- Click to Launch ICDx for PreOp, PostOp and Procedure

## 2023-11-22 NOTE — OCCUPATIONAL THERAPY INITIAL EVALUATION ADULT - LIVES WITH, PROFILE
Pt lives in a house with his aunt and cousin, 3 steps to enter, 1 flight of stairs to bedroom, +shower tub with tub bench.  Pt states prior to admission he was ambulating with rollator but recently has only been able to go short distance and states he feels the rollator "gets away from him". Uses commode over toilet at home and sponge bathes.

## 2023-11-22 NOTE — OCCUPATIONAL THERAPY INITIAL EVALUATION ADULT - PRECAUTIONS/LIMITATIONS, REHAB EVAL
fall precautions Pt states he does not think his ortho surgeon has allowed him to break his hip precautions yet. States he had hip hemiarthroplasty in August and has still been maintaining posterior hip precautions/fall precautions

## 2023-11-22 NOTE — OCCUPATIONAL THERAPY INITIAL EVALUATION ADULT - BATHING, PREVIOUS LEVEL OF FUNCTION, OT EVAL
Everett Olivarez is a 21 y.o. female who was seen by synchronous (real-time) audio-video technology on 6/18/2020. Consent: Everett Olivarez, who was seen by synchronous (real-time) audio-video technology, and/or her healthcare decision maker, is aware that this patient-initiated, Telehealth encounter on 6/18/2020 is a billable service, with coverage as determined by her insurance carrier. She is aware that she may receive a bill and has provided verbal consent to proceed: Yes. Assessment & Plan:   Diagnoses and all orders for this visit:    1. Other migraine without status migrainosus, not intractable   Avoid Chocolate, caffeine and too much sugar. Will call in,  -     rizatriptan (MAXALT-MLT) 5 mg rapid dissolve tablet; Take 1 Tab by mouth once for 1 dose. 2. Cold sore    Will call in,  -     valACYclovir (VALTREX) 500 mg tablet; Take 1 Tab by mouth two (2) times a day. 3. Vitamin D deficiency    Her vitamin D level is very low but she never got the vitamin D with her since she is not able to swallow any capsule. Will try,  -     cholecalciferol, vitamin D3, 12.5 mcg/5 mL (500 unit/5 mL) liqd; 2 tsp BID    4. Major depressive disorder, single episode, moderate with anxious distress (HCC)  Not on medications. She is seeing psychiatrist Dr. Seth Rosenthal.  She will see her soon. Has ADD problem, on Adderall. I spent at least 25 minutes on this visit with this established patient. Subjective:   Everett Olivarez is a 21 y.o. female who was seen for Vitamin D Deficiency; Headache (migraine); and Depression    Nazanin Snow is doing well. Report fever blister on the lower lips which is slightly improved. Would like to get medications for it. Report unilateral headache which is pulsating in nature. Not associated with photophobia but sometimes makes her nauseous. She has been suffering from migraine headache for long time. No weakness or numbness in the legs or arms. No blurred vision.   She used Tylenol and Motrin to help her with pain. Has had depression and ADD. Seeing psychiatrist.  Only on Adderall. She is going to talk with psychiatrist about her depression. No suicidal thought. Labs reviewed with her, all labs stable except vitamin D was very low. She was not able to swallow redness capsule so she did not take vitamin D supplement. Prior to Admission medications    Medication Sig Start Date End Date Taking? Authorizing Provider   HYDROcodone-acetaminophen [facility]) 7.5-325 mg per tablet  6/17/20  Yes Provider, Historical   amoxicillin (AMOXIL) 500 mg capsule  6/17/20  Yes Provider, Historical   rizatriptan (MAXALT-MLT) 5 mg rapid dissolve tablet Take 1 Tab by mouth once for 1 dose. 6/18/20 6/18/20 Yes Maria Victoria Cantu MD   valACYclovir (VALTREX) 500 mg tablet Take 1 Tab by mouth two (2) times a day. 6/18/20  Yes Maria Victoria Cantu MD   cholecalciferol, vitamin D3, 12.5 mcg/5 mL (500 unit/5 mL) liqd 2 tsp BID 6/18/20  Yes Maria Victoria Cantu MD   albuterol (PROVENTIL HFA, VENTOLIN HFA, PROAIR HFA) 90 mcg/actuation inhaler Take 1 Puff by inhalation every six (6) hours as needed for Wheezing. 6/16/20  Yes Maria Victoria Cantu MD   norelgestromin-ethinyl estradiol (ORTHO EVRA) 150-35 mcg/24 hr 1 Patch by TransDERmal route every Wednesday. 5/27/20  Yes Mell Valenzuela MD   dextroamphetamine-amphetamine (ADDERALL) 10 mg tablet Take 1 Tab by mouth two (2) times a day. Max Daily Amount: 20 mg. 4/20/20  Yes Dimitris Maxwell MD     No Known Allergies    Past Medical History:   Diagnosis Date    ADHD     Asthma     Depression     Headache     Psychotic disorder (HCC)        ROS headache and fever blister. Objective:   Vital Signs: (As obtained by patient/caregiver at home)  There were no vitals taken for this visit.          Constitutional: [x] Appears well-developed and well-nourished [x] No apparent distress      [] Abnormal -     Mental status: [x] Alert and awake  [x] Oriented to person/place/time [x] Able to follow commands    [] Abnormal -        HENT: [x] Normocephalic, atraumatic  [] Abnormal -   [x] Mouth/Throat: Mucous membranes are moist    External Ears [x] Normal  [] Abnormal -    Neck: [x] No visualized mass [] Abnormal -     Pulmonary/Chest: [x] Respiratory effort normal   [x] No visualized signs of difficulty breathing or respiratory distress        [] Abnormal -      Musculoskeletal:   [x] Normal gait with no signs of ataxia         [x] Normal range of motion of neck        [] Abnormal -     Neurological:        [x] No Facial Asymmetry (Cranial nerve 7 motor function) (limited exam due to video visit)          [x] No gaze palsy        [] Abnormal -          Skin:        [x] No significant exanthematous lesions or discoloration noted on facial skin         [] Abnormal -     Fever blisters on lower lips slightly better. Psychiatric:       [x] Normal Affect [] Abnormal -        [x] No Hallucinations    Other pertinent observable physical exam findings:-        We discussed the expected course, resolution and complications of the diagnosis(es) in detail. Medication risks, benefits, costs, interactions, and alternatives were discussed as indicated. I advised her to contact the office if her condition worsens, changes or fails to improve as anticipated. She expressed understanding with the diagnosis(es) and plan. Ellen Sol is a 21 y.o. female who was evaluated by a video visit encounter for concerns as above. Patient identification was verified prior to start of the visit. A caregiver was present when appropriate. Due to this being a TeleHealth encounter ([facility]), evaluation of the following organ systems was limited: Vitals/Constitutional/EENT/Resp/CV/GI//MS/Neuro/Skin/Heme-Lymph-Imm.   Pursuant to the emergency declaration under the [facility] waiver authority and the [employer] Appropriations Act, this Virtual  Visit was conducted, with patient's (and/or legal guardian's) consent, to reduce the patient's risk of exposure to COVID-19 and provide necessary medical care. Services were provided through a video synchronous discussion virtually to substitute for in-person clinic visit. Patient and provider were located at their individual homes.       Keith Solo MD needed assist

## 2023-11-22 NOTE — OCCUPATIONAL THERAPY INITIAL EVALUATION ADULT - DIAGNOSIS, OT EVAL
Pt presents with pain, decreased ROM, strength, endurance, balance, and coordination, all impacting ability to perform ADLs and functional mobility. [id number]

## 2023-11-22 NOTE — OCCUPATIONAL THERAPY INITIAL EVALUATION ADULT - NSOTDISCHREC_GEN_A_CORE
IF home, will require assist for all ADL/mobility & home OT to address ADL/IADL, AE/DME, and fall prevention/Acute Inpatient Rehab

## 2023-11-22 NOTE — OCCUPATIONAL THERAPY INITIAL EVALUATION ADULT - PERTINENT HX OF CURRENT PROBLEM, REHAB EVAL
7 year old male with hx of HTN, ESRD with HD M/W/F, presents to the ED with left hip pain s/p fall this morning. Patient is s/p left hip hemiarthroplasty on 8/15 with Dr. Jason León. He states since the surgery he has been ambulating with assistance. He was walking down the stairs today with his brother's  assistance when he slipped and fell down the stairs. He slipped down three steps landing on his buttocks and his left leg abducted. No acute fx on CT.

## 2023-11-22 NOTE — PROGRESS NOTE ADULT - SUBJECTIVE AND OBJECTIVE BOX
Seen on HD    Vital Signs Last 24 Hrs  T(C): 36.5 (11-22-23 @ 14:15), Max: 37.7 (11-22-23 @ 09:31)  T(F): 97.7 (11-22-23 @ 14:15), Max: 99.8 (11-22-23 @ 09:31)  HR: 97 (11-22-23 @ 15:18) (76 - 97)  BP: 144/82 (11-22-23 @ 15:18) (137/80 - 158/82)  RR: 18 (11-22-23 @ 14:15) (18 - 18)  SpO2: 96% (11-22-23 @ 14:15) (92% - 97%)    22 Nov 2023 07:01  -  22 Nov 2023 15:19  --------------------------------------------------------  OUT:    Other (mL): 2500 mL  Total OUT: 2500 mL    s1s2  b/l air entry  soft, ND  tr edema, AVF patent                                                                                       8.8    3.59  )-----------( 211      ( 22 Nov 2023 10:30 )             27.9     22 Nov 2023 10:30    136    |  94     |  76     ----------------------------<  98     5.7     |  24     |  11.68    Ca    8.6        22 Nov 2023 10:30    chlorhexidine 2% Cloths 1 Application(s) Topical <User Schedule>  heparin   Injectable 5000 Unit(s) SubCutaneous every 8 hours  hydrALAZINE 25 milliGRAM(s) Oral three times a day  HYDROmorphone  Injectable 2 milliGRAM(s) IV Push every 8 hours PRN  metoprolol tartrate 25 milliGRAM(s) Oral two times a day  oxyCODONE    IR 15 milliGRAM(s) Oral every 6 hours PRN  sevelamer carbonate 1600 milliGRAM(s) Oral three times a day    A/P:    Hx CVA, ESRD on HD  Hx fall, L femoral neck fracture, s/p L hemiarthroplasty 8/15/23  S/p rehab  D/c-d home recently  S/p fall at home, L hip pain   No acute fx on CT  HD today as ordered   Epoetin w/HD  TMP 2.5kg  Renal diet, Renvela   Rehab    [phone]

## 2023-11-22 NOTE — CONSULT NOTE ADULT - SUBJECTIVE AND OBJECTIVE BOX
HPI:  37 year old male with hx of HTN, ESRD with HD M/W/F, presents to the ED with left hip pain s/p fall this morning. Patient is s/p left hip arthroplasty on 8/15 with Dr. Jason León. He states since the surgery he has been ambulating with assistance. He was walking down the stairs today with his brother's  assistance when he slipped and fell down the stairs. He slipped down three steps landing on his buttocks and his left leg abducted.   no cp /sob prior   no palpiations   No head injury or LOC.   has been in his USOH otherwise    (20 Nov 2023 17:30)    Patient was admitted on 11/20, seen today, continues to have pain. He was recently at [facility] after SALUD in August, discharged to [facility] on 9/19, then home earlier this month.     REVIEW OF SYSTEMS  Constitutional - No fever, No weight loss, No fatigue  HEENT - No eye pain, No visual disturbances, No difficulty hearing, No tinnitus, No vertigo, No neck pain  Respiratory - No cough, No wheezing, No shortness of breath  Cardiovascular - No chest pain, No palpitations  Gastrointestinal - No abdominal pain, No nausea, No vomiting, No diarrhea, No constipation  Genitourinary - No dysuria, No frequency, No hematuria, No incontinence  Psychiatric - No depression, No anxiety    VITALS  T(C): 36.5 (11-22-23 @ 14:15), Max: 37.7 (11-22-23 @ 09:31)  HR: 97 (11-22-23 @ 15:18) (76 - 97)  BP: 144/82 (11-22-23 @ 15:18) (137/80 - 158/82)  RR: 18 (11-22-23 @ 14:15) (18 - 18)  SpO2: 96% (11-22-23 @ 14:15) (92% - 97%)  Wt(kg): --    PAST MEDICAL & SURGICAL HISTORY  HTN (hypertension)    Stroke    Morbid obesity    Sleep apnea    Leg fracture, right    Chronic renal failure    Hemodialysis access, AV graft    ESRD (end stage renal disease) on dialysis    Anemia, unspecified type    Hypothyroidism    Other hyperparathyroidism    AV fistula    Status post hemiarthroplasty of toe    History of left hip hemiarthroplasty        SOCIAL HISTORY  Smoking - Denied  EtOH - Denied   Drugs - Denied    FUNCTIONAL HISTORY  Lives with brother, cousin, brother currently traveling, cousin works during the day, 3 steps to enter, flight to bedroom   Independent ADLs, uses RW, assist with stairs     CURRENT FUNCTIONAL STATUS  11/21 PT  bed mobility min assist  transfers min assist with RW  gait min assist with RW x 20 feet     11/22 OT  bed mobility CG/min assist  transfers min assist with RW    FAMILY HISTORY   Family history of diabetes mellitus    RECENT LABS/IMAGING  CBC Full  -  ( 22 Nov 2023 10:30 )  WBC Count : 3.59 K/uL  RBC Count : 2.94 M/uL  Hemoglobin : 8.8 g/dL  Hematocrit : 27.9 %  Platelet Count - Automated : 211 K/uL  Mean Cell Volume : 94.9 fl  Mean Cell Hemoglobin : 29.9 pg  Mean Cell Hemoglobin Concentration : 31.5 gm/dL  Auto Neutrophil # : x  Auto Lymphocyte # : x  Auto Monocyte # : x  Auto Eosinophil # : x  Auto Basophil # : x  Auto Neutrophil % : x  Auto Lymphocyte % : x  Auto Monocyte % : x  Auto Eosinophil % : x  Auto Basophil % : x    11-22    136  |  94<L>  |  76<H>  ----------------------------<  98  5.7<H>   |  24  |  11.68<H>    Ca    8.6      22 Nov 2023 10:30      Urinalysis Basic - ( 22 Nov 2023 10:30 )    Color: x / Appearance: x / SG: x / pH: x  Gluc: 98 mg/dL / Ketone: x  / Bili: x / Urobili: x   Blood: x / Protein: x / Nitrite: x   Leuk Esterase: x / RBC: x / WBC x   Sq Epi: x / Non Sq Epi: x / Bacteria: x    < from: CT Femur No Cont, Left (11.20.23 @ 09:11) >    Impression: Diffuse osseous changes consistent with end-stage renal   disease. Periosteal reaction along the left femur also seen previously.   No acute fracture is identified.    < end of copied text >      ALLERGIES  shellfish (Hives)  No Known Drug Allergies      MEDICATIONS   chlorhexidine 2% Cloths 1 Application(s) Topical <User Schedule>  heparin   Injectable 5000 Unit(s) SubCutaneous every 8 hours  hydrALAZINE 25 milliGRAM(s) Oral three times a day  HYDROmorphone  Injectable 2 milliGRAM(s) IV Push every 8 hours PRN  metoprolol tartrate 25 milliGRAM(s) Oral two times a day  oxyCODONE    IR 15 milliGRAM(s) Oral every 6 hours PRN  sevelamer carbonate 1600 milliGRAM(s) Oral three times a day      ----------------------------------------------------------------------------------------  PHYSICAL EXAM  Constitutional - NAD, Comfortable, in bed   Chest - Breathing comfortably  Cardiovascular - S1S2   Abdomen - Soft   Extremities - Left AV fistula   Neurologic Exam -     follows commands                Cognitive - Awake, Alert, AAO to self, place, date, year, situation     Communication - Fluent, No dysarthria        Motor -                     LEFT    UE - 4/5                    RIGHT UE - 4/5                    LEFT    LE - HF 2/5, KE 3/5, DF 5/5, PF 5/5                    RIGHT LE - HF 2/5, KE 3/5, DF 5/5, PF 5/5        Sensory - Intact to LT    Psychiatric - Mood stable, Affect WNL  ----------------------------------------------------------------------------------------  ASSESSMENT/PLAN  37yMale h/o HTN, ESRD on HD, recent left SALUD after fracture with functional deficits after fall  CT with no new fracture  Pain - Tylenol oxycodone, dilaudid  DVT PPX - SCDs heparin   Rehab - Will continue to follow for ongoing rehab needs and recommendations.    continue bedside therapy    Recommend [facility] for continued inpatient rehabilitation, patient does not have a rehab diagnosis for readmission to acute rehab

## 2023-11-22 NOTE — OCCUPATIONAL THERAPY INITIAL EVALUATION ADULT - RANGE OF MOTION EXAMINATION, LOWER EXTREMITY
except L hip- maintained posterior hip precautions/bilateral LE Active ROM was WFL  (within functional limits)

## 2023-11-23 LAB
ANION GAP SERPL CALC-SCNC: 16 MMOL/L — SIGNIFICANT CHANGE UP (ref 5–17)
ANION GAP SERPL CALC-SCNC: 16 MMOL/L — SIGNIFICANT CHANGE UP (ref 5–17)
BASOPHILS # BLD AUTO: 0.02 K/UL — SIGNIFICANT CHANGE UP (ref 0–0.2)
BASOPHILS # BLD AUTO: 0.02 K/UL — SIGNIFICANT CHANGE UP (ref 0–0.2)
BASOPHILS NFR BLD AUTO: 0.6 % — SIGNIFICANT CHANGE UP (ref 0–2)
BASOPHILS NFR BLD AUTO: 0.6 % — SIGNIFICANT CHANGE UP (ref 0–2)
BUN SERPL-MCNC: 58 MG/DL — HIGH (ref 7–23)
BUN SERPL-MCNC: 58 MG/DL — HIGH (ref 7–23)
CALCIUM SERPL-MCNC: 8.8 MG/DL — SIGNIFICANT CHANGE UP (ref 8.4–10.5)
CALCIUM SERPL-MCNC: 8.8 MG/DL — SIGNIFICANT CHANGE UP (ref 8.4–10.5)
CHLORIDE SERPL-SCNC: 95 MMOL/L — LOW (ref 96–108)
CHLORIDE SERPL-SCNC: 95 MMOL/L — LOW (ref 96–108)
CO2 SERPL-SCNC: 26 MMOL/L — SIGNIFICANT CHANGE UP (ref 22–31)
CO2 SERPL-SCNC: 26 MMOL/L — SIGNIFICANT CHANGE UP (ref 22–31)
CREAT SERPL-MCNC: 8.85 MG/DL — HIGH (ref 0.5–1.3)
CREAT SERPL-MCNC: 8.85 MG/DL — HIGH (ref 0.5–1.3)
EGFR: 7 ML/MIN/1.73M2 — LOW
EGFR: 7 ML/MIN/1.73M2 — LOW
EOSINOPHIL # BLD AUTO: 0.14 K/UL — SIGNIFICANT CHANGE UP (ref 0–0.5)
EOSINOPHIL # BLD AUTO: 0.14 K/UL — SIGNIFICANT CHANGE UP (ref 0–0.5)
EOSINOPHIL NFR BLD AUTO: 4 % — SIGNIFICANT CHANGE UP (ref 0–6)
EOSINOPHIL NFR BLD AUTO: 4 % — SIGNIFICANT CHANGE UP (ref 0–6)
GLUCOSE SERPL-MCNC: 78 MG/DL — SIGNIFICANT CHANGE UP (ref 70–99)
GLUCOSE SERPL-MCNC: 78 MG/DL — SIGNIFICANT CHANGE UP (ref 70–99)
HCT VFR BLD CALC: 26.7 % — LOW (ref 39–50)
HCT VFR BLD CALC: 26.7 % — LOW (ref 39–50)
HGB BLD-MCNC: 8.5 G/DL — LOW (ref 13–17)
HGB BLD-MCNC: 8.5 G/DL — LOW (ref 13–17)
IMM GRANULOCYTES NFR BLD AUTO: 0.3 % — SIGNIFICANT CHANGE UP (ref 0–0.9)
IMM GRANULOCYTES NFR BLD AUTO: 0.3 % — SIGNIFICANT CHANGE UP (ref 0–0.9)
LYMPHOCYTES # BLD AUTO: 0.91 K/UL — LOW (ref 1–3.3)
LYMPHOCYTES # BLD AUTO: 0.91 K/UL — LOW (ref 1–3.3)
LYMPHOCYTES # BLD AUTO: 26.1 % — SIGNIFICANT CHANGE UP (ref 13–44)
LYMPHOCYTES # BLD AUTO: 26.1 % — SIGNIFICANT CHANGE UP (ref 13–44)
MAGNESIUM SERPL-MCNC: 2.4 MG/DL — SIGNIFICANT CHANGE UP (ref 1.6–2.6)
MAGNESIUM SERPL-MCNC: 2.4 MG/DL — SIGNIFICANT CHANGE UP (ref 1.6–2.6)
MCHC RBC-ENTMCNC: 30.4 PG — SIGNIFICANT CHANGE UP (ref 27–34)
MCHC RBC-ENTMCNC: 30.4 PG — SIGNIFICANT CHANGE UP (ref 27–34)
MCHC RBC-ENTMCNC: 31.8 GM/DL — LOW (ref 32–36)
MCHC RBC-ENTMCNC: 31.8 GM/DL — LOW (ref 32–36)
MCV RBC AUTO: 95.4 FL — SIGNIFICANT CHANGE UP (ref 80–100)
MCV RBC AUTO: 95.4 FL — SIGNIFICANT CHANGE UP (ref 80–100)
MONOCYTES # BLD AUTO: 0.5 K/UL — SIGNIFICANT CHANGE UP (ref 0–0.9)
MONOCYTES # BLD AUTO: 0.5 K/UL — SIGNIFICANT CHANGE UP (ref 0–0.9)
MONOCYTES NFR BLD AUTO: 14.4 % — HIGH (ref 2–14)
MONOCYTES NFR BLD AUTO: 14.4 % — HIGH (ref 2–14)
MRSA PCR RESULT.: SIGNIFICANT CHANGE UP
MRSA PCR RESULT.: SIGNIFICANT CHANGE UP
NEUTROPHILS # BLD AUTO: 1.9 K/UL — SIGNIFICANT CHANGE UP (ref 1.8–7.4)
NEUTROPHILS # BLD AUTO: 1.9 K/UL — SIGNIFICANT CHANGE UP (ref 1.8–7.4)
NEUTROPHILS NFR BLD AUTO: 54.6 % — SIGNIFICANT CHANGE UP (ref 43–77)
NEUTROPHILS NFR BLD AUTO: 54.6 % — SIGNIFICANT CHANGE UP (ref 43–77)
NRBC # BLD: 0 /100 WBCS — SIGNIFICANT CHANGE UP (ref 0–0)
NRBC # BLD: 0 /100 WBCS — SIGNIFICANT CHANGE UP (ref 0–0)
PLATELET # BLD AUTO: 187 K/UL — SIGNIFICANT CHANGE UP (ref 150–400)
PLATELET # BLD AUTO: 187 K/UL — SIGNIFICANT CHANGE UP (ref 150–400)
POTASSIUM SERPL-MCNC: 5.2 MMOL/L — SIGNIFICANT CHANGE UP (ref 3.5–5.3)
POTASSIUM SERPL-MCNC: 5.2 MMOL/L — SIGNIFICANT CHANGE UP (ref 3.5–5.3)
POTASSIUM SERPL-SCNC: 5.2 MMOL/L — SIGNIFICANT CHANGE UP (ref 3.5–5.3)
POTASSIUM SERPL-SCNC: 5.2 MMOL/L — SIGNIFICANT CHANGE UP (ref 3.5–5.3)
RBC # BLD: 2.8 M/UL — LOW (ref 4.2–5.8)
RBC # BLD: 2.8 M/UL — LOW (ref 4.2–5.8)
RBC # FLD: 14.7 % — HIGH (ref 10.3–14.5)
RBC # FLD: 14.7 % — HIGH (ref 10.3–14.5)
S AUREUS DNA NOSE QL NAA+PROBE: SIGNIFICANT CHANGE UP
S AUREUS DNA NOSE QL NAA+PROBE: SIGNIFICANT CHANGE UP
SODIUM SERPL-SCNC: 137 MMOL/L — SIGNIFICANT CHANGE UP (ref 135–145)
SODIUM SERPL-SCNC: 137 MMOL/L — SIGNIFICANT CHANGE UP (ref 135–145)
WBC # BLD: 3.48 K/UL — LOW (ref 3.8–10.5)
WBC # BLD: 3.48 K/UL — LOW (ref 3.8–10.5)
WBC # FLD AUTO: 3.48 K/UL — LOW (ref 3.8–10.5)
WBC # FLD AUTO: 3.48 K/UL — LOW (ref 3.8–10.5)

## 2023-11-23 RX ADMIN — Medication 25 MILLIGRAM(S): at 21:56

## 2023-11-23 RX ADMIN — CHLORHEXIDINE GLUCONATE 1 APPLICATION(S): 213 SOLUTION TOPICAL at 09:14

## 2023-11-23 RX ADMIN — OXYCODONE HYDROCHLORIDE 15 MILLIGRAM(S): 5 TABLET ORAL at 04:12

## 2023-11-23 RX ADMIN — Medication 25 MILLIGRAM(S): at 13:31

## 2023-11-23 RX ADMIN — OXYCODONE HYDROCHLORIDE 15 MILLIGRAM(S): 5 TABLET ORAL at 03:26

## 2023-11-23 RX ADMIN — Medication 25 MILLIGRAM(S): at 05:28

## 2023-11-23 RX ADMIN — HEPARIN SODIUM 5000 UNIT(S): 5000 INJECTION INTRAVENOUS; SUBCUTANEOUS at 21:56

## 2023-11-23 RX ADMIN — SEVELAMER CARBONATE 1600 MILLIGRAM(S): 2400 POWDER, FOR SUSPENSION ORAL at 18:20

## 2023-11-23 RX ADMIN — SEVELAMER CARBONATE 1600 MILLIGRAM(S): 2400 POWDER, FOR SUSPENSION ORAL at 09:14

## 2023-11-23 RX ADMIN — Medication 25 MILLIGRAM(S): at 18:20

## 2023-11-23 RX ADMIN — HEPARIN SODIUM 5000 UNIT(S): 5000 INJECTION INTRAVENOUS; SUBCUTANEOUS at 13:31

## 2023-11-23 RX ADMIN — SEVELAMER CARBONATE 1600 MILLIGRAM(S): 2400 POWDER, FOR SUSPENSION ORAL at 13:30

## 2023-11-23 NOTE — PROGRESS NOTE ADULT - SUBJECTIVE AND OBJECTIVE BOX
Date of service: 23 @ 13:24      Patient is a 37y old  Male who presents with a chief complaint of Left femoral neck fracture (2023 15:19)                                                               INTERVAL HPI/OVERNIGHT EVENTS:    REVIEW OF SYSTEMS:     CONSTITUTIONAL: No weakness, fevers or chills  EYES/ENT: No visual changes , no ear ache   NECK: No pain or stiffness  RESPIRATORY: No cough, wheezing,  No shortness of breath  CARDIOVASCULAR: No chest pain or palpitations  GASTROINTESTINAL: No abdominal pain  . No nausea, vomiting, or hematemesis; No diarrhea or constipation. No melena or hematochezia.  GENITOURINARY: No dysuria, frequency or hematuria  NEUROLOGICAL: No numbness or weakness  L hip pain                                                                                                                                                                                                                                                                                Medications:  MEDICATIONS  (STANDING):  chlorhexidine 2% Cloths 1 Application(s) Topical <User Schedule>  heparin   Injectable 5000 Unit(s) SubCutaneous every 8 hours  hydrALAZINE 25 milliGRAM(s) Oral three times a day  metoprolol tartrate 25 milliGRAM(s) Oral two times a day  sevelamer carbonate 1600 milliGRAM(s) Oral three times a day    MEDICATIONS  (PRN):  HYDROmorphone  Injectable 2 milliGRAM(s) IV Push every 8 hours PRN Severe Pain (7 - 10)  oxyCODONE    IR 15 milliGRAM(s) Oral every 6 hours PRN Severe Pain (7 - 10)       Allergies    shellfish (Hives)  No Known Drug Allergies    Intolerances      Vital Signs Last 24 Hrs  T(C): 37.2 (2023 04:35), Max: 37.2 (2023 04:35)  T(F): 99 (2023 04:35), Max: 99 (2023 04:35)  HR: 88 (2023 04:35) (78 - 97)  BP: 136/73 (2023 04:35) (136/73 - 145/74)  BP(mean): --  RR: 18 (2023 04:35) (18 - 18)  SpO2: 95% (2023 04:35) (95% - 97%)    Parameters below as of 2023 04:35  Patient On (Oxygen Delivery Method): room air      CAPILLARY BLOOD GLUCOSE           @ 07:01  -   @ 07:00  --------------------------------------------------------  IN: 240 mL / OUT: 2500 mL / NET: -2260 mL      Physical Exam:    Daily     Daily Weight in k.9 (2023 14:15)  General:  Well appearing, NAD, not cachetic  HEENT:  Nonicteric, PERRLA  CV:  RRR, S1S2   Lungs:  CTA B/L, no wheezes, rales, rhonchi  Abdomen:  Soft, non-tender, no distended, positive BS  Extremities:  2+ pulses, no c/c, no edema  Skin:  Warm and dry, no rashes  :  No mayes  Neuro:  AAOx3, non-focal, grossly intact                                                                                                                                                                                                                                                                                                LABS:                               8.5    3.48  )-----------( 187      ( 2023 10:53 )             26.7                      11    137  |  95<L>  |  58<H>  ----------------------------<  78  5.2   |  26  |  8.85<H>    Ca    8.8      2023 10:53  Mg     2.4                              RADIOLOGY & ADDITIONAL TESTS         I personally reviewed: [  ]EKG   [  ]CXR    [  ] CT      A/P:         Discussed with :     Jen consultants' Notes   Time spent :

## 2023-11-23 NOTE — PROGRESS NOTE ADULT - SUBJECTIVE AND OBJECTIVE BOX
DATE OF SERVICE: 11-23-23 @ 12:36    Patient is a 37y old  Male who presents with a chief complaint of Left femoral neck fracture (22 Nov 2023 15:19)      INTERVAL HISTORY: no complaints     REVIEW OF SYSTEMS:  CONSTITUTIONAL: No weakness  EYES/ENT: No visual changes;  No throat pain   NECK: No pain or stiffness  RESPIRATORY: No cough, wheezing; No shortness of breath  CARDIOVASCULAR: No chest pain or palpitations  GASTROINTESTINAL: No abdominal  pain. No nausea, vomiting, or hematemesis  GENITOURINARY: No dysuria, frequency or hematuria  NEUROLOGICAL: No stroke like symptoms  SKIN: No rashes    	  MEDICATIONS:  hydrALAZINE 25 milliGRAM(s) Oral three times a day  metoprolol tartrate 25 milliGRAM(s) Oral two times a day        PHYSICAL EXAM:  T(C): 37.2 (11-23-23 @ 04:35), Max: 37.2 (11-23-23 @ 04:35)  HR: 88 (11-23-23 @ 04:35) (78 - 97)  BP: 136/73 (11-23-23 @ 04:35) (136/73 - 145/74)  RR: 18 (11-23-23 @ 04:35) (18 - 18)  SpO2: 95% (11-23-23 @ 04:35) (95% - 97%)  Wt(kg): --  I&O's Summary    22 Nov 2023 07:01  -  23 Nov 2023 07:00  --------------------------------------------------------  IN: 240 mL / OUT: 2500 mL / NET: -2260 mL          Appearance: In no distress	  HEENT:    PERRL, EOMI	  Cardiovascular:  S1 S2, No JVD  Respiratory: Lungs clear to auscultation	  Gastrointestinal:  Soft, Non-tender, + BS	  Vascularature:  No edema of LE  Psychiatric: Appropriate affect   Neuro: no acute focal deficits                               8.5    3.48  )-----------( 187      ( 23 Nov 2023 10:53 )             26.7     11-23    137  |  95<L>  |  58<H>  ----------------------------<  78  5.2   |  26  |  8.85<H>    Ca    8.8      23 Nov 2023 10:53  Mg     2.4     11-23          Labs personally reviewed      ASSESSMENT/PLAN: 	    37 year old male with hx of HTN, ESRD with HD M/W/F, presents to the ED with left hip pain s/p fall this morning.    1. Pre-op assessment  -Cardiology consulted for pre-op eval. Patient states he has hyperthyroidism and is scheduled for surgery and needs cardiac clearance. Denies cardiac hx of MI or stents. Denies chest pain. Has chronic SOB which he attributes to his ESRD and HD.   -EKG NSR  -Obtain TTE  - Not in decompensated HF   - No Hx of tachy/bradyarrhythmias   - Low-moderate risk for moderate risk thyroid surgery. No contraindication to proceed with elective surgery.     2. s/p fall  -mechanical denies CP, palpitations or dizziness    3. ESRD  -on HD   -f/u renal     4. HTN  -BP improving  -c/w hydral and metoprolol          EYAL Beltre DO [facility]  Cardiovascular Medicine  [address], [address]  Office: [phone]  Available via call/text on Microsoft Teams

## 2023-11-24 LAB
ANION GAP SERPL CALC-SCNC: 18 MMOL/L — HIGH (ref 5–17)
ANION GAP SERPL CALC-SCNC: 18 MMOL/L — HIGH (ref 5–17)
BASOPHILS # BLD AUTO: 0.03 K/UL — SIGNIFICANT CHANGE UP (ref 0–0.2)
BASOPHILS # BLD AUTO: 0.03 K/UL — SIGNIFICANT CHANGE UP (ref 0–0.2)
BASOPHILS NFR BLD AUTO: 0.8 % — SIGNIFICANT CHANGE UP (ref 0–2)
BASOPHILS NFR BLD AUTO: 0.8 % — SIGNIFICANT CHANGE UP (ref 0–2)
BUN SERPL-MCNC: 76 MG/DL — HIGH (ref 7–23)
BUN SERPL-MCNC: 76 MG/DL — HIGH (ref 7–23)
CALCIUM SERPL-MCNC: 8.8 MG/DL — SIGNIFICANT CHANGE UP (ref 8.4–10.5)
CALCIUM SERPL-MCNC: 8.8 MG/DL — SIGNIFICANT CHANGE UP (ref 8.4–10.5)
CHLORIDE SERPL-SCNC: 96 MMOL/L — SIGNIFICANT CHANGE UP (ref 96–108)
CHLORIDE SERPL-SCNC: 96 MMOL/L — SIGNIFICANT CHANGE UP (ref 96–108)
CO2 SERPL-SCNC: 25 MMOL/L — SIGNIFICANT CHANGE UP (ref 22–31)
CO2 SERPL-SCNC: 25 MMOL/L — SIGNIFICANT CHANGE UP (ref 22–31)
CREAT SERPL-MCNC: 11.26 MG/DL — HIGH (ref 0.5–1.3)
CREAT SERPL-MCNC: 11.26 MG/DL — HIGH (ref 0.5–1.3)
EGFR: 5 ML/MIN/1.73M2 — LOW
EGFR: 5 ML/MIN/1.73M2 — LOW
EOSINOPHIL # BLD AUTO: 0.21 K/UL — SIGNIFICANT CHANGE UP (ref 0–0.5)
EOSINOPHIL # BLD AUTO: 0.21 K/UL — SIGNIFICANT CHANGE UP (ref 0–0.5)
EOSINOPHIL NFR BLD AUTO: 5.8 % — SIGNIFICANT CHANGE UP (ref 0–6)
EOSINOPHIL NFR BLD AUTO: 5.8 % — SIGNIFICANT CHANGE UP (ref 0–6)
GLUCOSE SERPL-MCNC: 89 MG/DL — SIGNIFICANT CHANGE UP (ref 70–99)
GLUCOSE SERPL-MCNC: 89 MG/DL — SIGNIFICANT CHANGE UP (ref 70–99)
HCT VFR BLD CALC: 25.8 % — LOW (ref 39–50)
HCT VFR BLD CALC: 25.8 % — LOW (ref 39–50)
HGB BLD-MCNC: 8.3 G/DL — LOW (ref 13–17)
HGB BLD-MCNC: 8.3 G/DL — LOW (ref 13–17)
IMM GRANULOCYTES NFR BLD AUTO: 0.3 % — SIGNIFICANT CHANGE UP (ref 0–0.9)
IMM GRANULOCYTES NFR BLD AUTO: 0.3 % — SIGNIFICANT CHANGE UP (ref 0–0.9)
LYMPHOCYTES # BLD AUTO: 0.99 K/UL — LOW (ref 1–3.3)
LYMPHOCYTES # BLD AUTO: 0.99 K/UL — LOW (ref 1–3.3)
LYMPHOCYTES # BLD AUTO: 27.5 % — SIGNIFICANT CHANGE UP (ref 13–44)
LYMPHOCYTES # BLD AUTO: 27.5 % — SIGNIFICANT CHANGE UP (ref 13–44)
MAGNESIUM SERPL-MCNC: 2.4 MG/DL — SIGNIFICANT CHANGE UP (ref 1.6–2.6)
MAGNESIUM SERPL-MCNC: 2.4 MG/DL — SIGNIFICANT CHANGE UP (ref 1.6–2.6)
MCHC RBC-ENTMCNC: 30.4 PG — SIGNIFICANT CHANGE UP (ref 27–34)
MCHC RBC-ENTMCNC: 30.4 PG — SIGNIFICANT CHANGE UP (ref 27–34)
MCHC RBC-ENTMCNC: 32.2 GM/DL — SIGNIFICANT CHANGE UP (ref 32–36)
MCHC RBC-ENTMCNC: 32.2 GM/DL — SIGNIFICANT CHANGE UP (ref 32–36)
MCV RBC AUTO: 94.5 FL — SIGNIFICANT CHANGE UP (ref 80–100)
MCV RBC AUTO: 94.5 FL — SIGNIFICANT CHANGE UP (ref 80–100)
MONOCYTES # BLD AUTO: 0.5 K/UL — SIGNIFICANT CHANGE UP (ref 0–0.9)
MONOCYTES # BLD AUTO: 0.5 K/UL — SIGNIFICANT CHANGE UP (ref 0–0.9)
MONOCYTES NFR BLD AUTO: 13.9 % — SIGNIFICANT CHANGE UP (ref 2–14)
MONOCYTES NFR BLD AUTO: 13.9 % — SIGNIFICANT CHANGE UP (ref 2–14)
NEUTROPHILS # BLD AUTO: 1.86 K/UL — SIGNIFICANT CHANGE UP (ref 1.8–7.4)
NEUTROPHILS # BLD AUTO: 1.86 K/UL — SIGNIFICANT CHANGE UP (ref 1.8–7.4)
NEUTROPHILS NFR BLD AUTO: 51.7 % — SIGNIFICANT CHANGE UP (ref 43–77)
NEUTROPHILS NFR BLD AUTO: 51.7 % — SIGNIFICANT CHANGE UP (ref 43–77)
NRBC # BLD: 0 /100 WBCS — SIGNIFICANT CHANGE UP (ref 0–0)
NRBC # BLD: 0 /100 WBCS — SIGNIFICANT CHANGE UP (ref 0–0)
PLATELET # BLD AUTO: 190 K/UL — SIGNIFICANT CHANGE UP (ref 150–400)
PLATELET # BLD AUTO: 190 K/UL — SIGNIFICANT CHANGE UP (ref 150–400)
POTASSIUM SERPL-MCNC: 5 MMOL/L — SIGNIFICANT CHANGE UP (ref 3.5–5.3)
POTASSIUM SERPL-MCNC: 5 MMOL/L — SIGNIFICANT CHANGE UP (ref 3.5–5.3)
POTASSIUM SERPL-SCNC: 5 MMOL/L — SIGNIFICANT CHANGE UP (ref 3.5–5.3)
POTASSIUM SERPL-SCNC: 5 MMOL/L — SIGNIFICANT CHANGE UP (ref 3.5–5.3)
RBC # BLD: 2.73 M/UL — LOW (ref 4.2–5.8)
RBC # BLD: 2.73 M/UL — LOW (ref 4.2–5.8)
RBC # FLD: 14.7 % — HIGH (ref 10.3–14.5)
RBC # FLD: 14.7 % — HIGH (ref 10.3–14.5)
SODIUM SERPL-SCNC: 139 MMOL/L — SIGNIFICANT CHANGE UP (ref 135–145)
SODIUM SERPL-SCNC: 139 MMOL/L — SIGNIFICANT CHANGE UP (ref 135–145)
WBC # BLD: 3.6 K/UL — LOW (ref 3.8–10.5)
WBC # BLD: 3.6 K/UL — LOW (ref 3.8–10.5)
WBC # FLD AUTO: 3.6 K/UL — LOW (ref 3.8–10.5)
WBC # FLD AUTO: 3.6 K/UL — LOW (ref 3.8–10.5)

## 2023-11-24 PROCEDURE — 71045 X-RAY EXAM CHEST 1 VIEW: CPT | Mod: 26

## 2023-11-24 RX ADMIN — HEPARIN SODIUM 5000 UNIT(S): 5000 INJECTION INTRAVENOUS; SUBCUTANEOUS at 14:25

## 2023-11-24 RX ADMIN — HEPARIN SODIUM 5000 UNIT(S): 5000 INJECTION INTRAVENOUS; SUBCUTANEOUS at 22:02

## 2023-11-24 RX ADMIN — Medication 25 MILLIGRAM(S): at 17:11

## 2023-11-24 RX ADMIN — Medication 25 MILLIGRAM(S): at 22:02

## 2023-11-24 RX ADMIN — CHLORHEXIDINE GLUCONATE 1 APPLICATION(S): 213 SOLUTION TOPICAL at 06:28

## 2023-11-24 RX ADMIN — Medication 25 MILLIGRAM(S): at 14:25

## 2023-11-24 RX ADMIN — Medication 25 MILLIGRAM(S): at 06:25

## 2023-11-24 RX ADMIN — HEPARIN SODIUM 5000 UNIT(S): 5000 INJECTION INTRAVENOUS; SUBCUTANEOUS at 06:25

## 2023-11-24 RX ADMIN — SEVELAMER CARBONATE 1600 MILLIGRAM(S): 2400 POWDER, FOR SUSPENSION ORAL at 17:11

## 2023-11-24 RX ADMIN — SEVELAMER CARBONATE 1600 MILLIGRAM(S): 2400 POWDER, FOR SUSPENSION ORAL at 14:24

## 2023-11-24 RX ADMIN — SEVELAMER CARBONATE 1600 MILLIGRAM(S): 2400 POWDER, FOR SUSPENSION ORAL at 09:15

## 2023-11-24 NOTE — PROGRESS NOTE ADULT - SUBJECTIVE AND OBJECTIVE BOX
NEPHROLOGY PROGRESS NOTE    CHIEF COMPLAINT:  ESRD    HPI:  Seen on dialysis.  Access working well.  BP stable.  Removing 2 liters w/o difficulty.     EXAM:  Vital Signs Last 24 Hrs  T(C): 36.6 (2023 10:53), Max: 37.3 (2023 22:10)  T(F): 97.9 (2023 10:53), Max: 99.2 (2023 22:10)  HR: 78 (2023 10:53) (75 - 87)  BP: 122/73 (2023 10:53) (122/73 - 143/73)  BP(mean): --  RR: 18 (2023 10:53) (18 - 18)  SpO2: 96% (2023 10:53) (96% - 99%)    Parameters below as of 2023 10:53  Patient On (Oxygen Delivery Method): room air      I&O's Summary    Daily     Daily Weight in k.9 (2023 10:53)    Conversant, in no apparent distress  Normal respiratory effort, lungs clear bilaterally  Heart RRR with no murmur, no peripheral edema    LABS                        8.3    3.60  )-----------( 190      ( 2023 10:53 )             25.8     11-24    139  |  96  |  76<H>  ----------------------------<  89  5.0   |  25  |  11.26<H>    Ca    8.8      2023 10:53  Mg     2.4     11-24        A/P:    Hx CVA, ESRD on HD  Hx fall, L femoral neck fracture, s/p L hemiarthroplasty 8/15/23  S/p rehab  D/c-d home recently  S/p fall at home, L hip pain   No acute fx on CT  Complete HD today as ordered  Epoetin w/HD  TMP 2.0 kg  Renal diet, Renvela   Rehab    [phone]

## 2023-11-24 NOTE — PROGRESS NOTE ADULT - SUBJECTIVE AND OBJECTIVE BOX
DATE OF SERVICE: 11-24-23 @ 11:39    Patient is a 37y old  Male who presents with a chief complaint of Left femoral neck fracture (22 Nov 2023 15:19)      INTERVAL HISTORY: Feels ok.     REVIEW OF SYSTEMS:  CONSTITUTIONAL: No weakness  EYES/ENT: No visual changes;  No throat pain   NECK: No pain or stiffness  RESPIRATORY: No cough, wheezing; No shortness of breath  CARDIOVASCULAR: No chest pain or palpitations  GASTROINTESTINAL: No abdominal  pain. No nausea, vomiting, or hematemesis  GENITOURINARY: No dysuria, frequency or hematuria  NEUROLOGICAL: No stroke like symptoms  SKIN: No rashes    	  MEDICATIONS:  hydrALAZINE 25 milliGRAM(s) Oral three times a day  metoprolol tartrate 25 milliGRAM(s) Oral two times a day        PHYSICAL EXAM:  T(C): 36.6 (11-24-23 @ 10:53), Max: 37.3 (11-23-23 @ 22:10)  HR: 78 (11-24-23 @ 10:53) (75 - 87)  BP: 122/73 (11-24-23 @ 10:53) (122/73 - 149/91)  RR: 18 (11-24-23 @ 10:53) (18 - 18)  SpO2: 96% (11-24-23 @ 10:53) (96% - 99%)  Wt(kg): --  I&O's Summary        Appearance: In no distress	  HEENT:    PERRL, EOMI	  Cardiovascular:  S1 S2, No JVD  Respiratory: Lungs clear to auscultation	  Gastrointestinal:  Soft, Non-tender, + BS	  Vascularature:  No edema of LE  Psychiatric: Appropriate affect   Neuro: no acute focal deficits                               8.3    3.60  )-----------( 190      ( 24 Nov 2023 10:53 )             25.8     11-23    137  |  95<L>  |  58<H>  ----------------------------<  78  5.2   |  26  |  8.85<H>    Ca    8.8      23 Nov 2023 10:53  Mg     2.4     11-23          Labs personally reviewed      ASSESSMENT/PLAN: 	    37 year old male with hx of HTN, ESRD with HD M/W/F, presents to the ED with left hip pain s/p fall this morning.    1. Pre-op assessment  -Cardiology consulted for pre-op eval. Patient states he has hyperthyroidism and is scheduled for surgery and needs cardiac clearance. Denies cardiac hx of MI or stents. Denies chest pain. Has chronic SOB which he attributes to his ESRD and HD.   -EKG NSR  - Obtain TTE (can be deferred to OP since thyroid surgery not urgent and patient planned for DC to [facility])  - Not in decompensated HF   - No Hx of tachy/bradyarrhythmias   - Low-moderate risk for moderate risk thyroid surgery. No contraindication to proceed with elective surgery.     2. s/p fall  -mechanical denies CP, palpitations or dizziness    3. ESRD  -on HD   -f/u renal     4. HTN  -BP improving  -c/w hydral and metoprolol        ANMOL Armstrong-RISHI Garcia DO [facility]  Cardiovascular Medicine  [address] [place] [address], [address]  Available through call or text on [employer] TEAM[employer]  Office: [phone]

## 2023-11-25 RX ADMIN — Medication 25 MILLIGRAM(S): at 17:42

## 2023-11-25 RX ADMIN — HEPARIN SODIUM 5000 UNIT(S): 5000 INJECTION INTRAVENOUS; SUBCUTANEOUS at 21:43

## 2023-11-25 RX ADMIN — Medication 25 MILLIGRAM(S): at 05:45

## 2023-11-25 RX ADMIN — HEPARIN SODIUM 5000 UNIT(S): 5000 INJECTION INTRAVENOUS; SUBCUTANEOUS at 13:01

## 2023-11-25 RX ADMIN — SEVELAMER CARBONATE 1600 MILLIGRAM(S): 2400 POWDER, FOR SUSPENSION ORAL at 17:41

## 2023-11-25 RX ADMIN — Medication 25 MILLIGRAM(S): at 21:43

## 2023-11-25 RX ADMIN — SEVELAMER CARBONATE 1600 MILLIGRAM(S): 2400 POWDER, FOR SUSPENSION ORAL at 08:59

## 2023-11-25 RX ADMIN — SEVELAMER CARBONATE 1600 MILLIGRAM(S): 2400 POWDER, FOR SUSPENSION ORAL at 13:01

## 2023-11-25 RX ADMIN — CHLORHEXIDINE GLUCONATE 1 APPLICATION(S): 213 SOLUTION TOPICAL at 05:58

## 2023-11-25 RX ADMIN — Medication 25 MILLIGRAM(S): at 13:02

## 2023-11-25 NOTE — PROGRESS NOTE ADULT - SUBJECTIVE AND OBJECTIVE BOX
Date of service: 11-25-23 @ 21:47      Patient is a 37y old  Male who presents with a chief complaint of Left femoral neck fracture (22 Nov 2023 15:19)                                                               INTERVAL HPI/OVERNIGHT EVENTS:    REVIEW OF SYSTEMS:     CONSTITUTIONAL: No weakness, fevers or chills  EYES/ENT: No visual changes , no ear ache   NECK: No pain or stiffness  RESPIRATORY: No cough, wheezing,  No shortness of breath  CARDIOVASCULAR: No chest pain or palpitations  GASTROINTESTINAL: No abdominal pain  . No nausea, vomiting, or hematemesis; No diarrhea or constipation. No melena or hematochezia.  GENITOURINARY: No dysuria, frequency or hematuria  NEUROLOGICAL: No numbness or weakness  SKIN: No itching, burning, rashes, or lesions                                                                                                                                                                                                                                                                                 Medications:  MEDICATIONS  (STANDING):  chlorhexidine 2% Cloths 1 Application(s) Topical <User Schedule>  heparin   Injectable 5000 Unit(s) SubCutaneous every 8 hours  hydrALAZINE 25 milliGRAM(s) Oral three times a day  metoprolol tartrate 25 milliGRAM(s) Oral two times a day  sevelamer carbonate 1600 milliGRAM(s) Oral three times a day    MEDICATIONS  (PRN):  HYDROmorphone  Injectable 2 milliGRAM(s) IV Push every 8 hours PRN Severe Pain (7 - 10)  oxyCODONE    IR 15 milliGRAM(s) Oral every 6 hours PRN Severe Pain (7 - 10)       Allergies    shellfish (Hives)  No Known Drug Allergies    Intolerances      Vital Signs Last 24 Hrs  T(C): 36.6 (25 Nov 2023 21:29), Max: 36.8 (25 Nov 2023 04:35)  T(F): 97.8 (25 Nov 2023 21:29), Max: 98.3 (25 Nov 2023 12:21)  HR: 77 (25 Nov 2023 21:29) (77 - 80)  BP: 118/68 (25 Nov 2023 21:29) (118/68 - 148/76)  BP(mean): --  RR: 18 (25 Nov 2023 21:29) (18 - 18)  SpO2: 93% (25 Nov 2023 21:29) (93% - 100%)    Parameters below as of 25 Nov 2023 21:29  Patient On (Oxygen Delivery Method): room air      CAPILLARY BLOOD GLUCOSE          11-24 @ 07:01  -  11-25 @ 07:00  --------------------------------------------------------  IN: 480 mL / OUT: 2000 mL / NET: -1520 mL      Physical Exam:    Daily     Daily   General:  Well appearing, NAD, not cachetic  HEENT:  Nonicteric, PERRLA  CV:  RRR, S1S2   Lungs:  CTA B/L, no wheezes, rales, rhonchi  Abdomen:  Soft, non-tender, no distended, positive BS  Extremities:  2+ pulses, no c/c, no edema  Skin:  Warm and dry, no rashes  :  No mayes  Neuro:  AAOx3, non-focal, grossly intact                                                                                                                                                                                                                                                                                                LABS:                               8.3    3.60  )-----------( 190      ( 24 Nov 2023 10:53 )             25.8                      11-24    139  |  96  |  76<H>  ----------------------------<  89  5.0   |  25  |  11.26<H>    Ca    8.8      24 Nov 2023 10:53  Mg     2.4     11-24                         RADIOLOGY & ADDITIONAL TESTS         I personally reviewed: [  ]EKG   [  ]CXR    [  ] CT      A/P:         Discussed with :     Jen consultants' Notes   Time spent :

## 2023-11-26 RX ADMIN — Medication 25 MILLIGRAM(S): at 21:36

## 2023-11-26 RX ADMIN — SEVELAMER CARBONATE 1600 MILLIGRAM(S): 2400 POWDER, FOR SUSPENSION ORAL at 13:24

## 2023-11-26 RX ADMIN — HEPARIN SODIUM 5000 UNIT(S): 5000 INJECTION INTRAVENOUS; SUBCUTANEOUS at 13:25

## 2023-11-26 RX ADMIN — CHLORHEXIDINE GLUCONATE 1 APPLICATION(S): 213 SOLUTION TOPICAL at 05:14

## 2023-11-26 RX ADMIN — Medication 25 MILLIGRAM(S): at 05:13

## 2023-11-26 RX ADMIN — Medication 25 MILLIGRAM(S): at 18:22

## 2023-11-26 RX ADMIN — SEVELAMER CARBONATE 1600 MILLIGRAM(S): 2400 POWDER, FOR SUSPENSION ORAL at 18:22

## 2023-11-26 RX ADMIN — Medication 25 MILLIGRAM(S): at 13:25

## 2023-11-26 RX ADMIN — SEVELAMER CARBONATE 1600 MILLIGRAM(S): 2400 POWDER, FOR SUSPENSION ORAL at 09:27

## 2023-11-26 RX ADMIN — HEPARIN SODIUM 5000 UNIT(S): 5000 INJECTION INTRAVENOUS; SUBCUTANEOUS at 21:36

## 2023-11-26 NOTE — PROGRESS NOTE ADULT - SUBJECTIVE AND OBJECTIVE BOX
Date of service: 11-26-23 @ 13:29      Patient is a 37y old  Male who presents with a chief complaint of Left femoral neck fracture (22 Nov 2023 15:19)                                                               INTERVAL HPI/OVERNIGHT EVENTS:    REVIEW OF SYSTEMS:     CONSTITUTIONAL: No weakness, fevers or chills  EYES/ENT: No visual changes , no ear ache   NECK: No pain or stiffness  RESPIRATORY: No cough, wheezing,  No shortness of breath  CARDIOVASCULAR: No chest pain or palpitations  GASTROINTESTINAL: No abdominal pain  . No nausea, vomiting, or hematemesis; No diarrhea or constipation. No melena or hematochezia.  GENITOURINARY: No dysuria, frequency or hematuria  NEUROLOGICAL: No numbness or weakness  SKIN: No itching, burning, rashes, or lesions                                                                                                                                                                                                                                                                                 Medications:  MEDICATIONS  (STANDING):  chlorhexidine 2% Cloths 1 Application(s) Topical <User Schedule>  heparin   Injectable 5000 Unit(s) SubCutaneous every 8 hours  hydrALAZINE 25 milliGRAM(s) Oral three times a day  metoprolol tartrate 25 milliGRAM(s) Oral two times a day  sevelamer carbonate 1600 milliGRAM(s) Oral three times a day    MEDICATIONS  (PRN):  HYDROmorphone  Injectable 2 milliGRAM(s) IV Push every 8 hours PRN Severe Pain (7 - 10)  oxyCODONE    IR 15 milliGRAM(s) Oral every 6 hours PRN Severe Pain (7 - 10)       Allergies    shellfish (Hives)  No Known Drug Allergies    Intolerances      Vital Signs Last 24 Hrs  T(C): 36.8 (26 Nov 2023 12:23), Max: 36.8 (26 Nov 2023 12:23)  T(F): 98.2 (26 Nov 2023 12:23), Max: 98.2 (26 Nov 2023 12:23)  HR: 73 (26 Nov 2023 12:23) (73 - 84)  BP: 131/74 (26 Nov 2023 12:23) (118/68 - 136/78)  BP(mean): --  RR: 18 (26 Nov 2023 12:23) (18 - 18)  SpO2: 98% (26 Nov 2023 12:23) (93% - 98%)    Parameters below as of 26 Nov 2023 12:23  Patient On (Oxygen Delivery Method): room air      CAPILLARY BLOOD GLUCOSE          Physical Exam:    Daily     Daily   General:  Well appearing, NAD, not cachetic  HEENT:  Nonicteric, PERRLA  CV:  RRR, S1S2   Lungs:  CTA B/L, no wheezes, rales, rhonchi  Abdomen:  Soft, non-tender, no distended, positive BS  Extremities:  2+ pulses, no c/c, no edema  Skin:  Warm and dry, no rashes  :  No mayes  Neuro:  AAOx3, non-focal, grossly intact                                                                                                                                                                                                                                                                                                LABS:                                                     RADIOLOGY & ADDITIONAL TESTS         I personally reviewed: [  ]EKG   [  ]CXR    [  ] CT      A/P:         Discussed with :     Jen consultants' Notes   Time spent :

## 2023-11-26 NOTE — PROGRESS NOTE ADULT - SUBJECTIVE AND OBJECTIVE BOX
DATE OF SERVICE: 11-26-23 @ 10:56    Patient is a 37y old  Male who presents with a chief complaint of Left femoral neck fracture (22 Nov 2023 15:19)      INTERVAL HISTORY: no complaints     REVIEW OF SYSTEMS:  CONSTITUTIONAL: No weakness  EYES/ENT: No visual changes;  No throat pain   NECK: No pain or stiffness  RESPIRATORY: No cough, wheezing; No shortness of breath  CARDIOVASCULAR: No chest pain or palpitations  GASTROINTESTINAL: No abdominal  pain. No nausea, vomiting, or hematemesis  GENITOURINARY: No dysuria, frequency or hematuria  NEUROLOGICAL: No stroke like symptoms  SKIN: No rashes      	  MEDICATIONS:  hydrALAZINE 25 milliGRAM(s) Oral three times a day  metoprolol tartrate 25 milliGRAM(s) Oral two times a day        PHYSICAL EXAM:  T(C): 36.7 (11-26-23 @ 04:56), Max: 36.8 (11-25-23 @ 12:21)  HR: 84 (11-26-23 @ 04:56) (77 - 84)  BP: 136/78 (11-26-23 @ 04:56) (118/68 - 148/76)  RR: 18 (11-26-23 @ 04:56) (18 - 18)  SpO2: 98% (11-26-23 @ 04:56) (93% - 99%)  Wt(kg): --  I&O's Summary        Appearance: In no distress	  HEENT:    PERRL, EOMI	  Cardiovascular:  S1 S2, No JVD  Respiratory: Lungs clear to auscultation	  Gastrointestinal:  Soft, Non-tender, + BS	  Vascularature:  No edema of LE  Psychiatric: Appropriate affect   Neuro: no acute focal deficits                     Labs personally reviewed      ASSESSMENT/PLAN: 	      37 year old male with hx of HTN, ESRD with HD M/W/F, presents to the ED with left hip pain s/p fall this morning.    1. Pre-op assessment  -Cardiology consulted for pre-op eval. Patient states he has hyperthyroidism and is scheduled for surgery and needs cardiac clearance. Denies cardiac hx of MI or stents. Denies chest pain. Has chronic SOB which he attributes to his ESRD and HD.   -EKG NSR  - Obtain TTE (can be deferred to OP since thyroid surgery not urgent and patient planned for DC to [facility])  - Not in decompensated HF   - No Hx of tachy/bradyarrhythmias   - Low-moderate risk for moderate risk thyroid surgery. No contraindication to proceed with elective surgery.     2. s/p fall  -mechanical denies CP, palpitations or dizziness    3. ESRD  -on HD   -f/u renal     4. HTN  -BP improving  -c/w hydral and metoprolol        EYAL Beltre DO [facility]  Cardiovascular Medicine  [address] [place] [address], [address]  Office: [phone]  Available via call/text on Microsoft Teams

## 2023-11-27 LAB
ANION GAP SERPL CALC-SCNC: 19 MMOL/L — HIGH (ref 5–17)
ANION GAP SERPL CALC-SCNC: 19 MMOL/L — HIGH (ref 5–17)
BASOPHILS # BLD AUTO: 0.02 K/UL — SIGNIFICANT CHANGE UP (ref 0–0.2)
BASOPHILS # BLD AUTO: 0.02 K/UL — SIGNIFICANT CHANGE UP (ref 0–0.2)
BASOPHILS NFR BLD AUTO: 0.5 % — SIGNIFICANT CHANGE UP (ref 0–2)
BASOPHILS NFR BLD AUTO: 0.5 % — SIGNIFICANT CHANGE UP (ref 0–2)
BUN SERPL-MCNC: 94 MG/DL — HIGH (ref 7–23)
BUN SERPL-MCNC: 94 MG/DL — HIGH (ref 7–23)
CALCIUM SERPL-MCNC: 9 MG/DL — SIGNIFICANT CHANGE UP (ref 8.4–10.5)
CALCIUM SERPL-MCNC: 9 MG/DL — SIGNIFICANT CHANGE UP (ref 8.4–10.5)
CALCIUM SERPL-MCNC: 9.1 MG/DL — SIGNIFICANT CHANGE UP (ref 8.4–10.5)
CALCIUM SERPL-MCNC: 9.1 MG/DL — SIGNIFICANT CHANGE UP (ref 8.4–10.5)
CHLORIDE SERPL-SCNC: 95 MMOL/L — LOW (ref 96–108)
CHLORIDE SERPL-SCNC: 95 MMOL/L — LOW (ref 96–108)
CO2 SERPL-SCNC: 26 MMOL/L — SIGNIFICANT CHANGE UP (ref 22–31)
CO2 SERPL-SCNC: 26 MMOL/L — SIGNIFICANT CHANGE UP (ref 22–31)
CREAT SERPL-MCNC: 12.7 MG/DL — HIGH (ref 0.5–1.3)
CREAT SERPL-MCNC: 12.7 MG/DL — HIGH (ref 0.5–1.3)
EGFR: 5 ML/MIN/1.73M2 — LOW
EGFR: 5 ML/MIN/1.73M2 — LOW
EOSINOPHIL # BLD AUTO: 0.19 K/UL — SIGNIFICANT CHANGE UP (ref 0–0.5)
EOSINOPHIL # BLD AUTO: 0.19 K/UL — SIGNIFICANT CHANGE UP (ref 0–0.5)
EOSINOPHIL NFR BLD AUTO: 4.8 % — SIGNIFICANT CHANGE UP (ref 0–6)
EOSINOPHIL NFR BLD AUTO: 4.8 % — SIGNIFICANT CHANGE UP (ref 0–6)
GLUCOSE SERPL-MCNC: 62 MG/DL — LOW (ref 70–99)
GLUCOSE SERPL-MCNC: 62 MG/DL — LOW (ref 70–99)
HCT VFR BLD CALC: 25.5 % — LOW (ref 39–50)
HCT VFR BLD CALC: 25.5 % — LOW (ref 39–50)
HGB BLD-MCNC: 8.1 G/DL — LOW (ref 13–17)
HGB BLD-MCNC: 8.1 G/DL — LOW (ref 13–17)
IMM GRANULOCYTES NFR BLD AUTO: 1 % — HIGH (ref 0–0.9)
IMM GRANULOCYTES NFR BLD AUTO: 1 % — HIGH (ref 0–0.9)
LYMPHOCYTES # BLD AUTO: 1.23 K/UL — SIGNIFICANT CHANGE UP (ref 1–3.3)
LYMPHOCYTES # BLD AUTO: 1.23 K/UL — SIGNIFICANT CHANGE UP (ref 1–3.3)
LYMPHOCYTES # BLD AUTO: 31.4 % — SIGNIFICANT CHANGE UP (ref 13–44)
LYMPHOCYTES # BLD AUTO: 31.4 % — SIGNIFICANT CHANGE UP (ref 13–44)
MCHC RBC-ENTMCNC: 29.7 PG — SIGNIFICANT CHANGE UP (ref 27–34)
MCHC RBC-ENTMCNC: 29.7 PG — SIGNIFICANT CHANGE UP (ref 27–34)
MCHC RBC-ENTMCNC: 31.8 GM/DL — LOW (ref 32–36)
MCHC RBC-ENTMCNC: 31.8 GM/DL — LOW (ref 32–36)
MCV RBC AUTO: 93.4 FL — SIGNIFICANT CHANGE UP (ref 80–100)
MCV RBC AUTO: 93.4 FL — SIGNIFICANT CHANGE UP (ref 80–100)
MONOCYTES # BLD AUTO: 0.4 K/UL — SIGNIFICANT CHANGE UP (ref 0–0.9)
MONOCYTES # BLD AUTO: 0.4 K/UL — SIGNIFICANT CHANGE UP (ref 0–0.9)
MONOCYTES NFR BLD AUTO: 10.2 % — SIGNIFICANT CHANGE UP (ref 2–14)
MONOCYTES NFR BLD AUTO: 10.2 % — SIGNIFICANT CHANGE UP (ref 2–14)
NEUTROPHILS # BLD AUTO: 2.04 K/UL — SIGNIFICANT CHANGE UP (ref 1.8–7.4)
NEUTROPHILS # BLD AUTO: 2.04 K/UL — SIGNIFICANT CHANGE UP (ref 1.8–7.4)
NEUTROPHILS NFR BLD AUTO: 52.1 % — SIGNIFICANT CHANGE UP (ref 43–77)
NEUTROPHILS NFR BLD AUTO: 52.1 % — SIGNIFICANT CHANGE UP (ref 43–77)
NRBC # BLD: 0 /100 WBCS — SIGNIFICANT CHANGE UP (ref 0–0)
NRBC # BLD: 0 /100 WBCS — SIGNIFICANT CHANGE UP (ref 0–0)
PHOSPHATE SERPL-MCNC: 6.2 MG/DL — HIGH (ref 2.5–4.5)
PHOSPHATE SERPL-MCNC: 6.2 MG/DL — HIGH (ref 2.5–4.5)
PLATELET # BLD AUTO: 214 K/UL — SIGNIFICANT CHANGE UP (ref 150–400)
PLATELET # BLD AUTO: 214 K/UL — SIGNIFICANT CHANGE UP (ref 150–400)
POTASSIUM SERPL-MCNC: 5 MMOL/L — SIGNIFICANT CHANGE UP (ref 3.5–5.3)
POTASSIUM SERPL-MCNC: 5 MMOL/L — SIGNIFICANT CHANGE UP (ref 3.5–5.3)
POTASSIUM SERPL-SCNC: 5 MMOL/L — SIGNIFICANT CHANGE UP (ref 3.5–5.3)
POTASSIUM SERPL-SCNC: 5 MMOL/L — SIGNIFICANT CHANGE UP (ref 3.5–5.3)
PTH-INTACT FLD-MCNC: 3968 PG/ML — HIGH (ref 15–65)
PTH-INTACT FLD-MCNC: 3968 PG/ML — HIGH (ref 15–65)
RBC # BLD: 2.73 M/UL — LOW (ref 4.2–5.8)
RBC # BLD: 2.73 M/UL — LOW (ref 4.2–5.8)
RBC # FLD: 14.6 % — HIGH (ref 10.3–14.5)
RBC # FLD: 14.6 % — HIGH (ref 10.3–14.5)
SODIUM SERPL-SCNC: 140 MMOL/L — SIGNIFICANT CHANGE UP (ref 135–145)
SODIUM SERPL-SCNC: 140 MMOL/L — SIGNIFICANT CHANGE UP (ref 135–145)
WBC # BLD: 3.92 K/UL — SIGNIFICANT CHANGE UP (ref 3.8–10.5)
WBC # BLD: 3.92 K/UL — SIGNIFICANT CHANGE UP (ref 3.8–10.5)
WBC # FLD AUTO: 3.92 K/UL — SIGNIFICANT CHANGE UP (ref 3.8–10.5)
WBC # FLD AUTO: 3.92 K/UL — SIGNIFICANT CHANGE UP (ref 3.8–10.5)

## 2023-11-27 RX ORDER — ERYTHROPOIETIN 10000 [IU]/ML
10000 INJECTION, SOLUTION INTRAVENOUS; SUBCUTANEOUS
Refills: 0 | Status: DISCONTINUED | OUTPATIENT
Start: 2023-11-27 | End: 2023-12-02

## 2023-11-27 RX ADMIN — Medication 25 MILLIGRAM(S): at 21:46

## 2023-11-27 RX ADMIN — Medication 25 MILLIGRAM(S): at 17:22

## 2023-11-27 RX ADMIN — SEVELAMER CARBONATE 1600 MILLIGRAM(S): 2400 POWDER, FOR SUSPENSION ORAL at 17:22

## 2023-11-27 RX ADMIN — Medication 25 MILLIGRAM(S): at 05:57

## 2023-11-27 RX ADMIN — OXYCODONE HYDROCHLORIDE 15 MILLIGRAM(S): 5 TABLET ORAL at 23:23

## 2023-11-27 RX ADMIN — HEPARIN SODIUM 5000 UNIT(S): 5000 INJECTION INTRAVENOUS; SUBCUTANEOUS at 21:45

## 2023-11-27 RX ADMIN — SEVELAMER CARBONATE 1600 MILLIGRAM(S): 2400 POWDER, FOR SUSPENSION ORAL at 09:14

## 2023-11-27 RX ADMIN — SEVELAMER CARBONATE 1600 MILLIGRAM(S): 2400 POWDER, FOR SUSPENSION ORAL at 13:56

## 2023-11-27 RX ADMIN — HEPARIN SODIUM 5000 UNIT(S): 5000 INJECTION INTRAVENOUS; SUBCUTANEOUS at 05:57

## 2023-11-27 RX ADMIN — OXYCODONE HYDROCHLORIDE 15 MILLIGRAM(S): 5 TABLET ORAL at 23:38

## 2023-11-27 RX ADMIN — CHLORHEXIDINE GLUCONATE 1 APPLICATION(S): 213 SOLUTION TOPICAL at 05:55

## 2023-11-27 RX ADMIN — HEPARIN SODIUM 5000 UNIT(S): 5000 INJECTION INTRAVENOUS; SUBCUTANEOUS at 17:22

## 2023-11-27 RX ADMIN — Medication 25 MILLIGRAM(S): at 17:23

## 2023-11-27 NOTE — PROGRESS NOTE ADULT - SUBJECTIVE AND OBJECTIVE BOX
Erik Caldwell MD  Interventional Cardiology / Endovascular Specialist  [place] Office : [address] [place] [address]. [address]  Tel:   [place] Office : [address] [facility] [place] [address]  Tel: [phone]    INTERVAL HISTORY: no complaints   	  MEDICATIONS:  heparin   Injectable 5000 Unit(s) SubCutaneous every 8 hours  hydrALAZINE 25 milliGRAM(s) Oral three times a day  metoprolol tartrate 25 milliGRAM(s) Oral two times a day        HYDROmorphone  Injectable 2 milliGRAM(s) IV Push every 8 hours PRN  oxyCODONE    IR 15 milliGRAM(s) Oral every 6 hours PRN        chlorhexidine 2% Cloths 1 Application(s) Topical <User Schedule>  epoetin carito (EPOGEN) Injectable 42281 Unit(s) IV Push <User Schedule>      PAST MEDICAL/SURGICAL HISTORY  PAST MEDICAL & SURGICAL HISTORY:  HTN (hypertension)      Stroke  age 10      Sleep apnea      Leg fracture, right      Chronic renal failure      Hemodialysis access, AV graft      ESRD (end stage renal disease) on dialysis  since 2013, M-W-F      Anemia, unspecified type      Other hyperparathyroidism      AV fistula  R arm; L arm clotted      History of left hip hemiarthroplasty          SOCIAL HISTORY: Substance Use (street drugs): ( x ) never used  (  ) other:    FAMILY HISTORY:      REVIEW OF SYSTEMS:  CONSTITUTIONAL: No fever, weight loss, or fatigue  EYES: No eye pain, visual disturbances, or discharge  ENMT:  No difficulty hearing, tinnitus, vertigo; No sinus or throat pain  BREASTS: No pain, masses, or nipple discharge  GASTROINTESTINAL: No abdominal or epigastric pain. No nausea, vomiting, or hematemesis; No diarrhea or constipation. No melena or hematochezia.  GENITOURINARY: No dysuria, frequency, hematuria, or incontinence  NEUROLOGICAL: No headaches, memory loss, loss of strength, numbness, or tremors  ENDOCRINE: No heat or cold intolerance; No hair loss  MUSCULOSKELETAL: No joint pain or swelling; No muscle, back, or extremity pain  PSYCHIATRIC: No depression, anxiety, mood swings, or difficulty sleeping  HEME/LYMPH: No easy bruising, or bleeding gums  All others negative    PHYSICAL EXAM:  T(C): 36.7 (11-27-23 @ 21:15), Max: 36.8 (11-27-23 @ 12:09)  HR: 76 (11-27-23 @ 21:15) (76 - 91)  BP: 131/63 (11-27-23 @ 21:15) (131/63 - 146/83)  RR: 18 (11-27-23 @ 21:15) (18 - 18)  SpO2: 99% (11-27-23 @ 21:15) (98% - 100%)  Wt(kg): --  I&O's Summary    26 Nov 2023 07:01  -  27 Nov 2023 07:00  --------------------------------------------------------  IN: 300 mL / OUT: 0 mL / NET: 300 mL    27 Nov 2023 07:01  -  27 Nov 2023 21:24  --------------------------------------------------------  IN: 500 mL / OUT: 2000 mL / NET: -1500 mL          Appearance: In no distress	  HEENT:    PERRL, EOMI	  Cardiovascular:  S1 S2, No JVD  Respiratory: Lungs clear to auscultation	  Gastrointestinal:  Soft, Non-tender, + BS	  Vascularature:  No edema of LE  Psychiatric: Appropriate affect   Neuro: no acute focal deficits                           8.1    3.92  )-----------( 214      ( 27 Nov 2023 13:28 )             25.5     11-27    140  |  95<L>  |  94<H>  ----------------------------<  62<L>  5.0   |  26  |  12.70<H>    Ca    9.1      27 Nov 2023 13:28  Phos  6.2     11-27      proBNP:   Lipid Profile:   HgA1c:   TSH:     Consultant(s) Notes Reviewed:  [x ] YES  [ ] NO    Care Discussed with Consultants/Other Providers [ x] YES  [ ] NO    Imaging Personally Reviewed independently:  [x] YES  [ ] NO    All labs, radiologic studies, vitals, orders and medications list reviewed. Patient is seen and examined at bedside. Case discussed with medical team.

## 2023-11-27 NOTE — PROGRESS NOTE ADULT - SUBJECTIVE AND OBJECTIVE BOX
Date of service: 11-27-23 @ 11:11      Patient is a 37y old  Male who presents with a chief complaint of Left femoral neck fracture (22 Nov 2023 15:19)                                                               INTERVAL HPI/OVERNIGHT EVENTS:    REVIEW OF SYSTEMS:     CONSTITUTIONAL: No weakness, fevers or chills  EYES/ENT: No visual changes , no ear ache   NECK: No pain or stiffness  RESPIRATORY: No cough, wheezing,  No shortness of breath  CARDIOVASCULAR: No chest pain or palpitations  GASTROINTESTINAL: No abdominal pain  . No nausea, vomiting, or hematemesis; No diarrhea or constipation. No melena or hematochezia.  GENITOURINARY: No dysuria, frequency or hematuria  NEUROLOGICAL: No numbness or weakness  SKIN: No itching, burning, rashes, or lesions                                                                                                                                                                                                                                                                                 Medications:  MEDICATIONS  (STANDING):  chlorhexidine 2% Cloths 1 Application(s) Topical <User Schedule>  heparin   Injectable 5000 Unit(s) SubCutaneous every 8 hours  hydrALAZINE 25 milliGRAM(s) Oral three times a day  metoprolol tartrate 25 milliGRAM(s) Oral two times a day  sevelamer carbonate 1600 milliGRAM(s) Oral three times a day    MEDICATIONS  (PRN):  HYDROmorphone  Injectable 2 milliGRAM(s) IV Push every 8 hours PRN Severe Pain (7 - 10)  oxyCODONE    IR 15 milliGRAM(s) Oral every 6 hours PRN Severe Pain (7 - 10)       Allergies    shellfish (Hives)  No Known Drug Allergies    Intolerances      Vital Signs Last 24 Hrs  T(C): 36.7 (27 Nov 2023 04:57), Max: 36.9 (26 Nov 2023 21:17)  T(F): 98.1 (27 Nov 2023 04:57), Max: 98.4 (26 Nov 2023 21:17)  HR: 89 (27 Nov 2023 04:57) (73 - 89)  BP: 143/78 (27 Nov 2023 04:57) (131/74 - 143/78)  BP(mean): --  RR: 18 (27 Nov 2023 04:57) (18 - 18)  SpO2: 98% (27 Nov 2023 04:57) (92% - 98%)    Parameters below as of 27 Nov 2023 04:57  Patient On (Oxygen Delivery Method): room air      CAPILLARY BLOOD GLUCOSE          11-26 @ 07:01  -  11-27 @ 07:00  --------------------------------------------------------  IN: 300 mL / OUT: 0 mL / NET: 300 mL      Physical Exam:    Daily     Daily   General:  Well appearing, NAD, not cachetic  HEENT:  Nonicteric, PERRLA  CV:  RRR, S1S2   Lungs:  CTA B/L, no wheezes, rales, rhonchi  Abdomen:  Soft, non-tender, no distended, positive BS  Extremities:  no edema                                                                                                                                                                                                                                                                                    LABS:                                                     RADIOLOGY & ADDITIONAL TESTS         I personally reviewed: [  ]EKG   [  ]CXR    [  ] CT      A/P:         Discussed with :     Jen consultants' Notes   Time spent :

## 2023-11-27 NOTE — PROGRESS NOTE ADULT - SUBJECTIVE AND OBJECTIVE BOX
Seen on HD    Vital Signs Last 24 Hrs  T(C): 36.5 (11-27-23 @ 12:40), Max: 36.9 (11-26-23 @ 21:17)  T(F): 97.7 (11-27-23 @ 12:40), Max: 98.4 (11-26-23 @ 21:17)  HR: 80 (11-27-23 @ 12:40) (80 - 91)  BP: 142/80 (11-27-23 @ 12:40) (142/78 - 146/83)  RR: 18 (11-27-23 @ 12:40) (18 - 18)  SpO2: 98% (11-27-23 @ 12:40) (92% - 100%)    s1s2  b/l air entry  soft, ND  tr edema, AVF patent                                                                                              8.1    3.92  )-----------( 214      ( 27 Nov 2023 13:28 )             25.5     27 Nov 2023 13:28    140    |  95     |  94     ----------------------------<  62     5.0     |  26     |  12.70    Ca    9.1        27 Nov 2023 13:28  Phos  6.2       27 Nov 2023 13:28    chlorhexidine 2% Cloths 1 Application(s) Topical <User Schedule>  heparin   Injectable 5000 Unit(s) SubCutaneous every 8 hours  hydrALAZINE 25 milliGRAM(s) Oral three times a day  HYDROmorphone  Injectable 2 milliGRAM(s) IV Push every 8 hours PRN  metoprolol tartrate 25 milliGRAM(s) Oral two times a day  oxyCODONE    IR 15 milliGRAM(s) Oral every 6 hours PRN  sevelamer carbonate 1600 milliGRAM(s) Oral three times a day    A/P:    Hx CVA, ESRD on HD  Hx fall, L femoral neck fracture, s/p L hemiarthroplasty 8/15/23  S/p rehab  D/c-d home recently  S/p fall at home, L hip pain, re-adm 11/20/23   No acute fx on CT  HD today as ordered   Epoetin w/HD  Renal diet, Renvela   JUSTINE    [phone]

## 2023-11-28 PROCEDURE — 76376 3D RENDER W/INTRP POSTPROCES: CPT | Mod: 26

## 2023-11-28 PROCEDURE — 93306 TTE W/DOPPLER COMPLETE: CPT | Mod: 26

## 2023-11-28 RX ORDER — OXYCODONE HYDROCHLORIDE 5 MG/1
15 TABLET ORAL EVERY 6 HOURS
Refills: 0 | Status: DISCONTINUED | OUTPATIENT
Start: 2023-11-28 | End: 2023-12-02

## 2023-11-28 RX ORDER — HYDROMORPHONE HYDROCHLORIDE 2 MG/ML
2 INJECTION INTRAMUSCULAR; INTRAVENOUS; SUBCUTANEOUS EVERY 8 HOURS
Refills: 0 | Status: DISCONTINUED | OUTPATIENT
Start: 2023-11-28 | End: 2023-12-02

## 2023-11-28 RX ORDER — CINACALCET 30 MG/1
30 TABLET, FILM COATED ORAL DAILY
Refills: 0 | Status: DISCONTINUED | OUTPATIENT
Start: 2023-11-28 | End: 2023-12-02

## 2023-11-28 RX ADMIN — Medication 25 MILLIGRAM(S): at 18:45

## 2023-11-28 RX ADMIN — HEPARIN SODIUM 5000 UNIT(S): 5000 INJECTION INTRAVENOUS; SUBCUTANEOUS at 13:07

## 2023-11-28 RX ADMIN — CHLORHEXIDINE GLUCONATE 1 APPLICATION(S): 213 SOLUTION TOPICAL at 05:38

## 2023-11-28 RX ADMIN — CINACALCET 30 MILLIGRAM(S): 30 TABLET, FILM COATED ORAL at 12:13

## 2023-11-28 RX ADMIN — SEVELAMER CARBONATE 1600 MILLIGRAM(S): 2400 POWDER, FOR SUSPENSION ORAL at 12:14

## 2023-11-28 RX ADMIN — Medication 25 MILLIGRAM(S): at 05:36

## 2023-11-28 RX ADMIN — Medication 25 MILLIGRAM(S): at 13:11

## 2023-11-28 RX ADMIN — SEVELAMER CARBONATE 1600 MILLIGRAM(S): 2400 POWDER, FOR SUSPENSION ORAL at 18:45

## 2023-11-28 RX ADMIN — SEVELAMER CARBONATE 1600 MILLIGRAM(S): 2400 POWDER, FOR SUSPENSION ORAL at 09:12

## 2023-11-28 RX ADMIN — OXYCODONE HYDROCHLORIDE 15 MILLIGRAM(S): 5 TABLET ORAL at 07:41

## 2023-11-28 RX ADMIN — OXYCODONE HYDROCHLORIDE 15 MILLIGRAM(S): 5 TABLET ORAL at 06:41

## 2023-11-28 NOTE — PROGRESS NOTE ADULT - SUBJECTIVE AND OBJECTIVE BOX
Date of service: 23 @ 11:24      Patient is a 37y old  Male who presents with a chief complaint of Left femoral neck fracture (2023 14:29)                                                               INTERVAL HPI/OVERNIGHT EVENTS:    REVIEW OF SYSTEMS:  no complaints                                                                                                                                                                                                                                                                        Medications:  MEDICATIONS  (STANDING):  chlorhexidine 2% Cloths 1 Application(s) Topical <User Schedule>  cinacalcet 30 milliGRAM(s) Oral daily  epoetin carito (EPOGEN) Injectable 54818 Unit(s) IV Push <User Schedule>  heparin   Injectable 5000 Unit(s) SubCutaneous every 8 hours  hydrALAZINE 25 milliGRAM(s) Oral three times a day  metoprolol tartrate 25 milliGRAM(s) Oral two times a day  sevelamer carbonate 1600 milliGRAM(s) Oral three times a day    MEDICATIONS  (PRN):  HYDROmorphone  Injectable 2 milliGRAM(s) IV Push every 8 hours PRN Severe Pain (7 - 10)  oxyCODONE    IR 15 milliGRAM(s) Oral every 6 hours PRN Severe Pain (7 - 10)       Allergies    shellfish (Hives)  No Known Drug Allergies    Intolerances      Vital Signs Last 24 Hrs  T(C): 36.7 (2023 05:17), Max: 36.8 (2023 12:09)  T(F): 98.1 (2023 05:17), Max: 98.3 (2023 12:09)  HR: 82 (2023 05:17) (76 - 91)  BP: 148/80 (2023 05:17) (131/63 - 148/80)  BP(mean): --  RR: 18 (2023 05:17) (18 - 18)  SpO2: 99% (2023 05:17) (98% - 100%)    Parameters below as of 2023 05:17  Patient On (Oxygen Delivery Method): room air      CAPILLARY BLOOD GLUCOSE           @ 07:01  -   @ 07:00  --------------------------------------------------------  IN: 500 mL / OUT: 2000 mL / NET: -1500 mL      Physical Exam:    Daily     Daily Weight in k (2023 15:40)  General:  Well appearing, NAD, not cachetic  HEENT:  Nonicteric, PERRLA  CV:  RRR, S1S2   Lungs:  CTA B/L, no wheezes, rales, rhonchi  Abdomen:  Soft, non-tender, no distended, positive BS  Extremities: no edema  Neuro:  AAOx3, non-focal, grossly intact                                                                                                                                                                                                                                                                                                LABS:                               8.1    3.92  )-----------( 214      ( 2023 13:28 )             25.5                      11-27    140  |  95<L>  |  94<H>  ----------------------------<  62<L>  5.0   |  26  |  12.70<H>    Ca    9.1      2023 13:28  Phos  6.2     11-27                         RADIOLOGY & ADDITIONAL TESTS         I personally reviewed: [  ]EKG   [  ]CXR    [  ] CT      A/P:         Discussed with :     Jen consultants' Notes   Time spent :

## 2023-11-28 NOTE — PROVIDER CONTACT NOTE (OTHER) - BACKGROUND
(Admit Diagnosis) Hyperkalemia  (PMH) Other hyperparathyroidism  (PMH) Anemia, unspecified type  (PMH) ESRD (end stage renal disease) on dialysis  (PMH) Hemodialysis access, AV graft

## 2023-11-28 NOTE — PROGRESS NOTE ADULT - SUBJECTIVE AND OBJECTIVE BOX
Erik Caldwell MD  Interventional Cardiology / Advance Heart Failure and Cardiac Transplant Specialist  [place] Office : [address] [place] [address] [facility] [address]  Tel:   [place] Office : [address] [facility] [place] [address]  Tel: [phone]       Pt is lying in bed comfortable not in distress, no chest pains no SOB no palpitations  	  MEDICATIONS:  heparin   Injectable 5000 Unit(s) SubCutaneous every 8 hours  hydrALAZINE 25 milliGRAM(s) Oral three times a day  metoprolol tartrate 25 milliGRAM(s) Oral two times a day        HYDROmorphone  Injectable 2 milliGRAM(s) IV Push every 8 hours PRN  oxyCODONE    IR 15 milliGRAM(s) Oral every 6 hours PRN      cinacalcet 30 milliGRAM(s) Oral daily    chlorhexidine 2% Cloths 1 Application(s) Topical <User Schedule>  epoetin carito (EPOGEN) Injectable 05262 Unit(s) IV Push <User Schedule>      PAST MEDICAL/SURGICAL HISTORY  PAST MEDICAL & SURGICAL HISTORY:  HTN (hypertension)      Stroke  age 10      Sleep apnea      Leg fracture, right      Chronic renal failure      Hemodialysis access, AV graft      ESRD (end stage renal disease) on dialysis  since 2013, M-W-F      Anemia, unspecified type      Other hyperparathyroidism      AV fistula  R arm; L arm clotted      History of left hip hemiarthroplasty          SOCIAL HISTORY: Substance Use (street drugs): ( x ) never used  (  ) other:    FAMILY HISTORY:      PHYSICAL EXAM:  T(C): 36.6 (11-28-23 @ 20:51), Max: 36.8 (11-28-23 @ 11:42)  HR: 75 (11-28-23 @ 20:51) (75 - 89)  BP: 121/55 (11-28-23 @ 21:43) (120/72 - 148/80)  RR: 18 (11-28-23 @ 20:51) (18 - 18)  SpO2: 99% (11-28-23 @ 20:51) (95% - 99%)  Wt(kg): --  I&O's Summary    27 Nov 2023 07:01  -  28 Nov 2023 07:00  --------------------------------------------------------  IN: 500 mL / OUT: 2000 mL / NET: -1500 mL          EYES:   PERRLA   ENMT:   Moist mucous membranes, Good dentition, No lesions  Cardiovascular: Normal S1 S2, No JVD, No murmurs, No edema  Respiratory: Lungs clear to auscultation	  Gastrointestinal:  Soft, Non-tender, + BS	  Extremities: no edema                                    8.1    3.92  )-----------( 214      ( 27 Nov 2023 13:28 )             25.5     11-27    140  |  95<L>  |  94<H>  ----------------------------<  62<L>  5.0   |  26  |  12.70<H>    Ca    9.1      27 Nov 2023 13:28  Phos  6.2     11-27      proBNP:   Lipid Profile:   HgA1c:   TSH:     Consultant(s) Notes Reviewed:  [x ] YES  [ ] NO    Care Discussed with Consultants/Other Providers [ x] YES  [ ] NO    Imaging Personally Reviewed independently:  [x] YES  [ ] NO    All labs, radiologic studies, vitals, orders and medications list reviewed. Patient is seen and examined at bedside. Case discussed with medical team.

## 2023-11-29 LAB
ANION GAP SERPL CALC-SCNC: 17 MMOL/L — SIGNIFICANT CHANGE UP (ref 5–17)
ANION GAP SERPL CALC-SCNC: 17 MMOL/L — SIGNIFICANT CHANGE UP (ref 5–17)
BASOPHILS # BLD AUTO: 0.04 K/UL — SIGNIFICANT CHANGE UP (ref 0–0.2)
BASOPHILS # BLD AUTO: 0.04 K/UL — SIGNIFICANT CHANGE UP (ref 0–0.2)
BASOPHILS NFR BLD AUTO: 0.9 % — SIGNIFICANT CHANGE UP (ref 0–2)
BASOPHILS NFR BLD AUTO: 0.9 % — SIGNIFICANT CHANGE UP (ref 0–2)
BUN SERPL-MCNC: 86 MG/DL — HIGH (ref 7–23)
BUN SERPL-MCNC: 86 MG/DL — HIGH (ref 7–23)
CALCIUM SERPL-MCNC: 8.6 MG/DL — SIGNIFICANT CHANGE UP (ref 8.4–10.5)
CALCIUM SERPL-MCNC: 8.6 MG/DL — SIGNIFICANT CHANGE UP (ref 8.4–10.5)
CHLORIDE SERPL-SCNC: 95 MMOL/L — LOW (ref 96–108)
CHLORIDE SERPL-SCNC: 95 MMOL/L — LOW (ref 96–108)
CO2 SERPL-SCNC: 27 MMOL/L — SIGNIFICANT CHANGE UP (ref 22–31)
CO2 SERPL-SCNC: 27 MMOL/L — SIGNIFICANT CHANGE UP (ref 22–31)
CREAT SERPL-MCNC: 11.94 MG/DL — HIGH (ref 0.5–1.3)
CREAT SERPL-MCNC: 11.94 MG/DL — HIGH (ref 0.5–1.3)
EGFR: 5 ML/MIN/1.73M2 — LOW
EGFR: 5 ML/MIN/1.73M2 — LOW
EOSINOPHIL # BLD AUTO: 0.25 K/UL — SIGNIFICANT CHANGE UP (ref 0–0.5)
EOSINOPHIL # BLD AUTO: 0.25 K/UL — SIGNIFICANT CHANGE UP (ref 0–0.5)
EOSINOPHIL NFR BLD AUTO: 5.6 % — SIGNIFICANT CHANGE UP (ref 0–6)
EOSINOPHIL NFR BLD AUTO: 5.6 % — SIGNIFICANT CHANGE UP (ref 0–6)
GLUCOSE SERPL-MCNC: 88 MG/DL — SIGNIFICANT CHANGE UP (ref 70–99)
GLUCOSE SERPL-MCNC: 88 MG/DL — SIGNIFICANT CHANGE UP (ref 70–99)
HCT VFR BLD CALC: 26.8 % — LOW (ref 39–50)
HCT VFR BLD CALC: 26.8 % — LOW (ref 39–50)
HGB BLD-MCNC: 8.5 G/DL — LOW (ref 13–17)
HGB BLD-MCNC: 8.5 G/DL — LOW (ref 13–17)
IMM GRANULOCYTES NFR BLD AUTO: 0.2 % — SIGNIFICANT CHANGE UP (ref 0–0.9)
IMM GRANULOCYTES NFR BLD AUTO: 0.2 % — SIGNIFICANT CHANGE UP (ref 0–0.9)
LYMPHOCYTES # BLD AUTO: 1.32 K/UL — SIGNIFICANT CHANGE UP (ref 1–3.3)
LYMPHOCYTES # BLD AUTO: 1.32 K/UL — SIGNIFICANT CHANGE UP (ref 1–3.3)
LYMPHOCYTES # BLD AUTO: 29.7 % — SIGNIFICANT CHANGE UP (ref 13–44)
LYMPHOCYTES # BLD AUTO: 29.7 % — SIGNIFICANT CHANGE UP (ref 13–44)
MCHC RBC-ENTMCNC: 29.8 PG — SIGNIFICANT CHANGE UP (ref 27–34)
MCHC RBC-ENTMCNC: 29.8 PG — SIGNIFICANT CHANGE UP (ref 27–34)
MCHC RBC-ENTMCNC: 31.7 GM/DL — LOW (ref 32–36)
MCHC RBC-ENTMCNC: 31.7 GM/DL — LOW (ref 32–36)
MCV RBC AUTO: 94 FL — SIGNIFICANT CHANGE UP (ref 80–100)
MCV RBC AUTO: 94 FL — SIGNIFICANT CHANGE UP (ref 80–100)
MONOCYTES # BLD AUTO: 0.51 K/UL — SIGNIFICANT CHANGE UP (ref 0–0.9)
MONOCYTES # BLD AUTO: 0.51 K/UL — SIGNIFICANT CHANGE UP (ref 0–0.9)
MONOCYTES NFR BLD AUTO: 11.5 % — SIGNIFICANT CHANGE UP (ref 2–14)
MONOCYTES NFR BLD AUTO: 11.5 % — SIGNIFICANT CHANGE UP (ref 2–14)
NEUTROPHILS # BLD AUTO: 2.32 K/UL — SIGNIFICANT CHANGE UP (ref 1.8–7.4)
NEUTROPHILS # BLD AUTO: 2.32 K/UL — SIGNIFICANT CHANGE UP (ref 1.8–7.4)
NEUTROPHILS NFR BLD AUTO: 52.1 % — SIGNIFICANT CHANGE UP (ref 43–77)
NEUTROPHILS NFR BLD AUTO: 52.1 % — SIGNIFICANT CHANGE UP (ref 43–77)
NRBC # BLD: 0 /100 WBCS — SIGNIFICANT CHANGE UP (ref 0–0)
NRBC # BLD: 0 /100 WBCS — SIGNIFICANT CHANGE UP (ref 0–0)
PLATELET # BLD AUTO: 239 K/UL — SIGNIFICANT CHANGE UP (ref 150–400)
PLATELET # BLD AUTO: 239 K/UL — SIGNIFICANT CHANGE UP (ref 150–400)
POTASSIUM SERPL-MCNC: 5.3 MMOL/L — SIGNIFICANT CHANGE UP (ref 3.5–5.3)
POTASSIUM SERPL-MCNC: 5.3 MMOL/L — SIGNIFICANT CHANGE UP (ref 3.5–5.3)
POTASSIUM SERPL-SCNC: 5.3 MMOL/L — SIGNIFICANT CHANGE UP (ref 3.5–5.3)
POTASSIUM SERPL-SCNC: 5.3 MMOL/L — SIGNIFICANT CHANGE UP (ref 3.5–5.3)
RBC # BLD: 2.85 M/UL — LOW (ref 4.2–5.8)
RBC # BLD: 2.85 M/UL — LOW (ref 4.2–5.8)
RBC # FLD: 14.9 % — HIGH (ref 10.3–14.5)
RBC # FLD: 14.9 % — HIGH (ref 10.3–14.5)
SODIUM SERPL-SCNC: 139 MMOL/L — SIGNIFICANT CHANGE UP (ref 135–145)
SODIUM SERPL-SCNC: 139 MMOL/L — SIGNIFICANT CHANGE UP (ref 135–145)
WBC # BLD: 4.45 K/UL — SIGNIFICANT CHANGE UP (ref 3.8–10.5)
WBC # BLD: 4.45 K/UL — SIGNIFICANT CHANGE UP (ref 3.8–10.5)
WBC # FLD AUTO: 4.45 K/UL — SIGNIFICANT CHANGE UP (ref 3.8–10.5)
WBC # FLD AUTO: 4.45 K/UL — SIGNIFICANT CHANGE UP (ref 3.8–10.5)

## 2023-11-29 RX ORDER — HYDRALAZINE HCL 50 MG
10 TABLET ORAL EVERY 12 HOURS
Refills: 0 | Status: DISCONTINUED | OUTPATIENT
Start: 2023-11-29 | End: 2023-12-02

## 2023-11-29 RX ADMIN — Medication 10 MILLIGRAM(S): at 18:09

## 2023-11-29 RX ADMIN — HEPARIN SODIUM 5000 UNIT(S): 5000 INJECTION INTRAVENOUS; SUBCUTANEOUS at 22:18

## 2023-11-29 RX ADMIN — Medication 25 MILLIGRAM(S): at 17:52

## 2023-11-29 RX ADMIN — SEVELAMER CARBONATE 1600 MILLIGRAM(S): 2400 POWDER, FOR SUSPENSION ORAL at 08:43

## 2023-11-29 RX ADMIN — HEPARIN SODIUM 5000 UNIT(S): 5000 INJECTION INTRAVENOUS; SUBCUTANEOUS at 05:43

## 2023-11-29 RX ADMIN — OXYCODONE HYDROCHLORIDE 15 MILLIGRAM(S): 5 TABLET ORAL at 00:19

## 2023-11-29 RX ADMIN — SEVELAMER CARBONATE 1600 MILLIGRAM(S): 2400 POWDER, FOR SUSPENSION ORAL at 17:51

## 2023-11-29 RX ADMIN — CHLORHEXIDINE GLUCONATE 1 APPLICATION(S): 213 SOLUTION TOPICAL at 05:44

## 2023-11-29 RX ADMIN — ERYTHROPOIETIN 10000 UNIT(S): 10000 INJECTION, SOLUTION INTRAVENOUS; SUBCUTANEOUS at 10:46

## 2023-11-29 NOTE — PROGRESS NOTE ADULT - SUBJECTIVE AND OBJECTIVE BOX
Date of service: 11-29-23 @ 23:50      Patient is a 37y old  Male who presents with a chief complaint of Left femoral neck fracture (29 Nov 2023 19:09)                                                               INTERVAL HPI/OVERNIGHT EVENTS:    REVIEW OF SYSTEMS:     CONSTITUTIONAL: No weakness, fevers or chills  EYES/ENT: No visual changes , no ear ache   NECK: No pain or stiffness  RESPIRATORY: No cough, wheezing,  No shortness of breath  CARDIOVASCULAR: No chest pain or palpitations  GASTROINTESTINAL: No abdominal pain  . No nausea, vomiting, or hematemesis; No diarrhea or constipation. No melena or hematochezia.  GENITOURINARY: No dysuria, frequency or hematuria  NEUROLOGICAL: No numbness or weakness  SKIN: No itching, burning, rashes, or lesions                                                                                                                                                                                                                                                                                 Medications:  MEDICATIONS  (STANDING):  chlorhexidine 2% Cloths 1 Application(s) Topical <User Schedule>  cinacalcet 30 milliGRAM(s) Oral daily  epoetin carito (EPOGEN) Injectable 53708 Unit(s) IV Push <User Schedule>  heparin   Injectable 5000 Unit(s) SubCutaneous every 8 hours  hydrALAZINE 10 milliGRAM(s) Oral every 12 hours  metoprolol tartrate 25 milliGRAM(s) Oral two times a day  sevelamer carbonate 1600 milliGRAM(s) Oral three times a day    MEDICATIONS  (PRN):  HYDROmorphone  Injectable 2 milliGRAM(s) IV Push every 8 hours PRN Severe Pain (7 - 10)  oxyCODONE    IR 15 milliGRAM(s) Oral every 6 hours PRN Severe Pain (7 - 10)       Allergies    shellfish (Hives)  No Known Drug Allergies    Intolerances      Vital Signs Last 24 Hrs  T(C): 37.1 (29 Nov 2023 20:23), Max: 37.1 (29 Nov 2023 20:23)  T(F): 98.7 (29 Nov 2023 20:23), Max: 98.7 (29 Nov 2023 20:23)  HR: 79 (29 Nov 2023 20:23) (78 - 95)  BP: 125/81 (29 Nov 2023 20:23) (124/71 - 150/86)  BP(mean): --  RR: 18 (29 Nov 2023 20:23) (18 - 18)  SpO2: 100% (29 Nov 2023 20:23) (95% - 100%)    Parameters below as of 29 Nov 2023 20:23  Patient On (Oxygen Delivery Method): room air      CAPILLARY BLOOD GLUCOSE          11-29 @ 07:01  -  11-29 @ 23:50  --------------------------------------------------------  IN: 0 mL / OUT: 2000 mL / NET: -2000 mL      Physical Exam:    Daily     Daily   General:  Well appearing, NAD, not cachetic  HEENT:  Nonicteric, PERRLA  CV:  RRR, S1S2   Lungs:  CTA B/L, no wheezes, rales, rhonchi  Abdomen:  Soft, non-tender, no distended, positive BS  Extremities:  2+ pulses, no c/c, no edema  Skin:  Warm and dry, no rashes  :  No mayes  Neuro:  AAOx3, non-focal, grossly intact                                                                                                                                                                                                                                                                                                LABS:                               8.5    4.45  )-----------( 239      ( 29 Nov 2023 11:27 )             26.8                      11-29    139  |  95<L>  |  86<H>  ----------------------------<  88  5.3   |  27  |  11.94<H>    Ca    8.6      29 Nov 2023 11:27                         RADIOLOGY & ADDITIONAL TESTS         I personally reviewed: [  ]EKG   [  ]CXR    [  ] CT      A/P:         Discussed with :     Jen consultants' Notes   Time spent :

## 2023-11-29 NOTE — PROGRESS NOTE ADULT - SUBJECTIVE AND OBJECTIVE BOX
S/p stable HD today     Vital Signs Last 24 Hrs  T(C): 36.9 (11-29-23 @ 17:58), Max: 36.9 (11-29-23 @ 09:46)  T(F): 98.5 (11-29-23 @ 17:58), Max: 98.5 (11-29-23 @ 17:58)  HR: 95 (11-29-23 @ 17:58) (75 - 95)  BP: 129/73 (11-29-23 @ 17:58) (121/55 - 150/86)  RR: 18 (11-29-23 @ 17:58) (18 - 18)  SpO2: 99% (11-29-23 @ 17:58) (95% - 99%)    I&O's Detail    29 Nov 2023 07:01  -  29 Nov 2023 19:10  --------------------------------------------------------  OUT:    Other (mL): 2000 mL  Total OUT: 2000 mL    s1s2  b/l air entry  soft, ND  tr edema, AVF patent                                                                                                              8.5    4.45  )-----------( 239      ( 29 Nov 2023 11:27 )             26.8     29 Nov 2023 11:27    139    |  95     |  86     ----------------------------<  88     5.3     |  27     |  11.94    Ca    8.6        29 Nov 2023 11:27    chlorhexidine 2% Cloths 1 Application(s) Topical <User Schedule>  cinacalcet 30 milliGRAM(s) Oral daily  epoetin carito (EPOGEN) Injectable 33559 Unit(s) IV Push <User Schedule>  heparin   Injectable 5000 Unit(s) SubCutaneous every 8 hours  hydrALAZINE 10 milliGRAM(s) Oral every 12 hours  HYDROmorphone  Injectable 2 milliGRAM(s) IV Push every 8 hours PRN  metoprolol tartrate 25 milliGRAM(s) Oral two times a day  oxyCODONE    IR 15 milliGRAM(s) Oral every 6 hours PRN  sevelamer carbonate 1600 milliGRAM(s) Oral three times a day    A/P:    Hx CVA, ESRD on HD  Hx fall, L femoral neck fracture, s/p L hemiarthroplasty 8/15/23  S/p rehab  D/c-d home recently  S/p fall at home, L hip pain, re-adm 11/20/23   No acute fx on CT  HD today w/2kg fluid removed   Epoetin w/HD 3 x week   JUSTINE    [phone]

## 2023-11-30 RX ADMIN — SEVELAMER CARBONATE 1600 MILLIGRAM(S): 2400 POWDER, FOR SUSPENSION ORAL at 19:04

## 2023-11-30 RX ADMIN — CHLORHEXIDINE GLUCONATE 1 APPLICATION(S): 213 SOLUTION TOPICAL at 06:23

## 2023-11-30 RX ADMIN — SEVELAMER CARBONATE 1600 MILLIGRAM(S): 2400 POWDER, FOR SUSPENSION ORAL at 08:53

## 2023-11-30 RX ADMIN — Medication 10 MILLIGRAM(S): at 19:05

## 2023-11-30 RX ADMIN — Medication 25 MILLIGRAM(S): at 19:05

## 2023-11-30 RX ADMIN — HEPARIN SODIUM 5000 UNIT(S): 5000 INJECTION INTRAVENOUS; SUBCUTANEOUS at 13:37

## 2023-11-30 RX ADMIN — CINACALCET 30 MILLIGRAM(S): 30 TABLET, FILM COATED ORAL at 13:34

## 2023-11-30 RX ADMIN — SEVELAMER CARBONATE 1600 MILLIGRAM(S): 2400 POWDER, FOR SUSPENSION ORAL at 13:34

## 2023-11-30 RX ADMIN — Medication 10 MILLIGRAM(S): at 06:25

## 2023-11-30 RX ADMIN — Medication 25 MILLIGRAM(S): at 06:22

## 2023-11-30 NOTE — PROGRESS NOTE ADULT - SUBJECTIVE AND OBJECTIVE BOX
Erik Caldwell MD  Interventional Cardiology / Advance Heart Failure and Cardiac Transplant Specialist  [place] Office : [address] [place] [address]  Tel:   [place] Office : [address] [facility] [address]  Tel: [phone]      Subjective/Overnight events: Pt is lying in bed not in distress, no chest pains no SOB no palpitations  	  MEDICATIONS:  heparin   Injectable 5000 Unit(s) SubCutaneous every 8 hours  hydrALAZINE 10 milliGRAM(s) Oral every 12 hours  metoprolol tartrate 25 milliGRAM(s) Oral two times a day        HYDROmorphone  Injectable 2 milliGRAM(s) IV Push every 8 hours PRN  oxyCODONE    IR 15 milliGRAM(s) Oral every 6 hours PRN      cinacalcet 30 milliGRAM(s) Oral daily    chlorhexidine 2% Cloths 1 Application(s) Topical <User Schedule>  epoetin carito (EPOGEN) Injectable 74011 Unit(s) IV Push <User Schedule>      PAST MEDICAL/SURGICAL HISTORY  PAST MEDICAL & SURGICAL HISTORY:  HTN (hypertension)      Stroke  age 10      Sleep apnea      Leg fracture, right      Chronic renal failure      Hemodialysis access, AV graft      ESRD (end stage renal disease) on dialysis  since 2013, M-W-F      Anemia, unspecified type      Other hyperparathyroidism      AV fistula  R arm; L arm clotted      History of left hip hemiarthroplasty          SOCIAL HISTORY: Substance Use (street drugs): ( x ) never used  (  ) other:    FAMILY HISTORY:        PHYSICAL EXAM:  T(C): 37.2 (11-30-23 @ 05:06), Max: 37.2 (11-30-23 @ 05:06)  HR: 88 (11-30-23 @ 05:06) (78 - 95)  BP: 136/71 (11-30-23 @ 05:06) (124/71 - 136/71)  RR: 18 (11-30-23 @ 05:06) (18 - 18)  SpO2: 97% (11-30-23 @ 05:06) (97% - 100%)  Wt(kg): --  I&O's Summary    29 Nov 2023 07:01  -  30 Nov 2023 07:00  --------------------------------------------------------  IN: 0 mL / OUT: 2000 mL / NET: -2000 mL          GENERAL: NAD  EYES: conjunctiva and sclera clear  ENMT: No tonsillar erythema, exudates, or enlargement  Cardiovascular: Normal S1 S2, No JVD, No murmurs, No edema  Respiratory: Lungs clear to auscultation	  Gastrointestinal:  Soft, Non-tender, + BS	  Extremities: No edema                                     8.5    4.45  )-----------( 239      ( 29 Nov 2023 11:27 )             26.8     11-29    139  |  95<L>  |  86<H>  ----------------------------<  88  5.3   |  27  |  11.94<H>    Ca    8.6      29 Nov 2023 11:27      proBNP:   Lipid Profile:   HgA1c:   TSH:     Consultant(s) Notes Reviewed:  [x ] YES  [ ] NO    Care Discussed with Consultants/Other Providers [ x] YES  [ ] NO    Imaging Personally Reviewed independently:  [x] YES  [ ] NO    All labs, radiologic studies, vitals, orders and medications list reviewed. Patient is seen and examined at bedside. Case discussed with medical team.

## 2023-11-30 NOTE — PROGRESS NOTE ADULT - SUBJECTIVE AND OBJECTIVE BOX
Date of service: 11-30-23 @ 21:35      Patient is a 37y old  Male who presents with a chief complaint of Left femoral neck fracture (29 Nov 2023 19:09)                                                               INTERVAL HPI/OVERNIGHT EVENTS:    REVIEW OF SYSTEMS:     CONSTITUTIONAL: No weakness, fevers or chills  RESPIRATORY: No cough, wheezing,  No shortness of breath  CARDIOVASCULAR: No chest pain or palpitations  GASTROINTESTINAL: No abdominal pain  . No nausea, vomiting, or hematemesis; No diarrhea or constipation. No melena or hematochezia.  GENITOURINARY: No dysuria, frequency or hematuria  NEUROLOGICAL: No numbness or weakness                                                                                                                                                                                                                                                                      Medications:  MEDICATIONS  (STANDING):  chlorhexidine 2% Cloths 1 Application(s) Topical <User Schedule>  cinacalcet 30 milliGRAM(s) Oral daily  epoetin carito (EPOGEN) Injectable 69938 Unit(s) IV Push <User Schedule>  heparin   Injectable 5000 Unit(s) SubCutaneous every 8 hours  hydrALAZINE 10 milliGRAM(s) Oral every 12 hours  metoprolol tartrate 25 milliGRAM(s) Oral two times a day  sevelamer carbonate 1600 milliGRAM(s) Oral three times a day    MEDICATIONS  (PRN):  HYDROmorphone  Injectable 2 milliGRAM(s) IV Push every 8 hours PRN Severe Pain (7 - 10)  oxyCODONE    IR 15 milliGRAM(s) Oral every 6 hours PRN Severe Pain (7 - 10)       Allergies    shellfish (Hives)  No Known Drug Allergies    Intolerances      Vital Signs Last 24 Hrs  T(C): 36.7 (30 Nov 2023 21:18), Max: 37.2 (30 Nov 2023 05:06)  T(F): 98 (30 Nov 2023 21:18), Max: 99 (30 Nov 2023 05:06)  HR: 71 (30 Nov 2023 21:18) (71 - 88)  BP: 122/54 (30 Nov 2023 21:18) (116/63 - 136/71)  BP(mean): --  RR: 18 (30 Nov 2023 21:18) (18 - 18)  SpO2: 100% (30 Nov 2023 21:18) (97% - 100%)    Parameters below as of 30 Nov 2023 21:18  Patient On (Oxygen Delivery Method): room air      CAPILLARY BLOOD GLUCOSE          11-29 @ 07:01  -  11-30 @ 07:00  --------------------------------------------------------  IN: 0 mL / OUT: 2000 mL / NET: -2000 mL      Physical Exam:    Daily     Daily   General:  Well appearing, NAD, not cachetic  HEENT:  Nonicteric, PERRLA  CV:  RRR, S1S2   Lungs:  CTA B/L, no wheezes, rales, rhonchi  Abdomen:  Soft, non-tender, no distended, positive BS  Extremities: no edema                                                                                                                                                                                                                                                                                           LABS:                               8.5    4.45  )-----------( 239      ( 29 Nov 2023 11:27 )             26.8                      11-29    139  |  95<L>  |  86<H>  ----------------------------<  88  5.3   |  27  |  11.94<H>    Ca    8.6      29 Nov 2023 11:27                         RADIOLOGY & ADDITIONAL TESTS         I personally reviewed: [  ]EKG   [  ]CXR    [  ] CT      A/P:         Discussed with :     Jen consultants' Notes   Time spent :

## 2023-11-30 NOTE — PROGRESS NOTE ADULT - NS ATTEND AMEND GEN_ALL_CORE FT
Patient care and plan discussed and reviewed with Advanced Care Provider. Plan as outlined above edited by me to reflect our discussion.
Patient care and plan discussed and reviewed with Advanced Care Provider. Plan as outlined above edited by me to reflect our discussion.
I reviewed the overnight course of events on the unit, re-confirming the patient history. I discussed the care with the patient and their family. The plan of care was discussed with the ACP team and modifications were made to the notation where appropriate. Differential diagnosis and plan of care discussed with patient after the evaluation. Advanced care planning was discussed with patient and family.  Advanced care planning forms were reviewed and discussed.  Risks, benefits and alternatives of cardiac procedures were discussed in detail and all questions were answered. 35 minutes spent on total encounter of which more than fifty percent of the encounter was spent counseling and/or coordinating care by the attending physician.
Patient care and plan discussed and reviewed with Advanced Care Provider. Plan as outlined above edited by me to reflect our discussion.

## 2023-12-01 LAB
ANION GAP SERPL CALC-SCNC: 13 MMOL/L — SIGNIFICANT CHANGE UP (ref 5–17)
ANION GAP SERPL CALC-SCNC: 13 MMOL/L — SIGNIFICANT CHANGE UP (ref 5–17)
ANION GAP SERPL CALC-SCNC: 16 MMOL/L — SIGNIFICANT CHANGE UP (ref 5–17)
ANION GAP SERPL CALC-SCNC: 16 MMOL/L — SIGNIFICANT CHANGE UP (ref 5–17)
BUN SERPL-MCNC: 48 MG/DL — HIGH (ref 7–23)
BUN SERPL-MCNC: 48 MG/DL — HIGH (ref 7–23)
BUN SERPL-MCNC: 93 MG/DL — HIGH (ref 7–23)
BUN SERPL-MCNC: 93 MG/DL — HIGH (ref 7–23)
CALCIUM SERPL-MCNC: 8.7 MG/DL — SIGNIFICANT CHANGE UP (ref 8.4–10.5)
CALCIUM SERPL-MCNC: 8.7 MG/DL — SIGNIFICANT CHANGE UP (ref 8.4–10.5)
CALCIUM SERPL-MCNC: 9.1 MG/DL — SIGNIFICANT CHANGE UP (ref 8.4–10.5)
CALCIUM SERPL-MCNC: 9.1 MG/DL — SIGNIFICANT CHANGE UP (ref 8.4–10.5)
CHLORIDE SERPL-SCNC: 95 MMOL/L — LOW (ref 96–108)
CHLORIDE SERPL-SCNC: 95 MMOL/L — LOW (ref 96–108)
CHLORIDE SERPL-SCNC: 98 MMOL/L — SIGNIFICANT CHANGE UP (ref 96–108)
CHLORIDE SERPL-SCNC: 98 MMOL/L — SIGNIFICANT CHANGE UP (ref 96–108)
CO2 SERPL-SCNC: 27 MMOL/L — SIGNIFICANT CHANGE UP (ref 22–31)
CREAT SERPL-MCNC: 11.69 MG/DL — HIGH (ref 0.5–1.3)
CREAT SERPL-MCNC: 11.69 MG/DL — HIGH (ref 0.5–1.3)
CREAT SERPL-MCNC: 6.92 MG/DL — HIGH (ref 0.5–1.3)
CREAT SERPL-MCNC: 6.92 MG/DL — HIGH (ref 0.5–1.3)
EGFR: 10 ML/MIN/1.73M2 — LOW
EGFR: 10 ML/MIN/1.73M2 — LOW
EGFR: 5 ML/MIN/1.73M2 — LOW
EGFR: 5 ML/MIN/1.73M2 — LOW
GLUCOSE SERPL-MCNC: 90 MG/DL — SIGNIFICANT CHANGE UP (ref 70–99)
GLUCOSE SERPL-MCNC: 90 MG/DL — SIGNIFICANT CHANGE UP (ref 70–99)
GLUCOSE SERPL-MCNC: 94 MG/DL — SIGNIFICANT CHANGE UP (ref 70–99)
GLUCOSE SERPL-MCNC: 94 MG/DL — SIGNIFICANT CHANGE UP (ref 70–99)
HCT VFR BLD CALC: 25.2 % — LOW (ref 39–50)
HCT VFR BLD CALC: 25.2 % — LOW (ref 39–50)
HGB BLD-MCNC: 8.2 G/DL — LOW (ref 13–17)
HGB BLD-MCNC: 8.2 G/DL — LOW (ref 13–17)
MCHC RBC-ENTMCNC: 30.4 PG — SIGNIFICANT CHANGE UP (ref 27–34)
MCHC RBC-ENTMCNC: 30.4 PG — SIGNIFICANT CHANGE UP (ref 27–34)
MCHC RBC-ENTMCNC: 32.5 GM/DL — SIGNIFICANT CHANGE UP (ref 32–36)
MCHC RBC-ENTMCNC: 32.5 GM/DL — SIGNIFICANT CHANGE UP (ref 32–36)
MCV RBC AUTO: 93.3 FL — SIGNIFICANT CHANGE UP (ref 80–100)
MCV RBC AUTO: 93.3 FL — SIGNIFICANT CHANGE UP (ref 80–100)
NRBC # BLD: 0 /100 WBCS — SIGNIFICANT CHANGE UP (ref 0–0)
NRBC # BLD: 0 /100 WBCS — SIGNIFICANT CHANGE UP (ref 0–0)
PLATELET # BLD AUTO: 199 K/UL — SIGNIFICANT CHANGE UP (ref 150–400)
PLATELET # BLD AUTO: 199 K/UL — SIGNIFICANT CHANGE UP (ref 150–400)
POTASSIUM SERPL-MCNC: 4.9 MMOL/L — SIGNIFICANT CHANGE UP (ref 3.5–5.3)
POTASSIUM SERPL-MCNC: 4.9 MMOL/L — SIGNIFICANT CHANGE UP (ref 3.5–5.3)
POTASSIUM SERPL-MCNC: 5.4 MMOL/L — HIGH (ref 3.5–5.3)
POTASSIUM SERPL-MCNC: 5.4 MMOL/L — HIGH (ref 3.5–5.3)
POTASSIUM SERPL-SCNC: 4.9 MMOL/L — SIGNIFICANT CHANGE UP (ref 3.5–5.3)
POTASSIUM SERPL-SCNC: 4.9 MMOL/L — SIGNIFICANT CHANGE UP (ref 3.5–5.3)
POTASSIUM SERPL-SCNC: 5.4 MMOL/L — HIGH (ref 3.5–5.3)
POTASSIUM SERPL-SCNC: 5.4 MMOL/L — HIGH (ref 3.5–5.3)
RBC # BLD: 2.7 M/UL — LOW (ref 4.2–5.8)
RBC # BLD: 2.7 M/UL — LOW (ref 4.2–5.8)
RBC # FLD: 14.8 % — HIGH (ref 10.3–14.5)
RBC # FLD: 14.8 % — HIGH (ref 10.3–14.5)
SODIUM SERPL-SCNC: 138 MMOL/L — SIGNIFICANT CHANGE UP (ref 135–145)
WBC # BLD: 4.71 K/UL — SIGNIFICANT CHANGE UP (ref 3.8–10.5)
WBC # BLD: 4.71 K/UL — SIGNIFICANT CHANGE UP (ref 3.8–10.5)
WBC # FLD AUTO: 4.71 K/UL — SIGNIFICANT CHANGE UP (ref 3.8–10.5)
WBC # FLD AUTO: 4.71 K/UL — SIGNIFICANT CHANGE UP (ref 3.8–10.5)

## 2023-12-01 RX ADMIN — CHLORHEXIDINE GLUCONATE 1 APPLICATION(S): 213 SOLUTION TOPICAL at 06:41

## 2023-12-01 RX ADMIN — ERYTHROPOIETIN 10000 UNIT(S): 10000 INJECTION, SOLUTION INTRAVENOUS; SUBCUTANEOUS at 12:45

## 2023-12-01 RX ADMIN — SEVELAMER CARBONATE 1600 MILLIGRAM(S): 2400 POWDER, FOR SUSPENSION ORAL at 08:21

## 2023-12-01 RX ADMIN — Medication 10 MILLIGRAM(S): at 17:06

## 2023-12-01 RX ADMIN — HEPARIN SODIUM 5000 UNIT(S): 5000 INJECTION INTRAVENOUS; SUBCUTANEOUS at 06:04

## 2023-12-01 RX ADMIN — Medication 25 MILLIGRAM(S): at 17:06

## 2023-12-01 RX ADMIN — HEPARIN SODIUM 5000 UNIT(S): 5000 INJECTION INTRAVENOUS; SUBCUTANEOUS at 15:13

## 2023-12-01 RX ADMIN — SEVELAMER CARBONATE 1600 MILLIGRAM(S): 2400 POWDER, FOR SUSPENSION ORAL at 17:05

## 2023-12-01 RX ADMIN — OXYCODONE HYDROCHLORIDE 15 MILLIGRAM(S): 5 TABLET ORAL at 01:51

## 2023-12-01 NOTE — PROGRESS NOTE ADULT - SUBJECTIVE AND OBJECTIVE BOX
Erik Caldwell MD  Interventional Cardiology / Advance Heart Failure and Cardiac Transplant Specialist  [place] Office : [address] [place] [address] [place]. [address]  Tel:   [place] Office : [address] [facility] [place] [address]  Tel: [phone]       Pt is lying in bed comfortable not in distress, no chest pains no SOB no palpitations  	  MEDICATIONS:  heparin   Injectable 5000 Unit(s) SubCutaneous every 8 hours  hydrALAZINE 10 milliGRAM(s) Oral every 12 hours  metoprolol tartrate 25 milliGRAM(s) Oral two times a day        HYDROmorphone  Injectable 2 milliGRAM(s) IV Push every 8 hours PRN  oxyCODONE    IR 15 milliGRAM(s) Oral every 6 hours PRN      cinacalcet 30 milliGRAM(s) Oral daily    chlorhexidine 2% Cloths 1 Application(s) Topical <User Schedule>  epoetin carito (EPOGEN) Injectable 09082 Unit(s) IV Push <User Schedule>      PAST MEDICAL/SURGICAL HISTORY  PAST MEDICAL & SURGICAL HISTORY:  HTN (hypertension)      Stroke  age 10      Sleep apnea      Leg fracture, right      Chronic renal failure      Hemodialysis access, AV graft      ESRD (end stage renal disease) on dialysis  since 2013, M-W-F      Anemia, unspecified type      Other hyperparathyroidism      AV fistula  R arm; L arm clotted      History of left hip hemiarthroplasty          SOCIAL HISTORY: Substance Use (street drugs): ( x ) never used  (  ) other:    FAMILY HISTORY:      REVIEW OF SYSTEMS:  CONSTITUTIONAL: No fever, weight loss, or fatigue  EYES: No eye pain, visual disturbances, or discharge  ENMT:  No difficulty hearing, tinnitus, vertigo; No sinus or throat pain  BREASTS: No pain, masses, or nipple discharge  GASTROINTESTINAL: No abdominal or epigastric pain. No nausea, vomiting, or hematemesis; No diarrhea or constipation. No melena or hematochezia.  GENITOURINARY: No dysuria, frequency, hematuria, or incontinence  NEUROLOGICAL: No headaches, memory loss, loss of strength, numbness, or tremors  ENDOCRINE: No heat or cold intolerance; No hair loss  MUSCULOSKELETAL: No joint pain or swelling; No muscle, back, or extremity pain  PSYCHIATRIC: No depression, anxiety, mood swings, or difficulty sleeping  HEME/LYMPH: No easy bruising, or bleeding gums       PHYSICAL EXAM:  T(C): 37.4 (12-01-23 @ 20:58), Max: 37.4 (12-01-23 @ 20:58)  HR: 83 (12-01-23 @ 20:58) (74 - 87)  BP: 108/55 (12-01-23 @ 20:58) (108/55 - 143/77)  RR: 18 (12-01-23 @ 20:58) (16 - 18)  SpO2: 100% (12-01-23 @ 20:58) (96% - 100%)  Wt(kg): --  I&O's Summary    01 Dec 2023 07:01  -  01 Dec 2023 21:56  --------------------------------------------------------  IN: 1200 mL / OUT: 0 mL / NET: 1200 mL          GENERAL: NAD  EYES:   PERRLA   ENMT:   Moist mucous membranes, Good dentition, No lesions  Cardiovascular: Normal S1 S2, No JVD, No murmurs, No edema  Respiratory: Lungs clear to auscultation	  Gastrointestinal:  Soft, Non-tender, + BS	  Extremities: no edema                                    8.2    4.71  )-----------( 199      ( 01 Dec 2023 10:40 )             25.2     12-01    138  |  98  |  48<H>  ----------------------------<  90  4.9   |  27  |  6.92<H>    Ca    9.1      01 Dec 2023 17:26      proBNP:   Lipid Profile:   HgA1c:   TSH:     Consultant(s) Notes Reviewed:  [x ] YES  [ ] NO    Care Discussed with Consultants/Other Providers [ x] YES  [ ] NO    Imaging Personally Reviewed independently:  [x] YES  [ ] NO    All labs, radiologic studies, vitals, orders and medications list reviewed. Patient is seen and examined at bedside. Case discussed with medical team.

## 2023-12-01 NOTE — PROGRESS NOTE ADULT - SUBJECTIVE AND OBJECTIVE BOX
Seen on HD today     Vital Signs Last 24 Hrs  T(C): 37 (12-01-23 @ 14:20), Max: 37.3 (12-01-23 @ 04:35)  T(F): 98.6 (12-01-23 @ 14:20), Max: 99.1 (12-01-23 @ 04:35)  HR: 87 (12-01-23 @ 14:20) (71 - 87)  BP: 115/64 (12-01-23 @ 14:20) (115/60 - 143/77)  RR: 18 (12-01-23 @ 14:20) (16 - 18)  SpO2: 96% (12-01-23 @ 14:20) (96% - 100%)    s1s2  b/l air entry  soft, ND  tr edema, AVF patent                                                                                                                      8.2    4.71  )-----------( 199      ( 01 Dec 2023 10:40 )             25.2     01 Dec 2023 10:40    138    |  95     |  93     ----------------------------<  94     5.4     |  27     |  11.69    Ca    8.7        01 Dec 2023 10:40    chlorhexidine 2% Cloths 1 Application(s) Topical <User Schedule>  cinacalcet 30 milliGRAM(s) Oral daily  epoetin carito (EPOGEN) Injectable 41851 Unit(s) IV Push <User Schedule>  heparin   Injectable 5000 Unit(s) SubCutaneous every 8 hours  hydrALAZINE 10 milliGRAM(s) Oral every 12 hours  HYDROmorphone  Injectable 2 milliGRAM(s) IV Push every 8 hours PRN  metoprolol tartrate 25 milliGRAM(s) Oral two times a day  oxyCODONE    IR 15 milliGRAM(s) Oral every 6 hours PRN  sevelamer carbonate 1600 milliGRAM(s) Oral three times a day    A/P:    Hx CVA, ESRD on HD  Hx fall, L femoral neck fracture, s/p L hemiarthroplasty 8/15/23  S/p rehab  D/c-d home recently  S/p fall at home, L hip pain, re-adm 11/20/23   No acute fx on CT  HD today w/2kg fluid removal as able  Epoetin w/HD 3 x week     [phone]

## 2023-12-01 NOTE — PROGRESS NOTE ADULT - SUBJECTIVE AND OBJECTIVE BOX
Date of service: 23 @ 13:07      Patient is a 37y old  Male who presents with a chief complaint of Left femoral neck fracture (2023 19:09)                                                               INTERVAL HPI/OVERNIGHT EVENTS:    REVIEW OF SYSTEMS:     CONSTITUTIONAL: No weakness, fevers or chills  EYES/ENT: No visual changes , no ear ache   NECK: No pain or stiffness  RESPIRATORY: No cough, wheezing,  No shortness of breath  CARDIOVASCULAR: No chest pain or palpitations  GASTROINTESTINAL: No abdominal pain  . No nausea, vomiting, or hematemesis; No diarrhea or constipation. No melena or hematochezia.  GENITOURINARY: No dysuria, frequency or hematuria  NEUROLOGICAL: No numbness or weakness  SKIN: No itching, burning, rashes, or lesions                                                                                                                                                                                                                                                                                 Medications:  MEDICATIONS  (STANDING):  chlorhexidine 2% Cloths 1 Application(s) Topical <User Schedule>  cinacalcet 30 milliGRAM(s) Oral daily  epoetin carito (EPOGEN) Injectable 03190 Unit(s) IV Push <User Schedule>  heparin   Injectable 5000 Unit(s) SubCutaneous every 8 hours  hydrALAZINE 10 milliGRAM(s) Oral every 12 hours  metoprolol tartrate 25 milliGRAM(s) Oral two times a day  sevelamer carbonate 1600 milliGRAM(s) Oral three times a day    MEDICATIONS  (PRN):  HYDROmorphone  Injectable 2 milliGRAM(s) IV Push every 8 hours PRN Severe Pain (7 - 10)  oxyCODONE    IR 15 milliGRAM(s) Oral every 6 hours PRN Severe Pain (7 - 10)       Allergies    shellfish (Hives)  No Known Drug Allergies    Intolerances      Vital Signs Last 24 Hrs  T(C): 36.5 (01 Dec 2023 10:28), Max: 37.3 (01 Dec 2023 04:35)  T(F): 97.7 (01 Dec 2023 10:28), Max: 99.1 (01 Dec 2023 04:35)  HR: 78 (01 Dec 2023 10:28) (71 - 87)  BP: 143/77 (01 Dec 2023 10:28) (116/63 - 143/77)  BP(mean): --  RR: 16 (01 Dec 2023 10:28) (16 - 18)  SpO2: 97% (01 Dec 2023 10:28) (97% - 100%)    Parameters below as of 01 Dec 2023 10:28  Patient On (Oxygen Delivery Method): room air      CAPILLARY BLOOD GLUCOSE          Physical Exam:    Daily     Daily Weight in k.3 (01 Dec 2023 10:28)  General:  Well appearing, NAD, not cachetic  HEENT:  Nonicteric, PERRLA  CV:  RRR, S1S2   Lungs:  CTA B/L, no wheezes, rales, rhonchi  Abdomen:  Soft, non-tender, no distended, positive BS  Extremities: no edema                                                                                                                                                                                                                                                                                           LABS:                               8.2    4.71  )-----------( 199      ( 01 Dec 2023 10:40 )             25.2                      12-    138  |  95<L>  |  93<H>  ----------------------------<  94  5.4<H>   |  27  |  11.69<H>    Ca    8.7      01 Dec 2023 10:40                         RADIOLOGY & ADDITIONAL TESTS         I personally reviewed: [  ]EKG   [  ]CXR    [  ] CT      A/P:         Discussed with :     Jen consultants' Notes   Time spent :

## 2023-12-02 ENCOUNTER — TRANSCRIPTION ENCOUNTER (OUTPATIENT)
Age: 38
End: 2023-12-02

## 2023-12-02 VITALS
SYSTOLIC BLOOD PRESSURE: 113 MMHG | RESPIRATION RATE: 18 BRPM | OXYGEN SATURATION: 99 % | TEMPERATURE: 99 F | DIASTOLIC BLOOD PRESSURE: 62 MMHG | HEART RATE: 93 BPM

## 2023-12-02 DIAGNOSIS — I10 ESSENTIAL (PRIMARY) HYPERTENSION: ICD-10-CM

## 2023-12-02 DIAGNOSIS — N18.6 END STAGE RENAL DISEASE: ICD-10-CM

## 2023-12-02 LAB
ANION GAP SERPL CALC-SCNC: 13 MMOL/L — SIGNIFICANT CHANGE UP (ref 5–17)
ANION GAP SERPL CALC-SCNC: 13 MMOL/L — SIGNIFICANT CHANGE UP (ref 5–17)
BUN SERPL-MCNC: 64 MG/DL — HIGH (ref 7–23)
BUN SERPL-MCNC: 64 MG/DL — HIGH (ref 7–23)
CALCIUM SERPL-MCNC: 9.2 MG/DL — SIGNIFICANT CHANGE UP (ref 8.4–10.5)
CALCIUM SERPL-MCNC: 9.2 MG/DL — SIGNIFICANT CHANGE UP (ref 8.4–10.5)
CHLORIDE SERPL-SCNC: 98 MMOL/L — SIGNIFICANT CHANGE UP (ref 96–108)
CHLORIDE SERPL-SCNC: 98 MMOL/L — SIGNIFICANT CHANGE UP (ref 96–108)
CO2 SERPL-SCNC: 28 MMOL/L — SIGNIFICANT CHANGE UP (ref 22–31)
CO2 SERPL-SCNC: 28 MMOL/L — SIGNIFICANT CHANGE UP (ref 22–31)
CREAT SERPL-MCNC: 9.13 MG/DL — HIGH (ref 0.5–1.3)
CREAT SERPL-MCNC: 9.13 MG/DL — HIGH (ref 0.5–1.3)
EGFR: 7 ML/MIN/1.73M2 — LOW
EGFR: 7 ML/MIN/1.73M2 — LOW
GLUCOSE SERPL-MCNC: 73 MG/DL — SIGNIFICANT CHANGE UP (ref 70–99)
GLUCOSE SERPL-MCNC: 73 MG/DL — SIGNIFICANT CHANGE UP (ref 70–99)
POTASSIUM SERPL-MCNC: 5.3 MMOL/L — SIGNIFICANT CHANGE UP (ref 3.5–5.3)
POTASSIUM SERPL-MCNC: 5.3 MMOL/L — SIGNIFICANT CHANGE UP (ref 3.5–5.3)
POTASSIUM SERPL-SCNC: 5.3 MMOL/L — SIGNIFICANT CHANGE UP (ref 3.5–5.3)
POTASSIUM SERPL-SCNC: 5.3 MMOL/L — SIGNIFICANT CHANGE UP (ref 3.5–5.3)
SODIUM SERPL-SCNC: 139 MMOL/L — SIGNIFICANT CHANGE UP (ref 135–145)
SODIUM SERPL-SCNC: 139 MMOL/L — SIGNIFICANT CHANGE UP (ref 135–145)

## 2023-12-02 PROCEDURE — 97116 GAIT TRAINING THERAPY: CPT

## 2023-12-02 PROCEDURE — 87340 HEPATITIS B SURFACE AG IA: CPT

## 2023-12-02 PROCEDURE — 83735 ASSAY OF MAGNESIUM: CPT

## 2023-12-02 PROCEDURE — 83970 ASSAY OF PARATHORMONE: CPT

## 2023-12-02 PROCEDURE — 96365 THER/PROPH/DIAG IV INF INIT: CPT

## 2023-12-02 PROCEDURE — 85610 PROTHROMBIN TIME: CPT

## 2023-12-02 PROCEDURE — 80048 BASIC METABOLIC PNL TOTAL CA: CPT

## 2023-12-02 PROCEDURE — 93306 TTE W/DOPPLER COMPLETE: CPT

## 2023-12-02 PROCEDURE — 86706 HEP B SURFACE ANTIBODY: CPT

## 2023-12-02 PROCEDURE — 76377 3D RENDER W/INTRP POSTPROCES: CPT

## 2023-12-02 PROCEDURE — 73502 X-RAY EXAM HIP UNI 2-3 VIEWS: CPT

## 2023-12-02 PROCEDURE — 71045 X-RAY EXAM CHEST 1 VIEW: CPT

## 2023-12-02 PROCEDURE — 36415 COLL VENOUS BLD VENIPUNCTURE: CPT

## 2023-12-02 PROCEDURE — 87641 MR-STAPH DNA AMP PROBE: CPT

## 2023-12-02 PROCEDURE — 85025 COMPLETE CBC W/AUTO DIFF WBC: CPT

## 2023-12-02 PROCEDURE — 86900 BLOOD TYPING SEROLOGIC ABO: CPT

## 2023-12-02 PROCEDURE — 99261: CPT

## 2023-12-02 PROCEDURE — 84100 ASSAY OF PHOSPHORUS: CPT

## 2023-12-02 PROCEDURE — 73700 CT LOWER EXTREMITY W/O DYE: CPT | Mod: MA

## 2023-12-02 PROCEDURE — 86870 RBC ANTIBODY IDENTIFICATION: CPT

## 2023-12-02 PROCEDURE — 96375 TX/PRO/DX INJ NEW DRUG ADDON: CPT

## 2023-12-02 PROCEDURE — 85730 THROMBOPLASTIN TIME PARTIAL: CPT

## 2023-12-02 PROCEDURE — 99285 EMERGENCY DEPT VISIT HI MDM: CPT | Mod: 25

## 2023-12-02 PROCEDURE — 86901 BLOOD TYPING SEROLOGIC RH(D): CPT

## 2023-12-02 PROCEDURE — 86850 RBC ANTIBODY SCREEN: CPT

## 2023-12-02 PROCEDURE — 73552 X-RAY EXAM OF FEMUR 2/>: CPT

## 2023-12-02 PROCEDURE — 86880 COOMBS TEST DIRECT: CPT

## 2023-12-02 PROCEDURE — 82310 ASSAY OF CALCIUM: CPT

## 2023-12-02 PROCEDURE — 87640 STAPH A DNA AMP PROBE: CPT

## 2023-12-02 PROCEDURE — 86922 COMPATIBILITY TEST ANTIGLOB: CPT

## 2023-12-02 PROCEDURE — 97530 THERAPEUTIC ACTIVITIES: CPT

## 2023-12-02 PROCEDURE — 72192 CT PELVIS W/O DYE: CPT | Mod: MA

## 2023-12-02 PROCEDURE — 80053 COMPREHEN METABOLIC PANEL: CPT

## 2023-12-02 PROCEDURE — 85027 COMPLETE CBC AUTOMATED: CPT

## 2023-12-02 PROCEDURE — 97535 SELF CARE MNGMENT TRAINING: CPT

## 2023-12-02 PROCEDURE — 97165 OT EVAL LOW COMPLEX 30 MIN: CPT

## 2023-12-02 PROCEDURE — 76376 3D RENDER W/INTRP POSTPROCES: CPT

## 2023-12-02 PROCEDURE — 97162 PT EVAL MOD COMPLEX 30 MIN: CPT

## 2023-12-02 PROCEDURE — 97110 THERAPEUTIC EXERCISES: CPT

## 2023-12-02 PROCEDURE — 82962 GLUCOSE BLOOD TEST: CPT

## 2023-12-02 RX ORDER — SEVELAMER CARBONATE 2400 MG/1
2 POWDER, FOR SUSPENSION ORAL
Refills: 0 | DISCHARGE

## 2023-12-02 RX ORDER — SEVELAMER CARBONATE 2400 MG/1
2 POWDER, FOR SUSPENSION ORAL
Qty: 0 | Refills: 0 | DISCHARGE
Start: 2023-12-02

## 2023-12-02 RX ORDER — ERYTHROPOIETIN 10000 [IU]/ML
0 INJECTION, SOLUTION INTRAVENOUS; SUBCUTANEOUS
Qty: 0 | Refills: 0 | DISCHARGE
Start: 2023-12-02

## 2023-12-02 RX ORDER — OXYCODONE HYDROCHLORIDE 5 MG/1
1 TABLET ORAL
Refills: 0 | DISCHARGE
Start: 2023-12-02

## 2023-12-02 RX ORDER — HYDRALAZINE HCL 50 MG
1 TABLET ORAL
Refills: 0 | DISCHARGE

## 2023-12-02 RX ORDER — OXYCODONE HYDROCHLORIDE 5 MG/1
1 TABLET ORAL
Refills: 0 | DISCHARGE

## 2023-12-02 RX ORDER — ERYTHROPOIETIN 10000 [IU]/ML
10000 INJECTION, SOLUTION INTRAVENOUS; SUBCUTANEOUS
Qty: 1 | Refills: 0
Start: 2023-12-02 | End: 2023-12-31

## 2023-12-02 RX ORDER — CINACALCET 30 MG/1
1 TABLET, FILM COATED ORAL
Qty: 0 | Refills: 0 | DISCHARGE
Start: 2023-12-02

## 2023-12-02 RX ORDER — HYDRALAZINE HCL 50 MG
1 TABLET ORAL
Refills: 0 | DISCHARGE
Start: 2023-12-02

## 2023-12-02 RX ORDER — METOPROLOL TARTRATE 50 MG
1 TABLET ORAL
Qty: 0 | Refills: 0 | DISCHARGE
Start: 2023-12-02

## 2023-12-02 RX ADMIN — SEVELAMER CARBONATE 1600 MILLIGRAM(S): 2400 POWDER, FOR SUSPENSION ORAL at 11:42

## 2023-12-02 RX ADMIN — Medication 10 MILLIGRAM(S): at 05:35

## 2023-12-02 RX ADMIN — OXYCODONE HYDROCHLORIDE 15 MILLIGRAM(S): 5 TABLET ORAL at 05:17

## 2023-12-02 RX ADMIN — OXYCODONE HYDROCHLORIDE 15 MILLIGRAM(S): 5 TABLET ORAL at 04:17

## 2023-12-02 RX ADMIN — CINACALCET 30 MILLIGRAM(S): 30 TABLET, FILM COATED ORAL at 11:42

## 2023-12-02 RX ADMIN — HEPARIN SODIUM 5000 UNIT(S): 5000 INJECTION INTRAVENOUS; SUBCUTANEOUS at 05:35

## 2023-12-02 RX ADMIN — Medication 25 MILLIGRAM(S): at 05:35

## 2023-12-02 RX ADMIN — CHLORHEXIDINE GLUCONATE 1 APPLICATION(S): 213 SOLUTION TOPICAL at 05:35

## 2023-12-02 RX ADMIN — SEVELAMER CARBONATE 1600 MILLIGRAM(S): 2400 POWDER, FOR SUSPENSION ORAL at 08:45

## 2023-12-02 NOTE — DISCHARGE NOTE PROVIDER - HOSPITAL COURSE
HPI:  37 year old male with hx of HTN, ESRD with HD M/W/F, presents to the ED with left hip pain s/p fall this morning. Patient is s/p left hip arthroplasty on 8/15 with Dr. Jason León. He states since the surgery he has been ambulating with assistance. He was walking down the stairs today with his brother's  assistance when he slipped and fell down the stairs. He slipped down three steps landing on his buttocks and his left leg abducted.   no cp /sob prior   no palpiations   No head injury or LOC.   has been in his USOH otherwise    (20 Nov 2023 17:30)    Hospital Course: Fall  - no cp /sob prior;  no palpitations. No head injury or LOC reports he has been in his usual state of health otherwise     R hip pain s/p left hip arthroplasty as above    CT no fx  - pain meds; PT - PMR consult     ESRD HD  - M/ W /F     HTN  - monitored and c/w bp meds     PT  - JUSTINE      Important Medication Changes and Reason: pain meds     Active or Pending Issues Requiring Follow-up: F/u with ortho     Advanced Directives:   [ X] Full code  [ ] DNR  [ ] Hospice    Discharge Diagnoses: s/p fall  / pain

## 2023-12-02 NOTE — DISCHARGE NOTE PROVIDER - NSDCMRMEDTOKEN_GEN_ALL_CORE_FT
hydrALAZINE 25 mg oral tablet: 1 tab(s) orally 3 times a day  metoprolol tartrate 25 mg oral tablet: 1 tab(s) orally 2 times a day  oxyCODONE 15 mg oral tablet: 1 tab(s) orally every 8 hours as needed  [place]-pat multivitamins: 1 tablet orally once a day  sevelamer carbonate 800 mg oral tablet: 2 tab(s) orally 3 times a day   hydrALAZINE 25 mg oral tablet: 1 tab(s) orally 3 times a day  metoprolol tartrate 25 mg oral tablet: 1 tab(s) orally 2 times a day  oxyCODONE 15 mg oral tablet: 1 tab(s) orally every 8 hours as needed  [place]-pat multivitamins: 1 tablet orally once a day  sevelamer carbonate 800 mg oral tablet: 2 tab(s) orally 3 times a day   hydrALAZINE 25 mg oral tablet: 1 tab(s) orally 3 times a day  metoprolol tartrate 25 mg oral tablet: 1 tab(s) orally 2 times a day  oxyCODONE 15 mg oral tablet: 1 tab(s) orally every 8 hours as needed  [place]-pat multivitamins: 1 tablet orally once a day  sevelamer carbonate 800 mg oral tablet: 2 tab(s) orally 3 times a day   cinacalcet 30 mg oral tablet: 1 tab(s) orally once a day  epoetin carito: 10,000 Units Intra dialysis M /W / F IVP  hydrALAZINE 10 mg oral tablet: 1 tab(s) orally every 12 hours  metoprolol tartrate 25 mg oral tablet: 1 tab(s) orally 2 times a day  oxyCODONE 15 mg oral tablet: 1 tab(s) orally every 6 hours As needed Severe Pain (7 - 10)  [place]-pat multivitamins: 1 tablet orally once a day  sevelamer carbonate 800 mg oral tablet: 2 tab(s) orally 3 times a day   cinacalcet 30 mg oral tablet: 1 tab(s) orally once a day  epoetin carito: 10,000 Units Intra dialysis M /W / F IVP  hydrALAZINE 10 mg oral tablet: 1 tab(s) orally every 12 hours  metoprolol tartrate 25 mg oral tablet: 1 tab(s) orally 2 times a day  oxyCODONE 15 mg oral tablet: 1 tab(s) orally every 6 hours As needed Severe Pain (7 - 10)  [place]-pat multivitamins: 1 tablet orally once a day  sevelamer carbonate 800 mg oral tablet: 2 tab(s) orally 3 times a day   cinacalcet 30 mg oral tablet: 1 tab(s) orally once a day  epoetin carito: 10,000 Units Intra dialysis M /W / F IVP  hydrALAZINE 10 mg oral tablet: 1 tab(s) orally every 12 hours  metoprolol tartrate 25 mg oral tablet: 1 tab(s) orally 2 times a day  oxyCODONE 15 mg oral tablet: 1 tab(s) orally every 6 hours As needed Severe Pain (7 - 10)  [place]-pat multivitamins: 1 tablet orally once a day  sevelamer carbonate 800 mg oral tablet: 2 tab(s) orally 3 times a day   cinacalcet 30 mg oral tablet: 1 tab(s) orally once a day  Epogen 10,000 units/mL injectable solution: 10,000 unit(s) intravenous 3 times a week as directed / Intra-dialysis 3x per week  hydrALAZINE 10 mg oral tablet: 1 tab(s) orally every 12 hours  metoprolol tartrate 25 mg oral tablet: 1 tab(s) orally 2 times a day  oxyCODONE 15 mg oral tablet: 1 tab(s) orally every 6 hours As needed Severe Pain (7 - 10)  [place]-Bianca oral tablet: 1 tab(s) orally once a day as directed  sevelamer carbonate 800 mg oral tablet: 2 tab(s) orally 3 times a day

## 2023-12-02 NOTE — DISCHARGE NOTE NURSING/CASE MANAGEMENT/SOCIAL WORK - NSDCPEFALRISK_GEN_ALL_CORE
For information on Fall & Injury Prevention, visit: [email or web address] OR  [email or web address] OR  https://www.cdc.gov/steadi/patient.html

## 2023-12-02 NOTE — DISCHARGE NOTE PROVIDER - PROVIDER TOKENS
PROVIDER:[TOKEN:[[id number]]] PROVIDER:[TOKEN:[[id number]]],PROVIDER:[TOKEN:[[id number]]]

## 2023-12-02 NOTE — DISCHARGE NOTE PROVIDER - CARE PROVIDER_API CALL
Jason León  Orthopaedic Surgery  [address] [facility] [address]  [place] [address]  Phone: [phone]  Fax: [phone]  Follow Up Time:    Jason León  Orthopaedic Surgery  [address] [facility] [address]  [place] [address]  Phone: [phone]  Fax: [phone]  Follow Up Time:    Jason León  Orthopaedic Surgery  [address] [facility] [address]  [place] [address]  Phone: [phone]  Fax: [phone]  Follow Up Time:    Jason León  Orthopaedic Surgery  [address] [facility]  [place] [address]  Phone: [phone]  Fax: [phone]  Follow Up Time:     Lucy Blanco  Nephrology  [address] [facility] [address]  [place] [address]  Phone: [phone]  Fax: [phone]  Follow Up Time:    Jason León  Orthopaedic Surgery  [address] [facility]  [place] [address]  Phone: [phone]  Fax: [phone]  Follow Up Time:     Lucy Blanco  Nephrology  [address] [facility]  [place] [address]  Phone: [phone]  Fax: [phone]  Follow Up Time:    Jason León  Orthopaedic Surgery  [address] [facility]  [place] [address]  Phone: [phone]  Fax: [phone]  Follow Up Time:     Lucy Blanco  Nephrology  [address] [facility]  [place] [address]  Phone: [phone]  Fax: [phone]  Follow Up Time:

## 2023-12-02 NOTE — DISCHARGE NOTE PROVIDER - NSDCCPCAREPLAN_GEN_ALL_CORE_FT
PRINCIPAL DISCHARGE DIAGNOSIS  Diagnosis: Fall  Assessment and Plan of Treatment: Fall  - no cp /sob prior;  no palpitations. No head injury or LOC reports he has been in his usual state of health otherwise   R hip pain s/p left hip arthroplasty as above    CT no fx  - pain meds; PT - PMR consult      SECONDARY DISCHARGE DIAGNOSES  Diagnosis: Left hip pain  Assessment and Plan of Treatment: R hip pain s/p left hip arthroplasty as above    CT no fx  - pain meds; PT - PMR consult    Diagnosis: ESRD on dialysis  Assessment and Plan of Treatment: ESRD HD  - M/ W /F    Diagnosis: HTN (hypertension)  Assessment and Plan of Treatment: HTN  - monitored and c/w bp meds

## 2023-12-02 NOTE — DISCHARGE NOTE PROVIDER - NSDCFUSCHEDAPPT_GEN_ALL_CORE_FT
Jason León  [facility]  ORTHOSURG [address] [facility]  Scheduled Appointment: 12/18/2023     Jason León  [facility]  ORTHOSURG [address] [facility]  Scheduled Appointment: 12/18/2023     Jason León  [facility]  ORTHOSURG [address] [facility]  Scheduled Appointment: 12/18/2023

## 2023-12-02 NOTE — DISCHARGE NOTE PROVIDER - CARE PROVIDERS DIRECT ADDRESSES
,[email or web address] ,[email or web address] ,[email or web address] the [email or web address] ,[email or web address] ,[email or web address] ,[email or web address]

## 2023-12-02 NOTE — PROGRESS NOTE ADULT - SUBJECTIVE AND OBJECTIVE BOX
Erik Caldwell MD  Interventional Cardiology / Advance Heart Failure and Cardiac Transplant Specialist  [place] Office : [address] [place] [address] [place]. [address]  Tel:   [place] Office : [address] [facility] [place] [address]  Tel: [phone]       Pt is lying in bed comfortable not in distress, no chest pains no SOB no palpitations  	  MEDICATIONS:  heparin   Injectable 5000 Unit(s) SubCutaneous every 8 hours  hydrALAZINE 10 milliGRAM(s) Oral every 12 hours  metoprolol tartrate 25 milliGRAM(s) Oral two times a day        HYDROmorphone  Injectable 2 milliGRAM(s) IV Push every 8 hours PRN  oxyCODONE    IR 15 milliGRAM(s) Oral every 6 hours PRN      cinacalcet 30 milliGRAM(s) Oral daily    chlorhexidine 2% Cloths 1 Application(s) Topical <User Schedule>  epoetin carito (EPOGEN) Injectable 23775 Unit(s) IV Push <User Schedule>      PAST MEDICAL/SURGICAL HISTORY  PAST MEDICAL & SURGICAL HISTORY:  HTN (hypertension)      Stroke  age 10      Sleep apnea      Leg fracture, right      Chronic renal failure      Hemodialysis access, AV graft      ESRD (end stage renal disease) on dialysis  since 2013, M-W-F      Anemia, unspecified type      Other hyperparathyroidism      AV fistula  R arm; L arm clotted      History of left hip hemiarthroplasty          SOCIAL HISTORY: Substance Use (street drugs): ( x ) never used  (  ) other:    FAMILY HISTORY:      REVIEW OF SYSTEMS:  CONSTITUTIONAL: No fever, weight loss, or fatigue  EYES: No eye pain, visual disturbances, or discharge  ENMT:  No difficulty hearing, tinnitus, vertigo; No sinus or throat pain  BREASTS: No pain, masses, or nipple discharge  GASTROINTESTINAL: No abdominal or epigastric pain. No nausea, vomiting, or hematemesis; No diarrhea or constipation. No melena or hematochezia.  GENITOURINARY: No dysuria, frequency, hematuria, or incontinence  NEUROLOGICAL: No headaches, memory loss, loss of strength, numbness, or tremors  ENDOCRINE: No heat or cold intolerance; No hair loss  MUSCULOSKELETAL: No joint pain or swelling; No muscle, back, or extremity pain  PSYCHIATRIC: No depression, anxiety, mood swings, or difficulty sleeping  HEME/LYMPH: No easy bruising, or bleeding gums       PHYSICAL EXAM:  T(C): 37.1 (12-02-23 @ 13:12), Max: 37.1 (12-02-23 @ 04:30)  HR: 93 (12-02-23 @ 13:12) (80 - 93)  BP: 113/62 (12-02-23 @ 13:12) (113/62 - 135/75)  RR: 18 (12-02-23 @ 13:12) (18 - 18)  SpO2: 99% (12-02-23 @ 13:12) (99% - 100%)  Wt(kg): --  I&O's Summary    01 Dec 2023 07:01  -  02 Dec 2023 07:00  --------------------------------------------------------  IN: 1200 mL / OUT: 0 mL / NET: 1200 mL          GENERAL: NAD  EYES:   PERRLA   ENMT:   Moist mucous membranes, Good dentition, No lesions  Cardiovascular: Normal S1 S2, No JVD, No murmurs, No edema  Respiratory: Lungs clear to auscultation	  Gastrointestinal:  Soft, Non-tender, + BS	  Extremities: no edema                                    8.2    4.71  )-----------( 199      ( 01 Dec 2023 10:40 )             25.2     12-02    139  |  98  |  64<H>  ----------------------------<  73  5.3   |  28  |  9.13<H>    Ca    9.2      02 Dec 2023 09:39      proBNP:   Lipid Profile:   HgA1c:   TSH:     Consultant(s) Notes Reviewed:  [x ] YES  [ ] NO    Care Discussed with Consultants/Other Providers [ x] YES  [ ] NO    Imaging Personally Reviewed independently:  [x] YES  [ ] NO    All labs, radiologic studies, vitals, orders and medications list reviewed. Patient is seen and examined at bedside. Case discussed with medical team.         Erik Caldwell MD  Interventional Cardiology / Advance Heart Failure and Cardiac Transplant Specialist  [place] Office : [address] [place] [address] [place]. [address]  Tel:   [place] Office : [address] [facility] [place] [address]  Tel: [phone]       Pt is lying in bed comfortable not in distress, no chest pains no SOB no palpitations  	  MEDICATIONS:  heparin   Injectable 5000 Unit(s) SubCutaneous every 8 hours  hydrALAZINE 10 milliGRAM(s) Oral every 12 hours  metoprolol tartrate 25 milliGRAM(s) Oral two times a day        HYDROmorphone  Injectable 2 milliGRAM(s) IV Push every 8 hours PRN  oxyCODONE    IR 15 milliGRAM(s) Oral every 6 hours PRN      cinacalcet 30 milliGRAM(s) Oral daily    chlorhexidine 2% Cloths 1 Application(s) Topical <User Schedule>  epoetin carito (EPOGEN) Injectable 53276 Unit(s) IV Push <User Schedule>      PAST MEDICAL/SURGICAL HISTORY  PAST MEDICAL & SURGICAL HISTORY:  HTN (hypertension)      Stroke  age 10      Sleep apnea      Leg fracture, right      Chronic renal failure      Hemodialysis access, AV graft      ESRD (end stage renal disease) on dialysis  since 2013, M-W-F      Anemia, unspecified type      Other hyperparathyroidism      AV fistula  R arm; L arm clotted      History of left hip hemiarthroplasty          SOCIAL HISTORY: Substance Use (street drugs): ( x ) never used  (  ) other:    FAMILY HISTORY:      REVIEW OF SYSTEMS:  CONSTITUTIONAL: No fever, weight loss, or fatigue  EYES: No eye pain, visual disturbances, or discharge  ENMT:  No difficulty hearing, tinnitus, vertigo; No sinus or throat pain  BREASTS: No pain, masses, or nipple discharge  GASTROINTESTINAL: No abdominal or epigastric pain. No nausea, vomiting, or hematemesis; No diarrhea or constipation. No melena or hematochezia.  GENITOURINARY: No dysuria, frequency, hematuria, or incontinence  NEUROLOGICAL: No headaches, memory loss, loss of strength, numbness, or tremors  ENDOCRINE: No heat or cold intolerance; No hair loss  MUSCULOSKELETAL: No joint pain or swelling; No muscle, back, or extremity pain  PSYCHIATRIC: No depression, anxiety, mood swings, or difficulty sleeping  HEME/LYMPH: No easy bruising, or bleeding gums       PHYSICAL EXAM:  T(C): 37.1 (12-02-23 @ 13:12), Max: 37.1 (12-02-23 @ 04:30)  HR: 93 (12-02-23 @ 13:12) (80 - 93)  BP: 113/62 (12-02-23 @ 13:12) (113/62 - 135/75)  RR: 18 (12-02-23 @ 13:12) (18 - 18)  SpO2: 99% (12-02-23 @ 13:12) (99% - 100%)  Wt(kg): --  I&O's Summary    01 Dec 2023 07:01  -  02 Dec 2023 07:00  --------------------------------------------------------  IN: 1200 mL / OUT: 0 mL / NET: 1200 mL          GENERAL: NAD  EYES:   PERRLA   ENMT:   Moist mucous membranes, Good dentition, No lesions  Cardiovascular: Normal S1 S2, No JVD, No murmurs, No edema  Respiratory: Lungs clear to auscultation	  Gastrointestinal:  Soft, Non-tender, + BS	  Extremities: no edema                                    8.2    4.71  )-----------( 199      ( 01 Dec 2023 10:40 )             25.2     12-02    139  |  98  |  64<H>  ----------------------------<  73  5.3   |  28  |  9.13<H>    Ca    9.2      02 Dec 2023 09:39      proBNP:   Lipid Profile:   HgA1c:   TSH:     Consultant(s) Notes Reviewed:  [x ] YES  [ ] NO    Care Discussed with Consultants/Other Providers [ x] YES  [ ] NO    Imaging Personally Reviewed independently:  [x] YES  [ ] NO    All labs, radiologic studies, vitals, orders and medications list reviewed. Patient is seen and examined at bedside. Case discussed with medical team.         Erik Caldwell MD  Interventional Cardiology / Advance Heart Failure and Cardiac Transplant Specialist  [place] Office : [address] [place] [address] [place]. [address]  Tel:   [place] Office : [address] [facility] [place] [address]  Tel: [phone]       Pt is lying in bed comfortable not in distress, no chest pains no SOB no palpitations  	  MEDICATIONS:  heparin   Injectable 5000 Unit(s) SubCutaneous every 8 hours  hydrALAZINE 10 milliGRAM(s) Oral every 12 hours  metoprolol tartrate 25 milliGRAM(s) Oral two times a day        HYDROmorphone  Injectable 2 milliGRAM(s) IV Push every 8 hours PRN  oxyCODONE    IR 15 milliGRAM(s) Oral every 6 hours PRN      cinacalcet 30 milliGRAM(s) Oral daily    chlorhexidine 2% Cloths 1 Application(s) Topical <User Schedule>  epoetin carito (EPOGEN) Injectable 30951 Unit(s) IV Push <User Schedule>      PAST MEDICAL/SURGICAL HISTORY  PAST MEDICAL & SURGICAL HISTORY:  HTN (hypertension)      Stroke  age 10      Sleep apnea      Leg fracture, right      Chronic renal failure      Hemodialysis access, AV graft      ESRD (end stage renal disease) on dialysis  since 2013, M-W-F      Anemia, unspecified type      Other hyperparathyroidism      AV fistula  R arm; L arm clotted      History of left hip hemiarthroplasty          SOCIAL HISTORY: Substance Use (street drugs): ( x ) never used  (  ) other:    FAMILY HISTORY:      REVIEW OF SYSTEMS:  CONSTITUTIONAL: No fever, weight loss, or fatigue  EYES: No eye pain, visual disturbances, or discharge  ENMT:  No difficulty hearing, tinnitus, vertigo; No sinus or throat pain  BREASTS: No pain, masses, or nipple discharge  GASTROINTESTINAL: No abdominal or epigastric pain. No nausea, vomiting, or hematemesis; No diarrhea or constipation. No melena or hematochezia.  GENITOURINARY: No dysuria, frequency, hematuria, or incontinence  NEUROLOGICAL: No headaches, memory loss, loss of strength, numbness, or tremors  ENDOCRINE: No heat or cold intolerance; No hair loss  MUSCULOSKELETAL: No joint pain or swelling; No muscle, back, or extremity pain  PSYCHIATRIC: No depression, anxiety, mood swings, or difficulty sleeping  HEME/LYMPH: No easy bruising, or bleeding gums       PHYSICAL EXAM:  T(C): 37.1 (12-02-23 @ 13:12), Max: 37.1 (12-02-23 @ 04:30)  HR: 93 (12-02-23 @ 13:12) (80 - 93)  BP: 113/62 (12-02-23 @ 13:12) (113/62 - 135/75)  RR: 18 (12-02-23 @ 13:12) (18 - 18)  SpO2: 99% (12-02-23 @ 13:12) (99% - 100%)  Wt(kg): --  I&O's Summary    01 Dec 2023 07:01  -  02 Dec 2023 07:00  --------------------------------------------------------  IN: 1200 mL / OUT: 0 mL / NET: 1200 mL          GENERAL: NAD  EYES:   PERRLA   ENMT:   Moist mucous membranes, Good dentition, No lesions  Cardiovascular: Normal S1 S2, No JVD, No murmurs, No edema  Respiratory: Lungs clear to auscultation	  Gastrointestinal:  Soft, Non-tender, + BS	  Extremities: no edema                                    8.2    4.71  )-----------( 199      ( 01 Dec 2023 10:40 )             25.2     12-02    139  |  98  |  64<H>  ----------------------------<  73  5.3   |  28  |  9.13<H>    Ca    9.2      02 Dec 2023 09:39      proBNP:   Lipid Profile:   HgA1c:   TSH:     Consultant(s) Notes Reviewed:  [x ] YES  [ ] NO    Care Discussed with Consultants/Other Providers [ x] YES  [ ] NO    Imaging Personally Reviewed independently:  [x] YES  [ ] NO    All labs, radiologic studies, vitals, orders and medications list reviewed. Patient is seen and examined at bedside. Case discussed with medical team.

## 2023-12-02 NOTE — PROGRESS NOTE ADULT - SUBJECTIVE AND OBJECTIVE BOX
stable for dc to rehab   see dc summary  Bello Sheridan)  [facility]  [address] [place] [address]  Phone: [phone]  Fax: [phone]  Established Patient  Follow Up Time: 2 weeks   Bello Sheridan)  [facility]  [address] [place] [address]  Phone: [phone]  Fax: [phone]  Established Patient  Follow Up Time: 2 weeks    Ashley Abreu)  [address], [address]  [place], [place] [id number]  Phone: [phone]  Fax: [phone]  Established Patient  Follow Up Time: 1 week

## 2023-12-02 NOTE — PROGRESS NOTE ADULT - ASSESSMENT
37 year old male with hx of HTN, ESRD with HD M/W/F, presents to the ED with left hip pain s/p fall this morning.    1. Pre-op assessment  -Cardiology consulted for pre-op eval. Patient states he has hyperthyroidism and is scheduled for surgery and needs cardiac clearance. Denies cardiac hx of MI or stents. Denies chest pain. Has chronic SOB which he attributes to his ESRD and HD.   -EKG NSR   - Not in decompensated HF   - No Hx of tachy/bradyarrhythmias   -TTE 11/2023 normal LV systolic function. mild-mod MS  -optimized from cardiac standpoint for thyroid surgery, moderate risk for MACE      2. s/p fall  -mechanical denies CP, palpitations or dizziness    3. ESRD  -on HD   -f/u renal     4. HTN  -BP improving  -c/w hydral and metoprolol  
37 year old male with hx of HTN, ESRD with HD M/W/F, presents to the ED with left hip pain s/p fall this morning.    1. Pre-op assessment  -Cardiology consulted for pre-op eval. Patient states he has hyperthyroidism and is scheduled for surgery and needs cardiac clearance. Denies cardiac hx of MI or stents. Denies chest pain. Has chronic SOB which he attributes to his ESRD and HD.   -EKG NSR  - Obtain TTE (can be deferred to OP since thyroid surgery not urgent and patient planned for DC to [facility])  - Not in decompensated HF   - No Hx of tachy/bradyarrhythmias   - moderate risk for moderate risk thyroid surgery. No contraindication to proceed with elective surgery.     2. s/p fall  -mechanical denies CP, palpitations or dizziness    3. ESRD  -on HD   -f/u renal     4. HTN  -BP improving  -c/w hydral and metoprolol
37 year old male with hx of HTN, ESRD with HD M/W/F, presents to the ED with left hip pain s/p fall this morning. Patient is s/p left hip arthroplasty on 8/15 with Dr. Jason León. He states since the surgery he has been ambulating with assistance. He was walking down the stairs today with his brother's  assistance when he slipped and fell down the stairs. He slipped down three steps landing on his buttocks and his left leg abducted.   no cp /sob prior   no palpiations   No head injury or LOC.   has been in his USOH otherwise     - R hip pain : CT no fx   pain meds   PT   PMR consult       ESRD HD     HTN cont bp meds          discussed with pt and cm   d.acp   no accepting facility   cont PT in an attempt to get home with PT       
37 year old male with hx of HTN, ESRD with HD M/W/F, presents to the ED with left hip pain s/p fall this morning.    1. Pre-op assessment  -Cardiology consulted for pre-op eval. Patient states he has hyperthyroidism and is scheduled for surgery and needs cardiac clearance. Denies cardiac hx of MI or stents. Denies chest pain. Has chronic SOB which he attributes to his ESRD and HD.   -EKG NSR   - Not in decompensated HF   - No Hx of tachy/bradyarrhythmias   -TTE 11/2023 normal LV systolic function. mild-mod MS  -optimized from cardiac standpoint for thyroid surgery, moderate risk for MACE      2. s/p fall  -mechanical denies CP, palpitations or dizziness    3. ESRD  -on HD   -f/u renal     4. HTN  -BP improving  -c/w hydral and metoprolol  
37 year old male with hx of HTN, ESRD with HD M/W/F, presents to the ED with left hip pain s/p fall this morning. Patient is s/p left hip arthroplasty on 8/15 with Dr. Jason León. He states since the surgery he has been ambulating with assistance. He was walking down the stairs today with his brother's  assistance when he slipped and fell down the stairs. He slipped down three steps landing on his buttocks and his left leg abducted.   no cp /sob prior   no palpiations   No head injury or LOC.   has been in his USOH otherwise     - R hip pain : CT no fx   pain meds   PT   PMR consult       ESRD HD     HTN cont bp meds          discussed with pt and cm at length   awaiting placement   if a ble to be placed into rehab then dc to rehab otherwise cont PT in an attempt to get home with PT        
37 year old male with hx of HTN, ESRD with HD M/W/F, presents to the ED with left hip pain s/p fall this morning. Patient is s/p left hip arthroplasty on 8/15 with Dr. Jason León. He states since the surgery he has been ambulating with assistance. He was walking down the stairs today with his brother's  assistance when he slipped and fell down the stairs. He slipped down three steps landing on his buttocks and his left leg abducted.   no cp /sob prior   no palpiations   No head injury or LOC.   has been in his US[facility] otherwise     - R hip pain : CT no fx   pain meds   PT   PMR consult       ESRD HD     HTN cont bp meds     plan for dc to rehab 
37 year old male with hx of HTN, ESRD with HD M/W/F, presents to the ED with left hip pain s/p fall this morning. Patient is s/p left hip arthroplasty on 8/15 with Dr. Jason León. He states since the surgery he has been ambulating with assistance. He was walking down the stairs today with his brother's  assistance when he slipped and fell down the stairs. He slipped down three steps landing on his buttocks and his left leg abducted.   no cp /sob prior   no palpiations   No head injury or LOC.   has been in his USOH otherwise     - R hip pain : CT no fx   pain meds   PT   PMR consult       ESRD HD     HTN cont bp meds     plan for dc to rehab 
37 year old male with hx of HTN, ESRD with HD M/W/F, presents to the ED with left hip pain s/p fall this morning. Patient is s/p left hip arthroplasty on 8/15 with Dr. Jason León. He states since the surgery he has been ambulating with assistance. He was walking down the stairs today with his brother's  assistance when he slipped and fell down the stairs. He slipped down three steps landing on his buttocks and his left leg abducted.   no cp /sob prior   no palpiations   No head injury or LOC.   has been in his US[facility] otherwise     - R hip pain : CT no fx   pain meds   PT   PMR consult       ESRD HD     HTN cont bp meds     plan for dc to rehab   fu with dennys   
37 year old male with hx of HTN, ESRD with HD M/W/F, presents to the ED with left hip pain s/p fall this morning.    1. Pre-op assessment  -Cardiology consulted for pre-op eval. Patient states he has hyperthyroidism and is scheduled for surgery and needs cardiac clearance. Denies cardiac hx of MI or stents. Denies chest pain. Has chronic SOB which he attributes to his ESRD and HD.   -EKG NSR   - Not in decompensated HF   - No Hx of tachy/bradyarrhythmias   -TTE 11/2023 normal LV systolic function. mild-mod MS  -optimized from cardiac standpoint for thyroid surgery, moderate risk for MACE      2. s/p fall  -mechanical denies CP, palpitations or dizziness    3. ESRD  -on HD   -f/u renal     4. HTN  -BP improving  -c/w hydral and metoprolol  
37 year old male with hx of HTN, ESRD with HD M/W/F, presents to the ED with left hip pain s/p fall this morning.    1. Pre-op assessment  -Cardiology consulted for pre-op eval. Patient states he has hyperthyroidism and is scheduled for surgery and needs cardiac clearance. Denies cardiac hx of MI or stents. Denies chest pain. Has chronic SOB which he attributes to his ESRD and HD.   -EKG NSR  - Obtain TTE    - Not in decompensated HF   - No Hx of tachy/bradyarrhythmias       2. s/p fall  -mechanical denies CP, palpitations or dizziness    3. ESRD  -on HD   -f/u renal     4. HTN  -BP improving  -c/w hydral and metoprolol
37 year old male with hx of HTN, ESRD with HD M/W/F, presents to the ED with left hip pain s/p fall this morning. Patient is s/p left hip arthroplasty on 8/15 with Dr. Jason León. He states since the surgery he has been ambulating with assistance. He was walking down the stairs today with his brother's  assistance when he slipped and fell down the stairs. He slipped down three steps landing on his buttocks and his left leg abducted.   no cp /sob prior   no palpiations   No head injury or LOC.   has been in his US[facility] otherwise     - R hip pain : CT no fx   pain meds   PT     ESRD HD     HTN cont bp meds     plan for dc to rehab 
37 year old male with hx of HTN, ESRD with HD M/W/F, presents to the ED with left hip pain s/p fall this morning. Patient is s/p left hip arthroplasty on 8/15 with Dr. Jason León. He states since the surgery he has been ambulating with assistance. He was walking down the stairs today with his brother's  assistance when he slipped and fell down the stairs. He slipped down three steps landing on his buttocks and his left leg abducted.   no cp /sob prior   no palpiations   No head injury or LOC.   has been in his US[facility] otherwise     - R hip pain : CT no fx   pain meds   PT   PMR consult       ESRD HD     HTN cont bp meds          discussed with pt and cm   d.acp   no accepting facility   cont PT in an attempt to get home with PT       
37 year old male with hx of HTN, ESRD with HD M/W/F, presents to the ED with left hip pain s/p fall this morning. Patient is s/p left hip arthroplasty on 8/15 with Dr. Jason León. He states since the surgery he has been ambulating with assistance. He was walking down the stairs today with his brother's  assistance when he slipped and fell down the stairs. He slipped down three steps landing on his buttocks and his left leg abducted.   no cp /sob prior   no palpiations   No head injury or LOC.   has been in his USOH otherwise     - R hip pain : CT no fx   pain meds   PT   PMR consult       ESRD HD     HTN cont bp meds          discussed with pt and cm at length   if a ble to be placed into rehab then dc to rehab otherwise cont PT in an attempt to get home with PT        
37 year old male with hx of HTN, ESRD with HD M/W/F, presents to the ED with left hip pain s/p fall this morning. Patient is s/p left hip arthroplasty on 8/15 with Dr. Jason León. He states since the surgery he has been ambulating with assistance. He was walking down the stairs today with his brother's  assistance when he slipped and fell down the stairs. He slipped down three steps landing on his buttocks and his left leg abducted.   no cp /sob prior   no palpiations   No head injury or LOC.   has been in his USOH otherwise     - R hip pain : CT no fx   pain meds   PT   PMR consult       ESRD HD     HTN cont bp meds     plan for dc to rehab   fu with dennys       
37 year old male with hx of HTN, ESRD with HD M/W/F, presents to the ED with left hip pain s/p fall this morning. Patient is s/p left hip arthroplasty on 8/15 with Dr. Jason León. He states since the surgery he has been ambulating with assistance. He was walking down the stairs today with his brother's  assistance when he slipped and fell down the stairs. He slipped down three steps landing on his buttocks and his left leg abducted.   no cp /sob prior   no palpiations   No head injury or LOC.   has been in his [facility] otherwise     - R hip pain : CT no fx   pain meds   PT   PMR consult       ESRD HD     HTN cont bp meds     plan for dc to rehab   fu with cm     discussed with pt and dennys   d.acp   no accepting facility   cont PT in an attempt to get home with PT

## 2023-12-02 NOTE — DISCHARGE NOTE PROVIDER - NPI NUMBER (FOR SYSADMIN USE ONLY) :
[id number] [[id number] [[id number]],[[id number]] [[id number]],[[id number]] [[id number]]

## 2023-12-02 NOTE — PROGRESS NOTE ADULT - PROVIDER SPECIALTY LIST ADULT
Cardiology
Cardiology
Internal Medicine
Nephrology
Nephrology
Cardiology
Cardiology
Internal Medicine
Nephrology
Nephrology
Cardiology
Internal Medicine
Internal Medicine
Nephrology
Cardiology

## 2023-12-02 NOTE — DISCHARGE NOTE NURSING/CASE MANAGEMENT/SOCIAL WORK - PATIENT PORTAL LINK FT
You can access the FollowMyHealth Patient Portal offered by [facility] by registering at the following website: [email or web address]. By joining [employer]’s FollowMyHealth portal, you will also be able to view your health information using other applications (apps) compatible with our system.

## 2023-12-18 ENCOUNTER — APPOINTMENT (OUTPATIENT)
Dept: ORTHOPEDIC SURGERY | Facility: CLINIC | Age: 38
End: 2023-12-18
Payer: MEDICAID

## 2023-12-18 DIAGNOSIS — Z96.649 PRESENCE OF UNSPECIFIED ARTIFICIAL HIP JOINT: ICD-10-CM

## 2023-12-18 DIAGNOSIS — S72.002D FRACTURE OF UNSPECIFIED PART OF NECK OF LEFT FEMUR, SUBSEQUENT ENCOUNTER FOR CLOSED FRACTURE WITH ROUTINE HEALING: ICD-10-CM

## 2023-12-18 PROCEDURE — 99213 OFFICE O/P EST LOW 20 MIN: CPT

## 2023-12-18 PROCEDURE — 72170 X-RAY EXAM OF PELVIS: CPT

## 2023-12-20 PROBLEM — S72.002D CLOSED DISPLACED FRACTURE OF LEFT FEMORAL NECK WITH ROUTINE HEALING: Status: ACTIVE | Noted: 2023-11-03

## 2023-12-20 PROBLEM — Z96.649 S/P HIP HEMIARTHROPLASTY: Status: ACTIVE | Noted: 2023-11-03

## 2024-01-20 NOTE — PATIENT PROFILE ADULT - FALL HARM RISK - UNIVERSAL INTERVENTIONS
No
Bed in lowest position, wheels locked, appropriate side rails in place/Call bell, personal items and telephone in reach/Instruct patient to call for assistance before getting out of bed or chair/Non-slip footwear when patient is out of bed/Vendor to call system/Physically safe environment - no spills, clutter or unnecessary equipment/Purposeful Proactive Rounding/Room/bathroom lighting operational, light cord in reach

## 2024-01-23 ENCOUNTER — OUTPATIENT (OUTPATIENT)
Dept: OUTPATIENT SERVICES | Facility: HOSPITAL | Age: 39
LOS: 1 days | End: 2024-01-23

## 2024-01-23 VITALS
HEIGHT: 64 IN | OXYGEN SATURATION: 98 % | DIASTOLIC BLOOD PRESSURE: 71 MMHG | HEART RATE: 91 BPM | WEIGHT: 199.96 LBS | SYSTOLIC BLOOD PRESSURE: 166 MMHG | RESPIRATION RATE: 14 BRPM | TEMPERATURE: 99 F

## 2024-01-23 DIAGNOSIS — Z96.642 PRESENCE OF LEFT ARTIFICIAL HIP JOINT: Chronic | ICD-10-CM

## 2024-01-23 DIAGNOSIS — Z98.890 OTHER SPECIFIED POSTPROCEDURAL STATES: Chronic | ICD-10-CM

## 2024-01-23 DIAGNOSIS — E21.3 HYPERPARATHYROIDISM, UNSPECIFIED: ICD-10-CM

## 2024-01-23 DIAGNOSIS — I77.0 ARTERIOVENOUS FISTULA, ACQUIRED: Chronic | ICD-10-CM

## 2024-01-23 DIAGNOSIS — E21.2 OTHER HYPERPARATHYROIDISM: ICD-10-CM

## 2024-01-23 LAB
ANION GAP SERPL CALC-SCNC: 18 MMOL/L — HIGH (ref 7–14)
BUN SERPL-MCNC: 83 MG/DL — HIGH (ref 7–23)
CALCIUM SERPL-MCNC: 9 MG/DL — SIGNIFICANT CHANGE UP (ref 8.4–10.5)
CHLORIDE SERPL-SCNC: 95 MMOL/L — LOW (ref 98–107)
CO2 SERPL-SCNC: 26 MMOL/L — SIGNIFICANT CHANGE UP (ref 22–31)
CREAT SERPL-MCNC: 9.78 MG/DL — HIGH (ref 0.5–1.3)
EGFR: 6 ML/MIN/1.73M2 — LOW
GLUCOSE SERPL-MCNC: 66 MG/DL — LOW (ref 70–99)
HCT VFR BLD CALC: 24.7 % — LOW (ref 39–50)
HGB BLD-MCNC: 7.7 G/DL — LOW (ref 13–17)
MCHC RBC-ENTMCNC: 30.3 PG — SIGNIFICANT CHANGE UP (ref 27–34)
MCHC RBC-ENTMCNC: 31.2 GM/DL — LOW (ref 32–36)
MCV RBC AUTO: 97.2 FL — SIGNIFICANT CHANGE UP (ref 80–100)
NRBC # BLD: 0 /100 WBCS — SIGNIFICANT CHANGE UP (ref 0–0)
NRBC # FLD: 0 K/UL — SIGNIFICANT CHANGE UP (ref 0–0)
PLATELET # BLD AUTO: 196 K/UL — SIGNIFICANT CHANGE UP (ref 150–400)
POTASSIUM SERPL-MCNC: 5.9 MMOL/L — HIGH (ref 3.5–5.3)
POTASSIUM SERPL-SCNC: 5.9 MMOL/L — HIGH (ref 3.5–5.3)
RBC # BLD: 2.54 M/UL — LOW (ref 4.2–5.8)
RBC # FLD: 17 % — HIGH (ref 10.3–14.5)
SODIUM SERPL-SCNC: 139 MMOL/L — SIGNIFICANT CHANGE UP (ref 135–145)
WBC # BLD: 5.16 K/UL — SIGNIFICANT CHANGE UP (ref 3.8–10.5)
WBC # FLD AUTO: 5.16 K/UL — SIGNIFICANT CHANGE UP (ref 3.8–10.5)

## 2024-01-23 NOTE — H&P PST ADULT - FUNCTIONAL STATUS
unable to walk 1 to 2 blocks, climbs 1 flight of stairs, ADLs  - activity intolerance due to left hip pain./less than 4 METS 4-10 METS

## 2024-01-23 NOTE — H&P PST ADULT - NEUROLOGICAL COMMENTS
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

## 2024-01-23 NOTE — H&P PST ADULT - NS MD HP INPLANTS MED DEV
left hip, left AV fistula ,/Artificial joint/Vascular access device left hip, right AV fistula/Artificial joint/Vascular access device

## 2024-01-23 NOTE — H&P PST ADULT - ANESTHESIA, PREVIOUS REACTION, PROFILE
post-op -in PACU, patient was lethargic and hypoxemic requiring reintubation after left hip hemiarthroplasty 08/15 /23 . Patient was hypotensive after intubation requiring pressor support. Extubated 08/17/23 post-op -in PACU, patient was lethargic and hypoxemic requiring reintubation after left hip hemiarthroplasty 08/15 /23 . full rom of neck mal 3

## 2024-01-23 NOTE — H&P PST ADULT - RESPIRATORY
clear to auscultation bilaterally/no wheezes/no respiratory distress clear to auscultation bilaterally/no wheezes/no rales/no rhonchi/no respiratory distress/no use of accessory muscles/no subcutaneous emphysema/airway patent/breath sounds equal/good air movement/respirations non-labored/no intercostal retractions

## 2024-01-23 NOTE — H&P PST ADULT - GASTROINTESTINAL
soft/nontender details… soft/nontender/nondistended/normal active bowel sounds/no guarding/no rigidity/no organomegaly

## 2024-01-23 NOTE — H&P PST ADULT - NSANTHOSAYNRD_GEN_A_CORE
as per pt./No. SOLITARIO screening performed.  STOP BANG Legend: 0-2 = LOW Risk; 3-4 = INTERMEDIATE Risk; 5-8 = HIGH Risk No. SOLITARIO screening performed.  STOP BANG Legend: 0-2 = LOW Risk; 3-4 = INTERMEDIATE Risk; 5-8 = HIGH Risk denies being tested for sleep apnea, NH hx reports sleep apnea/No. SOLITARIO screening performed.  STOP BANG Legend: 0-2 = LOW Risk; 3-4 = INTERMEDIATE Risk; 5-8 = HIGH Risk

## 2024-01-23 NOTE — H&P PST ADULT - NSICDXPASTMEDICALHX_GEN_ALL_CORE_FT
PAST MEDICAL HISTORY:  Anemia, unspecified type     Chronic renal failure     ESRD (end stage renal disease) on dialysis since 2013, M-W-F    Hemodialysis access, AV graft     HTN (hypertension)     Hypothyroidism     Leg fracture, right     Morbid obesity     Other hyperparathyroidism     Sleep apnea     Stroke age 10     PAST MEDICAL HISTORY:  Anemia, unspecified type     Chronic renal failure     ESRD (end stage renal disease) on dialysis since 2013, M-W-F    Hemodialysis access, AV graft     HTN (hypertension)     Leg fracture, right     Other hyperparathyroidism     Sleep apnea     Stroke age 10

## 2024-01-23 NOTE — H&P PST ADULT - MUSCULOSKELETAL
details… left hip ./no calf tenderness/decreased ROM due to pain/extremities exam/abnormal gait no calf tenderness

## 2024-01-23 NOTE — H&P PST ADULT - MUSCULOSKELETAL COMMENTS
recent  left radial fracture 08/14/23 with splint and  sustained a left femoral neck fracture s/p left hemiarthroplasty on 8/15/23 at [facility] . Patient was medically optimized for discharge to [facility] rehab  on 8/24/2023 recent  left radial fracture 08/14/23, s/p left hemiarthroplasty on 8/15/23 at [facility] . Patient was medically optimized for discharge to [facility] rehab  on 8/24/2023 recent  left radial fracture 08/14/23, s/p left hemiarthroplasty on 8/15/23 at [facility] unsteady gait, bilateral hand deformities, unable to stand straight up

## 2024-01-23 NOTE — H&P PST ADULT - NSICDXPASTSURGICALHX_GEN_ALL_CORE_FT
PAST SURGICAL HISTORY:  AV fistula R arm; L arm clotted    History of left hip hemiarthroplasty      PAST SURGICAL HISTORY:  AV fistula R arm; L arm clotted    History of left hip hemiarthroplasty     S/P parathyroidectomy

## 2024-01-23 NOTE — H&P PST ADULT - ENDOCRINE COMMENTS
c/o bone pain and fatigue with elevated PTH level since last few months - hyperparathyroidism c/o bone pain and fatigue with elevated PTH

## 2024-01-23 NOTE — H&P PST ADULT - CARDIOVASCULAR COMMENTS
since left hip fx using wheelchair, currently in rehalb, walks 100 feet at time in rehalb right AV fistula positive bruit and thrill

## 2024-01-23 NOTE — H&P PST ADULT - HISTORY OF PRESENT ILLNESS
37y/o male scheduled for reoperative parathyroidectomy, possible parathyroid autotransplantation on 1/29/2024      37 year old male  with hx of subtotal parathyroidectomy in [place] 12/22 (right inferior, left superior and left inferior parathyroids) c/o bone pain , fatigue and  elevated PTH level 7545 , CA 8.8  is diagnosed with  hyperparathyroidism and  is scheduled for reoperative parathyroidectomy , possible parathyroid autotransplantation . 39y/o male scheduled for reoperative parathyroidectomy, possible parathyroid autotransplantation on 1/29/2024.  Pt states, "hx ESRD HD [facility] MWF via right av fistula, left hip arthroplasty 8/2023, s/p subtotal parathyroidectomy 12/2022, continues to c/o bone pain, pth elevated.  Was previously scheduled for 10/2023 canceled due to needed to recover more from hip surgery."

## 2024-01-23 NOTE — H&P PST ADULT - CARDIOVASCULAR
regular rate and rhythm/S1 S2 present/no JVD/vascular details… regular rate and rhythm/S1 S2 present/no gallops/no rub/no murmur/no JVD/normal PMI/no pedal edema

## 2024-01-23 NOTE — H&P PST ADULT - GENITOURINARY COMMENTS
hx ESRD (T/TH/SAT via RUE AVF at [facility]. LUE AVF clotted. HD MWF via right AV fistula, left fistula clotted oliguria declined

## 2024-01-23 NOTE — H&P PST ADULT - PROBLEM SELECTOR PLAN 1
Pt scheduled for reoperative parathyroidectomy, possible parathyroid auto transplantation on 1/29/2024.  labs done results pending, ekg in chart.  Preop teaching done, pt able to verbalize understanding.    medication day of procedure-  hydralazine, metoprolol, oxycodone if needed   anesthesia SOLITARIO precautions.   pst request   echo 2023 in chart

## 2024-01-23 NOTE — H&P PST ADULT - GAIT/BALANCE
unsteady gait.  came to [facility] in a wheelchair unsteady gait

## 2024-01-26 NOTE — ED PROVIDER NOTE - CONSTITUTIONAL, MLM
normal... Well appearing, well nourished, awake, alert, oriented to person, place, time/situation and in no apparent distress. 56.7

## 2024-01-29 ENCOUNTER — APPOINTMENT (OUTPATIENT)
Dept: SURGERY | Facility: HOSPITAL | Age: 39
End: 2024-01-29

## 2024-02-13 ENCOUNTER — APPOINTMENT (OUTPATIENT)
Dept: SURGERY | Facility: CLINIC | Age: 39
End: 2024-02-13

## 2024-02-20 NOTE — ED ADULT TRIAGE NOTE - HEIGHT IN FEET
[FreeTextEntry1] : Discussed meds and depression with pt and wife Call me prn and rto 2 weeks Will consider therapy if we can find one that accepts insurance Pt willing and wants to have sessions with me 6

## 2024-02-22 NOTE — ED ADULT TRIAGE NOTE - WEIGHT METHOD
The patient is seen for screening colonoscopy.   The following colon cancer risk factors are identified:   personal history of colon cancer and Meraz syndrome  .   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 She is also had uterine and breast cancer and last year an adenomatous polyp was removed from her colon. stated

## 2024-02-29 NOTE — ED PROVIDER NOTE - INPATIENT RESIDENT/ACP NOTIFIED DATE
Patient requesting to take her placenta home with her.  States that she intends to make edible smoothies at home. States that she has found recipes and can be flavored up with berries to drown the taste.  Boyfriend went to car to get transportable refrigerated carry bag and placed placenta in plastic container inside of it.  Educational teaching sheet read to patient and boyfriend as placenta has blood born pathogens.  Consent to release Placenta from the Hospital for personal use signed by the patient and witnessed by myself.Denies any questions. V.U.   
14-Aug-2017 14:15

## 2024-03-06 ENCOUNTER — INPATIENT (INPATIENT)
Facility: HOSPITAL | Age: 39
LOS: 20 days | Discharge: NURSING, REHAB OR LONG-TERM CARE | DRG: 521 | End: 2024-03-27
Attending: GENERAL ACUTE CARE HOSPITAL | Admitting: GENERAL ACUTE CARE HOSPITAL
Payer: MEDICAID

## 2024-03-06 ENCOUNTER — TRANSCRIPTION ENCOUNTER (OUTPATIENT)
Age: 39
End: 2024-03-06

## 2024-03-06 VITALS
RESPIRATION RATE: 20 BRPM | DIASTOLIC BLOOD PRESSURE: 83 MMHG | TEMPERATURE: 100 F | HEART RATE: 85 BPM | OXYGEN SATURATION: 100 % | SYSTOLIC BLOOD PRESSURE: 152 MMHG | WEIGHT: 209.44 LBS | HEIGHT: 74 IN

## 2024-03-06 DIAGNOSIS — Z96.642 PRESENCE OF LEFT ARTIFICIAL HIP JOINT: Chronic | ICD-10-CM

## 2024-03-06 DIAGNOSIS — S72.001A FRACTURE OF UNSPECIFIED PART OF NECK OF RIGHT FEMUR, INITIAL ENCOUNTER FOR CLOSED FRACTURE: ICD-10-CM

## 2024-03-06 DIAGNOSIS — Z98.890 OTHER SPECIFIED POSTPROCEDURAL STATES: Chronic | ICD-10-CM

## 2024-03-06 DIAGNOSIS — I77.0 ARTERIOVENOUS FISTULA, ACQUIRED: Chronic | ICD-10-CM

## 2024-03-06 LAB
ALBUMIN SERPL ELPH-MCNC: 3.6 G/DL — SIGNIFICANT CHANGE UP (ref 3.3–5)
ALP SERPL-CCNC: 1052 U/L — HIGH (ref 40–120)
ALT FLD-CCNC: <5 U/L — LOW (ref 10–45)
ANION GAP SERPL CALC-SCNC: 13 MMOL/L — SIGNIFICANT CHANGE UP (ref 5–17)
APTT BLD: 30.3 SEC — SIGNIFICANT CHANGE UP (ref 24.5–35.6)
AST SERPL-CCNC: 10 U/L — SIGNIFICANT CHANGE UP (ref 10–40)
BASOPHILS # BLD AUTO: 0.02 K/UL — SIGNIFICANT CHANGE UP (ref 0–0.2)
BASOPHILS NFR BLD AUTO: 0.5 % — SIGNIFICANT CHANGE UP (ref 0–2)
BILIRUB SERPL-MCNC: 0.4 MG/DL — SIGNIFICANT CHANGE UP (ref 0.2–1.2)
BUN SERPL-MCNC: 54 MG/DL — HIGH (ref 7–23)
CALCIUM SERPL-MCNC: 9 MG/DL — SIGNIFICANT CHANGE UP (ref 8.4–10.5)
CHLORIDE SERPL-SCNC: 94 MMOL/L — LOW (ref 96–108)
CO2 SERPL-SCNC: 28 MMOL/L — SIGNIFICANT CHANGE UP (ref 22–31)
CREAT SERPL-MCNC: 6.91 MG/DL — HIGH (ref 0.5–1.3)
EGFR: 10 ML/MIN/1.73M2 — LOW
EOSINOPHIL # BLD AUTO: 0.15 K/UL — SIGNIFICANT CHANGE UP (ref 0–0.5)
EOSINOPHIL NFR BLD AUTO: 3.8 % — SIGNIFICANT CHANGE UP (ref 0–6)
GLUCOSE SERPL-MCNC: 82 MG/DL — SIGNIFICANT CHANGE UP (ref 70–99)
HCT VFR BLD CALC: 25.4 % — LOW (ref 39–50)
HGB BLD-MCNC: 8.2 G/DL — LOW (ref 13–17)
IMM GRANULOCYTES NFR BLD AUTO: 0.3 % — SIGNIFICANT CHANGE UP (ref 0–0.9)
INR BLD: 1.11 RATIO — SIGNIFICANT CHANGE UP (ref 0.85–1.18)
LYMPHOCYTES # BLD AUTO: 1.12 K/UL — SIGNIFICANT CHANGE UP (ref 1–3.3)
LYMPHOCYTES # BLD AUTO: 28.4 % — SIGNIFICANT CHANGE UP (ref 13–44)
MCHC RBC-ENTMCNC: 31.3 PG — SIGNIFICANT CHANGE UP (ref 27–34)
MCHC RBC-ENTMCNC: 32.3 GM/DL — SIGNIFICANT CHANGE UP (ref 32–36)
MCV RBC AUTO: 96.9 FL — SIGNIFICANT CHANGE UP (ref 80–100)
MONOCYTES # BLD AUTO: 0.44 K/UL — SIGNIFICANT CHANGE UP (ref 0–0.9)
MONOCYTES NFR BLD AUTO: 11.2 % — SIGNIFICANT CHANGE UP (ref 2–14)
NEUTROPHILS # BLD AUTO: 2.2 K/UL — SIGNIFICANT CHANGE UP (ref 1.8–7.4)
NEUTROPHILS NFR BLD AUTO: 55.8 % — SIGNIFICANT CHANGE UP (ref 43–77)
NRBC # BLD: 0 /100 WBCS — SIGNIFICANT CHANGE UP (ref 0–0)
PLATELET # BLD AUTO: 194 K/UL — SIGNIFICANT CHANGE UP (ref 150–400)
POTASSIUM SERPL-MCNC: 4.7 MMOL/L — SIGNIFICANT CHANGE UP (ref 3.5–5.3)
POTASSIUM SERPL-SCNC: 4.7 MMOL/L — SIGNIFICANT CHANGE UP (ref 3.5–5.3)
PROT SERPL-MCNC: 6.9 G/DL — SIGNIFICANT CHANGE UP (ref 6–8.3)
PROTHROM AB SERPL-ACNC: 11.6 SEC — SIGNIFICANT CHANGE UP (ref 9.5–13)
RBC # BLD: 2.62 M/UL — LOW (ref 4.2–5.8)
RBC # FLD: 14.6 % — HIGH (ref 10.3–14.5)
SODIUM SERPL-SCNC: 135 MMOL/L — SIGNIFICANT CHANGE UP (ref 135–145)
WBC # BLD: 3.94 K/UL — SIGNIFICANT CHANGE UP (ref 3.8–10.5)
WBC # FLD AUTO: 3.94 K/UL — SIGNIFICANT CHANGE UP (ref 3.8–10.5)

## 2024-03-06 PROCEDURE — 73552 X-RAY EXAM OF FEMUR 2/>: CPT | Mod: 26,RT,77

## 2024-03-06 PROCEDURE — 72170 X-RAY EXAM OF PELVIS: CPT | Mod: 26,59

## 2024-03-06 PROCEDURE — 72192 CT PELVIS W/O DYE: CPT | Mod: 26,MC

## 2024-03-06 PROCEDURE — 20611 DRAIN/INJ JOINT/BURSA W/US: CPT | Mod: RT

## 2024-03-06 PROCEDURE — 73522 X-RAY EXAM HIPS BI 3-4 VIEWS: CPT | Mod: 26

## 2024-03-06 PROCEDURE — 73562 X-RAY EXAM OF KNEE 3: CPT | Mod: 26,RT

## 2024-03-06 PROCEDURE — 71045 X-RAY EXAM CHEST 1 VIEW: CPT | Mod: 26

## 2024-03-06 PROCEDURE — 99285 EMERGENCY DEPT VISIT HI MDM: CPT

## 2024-03-06 PROCEDURE — 93971 EXTREMITY STUDY: CPT | Mod: 26,RT

## 2024-03-06 PROCEDURE — 86077 PHYS BLOOD BANK SERV XMATCH: CPT

## 2024-03-06 PROCEDURE — 73552 X-RAY EXAM OF FEMUR 2/>: CPT | Mod: 26,LT

## 2024-03-06 PROCEDURE — 76377 3D RENDER W/INTRP POSTPROCES: CPT | Mod: 26,76

## 2024-03-06 PROCEDURE — 99254 IP/OBS CNSLTJ NEW/EST MOD 60: CPT | Mod: 57

## 2024-03-06 PROCEDURE — 76882 US LMTD JT/FCL EVL NVASC XTR: CPT | Mod: 26,RT

## 2024-03-06 PROCEDURE — 73700 CT LOWER EXTREMITY W/O DYE: CPT | Mod: 26,RT,MC

## 2024-03-06 RX ORDER — LIDOCAINE 4 G/100G
1 CREAM TOPICAL ONCE
Refills: 0 | Status: COMPLETED | OUTPATIENT
Start: 2024-03-06 | End: 2024-03-06

## 2024-03-06 RX ORDER — HYDRALAZINE HCL 50 MG
10 TABLET ORAL EVERY 12 HOURS
Refills: 0 | Status: DISCONTINUED | OUTPATIENT
Start: 2024-03-06 | End: 2024-03-11

## 2024-03-06 RX ORDER — OXYCODONE HYDROCHLORIDE 5 MG/1
15 TABLET ORAL EVERY 6 HOURS
Refills: 0 | Status: DISCONTINUED | OUTPATIENT
Start: 2024-03-06 | End: 2024-03-07

## 2024-03-06 RX ORDER — ACETAMINOPHEN 500 MG
1000 TABLET ORAL ONCE
Refills: 0 | Status: DISCONTINUED | OUTPATIENT
Start: 2024-03-06 | End: 2024-03-06

## 2024-03-06 RX ORDER — SODIUM CHLORIDE 9 MG/ML
1000 INJECTION INTRAMUSCULAR; INTRAVENOUS; SUBCUTANEOUS
Refills: 0 | Status: DISCONTINUED | OUTPATIENT
Start: 2024-03-06 | End: 2024-03-06

## 2024-03-06 RX ORDER — ACETAMINOPHEN 500 MG
650 TABLET ORAL ONCE
Refills: 0 | Status: COMPLETED | OUTPATIENT
Start: 2024-03-06 | End: 2024-03-06

## 2024-03-06 RX ORDER — SEVELAMER CARBONATE 2400 MG/1
1600 POWDER, FOR SUSPENSION ORAL THREE TIMES A DAY
Refills: 0 | Status: DISCONTINUED | OUTPATIENT
Start: 2024-03-06 | End: 2024-03-10

## 2024-03-06 RX ORDER — CINACALCET 30 MG/1
30 TABLET, FILM COATED ORAL DAILY
Refills: 0 | Status: DISCONTINUED | OUTPATIENT
Start: 2024-03-06 | End: 2024-03-09

## 2024-03-06 RX ORDER — HEPARIN SODIUM 5000 [USP'U]/ML
5000 INJECTION INTRAVENOUS; SUBCUTANEOUS ONCE
Refills: 0 | Status: COMPLETED | OUTPATIENT
Start: 2024-03-06 | End: 2024-03-06

## 2024-03-06 RX ORDER — SODIUM CHLORIDE 9 MG/ML
1000 INJECTION INTRAMUSCULAR; INTRAVENOUS; SUBCUTANEOUS
Refills: 0 | Status: DISCONTINUED | OUTPATIENT
Start: 2024-03-06 | End: 2024-03-07

## 2024-03-06 RX ORDER — METOPROLOL TARTRATE 50 MG
25 TABLET ORAL
Refills: 0 | Status: DISCONTINUED | OUTPATIENT
Start: 2024-03-06 | End: 2024-03-27

## 2024-03-06 RX ADMIN — LIDOCAINE 1 PATCH: 4 CREAM TOPICAL at 12:13

## 2024-03-06 RX ADMIN — OXYCODONE HYDROCHLORIDE 15 MILLIGRAM(S): 5 TABLET ORAL at 21:35

## 2024-03-06 RX ADMIN — HEPARIN SODIUM 5000 UNIT(S): 5000 INJECTION INTRAVENOUS; SUBCUTANEOUS at 23:37

## 2024-03-06 RX ADMIN — SEVELAMER CARBONATE 1600 MILLIGRAM(S): 2400 POWDER, FOR SUSPENSION ORAL at 21:34

## 2024-03-06 NOTE — ED PROVIDER NOTE - ATTENDING CONTRIBUTION TO CARE
Dr. Artis: I have personally performed a face to face bedside history and physical examination of this patient. I have discussed the history, examination, review of systems, assessment and plan of management with the fellow. I have reviewed the electronic medical record and amended it to reflect my history, review of systems, physical exam, assessment and plan.    see mdm

## 2024-03-06 NOTE — H&P ADULT - HISTORY OF PRESENT ILLNESS
38 Y M H/O ESRD on HD M/W/F s/p HD session 3.5 hours, HTN, L. hip surgery, p/w progressive worsening ambulatory status, difficulty ambulating, fall and R. knee trauma. States during transfer fell to ground, no head trauma, + R. knee pain + R. thigh pain s/p fall. Denies any fever, chills, abd pain, SOB, Chest pain.  1.  Acute Garden type I minimally valgus impacted transcervical right   femoral neck fracture.  2.  Subacute appearingminimally impacted right pubic body and inferior   rami fractures.    seen by ortho   planned OR tommrw  38 Y M H/O ESRD , HTN  admitted with R hip pain /R knee pain s/p fall as he was being transferred to chair with help   no head trauma, + R. knee pain + R. thigh pain s/p fall.   1.  Acute Garden type I minimally valgus impacted transcervical right   femoral neck fracture.  2.  Subacute appearingminimally impacted right pubic body and inferior   rami fractures.    seen by ortho   planned OR tommrw

## 2024-03-06 NOTE — ED PROCEDURE NOTE - NS ED ATTENDING STATEMENT MOD
Attending Only
Attending Only
58 yo female presents s/p lap band in 2006. Since July 2023 reports that "I can't hold food down". Also reports nausea and vomiting and reports losing 60 pounds since then. Denies pain except a little tenderness at her umbilical hernia site.

## 2024-03-06 NOTE — ED ADULT NURSE NOTE - NSFALLHARMRISKINTERV_ED_ALL_ED

## 2024-03-06 NOTE — ED PROVIDER NOTE - NS ED ROS FT
GENERAL: No fever or chills  EYES: No change in vision  CARDIAC: No chest pain  PULMONARY: No cough or SOB  GI: No abdominal pain, no nausea or no vomiting, no diarrhea or constipation  : No changes in urination  SKIN: No rashes  NEURO: No headache, no numbness  MSK: + R. hip pain/knee pain  Otherwise as HPI or negative.

## 2024-03-06 NOTE — ED PROVIDER NOTE - ADMIT DISPOSITION PRESENT ON ADMISSION SEPSIS
Per Dr. Farley, would not be able to guarantee that patient would be able to have RFA before patient leaves for [place] due to insurance authorization and determining if injection was effective.  Called and updated patient's daughter in law.  Per patient's daughter in law would like to cancel procedure because she would prefer to wait until patient returns from [place].  Patient's daughter states concern that if facet injection was done now and RFA would be done after return from [place] that insurance would not cover procedure due to too much time between procedures.  Instructed Carin that procedure will be cancelled.  Carin states they will call to reschedule when patient returns from trip.     No

## 2024-03-06 NOTE — ED PROCEDURE NOTE - PROCEDURE ADDITIONAL DETAILS
POCUS: Emergency Department Focused Ultrasound performed at patient's bedside.  The images were saved and will be viewable in PACS.  The complete report will be available in PACS.

## 2024-03-06 NOTE — ED ADULT NURSE NOTE - OBJECTIVE STATEMENT
Patient is a 38y male presenting to the ED via EMS s/p fall. Patient A&Ox4. PMH of ESRD on HD M/W/F. Patient is speaking coherently in full sentences. Patient fell while being transferred from wheelchair. Denies head trauma, denies LOC. Reports right sided thigh pain and right knee pain; states pain starts in right thigh and radiates down the right leg. Reports pain in left lower extremity s/p left hip surgery, states pain is not worse than baseline. Respirations spontaneous, even, and non-labored. Abdomen soft, non-distended and non-tender upon palpation.

## 2024-03-06 NOTE — ED PROVIDER NOTE - PHYSICAL EXAMINATION
Physical Exam:  General: NAD, Conversive  Eyes: EOMI, Conjunctiva and sclera clear  Neck: No JVD  Lungs: Clear to auscultation bilaterally, no wheeze, no rhonchi  Heart: Normal S1, S2, no murmurs  Abdomen: Soft, nontender, nondistended, no CVA tenderness  Extremities: 2+ peripheral pulses, + swelling over R. knee, + pain with range of motion, tender to palpation over medial aspect of knee, L. leg non-tender, no calf swelling B/L  Psych: AAO X3  Neurologic: Non-focal

## 2024-03-06 NOTE — H&P ADULT - ASSESSMENT
38 Y M H/O ESRD , HTN  admitted with R hip pain /R knee pain s/p fall as he was being transferred to chair with help   no head trauma, + R. knee pain + R. thigh pain s/p fall.   1.  Acute Garden type I minimally valgus impacted transcervical right   femoral neck fracture.  2.  Subacute appearingminimally impacted right pubic body and inferior   rami fractures.    seen by ortho   planned OR tommrw     Femoral neck fx :   no hx of cad  NL EF in nov /2023   no evidence /hx of arrhythmia   intermediate risk for OR   pan meds      ESRD: HD   call nephro :         HTN : monitor

## 2024-03-06 NOTE — ED PROVIDER NOTE - OBJECTIVE STATEMENT
38 Y M H/O ESRD on HD M/W/F s/p HD session 3.5 hours, HTN, L. hip surgery, p/w progressive worsening ambulatory status, difficulty ambulating, fall and R. knee trauma. States during transfer fell to ground, no head trauma, + R. knee pain + R. thigh pain s/p fall. Denies any fever, chills, abd pain, SOB, Chest pain.

## 2024-03-06 NOTE — ED PROCEDURE NOTE - PROCEDURE DATE TIME, MLM
I discussed the patient with the resident, reviewed their findings, assessment, and plan, and agree with the content of their clinical note for today.  Opioid use disorder, severe, on maintenance therapy (CMS/[facility])  - DRUG SCREEN COMPLETE URINE; Future  - buprenorphine-naLOXone (SUBOXONE FILM) 8-2 MG sublingual film; Place 1 strip under the tongue daily.  Dispense: 30 each; Refill: 0  - ondansetron (ZOFRAN) 4 MG tablet; Take 1 tablet by mouth every 12 hours as needed for Nausea.  Dispense: 30 tablet; Refill: 0    Doing well, CPM.  
06-Mar-2024 11:14
06-Mar-2024 14:04

## 2024-03-06 NOTE — ED PROVIDER NOTE - NSICDXPASTSURGICALHX_GEN_ALL_CORE_FT
PAST SURGICAL HISTORY:  AV fistula R arm; L arm clotted    History of left hip hemiarthroplasty     S/P parathyroidectomy

## 2024-03-06 NOTE — CONSULT NOTE ADULT - ATTENDING COMMENTS
R FN fx.  minimally displaced.  However, due to patients severe renal osteodystrophy, there is minimal chance of success with ORIF.  Indicated for R hip dipika. Minimal ambulator at baseline

## 2024-03-06 NOTE — ED PROVIDER NOTE - CLINICAL SUMMARY MEDICAL DECISION MAKING FREE TEXT BOX
Dr. Artis: 38-year-old male history of ESRD on HD Monday Wednesday Friday via right AV fistula, left hip arthroplasty August 2023, status post subtotal parathyroidectomy December 2022, history of right leg fracture, hypertension, anemia of chronic disease, BIBEMS for right leg pain status post fall.  Patient is wheelchair-bound since left hip surgery.  While patient was being transferred out of his wheelchair he fell and landed on his right knee, no head injury, complains of right hip, femur, knee pain.  No chest pain or shortness of breath, no fevers or chills, no nausea vomiting, no abdominal pain.    Gen: No acute distress  HEENT: Mucous membranes moist, pink conjunctivae, EOMI  CV: RRR, no clubbing/cyanosis/edema  Resp: CTAB  GI: Abdomen soft, NT, ND. Normal BS. No rebound, no guarding  : No CVAT  Neuro: A&O x 3, moving all 4 extremities  MSK: Left AV fistula with palpable thrill, no midline spinal tenderness to palpation, pelvis stable, pain with range of motion of right hip, minimal to moderate effusion right knee with diffuse tenderness to palpation, no laxity, no rotation or shortening of the leg.  1+ pitting edema bilateral lower extremities, no calf tenderness to palpation  Skin: No rashes    Pt with chronic LLE pain sp fx and surgery, wheelchair bound, c/o right knee and hip pain s/p mechanical fall. No systemic sx, rectally afebrile, will get xrays r/o fx, pain ctrl, reassess

## 2024-03-06 NOTE — ED PROVIDER NOTE - PROGRESS NOTE DETAILS
Aleksandr Dolan MD:  hip fracture, US team notified for nerve block, ortho notified for PE prior to US block Dr. Artis: Pt feels better after nerve block, re-called ortho resident, awaiting decision from ortho attending re: admit to ortho vs medicine. Also recommended left femur xray. Dr. Artis: left message for ortho attending Dr. León Left msg for Dr. Williamson and Dr. eLón regarding pt's admission. D/w Dr. León, rec admission to medicine due to ESRD on HD. D/w Dr. Williamson for admission, who wants to discuss with Dr. León first.

## 2024-03-06 NOTE — CONSULT NOTE ADULT - SUBJECTIVE AND OBJECTIVE BOX
Consult Note: Orthopedic Surgery     Patient is a 38M community-ambulator without assistive devices at baseline who presents to the [facility] ED with a chief complaint of Left hip and knee pain. Patient states he currently resides a [facility]/SNF where he has been recovering from a Left hip fracture sustained and treated in August 2023 with a hemiarthroplasty (Dr. León).  Patient states a staff member was assisting him with a transfer when he slid to the floor onto his Right hip and knee. Of note, the patient bears a diagnosis of renal osteodystophy and is on HD MWF.  Denies head-strike, loss of consciousness, or any additional traumas. Localizing pain to Right hip and knee, denies radiation of pain elsewhere. States inability to ambulate immediately following the injury. Denies any numbness or tingling in the affected extremity. Denies having any other pain elsewhere. Denies any previous orthopaedic history. Denies fevers, chills, headaches, chest pain, shortness of breath, nausea, vomiting, or any additional orthopaedic concerns or complaints at time of current encounter.    PAST MEDICAL & SURGICAL HISTORY:  HTN (hypertension)  Stroke  age 10  Sleep apnea  Leg fracture, right  Chronic renal failure  Hemodialysis access, AV graft  ESRD (end stage renal disease) on dialysis  since 2013, M-W-F  Anemia, unspecified type  Other hyperparathyroidism  AV fistula  R arm; L arm clotted  History of left hip hemiarthroplasty  S/P parathyroidectomy    ALLERGIES:  No Known Drug Allergies  shellfish (Hives)      VITAL SIGNS:  T(C): 36.8 (03-06-24 @ 18:26), Max: 37.6 (03-06-24 @ 10:35)  HR: 75 (03-06-24 @ 18:26) (75 - 85)  BP: 163/56 (03-06-24 @ 18:26) (152/83 - 171/98)  RR: 20 (03-06-24 @ 18:26) (20 - 20)  SpO2: 97% (03-06-24 @ 18:26) (95% - 100%)        LABS:                        8.2    3.94  )-----------( 194      ( 06 Mar 2024 14:42 )             25.4     135  |  94<L>  |  54<H>  ----------------------------<  82  4.7   |  28  |  6.91<H>    Ca    9.0      06 Mar 2024 14:42    TPro  6.9  /  Alb  3.6  /  TBili  0.4  /  DBili  x   /  AST  10  /  ALT  <5<L>  /  AlkPhos  1052<H>  03-06    PT/INR - ( 06 Mar 2024 14:42 )   PT: 11.6 sec;   INR: 1.11 ratio      PTT - ( 06 Mar 2024 14:42 )  PTT:30.3 sec        PHYSICAL EXAM  Gen: NAD, Resting comfortably      RLE:  TTP over hip; TTP over knee.   + EHL/FHL/TA/GSC.   + SILT L3-S1.   + DP.   No micromotion tenderness.   Compartments soft and compressible.   No calf tenderness.       SECONDARY SURVEY  LLE/RUE/LUE: No TTP over bony prominences, SILT, palpable pulses, full/painless range of motion, compartments soft  Spine: No bony tenderness. No palpable stepoffs.      IMAGING:  XR Pelvis / Right Hip / Right Femur: Femoral Neck Fracture    ASSESSMENT:  Patient is a 38y Male with a Right femoral neck fracture.     PLAN:  - Plan for OR tomorrow (3/7/24) for Right Hip Hemiarthroplasty.  - Please document Medical/Other clearance(s)/optimization as needed.   - NPO after midnight except medications.   - IVF while NPO.   - Please hold chemical DVT Ppx; Venodynes may be used.   - Follow-up Preop labs.   - Follow up CT Bony Pelvis and CT Right Knee to rule out additional acute fracture.   - Pain control.   - Bedrest. .   - All Patient's and family member's questions answered to patient's satisfaction. Patient/family understand and agree with plan.  - Discussed with Dr. León who is aware of and in agreement with the above plan.

## 2024-03-07 LAB
ANION GAP SERPL CALC-SCNC: 16 MMOL/L — SIGNIFICANT CHANGE UP (ref 5–17)
ANION GAP SERPL CALC-SCNC: 16 MMOL/L — SIGNIFICANT CHANGE UP (ref 5–17)
APTT BLD: 29.1 SEC — SIGNIFICANT CHANGE UP (ref 24.5–35.6)
BASE EXCESS BLDV CALC-SCNC: 2.3 MMOL/L — SIGNIFICANT CHANGE UP (ref -2–3)
BASE EXCESS BLDV CALC-SCNC: 2.9 MMOL/L — SIGNIFICANT CHANGE UP (ref -2–3)
BASE EXCESS BLDV CALC-SCNC: 3.9 MMOL/L — HIGH (ref -2–3)
BASE EXCESS BLDV CALC-SCNC: 5.6 MMOL/L — HIGH (ref -2–3)
BUN SERPL-MCNC: 67 MG/DL — HIGH (ref 7–23)
BUN SERPL-MCNC: 74 MG/DL — HIGH (ref 7–23)
CA-I SERPL-SCNC: 1.11 MMOL/L — LOW (ref 1.15–1.33)
CA-I SERPL-SCNC: 1.11 MMOL/L — LOW (ref 1.15–1.33)
CA-I SERPL-SCNC: 1.15 MMOL/L — SIGNIFICANT CHANGE UP (ref 1.15–1.33)
CA-I SERPL-SCNC: 1.15 MMOL/L — SIGNIFICANT CHANGE UP (ref 1.15–1.33)
CALCIUM SERPL-MCNC: 8.7 MG/DL — SIGNIFICANT CHANGE UP (ref 8.4–10.5)
CALCIUM SERPL-MCNC: 9.1 MG/DL — SIGNIFICANT CHANGE UP (ref 8.4–10.5)
CHLORIDE BLDV-SCNC: 96 MMOL/L — SIGNIFICANT CHANGE UP (ref 96–108)
CHLORIDE BLDV-SCNC: 96 MMOL/L — SIGNIFICANT CHANGE UP (ref 96–108)
CHLORIDE BLDV-SCNC: 97 MMOL/L — SIGNIFICANT CHANGE UP (ref 96–108)
CHLORIDE BLDV-SCNC: 98 MMOL/L — SIGNIFICANT CHANGE UP (ref 96–108)
CHLORIDE SERPL-SCNC: 95 MMOL/L — LOW (ref 96–108)
CHLORIDE SERPL-SCNC: 95 MMOL/L — LOW (ref 96–108)
CO2 BLDV-SCNC: 30 MMOL/L — HIGH (ref 22–26)
CO2 BLDV-SCNC: 31 MMOL/L — HIGH (ref 22–26)
CO2 BLDV-SCNC: 31 MMOL/L — HIGH (ref 22–26)
CO2 BLDV-SCNC: 33 MMOL/L — HIGH (ref 22–26)
CO2 SERPL-SCNC: 25 MMOL/L — SIGNIFICANT CHANGE UP (ref 22–31)
CO2 SERPL-SCNC: 27 MMOL/L — SIGNIFICANT CHANGE UP (ref 22–31)
CREAT SERPL-MCNC: 8.06 MG/DL — HIGH (ref 0.5–1.3)
CREAT SERPL-MCNC: 8.39 MG/DL — HIGH (ref 0.5–1.3)
EGFR: 8 ML/MIN/1.73M2 — LOW
EGFR: 8 ML/MIN/1.73M2 — LOW
GAS PNL BLDV: 133 MMOL/L — LOW (ref 136–145)
GAS PNL BLDV: 134 MMOL/L — LOW (ref 136–145)
GAS PNL BLDV: 135 MMOL/L — LOW (ref 136–145)
GAS PNL BLDV: 135 MMOL/L — LOW (ref 136–145)
GAS PNL BLDV: SIGNIFICANT CHANGE UP
GLUCOSE BLDC GLUCOMTR-MCNC: 111 MG/DL — HIGH (ref 70–99)
GLUCOSE BLDC GLUCOMTR-MCNC: 115 MG/DL — HIGH (ref 70–99)
GLUCOSE BLDC GLUCOMTR-MCNC: 125 MG/DL — HIGH (ref 70–99)
GLUCOSE BLDC GLUCOMTR-MCNC: 128 MG/DL — HIGH (ref 70–99)
GLUCOSE BLDC GLUCOMTR-MCNC: 138 MG/DL — HIGH (ref 70–99)
GLUCOSE BLDC GLUCOMTR-MCNC: 174 MG/DL — HIGH (ref 70–99)
GLUCOSE BLDC GLUCOMTR-MCNC: 91 MG/DL — SIGNIFICANT CHANGE UP (ref 70–99)
GLUCOSE BLDV-MCNC: 37 MG/DL — CRITICAL LOW (ref 70–99)
GLUCOSE BLDV-MCNC: 41 MG/DL — CRITICAL LOW (ref 70–99)
GLUCOSE BLDV-MCNC: 68 MG/DL — LOW (ref 70–99)
GLUCOSE BLDV-MCNC: 97 MG/DL — SIGNIFICANT CHANGE UP (ref 70–99)
GLUCOSE SERPL-MCNC: 149 MG/DL — HIGH (ref 70–99)
GLUCOSE SERPL-MCNC: 71 MG/DL — SIGNIFICANT CHANGE UP (ref 70–99)
HCO3 BLDV-SCNC: 28 MMOL/L — SIGNIFICANT CHANGE UP (ref 22–29)
HCO3 BLDV-SCNC: 30 MMOL/L — HIGH (ref 22–29)
HCO3 BLDV-SCNC: 30 MMOL/L — HIGH (ref 22–29)
HCO3 BLDV-SCNC: 32 MMOL/L — HIGH (ref 22–29)
HCT VFR BLD CALC: 26.1 % — LOW (ref 39–50)
HCT VFR BLD CALC: 27.3 % — LOW (ref 39–50)
HCT VFR BLDA CALC: 26 % — LOW (ref 39–51)
HCT VFR BLDA CALC: 31 % — LOW (ref 39–51)
HGB BLD CALC-MCNC: 10.2 G/DL — LOW (ref 12.6–17.4)
HGB BLD CALC-MCNC: 8.5 G/DL — LOW (ref 12.6–17.4)
HGB BLD CALC-MCNC: 8.8 G/DL — LOW (ref 12.6–17.4)
HGB BLD CALC-MCNC: 8.8 G/DL — LOW (ref 12.6–17.4)
HGB BLD-MCNC: 8.3 G/DL — LOW (ref 13–17)
HGB BLD-MCNC: 8.8 G/DL — LOW (ref 13–17)
INR BLD: 1.1 RATIO — SIGNIFICANT CHANGE UP (ref 0.85–1.18)
LACTATE BLDV-MCNC: 1.8 MMOL/L — SIGNIFICANT CHANGE UP (ref 0.5–2)
LACTATE BLDV-MCNC: 1.8 MMOL/L — SIGNIFICANT CHANGE UP (ref 0.5–2)
LACTATE BLDV-MCNC: 2 MMOL/L — SIGNIFICANT CHANGE UP (ref 0.5–2)
LACTATE BLDV-MCNC: 2.3 MMOL/L — HIGH (ref 0.5–2)
MCHC RBC-ENTMCNC: 31 PG — SIGNIFICANT CHANGE UP (ref 27–34)
MCHC RBC-ENTMCNC: 31.3 PG — SIGNIFICANT CHANGE UP (ref 27–34)
MCHC RBC-ENTMCNC: 31.8 GM/DL — LOW (ref 32–36)
MCHC RBC-ENTMCNC: 32.2 GM/DL — SIGNIFICANT CHANGE UP (ref 32–36)
MCV RBC AUTO: 97.2 FL — SIGNIFICANT CHANGE UP (ref 80–100)
MCV RBC AUTO: 97.4 FL — SIGNIFICANT CHANGE UP (ref 80–100)
NRBC # BLD: 0 /100 WBCS — SIGNIFICANT CHANGE UP (ref 0–0)
NRBC # BLD: 0 /100 WBCS — SIGNIFICANT CHANGE UP (ref 0–0)
PCO2 BLDV: 50 MMHG — SIGNIFICANT CHANGE UP (ref 42–55)
PCO2 BLDV: 50 MMHG — SIGNIFICANT CHANGE UP (ref 42–55)
PCO2 BLDV: 52 MMHG — SIGNIFICANT CHANGE UP (ref 42–55)
PCO2 BLDV: 56 MMHG — HIGH (ref 42–55)
PH BLDV: 7.33 — SIGNIFICANT CHANGE UP (ref 7.32–7.43)
PH BLDV: 7.36 — SIGNIFICANT CHANGE UP (ref 7.32–7.43)
PH BLDV: 7.38 — SIGNIFICANT CHANGE UP (ref 7.32–7.43)
PH BLDV: 7.39 — SIGNIFICANT CHANGE UP (ref 7.32–7.43)
PLATELET # BLD AUTO: 204 K/UL — SIGNIFICANT CHANGE UP (ref 150–400)
PLATELET # BLD AUTO: 221 K/UL — SIGNIFICANT CHANGE UP (ref 150–400)
PO2 BLDV: 29 MMHG — SIGNIFICANT CHANGE UP (ref 25–45)
PO2 BLDV: 40 MMHG — SIGNIFICANT CHANGE UP (ref 25–45)
PO2 BLDV: 62 MMHG — HIGH (ref 25–45)
PO2 BLDV: 64 MMHG — HIGH (ref 25–45)
POTASSIUM BLDV-SCNC: 4.9 MMOL/L — SIGNIFICANT CHANGE UP (ref 3.5–5.1)
POTASSIUM BLDV-SCNC: 5.5 MMOL/L — HIGH (ref 3.5–5.1)
POTASSIUM BLDV-SCNC: 5.6 MMOL/L — HIGH (ref 3.5–5.1)
POTASSIUM BLDV-SCNC: 5.6 MMOL/L — HIGH (ref 3.5–5.1)
POTASSIUM SERPL-MCNC: 5.3 MMOL/L — SIGNIFICANT CHANGE UP (ref 3.5–5.3)
POTASSIUM SERPL-MCNC: 5.5 MMOL/L — HIGH (ref 3.5–5.3)
POTASSIUM SERPL-SCNC: 5.3 MMOL/L — SIGNIFICANT CHANGE UP (ref 3.5–5.3)
POTASSIUM SERPL-SCNC: 5.5 MMOL/L — HIGH (ref 3.5–5.3)
PROTHROM AB SERPL-ACNC: 11.5 SEC — SIGNIFICANT CHANGE UP (ref 9.5–13)
RBC # BLD: 2.68 M/UL — LOW (ref 4.2–5.8)
RBC # BLD: 2.81 M/UL — LOW (ref 4.2–5.8)
RBC # FLD: 14.7 % — HIGH (ref 10.3–14.5)
RBC # FLD: 14.7 % — HIGH (ref 10.3–14.5)
SAO2 % BLDV: 41.1 % — LOW (ref 67–88)
SAO2 % BLDV: 56 % — LOW (ref 67–88)
SAO2 % BLDV: 91 % — HIGH (ref 67–88)
SAO2 % BLDV: 93.4 % — HIGH (ref 67–88)
SODIUM SERPL-SCNC: 136 MMOL/L — SIGNIFICANT CHANGE UP (ref 135–145)
SODIUM SERPL-SCNC: 138 MMOL/L — SIGNIFICANT CHANGE UP (ref 135–145)
WBC # BLD: 3.84 K/UL — SIGNIFICANT CHANGE UP (ref 3.8–10.5)
WBC # BLD: 8.63 K/UL — SIGNIFICANT CHANGE UP (ref 3.8–10.5)
WBC # FLD AUTO: 3.84 K/UL — SIGNIFICANT CHANGE UP (ref 3.8–10.5)
WBC # FLD AUTO: 8.63 K/UL — SIGNIFICANT CHANGE UP (ref 3.8–10.5)

## 2024-03-07 PROCEDURE — 99252 IP/OBS CONSLTJ NEW/EST SF 35: CPT

## 2024-03-07 PROCEDURE — 99221 1ST HOSP IP/OBS SF/LOW 40: CPT

## 2024-03-07 PROCEDURE — 72170 X-RAY EXAM OF PELVIS: CPT | Mod: 26

## 2024-03-07 PROCEDURE — 27236 TREAT THIGH FRACTURE: CPT | Mod: RT

## 2024-03-07 DEVICE — STEM 127DGR CEMENTED HIP: Type: IMPLANTABLE DEVICE | Site: RIGHT | Status: FUNCTIONAL

## 2024-03-07 DEVICE — CEMENT SIMPLEX P 40GM: Type: IMPLANTABLE DEVICE | Site: RIGHT | Status: FUNCTIONAL

## 2024-03-07 DEVICE — IMPLANTABLE DEVICE: Type: IMPLANTABLE DEVICE | Site: RIGHT | Status: FUNCTIONAL

## 2024-03-07 DEVICE — FEMORAL HEAD MED: Type: IMPLANTABLE DEVICE | Site: RIGHT | Status: FUNCTIONAL

## 2024-03-07 DEVICE — RESTRIC CEM BUCK 18.5MM: Type: IMPLANTABLE DEVICE | Site: RIGHT | Status: FUNCTIONAL

## 2024-03-07 DEVICE — HEAD UNI REPL BIPOLAR 28X53MM: Type: IMPLANTABLE DEVICE | Site: RIGHT | Status: FUNCTIONAL

## 2024-03-07 RX ORDER — OXYCODONE HYDROCHLORIDE 5 MG/1
10 TABLET ORAL EVERY 4 HOURS
Refills: 0 | Status: DISCONTINUED | OUTPATIENT
Start: 2024-03-07 | End: 2024-03-07

## 2024-03-07 RX ORDER — OXYCODONE HYDROCHLORIDE 5 MG/1
10 TABLET ORAL EVERY 4 HOURS
Refills: 0 | Status: DISCONTINUED | OUTPATIENT
Start: 2024-03-07 | End: 2024-03-14

## 2024-03-07 RX ORDER — OXYCODONE HYDROCHLORIDE 5 MG/1
15 TABLET ORAL EVERY 4 HOURS
Refills: 0 | Status: DISCONTINUED | OUTPATIENT
Start: 2024-03-07 | End: 2024-03-14

## 2024-03-07 RX ORDER — CEFAZOLIN SODIUM 1 G
2000 VIAL (EA) INJECTION EVERY 8 HOURS
Refills: 0 | Status: DISCONTINUED | OUTPATIENT
Start: 2024-03-07 | End: 2024-03-07

## 2024-03-07 RX ORDER — SODIUM CHLORIDE 9 MG/ML
1000 INJECTION, SOLUTION INTRAVENOUS
Refills: 0 | Status: DISCONTINUED | OUTPATIENT
Start: 2024-03-07 | End: 2024-03-07

## 2024-03-07 RX ORDER — SODIUM CHLORIDE 9 MG/ML
1000 INJECTION INTRAMUSCULAR; INTRAVENOUS; SUBCUTANEOUS
Refills: 0 | Status: DISCONTINUED | OUTPATIENT
Start: 2024-03-07 | End: 2024-03-07

## 2024-03-07 RX ORDER — CEFAZOLIN SODIUM 1 G
2000 VIAL (EA) INJECTION ONCE
Refills: 0 | Status: DISCONTINUED | OUTPATIENT
Start: 2024-03-07 | End: 2024-03-07

## 2024-03-07 RX ORDER — ACETAMINOPHEN 500 MG
2 TABLET ORAL
Refills: 0 | DISCHARGE

## 2024-03-07 RX ORDER — POLYETHYLENE GLYCOL 3350 17 G/17G
17 POWDER, FOR SOLUTION ORAL DAILY
Refills: 0 | Status: DISCONTINUED | OUTPATIENT
Start: 2024-03-07 | End: 2024-03-22

## 2024-03-07 RX ORDER — ASPIRIN/CALCIUM CARB/MAGNESIUM 324 MG
81 TABLET ORAL DAILY
Refills: 0 | Status: DISCONTINUED | OUTPATIENT
Start: 2024-03-07 | End: 2024-03-07

## 2024-03-07 RX ORDER — CEFAZOLIN SODIUM 1 G
1000 VIAL (EA) INJECTION EVERY 12 HOURS
Refills: 0 | Status: COMPLETED | OUTPATIENT
Start: 2024-03-07 | End: 2024-03-08

## 2024-03-07 RX ORDER — ASPIRIN/CALCIUM CARB/MAGNESIUM 324 MG
81 TABLET ORAL
Refills: 0 | Status: DISCONTINUED | OUTPATIENT
Start: 2024-03-07 | End: 2024-03-27

## 2024-03-07 RX ORDER — ONDANSETRON 8 MG/1
4 TABLET, FILM COATED ORAL EVERY 6 HOURS
Refills: 0 | Status: DISCONTINUED | OUTPATIENT
Start: 2024-03-07 | End: 2024-03-27

## 2024-03-07 RX ORDER — DEXTROSE 50 % IN WATER 50 %
50 SYRINGE (ML) INTRAVENOUS ONCE
Refills: 0 | Status: COMPLETED | OUTPATIENT
Start: 2024-03-07 | End: 2024-03-07

## 2024-03-07 RX ORDER — INSULIN LISPRO 100/ML
10 VIAL (ML) SUBCUTANEOUS ONCE
Refills: 0 | Status: DISCONTINUED | OUTPATIENT
Start: 2024-03-07 | End: 2024-03-07

## 2024-03-07 RX ORDER — ONDANSETRON 8 MG/1
4 TABLET, FILM COATED ORAL ONCE
Refills: 0 | Status: DISCONTINUED | OUTPATIENT
Start: 2024-03-07 | End: 2024-03-07

## 2024-03-07 RX ORDER — HYDROMORPHONE HYDROCHLORIDE 2 MG/ML
0.5 INJECTION INTRAMUSCULAR; INTRAVENOUS; SUBCUTANEOUS
Refills: 0 | Status: DISCONTINUED | OUTPATIENT
Start: 2024-03-07 | End: 2024-03-07

## 2024-03-07 RX ORDER — OXYCODONE HYDROCHLORIDE 5 MG/1
5 TABLET ORAL EVERY 4 HOURS
Refills: 0 | Status: DISCONTINUED | OUTPATIENT
Start: 2024-03-07 | End: 2024-03-07

## 2024-03-07 RX ORDER — SENNA PLUS 8.6 MG/1
2 TABLET ORAL AT BEDTIME
Refills: 0 | Status: DISCONTINUED | OUTPATIENT
Start: 2024-03-07 | End: 2024-03-22

## 2024-03-07 RX ORDER — HEPARIN SODIUM 5000 [USP'U]/ML
5000 INJECTION INTRAVENOUS; SUBCUTANEOUS EVERY 8 HOURS
Refills: 0 | Status: DISCONTINUED | OUTPATIENT
Start: 2024-03-07 | End: 2024-03-20

## 2024-03-07 RX ORDER — ACETAMINOPHEN 500 MG
975 TABLET ORAL EVERY 8 HOURS
Refills: 0 | Status: DISCONTINUED | OUTPATIENT
Start: 2024-03-07 | End: 2024-03-27

## 2024-03-07 RX ORDER — DEXTROSE 50 % IN WATER 50 %
50 SYRINGE (ML) INTRAVENOUS ONCE
Refills: 0 | Status: DISCONTINUED | OUTPATIENT
Start: 2024-03-07 | End: 2024-03-07

## 2024-03-07 RX ADMIN — OXYCODONE HYDROCHLORIDE 15 MILLIGRAM(S): 5 TABLET ORAL at 18:31

## 2024-03-07 RX ADMIN — Medication 81 MILLIGRAM(S): at 17:57

## 2024-03-07 RX ADMIN — HYDROMORPHONE HYDROCHLORIDE 0.5 MILLIGRAM(S): 2 INJECTION INTRAMUSCULAR; INTRAVENOUS; SUBCUTANEOUS at 11:30

## 2024-03-07 RX ADMIN — OXYCODONE HYDROCHLORIDE 15 MILLIGRAM(S): 5 TABLET ORAL at 22:57

## 2024-03-07 RX ADMIN — Medication 100 MILLIGRAM(S): at 21:42

## 2024-03-07 RX ADMIN — OXYCODONE HYDROCHLORIDE 15 MILLIGRAM(S): 5 TABLET ORAL at 05:27

## 2024-03-07 RX ADMIN — Medication 50 MILLILITER(S): at 11:20

## 2024-03-07 RX ADMIN — Medication 25 MILLIGRAM(S): at 05:23

## 2024-03-07 RX ADMIN — OXYCODONE HYDROCHLORIDE 15 MILLIGRAM(S): 5 TABLET ORAL at 06:37

## 2024-03-07 RX ADMIN — HYDROMORPHONE HYDROCHLORIDE 0.5 MILLIGRAM(S): 2 INJECTION INTRAMUSCULAR; INTRAVENOUS; SUBCUTANEOUS at 12:10

## 2024-03-07 RX ADMIN — SEVELAMER CARBONATE 1600 MILLIGRAM(S): 2400 POWDER, FOR SUSPENSION ORAL at 14:50

## 2024-03-07 RX ADMIN — OXYCODONE HYDROCHLORIDE 10 MILLIGRAM(S): 5 TABLET ORAL at 14:30

## 2024-03-07 RX ADMIN — HYDROMORPHONE HYDROCHLORIDE 0.5 MILLIGRAM(S): 2 INJECTION INTRAMUSCULAR; INTRAVENOUS; SUBCUTANEOUS at 11:40

## 2024-03-07 RX ADMIN — SEVELAMER CARBONATE 1600 MILLIGRAM(S): 2400 POWDER, FOR SUSPENSION ORAL at 22:31

## 2024-03-07 RX ADMIN — OXYCODONE HYDROCHLORIDE 15 MILLIGRAM(S): 5 TABLET ORAL at 01:09

## 2024-03-07 RX ADMIN — OXYCODONE HYDROCHLORIDE 10 MILLIGRAM(S): 5 TABLET ORAL at 14:00

## 2024-03-07 RX ADMIN — HYDROMORPHONE HYDROCHLORIDE 0.5 MILLIGRAM(S): 2 INJECTION INTRAMUSCULAR; INTRAVENOUS; SUBCUTANEOUS at 11:55

## 2024-03-07 RX ADMIN — Medication 10 MILLIGRAM(S): at 05:23

## 2024-03-07 RX ADMIN — POLYETHYLENE GLYCOL 3350 17 GRAM(S): 17 POWDER, FOR SOLUTION ORAL at 17:57

## 2024-03-07 RX ADMIN — Medication 10 MILLIGRAM(S): at 17:57

## 2024-03-07 RX ADMIN — Medication 25 MILLIGRAM(S): at 17:57

## 2024-03-07 RX ADMIN — SENNA PLUS 2 TABLET(S): 8.6 TABLET ORAL at 22:31

## 2024-03-07 RX ADMIN — Medication 975 MILLIGRAM(S): at 17:56

## 2024-03-07 RX ADMIN — CINACALCET 30 MILLIGRAM(S): 30 TABLET, FILM COATED ORAL at 17:56

## 2024-03-07 RX ADMIN — HEPARIN SODIUM 5000 UNIT(S): 5000 INJECTION INTRAVENOUS; SUBCUTANEOUS at 22:31

## 2024-03-07 NOTE — CONSULT NOTE ADULT - NS ATTEND AMEND GEN_ALL_CORE FT
38 yo m, PMH CKD V, SOLITARIO, HTN, CVA, s/p fall while being transferred to a chair, injuries include R pubic body, inferior rami fractures, and R femoral neck fracture, s/p R hip hemiarthroplasty. Had postoperative hypoglycemia after treatment for hyperkalemia.  - Glycemia in 120s after giving patient juice and lunch. Continue monitoring glycemia.   - Monitor CMP and I/O, nephrology following.  - DVT ASA/SCDs.  - No current critical care needs.

## 2024-03-07 NOTE — CONSULT NOTE ADULT - SUBJECTIVE AND OBJECTIVE BOX
NEPHROLOGY CONSULTATION    CHIEF COMPLAINT: fall    HPI:  Pt is 39 yo M w/hx ESRD, HTN, CVA admitted with R hip pain/R knee pain s/p fall as he was being transferred to chair. No head trauma. Dx w/R hip fx.     ROS:  as above    Allergies:  No Known Drug Allergies  shellfish (Hives)    PAST MEDICAL & SURGICAL HISTORY:  HTN (hypertension)  Stroke  age 10  Sleep apnea  Leg fracture, right  ESRD  Anemia, unspecified type  Other hyperparathyroidism  AV fistula  R arm; L arm clotted  History of left hip hemiarthroplasty  S/P parathyroidectomy    SOCIAL HISTORY:  negative    FAMILY HISTORY:  NC    MEDICATIONS  (STANDING):  cinacalcet 30 milliGRAM(s) Oral daily  dextrose 50% Injectable 50 milliLiter(s) IV Push once  hydrALAZINE 10 milliGRAM(s) Oral every 12 hours  insulin lispro Injectable (ADMELOG). 10 Unit(s) SubCutaneous once  metoprolol tartrate 25 milliGRAM(s) Oral two times a day  sevelamer carbonate 1600 milliGRAM(s) Oral three times a day  sodium chloride 0.9%. 1000 milliLiter(s) (60 mL/Hr) IV Continuous <Continuous>    Vital Signs Last 24 Hrs  T(C): 37.5 (03-07-24 @ 10:07), Max: 37.6 (03-06-24 @ 10:35)  T(F): 99.5 (03-07-24 @ 05:05), Max: 99.7 (03-06-24 @ 10:35)  HR: 100 (03-07-24 @ 10:07) (75 - 100)  BP: 159/88 (03-07-24 @ 10:07) (137/62 - 171/98)  RR: 18 (03-07-24 @ 10:07) (16 - 20)  SpO2: 97% (03-07-24 @ 10:07) (95% - 100%)    LABS:                        8.8    3.84  )-----------( 204      ( 07 Mar 2024 02:54 )             27.3     03-07    138  |  95<L>  |  67<H>  ----------------------------<  71  5.5<H>   |  27  |  8.06<H>    Ca    9.1      07 Mar 2024 02:54    TPro  6.9  /  Alb  3.6  /  TBili  0.4  /  DBili  x   /  AST  10  /  ALT  <5<L>  /  AlkPhos  1052<H>  03-06    LIVER FUNCTIONS - ( 06 Mar 2024 14:42 )  Alb: 3.6 g/dL / Pro: 6.9 g/dL / ALK PHOS: 1052 U/L / ALT: <5 U/L / AST: 10 U/L / GGT: x           PT/INR - ( 07 Mar 2024 02:54 )   PT: 11.5 sec;   INR: 1.10 ratio      PTT - ( 07 Mar 2024 02:54 )  PTT:29.1 sec    A/P:    ESRD on HD   S/p fall, R hip fx, for OR  Will arrange for HD while in hospital    Full consult to follow    [phone]       NEPHROLOGY CONSULTATION    CHIEF COMPLAINT: fall    HPI:  Pt is 39 yo M w/hx ESRD, HTN, CVA admitted with R hip pain/R knee pain s/p fall as he was being transferred to chair. No head trauma. Dx w/R hip fx. Last HD completed yesterday. Due for HD tomorrow. Seen in PACU after R hip HA. Awake, denies CP, SOB, N/V/F/C.    ROS:  as above    Allergies:  No Known Drug Allergies  shellfish (Hives)    PAST MEDICAL & SURGICAL HISTORY:  HTN (hypertension)  Stroke  age 10  Sleep apnea  Leg fracture, right  ESRD  Anemia, unspecified type  Other hyperparathyroidism  AV fistula  R arm; L arm clotted  History of left hip hemiarthroplasty  S/P parathyroidectomy    SOCIAL HISTORY:  negative    FAMILY HISTORY:  NC    MEDICATIONS  (STANDING):  cinacalcet 30 milliGRAM(s) Oral daily  dextrose 50% Injectable 50 milliLiter(s) IV Push once  hydrALAZINE 10 milliGRAM(s) Oral every 12 hours  insulin lispro Injectable (ADMELOG). 10 Unit(s) SubCutaneous once  metoprolol tartrate 25 milliGRAM(s) Oral two times a day  sevelamer carbonate 1600 milliGRAM(s) Oral three times a day  sodium chloride 0.9%. 1000 milliLiter(s) (60 mL/Hr) IV Continuous <Continuous>    Vital Signs Last 24 Hrs  T(C): 36.2 (03-07-24 @ 12:00), Max: 37.5 (03-07-24 @ 05:05)  T(F): 97.2 (03-07-24 @ 12:00), Max: 99.5 (03-07-24 @ 05:05)  HR: 88 (03-07-24 @ 13:00) (75 - 100)  BP: 160/77 (03-07-24 @ 13:00) (129/89 - 166/84)  BP(mean): 111 (03-07-24 @ 13:00) (98 - 116)  RR: 16 (03-07-24 @ 13:00) (15 - 20)  SpO2: 98% (03-07-24 @ 13:00) (97% - 100%)    s1s2  b/l air entry  soft, ND  sm edema                         8.3    8.63  )-----------( 221      ( 07 Mar 2024 12:06 )             26.1     07 Mar 2024 12:06    136    |  95     |  74     ----------------------------<  149    5.3     |  25     |  8.39     Ca    8.7        07 Mar 2024 12:06    TPro  6.9    /  Alb  3.6    /  TBili  0.4    /  DBili  x      /  AST  10     /  ALT  <5     /  AlkPhos  1052   06 Mar 2024 14:42    LIVER FUNCTIONS - ( 06 Mar 2024 14:42 )  Alb: 3.6 g/dL / Pro: 6.9 g/dL / ALK PHOS: 1052 U/L / ALT: <5 U/L / AST: 10 U/L / GGT: x           PT/INR - ( 07 Mar 2024 02:54 )   PT: 11.5 sec;   INR: 1.10 ratio      A/P:    ESRD on HD   S/p fall, R hip fx  S/p R hip HA today   HD in am as ordered  Renal diet  No need for IVF  Will follow     [phone]

## 2024-03-07 NOTE — PRE PROCEDURE NOTE - PRE PROCEDURE EVALUATION
Pt resting comfortably.  No new complaints.     Vital Signs (24 Hrs):  T(C): 36.7 (03-06-24 @ 21:35), Max: 37.6 (03-06-24 @ 10:35)  HR: 79 (03-06-24 @ 21:35) (75 - 85)  BP: 147/91 (03-06-24 @ 21:35) (137/62 - 171/98)  RR: 16 (03-06-24 @ 21:35) (16 - 20)  SpO2: 97% (03-06-24 @ 21:35) (95% - 100%)  Wt(kg): --    LABS:                          8.2    3.94  )-----------( 194      ( 06 Mar 2024 14:42 )             25.4     03-06    135  |  94<L>  |  54<H>  ----------------------------<  82  4.7   |  28  |  6.91<H>    Ca    9.0      06 Mar 2024 14:42    TPro  6.9  /  Alb  3.6  /  TBili  0.4  /  DBili  x   /  AST  10  /  ALT  <5<L>  /  AlkPhos  1052<H>  03-06    LIVER FUNCTIONS - ( 06 Mar 2024 14:42 )  Alb: 3.6 g/dL / Pro: 6.9 g/dL / ALK PHOS: 1052 U/L / ALT: <5 U/L / AST: 10 U/L / GGT: x           PT/INR - ( 06 Mar 2024 14:42 )   PT: 11.6 sec;   INR: 1.11 ratio         PTT - ( 06 Mar 2024 14:42 )  PTT:30.3 sec    Spoke with medicine team regrading the H/H.  They states pre nephro, will monitor for now.  Nephro to see in the early am.      -Will FU regarding nephro   -NPO for OR  -careful w Ivf.    -please reach out if any constraints to proceeding w surgery today.  We will FU .

## 2024-03-07 NOTE — PRE-ANESTHESIA EVALUATION ADULT - NSANTHDISPORD_GEN_ALL_CORE
PACU Inflammation Suggestive Of Cancer Camouflage Histology Text: There was a dense lymphocytic infiltrate which prevented adequate histologic evaluation of adjacent structures.

## 2024-03-07 NOTE — PRE-ANESTHESIA EVALUATION ADULT - ANESTHESIA, PREVIOUS REACTION, PROFILE
post-op -in PACU, patient was lethargic and hypoxemic requiring reintubation after left hip hemiarthroplasty 08/15 /23 . full rom of neck mal 3

## 2024-03-07 NOTE — CONSULT NOTE ADULT - SUBJECTIVE AND OBJECTIVE BOX
HISTORY OF PRESENT ILLNESS:  38 y/o male w/ a PMHx of ESRD (M/W/F via LUE AVF), SOLITARIO, difficult airway, HTN, secondary hyperparathyroidism s/p parathyroidectomy (Dec 2022), obesity, stroke at age 10 w/ no residual deficits, left radial fracture s/p splint (8/14/2023), and left femoral neck fracture s/p left hemiarthroplasty (8/15/23) who presented on 3/6 after falling while being transferred to a chair with assistance. Patient was found to a right femoral neck fracture and right pubic body & inferior rami fractures. Pre-operatively, patient was hyperkalemic to 5.5 so he was given D50 + insulin, then taken to the OR on 3/7 for a right hip hemiarthroplasty. Intra-operatively, patient became hypoglycemic to the 40s requiring several amps of D50. Patient was extubated at the end of the case and transferred to PACU for close monitoring. Patient's glucose currently 174. He reports RLE pain but denies headache, fevers, dizziness, weakness, shortness of breath, chest pain, abdominal pain, or nausea/vomiting.    PAST MEDICAL HISTORY:  - HTN (hypertension)  - Stroke  - Morbid obesity  - Sleep apnea  - Leg fracture, right  - Chronic renal failure  - Hemodialysis access, AV graft  - ESRD (end stage renal disease) on dialysis  - Anemia, unspecified type  - Hypothyroidism  - Other hyperparathyroidism    PAST SURGICAL HISTORY:  - AV fistula  - Status post hemiarthroplasty of toe  - History of left hip hemiarthroplasty  - S/P parathyroidectomy    HOME MEDICATIONS  - cinacalcet 30 mg oral tablet: 1 tab(s) orally once a day  - Colace 100 mg oral capsule: 2 cap(s) orally once a day (at bedtime)  - hydrALAZINE 10 mg oral tablet: 1 tab(s) orally every 12 hours  - metoprolol tartrate 25 mg oral tablet: 1 tab(s) orally 2 times a day  - oxyCODONE 15 mg oral tablet: 1 tab(s) orally every 6 hours as needed for Severe Pain (7 - 10)  - senna (sennosides) 8.6 mg oral tablet: 2 tab(s) orally once a day (at bedtime)  - sevelamer carbonate 800 mg oral tablet: 2 tab(s) orally 3 times a day  - Tylenol 500 mg oral tablet: 2 tab(s) orally every 8 hours as needed for pain    ALLERGIES: shellfish (Hives)    FAMILY HISTORY: Family history of diabetes mellitus    SOCIAL HISTORY: Denies EtOH, tobacco, and illicit substance use    REVIEW OF SYSTEMS:  Constitutional - no fatigue, fever, weakness, or night sweats  HEENT - no vision changes, ear pain, vertigo, post-nasal drip, changes to smell, sore throat, voice changes, throat swelling, or swollen glands  CV - no chest pain, chest tightness, or palpitations  Resp - no shortness of breath, cough, or wheezing  GI - no abdominal pain, nausea/vomiting, bowel habit changes, indigestion, diarrhea, or constipation  Renal - no pain w/ urination, flank pain, or incontinence  MSK - no muscle cramps, muscle pain, or difficulty walking  Skin - no itching, hives, rashes, unusual bruising, or unusual bleeding    VITAL SIGNS:  ICU Vital Signs Last 24 Hrs  T(C): 36.4 (07 Mar 2024 14:00), Max: 37.5 (07 Mar 2024 05:05)  T(F): 97.5 (07 Mar 2024 14:00), Max: 99.5 (07 Mar 2024 05:05)  HR: 82 (07 Mar 2024 14:30) (75 - 100)  BP: 132/70 (07 Mar 2024 14:30) (129/89 - 166/84)  BP(mean): 96 (07 Mar 2024 14:30) (96 - 116)  RR: 16 (07 Mar 2024 14:30) (15 - 20)  SpO2: 96% (07 Mar 2024 14:30) (96% - 100%)    O2 Parameters below as of 07 Mar 2024 14:30  Patient On (Oxygen Delivery Method): nasal cannula  O2 Flow (L/min): 2    PHYSICAL EXAMINATION:  General - well-nourished, no acute distress  Neuro - awake, alert, oriented x4  HEENT - normocephalic, PERRLA, EOMI, moist mucous membranes  Lungs - clear to auscultation bilaterally  Heart - regular rate and rhythm, S1S2  Abdomen - soft, nontender, nondistended  Extremities - all four extremities w/ 2+ pulses, strength 5/5, intact sensation, RLE incision dressing C/D/I  Skin - warm & pink    LABS:                        8.3    8.63  )-----------( 221      ( 07 Mar 2024 12:06 )             26.1     136  |  95<L>  |  74<H>  ----------------------------<  149<H>  5.3   |  25  |  8.39<H>    Ca    8.7      07 Mar 2024 12:06    TPro  6.9  /  Alb  3.6  /  TBili  0.4  /  DBili  x   /  AST  10  /  ALT  <5<L>  /  AlkPhos  1052<H>    PT/INR - ( 07 Mar 2024 02:54 )   PT: 11.5 sec;   INR: 1.10 ratio    PTT - ( 07 Mar 2024 02:54 )  PTT:29.1 sec    VBG - ( 07 Mar 2024 10:36 )  pH: 7.36  /  pCO2: 50    /  pO2: 64    / HCO3: 28    / Base Excess: 2.3   /  SaO2: 93.4  /  Lactate: 2.0      CAPILLARY BLOOD GLUCOSE  POCT Blood Glucose.: 128 mg/dL (07 Mar 2024 13:46)  POCT Blood Glucose.: 125 mg/dL (07 Mar 2024 12:29)  POCT Blood Glucose.: 174 mg/dL (07 Mar 2024 11:43)  POCT Blood Glucose.: 91 mg/dL (07 Mar 2024 11:21)  POCT Blood Glucose.: 138 mg/dL (07 Mar 2024 10:57)

## 2024-03-07 NOTE — PRE-ANESTHESIA EVALUATION ADULT - NSANTHOSAYNRD_GEN_A_CORE
denies being tested for sleep apnea, NH hx reports sleep apnea/No. SOLITARIO screening performed.  STOP BANG Legend: 0-2 = LOW Risk; 3-4 = INTERMEDIATE Risk; 5-8 = HIGH Risk

## 2024-03-07 NOTE — PROGRESS NOTE ADULT - SUBJECTIVE AND OBJECTIVE BOX
Ortho Progress Note         BRIDGET NORTH is a 38yMale who is Preop for  a R FN Fx.  Patient reports feeling well, pain is well controlled and denies CP/SOB, Dizziness, N/V.  Denies is any new numbness or weakness.  Has no new complaints.     T(C): 36.7 (03-06-24 @ 21:35), Max: 37.6 (03-06-24 @ 10:35)  HR: 79 (03-06-24 @ 21:35) (75 - 85)  BP: 147/91 (03-06-24 @ 21:35) (137/62 - 171/98)  RR: 16 (03-06-24 @ 21:35) (16 - 20)  SpO2: 97% (03-06-24 @ 21:35) (95% - 100%)    LABS:                          8.8    3.84  )-----------( 204      ( 07 Mar 2024 02:54 )             27.3       03-07    138  |  95<L>  |  67<H>  ----------------------------<  71  5.5<H>   |  27  |  8.06<H>    Ca    9.1      07 Mar 2024 02:54    TPro  6.9  /  Alb  3.6  /  TBili  0.4  /  DBili  x   /  AST  10  /  ALT  <5<L>  /  AlkPhos  1052<H>  03-06              PT/INR - ( 07 Mar 2024 02:54 )   PT: 11.5 sec;   INR: 1.10 ratio         PTT - ( 07 Mar 2024 02:54 )  PTT:29.1 sec          General Appearance:  Patient is examined at bedside.  Appears to have pain well-controlled.  In no apparent distress.      R Hip was Examined.      EHL/FHL/GS/AT are intact.   SILT L2-S1  Calves are Soft,  Compartments easily compressable, NTTP Bilaterally.   DP/PT Pulses are palapable, 2/2 Bilaterally     BRIDGTE NORTH is a 38yMale preop for a R FN Fx    DVT Proph: on hold   Bedrest  Incentive Spirometry  Pain Control  RLE NWB  will D/W Dr León, Agrees with the plan.   Dispo:  OR.

## 2024-03-07 NOTE — PROGRESS NOTE ADULT - ASSESSMENT
38 Y M H/O ESRD , HTN  admitted with R hip pain /R knee pain s/p fall as he was being transferred to chair with help   no head trauma, + R. knee pain + R. thigh pain s/p fall.   1.  Acute Garden type I minimally valgus impacted transcervical right   femoral neck fracture.  2.  Subacute appearing impacted right pubic body and inferior   rami fractures.    seen by ortho       Femoral neck fx :   no hx of cad  NL EF in nov /2023   no evidence /hx of arrhythmia   s/p OR   pan meds      ESRD: HD    Dr. Blanco        HTN : monitor

## 2024-03-07 NOTE — PRE-ANESTHESIA EVALUATION ADULT - NSANTHADDINFOFT_GEN_ALL_CORE
increased k (5.6.) plan discussed with orthopedics. will treat and monitor serial vbgs.  we will treat or replace any electrolyte abnormality

## 2024-03-07 NOTE — PROGRESS NOTE ADULT - SUBJECTIVE AND OBJECTIVE BOX
ORTHO ATTENDING POST OP    s/p R  hip hemiarthroplasty  WBAT R  LE  Anterior hip precautions  ASA 81  s/p 1u PRBC intra op  venodynes  ancef x 24h  OOB to chair in AM  rehab consult  CBC in RR and AM   f/u medicine, renal

## 2024-03-08 LAB
ANION GAP SERPL CALC-SCNC: 16 MMOL/L — SIGNIFICANT CHANGE UP (ref 5–17)
ANION GAP SERPL CALC-SCNC: 17 MMOL/L — SIGNIFICANT CHANGE UP (ref 5–17)
BASOPHILS # BLD AUTO: 0.02 K/UL — SIGNIFICANT CHANGE UP (ref 0–0.2)
BASOPHILS NFR BLD AUTO: 0.4 % — SIGNIFICANT CHANGE UP (ref 0–2)
BUN SERPL-MCNC: 92 MG/DL — HIGH (ref 7–23)
BUN SERPL-MCNC: 97 MG/DL — HIGH (ref 7–23)
CALCIUM SERPL-MCNC: 8.6 MG/DL — SIGNIFICANT CHANGE UP (ref 8.4–10.5)
CALCIUM SERPL-MCNC: 8.8 MG/DL — SIGNIFICANT CHANGE UP (ref 8.4–10.5)
CHLORIDE SERPL-SCNC: 92 MMOL/L — LOW (ref 96–108)
CHLORIDE SERPL-SCNC: 92 MMOL/L — LOW (ref 96–108)
CO2 SERPL-SCNC: 25 MMOL/L — SIGNIFICANT CHANGE UP (ref 22–31)
CO2 SERPL-SCNC: 26 MMOL/L — SIGNIFICANT CHANGE UP (ref 22–31)
CREAT SERPL-MCNC: 10.08 MG/DL — HIGH (ref 0.5–1.3)
CREAT SERPL-MCNC: 10.23 MG/DL — HIGH (ref 0.5–1.3)
EGFR: 6 ML/MIN/1.73M2 — LOW
EGFR: 6 ML/MIN/1.73M2 — LOW
EOSINOPHIL # BLD AUTO: 0.04 K/UL — SIGNIFICANT CHANGE UP (ref 0–0.5)
EOSINOPHIL NFR BLD AUTO: 0.7 % — SIGNIFICANT CHANGE UP (ref 0–6)
GLUCOSE SERPL-MCNC: 85 MG/DL — SIGNIFICANT CHANGE UP (ref 70–99)
GLUCOSE SERPL-MCNC: 94 MG/DL — SIGNIFICANT CHANGE UP (ref 70–99)
HCT VFR BLD CALC: 22.9 % — LOW (ref 39–50)
HGB BLD-MCNC: 7.7 G/DL — LOW (ref 13–17)
IMM GRANULOCYTES NFR BLD AUTO: 0.4 % — SIGNIFICANT CHANGE UP (ref 0–0.9)
LYMPHOCYTES # BLD AUTO: 1.04 K/UL — SIGNIFICANT CHANGE UP (ref 1–3.3)
LYMPHOCYTES # BLD AUTO: 18.5 % — SIGNIFICANT CHANGE UP (ref 13–44)
MCHC RBC-ENTMCNC: 31.6 PG — SIGNIFICANT CHANGE UP (ref 27–34)
MCHC RBC-ENTMCNC: 33.6 GM/DL — SIGNIFICANT CHANGE UP (ref 32–36)
MCV RBC AUTO: 93.9 FL — SIGNIFICANT CHANGE UP (ref 80–100)
MONOCYTES # BLD AUTO: 0.71 K/UL — SIGNIFICANT CHANGE UP (ref 0–0.9)
MONOCYTES NFR BLD AUTO: 12.6 % — SIGNIFICANT CHANGE UP (ref 2–14)
NEUTROPHILS # BLD AUTO: 3.8 K/UL — SIGNIFICANT CHANGE UP (ref 1.8–7.4)
NEUTROPHILS NFR BLD AUTO: 67.4 % — SIGNIFICANT CHANGE UP (ref 43–77)
NRBC # BLD: 0 /100 WBCS — SIGNIFICANT CHANGE UP (ref 0–0)
PLATELET # BLD AUTO: 226 K/UL — SIGNIFICANT CHANGE UP (ref 150–400)
POTASSIUM SERPL-MCNC: 6.7 MMOL/L — CRITICAL HIGH (ref 3.5–5.3)
POTASSIUM SERPL-MCNC: 6.7 MMOL/L — CRITICAL HIGH (ref 3.5–5.3)
POTASSIUM SERPL-SCNC: 6.7 MMOL/L — CRITICAL HIGH (ref 3.5–5.3)
POTASSIUM SERPL-SCNC: 6.7 MMOL/L — CRITICAL HIGH (ref 3.5–5.3)
RBC # BLD: 2.44 M/UL — LOW (ref 4.2–5.8)
RBC # FLD: 14.6 % — HIGH (ref 10.3–14.5)
SODIUM SERPL-SCNC: 134 MMOL/L — LOW (ref 135–145)
SODIUM SERPL-SCNC: 134 MMOL/L — LOW (ref 135–145)
WBC # BLD: 5.63 K/UL — SIGNIFICANT CHANGE UP (ref 3.8–10.5)
WBC # FLD AUTO: 5.63 K/UL — SIGNIFICANT CHANGE UP (ref 3.8–10.5)

## 2024-03-08 RX ORDER — ERYTHROPOIETIN 10000 [IU]/ML
10000 INJECTION, SOLUTION INTRAVENOUS; SUBCUTANEOUS
Refills: 0 | Status: DISCONTINUED | OUTPATIENT
Start: 2024-03-11 | End: 2024-03-27

## 2024-03-08 RX ORDER — SODIUM ZIRCONIUM CYCLOSILICATE 10 G/10G
10 POWDER, FOR SUSPENSION ORAL ONCE
Refills: 0 | Status: DISCONTINUED | OUTPATIENT
Start: 2024-03-08 | End: 2024-03-08

## 2024-03-08 RX ORDER — ERYTHROPOIETIN 10000 [IU]/ML
10000 INJECTION, SOLUTION INTRAVENOUS; SUBCUTANEOUS ONCE
Refills: 0 | Status: COMPLETED | OUTPATIENT
Start: 2024-03-08 | End: 2024-03-08

## 2024-03-08 RX ADMIN — Medication 975 MILLIGRAM(S): at 17:35

## 2024-03-08 RX ADMIN — HEPARIN SODIUM 5000 UNIT(S): 5000 INJECTION INTRAVENOUS; SUBCUTANEOUS at 22:12

## 2024-03-08 RX ADMIN — SEVELAMER CARBONATE 1600 MILLIGRAM(S): 2400 POWDER, FOR SUSPENSION ORAL at 13:45

## 2024-03-08 RX ADMIN — OXYCODONE HYDROCHLORIDE 15 MILLIGRAM(S): 5 TABLET ORAL at 03:15

## 2024-03-08 RX ADMIN — SEVELAMER CARBONATE 1600 MILLIGRAM(S): 2400 POWDER, FOR SUSPENSION ORAL at 22:12

## 2024-03-08 RX ADMIN — ERYTHROPOIETIN 10000 UNIT(S): 10000 INJECTION, SOLUTION INTRAVENOUS; SUBCUTANEOUS at 09:51

## 2024-03-08 RX ADMIN — OXYCODONE HYDROCHLORIDE 15 MILLIGRAM(S): 5 TABLET ORAL at 12:49

## 2024-03-08 RX ADMIN — OXYCODONE HYDROCHLORIDE 15 MILLIGRAM(S): 5 TABLET ORAL at 08:06

## 2024-03-08 RX ADMIN — OXYCODONE HYDROCHLORIDE 15 MILLIGRAM(S): 5 TABLET ORAL at 07:36

## 2024-03-08 RX ADMIN — Medication 25 MILLIGRAM(S): at 17:35

## 2024-03-08 RX ADMIN — POLYETHYLENE GLYCOL 3350 17 GRAM(S): 17 POWDER, FOR SOLUTION ORAL at 12:49

## 2024-03-08 RX ADMIN — Medication 81 MILLIGRAM(S): at 06:15

## 2024-03-08 RX ADMIN — OXYCODONE HYDROCHLORIDE 15 MILLIGRAM(S): 5 TABLET ORAL at 18:00

## 2024-03-08 RX ADMIN — Medication 100 MILLIGRAM(S): at 08:02

## 2024-03-08 RX ADMIN — OXYCODONE HYDROCHLORIDE 15 MILLIGRAM(S): 5 TABLET ORAL at 17:30

## 2024-03-08 RX ADMIN — HEPARIN SODIUM 5000 UNIT(S): 5000 INJECTION INTRAVENOUS; SUBCUTANEOUS at 06:15

## 2024-03-08 RX ADMIN — CINACALCET 30 MILLIGRAM(S): 30 TABLET, FILM COATED ORAL at 12:49

## 2024-03-08 RX ADMIN — OXYCODONE HYDROCHLORIDE 15 MILLIGRAM(S): 5 TABLET ORAL at 13:19

## 2024-03-08 RX ADMIN — Medication 81 MILLIGRAM(S): at 17:35

## 2024-03-08 RX ADMIN — HEPARIN SODIUM 5000 UNIT(S): 5000 INJECTION INTRAVENOUS; SUBCUTANEOUS at 13:45

## 2024-03-08 RX ADMIN — Medication 975 MILLIGRAM(S): at 18:05

## 2024-03-08 RX ADMIN — SEVELAMER CARBONATE 1600 MILLIGRAM(S): 2400 POWDER, FOR SUSPENSION ORAL at 06:15

## 2024-03-08 RX ADMIN — Medication 10 MILLIGRAM(S): at 17:35

## 2024-03-08 RX ADMIN — Medication 975 MILLIGRAM(S): at 01:28

## 2024-03-08 NOTE — PHYSICAL THERAPY INITIAL EVALUATION ADULT - ACTIVE RANGE OF MOTION EXAMINATION, REHAB EVAL
Right LE NT, due to pt c/o pain/bilateral upper extremity Active ROM was WFL (within functional limits)/Left LE Active ROM was WFL (within functional limits)

## 2024-03-08 NOTE — PROGRESS NOTE ADULT - SUBJECTIVE AND OBJECTIVE BOX
S/p stable HD today     Vital Signs Last 24 Hrs  T(C): 36.8 (03-08-24 @ 12:52), Max: 37.4 (03-08-24 @ 08:14)  T(F): 98.3 (03-08-24 @ 12:52), Max: 99.3 (03-08-24 @ 08:14)  HR: 114 (03-08-24 @ 14:18) (84 - 115)  BP: 118/78 (03-08-24 @ 14:18) (117/77 - 161/88)  RR: 18 (03-08-24 @ 12:52) (18 - 18)  SpO2: 98% (03-08-24 @ 14:18) (96% - 100%)    s1s2  b/l air entry  soft, ND  sm edema                                  7.7    5.63  )-----------( 226      ( 08 Mar 2024 06:46 )             22.9     08 Mar 2024 10:40    134    |  92     |  97     ----------------------------<  94     6.7     |  26     |  10.23    Ca    8.8        08 Mar 2024 10:40    Culture - Blood (collected 06 Mar 2024 19:30)  Source: .Blood Blood-Venous  Preliminary Report (08 Mar 2024 02:03):    No growth at 24 hours    Culture - Blood (collected 06 Mar 2024 19:00)  Source: .Blood Blood  Preliminary Report (08 Mar 2024 02:03):    No growth at 24 hours    acetaminophen     Tablet .. 975 milliGRAM(s) Oral every 8 hours  aspirin enteric coated 81 milliGRAM(s) Oral two times a day  cinacalcet 30 milliGRAM(s) Oral daily  heparin   Injectable 5000 Unit(s) SubCutaneous every 8 hours  hydrALAZINE 10 milliGRAM(s) Oral every 12 hours  metoprolol tartrate 25 milliGRAM(s) Oral two times a day  ondansetron Injectable 4 milliGRAM(s) IV Push every 6 hours PRN  oxyCODONE    IR 15 milliGRAM(s) Oral every 4 hours PRN  oxyCODONE    IR 10 milliGRAM(s) Oral every 4 hours PRN  polyethylene glycol 3350 17 Gram(s) Oral daily  senna 2 Tablet(s) Oral at bedtime  sevelamer carbonate 1600 milliGRAM(s) Oral three times a day  sodium zirconium cyclosilicate 10 Gram(s) Oral once    A/P:    ESRD on HD   S/p fall, R hip fx  S/p R hip HA yesterday  Hyperkalemia this am prior to HD noted  Completed HD as ordered  No further tx of hyperkalemia needed today   BMP in am  Renal diet  Next HD on Monday     [phone]

## 2024-03-08 NOTE — PROGRESS NOTE ADULT - SUBJECTIVE AND OBJECTIVE BOX
Patient seen and examined at bedside. Reports inadequate pain control    ICU Vital Signs Last 24 Hrs  T(C): 37.3 (08 Mar 2024 05:37), Max: 37.5 (07 Mar 2024 10:07)  T(F): 99.1 (08 Mar 2024 05:37), Max: 99.1 (07 Mar 2024 20:12)  HR: 94 (08 Mar 2024 05:37) (82 - 100)  BP: 132/75 (08 Mar 2024 05:37) (117/77 - 166/84)  BP(mean): 96 (07 Mar 2024 14:30) (96 - 116)  ABP: --  ABP(mean): --  RR: 18 (08 Mar 2024 05:37) (15 - 18)  SpO2: 96% (08 Mar 2024 05:37) (96% - 100%)    O2 Parameters below as of 08 Mar 2024 05:37  Patient On (Oxygen Delivery Method): room air                          8.3    8.63  )-----------( 221      ( 07 Mar 2024 12:06 )             26.1   03-07    136  |  95<L>  |  74<H>  ----------------------------<  149<H>  5.3   |  25  |  8.39<H>    Ca    8.7      07 Mar 2024 12:06    TPro  6.9  /  Alb  3.6  /  TBili  0.4  /  DBili  x   /  AST  10  /  ALT  <5<L>  /  AlkPhos  1052<H>  03-06          PHYSICAL EXAM:    Gen: NAD, AAOx3    Right Lower Extremity:  Dressing clean dry intact  +EHL/FHL/TA/GS  SILT L3-S1  +DP/PT Pulses  Compartments soft  No calf TTP B/L        A/P: 38yMale S/p R Total Hip Arthroplasty. VSS. NAD  -Recommend chronic pain consult, patient endorses that he has been taking opiate pain medication since surgery on his contralateral limb several months ago  -PT/OT-WBAT RLE With Anterior Hip Precautions  -FU AM labs    -IS  -DVT PPx: ASA 81mg PO BID, SCDs, early OOb and amb  -Pain Control  -Continue Current Tx as per primary team (Medicine)  -Dispo planning

## 2024-03-08 NOTE — OCCUPATIONAL THERAPY INITIAL EVALUATION ADULT - PERTINENT HX OF CURRENT PROBLEM, REHAB EVAL
38 Y M H/O ESRD , HTN  admitted with R hip pain /R knee pain s/p fall as he was being transferred to chair with help no head trauma, + R. knee pain + R. thigh pain s/p fall.   1.  Acute Garden type I minimally valgus impacted transcervical right femoral neck fracture.  2.  Subacute appearing minimally impacted right pubic body and inferior rami fractures.  S/p R SALUD- anterior approach  CT knee 3/6- 1.  At least moderate right knee arthrosis with small to moderate volume effusion. No acute displaced fracture. 2.  Background advanced chronic renal osteodystrophy.  CT pelvis 3/6- 1.  Acute Garden type I minimally valgus impacted transcervical right femoral neck fracture. 2.  Subacute appearing minimally impacted right pubic body and inferior rami fractures. 3.  Background advanced chronic renal osteodystrophy.  Xray pelvis 3/7- Right hip hemiarthroplasty prosthesis currently implanted. Redemonstrated cemented left hip hemiarthroplasty. Intact and aligned current and prior hardware and no periprosthetic fractures. Appearance of a narrow lucent zone at cement bone interface of Gruen zone 1 of the left hip prosthesis indeterminate for possible loosening. Postoperative soft tissue changes. Surgical skin staples overlie operative site. Correlate with intraoperative findings. Redemonstrated generalized osseous stigmata of renal osteodystrophy.  Xray chest 3/6- Right lung base atelectasis. No focal consolidations.  Xray femur 3/6- No acute displaced periprosthetic left hip fracture.

## 2024-03-08 NOTE — PROGRESS NOTE ADULT - SUBJECTIVE AND OBJECTIVE BOX
Date of service: 03-08-24 @ 15:51      Patient is a 38y old  Male who presents with a chief complaint of fall (07 Mar 2024 10:23)                                                               INTERVAL HPI/OVERNIGHT EVENTS:    REVIEW OF SYSTEMS:     CONSTITUTIONAL: No weakness, fevers or chills  EYES/ENT: No visual changes , no ear ache   NECK: No pain or stiffness  RESPIRATORY: No cough, wheezing,  No shortness of breath  CARDIOVASCULAR: No chest pain or palpitations  GASTROINTESTINAL: No abdominal pain  . No nausea, vomiting, or hematemesis; No diarrhea or constipation. No melena or hematochezia.  GENITOURINARY: No dysuria, frequency or hematuria  NEUROLOGICAL: No numbness or weakness  SKIN: No itching, burning, rashes, or lesions                                                                                                                                                                                                                                                                                 Medications:  MEDICATIONS  (STANDING):  acetaminophen     Tablet .. 975 milliGRAM(s) Oral every 8 hours  aspirin enteric coated 81 milliGRAM(s) Oral two times a day  cinacalcet 30 milliGRAM(s) Oral daily  heparin   Injectable 5000 Unit(s) SubCutaneous every 8 hours  hydrALAZINE 10 milliGRAM(s) Oral every 12 hours  metoprolol tartrate 25 milliGRAM(s) Oral two times a day  polyethylene glycol 3350 17 Gram(s) Oral daily  senna 2 Tablet(s) Oral at bedtime  sevelamer carbonate 1600 milliGRAM(s) Oral three times a day    MEDICATIONS  (PRN):  ondansetron Injectable 4 milliGRAM(s) IV Push every 6 hours PRN Nausea and/or Vomiting  oxyCODONE    IR 15 milliGRAM(s) Oral every 4 hours PRN Severe Pain (7 - 10)  oxyCODONE    IR 10 milliGRAM(s) Oral every 4 hours PRN Moderate Pain (4 - 6)       Allergies    No Known Drug Allergies  shellfish (Hives)    Intolerances      Vital Signs Last 24 Hrs  T(C): 36.8 (08 Mar 2024 12:52), Max: 37.4 (08 Mar 2024 08:14)  T(F): 98.3 (08 Mar 2024 12:52), Max: 99.3 (08 Mar 2024 08:14)  HR: 114 (08 Mar 2024 14:18) (84 - 115)  BP: 118/78 (08 Mar 2024 14:18) (117/77 - 161/88)  BP(mean): --  RR: 18 (08 Mar 2024 12:52) (18 - 18)  SpO2: 98% (08 Mar 2024 14:18) (96% - 100%)    Parameters below as of 08 Mar 2024 14:18  Patient On (Oxygen Delivery Method): room air      CAPILLARY BLOOD GLUCOSE      POCT Blood Glucose.: 115 mg/dL (07 Mar 2024 21:27)  POCT Blood Glucose.: 111 mg/dL (07 Mar 2024 18:00)      03-07 @ 07:01  -  03-08 @ 07:00  --------------------------------------------------------  IN: 240 mL / OUT: 0 mL / NET: 240 mL    03-08 @ 07:01  -  03-08 @ 15:51  --------------------------------------------------------  IN: 120 mL / OUT: 1267 mL / NET: -1147 mL      Physical Exam:    Daily     Daily   General:  Well appearing, NAD, not cachetic  HEENT:  Nonicteric, PERRLA  CV:  RRR, S1S2   Lungs:  CTA B/L, no wheezes, rales, rhonchi  Abdomen:  Soft, non-tender, no distended, positive BS  Extremities:  2+ pulses, no c/c, no edema  Skin:  Warm and dry, no rashes  :  No mayes  Neuro:  AAOx3, non-focal, grossly intact                                                                                                                                                                                                                                                                                                LABS:                               7.7    5.63  )-----------( 226      ( 08 Mar 2024 06:46 )             22.9                      03-08    134<L>  |  92<L>  |  97<H>  ----------------------------<  94  6.7<HH>   |  26  |  10.23<H>    Ca    8.8      08 Mar 2024 10:40                         RADIOLOGY & ADDITIONAL TESTS         I personally reviewed: [  ]EKG   [  ]CXR    [  ] CT      A/P:         Discussed with :     Jen consultants' Notes   Time spent :

## 2024-03-08 NOTE — OCCUPATIONAL THERAPY INITIAL EVALUATION ADULT - BED MOBILITY LIMITATIONS, REHAB EVAL
Venous thromboembolism (VTE)
decreased ability to use arms for pushing/pulling/decreased ability to use legs for bridging/pushing

## 2024-03-08 NOTE — OCCUPATIONAL THERAPY INITIAL EVALUATION ADULT - LIVES WITH, PROFILE
Prior to admission, pt was living in [facility] for previous injury. Prior to [facility], pt reports he lives in a pvt home with aunt and cousin. Pt has tub shower with curtains. Prior to [facility], pt was (I) in ADLs and functional transfers. Pt owns rollator and cane. -DME./other relative

## 2024-03-08 NOTE — PHYSICAL THERAPY INITIAL EVALUATION ADULT - PERTINENT HX OF CURRENT PROBLEM, REHAB EVAL
Pt is a 38 year old male with PMH significant for ESRD, HTN, SOLITARIO, difficult airway, HTN, secondary hyperparathyroidism s/p parathyroidectomy (Dec 2022), obesity, stroke at age 10 w/ no residual deficits, left radial fracture s/p splint (8/14/2023), and left femoral neck fracture s/p left hemiarthroplasty (8/15/23).  Pt admitted with R hip pain/R knee pain s/p fall as he was being transferred to a chair at a SNF with assist from a staff member. Pt found to have Right femoral neck fracture.  Pt POD#1 R hip hemiarthoplasty.  Per ortho, pt WBAT RLE with anterior hip precautions.  CT knee 3/6- 1.  At least moderate right knee arthrosis with small to moderate volume effusion. No acute displaced fracture. 2.  Background advanced chronic renal osteodystrophy.  CT pelvis 3/6- 1.  Acute Garden type I minimally valgus impacted transcervical right femoral neck fracture. 2.  Subacute appearing minimally impacted right pubic body and inferior rami fractures. 3.  Background advanced chronic renal osteodystrophy.  Xray pelvis 3/7- Right hip hemiarthroplasty prosthesis currently implanted. Redemonstrated cemented left hip hemiarthroplasty. Intact and aligned current and prior hardware and no periprosthetic fractures. Appearance of a narrow lucent zone at cement bone interface of Gruen zone 1 of the left hip prosthesis indeterminate for possible loosening. Postoperative soft tissue changes. Surgical skin staples overlie operative site. Correlate with intraoperative findings. Redemonstrated generalized osseous stigmata of renal osteodystrophy.  Xray chest 3/6- Right lung base atelectasis. No focal consolidations.  Xray femur 3/6- No acute displaced periprosthetic left hip fracture.

## 2024-03-08 NOTE — PATIENT PROFILE ADULT - FALL HARM RISK - HARM RISK INTERVENTIONS
Assistance with ambulation/Assistance OOB with selected safe patient handling equipment/Communicate Risk of Fall with Harm to all staff/Discuss with provider need for PT consult/Monitor gait and stability/Provide patient with walking aids - walker, cane, crutches/Reinforce activity limits and safety measures with patient and family/Sit up slowly, dangle for a short time, stand at bedside before walking/Tailored Fall Risk Interventions/Use of alarms - bed, chair and/or voice tab/Visual Cue: Yellow wristband and red socks/Bed in lowest position, wheels locked, appropriate side rails in place/Call bell, personal items and telephone in reach/Instruct patient to call for assistance before getting out of bed or chair/Non-slip footwear when patient is out of bed/[place] to call system/Physically safe environment - no spills, clutter or unnecessary equipment/Purposeful Proactive Rounding/Room/bathroom lighting operational, light cord in reach

## 2024-03-08 NOTE — PHYSICAL THERAPY INITIAL EVALUATION ADULT - TRANSFER TRAINING, PT EVAL
GOAL: Patient will perform sit to stand transfers with mod Ax2 at rolling walker with proper hand placement

## 2024-03-08 NOTE — OCCUPATIONAL THERAPY INITIAL EVALUATION ADULT - ADDITIONAL COMMENTS
Xray LE 3/6- 1.  Mildly displaced right femoral neck fracture. 2.  New subsidence of the left hip arthroplasty femoral stem. 3.  Extensive osseous changes consistent with renal osteodystrophy.

## 2024-03-08 NOTE — PHYSICAL THERAPY INITIAL EVALUATION ADULT - ADDITIONAL COMMENTS
Pt admitted from SNF recovering from L hip dipika, since august 2023.  Prior to rehab, pt reports living with cousin in a private house.  Pt was able to ambulate independently using a rollator, and perform ADLs independently.

## 2024-03-09 LAB
ANION GAP SERPL CALC-SCNC: 14 MMOL/L — SIGNIFICANT CHANGE UP (ref 5–17)
BUN SERPL-MCNC: 76 MG/DL — HIGH (ref 7–23)
CALCIUM SERPL-MCNC: 7.9 MG/DL — LOW (ref 8.4–10.5)
CHLORIDE SERPL-SCNC: 91 MMOL/L — LOW (ref 96–108)
CO2 SERPL-SCNC: 27 MMOL/L — SIGNIFICANT CHANGE UP (ref 22–31)
CREAT SERPL-MCNC: 8.72 MG/DL — HIGH (ref 0.5–1.3)
EGFR: 7 ML/MIN/1.73M2 — LOW
GLUCOSE SERPL-MCNC: 97 MG/DL — SIGNIFICANT CHANGE UP (ref 70–99)
HCT VFR BLD CALC: 20 % — CRITICAL LOW (ref 39–50)
HGB BLD-MCNC: 6.4 G/DL — CRITICAL LOW (ref 13–17)
MCHC RBC-ENTMCNC: 31.1 PG — SIGNIFICANT CHANGE UP (ref 27–34)
MCHC RBC-ENTMCNC: 32 GM/DL — SIGNIFICANT CHANGE UP (ref 32–36)
MCV RBC AUTO: 97.1 FL — SIGNIFICANT CHANGE UP (ref 80–100)
NRBC # BLD: 0 /100 WBCS — SIGNIFICANT CHANGE UP (ref 0–0)
PLATELET # BLD AUTO: 202 K/UL — SIGNIFICANT CHANGE UP (ref 150–400)
POTASSIUM SERPL-MCNC: 5.4 MMOL/L — HIGH (ref 3.5–5.3)
POTASSIUM SERPL-SCNC: 5.4 MMOL/L — HIGH (ref 3.5–5.3)
RBC # BLD: 2.06 M/UL — LOW (ref 4.2–5.8)
RBC # FLD: 14.6 % — HIGH (ref 10.3–14.5)
SODIUM SERPL-SCNC: 132 MMOL/L — LOW (ref 135–145)
WBC # BLD: 5.28 K/UL — SIGNIFICANT CHANGE UP (ref 3.8–10.5)
WBC # FLD AUTO: 5.28 K/UL — SIGNIFICANT CHANGE UP (ref 3.8–10.5)

## 2024-03-09 RX ADMIN — OXYCODONE HYDROCHLORIDE 15 MILLIGRAM(S): 5 TABLET ORAL at 07:45

## 2024-03-09 RX ADMIN — OXYCODONE HYDROCHLORIDE 15 MILLIGRAM(S): 5 TABLET ORAL at 22:27

## 2024-03-09 RX ADMIN — OXYCODONE HYDROCHLORIDE 15 MILLIGRAM(S): 5 TABLET ORAL at 12:55

## 2024-03-09 RX ADMIN — Medication 975 MILLIGRAM(S): at 21:18

## 2024-03-09 RX ADMIN — Medication 81 MILLIGRAM(S): at 21:19

## 2024-03-09 RX ADMIN — OXYCODONE HYDROCHLORIDE 15 MILLIGRAM(S): 5 TABLET ORAL at 08:45

## 2024-03-09 RX ADMIN — OXYCODONE HYDROCHLORIDE 15 MILLIGRAM(S): 5 TABLET ORAL at 21:27

## 2024-03-09 RX ADMIN — SEVELAMER CARBONATE 1600 MILLIGRAM(S): 2400 POWDER, FOR SUSPENSION ORAL at 12:48

## 2024-03-09 RX ADMIN — HEPARIN SODIUM 5000 UNIT(S): 5000 INJECTION INTRAVENOUS; SUBCUTANEOUS at 06:11

## 2024-03-09 RX ADMIN — Medication 975 MILLIGRAM(S): at 01:48

## 2024-03-09 RX ADMIN — OXYCODONE HYDROCHLORIDE 10 MILLIGRAM(S): 5 TABLET ORAL at 17:32

## 2024-03-09 RX ADMIN — Medication 975 MILLIGRAM(S): at 11:14

## 2024-03-09 RX ADMIN — OXYCODONE HYDROCHLORIDE 10 MILLIGRAM(S): 5 TABLET ORAL at 16:32

## 2024-03-09 RX ADMIN — SEVELAMER CARBONATE 1600 MILLIGRAM(S): 2400 POWDER, FOR SUSPENSION ORAL at 21:20

## 2024-03-09 RX ADMIN — Medication 10 MILLIGRAM(S): at 21:20

## 2024-03-09 RX ADMIN — Medication 25 MILLIGRAM(S): at 06:10

## 2024-03-09 RX ADMIN — Medication 975 MILLIGRAM(S): at 12:14

## 2024-03-09 RX ADMIN — CINACALCET 30 MILLIGRAM(S): 30 TABLET, FILM COATED ORAL at 11:15

## 2024-03-09 RX ADMIN — Medication 25 MILLIGRAM(S): at 21:20

## 2024-03-09 RX ADMIN — Medication 975 MILLIGRAM(S): at 22:18

## 2024-03-09 RX ADMIN — OXYCODONE HYDROCHLORIDE 15 MILLIGRAM(S): 5 TABLET ORAL at 00:26

## 2024-03-09 RX ADMIN — HEPARIN SODIUM 5000 UNIT(S): 5000 INJECTION INTRAVENOUS; SUBCUTANEOUS at 21:21

## 2024-03-09 RX ADMIN — OXYCODONE HYDROCHLORIDE 15 MILLIGRAM(S): 5 TABLET ORAL at 13:55

## 2024-03-09 RX ADMIN — HEPARIN SODIUM 5000 UNIT(S): 5000 INJECTION INTRAVENOUS; SUBCUTANEOUS at 12:49

## 2024-03-09 RX ADMIN — Medication 10 MILLIGRAM(S): at 06:11

## 2024-03-09 RX ADMIN — Medication 81 MILLIGRAM(S): at 06:10

## 2024-03-09 RX ADMIN — SEVELAMER CARBONATE 1600 MILLIGRAM(S): 2400 POWDER, FOR SUSPENSION ORAL at 06:10

## 2024-03-09 NOTE — PROGRESS NOTE ADULT - SUBJECTIVE AND OBJECTIVE BOX
Date of service: 03-09-24 @ 22:44      Patient is a 38y old  Male who presents with a chief complaint of Right hemiarthroplasty of FN fx (09 Mar 2024 07:35)                                                               INTERVAL HPI/OVERNIGHT EVENTS:    REVIEW OF SYSTEMS:     CONSTITUTIONAL: No weakness, fevers or chills  EYES/ENT: No visual changes , no ear ache   NECK: No pain or stiffness  RESPIRATORY: No cough, wheezing,  No shortness of breath  CARDIOVASCULAR: No chest pain or palpitations  GASTROINTESTINAL: No abdominal pain  . No nausea, vomiting, or hematemesis; No diarrhea or constipation. No melena or hematochezia.  GENITOURINARY: small amount of ruine ..dysuria   NEUROLOGICAL: No numbness or weakness  SKIN: No itching, burning, rashes, or lesions                                                                                                                                                                                                                                                                                 Medications:  MEDICATIONS  (STANDING):  acetaminophen     Tablet .. 975 milliGRAM(s) Oral every 8 hours  aspirin enteric coated 81 milliGRAM(s) Oral two times a day  heparin   Injectable 5000 Unit(s) SubCutaneous every 8 hours  hydrALAZINE 10 milliGRAM(s) Oral every 12 hours  metoprolol tartrate 25 milliGRAM(s) Oral two times a day  polyethylene glycol 3350 17 Gram(s) Oral daily  senna 2 Tablet(s) Oral at bedtime  sevelamer carbonate 1600 milliGRAM(s) Oral three times a day    MEDICATIONS  (PRN):  ondansetron Injectable 4 milliGRAM(s) IV Push every 6 hours PRN Nausea and/or Vomiting  oxyCODONE    IR 15 milliGRAM(s) Oral every 4 hours PRN Severe Pain (7 - 10)  oxyCODONE    IR 10 milliGRAM(s) Oral every 4 hours PRN Moderate Pain (4 - 6)       Allergies    No Known Drug Allergies  shellfish (Hives)    Intolerances      Vital Signs Last 24 Hrs  T(C): 37.7 (09 Mar 2024 21:02), Max: 37.9 (09 Mar 2024 08:57)  T(F): 99.9 (09 Mar 2024 21:02), Max: 100.2 (09 Mar 2024 08:57)  HR: 110 (09 Mar 2024 21:02) (62 - 110)  BP: 126/73 (09 Mar 2024 21:02) (111/64 - 144/82)  BP(mean): --  RR: 18 (09 Mar 2024 21:02) (18 - 18)  SpO2: 97% (09 Mar 2024 21:02) (94% - 100%)    Parameters below as of 09 Mar 2024 21:02  Patient On (Oxygen Delivery Method): room air      CAPILLARY BLOOD GLUCOSE          03-08 @ 07:01  -  03-09 @ 07:00  --------------------------------------------------------  IN: 120 mL / OUT: 1267 mL / NET: -1147 mL    03-09 @ 06:01  -  03-09 @ 22:44  --------------------------------------------------------  IN: 600 mL / OUT: 2000 mL / NET: -1400 mL      Physical Exam:    Daily     Daily   General:  NAD   HEENT:  Nonicteric, PERRLA  CV:  RRR, S1S2   Lungs:  CTA B/L, no wheezes, rales, rhonchi  Abdomen:  Soft, non-tender, no distended, positive BS  Extremities: no edema                                                                                                                                                                                                                                                                    LABS:                               6.4    5.28  )-----------( 202      ( 09 Mar 2024 14:59 )             20.0                      03-09    132<L>  |  91<L>  |  76<H>  ----------------------------<  97  5.4<H>   |  27  |  8.72<H>    Ca    7.9<L>      09 Mar 2024 14:59                         RADIOLOGY & ADDITIONAL TESTS         I personally reviewed: [  ]EKG   [  ]CXR    [  ] CT      A/P:         Discussed with :     Jen consultants' Notes   Time spent :

## 2024-03-09 NOTE — PROGRESS NOTE ADULT - ASSESSMENT
A/P: 38y Male s/p Right hip hemiarthroplasty POD#2  -Recommend chronic pain consult, patient endorses that he has been taking opiate pain medication since surgery on his contralateral limb several months ago  -PT/OT: WBAT/OOB  -IS bedside  -Ice/elevation   -DVT PPx: per primary team  -Pain Control  -f/u AM labs   -Will continue to follow    Tyler Bhatti PA-C  Orthopedic Surgery Team  Team Pager #[phone]

## 2024-03-09 NOTE — PROGRESS NOTE ADULT - SUBJECTIVE AND OBJECTIVE BOX
Resting, comfortable on RA    Vital Signs Last 24 Hrs  T(C): 36.5 (03-09-24 @ 16:45), Max: 37.9 (03-09-24 @ 08:57)  T(F): 97.7 (03-09-24 @ 16:45), Max: 100.2 (03-09-24 @ 08:57)  HR: 90 (03-09-24 @ 16:45) (62 - 102)  BP: 143/75 (03-09-24 @ 16:45) (111/64 - 144/82)  RR: 18 (03-09-24 @ 16:45) (18 - 18)  SpO2: 100% (03-09-24 @ 16:45) (94% - 100%)    I&O's Detail    08 Mar 2024 07:01  -  09 Mar 2024 07:00  --------------------------------------------------------  OUT:    Other (mL): 1200 mL  Total OUT: 1200 mL    s1s2  b/l air entry  soft, ND  sm edema                                           6.4    5.28  )-----------( 202      ( 09 Mar 2024 14:59 )             20.0     09 Mar 2024 14:59    132    |  91     |  76     ----------------------------<  97     5.4     |  27     |  8.72     Ca    7.9        09 Mar 2024 14:59    acetaminophen     Tablet .. 975 milliGRAM(s) Oral every 8 hours  aspirin enteric coated 81 milliGRAM(s) Oral two times a day  cinacalcet 30 milliGRAM(s) Oral daily  heparin   Injectable 5000 Unit(s) SubCutaneous every 8 hours  hydrALAZINE 10 milliGRAM(s) Oral every 12 hours  metoprolol tartrate 25 milliGRAM(s) Oral two times a day  ondansetron Injectable 4 milliGRAM(s) IV Push every 6 hours PRN  oxyCODONE    IR 15 milliGRAM(s) Oral every 4 hours PRN  oxyCODONE    IR 10 milliGRAM(s) Oral every 4 hours PRN  polyethylene glycol 3350 17 Gram(s) Oral daily  senna 2 Tablet(s) Oral at bedtime  sevelamer carbonate 1600 milliGRAM(s) Oral three times a day    A/P:    ESRD on HD   S/p fall, R hip fx  S/p R hip HA 3/7  Anemia, mild hyperkalemia noted today   HD today as ordered  Bld tx per primary team  CBC, BMP in am  Renal diet  Next HD on Monday     [phone]

## 2024-03-09 NOTE — PROGRESS NOTE ADULT - ASSESSMENT
38 Y M H/O ESRD , HTN  admitted with R hip pain /R knee pain s/p fall as he was being transferred to chair with help   no head trauma, + R. knee pain + R. thigh pain s/p fall.   1.  Acute Garden type I minimally valgus impacted transcervical right   femoral neck fracture.  2.  Subacute appearing impacted right pubic body and inferior   rami fractures.    seen by ortho       Femoral neck fx :   no hx of cad  NL EF in nov /2023   no evidence /hx of arrhythmia   s/p OR   pan meds      ESRD: HD    Dr. Blanco     acute on chronic anemia : tranfuse     hyperkalemia: improved     fever : low grade post op .. reports dysuria though very small amount of urine   will check scan , UA  culture   if recurrent fever check culture and cxr        HTN : monitor

## 2024-03-09 NOTE — PROGRESS NOTE ADULT - SUBJECTIVE AND OBJECTIVE BOX
Pt evaluated at bedside resting without complaints. Pt states pain is well tolerated. No Chest Pain, SOB, N/V.    Vitals:  T(C): 37.2 (03-09-24 @ 04:46), Max: 37.8 (03-08-24 @ 20:44)  HR: 98 (03-09-24 @ 04:46) (94 - 115)  BP: 122/64 (03-09-24 @ 04:46) (118/78 - 161/88)  RR: 18 (03-09-24 @ 04:46) (18 - 18)  SpO2: 100% (03-09-24 @ 04:46) (96% - 100%)    Exam:  Alert and Oriented, No Acute Distress. VSS.   Laterality: RLE     Aquacel dressing is clean, dry and intact.      (+) PF/DF/EHL/FHL 5/5     Sensation intact to light touch      2+ DP/PT pulse  Calves soft, non-tender bilaterally    Labs:                    7.7    5.63  )-----------( 226      ( 08 Mar 2024 06:46 )             22.9    03-08    134<L>  |  92<L>  |  97<H>  ----------------------------<  94  6.7<HH>   |  26  |  10.23<H>    Ca    8.8      08 Mar 2024 10:40

## 2024-03-10 LAB
ANION GAP SERPL CALC-SCNC: 13 MMOL/L — SIGNIFICANT CHANGE UP (ref 5–17)
BUN SERPL-MCNC: 51 MG/DL — HIGH (ref 7–23)
CALCIUM SERPL-MCNC: 7.8 MG/DL — LOW (ref 8.4–10.5)
CALCIUM SERPL-MCNC: 8.2 MG/DL — LOW (ref 8.4–10.5)
CHLORIDE SERPL-SCNC: 95 MMOL/L — LOW (ref 96–108)
CO2 SERPL-SCNC: 28 MMOL/L — SIGNIFICANT CHANGE UP (ref 22–31)
CREAT SERPL-MCNC: 6.36 MG/DL — HIGH (ref 0.5–1.3)
EGFR: 11 ML/MIN/1.73M2 — LOW
GLUCOSE SERPL-MCNC: 84 MG/DL — SIGNIFICANT CHANGE UP (ref 70–99)
HAV IGM SER-ACNC: SIGNIFICANT CHANGE UP
HBV CORE IGM SER-ACNC: SIGNIFICANT CHANGE UP
HBV SURFACE AG SER-ACNC: SIGNIFICANT CHANGE UP
HCT VFR BLD CALC: 22.9 % — LOW (ref 39–50)
HCV AB S/CO SERPL IA: 0.36 S/CO — SIGNIFICANT CHANGE UP (ref 0–0.99)
HCV AB SERPL-IMP: SIGNIFICANT CHANGE UP
HGB BLD-MCNC: 7.2 G/DL — LOW (ref 13–17)
MCHC RBC-ENTMCNC: 30.3 PG — SIGNIFICANT CHANGE UP (ref 27–34)
MCHC RBC-ENTMCNC: 31.4 GM/DL — LOW (ref 32–36)
MCV RBC AUTO: 96.2 FL — SIGNIFICANT CHANGE UP (ref 80–100)
NRBC # BLD: 0 /100 WBCS — SIGNIFICANT CHANGE UP (ref 0–0)
PHOSPHATE SERPL-MCNC: 6.2 MG/DL — HIGH (ref 2.5–4.5)
PLATELET # BLD AUTO: 199 K/UL — SIGNIFICANT CHANGE UP (ref 150–400)
POTASSIUM SERPL-MCNC: 4.7 MMOL/L — SIGNIFICANT CHANGE UP (ref 3.5–5.3)
POTASSIUM SERPL-SCNC: 4.7 MMOL/L — SIGNIFICANT CHANGE UP (ref 3.5–5.3)
PTH-INTACT FLD-MCNC: 2337 PG/ML — HIGH (ref 15–65)
RBC # BLD: 2.38 M/UL — LOW (ref 4.2–5.8)
RBC # FLD: 15.9 % — HIGH (ref 10.3–14.5)
SODIUM SERPL-SCNC: 136 MMOL/L — SIGNIFICANT CHANGE UP (ref 135–145)
TSH SERPL-MCNC: 2.55 UIU/ML — SIGNIFICANT CHANGE UP (ref 0.27–4.2)
WBC # BLD: 4.82 K/UL — SIGNIFICANT CHANGE UP (ref 3.8–10.5)
WBC # FLD AUTO: 4.82 K/UL — SIGNIFICANT CHANGE UP (ref 3.8–10.5)

## 2024-03-10 RX ORDER — CALCIUM ACETATE 667 MG
1334 TABLET ORAL
Refills: 0 | Status: DISCONTINUED | OUTPATIENT
Start: 2024-03-10 | End: 2024-03-21

## 2024-03-10 RX ADMIN — Medication 1334 MILLIGRAM(S): at 14:15

## 2024-03-10 RX ADMIN — Medication 81 MILLIGRAM(S): at 09:08

## 2024-03-10 RX ADMIN — Medication 10 MILLIGRAM(S): at 21:29

## 2024-03-10 RX ADMIN — Medication 975 MILLIGRAM(S): at 12:59

## 2024-03-10 RX ADMIN — POLYETHYLENE GLYCOL 3350 17 GRAM(S): 17 POWDER, FOR SOLUTION ORAL at 13:00

## 2024-03-10 RX ADMIN — OXYCODONE HYDROCHLORIDE 15 MILLIGRAM(S): 5 TABLET ORAL at 14:15

## 2024-03-10 RX ADMIN — Medication 10 MILLIGRAM(S): at 09:08

## 2024-03-10 RX ADMIN — OXYCODONE HYDROCHLORIDE 15 MILLIGRAM(S): 5 TABLET ORAL at 03:30

## 2024-03-10 RX ADMIN — HEPARIN SODIUM 5000 UNIT(S): 5000 INJECTION INTRAVENOUS; SUBCUTANEOUS at 05:20

## 2024-03-10 RX ADMIN — Medication 81 MILLIGRAM(S): at 18:31

## 2024-03-10 RX ADMIN — Medication 975 MILLIGRAM(S): at 21:28

## 2024-03-10 RX ADMIN — Medication 25 MILLIGRAM(S): at 18:32

## 2024-03-10 RX ADMIN — Medication 975 MILLIGRAM(S): at 13:59

## 2024-03-10 RX ADMIN — OXYCODONE HYDROCHLORIDE 15 MILLIGRAM(S): 5 TABLET ORAL at 15:15

## 2024-03-10 RX ADMIN — HEPARIN SODIUM 5000 UNIT(S): 5000 INJECTION INTRAVENOUS; SUBCUTANEOUS at 21:32

## 2024-03-10 RX ADMIN — OXYCODONE HYDROCHLORIDE 15 MILLIGRAM(S): 5 TABLET ORAL at 21:35

## 2024-03-10 RX ADMIN — Medication 975 MILLIGRAM(S): at 22:38

## 2024-03-10 RX ADMIN — OXYCODONE HYDROCHLORIDE 15 MILLIGRAM(S): 5 TABLET ORAL at 22:38

## 2024-03-10 RX ADMIN — HEPARIN SODIUM 5000 UNIT(S): 5000 INJECTION INTRAVENOUS; SUBCUTANEOUS at 12:58

## 2024-03-10 RX ADMIN — Medication 975 MILLIGRAM(S): at 06:19

## 2024-03-10 RX ADMIN — SEVELAMER CARBONATE 1600 MILLIGRAM(S): 2400 POWDER, FOR SUSPENSION ORAL at 05:19

## 2024-03-10 RX ADMIN — SENNA PLUS 2 TABLET(S): 8.6 TABLET ORAL at 21:28

## 2024-03-10 RX ADMIN — Medication 975 MILLIGRAM(S): at 05:19

## 2024-03-10 RX ADMIN — OXYCODONE HYDROCHLORIDE 15 MILLIGRAM(S): 5 TABLET ORAL at 04:30

## 2024-03-10 RX ADMIN — Medication 1334 MILLIGRAM(S): at 18:31

## 2024-03-10 RX ADMIN — OXYCODONE HYDROCHLORIDE 15 MILLIGRAM(S): 5 TABLET ORAL at 09:09

## 2024-03-10 RX ADMIN — OXYCODONE HYDROCHLORIDE 15 MILLIGRAM(S): 5 TABLET ORAL at 10:09

## 2024-03-10 RX ADMIN — Medication 25 MILLIGRAM(S): at 09:09

## 2024-03-10 NOTE — PROGRESS NOTE ADULT - SUBJECTIVE AND OBJECTIVE BOX
Date of service: 03-10-24 @ 14:51      Patient is a 38y old  Male who presents with a chief complaint of R Femoral Neck Fx (10 Mar 2024 09:09)                                                               INTERVAL HPI/OVERNIGHT EVENTS:    REVIEW OF SYSTEMS:     CONSTITUTIONAL: No weakness, fevers or chills  RESPIRATORY: No cough, wheezing,  No shortness of breath  CARDIOVASCULAR: No chest pain or palpitations  GASTROINTESTINAL: No abdominal pain  . No nausea, vomiting, or hematemesis; No diarrhea or constipation. No melena or hematochezia.  GENITOURINARY: No dysuria, frequency or hematuria  NEUROLOGICAL: No numbness or weakness                                                                                                                                                                                                                                                                                  Medications:  MEDICATIONS  (STANDING):  acetaminophen     Tablet .. 975 milliGRAM(s) Oral every 8 hours  aspirin enteric coated 81 milliGRAM(s) Oral two times a day  calcium acetate 1334 milliGRAM(s) Oral three times a day with meals  heparin   Injectable 5000 Unit(s) SubCutaneous every 8 hours  hydrALAZINE 10 milliGRAM(s) Oral every 12 hours  metoprolol tartrate 25 milliGRAM(s) Oral two times a day  polyethylene glycol 3350 17 Gram(s) Oral daily  senna 2 Tablet(s) Oral at bedtime    MEDICATIONS  (PRN):  ondansetron Injectable 4 milliGRAM(s) IV Push every 6 hours PRN Nausea and/or Vomiting  oxyCODONE    IR 15 milliGRAM(s) Oral every 4 hours PRN Severe Pain (7 - 10)  oxyCODONE    IR 10 milliGRAM(s) Oral every 4 hours PRN Moderate Pain (4 - 6)       Allergies    No Known Drug Allergies  shellfish (Hives)    Intolerances      Vital Signs Last 24 Hrs  T(C): 36.8 (10 Mar 2024 12:34), Max: 37.9 (10 Mar 2024 04:06)  T(F): 98.2 (10 Mar 2024 12:34), Max: 100.2 (10 Mar 2024 04:06)  HR: 84 (10 Mar 2024 12:34) (84 - 110)  BP: 104/61 (10 Mar 2024 12:34) (103/66 - 143/75)  BP(mean): --  RR: 18 (10 Mar 2024 12:34) (18 - 18)  SpO2: 95% (10 Mar 2024 12:34) (95% - 100%)    Parameters below as of 10 Mar 2024 12:34  Patient On (Oxygen Delivery Method): room air      CAPILLARY BLOOD GLUCOSE          03-09 @ 06:01  -  03-10 @ 07:00  --------------------------------------------------------  IN: 720 mL / OUT: 2000 mL / NET: -1280 mL    03-10 @ 07:01  -  03-10 @ 14:51  --------------------------------------------------------  IN: 400 mL / OUT: 400 mL / NET: 0 mL      Physical Exam:    Daily     Daily   General:  Well appearing, NAD, not cachetic  HEENT:  Nonicteric, PERRLA  CV:  RRR, S1S2   Lungs:  CTA B/L, no wheezes, rales, rhonchi  Abdomen:  Soft, non-tender, no distended, positive BS  Extremities:  2+ pulses, no c/c, no edema  Skin:  Warm and dry, no rashes  :  No mayes  Neuro:  AAOx3, non-focal, grossly intact                                                                                                                                                                                                                                                                                                LABS:                               7.2    4.82  )-----------( 199      ( 10 Mar 2024 07:23 )             22.9                      03-10    136  |  95<L>  |  51<H>  ----------------------------<  84  4.7   |  28  |  6.36<H>    Ca    8.2<L>      10 Mar 2024 07:23  Phos  6.2     03-10                         RADIOLOGY & ADDITIONAL TESTS         I personally reviewed: [  ]EKG   [  ]CXR    [  ] CT      A/P:         Discussed with :     Jen consultants' Notes   Time spent :

## 2024-03-10 NOTE — PROGRESS NOTE ADULT - ASSESSMENT
38 Y M H/O ESRD , HTN  admitted with R hip pain /R knee pain s/p fall as he was being transferred to chair with help   no head trauma, + R. knee pain + R. thigh pain s/p fall.   1.  Acute Garden type I minimally valgus impacted transcervical right   femoral neck fracture.  2.  Subacute appearing impacted right pubic body and inferior   rami fractures.    seen by ortho       Femoral neck fx :   no hx of cad  NL EF in nov /2023   no evidence /hx of arrhythmia   s/p OR   pan meds      ESRD: HD    Dr. Blanco     acute on chronic anemia : tranfused  monitor and transfuse prn      hyperkalemia: improved     fever : low grade post op .. reports dysuria though very small amount of urine   will check scan , UA  culture   if recurrent fever check culture and cxr        HTN : monitor     prepare for discharge

## 2024-03-10 NOTE — PROGRESS NOTE ADULT - ASSESSMENT
A/p: 38y Male s/p R femoral neck IM  VSS. NAD.    Hgc 7.7, recommend giving 1U PRBC  PT/OT-WBAT RLE  IS  DVT PPx: ASA 81mg   Pain Control  Continue Current Tx.    Gonzalez Cruz PA-C  Orthopedic surgery   Team Pager: [phone]

## 2024-03-10 NOTE — PROGRESS NOTE ADULT - SUBJECTIVE AND OBJECTIVE BOX
Post op Day [3]    Patient resting without complaints.  No chest pain, SOB, N/V.    T(C): 37.3 (03-10-24 @ 09:03), Max: 37.9 (03-10-24 @ 04:06)  HR: 92 (03-10-24 @ 09:03) (62 - 110)  BP: 122/67 (03-10-24 @ 09:03) (103/66 - 143/75)  RR: 18 (03-10-24 @ 09:03) (18 - 18)  SpO2: 96% (03-10-24 @ 09:03) (94% - 100%)      Exam:  Alert and Oriented, No Acute Distress  Cardiac: Normal S1 & S2, RRR, No murmurs, rubs or gallops appreciated.  Pulmonary: 18bpm, normal breathing effort, no retractions, diminished lung sounds appreciated.  Bronchial/Vesicular lungs sounds appreciated throughout all lung lobes.  Lower Extremities: R Femur  Dressing: C/D/I w/ Aquacel c/d/i  Calves Soft, Non-tender bilaterally  +PF/DF/EHL/FHL  SILT  +DP Pulse                            7.2    4.82  )-----------( 199      ( 10 Mar 2024 07:23 )             22.9    03-10    136  |  95<L>  |  51<H>  ----------------------------<  84  4.7   |  28  |  6.36<H>    Ca    8.2<L>      10 Mar 2024 07:23  Phos  6.2     03-10

## 2024-03-11 LAB
ANION GAP SERPL CALC-SCNC: 17 MMOL/L — SIGNIFICANT CHANGE UP (ref 5–17)
BUN SERPL-MCNC: 78 MG/DL — HIGH (ref 7–23)
CALCIUM SERPL-MCNC: 8.3 MG/DL — LOW (ref 8.4–10.5)
CHLORIDE SERPL-SCNC: 94 MMOL/L — LOW (ref 96–108)
CO2 SERPL-SCNC: 25 MMOL/L — SIGNIFICANT CHANGE UP (ref 22–31)
CREAT SERPL-MCNC: 8.46 MG/DL — HIGH (ref 0.5–1.3)
EGFR: 8 ML/MIN/1.73M2 — LOW
GLUCOSE SERPL-MCNC: 113 MG/DL — HIGH (ref 70–99)
HCT VFR BLD CALC: 22.7 % — LOW (ref 39–50)
HGB BLD-MCNC: 7.2 G/DL — LOW (ref 13–17)
MAGNESIUM SERPL-MCNC: 2.4 MG/DL — SIGNIFICANT CHANGE UP (ref 1.6–2.6)
MCHC RBC-ENTMCNC: 30.8 PG — SIGNIFICANT CHANGE UP (ref 27–34)
MCHC RBC-ENTMCNC: 31.7 GM/DL — LOW (ref 32–36)
MCV RBC AUTO: 97 FL — SIGNIFICANT CHANGE UP (ref 80–100)
NRBC # BLD: 0 /100 WBCS — SIGNIFICANT CHANGE UP (ref 0–0)
PHOSPHATE SERPL-MCNC: 6.4 MG/DL — HIGH (ref 2.5–4.5)
PLATELET # BLD AUTO: 249 K/UL — SIGNIFICANT CHANGE UP (ref 150–400)
POTASSIUM SERPL-MCNC: 4.8 MMOL/L — SIGNIFICANT CHANGE UP (ref 3.5–5.3)
POTASSIUM SERPL-SCNC: 4.8 MMOL/L — SIGNIFICANT CHANGE UP (ref 3.5–5.3)
RBC # BLD: 2.34 M/UL — LOW (ref 4.2–5.8)
RBC # FLD: 15.5 % — HIGH (ref 10.3–14.5)
SODIUM SERPL-SCNC: 136 MMOL/L — SIGNIFICANT CHANGE UP (ref 135–145)
WBC # BLD: 5.01 K/UL — SIGNIFICANT CHANGE UP (ref 3.8–10.5)
WBC # FLD AUTO: 5.01 K/UL — SIGNIFICANT CHANGE UP (ref 3.8–10.5)

## 2024-03-11 PROCEDURE — 71045 X-RAY EXAM CHEST 1 VIEW: CPT | Mod: 26

## 2024-03-11 RX ADMIN — Medication 25 MILLIGRAM(S): at 17:11

## 2024-03-11 RX ADMIN — Medication 975 MILLIGRAM(S): at 14:04

## 2024-03-11 RX ADMIN — Medication 975 MILLIGRAM(S): at 22:01

## 2024-03-11 RX ADMIN — Medication 1334 MILLIGRAM(S): at 17:11

## 2024-03-11 RX ADMIN — Medication 81 MILLIGRAM(S): at 17:11

## 2024-03-11 RX ADMIN — OXYCODONE HYDROCHLORIDE 15 MILLIGRAM(S): 5 TABLET ORAL at 22:06

## 2024-03-11 RX ADMIN — OXYCODONE HYDROCHLORIDE 15 MILLIGRAM(S): 5 TABLET ORAL at 17:17

## 2024-03-11 RX ADMIN — Medication 975 MILLIGRAM(S): at 06:32

## 2024-03-11 RX ADMIN — ERYTHROPOIETIN 10000 UNIT(S): 10000 INJECTION, SOLUTION INTRAVENOUS; SUBCUTANEOUS at 09:57

## 2024-03-11 RX ADMIN — Medication 975 MILLIGRAM(S): at 13:04

## 2024-03-11 RX ADMIN — OXYCODONE HYDROCHLORIDE 15 MILLIGRAM(S): 5 TABLET ORAL at 08:32

## 2024-03-11 RX ADMIN — HEPARIN SODIUM 5000 UNIT(S): 5000 INJECTION INTRAVENOUS; SUBCUTANEOUS at 06:32

## 2024-03-11 RX ADMIN — OXYCODONE HYDROCHLORIDE 15 MILLIGRAM(S): 5 TABLET ORAL at 09:32

## 2024-03-11 RX ADMIN — Medication 975 MILLIGRAM(S): at 23:00

## 2024-03-11 RX ADMIN — POLYETHYLENE GLYCOL 3350 17 GRAM(S): 17 POWDER, FOR SOLUTION ORAL at 13:05

## 2024-03-11 RX ADMIN — SENNA PLUS 2 TABLET(S): 8.6 TABLET ORAL at 22:01

## 2024-03-11 RX ADMIN — Medication 1334 MILLIGRAM(S): at 13:04

## 2024-03-11 RX ADMIN — Medication 975 MILLIGRAM(S): at 07:01

## 2024-03-11 RX ADMIN — Medication 1334 MILLIGRAM(S): at 08:29

## 2024-03-11 RX ADMIN — Medication 81 MILLIGRAM(S): at 06:33

## 2024-03-11 RX ADMIN — HEPARIN SODIUM 5000 UNIT(S): 5000 INJECTION INTRAVENOUS; SUBCUTANEOUS at 13:04

## 2024-03-11 RX ADMIN — OXYCODONE HYDROCHLORIDE 15 MILLIGRAM(S): 5 TABLET ORAL at 23:00

## 2024-03-11 RX ADMIN — HEPARIN SODIUM 5000 UNIT(S): 5000 INJECTION INTRAVENOUS; SUBCUTANEOUS at 22:02

## 2024-03-11 RX ADMIN — OXYCODONE HYDROCHLORIDE 15 MILLIGRAM(S): 5 TABLET ORAL at 16:17

## 2024-03-11 NOTE — PROGRESS NOTE ADULT - SUBJECTIVE AND OBJECTIVE BOX
S/p stable HD today    Vital Signs Last 24 Hrs  T(C): 37.1 (03-11-24 @ 16:05), Max: 37.9 (03-10-24 @ 20:05)  T(F): 98.8 (03-11-24 @ 16:05), Max: 100.2 (03-10-24 @ 20:05)  HR: 88 (03-11-24 @ 16:05) (64 - 91)  BP: 121/67 (03-11-24 @ 16:05) (104/56 - 129/71)  RR: 18 (03-11-24 @ 16:05) (18 - 18)  SpO2: 97% (03-11-24 @ 16:05) (96% - 100%)    I&O's Detail    10 Mar 2024 07:01  -  11 Mar 2024 07:00  --------------------------------------------------------  IN:    Oral Fluid: 620 mL  Total IN: 620 mL    OUT:    Voided (mL): 400 mL  Total OUT: 400 mL    11 Mar 2024 07:01  -  11 Mar 2024 17:55  --------------------------------------------------------  IN:    Oral Fluid: 400 mL  Total IN: 400 mL    OUT:    Other (mL): 1800 mL  Total OUT: 1800 mL    s1s2  b/l air entry  soft, ND  sm edema                                                 7.2    5.01  )-----------( 249      ( 11 Mar 2024 10:37 )             22.7     11 Mar 2024 10:37    136    |  94     |  78     ----------------------------<  113    4.8     |  25     |  8.46     Ca    8.3        11 Mar 2024 10:37  Phos  6.4       11 Mar 2024 10:37  Mg     2.4       11 Mar 2024 10:37    acetaminophen     Tablet .. 975 milliGRAM(s) Oral every 8 hours  aspirin enteric coated 81 milliGRAM(s) Oral two times a day  calcium acetate 1334 milliGRAM(s) Oral three times a day with meals  epoetin carito-epbx (RETACRIT) Injectable 39269 Unit(s) IV Push <User Schedule>  heparin   Injectable 5000 Unit(s) SubCutaneous every 8 hours  hydrALAZINE 10 milliGRAM(s) Oral every 12 hours  metoprolol tartrate 25 milliGRAM(s) Oral two times a day  ondansetron Injectable 4 milliGRAM(s) IV Push every 6 hours PRN  oxyCODONE    IR 10 milliGRAM(s) Oral every 4 hours PRN  oxyCODONE    IR 15 milliGRAM(s) Oral every 4 hours PRN  polyethylene glycol 3350 17 Gram(s) Oral daily  senna 2 Tablet(s) Oral at bedtime    A/P:    ESRD on HD   S/p fall, R hip fx  S/p R hip HA 3/7  HD today as ordered  Epoetin w/HD  Renal diet  Rehab     [phone]

## 2024-03-11 NOTE — PROGRESS NOTE ADULT - ASSESSMENT
38 Y M H/O ESRD , HTN  admitted with R hip pain /R knee pain s/p fall as he was being transferred to chair with help   no head trauma, + R. knee pain + R. thigh pain s/p fall.   1.  Acute Garden type I minimally valgus impacted transcervical right   femoral neck fracture.  2.  Subacute appearing impacted right pubic body and inferior   rami fractures.    seen by ortho       Femoral neck fx :   no hx of cad  NL EF in nov /2023   no evidence /hx of arrhythmia   s/p OR   pan meds    fever : low grade however pt is on tyelnol   will check Cultures  check CXR   no obviuos source   ? atelecatsis   consider [facility] duplex r/o DVT if recurrent       ESRD: HD    Dr. Blanco     acute on chronic anemia : tranfused  monitor and transfuse prn      hyperkalemia: improved     fever : low grade post op .. reports dysuria though very small amount of urine   will check scan , UA  culture   if recurrent fever check culture and cxr      Hyperkalemia : improved   monitor     HTN : monitor     prepare for discharge

## 2024-03-11 NOTE — PROGRESS NOTE ADULT - ASSESSMENT
A/P: Pt is a 37 yo male s/p hemiarthroplasty, right femoral neck fx, POD # 4    - Pain control/analgesia  - DVT ppx  - PT/OT - WBAT RLE  - Anterior hip precautions  - Incentive spirometer  - Recommend GI ppx: Protonix  - Follow up AM labs  - Recommend transfusion to keep hemoglobin > 8  - Notify ortho for questions  - Dispo: PT recommended JUSTINE Varela PA-C  Orthopedic Surgery  Team Pager #[phone]

## 2024-03-11 NOTE — PROGRESS NOTE ADULT - SUBJECTIVE AND OBJECTIVE BOX
Date of service: 03-11-24 @ 17:57      Patient is a 38y old  Male who presents with a chief complaint of R Femoral Neck Fx (10 Mar 2024 09:09)                                                               INTERVAL HPI/OVERNIGHT EVENTS:    REVIEW OF SYSTEMS:     CONSTITUTIONAL: No weakness, fevers or chills  RESPIRATORY: No cough, wheezing,  No shortness of breath  CARDIOVASCULAR: No chest pain or palpitations  GASTROINTESTINAL: No abdominal pain  . No nausea, vomiting, or hematemesis; No diarrhea or constipation. No melena or hematochezia.  GENITOURINARY: No dysuria, frequency or hematuria  NEUROLOGICAL: No numbness or weakness                                                                                                                                                                                                                                                                                 Medications:  MEDICATIONS  (STANDING):  acetaminophen     Tablet .. 975 milliGRAM(s) Oral every 8 hours  aspirin enteric coated 81 milliGRAM(s) Oral two times a day  calcium acetate 1334 milliGRAM(s) Oral three times a day with meals  epoetin carito-epbx (RETACRIT) Injectable 22673 Unit(s) IV Push <User Schedule>  heparin   Injectable 5000 Unit(s) SubCutaneous every 8 hours  metoprolol tartrate 25 milliGRAM(s) Oral two times a day  polyethylene glycol 3350 17 Gram(s) Oral daily  senna 2 Tablet(s) Oral at bedtime    MEDICATIONS  (PRN):  ondansetron Injectable 4 milliGRAM(s) IV Push every 6 hours PRN Nausea and/or Vomiting  oxyCODONE    IR 10 milliGRAM(s) Oral every 4 hours PRN Moderate Pain (4 - 6)  oxyCODONE    IR 15 milliGRAM(s) Oral every 4 hours PRN Severe Pain (7 - 10)       Allergies    No Known Drug Allergies  shellfish (Hives)    Intolerances      Vital Signs Last 24 Hrs  T(C): 37.1 (11 Mar 2024 16:05), Max: 37.9 (10 Mar 2024 20:05)  T(F): 98.8 (11 Mar 2024 16:05), Max: 100.2 (10 Mar 2024 20:05)  HR: 88 (11 Mar 2024 16:05) (64 - 91)  BP: 121/67 (11 Mar 2024 16:05) (104/56 - 129/71)  BP(mean): --  RR: 18 (11 Mar 2024 16:05) (18 - 18)  SpO2: 97% (11 Mar 2024 16:05) (96% - 100%)    Parameters below as of 11 Mar 2024 16:05  Patient On (Oxygen Delivery Method): room air      CAPILLARY BLOOD GLUCOSE          03-10 @ 07:01  -  03-11 @ 07:00  --------------------------------------------------------  IN: 620 mL / OUT: 400 mL / NET: 220 mL    03-11 @ 07:01  -  03-11 @ 17:57  --------------------------------------------------------  IN: 400 mL / OUT: 1800 mL / NET: -1400 mL      Physical Exam:    Daily     Daily   General:  Well appearing, NAD, not cachetic  HEENT:  Nonicteric, PERRLA  CV:  RRR, S1S2   Lungs:  CTA B/L, no wheezes, rales, rhonchi  Abdomen:  Soft, non-tender, no distended, positive BS  Extremities: no edema   RLE   Neuro:  AAOx3, non-focal, grossly intact                                                                                                                                                                                                                                                                                                LABS:                               7.2    5.01  )-----------( 249      ( 11 Mar 2024 10:37 )             22.7                      03-11    136  |  94<L>  |  78<H>  ----------------------------<  113<H>  4.8   |  25  |  8.46<H>    Ca    8.3<L>      11 Mar 2024 10:37  Phos  6.4     03-11  Mg     2.4     03-11                         RADIOLOGY & ADDITIONAL TESTS         I personally reviewed: [  ]EKG   [  ]CXR    [  ] CT      A/P:         Discussed with :     Jen consultants' Notes   Time spent :

## 2024-03-11 NOTE — PROGRESS NOTE ADULT - SUBJECTIVE AND OBJECTIVE BOX
ORTHO PROGRESS NOTE    Patient is status post hemiarthroplasty, right femoral neck fx, POD # 4  Patient seen and examined at bedside, denies SOB, CP, dizziness, N/V/D/HA.  Patient reports SOB yesterday, patient denies SOB today.  Patient reports he had BM yesterday.  Patient reports pain well controlled.    Vital Signs Last 24 Hrs  T(C): 37.1 (11 Mar 2024 04:06), Max: 37.9 (10 Mar 2024 20:05)  T(F): 98.8 (11 Mar 2024 04:06), Max: 100.2 (10 Mar 2024 20:05)  HR: 64 (11 Mar 2024 04:06) (64 - 92)  BP: 115/79 (11 Mar 2024 04:06) (104/56 - 122/67)  BP(mean): --  RR: 18 (11 Mar 2024 04:06) (18 - 18)  SpO2: 98% (11 Mar 2024 04:06) (95% - 99%)    Parameters below as of 11 Mar 2024 04:06  Patient On (Oxygen Delivery Method): room air    Exam:  Gen: No apparent distress.  RLE: Dressing (aquacel) with small drainage centrally to bandage, otherwise intact to right hip.  BLE: Motor intact EHL/FHL/TA/GS. Sensation grossly intact to light touch in the distal extremities.  Compartments soft and nontender. Extremities warm. DP +.    Labs:  CBC Full  -  ( 10 Mar 2024 07:23 )  WBC Count : 4.82 K/uL  RBC Count : 2.38 M/uL  Hemoglobin : 7.2 g/dL  Hematocrit : 22.9 %  Platelet Count - Automated : 199 K/uL  Mean Cell Volume : 96.2 fl  Mean Cell Hemoglobin : 30.3 pg  Mean Cell Hemoglobin Concentration : 31.4 gm/dL    03-10    136  |  95<L>  |  51<H>  ----------------------------<  84  4.7   |  28  |  6.36<H>    Ca    8.2<L>      10 Mar 2024 07:23  Phos  6.2     03-10

## 2024-03-12 PROCEDURE — 99221 1ST HOSP IP/OBS SF/LOW 40: CPT

## 2024-03-12 RX ADMIN — Medication 975 MILLIGRAM(S): at 06:20

## 2024-03-12 RX ADMIN — Medication 1334 MILLIGRAM(S): at 11:31

## 2024-03-12 RX ADMIN — Medication 975 MILLIGRAM(S): at 22:41

## 2024-03-12 RX ADMIN — Medication 25 MILLIGRAM(S): at 17:02

## 2024-03-12 RX ADMIN — Medication 81 MILLIGRAM(S): at 06:21

## 2024-03-12 RX ADMIN — HEPARIN SODIUM 5000 UNIT(S): 5000 INJECTION INTRAVENOUS; SUBCUTANEOUS at 22:33

## 2024-03-12 RX ADMIN — OXYCODONE HYDROCHLORIDE 15 MILLIGRAM(S): 5 TABLET ORAL at 06:20

## 2024-03-12 RX ADMIN — OXYCODONE HYDROCHLORIDE 15 MILLIGRAM(S): 5 TABLET ORAL at 12:31

## 2024-03-12 RX ADMIN — Medication 975 MILLIGRAM(S): at 22:33

## 2024-03-12 RX ADMIN — Medication 1334 MILLIGRAM(S): at 17:03

## 2024-03-12 RX ADMIN — Medication 25 MILLIGRAM(S): at 06:21

## 2024-03-12 RX ADMIN — HEPARIN SODIUM 5000 UNIT(S): 5000 INJECTION INTRAVENOUS; SUBCUTANEOUS at 06:21

## 2024-03-12 RX ADMIN — OXYCODONE HYDROCHLORIDE 15 MILLIGRAM(S): 5 TABLET ORAL at 11:31

## 2024-03-12 RX ADMIN — Medication 1334 MILLIGRAM(S): at 08:48

## 2024-03-12 RX ADMIN — Medication 81 MILLIGRAM(S): at 17:02

## 2024-03-12 RX ADMIN — HEPARIN SODIUM 5000 UNIT(S): 5000 INJECTION INTRAVENOUS; SUBCUTANEOUS at 13:24

## 2024-03-12 RX ADMIN — OXYCODONE HYDROCHLORIDE 15 MILLIGRAM(S): 5 TABLET ORAL at 07:20

## 2024-03-12 RX ADMIN — Medication 975 MILLIGRAM(S): at 07:20

## 2024-03-12 NOTE — PROGRESS NOTE ADULT - SUBJECTIVE AND OBJECTIVE BOX
Resting    Vital Signs Last 24 Hrs  T(C): 37.1 (03-12-24 @ 08:02), Max: 37.7 (03-12-24 @ 00:39)  T(F): 98.7 (03-12-24 @ 08:02), Max: 99.8 (03-12-24 @ 00:39)  HR: 72 (03-12-24 @ 08:02) (72 - 93)  BP: 112/76 (03-12-24 @ 08:02) (109/73 - 129/71)  RR: 18 (03-12-24 @ 08:02) (18 - 18)  SpO2: 96% (03-12-24 @ 08:02) (96% - 100%)    I&O's Detail    11 Mar 2024 07:01  -  12 Mar 2024 07:00  --------------------------------------------------------  IN:    Oral Fluid: 680 mL  Total IN: 680 mL    OUT:    Other (mL): 1800 mL  Total OUT: 1800 mL    s1s2  b/l air entry  soft, ND  sm edema                                                         7.2    5.01  )-----------( 249      ( 11 Mar 2024 10:37 )             22.7     11 Mar 2024 10:37    136    |  94     |  78     ----------------------------<  113    4.8     |  25     |  8.46     Ca    8.3        11 Mar 2024 10:37  Phos  6.4       11 Mar 2024 10:37  Mg     2.4       11 Mar 2024 10:37    acetaminophen     Tablet .. 975 milliGRAM(s) Oral every 8 hours  aspirin enteric coated 81 milliGRAM(s) Oral two times a day  calcium acetate 1334 milliGRAM(s) Oral three times a day with meals  epoetin carito-epbx (RETACRIT) Injectable 29536 Unit(s) IV Push <User Schedule>  heparin   Injectable 5000 Unit(s) SubCutaneous every 8 hours  metoprolol tartrate 25 milliGRAM(s) Oral two times a day  ondansetron Injectable 4 milliGRAM(s) IV Push every 6 hours PRN  oxyCODONE    IR 10 milliGRAM(s) Oral every 4 hours PRN  oxyCODONE    IR 15 milliGRAM(s) Oral every 4 hours PRN  polyethylene glycol 3350 17 Gram(s) Oral daily  senna 2 Tablet(s) Oral at bedtime    A/P:    ESRD on HD   S/p fall, R hip fx  S/p R hip HA 3/7  HD tomorrow as ordered  Epoetin w/HD 3 x week   Renal diet  PT    [phone]

## 2024-03-12 NOTE — CONSULT NOTE ADULT - SUBJECTIVE AND OBJECTIVE BOX
Chief Complaint:  Patient is a 38y old  Male who presents with a chief complaint of R Femoral Neck Fx (10 Mar 2024 09:09)    HPI:  38 Y M H/O ESRD, HTN, left THR 8/2023    Admitted with R hip, thigh, knee pain s/p fall as he was being transferred to chair with help at [facility]  CT Pelvis w/ right femoral neck fracture, right pubic body and inferior rami fractures, advanced chronic renal osteodystrophy.  3/7 s/p right THR    Current out- patient pain regimen: Oxy IR 15 mg every 6 hrs    Out Patient Pain Management provider: None, [facility] Prescription Monitoring Program: Reference #[id number]    Opioid Risk Tool (ORT-OUD) Score: Low    Pain Score: 10/10 w/ movement    Pt seen lying in bed, appears comfortable, reports severe right hip and groin pain w/ movement, also c/o neuropathic pain right thigh/electric shocks, often triggered by compression sleeves. Pt reports good effect w/ current regimen and prefers to not increase doses or add new medication for neuropathic pain.      REVIEW OF SYSTEMS:  CONSTITUTIONAL: No fever, weight loss, fatigue, (+)fall  NEURO: No headaches, memory loss, loss of strength, tremors, dizziness or blurred vision  RESP: No shortness of breath, cough  CV: No chest pain, palpitations  GI: No abdominal pain, nausea, vomiting, diarrhea, constipation, incontinence  : Anuric  MSK: No upper motor strength weakness  SKIN: No itching, burning, rashes, or lesions   PSYCHIATRIC: No depression, anxiety, mood swings, or difficulty sleeping      PHYSICAL EXAM  GENERAL: Seen at bedside, NAD, well-groomed, well-developed, appears stated age, no signs of toxicity  NEURO: Alert & Oriented X3, Good concentration; Follows commands  HEENT: Head atraumatic, normocephalic; speech clear and fluent  GI: Appetite good, (+)BM  : Anuric, ESRD/HD  EXTREMITIES: moving all extremities  SKIN: No rashes or lesions  PSYCH: affect normal; good eye contact; no signs of depression or anxiety    PAST MEDICAL & SURGICAL HISTORY:  HTN (hypertension)      Stroke  age 10      Sleep apnea      Leg fracture, right      Chronic renal failure      Hemodialysis access, AV graft      ESRD (end stage renal disease) on dialysis  since 2013, M-W-F      Anemia, unspecified type      Other hyperparathyroidism      AV fistula  R arm; L arm clotted      History of left hip hemiarthroplasty      S/P parathyroidectomy          FAMILY HISTORY:      Allergies    No Known Drug Allergies  shellfish (Hives)        MEDICATIONS  (STANDING):  acetaminophen     Tablet .. 975 milliGRAM(s) Oral every 8 hours  aspirin enteric coated 81 milliGRAM(s) Oral two times a day  calcium acetate 1334 milliGRAM(s) Oral three times a day with meals  epoetin carito-epbx (RETACRIT) Injectable 18456 Unit(s) IV Push <User Schedule>  heparin   Injectable 5000 Unit(s) SubCutaneous every 8 hours  metoprolol tartrate 25 milliGRAM(s) Oral two times a day  polyethylene glycol 3350 17 Gram(s) Oral daily  senna 2 Tablet(s) Oral at bedtime    MEDICATIONS  (PRN):  ondansetron Injectable 4 milliGRAM(s) IV Push every 6 hours PRN Nausea and/or Vomiting  oxyCODONE    IR 10 milliGRAM(s) Oral every 4 hours PRN Moderate Pain (4 - 6)  oxyCODONE    IR 15 milliGRAM(s) Oral every 4 hours PRN Severe Pain (7 - 10)      Vital Signs:  T(C): 36.8 (03-12-24 @ 12:32)  HR: 87 (03-12-24 @ 12:32)  BP: 132/73 (03-12-24 @ 12:32)  RR: 18 (03-12-24 @ 12:32)  SpO2: 99% (03-12-24 @ 12:32)    Pertinent labs/radiology:  Reviewed                          7.2    5.01  )-----------( 249      ( 11 Mar 2024 10:37 )             22.7       03-11    136  |  94<L>  |  78<H>  ----------------------------<  113<H>  4.8   |  25  |  8.46<H>    Ca    8.3<L>      11 Mar 2024 10:37  Phos  6.4     03-11  Mg     2.4     03-11

## 2024-03-12 NOTE — PROGRESS NOTE ADULT - SUBJECTIVE AND OBJECTIVE BOX
ORTHO PROGRESS NOTE    Patient is status post hemiarthroplasty, right femoral neck fx, POD # 5  Patient seen and examined at bedside, denies SOB, CP, dizziness, N/V/D/HA.  Patient reports right hip pain after going to the bathroom and having BM today.  Patient reports pain well controlled.    Vital Signs Last 24 Hrs  T(C): 37.5 (12 Mar 2024 05:45), Max: 37.7 (12 Mar 2024 00:39)  T(F): 99.5 (12 Mar 2024 05:45), Max: 99.8 (12 Mar 2024 00:39)  HR: 83 (12 Mar 2024 05:45) (80 - 93)  BP: 109/73 (12 Mar 2024 05:45) (107/62 - 129/71)  BP(mean): --  RR: 18 (12 Mar 2024 05:45) (18 - 18)  SpO2: 98% (12 Mar 2024 05:45) (96% - 100%)    Parameters below as of 12 Mar 2024 05:45  Patient On (Oxygen Delivery Method): room air    Exam:  Gen: No apparent distress.  RLE: Dressing (aquacel) with small drainage centrally to bandage, otherwise intact to right hip.  BLE: Motor intact EHL/FHL/TA/GS. Sensation grossly intact to light touch in the distal extremities.  Compartments soft and nontender. Extremities warm. DP +.    Labs:  CBC Full  -  ( 11 Mar 2024 10:37 )  WBC Count : 5.01 K/uL  RBC Count : 2.34 M/uL  Hemoglobin : 7.2 g/dL  Hematocrit : 22.7 %  Platelet Count - Automated : 249 K/uL  Mean Cell Volume : 97.0 fl  Mean Cell Hemoglobin : 30.8 pg  Mean Cell Hemoglobin Concentration : 31.7 gm/dL    03-11    136  |  94<L>  |  78<H>  ----------------------------<  113<H>  4.8   |  25  |  8.46<H>    Ca    8.3<L>      11 Mar 2024 10:37  Phos  6.4     03-11  Mg     2.4     03-11 ORTHO PROGRESS NOTE    Patient is status post hemiarthroplasty, right femoral neck fx, POD # 5  Patient seen and examined at bedside, denies SOB, CP, dizziness, N/V/D/HA.  Patient reports right hip pain after going to the bathroom and having BM today.    Vital Signs Last 24 Hrs  T(C): 37.5 (12 Mar 2024 05:45), Max: 37.7 (12 Mar 2024 00:39)  T(F): 99.5 (12 Mar 2024 05:45), Max: 99.8 (12 Mar 2024 00:39)  HR: 83 (12 Mar 2024 05:45) (80 - 93)  BP: 109/73 (12 Mar 2024 05:45) (107/62 - 129/71)  BP(mean): --  RR: 18 (12 Mar 2024 05:45) (18 - 18)  SpO2: 98% (12 Mar 2024 05:45) (96% - 100%)    Parameters below as of 12 Mar 2024 05:45  Patient On (Oxygen Delivery Method): room air    Exam:  Gen: No apparent distress.  RLE: Dressing (aquacel) with small drainage centrally to bandage, otherwise intact to right hip.  BLE: Motor intact EHL/FHL/TA/GS. Sensation grossly intact to light touch in the distal extremities.  Compartments soft and nontender. Extremities warm. DP +.    Labs:  CBC Full  -  ( 11 Mar 2024 10:37 )  WBC Count : 5.01 K/uL  RBC Count : 2.34 M/uL  Hemoglobin : 7.2 g/dL  Hematocrit : 22.7 %  Platelet Count - Automated : 249 K/uL  Mean Cell Volume : 97.0 fl  Mean Cell Hemoglobin : 30.8 pg  Mean Cell Hemoglobin Concentration : 31.7 gm/dL    03-11    136  |  94<L>  |  78<H>  ----------------------------<  113<H>  4.8   |  25  |  8.46<H>    Ca    8.3<L>      11 Mar 2024 10:37  Phos  6.4     03-11  Mg     2.4     03-11

## 2024-03-12 NOTE — PROGRESS NOTE ADULT - ASSESSMENT
A/P: Pt is a 39 yo male s/p hemiarthroplasty, right femoral neck fx, POD # 5    - Pain control/analgesia  - DVT ppx  - PT/OT - WBAT RLE  - Anterior hip precautions  - Incentive spirometer  - Recommend GI ppx: Protonix  - Recommend AM labs  - Recommend transfusion to keep hemoglobin > 8  - Notify ortho for questions  - Dispo: PT recommended JUSTINE Varela PA-C  Orthopedic Surgery  Team Pager #[phone]

## 2024-03-13 ENCOUNTER — TRANSCRIPTION ENCOUNTER (OUTPATIENT)
Age: 39
End: 2024-03-13

## 2024-03-13 LAB
ALBUMIN SERPL ELPH-MCNC: 3.3 G/DL — SIGNIFICANT CHANGE UP (ref 3.3–5)
ALP SERPL-CCNC: 668 U/L — HIGH (ref 40–120)
ALT FLD-CCNC: <5 U/L — LOW (ref 10–45)
ANION GAP SERPL CALC-SCNC: 17 MMOL/L — SIGNIFICANT CHANGE UP (ref 5–17)
AST SERPL-CCNC: 11 U/L — SIGNIFICANT CHANGE UP (ref 10–40)
BASOPHILS # BLD AUTO: 0.02 K/UL — SIGNIFICANT CHANGE UP (ref 0–0.2)
BASOPHILS NFR BLD AUTO: 0.5 % — SIGNIFICANT CHANGE UP (ref 0–2)
BILIRUB SERPL-MCNC: 0.4 MG/DL — SIGNIFICANT CHANGE UP (ref 0.2–1.2)
BLD GP AB SCN SERPL QL: POSITIVE — SIGNIFICANT CHANGE UP
BUN SERPL-MCNC: 68 MG/DL — HIGH (ref 7–23)
CALCIUM SERPL-MCNC: 9 MG/DL — SIGNIFICANT CHANGE UP (ref 8.4–10.5)
CHLORIDE SERPL-SCNC: 97 MMOL/L — SIGNIFICANT CHANGE UP (ref 96–108)
CO2 SERPL-SCNC: 27 MMOL/L — SIGNIFICANT CHANGE UP (ref 22–31)
CREAT SERPL-MCNC: 8.53 MG/DL — HIGH (ref 0.5–1.3)
EGFR: 8 ML/MIN/1.73M2 — LOW
EOSINOPHIL # BLD AUTO: 0.24 K/UL — SIGNIFICANT CHANGE UP (ref 0–0.5)
EOSINOPHIL NFR BLD AUTO: 5.5 % — SIGNIFICANT CHANGE UP (ref 0–6)
FOLATE SERPL-MCNC: 14.2 NG/ML — SIGNIFICANT CHANGE UP
GLUCOSE SERPL-MCNC: 108 MG/DL — HIGH (ref 70–99)
HCT VFR BLD CALC: 23.8 % — LOW (ref 39–50)
HGB BLD-MCNC: 7.3 G/DL — LOW (ref 13–17)
IMM GRANULOCYTES NFR BLD AUTO: 0.5 % — SIGNIFICANT CHANGE UP (ref 0–0.9)
LYMPHOCYTES # BLD AUTO: 0.97 K/UL — LOW (ref 1–3.3)
LYMPHOCYTES # BLD AUTO: 22 % — SIGNIFICANT CHANGE UP (ref 13–44)
MCHC RBC-ENTMCNC: 30.3 PG — SIGNIFICANT CHANGE UP (ref 27–34)
MCHC RBC-ENTMCNC: 30.7 GM/DL — LOW (ref 32–36)
MCV RBC AUTO: 98.8 FL — SIGNIFICANT CHANGE UP (ref 80–100)
MONOCYTES # BLD AUTO: 0.5 K/UL — SIGNIFICANT CHANGE UP (ref 0–0.9)
MONOCYTES NFR BLD AUTO: 11.4 % — SIGNIFICANT CHANGE UP (ref 2–14)
NEUTROPHILS # BLD AUTO: 2.65 K/UL — SIGNIFICANT CHANGE UP (ref 1.8–7.4)
NEUTROPHILS NFR BLD AUTO: 60.1 % — SIGNIFICANT CHANGE UP (ref 43–77)
NRBC # BLD: 0 /100 WBCS — SIGNIFICANT CHANGE UP (ref 0–0)
PHOSPHATE SERPL-MCNC: 5.9 MG/DL — HIGH (ref 2.5–4.5)
PLATELET # BLD AUTO: 264 K/UL — SIGNIFICANT CHANGE UP (ref 150–400)
POTASSIUM SERPL-MCNC: 4.7 MMOL/L — SIGNIFICANT CHANGE UP (ref 3.5–5.3)
POTASSIUM SERPL-SCNC: 4.7 MMOL/L — SIGNIFICANT CHANGE UP (ref 3.5–5.3)
PROT SERPL-MCNC: 6.6 G/DL — SIGNIFICANT CHANGE UP (ref 6–8.3)
RBC # BLD: 2.41 M/UL — LOW (ref 4.2–5.8)
RBC # FLD: 15.1 % — HIGH (ref 10.3–14.5)
RH IG SCN BLD-IMP: POSITIVE — SIGNIFICANT CHANGE UP
SODIUM SERPL-SCNC: 141 MMOL/L — SIGNIFICANT CHANGE UP (ref 135–145)
TSH SERPL-MCNC: 1.91 UIU/ML — SIGNIFICANT CHANGE UP (ref 0.27–4.2)
VIT B12 SERPL-MCNC: 659 PG/ML — SIGNIFICANT CHANGE UP (ref 232–1245)
WBC # BLD: 4.4 K/UL — SIGNIFICANT CHANGE UP (ref 3.8–10.5)
WBC # FLD AUTO: 4.4 K/UL — SIGNIFICANT CHANGE UP (ref 3.8–10.5)

## 2024-03-13 RX ADMIN — HEPARIN SODIUM 5000 UNIT(S): 5000 INJECTION INTRAVENOUS; SUBCUTANEOUS at 23:12

## 2024-03-13 RX ADMIN — Medication 1334 MILLIGRAM(S): at 08:46

## 2024-03-13 RX ADMIN — ERYTHROPOIETIN 10000 UNIT(S): 10000 INJECTION, SOLUTION INTRAVENOUS; SUBCUTANEOUS at 12:21

## 2024-03-13 RX ADMIN — Medication 1334 MILLIGRAM(S): at 15:44

## 2024-03-13 RX ADMIN — POLYETHYLENE GLYCOL 3350 17 GRAM(S): 17 POWDER, FOR SOLUTION ORAL at 15:45

## 2024-03-13 RX ADMIN — Medication 975 MILLIGRAM(S): at 16:45

## 2024-03-13 RX ADMIN — Medication 975 MILLIGRAM(S): at 15:45

## 2024-03-13 RX ADMIN — Medication 81 MILLIGRAM(S): at 05:10

## 2024-03-13 RX ADMIN — Medication 25 MILLIGRAM(S): at 18:24

## 2024-03-13 RX ADMIN — OXYCODONE HYDROCHLORIDE 15 MILLIGRAM(S): 5 TABLET ORAL at 23:20

## 2024-03-13 RX ADMIN — Medication 81 MILLIGRAM(S): at 18:22

## 2024-03-13 RX ADMIN — Medication 975 MILLIGRAM(S): at 05:10

## 2024-03-13 RX ADMIN — Medication 1334 MILLIGRAM(S): at 18:22

## 2024-03-13 RX ADMIN — HEPARIN SODIUM 5000 UNIT(S): 5000 INJECTION INTRAVENOUS; SUBCUTANEOUS at 05:10

## 2024-03-13 RX ADMIN — HEPARIN SODIUM 5000 UNIT(S): 5000 INJECTION INTRAVENOUS; SUBCUTANEOUS at 15:45

## 2024-03-13 NOTE — DISCHARGE NOTE PROVIDER - DETAILS OF MALNUTRITION DIAGNOSIS/DIAGNOSES
This patient has been assessed with a concern for Malnutrition and was treated during this hospitalization for the following Nutrition diagnosis/diagnoses:     -  03/14/2024: Severe protein-calorie malnutrition

## 2024-03-13 NOTE — PROGRESS NOTE ADULT - SUBJECTIVE AND OBJECTIVE BOX
ORTHO PROGRESS NOTE    Patient is status post hemiarthroplasty, right femoral neck fx, POD # 6  Patient seen and examined at bedside, denies SOB, CP, dizziness, N/V/D/HA.  Patient reports pain well controlled.    Vital Signs Last 24 Hrs  T(C): 37.2 (13 Mar 2024 04:06), Max: 37.3 (12 Mar 2024 21:14)  T(F): 98.9 (13 Mar 2024 04:06), Max: 99.1 (12 Mar 2024 21:14)  HR: 88 (13 Mar 2024 04:06) (72 - 99)  BP: 123/66 (13 Mar 2024 04:06) (112/76 - 136/89)  BP(mean): --  RR: 18 (13 Mar 2024 04:06) (18 - 18)  SpO2: 97% (13 Mar 2024 04:06) (96% - 99%)    Parameters below as of 13 Mar 2024 04:06  Patient On (Oxygen Delivery Method): room air    Exam:  Gen: No apparent distress.  RLE: Dressing (aquacel) with small drainage centrally to bandage, otherwise intact to right hip.  BLE: Motor intact EHL/FHL/TA/GS. Sensation grossly intact to light touch in the distal extremities.  Compartments soft and nontender. Extremities warm. DP +.

## 2024-03-13 NOTE — PROGRESS NOTE ADULT - ASSESSMENT
38 Y M H/O ESRD , HTN  admitted with R hip pain /R knee pain s/p fall as he was being transferred to chair with help   no head trauma, + R. knee pain + R. thigh pain s/p fall.   1.  Acute Garden type I minimally valgus impacted transcervical right   femoral neck fracture.  2.  Subacute appearing impacted right pubic body and inferior   rami fractures.    seen by ortho     Femoral neck fx :   no hx of cad  NL EF in nov /2023   no evidence /hx of arrhythmia   s/p OR   pan meds    fever : low grade however pt is on tyelnol   will check Cultures  check CXR   no obviuos source   likely sec to  atelecatsis    VA duplex r/o DVT if recurrent .. espcially now with ? sob        ESRD: HD    Dr. Blanco     acute on chronic anemia : transfused  monitor and transfuse prn      hyperkalemia: improved       Hyperkalemia : improved   monitor     HTN : monitor     d/w pt at length   d/w acp   d/w cm   planned dc

## 2024-03-13 NOTE — PROGRESS NOTE ADULT - SUBJECTIVE AND OBJECTIVE BOX
Date of service: 03-13-24 @ 14:33      Patient is a 38y old  Male who presents with a chief complaint of Fall, right hip fracture  (13 Mar 2024 10:45)                                                               INTERVAL HPI/OVERNIGHT EVENTS:    REVIEW OF SYSTEMS:    R knee pain   ? sob                                                                                                                                                                                                                                                                   Medications:  MEDICATIONS  (STANDING):  acetaminophen     Tablet .. 975 milliGRAM(s) Oral every 8 hours  aspirin enteric coated 81 milliGRAM(s) Oral two times a day  calcium acetate 1334 milliGRAM(s) Oral three times a day with meals  epoetin carito-epbx (RETACRIT) Injectable 91865 Unit(s) IV Push <User Schedule>  heparin   Injectable 5000 Unit(s) SubCutaneous every 8 hours  metoprolol tartrate 25 milliGRAM(s) Oral two times a day  polyethylene glycol 3350 17 Gram(s) Oral daily  senna 2 Tablet(s) Oral at bedtime    MEDICATIONS  (PRN):  ondansetron Injectable 4 milliGRAM(s) IV Push every 6 hours PRN Nausea and/or Vomiting  oxyCODONE    IR 15 milliGRAM(s) Oral every 4 hours PRN Severe Pain (7 - 10)  oxyCODONE    IR 10 milliGRAM(s) Oral every 4 hours PRN Moderate Pain (4 - 6)       Allergies    No Known Drug Allergies  shellfish (Hives)    Intolerances      Vital Signs Last 24 Hrs  T(C): 36.3 (13 Mar 2024 10:55), Max: 37.3 (12 Mar 2024 21:14)  T(F): 97.3 (13 Mar 2024 10:55), Max: 99.1 (12 Mar 2024 21:14)  HR: 87 (13 Mar 2024 10:55) (87 - 99)  BP: 133/83 (13 Mar 2024 10:55) (119/69 - 136/89)  BP(mean): --  RR: 18 (13 Mar 2024 10:55) (18 - 18)  SpO2: 97% (13 Mar 2024 10:55) (97% - 99%)    Parameters below as of 13 Mar 2024 10:55  Patient On (Oxygen Delivery Method): room air      CAPILLARY BLOOD GLUCOSE          03-12 @ 07:01  -  03-13 @ 07:00  --------------------------------------------------------  IN: 440 mL / OUT: 0 mL / NET: 440 mL      Physical Exam:    Daily     Daily   General:  Well appearing, NAD, not cachetic  HEENT:  Nonicteric, PERRLA  CV:  RRR, S1S2   Lungs:  CTA B/L, no wheezes, rales, rhonchi  Abdomen:  Soft, non-tender, no distended, positive BS  Extremities:  RLE  limited ROM of knee sec t o pian                                                                                                                                                                                                                                                                                 LABS:                               7.3    4.40  )-----------( 264      ( 13 Mar 2024 07:05 )             23.8                      03-13    141  |  97  |  68<H>  ----------------------------<  108<H>  4.7   |  27  |  8.53<H>    Ca    9.0      13 Mar 2024 07:04  Phos  5.9     03-13    TPro  6.6  /  Alb  3.3  /  TBili  0.4  /  DBili  x   /  AST  11  /  ALT  <5<L>  /  AlkPhos  668<H>  03-13

## 2024-03-13 NOTE — DISCHARGE NOTE PROVIDER - NSDCFUADDAPPT_GEN_ALL_CORE_FT
APPTS ARE READY TO BE MADE: [x ] YES    Best Family or Patient Contact (if needed):    Additional Information about above appointments (if needed):    1: PCP  2: ortho  3:     Other comments or requests:    APPTS ARE READY TO BE MADE: [x ] YES    Best Family or Patient Contact (if needed):    Additional Information about above appointments (if needed):    1: PCP  2: ortho  3:     Other comments or requests:     Patient is being discharged to [facility]. Caregiver will arrange follow up.

## 2024-03-13 NOTE — DISCHARGE NOTE PROVIDER - CARE PROVIDER_API CALL
Jason León  Orthopaedic Surgery  [address], [address]  [place] [address]  Phone: [phone]  Fax: [phone]  Scheduled Appointment: 03/18/2024

## 2024-03-13 NOTE — DISCHARGE NOTE PROVIDER - HOSPITAL COURSE
HPI:  37yo M  PMHx ESRD on HD M/W/F via right AV fistula, left hip arthroplasty August 2023, status post subtotal parathyroidectomy December 2022, history of right leg fracture, HTN, anemia of chronic disease, p/w  R leg pain status post fall.  Patient is wheelchair-bound since left hip surgery.  While patient was being transferred out of his wheelchair he fell and landed on his right knee, no head injury, complains of right hip, femur, knee pain. Xrays consistent with fractures. 1.  Acute Garden type I minimally valgus impacted transcervical right femoral neck fracture. 2.  Subacute appearing minimally impacted right pubic body and inferior rami fractures. Seen by ortho   planned OR    Hospital Course:  36 y/o male w/ a PMHx of ESRD (M/W/F via LUE AVF), SOLITARIO, difficult airway, HTN, secondary hyperparathyroidism s/p parathyroidectomy (Dec 2022), obesity, stroke at age 10 w/ no residual deficits, left radial fracture s/p splint (8/14/2023), and left femoral neck fracture s/p left hemiarthroplasty (8/15/23) who presented after falling while being transferred to a chair with assistance w/ right pubic body & inferior rami fractures and a right femoral neck fracture s/p right hip hemiarthroplasty w/ a post-op course c/b hypoglycemia after being shifted for hyperkalemia, patient was given juice and ate lunch w/ improvement in glucose to the 120s without further episodes of hypoglycemia. Patient is now stable for discharge, per ortho to [facility] WBAT R[facility], maintain anterior hip precautions and Incentive spirometer encouraged.     Important Medication Changes and Reason:  None   Active or Pending Issues Requiring Follow-up:  Orthopedic follow up in 1 week   Advanced Directives:   [X] Full code  [ ] DNR  [ ] Hospice    Discharge Diagnoses:  sp Fall Right hip fx        HPI:  37yo M  PMHx ESRD on HD M/W/F via right AV fistula, left hip arthroplasty August 2023, status post subtotal parathyroidectomy December 2022, history of right leg fracture, HTN, anemia of chronic disease, p/w  R leg pain status post fall.  Patient is wheelchair-bound since left hip surgery.  While patient was being transferred out of his wheelchair he fell and landed on his right knee, no head injury, complains of right hip, femur, knee pain. Xrays consistent with fractures. 1.  Acute Garden type I minimally valgus impacted transcervical right femoral neck fracture. 2.  Subacute appearing minimally impacted right pubic body and inferior rami fractures. Seen by ortho   planned OR    Hospital Course:  Admitted with right pubic body & inferior rami fractures and a right femoral neck fracture s/p right hip hemiarthroplasty w/ a post-op hypoglycemia treated. Hyperkalemia treated.   Patient treated for COVID with remdesivir. Covid testing since is negative. Patient is stable for discharge, per ortho to [facility], maintain anterior hip precautions and Incentive spirometer encouraged. ESRD last HD 3/27    Important Medication Changes and Reason:  None   Active or Pending Issues Requiring Follow-up:  Orthopedic follow up in 1 week, PCP after rehab  Advanced Directives:   [X] Full code  [ ] DNR  [ ] Hospice    Discharge Diagnoses:  sp Fall Right hip fx s/p Hemiarthroplasty 3/7  ESRD on Dialysis  Anemia  COVID +       HPI:  39yo M  PMHx ESRD on HD M/W/F via right AV fistula, left hip arthroplasty August 2023, status post subtotal parathyroidectomy December 2022, history of right leg fracture, HTN, anemia of chronic disease, p/w  R leg pain status post fall.  Patient is wheelchair-bound since left hip surgery.  While patient was being transferred out of his wheelchair he fell and landed on his right knee, no head injury, complains of right hip, femur, knee pain. Xrays consistent with fractures. 1.  Acute Garden type I minimally valgus impacted transcervical right femoral neck fracture. 2.  Subacute appearing minimally impacted right pubic body and inferior rami fractures. Seen by ortho   planned OR    Hospital Course:  Admitted with right pubic body & inferior rami fractures and a right femoral neck fracture s/p right hip hemiarthroplasty w/ a post-op hypoglycemia treated. Hyperkalemia treated.   Patient treated for COVID with remdesivir. Covid testing since is negative. Patient is stable for discharge, per ortho to [facility], maintain anterior hip precautions and Incentive spirometer encouraged. ESRD last HD 3/27  Patient is medically clear for discharge by Dr. Williamson to acute rehab. Outpatient follow up with PCP, ortho  Med recc and clearance discussed with attending    Important Medication Changes and Reason:  None     Active or Pending Issues Requiring Follow-up:  Orthopedic follow up in 1 week, PCP after rehab    Advanced Directives:   [X] Full code  [ ] DNR  [ ] Hospice    Discharge Diagnoses:  sp Fall Right hip fx s/p Hemiarthroplasty 3/7  ESRD on Dialysis  Anemia  COVID +

## 2024-03-13 NOTE — DISCHARGE NOTE PROVIDER - NSDCCPCAREPLAN_GEN_ALL_CORE_FT
PRINCIPAL DISCHARGE DIAGNOSIS  Diagnosis: Fracture of femoral neck, right, closed  Assessment and Plan of Treatment: You had a fall and as a result fractured your right hip, you are post op hemiarthoplasty. You may bear weight to your right leg as tolerated. Continue physical therapy in rehab, continue your pain regimen and please follow up with Orthopedics in 1 week.      SECONDARY DISCHARGE DIAGNOSES  Diagnosis: ESRD on dialysis  Assessment and Plan of Treatment: You are on dialysis, your diaysis is coordinated with the rehab facility. Please follow up with Nephrology as directed.     PRINCIPAL DISCHARGE DIAGNOSIS  Diagnosis: Fracture of femoral neck, right, closed  Assessment and Plan of Treatment: You had a fall and as a result fractured your right hip, you are post op hemiarthoplasty. You may bear weight to your right leg as tolerated. Continue physical therapy in rehab, continue your pain regimen and please follow up with Orthopedics in 1 week.      SECONDARY DISCHARGE DIAGNOSES  Diagnosis: ESRD on dialysis  Assessment and Plan of Treatment: You are on dialysis, your diaysis is coordinated with the rehab facility. Please follow up with Nephrology as directed. last HD 3/27    Diagnosis: 2019 novel coronavirus disease (COVID-19)  Assessment and Plan of Treatment: You were treated with Remdesivir  Last COVID test was negative     PRINCIPAL DISCHARGE DIAGNOSIS  Diagnosis: Fracture of femoral neck, right, closed  Assessment and Plan of Treatment: You had a fall and as a result fractured your right hip, you are post op hemiarthoplasty. You may bear weight to your right leg as tolerated. Continue physical therapy in rehab, continue your pain regimen and please follow up with Orthopedics in 1 week.      SECONDARY DISCHARGE DIAGNOSES  Diagnosis: ESRD on dialysis  Assessment and Plan of Treatment: You are on dialysis, your dialysis is coordinated with the rehab facility. Please follow up with Nephrology as directed. last HD 3/27    Diagnosis: 2019 novel coronavirus disease (COVID-19)  Assessment and Plan of Treatment: You were treated with Remdesivir  Last COVID test was negative  Continue to take Eliquis 2.5 BID for one month  (STOP on April 20,2024)

## 2024-03-13 NOTE — DISCHARGE NOTE PROVIDER - NSDCFUSCHEDAPPT_GEN_ALL_CORE_FT
[facility]  SHERRI CONTRERAS  Scheduled Appointment: 03/17/2024    Jaosn León  [facility]  ORTHOSURG [address]   Scheduled Appointment: 03/18/2024    [facility]  SHERRI Amb Surg [facility]  Scheduled Appointment: 03/25/2024    [facility]  GENSURG HERNANDEZ [address]  Scheduled Appointment: 03/25/2024    [facility]  GENSURG [address]  Scheduled Appointment: 04/04/2024     [facility] Physician [address]  Scheduled Appointment: 04/04/2024

## 2024-03-13 NOTE — PROGRESS NOTE ADULT - SUBJECTIVE AND OBJECTIVE BOX
[facility] NEPHROLOGY SERVICES, [facility]  NEPHROLOGY AND HYPERTENSION  [address]  [address]  [place] [address]  [phone]    MD ADRIA GRAMAJO, MD TYREL PAYNE, MD JOSE ROGERS, MD LUISA BALDERAS MD          Patient events noted    MEDICATIONS  (STANDING):  acetaminophen     Tablet .. 975 milliGRAM(s) Oral every 8 hours  aspirin enteric coated 81 milliGRAM(s) Oral two times a day  calcium acetate 1334 milliGRAM(s) Oral three times a day with meals  epoetin carito-epbx (RETACRIT) Injectable 52079 Unit(s) IV Push <User Schedule>  heparin   Injectable 5000 Unit(s) SubCutaneous every 8 hours  metoprolol tartrate 25 milliGRAM(s) Oral two times a day  polyethylene glycol 3350 17 Gram(s) Oral daily  senna 2 Tablet(s) Oral at bedtime    MEDICATIONS  (PRN):  ondansetron Injectable 4 milliGRAM(s) IV Push every 6 hours PRN Nausea and/or Vomiting  oxyCODONE    IR 10 milliGRAM(s) Oral every 4 hours PRN Moderate Pain (4 - 6)  oxyCODONE    IR 15 milliGRAM(s) Oral every 4 hours PRN Severe Pain (7 - 10)      03-12-24 @ 07:01  -  03-13-24 @ 07:00  --------------------------------------------------------  IN: 440 mL / OUT: 0 mL / NET: 440 mL    03-13-24 @ 07:01  -  03-13-24 @ 23:11  --------------------------------------------------------  IN: 480 mL / OUT: 2000 mL / NET: -1520 mL      PHYSICAL EXAM:      T(C): 37.4 (03-13-24 @ 18:05), Max: 37.4 (03-13-24 @ 15:35)  HR: 103 (03-13-24 @ 18:05) (87 - 106)  BP: 112/65 (03-13-24 @ 18:05) (95/51 - 133/83)  RR: 18 (03-13-24 @ 18:05) (18 - 18)  SpO2: 98% (03-13-24 @ 18:05) (96% - 99%)  Wt(kg): --  Lungs clear  Heart S1S2  Abd soft NT ND  Extremities:   tr edema                                    7.3    4.40  )-----------( 264      ( 13 Mar 2024 07:05 )             23.8     03-13    141  |  97  |  68<H>  ----------------------------<  108<H>  4.7   |  27  |  8.53<H>    Ca    9.0      13 Mar 2024 07:04  Phos  5.9     03-13    TPro  6.6  /  Alb  3.3  /  TBili  0.4  /  DBili  x   /  AST  11  /  ALT  <5<L>  /  AlkPhos  668<H>  03-13      LIVER FUNCTIONS - ( 13 Mar 2024 07:04 )  Alb: 3.3 g/dL / Pro: 6.6 g/dL / ALK PHOS: 668 U/L / ALT: <5 U/L / AST: 11 U/L / GGT: x           Creatinine Trend: 8.53<--, 8.46<--, 6.36<--, 8.72<--, 10.23<--, 10.08<--        A/P:    ESRD on HD   S/p fall, R hip fx  S/p R hip HA 3/7  HD today as ordered  Epoetin w/HD 3 x week   Renal diet    Darrell Ortiz MD

## 2024-03-13 NOTE — DISCHARGE NOTE PROVIDER - NSDCMRMEDTOKEN_GEN_ALL_CORE_FT
cinacalcet 30 mg oral tablet: 1 tab(s) orally once a day  Colace 100 mg oral capsule: 2 cap(s) orally once a day (at bedtime)  hydrALAZINE 10 mg oral tablet: 1 tab(s) orally every 12 hours  metoprolol tartrate 25 mg oral tablet: 1 tab(s) orally 2 times a day  oxyCODONE 15 mg oral tablet: 1 tab(s) orally every 6 hours as needed for Severe Pain (7 - 10)  Diana-Bianca oral tablet: 1 tab(s) orally once a day as directed  senna (sennosides) 8.6 mg oral tablet: 2 tab(s) orally once a day (at bedtime)  sevelamer carbonate 800 mg oral tablet: 2 tab(s) orally 3 times a day  Tylenol 500 mg oral tablet: 2 tab(s) orally every 8 hours as needed for pain   apixaban 2.5 mg oral tablet: 1 tab(s) orally 2 times a day Stop taking on April 20, 2024  aspirin 81 mg oral delayed release tablet: 1 tab(s) orally 2 times a day  cinacalcet 30 mg oral tablet: 1 tab(s) orally once a day  Colace 100 mg oral capsule: 2 cap(s) orally once a day (at bedtime)  epoetin carito: 10,000 unit(s) intravenous 3 times a week intra-dialysis  hydrALAZINE 10 mg oral tablet: 1 tab(s) orally every 12 hours  metoprolol tartrate 25 mg oral tablet: 1 tab(s) orally 2 times a day  oxyCODONE 10 mg oral tablet: 1 tab(s) orally every 4 hours As needed Moderate Pain (4 - 6)  oxyCODONE 15 mg oral tablet: 1 tab(s) orally every 4 hours As needed Severe Pain (7 - 10)  Diana-Bianca oral tablet: 1 tab(s) orally once a day as directed  senna (sennosides) 8.6 mg oral tablet: 2 tab(s) orally once a day (at bedtime)  sevelamer carbonate 800 mg oral tablet: 1 tab(s) orally 3 times a day (with meals)  Tylenol 500 mg oral tablet: 2 tab(s) orally every 8 hours as needed for pain

## 2024-03-13 NOTE — PROGRESS NOTE ADULT - ASSESSMENT
A/P: Pt is a 37 yo male s/p hemiarthroplasty, right femoral neck fx, POD # 6    - Pain control/analgesia  - DVT ppx  - PT/OT - WBAT RLE  - Anterior precautions  - Incentive spirometer  - Recommend GI ppx: Protonix  - Follow up AM labs  - Recommend transfusion to keep hemoglobin > 8  - Notify ortho for questions  - Dispo: PT recommended [facility], pending placement    Susi Varela PA-C  Orthopedic Surgery  Team Pager #[phone]   A/P: Pt is a 37 yo male s/p hemiarthroplasty, right femoral neck fx, POD # 6    - Pain control/analgesia  - DVT ppx  - PT/OT - WBAT RLE  - Anterior hip precautions  - Incentive spirometer  - Recommend GI ppx: Protonix  - Follow up AM labs  - Recommend transfusion to keep hemoglobin > 8  - Notify ortho for questions  - Dispo: PT recommended [facility], pending placement    Susi Varela PA-C  Orthopedic Surgery  Team Pager #[phone]   A/P: Pt is a 37 yo male s/p hemiarthroplasty, right femoral neck fx, POD # 6    - Pain control/analgesia  - DVT ppx  - PT/OT - WBAT RLE  - Anterior hip precautions  - Incentive spirometer  - Recommend GI ppx: Protonix  - Follow up AM labs  - Recommend transfusion to keep hemoglobin > 8  - Notify ortho for questions  - Dispo: PT recommended [facility], pending placement    Susi Varela PA-C  Orthopedic Surgery  Team Pager #[phone]

## 2024-03-14 PROCEDURE — 93970 EXTREMITY STUDY: CPT | Mod: 26

## 2024-03-14 RX ORDER — OXYCODONE HYDROCHLORIDE 5 MG/1
10 TABLET ORAL EVERY 4 HOURS
Refills: 0 | Status: DISCONTINUED | OUTPATIENT
Start: 2024-03-14 | End: 2024-03-21

## 2024-03-14 RX ORDER — OXYCODONE HYDROCHLORIDE 5 MG/1
15 TABLET ORAL EVERY 4 HOURS
Refills: 0 | Status: DISCONTINUED | OUTPATIENT
Start: 2024-03-14 | End: 2024-03-21

## 2024-03-14 RX ADMIN — Medication 81 MILLIGRAM(S): at 17:01

## 2024-03-14 RX ADMIN — Medication 25 MILLIGRAM(S): at 17:01

## 2024-03-14 RX ADMIN — Medication 975 MILLIGRAM(S): at 07:12

## 2024-03-14 RX ADMIN — OXYCODONE HYDROCHLORIDE 15 MILLIGRAM(S): 5 TABLET ORAL at 00:20

## 2024-03-14 RX ADMIN — Medication 975 MILLIGRAM(S): at 08:12

## 2024-03-14 RX ADMIN — Medication 975 MILLIGRAM(S): at 22:42

## 2024-03-14 RX ADMIN — Medication 1334 MILLIGRAM(S): at 09:16

## 2024-03-14 RX ADMIN — HEPARIN SODIUM 5000 UNIT(S): 5000 INJECTION INTRAVENOUS; SUBCUTANEOUS at 21:42

## 2024-03-14 RX ADMIN — SENNA PLUS 2 TABLET(S): 8.6 TABLET ORAL at 21:42

## 2024-03-14 RX ADMIN — HEPARIN SODIUM 5000 UNIT(S): 5000 INJECTION INTRAVENOUS; SUBCUTANEOUS at 07:13

## 2024-03-14 RX ADMIN — OXYCODONE HYDROCHLORIDE 10 MILLIGRAM(S): 5 TABLET ORAL at 18:00

## 2024-03-14 RX ADMIN — Medication 1334 MILLIGRAM(S): at 17:01

## 2024-03-14 RX ADMIN — Medication 975 MILLIGRAM(S): at 14:22

## 2024-03-14 RX ADMIN — Medication 25 MILLIGRAM(S): at 07:13

## 2024-03-14 RX ADMIN — Medication 975 MILLIGRAM(S): at 21:42

## 2024-03-14 RX ADMIN — Medication 81 MILLIGRAM(S): at 07:12

## 2024-03-14 RX ADMIN — OXYCODONE HYDROCHLORIDE 10 MILLIGRAM(S): 5 TABLET ORAL at 17:00

## 2024-03-14 NOTE — DIETITIAN INITIAL EVALUATION ADULT - MUSCLE MASS (LOSS OF MUSCLE)
Pt received from procedure, sleepy but arousable.  Moves all extremeties,  GRanddaughter called and updated on procedure.   Clavicles.../Shoulders...

## 2024-03-14 NOTE — PROGRESS NOTE ADULT - SUBJECTIVE AND OBJECTIVE BOX
ORTHO  Patient is a 38y old  Male who presents with a chief complaint of Fall, right hip fracture  (13 Mar 2024 10:45)    Pt. resting without complaint    VS-  T(C): 37.3 (03-14-24 @ 04:00), Max: 37.4 (03-13-24 @ 15:35)  HR: 87 (03-14-24 @ 04:00) (87 - 106)  BP: 121/65 (03-14-24 @ 04:00) (95/51 - 133/83)  RR: 18 (03-14-24 @ 04:00) (18 - 18)  SpO2: 94% (03-14-24 @ 04:00) (94% - 99%)  Wt(kg): --    M.S. Alert  Extremity- Right hip Aquacel dressing- C/D/I  Neuro-              Motor- (+)Ankle- DF/PF              Sensation- grossly intact to light touch              Calves- soft, nontender- PAS                               7.3    4.40  )-----------( 264      ( 13 Mar 2024 07:05 )             23.8     03-13    141  |  97  |  68<H>  ----------------------------<  108<H>  4.7   |  27  |  8.53<H>    Ca    9.0      13 Mar 2024 07:04  Phos  5.9     03-13    TPro  6.6  /  Alb  3.3  /  TBili  0.4  /  DBili  x   /  AST  11  /  ALT  <5<L>  /  AlkPhos  668<H>  03-13

## 2024-03-14 NOTE — DIETITIAN INITIAL EVALUATION ADULT - PERTINENT MEDS FT
MEDICATIONS  (STANDING):  acetaminophen     Tablet .. 975 milliGRAM(s) Oral every 8 hours  aspirin enteric coated 81 milliGRAM(s) Oral two times a day  calcium acetate 1334 milliGRAM(s) Oral three times a day with meals  epoetin carito-epbx (RETACRIT) Injectable 89827 Unit(s) IV Push <User Schedule>  heparin   Injectable 5000 Unit(s) SubCutaneous every 8 hours  metoprolol tartrate 25 milliGRAM(s) Oral two times a day  polyethylene glycol 3350 17 Gram(s) Oral daily  senna 2 Tablet(s) Oral at bedtime    MEDICATIONS  (PRN):  ondansetron Injectable 4 milliGRAM(s) IV Push every 6 hours PRN Nausea and/or Vomiting  oxyCODONE    IR 10 milliGRAM(s) Oral every 4 hours PRN Moderate Pain (4 - 6)  oxyCODONE    IR 15 milliGRAM(s) Oral every 4 hours PRN Severe Pain (7 - 10)

## 2024-03-14 NOTE — DIETITIAN INITIAL EVALUATION ADULT - ENERGY INTAKE
Poor (<50%) In house, pt reports consuming only ~1 meal/day in setting of dislike of institutional foods. Food preferences discussed - RD to recommend liberalizing diet as stated below. No PO intake information available per flowsheets at this time.

## 2024-03-14 NOTE — DIETITIAN INITIAL EVALUATION ADULT - OTHER INFO
- UBW: ~96 kg (211 lb) per pt   - Dosing wt: 209 pounds (3/07) - ? accuracy of dosing weight, pt appears to weigh <200 pounds with visible muscle/fat loss detailed below.   - Unable to obtain bed scale weight / No new weights in house - bed scale not calibrated; OBTAIN UPDATED WEIGHT AS ABLE!  - Wt hx per [facility] HIE in pounds: 199 (1/23), 179 (11/20/23), 185 (11/08/23), 185 (10/26/23), 188 (8/25/23)  	- Unable to assess true weight changes at this time without updated weight - Pt reports probable recent weight loss.   	- Weight fluctuations possible partially in setting of fluid shifts (pt on HD, with moderate edema).   - RD to continue to monitor weight trends as able.   - Nutritionally Pertinent Meds in-house: lopressor.   - Neph:  	- ESRD on HD; last HD 3/13 with 2L fluid removal.   	- High Phos - ordered for Phoslo.  - Ortho: S/p Hemiarthroplasty of R hip 3/07.

## 2024-03-14 NOTE — DIETITIAN INITIAL EVALUATION ADULT - ORAL INTAKE PTA/DIET HISTORY
Pt reports having a good appetite and PO intake PTA; consuming meals "whenever he wants." Follows renal diet - aware of sodium, potassium, phosphorus contents. Pt confirms allergy to shellfish. Pt taking Diana-pat PTA per outpatient medications list. Denies any difficulty chewing/swallowing at this time.

## 2024-03-14 NOTE — DIETITIAN INITIAL EVALUATION ADULT - PERTINENT LABORATORY DATA
03-13    141  |  97  |  68<H>  ----------------------------<  108<H>  4.7   |  27  |  8.53<H>    Ca    9.0      13 Mar 2024 07:04  Phos  5.9     03-13    TPro  6.6  /  Alb  3.3  /  TBili  0.4  /  DBili  x   /  AST  11  /  ALT  <5<L>  /  AlkPhos  668<H>  03-13  A1C with Estimated Average Glucose Result: 4.8 % (08-15-23 @ 07:24)  A1C with Estimated Average Glucose Result: 4.8 % (07-21-23 @ 10:27)

## 2024-03-14 NOTE — PROGRESS NOTE ADULT - REASON FOR ADMISSION
R Femoral neck fx
R Femoral Neck Fx
Right hemiarthroplasty of FN fx
Right hip fracture/ Hemiarthroplasty

## 2024-03-14 NOTE — DIETITIAN INITIAL EVALUATION ADULT - PERSON TAUGHT/METHOD
Provided education on increased demand for kcal and protein intake to help prevent muscle/weight loss on HD. Encouraged adequate consumption of meals/supplements to optimize protein-energy intake. Encouraged small/frequent meals, nutrient dense snacks, prioritizing protein foods at meal time. Pt made aware RD remains available PRN./verbal instruction/patient instructed

## 2024-03-14 NOTE — PROGRESS NOTE ADULT - SUBJECTIVE AND OBJECTIVE BOX
Date of service: 03-14-24 @ 14:24      Patient is a 38y old  Male who presents with a chief complaint of Fracture of unspecified part of neck of right femur, initial encounter for closed fracture     (14 Mar 2024 10:59)                                                               INTERVAL HPI/OVERNIGHT EVENTS:    REVIEW OF SYSTEMS:     CONSTITUTIONAL: No weakness, fevers or chills  RESPIRATORY: No cough, wheezing,  No shortness of breath  CARDIOVASCULAR: No chest pain or palpitations  GASTROINTESTINAL: No abdominal pain  . No nausea, vomiting, or hematemesis; No diarrhea or constipation. No melena or hematochezia.  GENITOURINARY: No dysuria, frequency or hematuria  NEUROLOGICAL: No numbness or weakness                                                                                                                                                                                                                                                                               Medications:  MEDICATIONS  (STANDING):  acetaminophen     Tablet .. 975 milliGRAM(s) Oral every 8 hours  aspirin enteric coated 81 milliGRAM(s) Oral two times a day  calcium acetate 1334 milliGRAM(s) Oral three times a day with meals  epoetin carito-epbx (RETACRIT) Injectable 61879 Unit(s) IV Push <User Schedule>  heparin   Injectable 5000 Unit(s) SubCutaneous every 8 hours  metoprolol tartrate 25 milliGRAM(s) Oral two times a day  polyethylene glycol 3350 17 Gram(s) Oral daily  senna 2 Tablet(s) Oral at bedtime    MEDICATIONS  (PRN):  ondansetron Injectable 4 milliGRAM(s) IV Push every 6 hours PRN Nausea and/or Vomiting  oxyCODONE    IR 10 milliGRAM(s) Oral every 4 hours PRN Moderate Pain (4 - 6)  oxyCODONE    IR 15 milliGRAM(s) Oral every 4 hours PRN Severe Pain (7 - 10)       Allergies    No Known Drug Allergies  shellfish (Hives)    Intolerances      Vital Signs Last 24 Hrs  T(C): 37.3 (14 Mar 2024 13:47), Max: 37.4 (13 Mar 2024 15:35)  T(F): 99.2 (14 Mar 2024 13:47), Max: 99.3 (13 Mar 2024 15:35)  HR: 74 (14 Mar 2024 13:47) (73 - 106)  BP: 120/79 (14 Mar 2024 13:47) (95/51 - 121/65)  BP(mean): --  RR: 18 (14 Mar 2024 13:47) (18 - 18)  SpO2: 99% (14 Mar 2024 13:47) (94% - 100%)    Parameters below as of 14 Mar 2024 13:47  Patient On (Oxygen Delivery Method): room air      CAPILLARY BLOOD GLUCOSE          03-13 @ 07:01  -  03-14 @ 07:00  --------------------------------------------------------  IN: 600 mL / OUT: 2000 mL / NET: -1400 mL    03-14 @ 07:01  - 03-14 @ 14:24  --------------------------------------------------------  IN: 440 mL / OUT: 0 mL / NET: 440 mL      Physical Exam:      General:  NAD   HEENT:  Nonicteric, PERRLA  CV:  RRR, S1S2   Lungs:  CTA B/L, no wheezes, rales, rhonchi  Abdomen:  Soft, non-tender, no distended, positive BS  Extremities: no edema   Neuro:  AAOx3, non-focal, grossly intact                                                                                                                                                                                                                                                                                                LABS:                               7.3    4.40  )-----------( 264      ( 13 Mar 2024 07:05 )             23.8                      03-13    141  |  97  |  68<H>  ----------------------------<  108<H>  4.7   |  27  |  8.53<H>    Ca    9.0      13 Mar 2024 07:04  Phos  5.9     03-13    TPro  6.6  /  Alb  3.3  /  TBili  0.4  /  DBili  x   /  AST  11  /  ALT  <5<L>  /  AlkPhos  668<H>  03-13                       RADIOLOGY & ADDITIONAL TESTS         I personally reviewed: [  ]EKG   [  ]CXR    [  ] CT      A/P:         Discussed with :     Jen consultants' Notes   Time spent :

## 2024-03-14 NOTE — DIETITIAN INITIAL EVALUATION ADULT - REASON INDICATOR FOR ASSESSMENT
Pt seen for Length of stay.   Source: Pt, previous RD notes, Electronic Medical Record.  Chart reviewed, events noted.

## 2024-03-14 NOTE — DIETITIAN NUTRITION RISK NOTIFICATION - TREATMENT: THE FOLLOWING DIET HAS BEEN RECOMMENDED
Diet, Regular:   DASH/TLC {Sodium & Cholesterol Restricted} (DASH)  For patients receiving Renal Replacement - No Protein Restr, No Conc K, No Conc Phos, Low Sodium (RENAL) (03-07-24 @ 13:09) [Active]

## 2024-03-14 NOTE — DIETITIAN INITIAL EVALUATION ADULT - NSFNSGIIOFT_GEN_A_CORE
Pt denies any current N/V/D/C; ordered for zofran. Per chart, last BM 3/12. Bowel regimen: miralax, senna.

## 2024-03-14 NOTE — PROGRESS NOTE ADULT - ASSESSMENT
Impression: Stable       Plan:   Continue present treatment                 Out of bed, ambulate, weight bearing as tolerated                 Physical therapy follow up                 Continue to monitor    Roger Bai PA-C  Orthopaedic Surgery  Team pager [phone]  [phone]

## 2024-03-14 NOTE — DIETITIAN INITIAL EVALUATION ADULT - ADD RECOMMEND
1) Recommend d/c DASH diet restriction; Continue Renal diet as tolerated. Defer texture/consistency to SLP/team.   2) Recommend Nephro-pat daily pending no medical contraindications.  	- Recommend providing LPS 2x/day (per pt request) to optimize protein intake.   3) Continue to monitor PO intake, weight, labs, skin, GI status, and diet.  4) RD remains available for diet education PRN.  5) Malnutrition sticker placed in chart.

## 2024-03-14 NOTE — PROGRESS NOTE ADULT - ASSESSMENT
38 Y M H/O ESRD , HTN  admitted with R hip pain /R knee pain s/p fall as he was being transferred to chair with help   no head trauma, + R. knee pain + R. thigh pain s/p fall.   1.  Acute Garden type I minimally valgus impacted transcervical right   femoral neck fracture.  2.  Subacute appearing impacted right pubic body and inferior   rami fractures.    seen by ortho     Femoral neck fx :   no hx of cad  NL EF in nov /2023   no evidence /hx of arrhythmia   s/p OR   pan meds    fever : low grade however pt is on tyelnol   will check Cultures: negative likely sec to  atelecatsis   check CXR : no pna   no obvious source    VA duplex r/o DVT if recurrent .. especially now with ? sob: attempting to expedite           ESRD: HD    Dr. Blanco     acute on chronic anemia : transfused  monitor and transfuse prn      hyperkalemia: improved       Hyperkalemia : improved   monitor     HTN : monitor     d/w pt at length     planned dc : ?home since he has no accepting facility   d/w pt and acp at length

## 2024-03-14 NOTE — DIETITIAN INITIAL EVALUATION ADULT - OTHER CALCULATIONS
Fluid needs deferred to team.  Estimated needs using IBW with consideration for Malnutrition factor, ESRD on HD.

## 2024-03-15 LAB
ANION GAP SERPL CALC-SCNC: 16 MMOL/L — SIGNIFICANT CHANGE UP (ref 5–17)
BASOPHILS # BLD AUTO: 0.02 K/UL — SIGNIFICANT CHANGE UP (ref 0–0.2)
BASOPHILS NFR BLD AUTO: 0.5 % — SIGNIFICANT CHANGE UP (ref 0–2)
BUN SERPL-MCNC: 74 MG/DL — HIGH (ref 7–23)
CALCIUM SERPL-MCNC: 9.1 MG/DL — SIGNIFICANT CHANGE UP (ref 8.4–10.5)
CHLORIDE SERPL-SCNC: 98 MMOL/L — SIGNIFICANT CHANGE UP (ref 96–108)
CO2 SERPL-SCNC: 26 MMOL/L — SIGNIFICANT CHANGE UP (ref 22–31)
CREAT SERPL-MCNC: 8.72 MG/DL — HIGH (ref 0.5–1.3)
EGFR: 7 ML/MIN/1.73M2 — LOW
EOSINOPHIL # BLD AUTO: 0.15 K/UL — SIGNIFICANT CHANGE UP (ref 0–0.5)
EOSINOPHIL NFR BLD AUTO: 3.8 % — SIGNIFICANT CHANGE UP (ref 0–6)
GLUCOSE SERPL-MCNC: 95 MG/DL — SIGNIFICANT CHANGE UP (ref 70–99)
HCT VFR BLD CALC: 22.9 % — LOW (ref 39–50)
HGB BLD-MCNC: 7.1 G/DL — LOW (ref 13–17)
IMM GRANULOCYTES NFR BLD AUTO: 0.3 % — SIGNIFICANT CHANGE UP (ref 0–0.9)
LYMPHOCYTES # BLD AUTO: 1.02 K/UL — SIGNIFICANT CHANGE UP (ref 1–3.3)
LYMPHOCYTES # BLD AUTO: 25.8 % — SIGNIFICANT CHANGE UP (ref 13–44)
MCHC RBC-ENTMCNC: 30.9 PG — SIGNIFICANT CHANGE UP (ref 27–34)
MCHC RBC-ENTMCNC: 31 GM/DL — LOW (ref 32–36)
MCV RBC AUTO: 99.6 FL — SIGNIFICANT CHANGE UP (ref 80–100)
MONOCYTES # BLD AUTO: 0.39 K/UL — SIGNIFICANT CHANGE UP (ref 0–0.9)
MONOCYTES NFR BLD AUTO: 9.8 % — SIGNIFICANT CHANGE UP (ref 2–14)
NEUTROPHILS # BLD AUTO: 2.37 K/UL — SIGNIFICANT CHANGE UP (ref 1.8–7.4)
NEUTROPHILS NFR BLD AUTO: 59.8 % — SIGNIFICANT CHANGE UP (ref 43–77)
NRBC # BLD: 0 /100 WBCS — SIGNIFICANT CHANGE UP (ref 0–0)
PHOSPHATE SERPL-MCNC: 5.9 MG/DL — HIGH (ref 2.5–4.5)
PLATELET # BLD AUTO: 263 K/UL — SIGNIFICANT CHANGE UP (ref 150–400)
POTASSIUM SERPL-MCNC: 4.7 MMOL/L — SIGNIFICANT CHANGE UP (ref 3.5–5.3)
POTASSIUM SERPL-SCNC: 4.7 MMOL/L — SIGNIFICANT CHANGE UP (ref 3.5–5.3)
RBC # BLD: 2.3 M/UL — LOW (ref 4.2–5.8)
RBC # FLD: 15.1 % — HIGH (ref 10.3–14.5)
SODIUM SERPL-SCNC: 140 MMOL/L — SIGNIFICANT CHANGE UP (ref 135–145)
WBC # BLD: 3.96 K/UL — SIGNIFICANT CHANGE UP (ref 3.8–10.5)
WBC # FLD AUTO: 3.96 K/UL — SIGNIFICANT CHANGE UP (ref 3.8–10.5)

## 2024-03-15 RX ADMIN — Medication 1334 MILLIGRAM(S): at 14:40

## 2024-03-15 RX ADMIN — Medication 1334 MILLIGRAM(S): at 18:10

## 2024-03-15 RX ADMIN — Medication 1334 MILLIGRAM(S): at 09:29

## 2024-03-15 RX ADMIN — Medication 975 MILLIGRAM(S): at 14:40

## 2024-03-15 RX ADMIN — Medication 975 MILLIGRAM(S): at 22:06

## 2024-03-15 RX ADMIN — OXYCODONE HYDROCHLORIDE 10 MILLIGRAM(S): 5 TABLET ORAL at 19:03

## 2024-03-15 RX ADMIN — HEPARIN SODIUM 5000 UNIT(S): 5000 INJECTION INTRAVENOUS; SUBCUTANEOUS at 14:40

## 2024-03-15 RX ADMIN — Medication 975 MILLIGRAM(S): at 21:06

## 2024-03-15 RX ADMIN — Medication 975 MILLIGRAM(S): at 05:37

## 2024-03-15 RX ADMIN — OXYCODONE HYDROCHLORIDE 10 MILLIGRAM(S): 5 TABLET ORAL at 18:10

## 2024-03-15 RX ADMIN — Medication 975 MILLIGRAM(S): at 06:27

## 2024-03-15 RX ADMIN — POLYETHYLENE GLYCOL 3350 17 GRAM(S): 17 POWDER, FOR SOLUTION ORAL at 14:40

## 2024-03-15 RX ADMIN — Medication 81 MILLIGRAM(S): at 18:10

## 2024-03-15 RX ADMIN — Medication 975 MILLIGRAM(S): at 15:40

## 2024-03-15 RX ADMIN — Medication 81 MILLIGRAM(S): at 05:37

## 2024-03-15 RX ADMIN — ERYTHROPOIETIN 10000 UNIT(S): 10000 INJECTION, SOLUTION INTRAVENOUS; SUBCUTANEOUS at 11:18

## 2024-03-15 RX ADMIN — HEPARIN SODIUM 5000 UNIT(S): 5000 INJECTION INTRAVENOUS; SUBCUTANEOUS at 05:36

## 2024-03-15 RX ADMIN — Medication 25 MILLIGRAM(S): at 18:10

## 2024-03-15 RX ADMIN — HEPARIN SODIUM 5000 UNIT(S): 5000 INJECTION INTRAVENOUS; SUBCUTANEOUS at 21:07

## 2024-03-15 RX ADMIN — SENNA PLUS 2 TABLET(S): 8.6 TABLET ORAL at 21:06

## 2024-03-15 NOTE — PROGRESS NOTE ADULT - SUBJECTIVE AND OBJECTIVE BOX
ORTHO PROGRESS NOTE    Patient is status post hemiarthroplasty, right femoral neck fx, POD # 8  Patient seen and examined at bedside, denies SOB, CP, dizziness, N/V/D/HA.  Patient reports pain well controlled.    Vital Signs Last 24 Hrs  T(C): 37.3 (15 Mar 2024 04:38), Max: 37.3 (14 Mar 2024 13:47)  T(F): 99.1 (15 Mar 2024 04:38), Max: 99.2 (14 Mar 2024 13:47)  HR: 87 (15 Mar 2024 04:38) (73 - 88)  BP: 130/71 (15 Mar 2024 04:38) (114/60 - 130/71)  BP(mean): --  RR: 18 (15 Mar 2024 04:38) (18 - 18)  SpO2: 95% (15 Mar 2024 04:38) (95% - 100%)    Parameters below as of 15 Mar 2024 04:38  Patient On (Oxygen Delivery Method): room air    Exam:  Gen: No apparent distress.  RLE: Dressing (aquacel) with small drainage centrally to bandage, otherwise intact to right hip.  BLE: Motor intact EHL/FHL/TA/GS. Sensation grossly intact to light touch in the distal extremities.  Compartments soft and nontender. Extremities warm. DP +.

## 2024-03-15 NOTE — PROGRESS NOTE ADULT - ASSESSMENT
38 Y M H/O ESRD , HTN  admitted with R hip pain /R knee pain s/p fall as he was being transferred to chair with help   no head trauma, + R. knee pain + R. thigh pain s/p fall.   1.  Acute Garden type I minimally valgus impacted transcervical right   femoral neck fracture.  2.  Subacute appearing impacted right pubic body and inferior   rami fractures.    seen by ortho     Femoral neck fx :   no hx of cad  NL EF in nov /2023   no evidence /hx of arrhythmia   s/p OR   pan meds    fever : low grade however pt is on tyelnol   will check Cultures: negative likely sec to  atelecatsis   check CXR : no pna   no obvious source    VA duplex r/o DVT : negative           ESRD: HD    Dr. Blanco     acute on chronic anemia : transfused  monitor and transfuse prn      hyperkalemia: improved       Hyperkalemia : improved   monitor     HTN : monitor     d/w pt at length     planned dc : ?home since he has no accepting facility   d/w pt and acp at length   d/w CM: no acccepting facilities at this time .. will fu

## 2024-03-15 NOTE — PROGRESS NOTE ADULT - SUBJECTIVE AND OBJECTIVE BOX
Date of service: 03-15-24 @ 14:50      Patient is a 38y old  Male who presents with a chief complaint of Fracture of unspecified part of neck of right femur, initial encounter for closed fracture     (14 Mar 2024 10:59)                                                               INTERVAL HPI/OVERNIGHT EVENTS:    REVIEW OF SYSTEMS:     CONSTITUTIONAL: No weakness, fevers or chills  EYES/ENT: No visual changes , no ear ache   NECK: No pain or stiffness  RESPIRATORY: No cough, wheezing,  No shortness of breath  CARDIOVASCULAR: No chest pain or palpitations  GASTROINTESTINAL: No abdominal pain  . No nausea, vomiting, or hematemesis; No diarrhea or constipation. No melena or hematochezia.  GENITOURINARY: No dysuria, frequency or hematuria  NEUROLOGICAL: No numbness or weakness  SKIN: No itching, burning, rashes, or lesions                                                                                                                                                                                                                                                                                 Medications:  MEDICATIONS  (STANDING):  acetaminophen     Tablet .. 975 milliGRAM(s) Oral every 8 hours  aspirin enteric coated 81 milliGRAM(s) Oral two times a day  calcium acetate 1334 milliGRAM(s) Oral three times a day with meals  epoetin carito-epbx (RETACRIT) Injectable 44275 Unit(s) IV Push <User Schedule>  heparin   Injectable 5000 Unit(s) SubCutaneous every 8 hours  metoprolol tartrate 25 milliGRAM(s) Oral two times a day  polyethylene glycol 3350 17 Gram(s) Oral daily  senna 2 Tablet(s) Oral at bedtime    MEDICATIONS  (PRN):  ondansetron Injectable 4 milliGRAM(s) IV Push every 6 hours PRN Nausea and/or Vomiting  oxyCODONE    IR 15 milliGRAM(s) Oral every 4 hours PRN Severe Pain (7 - 10)  oxyCODONE    IR 10 milliGRAM(s) Oral every 4 hours PRN Moderate Pain (4 - 6)       Allergies    No Known Drug Allergies  shellfish (Hives)    Intolerances      Vital Signs Last 24 Hrs  T(C): 37.1 (15 Mar 2024 13:27), Max: 37.3 (15 Mar 2024 04:38)  T(F): 98.8 (15 Mar 2024 13:27), Max: 99.1 (15 Mar 2024 04:38)  HR: 98 (15 Mar 2024 13:27) (78 - 98)  BP: 108/70 (15 Mar 2024 13:27) (108/70 - 130/71)  BP(mean): --  RR: 18 (15 Mar 2024 13:27) (17 - 20)  SpO2: 98% (15 Mar 2024 13:27) (95% - 100%)    Parameters below as of 15 Mar 2024 13:27  Patient On (Oxygen Delivery Method): room air      CAPILLARY BLOOD GLUCOSE          03-14 @ 07:01  -  03-15 @ 07:00  --------------------------------------------------------  IN: 1340 mL / OUT: 0 mL / NET: 1340 mL    03-15 @ 07:01  -  03-15 @ 14:50  --------------------------------------------------------  IN: 0 mL / OUT: 2000 mL / NET: -2000 mL      Physical Exam:    Daily     Daily   General: NAD   HEENT:  Nonicteric, PERRLA  CV:  RRR, S1S2   Lungs:  CTA B/L, no wheezes, rales, rhonchi  Abdomen:  Soft, non-tender, no distended, positive BS  Extremities:  edmea RLE   Neuro:  AAOx3, non-focal, grossly intact                                                                                                                                                                                                                                                                                                LABS:                               7.1    3.96  )-----------( 263      ( 15 Mar 2024 10:35 )             22.9                      03-15    140  |  98  |  74<H>  ----------------------------<  95  4.7   |  26  |  8.72<H>    Ca    9.1      15 Mar 2024 10:35  Phos  5.9     03-15                         RADIOLOGY & ADDITIONAL TESTS         I personally reviewed: [  ]EKG   [  ]CXR    [  ] CT      A/P:         Discussed with :     Jen consultants' Notes   Time spent :

## 2024-03-15 NOTE — PROGRESS NOTE ADULT - ASSESSMENT
A/P: Pt is a 37 yo male s/p hemiarthroplasty, right femoral neck fx, POD # 8    - Pain control/analgesia  - DVT ppx  - PT/OT - WBAT RLE  - Anterior hip precautions  - Incentive spirometer  - Recommend GI ppx: Protonix  - Dispo: PT recommended [facility], no [facility] acceptions, preparing for home  - Orthopedically cleared for discharge, signing off, notify ortho for questions  - Keep aquacel intact to right hip, can get wet  - Dressing/staple removal POD # 14 and apply steri strips  - Follow up with Dr. León after discharge (2 weeks)    Susi Varela PA-C  Orthopedic Surgery  Team Pager #[phone] A/P: Pt is a 39 yo male s/p hemiarthroplasty, right femoral neck fx, POD # 8    - Pain control/analgesia  - DVT ppx: ASA, Heparin  - PT/OT - WBAT RLE  - Anterior hip precautions  - Incentive spirometer  - Recommend GI ppx: Protonix  - Dispo: PT recommended [facility] (as per CM: no [facility] acceptions, preparing for home discharge)  - Orthopedically cleared for discharge, signing off, notify ortho for questions  - Keep aquacel intact to right hip, can get wet  - Dressing/staple removal POD # 14 and apply steri strips  - Follow up with Dr. León after discharge (2 weeks)    Susi Varela PA-C  Orthopedic Surgery  Team Pager #[phone] A/P: Pt is a 39 yo male s/p hemiarthroplasty, right femoral neck fx, POD # 8    - Pain control/analgesia  - DVT ppx: ASA, Heparin  - PT/OT - WBAT RLE  - Anterior hip precautions  - Incentive spirometer  - Recommend GI ppx: Protonix  - Dispo: PT recommended [facility], pending placement  - Orthopedically stable for discharge, signing off, notify for further questions  - Keep dressing intact to right hip, can get wet  - Dressing/staple removal POD # 14 and apply steri strips  - Follow up with Dr. León upon discharge (2 weeks)    Susi Varela PA-C  Orthopedic Surgery  Team Pager #[phone]

## 2024-03-15 NOTE — PROGRESS NOTE ADULT - ATTENDING COMMENTS
PT. DVT ppx. f/u labs. f/u medicine. DC planning
For R hip Danis. R/B/A discussed
PT. DVT ppx. f/u labs. f/u medicine. DC planning
PT. DVT ppx. f/u labs. f/u medicine. f/u renal. DC planning
PT. DVT ppx. f/u labs. f/u medicine. DC planning

## 2024-03-16 LAB
CULTURE RESULTS: SIGNIFICANT CHANGE UP
SPECIMEN SOURCE: SIGNIFICANT CHANGE UP

## 2024-03-16 RX ADMIN — Medication 81 MILLIGRAM(S): at 05:33

## 2024-03-16 RX ADMIN — Medication 975 MILLIGRAM(S): at 23:18

## 2024-03-16 RX ADMIN — Medication 30 MILLILITER(S): at 03:36

## 2024-03-16 RX ADMIN — Medication 975 MILLIGRAM(S): at 14:12

## 2024-03-16 RX ADMIN — POLYETHYLENE GLYCOL 3350 17 GRAM(S): 17 POWDER, FOR SOLUTION ORAL at 11:33

## 2024-03-16 RX ADMIN — OXYCODONE HYDROCHLORIDE 10 MILLIGRAM(S): 5 TABLET ORAL at 15:49

## 2024-03-16 RX ADMIN — Medication 1334 MILLIGRAM(S): at 07:46

## 2024-03-16 RX ADMIN — HEPARIN SODIUM 5000 UNIT(S): 5000 INJECTION INTRAVENOUS; SUBCUTANEOUS at 22:18

## 2024-03-16 RX ADMIN — Medication 975 MILLIGRAM(S): at 22:18

## 2024-03-16 RX ADMIN — Medication 25 MILLIGRAM(S): at 17:01

## 2024-03-16 RX ADMIN — Medication 975 MILLIGRAM(S): at 06:28

## 2024-03-16 RX ADMIN — OXYCODONE HYDROCHLORIDE 10 MILLIGRAM(S): 5 TABLET ORAL at 16:49

## 2024-03-16 RX ADMIN — Medication 25 MILLIGRAM(S): at 05:33

## 2024-03-16 RX ADMIN — Medication 975 MILLIGRAM(S): at 05:33

## 2024-03-16 RX ADMIN — HEPARIN SODIUM 5000 UNIT(S): 5000 INJECTION INTRAVENOUS; SUBCUTANEOUS at 05:34

## 2024-03-16 RX ADMIN — Medication 81 MILLIGRAM(S): at 17:01

## 2024-03-16 RX ADMIN — Medication 975 MILLIGRAM(S): at 13:12

## 2024-03-16 RX ADMIN — Medication 1334 MILLIGRAM(S): at 17:01

## 2024-03-16 RX ADMIN — SENNA PLUS 2 TABLET(S): 8.6 TABLET ORAL at 22:18

## 2024-03-16 RX ADMIN — HEPARIN SODIUM 5000 UNIT(S): 5000 INJECTION INTRAVENOUS; SUBCUTANEOUS at 13:12

## 2024-03-16 RX ADMIN — Medication 1334 MILLIGRAM(S): at 11:34

## 2024-03-16 NOTE — PROGRESS NOTE ADULT - ASSESSMENT
38 Y M H/O ESRD , HTN  admitted with R hip pain /R knee pain s/p fall as he was being transferred to chair with help   no head trauma, + R. knee pain + R. thigh pain s/p fall.       #Femoral neck fx : s/p ORIF   ortho following   stable for d/c     #ESRD: HD   c/w HD TIW  renal following     # acute on chronic anemia : transfused  monitor and transfuse prn   today Hb 7.1   may need another unit of transfusion tomorrow      HTN   c/w toprol   controlled     dispo: pt. is cleared from ortho for d/c to [facility], however no accepting facility yet.   please T&C in am and transfuse 1 unit PRBC if Hb 7 or less

## 2024-03-16 NOTE — PROGRESS NOTE ADULT - SUBJECTIVE AND OBJECTIVE BOX
Patient is a 38y old  Male who presents with a chief complaint of Fracture of unspecified part of neck of right femur, initial encounter for closed fracture     (14 Mar 2024 10:59)      INTERVAL HPI/OVERNIGHT EVENTS: seen and examined, pain controlled , denies any c/o   T(C): 37.8 (03-16-24 @ 21:13), Max: 37.8 (03-16-24 @ 21:13)  HR: 89 (03-16-24 @ 21:13) (70 - 96)  BP: 144/82 (03-16-24 @ 21:13) (115/69 - 144/82)  RR: 18 (03-16-24 @ 21:13) (18 - 18)  SpO2: 100% (03-16-24 @ 21:13) (98% - 100%)  Wt(kg): --  I&O's Summary    15 Mar 2024 07:01  -  16 Mar 2024 07:00  --------------------------------------------------------  IN: 960 mL / OUT: 2000 mL / NET: -1040 mL    16 Mar 2024 07:01  -  16 Mar 2024 23:16  --------------------------------------------------------  IN: 540 mL / OUT: 0 mL / NET: 540 mL        PAST MEDICAL & SURGICAL HISTORY:  HTN (hypertension)      Stroke  age 10      Sleep apnea      Leg fracture, right      Chronic renal failure      Hemodialysis access, AV graft      ESRD (end stage renal disease) on dialysis  since 2013, M-W-F      Anemia, unspecified type      Other hyperparathyroidism      AV fistula  R arm; L arm clotted      History of left hip hemiarthroplasty      S/P parathyroidectomy          SOCIAL HISTORY  Alcohol:  Tobacco:  Illicit substance use:    FAMILY HISTORY:    REVIEW OF SYSTEMS:  CONSTITUTIONAL: No fever, weight loss, or fatigue  EYES: No eye pain, visual disturbances, or discharge  ENMT:  No difficulty hearing, tinnitus, vertigo; No sinus or throat pain  NECK: No pain or stiffness  RESPIRATORY: No cough, wheezing, chills or hemoptysis; No shortness of breath  CARDIOVASCULAR: No chest pain, palpitations, dizziness, or leg swelling  GASTROINTESTINAL: No abdominal or epigastric pain. No nausea, vomiting, or hematemesis; No diarrhea or constipation. No melena or hematochezia.  GENITOURINARY: No dysuria, frequency, hematuria, or incontinence  NEUROLOGICAL: No headaches, memory loss, loss of strength, numbness, or tremors  SKIN: No itching, burning, rashes, or lesions   LYMPH NODES: No enlarged glands  ENDOCRINE: No heat or cold intolerance; No hair loss  MUSCULOSKELETAL: No joint pain or swelling; No muscle, back, or extremity pain  PSYCHIATRIC: No depression, anxiety, mood swings, or difficulty sleeping  HEME/LYMPH: No easy bruising, or bleeding gums  ALLERY AND IMMUNOLOGIC: No hives or eczema    RADIOLOGY & ADDITIONAL TESTS:    Imaging Personally Reviewed:  [ ] YES  [ ] NO    Consultant(s) Notes Reviewed:  [ ] YES  [ ] NO    PHYSICAL EXAM:  GENERAL: NAD, well-groomed, well-developed  HEAD:  Atraumatic, Normocephalic  EYES: EOMI, PERRLA, conjunctiva and sclera clear  ENMT: No tonsillar erythema, exudates, or enlargement; Moist mucous membranes, Good dentition, No lesions  NECK: Supple, No JVD, Normal thyroid  NERVOUS SYSTEM:  Alert & Oriented X3, Good concentration; Motor Strength 5/5 B/L upper and lower extremities; DTRs 2+ intact and symmetric  CHEST/LUNG: Clear to percussion bilaterally; No rales, rhonchi, wheezing, or rubs  HEART: Regular rate and rhythm; No murmurs, rubs, or gallops  ABDOMEN: Soft, Nontender, Nondistended; Bowel sounds present  EXTREMITIES:  2+ Peripheral Pulses, No clubbing, cyanosis, or edema  LYMPH: No lymphadenopathy noted  SKIN: No rashes or lesions    LABS:                        7.1    3.96  )-----------( 263      ( 15 Mar 2024 10:35 )             22.9     03-15    140  |  98  |  74<H>  ----------------------------<  95  4.7   |  26  |  8.72<H>    Ca    9.1      15 Mar 2024 10:35  Phos  5.9     03-15        Urinalysis Basic - ( 15 Mar 2024 10:35 )    Color: x / Appearance: x / SG: x / pH: x  Gluc: 95 mg/dL / Ketone: x  / Bili: x / Urobili: x   Blood: x / Protein: x / Nitrite: x   Leuk Esterase: x / RBC: x / WBC x   Sq Epi: x / Non Sq Epi: x / Bacteria: x      CAPILLARY BLOOD GLUCOSE            Urinalysis Basic - ( 15 Mar 2024 10:35 )    Color: x / Appearance: x / SG: x / pH: x  Gluc: 95 mg/dL / Ketone: x  / Bili: x / Urobili: x   Blood: x / Protein: x / Nitrite: x   Leuk Esterase: x / RBC: x / WBC x   Sq Epi: x / Non Sq Epi: x / Bacteria: x        MEDICATIONS  (STANDING):  acetaminophen     Tablet .. 975 milliGRAM(s) Oral every 8 hours  aspirin enteric coated 81 milliGRAM(s) Oral two times a day  calcium acetate 1334 milliGRAM(s) Oral three times a day with meals  epoetin carito-epbx (RETACRIT) Injectable 05642 Unit(s) IV Push <User Schedule>  heparin   Injectable 5000 Unit(s) SubCutaneous every 8 hours  metoprolol tartrate 25 milliGRAM(s) Oral two times a day  polyethylene glycol 3350 17 Gram(s) Oral daily  senna 2 Tablet(s) Oral at bedtime    MEDICATIONS  (PRN):  aluminum hydroxide/magnesium hydroxide/simethicone Suspension 30 milliLiter(s) Oral every 4 hours PRN Dyspepsia  ondansetron Injectable 4 milliGRAM(s) IV Push every 6 hours PRN Nausea and/or Vomiting  oxyCODONE    IR 15 milliGRAM(s) Oral every 4 hours PRN Severe Pain (7 - 10)  oxyCODONE    IR 10 milliGRAM(s) Oral every 4 hours PRN Moderate Pain (4 - 6)      Care Discussed with Consultants/Other Providers [ ] YES  [ ] NO

## 2024-03-17 RX ORDER — ACETAMINOPHEN 500 MG
1000 TABLET ORAL ONCE
Refills: 0 | Status: DISCONTINUED | OUTPATIENT
Start: 2024-03-17 | End: 2024-03-17

## 2024-03-17 RX ORDER — PIPERACILLIN AND TAZOBACTAM 4; .5 G/20ML; G/20ML
3.38 INJECTION, POWDER, LYOPHILIZED, FOR SOLUTION INTRAVENOUS ONCE
Refills: 0 | Status: COMPLETED | OUTPATIENT
Start: 2024-03-17 | End: 2024-03-17

## 2024-03-17 RX ORDER — VANCOMYCIN HCL 1 G
500 VIAL (EA) INTRAVENOUS ONCE
Refills: 0 | Status: COMPLETED | OUTPATIENT
Start: 2024-03-17 | End: 2024-03-17

## 2024-03-17 RX ORDER — PIPERACILLIN AND TAZOBACTAM 4; .5 G/20ML; G/20ML
3.38 INJECTION, POWDER, LYOPHILIZED, FOR SOLUTION INTRAVENOUS ONCE
Refills: 0 | Status: COMPLETED | OUTPATIENT
Start: 2024-03-18 | End: 2024-03-18

## 2024-03-17 RX ORDER — PIPERACILLIN AND TAZOBACTAM 4; .5 G/20ML; G/20ML
3.38 INJECTION, POWDER, LYOPHILIZED, FOR SOLUTION INTRAVENOUS EVERY 12 HOURS
Refills: 0 | Status: DISCONTINUED | OUTPATIENT
Start: 2024-03-18 | End: 2024-03-19

## 2024-03-17 RX ADMIN — Medication 25 MILLIGRAM(S): at 05:33

## 2024-03-17 RX ADMIN — Medication 1334 MILLIGRAM(S): at 09:25

## 2024-03-17 RX ADMIN — HEPARIN SODIUM 5000 UNIT(S): 5000 INJECTION INTRAVENOUS; SUBCUTANEOUS at 05:33

## 2024-03-17 RX ADMIN — Medication 25 MILLIGRAM(S): at 17:50

## 2024-03-17 RX ADMIN — HEPARIN SODIUM 5000 UNIT(S): 5000 INJECTION INTRAVENOUS; SUBCUTANEOUS at 15:10

## 2024-03-17 RX ADMIN — Medication 975 MILLIGRAM(S): at 22:20

## 2024-03-17 RX ADMIN — HEPARIN SODIUM 5000 UNIT(S): 5000 INJECTION INTRAVENOUS; SUBCUTANEOUS at 22:20

## 2024-03-17 RX ADMIN — SENNA PLUS 2 TABLET(S): 8.6 TABLET ORAL at 22:20

## 2024-03-17 RX ADMIN — OXYCODONE HYDROCHLORIDE 15 MILLIGRAM(S): 5 TABLET ORAL at 23:27

## 2024-03-17 RX ADMIN — PIPERACILLIN AND TAZOBACTAM 200 GRAM(S): 4; .5 INJECTION, POWDER, LYOPHILIZED, FOR SOLUTION INTRAVENOUS at 18:51

## 2024-03-17 RX ADMIN — OXYCODONE HYDROCHLORIDE 15 MILLIGRAM(S): 5 TABLET ORAL at 22:27

## 2024-03-17 RX ADMIN — Medication 975 MILLIGRAM(S): at 18:37

## 2024-03-17 RX ADMIN — Medication 1334 MILLIGRAM(S): at 15:09

## 2024-03-17 RX ADMIN — Medication 81 MILLIGRAM(S): at 17:44

## 2024-03-17 RX ADMIN — Medication 1334 MILLIGRAM(S): at 17:44

## 2024-03-17 RX ADMIN — Medication 975 MILLIGRAM(S): at 23:20

## 2024-03-17 RX ADMIN — Medication 100 MILLIGRAM(S): at 23:55

## 2024-03-17 RX ADMIN — Medication 81 MILLIGRAM(S): at 05:33

## 2024-03-17 RX ADMIN — Medication 975 MILLIGRAM(S): at 05:33

## 2024-03-17 RX ADMIN — Medication 975 MILLIGRAM(S): at 06:33

## 2024-03-17 RX ADMIN — Medication 975 MILLIGRAM(S): at 17:42

## 2024-03-17 NOTE — PROGRESS NOTE ADULT - SUBJECTIVE AND OBJECTIVE BOX
Patient is a 38y old  Male who presents with a chief complaint of Fracture of unspecified part of neck of right femur, initial encounter for closed fracture     (14 Mar 2024 10:59)      INTERVAL HPI/OVERNIGHT EVENTS: spiked fever 101.6 F , denies any coughing / dysuria , surical site looks ok     T(C): 38.7 (03-17-24 @ 17:54), Max: 38.7 (03-17-24 @ 05:35)  HR: 103 (03-17-24 @ 17:54) (67 - 103)  BP: 111/58 (03-17-24 @ 17:54) (111/58 - 150/85)  RR: 18 (03-17-24 @ 17:54) (18 - 18)  SpO2: 98% (03-17-24 @ 17:54) (96% - 100%)  Wt(kg): --  I&O's Summary    16 Mar 2024 07:01  -  17 Mar 2024 07:00  --------------------------------------------------------  IN: 660 mL / OUT: 0 mL / NET: 660 mL    17 Mar 2024 07:01  -  17 Mar 2024 18:21  --------------------------------------------------------  IN: 360 mL / OUT: 0 mL / NET: 360 mL        PAST MEDICAL & SURGICAL HISTORY:  HTN (hypertension)      Stroke  age 10      Sleep apnea      Leg fracture, right      Chronic renal failure      Hemodialysis access, AV graft      ESRD (end stage renal disease) on dialysis  since 2013, M-W-F      Anemia, unspecified type      Other hyperparathyroidism      AV fistula  R arm; L arm clotted      History of left hip hemiarthroplasty      S/P parathyroidectomy          SOCIAL HISTORY  Alcohol:  Tobacco:  Illicit substance use:    FAMILY HISTORY:    REVIEW OF SYSTEMS:  CONSTITUTIONAL: No fever, weight loss, or fatigue  EYES: No eye pain, visual disturbances, or discharge  ENMT:  No difficulty hearing, tinnitus, vertigo; No sinus or throat pain  NECK: No pain or stiffness  RESPIRATORY: No cough, wheezing, chills or hemoptysis; No shortness of breath  CARDIOVASCULAR: No chest pain, palpitations, dizziness, or leg swelling  GASTROINTESTINAL: No abdominal or epigastric pain. No nausea, vomiting, or hematemesis; No diarrhea or constipation. No melena or hematochezia.  GENITOURINARY: No dysuria, frequency, hematuria, or incontinence  NEUROLOGICAL: No headaches, memory loss, loss of strength, numbness, or tremors  SKIN: No itching, burning, rashes, or lesions   LYMPH NODES: No enlarged glands  ENDOCRINE: No heat or cold intolerance; No hair loss  MUSCULOSKELETAL: No joint pain or swelling; No muscle, back, or extremity pain  PSYCHIATRIC: No depression, anxiety, mood swings, or difficulty sleeping  HEME/LYMPH: No easy bruising, or bleeding gums  ALLERY AND IMMUNOLOGIC: No hives or eczema    RADIOLOGY & ADDITIONAL TESTS:    Imaging Personally Reviewed:  [ ] YES  [ ] NO    Consultant(s) Notes Reviewed:  [ ] YES  [ ] NO    PHYSICAL EXAM:  GENERAL: NAD, well-groomed, well-developed  HEAD:  Atraumatic, Normocephalic  EYES: EOMI, PERRLA, conjunctiva and sclera clear  ENMT: No tonsillar erythema, exudates, or enlargement; Moist mucous membranes, Good dentition, No lesions  NECK: Supple, No JVD, Normal thyroid  NERVOUS SYSTEM:  Alert & Oriented X3, Good concentration; Motor Strength 5/5 B/L upper and lower extremities; DTRs 2+ intact and symmetric  CHEST/LUNG: Clear to percussion bilaterally; No rales, rhonchi, wheezing, or rubs  HEART: Regular rate and rhythm; No murmurs, rubs, or gallops  ABDOMEN: Soft, Nontender, Nondistended; Bowel sounds present  EXTREMITIES:  2+ Peripheral Pulses, No clubbing, cyanosis, or edema  LYMPH: No lymphadenopathy noted  SKIN: No rashes or lesions    LABS:              CAPILLARY BLOOD GLUCOSE                MEDICATIONS  (STANDING):  acetaminophen     Tablet .. 975 milliGRAM(s) Oral every 8 hours  acetaminophen   IVPB .. 1000 milliGRAM(s) IV Intermittent once  aspirin enteric coated 81 milliGRAM(s) Oral two times a day  calcium acetate 1334 milliGRAM(s) Oral three times a day with meals  epoetin carito-epbx (RETACRIT) Injectable 13738 Unit(s) IV Push <User Schedule>  heparin   Injectable 5000 Unit(s) SubCutaneous every 8 hours  metoprolol tartrate 25 milliGRAM(s) Oral two times a day  piperacillin/tazobactam IVPB. 3.375 Gram(s) IV Intermittent once  polyethylene glycol 3350 17 Gram(s) Oral daily  senna 2 Tablet(s) Oral at bedtime  vancomycin  IVPB 500 milliGRAM(s) IV Intermittent once    MEDICATIONS  (PRN):  aluminum hydroxide/magnesium hydroxide/simethicone Suspension 30 milliLiter(s) Oral every 4 hours PRN Dyspepsia  ondansetron Injectable 4 milliGRAM(s) IV Push every 6 hours PRN Nausea and/or Vomiting  oxyCODONE    IR 15 milliGRAM(s) Oral every 4 hours PRN Severe Pain (7 - 10)  oxyCODONE    IR 10 milliGRAM(s) Oral every 4 hours PRN Moderate Pain (4 - 6)      Care Discussed with Consultants/Other Providers [ ] YES  [ ] NO

## 2024-03-17 NOTE — PROGRESS NOTE ADULT - ASSESSMENT
38 Y M H/O ESRD , HTN  admitted with R hip pain /R knee pain s/p fall as he was being transferred to chair with help   no head trauma, + R. knee pain + R. thigh pain s/p fall.     # spiked fever : 101.6 F   etiology unclear   pan c/s   CXR in am   started on IV zosyn and vanco IV x 1 dose   house ID consult in am     #Femoral neck fx : s/p ORIF   ortho following   surgical site looks ok     #ESRD: HD   c/w HD TIW  renal following     # acute on chronic anemia : transfused  monitor and transfuse prn   today Hb 7.1   may need another unit of transfusion tomorrow      HTN   c/w toprol   controlled     dispo: ID consult in am , check labs in am

## 2024-03-18 ENCOUNTER — APPOINTMENT (OUTPATIENT)
Dept: ORTHOPEDIC SURGERY | Facility: CLINIC | Age: 39
End: 2024-03-18

## 2024-03-18 LAB
ANION GAP SERPL CALC-SCNC: 17 MMOL/L — SIGNIFICANT CHANGE UP (ref 5–17)
BLD GP AB SCN SERPL QL: POSITIVE — SIGNIFICANT CHANGE UP
BUN SERPL-MCNC: 94 MG/DL — HIGH (ref 7–23)
CALCIUM SERPL-MCNC: 9.4 MG/DL — SIGNIFICANT CHANGE UP (ref 8.4–10.5)
CHLORIDE SERPL-SCNC: 92 MMOL/L — LOW (ref 96–108)
CO2 SERPL-SCNC: 27 MMOL/L — SIGNIFICANT CHANGE UP (ref 22–31)
CREAT SERPL-MCNC: 11.21 MG/DL — HIGH (ref 0.5–1.3)
EGFR: 5 ML/MIN/1.73M2 — LOW
GLUCOSE SERPL-MCNC: 98 MG/DL — SIGNIFICANT CHANGE UP (ref 70–99)
HCT VFR BLD CALC: 24.8 % — LOW (ref 39–50)
HGB BLD-MCNC: 7.8 G/DL — LOW (ref 13–17)
MCHC RBC-ENTMCNC: 31.2 PG — SIGNIFICANT CHANGE UP (ref 27–34)
MCHC RBC-ENTMCNC: 31.5 GM/DL — LOW (ref 32–36)
MCV RBC AUTO: 99.2 FL — SIGNIFICANT CHANGE UP (ref 80–100)
NRBC # BLD: 0 /100 WBCS — SIGNIFICANT CHANGE UP (ref 0–0)
PLATELET # BLD AUTO: 261 K/UL — SIGNIFICANT CHANGE UP (ref 150–400)
POTASSIUM SERPL-MCNC: 5.5 MMOL/L — HIGH (ref 3.5–5.3)
POTASSIUM SERPL-SCNC: 5.5 MMOL/L — HIGH (ref 3.5–5.3)
RBC # BLD: 2.5 M/UL — LOW (ref 4.2–5.8)
RBC # FLD: 15.9 % — HIGH (ref 10.3–14.5)
RH IG SCN BLD-IMP: POSITIVE — SIGNIFICANT CHANGE UP
SODIUM SERPL-SCNC: 136 MMOL/L — SIGNIFICANT CHANGE UP (ref 135–145)
WBC # BLD: 3.52 K/UL — LOW (ref 3.8–10.5)
WBC # FLD AUTO: 3.52 K/UL — LOW (ref 3.8–10.5)

## 2024-03-18 PROCEDURE — 99222 1ST HOSP IP/OBS MODERATE 55: CPT

## 2024-03-18 PROCEDURE — 99254 IP/OBS CNSLTJ NEW/EST MOD 60: CPT

## 2024-03-18 PROCEDURE — 71045 X-RAY EXAM CHEST 1 VIEW: CPT | Mod: 26

## 2024-03-18 RX ADMIN — PIPERACILLIN AND TAZOBACTAM 25 GRAM(S): 4; .5 INJECTION, POWDER, LYOPHILIZED, FOR SOLUTION INTRAVENOUS at 12:01

## 2024-03-18 RX ADMIN — Medication 975 MILLIGRAM(S): at 06:50

## 2024-03-18 RX ADMIN — Medication 1334 MILLIGRAM(S): at 12:02

## 2024-03-18 RX ADMIN — SENNA PLUS 2 TABLET(S): 8.6 TABLET ORAL at 22:26

## 2024-03-18 RX ADMIN — Medication 975 MILLIGRAM(S): at 22:26

## 2024-03-18 RX ADMIN — Medication 1334 MILLIGRAM(S): at 08:50

## 2024-03-18 RX ADMIN — HEPARIN SODIUM 5000 UNIT(S): 5000 INJECTION INTRAVENOUS; SUBCUTANEOUS at 13:34

## 2024-03-18 RX ADMIN — HEPARIN SODIUM 5000 UNIT(S): 5000 INJECTION INTRAVENOUS; SUBCUTANEOUS at 22:26

## 2024-03-18 RX ADMIN — Medication 975 MILLIGRAM(S): at 23:00

## 2024-03-18 RX ADMIN — ERYTHROPOIETIN 10000 UNIT(S): 10000 INJECTION, SOLUTION INTRAVENOUS; SUBCUTANEOUS at 14:58

## 2024-03-18 RX ADMIN — HEPARIN SODIUM 5000 UNIT(S): 5000 INJECTION INTRAVENOUS; SUBCUTANEOUS at 05:50

## 2024-03-18 RX ADMIN — Medication 1334 MILLIGRAM(S): at 18:26

## 2024-03-18 RX ADMIN — OXYCODONE HYDROCHLORIDE 10 MILLIGRAM(S): 5 TABLET ORAL at 22:26

## 2024-03-18 RX ADMIN — PIPERACILLIN AND TAZOBACTAM 25 GRAM(S): 4; .5 INJECTION, POWDER, LYOPHILIZED, FOR SOLUTION INTRAVENOUS at 01:22

## 2024-03-18 RX ADMIN — Medication 975 MILLIGRAM(S): at 05:50

## 2024-03-18 RX ADMIN — Medication 25 MILLIGRAM(S): at 05:50

## 2024-03-18 RX ADMIN — OXYCODONE HYDROCHLORIDE 10 MILLIGRAM(S): 5 TABLET ORAL at 23:00

## 2024-03-18 RX ADMIN — Medication 81 MILLIGRAM(S): at 18:26

## 2024-03-18 RX ADMIN — Medication 81 MILLIGRAM(S): at 05:50

## 2024-03-18 RX ADMIN — Medication 25 MILLIGRAM(S): at 18:26

## 2024-03-18 NOTE — CONSULT NOTE ADULT - CONSULT REQUESTED DATE/TIME
12-Mar-2024 13:09
18-Mar-2024 14:31
06-Mar-2024 18:32
07-Mar-2024 12:04
18-Mar-2024 11:23
07-Mar-2024 08:24

## 2024-03-18 NOTE — PROGRESS NOTE ADULT - SUBJECTIVE AND OBJECTIVE BOX
Date of service: 03-18-24 @ 16:36      Patient is a 38y old  Male who presents with a chief complaint of Fracture of unspecified part of neck of right femur, initial encounter for closed fracture     (14 Mar 2024 10:59)                                                               INTERVAL HPI/OVERNIGHT EVENTS:    REVIEW OF SYSTEMS:     CONSTITUTIONAL: No weakness, fevers or chills  EYES/ENT: No visual changes , no ear ache   NECK: No pain or stiffness  RESPIRATORY: No cough, wheezing,  No shortness of breath  CARDIOVASCULAR: No chest pain or palpitations  GASTROINTESTINAL: No abdominal pain  . No nausea, vomiting, or hematemesis; No diarrhea or constipation. No melena or hematochezia.  GENITOURINARY: No dysuria, frequency or hematuria  NEUROLOGICAL: No numbness or weakness  SKIN: No itching, burning, rashes, or lesions                                                                                                                                                                                                                                                                                 Medications:  MEDICATIONS  (STANDING):  acetaminophen     Tablet .. 975 milliGRAM(s) Oral every 8 hours  aspirin enteric coated 81 milliGRAM(s) Oral two times a day  calcium acetate 1334 milliGRAM(s) Oral three times a day with meals  epoetin carito-epbx (RETACRIT) Injectable 88673 Unit(s) IV Push <User Schedule>  heparin   Injectable 5000 Unit(s) SubCutaneous every 8 hours  metoprolol tartrate 25 milliGRAM(s) Oral two times a day  piperacillin/tazobactam IVPB.. 3.375 Gram(s) IV Intermittent every 12 hours  polyethylene glycol 3350 17 Gram(s) Oral daily  senna 2 Tablet(s) Oral at bedtime    MEDICATIONS  (PRN):  aluminum hydroxide/magnesium hydroxide/simethicone Suspension 30 milliLiter(s) Oral every 4 hours PRN Dyspepsia  ondansetron Injectable 4 milliGRAM(s) IV Push every 6 hours PRN Nausea and/or Vomiting  oxyCODONE    IR 15 milliGRAM(s) Oral every 4 hours PRN Severe Pain (7 - 10)  oxyCODONE    IR 10 milliGRAM(s) Oral every 4 hours PRN Moderate Pain (4 - 6)       Allergies    No Known Drug Allergies  shellfish (Hives)    Intolerances      Vital Signs Last 24 Hrs  T(C): 36.5 (18 Mar 2024 14:20), Max: 38.7 (17 Mar 2024 17:54)  T(F): 97.7 (18 Mar 2024 14:20), Max: 101.6 (17 Mar 2024 17:54)  HR: 80 (18 Mar 2024 14:20) (61 - 103)  BP: 143/87 (18 Mar 2024 14:20) (111/58 - 144/75)  BP(mean): --  RR: 18 (18 Mar 2024 14:20) (18 - 18)  SpO2: 97% (18 Mar 2024 14:20) (96% - 100%)    Parameters below as of 18 Mar 2024 14:20  Patient On (Oxygen Delivery Method): room air      CAPILLARY BLOOD GLUCOSE          03-17 @ 07:01  -  03-18 @ 07:00  --------------------------------------------------------  IN: 680 mL / OUT: 0 mL / NET: 680 mL      Physical Exam:    Daily     Daily   General:  NAD   HEENT:  Nonicteric, PERRLA  CV:  RRR, S1S2   Lungs:  CTA B/L, no wheezes, rales, rhonchi  Abdomen:  Soft, non-tender, no distended, positive BS  Extremities:  trace edema   Neuro:  AAOx3, non-focal, grossly intact                                                                                                                                                                                                                                                                                                LABS:                               7.8    3.52  )-----------( 261      ( 18 Mar 2024 11:33 )             24.8                      03-18    136  |  92<L>  |  94<H>  ----------------------------<  98  5.5<H>   |  27  |  11.21<H>    Ca    9.4      18 Mar 2024 11:32                         RADIOLOGY & ADDITIONAL TESTS         I personally reviewed: [  ]EKG   [  ]CXR    [  ] CT      A/P:         Discussed with :     Jen consultants' Notes   Time spent :

## 2024-03-18 NOTE — PROGRESS NOTE ADULT - ASSESSMENT
38 Y M H/O ESRD , HTN  admitted with R hip pain /R knee pain s/p fall as he was being transferred to chair with help   no head trauma, + R. knee pain + R. thigh pain s/p fall.     spiked fever : 101.6 F   etiology unclear   pan c/s   CXR    started on IV zosyn and vanco IV x 1 dose : fu with ID     Femoral neck fx : s/p ORIF   ortho following   surgical site looks ok     ESRD: HD   c/w HD TIW  renal following      acute on chronic anemia : transfused  monitor and transfuse prn   today Hb 7.1   may need another unit of transfusion tomorrow      HTN   c/w toprol   controlled

## 2024-03-18 NOTE — CONSULT NOTE ADULT - SUBJECTIVE AND OBJECTIVE BOX
HPI:  38 m with HTN, ESRD on HD via RUE AVF, admitted  3/6with R hip pain /R knee pain s/p fall as he was being transferred to chair with help   was afebrile, no WBC  CT:   Acute Garden type I minimally valgus impacted transcervical right femoral neck fracture.   Subacute appearing minimally impacted right pubic body and inferior rami fractures. Background advanced chronic renal osteodystrophy.  s/p R hemiarthroplasty 3/7  pt had worsening anemia to Hgb 6 on 3/8 with low grade temps to 100.2  through 3/10, blood cx negative  pt again febrile to 101.6 on 3/17, WBC: 3.5, Hgb: 7.8  CXR unchanged atelectasis          PAST MEDICAL & SURGICAL HISTORY:  HTN (hypertension)      Stroke  age 10      Sleep apnea      Leg fracture, right      Chronic renal failure      Hemodialysis access, AV graft      ESRD (end stage renal disease) on dialysis  since 2013, M-W-F      Anemia, unspecified type      Other hyperparathyroidism      AV fistula  R arm; L arm clotted      History of left hip hemiarthroplasty      S/P parathyroidectomy          Allergies    No Known Drug Allergies  shellfish (Hives)    Intolerances        ANTIMICROBIALS:  piperacillin/tazobactam IVPB.. 3.375 every 12 hours      OTHER MEDS:  acetaminophen     Tablet .. 975 milliGRAM(s) Oral every 8 hours  aluminum hydroxide/magnesium hydroxide/simethicone Suspension 30 milliLiter(s) Oral every 4 hours PRN  aspirin enteric coated 81 milliGRAM(s) Oral two times a day  calcium acetate 1334 milliGRAM(s) Oral three times a day with meals  epoetin carito-epbx (RETACRIT) Injectable 85652 Unit(s) IV Push <User Schedule>  heparin   Injectable 5000 Unit(s) SubCutaneous every 8 hours  metoprolol tartrate 25 milliGRAM(s) Oral two times a day  ondansetron Injectable 4 milliGRAM(s) IV Push every 6 hours PRN  oxyCODONE    IR 10 milliGRAM(s) Oral every 4 hours PRN  oxyCODONE    IR 15 milliGRAM(s) Oral every 4 hours PRN  polyethylene glycol 3350 17 Gram(s) Oral daily  senna 2 Tablet(s) Oral at bedtime      SOCIAL HISTORY:  lives in [place]  no smoking, alcohol or drug abuse  no recent travel    FAMILY HISTORY:  no recent febrile illness in family members    ROS:    All other systems negative     Constitutional: + fever  Eye: no eye pain, no redness, no vision changes  ENT:  no sore throat, no rhinorrhea  Cardiovascular:  no chest pain, no palpitation  Respiratory:  no SOB, no cough  GI:  some abd pain, no vomiting, no diarrhea  urinary: does not make urine, no flank pain  : no penile discharge or bleeding  musculoskeletal:  has some RLE pain  skin:  no rash  neurology:  no headache, no seizure  psych: no anxiety, no depression     Physical Exam:    General:    NAD, non toxic  Head: atraumatic, normocephalic  Eyes: normal sclera and conjunctiva  ENT:   no oropharyngeal lesions, no LAD, neck supple  Cardio:    regular S1,S2  Respiratory:   clear b/l, no wheezing  abd:   soft, BS +, not tender  :     no CVAT, no suprapubic tenderness, no mayes  Musculoskeletal : no joint swelling, no edema  Skin:    no rash  vascular: RUE AVF with no tenderness  Neurologic:     no focal deficits  psych: normal affect      Drug Dosing Weight  Height (cm): 188 (07 Mar 2024 10:07)  Weight (kg): 95 (07 Mar 2024 10:07)  BMI (kg/m2): 26.9 (07 Mar 2024 10:07)  BSA (m2): 2.22 (07 Mar 2024 10:07)    Vital Signs Last 24 Hrs  T(F): 98.2 (03-18-24 @ 12:58), Max: 101.6 (03-17-24 @ 05:35)    Vital Signs Last 24 Hrs  HR: 74 (03-18-24 @ 12:58) (61 - 103)  BP: 144/75 (03-18-24 @ 12:58) (111/58 - 144/75)  RR: 18 (03-18-24 @ 12:58)  SpO2: 96% (03-18-24 @ 12:58) (96% - 100%)  Wt(kg): --                          7.8    3.52  )-----------( 261      ( 18 Mar 2024 11:33 )             24.8       03-18    136  |  92<L>  |  94<H>  ----------------------------<  98  5.5<H>   |  27  |  11.21<H>    Ca    9.4      18 Mar 2024 11:32        Urinalysis Basic - ( 18 Mar 2024 11:32 )    Color: x / Appearance: x / SG: x / pH: x  Gluc: 98 mg/dL / Ketone: x  / Bili: x / Urobili: x   Blood: x / Protein: x / Nitrite: x   Leuk Esterase: x / RBC: x / WBC x   Sq Epi: x / Non Sq Epi: x / Bacteria: x        MICROBIOLOGY:  v  .Blood Blood-Peripheral  03-17-24   No growth at 24 hours  --  --      .Blood Blood-Peripheral  03-17-24   No growth at 24 hours  --  --      .Blood Blood  03-11-24   No growth at 5 days  --  --      .Blood Blood-Venous  03-06-24   No growth at 5 days  --  --      .Blood Blood  03-06-24   No growth at 5 days  --  --                  RADIOLOGY:    Images independently visualized and reviewed personally,  findings as below  < from: [facility] Duplex Lower Ext Vein Scan, Bilat (03.14.24 @ 16:26) >  IMPRESSION:  No evidence of deep venous thrombosis in either lower extremity.    < end of copied text >  < from: Xray Chest 1 View- PORTABLE-Urgent (Xray Chest 1 View- PORTABLE-Urgent .) (03.11.24 @ 18:44) >  IMPRESSION:  Unchanged right basilar atelectasis.    < end of copied text >  < from: CT Knee No Cont, Right (03.06.24 @ 17:23) >    IMPRESSION:    1.  At least moderate right knee arthrosis with small to moderate volume   effusion. No acute displaced fracture.  2.  Background advanced chronic renal osteodystrophy.    < end of copied text >

## 2024-03-18 NOTE — PROGRESS NOTE ADULT - SUBJECTIVE AND OBJECTIVE BOX
Seen on HD    Vital Signs Last 24 Hrs  T(C): 37.5 (03-18-24 @ 18:03), Max: 37.5 (03-18-24 @ 18:03)  T(F): 99.5 (03-18-24 @ 18:03), Max: 99.5 (03-18-24 @ 18:03)  HR: 104 (03-18-24 @ 18:03) (61 - 104)  BP: 131/80 (03-18-24 @ 18:03) (112/76 - 144/75)  RR: 18 (03-18-24 @ 18:03) (18 - 18)  SpO2: 95% (03-18-24 @ 18:03) (95% - 100%)    s1s2  b/l air entry  soft, ND  sm edema                                                                         7.8    3.52  )-----------( 261      ( 18 Mar 2024 11:33 )             24.8     18 Mar 2024 11:32    136    |  92     |  94     ----------------------------<  98     5.5     |  27     |  11.21    Ca    9.4        18 Mar 2024 11:32    Culture - Blood (collected 17 Mar 2024 00:45)  Source: .Blood Blood-Peripheral  Preliminary Report (18 Mar 2024 07:02):    No growth at 24 hours    Culture - Blood (collected 17 Mar 2024 00:15)  Source: .Blood Blood-Peripheral  Preliminary Report (18 Mar 2024 07:02):    No growth at 24 hours    acetaminophen     Tablet .. 975 milliGRAM(s) Oral every 8 hours  aluminum hydroxide/magnesium hydroxide/simethicone Suspension 30 milliLiter(s) Oral every 4 hours PRN  aspirin enteric coated 81 milliGRAM(s) Oral two times a day  calcium acetate 1334 milliGRAM(s) Oral three times a day with meals  epoetin carito-epbx (RETACRIT) Injectable 70949 Unit(s) IV Push <User Schedule>  heparin   Injectable 5000 Unit(s) SubCutaneous every 8 hours  metoprolol tartrate 25 milliGRAM(s) Oral two times a day  ondansetron Injectable 4 milliGRAM(s) IV Push every 6 hours PRN  oxyCODONE    IR 15 milliGRAM(s) Oral every 4 hours PRN  oxyCODONE    IR 10 milliGRAM(s) Oral every 4 hours PRN  piperacillin/tazobactam IVPB 3.375 Gram(s) IV Intermittent every 12 hours  polyethylene glycol 3350 17 Gram(s) Oral daily  senna 2 Tablet(s) Oral at bedtime    A/P:    ESRD on HD   S/p fall, R hip fx  S/p R hip HA 3/7  HD today as ordered  Epoetin w/HD 3 x week   Renal diet  Phoslo as ordered   S/p fever, w/up, Abx per ID  Next HD on Wednesday     [phone]

## 2024-03-18 NOTE — CONSULT NOTE ADULT - SUBJECTIVE AND OBJECTIVE BOX
HPI:  38 Y M H/O ESRD , HTN  admitted with R hip pain /R knee pain s/p fall as he was being transferred to chair with help   no head trauma, + R. knee pain + R. thigh pain s/p fall.   1.  Acute Garden type I minimally valgus impacted transcervical right   femoral neck fracture.  2.  Subacute appearingminimally impacted right pubic body and inferior   rami fractures.    seen by ortho   planned OR tommrw  (06 Mar 2024 18:22)    Patient was admitted on 3/6, s/p right hip hemiarthroplasty, WBAT RLE, post op anemia, transfused, seen today. He has been in/out hospital and rehab since August 2023, limited by weight bearing status, steps at home. Prior to August, he was using rollator, independent with ADLs. Patient with b/l LE weakness. +fever yesterday.     REVIEW OF SYSTEMS  Denies chest pain, SOB, N/V, abdominal pain     VITALS  T(C): 37 (03-18-24 @ 08:32), Max: 38.7 (03-17-24 @ 17:54)  HR: 61 (03-18-24 @ 08:32) (61 - 103)  BP: 141/67 (03-18-24 @ 08:32) (111/58 - 143/86)  RR: 18 (03-18-24 @ 08:32) (18 - 18)  SpO2: 96% (03-18-24 @ 08:32) (96% - 100%)  Wt(kg): --    PAST MEDICAL & SURGICAL HISTORY  HTN (hypertension)    Stroke    Morbid obesity    Sleep apnea    Leg fracture, right    Chronic renal failure    Hemodialysis access, AV graft    ESRD (end stage renal disease) on dialysis    Anemia, unspecified type    Hypothyroidism    Other hyperparathyroidism    AV fistula    Status post hemiarthroplasty of toe    History of left hip hemiarthroplasty    S/P parathyroidectomy        FUNCTIONAL HISTORY  Lives with aunt, steps to enter   used device for mobility prior to admission     CURRENT FUNCTIONAL STATUS  PT  3/16  transfers mod to max assist with non mech lift     RECENT LABS/IMAGING    < from: CT Knee No Cont, Right (03.06.24 @ 17:23) >    IMPRESSION:    1.  At least moderate right knee arthrosis with small to moderate volume   effusion. No acute displaced fracture.  2.  Background advanced chronic renal osteodystrophy.      < end of copied text >    < from: Xray Femur 2 Views, Left (03.06.24 @ 16:41) >      FINDINGS:  Advanced across chronic renal osteodystrophy.    Status post left hip total arthroplasty without acute displaced  periprosthetic fracture. Unchanged chronic focal osteolysis in Gruen zone   1 consistent with stress shielding. No definite subsidence. Unchanged   unilamellar periosteal elevation along the proximal to mid femoral   diaphysis.    IMPRESSION:  No acute displaced periprosthetic left hip fracture.    < end of copied text >            ALLERGIES  No Known Drug Allergies  shellfish (Hives)      MEDICATIONS   acetaminophen     Tablet .. 975 milliGRAM(s) Oral every 8 hours  aluminum hydroxide/magnesium hydroxide/simethicone Suspension 30 milliLiter(s) Oral every 4 hours PRN  aspirin enteric coated 81 milliGRAM(s) Oral two times a day  calcium acetate 1334 milliGRAM(s) Oral three times a day with meals  epoetin carito-epbx (RETACRIT) Injectable 97207 Unit(s) IV Push <User Schedule>  heparin   Injectable 5000 Unit(s) SubCutaneous every 8 hours  metoprolol tartrate 25 milliGRAM(s) Oral two times a day  ondansetron Injectable 4 milliGRAM(s) IV Push every 6 hours PRN  oxyCODONE    IR 15 milliGRAM(s) Oral every 4 hours PRN  oxyCODONE    IR 10 milliGRAM(s) Oral every 4 hours PRN  piperacillin/tazobactam IVPB.. 3.375 Gram(s) IV Intermittent every 12 hours  polyethylene glycol 3350 17 Gram(s) Oral daily  senna 2 Tablet(s) Oral at bedtime      ----------------------------------------------------------------------------------------  PHYSICAL EXAM  Constitutional - NAD, Comfortable, laying/sitting at side of bed  Chest - Breathing comfortably  Cardiovascular - S1S2   Abdomen - Soft   Extremities - No C/C/E, No calf tenderness   Neurologic Exam -   follows commands                 Cognitive - Awake, Alert, AAO to self, place, date, year, situation     Communication - Fluent, No dysarthria        Motor -                    LEFT    UE - ShAB 5/5, EF 5/5, EE 5/5, WE 5/5,  5/5                    RIGHT UE - ShAB 5/5, EF 5/5, EE 5/5, WE 5/5,  5/5                    LEFT    LE - HF 2/5, KE 2/5, DF 5/5, PF 5/5                    RIGHT LE - HF 2/5, KE 2/5, DF 5/5, PF 5/5        Sensory - Intact to LT     Psychiatric - Mood stable, Affect WNL  ----------------------------------------------------------------------------------------  ASSESSMENT/PLAN  38yMale h/o ESRD, HTN, renal osteodystrophy, left hip fracture, s/p THR 8/2023 with functional deficits after right femoral neck fracture, s/p right hemiarthroplasty  WBAT, anterior hip precautions   fever, cultures pending, started on antibiotics   anemia, s/p transfusion, continue to monitor   Pain - Tylenol oxycodone   DVT PPX - SCDs heparin   Rehab - Will continue to follow for ongoing rehab needs and recommendations.   patient with b/l hip/quad weakness limiting mobility including transfers, stairs    Recommend ACUTE inpatient rehabilitation for the functional deficits consisting of 3 hours of therapy/day & x 4 weeks, 24 hour RN/daily PMR physician for comorbid medical management. Patient will be able to tolerate 3 hours a day.

## 2024-03-18 NOTE — CONSULT NOTE ADULT - ASSESSMENT
38 Y M H/O ESRD, HTN, left THR 8/2023    Admitted with R hip, thigh, knee pain s/p fall as he was being transferred to chair with help at SNF  CT Pelvis w/ right femoral neck fracture, right pubic body and inferior rami fractures, advanced chronic renal osteodystrophy.  3/7 s/p right THR    Current out- patient pain regimen: Oxy IR 15 mg every 6 hrs  Out Patient Pain Management provider: None, Rxs from SNF    Pt reports severe right hip and groin pain w/ movement, also c/o neuropathic pain right thigh/electric shocks, often triggered by compression sleeves. Pt reports good effect w/ current regimen and prefers to not increase doses or add new medication for neuropathic pain. States he is on Oxy IR 2/2 left hip pain and THR in August.    Recommend:  Discontinue Oxy IR 10 mg  Continue Oxy IR 15 mg every 4 hrs PRN severe pain x 2 more days then decrease to every 6 hrs PRN   Taper should continue after discharge:  Oxy IR 15 mg every 6 hrs x 3 days then decrease to Q8hrs PRN x 3 days then BID PRN x 3 days then QD PRN x 3 days then discontinue    Lidoderm  Warm/cool packs for comfort  Bowel Regimen  Incentive Spirometer  PT per primary team  Monitor for sedation, respiratory depression  Out-patient pain practice list can be provided for continued pain management if necessary after discharge  Narcan Rescue Kit on discharge (Naloxone 4 mg/0.1 ml nasal spray - 1 spray q 2-3 minutes alternating between nostrils)    Signing off, reconsult as needed    Time spent on encounter:   50     Minutes    [facility] Pain [facility]  [phone]
36 y/o male w/ a PMHx of ESRD (M/W/F via LUE AVF), SOLITARIO, difficult airway, HTN, secondary hyperparathyroidism s/p parathyroidectomy (Dec 2022), obesity, stroke at age 10 w/ no residual deficits, left radial fracture s/p splint (8/14/2023), and left femoral neck fracture s/p left hemiarthroplasty (8/15/23) who presented after falling while being transferred to a chair with assistance w/ right pubic body & inferior rami fractures and a right femoral neck fracture s/p right hip hemiarthroplasty w/ a post-op course c/b hypoglycemia after being shifted for hyperkalemia    Neuro: Multimodal pain control for post-op & traumatic pain    Resp: Out of bed to chair, ambulate as tolerated, and incentive spirometry to prevent atelectasis    CV: Home metoprolol    GI:   - Regular diet as tolerated  - Bowel regimen with senna & Miralax    Renal:  - Monitor I&Os and electrolytes w/ repletions as necessary  - Home cinacalcet for secondary parathyroidism  - Home Renvela for hyperphosphatemia  - Hemodialysis as per nephrology    Heme: ASA 81 mg BID for VTE prophylaxis as per ortho    ID: - Melvina-op Ancef as per ortho    Endo:   - Patient was given juice and ate lunch w/ improvement in glucose to the 120s  - Monitor glucose AC/QHS for next 24 hours or if patient becomes obtunded    Code Status: Full code    Disposition: Patient without critical care needs at this time, may be transferred back to medicine floors    Please reconsult for any further issues. Discussed w/ SICU attending Dr. Shay.    Karla Buck PA-C     [phone]
38 m with HTN, ESRD on HD via RUE AVF, admitted  3/6with R hip pain /R knee pain s/p fall as he was being transferred to chair with help   was afebrile, no WBC  CT:   Acute Garden type I minimally valgus impacted transcervical right femoral neck fracture.   Subacute appearing minimally impacted right pubic body and inferior rami fractures. Background advanced chronic renal osteodystrophy.  s/p R hemiarthroplasty 3/7  pt had worsening anemia to Hgb 6 on 3/8 with low grade temps to 100.2  through 3/10, blood cx negative  pt again febrile to 101.6 on 3/17, WBC: 3.5, Hgb: 7.8  CXR unchanged atelectasis    admitted for fall and femoral, pubic fx s/p R hemiarthroplasty, had worsening anemia/transfusion and low grade temps around 3/8 now again with fever to 101.6 unclear etiology    * f/u the  blood cx  * would get an a abd/pelvis CT with contrast  * monitor CBC/diff and CMP  * s/p vanco 3/17 and for now on zosyn, will continue for now  * monitor CBC/diff    The above assessment and plan was discussed with the primary team    Emilee Mathew MD  contact on teams  After 5pm and on weekends call [phone]

## 2024-03-19 LAB
ANION GAP SERPL CALC-SCNC: 18 MMOL/L — HIGH (ref 5–17)
BUN SERPL-MCNC: 67 MG/DL — HIGH (ref 7–23)
CALCIUM SERPL-MCNC: 9.1 MG/DL — SIGNIFICANT CHANGE UP (ref 8.4–10.5)
CHLORIDE SERPL-SCNC: 94 MMOL/L — LOW (ref 96–108)
CO2 SERPL-SCNC: 25 MMOL/L — SIGNIFICANT CHANGE UP (ref 22–31)
CREAT SERPL-MCNC: 8.25 MG/DL — HIGH (ref 0.5–1.3)
EGFR: 8 ML/MIN/1.73M2 — LOW
FLUAV AG NPH QL: SIGNIFICANT CHANGE UP
FLUBV AG NPH QL: SIGNIFICANT CHANGE UP
GLUCOSE SERPL-MCNC: 93 MG/DL — SIGNIFICANT CHANGE UP (ref 70–99)
HCT VFR BLD CALC: 24.2 % — LOW (ref 39–50)
HGB BLD-MCNC: 7.8 G/DL — LOW (ref 13–17)
MAGNESIUM SERPL-MCNC: 2.5 MG/DL — SIGNIFICANT CHANGE UP (ref 1.6–2.6)
MCHC RBC-ENTMCNC: 31.5 PG — SIGNIFICANT CHANGE UP (ref 27–34)
MCHC RBC-ENTMCNC: 32.2 GM/DL — SIGNIFICANT CHANGE UP (ref 32–36)
MCV RBC AUTO: 97.6 FL — SIGNIFICANT CHANGE UP (ref 80–100)
NRBC # BLD: 0 /100 WBCS — SIGNIFICANT CHANGE UP (ref 0–0)
PHOSPHATE SERPL-MCNC: 5.6 MG/DL — HIGH (ref 2.5–4.5)
PLATELET # BLD AUTO: 239 K/UL — SIGNIFICANT CHANGE UP (ref 150–400)
POTASSIUM SERPL-MCNC: 5.2 MMOL/L — SIGNIFICANT CHANGE UP (ref 3.5–5.3)
POTASSIUM SERPL-SCNC: 5.2 MMOL/L — SIGNIFICANT CHANGE UP (ref 3.5–5.3)
RBC # BLD: 2.48 M/UL — LOW (ref 4.2–5.8)
RBC # FLD: 15.9 % — HIGH (ref 10.3–14.5)
RSV RNA NPH QL NAA+NON-PROBE: SIGNIFICANT CHANGE UP
SARS-COV-2 RNA SPEC QL NAA+PROBE: DETECTED
SARS-COV-2 RNA SPEC QL NAA+PROBE: DETECTED
SODIUM SERPL-SCNC: 137 MMOL/L — SIGNIFICANT CHANGE UP (ref 135–145)
WBC # BLD: 4.14 K/UL — SIGNIFICANT CHANGE UP (ref 3.8–10.5)
WBC # FLD AUTO: 4.14 K/UL — SIGNIFICANT CHANGE UP (ref 3.8–10.5)

## 2024-03-19 PROCEDURE — 99232 SBSQ HOSP IP/OBS MODERATE 35: CPT

## 2024-03-19 RX ORDER — REMDESIVIR 5 MG/ML
INJECTION INTRAVENOUS
Refills: 0 | Status: COMPLETED | OUTPATIENT
Start: 2024-03-19 | End: 2024-03-21

## 2024-03-19 RX ORDER — CHLORHEXIDINE GLUCONATE 213 G/1000ML
1 SOLUTION TOPICAL DAILY
Refills: 0 | Status: DISCONTINUED | OUTPATIENT
Start: 2024-03-19 | End: 2024-03-27

## 2024-03-19 RX ORDER — SODIUM CHLORIDE 9 MG/ML
250 INJECTION INTRAMUSCULAR; INTRAVENOUS; SUBCUTANEOUS ONCE
Refills: 0 | Status: COMPLETED | OUTPATIENT
Start: 2024-03-19 | End: 2024-03-19

## 2024-03-19 RX ORDER — ACETAMINOPHEN 500 MG
1000 TABLET ORAL ONCE
Refills: 0 | Status: COMPLETED | OUTPATIENT
Start: 2024-03-19 | End: 2024-03-19

## 2024-03-19 RX ORDER — REMDESIVIR 5 MG/ML
200 INJECTION INTRAVENOUS EVERY 24 HOURS
Refills: 0 | Status: COMPLETED | OUTPATIENT
Start: 2024-03-19 | End: 2024-03-19

## 2024-03-19 RX ORDER — REMDESIVIR 5 MG/ML
100 INJECTION INTRAVENOUS EVERY 24 HOURS
Refills: 0 | Status: COMPLETED | OUTPATIENT
Start: 2024-03-20 | End: 2024-03-21

## 2024-03-19 RX ADMIN — PIPERACILLIN AND TAZOBACTAM 25 GRAM(S): 4; .5 INJECTION, POWDER, LYOPHILIZED, FOR SOLUTION INTRAVENOUS at 00:49

## 2024-03-19 RX ADMIN — Medication 1334 MILLIGRAM(S): at 12:40

## 2024-03-19 RX ADMIN — Medication 1334 MILLIGRAM(S): at 17:50

## 2024-03-19 RX ADMIN — SODIUM CHLORIDE 500 MILLILITER(S): 9 INJECTION INTRAMUSCULAR; INTRAVENOUS; SUBCUTANEOUS at 00:50

## 2024-03-19 RX ADMIN — Medication 975 MILLIGRAM(S): at 14:57

## 2024-03-19 RX ADMIN — Medication 1000 MILLIGRAM(S): at 02:00

## 2024-03-19 RX ADMIN — HEPARIN SODIUM 5000 UNIT(S): 5000 INJECTION INTRAVENOUS; SUBCUTANEOUS at 22:34

## 2024-03-19 RX ADMIN — POLYETHYLENE GLYCOL 3350 17 GRAM(S): 17 POWDER, FOR SOLUTION ORAL at 12:40

## 2024-03-19 RX ADMIN — Medication 400 MILLIGRAM(S): at 01:30

## 2024-03-19 RX ADMIN — Medication 81 MILLIGRAM(S): at 17:49

## 2024-03-19 RX ADMIN — Medication 25 MILLIGRAM(S): at 17:50

## 2024-03-19 RX ADMIN — Medication 975 MILLIGRAM(S): at 14:27

## 2024-03-19 RX ADMIN — OXYCODONE HYDROCHLORIDE 15 MILLIGRAM(S): 5 TABLET ORAL at 18:46

## 2024-03-19 RX ADMIN — OXYCODONE HYDROCHLORIDE 15 MILLIGRAM(S): 5 TABLET ORAL at 17:49

## 2024-03-19 RX ADMIN — HEPARIN SODIUM 5000 UNIT(S): 5000 INJECTION INTRAVENOUS; SUBCUTANEOUS at 14:27

## 2024-03-19 RX ADMIN — PIPERACILLIN AND TAZOBACTAM 25 GRAM(S): 4; .5 INJECTION, POWDER, LYOPHILIZED, FOR SOLUTION INTRAVENOUS at 12:39

## 2024-03-19 RX ADMIN — Medication 975 MILLIGRAM(S): at 22:35

## 2024-03-19 RX ADMIN — CHLORHEXIDINE GLUCONATE 1 APPLICATION(S): 213 SOLUTION TOPICAL at 12:40

## 2024-03-19 RX ADMIN — Medication 975 MILLIGRAM(S): at 23:35

## 2024-03-19 RX ADMIN — REMDESIVIR 200 MILLIGRAM(S): 5 INJECTION INTRAVENOUS at 18:32

## 2024-03-19 NOTE — PROGRESS NOTE ADULT - SUBJECTIVE AND OBJECTIVE BOX
Denies complaints    Vital Signs Last 24 Hrs  T(C): 37.2 (03-19-24 @ 11:37), Max: 38.3 (03-19-24 @ 00:13)  T(F): 98.9 (03-19-24 @ 11:37), Max: 101 (03-19-24 @ 00:13)  HR: 93 (03-19-24 @ 11:37) (85 - 104)  BP: 138/76 (03-19-24 @ 11:37) (105/61 - 138/76)  RR: 18 (03-19-24 @ 11:37) (17 - 18)  SpO2: 98% (03-19-24 @ 11:37) (95% - 100%)    I&O's Detail    18 Mar 2024 07:01  -  19 Mar 2024 07:00  --------------------------------------------------------  IN:    Oral Fluid: 160 mL    Other (mL): 800 mL  Total IN: 960 mL    OUT:    Other (mL): 2800 mL    Voided (mL): 0 mL  Total OUT: 2800 mL    19 Mar 2024 07:01  -  19 Mar 2024 14:36  --------------------------------------------------------  IN:    Oral Fluid: 480 mL  Total IN: 480 mL    OUT:  Total OUT: 0 mL    s1s2  b/l air entry  soft, ND  sm edema                                                                                7.8    3.52  )-----------( 261      ( 18 Mar 2024 11:33 )             24.8     18 Mar 2024 11:32    136    |  92     |  94     ----------------------------<  98     5.5     |  27     |  11.21    Ca    9.4        18 Mar 2024 11:32    Culture - Blood (collected 17 Mar 2024 00:45)  Source: .Blood Blood-Peripheral  Preliminary Report (19 Mar 2024 07:01):    No growth at 48 Hours    Culture - Blood (collected 17 Mar 2024 00:15)  Source: .Blood Blood-Peripheral  Preliminary Report (19 Mar 2024 07:01):    No growth at 48 Hours    acetaminophen     Tablet .. 975 milliGRAM(s) Oral every 8 hours  aluminum hydroxide/magnesium hydroxide/simethicone Suspension 30 milliLiter(s) Oral every 4 hours PRN  aspirin enteric coated 81 milliGRAM(s) Oral two times a day  calcium acetate 1334 milliGRAM(s) Oral three times a day with meals  chlorhexidine 2% Cloths 1 Application(s) Topical daily  epoetin cariot-epbx (RETACRIT) Injectable 69492 Unit(s) IV Push <User Schedule>  heparin   Injectable 5000 Unit(s) SubCutaneous every 8 hours  metoprolol tartrate 25 milliGRAM(s) Oral two times a day  ondansetron Injectable 4 milliGRAM(s) IV Push every 6 hours PRN  oxyCODONE    IR 10 milliGRAM(s) Oral every 4 hours PRN  oxyCODONE    IR 15 milliGRAM(s) Oral every 4 hours PRN  piperacillin/tazobactam IVPB.. 3.375 Gram(s) IV Intermittent every 12 hours  polyethylene glycol 3350 17 Gram(s) Oral daily  senna 2 Tablet(s) Oral at bedtime    A/P:    ESRD on HD   S/p fall, R hip fx  S/p R hip HA 3/7  HD tomorrow as ordered  Epoetin w/HD 3 x week   Renal diet  Phoslo as ordered   Fever, w/up, Abx per ID    [phone]

## 2024-03-19 NOTE — PROGRESS NOTE ADULT - ASSESSMENT
38 Y M H/O ESRD , HTN  admitted with R hip pain /R knee pain s/p fall as he was being transferred to chair with help   no head trauma, + R. knee pain + R. thigh pain s/p fall.     spiked fever : 101.6 F   tejeda c/s    COVID positive   hold off on CT a/p   start remdisivir   can dc abx     Femoral neck fx : s/p ORIF   ortho following   surgical site looks ok     ESRD: HD   c/w HD TIW  renal following      acute on chronic anemia : transfused  monitor and transfuse prn   today Hb 7.1   may need another unit of transfusion tomorrow      HTN   c/w toprol   controlled

## 2024-03-19 NOTE — PROGRESS NOTE ADULT - SUBJECTIVE AND OBJECTIVE BOX
Follow Up:  fever    Interval History/ROS: pt was again febrile overnight but feels well, had a new rhinorrhea no cough, vomiting or chest pain          Allergies  No Known Drug Allergies  shellfish (Hives)        ANTIMICROBIALS:  piperacillin/tazobactam IVPB.. 3.375 every 12 hours      OTHER MEDS:  acetaminophen     Tablet .. 975 milliGRAM(s) Oral every 8 hours  aluminum hydroxide/magnesium hydroxide/simethicone Suspension 30 milliLiter(s) Oral every 4 hours PRN  aspirin enteric coated 81 milliGRAM(s) Oral two times a day  calcium acetate 1334 milliGRAM(s) Oral three times a day with meals  chlorhexidine 2% Cloths 1 Application(s) Topical daily  epoetin carito-epbx (RETACRIT) Injectable 13809 Unit(s) IV Push <User Schedule>  heparin   Injectable 5000 Unit(s) SubCutaneous every 8 hours  metoprolol tartrate 25 milliGRAM(s) Oral two times a day  ondansetron Injectable 4 milliGRAM(s) IV Push every 6 hours PRN  oxyCODONE    IR 10 milliGRAM(s) Oral every 4 hours PRN  oxyCODONE    IR 15 milliGRAM(s) Oral every 4 hours PRN  polyethylene glycol 3350 17 Gram(s) Oral daily  senna 2 Tablet(s) Oral at bedtime      Vital Signs Last 24 Hrs  T(C): 37.2 (19 Mar 2024 11:37), Max: 38.3 (19 Mar 2024 00:13)  T(F): 98.9 (19 Mar 2024 11:37), Max: 101 (19 Mar 2024 00:13)  HR: 93 (19 Mar 2024 11:37) (80 - 104)  BP: 138/76 (19 Mar 2024 11:37) (105/61 - 143/87)  BP(mean): --  RR: 18 (19 Mar 2024 11:37) (17 - 18)  SpO2: 98% (19 Mar 2024 11:37) (95% - 100%)    Parameters below as of 19 Mar 2024 11:37  Patient On (Oxygen Delivery Method): room air        Physical Exam:  General:    NAD, non toxic  Respiratory:   comfortable on RA  abd:   soft, BS +, not tender  :     no CVAT, no suprapubic tenderness, no mayes  Musculoskeletal : no joint swelling, no edema  Skin:    no rash  vascular: RUE AVF with no tenderness                          7.8    3.52  )-----------( 261      ( 18 Mar 2024 11:33 )             24.8       03-18    136  |  92<L>  |  94<H>  ----------------------------<  98  5.5<H>   |  27  |  11.21<H>    Ca    9.4      18 Mar 2024 11:32        Urinalysis Basic - ( 18 Mar 2024 11:32 )    Color: x / Appearance: x / SG: x / pH: x  Gluc: 98 mg/dL / Ketone: x  / Bili: x / Urobili: x   Blood: x / Protein: x / Nitrite: x   Leuk Esterase: x / RBC: x / WBC x   Sq Epi: x / Non Sq Epi: x / Bacteria: x        MICROBIOLOGY:  v  .Blood Blood-Peripheral  03-17-24   No growth at 48 Hours  --  --      .Blood Blood-Peripheral  03-17-24   No growth at 48 Hours  --  --      .Blood Blood  03-11-24   No growth at 5 days  --  --      .Blood Blood-Venous  03-06-24   No growth at 5 days  --  --      .Blood Blood  03-06-24   No growth at 5 days  --  --                RADIOLOGY:  Images independently visualized and reviewed personally, findings as below  < from: Xray Chest 1 View- PORTABLE-Routine (Xray Chest 1 View- PORTABLE-Routine in AM.) (03.18.24 @ 09:42) >    Impression:    No acute pulmonary disease.    < end of copied text >  < from: [facility] Duplex Lower Ext Vein Scan, Bilat (03.14.24 @ 16:26) >  IMPRESSION:  No evidence of deep venous thrombosis in either lower extremity.      < end of copied text >

## 2024-03-19 NOTE — PROGRESS NOTE ADULT - SUBJECTIVE AND OBJECTIVE BOX
Date of service: 03-19-24 @ 22:26      Patient is a 38y old  Male who presents with a chief complaint of Fracture of unspecified part of neck of right femur, initial encounter for closed fracture     (14 Mar 2024 10:59)                                                               INTERVAL HPI/OVERNIGHT EVENTS:    REVIEW OF SYSTEMS:     CONSTITUTIONAL: No weakness, fevers or chills  EYES/ENT: No visual changes , no ear ache   NECK: No pain or stiffness  RESPIRATORY: No cough, wheezing,  No shortness of breath  CARDIOVASCULAR: No chest pain or palpitations  GASTROINTESTINAL: No abdominal pain  . No nausea, vomiting, or hematemesis; No diarrhea or constipation. No melena or hematochezia.  GENITOURINARY: No dysuria, frequency or hematuria  NEUROLOGICAL: No numbness or weakness  SKIN: No itching, burning, rashes, or lesions                                                                                                                                                                                                                                                                                 Medications:  MEDICATIONS  (STANDING):  acetaminophen     Tablet .. 975 milliGRAM(s) Oral every 8 hours  aspirin enteric coated 81 milliGRAM(s) Oral two times a day  calcium acetate 1334 milliGRAM(s) Oral three times a day with meals  chlorhexidine 2% Cloths 1 Application(s) Topical daily  epoetin carito-epbx (RETACRIT) Injectable 11986 Unit(s) IV Push <User Schedule>  heparin   Injectable 5000 Unit(s) SubCutaneous every 8 hours  metoprolol tartrate 25 milliGRAM(s) Oral two times a day  polyethylene glycol 3350 17 Gram(s) Oral daily  remdesivir  IVPB   IV Intermittent   senna 2 Tablet(s) Oral at bedtime    MEDICATIONS  (PRN):  aluminum hydroxide/magnesium hydroxide/simethicone Suspension 30 milliLiter(s) Oral every 4 hours PRN Dyspepsia  ondansetron Injectable 4 milliGRAM(s) IV Push every 6 hours PRN Nausea and/or Vomiting  oxyCODONE    IR 10 milliGRAM(s) Oral every 4 hours PRN Moderate Pain (4 - 6)  oxyCODONE    IR 15 milliGRAM(s) Oral every 4 hours PRN Severe Pain (7 - 10)       Allergies    No Known Drug Allergies  shellfish (Hives)    Intolerances      Vital Signs Last 24 Hrs  T(C): 37.2 (19 Mar 2024 20:45), Max: 38.3 (19 Mar 2024 00:13)  T(F): 98.9 (19 Mar 2024 20:45), Max: 101 (19 Mar 2024 00:13)  HR: 99 (19 Mar 2024 20:45) (85 - 99)  BP: 133/77 (19 Mar 2024 20:45) (105/61 - 138/76)  BP(mean): --  RR: 18 (19 Mar 2024 20:45) (18 - 18)  SpO2: 99% (19 Mar 2024 20:45) (95% - 100%)    Parameters below as of 19 Mar 2024 20:45  Patient On (Oxygen Delivery Method): room air      CAPILLARY BLOOD GLUCOSE          03-18 @ 07:01  -  03-19 @ 07:00  --------------------------------------------------------  IN: 960 mL / OUT: 2800 mL / NET: -1840 mL    03-19 @ 07:01  -  03-19 @ 22:26  --------------------------------------------------------  IN: 480 mL / OUT: 0 mL / NET: 480 mL      Physical Exam:    Daily     Daily   General:  Well appearing, NAD, not cachetic  HEENT:  Nonicteric, PERRLA  CV:  RRR, S1S2   Lungs:  CTA B/L, no wheezes, rales, rhonchi  Abdomen:  Soft, non-tender, no distended, positive BS  Extremities:  2+ pulses, no c/c, no edema  Skin:  Warm and dry, no rashes  :  No mayes  Neuro:  AAOx3, non-focal, grossly intact                                                                                                                                                                                                                                                                                                LABS:                               7.8    4.14  )-----------( 239      ( 19 Mar 2024 15:05 )             24.2                      03-19    137  |  94<L>  |  67<H>  ----------------------------<  93  5.2   |  25  |  8.25<H>    Ca    9.1      19 Mar 2024 15:05  Phos  5.6     03-19  Mg     2.5     03-19                         RADIOLOGY & ADDITIONAL TESTS         I personally reviewed: [  ]EKG   [  ]CXR    [  ] CT      A/P:         Discussed with :     Jen consultants' Notes   Time spent :

## 2024-03-19 NOTE — PROGRESS NOTE ADULT - ASSESSMENT
38 m with HTN, ESRD on HD via RUE AVF, admitted  3/6with R hip pain /R knee pain s/p fall as he was being transferred to chair with help   was afebrile, no WBC  CT:   Acute Garden type I minimally valgus impacted transcervical right femoral neck fracture.   Subacute appearing minimally impacted right pubic body and inferior rami fractures. Background advanced chronic renal osteodystrophy.  s/p R hemiarthroplasty 3/7  pt had worsening anemia to Hgb 6 on 3/8 with low grade temps to 100.2  through 3/10, blood cx negative  pt again febrile to 101.6 on 3/17, WBC: 3.5, Hgb: 7.8  CXR unchanged atelectasis    admitted for fall and femoral, pubic fx s/p R hemiarthroplasty, had worsening anemia/transfusion and low grade temps around 3/8 now again with fever to 101.6 with rhinorrhea likely viral URI    * f/u the final blood cx  * RVP  * would get an a abd/pelvis CT with contrast  * monitor CBC/diff and CMP  * s/p vanco 3/17 and for now on zosyn, will continue for now  * monitor CBC/diff    The above assessment and plan was discussed with the primary team    Emilee Mathew MD  contact on teams  After 5pm and on weekends call [phone]

## 2024-03-20 LAB
ALBUMIN SERPL ELPH-MCNC: 3.2 G/DL — LOW (ref 3.3–5)
ALP SERPL-CCNC: 671 U/L — HIGH (ref 40–120)
ALT FLD-CCNC: 8 U/L — LOW (ref 10–45)
ANION GAP SERPL CALC-SCNC: 18 MMOL/L — HIGH (ref 5–17)
AST SERPL-CCNC: 22 U/L — SIGNIFICANT CHANGE UP (ref 10–40)
BASOPHILS # BLD AUTO: 0.03 K/UL — SIGNIFICANT CHANGE UP (ref 0–0.2)
BASOPHILS NFR BLD AUTO: 0.7 % — SIGNIFICANT CHANGE UP (ref 0–2)
BILIRUB SERPL-MCNC: 0.2 MG/DL — SIGNIFICANT CHANGE UP (ref 0.2–1.2)
BUN SERPL-MCNC: 88 MG/DL — HIGH (ref 7–23)
CALCIUM SERPL-MCNC: 9.5 MG/DL — SIGNIFICANT CHANGE UP (ref 8.4–10.5)
CHLORIDE SERPL-SCNC: 95 MMOL/L — LOW (ref 96–108)
CO2 SERPL-SCNC: 24 MMOL/L — SIGNIFICANT CHANGE UP (ref 22–31)
CREAT SERPL-MCNC: 9.81 MG/DL — HIGH (ref 0.5–1.3)
D DIMER BLD IA.RAPID-MCNC: 8094 NG/ML DDU — HIGH
EGFR: 6 ML/MIN/1.73M2 — LOW
EOSINOPHIL # BLD AUTO: 0.14 K/UL — SIGNIFICANT CHANGE UP (ref 0–0.5)
EOSINOPHIL NFR BLD AUTO: 3.1 % — SIGNIFICANT CHANGE UP (ref 0–6)
GLUCOSE SERPL-MCNC: 101 MG/DL — HIGH (ref 70–99)
HCT VFR BLD CALC: 22.6 % — LOW (ref 39–50)
HGB BLD-MCNC: 7.1 G/DL — LOW (ref 13–17)
IMM GRANULOCYTES NFR BLD AUTO: 0.4 % — SIGNIFICANT CHANGE UP (ref 0–0.9)
INR BLD: 1.11 RATIO — SIGNIFICANT CHANGE UP (ref 0.85–1.18)
LYMPHOCYTES # BLD AUTO: 1.1 K/UL — SIGNIFICANT CHANGE UP (ref 1–3.3)
LYMPHOCYTES # BLD AUTO: 24.1 % — SIGNIFICANT CHANGE UP (ref 13–44)
MAGNESIUM SERPL-MCNC: 2.5 MG/DL — SIGNIFICANT CHANGE UP (ref 1.6–2.6)
MCHC RBC-ENTMCNC: 30.9 PG — SIGNIFICANT CHANGE UP (ref 27–34)
MCHC RBC-ENTMCNC: 31.4 GM/DL — LOW (ref 32–36)
MCV RBC AUTO: 98.3 FL — SIGNIFICANT CHANGE UP (ref 80–100)
MONOCYTES # BLD AUTO: 0.51 K/UL — SIGNIFICANT CHANGE UP (ref 0–0.9)
MONOCYTES NFR BLD AUTO: 11.2 % — SIGNIFICANT CHANGE UP (ref 2–14)
NEUTROPHILS # BLD AUTO: 2.77 K/UL — SIGNIFICANT CHANGE UP (ref 1.8–7.4)
NEUTROPHILS NFR BLD AUTO: 60.5 % — SIGNIFICANT CHANGE UP (ref 43–77)
NRBC # BLD: 0 /100 WBCS — SIGNIFICANT CHANGE UP (ref 0–0)
PHOSPHATE SERPL-MCNC: 6.2 MG/DL — HIGH (ref 2.5–4.5)
PLATELET # BLD AUTO: 253 K/UL — SIGNIFICANT CHANGE UP (ref 150–400)
POTASSIUM SERPL-MCNC: 5.4 MMOL/L — HIGH (ref 3.5–5.3)
POTASSIUM SERPL-SCNC: 5.4 MMOL/L — HIGH (ref 3.5–5.3)
PROT SERPL-MCNC: 6.4 G/DL — SIGNIFICANT CHANGE UP (ref 6–8.3)
PROTHROM AB SERPL-ACNC: 11.6 SEC — SIGNIFICANT CHANGE UP (ref 9.5–13)
RBC # BLD: 2.3 M/UL — LOW (ref 4.2–5.8)
RBC # FLD: 16.1 % — HIGH (ref 10.3–14.5)
SODIUM SERPL-SCNC: 137 MMOL/L — SIGNIFICANT CHANGE UP (ref 135–145)
WBC # BLD: 4.57 K/UL — SIGNIFICANT CHANGE UP (ref 3.8–10.5)
WBC # FLD AUTO: 4.57 K/UL — SIGNIFICANT CHANGE UP (ref 3.8–10.5)

## 2024-03-20 PROCEDURE — 99232 SBSQ HOSP IP/OBS MODERATE 35: CPT

## 2024-03-20 RX ORDER — ENOXAPARIN SODIUM 100 MG/ML
40 INJECTION SUBCUTANEOUS EVERY 24 HOURS
Refills: 0 | Status: DISCONTINUED | OUTPATIENT
Start: 2024-03-20 | End: 2024-03-20

## 2024-03-20 RX ORDER — APIXABAN 2.5 MG/1
2.5 TABLET, FILM COATED ORAL
Refills: 0 | Status: DISCONTINUED | OUTPATIENT
Start: 2024-03-20 | End: 2024-03-27

## 2024-03-20 RX ADMIN — ERYTHROPOIETIN 10000 UNIT(S): 10000 INJECTION, SOLUTION INTRAVENOUS; SUBCUTANEOUS at 18:17

## 2024-03-20 RX ADMIN — REMDESIVIR 200 MILLIGRAM(S): 5 INJECTION INTRAVENOUS at 18:57

## 2024-03-20 RX ADMIN — Medication 1334 MILLIGRAM(S): at 14:40

## 2024-03-20 RX ADMIN — APIXABAN 2.5 MILLIGRAM(S): 2.5 TABLET, FILM COATED ORAL at 23:12

## 2024-03-20 RX ADMIN — Medication 1334 MILLIGRAM(S): at 18:54

## 2024-03-20 RX ADMIN — Medication 975 MILLIGRAM(S): at 21:24

## 2024-03-20 RX ADMIN — OXYCODONE HYDROCHLORIDE 15 MILLIGRAM(S): 5 TABLET ORAL at 23:40

## 2024-03-20 RX ADMIN — Medication 25 MILLIGRAM(S): at 05:27

## 2024-03-20 RX ADMIN — Medication 975 MILLIGRAM(S): at 14:40

## 2024-03-20 RX ADMIN — Medication 1334 MILLIGRAM(S): at 08:41

## 2024-03-20 RX ADMIN — Medication 975 MILLIGRAM(S): at 06:10

## 2024-03-20 RX ADMIN — OXYCODONE HYDROCHLORIDE 15 MILLIGRAM(S): 5 TABLET ORAL at 23:10

## 2024-03-20 RX ADMIN — Medication 25 MILLIGRAM(S): at 18:54

## 2024-03-20 RX ADMIN — HEPARIN SODIUM 5000 UNIT(S): 5000 INJECTION INTRAVENOUS; SUBCUTANEOUS at 14:40

## 2024-03-20 RX ADMIN — Medication 81 MILLIGRAM(S): at 18:54

## 2024-03-20 RX ADMIN — POLYETHYLENE GLYCOL 3350 17 GRAM(S): 17 POWDER, FOR SOLUTION ORAL at 12:19

## 2024-03-20 RX ADMIN — OXYCODONE HYDROCHLORIDE 15 MILLIGRAM(S): 5 TABLET ORAL at 19:26

## 2024-03-20 RX ADMIN — HEPARIN SODIUM 5000 UNIT(S): 5000 INJECTION INTRAVENOUS; SUBCUTANEOUS at 05:27

## 2024-03-20 RX ADMIN — Medication 975 MILLIGRAM(S): at 05:27

## 2024-03-20 RX ADMIN — OXYCODONE HYDROCHLORIDE 15 MILLIGRAM(S): 5 TABLET ORAL at 18:56

## 2024-03-20 RX ADMIN — Medication 81 MILLIGRAM(S): at 05:27

## 2024-03-20 RX ADMIN — CHLORHEXIDINE GLUCONATE 1 APPLICATION(S): 213 SOLUTION TOPICAL at 12:19

## 2024-03-20 NOTE — PROGRESS NOTE ADULT - ASSESSMENT
38 m with HTN, ESRD on HD via RUE AVF, admitted  3/6with R hip pain /R knee pain s/p fall as he was being transferred to chair with help   was afebrile, no WBC  CT:   Acute Garden type I minimally valgus impacted transcervical right femoral neck fracture.   Subacute appearing minimally impacted right pubic body and inferior rami fractures. Background advanced chronic renal osteodystrophy.  s/p R hemiarthroplasty 3/7  pt had worsening anemia to Hgb 6 on 3/8 with low grade temps to 100.2  through 3/10, blood cx negative  pt again febrile to 101.6 on 3/17, WBC: 3.5, Hgb: 7.8  CXR unchanged atelectasis    admitted for fall and femoral, pubic fx s/p R hemiarthroplasty, had worsening anemia/transfusion and low grade temps around 3/8 now again with fever to 101.6 with rhinorrhea due to COVID  blood cx negative    * zosyn was discontinued  * remdesivir for  3 days  * please call with questions    The above assessment and plan was discussed with the primary team    Emilee Mathew MD  contact on teams  After 5pm and on weekends call [phone]

## 2024-03-20 NOTE — PROGRESS NOTE ADULT - SUBJECTIVE AND OBJECTIVE BOX
Follow Up:  fever    Interval History/ROS: no more fever, has  rhinorrhea no  vomiting or chest pain, COVID came back positive            Allergies  No Known Drug Allergies  shellfish (Hives)        ANTIMICROBIALS:  remdesivir  IVPB    remdesivir  IVPB 100 every 24 hours      OTHER MEDS:  acetaminophen     Tablet .. 975 milliGRAM(s) Oral every 8 hours  aluminum hydroxide/magnesium hydroxide/simethicone Suspension 30 milliLiter(s) Oral every 4 hours PRN  aspirin enteric coated 81 milliGRAM(s) Oral two times a day  calcium acetate 1334 milliGRAM(s) Oral three times a day with meals  chlorhexidine 2% Cloths 1 Application(s) Topical daily  epoetin carito-epbx (RETACRIT) Injectable 74636 Unit(s) IV Push <User Schedule>  heparin   Injectable 5000 Unit(s) SubCutaneous every 8 hours  metoprolol tartrate 25 milliGRAM(s) Oral two times a day  ondansetron Injectable 4 milliGRAM(s) IV Push every 6 hours PRN  oxyCODONE    IR 10 milliGRAM(s) Oral every 4 hours PRN  oxyCODONE    IR 15 milliGRAM(s) Oral every 4 hours PRN  polyethylene glycol 3350 17 Gram(s) Oral daily  senna 2 Tablet(s) Oral at bedtime      Vital Signs Last 24 Hrs  T(C): 36.9 (20 Mar 2024 08:34), Max: 37.7 (20 Mar 2024 00:12)  T(F): 98.5 (20 Mar 2024 08:34), Max: 99.8 (20 Mar 2024 00:12)  HR: 78 (20 Mar 2024 08:34) (78 - 100)  BP: 125/68 (20 Mar 2024 08:34) (119/67 - 138/76)  BP(mean): --  RR: 18 (20 Mar 2024 08:34) (18 - 18)  SpO2: 99% (20 Mar 2024 08:34) (98% - 100%)    Parameters below as of 20 Mar 2024 08:34  Patient On (Oxygen Delivery Method): room air        Physical Exam:  General:    NAD, non toxic  Respiratory:   comfortable on RA  abd:   soft, BS +, not tender  :     no CVAT, no suprapubic tenderness, no mayes  Musculoskeletal : no joint swelling, no edema  Skin:    no rash  vascular: RUE AVF with no tenderness                          7.8    4.14  )-----------( 239      ( 19 Mar 2024 15:05 )             24.2       03-19    137  |  94<L>  |  67<H>  ----------------------------<  93  5.2   |  25  |  8.25<H>    Ca    9.1      19 Mar 2024 15:05  Phos  5.6     03-19  Mg     2.5     03-19        Urinalysis Basic - ( 19 Mar 2024 15:05 )    Color: x / Appearance: x / SG: x / pH: x  Gluc: 93 mg/dL / Ketone: x  / Bili: x / Urobili: x   Blood: x / Protein: x / Nitrite: x   Leuk Esterase: x / RBC: x / WBC x   Sq Epi: x / Non Sq Epi: x / Bacteria: x        MICROBIOLOGY:  v  .Blood Blood-Peripheral  03-17-24   No growth at 72 Hours  --  --      .Blood Blood-Peripheral  03-17-24   No growth at 72 Hours  --  --      .Blood Blood  03-11-24   No growth at 5 days  --  --      .Blood Blood-Venous  03-06-24   No growth at 5 days  --  --      .Blood Blood  03-06-24   No growth at 5 days  --  --                RADIOLOGY:  Images independently visualized and reviewed personally, findings as below  < from: Xray Chest 1 View- PORTABLE-Routine (Xray Chest 1 View- PORTABLE-Routine in AM.) (03.18.24 @ 09:42) >  Impression:    No acute pulmonary disease.      < end of copied text >  < from: [facility] Duplex Lower Ext Vein Scan, Bilat (03.14.24 @ 16:26) >    IMPRESSION:  No evidence of deep venous thrombosis in either lower extremity.    < end of copied text >

## 2024-03-20 NOTE — PROVIDER CONTACT NOTE (CRITICAL VALUE NOTIFICATION) - ACTION/TREATMENT ORDERED:
PA made aware. No interventions at this time. Pt will remain on Airborne/Contact precautions alone in room. Will continue to monitor.
No new orders at this time
PA aware,Stat Metabolic ordered.pt. going to Dialysis for treatment.

## 2024-03-20 NOTE — PROGRESS NOTE ADULT - ASSESSMENT
38 Y M H/O ESRD , HTN  admitted with R hip pain /R knee pain s/p fall as he was being transferred to chair with help   no head trauma, + R. knee pain + R. thigh pain s/p fall.     spiked fever : 101.6 F   tejeda c/s    COVID positive   hold off on CT a/p   start remdisivir   can dc abx   significanly elevated D-dimer : start lovenox     Femoral neck fx : s/p ORIF   ortho following   surgical site looks ok     ESRD: HD   c/w HD TIW  renal following      acute on chronic anemia : transfused  monitor and transfuse prn   today Hb 7.1   may need another unit of transfusion tomorrow      HTN   c/w toprol   controlled

## 2024-03-20 NOTE — PROVIDER CONTACT NOTE (CRITICAL VALUE NOTIFICATION) - SITUATION
Critical value, hemoglobin 6.4 hematocrit 20
Potassium 6.7
Pt A&Ox4, VSS. Pt second test resulted (+) for COVID-19. Denies SOB or chest pain.

## 2024-03-20 NOTE — PROGRESS NOTE ADULT - SUBJECTIVE AND OBJECTIVE BOX
Date of service: 03-20-24 @ 17:49      Patient is a 38y old  Male who presents with a chief complaint of Fracture of unspecified part of neck of right femur, initial encounter for closed fracture     (14 Mar 2024 10:59)                                                               INTERVAL HPI/OVERNIGHT EVENTS:    REVIEW OF SYSTEMS:     CONSTITUTIONAL: No weakness, fevers or chills  RESPIRATORY: No cough, wheezing,  No shortness of breath  CARDIOVASCULAR: No chest pain or palpitations  GASTROINTESTINAL: No abdominal pain  . No nausea, vomiting, or hematemesis; No diarrhea or constipation. No melena or hematochezia.  GENITOURINARY: No dysuria, frequency or hematuria  NEUROLOGICAL: No numbness or weakness                                                                                                                                                                                                                                                                                 Medications:  MEDICATIONS  (STANDING):  acetaminophen     Tablet .. 975 milliGRAM(s) Oral every 8 hours  aspirin enteric coated 81 milliGRAM(s) Oral two times a day  calcium acetate 1334 milliGRAM(s) Oral three times a day with meals  chlorhexidine 2% Cloths 1 Application(s) Topical daily  epoetin carito-epbx (RETACRIT) Injectable 35480 Unit(s) IV Push <User Schedule>  heparin   Injectable 5000 Unit(s) SubCutaneous every 8 hours  metoprolol tartrate 25 milliGRAM(s) Oral two times a day  polyethylene glycol 3350 17 Gram(s) Oral daily  remdesivir  IVPB   IV Intermittent   remdesivir  IVPB 100 milliGRAM(s) IV Intermittent every 24 hours  senna 2 Tablet(s) Oral at bedtime    MEDICATIONS  (PRN):  aluminum hydroxide/magnesium hydroxide/simethicone Suspension 30 milliLiter(s) Oral every 4 hours PRN Dyspepsia  ondansetron Injectable 4 milliGRAM(s) IV Push every 6 hours PRN Nausea and/or Vomiting  oxyCODONE    IR 10 milliGRAM(s) Oral every 4 hours PRN Moderate Pain (4 - 6)  oxyCODONE    IR 15 milliGRAM(s) Oral every 4 hours PRN Severe Pain (7 - 10)       Allergies    No Known Drug Allergies  shellfish (Hives)    Intolerances      Vital Signs Last 24 Hrs  T(C): 37.4 (20 Mar 2024 14:18), Max: 37.7 (20 Mar 2024 00:12)  T(F): 99.4 (20 Mar 2024 14:18), Max: 99.8 (20 Mar 2024 00:12)  HR: 94 (20 Mar 2024 14:18) (78 - 100)  BP: 135/76 (20 Mar 2024 14:18) (125/68 - 135/76)  BP(mean): --  RR: 18 (20 Mar 2024 14:18) (18 - 18)  SpO2: 96% (20 Mar 2024 14:18) (96% - 100%)    Parameters below as of 20 Mar 2024 14:18  Patient On (Oxygen Delivery Method): room air      CAPILLARY BLOOD GLUCOSE          03-19 @ 07:01  -  03-20 @ 07:00  --------------------------------------------------------  IN: 1140 mL / OUT: 0 mL / NET: 1140 mL      Physical Exam:    Daily     Daily   General: no edema   HEENT:  Nonicteric, PERRLA  CV:  RRR, S1S2   Lungs:  CTA B/L, no wheezes, rales, rhonchi  Abdomen:  Soft, non-tender, no distended, positive BS  Extremities: trace edema                                                                                                                                                                                                                                                                                                 LABS:                               7.1    4.57  )-----------( 253      ( 20 Mar 2024 17:23 )             22.6                      03-20    137  |  95<L>  |  88<H>  ----------------------------<  101<H>  5.4<H>   |  24  |  9.81<H>    Ca    9.5      20 Mar 2024 17:23  Phos  6.2     03-20  Mg     2.5     03-20    TPro  6.4  /  Alb  3.2<L>  /  TBili  0.2  /  DBili  x   /  AST  22  /  ALT  8<L>  /  AlkPhos  671<H>  03-20                       RADIOLOGY & ADDITIONAL TESTS         I personally reviewed: [  ]EKG   [  ]CXR    [  ] CT      A/P:         Discussed with :     Jen consultants' Notes   Time spent :

## 2024-03-20 NOTE — PROGRESS NOTE ADULT - SUBJECTIVE AND OBJECTIVE BOX
[facility] NEPHROLOGY SERVICES, [facility]  NEPHROLOGY AND HYPERTENSION  [address]  [address]  [place] [address]  [phone]    MD ADRIA GRAMAJO MD YELENA ROSENBERG, MD BINNY KOSHY, MD CHRISTOPHER CAPUTO, MD EDWARD BOVER, MD          Patient events noted  No distress    MEDICATIONS  (STANDING):  acetaminophen     Tablet .. 975 milliGRAM(s) Oral every 8 hours  aspirin enteric coated 81 milliGRAM(s) Oral two times a day  calcium acetate 1334 milliGRAM(s) Oral three times a day with meals  chlorhexidine 2% Cloths 1 Application(s) Topical daily  enoxaparin Injectable 40 milliGRAM(s) SubCutaneous every 24 hours  epoetin carito-epbx (RETACRIT) Injectable 54490 Unit(s) IV Push <User Schedule>  metoprolol tartrate 25 milliGRAM(s) Oral two times a day  polyethylene glycol 3350 17 Gram(s) Oral daily  remdesivir  IVPB   IV Intermittent   remdesivir  IVPB 100 milliGRAM(s) IV Intermittent every 24 hours  senna 2 Tablet(s) Oral at bedtime    MEDICATIONS  (PRN):  aluminum hydroxide/magnesium hydroxide/simethicone Suspension 30 milliLiter(s) Oral every 4 hours PRN Dyspepsia  ondansetron Injectable 4 milliGRAM(s) IV Push every 6 hours PRN Nausea and/or Vomiting  oxyCODONE    IR 10 milliGRAM(s) Oral every 4 hours PRN Moderate Pain (4 - 6)  oxyCODONE    IR 15 milliGRAM(s) Oral every 4 hours PRN Severe Pain (7 - 10)      03-19-24 @ 07:01  -  03-20-24 @ 07:00  --------------------------------------------------------  IN: 1140 mL / OUT: 0 mL / NET: 1140 mL    03-20-24 @ 07:01  -  03-20-24 @ 20:58  --------------------------------------------------------  IN: 1140 mL / OUT: 1800 mL / NET: -660 mL      PHYSICAL EXAM:      T(C): 36.9 (03-20-24 @ 18:30), Max: 37.7 (03-20-24 @ 00:12)  HR: 84 (03-20-24 @ 18:30) (78 - 100)  BP: 125/59 (03-20-24 @ 18:30) (125/59 - 135/76)  RR: 18 (03-20-24 @ 18:30) (18 - 18)  SpO2: 98% (03-20-24 @ 18:30) (96% - 100%)  Wt(kg): --  Lungs clear  Heart S1S2  Abd soft NT ND  Extremities:   tr edema  +avf + bruit and thrill                                     7.1    4.57  )-----------( 253      ( 20 Mar 2024 17:23 )             22.6     03-20    137  |  95<L>  |  88<H>  ----------------------------<  101<H>  5.4<H>   |  24  |  9.81<H>    Ca    9.5      20 Mar 2024 17:23  Phos  6.2     03-20  Mg     2.5     03-20    TPro  6.4  /  Alb  3.2<L>  /  TBili  0.2  /  DBili  x   /  AST  22  /  ALT  8<L>  /  AlkPhos  671<H>  03-20      LIVER FUNCTIONS - ( 20 Mar 2024 17:23 )  Alb: 3.2 g/dL / Pro: 6.4 g/dL / ALK PHOS: 671 U/L / ALT: 8 U/L / AST: 22 U/L / GGT: x           Creatinine Trend: 9.81<--, 8.25<--, 11.21<--, 8.72<--, 8.53<--, 8.46<--        Assessment   ESRD, maintenance   COVID +      Plan:  HD for today  UF as tolerated   Supportive care      Darrell Ortiz MD

## 2024-03-21 LAB
ALBUMIN SERPL ELPH-MCNC: 3.3 G/DL — SIGNIFICANT CHANGE UP (ref 3.3–5)
ALP SERPL-CCNC: 674 U/L — HIGH (ref 40–120)
ALT FLD-CCNC: 10 U/L — SIGNIFICANT CHANGE UP (ref 10–45)
ANION GAP SERPL CALC-SCNC: 15 MMOL/L — SIGNIFICANT CHANGE UP (ref 5–17)
AST SERPL-CCNC: 19 U/L — SIGNIFICANT CHANGE UP (ref 10–40)
BILIRUB DIRECT SERPL-MCNC: <0.1 MG/DL — SIGNIFICANT CHANGE UP (ref 0–0.3)
BILIRUB INDIRECT FLD-MCNC: >0.1 MG/DL — LOW (ref 0.2–1)
BILIRUB SERPL-MCNC: 0.2 MG/DL — SIGNIFICANT CHANGE UP (ref 0.2–1.2)
BLD GP AB SCN SERPL QL: POSITIVE — SIGNIFICANT CHANGE UP
BUN SERPL-MCNC: 58 MG/DL — HIGH (ref 7–23)
CALCIUM SERPL-MCNC: 9.9 MG/DL — SIGNIFICANT CHANGE UP (ref 8.4–10.5)
CHLORIDE SERPL-SCNC: 97 MMOL/L — SIGNIFICANT CHANGE UP (ref 96–108)
CO2 SERPL-SCNC: 27 MMOL/L — SIGNIFICANT CHANGE UP (ref 22–31)
CREAT SERPL-MCNC: 7.42 MG/DL — HIGH (ref 0.5–1.3)
CREAT SERPL-MCNC: 7.49 MG/DL — HIGH (ref 0.5–1.3)
EGFR: 9 ML/MIN/1.73M2 — LOW
EGFR: 9 ML/MIN/1.73M2 — LOW
GLUCOSE SERPL-MCNC: 83 MG/DL — SIGNIFICANT CHANGE UP (ref 70–99)
HCT VFR BLD CALC: 24.8 % — LOW (ref 39–50)
HGB BLD-MCNC: 7.6 G/DL — LOW (ref 13–17)
INR BLD: 1.04 RATIO — SIGNIFICANT CHANGE UP (ref 0.85–1.18)
MCHC RBC-ENTMCNC: 30.6 GM/DL — LOW (ref 32–36)
MCHC RBC-ENTMCNC: 30.6 PG — SIGNIFICANT CHANGE UP (ref 27–34)
MCV RBC AUTO: 100 FL — SIGNIFICANT CHANGE UP (ref 80–100)
NRBC # BLD: 0 /100 WBCS — SIGNIFICANT CHANGE UP (ref 0–0)
PLATELET # BLD AUTO: 244 K/UL — SIGNIFICANT CHANGE UP (ref 150–400)
POTASSIUM SERPL-MCNC: 4.6 MMOL/L — SIGNIFICANT CHANGE UP (ref 3.5–5.3)
POTASSIUM SERPL-SCNC: 4.6 MMOL/L — SIGNIFICANT CHANGE UP (ref 3.5–5.3)
PROT SERPL-MCNC: 6.5 G/DL — SIGNIFICANT CHANGE UP (ref 6–8.3)
PROTHROM AB SERPL-ACNC: 11.4 SEC — SIGNIFICANT CHANGE UP (ref 9.5–13)
RBC # BLD: 2.48 M/UL — LOW (ref 4.2–5.8)
RBC # FLD: 16.4 % — HIGH (ref 10.3–14.5)
RH IG SCN BLD-IMP: POSITIVE — SIGNIFICANT CHANGE UP
SODIUM SERPL-SCNC: 139 MMOL/L — SIGNIFICANT CHANGE UP (ref 135–145)
WBC # BLD: 3.89 K/UL — SIGNIFICANT CHANGE UP (ref 3.8–10.5)
WBC # FLD AUTO: 3.89 K/UL — SIGNIFICANT CHANGE UP (ref 3.8–10.5)

## 2024-03-21 PROCEDURE — 86077 PHYS BLOOD BANK SERV XMATCH: CPT

## 2024-03-21 RX ORDER — SEVELAMER CARBONATE 2400 MG/1
800 POWDER, FOR SUSPENSION ORAL
Refills: 0 | Status: DISCONTINUED | OUTPATIENT
Start: 2024-03-21 | End: 2024-03-27

## 2024-03-21 RX ORDER — OXYCODONE HYDROCHLORIDE 5 MG/1
15 TABLET ORAL EVERY 4 HOURS
Refills: 0 | Status: DISCONTINUED | OUTPATIENT
Start: 2024-03-21 | End: 2024-03-27

## 2024-03-21 RX ORDER — OXYCODONE HYDROCHLORIDE 5 MG/1
10 TABLET ORAL EVERY 4 HOURS
Refills: 0 | Status: DISCONTINUED | OUTPATIENT
Start: 2024-03-21 | End: 2024-03-27

## 2024-03-21 RX ADMIN — Medication 25 MILLIGRAM(S): at 18:41

## 2024-03-21 RX ADMIN — OXYCODONE HYDROCHLORIDE 15 MILLIGRAM(S): 5 TABLET ORAL at 09:41

## 2024-03-21 RX ADMIN — Medication 975 MILLIGRAM(S): at 07:07

## 2024-03-21 RX ADMIN — Medication 1334 MILLIGRAM(S): at 08:08

## 2024-03-21 RX ADMIN — Medication 81 MILLIGRAM(S): at 07:07

## 2024-03-21 RX ADMIN — Medication 25 MILLIGRAM(S): at 07:06

## 2024-03-21 RX ADMIN — Medication 975 MILLIGRAM(S): at 22:29

## 2024-03-21 RX ADMIN — REMDESIVIR 200 MILLIGRAM(S): 5 INJECTION INTRAVENOUS at 18:41

## 2024-03-21 RX ADMIN — APIXABAN 2.5 MILLIGRAM(S): 2.5 TABLET, FILM COATED ORAL at 22:29

## 2024-03-21 RX ADMIN — OXYCODONE HYDROCHLORIDE 10 MILLIGRAM(S): 5 TABLET ORAL at 22:45

## 2024-03-21 RX ADMIN — OXYCODONE HYDROCHLORIDE 10 MILLIGRAM(S): 5 TABLET ORAL at 23:25

## 2024-03-21 RX ADMIN — OXYCODONE HYDROCHLORIDE 15 MILLIGRAM(S): 5 TABLET ORAL at 09:11

## 2024-03-21 RX ADMIN — SEVELAMER CARBONATE 800 MILLIGRAM(S): 2400 POWDER, FOR SUSPENSION ORAL at 18:40

## 2024-03-21 RX ADMIN — Medication 975 MILLIGRAM(S): at 13:47

## 2024-03-21 RX ADMIN — CHLORHEXIDINE GLUCONATE 1 APPLICATION(S): 213 SOLUTION TOPICAL at 10:13

## 2024-03-21 RX ADMIN — APIXABAN 2.5 MILLIGRAM(S): 2.5 TABLET, FILM COATED ORAL at 08:08

## 2024-03-21 RX ADMIN — Medication 81 MILLIGRAM(S): at 18:40

## 2024-03-21 NOTE — ADVANCED PRACTICE NURSE CONSULT - REASON FOR CONSULT
Wound care consult initiated by RN to assess patient's skin for a sacral deep tissue injury.      History of Present Illness:  History of Present Illness:   38 Y M H/O ESRD , HTN  admitted with R hip pain /R knee pain s/p fall as he was being transferred to chair with help   no head trauma, + R. knee pain + R. thigh pain s/p fall.   1.  Acute Garden type I minimally valgus impacted transcervical right   femoral neck fracture.  2.  Subacute appearingminimally impacted right pubic body and inferior   rami fractures.    seen by ortho   planned OR tommrw

## 2024-03-21 NOTE — PROGRESS NOTE ADULT - ASSESSMENT
38 Y M H/O ESRD , HTN  admitted with R hip pain /R knee pain s/p fall as he was being transferred to chair with help   no head trauma, + R. knee pain + R. thigh pain s/p fall.     spiked fever : 101.6 F   tejeda c/s    COVID positive : remdisivir   off  dc abx   significanly elevated D-dimer : start eliquis      Femoral neck fx : s/p ORIF   ortho following   surgical site looks ok     ESRD: HD   c/w HD TIW  renal following      acute on chronic anemia : transfused  monitor and transfuse prn   today Hb 7.1   may need another unit of transfusion tomorrow      HTN   c/w toprol   controlled     diarreah : i fcontinues will check c diff and gi pcr

## 2024-03-21 NOTE — PROGRESS NOTE ADULT - SUBJECTIVE AND OBJECTIVE BOX
Denies complaints, dx w/COVID    Vital Signs Last 24 Hrs  T(C): 37.1 (03-21-24 @ 13:11), Max: 37.1 (03-21-24 @ 13:11)  T(F): 98.7 (03-21-24 @ 13:11), Max: 98.7 (03-21-24 @ 13:11)  HR: 82 (03-21-24 @ 13:11) (75 - 84)  BP: 117/78 (03-21-24 @ 13:11) (104/67 - 132/78)  RR: 18 (03-21-24 @ 13:11) (18 - 18)  SpO2: 98% (03-21-24 @ 13:11) (97% - 99%)    I&O's Detail    20 Mar 2024 07:01  -  21 Mar 2024 07:00  --------------------------------------------------------  IN:    IV PiggyBack: 100 mL    Oral Fluid: 480 mL    Other (mL): 800 mL  Total IN: 1380 mL    OUT:    Other (mL): 1800 mL    Voided (mL): 0 mL  Total OUT: 1800 mL    s1s2  b/l air entry  soft, ND  sm edema                                                                                        7.6    3.89  )-----------( 244      ( 21 Mar 2024 08:36 )             24.8     21 Mar 2024 08:36    139    |  97     |  58     ----------------------------<  83     4.6     |  27     |  7.42     Ca    9.9        21 Mar 2024 08:36  Phos  6.2       20 Mar 2024 17:23  Mg     2.5       20 Mar 2024 17:23    TPro  6.5    /  Alb  3.3    /  TBili  0.2    /  DBili  <0.1   /  AST  19     /  ALT  10     /  AlkPhos  674    21 Mar 2024 08:36    LIVER FUNCTIONS - ( 21 Mar 2024 08:36 )  Alb: 3.3 g/dL / Pro: 6.5 g/dL / ALK PHOS: 674 U/L / ALT: 10 U/L / AST: 19 U/L / GGT: x           PT/INR - ( 21 Mar 2024 08:36 )   PT: 11.4 sec;   INR: 1.04 ratio      acetaminophen     Tablet .. 975 milliGRAM(s) Oral every 8 hours  aluminum hydroxide/magnesium hydroxide/simethicone Suspension 30 milliLiter(s) Oral every 4 hours PRN  apixaban 2.5 milliGRAM(s) Oral two times a day  aspirin enteric coated 81 milliGRAM(s) Oral two times a day  calcium acetate 1334 milliGRAM(s) Oral three times a day with meals  chlorhexidine 2% Cloths 1 Application(s) Topical daily  epoetin carito-epbx (RETACRIT) Injectable 20103 Unit(s) IV Push <User Schedule>  metoprolol tartrate 25 milliGRAM(s) Oral two times a day  ondansetron Injectable 4 milliGRAM(s) IV Push every 6 hours PRN  oxyCODONE    IR 10 milliGRAM(s) Oral every 4 hours PRN  oxyCODONE    IR 15 milliGRAM(s) Oral every 4 hours PRN  polyethylene glycol 3350 17 Gram(s) Oral daily  remdesivir  IVPB   IV Intermittent   remdesivir  IVPB 100 milliGRAM(s) IV Intermittent every 24 hours  senna 2 Tablet(s) Oral at bedtime    A/P:    ESRD on HD   S/p fall, R hip fx  S/p R hip HA 3/7  Tested positive for COVID   HD tomorrow as ordered  Epoetin w/HD 3 x week   Renal diet  Renvela as ordered     [phone]

## 2024-03-21 NOTE — ADVANCED PRACTICE NURSE CONSULT - RECOMMEDATIONS
Impression  bilateral sacrum/coccyx deep tissue injury with evolution     Recommendations   1. Bilateral sacrum/coccyx   deep tissue injury with evolution     Topical therapy- sacral/bilateral buttocks- cleanse w/incontinent cleanser, pat dry & apply TRAID   twice daily & prn soiling . Monitor  for changes .  2. Right and left heel Elevate heels; apply Complete Cair air fluidized boots; ensure that the soles of the feet are not resting on the foot board of the bed.  3  Incontinent management - incontinent cleanser, pads,  bianca care  BID  4. Maintain on an alternating air with low air loss surface   5. Turn & reposition every 2 hr; Use positioning pillow to turn and reposition, soft pillow between bony prominences; continue measures to decrease friction/shear/pressure.  6. Nutrition optimization.  7.  Place waffle cushion when out of bed to chair .   plan of care reviewed with covering staff Yeni Saunders)

## 2024-03-21 NOTE — ADVANCED PRACTICE NURSE CONSULT - ASSESSMENT
arrived on unit, patient was found lying in a low air loss pressure redistribution support surface style bed. The patient  is unable to turn independently and staff assistance x 1was provided. Once turned, able to view his skin.   bilateral sacrum/ coccyx  non-blanchable deep red, maroon / purple discoloration and hyperpigmentation with an coccyx area that is crusted  in process of healing  consistent with deep tissue with evolution  size approximately 5 cm x 5  cm x 0.1 cm.   patient  was educated  on the importance  for turning  and positioning  every 2 hours. The use of waffle cushion when out of bed  to chair  and to shift his Weight every 2 hours while in chair. The importance a keeping skin clean and dry and  to offload feet/heels , and optimal nutrition. arrived on unit, patient was found lying in a low air loss pressure redistribution support surface style bed. The patient  is unable to turn independently and staff assistance x 1was provided. Once turned, able to view his skin.   bilateral sacrum/ coccyx  non-blanchable deep  maroon / purple discoloration and hyperpigmentation with an coccyx area that is crusted  in process of healing  consistent with deep tissue with evolution  size approximately 5 cm x 5  cm x 0.1 cm.   patient  was educated  on the importance  for turning  and positioning  every 2 hours. The use of waffle cushion when out of bed  to chair  and to shift his Weight every 2 hours while in chair. The importance a keeping skin clean and dry and  to offload feet/heels , and optimal nutrition.

## 2024-03-21 NOTE — PROGRESS NOTE ADULT - SUBJECTIVE AND OBJECTIVE BOX
Date of service: 03-21-24 @ 18:32      Patient is a 38y old  Male who presents with a chief complaint of Fracture of unspecified part of neck of right femur, initial encounter for closed fracture     (14 Mar 2024 10:59)                                                               INTERVAL HPI/OVERNIGHT EVENTS:    REVIEW OF SYSTEMS:     CONSTITUTIONAL: No weakness, fevers or chills  EYES/ENT: No visual changes , no ear ache   NECK: No pain or stiffness  RESPIRATORY: No cough, wheezing,  No shortness of breath  CARDIOVASCULAR: No chest pain or palpitations  GASTROINTESTINAL: No abdominal pain  . No nausea, vomiting, or hematemesis; No diarrhea or constipation. No melena or hematochezia.  GENITOURINARY: No dysuria, frequency or hematuria  NEUROLOGICAL: No numbness or weakness  SKIN: No itching, burning, rashes, or lesions                                                                                                                                                                                                                                                                                 Medications:  MEDICATIONS  (STANDING):  acetaminophen     Tablet .. 975 milliGRAM(s) Oral every 8 hours  apixaban 2.5 milliGRAM(s) Oral two times a day  aspirin enteric coated 81 milliGRAM(s) Oral two times a day  chlorhexidine 2% Cloths 1 Application(s) Topical daily  epoetin carito-epbx (RETACRIT) Injectable 25931 Unit(s) IV Push <User Schedule>  metoprolol tartrate 25 milliGRAM(s) Oral two times a day  polyethylene glycol 3350 17 Gram(s) Oral daily  senna 2 Tablet(s) Oral at bedtime  sevelamer carbonate 800 milliGRAM(s) Oral three times a day with meals    MEDICATIONS  (PRN):  aluminum hydroxide/magnesium hydroxide/simethicone Suspension 30 milliLiter(s) Oral every 4 hours PRN Dyspepsia  ondansetron Injectable 4 milliGRAM(s) IV Push every 6 hours PRN Nausea and/or Vomiting  oxyCODONE    IR 15 milliGRAM(s) Oral every 4 hours PRN Severe Pain (7 - 10)  oxyCODONE    IR 10 milliGRAM(s) Oral every 4 hours PRN Moderate Pain (4 - 6)       Allergies    No Known Drug Allergies  shellfish (Hives)    Intolerances      Vital Signs Last 24 Hrs  afeb  stable                 Physical Exam:    Daily     Daily   General:  Well appearing, NAD, not cachetic  HEENT:  Nonicteric, PERRLA  CV:  RRR, S1S2   Lungs:  CTA B/L, no wheezes, rales, rhonchi  Abdomen:  Soft, non-tender, no distended, positive BS  Extremities:  2+ pulses, no c/c, no edema  Skin:  Warm and dry, no rashes  :  No mayes  Neuro:  AAOx3, non-focal, grossly intact                                                                                                                                                                                                                                                                                                LABS:                               7.6    3.89  )-----------( 244      ( 21 Mar 2024 08:36 )             24.8                      03-22

## 2024-03-22 LAB
ALBUMIN SERPL ELPH-MCNC: 3.1 G/DL — LOW (ref 3.3–5)
ALP SERPL-CCNC: 620 U/L — HIGH (ref 40–120)
ALT FLD-CCNC: 6 U/L — LOW (ref 10–45)
AST SERPL-CCNC: 14 U/L — SIGNIFICANT CHANGE UP (ref 10–40)
BILIRUB DIRECT SERPL-MCNC: <0.1 MG/DL — SIGNIFICANT CHANGE UP (ref 0–0.3)
BILIRUB INDIRECT FLD-MCNC: >0.1 MG/DL — LOW (ref 0.2–1)
BILIRUB SERPL-MCNC: 0.2 MG/DL — SIGNIFICANT CHANGE UP (ref 0.2–1.2)
CREAT SERPL-MCNC: 8.87 MG/DL — HIGH (ref 0.5–1.3)
CULTURE RESULTS: SIGNIFICANT CHANGE UP
CULTURE RESULTS: SIGNIFICANT CHANGE UP
EGFR: 7 ML/MIN/1.73M2 — LOW
INR BLD: 1.12 RATIO — SIGNIFICANT CHANGE UP (ref 0.85–1.18)
PROT SERPL-MCNC: 6.5 G/DL — SIGNIFICANT CHANGE UP (ref 6–8.3)
PROTHROM AB SERPL-ACNC: 11.7 SEC — SIGNIFICANT CHANGE UP (ref 9.5–13)
SPECIMEN SOURCE: SIGNIFICANT CHANGE UP
SPECIMEN SOURCE: SIGNIFICANT CHANGE UP

## 2024-03-22 PROCEDURE — 74018 RADEX ABDOMEN 1 VIEW: CPT | Mod: 26

## 2024-03-22 RX ADMIN — Medication 81 MILLIGRAM(S): at 17:29

## 2024-03-22 RX ADMIN — Medication 30 MILLILITER(S): at 16:00

## 2024-03-22 RX ADMIN — SEVELAMER CARBONATE 800 MILLIGRAM(S): 2400 POWDER, FOR SUSPENSION ORAL at 08:48

## 2024-03-22 RX ADMIN — Medication 975 MILLIGRAM(S): at 23:02

## 2024-03-22 RX ADMIN — SEVELAMER CARBONATE 800 MILLIGRAM(S): 2400 POWDER, FOR SUSPENSION ORAL at 17:29

## 2024-03-22 RX ADMIN — APIXABAN 2.5 MILLIGRAM(S): 2.5 TABLET, FILM COATED ORAL at 08:47

## 2024-03-22 RX ADMIN — SEVELAMER CARBONATE 800 MILLIGRAM(S): 2400 POWDER, FOR SUSPENSION ORAL at 14:50

## 2024-03-22 RX ADMIN — Medication 25 MILLIGRAM(S): at 17:29

## 2024-03-22 RX ADMIN — APIXABAN 2.5 MILLIGRAM(S): 2.5 TABLET, FILM COATED ORAL at 23:02

## 2024-03-22 RX ADMIN — POLYETHYLENE GLYCOL 3350 17 GRAM(S): 17 POWDER, FOR SOLUTION ORAL at 14:50

## 2024-03-22 RX ADMIN — CHLORHEXIDINE GLUCONATE 1 APPLICATION(S): 213 SOLUTION TOPICAL at 14:50

## 2024-03-22 RX ADMIN — ERYTHROPOIETIN 10000 UNIT(S): 10000 INJECTION, SOLUTION INTRAVENOUS; SUBCUTANEOUS at 10:56

## 2024-03-22 RX ADMIN — Medication 975 MILLIGRAM(S): at 23:32

## 2024-03-22 RX ADMIN — Medication 81 MILLIGRAM(S): at 08:47

## 2024-03-22 NOTE — PROGRESS NOTE ADULT - ASSESSMENT
38 Y M H/O ESRD , HTN  admitted with R hip pain /R knee pain s/p fall as he was being transferred to chair with help   no head trauma, + R. knee pain + R. thigh pain s/p fall.     spiked fever : 101.6 F   tejeda c/s    COVID positive : remdisivir   off  dc abx   significanly elevated D-dimer : start eliquis      Femoral neck fx : s/p ORIF   ortho following   surgical site looks ok     ESRD: HD   c/w HD TIW  renal following      acute on chronic anemia : transfused  monitor and transfuse prn   today Hb 7.1   may need another unit of transfusion tomorrow      HTN   c/w toprol   controlled     diarreah :check c diff and gi pcr

## 2024-03-22 NOTE — CHART NOTE - NSCHARTNOTESELECT_GEN_ALL_CORE
Nutrition Services
Ortho POC/Event Note
Orthopaedics/Event Note
hgb 6.4/Event Note
Event Note
fever/Event Note
hyperkalemia and acute anemia/Event Note

## 2024-03-22 NOTE — PROGRESS NOTE ADULT - SUBJECTIVE AND OBJECTIVE BOX
Date of service: 03-22-24 @ 18:35      Patient is a 38y old  Male who presents with a chief complaint of Fracture of unspecified part of neck of right femur, initial encounter for closed fracture     (14 Mar 2024 10:59)                                                               INTERVAL HPI/OVERNIGHT EVENTS:    REVIEW OF SYSTEMS:     CONSTITUTIONAL: No weakness, fevers or chills  EYES/ENT: No visual changes , no ear ache   NECK: No pain or stiffness  RESPIRATORY: No cough, wheezing,  No shortness of breath  CARDIOVASCULAR: No chest pain or palpitations  GASTROINTESTINAL: No abdominal pain  .  diarreha   GENITOURINARY: No dysuria, frequency or hematuria  NEUROLOGICAL: No numbness or weakness  SKIN: No itching, burning, rashes, or lesions                                                                                                                                                                                                                                                                                 Medications:  MEDICATIONS  (STANDING):  acetaminophen     Tablet .. 975 milliGRAM(s) Oral every 8 hours  apixaban 2.5 milliGRAM(s) Oral two times a day  aspirin enteric coated 81 milliGRAM(s) Oral two times a day  chlorhexidine 2% Cloths 1 Application(s) Topical daily  epoetin carito-epbx (RETACRIT) Injectable 54907 Unit(s) IV Push <User Schedule>  metoprolol tartrate 25 milliGRAM(s) Oral two times a day  polyethylene glycol 3350 17 Gram(s) Oral daily  senna 2 Tablet(s) Oral at bedtime  sevelamer carbonate 800 milliGRAM(s) Oral three times a day with meals    MEDICATIONS  (PRN):  aluminum hydroxide/magnesium hydroxide/simethicone Suspension 30 milliLiter(s) Oral every 4 hours PRN Dyspepsia  ondansetron Injectable 4 milliGRAM(s) IV Push every 6 hours PRN Nausea and/or Vomiting  oxyCODONE    IR 15 milliGRAM(s) Oral every 4 hours PRN Severe Pain (7 - 10)  oxyCODONE    IR 10 milliGRAM(s) Oral every 4 hours PRN Moderate Pain (4 - 6)       Allergies    No Known Drug Allergies  shellfish (Hives)    Intolerances      Vital Signs Last 24 Hrs  T(C): 36.8 (22 Mar 2024 17:20), Max: 37.3 (22 Mar 2024 13:34)  T(F): 98.3 (22 Mar 2024 17:20), Max: 99.1 (22 Mar 2024 13:34)  HR: 96 (22 Mar 2024 17:20) (83 - 96)  BP: 139/69 (22 Mar 2024 17:20) (116/64 - 155/78)  BP(mean): --  RR: 18 (22 Mar 2024 17:20) (17 - 18)  SpO2: 100% (22 Mar 2024 17:20) (100% - 100%)    Parameters below as of 22 Mar 2024 17:20  Patient On (Oxygen Delivery Method): room air      CAPILLARY BLOOD GLUCOSE          03-21 @ 07:01  -  03-22 @ 07:00  --------------------------------------------------------  IN: 1120 mL / OUT: 0 mL / NET: 1120 mL    03-22 @ 07:01  -  03-22 @ 18:35  --------------------------------------------------------  IN: 580 mL / OUT: 2000 mL / NET: -1420 mL      Physical Exam:    Daily     Daily   General:  NAD   HEENT:  Nonicteric, PERRLA  CV:  RRR, S1S2   Lungs:  CTA B/L, no wheezes, rales, rhonchi  Abdomen:  Soft, non-tender, no distended, positive BS  Extremities:  2+ pulses, no c/c, no edema  Skin:  Warm and dry, no rashes  :  No mayes  Neuro:  AAOx3, non-focal, grossly intact                                                                                                                                                                                                                                                                                                LABS:                               7.6    3.89  )-----------( 244      ( 21 Mar 2024 08:36 )             24.8                      03-22    x   |  x   |  x   ----------------------------<  x   x    |  x   |  8.87<H>    Ca    9.9      21 Mar 2024 08:36    TPro  6.5  /  Alb  3.1<L>  /  TBili  0.2  /  DBili  <0.1  /  AST  14  /  ALT  6<L>  /  AlkPhos  620<H>  03-22                       RADIOLOGY & ADDITIONAL TESTS         I personally reviewed: [  ]EKG   [  ]CXR    [  ] CT      A/P:         Discussed with :     Jen consultants' Notes   Time spent :

## 2024-03-22 NOTE — CHART NOTE - NSCHARTNOTEFT_GEN_A_CORE
MEDICINE NP    Notified by RN patient with temperature 101F. Seen and examined at bedside. Patient is awake, NAD. Denies HA, CP, SOB, cough, N/V, or abd pain.    VITAL SIGNS:  T(C): 38.3 (03-19-24 @ 00:13), Max: 38.3 (03-19-24 @ 00:13)  HR: 93 (03-19-24 @ 00:13) (61 - 104)  BP: 111/57 (03-19-24 @ 00:13) (111/57 - 144/75)  RR: 18 (03-19-24 @ 00:13) (17 - 18)  SpO2: 95% (03-19-24 @ 00:13) (95% - 100%)  Wt(kg): --      LABORATORY:                          7.8    3.52  )-----------( 261      ( 18 Mar 2024 11:33 )             24.8       03-18    136  |  92<L>  |  94<H>  ----------------------------<  98  5.5<H>   |  27  |  11.21<H>    Ca    9.4      18 Mar 2024 11:32            PHYSICAL EXAM:    Constitutional: AOx3. NAD.    Respiratory: diminished lung sound at base bilaterally. No wheezing or crackles.    Cardiovascular: S1 S 2 +    Gastrointestinal: BS X4 active. soft. nontender.          ASSESSMENT/PLAN:     38 Y M H/O ESRD , HTN  admitted with R hip pain /R knee pain s/p fall as he was being transferred to chair with help   no head trauma, + R. knee pain + R. thigh pain s/p fall.   1.  Acute Garden type I minimally valgus impacted transcervical right femoral neck fracture.  2.  Subacute appearing minimally impacted right pubic body and inferior rami fractures.  seen by ortho   planned OR tommrw  (06 Mar 2024 18:22)        1) Fever of unknown etiology:  -tylenol and cooling measures prn for pyrexia  -BC x2, UA/UC, and CXR done within 24hr. Pending Cx results  -c/w current IV ATB's  -NS 250cc iv bolus x1 for hydration  -ID re-consult  -cont assess and monitor  -F/U primary team in AM
NP Note (episodic)     CC: Pt seen at bedside, noted with fever on routine vital signs. Pt on Tylenol 975 Q 8hrs, had last taken at 10:15 pm, now with fever of 101.4F 2 hours after taking Tylenol. Pt denies any symptoms of HA, sore throat, coughing, sick contact, SOB,  N/V, abd pain or diarrhea. Pt is anuric on HD. Pt does complain of a scratchy throat.    HPI: 39yo M  PMHx ESRD on HD M/W/F via right AV fistula, left hip arthroplasty August 2023, status post subtotal parathyroidectomy December 2022, history of right leg fracture, HTN, anemia of chronic disease, p/w  R leg pain status post fall.  Patient is wheelchair-bound since left hip surgery.  While patient was being transferred out of his wheelchair he fell and landed on his right knee, no head injury, complains of right hip, femur, knee pain.  Pt admitted for R femoral neck fracture and R pubic rami fracture post fall now s/p R Hip hemiarthroplasty with Orthopedics on 3/7, WBAT, s/p PRBC for anemia, CBC daily trending. Pt with fever noted last 3/11 now with recurrent fever today.      REVIEW OF SYSTEMS:  CONSTITUTIONAL: + fever, no weakness or chills  EYES/ENT: + scratchy throat,  no visual changes,  vertigo or throat pain   NECK: No pain or stiffness  RESPIRATORY: No cough, wheezing, hemoptysis; No shortness of breath  CARDIOVASCULAR: No chest pain or palpitations  GASTROINTESTINAL: No abdominal or epigastric pain. No nausea, vomiting, or hematemesis; No diarrhea or constipation. No melena or hematochezia.  MUSCULOSKELETAL: No joint pain, stiffness, or swelling, Normal ROM  GENITOURINARY: No dysuria, frequency or hematuria  NEUROLOGICAL: No numbness or weakness  SKIN: No itching, burning, rashes, or lesions   All other review of systems is negative unless indicated above.    PMH/PSH:  PAST MEDICAL & SURGICAL HISTORY:  HTN (hypertension)    Stroke  age 10    Sleep apnea    Leg fracture, right    Chronic renal failure    Hemodialysis access, AV graft    ESRD (end stage renal disease) on dialysis  since 2013, M-W-F    Anemia, unspecified type    Other hyperparathyroidism    AV fistula  R arm; L arm clotted    History of left hip hemiarthroplasty    S/P parathyroidectomy          Allergies    No Known Drug Allergies  shellfish (Hives)    Intolerances          Physical Exam:    Vital Signs Last 24 Hrs  T(C): 38.6 (17 Mar 2024 00:00), Max: 38.6 (17 Mar 2024 00:00)  T(F): 101.4 (17 Mar 2024 00:00), Max: 101.4 (17 Mar 2024 00:00)  HR: 90 (17 Mar 2024 00:00) (70 - 96)  BP: 123/71 (17 Mar 2024 00:00) (115/69 - 144/82)  BP(mean): --  RR: 18 (17 Mar 2024 00:00) (18 - 18)  SpO2: 96% (17 Mar 2024 00:00) (96% - 100%)    Parameters below as of 17 Mar 2024 00:00  Patient On (Oxygen Delivery Method): room air        General:  NAD, AOx3, nontoxic appearing  Head:  NC/AT, no scleral injection, no JVD   CV: RRR, S1S2   Respiratory: CTA B/L, nonlabored  Abdominal: Soft, NT, ND no palpable mass, no guarding or rebound tenderness  MSK: No BLLE edema, + peripheral pulses, FROM all 4 extremity      Labs:                          7.1    3.96  )-----------( 263      ( 15 Mar 2024 10:35 )             22.9     03-15    140  |  98  |  74<H>  ----------------------------<  95  4.7   |  26  |  8.72<H>    Ca    9.1      15 Mar 2024 10:35  Phos  5.9     03-15        Radiology:  < from: Xray Chest 1 View- PORTABLE-Urgent (Xray Chest 1 View- PORTABLE-Urgent .) (03.11.24 @ 18:44) >      IMPRESSION:  Unchanged right basilar atelectasis.    < end of copied text >        Assessment & Plan:    39yo M  PMHx ESRD on HD M/W/F via right AV fistula, left hip arthroplasty August 2023, status post subtotal parathyroidectomy December 2022, history of right leg fracture, HTN, anemia of chronic disease admitted with R femoral neck fracture and R pubic rami fracture post fall now s/p R Hip hemiarthroplasty with Orthopedics on 3/7, WBAT, s/p PRBC for anemia, CBC daily trending. Pt with fever noted last 3/11 now with recurrent fever today.    - BC x 2 ordered, last BC x 1 on 3/11 NTD  - Pt received 975 mg tylenol at 10: 15 pm, about 2 hours prior, if still febrile at 2: 15 can give 650 mg for 4 hour gap  - Pt refused Flu/COVID swab  - CXR was done 3/11 with no PNA noted, will hold off since it was done 5 days prior without any present hypoxia or SOB.  - f/u CBC in AM   Follow up with Primary Team in AM.    Orville Fenton, RISHI  [id number]
S: hgb 6.4 and potassium 5.4                        6.4    5.28  )-----------( 202      ( 09 Mar 2024 14:59 )             20.0       ````Basic Metabolic Panel - STAT (03.09.24 @ 14:59)   Sodium: 132 mmol/L  Potassium: 5.4 mmol/L  Chloride: 91 mmol/L  Carbon Dioxide: 27 mmol/L  Anion Gap: 14 mmol/L  Blood Urea Nitrogen: 76 mg/dL  Creatinine: 8.72 mg/dL  Glucose: 97 mg/dL  Calcium: 7.9 mg/dL  a/p pt with acute anemia and hyperkalemia  D/W Dr Blanco , of elevated potassium ; pt to get HD this evening and will also administer PRBC in HD.
Nutrition Follow Up Note  Patient seen for: Malnutrition Follow Up.    Chart reviewed, events noted.     Source: [] Patient       [x] Medical Record        [] RN        [] Family at bedside       [x] Other: Team    -If unable to interview patient: [] Trach/Vent/BiPAP  [x] Asleep / not responsive to verbal stimuli during attempted visits.     Diet Order:   Diet, Renal Restrictions:   For patients receiving Renal Replacement - No Protein Restr, No Conc K, No Conc Phos, Low Sodium  Supplement Feeding Modality:  Oral  Nepro Cans or Servings Per Day:  3       Frequency:  Daily (03-14-24)    - Is current order appropriate/adequate? [x] See recommendations below.     - PO intake :   [] >75%  Adequate    [x] 50-75%  Fair       [] <50%  Poor    - Nutrition-related concerns:      - COVID+ on Remdesivir.       - Neph: ESRD on HD; last HD 3/18 with 2L fluid removal. High Phos - ordered for Phoslo.       - Ortho: S/p Hemiarthroplasty of R hip 3/07.       - GI: No current N/V/D/C reported; pt ordered for zofran. Per chart, Last BM 3/19.  Bowel Regimen? [x] Yes: miralax, senna.       Weights:   - Dosing wt: 95 kg (3/07)  - No new wts available per chart at this time - UPDATED WEIGHT NEEDED.   - RD to continue to monitor weight trends as able.     Nutritionally Pertinent MEDICATIONS  (STANDING):  calcium acetate  metoprolol tartrate  polyethylene glycol 3350  remdesivir  IVPB  remdesivir  IVPB  senna    Pertinent Labs: 03-19 @ 15:05: Na 137, BUN 67<H>, Cr 8.25<H>, BG 93, K+ 5.2, Phos 5.6<H>, Mg 2.5, Alk Phos --, ALT/SGPT --, AST/SGOT --, HbA1c --      Skin per nursing documentation: No pressure injuries noted.  Edema per nursing documentation: +1 R hip edema noted.     Estimated Needs:   [x] no change since previous assessment  Energy: 6300-8318 kcal/day (28-32 kcal/kg)  Protein: 103..54 g pro/day (1.2-1.4 g pro/kg)  Fluid needs deferred to team.   Wt used for estimating needs: IBW 86.1 kg     Previous Nutrition Diagnosis: 1) Severe Malnutrition 2) Increased Nutrient Needs   Nutrition Diagnosis is: [x] ongoing  [] resolved [] not applicable     Nutrition Care Plan:  [x] In Progress - being addressed with PO diet, assistance/encouragement with PO intake, oral nutrition supplementation, micronutrient supplementation.   [] Achieved  [] Not applicable    New Nutrition Diagnosis: [x] Not applicable    Nutrition Interventions:     Education Provided   [] Yes:  [x] No: Education not feasible/warranted at this time.     Recommendations:      1) Continue Renal diet as tolerated. Defer texture/consistency to SLP/team.             -- Continue providing Nepro oral nutrition supplements TID to optimize protein-energy intake.   2) Recommend Nephro-pat daily pending no medical contraindications.  3) Continue to monitor PO intake, weight, labs, skin, GI status, and diet.  4) Provide assistance/encouragement with meals PRN to promote adequate PO intake. Freedom food preferences as able.   5) RD remains available for diet education PRN.    Monitoring and Evaluation:   Continue to monitor nutritional intake, tolerance to diet prescription, weights, labs, skin integrity    RD remains available upon request and will follow up per protocol  Luz Elena Sanabria RDN, CDN / TEAMS
Patient seen and examined at bedside.     Aquacel dressing clean, dry and intact.     Dressing and staples removed. Steri strips applied.     Patient to follow up with Dr. León as an outpatient for further evaluation and management.     All of the patient's questions and concerns were answered and addressed.
Resting without complaints.  No Chest Pain, SOB, N/V.    T(C): 36.2 (03-07-24 @ 12:00), Max: 37.5 (03-07-24 @ 05:05)  HR: 86 (03-07-24 @ 12:00) (75 - 100)  BP: 156/73 (03-07-24 @ 12:00) (129/89 - 166/84)  RR: 15 (03-07-24 @ 12:00) (15 - 20)  SpO2: 99% (03-07-24 @ 12:00) (97% - 100%)      Exam:  Alert and [place], No Acute Distress  Card: +S1/S2, RRR  Pulm: CTAB  Laterality: R Hip  Aquacel c/d/i  Calves soft, non-tender bilaterally  +PF/DF/EHL/FHL  SILT  + DP pulse    Xray: IMPRESSION:  Right hip hemiarthroplasty prosthesis currently implanted. Redemonstrated cemented left hip hemiarthroplasty.    Intact and aligned current and prior hardware and no periprosthetic fractures.    Appearance of a narrow lucent zone at cement bone interface of Gruen zone 1 of the left hip prosthesis indeterminate for possible loosening.    Postoperative soft tissue changes.    Surgical skin staples overlie operative site.    Correlate with intraoperative findings.    Redemonstrated generalized osseous stigmata of renal osteodystrophy.    --- End of Report ---                            8.3    8.63  )-----------( 221      ( 07 Mar 2024 12:06 )             26.1    03-07    136  |  95<L>  |  74<H>  ----------------------------<  149<H>  5.3   |  25  |  8.39<H>    Ca    8.7      07 Mar 2024 12:06    TPro  6.9  /  Alb  3.6  /  TBili  0.4  /  DBili  x   /  AST  10  /  ALT  <5<L>  /  AlkPhos  1052<H>  03-06      A/P: 38yMale S/p R Total Hip Arthroplasty. VSS. NAD  -PT/OT-WBAT RLE With Anterior Hip Precautions  -FU AM labs tomorrow  -IS  -DVT PPx: ASA 81mg PO BID, SCDs, early OOb and amb  -Pain Control  -Continue Current Tx as per primary team (Medicine)  -Dispo planning    Gonzalez Cruz PA-C  Orthopedic Surgery Team  Team Pager #[phone]
S: lab reviewed; cbc show hgb 6.4 ,potassium 5.4                        6.4    5.28  )-----------( 202      ( 09 Mar 2024 14:59 )             20.0    HPI:  38 Y M H/O ESRD , HTN  admitted with R hip pain /R knee pain s/p fall as he was being transferred to chair with help   no head trauma, + R. knee pain + R. thigh pain s/p fall.   1.  Acute Garden type I minimally valgus impacted transcervical right   femoral neck fracture.  2.  Subacute appearingminimally impacted right pubic body and inferior   rami fractures.    seen by ortho   s/p Rt femoral neck fracture repair on 3/7 , EBL 500ml.  PE: awake but with pain  Vital Signs Last 24 Hrs  T(C): 37.3 (09 Mar 2024 14:46), Max: 37.9 (09 Mar 2024 08:57)  T(F): 99.1 (09 Mar 2024 14:46), Max: 100.2 (09 Mar 2024 08:57)  HR: 62 (09 Mar 2024 14:46) (62 - 108)  BP: 117/62 (09 Mar 2024 14:46) (117/62 - 144/82)  BP(mean): --  RR: 18 (09 Mar 2024 14:46) (18 - 18)  SpO2: 94% (09 Mar 2024 14:46) (94% - 100%)    Parameters below as of 09 Mar 2024 14:46  Patient On (Oxygen Delivery Method): room air  a/p 38 yrs old male with ESRD s/p Rt femoral hip repair now with HGb 6.4  PRBC in split unit and repeat cbc after , patient next HD on monday .   d/w dr Williamson and ortho

## 2024-03-22 NOTE — PROGRESS NOTE ADULT - SUBJECTIVE AND OBJECTIVE BOX
NEPHROLOGY PROGRESS NOTE    CHIEF COMPLAINT:  ESRD    HPI:  Seen on dialysis.  Access working well.  BP stable.  Removing 2 liters.      EXAM:  Vital Signs Last 24 Hrs  T(C): 36.9 (22 Mar 2024 13:03), Max: 37.2 (21 Mar 2024 18:46)  T(F): 98.5 (22 Mar 2024 13:03), Max: 98.9 (21 Mar 2024 18:46)  HR: 91 (22 Mar 2024 13:03) (83 - 93)  BP: 122/81 (22 Mar 2024 13:03) (116/64 - 155/78)  BP(mean): --  RR: 18 (22 Mar 2024 13:03) (17 - 18)  SpO2: 100% (22 Mar 2024 13:03) (100% - 100%)    Parameters below as of 22 Mar 2024 13:03  Patient On (Oxygen Delivery Method): room air      I&O's Summary    21 Mar 2024 07:01  -  22 Mar 2024 07:00  --------------------------------------------------------  IN: 1120 mL / OUT: 0 mL / NET: 1120 mL    22 Mar 2024 07:01  -  22 Mar 2024 13:24  --------------------------------------------------------  IN: 340 mL / OUT: 0 mL / NET: 340 mL        Conversant, in no apparent distress  Normal respiratory effort, lungs clear bilaterally  Heart RRR with no murmur, no peripheral edema    LABS                        7.6    3.89  )-----------( 244      ( 21 Mar 2024 08:36 )             24.8     03-22    x   |  x   |  x   ----------------------------<  x   x    |  x   |  8.87<H>    Ca    9.9      21 Mar 2024 08:36  Phos  6.2     03-20  Mg     2.5     03-20    TPro  6.5  /  Alb  3.1<L>  /  TBili  0.2  /  DBili  <0.1  /  AST  14  /  ALT  6<L>  /  AlkPhos  620<H>  03-22        A/P:    ESRD on HD   S/p fall, R hip fx  S/p R hip HA 3/7  Tested positive for COVID   Complete HD as ordered  Epoetin w/HD 3 x week   Renal diet  Renvela as ordered     [phone]

## 2024-03-23 RX ADMIN — SEVELAMER CARBONATE 800 MILLIGRAM(S): 2400 POWDER, FOR SUSPENSION ORAL at 17:46

## 2024-03-23 RX ADMIN — Medication 975 MILLIGRAM(S): at 14:59

## 2024-03-23 RX ADMIN — Medication 81 MILLIGRAM(S): at 17:46

## 2024-03-23 RX ADMIN — Medication 25 MILLIGRAM(S): at 17:46

## 2024-03-23 RX ADMIN — APIXABAN 2.5 MILLIGRAM(S): 2.5 TABLET, FILM COATED ORAL at 21:57

## 2024-03-23 RX ADMIN — APIXABAN 2.5 MILLIGRAM(S): 2.5 TABLET, FILM COATED ORAL at 08:41

## 2024-03-23 RX ADMIN — SEVELAMER CARBONATE 800 MILLIGRAM(S): 2400 POWDER, FOR SUSPENSION ORAL at 14:00

## 2024-03-23 RX ADMIN — CHLORHEXIDINE GLUCONATE 1 APPLICATION(S): 213 SOLUTION TOPICAL at 14:00

## 2024-03-23 RX ADMIN — Medication 81 MILLIGRAM(S): at 06:18

## 2024-03-23 RX ADMIN — SEVELAMER CARBONATE 800 MILLIGRAM(S): 2400 POWDER, FOR SUSPENSION ORAL at 08:41

## 2024-03-23 RX ADMIN — Medication 975 MILLIGRAM(S): at 13:59

## 2024-03-23 RX ADMIN — Medication 975 MILLIGRAM(S): at 06:18

## 2024-03-23 RX ADMIN — Medication 25 MILLIGRAM(S): at 06:18

## 2024-03-23 RX ADMIN — Medication 975 MILLIGRAM(S): at 06:48

## 2024-03-23 NOTE — PROGRESS NOTE ADULT - ASSESSMENT
38 Y M H/O ESRD , HTN  admitted with R hip pain /R knee pain s/p fall as he was being transferred to chair with help   no head trauma, + R. knee pain + R. thigh pain s/p fall.     spiked fever : 101.6 F   tejeda c/s    COVID positive : remdisivir   off  dc abx   significanly elevated D-dimer : start eliquis      Femoral neck fx : s/p ORIF   ortho following   surgical site looks ok     ESRD: HD   c/w HD TIW  renal following      acute on chronic anemia : transfused  monitor and transfuse prn   today Hb 7.1   may need another unit of transfusion tomorrow      HTN   c/w toprol   controlled     diarreah : resolved

## 2024-03-23 NOTE — PROVIDER CONTACT NOTE (MEDICATION) - ASSESSMENT
Pt A&Ox4. VSS. Pt refusing tylenol as he does not have any pain.
Pt AxO4 and denies pain
Patient refusing am meds, states "i'm tired I don't need this right now"  VSS
Pt refused dose of Senna, education provided. Pt LBM 3/5, bowel sounds present, pt stated he is passing gas & that he will "have a bowel movement tomorrow".  Pt bladder scan revealed pt was retaining 0ml, s/p dialysis. Pt is anuric.   Low grade fever noted in flowsheet, 975mg tylenol administered as ordered. Pt pending UA.

## 2024-03-23 NOTE — PROVIDER CONTACT NOTE (MEDICATION) - SITUATION
Pt Refusing PM Tylenol
Pt refused dose of Senna & Pt complaining of burning with urination to MD Williamson  Per MD Williamson bladder scan for retention due to possible cause of low grade fever & burning during urination
pt refused tylenol

## 2024-03-23 NOTE — PROVIDER CONTACT NOTE (MEDICATION) - REASON
Pt Refusing PM Tylenol
Pt refused dose of Senna & Pt complaining of burning with urination to MD Williamson
Patient refusing am meds
pt refused tylenol

## 2024-03-23 NOTE — PROVIDER CONTACT NOTE (MEDICATION) - ACTION/TREATMENT ORDERED:
NP at bedside, No new orders, meds held
provider made aware
ACP notified & made aware, stated to recheck temp in 1 hour and continue to monitor urine output.
Provider notified

## 2024-03-23 NOTE — PROGRESS NOTE ADULT - SUBJECTIVE AND OBJECTIVE BOX
Date of service: 03-23-24 @ 23:39      Patient is a 38y old  Male who presents with a chief complaint of Fracture of unspecified part of neck of right femur, initial encounter for closed fracture     (14 Mar 2024 10:59)                                                               INTERVAL HPI/OVERNIGHT EVENTS:    REVIEW OF SYSTEMS:     CONSTITUTIONAL: No weakness, fevers or chills  EYES/ENT: No visual changes , no ear ache   NECK: No pain or stiffness  RESPIRATORY: No cough, wheezing,  No shortness of breath  CARDIOVASCULAR: No chest pain or palpitations  GASTROINTESTINAL: No abdominal pain  . No nausea, vomiting, or hematemesis; No diarrhea or constipation. No melena or hematochezia.  GENITOURINARY: No dysuria, frequency or hematuria  NEUROLOGICAL: No numbness or weakness  SKIN: No itching, burning, rashes, or lesions                                                                                                                                                                                                                                                                                 Medications:  MEDICATIONS  (STANDING):  acetaminophen     Tablet .. 975 milliGRAM(s) Oral every 8 hours  apixaban 2.5 milliGRAM(s) Oral two times a day  aspirin enteric coated 81 milliGRAM(s) Oral two times a day  chlorhexidine 2% Cloths 1 Application(s) Topical daily  epoetin carito-epbx (RETACRIT) Injectable 79661 Unit(s) IV Push <User Schedule>  metoprolol tartrate 25 milliGRAM(s) Oral two times a day  sevelamer carbonate 800 milliGRAM(s) Oral three times a day with meals    MEDICATIONS  (PRN):  aluminum hydroxide/magnesium hydroxide/simethicone Suspension 30 milliLiter(s) Oral every 4 hours PRN Dyspepsia  ondansetron Injectable 4 milliGRAM(s) IV Push every 6 hours PRN Nausea and/or Vomiting  oxyCODONE    IR 15 milliGRAM(s) Oral every 4 hours PRN Severe Pain (7 - 10)  oxyCODONE    IR 10 milliGRAM(s) Oral every 4 hours PRN Moderate Pain (4 - 6)       Allergies    No Known Drug Allergies  shellfish (Hives)    Intolerances      Vital Signs Last 24 Hrs  T(C): 37.1 (23 Mar 2024 17:30), Max: 37.2 (23 Mar 2024 14:32)  T(F): 98.7 (23 Mar 2024 17:30), Max: 98.9 (23 Mar 2024 14:32)  HR: 75 (23 Mar 2024 17:30) (71 - 90)  BP: 117/56 (23 Mar 2024 17:30) (117/56 - 132/69)  BP(mean): --  RR: 18 (23 Mar 2024 17:30) (18 - 18)  SpO2: 97% (23 Mar 2024 17:30) (95% - 98%)    Parameters below as of 23 Mar 2024 17:30  Patient On (Oxygen Delivery Method): room air      CAPILLARY BLOOD GLUCOSE          03-22 @ 07:01 - 03-23 @ 07:00  --------------------------------------------------------  IN: 940 mL / OUT: 2000 mL / NET: -1060 mL    03-23 @ 07:01  - 03-23 @ 23:39  --------------------------------------------------------  IN: 600 mL / OUT: 0 mL / NET: 600 mL      Physical Exam:    Daily     Daily   General:  Well appearing, NAD, not cachetic  HEENT:  Nonicteric, PERRLA  CV:  RRR, S1S2   Lungs:  CTA B/L, no wheezes, rales, rhonchi  Abdomen:  Soft, non-tender, no distended, positive BS  Extremities:  2+ pulses, no c/c, no edema  Skin:  Warm and dry, no rashes  :  No mayes  Neuro:  AAOx3, non-focal, grossly intact                                                                                                                                                                                                                                                                                                LABS:                            03-22    x   |  x   |  x   ----------------------------<  x   x    |  x   |  8.87<H>      TPro  6.5  /  Alb  3.1<L>  /  TBili  0.2  /  DBili  <0.1  /  AST  14  /  ALT  6<L>  /  AlkPhos  620<H>  03-22                       RADIOLOGY & ADDITIONAL TESTS         I personally reviewed: [  ]EKG   [  ]CXR    [  ] CT      A/P:         Discussed with :     Jen consultants' Notes   Time spent :

## 2024-03-24 RX ADMIN — SEVELAMER CARBONATE 800 MILLIGRAM(S): 2400 POWDER, FOR SUSPENSION ORAL at 13:26

## 2024-03-24 RX ADMIN — CHLORHEXIDINE GLUCONATE 1 APPLICATION(S): 213 SOLUTION TOPICAL at 18:01

## 2024-03-24 RX ADMIN — APIXABAN 2.5 MILLIGRAM(S): 2.5 TABLET, FILM COATED ORAL at 09:29

## 2024-03-24 RX ADMIN — APIXABAN 2.5 MILLIGRAM(S): 2.5 TABLET, FILM COATED ORAL at 20:55

## 2024-03-24 RX ADMIN — Medication 81 MILLIGRAM(S): at 18:02

## 2024-03-24 RX ADMIN — Medication 81 MILLIGRAM(S): at 06:39

## 2024-03-24 RX ADMIN — SEVELAMER CARBONATE 800 MILLIGRAM(S): 2400 POWDER, FOR SUSPENSION ORAL at 18:02

## 2024-03-24 RX ADMIN — SEVELAMER CARBONATE 800 MILLIGRAM(S): 2400 POWDER, FOR SUSPENSION ORAL at 09:29

## 2024-03-24 RX ADMIN — Medication 25 MILLIGRAM(S): at 18:02

## 2024-03-24 RX ADMIN — Medication 25 MILLIGRAM(S): at 06:39

## 2024-03-24 NOTE — PROGRESS NOTE ADULT - SUBJECTIVE AND OBJECTIVE BOX
Date of service: 03-24-24 @ 22:47      Patient is a 38y old  Male who presents with a chief complaint of Fracture of unspecified part of neck of right femur, initial encounter for closed fracture     (14 Mar 2024 10:59)                                                               INTERVAL HPI/OVERNIGHT EVENTS:    REVIEW OF SYSTEMS:     CONSTITUTIONAL: No weakness, fevers or chills  EYES/ENT: No visual changes , no ear ache   NECK: No pain or stiffness  RESPIRATORY: No cough, wheezing,  No shortness of breath  CARDIOVASCULAR: No chest pain or palpitations  GASTROINTESTINAL: No abdominal pain  . No nausea, vomiting, or hematemesis; No diarrhea or constipation. No melena or hematochezia.  GENITOURINARY: No dysuria, frequency or hematuria  NEUROLOGICAL: No numbness or weakness  SKIN: No itching, burning, rashes, or lesions                                                                                                                                                                                                                                                                                 Medications:  MEDICATIONS  (STANDING):  acetaminophen     Tablet .. 975 milliGRAM(s) Oral every 8 hours  apixaban 2.5 milliGRAM(s) Oral two times a day  aspirin enteric coated 81 milliGRAM(s) Oral two times a day  chlorhexidine 2% Cloths 1 Application(s) Topical daily  epoetin carito-epbx (RETACRIT) Injectable 91448 Unit(s) IV Push <User Schedule>  metoprolol tartrate 25 milliGRAM(s) Oral two times a day  sevelamer carbonate 800 milliGRAM(s) Oral three times a day with meals    MEDICATIONS  (PRN):  aluminum hydroxide/magnesium hydroxide/simethicone Suspension 30 milliLiter(s) Oral every 4 hours PRN Dyspepsia  ondansetron Injectable 4 milliGRAM(s) IV Push every 6 hours PRN Nausea and/or Vomiting  oxyCODONE    IR 15 milliGRAM(s) Oral every 4 hours PRN Severe Pain (7 - 10)  oxyCODONE    IR 10 milliGRAM(s) Oral every 4 hours PRN Moderate Pain (4 - 6)       Allergies    No Known Drug Allergies  shellfish (Hives)    Intolerances      Vital Signs Last 24 Hrs  T(C): 37.1 (24 Mar 2024 22:37), Max: 37.2 (24 Mar 2024 06:35)  T(F): 98.8 (24 Mar 2024 22:37), Max: 98.9 (24 Mar 2024 06:35)  HR: 74 (24 Mar 2024 22:37) (71 - 82)  BP: 152/84 (24 Mar 2024 22:37) (117/68 - 152/84)  BP(mean): --  RR: 18 (24 Mar 2024 22:37) (18 - 18)  SpO2: 99% (24 Mar 2024 22:37) (96% - 99%)    Parameters below as of 24 Mar 2024 22:37  Patient On (Oxygen Delivery Method): room air      CAPILLARY BLOOD GLUCOSE          03-23 @ 07:01  -  03-24 @ 07:00  --------------------------------------------------------  IN: 840 mL / OUT: 0 mL / NET: 840 mL    03-24 @ 07:01  -  03-24 @ 22:47  --------------------------------------------------------  IN: 720 mL / OUT: 0 mL / NET: 720 mL      Physical Exam:    Daily     Daily   General:  Well appearing, NAD, not cachetic  HEENT:  Nonicteric, PERRLA  CV:  RRR, S1S2   Lungs:  CTA B/L, no wheezes, rales, rhonchi  Abdomen:  Soft, non-tender, no distended, positive BS  Extremities:  2+ pulses, no c/c, no edema  Skin:  Warm and dry, no rashes  :  No mayes  Neuro:  AAOx3, non-focal, grossly intact                                                                                                                                                                                                                                                                                                LABS:                                                     RADIOLOGY & ADDITIONAL TESTS         I personally reviewed: [  ]EKG   [  ]CXR    [  ] CT      A/P:         Discussed with :     Jen consultants' Notes   Time spent :

## 2024-03-24 NOTE — PROVIDER CONTACT NOTE (OTHER) - NAME OF MD/NP/PA/DO NOTIFIED:
Provider Lucille Pérez
Chris Garza, NP
RISHI Fenton
RISHI Leone
Debbie Guzman
MAXWELL Caceres
Tameka ARREOLA

## 2024-03-24 NOTE — PROVIDER CONTACT NOTE (OTHER) - BACKGROUND
pt admitted s/p fall at rehab. pt dx with R hip fx and underwent a R hip hemiarthroplasty.
See H & P
See H&P for details
hx of ESRD, stroke, HTN
Patient admitted to [facility], VSS. Admitted to [facility] s/p fall w/ fracture of R. hip.
admitted s/p fall hip fracture, admission complicated by COVID

## 2024-03-24 NOTE — PROVIDER CONTACT NOTE (OTHER) - ASSESSMENT
A&Ox4. Unable to assess VS. No c/o pain, headache, n/v, or SOB. Pt states, "I just want to sleep".
Pt A&Ox4. VSS. Pt refusing lab work because he states that he gets blood work done every morning, and he does not want to do it anymore. Pt educated on why lab work is important.  Pt states "maybe later."
pt a&ox4, states that he does not want to be stuck anymore for labwork, pt educated on importance of checking labs
Patient received Tylenol 1030, low grade temp 100.5 1130pm, vitals taken again now temp is 101.0
Pt has a 101.4 fever, all other VSS. Pt has no complaints of sweating, chills, aches, and no reports of feeling hot or flushed. No pain or distress noted. Pt is tolerating oral fluids. No other concerns at this time.
Patient A&Ox4, patient educated on benefits of taking medication, pt. demonstrated understanding. PT. still refused medication.

## 2024-03-24 NOTE — PROVIDER CONTACT NOTE (OTHER) - SITUATION
Pt refusing labwork
pt c/o unmanaged pain. Pt stated "I am in pain 2 hours after I take the oxy 15mg."
Patient s/p fall, R hip fx
pt refusing meds, vitals, and blood draw
Pt is febrile
pt refusing lab work
Patient refusing senna

## 2024-03-24 NOTE — PROVIDER CONTACT NOTE (OTHER) - DATE AND TIME:
20-Mar-2024 21:36
24-Mar-2024 11:16
09-Mar-2024 13:10
19-Mar-2024 00:15
17-Mar-2024 00:00
23-Mar-2024 08:30
19-Mar-2024 09:04

## 2024-03-24 NOTE — PROVIDER CONTACT NOTE (OTHER) - RECOMMENDATIONS
Notify provider
Notify PA.
Notify provider
continue to encourage, if patient continues to refuse can get labs from dialysis tomorrow

## 2024-03-24 NOTE — PROVIDER CONTACT NOTE (OTHER) - REASON
Pt is febrile
febrile
Pt refusing labwork
pt c/o unmanaged pain
pt refusing lab work
Patient refusing senna
pt refusing care

## 2024-03-24 NOTE — PROVIDER CONTACT NOTE (OTHER) - ACTION/TREATMENT ORDERED:
pt to be assessed at bedside
No interventions needed
continue to encourage, if patient continues to refuse can get labs from dialysis tomorrow
provider aware. will try to encourage patient later.
Pain consult placed   Administer oxy 10mg when needed in between the oxy 15mg dose as long as VSS.
Will likely order IV Tylenol,
PA came to bedside, STAT blood cultures ordered - patient refused the RSV swab. PA stated to reassess temp at 2:15.

## 2024-03-25 ENCOUNTER — APPOINTMENT (OUTPATIENT)
Dept: SURGERY | Facility: HOSPITAL | Age: 39
End: 2024-03-25

## 2024-03-25 LAB
ALBUMIN SERPL ELPH-MCNC: 2.9 G/DL — LOW (ref 3.3–5)
ALBUMIN SERPL ELPH-MCNC: 3.2 G/DL — LOW (ref 3.3–5)
ALP SERPL-CCNC: 637 U/L — HIGH (ref 40–120)
ALP SERPL-CCNC: 645 U/L — HIGH (ref 40–120)
ALT FLD-CCNC: 5 U/L — LOW (ref 10–45)
ALT FLD-CCNC: 6 U/L — LOW (ref 10–45)
ANION GAP SERPL CALC-SCNC: 21 MMOL/L — HIGH (ref 5–17)
AST SERPL-CCNC: 13 U/L — SIGNIFICANT CHANGE UP (ref 10–40)
AST SERPL-CCNC: 14 U/L — SIGNIFICANT CHANGE UP (ref 10–40)
BILIRUB DIRECT SERPL-MCNC: <0.1 MG/DL — SIGNIFICANT CHANGE UP (ref 0–0.3)
BILIRUB INDIRECT FLD-MCNC: >0.1 MG/DL — LOW (ref 0.2–1)
BILIRUB SERPL-MCNC: 0.2 MG/DL — SIGNIFICANT CHANGE UP (ref 0.2–1.2)
BILIRUB SERPL-MCNC: 0.2 MG/DL — SIGNIFICANT CHANGE UP (ref 0.2–1.2)
BUN SERPL-MCNC: 76 MG/DL — HIGH (ref 7–23)
CALCIUM SERPL-MCNC: 9.4 MG/DL — SIGNIFICANT CHANGE UP (ref 8.4–10.5)
CHLORIDE SERPL-SCNC: 99 MMOL/L — SIGNIFICANT CHANGE UP (ref 96–108)
CO2 SERPL-SCNC: 22 MMOL/L — SIGNIFICANT CHANGE UP (ref 22–31)
CREAT SERPL-MCNC: 10.14 MG/DL — HIGH (ref 0.5–1.3)
CREAT SERPL-MCNC: 10.45 MG/DL — HIGH (ref 0.5–1.3)
EGFR: 6 ML/MIN/1.73M2 — LOW
EGFR: 6 ML/MIN/1.73M2 — LOW
GLUCOSE SERPL-MCNC: 73 MG/DL — SIGNIFICANT CHANGE UP (ref 70–99)
HCT VFR BLD CALC: 24 % — LOW (ref 39–50)
HGB BLD-MCNC: 7.2 G/DL — LOW (ref 13–17)
INR BLD: 1.09 RATIO — SIGNIFICANT CHANGE UP (ref 0.85–1.18)
MCHC RBC-ENTMCNC: 30 GM/DL — LOW (ref 32–36)
MCHC RBC-ENTMCNC: 30.5 PG — SIGNIFICANT CHANGE UP (ref 27–34)
MCV RBC AUTO: 101.7 FL — HIGH (ref 80–100)
NRBC # BLD: 0 /100 WBCS — SIGNIFICANT CHANGE UP (ref 0–0)
PLATELET # BLD AUTO: 313 K/UL — SIGNIFICANT CHANGE UP (ref 150–400)
POTASSIUM SERPL-MCNC: 4.5 MMOL/L — SIGNIFICANT CHANGE UP (ref 3.5–5.3)
POTASSIUM SERPL-SCNC: 4.5 MMOL/L — SIGNIFICANT CHANGE UP (ref 3.5–5.3)
PROT SERPL-MCNC: 6.2 G/DL — SIGNIFICANT CHANGE UP (ref 6–8.3)
PROT SERPL-MCNC: 6.3 G/DL — SIGNIFICANT CHANGE UP (ref 6–8.3)
PROTHROM AB SERPL-ACNC: 12 SEC — SIGNIFICANT CHANGE UP (ref 9.5–13)
RBC # BLD: 2.36 M/UL — LOW (ref 4.2–5.8)
RBC # FLD: 16.8 % — HIGH (ref 10.3–14.5)
SODIUM SERPL-SCNC: 142 MMOL/L — SIGNIFICANT CHANGE UP (ref 135–145)
WBC # BLD: 4.44 K/UL — SIGNIFICANT CHANGE UP (ref 3.8–10.5)
WBC # FLD AUTO: 4.44 K/UL — SIGNIFICANT CHANGE UP (ref 3.8–10.5)

## 2024-03-25 RX ADMIN — CHLORHEXIDINE GLUCONATE 1 APPLICATION(S): 213 SOLUTION TOPICAL at 08:41

## 2024-03-25 RX ADMIN — SEVELAMER CARBONATE 800 MILLIGRAM(S): 2400 POWDER, FOR SUSPENSION ORAL at 19:00

## 2024-03-25 RX ADMIN — Medication 25 MILLIGRAM(S): at 18:51

## 2024-03-25 RX ADMIN — Medication 25 MILLIGRAM(S): at 05:54

## 2024-03-25 RX ADMIN — SEVELAMER CARBONATE 800 MILLIGRAM(S): 2400 POWDER, FOR SUSPENSION ORAL at 13:41

## 2024-03-25 RX ADMIN — ERYTHROPOIETIN 10000 UNIT(S): 10000 INJECTION, SOLUTION INTRAVENOUS; SUBCUTANEOUS at 10:49

## 2024-03-25 RX ADMIN — APIXABAN 2.5 MILLIGRAM(S): 2.5 TABLET, FILM COATED ORAL at 21:08

## 2024-03-25 RX ADMIN — Medication 81 MILLIGRAM(S): at 18:51

## 2024-03-25 RX ADMIN — SEVELAMER CARBONATE 800 MILLIGRAM(S): 2400 POWDER, FOR SUSPENSION ORAL at 08:49

## 2024-03-25 RX ADMIN — Medication 81 MILLIGRAM(S): at 05:54

## 2024-03-25 RX ADMIN — APIXABAN 2.5 MILLIGRAM(S): 2.5 TABLET, FILM COATED ORAL at 08:49

## 2024-03-25 NOTE — PROGRESS NOTE ADULT - SUBJECTIVE AND OBJECTIVE BOX
Date of service: 03-25-24 @ 10:16      Patient is a 38y old  Male who presents with a chief complaint of Fracture of unspecified part of neck of right femur, initial encounter for closed fracture     (14 Mar 2024 10:59)                                                               INTERVAL HPI/OVERNIGHT EVENTS:    REVIEW OF SYSTEMS:     CONSTITUTIONAL: No weakness, fevers or chills  EYES/ENT: No visual changes , no ear ache   NECK: No pain or stiffness  RESPIRATORY: No cough, wheezing,  No shortness of breath  CARDIOVASCULAR: No chest pain or palpitations  GASTROINTESTINAL: No abdominal pain  . No nausea, vomiting, or hematemesis; No diarrhea or constipation. No melena or hematochezia.  GENITOURINARY: No dysuria, frequency or hematuria  NEUROLOGICAL: No numbness or weakness  SKIN: No itching, burning, rashes, or lesions                                                                                                                                                                                                                                                                                 Medications:  MEDICATIONS  (STANDING):  acetaminophen     Tablet .. 975 milliGRAM(s) Oral every 8 hours  apixaban 2.5 milliGRAM(s) Oral two times a day  aspirin enteric coated 81 milliGRAM(s) Oral two times a day  chlorhexidine 2% Cloths 1 Application(s) Topical daily  epoetin carito-epbx (RETACRIT) Injectable 13122 Unit(s) IV Push <User Schedule>  metoprolol tartrate 25 milliGRAM(s) Oral two times a day  sevelamer carbonate 800 milliGRAM(s) Oral three times a day with meals    MEDICATIONS  (PRN):  aluminum hydroxide/magnesium hydroxide/simethicone Suspension 30 milliLiter(s) Oral every 4 hours PRN Dyspepsia  ondansetron Injectable 4 milliGRAM(s) IV Push every 6 hours PRN Nausea and/or Vomiting  oxyCODONE    IR 15 milliGRAM(s) Oral every 4 hours PRN Severe Pain (7 - 10)  oxyCODONE    IR 10 milliGRAM(s) Oral every 4 hours PRN Moderate Pain (4 - 6)       Allergies    No Known Drug Allergies  shellfish (Hives)    Intolerances      Vital Signs Last 24 Hrs  T(C): 37.2 (25 Mar 2024 05:50), Max: 37.2 (25 Mar 2024 05:50)  T(F): 99 (25 Mar 2024 05:50), Max: 99 (25 Mar 2024 05:50)  HR: 73 (25 Mar 2024 09:21) (71 - 86)  BP: 125/72 (25 Mar 2024 09:21) (125/72 - 152/84)  BP(mean): --  RR: 18 (25 Mar 2024 09:21) (18 - 18)  SpO2: 98% (25 Mar 2024 09:21) (96% - 99%)    Parameters below as of 25 Mar 2024 09:21  Patient On (Oxygen Delivery Method): room air      CAPILLARY BLOOD GLUCOSE          03-24 @ 07:01  -  03-25 @ 07:00  --------------------------------------------------------  IN: 840 mL / OUT: 0 mL / NET: 840 mL      Physical Exam:    Daily     Daily   General:  Well appearing, NAD, not cachetic  HEENT:  Nonicteric, PERRLA  CV:  RRR, S1S2   Lungs:  CTA B/L, no wheezes, rales, rhonchi  Abdomen:  Soft, non-tender, no distended, positive BS  Extremities: RLe trace edema   Neuro:  AAOx3, non-focal, grossly intact                                                                                                                                                                                                                                                                                                LABS:                               7.2    4.44  )-----------( 313      ( 25 Mar 2024 07:14 )             24.0                      03-25    x   |  x   |  x   ----------------------------<  x   x    |  x   |  10.45<H>    Ca    9.4      25 Mar 2024 07:14    TPro  6.2  /  Alb  2.9<L>  /  TBili  0.2  /  DBili  <0.1  /  AST  14  /  ALT  6<L>  /  AlkPhos  645<H>  03-25                       RADIOLOGY & ADDITIONAL TESTS         I personally reviewed: [  ]EKG   [  ]CXR    [  ] CT      A/P:         Discussed with :     Jen consultants' Notes   Time spent :

## 2024-03-25 NOTE — PROGRESS NOTE ADULT - SUBJECTIVE AND OBJECTIVE BOX
Seen on HD    Vital Signs Last 24 Hrs  T(C): 36.9 (03-25-24 @ 17:08), Max: 37.2 (03-25-24 @ 05:50)  T(F): 98.4 (03-25-24 @ 17:08), Max: 99 (03-25-24 @ 05:50)  HR: 72 (03-25-24 @ 17:08) (61 - 86)  BP: 127/64 (03-25-24 @ 17:08) (124/61 - 152/84)  RR: 18 (03-25-24 @ 17:08) (18 - 18)  SpO2: 99% (03-25-24 @ 17:08) (98% - 100%)    I&O's Detail    24 Mar 2024 07:01  -  25 Mar 2024 07:00  --------------------------------------------------------  IN:    Oral Fluid: 840 mL  Total IN: 840 mL    OUT:    Voided (mL): 0 mL  Total OUT: 0 mL    25 Mar 2024 07:01  -  25 Mar 2024 17:58  --------------------------------------------------------  OUT:    Other (mL): 2000 mL  Total OUT: 2000 mL    s1s2  b/l air entry  soft, ND  sm edema                                                                                                 7.2    4.44  )-----------( 313      ( 25 Mar 2024 07:14 )             24.0     25 Mar 2024 07:15    x      |  x      |  x      ----------------------------<  x      x       |  x      |  10.45    Ca    9.4        25 Mar 2024 07:14    TPro  6.2    /  Alb  2.9    /  TBili  0.2    /  DBili  <0.1   /  AST  14     /  ALT  6      /  AlkPhos  645    25 Mar 2024 07:15    LIVER FUNCTIONS - ( 25 Mar 2024 07:15 )  Alb: 2.9 g/dL / Pro: 6.2 g/dL / ALK PHOS: 645 U/L / ALT: 6 U/L / AST: 14 U/L / GGT: x           PT/INR - ( 25 Mar 2024 07:20 )   PT: 12.0 sec;   INR: 1.09 ratio      acetaminophen     Tablet .. 975 milliGRAM(s) Oral every 8 hours  aluminum hydroxide/magnesium hydroxide/simethicone Suspension 30 milliLiter(s) Oral every 4 hours PRN  apixaban 2.5 milliGRAM(s) Oral two times a day  aspirin enteric coated 81 milliGRAM(s) Oral two times a day  chlorhexidine 2% Cloths 1 Application(s) Topical daily  epoetin carito-epbx (RETACRIT) Injectable 53239 Unit(s) IV Push <User Schedule>  metoprolol tartrate 25 milliGRAM(s) Oral two times a day  ondansetron Injectable 4 milliGRAM(s) IV Push every 6 hours PRN  oxyCODONE    IR 15 milliGRAM(s) Oral every 4 hours PRN  oxyCODONE    IR 10 milliGRAM(s) Oral every 4 hours PRN  sevelamer carbonate 800 milliGRAM(s) Oral three times a day with meals    A/P:    ESRD on HD   S/p fall, R hip fx  S/p R hip HA 3/7  Tested positive for COVID   HD today as ordered  TMP 2kg  Epoetin w/HD 3 x week   Renal diet  Renvela as ordered     [phone]

## 2024-03-26 LAB
ALBUMIN SERPL ELPH-MCNC: 3 G/DL — LOW (ref 3.3–5)
ALP SERPL-CCNC: 646 U/L — HIGH (ref 40–120)
ALT FLD-CCNC: 5 U/L — LOW (ref 10–45)
ANION GAP SERPL CALC-SCNC: 16 MMOL/L — SIGNIFICANT CHANGE UP (ref 5–17)
AST SERPL-CCNC: 12 U/L — SIGNIFICANT CHANGE UP (ref 10–40)
BILIRUB DIRECT SERPL-MCNC: <0.1 MG/DL — SIGNIFICANT CHANGE UP (ref 0–0.3)
BILIRUB INDIRECT FLD-MCNC: >0.1 MG/DL — LOW (ref 0.2–1)
BILIRUB SERPL-MCNC: 0.2 MG/DL — SIGNIFICANT CHANGE UP (ref 0.2–1.2)
BUN SERPL-MCNC: 53 MG/DL — HIGH (ref 7–23)
CALCIUM SERPL-MCNC: 9.1 MG/DL — SIGNIFICANT CHANGE UP (ref 8.4–10.5)
CHLORIDE SERPL-SCNC: 99 MMOL/L — SIGNIFICANT CHANGE UP (ref 96–108)
CO2 SERPL-SCNC: 25 MMOL/L — SIGNIFICANT CHANGE UP (ref 22–31)
CREAT SERPL-MCNC: 7.73 MG/DL — HIGH (ref 0.5–1.3)
EGFR: 8 ML/MIN/1.73M2 — LOW
GLUCOSE SERPL-MCNC: 77 MG/DL — SIGNIFICANT CHANGE UP (ref 70–99)
HCT VFR BLD CALC: 24.3 % — LOW (ref 39–50)
HGB BLD-MCNC: 7.7 G/DL — LOW (ref 13–17)
INR BLD: 1.17 RATIO — SIGNIFICANT CHANGE UP (ref 0.85–1.18)
MCHC RBC-ENTMCNC: 31.6 PG — SIGNIFICANT CHANGE UP (ref 27–34)
MCHC RBC-ENTMCNC: 31.7 GM/DL — LOW (ref 32–36)
MCV RBC AUTO: 99.6 FL — SIGNIFICANT CHANGE UP (ref 80–100)
NRBC # BLD: 0 /100 WBCS — SIGNIFICANT CHANGE UP (ref 0–0)
PHOSPHATE SERPL-MCNC: 5.7 MG/DL — HIGH (ref 2.5–4.5)
PLATELET # BLD AUTO: 298 K/UL — SIGNIFICANT CHANGE UP (ref 150–400)
POTASSIUM SERPL-MCNC: 4.3 MMOL/L — SIGNIFICANT CHANGE UP (ref 3.5–5.3)
POTASSIUM SERPL-SCNC: 4.3 MMOL/L — SIGNIFICANT CHANGE UP (ref 3.5–5.3)
PROT SERPL-MCNC: 6.4 G/DL — SIGNIFICANT CHANGE UP (ref 6–8.3)
PROTHROM AB SERPL-ACNC: 12.2 SEC — SIGNIFICANT CHANGE UP (ref 9.5–13)
RBC # BLD: 2.44 M/UL — LOW (ref 4.2–5.8)
RBC # FLD: 16.8 % — HIGH (ref 10.3–14.5)
SARS-COV-2 RNA SPEC QL NAA+PROBE: SIGNIFICANT CHANGE UP
SODIUM SERPL-SCNC: 140 MMOL/L — SIGNIFICANT CHANGE UP (ref 135–145)
WBC # BLD: 4.28 K/UL — SIGNIFICANT CHANGE UP (ref 3.8–10.5)
WBC # FLD AUTO: 4.28 K/UL — SIGNIFICANT CHANGE UP (ref 3.8–10.5)

## 2024-03-26 RX ADMIN — SEVELAMER CARBONATE 800 MILLIGRAM(S): 2400 POWDER, FOR SUSPENSION ORAL at 18:10

## 2024-03-26 RX ADMIN — APIXABAN 2.5 MILLIGRAM(S): 2.5 TABLET, FILM COATED ORAL at 22:41

## 2024-03-26 RX ADMIN — APIXABAN 2.5 MILLIGRAM(S): 2.5 TABLET, FILM COATED ORAL at 09:59

## 2024-03-26 RX ADMIN — Medication 81 MILLIGRAM(S): at 18:10

## 2024-03-26 RX ADMIN — Medication 25 MILLIGRAM(S): at 05:37

## 2024-03-26 RX ADMIN — CHLORHEXIDINE GLUCONATE 1 APPLICATION(S): 213 SOLUTION TOPICAL at 13:39

## 2024-03-26 RX ADMIN — SEVELAMER CARBONATE 800 MILLIGRAM(S): 2400 POWDER, FOR SUSPENSION ORAL at 13:38

## 2024-03-26 RX ADMIN — Medication 81 MILLIGRAM(S): at 05:37

## 2024-03-26 RX ADMIN — Medication 25 MILLIGRAM(S): at 18:10

## 2024-03-26 RX ADMIN — SEVELAMER CARBONATE 800 MILLIGRAM(S): 2400 POWDER, FOR SUSPENSION ORAL at 09:59

## 2024-03-26 NOTE — PROGRESS NOTE ADULT - SUBJECTIVE AND OBJECTIVE BOX
Date of service: 03-26-24 @ 15:28      Patient is a 38y old  Male who presents with a chief complaint of Fracture of unspecified part of neck of right femur, initial encounter for closed fracture     (14 Mar 2024 10:59)                                                               INTERVAL HPI/OVERNIGHT EVENTS:    REVIEW OF SYSTEMS:     CONSTITUTIONAL: No weakness, fevers or chills  EYES/ENT: No visual changes , no ear ache   NECK: No pain or stiffness  RESPIRATORY: No cough, wheezing,  No shortness of breath  CARDIOVASCULAR: No chest pain or palpitations  GASTROINTESTINAL: No abdominal pain  . No nausea, vomiting, or hematemesis; No diarrhea or constipation. No melena or hematochezia.  GENITOURINARY: No dysuria, frequency or hematuria  NEUROLOGICAL: No numbness or weakness  SKIN: No itching, burning, rashes, or lesions                                                                                                                                                                                                                                                                                 Medications:  MEDICATIONS  (STANDING):  acetaminophen     Tablet .. 975 milliGRAM(s) Oral every 8 hours  apixaban 2.5 milliGRAM(s) Oral two times a day  aspirin enteric coated 81 milliGRAM(s) Oral two times a day  chlorhexidine 2% Cloths 1 Application(s) Topical daily  epoetin carito-epbx (RETACRIT) Injectable 20776 Unit(s) IV Push <User Schedule>  metoprolol tartrate 25 milliGRAM(s) Oral two times a day  sevelamer carbonate 800 milliGRAM(s) Oral three times a day with meals    MEDICATIONS  (PRN):  aluminum hydroxide/magnesium hydroxide/simethicone Suspension 30 milliLiter(s) Oral every 4 hours PRN Dyspepsia  ondansetron Injectable 4 milliGRAM(s) IV Push every 6 hours PRN Nausea and/or Vomiting  oxyCODONE    IR 15 milliGRAM(s) Oral every 4 hours PRN Severe Pain (7 - 10)  oxyCODONE    IR 10 milliGRAM(s) Oral every 4 hours PRN Moderate Pain (4 - 6)       Allergies    No Known Drug Allergies  shellfish (Hives)    Intolerances      Vital Signs Last 24 Hrs  T(C): 36.7 (26 Mar 2024 13:37), Max: 37.2 (25 Mar 2024 22:05)  T(F): 98.1 (26 Mar 2024 13:37), Max: 98.9 (25 Mar 2024 22:05)  HR: 68 (26 Mar 2024 13:37) (68 - 88)  BP: 124/80 (26 Mar 2024 13:37) (124/80 - 147/82)  BP(mean): --  RR: 18 (26 Mar 2024 13:37) (18 - 18)  SpO2: 99% (26 Mar 2024 13:37) (97% - 99%)    Parameters below as of 26 Mar 2024 13:37  Patient On (Oxygen Delivery Method): room air      CAPILLARY BLOOD GLUCOSE          03-25 @ 07:01  -  03-26 @ 07:00  --------------------------------------------------------  IN: 0 mL / OUT: 2000 mL / NET: -2000 mL    03-26 @ 07:01  -  03-26 @ 15:28  --------------------------------------------------------  IN: 480 mL / OUT: 0 mL / NET: 480 mL      Physical Exam:    Daily     Daily   General:  Well appearing, NAD, not cachetic  HEENT:  Nonicteric, PERRLA  CV:  RRR, S1S2   Lungs:  CTA B/L, no wheezes, rales, rhonchi  Abdomen:  Soft, non-tender, no distended, positive BS  Extremities:  2+ pulses, no c/c, no edema  Skin:  Warm and dry, no rashes  :  No mayes  Neuro:  AAOx3, non-focal, grossly intact                                                                                                                                                                                                                                                                                                LABS:                               7.7    4.28  )-----------( 298      ( 26 Mar 2024 07:00 )             24.3                      03-26    140  |  99  |  53<H>  ----------------------------<  77  4.3   |  25  |  7.73<H>    Ca    9.1      26 Mar 2024 07:00  Phos  5.7     03-26    TPro  6.4  /  Alb  3.0<L>  /  TBili  0.2  /  DBili  <0.1  /  AST  12  /  ALT  5<L>  /  AlkPhos  646<H>  03-26                       RADIOLOGY & ADDITIONAL TESTS         I personally reviewed: [  ]EKG   [  ]CXR    [  ] CT      A/P:         Discussed with :     Jen consultants' Notes   Time spent :

## 2024-03-26 NOTE — PROGRESS NOTE ADULT - ASSESSMENT
38 Y M H/O ESRD , HTN  admitted with R hip pain /R knee pain s/p fall as he was being transferred to chair with help   no head trauma, + R. knee pain + R. thigh pain s/p fall.     spiked fever : 101.6 F   tejeda c/s    COVID positive : remdisivir completed  off  dc abx   significantly elevated D-dimer : start eliquis   repeat PCR      Femoral neck fx : s/p ORIF   ortho following   surgical site looks ok     ESRD: HD   c/w HD TIW  renal following      acute on chronic anemia : transfused  monitor and transfuse prn   today Hb 7.1   may need another unit of transfusion tomorrow      HTN   c/w toprol   controlled     diarreah : resolved     d/c planning pending COVID pcr

## 2024-03-27 ENCOUNTER — TRANSCRIPTION ENCOUNTER (OUTPATIENT)
Age: 39
End: 2024-03-27

## 2024-03-27 ENCOUNTER — INPATIENT (INPATIENT)
Facility: HOSPITAL | Age: 39
LOS: 33 days | Discharge: HOME HEALTH | DRG: 559 | End: 2024-04-30
Attending: PHYSICAL MEDICINE & REHABILITATION | Admitting: PHYSICAL MEDICINE & REHABILITATION
Payer: COMMERCIAL

## 2024-03-27 VITALS
TEMPERATURE: 98 F | DIASTOLIC BLOOD PRESSURE: 77 MMHG | OXYGEN SATURATION: 97 % | RESPIRATION RATE: 17 BRPM | SYSTOLIC BLOOD PRESSURE: 147 MMHG | HEART RATE: 79 BPM

## 2024-03-27 VITALS
DIASTOLIC BLOOD PRESSURE: 75 MMHG | TEMPERATURE: 99 F | HEIGHT: 75 IN | RESPIRATION RATE: 16 BRPM | HEART RATE: 69 BPM | SYSTOLIC BLOOD PRESSURE: 129 MMHG | WEIGHT: 206.35 LBS | OXYGEN SATURATION: 98 %

## 2024-03-27 DIAGNOSIS — Z51.89 ENCOUNTER FOR OTHER SPECIFIED AFTERCARE: ICD-10-CM

## 2024-03-27 DIAGNOSIS — D62 ACUTE POSTHEMORRHAGIC ANEMIA: ICD-10-CM

## 2024-03-27 DIAGNOSIS — I95.9 HYPOTENSION, UNSPECIFIED: ICD-10-CM

## 2024-03-27 DIAGNOSIS — Z79.891 LONG TERM (CURRENT) USE OF OPIATE ANALGESIC: ICD-10-CM

## 2024-03-27 DIAGNOSIS — M17.11 UNILATERAL PRIMARY OSTEOARTHRITIS, RIGHT KNEE: ICD-10-CM

## 2024-03-27 DIAGNOSIS — E43 UNSPECIFIED SEVERE PROTEIN-CALORIE MALNUTRITION: ICD-10-CM

## 2024-03-27 DIAGNOSIS — N18.6 END STAGE RENAL DISEASE: ICD-10-CM

## 2024-03-27 DIAGNOSIS — Z20.822 CONTACT WITH AND (SUSPECTED) EXPOSURE TO COVID-19: ICD-10-CM

## 2024-03-27 DIAGNOSIS — I77.0 ARTERIOVENOUS FISTULA, ACQUIRED: Chronic | ICD-10-CM

## 2024-03-27 DIAGNOSIS — S32.501D UNSPECIFIED FRACTURE OF RIGHT PUBIS, SUBSEQUENT ENCOUNTER FOR FRACTURE WITH ROUTINE HEALING: ICD-10-CM
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
## 2024-03-27 DIAGNOSIS — D63.1 ANEMIA IN CHRONIC KIDNEY DISEASE: ICD-10-CM

## 2024-03-27 DIAGNOSIS — R53.1 WEAKNESS: ICD-10-CM

## 2024-03-27 DIAGNOSIS — L89.156 PRESSURE-INDUCED DEEP TISSUE DAMAGE OF SACRAL REGION: ICD-10-CM

## 2024-03-27 DIAGNOSIS — M25.552 PAIN IN LEFT HIP: ICD-10-CM

## 2024-03-27 DIAGNOSIS — E83.39 OTHER DISORDERS OF PHOSPHORUS METABOLISM: ICD-10-CM

## 2024-03-27 DIAGNOSIS — Z47.89 ENCOUNTER FOR OTHER ORTHOPEDIC AFTERCARE: ICD-10-CM

## 2024-03-27 DIAGNOSIS — R60.0 LOCALIZED EDEMA: ICD-10-CM

## 2024-03-27 DIAGNOSIS — Z99.2 DEPENDENCE ON RENAL DIALYSIS: ICD-10-CM

## 2024-03-27 DIAGNOSIS — I12.0 HYPERTENSIVE CHRONIC KIDNEY DISEASE WITH STAGE 5 CHRONIC KIDNEY DISEASE OR END STAGE RENAL DISEASE: ICD-10-CM

## 2024-03-27 DIAGNOSIS — Z86.16 PERSONAL HISTORY OF COVID-19: ICD-10-CM

## 2024-03-27 DIAGNOSIS — K26.4 CHRONIC OR UNSPECIFIED DUODENAL ULCER WITH HEMORRHAGE: ICD-10-CM

## 2024-03-27 DIAGNOSIS — N25.0 RENAL OSTEODYSTROPHY: ICD-10-CM

## 2024-03-27 DIAGNOSIS — Z96.642 PRESENCE OF LEFT ARTIFICIAL HIP JOINT: Chronic | ICD-10-CM

## 2024-03-27 DIAGNOSIS — M25.561 PAIN IN RIGHT KNEE: ICD-10-CM

## 2024-03-27 DIAGNOSIS — R26.2 DIFFICULTY IN WALKING, NOT ELSEWHERE CLASSIFIED: ICD-10-CM

## 2024-03-27 DIAGNOSIS — S72.146A NONDISPLACED INTERTROCHANTERIC FRACTURE OF UNSPECIFIED FEMUR, INITIAL ENCOUNTER FOR CLOSED FRACTURE: ICD-10-CM

## 2024-03-27 DIAGNOSIS — E87.5 HYPERKALEMIA: ICD-10-CM
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
## 2024-03-27 DIAGNOSIS — Z98.890 OTHER SPECIFIED POSTPROCEDURAL STATES: Chronic | ICD-10-CM

## 2024-03-27 DIAGNOSIS — Z96.641 PRESENCE OF RIGHT ARTIFICIAL HIP JOINT: ICD-10-CM

## 2024-03-27 LAB
ALBUMIN SERPL ELPH-MCNC: 3.2 G/DL — LOW (ref 3.3–5)
ALP SERPL-CCNC: 695 U/L — HIGH (ref 40–120)
ALT FLD-CCNC: <5 U/L — LOW (ref 10–45)
ANION GAP SERPL CALC-SCNC: 21 MMOL/L — HIGH (ref 5–17)
AST SERPL-CCNC: 10 U/L — SIGNIFICANT CHANGE UP (ref 10–40)
BILIRUB DIRECT SERPL-MCNC: <0.1 MG/DL — SIGNIFICANT CHANGE UP (ref 0–0.3)
BILIRUB INDIRECT FLD-MCNC: >0.1 MG/DL — LOW (ref 0.2–1)
BILIRUB SERPL-MCNC: 0.2 MG/DL — SIGNIFICANT CHANGE UP (ref 0.2–1.2)
BUN SERPL-MCNC: 77 MG/DL — HIGH (ref 7–23)
CALCIUM SERPL-MCNC: 9.6 MG/DL — SIGNIFICANT CHANGE UP (ref 8.4–10.5)
CHLORIDE SERPL-SCNC: 98 MMOL/L — SIGNIFICANT CHANGE UP (ref 96–108)
CO2 SERPL-SCNC: 22 MMOL/L — SIGNIFICANT CHANGE UP (ref 22–31)
CREAT SERPL-MCNC: 9.48 MG/DL — HIGH (ref 0.5–1.3)
EGFR: 7 ML/MIN/1.73M2 — LOW
FLUAV AG NPH QL: SIGNIFICANT CHANGE UP
FLUBV AG NPH QL: SIGNIFICANT CHANGE UP
GLUCOSE SERPL-MCNC: 75 MG/DL — SIGNIFICANT CHANGE UP (ref 70–99)
HCT VFR BLD CALC: 23.7 % — LOW (ref 39–50)
HGB BLD-MCNC: 7.4 G/DL — LOW (ref 13–17)
INR BLD: 1.02 RATIO — SIGNIFICANT CHANGE UP (ref 0.85–1.18)
MAGNESIUM SERPL-MCNC: 2.6 MG/DL — SIGNIFICANT CHANGE UP (ref 1.6–2.6)
MCHC RBC-ENTMCNC: 31.2 GM/DL — LOW (ref 32–36)
MCHC RBC-ENTMCNC: 31.2 PG — SIGNIFICANT CHANGE UP (ref 27–34)
MCV RBC AUTO: 100 FL — SIGNIFICANT CHANGE UP (ref 80–100)
NRBC # BLD: 0 /100 WBCS — SIGNIFICANT CHANGE UP (ref 0–0)
PHOSPHATE SERPL-MCNC: 5.8 MG/DL — HIGH (ref 2.5–4.5)
PLATELET # BLD AUTO: 319 K/UL — SIGNIFICANT CHANGE UP (ref 150–400)
POTASSIUM SERPL-MCNC: 4.6 MMOL/L — SIGNIFICANT CHANGE UP (ref 3.5–5.3)
POTASSIUM SERPL-SCNC: 4.6 MMOL/L — SIGNIFICANT CHANGE UP (ref 3.5–5.3)
PROT SERPL-MCNC: 6.5 G/DL — SIGNIFICANT CHANGE UP (ref 6–8.3)
PROTHROM AB SERPL-ACNC: 11.2 SEC — SIGNIFICANT CHANGE UP (ref 9.5–13)
RBC # BLD: 2.37 M/UL — LOW (ref 4.2–5.8)
RBC # FLD: 17.2 % — HIGH (ref 10.3–14.5)
RSV RNA NPH QL NAA+NON-PROBE: SIGNIFICANT CHANGE UP
SARS-COV-2 RNA SPEC QL NAA+PROBE: SIGNIFICANT CHANGE UP
SARS-COV-2 RNA SPEC QL NAA+PROBE: SIGNIFICANT CHANGE UP
SODIUM SERPL-SCNC: 141 MMOL/L — SIGNIFICANT CHANGE UP (ref 135–145)
WBC # BLD: 4.89 K/UL — SIGNIFICANT CHANGE UP (ref 3.8–10.5)
WBC # FLD AUTO: 4.89 K/UL — SIGNIFICANT CHANGE UP (ref 3.8–10.5)

## 2024-03-27 PROCEDURE — 83735 ASSAY OF MAGNESIUM: CPT

## 2024-03-27 PROCEDURE — 82947 ASSAY GLUCOSE BLOOD QUANT: CPT

## 2024-03-27 PROCEDURE — 73700 CT LOWER EXTREMITY W/O DYE: CPT | Mod: MC

## 2024-03-27 PROCEDURE — 86870 RBC ANTIBODY IDENTIFICATION: CPT

## 2024-03-27 PROCEDURE — 71045 X-RAY EXAM CHEST 1 VIEW: CPT

## 2024-03-27 PROCEDURE — 84100 ASSAY OF PHOSPHORUS: CPT

## 2024-03-27 PROCEDURE — 85730 THROMBOPLASTIN TIME PARTIAL: CPT

## 2024-03-27 PROCEDURE — 36415 COLL VENOUS BLD VENIPUNCTURE: CPT

## 2024-03-27 PROCEDURE — 87635 SARS-COV-2 COVID-19 AMP PRB: CPT

## 2024-03-27 PROCEDURE — 85014 HEMATOCRIT: CPT

## 2024-03-27 PROCEDURE — P9016: CPT

## 2024-03-27 PROCEDURE — C9399: CPT

## 2024-03-27 PROCEDURE — 86902 BLOOD TYPE ANTIGEN DONOR EA: CPT

## 2024-03-27 PROCEDURE — 82565 ASSAY OF CREATININE: CPT

## 2024-03-27 PROCEDURE — 82803 BLOOD GASES ANY COMBINATION: CPT

## 2024-03-27 PROCEDURE — 87040 BLOOD CULTURE FOR BACTERIA: CPT

## 2024-03-27 PROCEDURE — 20611 DRAIN/INJ JOINT/BURSA W/US: CPT

## 2024-03-27 PROCEDURE — 86900 BLOOD TYPING SEROLOGIC ABO: CPT

## 2024-03-27 PROCEDURE — 80048 BASIC METABOLIC PNL TOTAL CA: CPT

## 2024-03-27 PROCEDURE — 82746 ASSAY OF FOLIC ACID SERUM: CPT

## 2024-03-27 PROCEDURE — 84443 ASSAY THYROID STIM HORMONE: CPT

## 2024-03-27 PROCEDURE — 72170 X-RAY EXAM OF PELVIS: CPT

## 2024-03-27 PROCEDURE — 99261: CPT

## 2024-03-27 PROCEDURE — 93971 EXTREMITY STUDY: CPT

## 2024-03-27 PROCEDURE — 85379 FIBRIN DEGRADATION QUANT: CPT

## 2024-03-27 PROCEDURE — 85025 COMPLETE CBC W/AUTO DIFF WBC: CPT

## 2024-03-27 PROCEDURE — 80076 HEPATIC FUNCTION PANEL: CPT

## 2024-03-27 PROCEDURE — 82607 VITAMIN B-12: CPT

## 2024-03-27 PROCEDURE — C1776: CPT

## 2024-03-27 PROCEDURE — 97110 THERAPEUTIC EXERCISES: CPT

## 2024-03-27 PROCEDURE — 97166 OT EVAL MOD COMPLEX 45 MIN: CPT

## 2024-03-27 PROCEDURE — 82435 ASSAY OF BLOOD CHLORIDE: CPT

## 2024-03-27 PROCEDURE — 85027 COMPLETE CBC AUTOMATED: CPT

## 2024-03-27 PROCEDURE — C1889: CPT

## 2024-03-27 PROCEDURE — 84295 ASSAY OF SERUM SODIUM: CPT

## 2024-03-27 PROCEDURE — 97530 THERAPEUTIC ACTIVITIES: CPT

## 2024-03-27 PROCEDURE — 73552 X-RAY EXAM OF FEMUR 2/>: CPT

## 2024-03-27 PROCEDURE — 73522 X-RAY EXAM HIPS BI 3-4 VIEWS: CPT

## 2024-03-27 PROCEDURE — 76882 US LMTD JT/FCL EVL NVASC XTR: CPT

## 2024-03-27 PROCEDURE — 74018 RADEX ABDOMEN 1 VIEW: CPT

## 2024-03-27 PROCEDURE — 97162 PT EVAL MOD COMPLEX 30 MIN: CPT

## 2024-03-27 PROCEDURE — C1713: CPT

## 2024-03-27 PROCEDURE — 86880 COOMBS TEST DIRECT: CPT

## 2024-03-27 PROCEDURE — 84132 ASSAY OF SERUM POTASSIUM: CPT

## 2024-03-27 PROCEDURE — 83970 ASSAY OF PARATHORMONE: CPT

## 2024-03-27 PROCEDURE — 87637 SARSCOV2&INF A&B&RSV AMP PRB: CPT

## 2024-03-27 PROCEDURE — 80074 ACUTE HEPATITIS PANEL: CPT

## 2024-03-27 PROCEDURE — 76377 3D RENDER W/INTRP POSTPROCES: CPT

## 2024-03-27 PROCEDURE — 86922 COMPATIBILITY TEST ANTIGLOB: CPT

## 2024-03-27 PROCEDURE — 82330 ASSAY OF CALCIUM: CPT

## 2024-03-27 PROCEDURE — 85610 PROTHROMBIN TIME: CPT

## 2024-03-27 PROCEDURE — 36430 TRANSFUSION BLD/BLD COMPNT: CPT

## 2024-03-27 PROCEDURE — 73562 X-RAY EXAM OF KNEE 3: CPT

## 2024-03-27 PROCEDURE — 82962 GLUCOSE BLOOD TEST: CPT

## 2024-03-27 PROCEDURE — 97535 SELF CARE MNGMENT TRAINING: CPT

## 2024-03-27 PROCEDURE — 82310 ASSAY OF CALCIUM: CPT

## 2024-03-27 PROCEDURE — 80053 COMPREHEN METABOLIC PANEL: CPT

## 2024-03-27 PROCEDURE — 83605 ASSAY OF LACTIC ACID: CPT

## 2024-03-27 PROCEDURE — 97116 GAIT TRAINING THERAPY: CPT

## 2024-03-27 PROCEDURE — 85018 HEMOGLOBIN: CPT

## 2024-03-27 PROCEDURE — 93970 EXTREMITY STUDY: CPT

## 2024-03-27 PROCEDURE — 97112 NEUROMUSCULAR REEDUCATION: CPT

## 2024-03-27 PROCEDURE — 72192 CT PELVIS W/O DYE: CPT | Mod: MC

## 2024-03-27 PROCEDURE — 86901 BLOOD TYPING SEROLOGIC RH(D): CPT

## 2024-03-27 PROCEDURE — 86850 RBC ANTIBODY SCREEN: CPT

## 2024-03-27 PROCEDURE — 99285 EMERGENCY DEPT VISIT HI MDM: CPT | Mod: 25

## 2024-03-27 RX ORDER — ERYTHROPOIETIN 10000 [IU]/ML
10000 INJECTION, SOLUTION INTRAVENOUS; SUBCUTANEOUS
Qty: 0 | Refills: 0 | DISCHARGE
Start: 2024-03-27

## 2024-03-27 RX ORDER — APIXABAN 2.5 MG/1
2.5 TABLET, FILM COATED ORAL
Refills: 0 | Status: DISCONTINUED | OUTPATIENT
Start: 2024-03-27 | End: 2024-04-15

## 2024-03-27 RX ORDER — ASPIRIN/CALCIUM CARB/MAGNESIUM 324 MG
81 TABLET ORAL
Refills: 0 | Status: DISCONTINUED | OUTPATIENT
Start: 2024-03-28 | End: 2024-04-18

## 2024-03-27 RX ORDER — LANOLIN ALCOHOL/MO/W.PET/CERES
3 CREAM (GRAM) TOPICAL AT BEDTIME
Refills: 0 | Status: DISCONTINUED | OUTPATIENT
Start: 2024-03-27 | End: 2024-03-30

## 2024-03-27 RX ORDER — ERYTHROPOIETIN 10000 [IU]/ML
10000 INJECTION, SOLUTION INTRAVENOUS; SUBCUTANEOUS
Refills: 0 | Status: DISCONTINUED | OUTPATIENT
Start: 2024-03-27 | End: 2024-04-30

## 2024-03-27 RX ORDER — SENNA PLUS 8.6 MG/1
2 TABLET ORAL AT BEDTIME
Refills: 0 | Status: DISCONTINUED | OUTPATIENT
Start: 2024-03-27 | End: 2024-04-30

## 2024-03-27 RX ORDER — APIXABAN 2.5 MG/1
1 TABLET, FILM COATED ORAL
Qty: 0 | Refills: 0 | DISCHARGE
Start: 2024-03-27

## 2024-03-27 RX ORDER — SEVELAMER CARBONATE 2400 MG/1
800 POWDER, FOR SUSPENSION ORAL
Refills: 0 | Status: DISCONTINUED | OUTPATIENT
Start: 2024-03-27 | End: 2024-04-03

## 2024-03-27 RX ORDER — OXYCODONE HYDROCHLORIDE 5 MG/1
1 TABLET ORAL
Qty: 0 | Refills: 0 | DISCHARGE
Start: 2024-03-27

## 2024-03-27 RX ORDER — SEVELAMER CARBONATE 2400 MG/1
1 POWDER, FOR SUSPENSION ORAL
Qty: 0 | Refills: 0 | DISCHARGE
Start: 2024-03-27

## 2024-03-27 RX ORDER — ACETAMINOPHEN 500 MG
975 TABLET ORAL EVERY 8 HOURS
Refills: 0 | Status: DISCONTINUED | OUTPATIENT
Start: 2024-03-27 | End: 2024-04-30

## 2024-03-27 RX ORDER — OXYCODONE HYDROCHLORIDE 5 MG/1
15 TABLET ORAL EVERY 4 HOURS
Refills: 0 | Status: DISCONTINUED | OUTPATIENT
Start: 2024-03-27 | End: 2024-03-27

## 2024-03-27 RX ORDER — ASPIRIN/CALCIUM CARB/MAGNESIUM 324 MG
1 TABLET ORAL
Qty: 0 | Refills: 0 | DISCHARGE
Start: 2024-03-27

## 2024-03-27 RX ORDER — POLYETHYLENE GLYCOL 3350 17 G/17G
17 POWDER, FOR SOLUTION ORAL DAILY
Refills: 0 | Status: DISCONTINUED | OUTPATIENT
Start: 2024-03-28 | End: 2024-04-30

## 2024-03-27 RX ORDER — METOPROLOL TARTRATE 50 MG
25 TABLET ORAL
Refills: 0 | Status: DISCONTINUED | OUTPATIENT
Start: 2024-03-28 | End: 2024-04-30

## 2024-03-27 RX ORDER — OXYCODONE HYDROCHLORIDE 5 MG/1
10 TABLET ORAL EVERY 4 HOURS
Refills: 0 | Status: DISCONTINUED | OUTPATIENT
Start: 2024-03-27 | End: 2024-04-03

## 2024-03-27 RX ADMIN — Medication 81 MILLIGRAM(S): at 18:03

## 2024-03-27 RX ADMIN — APIXABAN 2.5 MILLIGRAM(S): 2.5 TABLET, FILM COATED ORAL at 09:45

## 2024-03-27 RX ADMIN — CHLORHEXIDINE GLUCONATE 1 APPLICATION(S): 213 SOLUTION TOPICAL at 10:47

## 2024-03-27 RX ADMIN — Medication 25 MILLIGRAM(S): at 05:25

## 2024-03-27 RX ADMIN — ERYTHROPOIETIN 10000 UNIT(S): 10000 INJECTION, SOLUTION INTRAVENOUS; SUBCUTANEOUS at 10:24

## 2024-03-27 RX ADMIN — Medication 25 MILLIGRAM(S): at 18:03

## 2024-03-27 RX ADMIN — Medication 81 MILLIGRAM(S): at 05:26

## 2024-03-27 RX ADMIN — SEVELAMER CARBONATE 800 MILLIGRAM(S): 2400 POWDER, FOR SUSPENSION ORAL at 09:48

## 2024-03-27 RX ADMIN — SEVELAMER CARBONATE 800 MILLIGRAM(S): 2400 POWDER, FOR SUSPENSION ORAL at 14:47

## 2024-03-27 NOTE — PROGRESS NOTE ADULT - PROVIDER SPECIALTY LIST ADULT
Internal Medicine
Nephrology
Orthopedics
Orthopedics
Infectious Disease
Internal Medicine
Nephrology
Orthopedics
Infectious Disease
Internal Medicine
Orthopedics
Internal Medicine

## 2024-03-27 NOTE — PROGRESS NOTE ADULT - SUBJECTIVE AND OBJECTIVE BOX
pt seen and examined   no sob   no cough   vitally stable   no hypoxia  stable for dc   discussed with acp   d/w pt   d/w cm   dc on current meds   including eliquis givne pt is post op/covid and high d-dimer : 30 day total     see dc summary     d/c >35 min

## 2024-03-27 NOTE — DISCHARGE NOTE NURSING/CASE MANAGEMENT/SOCIAL WORK - NSDCPEFALRISK_GEN_ALL_CORE
For information on Fall & Injury Prevention, visit: [email or web address] OR  [email or web address] OR  https://www.cdc.gov/steadi/patient.html

## 2024-03-27 NOTE — PATIENT PROFILE ADULT - NSPROPTRIGHTSUPPORTPHONE_GEN_A_NUR
Detail Level: Zone Initiate Treatment: Ketoconazole cream\\nHydrocortisone cream Plan: Discussed if spot on upper lip not treated or improved with medications we may biopsy at next follow up Plan: Discussed OTC hydroquinone cream for lightening and Even Tone cream by [employer] to lighten spots [phone]

## 2024-03-27 NOTE — H&P ADULT - NS ATTEND AMEND GEN_ALL_CORE FT
Rehab Attending- Patient seen and examined by me - Case discussed, above note reviewed by me with modifications made    patient seen and evaluated bedside  diffuse weakness BLEs- pain in right hip  last BM 3/26  For HD in Am    IMP Rehab Right hip Fx SP Hemiarthroplasty/ Hx left hip Fx- Good candidate for intensive rehab - will tolerate thrtee hours rehab daily      MEDICATIONS  (STANDING):  acetaminophen     Tablet .. 975 milliGRAM(s) Oral every 8 hours  apixaban 2.5 milliGRAM(s) Oral two times a day  aspirin enteric coated 81 milliGRAM(s) Oral two times a day  epoetin carito-epbx (RETACRIT) Injectable 36722 Unit(s) IV Push <User Schedule>  metoprolol tartrate 25 milliGRAM(s) Oral two times a day  senna 2 Tablet(s) Oral at bedtime  sevelamer carbonate 800 milliGRAM(s) Oral three times a day with meals    MEDICATIONS  (PRN):  aluminum hydroxide/magnesium hydroxide/simethicone Suspension 30 milliLiter(s) Oral every 4 hours PRN Dyspepsia  melatonin 3 milliGRAM(s) Oral at bedtime PRN Insomnia  oxyCODONE    IR 15 milliGRAM(s) Oral every 4 hours PRN Severe Pain (7 - 10)  oxyCODONE    IR 10 milliGRAM(s) Oral every 4 hours PRN Moderate Pain (4 - 6)  polyethylene glycol 3350 17 Gram(s) Oral daily PRN Constipation               8.2    4.83  )-----------( 366      ( 28 Mar 2024 06:20 )             25.5     03-28    137  |  96  |  53<H>  ----------------------------<  74  4.4   |  27  |  7.42<H>    Ca    9.1      28 Mar 2024 06:20  Phos  6.0     03-28  Mg     2.5     03-28    TPro  6.6  /  Alb  2.4<L>  /  TBili  0.4  /  DBili  x   /  AST  14  /  ALT  <6<L>  /  AlkPhos  754<H>  03-28      PT/INR - ( 27 Mar 2024 11:00 )   PT: 11.2 sec;   INR: 1.02 ratio          Vital Signs Last 24 Hrs  T(C): 36.7 (28 Mar 2024 08:22), Max: 37.2 (27 Mar 2024 19:55)  T(F): 98.1 (28 Mar 2024 08:22), Max: 99 (27 Mar 2024 19:55)  HR: 78 (28 Mar 2024 08:22) (69 - 79)  BP: 131/72 (28 Mar 2024 08:22) (129/75 - 147/77)  BP(mean): --  RR: 16 (28 Mar 2024 08:22) (16 - 17)  SpO2: 98% (28 Mar 2024 08:22) (97% - 98%)    Parameters below as of 28 Mar 2024 08:22  Patient On (Oxygen Delivery Method): room air        Gen - NAD, Comfortable  HEENT - NCAT, EOMI, MMM  Neck - Supple, No limited ROM  Pulm - CTAB, No wheeze, No rhonchi, No crackles  Cardiovascular - RRR, S1S2, No murmurs  Chest - good chest expansion, good respiratory effort  Abdomen - Soft, NT/ND, +BS. BM 3/26  Extremities - Right FA AV fistula + bruit and thrill. GIDEON old AV fistula not active, 1-2+ edema LEs- skin dry  Neuro-     Cognitive - awake, alert, oriented to person, place, date, year, and situation.  Able  to follow command     Communication - Fluent, Comprehensible, No dysarthria, No aphasia        Memory:  Recent/ remote memory intact     Cranial Nerves - CN 2-12 intact. No facial asymmetry, Tongue midline, EOMI, Shoulder shrug intact     Motor -                    LEFT    UE - ShAB 5/5, EF 5/5, EE 5/5,  5/5                    RIGHT UE - ShAB 5/5, EF 5/5, EE 5/5,   5/5                    LEFT    LE - HF 4/5, KE 3+/5, DF 4/5, PF 4/5                    RIGHT LE - HF 3+/5, KE 4/5, DF 4/5, PF 4/5        Sensory - Intact bilaterally      Tone - normal  MSK: b/l hip discomfort with movement   Psychiatric - Mood stable, Affect WNL  Skin: right hip surgical site with steri strips CDI. b/l sacrum/coccyx DTI with evolution.

## 2024-03-27 NOTE — H&P ADULT - HISTORY OF PRESENT ILLNESS
37yo Male PMHx HTN, anemia of chronic disease, ESRD on HD (M/W/F via right AV fistula), left hip arthroplasty in 8/23, history of right leg fracture presented to [facility] on 3/6 after falling during a wheelchair transfer landing on his right knee. CT hip with acute garden type 1 minimally valgus impacted transcervical right femoral neck fracture and subacute right pubic body and inferior rami fractures. He is s/p right hip hemiarthroplasty. Post op course complicated by hypoglycemia after being shifted for hyperkalemia.    39yo Male PMHx HTN, anemia of chronic disease, ESRD on HD (M/W/F via right AV fistula), left hip arthroplasty in 8/23, history of right leg fracture presented to [facility] on 3/6 after falling during a wheelchair transfer landing on his right knee. He is wheelchair bound since his left hip surgery. CT hip with acute garden type 1 minimally valgus impacted transcervical right femoral neck fracture and subacute right pubic body and inferior rami fractures. He is s/p right hip hemiarthroplasty. Post op course complicated by anemia s/p transfusion. Hospital course complicated by hypoglycemia after being shifted for hyperkalemia. Further complicated by febrile 101.6F found to be covid positive and completed remdisivir. He was started on eliquis 2.5mg BID for elevated d-dimers and negative LE duplex.     Patient was evaluated by PM&R and therapy for functional deficits, gait/ADL impairments and acute rehabilitation was recommended. Patient was medically optimized for discharge to [facility]U on 3/27       39yo Male PMHx HTN, anemia of chronic disease, ESRD on HD (M/W/F via right AV fistula), left hip arthroplasty in 8/23, history of right leg fracture presented to [facility] on 3/6 after falling during a wheelchair transfer landing on his right knee. He is wheelchair bound since his left hip surgery. CT hip with acute garden type 1 minimally valgus impacted transcervical right femoral neck fracture and subacute right pubic body and inferior rami fractures. He is s/p right hip hemiarthroplasty. Post op course complicated by anemia s/p transfusion. Hospital course complicated by hypoglycemia after being shifted for hyperkalemia. Further complicated by febrile 101.6F found to be covid positive and completed remdisivir. He was started on eliquis 2.5mg BID for elevated d-dimers and negative LE duplex.     Patient was evaluated by PM&R and therapy for functional deficits, gait/ADL impairments and acute rehabilitation was recommended. Patient was medically optimized for discharge to [facility] on 3/27 39yo Male PMHx HTN, anemia of chronic disease, ESRD on HD (M/W/F via right AV fistula), left hip arthroplasty in 8/23, history of right leg fracture presented to [facility] from [facility] on 3/6 after falling during a wheelchair transfer landing on his right knee. He is wheelchair bound since his left hip surgery. CT hip with acute garden type 1 minimally valgus impacted transcervical right femoral neck fracture and subacute right pubic body and inferior rami fractures. He is s/p right hip hemiarthroplasty. Post op course complicated by anemia s/p transfusion. Hospital course complicated by hypoglycemia after being shifted for hyperkalemia. Further complicated by febrile 101.6F found to be covid positive and completed remdisivir. He was started on eliquis 2.5mg BID for elevated d-dimers and negative LE duplex.     Patient was evaluated by PM&R and therapy for functional deficits, gait/ADL impairments and acute rehabilitation was recommended. Patient was medically optimized for discharge to [facility] IRU on 3/27

## 2024-03-27 NOTE — H&P ADULT - NSHPPHYSICALEXAM_GEN_ALL_CORE
PHYSICAL EXAM  VITALS  T(C): 36.6 (03-27-24 @ 18:18), Max: 37.1 (03-27-24 @ 05:34)  HR: 79 (03-27-24 @ 18:18) (63 - 81)  BP: 147/77 (03-27-24 @ 18:18) (109/85 - 147/77)  RR: 17 (03-27-24 @ 18:18) (17 - 18)  SpO2: 97% (03-27-24 @ 18:18) (94% - 100%)    Gen - NAD, Comfortable  HEENT - NCAT, EOMI, MMM  Neck - Supple, No limited ROM  Pulm - CTAB, No wheeze, No rhonchi, No crackles  Cardiovascular - RRR, S1S2, No murmurs  Chest - good chest expansion, good respiratory effort  Abdomen - Soft, NT/ND, +BS. BM 3/26  Extremities - Right FA AV fistula + bruit and thrill. GIDEON old AV fistula not active,   Neuro-     Cognitive - awake, alert, oriented to person, place, date, year, and situation.  Able  to follow command     Communication - Fluent, Comprehensible, No dysarthria, No aphasia        Memory:  Recent/ remote memory intact     Cranial Nerves - CN 2-12 intact. No facial asymmetry, Tongue midline, EOMI, Shoulder shrug intact     Motor -                    LEFT    UE - ShAB 5/5, EF 5/5, EE 5/5,  5/5                    RIGHT UE - ShAB 5/5, EF 5/5, EE 5/5,   5/5                    LEFT    LE - HF 4/5, KE 3+/5, DF 4/5, PF 4/5                    RIGHT LE - HF 3+/5, KE 4/5, DF 4/5, PF 4/5        Sensory - Intact bilaterally      Tone - normal  MSK: b/l hip discomfort with movement   Psychiatric - Mood stable, Affect WNL  Skin: right hip surgical site with steri strips CDI. b/l sacrum/coccyx DTI wth evolution.

## 2024-03-27 NOTE — H&P ADULT - ASSESSMENT
37yo Male PMHx HTN, anemia of chronic disease, ESRD on HD (M/W/F via right AV fistula), left hip arthroplasty in 8/23, history of right leg fracture presented to [facility] on 3/6 after falling during a wheelchair transfer landing on his right knee. He is wheelchair bound since his left hip surgery. CT hip with acute garden type 1 minimally valgus impacted transcervical right femoral neck fracture and subacute right pubic body and inferior rami fractures. He is s/p right hip hemiarthroplasty. Post op course complicated by anemia s/p transfusion. Hospital course complicated by hypoglycemia after being shifted for hyperkalemia. Further complicated by febrile 101.6F found to be covid positive and completed remdisivir. He was started on eliquis 2.5mg BID for elevated d-dimers and negative LE duplex.     Patient was evaluated by PM&R and therapy for functional deficits, gait/ADL impairments and acute rehabilitation was recommended. Patient was medically optimized for discharge to [facility] on 3/27      BRIDGET NORTH is a 38y with HTN, anemia of chronic disease, ESRD on HD (M/W/F via right AV fistula), left hip arthroplasty in 8/23, history of right leg fracture presented to [facility] on 3/6 after a fall during a wheelchair transfer. He was found to have a right femoral neck fracture and subacute pubic body and inferior rami fractures. He is s/p right hip hemiarthroplasty. Post    .  Hospital Course complicated by ***. Patient now admitted for a multidisciplinary rehab program. 03-27-24 @ 16:17        Comprehensive Multidisciplinary Rehab Program:  - Start comprehensive rehab program of PT/OT/SLP - 3 hours a day, 5 days a week. P&O as needed       HTN  -   - Monitor BP    HLD  - cont statin    T2DM  - Lantus  - Premeal  - SSI  - Monitor fingersticks    Pain  - Tylenol PRN  - Avoid sedating medications that may interfere with cognitive recovery    Mood / Cognition  - Neuropsychology consult  - [ ] Enhanced Supervision  [ ] Constant Observation    Sleep  - Maintain quiet hours and a low stim environment.   - Monitor sleep logs (for BIU)  - Melatonin PRN     GI / Bowel  - Senna qHS  - Miralax PRN Daily  - GI ppx: ***     / Bladder  - Currently patient voids:      [ ] independent      [ ] external collection device (condom cath)      [ ] Indwelling mayes catheter      [ ] Intermittent catheterization  - Continue bladder scans Q8 hours with straight cath for >400cc.  - Toileting schedule every 4 hours    Skin / Pressure injury  - Skin assessment on admission performed [  ] :   - Monitor Incisions:    - Turn q2 hours in bed while awake, air mattress  - nursing to monitor skin qShift  - soft heel protectors  - skin barrier cream PRN    Diet/Dysphagia:  - Diet Consistency: ***  - Supplements: ***  - Aspiration Precautions  - SLP consult for swallow function evaluation and treatment  - Nutrition consult    DVT prophylaxis:   - *****  - SCDs  - Last Doppler on ***       [facility]patient Follow-up:    [facility]  [facility]  Scheduled Appointment: 03/17/2024    Jason León  [facility] Phys[facility]  ORTHOSURG [address] [facility]  Scheduled Appointment: 03/18/2024    [facility]  Scheduled Appointment: 03/25/2024    [facility]  GENSURG [address] [facility]  Scheduled Appointment: 04/04/2024        Code Status/Emergency Contact:      ---------------    Goals: Safe discharge to home  Estimated Length of Stay: 10-14 days  Rehab Potential: Good  Medical Prognosis: Good  Estimated Disposition: Home with home care      PRESCREEN COMPARISON:  I have reviewed the prescreen information and I have found no relevant changes between the preadmission screening and my post admission evaluation.    RATIONALE FOR INPATIENT ADMISSION: Patient demonstrates the following:  [X] Medically appropriate for rehabilitation admission  [X] Has attainable rehab goals with an appropriate initial discharge plan  [X]Has rehabilitation potential (expected to make a significant improvement within a reasonable period of time)  [X] Requires close medical management by a rehab physician, rehab nursing care, Hospitalist and comprehensive interdisciplinary team (including PT, OT and/or SLP, Prosthetics and Orthotics)     BRIDGET NORTH is a 38y M with HTN, anemia of chronic disease, ESRD on HD (M/W/F via right AV fistula), left hip arthroplasty in 8/23, history of right leg fracture presented to [facility] on 3/6 after a fall during a wheelchair transfer. He was found to have a right femoral neck fracture and subacute pubic body and inferior rami fractures. He is s/p right hip hemiarthroplasty. Hospital course complicated by post-op anemia s/p transfusion, hypoglycemia, hyperkalemia, febrile 2/2 covid s/p remdisivir, and started on eliquis 2.5mg BID with negative dvts on LE duplex. Patient now admitted for a multidisciplinary rehab program. 03-27-24 @ 16:17      #Right Femoral Neck fracture   - s/p right hip hemiarthroplasty on 3/7  - pain med as below  - Dressing and staples removed on 3/22  - Comprehensive Multidisciplinary Rehab Program: Start comprehensive rehab program of PT/OT/SLP - 3 hours a day, 5 days a week. P&O as needed.   - precautions: WBAT RLE. Anterior hip.     #ESRD  - HDS via right AV fistula on m/w/f.   - Nephro consult to be placed. Dr Blanco  - sevelamer 800mg TID  - retacrit m/w/f    #HTN  - toprol 25mg BID  - Monitor BP    #Pain  - Tylenol PRN  - oxy 10mg moderate, 15mg severe.     #Mood / Cognition  - Neuropsychology consult PRN  - recreation therapy PRN    #Sleep  - Melatonin PRN     #GI / Bowel  #dyspepsia  - Senna qHS  - Miralax PRN   - maalox  - GI ppx: none.     # / Bladder  - Continue bladder scans Q8 hours with straight cath for >400cc.  - Toileting schedule every 4 hours    #Skin / Pressure injury  - Skin assessment on admission performed [  ] :   - Monitor Incisions:      #Diet:  - Diet Consistency: Renal restrictions  - Supplements: ***  - Nutrition consult    #DVT prophylaxis:   - eliquis 2.5mg BID  - SCDs  - Last LE Doppler normal on 3/14       Outpatient Follow-up:  [facility]  SHERRI BELLE.A.S[facility]DONALD  Scheduled Appointment: 03/17/2024    Jason León  [facility]  ORTHOSURG [address] [facility]  Scheduled Appointment: 03/18/2024    [facility]  LIJOP Amb Surg MOR  Scheduled Appointment: 03/25/2024    [facility]  GENSURG [address]  Scheduled Appointment: 04/04/2024        Code Status/Emergency Contact:      ---------------    Goals: Safe discharge to home  Estimated Length of Stay: 10-14 days  Rehab Potential: Good  Medical Prognosis: Good  Estimated Disposition: Home with home care      PRESCREEN COMPARISON:  I have reviewed the prescreen information and I have found no relevant changes between the preadmission screening and my post admission evaluation.    RATIONALE FOR INPATIENT ADMISSION: Patient demonstrates the following:  [X] Medically appropriate for rehabilitation admission  [X] Has attainable rehab goals with an appropriate initial discharge plan  [X]Has rehabilitation potential (expected to make a significant improvement within a reasonable period of time)  [X] Requires close medical management by a rehab physician, rehab nursing care, Hospitalist and comprehensive interdisciplinary team (including PT, OT and/or SLP, Prosthetics and Orthotics)     BRIDGET NORTH is a 38y M with HTN, anemia of chronic disease, ESRD on HD (M/W/F via right AV fistula), left hip arthroplasty in 8/23, history of right leg fracture presented to [facility] on 3/6 after a fall during a wheelchair transfer. He was found to have a right femoral neck fracture and subacute pubic body and inferior rami fractures. He is s/p right hip hemiarthroplasty. Hospital course complicated by post-op anemia s/p transfusion, hypoglycemia, hyperkalemia, febrile 2/2 covid s/p remdisivir, and started on eliquis 2.5mg BID with negative dvts on LE duplex. Patient now admitted for a multidisciplinary rehab program. 03-27-24 @ 16:17      #Right Femoral Neck fracture   - s/p right hip hemiarthroplasty on 3/7  - pain med as below  - Dressing and staples removed on 3/22  - Comprehensive Multidisciplinary Rehab Program: Start comprehensive rehab program of PT/OT/SLP - 3 hours a day, 5 days a week. P&O as needed.   - precautions: WBAT RLE. Anterior hip.     #ESRD  - HDS via right AV fistula on m/w/f.   - Nephro consult to be placed. Dr Blanco  - sevelamer 800mg TID  - retacrit m/w/f    #HTN  - toprol 25mg BID  - Monitor BP    #Pain  - Tylenol PRN  - oxy 10mg moderate, 15mg severe.     #Mood / Cognition  - Neuropsychology consult PRN  - recreation therapy PRN    #Sleep  - Melatonin PRN     #GI / Bowel  #dyspepsia  - Senna qHS  - Miralax PRN   - maalox  - GI ppx: none.     # / Bladder  - Continue bladder scans Q8 hours with straight cath for >400cc.  - Toileting schedule every 4 hours    #Skin / Pressure injury  - Skin assessment on admission performed [  ] :   - Monitor Incisions:    - wound care following at [facility] for b/l sacrum/coccyx DTI wth evolution.   - wound care to follow  - turn and reposition q2h      #Diet:  - Diet Consistency: Renal restrictions  - Supplements: ***  - Nutrition consult    #DVT prophylaxis:   - eliquis 2.5mg BID  - SCDs  - Last LE Doppler normal on 3/14       Outpatient Follow-up:  [facility]  [facility] P[facility]T  Scheduled Appointment: 03/17/2024    Jason León  [facility] Physician [facility]  ORTHOSURG [address]  Scheduled Appointment: 03/18/2024    Eric Royal  [facility]  LIJOP Amb Surg MOR  Scheduled Appointment: 03/25/2024    [facility] Physician [facility]  GENSURG [address]  Scheduled Appointment: 04/04/2024        Code Status/Emergency Contact:      ---------------    Goals: Safe discharge to home  Estimated Length of Stay: 10-14 days  Rehab Potential: Good  Medical Prognosis: Good  Estimated Disposition: Home with home care      PRESCREEN COMPARISON:  I have reviewed the prescreen information and I have found no relevant changes between the preadmission screening and my post admission evaluation.    RATIONALE FOR INPATIENT ADMISSION: Patient demonstrates the following:  [X] Medically appropriate for rehabilitation admission  [X] Has attainable rehab goals with an appropriate initial discharge plan  [X]Has rehabilitation potential (expected to make a significant improvement within a reasonable period of time)  [X] Requires close medical management by a rehab physician, rehab nursing care, Hospitalist and comprehensive interdisciplinary team (including PT, OT and/or SLP, Prosthetics and Orthotics)     BRIDGET NORTH is a 38y M with HTN, anemia of chronic disease, ESRD on HD (M/W/F via right AV fistula), left hip arthroplasty in 8/23, history of right leg fracture presented to [facility] on 3/6 after a fall during a wheelchair transfer. He was found to have a right femoral neck fracture and subacute pubic body and inferior rami fractures. He is s/p right hip hemiarthroplasty. Hospital course complicated by post-op anemia s/p transfusion, hypoglycemia, hyperkalemia, febrile 2/2 covid s/p remdisivir, and started on eliquis 2.5mg BID with negative dvts on LE duplex. Patient now admitted for a multidisciplinary rehab program. 03-27-24 @ 16:17      #Right Femoral Neck fracture   - s/p right hip hemiarthroplasty on 3/7  - pain med as below  - Dressing and staples removed on 3/22  - Comprehensive Multidisciplinary Rehab Program: Start comprehensive rehab program of PT/OT/SLP - 3 hours a day, 5 days a week. P&O as needed.   - precautions: WBAT RLE. Anterior hip.     #ESRD  - HDS via right AV fistula on m/w/f.   - Nephro consult placed. Dr Blanco aware.   - sevelamer 800mg TID  - retacrit m/w/f    #HTN  - toprol 25mg BID  - Monitor BP    #Pain  - Tylenol PRN  - oxy 10mg moderate, 15mg severe.     #Mood / Cognition  - Neuropsychology consult PRN  - recreation therapy PRN    #Sleep  - Melatonin PRN     #GI / Bowel  #dyspepsia  - Senna qHS  - Miralax PRN   - maalox  - GI ppx: none.     # / Bladder  - Continue bladder scans Q8 hours with straight cath for >400cc.  - Toileting schedule every 4 hours    #Skin / Pressure injury  - Skin assessment on admission performed [  ] :   - Monitor Incisions:    - wound care following at [facility] for b/l sacrum/coccyx DTI wth evolution.   - wound care to follow  - turn and reposition q2h      #Diet:  - Diet Consistency: Renal restrictions  - Supplements: ***  - Nutrition consult    #DVT prophylaxis:   - eliquis 2.5mg BID  - SCDs  - Last LE Doppler normal on 3/14       Outpatient Follow-up:  [facility]  [facility]JOP P[facility]T  Scheduled Appointment: 03/17/2024    Jason León  [facility] Physician [facility]  ORTHOSURG [address]  Scheduled Appointment: 03/18/2024    Eric Royal  [facility]  LIJOP Amb Surg MOR  Scheduled Appointment: 03/25/2024    [facility] Physician [facility]  GENSURG [address]  Scheduled Appointment: 04/04/2024        Code Status/Emergency Contact:      ---------------    Goals: Safe discharge to home  Estimated Length of Stay: 10-14 days  Rehab Potential: Good  Medical Prognosis: Good  Estimated Disposition: Home with home care      PRESCREEN COMPARISON:  I have reviewed the prescreen information and I have found no relevant changes between the preadmission screening and my post admission evaluation.    RATIONALE FOR INPATIENT ADMISSION: Patient demonstrates the following:  [X] Medically appropriate for rehabilitation admission  [X] Has attainable rehab goals with an appropriate initial discharge plan  [X]Has rehabilitation potential (expected to make a significant improvement within a reasonable period of time)  [X] Requires close medical management by a rehab physician, rehab nursing care, Hospitalist and comprehensive interdisciplinary team (including PT, OT and/or SLP, Prosthetics and Orthotics)     BRIDGET NORTH is a 38y M with HTN, anemia of chronic disease, ESRD on HD (M/W/F via right AV fistula), left hip arthroplasty in 8/23, history of right leg fracture presented to [facility] on 3/6 after a fall during a wheelchair transfer. He was found to have a right femoral neck fracture and subacute pubic body and inferior rami fractures. He is s/p right hip hemiarthroplasty. Hospital course complicated by post-op anemia s/p transfusion, hypoglycemia, hyperkalemia, febrile 2/2 covid s/p remdisivir, and started on eliquis 2.5mg BID with negative dvts on LE duplex. Patient now admitted for a multidisciplinary rehab program. 03-27-24 @ 16:17      #Right Femoral Neck fracture   - s/p right hip hemiarthroplasty on 3/7  - pain med as below  - Dressing and staples removed on 3/22  - Comprehensive Multidisciplinary Rehab Program: Start comprehensive rehab program of PT/OT/SLP - 3 hours a day, 5 days a week. P&O as needed.   - precautions: WBAT RLE. Anterior hip.     #ESRD  - HDS via right AV fistula on m/w/f.  ( Last HD 3/27)  - Nephro consult placed. Dr Blanco aware.   - sevelamer 800mg TID  - retacrit m/w/f    #HTN  - toprol 25mg BID  - Monitor BP    #Pain  - Tylenol PRN  - oxy 10mg moderate, 15mg severe.     #Mood / Cognition  - Neuropsychology consult PRN  - recreation therapy PRN    #Sleep  - Melatonin PRN     #GI / Bowel  #dyspepsia  - Senna qHS  - Miralax PRN   - maalox  - GI ppx: none.     # / Bladder  - Continue bladder scans Q8 hours with straight cath for >400cc.  - Toileting schedule every 4 hours    #Skin / Pressure injury  - Skin assessment on admission performed : right hip surgical site with steri strips CDI. Coccyx denuded.  - Monitor Incisions:    - wound care following at [facility] for b/l sacrum/coccyx DTI wth evolution.   - wound care to follow  - turn and reposition q2h      #Diet:  - Diet Consistency: Renal restriction  - Nutrition consult    #DVT prophylaxis:   - eliquis 2.5mg BID  - SCDs  - Last LE Doppler normal on 3/14       Outpatient Follow-up:  [facility]  [facility] P[facility]T  Scheduled Appointment: 03/17/2024    Jason León  [facility]  ORTHOSURG [address]  Scheduled Appointment: 03/18/2024    [facility]  LIFERDIN[facility]P Amb Surg MOR  Scheduled Appointment: 03/25/2024    [facility]  GENSURG [address]  Scheduled Appointment: 04/04/2024        Code Status/Emergency Contact:      ---------------    Goals: Safe discharge to home  Estimated Length of Stay: 10-14 days  Rehab Potential: Good  Medical Prognosis: Good  Estimated Disposition: Home with home care      PRESCREEN COMPARISON:  I have reviewed the prescreen information and I have found no relevant changes between the preadmission screening and my post admission evaluation.    RATIONALE FOR INPATIENT ADMISSION: Patient demonstrates the following:  [X] Medically appropriate for rehabilitation admission  [X] Has attainable rehab goals with an appropriate initial discharge plan  [X]Has rehabilitation potential (expected to make a significant improvement within a reasonable period of time)  [X] Requires close medical management by a rehab physician, rehab nursing care, Hospitalist and comprehensive interdisciplinary team (including PT, OT and/or SLP, Prosthetics and Orthotics)     BRIDGET NORTH is a 38y M with HTN, anemia of chronic disease, ESRD on HD (M/W/F via right AV fistula), left hip arthroplasty in 8/23, history of right leg fracture presented to [facility] on 3/6 after a fall during a wheelchair transfer. He was found to have a right femoral neck fracture and subacute pubic body and inferior rami fractures. He is s/p right hip hemiarthroplasty. Hospital course complicated by post-op anemia s/p transfusion, hypoglycemia, hyperkalemia, febrile 2/2 covid s/p remdisivir, and started on eliquis 2.5mg BID with negative dvts on LE duplex. Patient now admitted for a multidisciplinary rehab program. 03-27-24 @ 16:17      #Right Femoral Neck fracture   - s/p right hip hemiarthroplasty on 3/7  - pain med as below  - Dressing and staples removed on 3/22  - Comprehensive Multidisciplinary Rehab Program: Start comprehensive rehab program of PT/OT/SLP - 3 hours a day, 5 days a week. P&O as needed.   - precautions: WBAT RLE. Anterior hip.     #ESRD  - HDS via right AV fistula on m/w/f.  ( Last HD 3/27). Old HDS left UA  - Nephro consult placed. Dr Blanco aware.   - sevelamer 800mg TID  - retacrit m/w/f    #HTN  - toprol 25mg BID  - Monitor BP    #Pain  - Tylenol PRN  - oxy 10mg moderate, 15mg severe.     #Mood / Cognition  - Neuropsychology consult PRN  - recreation therapy PRN    #Sleep  - Melatonin PRN     #GI / Bowel  #dyspepsia  - Senna qHS  - Miralax PRN   - maalox  - GI ppx: none.     # / Bladder  - does not void ESRD    #Skin / Pressure injury  - Skin assessment on admission performed : right hip surgical site with steri strips CDI. Coccyx denuded.  - Monitor Incisions:    - wound care following at [facility] for b/l sacrum/coccyx DTI wth evolution.   - wound care to follow  - turn and reposition q2h      #Diet:  - Diet Consistency: Renal restriction  - Nutrition consult    #DVT prophylaxis:   - eliquis 2.5mg BID  - SCDs  - Last LE Doppler normal on 3/14       Outpatient Follow-up:  [facility]  LIJOP P.A.S.T  Scheduled Appointment: 03/17/2024    LeónJason  [facility] Physician [facility]  ORTHOSURG [address]   Scheduled Appointment: 03/18/2024    [facility]  LIJOP Amb Surg MOR  Scheduled Appointment: 03/25/2024    [facility] Physician [facility]  GENSURG [address]  Scheduled Appointment: 04/04/2024        Code Status/Emergency Contact:      ---------------    Goals: Safe discharge to home  Estimated Length of Stay: 10-14 days  Rehab Potential: Good  Medical Prognosis: Good  Estimated Disposition: Home with home care      PRESCREEN COMPARISON:  I have reviewed the prescreen information and I have found no relevant changes between the preadmission screening and my post admission evaluation.    RATIONALE FOR INPATIENT ADMISSION: Patient demonstrates the following:  [X] Medically appropriate for rehabilitation admission  [X] Has attainable rehab goals with an appropriate initial discharge plan  [X]Has rehabilitation potential (expected to make a significant improvement within a reasonable period of time)  [X] Requires close medical management by a rehab physician, rehab nursing care, Hospitalist and comprehensive interdisciplinary team (including PT, OT and/or SLP, Prosthetics and Orthotics)

## 2024-03-27 NOTE — H&P ADULT - NSHPREVIEWOFSYSTEMS_GEN_ALL_CORE
REVIEW OF SYSTEMS  Constitutional: No fever, No Chills, No fatigue  HEENT: No eye pain, No visual disturbances, No difficulty hearing  Pulm: No cough,  No shortness of breath  Cardio: No chest pain, No palpitations  GI:  No abdominal pain, No nausea, No vomiting, No diarrhea, No constipation  : No dysuria, No frequency, No hematuria  Neuro: No headaches, No memory loss, No loss of strength, No numbness, No tremors  Skin: No itching, No rashes, No lesions   Endo: No temperature intolerance  MSK: right hip discomfort  Psych:  No depression, No anxiety

## 2024-03-27 NOTE — H&P ADULT - NSHPSOCIALHISTORY_GEN_ALL_CORE
SOCIAL HISTORY  Smoking -   EtOH -   Marital Status -     Previous Functional Status:  He has been in/out hospital and rehab since August 2023, limited by weight bearing status, steps at home. Prior to August, he was using rollator, independent with ADLs.       Current Functional Status:  Bed mobility: Max-A  Transfers: Mod/Max-A  Gait / ambulation: unable  ADL's: LBD max-A. SOCIAL HISTORY  Smoking - denies  EtOH - denies  Drugs - denies   Marital Status - single    Previous Functional Status: Pt came from nursing/rehab facility where he stated over the last 2 months he has had a decline in function. He was unable to transfer, dress and  use bathroom unassisted. Was mostly wheelchair bound   He has been in/out hospital and rehab since August 2023, limited by weight bearing status, 2 ODESSA at home and bedroom and bath on second floor. Prior to August, he was using rollator, independent with ADLs.       Current Functional Status:  Bed mobility: Max-A  Transfers: Mod/Max-A  Gait / ambulation: unable  ADL's: LBD max-A.

## 2024-03-27 NOTE — PROGRESS NOTE ADULT - SUBJECTIVE AND OBJECTIVE BOX
Seen on HD    Vital Signs Last 24 Hrs  T(C): 36.4 (03-27-24 @ 13:15), Max: 37.1 (03-27-24 @ 05:34)  T(F): 97.5 (03-27-24 @ 13:15), Max: 98.8 (03-27-24 @ 05:34)  HR: 76 (03-27-24 @ 13:15) (63 - 81)  BP: 109/85 (03-27-24 @ 13:15) (109/85 - 146/92)  RR: 18 (03-27-24 @ 13:15) (18 - 18)  SpO2: 94% (03-27-24 @ 13:15) (94% - 100%)    I&O's Detail    27 Mar 2024 07:01  -  27 Mar 2024 18:04  --------------------------------------------------------  OUT:    Other (mL): 2000 mL  Total OUT: 2000 mL    s1s2  b/l air entry  soft, ND  sm edema                                                                                                         7.4    4.89  )-----------( 319      ( 27 Mar 2024 11:00 )             23.7     27 Mar 2024 11:00    141    |  98     |  77     ----------------------------<  75     4.6     |  22     |  9.48     Ca    9.6        27 Mar 2024 11:00  Phos  5.8       27 Mar 2024 11:00  Mg     2.6       27 Mar 2024 11:00    TPro  6.5    /  Alb  3.2    /  TBili  0.2    /  DBili  <0.1   /  AST  10     /  ALT  <5     /  AlkPhos  695    27 Mar 2024 11:00    LIVER FUNCTIONS - ( 27 Mar 2024 11:00 )  Alb: 3.2 g/dL / Pro: 6.5 g/dL / ALK PHOS: 695 U/L / ALT: <5 U/L / AST: 10 U/L / GGT: x           PT/INR - ( 27 Mar 2024 11:00 )   PT: 11.2 sec;   INR: 1.02 ratio      acetaminophen     Tablet .. 975 milliGRAM(s) Oral every 8 hours  aluminum hydroxide/magnesium hydroxide/simethicone Suspension 30 milliLiter(s) Oral every 4 hours PRN  apixaban 2.5 milliGRAM(s) Oral two times a day  aspirin enteric coated 81 milliGRAM(s) Oral two times a day  chlorhexidine 2% Cloths 1 Application(s) Topical daily  epoetin carito-epbx (RETACRIT) Injectable 55098 Unit(s) IV Push <User Schedule>  metoprolol tartrate 25 milliGRAM(s) Oral two times a day  ondansetron Injectable 4 milliGRAM(s) IV Push every 6 hours PRN  oxyCODONE    IR 10 milliGRAM(s) Oral every 4 hours PRN  oxyCODONE    IR 15 milliGRAM(s) Oral every 4 hours PRN  sevelamer carbonate 800 milliGRAM(s) Oral three times a day with meals    A/P:    ESRD on HD   S/p fall, R hip fx  S/p R hip HA 3/7  Tested positive for COVID   HD today as ordered  TMP 2kg  Epoetin w/HD 3 x week   Renal diet  Renvela as ordered   Rehab    [phone]

## 2024-03-27 NOTE — PROGRESS NOTE ADULT - NUTRITIONAL ASSESSMENT
This patient has been assessed with a concern for Malnutrition and has been determined to have a diagnosis/diagnoses of Severe protein-calorie malnutrition.    This patient is being managed with:   Diet Renal Restrictions-  For patients receiving Renal Replacement - No Protein Restr No Conc K No Conc Phos Low Sodium  Supplement Feeding Modality:  Oral  Nepro Cans or Servings Per Day:  3       Frequency:  Daily  Entered: Mar 14 2024 11:52AM  

## 2024-03-27 NOTE — DISCHARGE NOTE NURSING/CASE MANAGEMENT/SOCIAL WORK - PATIENT PORTAL LINK FT
You can access the FollowMyHealth Patient Portal offered by [facility] by registering at the following website: [email or web address]. By joining [facility]’s FollowMyHealth portal, you will also be able to view your health information using other applications (apps) compatible with our system.

## 2024-03-27 NOTE — H&P ADULT - NSHPLABSRESULTS_GEN_ALL_CORE
LABS:                        7.4    4.89  )-----------( 319      ( 27 Mar 2024 11:00 )             23.7     03-27    141  |  98  |  77<H>  ----------------------------<  75  4.6   |  22  |  9.48<H>    Ca    9.6      27 Mar 2024 11:00  Phos  5.8     03-27  Mg     2.6     03-27    TPro  6.5  /  Alb  3.2<L>  /  TBili  0.2  /  DBili  <0.1  /  AST  10  /  ALT  <5<L>  /  AlkPhos  695<H>  03-27    PT/INR - ( 27 Mar 2024 11:00 )   PT: 11.2 sec;   INR: 1.02 ratio         < from: TTE Limited W or WO Ultrasound Enhancing Agent (11.28.23 @ 16:28) >  CONCLUSIONS:    1. Left ventricular cavity is normal. Left ventricular wall thickness is normal. Left ventricular systolic function is normal with an ejection fraction visually estimated at 55 to 60 %. There are no regional wall motion abnormalities seen.   2. The left ventricular diastolic function is indeterminate, with indeterminant filling pressure.   3. Normal right ventricular cavity size, wall thickness, and systolic function.   4. There is severe calcification of the mitral valve annulus.   5. Estimated pulmonary artery systolic pressure is 32 mmHg.   6. Mildto moderate mitral valve stenosis.   7. No pericardial effusion seen.   8. No prior echocardiogram is available for comparison.  < end of copied text >    < from: Xray Pelvis AP only (03.06.24 @ 11:50) >  IMPRESSION:  1.  Mildly displaced right femoral neck fracture.  2.  New subsidence of the left hip arthroplasty femoral stem.  3.  Extensive osseous changes consistent with renal osteodystrophy.  < end of copied text >    < from: CT 3D Reconstruct w/ Workstation (03.06.24 @ 17:17) >  IMPRESSION:  1.  At least moderate right knee arthrosis with small to moderate volume   effusion. No acute displaced fracture.  2.  Background advanced chronic renal osteodystrophy.  < end of copied text >    < from: CT Pelvis Bony Only No Cont (03.06.24 @ 17:18) >  IMPRESSION:  1.  Acute Garden type I minimally valgus impacted transcervical right   femoral neck fracture.  2.  Subacute appearing minimally impacted right pubic body and inferior   rami fractures.  3.  Background advanced chronic renal osteodystrophy.  < end of copied text >    < from: VA Duplex Lower Ext Vein Scan, Bilat (03.14.24 @ 16:26) >  IMPRESSION: No evidence of deep venous thrombosis in either lower extremity.  < end of copied text >

## 2024-03-27 NOTE — PATIENT PROFILE ADULT - FALL HARM RISK - HARM RISK INTERVENTIONS
Assistance with ambulation/Assistance OOB with selected safe patient handling equipment/Communicate Risk of Fall with Harm to all staff/Discuss with provider need for PT consult/Monitor gait and stability/Provide patient with walking aids - walker, cane, crutches/Reinforce activity limits and safety measures with patient and family/Sit up slowly, dangle for a short time, stand at bedside before walking/Tailored Fall Risk Interventions/Use of alarms - bed, chair and/or voice tab/Visual Cue: Yellow wristband and red socks/Bed in lowest position, wheels locked, appropriate side rails in place/Call bell, personal items and telephone in reach/Instruct patient to call for assistance before getting out of bed or chair/Non-slip footwear when patient is out of bed/[place] to call system/Physically safe environment - no spills, clutter or unnecessary equipment/Purposeful Proactive Rounding/Room/bathroom lighting operational, light cord in reach

## 2024-03-28 DIAGNOSIS — S72.034A: ICD-10-CM

## 2024-03-28 LAB
ALBUMIN SERPL ELPH-MCNC: 2.4 G/DL — LOW (ref 3.3–5)
ALP SERPL-CCNC: 754 U/L — HIGH (ref 40–120)
ALT FLD-CCNC: <6 U/L — LOW (ref 10–45)
ANION GAP SERPL CALC-SCNC: 14 MMOL/L — SIGNIFICANT CHANGE UP (ref 5–17)
AST SERPL-CCNC: 14 U/L — SIGNIFICANT CHANGE UP (ref 10–40)
BASOPHILS # BLD AUTO: 0.07 K/UL — SIGNIFICANT CHANGE UP (ref 0–0.2)
BASOPHILS NFR BLD AUTO: 1.4 % — SIGNIFICANT CHANGE UP (ref 0–2)
BILIRUB SERPL-MCNC: 0.4 MG/DL — SIGNIFICANT CHANGE UP (ref 0.2–1.2)
BUN SERPL-MCNC: 53 MG/DL — HIGH (ref 7–23)
CALCIUM SERPL-MCNC: 9.1 MG/DL — SIGNIFICANT CHANGE UP (ref 8.4–10.5)
CHLORIDE SERPL-SCNC: 96 MMOL/L — SIGNIFICANT CHANGE UP (ref 96–108)
CO2 SERPL-SCNC: 27 MMOL/L — SIGNIFICANT CHANGE UP (ref 22–31)
CREAT SERPL-MCNC: 7.42 MG/DL — HIGH (ref 0.5–1.3)
EGFR: 9 ML/MIN/1.73M2 — LOW
EOSINOPHIL # BLD AUTO: 0.19 K/UL — SIGNIFICANT CHANGE UP (ref 0–0.5)
EOSINOPHIL NFR BLD AUTO: 3.9 % — SIGNIFICANT CHANGE UP (ref 0–6)
GLUCOSE SERPL-MCNC: 74 MG/DL — SIGNIFICANT CHANGE UP (ref 70–99)
HCT VFR BLD CALC: 25.5 % — LOW (ref 39–50)
HGB BLD-MCNC: 8.2 G/DL — LOW (ref 13–17)
IMM GRANULOCYTES NFR BLD AUTO: 0.4 % — SIGNIFICANT CHANGE UP (ref 0–0.9)
LYMPHOCYTES # BLD AUTO: 1.31 K/UL — SIGNIFICANT CHANGE UP (ref 1–3.3)
LYMPHOCYTES # BLD AUTO: 27.1 % — SIGNIFICANT CHANGE UP (ref 13–44)
MAGNESIUM SERPL-MCNC: 2.5 MG/DL — SIGNIFICANT CHANGE UP (ref 1.6–2.6)
MCHC RBC-ENTMCNC: 31.1 PG — SIGNIFICANT CHANGE UP (ref 27–34)
MCHC RBC-ENTMCNC: 32.2 GM/DL — SIGNIFICANT CHANGE UP (ref 32–36)
MCV RBC AUTO: 96.6 FL — SIGNIFICANT CHANGE UP (ref 80–100)
MONOCYTES # BLD AUTO: 0.48 K/UL — SIGNIFICANT CHANGE UP (ref 0–0.9)
MONOCYTES NFR BLD AUTO: 9.9 % — SIGNIFICANT CHANGE UP (ref 2–14)
NEUTROPHILS # BLD AUTO: 2.76 K/UL — SIGNIFICANT CHANGE UP (ref 1.8–7.4)
NEUTROPHILS NFR BLD AUTO: 57.3 % — SIGNIFICANT CHANGE UP (ref 43–77)
NRBC # BLD: 0 /100 WBCS — SIGNIFICANT CHANGE UP (ref 0–0)
PHOSPHATE SERPL-MCNC: 6 MG/DL — HIGH (ref 2.5–4.5)
PLATELET # BLD AUTO: 366 K/UL — SIGNIFICANT CHANGE UP (ref 150–400)
POTASSIUM SERPL-MCNC: 4.4 MMOL/L — SIGNIFICANT CHANGE UP (ref 3.5–5.3)
POTASSIUM SERPL-SCNC: 4.4 MMOL/L — SIGNIFICANT CHANGE UP (ref 3.5–5.3)
PROT SERPL-MCNC: 6.6 G/DL — SIGNIFICANT CHANGE UP (ref 6–8.3)
RBC # BLD: 2.64 M/UL — LOW (ref 4.2–5.8)
RBC # FLD: 16.7 % — HIGH (ref 10.3–14.5)
SODIUM SERPL-SCNC: 137 MMOL/L — SIGNIFICANT CHANGE UP (ref 135–145)
WBC # BLD: 4.83 K/UL — SIGNIFICANT CHANGE UP (ref 3.8–10.5)
WBC # FLD AUTO: 4.83 K/UL — SIGNIFICANT CHANGE UP (ref 3.8–10.5)

## 2024-03-28 PROCEDURE — 99223 1ST HOSP IP/OBS HIGH 75: CPT

## 2024-03-28 PROCEDURE — 99222 1ST HOSP IP/OBS MODERATE 55: CPT | Mod: GC

## 2024-03-28 RX ADMIN — OXYCODONE HYDROCHLORIDE 10 MILLIGRAM(S): 5 TABLET ORAL at 07:02

## 2024-03-28 RX ADMIN — SEVELAMER CARBONATE 800 MILLIGRAM(S): 2400 POWDER, FOR SUSPENSION ORAL at 12:15

## 2024-03-28 RX ADMIN — OXYCODONE HYDROCHLORIDE 10 MILLIGRAM(S): 5 TABLET ORAL at 06:29

## 2024-03-28 RX ADMIN — APIXABAN 2.5 MILLIGRAM(S): 2.5 TABLET, FILM COATED ORAL at 06:29

## 2024-03-28 RX ADMIN — Medication 25 MILLIGRAM(S): at 06:29

## 2024-03-28 RX ADMIN — Medication 25 MILLIGRAM(S): at 18:31

## 2024-03-28 RX ADMIN — Medication 81 MILLIGRAM(S): at 06:29

## 2024-03-28 RX ADMIN — SEVELAMER CARBONATE 800 MILLIGRAM(S): 2400 POWDER, FOR SUSPENSION ORAL at 18:30

## 2024-03-28 RX ADMIN — Medication 81 MILLIGRAM(S): at 18:32

## 2024-03-28 RX ADMIN — SEVELAMER CARBONATE 800 MILLIGRAM(S): 2400 POWDER, FOR SUSPENSION ORAL at 08:08

## 2024-03-28 RX ADMIN — APIXABAN 2.5 MILLIGRAM(S): 2.5 TABLET, FILM COATED ORAL at 18:30

## 2024-03-28 NOTE — DIETITIAN INITIAL EVALUATION ADULT - ADD RECOMMEND
1) Monitor Weights, Intake, Tolerance, Skin & Labwork  2) Recommend Change Supplement Nepro to BID  3) Add Nephrovite Daily  4) Sanju 1 Packet Daily  5) Education Provided on Need for Supplementation & Renal Diet  6) Continue Nutrition Plan of Care

## 2024-03-28 NOTE — DIETITIAN INITIAL EVALUATION ADULT - PERTINENT LABORATORY DATA
03-28    137  |  96  |  53<H>  ----------------------------<  74  4.4   |  27  |  7.42<H>    Ca    9.1      28 Mar 2024 06:20  Phos  6.0     03-28  Mg     2.5     03-28    TPro  6.6  /  Alb  2.4<L>  /  TBili  0.4  /  DBili  x   /  AST  14  /  ALT  <6<L>  /  AlkPhos  754<H>  03-28  A1C with Estimated Average Glucose Result: 4.8 % (08-15-23 @ 07:24)  A1C with Estimated Average Glucose Result: 4.8 % (07-21-23 @ 10:27)

## 2024-03-28 NOTE — DIETITIAN INITIAL EVALUATION ADULT - DIET TYPE
renal replacement pts:no protein restr,no conc K & phos, low sodium/supplement (specify)/thin liquids

## 2024-03-28 NOTE — DIETITIAN INITIAL EVALUATION ADULT - PERTINENT MEDS FT
MEDICATIONS  (STANDING):  acetaminophen     Tablet .. 975 milliGRAM(s) Oral every 8 hours  apixaban 2.5 milliGRAM(s) Oral two times a day  aspirin enteric coated 81 milliGRAM(s) Oral two times a day  epoetin carito-epbx (RETACRIT) Injectable 67036 Unit(s) IV Push <User Schedule>  metoprolol tartrate 25 milliGRAM(s) Oral two times a day  senna 2 Tablet(s) Oral at bedtime  sevelamer carbonate 800 milliGRAM(s) Oral three times a day with meals    MEDICATIONS  (PRN):  aluminum hydroxide/magnesium hydroxide/simethicone Suspension 30 milliLiter(s) Oral every 4 hours PRN Dyspepsia  melatonin 3 milliGRAM(s) Oral at bedtime PRN Insomnia  oxyCODONE    IR 15 milliGRAM(s) Oral every 4 hours PRN Severe Pain (7 - 10)  oxyCODONE    IR 10 milliGRAM(s) Oral every 4 hours PRN Moderate Pain (4 - 6)  polyethylene glycol 3350 17 Gram(s) Oral daily PRN Constipation

## 2024-03-28 NOTE — CONSULT NOTE ADULT - ASSESSMENT
38y M with HTN, anemia of chronic disease, ESRD on HD (M/W/F via right AV fistula), left hip arthroplasty in 8/23, history of right leg fracture presented to [facility] on 3/6 after a fall during a wheelchair transfer. He was found to have a right femoral neck fracture and subacute pubic body and inferior rami fractures. He is s/p right hip hemiarthroplasty. Patient now admitted for a multidisciplinary rehab program.     #Right Femoral Neck fracture   - s/p right hip hemiarthroplasty on 3/7  - Comprehensive Multidisciplinary Rehab Program  - precautions: WBAT RLE. Anterior hip.   -pain management per rehab with tylenol and oxy prn  -bowel regimen    #ESRD  - HDS via right AV fistula on m/w/f.  ( Last HD 3/27). Old HDS left UA  - HS per Dr Blanco  - sevelamer 800mg TID  - retacrit m/w/f    #HTN  - toprol 25mg BID  - Monitor BP    #DVT prophylaxis:   - eliquis 2.5mg BID  - SCDs

## 2024-03-28 NOTE — CONSULT NOTE ADULT - SUBJECTIVE AND OBJECTIVE BOX
NEPHROLOGY CONSULTATION    CHIEF COMPLAINT: hip fx    HPI:  Pt is 37 yo M w/PMH HTN, anemia of chronic disease, ESRD on HD (M/W/F via R AV fistula), L hip arthroplasty in 8/23, history of R leg fracture presented to [facility] after falling during a wheelchair transfer landing on his R knee. He is wheelchair bound since L hip surgery. CT hip with acute garden type 1 minimally valgus impacted transcervical R femoral neck fracture and subacute R pubic body and inferior rami fractures. He is s/p R hip hemiarthroplasty. Post op course complicated by anemia s/p transfusion. Hospital course complicated by febrile, found to be covid positive. Adm to [facility] 3/27/24. Due for HD tomorrow. No CP, SOB, N/V/D/C/F/C.    ROS:  as above    Allergies:  No Known Drug Allergies  shellfish (Hives)    PAST MEDICAL & SURGICAL HISTORY:  HTN (hypertension)  Stroke  age 10  Sleep apnea  Leg fracture, right  Hemodialysis access, AV graft  ESRD (end stage renal disease) on dialysis  since 2013, M-W-F  Anemia  Other hyperparathyroidis  AV fistula  R arm; L arm clotted  History of left hip hemiarthroplasty  S/P parathyroidectomy    SOCIAL HISTORY:  negative    FAMILY HISTORY:  NC    MEDICATIONS  (STANDING):  acetaminophen     Tablet .. 975 milliGRAM(s) Oral every 8 hours  apixaban 2.5 milliGRAM(s) Oral two times a day  aspirin enteric coated 81 milliGRAM(s) Oral two times a day  epoetin carito-epbx (RETACRIT) Injectable 78918 Unit(s) IV Push <User Schedule>  metoprolol tartrate 25 milliGRAM(s) Oral two times a day  Nephro-pat 1 Tablet(s) Oral daily  senna 2 Tablet(s) Oral at bedtime  sevelamer carbonate 800 milliGRAM(s) Oral three times a day with meals    Vital Signs Last 24 Hrs  T(C): 37.1 (03-28-24 @ 19:36), Max: 37.1 (03-28-24 @ 19:36)  T(F): 98.7 (03-28-24 @ 19:36), Max: 98.7 (03-28-24 @ 19:36)  HR: 80 (03-28-24 @ 19:36) (74 - 86)  BP: 130/75 (03-28-24 @ 19:36) (127/80 - 131/72)  RR: 16 (03-28-24 @ 19:36) (16 - 16)  SpO2: 96% (03-28-24 @ 19:36) (96% - 98%)    s1s2  b/l air entry  soft, ND  tr edema     LABS:                        8.2    4.83  )-----------( 366      ( 28 Mar 2024 06:20 )             25.5     03-28    137  |  96  |  53<H>  ----------------------------<  74  4.4   |  27  |  7.42<H>    Ca    9.1      28 Mar 2024 06:20  Phos  6.0     03-28  Mg     2.5     03-28    TPro  6.6  /  Alb  2.4<L>  /  TBili  0.4  /  DBili  x   /  AST  14  /  ALT  <6<L>  /  AlkPhos  754<H>  03-28    LIVER FUNCTIONS - ( 28 Mar 2024 06:20 )  Alb: 2.4 g/dL / Pro: 6.6 g/dL / ALK PHOS: 754 U/L / ALT: <6 U/L / AST: 14 U/L / GGT: x           PT/INR - ( 27 Mar 2024 11:00 )   PT: 11.2 sec;   INR: 1.02 ratio      A/P:    ESRD on HD   S/p fall, R hip fx  S/p R hip HA 3/7  Tested positive for COVID   HD tomorrow as ordered  TMP 2kg as tolerates   Epoetin w/HD 3 x week   Renal diet  Renvela as ordered   Rehab    [phone]

## 2024-03-28 NOTE — DIETITIAN INITIAL EVALUATION ADULT - NS FNS WEIGHT CHANGE REASON
States Weight Trending Downward Recent   Pt Unsure of Amount  States Current Weight is Not Accurate   He Hasn't Been 200lb in awhile (Per Patient)/unintentional

## 2024-03-28 NOTE — DIETITIAN INITIAL EVALUATION ADULT - OTHER INFO
Initial Nutrition Assessment   38yr Old Male   States Shellfish Food Allergy/Intolerance  Tolerates Diet Well - No Chewing/Swallowing Complications (Per Patient)  Surgical Incision on Right Hip & DTI Pressure Ulcers on Sacrum (as Per Nursing Flow Sheets)  No Edema Noted (as Per Nursing Flow Sheets)  No Recent Nausea/Vomiting/Diarrhea/Constipation (as Per Patient)

## 2024-03-28 NOTE — DIETITIAN INITIAL EVALUATION ADULT - ORAL INTAKE PTA/DIET HISTORY
Patient Does Not Follow Diet @Home  Consumes 2 Meals a Day - Late Breakfast & Dinner  Usually Orders Takeout/Delivery Regularly Rarely Cooks  Does Take LPS Supplements @Home

## 2024-03-28 NOTE — CONSULT NOTE ADULT - SUBJECTIVE AND OBJECTIVE BOX
HPI:  37yo Male PMHx HTN, anemia of chronic disease, ESRD on HD (M/W/F via right AV fistula), left hip arthroplasty in 8/23, history of right leg fracture presented to [facility] from [facility] on 3/6 after falling during a wheelchair transfer landing on his right knee. He is wheelchair bound since his left hip surgery. CT hip with acute garden type 1 minimally valgus impacted transcervical right femoral neck fracture and subacute right pubic body and inferior rami fractures. He is s/p right hip hemiarthroplasty. Post op course complicated by anemia s/p transfusion. Hospital course complicated by hypoglycemia after being shifted for hyperkalemia. Further complicated by febrile 101.6F found to be covid positive and completed remdisivir. He was started on eliquis 2.5mg BID for elevated d-dimers and negative LE duplex. Patient was evaluated by PM&R and therapy for functional deficits, gait/ADL impairments and acute rehabilitation was recommended. Patient was medically optimized for discharge to [facility] IRU on 3/27.    Today patient seen and examined at bedside. He was eating breakfast. Denies any chest pain, SOB, HA. Hip pain is tolerable. Denies any acute medical complaints. Overall feels well.       PAST MEDICAL & SURGICAL HISTORY:  HTN (hypertension)  Stroke  age 10  Sleep apnea  Leg fracture, right  Chronic renal failure  Hemodialysis access, AV graft  ESRD (end stage renal disease) on dialysis  since 2013, M-W-F  Anemia, unspecified type  Other hyperparathyroidism  AV fistula  R arm; L arm clotted  History of left hip hemiarthroplasty  S/P parathyroidectomy    Review of Systems:   CONSTITUTIONAL: No fever  EYES: No eye pain  ENMT:  No difficulty hearing, tinnitus, vertigo  NECK: No pain   RESPIRATORY: No cough, wheezing,  No shortness of breath  CARDIOVASCULAR: No chest pain, palpitations, dizziness, or leg swelling  GASTROINTESTINAL: No abdominal or epigastric pain.   GENITOURINARY: No dysuria  NEUROLOGICAL: No headaches  SKIN: No rash  LYMPH NODES: No enlarged glands  ENDOCRINE: No heat or cold intolerance; No hair loss  MUSCULOSKELETAL: No joint pain or swelling; No muscle, back, or extremity pain  PSYCHIATRIC: No depression, anxiety, mood swings, or difficulty sleeping  HEME/LYMPH: No easy bruising, or bleeding gums  ALLERY AND IMMUNOLOGIC: No hives or eczema    Allergies    No Known Drug Allergies  shellfish (Hives)    Intolerances    Social History: Smoking - denies  EtOH - denies  Drugs - denies   Marital Status - single    MEDICATIONS  (STANDING):  acetaminophen     Tablet .. 975 milliGRAM(s) Oral every 8 hours  apixaban 2.5 milliGRAM(s) Oral two times a day  aspirin enteric coated 81 milliGRAM(s) Oral two times a day  epoetin carito-epbx (RETACRIT) Injectable 71561 Unit(s) IV Push <User Schedule>  metoprolol tartrate 25 milliGRAM(s) Oral two times a day  senna 2 Tablet(s) Oral at bedtime  sevelamer carbonate 800 milliGRAM(s) Oral three times a day with meals    MEDICATIONS  (PRN):  aluminum hydroxide/magnesium hydroxide/simethicone Suspension 30 milliLiter(s) Oral every 4 hours PRN Dyspepsia  melatonin 3 milliGRAM(s) Oral at bedtime PRN Insomnia  oxyCODONE    IR 15 milliGRAM(s) Oral every 4 hours PRN Severe Pain (7 - 10)  oxyCODONE    IR 10 milliGRAM(s) Oral every 4 hours PRN Moderate Pain (4 - 6)  polyethylene glycol 3350 17 Gram(s) Oral daily PRN Constipation    Vital Signs Last 24 Hrs  T(C): 36.7 (28 Mar 2024 08:22), Max: 37.2 (27 Mar 2024 19:55)  T(F): 98.1 (28 Mar 2024 08:22), Max: 99 (27 Mar 2024 19:55)  HR: 78 (28 Mar 2024 08:22) (69 - 79)  BP: 131/72 (28 Mar 2024 08:22) (109/85 - 147/77)  BP(mean): --  RR: 16 (28 Mar 2024 08:22) (16 - 18)  SpO2: 98% (28 Mar 2024 08:22) (94% - 98%)    Parameters below as of 28 Mar 2024 08:22  Patient On (Oxygen Delivery Method): room air      PHYSICAL EXAM:  GENERAL: NAD, well-developed  HEAD:  Atraumatic, Normocephalic  EYES: EOMI, PERRLA, conjunctiva and sclera clear  NECK: Supple, No JVD  CHEST/LUNG: Clear to auscultation bilaterally; No wheeze  HEART: Regular rate and rhythm; No murmurs, rubs, or gallops  ABDOMEN: Soft, Nontender, Nondistended; Bowel sounds present  EXTREMITIES:  right arm fistula with thrill   PSYCH: AAOx3  NEUROLOGY: non-focal  SKIN: No rashes or lesions    LABS:                        8.2    4.83  )-----------( 366      ( 28 Mar 2024 06:20 )             25.5     03-28    137  |  96  |  53<H>  ----------------------------<  74  4.4   |  27  |  7.42<H>    Ca    9.1      28 Mar 2024 06:20  Phos  6.0     03-28  Mg     2.5     03-28    TPro  6.6  /  Alb  2.4<L>  /  TBili  0.4  /  DBili  x   /  AST  14  /  ALT  <6<L>  /  AlkPhos  754<H>  03-28    PT/INR - ( 27 Mar 2024 11:00 )   PT: 11.2 sec;   INR: 1.02 ratio               Urinalysis Basic - ( 28 Mar 2024 06:20 )    Color: x / Appearance: x / SG: x / pH: x  Gluc: 74 mg/dL / Ketone: x  / Bili: x / Urobili: x   Blood: x / Protein: x / Nitrite: x   Leuk Esterase: x / RBC: x / WBC x   Sq Epi: x / Non Sq Epi: x / Bacteria: x        RADIOLOGY & ADDITIONAL TESTS:    Imaging Personally Reviewed:    Consultant(s) Notes Reviewed:  Renal    Care Discussed with Consultants/Other Providers: Rehab provider    Assessment and Plan:

## 2024-03-28 NOTE — DIETITIAN INITIAL EVALUATION ADULT - NS FNS DIET ORDER
on Renal Diet w/ Thin Liquids (IDDSI Level 0) 7 & Nepro 8oz TID (Provides 1,275kcal & 57grams of Protein)   Recommend Change Supplement to Nepro 8oz BID (Provides 850kcal & 38grams of Protein)   Recommend Initiate Sanju 1 Packet Daily (Provides 90kcal & 14grams of Protein) - Will Monitor & Change/DC if Needed  Education Provided on Need for Supplementation & Renal Diet  Recommend Initiate Neprovite Daily

## 2024-03-28 NOTE — DIETITIAN INITIAL EVALUATION ADULT - FACTORS AFF FOOD INTAKE
Poor PO Intake over Last Week  Consuming Far Less than Prior to Admission to Hospital (Per Patient)/persistent lack of appetite

## 2024-03-29 LAB
ANION GAP SERPL CALC-SCNC: 16 MMOL/L — SIGNIFICANT CHANGE UP (ref 5–17)
BUN SERPL-MCNC: 82 MG/DL — HIGH (ref 7–23)
CALCIUM SERPL-MCNC: 8.9 MG/DL — SIGNIFICANT CHANGE UP (ref 8.4–10.5)
CHLORIDE SERPL-SCNC: 96 MMOL/L — SIGNIFICANT CHANGE UP (ref 96–108)
CO2 SERPL-SCNC: 26 MMOL/L — SIGNIFICANT CHANGE UP (ref 22–31)
CREAT SERPL-MCNC: 9.79 MG/DL — HIGH (ref 0.5–1.3)
EGFR: 6 ML/MIN/1.73M2 — LOW
GLUCOSE SERPL-MCNC: 92 MG/DL — SIGNIFICANT CHANGE UP (ref 70–99)
HCT VFR BLD CALC: 22 % — LOW (ref 39–50)
HGB BLD-MCNC: 7.3 G/DL — LOW (ref 13–17)
MCHC RBC-ENTMCNC: 32.3 PG — SIGNIFICANT CHANGE UP (ref 27–34)
MCHC RBC-ENTMCNC: 33.2 GM/DL — SIGNIFICANT CHANGE UP (ref 32–36)
MCV RBC AUTO: 97.3 FL — SIGNIFICANT CHANGE UP (ref 80–100)
NRBC # BLD: 0 /100 WBCS — SIGNIFICANT CHANGE UP (ref 0–0)
PHOSPHATE SERPL-MCNC: 6.3 MG/DL — HIGH (ref 2.5–4.5)
PLATELET # BLD AUTO: 321 K/UL — SIGNIFICANT CHANGE UP (ref 150–400)
POTASSIUM SERPL-MCNC: 4.7 MMOL/L — SIGNIFICANT CHANGE UP (ref 3.5–5.3)
POTASSIUM SERPL-SCNC: 4.7 MMOL/L — SIGNIFICANT CHANGE UP (ref 3.5–5.3)
RBC # BLD: 2.26 M/UL — LOW (ref 4.2–5.8)
RBC # FLD: 17.3 % — HIGH (ref 10.3–14.5)
SODIUM SERPL-SCNC: 138 MMOL/L — SIGNIFICANT CHANGE UP (ref 135–145)
WBC # BLD: 6.1 K/UL — SIGNIFICANT CHANGE UP (ref 3.8–10.5)
WBC # FLD AUTO: 6.1 K/UL — SIGNIFICANT CHANGE UP (ref 3.8–10.5)

## 2024-03-29 PROCEDURE — 99232 SBSQ HOSP IP/OBS MODERATE 35: CPT

## 2024-03-29 PROCEDURE — 99232 SBSQ HOSP IP/OBS MODERATE 35: CPT | Mod: GC

## 2024-03-29 RX ADMIN — Medication 25 MILLIGRAM(S): at 17:53

## 2024-03-29 RX ADMIN — APIXABAN 2.5 MILLIGRAM(S): 2.5 TABLET, FILM COATED ORAL at 17:53

## 2024-03-29 RX ADMIN — SEVELAMER CARBONATE 800 MILLIGRAM(S): 2400 POWDER, FOR SUSPENSION ORAL at 12:13

## 2024-03-29 RX ADMIN — OXYCODONE HYDROCHLORIDE 10 MILLIGRAM(S): 5 TABLET ORAL at 08:43

## 2024-03-29 RX ADMIN — OXYCODONE HYDROCHLORIDE 10 MILLIGRAM(S): 5 TABLET ORAL at 13:30

## 2024-03-29 RX ADMIN — SEVELAMER CARBONATE 800 MILLIGRAM(S): 2400 POWDER, FOR SUSPENSION ORAL at 08:00

## 2024-03-29 RX ADMIN — Medication 30 MILLILITER(S): at 05:07

## 2024-03-29 RX ADMIN — ERYTHROPOIETIN 10000 UNIT(S): 10000 INJECTION, SOLUTION INTRAVENOUS; SUBCUTANEOUS at 17:16

## 2024-03-29 RX ADMIN — POLYETHYLENE GLYCOL 3350 17 GRAM(S): 17 POWDER, FOR SOLUTION ORAL at 17:55

## 2024-03-29 RX ADMIN — APIXABAN 2.5 MILLIGRAM(S): 2.5 TABLET, FILM COATED ORAL at 05:07

## 2024-03-29 RX ADMIN — Medication 81 MILLIGRAM(S): at 17:53

## 2024-03-29 RX ADMIN — Medication 25 MILLIGRAM(S): at 05:08

## 2024-03-29 RX ADMIN — Medication 81 MILLIGRAM(S): at 05:08

## 2024-03-29 RX ADMIN — OXYCODONE HYDROCHLORIDE 10 MILLIGRAM(S): 5 TABLET ORAL at 12:51

## 2024-03-29 RX ADMIN — SEVELAMER CARBONATE 800 MILLIGRAM(S): 2400 POWDER, FOR SUSPENSION ORAL at 17:52

## 2024-03-29 RX ADMIN — Medication 1 TABLET(S): at 12:13

## 2024-03-29 RX ADMIN — OXYCODONE HYDROCHLORIDE 10 MILLIGRAM(S): 5 TABLET ORAL at 09:19

## 2024-03-29 RX ADMIN — Medication 975 MILLIGRAM(S): at 20:51

## 2024-03-29 RX ADMIN — OXYCODONE HYDROCHLORIDE 10 MILLIGRAM(S): 5 TABLET ORAL at 20:58

## 2024-03-29 NOTE — PROGRESS NOTE ADULT - SUBJECTIVE AND OBJECTIVE BOX
Stable on HD    Vital Signs Last 24 Hrs  T(C): 36.8 (03-29-24 @ 17:15), Max: 37.1 (03-28-24 @ 19:36)  T(F): 98.2 (03-29-24 @ 17:15), Max: 98.7 (03-28-24 @ 19:36)  HR: 89 (03-29-24 @ 17:56) (74 - 89)  BP: 136/80 (03-29-24 @ 17:56) (120/71 - 144/79)  RR: 16 (03-29-24 @ 17:15) (16 - 16)  SpO2: 99% (03-29-24 @ 17:15) (96% - 99%)    I&O's Detail    29 Mar 2024 07:01  -  29 Mar 2024 19:04  --------------------------------------------------------  IN:    Other (mL): 800 mL  Total IN: 800 mL    OUT:    Other (mL): 2800 mL  Total OUT: 2800 mL    s1s2  b/l air entry  soft, ND  tr edema                         7.3    6.10  )-----------( 321      ( 29 Mar 2024 14:15 )             22.0     29 Mar 2024 14:15    138    |  96     |  82     ----------------------------<  92     4.7     |  26     |  9.79     Ca    8.9        29 Mar 2024 14:15  Phos  6.3       29 Mar 2024 14:15  Mg     2.5       28 Mar 2024 06:20    TPro  6.6    /  Alb  2.4    /  TBili  0.4    /  DBili  x      /  AST  14     /  ALT  <6     /  AlkPhos  754    28 Mar 2024 06:20    LIVER FUNCTIONS - ( 28 Mar 2024 06:20 )  Alb: 2.4 g/dL / Pro: 6.6 g/dL / ALK PHOS: 754 U/L / ALT: <6 U/L / AST: 14 U/L / GGT: x           acetaminophen     Tablet .. 975 milliGRAM(s) Oral every 8 hours  aluminum hydroxide/magnesium hydroxide/simethicone Suspension 30 milliLiter(s) Oral every 4 hours PRN  apixaban 2.5 milliGRAM(s) Oral two times a day  aspirin enteric coated 81 milliGRAM(s) Oral two times a day  epoetin carito-epbx (RETACRIT) Injectable 51029 Unit(s) IV Push <User Schedule>  melatonin 3 milliGRAM(s) Oral at bedtime PRN  metoprolol tartrate 25 milliGRAM(s) Oral two times a day  Nephro-pat 1 Tablet(s) Oral daily  oxyCODONE    IR 15 milliGRAM(s) Oral every 4 hours PRN  oxyCODONE    IR 10 milliGRAM(s) Oral every 4 hours PRN  polyethylene glycol 3350 17 Gram(s) Oral daily PRN  senna 2 Tablet(s) Oral at bedtime  sevelamer carbonate 800 milliGRAM(s) Oral three times a day with meals    A/P:    ESRD on HD   S/p fall, R hip fx  S/p R hip HA 3/7  HD today w/2kg fluid removal    Epoetin w/HD 3 x week   Renal diet  Renvela for high Phos  Rehab    [phone]

## 2024-03-29 NOTE — PROGRESS NOTE ADULT - ASSESSMENT
38y M with HTN, anemia of chronic disease, ESRD on HD (M/W/F via right AV fistula), left hip arthroplasty in 8/23, history of right leg fracture presented to [facility] on 3/6 after a fall during a wheelchair transfer. He was found to have a right femoral neck fracture and subacute pubic body and inferior rami fractures. He is s/p right hip hemiarthroplasty. Patient now admitted for a multidisciplinary rehab program.     #Right Femoral Neck fracture   - s/p right hip hemiarthroplasty on 3/7  - Comprehensive Multidisciplinary Rehab Program  - precautions: WBAT RLE. Anterior hip.   -pain management per rehab with tylenol and oxy prn  -bowel regimen    #ESRD  - HDS via right AV fistula on m/w/f.  ( Last HD 3/27). Old HDS left UA  - HS per Dr Blanco  - sevelamer 800mg TID  - retacrit m/w/f    #HTN  - toprol 25mg BID  - Monitor BP    #DVT prophylaxis:   - eliquis 2.5mg BID  - SCDs

## 2024-03-29 NOTE — PROGRESS NOTE ADULT - SUBJECTIVE AND OBJECTIVE BOX
Patient is a 38y old  Male who presents with a chief complaint of Right Hip fracture s/p right hip hemiarthroplasty (28 Mar 2024 22:58)    SUBJECTIVE / OVERNIGHT EVENTS: Patient seen and examined. Reports some leg pain after PT otherwise feels ok. States that he doesnt sleep at night and therefore sleeps during the day and feels tired during the day. No chest pain or SOB. Scheduled to go to HD later today.     MEDICATIONS  (STANDING):  acetaminophen     Tablet .. 975 milliGRAM(s) Oral every 8 hours  apixaban 2.5 milliGRAM(s) Oral two times a day  aspirin enteric coated 81 milliGRAM(s) Oral two times a day  epoetin carito-epbx (RETACRIT) Injectable 87275 Unit(s) IV Push <User Schedule>  metoprolol tartrate 25 milliGRAM(s) Oral two times a day  Nephro-pat 1 Tablet(s) Oral daily  senna 2 Tablet(s) Oral at bedtime  sevelamer carbonate 800 milliGRAM(s) Oral three times a day with meals    MEDICATIONS  (PRN):  aluminum hydroxide/magnesium hydroxide/simethicone Suspension 30 milliLiter(s) Oral every 4 hours PRN Dyspepsia  melatonin 3 milliGRAM(s) Oral at bedtime PRN Insomnia  oxyCODONE    IR 15 milliGRAM(s) Oral every 4 hours PRN Severe Pain (7 - 10)  oxyCODONE    IR 10 milliGRAM(s) Oral every 4 hours PRN Moderate Pain (4 - 6)  polyethylene glycol 3350 17 Gram(s) Oral daily PRN Constipation        Vital Signs Last 24 Hrs  T(C): 37.1 (29 Mar 2024 07:27), Max: 37.1 (28 Mar 2024 19:36)  T(F): 98.7 (29 Mar 2024 07:27), Max: 98.7 (28 Mar 2024 19:36)  HR: 74 (29 Mar 2024 07:27) (74 - 88)  BP: 135/66 (29 Mar 2024 07:27) (127/80 - 144/79)  BP(mean): --  RR: 16 (29 Mar 2024 07:27) (16 - 16)  SpO2: 98% (29 Mar 2024 07:27) (96% - 98%)    Parameters below as of 29 Mar 2024 07:27  Patient On (Oxygen Delivery Method): room air    PHYSICAL EXAM:  GENERAL: NAD, well-developed  HEAD:  Atraumatic, Normocephalic  EYES: EOMI, PERRLA, conjunctiva and sclera clear  NECK: Supple, No JVD  CHEST/LUNG: Clear to auscultation bilaterally; No wheeze  HEART: Regular rate and rhythm; No murmurs, rubs, or gallops  ABDOMEN: Soft, Nontender, Nondistended; Bowel sounds present  EXTREMITIES:  right arm fistula with thrill   PSYCH: AAOx3  NEUROLOGY: non-focal  SKIN: No rashes or lesions    LABS:                        7.3    6.10  )-----------( 321      ( 29 Mar 2024 14:15 )             22.0     03-29    138  |  96  |  82<H>  ----------------------------<  92  4.7   |  26  |  9.79<H>    Ca    8.9      29 Mar 2024 14:15  Phos  6.3     03-29  Mg     2.5     03-28    TPro  6.6  /  Alb  2.4<L>  /  TBili  0.4  /  DBili  x   /  AST  14  /  ALT  <6<L>  /  AlkPhos  754<H>  03-28          Urinalysis Basic - ( 29 Mar 2024 14:15 )    Color: x / Appearance: x / SG: x / pH: x  Gluc: 92 mg/dL / Ketone: x  / Bili: x / Urobili: x   Blood: x / Protein: x / Nitrite: x   Leuk Esterase: x / RBC: x / WBC x   Sq Epi: x / Non Sq Epi: x / Bacteria: x        RADIOLOGY & ADDITIONAL TESTS:    Imaging Personally Reviewed:    Consultant(s) Notes Reviewed:      Care Discussed with Consultants/Other Providers:tejas wilburn provider     Assessment and Plan:

## 2024-03-29 NOTE — PROGRESS NOTE ADULT - ASSESSMENT
BRIDGET NORTH is a 38y M with HTN, anemia of chronic disease, ESRD on HD (M/W/F via right AV fistula), left hip arthroplasty in 8/23, history of right leg fracture presented to [facility] on 3/6 after a fall during a wheelchair transfer. He was found to have a right femoral neck fracture and subacute pubic body and inferior rami fractures. He is s/p right hip hemiarthroplasty. Hospital course complicated by post-op anemia s/p transfusion, hypoglycemia, hyperkalemia, febrile 2/2 covid s/p remdisivir, and started on eliquis 2.5mg BID with negative dvts on LE duplex. Patient now admitted for a multidisciplinary rehab program. 03-27-24 @ 16:17      #Right Femoral Neck fracture   - s/p right hip hemiarthroplasty on 3/7  - pain med as below  - Dressing and staples removed on 3/22  - Comprehensive Multidisciplinary Rehab Program:  comprehensive rehab program of PT/OT/SLP - 3 hours a day, 5 days a week. P&O as needed.   - precautions: WBAT RLE. Anterior hip.     #ESRD  - HDS via right AV fistula on m/w/f.  ( Last HD 3/27). Old HDS left UA  - Nephro consult placed. Dr Blanco aware.   - sevelamer 800mg TID  - retacrit m/w/f    #HTN  - toprol 25mg BID  - Monitor BP    #Pain  - Tylenol PRN  - oxy 10mg moderate, 15mg severe.   Right Knee pain- add lidoderm during the day- immobilize in PT ?    #Mood / Cognition  - Neuropsychology consult PRN  - recreation therapy PRN    #Sleep  - Melatonin PRN     #GI / Bowel  #dyspepsia  - Senna qHS  - Miralax PRN - Give bowel meds in AM if no BM today  - maalox  - GI ppx: none.     # / Bladder  - does not void ESRD    #Skin / Pressure injury  - Skin assessment on admission performed : right hip surgical site with steri strips CDI. Coccyx denuded.  - Monitor Incisions:    - wound care following at [facility] for b/l sacrum/coccyx DTI wth evolution.   - wound care to follow  - turn and reposition q2h      #Diet:  - Diet Consistency: Renal restriction  - Nutrition consult    #DVT prophylaxis:   - eliquis 2.5mg BID  - SCDs  - Last LE Doppler normal on 3/14

## 2024-03-29 NOTE — PROGRESS NOTE ADULT - SUBJECTIVE AND OBJECTIVE BOX
chief complaint- Pain in R hip / difficulty ambulating      39yo Male PMHx HTN, anemia of chronic disease, ESRD on HD (M/W/F via right AV fistula), left hip arthroplasty in 8/23, history of right leg fracture presented to [facility] from [facility] on 3/6 after falling during a wheelchair transfer landing on his right knee. He is wheelchair bound since his left hip surgery. CT hip with acute garden type 1 minimally valgus impacted transcervical right femoral neck fracture and subacute right pubic body and inferior rami fractures. He is s/p right hip hemiarthroplasty. Post op course complicated by anemia s/p transfusion. Hospital course complicated by hypoglycemia after being shifted for hyperkalemia. Further complicated by febrile 101.6F found to be covid positive and completed remdisivir. He was started on eliquis 2.5mg BID for elevated d-dimers and negative LE duplex.     Patient was evaluated by PM&R and therapy for functional deficits, gait/ADL impairments and acute rehabilitation was recommended. Patient was medically optimized for discharge to [facility] IRU on 3/27    ROS/subjective:  patient seen and evaluated in PT  doing well- more movement in BLEs  Right knee pain sp fall - told it was bruised- will add lidoderm  no dizziness, no chest pain, no nausea, no vomiting, no SOB, no headaches  no BM x 2 days - claims he will go today- currently refuses meds    MEDICATIONS  (STANDING):  acetaminophen     Tablet .. 975 milliGRAM(s) Oral every 8 hours  apixaban 2.5 milliGRAM(s) Oral two times a day  aspirin enteric coated 81 milliGRAM(s) Oral two times a day  epoetin carito-epbx (RETACRIT) Injectable 94093 Unit(s) IV Push <User Schedule>  metoprolol tartrate 25 milliGRAM(s) Oral two times a day  Nephro-pat 1 Tablet(s) Oral daily  senna 2 Tablet(s) Oral at bedtime  sevelamer carbonate 800 milliGRAM(s) Oral three times a day with meals    MEDICATIONS  (PRN):  aluminum hydroxide/magnesium hydroxide/simethicone Suspension 30 milliLiter(s) Oral every 4 hours PRN Dyspepsia  melatonin 3 milliGRAM(s) Oral at bedtime PRN Insomnia  oxyCODONE    IR 15 milliGRAM(s) Oral every 4 hours PRN Severe Pain (7 - 10)  oxyCODONE    IR 10 milliGRAM(s) Oral every 4 hours PRN Moderate Pain (4 - 6)  polyethylene glycol 3350 17 Gram(s) Oral daily PRN Constipation                            7.3    6.10  )-----------( 321      ( 29 Mar 2024 14:15 )             22.0     03-29    138  |  96  |  82<H>  ----------------------------<  92  4.7   |  26  |  9.79<H>    Ca    8.9      29 Mar 2024 14:15  Phos  6.3     03-29  Mg     2.5     03-28    TPro  6.6  /  Alb  2.4<L>  /  TBili  0.4  /  DBili  x   /  AST  14  /  ALT  <6<L>  /  AlkPhos  754<H>  03-28    Urinalysis Basic - ( 29 Mar 2024 14:15 )    Color: x / Appearance: x / SG: x / pH: x  Gluc: 92 mg/dL / Ketone: x  / Bili: x / Urobili: x   Blood: x / Protein: x / Nitrite: x   Leuk Esterase: x / RBC: x / WBC x   Sq Epi: x / Non Sq Epi: x / Bacteria: x        CAPILLARY BLOOD GLUCOSE          Vital Signs Last 24 Hrs  T(C): 36.8 (29 Mar 2024 17:15), Max: 37.1 (28 Mar 2024 19:36)  T(F): 98.2 (29 Mar 2024 17:15), Max: 98.7 (28 Mar 2024 19:36)  HR: 78 (29 Mar 2024 17:15) (74 - 88)  BP: 125/80 (29 Mar 2024 17:15) (120/71 - 144/79)  BP(mean): --  RR: 16 (29 Mar 2024 17:15) (16 - 16)  SpO2: 99% (29 Mar 2024 17:15) (96% - 99%)    Parameters below as of 29 Mar 2024 17:15  Patient On (Oxygen Delivery Method): room air      Gen - NAD, Comfortable  HEENT - NCAT, EOMI, MMM  Neck - Supple, No limited ROM  Pulm - CTAB, No wheeze, No rhonchi, No crackles  Cardiovascular - RRR, S1S2, No murmurs  Chest - good chest expansion, good respiratory effort  Abdomen - Soft, NT/ND, +BS. BM 3/26  Extremities - Right FA AV fistula + bruit and thrill. GIDEON old AV fistula not active,   Neuro-     Cognitive - awake, alert, oriented to person, place, date, year, and situation.  Able  to follow command     Communication - Fluent, Comprehensible, No dysarthria, No aphasia        Memory:  Recent/ remote memory intact     Cranial Nerves - CN 2-12 intact. No facial asymmetry, Tongue midline, EOMI, Shoulder shrug intact     Motor -                    LEFT    UE - ShAB 5/5, EF 5/5, EE 5/5,  5/5                    RIGHT UE - ShAB 5/5, EF 5/5, EE 5/5,   5/5                    LEFT    LE - HF 4/5, KE 3+/5, DF 4/5, PF 4/5                    RIGHT LE - HF 3+/5, KE 3/5, DF 4/5, PF 4/5        Sensory - Intact bilaterally      Tone - normal  MSK: b/l hip discomfort with movement OA changes Right Knee  Psychiatric - Mood stable, Affect WNL  Skin: right hip surgical site with steri strips CDI. b/l sacrum/coccyx DTI wth evolution.

## 2024-03-30 PROCEDURE — 99232 SBSQ HOSP IP/OBS MODERATE 35: CPT

## 2024-03-30 RX ORDER — LANOLIN ALCOHOL/MO/W.PET/CERES
6 CREAM (GRAM) TOPICAL AT BEDTIME
Refills: 0 | Status: DISCONTINUED | OUTPATIENT
Start: 2024-03-30 | End: 2024-04-30

## 2024-03-30 RX ORDER — PETROLATUM,WHITE
1 JELLY (GRAM) TOPICAL
Refills: 0 | Status: DISCONTINUED | OUTPATIENT
Start: 2024-03-30 | End: 2024-04-30

## 2024-03-30 RX ADMIN — APIXABAN 2.5 MILLIGRAM(S): 2.5 TABLET, FILM COATED ORAL at 17:18

## 2024-03-30 RX ADMIN — OXYCODONE HYDROCHLORIDE 10 MILLIGRAM(S): 5 TABLET ORAL at 11:24

## 2024-03-30 RX ADMIN — Medication 25 MILLIGRAM(S): at 06:16

## 2024-03-30 RX ADMIN — Medication 1 TABLET(S): at 12:13

## 2024-03-30 RX ADMIN — OXYCODONE HYDROCHLORIDE 10 MILLIGRAM(S): 5 TABLET ORAL at 10:24

## 2024-03-30 RX ADMIN — Medication 6 MILLIGRAM(S): at 21:15

## 2024-03-30 RX ADMIN — Medication 1 APPLICATION(S): at 17:35

## 2024-03-30 RX ADMIN — APIXABAN 2.5 MILLIGRAM(S): 2.5 TABLET, FILM COATED ORAL at 06:16

## 2024-03-30 RX ADMIN — SEVELAMER CARBONATE 800 MILLIGRAM(S): 2400 POWDER, FOR SUSPENSION ORAL at 08:08

## 2024-03-30 RX ADMIN — Medication 81 MILLIGRAM(S): at 06:16

## 2024-03-30 RX ADMIN — SEVELAMER CARBONATE 800 MILLIGRAM(S): 2400 POWDER, FOR SUSPENSION ORAL at 12:13

## 2024-03-30 RX ADMIN — SEVELAMER CARBONATE 800 MILLIGRAM(S): 2400 POWDER, FOR SUSPENSION ORAL at 17:18

## 2024-03-30 RX ADMIN — Medication 81 MILLIGRAM(S): at 17:18

## 2024-03-30 RX ADMIN — Medication 25 MILLIGRAM(S): at 17:18

## 2024-03-30 NOTE — PROGRESS NOTE ADULT - ASSESSMENT
38y M with HTN, anemia of chronic disease, ESRD on HD (M/W/F via right AV fistula), left hip arthroplasty in 8/23, history of right leg fracture presented to [facility] on 3/6 after a fall during a wheelchair transfer. He was found to have a right femoral neck fracture and subacute pubic body and inferior rami fractures. He is s/p right hip hemiarthroplasty. Patient now admitted for a multidisciplinary rehab program.     #Right Femoral Neck fracture   - s/p right hip hemiarthroplasty on 3/7  - Comprehensive Multidisciplinary Rehab Program  - precautions: WBAT RLE. Anterior hip.   - pain management per rehab   - bowel regimen    #ESRD  - HDS via right AV fistula on m/w/f.  ( Last HD 3/29). Old HDS left UA  - HS per Dr Blanco  - sevelamer 800mg TID  - retacrit m/w/f    #HTN  - toprol 25mg BID  - Monitor BP    #DVT prophylaxis:   - eliquis 2.5mg BID  - SCDs

## 2024-03-30 NOTE — PROGRESS NOTE ADULT - ASSESSMENT
ASSESSMENT/PLAN  38 year old male admitted for Acute Inpatient Rehabilitation at [facility] with functional deficits after Right Femoral Neck fracture s/p right hip hemiarthroplasty on 3/7.  No Active issues  Continue current medical management.  Continue comprehensive rehab program.

## 2024-03-30 NOTE — PROGRESS NOTE ADULT - SUBJECTIVE AND OBJECTIVE BOX
HPI:  37yo Male PMHx HTN, anemia of chronic disease, ESRD on HD (M/W/F via right AV fistula), left hip arthroplasty in 8/23, history of right leg fracture [facility] on 3/6 after falling during a wheelchair transfer landing on his right knee. He is wheelchair bound since his left hip surgery. CT hip with acute garden type 1 minimally valgus impacted transcervical right femoral neck fracture and subacute right pubic body and inferior rami fractures. He is s/p right hip hemiarthroplasty. Post op course complicated by anemia s/p transfusion. Hospital course complicated by hypoglycemia after being shifted for hyperkalemia. Further complicated by febrile 101.6F found to be covid positive and completed remdisivir. He was started on eliquis 2.5mg BID for elevated d-dimers and negative LE duplex.     Patient was evaluated by PM&R and therapy for functional deficits, gait/ADL impairments and acute rehabilitation was recommended. Patient was medically optimized for discharge to [facility] IRF on 3/27 (27 Mar 2024 15:19)    ___________________________________________________________________________    SUBJECTIVE/ROS  Patient was seen and evaluated at bedside today.  Reported poor sleep overnight events and is in no acute distress.  Melatonin increased to 6mg.  Eager to participate on the recommended rehabilitation program.  Denies any CP, SOB, RIZVI, palpitations, fever, chills, body aches, cough, congestion, or any other symptoms at this time.   ___________________________________________________________________________    VITALS  T(C): 36.7 (03-30-24 @ 08:05), Max: 37 (03-29-24 @ 14:15)  HR: 76 (03-30-24 @ 08:05) (76 - 89)  BP: 131/69 (03-30-24 @ 08:05) (120/71 - 143/73)  RR: 15 (03-30-24 @ 08:05) (15 - 16)  SpO2: 99% (03-30-24 @ 08:05) (98% - 99%)  ___________________________________________________________________________    LABS                          7.3    6.10  )-----------( 321      ( 29 Mar 2024 14:15 )             22.0       03-29    138  |  96  |  82<H>  ----------------------------<  92  4.7   |  26  |  9.79<H>    Ca    8.9      29 Mar 2024 14:15  Phos  6.3     03-29    ___________________________________________________________________________    MEDICATIONS  (STANDING):  acetaminophen     Tablet .. 975 milliGRAM(s) Oral every 8 hours  apixaban 2.5 milliGRAM(s) Oral two times a day  AQUAPHOR (petrolatum Ointment) 1 Application(s) Topical two times a day  aspirin enteric coated 81 milliGRAM(s) Oral two times a day  epoetin carito-epbx (RETACRIT) Injectable 96392 Unit(s) IV Push <User Schedule>  metoprolol tartrate 25 milliGRAM(s) Oral two times a day  Nephro-pat 1 Tablet(s) Oral daily  senna 2 Tablet(s) Oral at bedtime  sevelamer carbonate 800 milliGRAM(s) Oral three times a day with meals    MEDICATIONS  (PRN):  aluminum hydroxide/magnesium hydroxide/simethicone Suspension 30 milliLiter(s) Oral every 4 hours PRN Dyspepsia  melatonin 6 milliGRAM(s) Oral at bedtime PRN Insomnia  oxyCODONE    IR 15 milliGRAM(s) Oral every 4 hours PRN Severe Pain (7 - 10)  oxyCODONE    IR 10 milliGRAM(s) Oral every 4 hours PRN Moderate Pain (4 - 6)  polyethylene glycol 3350 17 Gram(s) Oral daily PRN Constipation    ___________________________________________________________________________    PHYSICAL EXAM:    Gen - NAD, Comfortable  HEENT - NCAT, EOMI, MMM  Pulm - CTAB, No wheeze, No rhonchi, No crackles  Cardiovascular - RRR, S1S2, No murmurs  Chest - good chest expansion, good respiratory effort  Abdomen - Soft, NT/ND, +BS. BM 3/26  Extremities - Right FA AV fistula + bruit and thrill. GIDEON old AV fistula not active,   Neuro - stable  MSK: b/l hip discomfort with movement OA changes Right Knee  Psychiatric - Mood stable, Affect WNL  Skin: right hip surgical site with steri strips CDI. b/l sacrum/coccyx DTI wth evolution.    ___________________________________________________________________________

## 2024-03-30 NOTE — PROGRESS NOTE ADULT - SUBJECTIVE AND OBJECTIVE BOX
PROGRESS NOTE:     Patient is a 38y old  Male who presents with a chief complaint of Right Hip fracture s/p right hip hemiarthroplasty (30 Mar 2024 10:20)          SUBJECTIVE & OBJECTIVE:   Pt seen and examined at bedside in AM    no overnight events.     REVIEW OF SYSTEMS: remaining ROS negative     PHYSICAL EXAM:  VITALS:  Vital Signs Last 24 Hrs  T(C): 36.7 (30 Mar 2024 08:05), Max: 37 (29 Mar 2024 14:15)  T(F): 98.1 (30 Mar 2024 08:05), Max: 98.6 (29 Mar 2024 14:15)  HR: 76 (30 Mar 2024 08:05) (76 - 89)  BP: 131/69 (30 Mar 2024 08:05) (120/71 - 143/73)  BP(mean): --  RR: 15 (30 Mar 2024 08:05) (15 - 16)  SpO2: 99% (30 Mar 2024 08:05) (98% - 99%)    Parameters below as of 30 Mar 2024 08:05  Patient On (Oxygen Delivery Method): room air          GENERAL: NAD,  no increased WOB  HEAD:  Atraumatic, Normocephalic  EYES: EOMI, conjunctiva and sclera clear  ENMT: Moist mucous membranes  NECK: Supple, No JVD  NERVOUS SYSTEM:  Alert & Oriented    CHEST/LUNG: Clear to auscultation bilaterally; No rales, rhonchi, wheezing  HEART: Regular rate and rhythm  ABDOMEN: Soft, Nontender, Nondistended; Bowel sounds present  EXTREMITIES:  No clubbing, cyanosis, calf tenderness or edema b/l      MEDICATIONS  (STANDING):  acetaminophen     Tablet .. 975 milliGRAM(s) Oral every 8 hours  apixaban 2.5 milliGRAM(s) Oral two times a day  AQUAPHOR (petrolatum Ointment) 1 Application(s) Topical two times a day  aspirin enteric coated 81 milliGRAM(s) Oral two times a day  epoetin carito-epbx (RETACRIT) Injectable 63720 Unit(s) IV Push <User Schedule>  metoprolol tartrate 25 milliGRAM(s) Oral two times a day  Nephro-pat 1 Tablet(s) Oral daily  senna 2 Tablet(s) Oral at bedtime  sevelamer carbonate 800 milliGRAM(s) Oral three times a day with meals    MEDICATIONS  (PRN):  aluminum hydroxide/magnesium hydroxide/simethicone Suspension 30 milliLiter(s) Oral every 4 hours PRN Dyspepsia  melatonin 6 milliGRAM(s) Oral at bedtime PRN Insomnia  oxyCODONE    IR 15 milliGRAM(s) Oral every 4 hours PRN Severe Pain (7 - 10)  oxyCODONE    IR 10 milliGRAM(s) Oral every 4 hours PRN Moderate Pain (4 - 6)  polyethylene glycol 3350 17 Gram(s) Oral daily PRN Constipation      Allergies    No Known Drug Allergies  shellfish (Hives)    Intolerances              LABS:                           7.3    6.10  )-----------( 321      ( 29 Mar 2024 14:15 )             22.0     03-29    138  |  96  |  82<H>  ----------------------------<  92  4.7   |  26  |  9.79<H>    Ca    8.9      29 Mar 2024 14:15  Phos  6.3     03-29        Urinalysis Basic - ( 29 Mar 2024 14:15 )    Color: x / Appearance: x / SG: x / pH: x  Gluc: 92 mg/dL / Ketone: x  / Bili: x / Urobili: x   Blood: x / Protein: x / Nitrite: x   Leuk Esterase: x / RBC: x / WBC x   Sq Epi: x / Non Sq Epi: x / Bacteria: x      CAPILLARY BLOOD GLUCOSE                    RECENT CULTURES:          RADIOLOGY & ADDITIONAL TESTS:        Care Discussed with Consultants/Other Providers: rehab provider

## 2024-03-31 PROCEDURE — 99232 SBSQ HOSP IP/OBS MODERATE 35: CPT

## 2024-03-31 RX ADMIN — Medication 1 APPLICATION(S): at 05:30

## 2024-03-31 RX ADMIN — APIXABAN 2.5 MILLIGRAM(S): 2.5 TABLET, FILM COATED ORAL at 05:30

## 2024-03-31 RX ADMIN — APIXABAN 2.5 MILLIGRAM(S): 2.5 TABLET, FILM COATED ORAL at 17:35

## 2024-03-31 RX ADMIN — SEVELAMER CARBONATE 800 MILLIGRAM(S): 2400 POWDER, FOR SUSPENSION ORAL at 07:58

## 2024-03-31 RX ADMIN — Medication 81 MILLIGRAM(S): at 17:35

## 2024-03-31 RX ADMIN — Medication 6 MILLIGRAM(S): at 21:07

## 2024-03-31 RX ADMIN — Medication 25 MILLIGRAM(S): at 17:35

## 2024-03-31 RX ADMIN — SEVELAMER CARBONATE 800 MILLIGRAM(S): 2400 POWDER, FOR SUSPENSION ORAL at 17:35

## 2024-03-31 RX ADMIN — Medication 81 MILLIGRAM(S): at 05:28

## 2024-03-31 RX ADMIN — SEVELAMER CARBONATE 800 MILLIGRAM(S): 2400 POWDER, FOR SUSPENSION ORAL at 12:13

## 2024-03-31 RX ADMIN — Medication 1 TABLET(S): at 12:13

## 2024-03-31 RX ADMIN — Medication 25 MILLIGRAM(S): at 05:28

## 2024-03-31 RX ADMIN — Medication 1 APPLICATION(S): at 18:10

## 2024-03-31 NOTE — PROGRESS NOTE ADULT - SUBJECTIVE AND OBJECTIVE BOX
HPI:  39yo Male PMHx HTN, anemia of chronic disease, ESRD on HD (M/W/F via right AV fistula), left hip arthroplasty in 8/23, history of right leg fracture presented to [facility] from [facility] on 3/6 after falling during a wheelchair transfer landing on his right knee. He is wheelchair bound since his left hip surgery. CT hip with acute garden type 1 minimally valgus impacted transcervical right femoral neck fracture and subacute right pubic body and inferior rami fractures. He is s/p right hip hemiarthroplasty. Post op course complicated by anemia s/p transfusion. Hospital course complicated by hypoglycemia after being shifted for hyperkalemia. Further complicated by febrile 101.6F found to be covid positive and completed remdisivir. He was started on eliquis 2.5mg BID for elevated d-dimers and negative LE duplex.     Patient was evaluated by PM&R and therapy for functional deficits, gait/ADL impairments and acute rehabilitation was recommended. Patient was medically optimized for discharge to [facility] IRF on 3/27 (27 Mar 2024 15:19)    ___________________________________________________________________________    SUBJECTIVE/ROS  Patient was seen and evaluated at bedside today.  Reported poor sleep overnight events and is in no acute distress.  Melatonin increased to 6mg and effective.  Patient is eager and motivated to resume participation on the recommended rehabilitation program.  Denies any CP, SOB, RIZVI, palpitations, fever, chills, body aches, cough, congestion, or any other symptoms at this time.   ___________________________________________________________________________    Vital Signs Last 24 Hrs  T(C): 36.8 (31 Mar 2024 08:17), Max: 37.2 (30 Mar 2024 19:38)  T(F): 98.3 (31 Mar 2024 08:17), Max: 99 (30 Mar 2024 19:38)  HR: 68 (31 Mar 2024 08:17) (68 - 83)  BP: 124/66 (31 Mar 2024 08:17) (124/66 - 134/65)  RR: 15 (31 Mar 2024 08:17) (15 - 16)  SpO2: 98% (31 Mar 2024 08:17) (98% - 98%)    ___________________________________________________________________________    LABS                          7.3    6.10  )-----------( 321      ( 29 Mar 2024 14:15 )             22.0       03-29    138  |  96  |  82<H>  ----------------------------<  92  4.7   |  26  |  9.79<H>    Ca    8.9      29 Mar 2024 14:15  Phos  6.3     03-29    ___________________________________________________________________________    MEDICATIONS  (STANDING):  acetaminophen     Tablet .. 975 milliGRAM(s) Oral every 8 hours  apixaban 2.5 milliGRAM(s) Oral two times a day  AQUAPHOR (petrolatum Ointment) 1 Application(s) Topical two times a day  aspirin enteric coated 81 milliGRAM(s) Oral two times a day  epoetin carito-epbx (RETACRIT) Injectable 96292 Unit(s) IV Push <User Schedule>  metoprolol tartrate 25 milliGRAM(s) Oral two times a day  Nephro-pat 1 Tablet(s) Oral daily  senna 2 Tablet(s) Oral at bedtime  sevelamer carbonate 800 milliGRAM(s) Oral three times a day with meals    MEDICATIONS  (PRN):  aluminum hydroxide/magnesium hydroxide/simethicone Suspension 30 milliLiter(s) Oral every 4 hours PRN Dyspepsia  melatonin 6 milliGRAM(s) Oral at bedtime PRN Insomnia  oxyCODONE    IR 15 milliGRAM(s) Oral every 4 hours PRN Severe Pain (7 - 10)  oxyCODONE    IR 10 milliGRAM(s) Oral every 4 hours PRN Moderate Pain (4 - 6)  polyethylene glycol 3350 17 Gram(s) Oral daily PRN Constipation    ___________________________________________________________________________    PHYSICAL EXAM:    Gen - NAD, Comfortable  HEENT - NCAT, EOMI, MMM  Pulm - CTAB, No wheeze, No rhonchi, No crackles  Cardiovascular - RRR, S1S2, No murmurs  Chest - good chest expansion, good respiratory effort  Abdomen - Soft, NT/ND, +BS. BM 3/26  Extremities - Right FA AV fistula + bruit and thrill. GIDEON old AV fistula not active,   Neuro - stable  MSK: b/l hip discomfort with movement OA changes Right Knee  Psychiatric - Mood stable, Affect WNL  Skin: right hip surgical site with steri strips CDI. b/l sacrum/coccyx DTI wth evolution.    ___________________________________________________________________________

## 2024-03-31 NOTE — PROGRESS NOTE ADULT - ASSESSMENT
ASSESSMENT/PLAN  38 year old male admitted for Acute Inpatient Rehabilitation at [facility] with functional deficits after Right Femoral Neck fracture s/p right hip hemiarthroplasty on 3/7.  No Active issues  Continue current medical management.  Continue comprehensive rehab program.

## 2024-03-31 NOTE — PROGRESS NOTE ADULT - ASSESSMENT
38y M with HTN, anemia of chronic disease, ESRD on HD (M/W/F via right AV fistula), left hip arthroplasty in 8/23, history of right leg fracture presented to [facility] on 3/6 after a fall during a wheelchair transfer. He was found to have a right femoral neck fracture and subacute pubic body and inferior rami fractures. He is s/p right hip hemiarthroplasty. Patient now admitted for a multidisciplinary rehab program.     #Right Femoral Neck fracture   - s/p right hip hemiarthroplasty on 3/7  - Comprehensive Multidisciplinary Rehab Program  - precautions: WBAT RLE. Anterior hip.   - pain management per rehab   - bowel regimen    #ESRD  - HDS via right AV fistula on m/w/f.  ( Last HD 3/29). Old HDS left UA  - HS per Dr Blanco  - sevelamer 800mg TID  - retacrit m/w/f    #HTN  - toprol 25mg BID  - Monitor BP    #DVT prophylaxis:   - eliquis 2.5mg BID  - SCDs

## 2024-03-31 NOTE — PROGRESS NOTE ADULT - SUBJECTIVE AND OBJECTIVE BOX
PROGRESS NOTE:     Patient is a 38y old  Male who presents with a chief complaint of Right Hip fracture s/p right hip hemiarthroplasty (30 Mar 2024 10:20)          SUBJECTIVE & OBJECTIVE:   Pt seen and examined at bedside in AM    no overnight events.     REVIEW OF SYSTEMS: remaining ROS negative     PHYSICAL EXAM:  VITALS:  Vital Signs Last 24 Hrs  T(C): 36.8 (31 Mar 2024 08:17), Max: 37.2 (30 Mar 2024 19:38)  T(F): 98.3 (31 Mar 2024 08:17), Max: 99 (30 Mar 2024 19:38)  HR: 68 (31 Mar 2024 08:17) (68 - 83)  BP: 124/66 (31 Mar 2024 08:17) (124/66 - 134/65)  BP(mean): --  RR: 15 (31 Mar 2024 08:17) (15 - 16)  SpO2: 98% (31 Mar 2024 08:17) (98% - 98%)    Parameters below as of 31 Mar 2024 08:17  Patient On (Oxygen Delivery Method): room air          GENERAL: NAD,  no increased WOB  HEAD:  Atraumatic, Normocephalic  EYES: EOMI, conjunctiva and sclera clear  ENMT: Moist mucous membranes  NECK: Supple, No JVD  NERVOUS SYSTEM:  Alert & Oriented    CHEST/LUNG: Clear to auscultation bilaterally; No rales, rhonchi, wheezing  HEART: Regular rate and rhythm  ABDOMEN: Soft, Nontender, Nondistended; Bowel sounds present  EXTREMITIES:  No clubbing, cyanosis, calf tenderness or edema b/l      MEDICATIONS  (STANDING):  acetaminophen     Tablet .. 975 milliGRAM(s) Oral every 8 hours  apixaban 2.5 milliGRAM(s) Oral two times a day  AQUAPHOR (petrolatum Ointment) 1 Application(s) Topical two times a day  aspirin enteric coated 81 milliGRAM(s) Oral two times a day  epoetin carito-epbx (RETACRIT) Injectable 86595 Unit(s) IV Push <User Schedule>  metoprolol tartrate 25 milliGRAM(s) Oral two times a day  Nephro-pat 1 Tablet(s) Oral daily  senna 2 Tablet(s) Oral at bedtime  sevelamer carbonate 800 milliGRAM(s) Oral three times a day with meals    MEDICATIONS  (PRN):  aluminum hydroxide/magnesium hydroxide/simethicone Suspension 30 milliLiter(s) Oral every 4 hours PRN Dyspepsia  melatonin 6 milliGRAM(s) Oral at bedtime PRN Insomnia  oxyCODONE    IR 15 milliGRAM(s) Oral every 4 hours PRN Severe Pain (7 - 10)  oxyCODONE    IR 10 milliGRAM(s) Oral every 4 hours PRN Moderate Pain (4 - 6)  polyethylene glycol 3350 17 Gram(s) Oral daily PRN Constipation        Allergies    No Known Drug Allergies  shellfish (Hives)    Intolerances              LABS:                           7.3    6.10  )-----------( 321      ( 29 Mar 2024 14:15 )             22.0     03-29    138  |  96  |  82<H>  ----------------------------<  92  4.7   |  26  |  9.79<H>    Ca    8.9      29 Mar 2024 14:15  Phos  6.3     03-29        Urinalysis Basic - ( 29 Mar 2024 14:15 )    Color: x / Appearance: x / SG: x / pH: x  Gluc: 92 mg/dL / Ketone: x  / Bili: x / Urobili: x   Blood: x / Protein: x / Nitrite: x   Leuk Esterase: x / RBC: x / WBC x   Sq Epi: x / Non Sq Epi: x / Bacteria: x      CAPILLARY BLOOD GLUCOSE                    RECENT CULTURES:          RADIOLOGY & ADDITIONAL TESTS:        Care Discussed with Consultants/Other Providers: rehab provider

## 2024-04-01 LAB
ALBUMIN SERPL ELPH-MCNC: 2.4 G/DL — LOW (ref 3.3–5)
ALP SERPL-CCNC: 748 U/L — HIGH (ref 40–120)
ALT FLD-CCNC: 8 U/L — LOW (ref 10–45)
ANION GAP SERPL CALC-SCNC: 16 MMOL/L — SIGNIFICANT CHANGE UP (ref 5–17)
AST SERPL-CCNC: 10 U/L — SIGNIFICANT CHANGE UP (ref 10–40)
BASOPHILS # BLD AUTO: 0.03 K/UL — SIGNIFICANT CHANGE UP (ref 0–0.2)
BASOPHILS NFR BLD AUTO: 0.6 % — SIGNIFICANT CHANGE UP (ref 0–2)
BILIRUB SERPL-MCNC: 0.3 MG/DL — SIGNIFICANT CHANGE UP (ref 0.2–1.2)
BUN SERPL-MCNC: 100 MG/DL — HIGH (ref 7–23)
CALCIUM SERPL-MCNC: 9.1 MG/DL — SIGNIFICANT CHANGE UP (ref 8.4–10.5)
CHLORIDE SERPL-SCNC: 97 MMOL/L — SIGNIFICANT CHANGE UP (ref 96–108)
CO2 SERPL-SCNC: 25 MMOL/L — SIGNIFICANT CHANGE UP (ref 22–31)
CREAT SERPL-MCNC: 10.53 MG/DL — HIGH (ref 0.5–1.3)
EGFR: 6 ML/MIN/1.73M2 — LOW
EOSINOPHIL # BLD AUTO: 0.18 K/UL — SIGNIFICANT CHANGE UP (ref 0–0.5)
EOSINOPHIL NFR BLD AUTO: 3.4 % — SIGNIFICANT CHANGE UP (ref 0–6)
GLUCOSE SERPL-MCNC: 71 MG/DL — SIGNIFICANT CHANGE UP (ref 70–99)
HCT VFR BLD CALC: 24.9 % — LOW (ref 39–50)
HGB BLD-MCNC: 7.8 G/DL — LOW (ref 13–17)
IMM GRANULOCYTES NFR BLD AUTO: 0.4 % — SIGNIFICANT CHANGE UP (ref 0–0.9)
LYMPHOCYTES # BLD AUTO: 1.39 K/UL — SIGNIFICANT CHANGE UP (ref 1–3.3)
LYMPHOCYTES # BLD AUTO: 26 % — SIGNIFICANT CHANGE UP (ref 13–44)
MCHC RBC-ENTMCNC: 30.7 PG — SIGNIFICANT CHANGE UP (ref 27–34)
MCHC RBC-ENTMCNC: 31.3 GM/DL — LOW (ref 32–36)
MCV RBC AUTO: 98 FL — SIGNIFICANT CHANGE UP (ref 80–100)
MONOCYTES # BLD AUTO: 0.58 K/UL — SIGNIFICANT CHANGE UP (ref 0–0.9)
MONOCYTES NFR BLD AUTO: 10.9 % — SIGNIFICANT CHANGE UP (ref 2–14)
NEUTROPHILS # BLD AUTO: 3.14 K/UL — SIGNIFICANT CHANGE UP (ref 1.8–7.4)
NEUTROPHILS NFR BLD AUTO: 58.7 % — SIGNIFICANT CHANGE UP (ref 43–77)
NRBC # BLD: 0 /100 WBCS — SIGNIFICANT CHANGE UP (ref 0–0)
PLATELET # BLD AUTO: 280 K/UL — SIGNIFICANT CHANGE UP (ref 150–400)
POTASSIUM SERPL-MCNC: 5.4 MMOL/L — HIGH (ref 3.5–5.3)
POTASSIUM SERPL-SCNC: 5.4 MMOL/L — HIGH (ref 3.5–5.3)
PROT SERPL-MCNC: 6.4 G/DL — SIGNIFICANT CHANGE UP (ref 6–8.3)
RBC # BLD: 2.54 M/UL — LOW (ref 4.2–5.8)
RBC # FLD: 16.8 % — HIGH (ref 10.3–14.5)
SODIUM SERPL-SCNC: 138 MMOL/L — SIGNIFICANT CHANGE UP (ref 135–145)
WBC # BLD: 5.34 K/UL — SIGNIFICANT CHANGE UP (ref 3.8–10.5)
WBC # FLD AUTO: 5.34 K/UL — SIGNIFICANT CHANGE UP (ref 3.8–10.5)

## 2024-04-01 PROCEDURE — 99232 SBSQ HOSP IP/OBS MODERATE 35: CPT

## 2024-04-01 PROCEDURE — 99232 SBSQ HOSP IP/OBS MODERATE 35: CPT | Mod: GC

## 2024-04-01 RX ORDER — LIDOCAINE 4 G/100G
1 CREAM TOPICAL
Refills: 0 | Status: DISCONTINUED | OUTPATIENT
Start: 2024-04-01 | End: 2024-04-30

## 2024-04-01 RX ADMIN — Medication 81 MILLIGRAM(S): at 17:46

## 2024-04-01 RX ADMIN — Medication 81 MILLIGRAM(S): at 05:13

## 2024-04-01 RX ADMIN — Medication 25 MILLIGRAM(S): at 05:12

## 2024-04-01 RX ADMIN — SEVELAMER CARBONATE 800 MILLIGRAM(S): 2400 POWDER, FOR SUSPENSION ORAL at 08:09

## 2024-04-01 RX ADMIN — APIXABAN 2.5 MILLIGRAM(S): 2.5 TABLET, FILM COATED ORAL at 05:12

## 2024-04-01 RX ADMIN — ERYTHROPOIETIN 10000 UNIT(S): 10000 INJECTION, SOLUTION INTRAVENOUS; SUBCUTANEOUS at 17:12

## 2024-04-01 RX ADMIN — Medication 6 MILLIGRAM(S): at 21:14

## 2024-04-01 RX ADMIN — APIXABAN 2.5 MILLIGRAM(S): 2.5 TABLET, FILM COATED ORAL at 17:46

## 2024-04-01 RX ADMIN — Medication 25 MILLIGRAM(S): at 17:46

## 2024-04-01 RX ADMIN — Medication 1 TABLET(S): at 12:09

## 2024-04-01 RX ADMIN — SEVELAMER CARBONATE 800 MILLIGRAM(S): 2400 POWDER, FOR SUSPENSION ORAL at 17:46

## 2024-04-01 RX ADMIN — Medication 1 APPLICATION(S): at 05:20

## 2024-04-01 RX ADMIN — OXYCODONE HYDROCHLORIDE 10 MILLIGRAM(S): 5 TABLET ORAL at 09:00

## 2024-04-01 RX ADMIN — Medication 1 APPLICATION(S): at 17:51

## 2024-04-01 RX ADMIN — OXYCODONE HYDROCHLORIDE 10 MILLIGRAM(S): 5 TABLET ORAL at 08:12

## 2024-04-01 RX ADMIN — SEVELAMER CARBONATE 800 MILLIGRAM(S): 2400 POWDER, FOR SUSPENSION ORAL at 12:09

## 2024-04-01 NOTE — PROGRESS NOTE ADULT - ASSESSMENT
38y M with HTN, anemia of chronic disease, ESRD on HD (M/W/F via right AV fistula), left hip arthroplasty in 8/23, history of right leg fracture presented to [facility] on 3/6 after a fall during a wheelchair transfer. He was found to have a right femoral neck fracture and subacute pubic body and inferior rami fractures. He is s/p right hip hemiarthroplasty. Patient now admitted for a multidisciplinary rehab program.     #Right Femoral Neck fracture   - s/p right hip hemiarthroplasty on 3/7  - Comprehensive Multidisciplinary Rehab Program  - precautions: WBAT RLE. Anterior hip.   - pain management per rehab   - bowel regimen    #ESRD  #hyperkalemia  - HDS via right AV fistula on m/w/f.  ( Last HD 3/29). Old HDS left UA  - HD per Dr Blanco  - sevelamer 800mg TID  - retacrit m/w/f    #HTN  - toprol 25mg BID  - Monitor BP    #DVT prophylaxis:   - eliquis 2.5mg BID  - SCDs

## 2024-04-01 NOTE — PROGRESS NOTE ADULT - SUBJECTIVE AND OBJECTIVE BOX
PROGRESS NOTE:     Patient is a 38y old  Male who presents with a chief complaint of Right Hip fracture s/p right hip hemiarthroplasty (30 Mar 2024 10:20)          SUBJECTIVE & OBJECTIVE:   Pt seen and examined at bedside in AM    no overnight events.     REVIEW OF SYSTEMS: remaining ROS negative     PHYSICAL EXAM:  VITALS:  Vital Signs Last 24 Hrs  T(C): 36.8 (01 Apr 2024 07:17), Max: 37.2 (31 Mar 2024 19:23)  T(F): 98.2 (01 Apr 2024 07:17), Max: 98.9 (31 Mar 2024 19:23)  HR: 63 (01 Apr 2024 07:17) (63 - 82)  BP: 132/75 (01 Apr 2024 07:17) (122/69 - 156/76)  BP(mean): --  RR: 16 (01 Apr 2024 07:17) (16 - 16)  SpO2: 95% (01 Apr 2024 07:17) (95% - 100%)    Parameters below as of 01 Apr 2024 07:17  Patient On (Oxygen Delivery Method): room air          GENERAL: NAD,  no increased WOB  HEAD:  Atraumatic, Normocephalic  EYES: EOMI, conjunctiva and sclera clear  ENMT: Moist mucous membranes  NECK: Supple, No JVD  NERVOUS SYSTEM:  Alert & Oriented    CHEST/LUNG: Clear to auscultation bilaterally; No rales, rhonchi, wheezing  HEART: Regular rate and rhythm  ABDOMEN: Soft, Nontender, Nondistended; Bowel sounds present  EXTREMITIES:  No clubbing, cyanosis, calf tenderness       MEDICATIONS  (STANDING):  acetaminophen     Tablet .. 975 milliGRAM(s) Oral every 8 hours  apixaban 2.5 milliGRAM(s) Oral two times a day  AQUAPHOR (petrolatum Ointment) 1 Application(s) Topical two times a day  aspirin enteric coated 81 milliGRAM(s) Oral two times a day  epoetin carito-epbx (RETACRIT) Injectable 76395 Unit(s) IV Push <User Schedule>  metoprolol tartrate 25 milliGRAM(s) Oral two times a day  Nephro-pat 1 Tablet(s) Oral daily  senna 2 Tablet(s) Oral at bedtime  sevelamer carbonate 800 milliGRAM(s) Oral three times a day with meals    MEDICATIONS  (PRN):  aluminum hydroxide/magnesium hydroxide/simethicone Suspension 30 milliLiter(s) Oral every 4 hours PRN Dyspepsia  melatonin 6 milliGRAM(s) Oral at bedtime PRN Insomnia  oxyCODONE    IR 15 milliGRAM(s) Oral every 4 hours PRN Severe Pain (7 - 10)  oxyCODONE    IR 10 milliGRAM(s) Oral every 4 hours PRN Moderate Pain (4 - 6)  polyethylene glycol 3350 17 Gram(s) Oral daily PRN Constipation        Allergies    No Known Drug Allergies  shellfish (Hives)    Intolerances              LABS:                           7.8    5.34  )-----------( 280      ( 01 Apr 2024 05:50 )             24.9     04-01    138  |  97  |  100<H>  ----------------------------<  71  5.4<H>   |  25  |  10.53<H>    Ca    9.1      01 Apr 2024 05:50    TPro  6.4  /  Alb  2.4<L>  /  TBili  0.3  /  DBili  x   /  AST  10  /  ALT  8<L>  /  AlkPhos  748<H>  04-01    LIVER FUNCTIONS - ( 01 Apr 2024 05:50 )  Alb: 2.4 g/dL / Pro: 6.4 g/dL / ALK PHOS: 748 U/L / ALT: 8 U/L / AST: 10 U/L / GGT: x             Urinalysis Basic - ( 01 Apr 2024 05:50 )    Color: x / Appearance: x / SG: x / pH: x  Gluc: 71 mg/dL / Ketone: x  / Bili: x / Urobili: x   Blood: x / Protein: x / Nitrite: x   Leuk Esterase: x / RBC: x / WBC x   Sq Epi: x / Non Sq Epi: x / Bacteria: x        CAPILLARY BLOOD GLUCOSE                    RECENT CULTURES:          RADIOLOGY & ADDITIONAL TESTS:        Care Discussed with Consultants/Other Providers: rehab provider

## 2024-04-01 NOTE — PROGRESS NOTE ADULT - SUBJECTIVE AND OBJECTIVE BOX
Stable on HD    Vital Signs Last 24 Hrs  T(C): 37.1 (04-01-24 @ 19:28), Max: 37.1 (04-01-24 @ 19:28)  T(F): 98.7 (04-01-24 @ 19:28), Max: 98.7 (04-01-24 @ 19:28)  HR: 79 (04-01-24 @ 19:28) (63 - 92)  BP: 133/61 (04-01-24 @ 19:28) (125/68 - 156/76)  RR: 16 (04-01-24 @ 19:28) (16 - 16)  SpO2: 98% (04-01-24 @ 19:28) (95% - 99%)    I&O's Detail    01 Apr 2024 07:01  -  01 Apr 2024 22:53  --------------------------------------------------------  IN:    Other (mL): 800 mL  Total IN: 800 mL    OUT:    Other (mL): 2800 mL  Total OUT: 2800 mL    s1s2  b/l air entry  soft, ND  tr edema                                  7.8    5.34  )-----------( 280      ( 01 Apr 2024 05:50 )             24.9     01 Apr 2024 05:50    138    |  97     |  100    ----------------------------<  71     5.4     |  25     |  10.53    Ca    9.1        01 Apr 2024 05:50    TPro  6.4    /  Alb  2.4    /  TBili  0.3    /  DBili  x      /  AST  10     /  ALT  8      /  AlkPhos  748    01 Apr 2024 05:50    LIVER FUNCTIONS - ( 01 Apr 2024 05:50 )  Alb: 2.4 g/dL / Pro: 6.4 g/dL / ALK PHOS: 748 U/L / ALT: 8 U/L / AST: 10 U/L / GGT: x           acetaminophen     Tablet .. 975 milliGRAM(s) Oral every 8 hours  aluminum hydroxide/magnesium hydroxide/simethicone Suspension 30 milliLiter(s) Oral every 4 hours PRN  apixaban 2.5 milliGRAM(s) Oral two times a day  AQUAPHOR (petrolatum Ointment) 1 Application(s) Topical two times a day  aspirin enteric coated 81 milliGRAM(s) Oral two times a day  epoetin carito-epbx (RETACRIT) Injectable 39034 Unit(s) IV Push <User Schedule>  lidocaine   4% Patch 1 Patch Transdermal <User Schedule>  melatonin 6 milliGRAM(s) Oral at bedtime PRN  metoprolol tartrate 25 milliGRAM(s) Oral two times a day  Nephro-pat 1 Tablet(s) Oral daily  oxyCODONE    IR 15 milliGRAM(s) Oral every 4 hours PRN  oxyCODONE    IR 10 milliGRAM(s) Oral every 4 hours PRN  polyethylene glycol 3350 17 Gram(s) Oral daily PRN  senna 2 Tablet(s) Oral at bedtime  sevelamer carbonate 800 milliGRAM(s) Oral three times a day with meals    A/P:    ESRD on HD   S/p fall, R hip fx  S/p R hip HA 3/7  HD today w/2kg fluid removal    Epoetin w/HD 3 x week   Renal diet  Renvela for high Phos    [phone]

## 2024-04-01 NOTE — PROGRESS NOTE ADULT - SUBJECTIVE AND OBJECTIVE BOX
chief complaint- Pain in R hip / difficulty ambulating      39yo Male PMHx HTN, anemia of chronic disease, ESRD on HD (M/W/F via right AV fistula), left hip arthroplasty in 8/23, history of right leg fracture presented to [facility] from [facility] on 3/6 after falling during a wheelchair transfer landing on his right knee. He is wheelchair bound since his left hip surgery. CT hip with acute garden type 1 minimally valgus impacted transcervical right femoral neck fracture and subacute right pubic body and inferior rami fractures. He is s/p right hip hemiarthroplasty. Post op course complicated by anemia s/p transfusion. Hospital course complicated by hypoglycemia after being shifted for hyperkalemia. Further complicated by febrile 101.6F found to be covid positive and completed remdisivir. He was started on eliquis 2.5mg BID for elevated d-dimers and negative LE duplex.     Patient was evaluated by PM&R and therapy for functional deficits, gait/ADL impairments and acute rehabilitation was recommended. Patient was medically optimized for discharge to [facility] IRU on 3/27    ROS/subjective:  patient seen and evaluated in OT  no events over weekend  persistent pain in Right knee limits function  Studies reviewed- CT Right knee 3/6- no Fx- arthrosis/ effusion  lidoderm/ ICE ordered  no dizziness, no chest pain, no nausea, no vomiting, no SOB, no headaches  last BM 3/30    MEDICATIONS  (STANDING):  acetaminophen     Tablet .. 975 milliGRAM(s) Oral every 8 hours  apixaban 2.5 milliGRAM(s) Oral two times a day  AQUAPHOR (petrolatum Ointment) 1 Application(s) Topical two times a day  aspirin enteric coated 81 milliGRAM(s) Oral two times a day  epoetin carito-epbx (RETACRIT) Injectable 86892 Unit(s) IV Push <User Schedule>  lidocaine   4% Patch 1 Patch Transdermal <User Schedule>  metoprolol tartrate 25 milliGRAM(s) Oral two times a day  Nephro-pat 1 Tablet(s) Oral daily  senna 2 Tablet(s) Oral at bedtime  sevelamer carbonate 800 milliGRAM(s) Oral three times a day with meals    MEDICATIONS  (PRN):  aluminum hydroxide/magnesium hydroxide/simethicone Suspension 30 milliLiter(s) Oral every 4 hours PRN Dyspepsia  melatonin 6 milliGRAM(s) Oral at bedtime PRN Insomnia  oxyCODONE    IR 10 milliGRAM(s) Oral every 4 hours PRN Moderate Pain (4 - 6)  oxyCODONE    IR 15 milliGRAM(s) Oral every 4 hours PRN Severe Pain (7 - 10)  polyethylene glycol 3350 17 Gram(s) Oral daily PRN Constipation                            7.8    5.34  )-----------( 280      ( 01 Apr 2024 05:50 )             24.9     04-01    138  |  97  |  100<H>  ----------------------------<  71  5.4<H>   |  25  |  10.53<H>    Ca    9.1      01 Apr 2024 05:50    TPro  6.4  /  Alb  2.4<L>  /  TBili  0.3  /  DBili  x   /  AST  10  /  ALT  8<L>  /  AlkPhos  748<H>  04-01    Urinalysis Basic - ( 01 Apr 2024 05:50 )    Color: x / Appearance: x / SG: x / pH: x  Gluc: 71 mg/dL / Ketone: x  / Bili: x / Urobili: x   Blood: x / Protein: x / Nitrite: x   Leuk Esterase: x / RBC: x / WBC x   Sq Epi: x / Non Sq Epi: x / Bacteria: x        CAPILLARY BLOOD GLUCOSE          Vital Signs Last 24 Hrs  T(C): 36.8 (01 Apr 2024 07:17), Max: 37.2 (31 Mar 2024 19:23)  T(F): 98.2 (01 Apr 2024 07:17), Max: 98.9 (31 Mar 2024 19:23)  HR: 63 (01 Apr 2024 07:17) (63 - 82)  BP: 132/75 (01 Apr 2024 07:17) (122/69 - 156/76)  BP(mean): --  RR: 16 (01 Apr 2024 07:17) (16 - 16)  SpO2: 95% (01 Apr 2024 07:17) (95% - 100%)    Parameters below as of 01 Apr 2024 07:17  Patient On (Oxygen Delivery Method): room air      Gen - NAD, Comfortable  HEENT - NCAT, EOMI, MMM  Neck - Supple, No limited ROM  Pulm - CTAB, No wheeze, No rhonchi, No crackles  Cardiovascular - RRR, S1S2, No murmurs  Chest - good chest expansion, good respiratory effort  Abdomen - Soft, NT/ND, +BS. BM 3/26  Extremities - Right FA AV fistula + bruit and thrill. GIDEON old AV fistula not active,   Neuro-     Cognitive - awake, alert, oriented to person, place, date, year, and situation.  Able  to follow command     Communication - Fluent, Comprehensible, No dysarthria, No aphasia        Memory:  Recent/ remote memory intact     Cranial Nerves - CN 2-12 intact. No facial asymmetry, Tongue midline, EOMI, Shoulder shrug intact     Motor -                    LEFT    UE - ShAB 5/5, EF 5/5, EE 5/5,  5/5                    RIGHT UE - ShAB 5/5, EF 5/5, EE 5/5,   5/5                    LEFT    LE - HF 4/5, KE 3+/5, DF 4/5, PF 4/5                    RIGHT LE - HF 3+/5, KE 3/5, DF 4/5, PF 4/5        Sensory - Intact bilaterally      Tone - normal  MSK: b/l hip discomfort with movement OA changes Right Knee- tender to palpation superior/ lat aspect of knee joint  Psychiatric - Mood stable, Affect WNL  Skin: right hip surgical site with steri strips CDI. b/l sacrum/coccyx DTI .    IDT Camilla 4/1  tentative Dc  4/16 to home- 24x7 supervision- ? Brother to Provide

## 2024-04-01 NOTE — PROGRESS NOTE ADULT - ASSESSMENT
BRIDGET NORTH is a 38y M with HTN, anemia of chronic disease, ESRD on HD (M/W/F via right AV fistula), left hip arthroplasty in 8/23, history of right leg fracture presented to [facility] on 3/6 after a fall during a wheelchair transfer. He was found to have a right femoral neck fracture and subacute pubic body and inferior rami fractures. He is s/p right hip hemiarthroplasty. Hospital course complicated by post-op anemia s/p transfusion, hypoglycemia, hyperkalemia, febrile 2/2 covid s/p remdisivir, and started on eliquis 2.5mg BID with negative dvts on LE duplex. Patient now admitted for a multidisciplinary rehab program. 03-27-24 @ 16:17      #Right Femoral Neck fracture   - s/p right hip hemiarthroplasty on 3/7  - pain med as below  - Dressing and staples removed on 3/22  - Comprehensive Multidisciplinary Rehab Program:  comprehensive rehab program of PT/OT/SLP - 3 hours a day, 5 days a week. P&O as needed.   - precautions: WBAT RLE. Anterior hip.   Right Knee pain- arthrosis/ effusion- no Fx- renal osteodystrophy- ICE/ Lidoderm/ Rehab    #ESRD  - HDS via right AV fistula on m/w/f.  ( Last HD 3/27). Old HDS left UA  - Nephro consult placed. Dr Blanco aware.   - sevelamer 800mg TID  - retacrit m/w/f    #HTN  - toprol 25mg BID  - Monitor BP    #Pain  - Tylenol PRN  - oxy 10mg moderate, 15mg severe.   Right Knee pain- add lidoderm during the day/ICE    #Mood / Cognition  - Neuropsychology consult PRN  - recreation therapy PRN    #Sleep  - Melatonin PRN     #GI / Bowel  #dyspepsia  - Senna qHS  - Miralax PRN - Give bowel meds in AM if no BM today- last BM 3/30  - maalox  - GI ppx: none.     # / Bladder  - does not void ESRD    #Skin / Pressure injury  - Skin assessment on admission performed : right hip surgical site with steri strips CDI. Coccyx denuded.  - Monitor Incisions:    - wound care following at [facility] for b/l sacrum/coccyx DTI wth evolution.   - wound care to follow  - turn and reposition q2h      #Diet:  - Diet Consistency: Renal restriction  - Nutrition consult    #DVT prophylaxis:   - eliquis 2.5mg BID  - SCDs  - Last LE Doppler normal on 3/14     Social work to contact Brother Regarding DC plans to home- will require 24x7 assistance upon DC

## 2024-04-02 PROCEDURE — 99232 SBSQ HOSP IP/OBS MODERATE 35: CPT | Mod: GC

## 2024-04-02 RX ADMIN — Medication 25 MILLIGRAM(S): at 05:32

## 2024-04-02 RX ADMIN — SEVELAMER CARBONATE 800 MILLIGRAM(S): 2400 POWDER, FOR SUSPENSION ORAL at 12:23

## 2024-04-02 RX ADMIN — LIDOCAINE 1 PATCH: 4 CREAM TOPICAL at 05:32

## 2024-04-02 RX ADMIN — Medication 81 MILLIGRAM(S): at 17:55

## 2024-04-02 RX ADMIN — Medication 1 APPLICATION(S): at 18:07

## 2024-04-02 RX ADMIN — Medication 81 MILLIGRAM(S): at 05:32

## 2024-04-02 RX ADMIN — SEVELAMER CARBONATE 800 MILLIGRAM(S): 2400 POWDER, FOR SUSPENSION ORAL at 17:55

## 2024-04-02 RX ADMIN — SEVELAMER CARBONATE 800 MILLIGRAM(S): 2400 POWDER, FOR SUSPENSION ORAL at 08:00

## 2024-04-02 RX ADMIN — Medication 1 TABLET(S): at 12:23

## 2024-04-02 RX ADMIN — APIXABAN 2.5 MILLIGRAM(S): 2.5 TABLET, FILM COATED ORAL at 05:32

## 2024-04-02 RX ADMIN — Medication 25 MILLIGRAM(S): at 17:57

## 2024-04-02 RX ADMIN — APIXABAN 2.5 MILLIGRAM(S): 2.5 TABLET, FILM COATED ORAL at 17:55

## 2024-04-02 NOTE — PROGRESS NOTE ADULT - SUBJECTIVE AND OBJECTIVE BOX
No complaints    Vital Signs Last 24 Hrs  T(C): 36.9 (04-02-24 @ 20:21), Max: 37.1 (04-02-24 @ 07:28)  T(F): 98.4 (04-02-24 @ 20:21), Max: 98.7 (04-02-24 @ 07:28)  HR: 84 (04-02-24 @ 20:21) (75 - 89)  BP: 138/72 (04-02-24 @ 20:21) (138/72 - 155/72)  RR: 16 (04-02-24 @ 20:21) (16 - 16)  SpO2: 99% (04-02-24 @ 20:21) (99% - 100%)    I&O's Detail    01 Apr 2024 07:01  -  02 Apr 2024 07:00  --------------------------------------------------------  IN:    Other (mL): 800 mL  Total IN: 800 mL    OUT:    Other (mL): 2800 mL  Total OUT: 2800 mL    s1s2  b/l air entry  soft, ND  tr edema                                          7.8    5.34  )-----------( 280      ( 01 Apr 2024 05:50 )             24.9     01 Apr 2024 05:50    138    |  97     |  100    ----------------------------<  71     5.4     |  25     |  10.53    Ca    9.1        01 Apr 2024 05:50    TPro  6.4    /  Alb  2.4    /  TBili  0.3    /  DBili  x      /  AST  10     /  ALT  8      /  AlkPhos  748    01 Apr 2024 05:50    LIVER FUNCTIONS - ( 01 Apr 2024 05:50 )  Alb: 2.4 g/dL / Pro: 6.4 g/dL / ALK PHOS: 748 U/L / ALT: 8 U/L / AST: 10 U/L / GGT: x           acetaminophen     Tablet .. 975 milliGRAM(s) Oral every 8 hours  aluminum hydroxide/magnesium hydroxide/simethicone Suspension 30 milliLiter(s) Oral every 4 hours PRN  apixaban 2.5 milliGRAM(s) Oral two times a day  AQUAPHOR (petrolatum Ointment) 1 Application(s) Topical two times a day  aspirin enteric coated 81 milliGRAM(s) Oral two times a day  epoetin carito-epbx (RETACRIT) Injectable 54208 Unit(s) IV Push <User Schedule>  lidocaine   4% Patch 1 Patch Transdermal <User Schedule>  melatonin 6 milliGRAM(s) Oral at bedtime PRN  metoprolol tartrate 25 milliGRAM(s) Oral two times a day  Nephro-pat 1 Tablet(s) Oral daily  oxyCODONE    IR 10 milliGRAM(s) Oral every 4 hours PRN  oxyCODONE    IR 15 milliGRAM(s) Oral every 4 hours PRN  polyethylene glycol 3350 17 Gram(s) Oral daily PRN  senna 2 Tablet(s) Oral at bedtime  sevelamer carbonate 800 milliGRAM(s) Oral three times a day with meals    A/P:    ESRD on HD   S/p fall, R hip fx  S/p R hip HA 3/7  HD tomorrow w/2kg fluid removal as able  Epoetin w/HD 3 x week   Renal diet  CBC, BMP w/HD    [phone]

## 2024-04-02 NOTE — PROGRESS NOTE ADULT - SUBJECTIVE AND OBJECTIVE BOX
chief complaint- Pain in R hip / difficulty ambulating      37yo Male PMHx HTN, anemia of chronic disease, ESRD on HD (M/W/F via right AV fistula), left hip arthroplasty in 8/23, history of right leg fracture presented to [facility] from [facility] on 3/6 after falling during a wheelchair transfer landing on his right knee. He is wheelchair bound since his left hip surgery. CT hip with acute garden type 1 minimally valgus impacted transcervical right femoral neck fracture and subacute right pubic body and inferior rami fractures. He is s/p right hip hemiarthroplasty. Post op course complicated by anemia s/p transfusion. Hospital course complicated by hypoglycemia after being shifted for hyperkalemia. Further complicated by febrile 101.6F found to be covid positive and completed remdisivir. He was started on eliquis 2.5mg BID for elevated d-dimers and negative LE duplex.     Patient was evaluated by PM&R and therapy for functional deficits, gait/ADL impairments and acute rehabilitation was recommended. Patient was medically optimized for discharge to [facility] IRU on 3/27    ROS/subjective:  patient seen and evaluated bedside  no events overnight  tolerated dialysis  persistent pain in Right knee limits function  Studies reviewed- CT Right knee 3/6- no Fx- arthrosis/ effusion  lidoderm/ ICE ordered  no dizziness, no chest pain, no nausea, no vomiting, no SOB, no headaches  moved bowels today 4/2    MEDICATIONS  (STANDING):  acetaminophen     Tablet .. 975 milliGRAM(s) Oral every 8 hours  apixaban 2.5 milliGRAM(s) Oral two times a day  AQUAPHOR (petrolatum Ointment) 1 Application(s) Topical two times a day  aspirin enteric coated 81 milliGRAM(s) Oral two times a day  epoetin carito-epbx (RETACRIT) Injectable 92394 Unit(s) IV Push <User Schedule>  lidocaine   4% Patch 1 Patch Transdermal <User Schedule>  metoprolol tartrate 25 milliGRAM(s) Oral two times a day  Nephro-pat 1 Tablet(s) Oral daily  senna 2 Tablet(s) Oral at bedtime  sevelamer carbonate 800 milliGRAM(s) Oral three times a day with meals    MEDICATIONS  (PRN):  aluminum hydroxide/magnesium hydroxide/simethicone Suspension 30 milliLiter(s) Oral every 4 hours PRN Dyspepsia  melatonin 6 milliGRAM(s) Oral at bedtime PRN Insomnia  oxyCODONE    IR 15 milliGRAM(s) Oral every 4 hours PRN Severe Pain (7 - 10)  oxyCODONE    IR 10 milliGRAM(s) Oral every 4 hours PRN Moderate Pain (4 - 6)  polyethylene glycol 3350 17 Gram(s) Oral daily PRN Constipation                            7.8    5.34  )-----------( 280      ( 01 Apr 2024 05:50 )             24.9     04-01    138  |  97  |  100<H>  ----------------------------<  71  5.4<H>   |  25  |  10.53<H>    Ca    9.1      01 Apr 2024 05:50    TPro  6.4  /  Alb  2.4<L>  /  TBili  0.3  /  DBili  x   /  AST  10  /  ALT  8<L>  /  AlkPhos  748<H>  04-01    Urinalysis Basic - ( 01 Apr 2024 05:50 )    Color: x / Appearance: x / SG: x / pH: x  Gluc: 71 mg/dL / Ketone: x  / Bili: x / Urobili: x   Blood: x / Protein: x / Nitrite: x   Leuk Esterase: x / RBC: x / WBC x   Sq Epi: x / Non Sq Epi: x / Bacteria: x        CAPILLARY BLOOD GLUCOSE      Vital Signs Last 24 Hrs  T(C): 37.1 (02 Apr 2024 07:28), Max: 37.1 (01 Apr 2024 19:28)  T(F): 98.7 (02 Apr 2024 07:28), Max: 98.7 (01 Apr 2024 19:28)  HR: 75 (02 Apr 2024 07:28) (75 - 92)  BP: 143/66 (02 Apr 2024 07:28) (125/68 - 155/72)  BP(mean): --  RR: 16 (02 Apr 2024 07:28) (16 - 16)  SpO2: 100% (02 Apr 2024 07:28) (98% - 100%)    Parameters below as of 02 Apr 2024 07:28  Patient On (Oxygen Delivery Method): room air      Gen - NAD, Comfortable  HEENT - NCAT, EOMI, MMM  Neck - Supple, No limited ROM  Pulm - CTAB, No wheeze, No rhonchi, No crackles  Cardiovascular - RRR, S1S2, No murmurs  Chest - good chest expansion, good respiratory effort  Abdomen - Soft, NT/ND, +BS. BM 3/26  Extremities - Right FA AV fistula + bruit and thrill. GIDEON old AV fistula not active,   Neuro-     Cognitive - awake, alert, oriented to person, place, date, year, and situation.  Able  to follow command     Communication - Fluent, Comprehensible, No dysarthria, No aphasia        Memory:  Recent/ remote memory intact     Cranial Nerves - CN 2-12 intact. No facial asymmetry, Tongue midline, EOMI, Shoulder shrug intact     Motor -                    LEFT    UE - ShAB 5/5, EF 5/5, EE 5/5,  5/5                    RIGHT UE - ShAB 5/5, EF 5/5, EE 5/5,   5/5                    LEFT    LE - HF 4/5, KE 3+/5, DF 4/5, PF 4/5                    RIGHT LE - HF 3+/5, KE 3/5, DF 4/5, PF 4/5        Sensory - Intact bilaterally      Tone - normal  MSK: b/l hip discomfort with movement OA changes Right Knee- tender to palpation superior/ lat aspect of knee joint  Psychiatric - Mood stable, Affect WNL  Skin: right hip surgical site with steri strips CDI. b/l sacrum/coccyx DTI .    IDT Camilla 4/1  tentative Dc  4/16 to home- 24x7 supervision- ? Brother to Provide

## 2024-04-02 NOTE — PROGRESS NOTE ADULT - ASSESSMENT
BRIDGET NORTH is a 38y M with HTN, anemia of chronic disease, ESRD on HD (M/W/F via right AV fistula), left hip arthroplasty in 8/23, history of right leg fracture presented to [facility] on 3/6 after a fall during a wheelchair transfer. He was found to have a right femoral neck fracture and subacute pubic body and inferior rami fractures. He is s/p right hip hemiarthroplasty. Hospital course complicated by post-op anemia s/p transfusion, hypoglycemia, hyperkalemia, febrile 2/2 covid s/p remdisivir, and started on eliquis 2.5mg BID with negative dvts on LE duplex. Patient now admitted for a multidisciplinary rehab program. 03-27-24 @ 16:17      #Right Femoral Neck fracture   - s/p right hip hemiarthroplasty on 3/7  - pain med as below  - Dressing and staples removed on 3/22  - Comprehensive Multidisciplinary Rehab Program:  comprehensive rehab program of PT/OT/SLP - 3 hours a day, 5 days a week. P&O as needed.   - precautions: WBAT RLE. Anterior hip.   Right Knee pain- arthrosis/ effusion- no Fx- renal osteodystrophy- ICE/ Lidoderm/ Rehab  Social work to contact brother regarding providing 24x7 supervision on DC    #ESRD  - HDS via right AV fistula on m/w/f.  ( Last HD 3/27). Old HDS left UA  - Nephro consult placed. Dr Blanco aware.   - sevelamer 800mg TID  - retacrit m/w/f    #HTN  - toprol 25mg BID  - Monitor BP    #Pain  - Tylenol PRN  - oxy 10mg moderate, 15mg severe.   Right Knee pain- lidoderm during the day/ICE    #Mood / Cognition  - Neuropsychology consult PRN  - recreation therapy PRN    #Sleep  - Melatonin PRN     #GI / Bowel  #dyspepsia  - Senna qHS  - Miralax PRN -  - maalox  - GI ppx: none.     # / Bladder  - does not void ESRD    #Skin / Pressure injury  - Skin assessment on admission performed : right hip surgical site with steri strips CDI. Coccyx denuded.  - Monitor Incisions:    - wound care following at [facility] for b/l sacrum/coccyx DTI wth evolution.   - wound care to follow  - turn and reposition q2h      #Diet:  - Diet Consistency: Renal restriction  - Nutrition consult    #DVT prophylaxis:   - eliquis 2.5mg BID  - SCDs  - Last LE Doppler normal on 3/14

## 2024-04-02 NOTE — CHART NOTE - NSCHARTNOTEFT_GEN_A_CORE
Nutrition Follow Up Note  Hospital Course   (Per Electronic Medical Record)    Source:  Patient [X]  Medical Record [X]      Diet:   Renal Diet w/ Thin Liquids (IDDSI Level 0)  Tolerates Diet Consistency Well  No Chewing/Swallowing Difficulties  No Recent Nausea, Vomiting, Diarrhea or Constipation (as Per Patient)  Consumes % of Meals (as Per Documentation) - States Good PO Intake/Appetite (Per Patient)  on Nepro 8oz BID (Provides 850kcal & 38grams of Protein)  on Sanju 1 Packet Daily (Provides 90kcal & 14grams of Protein)  Patient Takes Nutrition Supplements Well  Education Provided on Need for Supplementation     Enteral/Parenteral Nutrition: Not Applicable    Current Weight: 205lb on 4/1  Weight Fluctuates  Obtain Weights Daily     Pertinent Medications: MEDICATIONS  (STANDING):  acetaminophen     Tablet .. 975 milliGRAM(s) Oral every 8 hours  apixaban 2.5 milliGRAM(s) Oral two times a day  AQUAPHOR (petrolatum Ointment) 1 Application(s) Topical two times a day  aspirin enteric coated 81 milliGRAM(s) Oral two times a day  epoetin carito-epbx (RETACRIT) Injectable 16705 Unit(s) IV Push <User Schedule>  lidocaine   4% Patch 1 Patch Transdermal <User Schedule>  metoprolol tartrate 25 milliGRAM(s) Oral two times a day  Nephro-pat 1 Tablet(s) Oral daily  senna 2 Tablet(s) Oral at bedtime  sevelamer carbonate 800 milliGRAM(s) Oral three times a day with meals    MEDICATIONS  (PRN):  aluminum hydroxide/magnesium hydroxide/simethicone Suspension 30 milliLiter(s) Oral every 4 hours PRN Dyspepsia  melatonin 6 milliGRAM(s) Oral at bedtime PRN Insomnia  oxyCODONE    IR 15 milliGRAM(s) Oral every 4 hours PRN Severe Pain (7 - 10)  oxyCODONE    IR 10 milliGRAM(s) Oral every 4 hours PRN Moderate Pain (4 - 6)  polyethylene glycol 3350 17 Gram(s) Oral daily PRN Constipation    Pertinent Labs:  04-01 Na138 mmol/L Glu 71 mg/dL K+ 5.4 mmol/L<H> Cr  10.53 mg/dL<H>  mg/dL<H> 03-29 Phos 6.3 mg/dL<H> 04-01 Alb 2.4 g/dL<L>    Skin: DTI on Sacrum/Coccyx    Edema: None Noted (as Per Documentation)     Last Bowel Movement: on 3/30    Estimated Needs:   [X] No Change Since Previous Assessment    Previous Nutrition Diagnosis:   Severe Malnutrition     Nutrition Diagnosis is [X] Ongoing - Patient Continues on Nutrition Supplement, Patient Takes Nutrition Supplement & Nutrition Education Provided on Need for Supplementation     New Nutrition Diagnosis: [X] Not Applicable    Interventions:   1. Education Provided on Need for Supplementation   2. Recommend Continue Nutrition Plan of Care     Monitoring & Evaluation:   [X] Weights   [X] PO Intake   [X] Skin Integrity   [X] Follow Up (Per Protocol)  [X] Tolerance to Diet Prescription   [X] Other: Labs     Registered Dietitian/Nutritionist Remains Available.  Marc Francisco, SHANNAN, CDN    Phone# [phone]

## 2024-04-03 LAB
ANION GAP SERPL CALC-SCNC: 14 MMOL/L — SIGNIFICANT CHANGE UP (ref 5–17)
BUN SERPL-MCNC: 95 MG/DL — HIGH (ref 7–23)
CALCIUM SERPL-MCNC: 9.1 MG/DL — SIGNIFICANT CHANGE UP (ref 8.4–10.5)
CHLORIDE SERPL-SCNC: 99 MMOL/L — SIGNIFICANT CHANGE UP (ref 96–108)
CO2 SERPL-SCNC: 26 MMOL/L — SIGNIFICANT CHANGE UP (ref 22–31)
CREAT SERPL-MCNC: 9.71 MG/DL — HIGH (ref 0.5–1.3)
EGFR: 6 ML/MIN/1.73M2 — LOW
GLUCOSE SERPL-MCNC: 89 MG/DL — SIGNIFICANT CHANGE UP (ref 70–99)
HCT VFR BLD CALC: 23.5 % — LOW (ref 39–50)
HGB BLD-MCNC: 7.6 G/DL — LOW (ref 13–17)
MCHC RBC-ENTMCNC: 31.4 PG — SIGNIFICANT CHANGE UP (ref 27–34)
MCHC RBC-ENTMCNC: 32.3 GM/DL — SIGNIFICANT CHANGE UP (ref 32–36)
MCV RBC AUTO: 97.1 FL — SIGNIFICANT CHANGE UP (ref 80–100)
NRBC # BLD: 0 /100 WBCS — SIGNIFICANT CHANGE UP (ref 0–0)
PHOSPHATE SERPL-MCNC: 6.7 MG/DL — HIGH (ref 2.5–4.5)
PLATELET # BLD AUTO: 289 K/UL — SIGNIFICANT CHANGE UP (ref 150–400)
POTASSIUM SERPL-MCNC: 4.9 MMOL/L — SIGNIFICANT CHANGE UP (ref 3.5–5.3)
POTASSIUM SERPL-SCNC: 4.9 MMOL/L — SIGNIFICANT CHANGE UP (ref 3.5–5.3)
RBC # BLD: 2.42 M/UL — LOW (ref 4.2–5.8)
RBC # FLD: 16.8 % — HIGH (ref 10.3–14.5)
SODIUM SERPL-SCNC: 139 MMOL/L — SIGNIFICANT CHANGE UP (ref 135–145)
WBC # BLD: 5.29 K/UL — SIGNIFICANT CHANGE UP (ref 3.8–10.5)
WBC # FLD AUTO: 5.29 K/UL — SIGNIFICANT CHANGE UP (ref 3.8–10.5)

## 2024-04-03 PROCEDURE — 99232 SBSQ HOSP IP/OBS MODERATE 35: CPT | Mod: GC

## 2024-04-03 PROCEDURE — 99232 SBSQ HOSP IP/OBS MODERATE 35: CPT

## 2024-04-03 RX ORDER — SEVELAMER CARBONATE 2400 MG/1
1600 POWDER, FOR SUSPENSION ORAL
Refills: 0 | Status: DISCONTINUED | OUTPATIENT
Start: 2024-04-03 | End: 2024-04-30

## 2024-04-03 RX ADMIN — SEVELAMER CARBONATE 800 MILLIGRAM(S): 2400 POWDER, FOR SUSPENSION ORAL at 17:48

## 2024-04-03 RX ADMIN — LIDOCAINE 1 PATCH: 4 CREAM TOPICAL at 06:34

## 2024-04-03 RX ADMIN — OXYCODONE HYDROCHLORIDE 10 MILLIGRAM(S): 5 TABLET ORAL at 02:16

## 2024-04-03 RX ADMIN — Medication 81 MILLIGRAM(S): at 17:48

## 2024-04-03 RX ADMIN — Medication 1 TABLET(S): at 12:18

## 2024-04-03 RX ADMIN — OXYCODONE HYDROCHLORIDE 10 MILLIGRAM(S): 5 TABLET ORAL at 02:50

## 2024-04-03 RX ADMIN — Medication 81 MILLIGRAM(S): at 05:33

## 2024-04-03 RX ADMIN — SEVELAMER CARBONATE 800 MILLIGRAM(S): 2400 POWDER, FOR SUSPENSION ORAL at 12:19

## 2024-04-03 RX ADMIN — SEVELAMER CARBONATE 800 MILLIGRAM(S): 2400 POWDER, FOR SUSPENSION ORAL at 08:35

## 2024-04-03 RX ADMIN — Medication 1 APPLICATION(S): at 05:34

## 2024-04-03 RX ADMIN — Medication 25 MILLIGRAM(S): at 05:33

## 2024-04-03 RX ADMIN — Medication 975 MILLIGRAM(S): at 06:55

## 2024-04-03 RX ADMIN — ERYTHROPOIETIN 10000 UNIT(S): 10000 INJECTION, SOLUTION INTRAVENOUS; SUBCUTANEOUS at 16:47

## 2024-04-03 RX ADMIN — APIXABAN 2.5 MILLIGRAM(S): 2.5 TABLET, FILM COATED ORAL at 05:33

## 2024-04-03 RX ADMIN — LIDOCAINE 1 PATCH: 4 CREAM TOPICAL at 06:55

## 2024-04-03 RX ADMIN — Medication 975 MILLIGRAM(S): at 05:32

## 2024-04-03 RX ADMIN — APIXABAN 2.5 MILLIGRAM(S): 2.5 TABLET, FILM COATED ORAL at 17:53

## 2024-04-03 RX ADMIN — Medication 25 MILLIGRAM(S): at 17:57

## 2024-04-03 NOTE — PROGRESS NOTE ADULT - ASSESSMENT
BRIDGET NORTH is a 38y M with HTN, anemia of chronic disease, ESRD on HD (M/W/F via right AV fistula), left hip arthroplasty in 8/23, history of right leg fracture presented to [facility] on 3/6 after a fall during a wheelchair transfer. He was found to have a right femoral neck fracture and subacute pubic body and inferior rami fractures. He is s/p right hip hemiarthroplasty. Hospital course complicated by post-op anemia s/p transfusion, hypoglycemia, hyperkalemia, febrile 2/2 covid s/p remdisivir, and started on eliquis 2.5mg BID with negative dvts on LE duplex. Patient now admitted for a multidisciplinary rehab program. 03-27-24 @ 16:17      #Right Femoral Neck fracture   - s/p right hip hemiarthroplasty on 3/7  - pain med as below  - Dressing and staples removed on 3/22  - Comprehensive Multidisciplinary Rehab Program:  comprehensive rehab program of PT/OT/SLP - 3 hours a day, 5 days a week. P&O as needed.   - precautions: WBAT RLE. Anterior hip.   Right Knee pain- arthrosis/ effusion- no Fx- renal osteodystrophy- ICE/ Lidoderm/ Rehab  Social work to contact brother regarding providing 24x7 supervision on DC    #ESRD  - HDS via right AV fistula on m/w/f.  ( Last HD 3/27). Old HDS left UA  - Nephro consult placed. Dr Blanco aware.   - sevelamer 800mg TID  - retacrit m/w/f    #HTN  - toprol 25mg BID  - Monitor BP    #Pain  - Tylenol PRN  - oxy 10mg moderate, 15mg severe.   Right Knee pain- lidoderm during the day/ICE    #Mood / Cognition  - Neuropsychology consult PRN  - recreation therapy PRN    #Sleep  - Melatonin PRN     #GI / Bowel  #dyspepsia  - Senna qHS  - Miralax PRN -  - maalox  - GI ppx: none.     # / Bladder  - does not void ESRD    #Skin / Pressure injury  - Skin assessment on admission performed : right hip surgical site with steri strips CDI. Coccyx denuded.  - Monitor Incisions:    - wound care following at [facility] for b/l sacrum/coccyx DTI wth evolution.   - wound care to follow  - turn and reposition q2h      #Diet:  - Diet Consistency: Renal restriction  - Nutrition consult    #DVT prophylaxis:   - eliquis 2.5mg BID  - SCDs  - Last LE Doppler normal on 3/14

## 2024-04-03 NOTE — PROGRESS NOTE ADULT - SUBJECTIVE AND OBJECTIVE BOX
No distress    Vital Signs Last 24 Hrs  T(C): 37.2 (04-03-24 @ 20:00), Max: 37.2 (04-03-24 @ 20:00)  T(F): 98.9 (04-03-24 @ 20:00), Max: 98.9 (04-03-24 @ 20:00)  HR: 88 (04-03-24 @ 20:00) (78 - 94)  BP: 120/70 (04-03-24 @ 20:00) (110/70 - 151/84)  RR: 16 (04-03-24 @ 20:00) (16 - 16)  SpO2: 100% (04-03-24 @ 20:00) (99% - 100%)    I&O's Detail    03 Apr 2024 07:01  -  03 Apr 2024 23:42  --------------------------------------------------------  IN:    Other (mL): 800 mL  Total IN: 800 mL    OUT:    Other (mL): 2800 mL  Total OUT: 2800 mL    s1s2  b/l air entry  soft, ND  tr edema                                                   7.6    5.29  )-----------( 289      ( 03 Apr 2024 13:45 )             23.5     03 Apr 2024 13:45    139    |  99     |  95     ----------------------------<  89     4.9     |  26     |  9.71     Ca    9.1        03 Apr 2024 13:45  Phos  6.7       03 Apr 2024 13:45    acetaminophen     Tablet .. 975 milliGRAM(s) Oral every 8 hours  aluminum hydroxide/magnesium hydroxide/simethicone Suspension 30 milliLiter(s) Oral every 4 hours PRN  apixaban 2.5 milliGRAM(s) Oral two times a day  AQUAPHOR (petrolatum Ointment) 1 Application(s) Topical two times a day  aspirin enteric coated 81 milliGRAM(s) Oral two times a day  epoetin carito-epbx (RETACRIT) Injectable 32698 Unit(s) IV Push <User Schedule>  lidocaine   4% Patch 1 Patch Transdermal <User Schedule>  melatonin 6 milliGRAM(s) Oral at bedtime PRN  metoprolol tartrate 25 milliGRAM(s) Oral two times a day  Nephro-pat 1 Tablet(s) Oral daily  oxyCODONE    IR 15 milliGRAM(s) Oral every 4 hours PRN  oxyCODONE    IR 10 milliGRAM(s) Oral every 4 hours PRN  polyethylene glycol 3350 17 Gram(s) Oral daily PRN  senna 2 Tablet(s) Oral at bedtime  sevelamer carbonate 800 milliGRAM(s) Oral three times a day with meals    A/P:    ESRD on HD   S/p fall, R hip fx  S/p R hip HA 3/7  HD today w/2kg fluid removal  Epoetin w/HD 3 x week   Renal diet  Renvela for high Phos  CBC, BMP w/HD    [phone]

## 2024-04-03 NOTE — PROGRESS NOTE ADULT - ASSESSMENT
38y M with HTN, anemia of chronic disease, ESRD on HD (M/W/F via right AV fistula), left hip arthroplasty in 8/23, history of right leg fracture presented to [facility] on 3/6 after a fall during a wheelchair transfer. He was found to have a right femoral neck fracture and subacute pubic body and inferior rami fractures. He is s/p right hip hemiarthroplasty. Patient now admitted for a multidisciplinary rehab program.     #Right Femoral Neck fracture   - s/p right hip hemiarthroplasty on 3/7  - Comprehensive Multidisciplinary Rehab Program  - precautions: WBAT RLE. Anterior hip.   - pain management per rehab   - bowel regimen    #ESRD  #hyperkalemia  - HDS via right AV fistula on m/w/f.  ( Last HD 3/29). Old HDS left UA  - HD per Dr Blanco  - sevelamer 800mg TID  - retacrit m/w/f    #HTN  - toprol 25mg BID  - Monitor BP    #DVT prophylaxis:   - eliquis 2.5mg BID  - SCDs

## 2024-04-03 NOTE — PROGRESS NOTE ADULT - SUBJECTIVE AND OBJECTIVE BOX
PROGRESS NOTE:     Patient is a 38y old  Male who presents with a chief complaint of Right Hip fracture s/p right hip hemiarthroplasty (30 Mar 2024 10:20)          SUBJECTIVE & OBJECTIVE:   Pt seen and examined at bedside in AM    no overnight events.     REVIEW OF SYSTEMS: remaining ROS negative     PHYSICAL EXAM:  VITALS:  Vital Signs Last 24 Hrs  T(C): 36.8 (03 Apr 2024 09:26), Max: 36.9 (02 Apr 2024 20:21)  T(F): 98.2 (03 Apr 2024 09:26), Max: 98.4 (02 Apr 2024 20:21)  HR: 79 (03 Apr 2024 09:26) (77 - 84)  BP: 115/64 (03 Apr 2024 09:26) (115/64 - 151/84)  BP(mean): --  RR: 16 (03 Apr 2024 09:26) (16 - 16)  SpO2: 100% (03 Apr 2024 09:26) (99% - 100%)    Parameters below as of 03 Apr 2024 09:26  Patient On (Oxygen Delivery Method): room air          GENERAL: NAD,  no increased WOB  HEAD:  Atraumatic, Normocephalic  EYES: EOMI, conjunctiva and sclera clear  ENMT: Moist mucous membranes  NECK: Supple, No JVD  NERVOUS SYSTEM:  Alert & Oriented    CHEST/LUNG: Clear to auscultation bilaterally; No rales, rhonchi, wheezing  HEART: Regular rate and rhythm  ABDOMEN: Soft, Nontender, Nondistended; Bowel sounds present  EXTREMITIES:  No clubbing, cyanosis, calf tenderness       MEDICATIONS  (STANDING):  acetaminophen     Tablet .. 975 milliGRAM(s) Oral every 8 hours  apixaban 2.5 milliGRAM(s) Oral two times a day  AQUAPHOR (petrolatum Ointment) 1 Application(s) Topical two times a day  aspirin enteric coated 81 milliGRAM(s) Oral two times a day  epoetin carito-epbx (RETACRIT) Injectable 83341 Unit(s) IV Push <User Schedule>  lidocaine   4% Patch 1 Patch Transdermal <User Schedule>  metoprolol tartrate 25 milliGRAM(s) Oral two times a day  Nephro-pat 1 Tablet(s) Oral daily  senna 2 Tablet(s) Oral at bedtime  sevelamer carbonate 800 milliGRAM(s) Oral three times a day with meals    MEDICATIONS  (PRN):  aluminum hydroxide/magnesium hydroxide/simethicone Suspension 30 milliLiter(s) Oral every 4 hours PRN Dyspepsia  melatonin 6 milliGRAM(s) Oral at bedtime PRN Insomnia  oxyCODONE    IR 15 milliGRAM(s) Oral every 4 hours PRN Severe Pain (7 - 10)  oxyCODONE    IR 10 milliGRAM(s) Oral every 4 hours PRN Moderate Pain (4 - 6)  polyethylene glycol 3350 17 Gram(s) Oral daily PRN Constipation        Allergies    No Known Drug Allergies  shellfish (Hives)    Intolerances              LABS:                           7.8    5.34  )-----------( 280      ( 01 Apr 2024 05:50 )             24.9     04-01    138  |  97  |  100<H>  ----------------------------<  71  5.4<H>   |  25  |  10.53<H>    Ca    9.1      01 Apr 2024 05:50    TPro  6.4  /  Alb  2.4<L>  /  TBili  0.3  /  DBili  x   /  AST  10  /  ALT  8<L>  /  AlkPhos  748<H>  04-01    LIVER FUNCTIONS - ( 01 Apr 2024 05:50 )  Alb: 2.4 g/dL / Pro: 6.4 g/dL / ALK PHOS: 748 U/L / ALT: 8 U/L / AST: 10 U/L / GGT: x             Urinalysis Basic - ( 01 Apr 2024 05:50 )    Color: x / Appearance: x / SG: x / pH: x  Gluc: 71 mg/dL / Ketone: x  / Bili: x / Urobili: x   Blood: x / Protein: x / Nitrite: x   Leuk Esterase: x / RBC: x / WBC x   Sq Epi: x / Non Sq Epi: x / Bacteria: x        CAPILLARY BLOOD GLUCOSE                    RECENT CULTURES:          RADIOLOGY & ADDITIONAL TESTS:        Care Discussed with Consultants/Other Providers: rehab provider

## 2024-04-03 NOTE — PROGRESS NOTE ADULT - SUBJECTIVE AND OBJECTIVE BOX
chief complaint- Pain in R hip / difficulty ambulating      37yo Male PMHx HTN, anemia of chronic disease, ESRD on HD (M/W/F via right AV fistula), left hip arthroplasty in 8/23, history of right leg fracture presented to [facility] from [facility] on 3/6 after falling during a wheelchair transfer landing on his right knee. He is wheelchair bound since his left hip surgery. CT hip with acute garden type 1 minimally valgus impacted transcervical right femoral neck fracture and subacute right pubic body and inferior rami fractures. He is s/p right hip hemiarthroplasty. Post op course complicated by anemia s/p transfusion. Hospital course complicated by hypoglycemia after being shifted for hyperkalemia. Further complicated by febrile 101.6F found to be covid positive and completed remdisivir. He was started on eliquis 2.5mg BID for elevated d-dimers and negative LE duplex.     Patient was evaluated by PM&R and therapy for functional deficits, gait/ADL impairments and acute rehabilitation was recommended. Patient was medically optimized for discharge to [facility] IRU on 3/27    ROS/subjective:  patient seen and evaluated in OT  no events overnight  HD today  less right knee pain with lidoderm  no dizziness, no chest pain, no nausea, no vomiting, no SOB, no headaches  moved bowels today 4/3    MEDICATIONS  (STANDING):  acetaminophen     Tablet .. 975 milliGRAM(s) Oral every 8 hours  apixaban 2.5 milliGRAM(s) Oral two times a day  AQUAPHOR (petrolatum Ointment) 1 Application(s) Topical two times a day  aspirin enteric coated 81 milliGRAM(s) Oral two times a day  epoetin carito-epbx (RETACRIT) Injectable 64533 Unit(s) IV Push <User Schedule>  lidocaine   4% Patch 1 Patch Transdermal <User Schedule>  metoprolol tartrate 25 milliGRAM(s) Oral two times a day  Nephro-pat 1 Tablet(s) Oral daily  senna 2 Tablet(s) Oral at bedtime  sevelamer carbonate 800 milliGRAM(s) Oral three times a day with meals    MEDICATIONS  (PRN):  aluminum hydroxide/magnesium hydroxide/simethicone Suspension 30 milliLiter(s) Oral every 4 hours PRN Dyspepsia  melatonin 6 milliGRAM(s) Oral at bedtime PRN Insomnia  oxyCODONE    IR 10 milliGRAM(s) Oral every 4 hours PRN Moderate Pain (4 - 6)  oxyCODONE    IR 15 milliGRAM(s) Oral every 4 hours PRN Severe Pain (7 - 10)  polyethylene glycol 3350 17 Gram(s) Oral daily PRN Constipation                            7.6    5.29  )-----------( 289      ( 03 Apr 2024 13:45 )             23.5     04-03    139  |  99  |  95<H>  ----------------------------<  89  4.9   |  26  |  9.71<H>    Ca    9.1      03 Apr 2024 13:45  Phos  6.7     04-03      Urinalysis Basic - ( 03 Apr 2024 13:45 )    Color: x / Appearance: x / SG: x / pH: x  Gluc: 89 mg/dL / Ketone: x  / Bili: x / Urobili: x   Blood: x / Protein: x / Nitrite: x   Leuk Esterase: x / RBC: x / WBC x   Sq Epi: x / Non Sq Epi: x / Bacteria: x        CAPILLARY BLOOD GLUCOSE          Vital Signs Last 24 Hrs  T(C): 36.8 (03 Apr 2024 09:26), Max: 36.9 (02 Apr 2024 20:21)  T(F): 98.2 (03 Apr 2024 09:26), Max: 98.4 (02 Apr 2024 20:21)  HR: 79 (03 Apr 2024 09:26) (77 - 84)  BP: 115/64 (03 Apr 2024 09:26) (115/64 - 151/84)  BP(mean): --  RR: 16 (03 Apr 2024 09:26) (16 - 16)  SpO2: 100% (03 Apr 2024 09:26) (99% - 100%)    Parameters below as of 03 Apr 2024 09:26  Patient On (Oxygen Delivery Method): room air      Gen - NAD, Comfortable  HEENT - NCAT, EOMI, MMM  Neck - Supple, No limited ROM  Pulm - CTAB, No wheeze, No rhonchi, No crackles  Cardiovascular - RRR, S1S2, No murmurs  Chest - good chest expansion, good respiratory effort  Abdomen - Soft, NT/ND, +BS. BM 3/26  Extremities - Right FA AV fistula + bruit and thrill. GIDEON old AV fistula not active,   Neuro-     Cognitive - awake, alert, oriented to person, place, date, year, and situation.  Able  to follow command     Communication - Fluent, Comprehensible, No dysarthria, No aphasia        Memory:  Recent/ remote memory intact     Cranial Nerves - CN 2-12 intact. No facial asymmetry, Tongue midline, EOMI, Shoulder shrug intact     Motor -                    LEFT    UE - ShAB 5/5, EF 5/5, EE 5/5,  5/5                    RIGHT UE - ShAB 5/5, EF 5/5, EE 5/5,   5/5                    LEFT    LE - HF 4/5, KE 3+/5, DF 4/5, PF 4/5                    RIGHT LE - HF 3+/5, KE 3/5, DF 4/5, PF 4/5   moves Right knee better     Sensory - Intact bilaterally      Tone - normal  MSK: b/l hip discomfort with movement OA changes Right Knee- tender to palpation superior/ lat aspect of knee joint  Psychiatric - Mood stable, Affect WNL  Skin: right hip surgical site with steri strips CDI. b/l sacrum/coccyx DTI .    IDT Camilla 4/1  tentative Dc  4/16 to home- 24x7 supervision- ? Brother to Provide

## 2024-04-04 ENCOUNTER — APPOINTMENT (OUTPATIENT)
Dept: SURGERY | Facility: CLINIC | Age: 39
End: 2024-04-04

## 2024-04-04 RX ORDER — OXYCODONE HYDROCHLORIDE 5 MG/1
10 TABLET ORAL EVERY 4 HOURS
Refills: 0 | Status: DISCONTINUED | OUTPATIENT
Start: 2024-04-04 | End: 2024-04-10

## 2024-04-04 RX ORDER — OXYCODONE HYDROCHLORIDE 5 MG/1
5 TABLET ORAL EVERY 4 HOURS
Refills: 0 | Status: DISCONTINUED | OUTPATIENT
Start: 2024-04-04 | End: 2024-04-04

## 2024-04-04 RX ADMIN — LIDOCAINE 1 PATCH: 4 CREAM TOPICAL at 06:46

## 2024-04-04 RX ADMIN — Medication 1 APPLICATION(S): at 06:30

## 2024-04-04 RX ADMIN — OXYCODONE HYDROCHLORIDE 5 MILLIGRAM(S): 5 TABLET ORAL at 04:40

## 2024-04-04 RX ADMIN — Medication 1 TABLET(S): at 12:07

## 2024-04-04 RX ADMIN — OXYCODONE HYDROCHLORIDE 5 MILLIGRAM(S): 5 TABLET ORAL at 14:18

## 2024-04-04 RX ADMIN — Medication 25 MILLIGRAM(S): at 17:29

## 2024-04-04 RX ADMIN — APIXABAN 2.5 MILLIGRAM(S): 2.5 TABLET, FILM COATED ORAL at 06:29

## 2024-04-04 RX ADMIN — SEVELAMER CARBONATE 1600 MILLIGRAM(S): 2400 POWDER, FOR SUSPENSION ORAL at 12:07

## 2024-04-04 RX ADMIN — Medication 975 MILLIGRAM(S): at 07:29

## 2024-04-04 RX ADMIN — Medication 975 MILLIGRAM(S): at 06:29

## 2024-04-04 RX ADMIN — OXYCODONE HYDROCHLORIDE 5 MILLIGRAM(S): 5 TABLET ORAL at 15:00

## 2024-04-04 RX ADMIN — Medication 975 MILLIGRAM(S): at 21:17

## 2024-04-04 RX ADMIN — Medication 975 MILLIGRAM(S): at 21:47

## 2024-04-04 RX ADMIN — Medication 81 MILLIGRAM(S): at 06:29

## 2024-04-04 RX ADMIN — SEVELAMER CARBONATE 1600 MILLIGRAM(S): 2400 POWDER, FOR SUSPENSION ORAL at 07:56

## 2024-04-04 RX ADMIN — Medication 25 MILLIGRAM(S): at 06:29

## 2024-04-04 RX ADMIN — APIXABAN 2.5 MILLIGRAM(S): 2.5 TABLET, FILM COATED ORAL at 17:30

## 2024-04-04 RX ADMIN — OXYCODONE HYDROCHLORIDE 5 MILLIGRAM(S): 5 TABLET ORAL at 05:30

## 2024-04-04 RX ADMIN — LIDOCAINE 1 PATCH: 4 CREAM TOPICAL at 06:30

## 2024-04-04 RX ADMIN — Medication 81 MILLIGRAM(S): at 17:30

## 2024-04-04 RX ADMIN — LIDOCAINE 1 PATCH: 4 CREAM TOPICAL at 18:42

## 2024-04-04 RX ADMIN — SEVELAMER CARBONATE 1600 MILLIGRAM(S): 2400 POWDER, FOR SUSPENSION ORAL at 17:30

## 2024-04-04 NOTE — PROGRESS NOTE ADULT - SUBJECTIVE AND OBJECTIVE BOX
No distress    Vital Signs Last 24 Hrs  T(C): 37.1 (04-04-24 @ 07:34), Max: 37.1 (04-04-24 @ 07:34)  T(F): 98.8 (04-04-24 @ 07:34), Max: 98.8 (04-04-24 @ 07:34)  HR: 81 (04-04-24 @ 17:27) (81 - 82)  BP: 137/77 (04-04-24 @ 17:27) (120/74 - 137/77)  RR: 16 (04-04-24 @ 07:34) (16 - 16)  SpO2: 99% (04-04-24 @ 07:34) (99% - 99%)    I&O's Detail    03 Apr 2024 07:01  -  04 Apr 2024 07:00  --------------------------------------------------------  IN:    Other (mL): 800 mL  Total IN: 800 mL    OUT:    Other (mL): 2800 mL  Total OUT: 2800 mL    s1s2  b/l air entry  soft, ND  tr edema                                                           7.6    5.29  )-----------( 289      ( 03 Apr 2024 13:45 )             23.5     03 Apr 2024 13:45    139    |  99     |  95     ----------------------------<  89     4.9     |  26     |  9.71     Ca    9.1        03 Apr 2024 13:45  Phos  6.7       03 Apr 2024 13:45    acetaminophen     Tablet .. 975 milliGRAM(s) Oral every 8 hours  aluminum hydroxide/magnesium hydroxide/simethicone Suspension 30 milliLiter(s) Oral every 4 hours PRN  apixaban 2.5 milliGRAM(s) Oral two times a day  AQUAPHOR (petrolatum Ointment) 1 Application(s) Topical two times a day  aspirin enteric coated 81 milliGRAM(s) Oral two times a day  epoetin carito-epbx (RETACRIT) Injectable 91361 Unit(s) IV Push <User Schedule>  lidocaine   4% Patch 1 Patch Transdermal <User Schedule>  melatonin 6 milliGRAM(s) Oral at bedtime PRN  metoprolol tartrate 25 milliGRAM(s) Oral two times a day  Nephro-pat 1 Tablet(s) Oral daily  oxyCODONE    IR 5 milliGRAM(s) Oral every 4 hours PRN  oxyCODONE    IR 10 milliGRAM(s) Oral every 4 hours PRN  polyethylene glycol 3350 17 Gram(s) Oral daily PRN  senna 2 Tablet(s) Oral at bedtime  sevelamer carbonate 1600 milliGRAM(s) Oral three times a day with meals    A/P:    ESRD on HD   S/p fall, R hip fx  S/p R hip HA 3/7  HD tomorrow w/2kg fluid removal as able   Epoetin w/HD 3 x week   Renal diet  Renvela for high Phos  CBC, BMP w/HD    [phone]

## 2024-04-05 LAB
ANION GAP SERPL CALC-SCNC: 13 MMOL/L — SIGNIFICANT CHANGE UP (ref 5–17)
BUN SERPL-MCNC: 83 MG/DL — HIGH (ref 7–23)
CALCIUM SERPL-MCNC: 9.1 MG/DL — SIGNIFICANT CHANGE UP (ref 8.4–10.5)
CHLORIDE SERPL-SCNC: 99 MMOL/L — SIGNIFICANT CHANGE UP (ref 96–108)
CO2 SERPL-SCNC: 29 MMOL/L — SIGNIFICANT CHANGE UP (ref 22–31)
CREAT SERPL-MCNC: 8.73 MG/DL — HIGH (ref 0.5–1.3)
EGFR: 7 ML/MIN/1.73M2 — LOW
GLUCOSE SERPL-MCNC: 68 MG/DL — LOW (ref 70–99)
HCT VFR BLD CALC: 25.3 % — LOW (ref 39–50)
HGB BLD-MCNC: 7.8 G/DL — LOW (ref 13–17)
MCHC RBC-ENTMCNC: 30.8 GM/DL — LOW (ref 32–36)
MCHC RBC-ENTMCNC: 31.1 PG — SIGNIFICANT CHANGE UP (ref 27–34)
MCV RBC AUTO: 100.8 FL — HIGH (ref 80–100)
NRBC # BLD: 0 /100 WBCS — SIGNIFICANT CHANGE UP (ref 0–0)
PHOSPHATE SERPL-MCNC: 6.9 MG/DL — HIGH (ref 2.5–4.5)
PLATELET # BLD AUTO: 246 K/UL — SIGNIFICANT CHANGE UP (ref 150–400)
POTASSIUM SERPL-MCNC: 4.9 MMOL/L — SIGNIFICANT CHANGE UP (ref 3.5–5.3)
POTASSIUM SERPL-SCNC: 4.9 MMOL/L — SIGNIFICANT CHANGE UP (ref 3.5–5.3)
RBC # BLD: 2.51 M/UL — LOW (ref 4.2–5.8)
RBC # FLD: 16.3 % — HIGH (ref 10.3–14.5)
SODIUM SERPL-SCNC: 141 MMOL/L — SIGNIFICANT CHANGE UP (ref 135–145)
WBC # BLD: 4.83 K/UL — SIGNIFICANT CHANGE UP (ref 3.8–10.5)
WBC # FLD AUTO: 4.83 K/UL — SIGNIFICANT CHANGE UP (ref 3.8–10.5)

## 2024-04-05 PROCEDURE — 99232 SBSQ HOSP IP/OBS MODERATE 35: CPT | Mod: GC

## 2024-04-05 PROCEDURE — 99232 SBSQ HOSP IP/OBS MODERATE 35: CPT

## 2024-04-05 RX ADMIN — APIXABAN 2.5 MILLIGRAM(S): 2.5 TABLET, FILM COATED ORAL at 18:06

## 2024-04-05 RX ADMIN — APIXABAN 2.5 MILLIGRAM(S): 2.5 TABLET, FILM COATED ORAL at 06:38

## 2024-04-05 RX ADMIN — OXYCODONE HYDROCHLORIDE 10 MILLIGRAM(S): 5 TABLET ORAL at 01:20

## 2024-04-05 RX ADMIN — OXYCODONE HYDROCHLORIDE 10 MILLIGRAM(S): 5 TABLET ORAL at 14:08

## 2024-04-05 RX ADMIN — Medication 975 MILLIGRAM(S): at 07:34

## 2024-04-05 RX ADMIN — ERYTHROPOIETIN 10000 UNIT(S): 10000 INJECTION, SOLUTION INTRAVENOUS; SUBCUTANEOUS at 17:25

## 2024-04-05 RX ADMIN — Medication 25 MILLIGRAM(S): at 06:38

## 2024-04-05 RX ADMIN — Medication 975 MILLIGRAM(S): at 06:38

## 2024-04-05 RX ADMIN — OXYCODONE HYDROCHLORIDE 10 MILLIGRAM(S): 5 TABLET ORAL at 22:58

## 2024-04-05 RX ADMIN — SEVELAMER CARBONATE 1600 MILLIGRAM(S): 2400 POWDER, FOR SUSPENSION ORAL at 08:35

## 2024-04-05 RX ADMIN — SEVELAMER CARBONATE 1600 MILLIGRAM(S): 2400 POWDER, FOR SUSPENSION ORAL at 12:08

## 2024-04-05 RX ADMIN — Medication 81 MILLIGRAM(S): at 06:39

## 2024-04-05 RX ADMIN — OXYCODONE HYDROCHLORIDE 10 MILLIGRAM(S): 5 TABLET ORAL at 23:10

## 2024-04-05 RX ADMIN — OXYCODONE HYDROCHLORIDE 10 MILLIGRAM(S): 5 TABLET ORAL at 01:50

## 2024-04-05 RX ADMIN — Medication 1 TABLET(S): at 12:08

## 2024-04-05 RX ADMIN — SEVELAMER CARBONATE 1600 MILLIGRAM(S): 2400 POWDER, FOR SUSPENSION ORAL at 18:05

## 2024-04-05 RX ADMIN — Medication 81 MILLIGRAM(S): at 18:06

## 2024-04-05 RX ADMIN — Medication 1 APPLICATION(S): at 18:05

## 2024-04-05 RX ADMIN — Medication 25 MILLIGRAM(S): at 18:05

## 2024-04-05 NOTE — PROGRESS NOTE ADULT - SUBJECTIVE AND OBJECTIVE BOX
Medicine Progress Note    Patient is a 38y old  Male who presents with a chief complaint of Right Hip fracture s/p right hip hemiarthroplasty (04 Apr 2024 21:30)      SUBJECTIVE / OVERNIGHT EVENTS:  seen and examined  Chart reviewed  No overnight events  Limb weakness improving with therapy  BM+  No pain  No complaints    ADDITIONAL REVIEW OF SYSTEMS:  denied fever/chills/CP/SOB/cough/palpitation/dizziness/abdominal pian/nausea/vomiting/diarrhoea/constipation/dysuria/leg or calf pain/headaches.all other ROS neg    MEDICATIONS  (STANDING):  acetaminophen     Tablet .. 975 milliGRAM(s) Oral every 8 hours  apixaban 2.5 milliGRAM(s) Oral two times a day  AQUAPHOR (petrolatum Ointment) 1 Application(s) Topical two times a day  aspirin enteric coated 81 milliGRAM(s) Oral two times a day  epoetin carito-epbx (RETACRIT) Injectable 61601 Unit(s) IV Push <User Schedule>  lidocaine   4% Patch 1 Patch Transdermal <User Schedule>  metoprolol tartrate 25 milliGRAM(s) Oral two times a day  Nephro-pat 1 Tablet(s) Oral daily  senna 2 Tablet(s) Oral at bedtime  sevelamer carbonate 1600 milliGRAM(s) Oral three times a day with meals    MEDICATIONS  (PRN):  aluminum hydroxide/magnesium hydroxide/simethicone Suspension 30 milliLiter(s) Oral every 4 hours PRN Dyspepsia  melatonin 6 milliGRAM(s) Oral at bedtime PRN Insomnia  oxyCODONE    IR 5 milliGRAM(s) Oral every 4 hours PRN Moderate Pain (4 - 6)  oxyCODONE    IR 10 milliGRAM(s) Oral every 4 hours PRN Severe Pain (7 - 10)  polyethylene glycol 3350 17 Gram(s) Oral daily PRN Constipation    CAPILLARY BLOOD GLUCOSE        I&O's Summary      PHYSICAL EXAM:  Vital Signs Last 24 Hrs  T(C): 36.8 (05 Apr 2024 07:22), Max: 36.8 (05 Apr 2024 07:22)  T(F): 98.2 (05 Apr 2024 07:22), Max: 98.2 (05 Apr 2024 07:22)  HR: 78 (05 Apr 2024 07:22) (78 - 81)  BP: 129/78 (05 Apr 2024 07:22) (129/78 - 156/76)  BP(mean): --  RR: 16 (05 Apr 2024 07:22) (16 - 17)  SpO2: 98% (05 Apr 2024 07:22) (98% - 98%)    Parameters below as of 05 Apr 2024 07:22  Patient On (Oxygen Delivery Method): room air    GENERAL: Not in distress. Alert    HEENT: clear conjuctiva, MMM. no pallor or icterus  CARDIOVASCULAR: RRR S1, S2. No murmur/rubs/gallop  LUNGS: BLAE+, no rales, no wheezing, no rhonchi.    ABDOMEN: ND. Soft,  NT, no guarding / rebound / rigidity. BS normoactive  BACK: No spine tenderness.  EXTREMITIES: no edema. no leg or calf TP.  SKIN: warm and dry. right hip surgical site with steri strips CDI. b/l sacrum/coccyx DTI .Right FA AVF. bruit+. LUE AVF inactive  PSYCHIATRIC: Calm.  No agitation.  CNS: AAO*3. moves limbs, follows commands. b/l LE mild weakness    LABS:                        7.8    4.83  )-----------( 246      ( 05 Apr 2024 05:44 )             25.3     04-05    141  |  99  |  83<H>  ----------------------------<  68<L>  4.9   |  29  |  8.73<H>    Ca    9.1      05 Apr 2024 05:44  Phos  6.9     04-05            Urinalysis Basic - ( 05 Apr 2024 05:44 )    Color: x / Appearance: x / SG: x / pH: x  Gluc: 68 mg/dL / Ketone: x  / Bili: x / Urobili: x   Blood: x / Protein: x / Nitrite: x   Leuk Esterase: x / RBC: x / WBC x   Sq Epi: x / Non Sq Epi: x / Bacteria: x        COVID-19 PCR: NotDetec (27 Mar 2024 06:47)  COVID-19 PCR: NotDetec (26 Mar 2024 06:57)  COVID-19 PCR: Detected (19 Mar 2024 16:53)      RADIOLOGY & ADDITIONAL TESTS:  Imaging from Last 24 Hours:    Electrocardiogram/QTc Interval:    COORDINATION OF CARE:  Care Discussed with Consultants/Other Providers:

## 2024-04-05 NOTE — PROGRESS NOTE ADULT - SUBJECTIVE AND OBJECTIVE BOX
Seen on HD    Vital Signs Last 24 Hrs  T(C): 36.7 (04-05-24 @ 17:30), Max: 36.8 (04-05-24 @ 07:22)  T(F): 98 (04-05-24 @ 17:30), Max: 98.2 (04-05-24 @ 07:22)  HR: 84 (04-05-24 @ 17:30) (78 - 84)  BP: 125/75 (04-05-24 @ 17:30) (114/79 - 156/76)  RR: 16 (04-05-24 @ 17:30) (16 - 16)  SpO2: 98% (04-05-24 @ 17:30) (98% - 98%)    I&O's Detail    05 Apr 2024 07:01  -  05 Apr 2024 21:50  --------------------------------------------------------  IN:    Other (mL): 800 mL  Total IN: 800 mL    OUT:    Other (mL): 2800 mL  Total OUT: 2800 mL    s1s2  b/l air entry  soft, ND  tr edema                                                                   7.8    4.83  )-----------( 246      ( 05 Apr 2024 05:44 )             25.3     05 Apr 2024 05:44    141    |  99     |  83     ----------------------------<  68     4.9     |  29     |  8.73     Ca    9.1        05 Apr 2024 05:44  Phos  6.9       05 Apr 2024 05:44    acetaminophen     Tablet .. 975 milliGRAM(s) Oral every 8 hours  aluminum hydroxide/magnesium hydroxide/simethicone Suspension 30 milliLiter(s) Oral every 4 hours PRN  apixaban 2.5 milliGRAM(s) Oral two times a day  AQUAPHOR (petrolatum Ointment) 1 Application(s) Topical two times a day  aspirin enteric coated 81 milliGRAM(s) Oral two times a day  epoetin carito-epbx (RETACRIT) Injectable 10483 Unit(s) IV Push <User Schedule>  lidocaine   4% Patch 1 Patch Transdermal <User Schedule>  melatonin 6 milliGRAM(s) Oral at bedtime PRN  metoprolol tartrate 25 milliGRAM(s) Oral two times a day  Nephro-pat 1 Tablet(s) Oral daily  oxyCODONE    IR 10 milliGRAM(s) Oral every 4 hours PRN  oxyCODONE    IR 5 milliGRAM(s) Oral every 4 hours PRN  polyethylene glycol 3350 17 Gram(s) Oral daily PRN  senna 2 Tablet(s) Oral at bedtime  sevelamer carbonate 1600 milliGRAM(s) Oral three times a day with meals    A/P:    ESRD on HD   S/p fall, R hip fx  S/p R hip HA 3/7  HD today w/2kg fluid removal  Epoetin w/HD 3 x week   Renal diet  Renvela for high Phos  CBC, BMP w/HD    [phone]

## 2024-04-05 NOTE — PROGRESS NOTE ADULT - ASSESSMENT
38y M with HTN, anemia of chronic disease, ESRD on HD (M/W/F via right AV fistula), left hip arthroplasty in 8/23, history of right leg fracture presented to [facility] on 3/6 after a fall during a wheelchair transfer. He was found to have a right femoral neck fracture and subacute pubic body and inferior rami fractures. He is s/p right hip hemiarthroplasty. Patient now admitted for a multidisciplinary rehab program.     #Right Femoral Neck fracture   - s/p right hip hemiarthroplasty on 3/7  - Comprehensive Multidisciplinary Rehab Program  - precautions: WBAT RLE. Anterior hip.   - pain management per rehab   - bowel regimen  - fall precautions    #ESRD  #hyperkalemia  - HDS via right AV fistula on m/w/f.  ( Last HD 3/29). Old HDS left UA  - HD per Dr Blanco  - sevelamer 800mg TID  - retacrit m/w/f    #HTN  - toprol 25mg BID  - Monitor BP including OH and adjust meds    #DVT prophylaxis:   - eliquis 2.5mg BID  - SCDs    d/w rehab team at [place]

## 2024-04-05 NOTE — PROGRESS NOTE ADULT - ASSESSMENT
BRIDGET NORTH is a 38y M with HTN, anemia of chronic disease, ESRD on HD (M/W/F via right AV fistula), left hip arthroplasty in 8/23, history of right leg fracture presented to [facility] on 3/6 after a fall during a wheelchair transfer. He was found to have a right femoral neck fracture and subacute pubic body and inferior rami fractures. He is s/p right hip hemiarthroplasty. Hospital course complicated by post-op anemia s/p transfusion, hypoglycemia, hyperkalemia, febrile 2/2 covid s/p remdisivir, and started on eliquis 2.5mg BID with negative dvts on LE duplex. Patient now admitted for a multidisciplinary rehab program. 03-27-24 @ 16:17      #Right Femoral Neck fracture   - s/p right hip hemiarthroplasty on 3/7  - pain med as below  - Dressing and staples removed on 3/22  - Comprehensive Multidisciplinary Rehab Program:  comprehensive rehab program of PT/OT/SLP - 3 hours a day, 5 days a week. P&O as needed.   - precautions: WBAT RLE. Anterior hip.   Right Knee pain- arthrosis/ effusion- no Fx- renal osteodystrophy- ICE/ Lidoderm/ Rehab/ ACE  Social work to contact brother regarding providing 24x7 supervision on DC    #ESRD  - HDS via right AV fistula on m/w/f.  ( Last HD 3/27). Old HDS left UA  - Nephro consult placed. Dr Blanco aware.   - sevelamer 800mg TID  - retacrit m/w/f    #HTN  - toprol 25mg BID  - Monitor BP    #Pain  - Tylenol PRN  - oxy 10mg moderate, 15mg severe.   Right Knee pain- lidoderm during the day/ICE/ ACE    #Mood / Cognition  - Neuropsychology consult PRN  - recreation therapy PRN    #Sleep  - Melatonin PRN     #GI / Bowel  #dyspepsia  - Senna qHS  - Miralax PRN -  - maalox  - GI ppx: none.     # / Bladder  - does not void ESRD    #Skin / Pressure injury  - Skin assessment on admission performed : right hip surgical site with steri strips CDI. coccyx small stage 2-using barrier cream  - Monitor Incisions:    - wound care following at [facility] for b/l sacrum/coccyx DTI wth evolution.   - wound care to follow  - turn and reposition q2h      #Diet:  - Diet Consistency: Renal restriction  - Nutrition consult    #DVT prophylaxis:   - eliquis 2.5mg BID  - SCDs  - Last LE Doppler normal on 3/14

## 2024-04-05 NOTE — PROGRESS NOTE ADULT - SUBJECTIVE AND OBJECTIVE BOX
chief complaint- Pain in R hip / difficulty ambulating      39yo Male PMHx HTN, anemia of chronic disease, ESRD on HD (M/W/F via right AV fistula), left hip arthroplasty in 8/23, history of right leg fracture presented to [facility] from [facility] on 3/6 after falling during a wheelchair transfer landing on his right knee. He is wheelchair bound since his left hip surgery. CT hip with acute garden type 1 minimally valgus impacted transcervical right femoral neck fracture and subacute right pubic body and inferior rami fractures. He is s/p right hip hemiarthroplasty. Post op course complicated by anemia s/p transfusion. Hospital course complicated by hypoglycemia after being shifted for hyperkalemia. Further complicated by febrile 101.6F found to be covid positive and completed remdisivir. He was started on eliquis 2.5mg BID for elevated d-dimers and negative LE duplex.     Patient was evaluated by PM&R and therapy for functional deficits, gait/ADL impairments and acute rehabilitation was recommended. Patient was medically optimized for discharge to [facility] IRU on 3/27        ROS/subjective:  patient seen and evaluated bedside  no events overnight  ambulated 30' with cardiac walker- right knee pain somehat controlled with ACE/ lidoderm  HD today  no dizziness, no chest pain, no nausea, no vomiting, no SOB, no headaches  last BM PM 4/4      MEDICATIONS  (STANDING):  acetaminophen     Tablet .. 975 milliGRAM(s) Oral every 8 hours  apixaban 2.5 milliGRAM(s) Oral two times a day  AQUAPHOR (petrolatum Ointment) 1 Application(s) Topical two times a day  aspirin enteric coated 81 milliGRAM(s) Oral two times a day  epoetin carito-epbx (RETACRIT) Injectable 11044 Unit(s) IV Push <User Schedule>  lidocaine   4% Patch 1 Patch Transdermal <User Schedule>  metoprolol tartrate 25 milliGRAM(s) Oral two times a day  Nephro-pat 1 Tablet(s) Oral daily  senna 2 Tablet(s) Oral at bedtime  sevelamer carbonate 1600 milliGRAM(s) Oral three times a day with meals    MEDICATIONS  (PRN):  aluminum hydroxide/magnesium hydroxide/simethicone Suspension 30 milliLiter(s) Oral every 4 hours PRN Dyspepsia  melatonin 6 milliGRAM(s) Oral at bedtime PRN Insomnia  oxyCODONE    IR 5 milliGRAM(s) Oral every 4 hours PRN Moderate Pain (4 - 6)  oxyCODONE    IR 10 milliGRAM(s) Oral every 4 hours PRN Severe Pain (7 - 10)  polyethylene glycol 3350 17 Gram(s) Oral daily PRN Constipation                            7.8    4.83  )-----------( 246      ( 05 Apr 2024 05:44 )             25.3     04-05    141  |  99  |  83<H>  ----------------------------<  68<L>  4.9   |  29  |  8.73<H>    Ca    9.1      05 Apr 2024 05:44  Phos  6.9     04-05      Urinalysis Basic - ( 05 Apr 2024 05:44 )    Color: x / Appearance: x / SG: x / pH: x  Gluc: 68 mg/dL / Ketone: x  / Bili: x / Urobili: x   Blood: x / Protein: x / Nitrite: x   Leuk Esterase: x / RBC: x / WBC x   Sq Epi: x / Non Sq Epi: x / Bacteria: x        CAPILLARY BLOOD GLUCOSE          Vital Signs Last 24 Hrs  T(C): 36.8 (05 Apr 2024 07:22), Max: 36.8 (05 Apr 2024 07:22)  T(F): 98.2 (05 Apr 2024 07:22), Max: 98.2 (05 Apr 2024 07:22)  HR: 78 (05 Apr 2024 07:22) (78 - 81)  BP: 129/78 (05 Apr 2024 07:22) (129/78 - 156/76)  BP(mean): --  RR: 16 (05 Apr 2024 07:22) (16 - 17)  SpO2: 98% (05 Apr 2024 07:22) (98% - 98%)    Parameters below as of 05 Apr 2024 07:22  Patient On (Oxygen Delivery Method): room air        Gen - NAD, Comfortable  HEENT - NCAT, EOMI, MMM  Neck - Supple, No limited ROM  Pulm - CTAB, No wheeze, No rhonchi, No crackles  Cardiovascular - RRR, S1S2, No murmurs  Chest - good chest expansion, good respiratory effort  Abdomen - Soft, NT/ND, +BS. BM 3/26  Extremities - Right FA AV fistula + bruit and thrill. GIDEON old AV fistula not active,   Neuro-     Cognitive - awake, alert, oriented to person, place, date, year, and situation.  Able  to follow command     Communication - Fluent, Comprehensible, No dysarthria, No aphasia        Memory:  Recent/ remote memory intact     Cranial Nerves - CN 2-12 intact. No facial asymmetry, Tongue midline, EOMI, Shoulder shrug intact     Motor -                    LEFT    UE - ShAB 5/5, EF 5/5, EE 5/5,  5/5                    RIGHT UE - ShAB 5/5, EF 5/5, EE 5/5,   5/5                    LEFT    LE - HF 4/5, KE 3+/5, DF 4/5, PF 4/5                    RIGHT LE - HF 3+/5, KE 3/5, DF 4/5, PF 4/5   moves Right knee better     Sensory - Intact bilaterally      Tone - normal  MSK: b/l hip discomfort with movement OA changes Right Knee- tender to palpation superior/ lat aspect of knee joint  Psychiatric - Mood stable, Affect WNL  Skin: right hip surgical site with steri strips CDI. b/l sacrum/coccyx DTI .    IDT Camilla 4/1  tentative Dc  4/16 to home- 24x7 supervision- ? Brother to Provide

## 2024-04-06 PROCEDURE — 99232 SBSQ HOSP IP/OBS MODERATE 35: CPT

## 2024-04-06 PROCEDURE — 99232 SBSQ HOSP IP/OBS MODERATE 35: CPT | Mod: GC

## 2024-04-06 RX ADMIN — SEVELAMER CARBONATE 1600 MILLIGRAM(S): 2400 POWDER, FOR SUSPENSION ORAL at 21:11

## 2024-04-06 RX ADMIN — Medication 1 APPLICATION(S): at 06:14

## 2024-04-06 RX ADMIN — SEVELAMER CARBONATE 1600 MILLIGRAM(S): 2400 POWDER, FOR SUSPENSION ORAL at 08:17

## 2024-04-06 RX ADMIN — Medication 81 MILLIGRAM(S): at 06:13

## 2024-04-06 RX ADMIN — Medication 975 MILLIGRAM(S): at 06:13

## 2024-04-06 RX ADMIN — APIXABAN 2.5 MILLIGRAM(S): 2.5 TABLET, FILM COATED ORAL at 06:13

## 2024-04-06 RX ADMIN — Medication 25 MILLIGRAM(S): at 17:34

## 2024-04-06 RX ADMIN — SEVELAMER CARBONATE 1600 MILLIGRAM(S): 2400 POWDER, FOR SUSPENSION ORAL at 12:37

## 2024-04-06 RX ADMIN — Medication 25 MILLIGRAM(S): at 06:13

## 2024-04-06 RX ADMIN — APIXABAN 2.5 MILLIGRAM(S): 2.5 TABLET, FILM COATED ORAL at 17:34

## 2024-04-06 RX ADMIN — OXYCODONE HYDROCHLORIDE 10 MILLIGRAM(S): 5 TABLET ORAL at 21:59

## 2024-04-06 RX ADMIN — Medication 81 MILLIGRAM(S): at 17:34

## 2024-04-06 RX ADMIN — Medication 1 TABLET(S): at 12:37

## 2024-04-06 RX ADMIN — LIDOCAINE 1 PATCH: 4 CREAM TOPICAL at 08:18

## 2024-04-06 RX ADMIN — Medication 1 APPLICATION(S): at 18:12

## 2024-04-06 RX ADMIN — OXYCODONE HYDROCHLORIDE 10 MILLIGRAM(S): 5 TABLET ORAL at 21:10

## 2024-04-06 NOTE — PROGRESS NOTE ADULT - SUBJECTIVE AND OBJECTIVE BOX
Patient seen and examined at bedside. no complaints.      ALLERGIES:  No Known Drug Allergies  shellfish (Hives)    MEDICATIONS  (STANDING):  acetaminophen     Tablet .. 975 milliGRAM(s) Oral every 8 hours  apixaban 2.5 milliGRAM(s) Oral two times a day  AQUAPHOR (petrolatum Ointment) 1 Application(s) Topical two times a day  aspirin enteric coated 81 milliGRAM(s) Oral two times a day  epoetin carito-epbx (RETACRIT) Injectable 12324 Unit(s) IV Push <User Schedule>  lidocaine   4% Patch 1 Patch Transdermal <User Schedule>  metoprolol tartrate 25 milliGRAM(s) Oral two times a day  Nephro-pat 1 Tablet(s) Oral daily  senna 2 Tablet(s) Oral at bedtime  sevelamer carbonate 1600 milliGRAM(s) Oral three times a day with meals    MEDICATIONS  (PRN):  aluminum hydroxide/magnesium hydroxide/simethicone Suspension 30 milliLiter(s) Oral every 4 hours PRN Dyspepsia  melatonin 6 milliGRAM(s) Oral at bedtime PRN Insomnia  oxyCODONE    IR 5 milliGRAM(s) Oral every 4 hours PRN Moderate Pain (4 - 6)  oxyCODONE    IR 10 milliGRAM(s) Oral every 4 hours PRN Severe Pain (7 - 10)  polyethylene glycol 3350 17 Gram(s) Oral daily PRN Constipation    Vital Signs Last 24 Hrs  T(F): 98.9 (06 Apr 2024 08:36), Max: 98.9 (06 Apr 2024 08:36)  HR: 69 (06 Apr 2024 08:36) (69 - 84)  BP: 142/79 (06 Apr 2024 08:36) (114/79 - 142/79)  RR: 17 (06 Apr 2024 08:36) (16 - 18)  SpO2: 97% (06 Apr 2024 08:36) (97% - 98%)  I&O's Summary    05 Apr 2024 07:01  -  06 Apr 2024 07:00  --------------------------------------------------------  IN: 800 mL / OUT: 2800 mL / NET: -2000 mL        PHYSICAL EXAM:    Gen: nad, resting in bed  Neuro: aaox3, no focal deficits  Heent: eomi b/l, no jvd, no oral exudates  Pulm: cta b/l, no w/r/r  CV: +s1s2, no m/r/g  Ab: soft, nt/nd, normoactive bs x 4  Extrem: no edema, pulses intact and equal  Skin: no rashes  Psych: normal    LABS:                        7.8    4.83  )-----------( 246      ( 05 Apr 2024 05:44 )             25.3       04-05    141  |  99  |  83  ----------------------------<  68  4.9   |  29  |  8.73    Ca    9.1      05 Apr 2024 05:44  Phos  6.9     04-05               Urinalysis Basic - ( 05 Apr 2024 05:44 )    Color: x / Appearance: x / SG: x / pH: x  Gluc: 68 mg/dL / Ketone: x  / Bili: x / Urobili: x   Blood: x / Protein: x / Nitrite: x   Leuk Esterase: x / RBC: x / WBC x   Sq Epi: x / Non Sq Epi: x / Bacteria: x        COVID-19 PCR: NotDetec (03-27-24 @ 06:47)  COVID-19 PCR: NotDetec (03-26-24 @ 06:57)  COVID-19 PCR: Detected (03-19-24 @ 16:53)      RADIOLOGY & ADDITIONAL TESTS:    Care Discussed with Consultants/Other Providers:

## 2024-04-06 NOTE — PROGRESS NOTE ADULT - ASSESSMENT
38y M with HTN, anemia of chronic disease, ESRD on HD (M/W/F via right AV fistula), left hip arthroplasty in 8/23, history of right leg fracture presented to [facility] on 3/6 after a fall during a wheelchair transfer. He was found to have a right femoral neck fracture and subacute pubic body and inferior rami fractures. He is s/p right hip hemiarthroplasty. Patient now admitted for a multidisciplinary rehab program.    #Right Femoral Neck fracture   - s/p right hip hemiarthroplasty on 3/7  - Comprehensive Multidisciplinary Rehab Program  - precautions: WBAT RLE, fall precautions  - pain management per rehab     #ESRD  #hyperkalemia  - HDS via right AV fistula on m/w/f.  ( Last HD 3/29). Old HDS left UA  - HD per Dr Blanco  - sevelamer 800mg TID  - retacrit m/w/f    #HTN  - toprol 25mg BID  - BP control per nephrology give likely secondary hypertension    #DVT prophylaxis:   - eliquis 2.5mg BID

## 2024-04-06 NOTE — PROGRESS NOTE ADULT - ASSESSMENT
BRIDGET NORTH is a 38y M with HTN, anemia of chronic disease, ESRD on HD (M/W/F via right AV fistula), left hip arthroplasty in 8/23, history of right leg fracture presented to [facility] on 3/6 after a fall during a wheelchair transfer. He was found to have a right femoral neck fracture and subacute pubic body and inferior rami fractures. He is s/p right hip hemiarthroplasty. Hospital course complicated by post-op anemia s/p transfusion, hypoglycemia, hyperkalemia, febrile 2/2 covid s/p remdisivir, and started on eliquis 2.5mg BID with negative dvts on LE duplex. Patient now admitted for a multidisciplinary rehab program. 03-27-24 @ 16:17      #Right Femoral Neck fracture   - s/p right hip hemiarthroplasty on 3/7  - pain med as below  - Dressing and staples removed on 3/22  - Comprehensive Multidisciplinary Rehab Program:  comprehensive rehab program of PT/OT/SLP - 3 hours a day, 5 days a week. P&O as needed.   - precautions: WBAT RLE. Anterior hip.   Right Knee pain- arthrosis/ effusion- no Fx- renal osteodystrophy- ICE/ Lidoderm/ Rehab/ ACE  Social work to contact brother regarding providing 24x7 supervision on DC    #ESRD  - HDS via right AV fistula on m/w/f.  ( Last HD 3/27). Old HDS left UA  - Nephro consult placed. Dr Blanco aware.   - sevelamer 800mg TID  - retacrit m/w/f    #HTN  - toprol 25mg BID  - Monitor BP    #Pain  - Tylenol PRN  - oxy 10mg moderate, 15mg severe.   Right Knee pain- lidoderm during the day/ICE/ ACE    #Mood / Cognition  - Neuropsychology consult PRN  - recreation therapy PRN    #Sleep  - Melatonin PRN     #GI / Bowel  #dyspepsia  - Senna qHS  - Miralax PRN -  - maalox  - GI ppx: none.     # / Bladder  - does not void ESRD    #Skin / Pressure injury  - Skin assessment on admission performed : right hip surgical site with steri strips CDI. coccyx small stage 2-using barrier cream  - Monitor Incisions:    - wound care following at [facility] for b/l sacrum/coccyx DTI wth evolution.   - wound care to follow  - turn and reposition q2h      #Diet:  - Diet Consistency: Renal restriction  - Nutrition consult    #DVT prophylaxis:   - eliquis 2.5mg BID  - SCDs  - Last LE Doppler normal on 3/14

## 2024-04-06 NOTE — PROGRESS NOTE ADULT - SUBJECTIVE AND OBJECTIVE BOX
chief complaint- Pain in R hip / difficulty ambulating      39yo Male PMHx HTN, anemia of chronic disease, ESRD on HD (M/W/F via right AV fistula), left hip arthroplasty in 8/23, history of right leg fracture presented to [facility] from [facility] on 3/6 after falling during a wheelchair transfer landing on his right knee. He is wheelchair bound since his left hip surgery. CT hip with acute garden type 1 minimally valgus impacted transcervical right femoral neck fracture and subacute right pubic body and inferior rami fractures. He is s/p right hip hemiarthroplasty. Post op course complicated by anemia s/p transfusion. Hospital course complicated by hypoglycemia after being shifted for hyperkalemia. Further complicated by febrile 101.6F found to be covid positive and completed remdisivir. He was started on eliquis 2.5mg BID for elevated d-dimers and negative LE duplex.     Patient was evaluated by PM&R and therapy for functional deficits, gait/ADL impairments and acute rehabilitation was recommended. Patient was medically optimized for discharge to [facility] IRU on 3/27        ROS/subjective:  patient seen and evaluated bedside  no events overnight  continued ambulation in PT, Right knee pain stable  HD yesterday- went well  no dizziness, no chest pain, no nausea, no vomiting, no SOB, no headaches  last BM PM 4/4      MEDICATIONS  (STANDING):  acetaminophen     Tablet .. 975 milliGRAM(s) Oral every 8 hours  apixaban 2.5 milliGRAM(s) Oral two times a day  AQUAPHOR (petrolatum Ointment) 1 Application(s) Topical two times a day  aspirin enteric coated 81 milliGRAM(s) Oral two times a day  epoetin carito-epbx (RETACRIT) Injectable 27786 Unit(s) IV Push <User Schedule>  lidocaine   4% Patch 1 Patch Transdermal <User Schedule>  metoprolol tartrate 25 milliGRAM(s) Oral two times a day  Nephro-pat 1 Tablet(s) Oral daily  senna 2 Tablet(s) Oral at bedtime  sevelamer carbonate 1600 milliGRAM(s) Oral three times a day with meals    MEDICATIONS  (PRN):  aluminum hydroxide/magnesium hydroxide/simethicone Suspension 30 milliLiter(s) Oral every 4 hours PRN Dyspepsia  melatonin 6 milliGRAM(s) Oral at bedtime PRN Insomnia  oxyCODONE    IR 5 milliGRAM(s) Oral every 4 hours PRN Moderate Pain (4 - 6)  oxyCODONE    IR 10 milliGRAM(s) Oral every 4 hours PRN Severe Pain (7 - 10)  polyethylene glycol 3350 17 Gram(s) Oral daily PRN Constipation                            7.8    4.83  )-----------( 246      ( 05 Apr 2024 05:44 )             25.3     04-05    141  |  99  |  83<H>  ----------------------------<  68<L>  4.9   |  29  |  8.73<H>    Ca    9.1      05 Apr 2024 05:44  Phos  6.9     04-05      Urinalysis Basic - ( 05 Apr 2024 05:44 )    Color: x / Appearance: x / SG: x / pH: x  Gluc: 68 mg/dL / Ketone: x  / Bili: x / Urobili: x   Blood: x / Protein: x / Nitrite: x   Leuk Esterase: x / RBC: x / WBC x   Sq Epi: x / Non Sq Epi: x / Bacteria: x        CAPILLARY BLOOD GLUCOSE          Vital Signs Last 24 Hrs  T(C): 37.2 (06 Apr 2024 08:36), Max: 37.2 (06 Apr 2024 08:36)  T(F): 98.9 (06 Apr 2024 08:36), Max: 98.9 (06 Apr 2024 08:36)  HR: 69 (06 Apr 2024 08:36) (69 - 84)  BP: 142/79 (06 Apr 2024 08:36) (114/79 - 142/79)  BP(mean): --  RR: 17 (06 Apr 2024 08:36) (16 - 18)  SpO2: 97% (06 Apr 2024 08:36) (97% - 98%)    Parameters below as of 06 Apr 2024 08:36  Patient On (Oxygen Delivery Method): room air        Gen - NAD, Comfortable  HEENT - NCAT, EOMI, MMM  Neck - Supple, No limited ROM  Pulm - CTAB, No wheeze, No rhonchi, No crackles  Cardiovascular - RRR, S1S2, No murmurs  Chest - good chest expansion, good respiratory effort  Abdomen - Soft, NT/ND, +BS. BM 3/26  Extremities - Right FA AV fistula + bruit and thrill. GIDEON old AV fistula not active, Right Knee with degenerative chnages  Neuro-     Cognitive - awake, alert, oriented to person, place, date, year, and situation.  Able  to follow command     Communication - Fluent, Comprehensible, No dysarthria, No aphasia        Memory:  Recent/ remote memory intact     Cranial Nerves - CN 2-12 intact. No facial asymmetry, Tongue midline, EOMI, Shoulder shrug intact     Motor -                    LEFT    UE - ShAB 5/5, EF 5/5, EE 5/5,  5/5                    RIGHT UE - ShAB 5/5, EF 5/5, EE 5/5,   5/5                    LEFT    LE - HF 4/5, KE 3+/5, DF 4/5, PF 4/5                    RIGHT LE - HF 3+/5, KE 3/5, DF 4/5, PF 4/5   moves Right knee better     Sensory - Intact bilaterally      Tone - normal  MSK: b/l hip discomfort with movement OA changes Right Knee- tender to palpation superior/ lat aspect of knee joint  Psychiatric - Mood stable, Affect WNL  Skin: right hip surgical site with steri strips CDI. b/l sacrum/coccyx DTI .    IDT Camilla 4/1  tentative Dc  4/16 to home- 24x7 supervision- ? Brother to Provide

## 2024-04-07 PROCEDURE — 99232 SBSQ HOSP IP/OBS MODERATE 35: CPT | Mod: GC

## 2024-04-07 PROCEDURE — 99232 SBSQ HOSP IP/OBS MODERATE 35: CPT

## 2024-04-07 RX ADMIN — SEVELAMER CARBONATE 1600 MILLIGRAM(S): 2400 POWDER, FOR SUSPENSION ORAL at 09:09

## 2024-04-07 RX ADMIN — SEVELAMER CARBONATE 1600 MILLIGRAM(S): 2400 POWDER, FOR SUSPENSION ORAL at 13:03

## 2024-04-07 RX ADMIN — LIDOCAINE 1 PATCH: 4 CREAM TOPICAL at 22:05

## 2024-04-07 RX ADMIN — APIXABAN 2.5 MILLIGRAM(S): 2.5 TABLET, FILM COATED ORAL at 18:05

## 2024-04-07 RX ADMIN — APIXABAN 2.5 MILLIGRAM(S): 2.5 TABLET, FILM COATED ORAL at 05:38

## 2024-04-07 RX ADMIN — LIDOCAINE 1 PATCH: 4 CREAM TOPICAL at 22:04

## 2024-04-07 RX ADMIN — SEVELAMER CARBONATE 1600 MILLIGRAM(S): 2400 POWDER, FOR SUSPENSION ORAL at 18:05

## 2024-04-07 RX ADMIN — Medication 81 MILLIGRAM(S): at 18:05

## 2024-04-07 RX ADMIN — Medication 1 TABLET(S): at 13:03

## 2024-04-07 RX ADMIN — Medication 975 MILLIGRAM(S): at 05:38

## 2024-04-07 RX ADMIN — Medication 81 MILLIGRAM(S): at 05:38

## 2024-04-07 RX ADMIN — OXYCODONE HYDROCHLORIDE 10 MILLIGRAM(S): 5 TABLET ORAL at 22:03

## 2024-04-07 RX ADMIN — OXYCODONE HYDROCHLORIDE 10 MILLIGRAM(S): 5 TABLET ORAL at 22:45

## 2024-04-07 RX ADMIN — LIDOCAINE 1 PATCH: 4 CREAM TOPICAL at 09:11

## 2024-04-07 RX ADMIN — Medication 25 MILLIGRAM(S): at 18:05

## 2024-04-07 RX ADMIN — Medication 25 MILLIGRAM(S): at 05:38

## 2024-04-07 RX ADMIN — Medication 6 MILLIGRAM(S): at 22:03

## 2024-04-07 RX ADMIN — Medication 1 APPLICATION(S): at 18:36

## 2024-04-07 NOTE — PROGRESS NOTE ADULT - ASSESSMENT
BRIDGET NORTH is a 38y M with HTN, anemia of chronic disease, ESRD on HD (M/W/F via right AV fistula), left hip arthroplasty in 8/23, history of right leg fracture presented to [facility] on 3/6 after a fall during a wheelchair transfer. He was found to have a right femoral neck fracture and subacute pubic body and inferior rami fractures. He is s/p right hip hemiarthroplasty. Hospital course complicated by post-op anemia s/p transfusion, hypoglycemia, hyperkalemia, febrile 2/2 covid s/p remdisivir, and started on eliquis 2.5mg BID with negative dvts on LE duplex. Patient now admitted for a multidisciplinary rehab program. 03-27-24 @ 16:17      #Right Femoral Neck fracture   - s/p right hip hemiarthroplasty on 3/7  - pain med as below  - Dressing and staples removed on 3/22  - Comprehensive Multidisciplinary Rehab Program:  comprehensive rehab program of PT/OT/SLP - 3 hours a day, 5 days a week. P&O as needed.   - precautions: WBAT RLE. Anterior hip.   Right Knee pain- arthrosis/ effusion- no Fx- renal osteodystrophy- ICE/ Lidoderm/ Rehab/ ACE  Social work to contact brother regarding providing 24x7 supervision on DC    #ESRD  - HDS via right AV fistula on m/w/f.  ( Last HD 3/27). Old HDS left UA  - Nephro consult placed. Dr Blanco aware.   - sevelamer 800mg TID  - retacrit m/w/f    #HTN  - toprol 25mg BID  - Monitor BP    #Pain  - Tylenol PRN  - oxy 10mg moderate, 15mg severe.   Right Knee pain- lidoderm during the day/ICE/ ACE- Better    #Mood / Cognition  - Neuropsychology consult PRN  - recreation therapy PRN    #Sleep  - Melatonin PRN     #GI / Bowel  #dyspepsia  - Senna qHS  - Miralax PRN -  - maalox  - GI ppx: none.     # / Bladder  - does not void ESRD    #Skin / Pressure injury  - Skin assessment on admission performed : right hip surgical site with steri strips CDI. coccyx small stage 2-using barrier cream  - Monitor Incisions:    - wound care following at [facility] for b/l sacrum/coccyx DTI wth evolution.   - wound care to follow  - turn and reposition q2h      #Diet:  - Diet Consistency: Renal restriction  - Nutrition consult    #DVT prophylaxis:   - eliquis 2.5mg BID  - SCDs  - Last LE Doppler normal on 3/14

## 2024-04-07 NOTE — PROGRESS NOTE ADULT - SUBJECTIVE AND OBJECTIVE BOX
Patient seen and examined at bedside. no complaints.      ALLERGIES:  No Known Drug Allergies  shellfish (Hives)    MEDICATIONS  (STANDING):  acetaminophen     Tablet .. 975 milliGRAM(s) Oral every 8 hours  apixaban 2.5 milliGRAM(s) Oral two times a day  AQUAPHOR (petrolatum Ointment) 1 Application(s) Topical two times a day  aspirin enteric coated 81 milliGRAM(s) Oral two times a day  epoetin carito-epbx (RETACRIT) Injectable 92829 Unit(s) IV Push <User Schedule>  lidocaine   4% Patch 1 Patch Transdermal <User Schedule>  metoprolol tartrate 25 milliGRAM(s) Oral two times a day  Nephro-pat 1 Tablet(s) Oral daily  senna 2 Tablet(s) Oral at bedtime  sevelamer carbonate 1600 milliGRAM(s) Oral three times a day with meals    MEDICATIONS  (PRN):  aluminum hydroxide/magnesium hydroxide/simethicone Suspension 30 milliLiter(s) Oral every 4 hours PRN Dyspepsia  melatonin 6 milliGRAM(s) Oral at bedtime PRN Insomnia  oxyCODONE    IR 5 milliGRAM(s) Oral every 4 hours PRN Moderate Pain (4 - 6)  oxyCODONE    IR 10 milliGRAM(s) Oral every 4 hours PRN Severe Pain (7 - 10)  polyethylene glycol 3350 17 Gram(s) Oral daily PRN Constipation    Vital Signs Last 24 Hrs  T(F): 98.4 (07 Apr 2024 08:00), Max: 98.4 (07 Apr 2024 08:00)  HR: 72 (07 Apr 2024 08:00) (72 - 79)  BP: 133/66 (07 Apr 2024 08:00) (127/80 - 150/82)  RR: 18 (06 Apr 2024 20:59) (18 - 18)  SpO2: 97% (07 Apr 2024 08:00) (97% - 98%)  I&O's Summary      PHYSICAL EXAM:    Gen: nad, resting in bed  Neuro: aaox3, no focal deficits  Heent: eomi b/l, no jvd, no oral exudates  Pulm: cta b/l, no w/r/r  CV: +s1s2, no m/r/g  Ab: soft, nt/nd, normoactive bs x 4  Extrem: no edema, pulses intact and equal  Skin: no rashes  Psych: normal    LABS:                        7.8    4.83  )-----------( 246      ( 05 Apr 2024 05:44 )             25.3       04-05    141  |  99  |  83  ----------------------------<  68  4.9   |  29  |  8.73    Ca    9.1      05 Apr 2024 05:44  Phos  6.9     04-05                                    Urinalysis Basic - ( 05 Apr 2024 05:44 )    Color: x / Appearance: x / SG: x / pH: x  Gluc: 68 mg/dL / Ketone: x  / Bili: x / Urobili: x   Blood: x / Protein: x / Nitrite: x   Leuk Esterase: x / RBC: x / WBC x   Sq Epi: x / Non Sq Epi: x / Bacteria: x        COVID-19 PCR: NotDetec (03-27-24 @ 06:47)  COVID-19 PCR: NotDetec (03-26-24 @ 06:57)  COVID-19 PCR: Detected (03-19-24 @ 16:53)      RADIOLOGY & ADDITIONAL TESTS:    Care Discussed with Consultants/Other Providers:

## 2024-04-07 NOTE — PROGRESS NOTE ADULT - SUBJECTIVE AND OBJECTIVE BOX
chief complaint- Pain in R hip / difficulty ambulating      37yo Male PMHx HTN, anemia of chronic disease, ESRD on HD (M/W/F via right AV fistula), left hip arthroplasty in 8/23, history of right leg fracture presented to [facility] from [facility] on 3/6 after falling during a wheelchair transfer landing on his right knee. He is wheelchair bound since his left hip surgery. CT hip with acute garden type 1 minimally valgus impacted transcervical right femoral neck fracture and subacute right pubic body and inferior rami fractures. He is s/p right hip hemiarthroplasty. Post op course complicated by anemia s/p transfusion. Hospital course complicated by hypoglycemia after being shifted for hyperkalemia. Further complicated by febrile 101.6F found to be covid positive and completed remdisivir. He was started on eliquis 2.5mg BID for elevated d-dimers and negative LE duplex.     Patient was evaluated by PM&R and therapy for functional deficits, gait/ADL impairments and acute rehabilitation was recommended. Patient was medically optimized for discharge to [facility] IRU on 3/27        ROS/subjective:  patient seen and evaluated bedside  no events overnight  continued ambulation in PT, Right knee pain stable  HD yesterday- went well  no dizziness, no chest pain, no nausea, no vomiting, no SOB, no headaches  moved bowels 4/6      MEDICATIONS  (STANDING):  acetaminophen     Tablet .. 975 milliGRAM(s) Oral every 8 hours  apixaban 2.5 milliGRAM(s) Oral two times a day  AQUAPHOR (petrolatum Ointment) 1 Application(s) Topical two times a day  aspirin enteric coated 81 milliGRAM(s) Oral two times a day  epoetin carito-epbx (RETACRIT) Injectable 13575 Unit(s) IV Push <User Schedule>  lidocaine   4% Patch 1 Patch Transdermal <User Schedule>  metoprolol tartrate 25 milliGRAM(s) Oral two times a day  Nephro-pat 1 Tablet(s) Oral daily  senna 2 Tablet(s) Oral at bedtime  sevelamer carbonate 1600 milliGRAM(s) Oral three times a day with meals    MEDICATIONS  (PRN):  aluminum hydroxide/magnesium hydroxide/simethicone Suspension 30 milliLiter(s) Oral every 4 hours PRN Dyspepsia  melatonin 6 milliGRAM(s) Oral at bedtime PRN Insomnia  oxyCODONE    IR 5 milliGRAM(s) Oral every 4 hours PRN Moderate Pain (4 - 6)  oxyCODONE    IR 10 milliGRAM(s) Oral every 4 hours PRN Severe Pain (7 - 10)  polyethylene glycol 3350 17 Gram(s) Oral daily PRN Constipation                            7.8    4.83  )-----------( 246      ( 05 Apr 2024 05:44 )             25.3     04-05    141  |  99  |  83<H>  ----------------------------<  68<L>  4.9   |  29  |  8.73<H>    Ca    9.1      05 Apr 2024 05:44  Phos  6.9     04-05      Urinalysis Basic - ( 05 Apr 2024 05:44 )    Color: x / Appearance: x / SG: x / pH: x  Gluc: 68 mg/dL / Ketone: x  / Bili: x / Urobili: x   Blood: x / Protein: x / Nitrite: x   Leuk Esterase: x / RBC: x / WBC x   Sq Epi: x / Non Sq Epi: x / Bacteria: x        CAPILLARY BLOOD GLUCOSE        Vital Signs Last 24 Hrs  T(C): 36.9 (07 Apr 2024 08:00), Max: 36.9 (07 Apr 2024 08:00)  T(F): 98.4 (07 Apr 2024 08:00), Max: 98.4 (07 Apr 2024 08:00)  HR: 72 (07 Apr 2024 08:00) (72 - 79)  BP: 133/66 (07 Apr 2024 08:00) (127/80 - 150/82)  BP(mean): --  RR: 18 (06 Apr 2024 20:59) (18 - 18)  SpO2: 97% (07 Apr 2024 08:00) (97% - 98%)    Parameters below as of 07 Apr 2024 08:00  Patient On (Oxygen Delivery Method): room air      Gen - NAD, Comfortable  HEENT - NCAT, EOMI, MMM  Neck - Supple, No limited ROM  Pulm - CTAB, No wheeze, No rhonchi, No crackles  Cardiovascular - RRR, S1S2, No murmurs  Chest - good chest expansion, good respiratory effort  Abdomen - Soft, NT/ND, +BS. BM 3/26  Extremities - Right FA AV fistula + bruit and thrill. GIDEON old AV fistula not active, Right Knee with degenerative chnages  Neuro-     Cognitive - awake, alert, oriented to person, place, date, year, and situation.  Able  to follow command     Communication - Fluent, Comprehensible, No dysarthria, No aphasia        Memory:  Recent/ remote memory intact     Cranial Nerves - CN 2-12 intact. No facial asymmetry, Tongue midline, EOMI, Shoulder shrug intact     Motor -                    LEFT    UE - ShAB 5/5, EF 5/5, EE 5/5,  5/5                    RIGHT UE - ShAB 5/5, EF 5/5, EE 5/5,   5/5                    LEFT    LE - HF 4/5, KE 3+/5, DF 4/5, PF 4/5                    RIGHT LE - HF 3+/5, KE 3/5, DF 4/5, PF 4/5   moves Right knee better     Sensory - Intact bilaterally      Tone - normal  MSK: b/l hip discomfort with movement OA changes Right Knee- tender to palpation superior/ lat aspect of knee joint  Psychiatric - Mood stable, Affect WNL  Skin: right hip surgical site with steri strips CDI. b/l sacrum/coccyx DTI .    IDT Camilla 4/1  tentative Dc  4/16 to home- 24x7 supervision- ? Brother to Provide

## 2024-04-07 NOTE — PROGRESS NOTE ADULT - ASSESSMENT
38y M with HTN, anemia of chronic disease, ESRD on HD (M/W/F via right AV fistula), left hip arthroplasty in 8/23, history of right leg fracture presented to [facility] on 3/6 after a fall during a wheelchair transfer. He was found to have a right femoral neck fracture and subacute pubic body and inferior rami fractures. He is s/p right hip hemiarthroplasty. Patient now admitted for a multidisciplinary rehab program.    #Right Femoral Neck fracture   - s/p right hip hemiarthroplasty on 3/7  - Comprehensive Multidisciplinary Rehab Program  - precautions: WBAT RLE, fall precautions  - pain management per rehab     #ESRD  #hyperkalemia  - HDS via right AV fistula on m/w/f.  ( Last HD 3/29). Old HDS left UA  - HD per Dr Blanco  - sevelamer 800mg TID  - retacrit m/w/f    #HTN  - toprol 25mg BID  - BP control per nephrology give likely secondary hypertension    #DVT prophylaxis:   - eliquis 2.5mg BID

## 2024-04-08 LAB
ANION GAP SERPL CALC-SCNC: 17 MMOL/L — SIGNIFICANT CHANGE UP (ref 5–17)
BUN SERPL-MCNC: 101 MG/DL — HIGH (ref 7–23)
CALCIUM SERPL-MCNC: 9.1 MG/DL — SIGNIFICANT CHANGE UP (ref 8.4–10.5)
CHLORIDE SERPL-SCNC: 99 MMOL/L — SIGNIFICANT CHANGE UP (ref 96–108)
CO2 SERPL-SCNC: 25 MMOL/L — SIGNIFICANT CHANGE UP (ref 22–31)
CREAT SERPL-MCNC: 10.7 MG/DL — HIGH (ref 0.5–1.3)
EGFR: 6 ML/MIN/1.73M2 — LOW
GLUCOSE SERPL-MCNC: 92 MG/DL — SIGNIFICANT CHANGE UP (ref 70–99)
HCT VFR BLD CALC: 23.4 % — LOW (ref 39–50)
HGB BLD-MCNC: 7.3 G/DL — LOW (ref 13–17)
MCHC RBC-ENTMCNC: 31.1 PG — SIGNIFICANT CHANGE UP (ref 27–34)
MCHC RBC-ENTMCNC: 31.2 GM/DL — LOW (ref 32–36)
MCV RBC AUTO: 99.6 FL — SIGNIFICANT CHANGE UP (ref 80–100)
NRBC # BLD: 0 /100 WBCS — SIGNIFICANT CHANGE UP (ref 0–0)
PHOSPHATE SERPL-MCNC: 6.5 MG/DL — HIGH (ref 2.5–4.5)
PLATELET # BLD AUTO: 245 K/UL — SIGNIFICANT CHANGE UP (ref 150–400)
POTASSIUM SERPL-MCNC: 5.5 MMOL/L — HIGH (ref 3.5–5.3)
POTASSIUM SERPL-SCNC: 5.5 MMOL/L — HIGH (ref 3.5–5.3)
RBC # BLD: 2.35 M/UL — LOW (ref 4.2–5.8)
RBC # FLD: 16.4 % — HIGH (ref 10.3–14.5)
SODIUM SERPL-SCNC: 141 MMOL/L — SIGNIFICANT CHANGE UP (ref 135–145)
WBC # BLD: 4.36 K/UL — SIGNIFICANT CHANGE UP (ref 3.8–10.5)
WBC # FLD AUTO: 4.36 K/UL — SIGNIFICANT CHANGE UP (ref 3.8–10.5)

## 2024-04-08 PROCEDURE — 99232 SBSQ HOSP IP/OBS MODERATE 35: CPT

## 2024-04-08 PROCEDURE — 99232 SBSQ HOSP IP/OBS MODERATE 35: CPT | Mod: GC

## 2024-04-08 RX ADMIN — Medication 25 MILLIGRAM(S): at 17:58

## 2024-04-08 RX ADMIN — OXYCODONE HYDROCHLORIDE 10 MILLIGRAM(S): 5 TABLET ORAL at 21:51

## 2024-04-08 RX ADMIN — Medication 81 MILLIGRAM(S): at 05:49

## 2024-04-08 RX ADMIN — LIDOCAINE 1 PATCH: 4 CREAM TOPICAL at 18:32

## 2024-04-08 RX ADMIN — Medication 1 APPLICATION(S): at 05:53

## 2024-04-08 RX ADMIN — OXYCODONE HYDROCHLORIDE 10 MILLIGRAM(S): 5 TABLET ORAL at 12:52

## 2024-04-08 RX ADMIN — SEVELAMER CARBONATE 1600 MILLIGRAM(S): 2400 POWDER, FOR SUSPENSION ORAL at 12:09

## 2024-04-08 RX ADMIN — APIXABAN 2.5 MILLIGRAM(S): 2.5 TABLET, FILM COATED ORAL at 05:49

## 2024-04-08 RX ADMIN — Medication 1 APPLICATION(S): at 18:01

## 2024-04-08 RX ADMIN — Medication 975 MILLIGRAM(S): at 07:00

## 2024-04-08 RX ADMIN — Medication 81 MILLIGRAM(S): at 17:58

## 2024-04-08 RX ADMIN — LIDOCAINE 1 PATCH: 4 CREAM TOPICAL at 05:52

## 2024-04-08 RX ADMIN — Medication 1 TABLET(S): at 12:09

## 2024-04-08 RX ADMIN — ERYTHROPOIETIN 10000 UNIT(S): 10000 INJECTION, SOLUTION INTRAVENOUS; SUBCUTANEOUS at 14:52

## 2024-04-08 RX ADMIN — SEVELAMER CARBONATE 1600 MILLIGRAM(S): 2400 POWDER, FOR SUSPENSION ORAL at 08:16

## 2024-04-08 RX ADMIN — Medication 25 MILLIGRAM(S): at 05:49

## 2024-04-08 RX ADMIN — SEVELAMER CARBONATE 1600 MILLIGRAM(S): 2400 POWDER, FOR SUSPENSION ORAL at 17:58

## 2024-04-08 RX ADMIN — APIXABAN 2.5 MILLIGRAM(S): 2.5 TABLET, FILM COATED ORAL at 17:58

## 2024-04-08 RX ADMIN — LIDOCAINE 1 PATCH: 4 CREAM TOPICAL at 08:15

## 2024-04-08 RX ADMIN — Medication 975 MILLIGRAM(S): at 05:49

## 2024-04-08 RX ADMIN — OXYCODONE HYDROCHLORIDE 10 MILLIGRAM(S): 5 TABLET ORAL at 13:51

## 2024-04-08 NOTE — PROGRESS NOTE ADULT - SUBJECTIVE AND OBJECTIVE BOX
chief complaint- Pain in R hip / difficulty ambulating      39yo Male PMHx HTN, anemia of chronic disease, ESRD on HD (M/W/F via right AV fistula), left hip arthroplasty in 8/23, history of right leg fracture presented to [facility] from [facility] on 3/6 after falling during a wheelchair transfer landing on his right knee. He is wheelchair bound since his left hip surgery. CT hip with acute garden type 1 minimally valgus impacted transcervical right femoral neck fracture and subacute right pubic body and inferior rami fractures. He is s/p right hip hemiarthroplasty. Post op course complicated by anemia s/p transfusion. Hospital course complicated by hypoglycemia after being shifted for hyperkalemia. Further complicated by febrile 101.6F found to be covid positive and completed remdisivir. He was started on eliquis 2.5mg BID for elevated d-dimers and negative LE duplex.     Patient was evaluated by PM&R and therapy for functional deficits, gait/ADL impairments and acute rehabilitation was recommended. Patient was medically optimized for discharge to [facility] IRU on 3/27        ROS/subjective:  patient seen and evaluated bedside  no events overnight  continued ambulation in PT, Right knee pain stable- reports Left Groin pain on standing  Prior Xrays reviewed- ? sinking of prosthesis in Left hip  CT [place] no prosthetic abnormality  HD yesterday- went well  no dizziness, no chest pain, no nausea, no vomiting, no SOB, no headaches  moved bowels 4/6      MEDICATIONS  (STANDING):  acetaminophen     Tablet .. 975 milliGRAM(s) Oral every 8 hours  apixaban 2.5 milliGRAM(s) Oral two times a day  AQUAPHOR (petrolatum Ointment) 1 Application(s) Topical two times a day  aspirin enteric coated 81 milliGRAM(s) Oral two times a day  epoetin carito-epbx (RETACRIT) Injectable 89558 Unit(s) IV Push <User Schedule>  lidocaine   4% Patch 1 Patch Transdermal <User Schedule>  metoprolol tartrate 25 milliGRAM(s) Oral two times a day  Nephro-pat 1 Tablet(s) Oral daily  senna 2 Tablet(s) Oral at bedtime  sevelamer carbonate 1600 milliGRAM(s) Oral three times a day with meals    MEDICATIONS  (PRN):  aluminum hydroxide/magnesium hydroxide/simethicone Suspension 30 milliLiter(s) Oral every 4 hours PRN Dyspepsia  melatonin 6 milliGRAM(s) Oral at bedtime PRN Insomnia  oxyCODONE    IR 5 milliGRAM(s) Oral every 4 hours PRN Moderate Pain (4 - 6)  oxyCODONE    IR 10 milliGRAM(s) Oral every 4 hours PRN Severe Pain (7 - 10)  polyethylene glycol 3350 17 Gram(s) Oral daily PRN Constipation                  CAPILLARY BLOOD GLUCOSE          Vital Signs Last 24 Hrs  T(C): 36.7 (08 Apr 2024 08:13), Max: 36.7 (08 Apr 2024 08:13)  T(F): 98.1 (08 Apr 2024 08:13), Max: 98.1 (08 Apr 2024 08:13)  HR: 67 (08 Apr 2024 08:13) (67 - 87)  BP: 161/85 (08 Apr 2024 08:13) (130/70 - 161/85)  BP(mean): --  RR: 17 (08 Apr 2024 08:13) (17 - 18)  SpO2: 100% (08 Apr 2024 08:13) (98% - 100%)    Parameters below as of 08 Apr 2024 08:13  Patient On (Oxygen Delivery Method): room air        Gen - NAD, Comfortable  HEENT - NCAT, EOMI, MMM  Neck - Supple, No limited ROM  Pulm - CTAB, No wheeze, No rhonchi, No crackles  Cardiovascular - RRR, S1S2, No murmurs  Chest - good chest expansion, good respiratory effort  Abdomen - Soft, NT/ND, +BS. BM 3/26  Extremities - Right FA AV fistula + bruit and thrill. GIDEON old AV fistula not active, Right Knee with degenerative chnages  Neuro-     Cognitive - awake, alert, oriented to person, place, date, year, and situation.  Able  to follow command     Communication - Fluent, Comprehensible, No dysarthria, No aphasia        Memory:  Recent/ remote memory intact     Cranial Nerves - CN 2-12 intact. No facial asymmetry, Tongue midline, EOMI, Shoulder shrug intact     Motor -                    LEFT    UE - ShAB 5/5, EF 5/5, EE 5/5,  5/5                    RIGHT UE - ShAB 5/5, EF 5/5, EE 5/5,   5/5                    LEFT    LE - HF 4/5, KE 3+/5, DF 4/5, PF 4/5                    RIGHT LE - HF 3+/5, KE 3/5, DF 4/5, PF 4/5   moves Right knee better     Sensory - Intact bilaterally      Tone - normal  MSK: b/l hip discomfort with movement OA changes Right Knee- tender to palpation superior/ lat aspect of knee joint  Psychiatric - Mood stable, Affect WNL  Skin: right hip surgical site with steri strips CDI. b/l sacrum/coccyx DTI .    IDT Camilla 4/8  tentative Dc  4/16 to home- 24x7 supervision- ? Brother to Provide

## 2024-04-08 NOTE — PROGRESS NOTE ADULT - SUBJECTIVE AND OBJECTIVE BOX
Seen on HD    Vital Signs Last 24 Hrs  T(C): 37.4 (04-08-24 @ 20:11), Max: 37.4 (04-08-24 @ 20:11)  T(F): 99.3 (04-08-24 @ 20:11), Max: 99.3 (04-08-24 @ 20:11)  HR: 81 (04-08-24 @ 20:11) (67 - 87)  BP: 130/72 (04-08-24 @ 20:11) (130/70 - 161/85)  RR: 16 (04-08-24 @ 20:11) (16 - 18)  SpO2: 100% (04-08-24 @ 20:11) (98% - 100%)    I&O's Detail    08 Apr 2024 07:01  -  08 Apr 2024 20:13  --------------------------------------------------------  OUT:    Other (mL): 2000 mL  Total OUT: 2000 mL    s1s2  b/l air entry  soft, ND  tr edema                                                                           7.3    4.36  )-----------( 245      ( 08 Apr 2024 14:10 )             23.4     08 Apr 2024 14:10    141    |  99     |  101    ----------------------------<  92     5.5     |  25     |  10.70    Ca    9.1        08 Apr 2024 14:10  Phos  6.5       08 Apr 2024 14:10    acetaminophen     Tablet .. 975 milliGRAM(s) Oral every 8 hours  aluminum hydroxide/magnesium hydroxide/simethicone Suspension 30 milliLiter(s) Oral every 4 hours PRN  apixaban 2.5 milliGRAM(s) Oral two times a day  AQUAPHOR (petrolatum Ointment) 1 Application(s) Topical two times a day  aspirin enteric coated 81 milliGRAM(s) Oral two times a day  epoetin carito-epbx (RETACRIT) Injectable 42858 Unit(s) IV Push <User Schedule>  lidocaine   4% Patch 1 Patch Transdermal <User Schedule>  melatonin 6 milliGRAM(s) Oral at bedtime PRN  metoprolol tartrate 25 milliGRAM(s) Oral two times a day  Nephro-pat 1 Tablet(s) Oral daily  oxyCODONE    IR 5 milliGRAM(s) Oral every 4 hours PRN  oxyCODONE    IR 10 milliGRAM(s) Oral every 4 hours PRN  polyethylene glycol 3350 17 Gram(s) Oral daily PRN  senna 2 Tablet(s) Oral at bedtime  sevelamer carbonate 1600 milliGRAM(s) Oral three times a day with meals    A/P:    ESRD on HD   S/p fall, R hip fx  S/p R hip HA 3/7  HD today w/2kg fluid removal  Epoetin w/HD 3 x week   Renal diet  Renvela for high Phos  CBC, BMP w/HD    [phone]

## 2024-04-08 NOTE — PROGRESS NOTE ADULT - SUBJECTIVE AND OBJECTIVE BOX
|-------------------------------------------|                       Subjective   |-------------------------------------------|    No events overnight  No new complaints to offer me    |------------------------------------------|                       Objective  |------------------------------------------|    Vital Signs Last 24 Hrs  T(F): 98.1 (08 Apr 2024 08:13), Max: 98.1 (08 Apr 2024 08:13)  HR: 67 (08 Apr 2024 08:13) (67 - 87)  BP: 161/85 (08 Apr 2024 08:13) (130/70 - 161/85)  RR: 17 (08 Apr 2024 08:13) (17 - 18)  SpO2: 100% (08 Apr 2024 08:13) (98% - 100%)  I&O's Summary      Examination:   General: NAD, Comfortable  Pulm: CTA BL No Rhonchi Rales or wheezes  Neck: Supple, No JVD  CVS: RRR No rubs murmurs or gallops  Abdomen: Soft, Nontender, Nondistended; No masses or organomegally  Extremities: No calf tenderness, No pitting edema    Labs:

## 2024-04-08 NOTE — PROGRESS NOTE ADULT - ASSESSMENT
BRIDGET NORTH is a 38y M with HTN, anemia of chronic disease, ESRD on HD (M/W/F via right AV fistula), left hip arthroplasty in 8/23, history of right leg fracture presented to [facility] on 3/6 after a fall during a wheelchair transfer. He was found to have a right femoral neck fracture and subacute pubic body and inferior rami fractures. He is s/p right hip hemiarthroplasty. Hospital course complicated by post-op anemia s/p transfusion, hypoglycemia, hyperkalemia, febrile 2/2 covid s/p remdisivir, and started on eliquis 2.5mg BID with negative dvts on LE duplex. Patient now admitted for a multidisciplinary rehab program. 03-27-24 @ 16:17      #Right Femoral Neck fracture   - s/p right hip hemiarthroplasty on 3/7  - pain med as below  - Dressing and staples removed on 3/22  - Comprehensive Multidisciplinary Rehab Program:  comprehensive rehab program of PT/OT/SLP - 3 hours a day, 5 days a week. P&O as needed.   - precautions: WBAT RLE. Anterior hip.   Right Knee pain- arthrosis/ effusion- no Fx- renal osteodystrophy- ICE/ Lidoderm/ Rehab/ ACE  Social work to confirm DC paln- Nered 24x7 assisatnace at home- ? Brother/ ? Cousin  stairs not an issue secondary to EPRCY- can have ambulance to transfer    #ESRD  - HDS via right AV fistula on m/w/f.  ( Last HD 3/27). Old HDS left UA  - Nephro consult placed. Dr Blanco aware.   - sevelamer 800mg TID  - retacrit m/w/f    #HTN  - toprol 25mg BID  - Monitor BP    #Pain  - Tylenol PRN  - oxy 10mg moderate, 15mg severe.   Right Knee pain- lidoderm during the day/ICE/ ACE- Better  Left Hip apin- all studies reviewed- recent CT 3/6- no evidence of abnormality in Left prosthesis  will follow clinically- if pain persists can reimgae    #Mood / Cognition  - Neuropsychology consult PRN  - recreation therapy PRN    #Sleep  - Melatonin PRN     #GI / Bowel  #dyspepsia  - Senna qHS  - Miralax PRN -  - maalox  - GI ppx: none.     # / Bladder  - does not void ESRD    #Skin / Pressure injury  - Skin assessment on admission performed : right hip surgical site with steri strips CDI. coccyx small stage 2-using barrier cream  - Monitor Incisions:    - wound care following at [facility] for b/l sacrum/coccyx DTI wth evolution.   - wound care to follow  - turn and reposition q2h      #Diet:  - Diet Consistency: Renal restriction  - Nutrition consult    #DVT prophylaxis:   - eliquis 2.5mg BID  - SCDs  - Last LE Doppler normal on 3/14

## 2024-04-08 NOTE — PROGRESS NOTE ADULT - ASSESSMENT
38M HTN, anemia of chronic disease, ESRD on HD (M/W/F via right AV fistula), left hip arthroplasty in 8/23, history of right leg fracture   Admit Mar6 SP fall from wheel chair, Rt Femoral neck fracture and pubic body fracture  SP Rt Hip replacement  TX to acute rehab   38M HTN, anemia of chronic disease, ESRD on HD (M/W/F via right AV fistula), left hip arthroplasty in 8/23, history of right leg fracture   Admit Mar6 SP fall from wheel chair, Rt Femoral neck fracture and pubic body fracture  SP Rt Hip replacement  TX to acute rehab      #Right Femoral Neck fracture   - s/p right hip hemiarthroplasty on 3/7  - Comprehensive Multidisciplinary Rehab Program  - pain management per rehab     #ESRD  - HDS via right AV fistula on m/w/f.  ( Last HD 3/29). Old HDS left UA  - sevelamer 800mg TID  - retacrit m/w/f    #HTN  - toprol 25mg BID  care per nephrology    #DVT prophylaxis:   - eliquis 2.5mg BID

## 2024-04-09 PROCEDURE — 99232 SBSQ HOSP IP/OBS MODERATE 35: CPT | Mod: GC

## 2024-04-09 PROCEDURE — 99232 SBSQ HOSP IP/OBS MODERATE 35: CPT

## 2024-04-09 RX ADMIN — OXYCODONE HYDROCHLORIDE 10 MILLIGRAM(S): 5 TABLET ORAL at 13:16

## 2024-04-09 RX ADMIN — Medication 81 MILLIGRAM(S): at 05:38

## 2024-04-09 RX ADMIN — OXYCODONE HYDROCHLORIDE 10 MILLIGRAM(S): 5 TABLET ORAL at 14:14

## 2024-04-09 RX ADMIN — Medication 25 MILLIGRAM(S): at 05:38

## 2024-04-09 RX ADMIN — Medication 1 APPLICATION(S): at 17:23

## 2024-04-09 RX ADMIN — SEVELAMER CARBONATE 1600 MILLIGRAM(S): 2400 POWDER, FOR SUSPENSION ORAL at 11:56

## 2024-04-09 RX ADMIN — LIDOCAINE 1 PATCH: 4 CREAM TOPICAL at 05:46

## 2024-04-09 RX ADMIN — Medication 1 TABLET(S): at 11:56

## 2024-04-09 RX ADMIN — Medication 25 MILLIGRAM(S): at 17:22

## 2024-04-09 RX ADMIN — APIXABAN 2.5 MILLIGRAM(S): 2.5 TABLET, FILM COATED ORAL at 17:23

## 2024-04-09 RX ADMIN — SEVELAMER CARBONATE 1600 MILLIGRAM(S): 2400 POWDER, FOR SUSPENSION ORAL at 07:59

## 2024-04-09 RX ADMIN — LIDOCAINE 1 PATCH: 4 CREAM TOPICAL at 07:57

## 2024-04-09 RX ADMIN — Medication 1 APPLICATION(S): at 05:38

## 2024-04-09 RX ADMIN — Medication 975 MILLIGRAM(S): at 05:38

## 2024-04-09 RX ADMIN — Medication 81 MILLIGRAM(S): at 17:22

## 2024-04-09 RX ADMIN — LIDOCAINE 1 PATCH: 4 CREAM TOPICAL at 17:24

## 2024-04-09 RX ADMIN — APIXABAN 2.5 MILLIGRAM(S): 2.5 TABLET, FILM COATED ORAL at 05:38

## 2024-04-09 RX ADMIN — SEVELAMER CARBONATE 1600 MILLIGRAM(S): 2400 POWDER, FOR SUSPENSION ORAL at 17:20

## 2024-04-09 NOTE — PROGRESS NOTE ADULT - ASSESSMENT
38M HTN, anemia of chronic disease, ESRD on HD (M/W/F via right AV fistula), left hip arthroplasty in 8/23, history of right leg fracture   Admit Mar6 SP fall from wheel chair, Rt Femoral neck fracture and pubic body fracture  SP Rt Hip replacement  TX to acute rehab      #Right Femoral Neck fracture   - s/p right hip hemiarthroplasty on 3/7  - Comprehensive Multidisciplinary Rehab Program  - pain management per rehab     #ESRD  - HDS via right AV fistula on m/w/f.  ( Last HD 3/29). Old HDS left UA  - sevelamer 800mg TID  - retacrit m/w/f    #HTN  - toprol 25mg BID  care per nephrology    #DVT prophylaxis:   - eliquis 2.5mg BID

## 2024-04-09 NOTE — PROGRESS NOTE ADULT - ASSESSMENT
BRIDGET NORTH is a 38y M with HTN, anemia of chronic disease, ESRD on HD (M/W/F via right AV fistula), left hip arthroplasty in 8/23, history of right leg fracture presented to [facility] on 3/6 after a fall during a wheelchair transfer. He was found to have a right femoral neck fracture and subacute pubic body and inferior rami fractures. He is s/p right hip hemiarthroplasty. Hospital course complicated by post-op anemia s/p transfusion, hypoglycemia, hyperkalemia, febrile 2/2 covid s/p remdisivir, and started on eliquis 2.5mg BID with negative dvts on LE duplex. Patient now admitted for a multidisciplinary rehab program. 03-27-24 @ 16:17      #Right Femoral Neck fracture   - s/p right hip hemiarthroplasty on 3/7  - pain med as below  - Dressing and staples removed on 3/22  - Comprehensive Multidisciplinary Rehab Program:  comprehensive rehab program of PT/OT   - precautions: WBAT RLE. Anterior hip.   - Right Knee pain- arthrosis/ effusion- no Fx- renal osteodystrophy- ICE/ Lidoderm/ Rehab/ ACE  - Social work to confirm DC plan- Need 24x7 assistance at home- ? Brother/ ? Cousin  - stairs not an issue secondary to PERCY- can have ambulance to transfer    #ESRD  - HDS via right AV fistula on m/w/f.  ( Last HD 3/27). Old HDS left UA  - Nephro consult placed. Dr Blanco aware.   - sevelamer 800mg TID  - retacrit m/w/f    #HTN  - toprol 25mg BID  - Monitor BP    #Pain  - Tylenol PRN  - oxy 10mg moderate, 15mg severe.   Right Knee pain- lidoderm during the day/ICE/ ACE- Better  Left Hip pain - all studies reviewed- recent CT 3/6- no evidence of abnormality in Left prosthesis  will follow clinically- if pain persists can reimgae    #Sleep  - Melatonin PRN     #GI / Bowel  #dyspepsia  - Senna qHS  - Miralax PRN -  - maalox    # / Bladder  - does not void ESRD    #Skin / Pressure injury  - Skin assessment on admission performed : right hip surgical site with steri strips CDI. coccyx small stage 2-using barrier cream  - Monitor Incisions:    - wound care following at [facility] for b/l sacrum/coccyx DTI wth evolution.   - wound care to follow  - turn and reposition q2h      #Diet:  - Diet Consistency: Renal restriction  - Nutrition consult    #DVT prophylaxis:   - eliquis 2.5mg BID  - Last LE Doppler normal on 3/14

## 2024-04-09 NOTE — PROGRESS NOTE ADULT - SUBJECTIVE AND OBJECTIVE BOX
HPI:  37yo Male PMHx HTN, anemia of chronic disease, ESRD on HD (M/W/F via right AV fistula), left hip arthroplasty in 8/23, history of right leg fracture presented to [facility] from [facility] on 3/6 after falling during a wheelchair transfer landing on his right knee. He is wheelchair bound since his left hip surgery. CT hip with acute garden type 1 minimally valgus impacted transcervical right femoral neck fracture and subacute right pubic body and inferior rami fractures. He is s/p right hip hemiarthroplasty. Post op course complicated by anemia s/p transfusion. Hospital course complicated by hypoglycemia after being shifted for hyperkalemia. Further complicated by febrile 101.6F found to be covid positive and completed remdisivir. He was started on eliquis 2.5mg BID for elevated d-dimers and negative LE duplex.     Patient was evaluated by PM&R and therapy for functional deficits, gait/ADL impairments and acute rehabilitation was recommended. Patient was medically optimized for discharge to [facility] IRU on 3/27 (27 Mar 2024 15:19)      ROS/subjective:  patient seen and evaluated bedside  no events overnight  continued ambulation in PT, Right knee pain stable- reports Left Groin pain on standing  Prior Xrays reviewed-no acute changes on CT March/CT [place] no prosthetic abnormality  no dizziness, no chest pain, no nausea, no vomiting, no SOB, no headaches  moved bowels 4/6      MEDICATIONS  (STANDING):  acetaminophen     Tablet .. 975 milliGRAM(s) Oral every 8 hours  apixaban 2.5 milliGRAM(s) Oral two times a day  AQUAPHOR (petrolatum Ointment) 1 Application(s) Topical two times a day  aspirin enteric coated 81 milliGRAM(s) Oral two times a day  epoetin carito-epbx (RETACRIT) Injectable 77744 Unit(s) IV Push <User Schedule>  lidocaine   4% Patch 1 Patch Transdermal <User Schedule>  metoprolol tartrate 25 milliGRAM(s) Oral two times a day  Nephro-pat 1 Tablet(s) Oral daily  senna 2 Tablet(s) Oral at bedtime  sevelamer carbonate 1600 milliGRAM(s) Oral three times a day with meals    MEDICATIONS  (PRN):  aluminum hydroxide/magnesium hydroxide/simethicone Suspension 30 milliLiter(s) Oral every 4 hours PRN Dyspepsia  melatonin 6 milliGRAM(s) Oral at bedtime PRN Insomnia  oxyCODONE    IR 5 milliGRAM(s) Oral every 4 hours PRN Moderate Pain (4 - 6)  oxyCODONE    IR 10 milliGRAM(s) Oral every 4 hours PRN Severe Pain (7 - 10)  polyethylene glycol 3350 17 Gram(s) Oral daily PRN Constipation                            7.3    4.36  )-----------( 245      ( 08 Apr 2024 14:10 )             23.4     04-08    141  |  99  |  101<H>  ----------------------------<  92  5.5<H>   |  25  |  10.70<H>    Ca    9.1      08 Apr 2024 14:10  Phos  6.5     04-08      Urinalysis Basic - ( 08 Apr 2024 14:10 )    Color: x / Appearance: x / SG: x / pH: x  Gluc: 92 mg/dL / Ketone: x  / Bili: x / Urobili: x   Blood: x / Protein: x / Nitrite: x   Leuk Esterase: x / RBC: x / WBC x   Sq Epi: x / Non Sq Epi: x / Bacteria: x      Vital Signs Last 24 Hrs  T(C): 36.7 (09 Apr 2024 07:31), Max: 37.4 (08 Apr 2024 20:11)  T(F): 98 (09 Apr 2024 07:31), Max: 99.3 (08 Apr 2024 20:11)  HR: 71 (09 Apr 2024 07:31) (71 - 87)  BP: 125/50 (09 Apr 2024 07:31) (125/50 - 150/80)  BP(mean): --  RR: 16 (09 Apr 2024 07:31) (16 - 16)  SpO2: 99% (09 Apr 2024 07:31) (99% - 100%)    Parameters below as of 09 Apr 2024 07:31  Patient On (Oxygen Delivery Method): room air      Gen - NAD, Comfortable  HEENT - NCAT, EOMI, MMM  Neck - Supple, No limited ROM  Pulm - CTAB, No wheeze, No rhonchi, No crackles  Cardiovascular - RRR, S1S2, No murmurs  Chest - good chest expansion, good respiratory effort  Abdomen - Soft, NT/ND, +BS. BM 3/26  Extremities - Right FA AV fistula + bruit and thrill. GIDEON old AV fistula not active, Right Knee with degenerative chnages  Neuro-     Cognitive - awake, alert, oriented to person, place, date, year, and situation.  Able  to follow command     Communication - Fluent, Comprehensible, No dysarthria, No aphasia        Memory:  Recent/ remote memory intact     Cranial Nerves - CN 2-12 intact. No facial asymmetry, Tongue midline, EOMI, Shoulder shrug intact     Motor -                    LEFT    UE - ShAB 5/5, EF 5/5, EE 5/5,  5/5                    RIGHT UE - ShAB 5/5, EF 5/5, EE 5/5,   5/5                    LEFT    LE - HF 4/5, KE 3+/5, DF 4/5, PF 4/5                    RIGHT LE - HF 3+/5, KE 3/5, DF 4/5, PF 4/5   moves Right knee better     Sensory - Intact bilaterally      Tone - normal  MSK: b/l hip discomfort with movement OA changes Right Knee- tender to palpation superior/ lat aspect of knee joint  Psychiatric - Mood stable, Affect WNL  Skin: right hip surgical site with steri strips CDI. b/l sacrum/coccyx DTI .    IDT Camilla 4/8  tentative Dc  4/16 to home- 24x7 supervision- ? Brother to Provide      Continue comprehensive acute rehab program consisting of 3hrs/day of OT/PT. HPI:  37yo Male PMHx HTN, anemia of chronic disease, ESRD on HD (M/W/F via right AV fistula), left hip arthroplasty in 8/23, history of right leg fracture presented to [facility] from [facility] on 3/6 after falling during a wheelchair transfer landing on his right knee. He is wheelchair bound since his left hip surgery. CT hip with acute garden type 1 minimally valgus impacted transcervical right femoral neck fracture and subacute right pubic body and inferior rami fractures. He is s/p right hip hemiarthroplasty. Post op course complicated by anemia s/p transfusion. Hospital course complicated by hypoglycemia after being shifted for hyperkalemia. Further complicated by febrile 101.6F found to be covid positive and completed remdisivir. He was started on eliquis 2.5mg BID for elevated d-dimers and negative LE duplex.     Patient was evaluated by PM&R and therapy for functional deficits, gait/ADL impairments and acute rehabilitation was recommended. Patient was medically optimized for discharge to  IRU on 3/27 (27 Mar 2024 15:19)      ROS/subjective:  patient seen and evaluated bedside  no events overnight  continued ambulation in PT, Right knee pain stable- reports Left Groin pain on standing  Prior Xrays reviewed-no acute changes on CT March/CT [place] no prosthetic abnormality  no dizziness, no chest pain, no nausea, no vomiting, no SOB, no headaches  moved bowels 4/7      MEDICATIONS  (STANDING):  acetaminophen     Tablet .. 975 milliGRAM(s) Oral every 8 hours  apixaban 2.5 milliGRAM(s) Oral two times a day  AQUAPHOR (petrolatum Ointment) 1 Application(s) Topical two times a day  aspirin enteric coated 81 milliGRAM(s) Oral two times a day  epoetin carito-epbx (RETACRIT) Injectable 00689 Unit(s) IV Push <User Schedule>  lidocaine   4% Patch 1 Patch Transdermal <User Schedule>  metoprolol tartrate 25 milliGRAM(s) Oral two times a day  Nephro-pat 1 Tablet(s) Oral daily  senna 2 Tablet(s) Oral at bedtime  sevelamer carbonate 1600 milliGRAM(s) Oral three times a day with meals    MEDICATIONS  (PRN):  aluminum hydroxide/magnesium hydroxide/simethicone Suspension 30 milliLiter(s) Oral every 4 hours PRN Dyspepsia  melatonin 6 milliGRAM(s) Oral at bedtime PRN Insomnia  oxyCODONE    IR 5 milliGRAM(s) Oral every 4 hours PRN Moderate Pain (4 - 6)  oxyCODONE    IR 10 milliGRAM(s) Oral every 4 hours PRN Severe Pain (7 - 10)  polyethylene glycol 3350 17 Gram(s) Oral daily PRN Constipation                            7.3    4.36  )-----------( 245      ( 08 Apr 2024 14:10 )             23.4     04-08    141  |  99  |  101<H>  ----------------------------<  92  5.5<H>   |  25  |  10.70<H>    Ca    9.1      08 Apr 2024 14:10  Phos  6.5     04-08      Urinalysis Basic - ( 08 Apr 2024 14:10 )    Color: x / Appearance: x / SG: x / pH: x  Gluc: 92 mg/dL / Ketone: x  / Bili: x / Urobili: x   Blood: x / Protein: x / Nitrite: x   Leuk Esterase: x / RBC: x / WBC x   Sq Epi: x / Non Sq Epi: x / Bacteria: x      Vital Signs Last 24 Hrs  T(C): 36.7 (09 Apr 2024 07:31), Max: 37.4 (08 Apr 2024 20:11)  T(F): 98 (09 Apr 2024 07:31), Max: 99.3 (08 Apr 2024 20:11)  HR: 71 (09 Apr 2024 07:31) (71 - 87)  BP: 125/50 (09 Apr 2024 07:31) (125/50 - 150/80)  BP(mean): --  RR: 16 (09 Apr 2024 07:31) (16 - 16)  SpO2: 99% (09 Apr 2024 07:31) (99% - 100%)    Parameters below as of 09 Apr 2024 07:31  Patient On (Oxygen Delivery Method): room air      Gen - NAD, Comfortable  HEENT - NCAT, EOMI, MMM  Neck - Supple, No limited ROM  Pulm - CTAB, No wheeze, No rhonchi, No crackles  Cardiovascular - RRR, S1S2, No murmurs  Chest - good chest expansion, good respiratory effort  Abdomen - Soft, NT/ND, +BS. BM 3/26  Extremities - Right FA AV fistula + bruit and thrill. GIDEON old AV fistula not active, Right Knee with degenerative chnages  Neuro-     Cognitive - awake, alert, oriented to person, place, date, year, and situation.  Able  to follow command     Communication - Fluent, Comprehensible, No dysarthria, No aphasia        Memory:  Recent/ remote memory intact     Cranial Nerves - CN 2-12 intact. No facial asymmetry, Tongue midline, EOMI, Shoulder shrug intact     Motor -                    LEFT    UE - ShAB 5/5, EF 5/5, EE 5/5,  5/5                    RIGHT UE - ShAB 5/5, EF 5/5, EE 5/5,   5/5                    LEFT    LE - HF 4/5, KE 3+/5, DF 4/5, PF 4/5                    RIGHT LE - HF 3+/5, KE 3/5, DF 4/5, PF 4/5   moves Right knee better     Sensory - Intact bilaterally      Tone - normal  MSK: b/l hip discomfort with movement OA changes Right Knee- tender to palpation superior/ lat aspect of knee joint  Psychiatric - Mood stable, Affect WNL  Skin: right hip surgical site with steri strips CDI. b/l sacrum/coccyx DTI .    IDT Camilla 4/8  tentative Dc  4/16 to home- 24x7 supervision- ? Brother to Provide      Continue comprehensive acute rehab program consisting of 3hrs/day of OT/PT.

## 2024-04-09 NOTE — CHART NOTE - NSCHARTNOTEFT_GEN_A_CORE
Nutrition Follow Up Note  Hospital Course   (Per Electronic Medical Record)    Source:  Patient [X]  Medical Record [X]      Diet:   Renal Diet w/ Thin Liquids (IDDSI Level 0)  Tolerates Diet Consistency Well  No Chewing/Swallowing Difficulties  No Recent Nausea, Vomiting, Diarrhea or Constipation (as Per Patient)  Consumes % of Meals Usually (as Per Documentation) - States Good PO Intake/Appetite (Per Patient)  on Nepro 8oz BID (Provides 850kcal & 38grams of Protein) - Recommend Decrease Supplement to Daily (Per Patient Request)   on Sanju 1 Packet Daily (Provides 90kcal & 14grams of Protein) - Patient Takes Nutrition Supplement   Education Provided on Need for Supplementation    Enteral/Parenteral Nutrition: Not Applicable    Current Weight: 199.9lb on 4/8  Obtain Weights Daily  Weight Fluctuates     Pertinent Medications: MEDICATIONS  (STANDING):  acetaminophen     Tablet .. 975 milliGRAM(s) Oral every 8 hours  apixaban 2.5 milliGRAM(s) Oral two times a day  AQUAPHOR (petrolatum Ointment) 1 Application(s) Topical two times a day  aspirin enteric coated 81 milliGRAM(s) Oral two times a day  epoetin carito-epbx (RETACRIT) Injectable 36212 Unit(s) IV Push <User Schedule>  lidocaine   4% Patch 1 Patch Transdermal <User Schedule>  metoprolol tartrate 25 milliGRAM(s) Oral two times a day  Nephro-pat 1 Tablet(s) Oral daily  senna 2 Tablet(s) Oral at bedtime  sevelamer carbonate 1600 milliGRAM(s) Oral three times a day with meals    MEDICATIONS  (PRN):  aluminum hydroxide/magnesium hydroxide/simethicone Suspension 30 milliLiter(s) Oral every 4 hours PRN Dyspepsia  melatonin 6 milliGRAM(s) Oral at bedtime PRN Insomnia  oxyCODONE    IR 5 milliGRAM(s) Oral every 4 hours PRN Moderate Pain (4 - 6)  oxyCODONE    IR 10 milliGRAM(s) Oral every 4 hours PRN Severe Pain (7 - 10)  polyethylene glycol 3350 17 Gram(s) Oral daily PRN Constipation    Pertinent Labs:  04-08 Na141 mmol/L Glu 92 mg/dL K+ 5.5 mmol/L<H> Cr  10.70 mg/dL<H>  mg/dL<H> 04-08 Phos 6.5 mg/dL<H>    Skin: DTI on Coccyx/Sacrum    Edema: None Noted Recently (as Per Documentation)     Last Bowel Movement: on 4/7    Estimated Needs:   [X] No Change Since Previous Assessment    Previous Nutrition Diagnosis:   Severe Malnutrition    Nutrition Diagnosis is [X] Ongoing - Recommend Decrease Supplement to Daily; Education Provided on Need for Supplementation     New Nutrition Diagnosis: [X] Not Applicable    Interventions:   1. Recommend Change Nepro 8oz to Daily (Provides 425kcal & 19grams of Protein)   2. Education Provided on Need for Supplementation   3. Recommend Continue Nutrition Plan of Care     Monitoring & Evaluation:   [X] Weights   [X] PO Intake   [X] Skin Integrity   [X] Follow Up (Per Protocol)  [X] Tolerance to Diet Prescription   [X] Other: Labs     Registered Dietitian/Nutritionist Remains Available.  Marc Francisco RDN, CDN    Phone# [phone]

## 2024-04-09 NOTE — PROGRESS NOTE ADULT - SUBJECTIVE AND OBJECTIVE BOX
|-------------------------------------------|                       Subjective   |-------------------------------------------|    No events overnight  No new complaints to offer me    |------------------------------------------|                       Objective  |------------------------------------------|    Vital Signs Last 24 Hrs  T(F): 98 (09 Apr 2024 07:31), Max: 99.3 (08 Apr 2024 20:11)  HR: 71 (09 Apr 2024 07:31) (71 - 87)  BP: 125/50 (09 Apr 2024 07:31) (125/50 - 150/80)  RR: 16 (09 Apr 2024 07:31) (16 - 16)  SpO2: 99% (09 Apr 2024 07:31) (99% - 100%)  I&O's Summary    08 Apr 2024 07:01  -  09 Apr 2024 07:00  --------------------------------------------------------  IN: 0 mL / OUT: 2000 mL / NET: -2000 mL        Examination:   General: NAD, Comfortable  Pulm: CTA BL No Rhonchi Rales or wheezes  Neck: Supple, No JVD  CVS: RRR No rubs murmurs or gallops  Abdomen: Soft, Nontender, Nondistended; No masses or organomegally  Extremities: No calf tenderness, No pitting edema    Labs:                        7.3    4.36  )-----------( 245      ( 08 Apr 2024 14:10 )             23.4       04-08    141  |  99  |  101  ----------------------------<  92  5.5   |  25  |  10.70    Ca    9.1      08 Apr 2024 14:10  Phos  6.5     04-08

## 2024-04-09 NOTE — PROGRESS NOTE ADULT - NS ATTEND AMEND GEN_ALL_CORE FT
Rehab Attending- Patient seen and examined by me - Case discussed, above note reviewed by me with modifications made    patient seen and evaluated bedside  no overnight issues   last BM 4/7  Left groin pain on weight bearing long standing since fracture  right knee pain controlled  to continue intensive rehab program    Vital Signs Last 24 Hrs  T(C): 36.7 (09 Apr 2024 07:31), Max: 37.4 (08 Apr 2024 20:11)  T(F): 98 (09 Apr 2024 07:31), Max: 99.3 (08 Apr 2024 20:11)  HR: 71 (09 Apr 2024 07:31) (71 - 87)  BP: 125/50 (09 Apr 2024 07:31) (125/50 - 150/80)  BP(mean): --  RR: 16 (09 Apr 2024 07:31) (16 - 16)  SpO2: 99% (09 Apr 2024 07:31) (99% - 100%)    Parameters below as of 09 Apr 2024 07:31  Patient On (Oxygen Delivery Method): room air      Gen - NAD, Comfortable  HEENT - NCAT, EOMI, MMM  Neck - Supple, No limited ROM  Pulm - CTAB, No wheeze, No rhonchi, No crackles  Cardiovascular - RRR, S1S2, No murmurs  Chest - good chest expansion, good respiratory effort  Abdomen - Soft, NT/ND, +BS. BM 3/26  Extremities - Right FA AV fistula + bruit and thrill. GIDEON old AV fistula not active, Right Knee with degenerative chnages  Neuro-     Cognitive - awake, alert, oriented to person, place, date, year, and situation.  Able  to follow command     Communication - Fluent, Comprehensible, No dysarthria, No aphasia        Memory:  Recent/ remote memory intact     Cranial Nerves - CN 2-12 intact. No facial asymmetry, Tongue midline, EOMI, Shoulder shrug intact     Motor -                    LEFT    UE - ShAB 5/5, EF 5/5, EE 5/5,  5/5                    RIGHT UE - ShAB 5/5, EF 5/5, EE 5/5,   5/5                    LEFT    LE - HF 4/5, KE 3+/5, DF 4/5, PF 4/5                    RIGHT LE - HF 3+/5, KE 3/5, DF 4/5, PF 4/5   moves Right knee better     Sensory - Intact bilaterally      Tone - normal  MSK: b/l hip discomfort with movement OA changes Right Knee- tender to palpation superior/ lat aspect of knee joint  Psychiatric - Mood stable, Affect WNL  Skin: right hip surgical site with steri strips CDI. b/l sacrum/coccyx DT

## 2024-04-10 LAB
ANION GAP SERPL CALC-SCNC: 12 MMOL/L — SIGNIFICANT CHANGE UP (ref 5–17)
BUN SERPL-MCNC: 82 MG/DL — HIGH (ref 7–23)
CALCIUM SERPL-MCNC: 9.1 MG/DL — SIGNIFICANT CHANGE UP (ref 8.4–10.5)
CHLORIDE SERPL-SCNC: 101 MMOL/L — SIGNIFICANT CHANGE UP (ref 96–108)
CO2 SERPL-SCNC: 28 MMOL/L — SIGNIFICANT CHANGE UP (ref 22–31)
CREAT SERPL-MCNC: 9.53 MG/DL — HIGH (ref 0.5–1.3)
EGFR: 7 ML/MIN/1.73M2 — LOW
GLUCOSE SERPL-MCNC: 87 MG/DL — SIGNIFICANT CHANGE UP (ref 70–99)
HCT VFR BLD CALC: 22.7 % — LOW (ref 39–50)
HGB BLD-MCNC: 7.1 G/DL — LOW (ref 13–17)
MCHC RBC-ENTMCNC: 30.9 PG — SIGNIFICANT CHANGE UP (ref 27–34)
MCHC RBC-ENTMCNC: 31.3 GM/DL — LOW (ref 32–36)
MCV RBC AUTO: 98.7 FL — SIGNIFICANT CHANGE UP (ref 80–100)
NRBC # BLD: 0 /100 WBCS — SIGNIFICANT CHANGE UP (ref 0–0)
PHOSPHATE SERPL-MCNC: 6.5 MG/DL — HIGH (ref 2.5–4.5)
PLATELET # BLD AUTO: 208 K/UL — SIGNIFICANT CHANGE UP (ref 150–400)
POTASSIUM SERPL-MCNC: 5.2 MMOL/L — SIGNIFICANT CHANGE UP (ref 3.5–5.3)
POTASSIUM SERPL-SCNC: 5.2 MMOL/L — SIGNIFICANT CHANGE UP (ref 3.5–5.3)
RBC # BLD: 2.3 M/UL — LOW (ref 4.2–5.8)
RBC # FLD: 16.2 % — HIGH (ref 10.3–14.5)
SODIUM SERPL-SCNC: 141 MMOL/L — SIGNIFICANT CHANGE UP (ref 135–145)
WBC # BLD: 4.12 K/UL — SIGNIFICANT CHANGE UP (ref 3.8–10.5)
WBC # FLD AUTO: 4.12 K/UL — SIGNIFICANT CHANGE UP (ref 3.8–10.5)

## 2024-04-10 PROCEDURE — 99232 SBSQ HOSP IP/OBS MODERATE 35: CPT | Mod: GC

## 2024-04-10 RX ADMIN — Medication 975 MILLIGRAM(S): at 14:55

## 2024-04-10 RX ADMIN — OXYCODONE HYDROCHLORIDE 10 MILLIGRAM(S): 5 TABLET ORAL at 08:41

## 2024-04-10 RX ADMIN — Medication 975 MILLIGRAM(S): at 20:21

## 2024-04-10 RX ADMIN — Medication 975 MILLIGRAM(S): at 06:17

## 2024-04-10 RX ADMIN — SEVELAMER CARBONATE 1600 MILLIGRAM(S): 2400 POWDER, FOR SUSPENSION ORAL at 08:36

## 2024-04-10 RX ADMIN — Medication 25 MILLIGRAM(S): at 06:17

## 2024-04-10 RX ADMIN — LIDOCAINE 1 PATCH: 4 CREAM TOPICAL at 08:38

## 2024-04-10 RX ADMIN — OXYCODONE HYDROCHLORIDE 10 MILLIGRAM(S): 5 TABLET ORAL at 21:36

## 2024-04-10 RX ADMIN — Medication 975 MILLIGRAM(S): at 14:08

## 2024-04-10 RX ADMIN — SEVELAMER CARBONATE 1600 MILLIGRAM(S): 2400 POWDER, FOR SUSPENSION ORAL at 12:25

## 2024-04-10 RX ADMIN — SEVELAMER CARBONATE 1600 MILLIGRAM(S): 2400 POWDER, FOR SUSPENSION ORAL at 18:11

## 2024-04-10 RX ADMIN — ERYTHROPOIETIN 10000 UNIT(S): 10000 INJECTION, SOLUTION INTRAVENOUS; SUBCUTANEOUS at 14:47

## 2024-04-10 RX ADMIN — APIXABAN 2.5 MILLIGRAM(S): 2.5 TABLET, FILM COATED ORAL at 18:13

## 2024-04-10 RX ADMIN — Medication 1 TABLET(S): at 12:25

## 2024-04-10 RX ADMIN — Medication 81 MILLIGRAM(S): at 06:17

## 2024-04-10 RX ADMIN — Medication 81 MILLIGRAM(S): at 18:14

## 2024-04-10 RX ADMIN — APIXABAN 2.5 MILLIGRAM(S): 2.5 TABLET, FILM COATED ORAL at 06:17

## 2024-04-10 RX ADMIN — OXYCODONE HYDROCHLORIDE 10 MILLIGRAM(S): 5 TABLET ORAL at 09:17

## 2024-04-10 RX ADMIN — Medication 25 MILLIGRAM(S): at 18:13

## 2024-04-10 NOTE — PROGRESS NOTE ADULT - NS ATTEND AMEND GEN_ALL_CORE FT
Rehab Attending- Patient seen and examined by me - Case discussed, above note reviewed by me with modifications made    patient seen and evaluated bedside  no overnight issues   last BM 4/7  Left groin pain on weight bearing long standing since fracture  right knee pain controlled  to continue intensive rehab program    Vital Signs Last 24 Hrs  T(C): 36.7 (09 Apr 2024 07:31), Max: 37.4 (08 Apr 2024 20:11)  T(F): 98 (09 Apr 2024 07:31), Max: 99.3 (08 Apr 2024 20:11)  HR: 71 (09 Apr 2024 07:31) (71 - 87)  BP: 125/50 (09 Apr 2024 07:31) (125/50 - 150/80)  BP(mean): --  RR: 16 (09 Apr 2024 07:31) (16 - 16)  SpO2: 99% (09 Apr 2024 07:31) (99% - 100%)    Parameters below as of 09 Apr 2024 07:31  Patient On (Oxygen Delivery Method): room air      Gen - NAD, Comfortable  HEENT - NCAT, EOMI, MMM  Neck - Supple, No limited ROM  Pulm - CTAB, No wheeze, No rhonchi, No crackles  Cardiovascular - RRR, S1S2, No murmurs  Chest - good chest expansion, good respiratory effort  Abdomen - Soft, NT/ND, +BS. BM 3/26  Extremities - Right FA AV fistula + bruit and thrill. GIDEON old AV fistula not active, Right Knee with degenerative chnages  Neuro-     Cognitive - awake, alert, oriented to person, place, date, year, and situation.  Able  to follow command     Communication - Fluent, Comprehensible, No dysarthria, No aphasia        Memory:  Recent/ remote memory intact     Cranial Nerves - CN 2-12 intact. No facial asymmetry, Tongue midline, EOMI, Shoulder shrug intact     Motor -                    LEFT    UE - ShAB 5/5, EF 5/5, EE 5/5,  5/5                    RIGHT UE - ShAB 5/5, EF 5/5, EE 5/5,   5/5                    LEFT    LE - HF 4/5, KE 3+/5, DF 4/5, PF 4/5                    RIGHT LE - HF 3+/5, KE 3/5, DF 4/5, PF 4/5   moves Right knee better     Sensory - Intact bilaterally      Tone - normal  MSK: b/l hip discomfort with movement OA changes Right Knee- tender to palpation superior/ lat aspect of knee joint  Psychiatric - Mood stable, Affect WNL  Skin: right hip surgical site with steri strips CDI. b/l sacrum/coccyx DT Rehab Attending- Patient seen and examined by me - Case discussed, above note reviewed by me with modifications made    patient seen and evaluated in PT  no overnight issues   last BM 4/9  pain seems controlled today in Right Knee and Left Groin  to continue intensive rehab program    Vital Signs Last 24 Hrs  T(C): 36.7 (10 Apr 2024 17:20), Max: 37.2 (10 Apr 2024 07:14)  T(F): 98 (10 Apr 2024 17:20), Max: 99 (10 Apr 2024 07:14)  HR: 69 (10 Apr 2024 17:20) (69 - 76)  BP: 126/61 (10 Apr 2024 17:20) (126/61 - 156/69)  BP(mean): --  RR: 16 (10 Apr 2024 17:20) (16 - 17)  SpO2: 100% (10 Apr 2024 17:20) (95% - 100%)    Parameters below as of 10 Apr 2024 17:20  Patient On (Oxygen Delivery Method): room air          Gen - NAD, Comfortable  HEENT - NCAT, EOMI, MMM  Neck - Supple, No limited ROM  Pulm - CTAB, No wheeze, No rhonchi, No crackles  Cardiovascular - RRR, S1S2, No murmurs  Chest - good chest expansion, good respiratory effort  Abdomen - Soft, NT/ND, +BS. BM 3/26  Extremities - Right FA AV fistula + bruit and thrill. GIDEON old AV fistula not active, Right Knee with degenerative changes  Neuro-     Cognitive - awake, alert, oriented to person, place, date, year, and situation.  Able  to follow command     Communication - Fluent, Comprehensible, No dysarthria, No aphasia        Memory:  Recent/ remote memory intact     Cranial Nerves - CN 2-12 intact. No facial asymmetry, Tongue midline, EOMI, Shoulder shrug intact     Motor -                    LEFT    UE - ShAB 5/5, EF 5/5, EE 5/5,  5/5                    RIGHT UE - ShAB 5/5, EF 5/5, EE 5/5,   5/5                    LEFT    LE - HF 4/5, KE 3+/5, DF 4/5, PF 4/5                    RIGHT LE - HF 3+/5, KE 3/5, DF 4/5, PF 4/5   moves Right knee better     Sensory - Intact bilaterally      Tone - normal  MSK: b/l hip discomfort with movement OA changes Right Knee- tender to palpation superior/ lat aspect of knee joint  Psychiatric - Mood stable, Affect WNL  Skin: right hip surgical site with steri strips CDI. b/l sacrum/coccyx DT

## 2024-04-10 NOTE — PROGRESS NOTE ADULT - SUBJECTIVE AND OBJECTIVE BOX
HPI:  37yo Male PMHx HTN, anemia of chronic disease, ESRD on HD (M/W/F via right AV fistula), left hip arthroplasty in 8/23, history of right leg fracture presented to [facility] from [facility] on 3/6 after falling during a wheelchair transfer landing on his right knee. He is wheelchair bound since his left hip surgery. CT hip with acute garden type 1 minimally valgus impacted transcervical right femoral neck fracture and subacute right pubic body and inferior rami fractures. He is s/p right hip hemiarthroplasty. Post op course complicated by anemia s/p transfusion. Hospital course complicated by hypoglycemia after being shifted for hyperkalemia. Further complicated by febrile 101.6F found to be covid positive and completed remdisivir. He was started on eliquis 2.5mg BID for elevated d-dimers and negative LE duplex.     Patient was evaluated by PM&R and therapy for functional deficits, gait/ADL impairments and acute rehabilitation was recommended. Patient was medically optimized for discharge to [facility] IRU on 3/27 (27 Mar 2024 15:19)      ROS/subjective:  patient seen and evaluated bedside  no events overnight  continued ambulation in PT, Right knee pain stable- reports Left Groin pain on standing  Prior Xrays reviewed-no acute changes on CT March/CT [place] no prosthetic abnormality  no dizziness, no chest pain, no nausea, no vomiting, no SOB, no headaches  moved bowels 4/7      MEDICATIONS  (STANDING):  acetaminophen     Tablet .. 975 milliGRAM(s) Oral every 8 hours  apixaban 2.5 milliGRAM(s) Oral two times a day  AQUAPHOR (petrolatum Ointment) 1 Application(s) Topical two times a day  aspirin enteric coated 81 milliGRAM(s) Oral two times a day  epoetin carito-epbx (RETACRIT) Injectable 36127 Unit(s) IV Push <User Schedule>  lidocaine   4% Patch 1 Patch Transdermal <User Schedule>  metoprolol tartrate 25 milliGRAM(s) Oral two times a day  Nephro-pat 1 Tablet(s) Oral daily  senna 2 Tablet(s) Oral at bedtime  sevelamer carbonate 1600 milliGRAM(s) Oral three times a day with meals    MEDICATIONS  (PRN):  aluminum hydroxide/magnesium hydroxide/simethicone Suspension 30 milliLiter(s) Oral every 4 hours PRN Dyspepsia  melatonin 6 milliGRAM(s) Oral at bedtime PRN Insomnia  oxyCODONE    IR 10 milliGRAM(s) Oral every 4 hours PRN Severe Pain (7 - 10)  oxyCODONE    IR 5 milliGRAM(s) Oral every 4 hours PRN Moderate Pain (4 - 6)  polyethylene glycol 3350 17 Gram(s) Oral daily PRN Constipation                            7.3    4.36  )-----------( 245      ( 08 Apr 2024 14:10 )             23.4     04-08    141  |  99  |  101<H>  ----------------------------<  92  5.5<H>   |  25  |  10.70<H>    Ca    9.1      08 Apr 2024 14:10  Phos  6.5     04-08      Urinalysis Basic - ( 08 Apr 2024 14:10 )    Color: x / Appearance: x / SG: x / pH: x  Gluc: 92 mg/dL / Ketone: x  / Bili: x / Urobili: x   Blood: x / Protein: x / Nitrite: x   Leuk Esterase: x / RBC: x / WBC x   Sq Epi: x / Non Sq Epi: x / Bacteria: x        Vital Signs Last 24 Hrs  T(C): 37.2 (10 Apr 2024 07:14), Max: 37.2 (10 Apr 2024 07:14)  T(F): 99 (10 Apr 2024 07:14), Max: 99 (10 Apr 2024 07:14)  HR: 69 (10 Apr 2024 07:14) (69 - 78)  BP: 156/69 (10 Apr 2024 07:14) (143/86 - 156/69)  BP(mean): --  RR: 16 (10 Apr 2024 07:14) (16 - 17)  SpO2: 98% (10 Apr 2024 07:14) (95% - 98%)    Parameters below as of 10 Apr 2024 07:14  Patient On (Oxygen Delivery Method): room air      Gen - NAD, Comfortable  HEENT - NCAT, EOMI, MMM  Neck - Supple, No limited ROM  Pulm - CTAB, No wheeze, No rhonchi, No crackles  Cardiovascular - RRR, S1S2, No murmurs  Chest - good chest expansion, good respiratory effort  Abdomen - Soft, NT/ND, +BS. BM 3/26  Extremities - Right FA AV fistula + bruit and thrill. GIDEON old AV fistula not active, Right Knee with degenerative chnages  Neuro-     Cognitive - awake, alert, oriented to person, place, date, year, and situation.  Able  to follow command     Communication - Fluent, Comprehensible, No dysarthria, No aphasia        Memory:  Recent/ remote memory intact     Cranial Nerves - CN 2-12 intact. No facial asymmetry, Tongue midline, EOMI, Shoulder shrug intact     Motor -                    LEFT    UE - ShAB 5/5, EF 5/5, EE 5/5,  5/5                    RIGHT UE - ShAB 5/5, EF 5/5, EE 5/5,   5/5                    LEFT    LE - HF 4/5, KE 3+/5, DF 4/5, PF 4/5                    RIGHT LE - HF 3+/5, KE 3/5, DF 4/5, PF 4/5   moves Right knee better     Sensory - Intact bilaterally      Tone - normal  MSK: b/l hip discomfort with movement OA changes Right Knee- tender to palpation superior/ lat aspect of knee joint  Psychiatric - Mood stable, Affect WNL  Skin: right hip surgical site with steri strips CDI. b/l sacrum/coccyx DTI .    IDT Camilla 4/8  tentative Dc  4/16 to home- 24x7 supervision- ? Brother to Provide      Continue comprehensive acute rehab program consisting of 3hrs/day of OT/PT and SLP. HPI:  39yo Male PMHx HTN, anemia of chronic disease, ESRD on HD (M/W/F via right AV fistula), left hip arthroplasty in 8/23, history of right leg fracture presented to [facility] from [facility] on 3/6 after falling during a wheelchair transfer landing on his right knee. He is wheelchair bound since his left hip surgery. CT hip with acute garden type 1 minimally valgus impacted transcervical right femoral neck fracture and subacute right pubic body and inferior rami fractures. He is s/p right hip hemiarthroplasty. Post op course complicated by anemia s/p transfusion. Hospital course complicated by hypoglycemia after being shifted for hyperkalemia. Further complicated by febrile 101.6F found to be covid positive and completed remdisivir. He was started on eliquis 2.5mg BID for elevated d-dimers and negative LE duplex.     Patient was evaluated by PM&R and therapy for functional deficits, gait/ADL impairments and acute rehabilitation was recommended. Patient was medically optimized for discharge to [facility] IRU on 3/27 (27 Mar 2024 15:19)      ROS/subjective:  patient seen and evaluated in PT  no events overnight  continued ambulation in PT, Right knee pain/left hip apin stable  no dizziness, no chest pain, no nausea, no vomiting, no SOB, no headaches  moved bowels 4/9      MEDICATIONS  (STANDING):  acetaminophen     Tablet .. 975 milliGRAM(s) Oral every 8 hours  apixaban 2.5 milliGRAM(s) Oral two times a day  AQUAPHOR (petrolatum Ointment) 1 Application(s) Topical two times a day  aspirin enteric coated 81 milliGRAM(s) Oral two times a day  epoetin carito-epbx (RETACRIT) Injectable 43118 Unit(s) IV Push <User Schedule>  lidocaine   4% Patch 1 Patch Transdermal <User Schedule>  metoprolol tartrate 25 milliGRAM(s) Oral two times a day  Nephro-pat 1 Tablet(s) Oral daily  senna 2 Tablet(s) Oral at bedtime  sevelamer carbonate 1600 milliGRAM(s) Oral three times a day with meals    MEDICATIONS  (PRN):  aluminum hydroxide/magnesium hydroxide/simethicone Suspension 30 milliLiter(s) Oral every 4 hours PRN Dyspepsia  melatonin 6 milliGRAM(s) Oral at bedtime PRN Insomnia  oxyCODONE    IR 10 milliGRAM(s) Oral every 4 hours PRN Severe Pain (7 - 10)  oxyCODONE    IR 5 milliGRAM(s) Oral every 4 hours PRN Moderate Pain (4 - 6)  polyethylene glycol 3350 17 Gram(s) Oral daily PRN Constipation                            7.3    4.36  )-----------( 245      ( 08 Apr 2024 14:10 )             23.4     04-08    141  |  99  |  101<H>  ----------------------------<  92  5.5<H>   |  25  |  10.70<H>    Ca    9.1      08 Apr 2024 14:10  Phos  6.5     04-08      Urinalysis Basic - ( 08 Apr 2024 14:10 )    Color: x / Appearance: x / SG: x / pH: x  Gluc: 92 mg/dL / Ketone: x  / Bili: x / Urobili: x   Blood: x / Protein: x / Nitrite: x   Leuk Esterase: x / RBC: x / WBC x   Sq Epi: x / Non Sq Epi: x / Bacteria: x        Vital Signs Last 24 Hrs  T(C): 37.2 (10 Apr 2024 07:14), Max: 37.2 (10 Apr 2024 07:14)  T(F): 99 (10 Apr 2024 07:14), Max: 99 (10 Apr 2024 07:14)  HR: 69 (10 Apr 2024 07:14) (69 - 78)  BP: 156/69 (10 Apr 2024 07:14) (143/86 - 156/69)  BP(mean): --  RR: 16 (10 Apr 2024 07:14) (16 - 17)  SpO2: 98% (10 Apr 2024 07:14) (95% - 98%)    Parameters below as of 10 Apr 2024 07:14  Patient On (Oxygen Delivery Method): room air      Gen - NAD, Comfortable  HEENT - NCAT, EOMI, MMM  Neck - Supple, No limited ROM  Pulm - CTAB, No wheeze, No rhonchi, No crackles  Cardiovascular - RRR, S1S2, No murmurs  Chest - good chest expansion, good respiratory effort  Abdomen - Soft, NT/ND, +BS. BM 3/26  Extremities - Right FA AV fistula + bruit and thrill. GIDEON old AV fistula not active, Right Knee with degenerative chnages  Neuro-     Cognitive - awake, alert, oriented to person, place, date, year, and situation.  Able  to follow command     Communication - Fluent, Comprehensible, No dysarthria, No aphasia        Memory:  Recent/ remote memory intact     Cranial Nerves - CN 2-12 intact. No facial asymmetry, Tongue midline, EOMI, Shoulder shrug intact     Motor -                    LEFT    UE - ShAB 5/5, EF 5/5, EE 5/5,  5/5                    RIGHT UE - ShAB 5/5, EF 5/5, EE 5/5,   5/5                    LEFT    LE - HF 4/5, KE 3+/5, DF 4/5, PF 4/5                    RIGHT LE - HF 3+/5, KE 3/5, DF 4/5, PF 4/5   moves Right knee better     Sensory - Intact bilaterally      Tone - normal  MSK: b/l hip discomfort with movement OA changes Right Knee- tender to palpation superior/ lat aspect of knee joint  Psychiatric - Mood stable, Affect WNL  Skin: right hip surgical site with steri strips CDI. b/l sacrum/coccyx DTI .    IDT Camilla 4/8  tentative Dc  4/16 to home- 24x7 supervision- ? Brother to Provide      Continue comprehensive acute rehab program consisting of 3hrs/day of OT/PT and SLP.

## 2024-04-10 NOTE — PROGRESS NOTE ADULT - SUBJECTIVE AND OBJECTIVE BOX
Stable on HD    Vital Signs Last 24 Hrs  T(C): 36.7 (04-10-24 @ 17:20), Max: 37.2 (04-10-24 @ 07:14)  T(F): 98 (04-10-24 @ 17:20), Max: 99 (04-10-24 @ 07:14)  HR: 69 (04-10-24 @ 17:20) (69 - 76)  BP: 126/61 (04-10-24 @ 17:20) (126/61 - 156/69)  RR: 16 (04-10-24 @ 17:20) (16 - 16)  SpO2: 100% (04-10-24 @ 17:20) (98% - 100%)    I&O's Detail    10 Apr 2024 07:01  -  10 Apr 2024 23:00  --------------------------------------------------------  OUT:    Other (mL): 2000 mL  Total OUT: 2000 mL    s1s2  b/l air entry  soft, ND  tr edema                                                                                    7.1    4.12  )-----------( 208      ( 10 Apr 2024 14:30 )             22.7     10 Apr 2024 14:30    141    |  101    |  82     ----------------------------<  87     5.2     |  28     |  9.53     Ca    9.1        10 Apr 2024 14:30  Phos  6.5       10 Apr 2024 14:30    acetaminophen     Tablet .. 975 milliGRAM(s) Oral every 8 hours  aluminum hydroxide/magnesium hydroxide/simethicone Suspension 30 milliLiter(s) Oral every 4 hours PRN  apixaban 2.5 milliGRAM(s) Oral two times a day  AQUAPHOR (petrolatum Ointment) 1 Application(s) Topical two times a day  aspirin enteric coated 81 milliGRAM(s) Oral two times a day  epoetin carito-epbx (RETACRIT) Injectable 30644 Unit(s) IV Push <User Schedule>  lidocaine   4% Patch 1 Patch Transdermal <User Schedule>  melatonin 6 milliGRAM(s) Oral at bedtime PRN  metoprolol tartrate 25 milliGRAM(s) Oral two times a day  Nephro-pat 1 Tablet(s) Oral daily  oxyCODONE    IR 5 milliGRAM(s) Oral every 4 hours PRN  oxyCODONE    IR 10 milliGRAM(s) Oral every 4 hours PRN  polyethylene glycol 3350 17 Gram(s) Oral daily PRN  senna 2 Tablet(s) Oral at bedtime  sevelamer carbonate 1600 milliGRAM(s) Oral three times a day with meals    A/P:    ESRD on HD   S/p fall, R hip fx  S/p R hip HA 3/7  HD today w/2kg fluid removal  Epoetin w/HD 3 x week   Renal diet  Renvela for high Phos  CBC, BMP w/HD    [phone]

## 2024-04-10 NOTE — PROGRESS NOTE ADULT - ASSESSMENT
BRIDGET NORTH is a 38y M with HTN, anemia of chronic disease, ESRD on HD (M/W/F via right AV fistula), left hip arthroplasty in 8/23, history of right leg fracture presented to [facility] on 3/6 after a fall during a wheelchair transfer. He was found to have a right femoral neck fracture and subacute pubic body and inferior rami fractures. He is s/p right hip hemiarthroplasty. Hospital course complicated by post-op anemia s/p transfusion, hypoglycemia, hyperkalemia, febrile 2/2 covid s/p remdisivir, and started on eliquis 2.5mg BID with negative dvts on LE duplex. Patient now admitted for a multidisciplinary rehab program. 03-27-24 @ 16:17      #Right Femoral Neck fracture   - s/p right hip hemiarthroplasty on 3/7  - pain med as below  - Dressing and staples removed on 3/22  - Comprehensive Multidisciplinary Rehab Program:  comprehensive rehab program of PT/OT   - precautions: WBAT RLE. Anterior hip.   - Right Knee pain- arthrosis/ effusion- no Fx- renal osteodystrophy- ICE/ Lidoderm/ Rehab/ ACE  - Social work to confirm DC plan- Need 24x7 assistance at home- ? Brother/ ? Cousin  - stairs not an issue secondary to PERCY- can have ambulance to transfer    #ESRD  - HDS via right AV fistula on m/w/f.  ( Last HD 3/27). Old HDS left UA  - Nephro consult placed. Dr Blanco aware.   - sevelamer 800mg TID  - retacrit m/w/f    #HTN  - toprol 25mg BID  - Monitor BP    #Pain  - Tylenol PRN  - oxy 10mg moderate, 15mg severe.   Right Knee pain- lidoderm during the day/ICE/ ACE- Better  Left Hip pain - all studies reviewed- recent CT 3/6- no evidence of abnormality in Left prosthesis  will follow clinically- if pain persists can reimgae    #Sleep  - Melatonin PRN     #GI / Bowel  #dyspepsia  - Senna qHS  - Miralax PRN -  - maalox    # / Bladder  - does not void ESRD    #Skin / Pressure injury  - Skin assessment on admission performed : right hip surgical site with steri strips CDI. coccyx small stage 2-using barrier cream  - Monitor Incisions:    - wound care following at [facility] for b/l sacrum/coccyx DTI wth evolution.   - wound care to follow  - turn and reposition q2h      #Diet:  - Diet Consistency: Renal restriction  - Nutrition consult    #DVT prophylaxis:   - eliquis 2.5mg BID  - Last LE Doppler normal on 3/14

## 2024-04-11 LAB — SARS-COV-2 RNA SPEC QL NAA+PROBE: SIGNIFICANT CHANGE UP

## 2024-04-11 PROCEDURE — 99232 SBSQ HOSP IP/OBS MODERATE 35: CPT

## 2024-04-11 PROCEDURE — 99232 SBSQ HOSP IP/OBS MODERATE 35: CPT | Mod: GC

## 2024-04-11 RX ADMIN — Medication 25 MILLIGRAM(S): at 18:08

## 2024-04-11 RX ADMIN — Medication 1 TABLET(S): at 12:22

## 2024-04-11 RX ADMIN — Medication 81 MILLIGRAM(S): at 18:08

## 2024-04-11 RX ADMIN — APIXABAN 2.5 MILLIGRAM(S): 2.5 TABLET, FILM COATED ORAL at 05:38

## 2024-04-11 RX ADMIN — SEVELAMER CARBONATE 1600 MILLIGRAM(S): 2400 POWDER, FOR SUSPENSION ORAL at 10:35

## 2024-04-11 RX ADMIN — SEVELAMER CARBONATE 1600 MILLIGRAM(S): 2400 POWDER, FOR SUSPENSION ORAL at 12:22

## 2024-04-11 RX ADMIN — Medication 975 MILLIGRAM(S): at 05:37

## 2024-04-11 RX ADMIN — SEVELAMER CARBONATE 1600 MILLIGRAM(S): 2400 POWDER, FOR SUSPENSION ORAL at 18:08

## 2024-04-11 RX ADMIN — LIDOCAINE 1 PATCH: 4 CREAM TOPICAL at 22:17

## 2024-04-11 RX ADMIN — Medication 1 APPLICATION(S): at 06:32

## 2024-04-11 RX ADMIN — Medication 975 MILLIGRAM(S): at 06:32

## 2024-04-11 RX ADMIN — Medication 25 MILLIGRAM(S): at 05:38

## 2024-04-11 RX ADMIN — Medication 81 MILLIGRAM(S): at 05:38

## 2024-04-11 RX ADMIN — LIDOCAINE 1 PATCH: 4 CREAM TOPICAL at 10:34

## 2024-04-11 RX ADMIN — LIDOCAINE 1 PATCH: 4 CREAM TOPICAL at 19:53

## 2024-04-11 RX ADMIN — APIXABAN 2.5 MILLIGRAM(S): 2.5 TABLET, FILM COATED ORAL at 18:09

## 2024-04-11 NOTE — PROGRESS NOTE ADULT - ASSESSMENT
38M HTN, anemia of chronic disease, ESRD on HD (M/W/F via right AV fistula), left hip arthroplasty in 8/23, history of right leg fracture   Admit Mar6 SP fall from wheel chair, Rt Femoral neck fracture and pubic body fracture  SP Rt Hip replacement  TX to acute rehab      #Right Femoral Neck fracture   - s/p right hip hemiarthroplasty on 3/7  - Comprehensive Multidisciplinary Rehab Program  - pain management per rehab     #ESRD  - HDS via right AV fistula on m/w/f.  ( Last HD 3/29). Old HDS left UA  - sevelamer 800mg TID  - retacrit m/w/f    SP Covid19  Acute infection Mar19  recovered    #HTN  - toprol 25mg BID  care per nephrology    #DVT prophylaxis:   - eliquis 2.5mg BID

## 2024-04-11 NOTE — CHART NOTE - NSCHARTNOTEFT_GEN_A_CORE
Exposed to room mate x 12 hours who is covid pcr + with rhinitis  PT himself is asymptomatic.   PT had COVID19 mar19th.   repeat positivity does not mean he is infectious  risk of him being reinfected x 3 mo (from mar19) is very low

## 2024-04-11 NOTE — PROGRESS NOTE ADULT - SUBJECTIVE AND OBJECTIVE BOX
|-------------------------------------------|                       Subjective   |-------------------------------------------|    No events overnight  No new complaints to offer me    |------------------------------------------|                       Objective  |------------------------------------------|    Vital Signs Last 24 Hrs  T(F): 97 (11 Apr 2024 05:25), Max: 98.4 (10 Apr 2024 14:20)  HR: 70 (11 Apr 2024 05:25) (69 - 73)  BP: 150/75 (11 Apr 2024 05:25) (126/61 - 150/75)  RR: 16 (10 Apr 2024 17:20) (16 - 16)  SpO2: 100% (10 Apr 2024 17:20) (100% - 100%)  I&O's Summary    10 Apr 2024 07:01  -  11 Apr 2024 07:00  --------------------------------------------------------  IN: 0 mL / OUT: 2000 mL / NET: -2000 mL        Examination:   General: NAD, Comfortable  Pulm: CTA BL No Rhonchi Rales or wheezes  Neck: Supple, No JVD  CVS: RRR No rubs murmurs or gallops  Abdomen: Soft, Nontender, Nondistended; No masses or organomegally  Extremities: No calf tenderness, No pitting edema    Labs:                        7.1    4.12  )-----------( 208      ( 10 Apr 2024 14:30 )             22.7       04-10    141  |  101  |  82  ----------------------------<  87  5.2   |  28  |  9.53    Ca    9.1      10 Apr 2024 14:30  Phos  6.5     04-10

## 2024-04-11 NOTE — PROGRESS NOTE ADULT - NS ATTEND AMEND GEN_ALL_CORE FT
Rehab Attending- Patient seen and examined by me - Case discussed, above note reviewed by me with modifications made    patient seen and evaluated in PT  no overnight issues   last BM 4/9  pain seems controlled today in Right Knee and Left Groin  to continue intensive rehab program    Vital Signs Last 24 Hrs  T(C): 36.7 (10 Apr 2024 17:20), Max: 37.2 (10 Apr 2024 07:14)  T(F): 98 (10 Apr 2024 17:20), Max: 99 (10 Apr 2024 07:14)  HR: 69 (10 Apr 2024 17:20) (69 - 76)  BP: 126/61 (10 Apr 2024 17:20) (126/61 - 156/69)  BP(mean): --  RR: 16 (10 Apr 2024 17:20) (16 - 17)  SpO2: 100% (10 Apr 2024 17:20) (95% - 100%)    Parameters below as of 10 Apr 2024 17:20  Patient On (Oxygen Delivery Method): room air          Gen - NAD, Comfortable  HEENT - NCAT, EOMI, MMM  Neck - Supple, No limited ROM  Pulm - CTAB, No wheeze, No rhonchi, No crackles  Cardiovascular - RRR, S1S2, No murmurs  Chest - good chest expansion, good respiratory effort  Abdomen - Soft, NT/ND, +BS. BM 3/26  Extremities - Right FA AV fistula + bruit and thrill. GIDEON old AV fistula not active, Right Knee with degenerative changes  Neuro-     Cognitive - awake, alert, oriented to person, place, date, year, and situation.  Able  to follow command     Communication - Fluent, Comprehensible, No dysarthria, No aphasia        Memory:  Recent/ remote memory intact     Cranial Nerves - CN 2-12 intact. No facial asymmetry, Tongue midline, EOMI, Shoulder shrug intact     Motor -                    LEFT    UE - ShAB 5/5, EF 5/5, EE 5/5,  5/5                    RIGHT UE - ShAB 5/5, EF 5/5, EE 5/5,   5/5                    LEFT    LE - HF 4/5, KE 3+/5, DF 4/5, PF 4/5                    RIGHT LE - HF 3+/5, KE 3/5, DF 4/5, PF 4/5   moves Right knee better     Sensory - Intact bilaterally      Tone - normal  MSK: b/l hip discomfort with movement OA changes Right Knee- tender to palpation superior/ lat aspect of knee joint  Psychiatric - Mood stable, Affect WNL  Skin: right hip surgical site with steri strips CDI. b/l sacrum/coccyx DT Rehab Attending- Patient seen and examined by me - Case discussed, above note reviewed by me with modifications made    patient seen and evaluated in PT  no overnight issues   last BM 4/9  pain  controlled today in Right Knee and Left Groin  labs reviewed post hemo-stable  to continue intensive rehab program    Vital Signs Last 24 Hrs  T(C): 36.7 (11 Apr 2024 09:00), Max: 36.7 (10 Apr 2024 17:20)  T(F): 98.1 (11 Apr 2024 09:00), Max: 98.1 (11 Apr 2024 09:00)  HR: 72 (11 Apr 2024 09:00) (69 - 72)  BP: 152/47 (11 Apr 2024 09:00) (126/61 - 152/47)  BP(mean): --  RR: 16 (11 Apr 2024 09:00) (16 - 16)  SpO2: 99% (11 Apr 2024 09:00) (99% - 100%)    Parameters below as of 11 Apr 2024 09:00  Patient On (Oxygen Delivery Method): room air      Gen - NAD, Comfortable  HEENT - NCAT, EOMI, MMM  Neck - Supple, No limited ROM  Pulm - CTAB, No wheeze, No rhonchi, No crackles  Cardiovascular - RRR, S1S2, No murmurs  Chest - good chest expansion, good respiratory effort  Abdomen - Soft, NT/ND, +BS. BM 3/26  Extremities - Right FA AV fistula + bruit and thrill. GIDEON old AV fistula not active, Right Knee with degenerative changes  Neuro-     Cognitive - awake, alert, oriented to person, place, date, year, and situation.  Able  to follow command     Communication - Fluent, Comprehensible, No dysarthria, No aphasia        Memory:  Recent/ remote memory intact     Cranial Nerves - CN 2-12 intact. No facial asymmetry, Tongue midline, EOMI, Shoulder shrug intact     Motor -                    LEFT    UE - ShAB 5/5, EF 5/5, EE 5/5,  5/5                    RIGHT UE - ShAB 5/5, EF 5/5, EE 5/5,   5/5                    LEFT    LE - HF 4/5, KE 3+/5, DF 4/5, PF 4/5                    RIGHT LE - HF 3+/5, KE 3/5, DF 4/5, PF 4/5   moves Right knee better     Sensory - Intact bilaterally      Tone - normal  MSK: b/l hip discomfort with movement OA changes Right Knee- tender to palpation superior/ lat aspect of knee joint  Psychiatric - Mood stable, Affect WNL  Skin: right hip surgical site with steri strips CDI. b/l sacrum/coccyx DT

## 2024-04-11 NOTE — PROGRESS NOTE ADULT - SUBJECTIVE AND OBJECTIVE BOX
Resting    Vital Signs Last 24 Hrs  T(C): 36.7 (04-11-24 @ 09:00), Max: 36.7 (04-11-24 @ 09:00)  T(F): 98.1 (04-11-24 @ 09:00), Max: 98.1 (04-11-24 @ 09:00)  HR: 72 (04-11-24 @ 09:00) (70 - 72)  BP: 152/47 (04-11-24 @ 09:00) (150/75 - 152/47)  RR: 16 (04-11-24 @ 09:00) (16 - 16)  SpO2: 99% (04-11-24 @ 09:00) (99% - 99%)    I&O's Detail    10 Apr 2024 07:01  -  11 Apr 2024 07:00  --------------------------------------------------------  OUT:    Other (mL): 2000 mL  Total OUT: 2000 mL    s1s2  b/l air entry  soft, ND  edema                                                                                   7.1    4.12  )-----------( 208      ( 10 Apr 2024 14:30 )             22.7     10 Apr 2024 14:30    141    |  101    |  82     ----------------------------<  87     5.2     |  28     |  9.53     Ca    9.1        10 Apr 2024 14:30  Phos  6.5       10 Apr 2024 14:30    acetaminophen     Tablet .. 975 milliGRAM(s) Oral every 8 hours  aluminum hydroxide/magnesium hydroxide/simethicone Suspension 30 milliLiter(s) Oral every 4 hours PRN  apixaban 2.5 milliGRAM(s) Oral two times a day  AQUAPHOR (petrolatum Ointment) 1 Application(s) Topical two times a day  aspirin enteric coated 81 milliGRAM(s) Oral two times a day  epoetin carito-epbx (RETACRIT) Injectable 98534 Unit(s) IV Push <User Schedule>  lidocaine   4% Patch 1 Patch Transdermal <User Schedule>  melatonin 6 milliGRAM(s) Oral at bedtime PRN  metoprolol tartrate 25 milliGRAM(s) Oral two times a day  Nephro-pat 1 Tablet(s) Oral daily  oxyCODONE    IR 5 milliGRAM(s) Oral every 4 hours PRN  oxyCODONE    IR 10 milliGRAM(s) Oral every 4 hours PRN  polyethylene glycol 3350 17 Gram(s) Oral daily PRN  senna 2 Tablet(s) Oral at bedtime  sevelamer carbonate 1600 milliGRAM(s) Oral three times a day with meals    A/P:    ESRD on HD   S/p fall, R hip fx  S/p R hip HA 3/7  HD tomorrow w/2-3kg fluid removal as able  Epoetin w/HD 3 x week   Renal diet  Renvela for high Phos  CBC, BMP w/HD    [phone]

## 2024-04-11 NOTE — PROGRESS NOTE ADULT - SUBJECTIVE AND OBJECTIVE BOX
HPI:  39yo Male PMHx HTN, anemia of chronic disease, ESRD on HD (M/W/F via right AV fistula), left hip arthroplasty in 8/23, history of right leg fracture presented to [facility] from [facility] on 3/6 after falling during a wheelchair transfer landing on his right knee. He is wheelchair bound since his left hip surgery. CT hip with acute garden type 1 minimally valgus impacted transcervical right femoral neck fracture and subacute right pubic body and inferior rami fractures. He is s/p right hip hemiarthroplasty. Post op course complicated by anemia s/p transfusion. Hospital course complicated by hypoglycemia after being shifted for hyperkalemia. Further complicated by febrile 101.6F found to be covid positive and completed remdisivir. He was started on eliquis 2.5mg BID for elevated d-dimers and negative LE duplex.     Patient was evaluated by PM&R and therapy for functional deficits, gait/ADL impairments and acute rehabilitation was recommended. Patient was medically optimized for discharge to [facility] IRU on 3/27 (27 Mar 2024 15:19)      ROS/subjective:  Patient seen and evaluated in PT  No events overnight.   continued ambulation in PT, Right knee pain/left hip apin stable  no dizziness, no chest pain, no nausea, no vomiting, no SOB, no headaches      MEDICATIONS  (STANDING):  acetaminophen     Tablet .. 975 milliGRAM(s) Oral every 8 hours  apixaban 2.5 milliGRAM(s) Oral two times a day  AQUAPHOR (petrolatum Ointment) 1 Application(s) Topical two times a day  aspirin enteric coated 81 milliGRAM(s) Oral two times a day  epoetin carito-epbx (RETACRIT) Injectable 41075 Unit(s) IV Push <User Schedule>  lidocaine   4% Patch 1 Patch Transdermal <User Schedule>  metoprolol tartrate 25 milliGRAM(s) Oral two times a day  Nephro-pat 1 Tablet(s) Oral daily  senna 2 Tablet(s) Oral at bedtime  sevelamer carbonate 1600 milliGRAM(s) Oral three times a day with meals    MEDICATIONS  (PRN):  aluminum hydroxide/magnesium hydroxide/simethicone Suspension 30 milliLiter(s) Oral every 4 hours PRN Dyspepsia  melatonin 6 milliGRAM(s) Oral at bedtime PRN Insomnia  oxyCODONE    IR 5 milliGRAM(s) Oral every 4 hours PRN Moderate Pain (4 - 6)  oxyCODONE    IR 10 milliGRAM(s) Oral every 4 hours PRN Severe Pain (7 - 10)  polyethylene glycol 3350 17 Gram(s) Oral daily PRN Constipation                            7.1    4.12  )-----------( 208      ( 10 Apr 2024 14:30 )             22.7     04-10    141  |  101  |  82<H>  ----------------------------<  87  5.2   |  28  |  9.53<H>    Ca    9.1      10 Apr 2024 14:30  Phos  6.5     04-10      Urinalysis Basic - ( 10 Apr 2024 14:30 )    Color: x / Appearance: x / SG: x / pH: x  Gluc: 87 mg/dL / Ketone: x  / Bili: x / Urobili: x   Blood: x / Protein: x / Nitrite: x   Leuk Esterase: x / RBC: x / WBC x   Sq Epi: x / Non Sq Epi: x / Bacteria: x      Vital Signs Last 24 Hrs  T(C): 36.7 (11 Apr 2024 09:00), Max: 36.9 (10 Apr 2024 14:20)  T(F): 98.1 (11 Apr 2024 09:00), Max: 98.4 (10 Apr 2024 14:20)  HR: 72 (11 Apr 2024 09:00) (69 - 73)  BP: 152/47 (11 Apr 2024 09:00) (126/61 - 152/47)  BP(mean): --  RR: 16 (11 Apr 2024 09:00) (16 - 16)  SpO2: 99% (11 Apr 2024 09:00) (99% - 100%)    Parameters below as of 11 Apr 2024 09:00  Patient On (Oxygen Delivery Method): room air        Gen - NAD, Comfortable  HEENT - NCAT, EOMI  Neck - Supple, No limited ROM  Pulm - CTAB, No wheeze, No rhonchi, No crackles  Cardiovascular - RRR, S1S2, No murmurs  Chest - good chest expansion, good respiratory effort  Abdomen - Soft, NT/ND, +BS. BM 3/26  Extremities - Right FA AV fistula + bruit and thrill. GIDEON old AV fistula not active, Right Knee with degenerative chnages  Neuro-     Cognitive - awake, alert, oriented to person, place, date, year, and situation.  Able  to follow command     Communication - Fluent, Comprehensible, No dysarthria, No aphasia        Memory:  Recent/ remote memory intact     Cranial Nerves - CN 2-12 intact. No facial asymmetry, Tongue midline, EOMI, Shoulder shrug intact     Motor -                    LEFT    UE - ShAB 5/5, EF 5/5, EE 5/5,  5/5                    RIGHT UE - ShAB 5/5, EF 5/5, EE 5/5,   5/5                    LEFT    LE - HF 4/5, KE 3+/5, DF 4/5, PF 4/5                    RIGHT LE - HF 3+/5, KE 3/5, DF 4/5, PF 4/5   moves Right knee better     Sensory - Intact bilaterally      Tone - normal  MSK: b/l hip discomfort with movement OA changes Right Knee- tender to palpation superior/ lat aspect of knee joint  Psychiatric - Mood stable, Affect WNL  Skin: right hip surgical site with steri strips CDI. b/l sacrum/coccyx DTI .    IDT Camilla 4/8  tentative Dc  4/16 to home- 24x7 supervision- ? Brother to Provide        Continue comprehensive acute rehab program consisting of 3hrs/day of OT/PT. HPI:  37yo Male PMHx HTN, anemia of chronic disease, ESRD on HD (M/W/F via right AV fistula), left hip arthroplasty in 8/23, history of right leg fracture presented to [facility] from [facility] on 3/6 after falling during a wheelchair transfer landing on his right knee. He is wheelchair bound since his left hip surgery. CT hip with acute garden type 1 minimally valgus impacted transcervical right femoral neck fracture and subacute right pubic body and inferior rami fractures. He is s/p right hip hemiarthroplasty. Post op course complicated by anemia s/p transfusion. Hospital course complicated by hypoglycemia after being shifted for hyperkalemia. Further complicated by febrile 101.6F found to be covid positive and completed remdisivir. He was started on eliquis 2.5mg BID for elevated d-dimers and negative LE duplex.     Patient was evaluated by PM&R and therapy for functional deficits, gait/ADL impairments and acute rehabilitation was recommended. Patient was medically optimized for discharge to [facility] IRU on 3/27 (27 Mar 2024 15:19)      ROS/subjective:  Patient seen and evaluated in PT  No events overnight.   continued ambulation in PT, Right knee pain/left hip pain stable  no dizziness, no chest pain, no nausea, no vomiting, no SOB, no headaches      MEDICATIONS  (STANDING):  acetaminophen     Tablet .. 975 milliGRAM(s) Oral every 8 hours  apixaban 2.5 milliGRAM(s) Oral two times a day  AQUAPHOR (petrolatum Ointment) 1 Application(s) Topical two times a day  aspirin enteric coated 81 milliGRAM(s) Oral two times a day  epoetin carito-epbx (RETACRIT) Injectable 08859 Unit(s) IV Push <User Schedule>  lidocaine   4% Patch 1 Patch Transdermal <User Schedule>  metoprolol tartrate 25 milliGRAM(s) Oral two times a day  Nephro-pat 1 Tablet(s) Oral daily  senna 2 Tablet(s) Oral at bedtime  sevelamer carbonate 1600 milliGRAM(s) Oral three times a day with meals    MEDICATIONS  (PRN):  aluminum hydroxide/magnesium hydroxide/simethicone Suspension 30 milliLiter(s) Oral every 4 hours PRN Dyspepsia  melatonin 6 milliGRAM(s) Oral at bedtime PRN Insomnia  oxyCODONE    IR 5 milliGRAM(s) Oral every 4 hours PRN Moderate Pain (4 - 6)  oxyCODONE    IR 10 milliGRAM(s) Oral every 4 hours PRN Severe Pain (7 - 10)  polyethylene glycol 3350 17 Gram(s) Oral daily PRN Constipation                            7.1    4.12  )-----------( 208      ( 10 Apr 2024 14:30 )             22.7     04-10    141  |  101  |  82<H>  ----------------------------<  87  5.2   |  28  |  9.53<H>    Ca    9.1      10 Apr 2024 14:30  Phos  6.5     04-10      Urinalysis Basic - ( 10 Apr 2024 14:30 )    Color: x / Appearance: x / SG: x / pH: x  Gluc: 87 mg/dL / Ketone: x  / Bili: x / Urobili: x   Blood: x / Protein: x / Nitrite: x   Leuk Esterase: x / RBC: x / WBC x   Sq Epi: x / Non Sq Epi: x / Bacteria: x      Vital Signs Last 24 Hrs  T(C): 36.7 (11 Apr 2024 09:00), Max: 36.9 (10 Apr 2024 14:20)  T(F): 98.1 (11 Apr 2024 09:00), Max: 98.4 (10 Apr 2024 14:20)  HR: 72 (11 Apr 2024 09:00) (69 - 73)  BP: 152/47 (11 Apr 2024 09:00) (126/61 - 152/47)  BP(mean): --  RR: 16 (11 Apr 2024 09:00) (16 - 16)  SpO2: 99% (11 Apr 2024 09:00) (99% - 100%)    Parameters below as of 11 Apr 2024 09:00  Patient On (Oxygen Delivery Method): room air        Gen - NAD, Comfortable  HEENT - NCAT, EOMI  Neck - Supple, No limited ROM  Pulm - CTAB, No wheeze, No rhonchi, No crackles  Cardiovascular - RRR, S1S2, No murmurs  Chest - good chest expansion, good respiratory effort  Abdomen - Soft, NT/ND, +BS. BM 3/26  Extremities - Right FA AV fistula + bruit and thrill. GIDEON old AV fistula not active, Right Knee with degenerative chnages  Neuro-     Cognitive - awake, alert, oriented to person, place, date, year, and situation.  Able  to follow command     Communication - Fluent, Comprehensible, No dysarthria, No aphasia        Memory:  Recent/ remote memory intact     Cranial Nerves - CN 2-12 intact. No facial asymmetry, Tongue midline, EOMI, Shoulder shrug intact     Motor -                    LEFT    UE - ShAB 5/5, EF 5/5, EE 5/5,  5/5                    RIGHT UE - ShAB 5/5, EF 5/5, EE 5/5,   5/5                    LEFT    LE - HF 4/5, KE 3+/5, DF 4/5, PF 4/5                    RIGHT LE - HF 3+/5, KE 3/5, DF 4/5, PF 4/5   moves Right knee better     Sensory - Intact bilaterally      Tone - normal  MSK: b/l hip discomfort with movement OA changes Right Knee- tender to palpation superior/ lat aspect of knee joint  Psychiatric - Mood stable, Affect WNL  Skin: right hip surgical site with steri strips CDI. b/l sacrum/coccyx DTI .    IDT Camilla 4/8  tentative Dc  4/16 to home- 24x7 supervision- ? Brother to Provide        Continue comprehensive acute rehab program consisting of 3hrs/day of OT/PT.

## 2024-04-11 NOTE — PROGRESS NOTE ADULT - ASSESSMENT
BRIDGET NORTH is a 38y M with HTN, anemia of chronic disease, ESRD on HD (M/W/F via right AV fistula), left hip arthroplasty in 8/23, history of right leg fracture presented to [facility] on 3/6 after a fall during a wheelchair transfer. He was found to have a right femoral neck fracture and subacute pubic body and inferior rami fractures. He is s/p right hip hemiarthroplasty. Hospital course complicated by post-op anemia s/p transfusion, hypoglycemia, hyperkalemia, febrile 2/2 covid s/p remdisivir, and started on eliquis 2.5mg BID with negative dvts on LE duplex. Patient now admitted for a multidisciplinary rehab program. 03-27-24 @ 16:17      #Right Femoral Neck fracture   - s/p right hip hemiarthroplasty on 3/7  - pain med as below  - Dressing and staples removed on 3/22  - Comprehensive Multidisciplinary Rehab Program:  comprehensive rehab program of PT/OT   - precautions: WBAT RLE. Anterior hip.   - Right Knee pain- arthrosis/ effusion- no Fx- renal osteodystrophy- ICE/ Lidoderm/ Rehab/ ACE  - Social work to confirm DC plan- Need 24x7 assistance at home- ? Brother/ ? Cousin  - stairs not an issue secondary to PERCY- can have ambulance to transfer    #ESRD  - HDS via right AV fistula on m/w/f.  ( Last HD 3/27). Old HDS left UA  - Nephro consult placed. Dr Blanco aware.   - sevelamer 800mg TID  - retacrit m/w/f    #HTN  - toprol 25mg BID  - Monitor BP    #Pain  - Tylenol PRN  - oxy 10mg moderate, 15mg severe.   Right Knee pain- lidoderm during the day/ICE/ ACE- Better  Left Hip pain - all studies reviewed- recent CT 3/6- no evidence of abnormality in Left prosthesis  will follow clinically- if pain persists can reimgae    #Sleep  - Melatonin PRN     #GI / Bowel  #dyspepsia  - Senna qHS  - Miralax PRN -  - maalox    # / Bladder  - does not void ESRD    #Skin / Pressure injury  - Skin assessment on admission performed : right hip surgical site with steri strips CDI. coccyx small stage 2-using barrier cream  - Monitor Incisions:    - wound care following at [facility] for b/l sacrum/coccyx DTI wth evolution.   - wound care to follow  - turn and reposition q2h      #Diet:  - Diet Consistency: Renal restriction  - Nutrition consult    #DVT prophylaxis:   - eliquis 2.5mg BID  - Last LE Doppler normal on 3/14      BRIDGET NORTH is a 38y M with HTN, anemia of chronic disease, ESRD on HD (M/W/F via right AV fistula), left hip arthroplasty in 8/23, history of right leg fracture presented to [facility] on 3/6 after a fall during a wheelchair transfer. He was found to have a right femoral neck fracture and subacute pubic body and inferior rami fractures. He is s/p right hip hemiarthroplasty. Hospital course complicated by post-op anemia s/p transfusion, hypoglycemia, hyperkalemia, febrile 2/2 covid s/p remdisivir, and started on eliquis 2.5mg BID with negative dvts on LE duplex. Patient now admitted for a multidisciplinary rehab program. 03-27-24 @ 16:17      #Right Femoral Neck fracture   - s/p right hip hemiarthroplasty on 3/7  - pain med as below  - Dressing and staples removed on 3/22  - Comprehensive Multidisciplinary Rehab Program:  comprehensive rehab program of PT/OT   - precautions: WBAT RLE. Anterior hip.   - Right Knee pain- arthrosis/ effusion- no Fx- renal osteodystrophy- ICE/ Lidoderm/ Rehab/ ACE  - Social work to confirm DC plan- Need 24x7 assistance at home- ? Brother/ ? Cousin  - stairs not an issue secondary to PERCY- can have ambulance to transfer    #ESRD  - HDS via right AV fistula on m/w/f.  ( Last HD 3/27). Old HDS left UA  - Nephro consult placed. Dr Blanco aware.   - sevelamer 800mg TID  - retacrit m/w/f    #HTN  - toprol 25mg BID  - Monitor BP    #Pain  - Tylenol PRN  - oxy 10mg moderate, 15mg severe.   Right Knee pain- lidoderm during the day/ICE/ ACE- Better  Left Hip pain - all studies reviewed- recent CT 3/6- no evidence of abnormality in Left prosthesis  will follow clinically- if pain persists can reimgae    #Sleep  - Melatonin PRN     #GI / Bowel  #dyspepsia  - Senna qHS  - Miralax PRN -  - maalox    # / Bladder  - does not void ESRD    #Skin / Pressure injury  - Skin assessment on admission performed : right hip surgical site with steri strips CDI. coccyx small stage 2-using barrier cream  - Monitor Incisions:    - wound care following at [facility] for b/l sacrum/coccyx DTI wth evolution.   - wound care to follow  - turn and reposition q2h      #Diet:  - Diet Consistency: Renal restriction  - Nutrition consult    #DVT prophylaxis:   - eliquis 2.5mg BID  - Last LE Doppler normal on 3/14     PATIENT IS A GOOD CANDIDATE FOR [facility] HOSPITAL BED 2/2 UNDERLYING RENAL DISEASE AND ANTONIA HIP FRACTURES, PAIN FROM OSTEODYSTROPHY. PATIENT REQUIRES FREQUENT POSITION CHANGES OR IMMEDIATE NEED FOR A CHANGE OF POSITION -REQUIRES POSITIONING OF THE BODY TO ALLEVIATE PAIN, PREVENT CONTRACTURES AND AVOID RESPIRATORY INFECTIONS IN WAYS NOT FEASIBLE IN ORDINARY BED.

## 2024-04-12 LAB
ANION GAP SERPL CALC-SCNC: 10 MMOL/L — SIGNIFICANT CHANGE UP (ref 5–17)
BUN SERPL-MCNC: 76 MG/DL — HIGH (ref 7–23)
CALCIUM SERPL-MCNC: 8.9 MG/DL — SIGNIFICANT CHANGE UP (ref 8.4–10.5)
CHLORIDE SERPL-SCNC: 101 MMOL/L — SIGNIFICANT CHANGE UP (ref 96–108)
CO2 SERPL-SCNC: 30 MMOL/L — SIGNIFICANT CHANGE UP (ref 22–31)
CREAT SERPL-MCNC: 9.4 MG/DL — HIGH (ref 0.5–1.3)
EGFR: 7 ML/MIN/1.73M2 — LOW
GLUCOSE SERPL-MCNC: 86 MG/DL — SIGNIFICANT CHANGE UP (ref 70–99)
HBV CORE AB SER-ACNC: SIGNIFICANT CHANGE UP
HBV SURFACE AB SER-ACNC: 51.5 MIU/ML — SIGNIFICANT CHANGE UP
HBV SURFACE AG SER-ACNC: SIGNIFICANT CHANGE UP
HCT VFR BLD CALC: 22 % — LOW (ref 39–50)
HCV AB S/CO SERPL IA: 0.39 S/CO — SIGNIFICANT CHANGE UP (ref 0–0.99)
HCV AB SERPL-IMP: SIGNIFICANT CHANGE UP
HGB BLD-MCNC: 7.1 G/DL — LOW (ref 13–17)
MCHC RBC-ENTMCNC: 31.3 PG — SIGNIFICANT CHANGE UP (ref 27–34)
MCHC RBC-ENTMCNC: 32.3 GM/DL — SIGNIFICANT CHANGE UP (ref 32–36)
MCV RBC AUTO: 96.9 FL — SIGNIFICANT CHANGE UP (ref 80–100)
NRBC # BLD: 0 /100 WBCS — SIGNIFICANT CHANGE UP (ref 0–0)
PHOSPHATE SERPL-MCNC: 6.2 MG/DL — HIGH (ref 2.5–4.5)
PLATELET # BLD AUTO: 203 K/UL — SIGNIFICANT CHANGE UP (ref 150–400)
POTASSIUM SERPL-MCNC: 5.2 MMOL/L — SIGNIFICANT CHANGE UP (ref 3.5–5.3)
POTASSIUM SERPL-SCNC: 5.2 MMOL/L — SIGNIFICANT CHANGE UP (ref 3.5–5.3)
RBC # BLD: 2.27 M/UL — LOW (ref 4.2–5.8)
RBC # FLD: 15.8 % — HIGH (ref 10.3–14.5)
SODIUM SERPL-SCNC: 141 MMOL/L — SIGNIFICANT CHANGE UP (ref 135–145)
WBC # BLD: 4.03 K/UL — SIGNIFICANT CHANGE UP (ref 3.8–10.5)
WBC # FLD AUTO: 4.03 K/UL — SIGNIFICANT CHANGE UP (ref 3.8–10.5)

## 2024-04-12 PROCEDURE — 99232 SBSQ HOSP IP/OBS MODERATE 35: CPT

## 2024-04-12 RX ADMIN — Medication 975 MILLIGRAM(S): at 13:01

## 2024-04-12 RX ADMIN — Medication 1 APPLICATION(S): at 21:46

## 2024-04-12 RX ADMIN — Medication 25 MILLIGRAM(S): at 06:00

## 2024-04-12 RX ADMIN — APIXABAN 2.5 MILLIGRAM(S): 2.5 TABLET, FILM COATED ORAL at 06:00

## 2024-04-12 RX ADMIN — Medication 81 MILLIGRAM(S): at 18:37

## 2024-04-12 RX ADMIN — APIXABAN 2.5 MILLIGRAM(S): 2.5 TABLET, FILM COATED ORAL at 18:37

## 2024-04-12 RX ADMIN — SEVELAMER CARBONATE 1600 MILLIGRAM(S): 2400 POWDER, FOR SUSPENSION ORAL at 08:41

## 2024-04-12 RX ADMIN — SEVELAMER CARBONATE 1600 MILLIGRAM(S): 2400 POWDER, FOR SUSPENSION ORAL at 18:37

## 2024-04-12 RX ADMIN — ERYTHROPOIETIN 10000 UNIT(S): 10000 INJECTION, SOLUTION INTRAVENOUS; SUBCUTANEOUS at 15:28

## 2024-04-12 RX ADMIN — Medication 975 MILLIGRAM(S): at 13:45

## 2024-04-12 RX ADMIN — LIDOCAINE 1 PATCH: 4 CREAM TOPICAL at 12:17

## 2024-04-12 RX ADMIN — Medication 975 MILLIGRAM(S): at 07:29

## 2024-04-12 RX ADMIN — SEVELAMER CARBONATE 1600 MILLIGRAM(S): 2400 POWDER, FOR SUSPENSION ORAL at 12:17

## 2024-04-12 RX ADMIN — Medication 975 MILLIGRAM(S): at 22:25

## 2024-04-12 RX ADMIN — Medication 1 APPLICATION(S): at 06:01

## 2024-04-12 RX ADMIN — Medication 975 MILLIGRAM(S): at 21:43

## 2024-04-12 RX ADMIN — Medication 975 MILLIGRAM(S): at 06:00

## 2024-04-12 RX ADMIN — Medication 81 MILLIGRAM(S): at 06:00

## 2024-04-12 RX ADMIN — Medication 1 TABLET(S): at 12:17

## 2024-04-12 RX ADMIN — Medication 25 MILLIGRAM(S): at 18:37

## 2024-04-12 RX ADMIN — LIDOCAINE 1 PATCH: 4 CREAM TOPICAL at 19:30

## 2024-04-12 NOTE — PROGRESS NOTE ADULT - ASSESSMENT
38M HTN, anemia of chronic disease, ESRD on HD (M/W/F via right AV fistula), left hip arthroplasty in 8/23, history of right leg fracture   Admit Mar6 SP fall from wheel chair, Rt Femoral neck fracture and pubic body fracture  SP Rt Hip replacement  TX to acute rehab    Right Femoral Neck fracture   s/p right hip hemiarthroplasty on 3/7  Comprehensive Multidisciplinary Rehab Program  pain management per rehab     #ESRD  - HDS via right AV fistula on m/w/f.  ( Last HD 3/29). Old HDS left UA  - sevelamer 800mg TID  - retacrit m/w/f    SP Covid19  Acute infection Mar19  recovered    #HTN  - toprol 25mg BID  care per nephrology    #DVT prophylaxis:

## 2024-04-12 NOTE — PROGRESS NOTE ADULT - SUBJECTIVE AND OBJECTIVE BOX
HPI:  37yo Male PMHx HTN, anemia of chronic disease, ESRD on HD (M/W/F via right AV fistula), left hip arthroplasty in 8/23, history of right leg fracture presented to [facility] from [facility] on 3/6 after falling during a wheelchair transfer landing on his right knee. He is wheelchair bound since his left hip surgery. CT hip with acute garden type 1 minimally valgus impacted transcervical right femoral neck fracture and subacute right pubic body and inferior rami fractures. He is s/p right hip hemiarthroplasty. Post op course complicated by anemia s/p transfusion. Hospital course complicated by hypoglycemia after being shifted for hyperkalemia. Further complicated by febrile 101.6F found to be covid positive and completed remdisivir. He was started on eliquis 2.5mg BID for elevated d-dimers and negative LE duplex.     Patient was evaluated by PM&R and therapy for functional deficits, gait/ADL impairments and acute rehabilitation was recommended. Patient was medically optimized for discharge to [facility] IRU on 3/27 (27 Mar 2024 15:19)      ROS/subjective:  Patient seen and evaluated in PT, ambulating w/platform walker  No events overnight.   Right knee pain/left hip pain stable  no dizziness, no chest pain, no nausea, no vomiting, no SOB, no headaches  tolerating HD and Eliquis  BM yesterday    MEDICATIONS  (STANDING):  acetaminophen     Tablet .. 975 milliGRAM(s) Oral every 8 hours  apixaban 2.5 milliGRAM(s) Oral two times a day  AQUAPHOR (petrolatum Ointment) 1 Application(s) Topical two times a day  aspirin enteric coated 81 milliGRAM(s) Oral two times a day  epoetin carito-epbx (RETACRIT) Injectable 67131 Unit(s) IV Push <User Schedule>  lidocaine   4% Patch 1 Patch Transdermal <User Schedule>  metoprolol tartrate 25 milliGRAM(s) Oral two times a day  Nephro-pat 1 Tablet(s) Oral daily  senna 2 Tablet(s) Oral at bedtime  sevelamer carbonate 1600 milliGRAM(s) Oral three times a day with meals    MEDICATIONS  (PRN):  aluminum hydroxide/magnesium hydroxide/simethicone Suspension 30 milliLiter(s) Oral every 4 hours PRN Dyspepsia  melatonin 6 milliGRAM(s) Oral at bedtime PRN Insomnia  polyethylene glycol 3350 17 Gram(s) Oral daily PRN Constipation                            7.1    4.12  )-----------( 208      ( 10 Apr 2024 14:30 )             22.7     04-10    141  |  101  |  82<H>  ----------------------------<  87  5.2   |  28  |  9.53<H>    Ca    9.1      10 Apr 2024 14:30  Phos  6.5     04-10      Urinalysis Basic - ( 10 Apr 2024 14:30 )    Color: x / Appearance: x / SG: x / pH: x  Gluc: 87 mg/dL / Ketone: x  / Bili: x / Urobili: x   Blood: x / Protein: x / Nitrite: x   Leuk Esterase: x / RBC: x / WBC x   Sq Epi: x / Non Sq Epi: x / Bacteria: x      Vital Signs Last 24 Hrs  T(C): 36.8 (12 Apr 2024 09:11), Max: 36.9 (11 Apr 2024 20:41)  T(F): 98.3 (12 Apr 2024 09:11), Max: 98.4 (11 Apr 2024 20:41)  HR: 78 (12 Apr 2024 09:11) (78 - 81)  BP: 152/90 (12 Apr 2024 09:11) (138/78 - 152/90)  BP(mean): --  RR: 16 (12 Apr 2024 09:11) (16 - 18)  SpO2: 97% (12 Apr 2024 09:11) (97% - 98%)    Parameters below as of 12 Apr 2024 09:11  Patient On (Oxygen Delivery Method): room air        Gen - NAD, Comfortable  HEENT - NCAT, EOMI  Neck - Supple, No limited ROM  Pulm - CTAB, No wheeze, No rhonchi, No crackles  Cardiovascular - RRR, S1S2, No murmurs  Chest - good chest expansion, good respiratory effort  Abdomen - Soft, NT/ND, +BS. BM 3/26  Extremities - Right FA AV fistula + bruit and thrill. GIDEON old AV fistula not active, Right Knee with degenerative chnages  Neuro-     Cognitive - awake, alert, oriented to person, place, date, year, and situation.  Able  to follow command     Communication - Fluent, Comprehensible, No dysarthria, No aphasia        Memory:  Recent/ remote memory intact     Cranial Nerves - CN 2-12 intact. No facial asymmetry, Tongue midline, EOMI, Shoulder shrug intact     Motor -                    LEFT    UE - ShAB 5/5, EF 5/5, EE 5/5,  5/5                    RIGHT UE - ShAB 5/5, EF 5/5, EE 5/5,   5/5                    LEFT    LE - HF 4/5, KE 3+/5, DF 4/5, PF 4/5                    RIGHT LE - HF 3+/5, KE 3/5, DF 4/5, PF 4/5   moves Right knee better     Sensory - Intact bilaterally      Tone - normal  MSK: b/l hip discomfort with movement OA changes Right Knee- tender to palpation superior/ lat aspect of knee joint  Psychiatric - Mood stable, Affect WNL  Skin: right hip surgical site with steri strips CDI. b/l sacrum/coccyx DTI .    IDT Camilla 4/8  tentative Dc  4/16 to home- 24x7 supervision- ? Brother to Provide          Continue comprehensive acute rehab program consisting of 3hrs/day of OT/PT.

## 2024-04-12 NOTE — PROGRESS NOTE ADULT - ASSESSMENT
BRIDGET NORTH is a 38y M with HTN, anemia of chronic disease, ESRD on HD (M/W/F via right AV fistula), left hip arthroplasty in 8/23, history of right leg fracture presented to [facility] on 3/6 after a fall during a wheelchair transfer. He was found to have a right femoral neck fracture and subacute pubic body and inferior rami fractures. He is s/p right hip hemiarthroplasty. Hospital course complicated by post-op anemia s/p transfusion, hypoglycemia, hyperkalemia, febrile 2/2 covid s/p remdisivir, and started on eliquis 2.5mg BID with negative dvts on LE duplex. Patient now admitted for a multidisciplinary rehab program. 03-27-24 @ 16:17      #Right Femoral Neck fracture   - s/p right hip hemiarthroplasty on 3/7  - pain med as below  - Dressing and staples removed on 3/22  - Comprehensive Multidisciplinary Rehab Program:  comprehensive rehab program of PT/OT   - precautions: WBAT RLE. Anterior hip.   - Right Knee pain- arthrosis/ effusion- no Fx- renal osteodystrophy- ICE/ Lidoderm/ Rehab/ ACE  - Social work to confirm DC plan- Need 24x7 assistance at home- ? Brother/ ? Cousin  - stairs not an issue secondary to PERCY- can have ambulance to transfer    #ESRD  - HDS via right AV fistula on m/w/f.  ( Last HD 3/27). Old HDS left UA  - Nephro consult placed. Dr Blanco aware.   - sevelamer 800mg TID  - retacrit m/w/f    #HTN  - toprol 25mg BID  - Monitor BP    #Pain  - Tylenol PRN  - oxy 10mg moderate, 15mg severe.   Right Knee pain- lidoderm during the day/ICE/ ACE- Better  Left Hip pain - all studies reviewed- recent CT 3/6- no evidence of abnormality in Left prosthesis  will follow clinically-stable, ambulating    #Sleep  - Melatonin PRN     #GI / Bowel  #dyspepsia  - Senna qHS  - Miralax PRN -  - maalox    # / Bladder  - does not void ESRD    #Skin / Pressure injury  - Skin assessment on admission performed : right hip surgical site with steri strips CDI. coccyx small stage 2-using barrier cream  - Monitor Incisions:    - wound care following at [facility] for b/l sacrum/coccyx DTI wth evolution.   - wound care to follow  - turn and reposition q2h      #Diet:  - Diet Consistency: Renal restriction  - Nutrition consult    #DVT prophylaxis:   - eliquis 2.5mg BID  - Last LE Doppler normal on 3/14     PATIENT IS A GOOD CANDIDATE FOR [facility] 2/2 UNDERLYING RENAL DISEASE AND ANTONIA HIP FRACTURES, PAIN FROM OSTEODYSTROPHY. PATIENT REQUIRES FREQUENT POSITION CHANGES OR IMMEDIATE NEED FOR A CHANGE OF POSITION -REQUIRES POSITIONING OF THE BODY TO ALLEVIATE PAIN, PREVENT CONTRACTURES AND AVOID RESPIRATORY INFECTIONS IN WAYS NOT FEASIBLE IN ORDINARY BED.

## 2024-04-12 NOTE — PROGRESS NOTE ADULT - SUBJECTIVE AND OBJECTIVE BOX
Stable on HD    Vital Signs Last 24 Hrs  T(C): 37.6 (04-12-24 @ 20:48), Max: 37.6 (04-12-24 @ 20:48)  T(F): 99.7 (04-12-24 @ 20:48), Max: 99.7 (04-12-24 @ 20:48)  HR: 89 (04-12-24 @ 20:48) (61 - 89)  BP: 128/66 (04-12-24 @ 20:48) (128/66 - 158/87)  RR: 17 (04-12-24 @ 20:48) (16 - 18)  SpO2: 100% (04-12-24 @ 20:48) (96% - 100%)    I&O's Detail    12 Apr 2024 07:01  -  12 Apr 2024 21:39  --------------------------------------------------------  OUT:    Other (mL): 2500 mL  Total OUT: 2500 mL    s1s2  b/l air entry  soft, ND  edema                                                                                           7.1    4.03  )-----------( 203      ( 12 Apr 2024 15:37 )             22.0     12 Apr 2024 15:37    141    |  101    |  76     ----------------------------<  86     5.2     |  30     |  9.40     Ca    8.9        12 Apr 2024 15:37  Phos  6.2       12 Apr 2024 15:37    acetaminophen     Tablet .. 975 milliGRAM(s) Oral every 8 hours  aluminum hydroxide/magnesium hydroxide/simethicone Suspension 30 milliLiter(s) Oral every 4 hours PRN  apixaban 2.5 milliGRAM(s) Oral two times a day  AQUAPHOR (petrolatum Ointment) 1 Application(s) Topical two times a day  aspirin enteric coated 81 milliGRAM(s) Oral two times a day  epoetin carito-epbx (RETACRIT) Injectable 92551 Unit(s) IV Push <User Schedule>  lidocaine   4% Patch 1 Patch Transdermal <User Schedule>  melatonin 6 milliGRAM(s) Oral at bedtime PRN  metoprolol tartrate 25 milliGRAM(s) Oral two times a day  Nephro-pat 1 Tablet(s) Oral daily  polyethylene glycol 3350 17 Gram(s) Oral daily PRN  senna 2 Tablet(s) Oral at bedtime  sevelamer carbonate 1600 milliGRAM(s) Oral three times a day with meals    A/P:    ESRD on HD   S/p fall, R hip fx  S/p R hip HA 3/7  HD todayw/2.5kg fluid removal   Epoetin w/HD 3 x week   Renal diet  Renvela for high Phos  CBC, BMP w/HD    [phone]

## 2024-04-12 NOTE — PROGRESS NOTE ADULT - SUBJECTIVE AND OBJECTIVE BOX
|-------------------------------------------|                       Subjective   |-------------------------------------------|    No events overnight  No new complaints to offer me    |------------------------------------------|                       Objective  |------------------------------------------|    Vital Signs Last 24 Hrs  T(F): 98.3 (12 Apr 2024 09:11), Max: 98.4 (11 Apr 2024 20:41)  HR: 78 (12 Apr 2024 09:11) (78 - 81)  BP: 152/90 (12 Apr 2024 09:11) (138/78 - 152/90)  RR: 16 (12 Apr 2024 09:11) (16 - 18)  SpO2: 97% (12 Apr 2024 09:11) (97% - 98%)  I&O's Summary      Examination:   General: NAD, Comfortable  Pulm: CTA BL No Rhonchi Rales or wheezes  Neck: Supple, No JVD  CVS: RRR No rubs murmurs or gallops  Abdomen: Soft, Nontender, Nondistended; No masses or organomegally  Extremities: No calf tenderness, No pitting edema    Labs:                        7.1    4.12  )-----------( 208      ( 10 Apr 2024 14:30 )             22.7       04-10    141  |  101  |  82  ----------------------------<  87  5.2   |  28  |  9.53    Ca    9.1      10 Apr 2024 14:30  Phos  6.5     04-10

## 2024-04-13 PROCEDURE — 99232 SBSQ HOSP IP/OBS MODERATE 35: CPT

## 2024-04-13 RX ADMIN — LIDOCAINE 1 PATCH: 4 CREAM TOPICAL at 08:08

## 2024-04-13 RX ADMIN — SEVELAMER CARBONATE 1600 MILLIGRAM(S): 2400 POWDER, FOR SUSPENSION ORAL at 12:28

## 2024-04-13 RX ADMIN — Medication 975 MILLIGRAM(S): at 13:50

## 2024-04-13 RX ADMIN — Medication 25 MILLIGRAM(S): at 17:58

## 2024-04-13 RX ADMIN — Medication 81 MILLIGRAM(S): at 17:58

## 2024-04-13 RX ADMIN — LIDOCAINE 1 PATCH: 4 CREAM TOPICAL at 00:26

## 2024-04-13 RX ADMIN — Medication 1 APPLICATION(S): at 05:56

## 2024-04-13 RX ADMIN — Medication 25 MILLIGRAM(S): at 05:53

## 2024-04-13 RX ADMIN — Medication 975 MILLIGRAM(S): at 06:54

## 2024-04-13 RX ADMIN — SEVELAMER CARBONATE 1600 MILLIGRAM(S): 2400 POWDER, FOR SUSPENSION ORAL at 17:58

## 2024-04-13 RX ADMIN — Medication 1 APPLICATION(S): at 18:01

## 2024-04-13 RX ADMIN — Medication 81 MILLIGRAM(S): at 05:53

## 2024-04-13 RX ADMIN — Medication 1 TABLET(S): at 12:29

## 2024-04-13 RX ADMIN — SEVELAMER CARBONATE 1600 MILLIGRAM(S): 2400 POWDER, FOR SUSPENSION ORAL at 08:08

## 2024-04-13 RX ADMIN — APIXABAN 2.5 MILLIGRAM(S): 2.5 TABLET, FILM COATED ORAL at 05:53

## 2024-04-13 RX ADMIN — Medication 975 MILLIGRAM(S): at 13:19

## 2024-04-13 RX ADMIN — APIXABAN 2.5 MILLIGRAM(S): 2.5 TABLET, FILM COATED ORAL at 17:58

## 2024-04-13 RX ADMIN — Medication 975 MILLIGRAM(S): at 05:54

## 2024-04-13 NOTE — PROGRESS NOTE ADULT - ASSESSMENT
BRIDGET NORTH is a 38y M with HTN, anemia of chronic disease, ESRD on HD (M/W/F via right AV fistula), left hip arthroplasty in 8/23, history of right leg fracture presented to [facility] on 3/6 after a fall during a wheelchair transfer. He was found to have a right femoral neck fracture and subacute pubic body and inferior rami fractures. He is s/p right hip hemiarthroplasty. Hospital course complicated by post-op anemia s/p transfusion, hypoglycemia, hyperkalemia, febrile 2/2 covid s/p remdisivir, and started on eliquis 2.5mg BID with negative dvts on LE duplex. Patient now admitted for a multidisciplinary rehab program. 03-27-24 @ 16:17      #Right Femoral Neck fracture   - s/p right hip hemiarthroplasty on 3/7  - pain med as below  - Dressing and staples removed on 3/22  - Comprehensive Multidisciplinary Rehab Program:  comprehensive rehab program of PT/OT   - precautions: WBAT RLE. Anterior hip.   - Right Knee pain- arthrosis/ effusion- no Fx- renal osteodystrophy- ICE/ Lidoderm/ Rehab/ ACE  - Social work to confirm DC plan- Need 24x7 assistance at home- ? Brother/ ? Cousin  - stairs not an issue secondary to PERCY- can have ambulance to transfer    #ESRD  - HDS via right AV fistula on m/w/f.  ( Last HD 3/27). Old HDS left UA  - Nephro consult placed. Dr Blanco aware.   - sevelamer 800mg TID  - retacrit m/w/f    #HTN  - toprol 25mg BID  - Monitor BP    #Pain  - Tylenol PRN  - oxy 10mg moderate, 15mg severe.   Right Knee pain- lidoderm during the day/ICE/ ACE- Better  Left Hip pain - all studies reviewed- recent CT 3/6- no evidence of abnormality in Left prosthesis  will follow clinically-stable, ambulating    #Sleep  - Melatonin PRN     #GI / Bowel  #dyspepsia  - Senna qHS  - Miralax PRN -  - maalox    # / Bladder  - does not void ESRD    #Skin / Pressure injury  - Skin assessment on admission performed : right hip surgical site with steri strips CDI. coccyx small stage 2-using barrier cream  - Monitor Incisions:    - wound care following at [facility] for b/l sacrum/coccyx DTI wth evolution.   - wound care to follow  - turn and reposition q2h      #Diet:  - Diet Consistency: Renal restriction  - Nutrition consult    #DVT prophylaxis:   - eliquis 2.5mg BID  - Last LE Doppler normal on 3/14     PATIENT IS A GOOD CANDIDATE FOR SEMI ELECTRIC HOSPITAL BED 2/2 UNDERLYING RENAL DISEASE AND ANTONIA HIP FRACTURES, PAIN FROM OSTEODYSTROPHY. PATIENT REQUIRES FREQUENT POSITION CHANGES OR IMMEDIATE NEED FOR A CHANGE OF POSITION -REQUIRES POSITIONING OF THE BODY TO ALLEVIATE PAIN, PREVENT CONTRACTURES AND AVOID RESPIRATORY INFECTIONS IN WAYS NOT FEASIBLE IN ORDINARY BED.

## 2024-04-13 NOTE — PROGRESS NOTE ADULT - SUBJECTIVE AND OBJECTIVE BOX
Patient seen and examined at bedside. no complaints.      ALLERGIES:  No Known Drug Allergies  shellfish (Hives)    MEDICATIONS  (STANDING):  acetaminophen     Tablet .. 975 milliGRAM(s) Oral every 8 hours  apixaban 2.5 milliGRAM(s) Oral two times a day  AQUAPHOR (petrolatum Ointment) 1 Application(s) Topical two times a day  aspirin enteric coated 81 milliGRAM(s) Oral two times a day  epoetin carito-epbx (RETACRIT) Injectable 31048 Unit(s) IV Push <User Schedule>  lidocaine   4% Patch 1 Patch Transdermal <User Schedule>  metoprolol tartrate 25 milliGRAM(s) Oral two times a day  Nephro-pat 1 Tablet(s) Oral daily  senna 2 Tablet(s) Oral at bedtime  sevelamer carbonate 1600 milliGRAM(s) Oral three times a day with meals    MEDICATIONS  (PRN):  aluminum hydroxide/magnesium hydroxide/simethicone Suspension 30 milliLiter(s) Oral every 4 hours PRN Dyspepsia  melatonin 6 milliGRAM(s) Oral at bedtime PRN Insomnia  polyethylene glycol 3350 17 Gram(s) Oral daily PRN Constipation    Vital Signs Last 24 Hrs  T(F): 97.4 (13 Apr 2024 08:13), Max: 99.7 (12 Apr 2024 20:48)  HR: 70 (13 Apr 2024 08:13) (61 - 89)  BP: 128/70 (13 Apr 2024 08:13) (118/69 - 158/87)  RR: 16 (13 Apr 2024 08:13) (16 - 18)  SpO2: 97% (13 Apr 2024 08:13) (96% - 100%)  I&O's Summary    12 Apr 2024 07:01  -  13 Apr 2024 07:00  --------------------------------------------------------  IN: 0 mL / OUT: 2500 mL / NET: -2500 mL        PHYSICAL EXAM:    Gen: nad, resting in bed  Neuro: aaox3, no focal deficits  Heent: eomi b/l, no jvd, no oral exudates  Pulm: cta b/l, no w/r/r  CV: +s1s2, no m/r/g  Ab: soft, nt/nd, normoactive bs x 4  Extrem: no edema, pulses intact and equal  Skin: no rashes  Psych: normal    LABS:                        7.1    4.03  )-----------( 203      ( 12 Apr 2024 15:37 )             22.0       04-12    141  |  101  |  76  ----------------------------<  86  5.2   |  30  |  9.40    Ca    8.9      12 Apr 2024 15:37  Phos  6.2     04-12                                    Urinalysis Basic - ( 12 Apr 2024 15:37 )    Color: x / Appearance: x / SG: x / pH: x  Gluc: 86 mg/dL / Ketone: x  / Bili: x / Urobili: x   Blood: x / Protein: x / Nitrite: x   Leuk Esterase: x / RBC: x / WBC x   Sq Epi: x / Non Sq Epi: x / Bacteria: x        COVID-19 PCR: NotDetec (04-11-24 @ 10:30)  COVID-19 PCR: NotDetec (03-27-24 @ 06:47)  COVID-19 PCR: NotDetec (03-26-24 @ 06:57)  COVID-19 PCR: Detected (03-19-24 @ 16:53)      RADIOLOGY & ADDITIONAL TESTS:    Care Discussed with Consultants/Other Providers:

## 2024-04-13 NOTE — PROGRESS NOTE ADULT - ASSESSMENT
38y M with HTN, anemia of chronic disease, ESRD on HD (M/W/F via right AV fistula), left hip arthroplasty in 8/23, history of right leg fracture presented to [facility] on 3/6 after a fall during a wheelchair transfer. He was found to have a right femoral neck fracture and subacute pubic body and inferior rami fractures. He is s/p right hip hemiarthroplasty. Patient now admitted for a multidisciplinary rehab program.    #Right Femoral Neck fracture   - s/p right hip hemiarthroplasty on 3/7  - Comprehensive Multidisciplinary Rehab Program  - precautions: WBAT RLE, fall precautions  - pain management per rehab     #anemia  - hgb 7.1 on 4/11 and 4/12  - suspect secondary to chronic disease  - would like to check anemia workup on Monday  - consider transfusion on Monday with dialysis    #ESRD  #hyperkalemia  - HDS via right AV fistula on m/w/f.  ( Last HD 3/29). Old HDS left UA  - HD per Dr Blanco  - sevelamer 800mg TID  - Retacrit m/w/f    #HTN  - toprol 25mg BID  - BP control per nephrology give likely secondary hypertension    #DVT prophylaxis:   - eliquis 2.5mg BID

## 2024-04-13 NOTE — PROGRESS NOTE ADULT - SUBJECTIVE AND OBJECTIVE BOX
HPI:  39yo Male PMHx HTN, anemia of chronic disease, ESRD on HD (M/W/F via right AV fistula), left hip arthroplasty in 8/23, history of right leg fracture presented to [facility] from [facility] on 3/6 after falling during a wheelchair transfer landing on his right knee. He is wheelchair bound since his left hip surgery. CT hip with acute garden type 1 minimally valgus impacted transcervical right femoral neck fracture and subacute right pubic body and inferior rami fractures. He is s/p right hip hemiarthroplasty. Post op course complicated by anemia s/p transfusion. Hospital course complicated by hypoglycemia after being shifted for hyperkalemia. Further complicated by febrile 101.6F found to be covid positive and completed remdisivir. He was started on eliquis 2.5mg BID for elevated d-dimers and negative LE duplex.     Patient was evaluated by PM&R and therapy for functional deficits, gait/ADL impairments and acute rehabilitation was recommended. Patient was medically optimized for discharge to [facility] IRU on 3/27 (27 Mar 2024 15:19)    ___________________________________________________________________________    SUBJECTIVE/ROS  Patient was seen and evaluated at bedside today.  Reported no overnight events and is in no acute distress.  Eager to participate on the recommended rehabilitation program.  Denies any CP, SOB, RIZVI, palpitations, fever, chills, body aches, cough, congestion, or any other symptoms at this time.   ___________________________________________________________________________    VITALS  T(C): 36.3 (04-13-24 @ 08:13), Max: 37.6 (04-12-24 @ 20:48)  HR: 70 (04-13-24 @ 08:13) (70 - 89)  BP: 128/70 (04-13-24 @ 08:13) (118/69 - 158/87)  RR: 16 (04-13-24 @ 08:13) (16 - 18)  SpO2: 97% (04-13-24 @ 08:13) (97% - 100%)  ___________________________________________________________________________    LABS                          7.1    4.03  )-----------( 203      ( 12 Apr 2024 15:37 )             22.0       04-12    141  |  101  |  76<H>  ----------------------------<  86  5.2   |  30  |  9.40<H>    Ca    8.9      12 Apr 2024 15:37  Phos  6.2     04-12    ___________________________________________________________________________    MEDICATIONS  (STANDING):  acetaminophen     Tablet .. 975 milliGRAM(s) Oral every 8 hours  apixaban 2.5 milliGRAM(s) Oral two times a day  AQUAPHOR (petrolatum Ointment) 1 Application(s) Topical two times a day  aspirin enteric coated 81 milliGRAM(s) Oral two times a day  epoetin carito-epbx (RETACRIT) Injectable 06090 Unit(s) IV Push <User Schedule>  lidocaine   4% Patch 1 Patch Transdermal <User Schedule>  metoprolol tartrate 25 milliGRAM(s) Oral two times a day  Nephro-pat 1 Tablet(s) Oral daily  senna 2 Tablet(s) Oral at bedtime  sevelamer carbonate 1600 milliGRAM(s) Oral three times a day with meals    MEDICATIONS  (PRN):  aluminum hydroxide/magnesium hydroxide/simethicone Suspension 30 milliLiter(s) Oral every 4 hours PRN Dyspepsia  melatonin 6 milliGRAM(s) Oral at bedtime PRN Insomnia  polyethylene glycol 3350 17 Gram(s) Oral daily PRN Constipation    ___________________________________________________________________________    PHYSICAL EXAM:    Gen - NAD, Comfortable  HEENT - NCAT, EOMI  Neck - Supple, No limited ROM  Pulm - CTAB, No wheeze, No rhonchi, No crackles  Cardiovascular - RRR, S1S2, No murmurs  Chest - good chest expansion, good respiratory effort  Abdomen - Soft, NT/ND, +BS. BM 3/26  Extremities - Right FA AV fistula + bruit and thrill. GIDEON old AV fistula not active, Right Knee with degenerative chnages  Neuro - stable  Psychiatric - Mood stable, Affect WNL  Skin: right hip surgical site with steri strips CDI. b/l sacrum/coccyx DTI .    ___________________________________________________________________________

## 2024-04-14 PROCEDURE — 99232 SBSQ HOSP IP/OBS MODERATE 35: CPT

## 2024-04-14 RX ADMIN — Medication 975 MILLIGRAM(S): at 06:40

## 2024-04-14 RX ADMIN — Medication 975 MILLIGRAM(S): at 06:59

## 2024-04-14 RX ADMIN — Medication 81 MILLIGRAM(S): at 06:39

## 2024-04-14 RX ADMIN — SEVELAMER CARBONATE 1600 MILLIGRAM(S): 2400 POWDER, FOR SUSPENSION ORAL at 18:14

## 2024-04-14 RX ADMIN — LIDOCAINE 1 PATCH: 4 CREAM TOPICAL at 08:54

## 2024-04-14 RX ADMIN — Medication 1 APPLICATION(S): at 06:40

## 2024-04-14 RX ADMIN — Medication 81 MILLIGRAM(S): at 18:14

## 2024-04-14 RX ADMIN — Medication 25 MILLIGRAM(S): at 06:40

## 2024-04-14 RX ADMIN — Medication 25 MILLIGRAM(S): at 18:14

## 2024-04-14 RX ADMIN — APIXABAN 2.5 MILLIGRAM(S): 2.5 TABLET, FILM COATED ORAL at 18:15

## 2024-04-14 RX ADMIN — SEVELAMER CARBONATE 1600 MILLIGRAM(S): 2400 POWDER, FOR SUSPENSION ORAL at 12:33

## 2024-04-14 RX ADMIN — Medication 1 TABLET(S): at 12:33

## 2024-04-14 RX ADMIN — APIXABAN 2.5 MILLIGRAM(S): 2.5 TABLET, FILM COATED ORAL at 06:40

## 2024-04-14 RX ADMIN — SEVELAMER CARBONATE 1600 MILLIGRAM(S): 2400 POWDER, FOR SUSPENSION ORAL at 08:54

## 2024-04-14 NOTE — PROGRESS NOTE ADULT - ASSESSMENT
38y M with HTN, anemia of chronic disease, ESRD on HD (M/W/F via right AV fistula), left hip arthroplasty in 8/23, history of right leg fracture presented to [facility] on 3/6 after a fall during a wheelchair transfer. He was found to have a right femoral neck fracture and subacute pubic body and inferior rami fractures. He is s/p right hip hemiarthroplasty. Patient now admitted for a multidisciplinary rehab program.    #Right Femoral Neck fracture   - s/p right hip hemiarthroplasty on 3/7  - Comprehensive Multidisciplinary Rehab Program  - precautions: WBAT RLE, fall precautions  - pain management per rehab     #anemia  - hgb 7.1 on 4/11 and 4/12  - suspect secondary to chronic disease  - no signs of bleeding, no bloody BM, no melena  - repeat CBC monday  - consider transfusion on Monday with dialysis    #ESRD  #hyperkalemia  - HDS via right AV fistula on m/w/f.  ( Last HD 3/29). Old HDS left UA  - HD per Dr Blanco  - sevelamer 800mg TID  - Retacrit m/w/f    #HTN  - toprol 25mg BID  - BP control per nephrology give likely secondary hypertension    #DVT prophylaxis:   - eliquis 2.5mg BID

## 2024-04-14 NOTE — PROGRESS NOTE ADULT - SUBJECTIVE AND OBJECTIVE BOX
HPI:  39yo Male PMHx HTN, anemia of chronic disease, ESRD on HD (M/W/F via right AV fistula), left hip arthroplasty in 8/23, history of right leg fracture presented to [facility] from [facility] on 3/6 after falling during a wheelchair transfer landing on his right knee. He is wheelchair bound since his left hip surgery. CT hip with acute garden type 1 minimally valgus impacted transcervical right femoral neck fracture and subacute right pubic body and inferior rami fractures. He is s/p right hip hemiarthroplasty. Post op course complicated by anemia s/p transfusion. Hospital course complicated by hypoglycemia after being shifted for hyperkalemia. Further complicated by febrile 101.6F found to be covid positive and completed remdisivir. He was started on eliquis 2.5mg BID for elevated d-dimers and negative LE duplex.     Patient was evaluated by PM&R and therapy for functional deficits, gait/ADL impairments and acute rehabilitation was recommended. Patient was medically optimized for discharge to [facility] IRU on 3/27 (27 Mar 2024 15:19)    ___________________________________________________________________________    SUBJECTIVE/ROS  Patient was seen and evaluated at bedside today.  Reported no overnight events and is in no acute distress.  Patient was allowed opportunity to ask questions.   Eager to participate on the resume his rehabilitation program tomorrow.  Denies any CP, SOB, RIZVI, palpitations, fever, chills, body aches, cough, congestion, or any other symptoms at this time.   ___________________________________________________________________________    Vital Signs Last 24 Hrs  T(C): 36.8 (14 Apr 2024 08:57), Max: 36.8 (14 Apr 2024 08:57)  T(F): 98.2 (14 Apr 2024 08:57), Max: 98.2 (14 Apr 2024 08:57)  HR: 67 (14 Apr 2024 08:57) (67 - 76)  BP: 128/68 (14 Apr 2024 08:57) (128/68 - 132/75)  RR: 16 (14 Apr 2024 08:57) (16 - 18)  SpO2: 99% (14 Apr 2024 08:57) (98% - 100%)    ___________________________________________________________________________    LABS                          7.1    4.03  )-----------( 203      ( 12 Apr 2024 15:37 )             22.0       04-12    141  |  101  |  76<H>  ----------------------------<  86  5.2   |  30  |  9.40<H>    Ca    8.9      12 Apr 2024 15:37  Phos  6.2     04-12    ___________________________________________________________________________    MEDICATIONS  (STANDING):  acetaminophen     Tablet .. 975 milliGRAM(s) Oral every 8 hours  apixaban 2.5 milliGRAM(s) Oral two times a day  AQUAPHOR (petrolatum Ointment) 1 Application(s) Topical two times a day  aspirin enteric coated 81 milliGRAM(s) Oral two times a day  epoetin carito-epbx (RETACRIT) Injectable 90604 Unit(s) IV Push <User Schedule>  lidocaine   4% Patch 1 Patch Transdermal <User Schedule>  metoprolol tartrate 25 milliGRAM(s) Oral two times a day  Nephro-pat 1 Tablet(s) Oral daily  senna 2 Tablet(s) Oral at bedtime  sevelamer carbonate 1600 milliGRAM(s) Oral three times a day with meals    MEDICATIONS  (PRN):  aluminum hydroxide/magnesium hydroxide/simethicone Suspension 30 milliLiter(s) Oral every 4 hours PRN Dyspepsia  melatonin 6 milliGRAM(s) Oral at bedtime PRN Insomnia  polyethylene glycol 3350 17 Gram(s) Oral daily PRN Constipation    ___________________________________________________________________________    PHYSICAL EXAM:    Gen - NAD, Comfortable  HEENT - NCAT, EOMI  Neck - Supple, No limited ROM  Pulm - CTAB, No wheeze, No rhonchi, No crackles  Cardiovascular - RRR, S1S2, No murmurs  Chest - good chest expansion, good respiratory effort  Abdomen - Soft, NT/ND, +BS. BM 3/26  Extremities - Right FA AV fistula + bruit and thrill. GIDEON old AV fistula not active, Right Knee with degenerative chnages  Neuro - stable  Psychiatric - Mood stable, Affect WNL  Skin: right hip surgical site with steri strips CDI. b/l sacrum/coccyx DTI .    ___________________________________________________________________________

## 2024-04-14 NOTE — PROGRESS NOTE ADULT - SUBJECTIVE AND OBJECTIVE BOX
Patient seen and examined at bedside. no complaints.      ALLERGIES:  No Known Drug Allergies  shellfish (Hives)    MEDICATIONS  (STANDING):  acetaminophen     Tablet .. 975 milliGRAM(s) Oral every 8 hours  apixaban 2.5 milliGRAM(s) Oral two times a day  AQUAPHOR (petrolatum Ointment) 1 Application(s) Topical two times a day  aspirin enteric coated 81 milliGRAM(s) Oral two times a day  epoetin carito-epbx (RETACRIT) Injectable 97888 Unit(s) IV Push <User Schedule>  lidocaine   4% Patch 1 Patch Transdermal <User Schedule>  metoprolol tartrate 25 milliGRAM(s) Oral two times a day  Nephro-pat 1 Tablet(s) Oral daily  senna 2 Tablet(s) Oral at bedtime  sevelamer carbonate 1600 milliGRAM(s) Oral three times a day with meals    MEDICATIONS  (PRN):  aluminum hydroxide/magnesium hydroxide/simethicone Suspension 30 milliLiter(s) Oral every 4 hours PRN Dyspepsia  melatonin 6 milliGRAM(s) Oral at bedtime PRN Insomnia  polyethylene glycol 3350 17 Gram(s) Oral daily PRN Constipation    Vital Signs Last 24 Hrs  T(F): 98.2 (14 Apr 2024 08:57), Max: 98.2 (14 Apr 2024 08:57)  HR: 67 (14 Apr 2024 08:57) (67 - 76)  BP: 128/68 (14 Apr 2024 08:57) (128/68 - 132/75)  RR: 16 (14 Apr 2024 08:57) (16 - 18)  SpO2: 99% (14 Apr 2024 08:57) (98% - 100%)  I&O's Summary      PHYSICAL EXAM:    Gen: nad, resting in bed  Neuro: aaox3, no focal deficits  Heent: eomi b/l, no jvd, no oral exudates  Pulm: cta b/l, no w/r/r  CV: +s1s2, no m/r/g  Ab: soft, nt/nd, normoactive bs x 4  Extrem: no edema, pulses intact and equal  Skin: no rashes  Psych: normal    LABS:                        7.1    4.03  )-----------( 203      ( 12 Apr 2024 15:37 )             22.0       04-12    141  |  101  |  76  ----------------------------<  86  5.2   |  30  |  9.40    Ca    8.9      12 Apr 2024 15:37  Phos  6.2     04-12        Urinalysis Basic - ( 12 Apr 2024 15:37 )    Color: x / Appearance: x / SG: x / pH: x  Gluc: 86 mg/dL / Ketone: x  / Bili: x / Urobili: x   Blood: x / Protein: x / Nitrite: x   Leuk Esterase: x / RBC: x / WBC x   Sq Epi: x / Non Sq Epi: x / Bacteria: x        COVID-19 PCR: NotDetec (04-11-24 @ 10:30)  COVID-19 PCR: NotDetec (03-27-24 @ 06:47)  COVID-19 PCR: NotDetec (03-26-24 @ 06:57)  COVID-19 PCR: Detected (03-19-24 @ 16:53)      RADIOLOGY & ADDITIONAL TESTS:    Care Discussed with Consultants/Other Providers:

## 2024-04-14 NOTE — PROGRESS NOTE ADULT - ASSESSMENT
BRIDGET NORTH is a 38y M with HTN, anemia of chronic disease, ESRD on HD (M/W/F via right AV fistula), left hip arthroplasty in 8/23, history of right leg fracture presented to [facility] on 3/6 after a fall during a wheelchair transfer. He was found to have a right femoral neck fracture and subacute pubic body and inferior rami fractures. He is s/p right hip hemiarthroplasty. Hospital course complicated by post-op anemia s/p transfusion, hypoglycemia, hyperkalemia, febrile 2/2 covid s/p remdisivir, and started on eliquis 2.5mg BID with negative dvts on LE duplex. Patient now admitted for a multidisciplinary rehab program. 03-27-24 @ 16:17      #Right Femoral Neck fracture   - s/p right hip hemiarthroplasty on 3/7  - pain med as below  - Dressing and staples removed on 3/22  - Comprehensive Multidisciplinary Rehab Program:  comprehensive rehab program of PT/OT   - precautions: WBAT RLE. Anterior hip.   - Right Knee pain- arthrosis/ effusion- no Fx- renal osteodystrophy- ICE/ Lidoderm/ Rehab/ ACE  - Social work to confirm DC plan- Need 24x7 assistance at home- ? Brother/ ? Cousin  - stairs not an issue secondary to PERCY- can have ambulance to transfer    #ESRD  - HDS via right AV fistula on m/w/f.  ( Last HD 3/27). Old HDS left UA  - Nephro consult placed. Dr Blanco aware.   - sevelamer 800mg TID  - retacrit m/w/f    #HTN  - toprol 25mg BID  - Monitor BP    #Pain  - Tylenol PRN  - oxy 10mg moderate, 15mg severe.   Right Knee pain- lidoderm during the day/ICE/ ACE- Better  Left Hip pain - all studies reviewed- recent CT 3/6- no evidence of abnormality in Left prosthesis  will follow clinically-stable, ambulating    #Sleep  - Melatonin PRN     #GI / Bowel  #dyspepsia  - Senna qHS  - Miralax PRN -  - maalox    # / Bladder  - does not void ESRD    #Skin / Pressure injury  - Skin assessment on admission performed : right hip surgical site with steri strips CDI. coccyx small stage 2-using barrier cream  - Monitor Incisions:    - wound care following at [facility] for b/l sacrum/coccyx DTI wth evolution.   - wound care to follow  - turn and reposition q2h      #Diet:  - Diet Consistency: Renal restriction  - Nutrition consult    #DVT prophylaxis:   - eliquis 2.5mg BID  - Last LE Doppler normal on 3/14     PATIENT IS A GOOD CANDIDATE FOR SEMI ELECTRIC HOSPITAL BED 2/2 UNDERLYING RENAL DISEASE AND ANTONIA HIP FRACTURES, PAIN FROM OSTEODYSTROPHY. PATIENT REQUIRES FREQUENT POSITION CHANGES OR IMMEDIATE NEED FOR A CHANGE OF POSITION -REQUIRES POSITIONING OF THE BODY TO ALLEVIATE PAIN, PREVENT CONTRACTURES AND AVOID RESPIRATORY INFECTIONS IN WAYS NOT FEASIBLE IN ORDINARY BED.

## 2024-04-15 ENCOUNTER — TRANSCRIPTION ENCOUNTER (OUTPATIENT)
Age: 39
End: 2024-04-15

## 2024-04-15 LAB
ABO RH CONFIRMATION: SIGNIFICANT CHANGE UP
ALLERGY+IMMUNOLOGY DIAG STUDY NOTE: SIGNIFICANT CHANGE UP
ANION GAP SERPL CALC-SCNC: 12 MMOL/L — SIGNIFICANT CHANGE UP (ref 5–17)
BLD GP AB SCN SERPL QL: SIGNIFICANT CHANGE UP
BUN SERPL-MCNC: 83 MG/DL — HIGH (ref 7–23)
CALCIUM SERPL-MCNC: 8.8 MG/DL — SIGNIFICANT CHANGE UP (ref 8.4–10.5)
CHLORIDE SERPL-SCNC: 98 MMOL/L — SIGNIFICANT CHANGE UP (ref 96–108)
CO2 SERPL-SCNC: 28 MMOL/L — SIGNIFICANT CHANGE UP (ref 22–31)
CREAT SERPL-MCNC: 9.9 MG/DL — HIGH (ref 0.5–1.3)
DIR ANTIGLOB POLYSPECIFIC INTERPRETATION: SIGNIFICANT CHANGE UP
EGFR: 6 ML/MIN/1.73M2 — LOW
GLUCOSE SERPL-MCNC: 86 MG/DL — SIGNIFICANT CHANGE UP (ref 70–99)
HCT VFR BLD CALC: 21.2 % — LOW (ref 39–50)
HCT VFR BLD CALC: 23.4 % — LOW (ref 39–50)
HGB BLD-MCNC: 6.5 G/DL — CRITICAL LOW (ref 13–17)
HGB BLD-MCNC: 7.4 G/DL — LOW (ref 13–17)
MCHC RBC-ENTMCNC: 30.7 GM/DL — LOW (ref 32–36)
MCHC RBC-ENTMCNC: 30.7 PG — SIGNIFICANT CHANGE UP (ref 27–34)
MCHC RBC-ENTMCNC: 30.8 PG — SIGNIFICANT CHANGE UP (ref 27–34)
MCHC RBC-ENTMCNC: 31.6 GM/DL — LOW (ref 32–36)
MCV RBC AUTO: 100 FL — SIGNIFICANT CHANGE UP (ref 80–100)
MCV RBC AUTO: 97.5 FL — SIGNIFICANT CHANGE UP (ref 80–100)
NRBC # BLD: 0 /100 WBCS — SIGNIFICANT CHANGE UP (ref 0–0)
NRBC # BLD: 0 /100 WBCS — SIGNIFICANT CHANGE UP (ref 0–0)
PHOSPHATE SERPL-MCNC: 6 MG/DL — HIGH (ref 2.5–4.5)
PLATELET # BLD AUTO: 197 K/UL — SIGNIFICANT CHANGE UP (ref 150–400)
PLATELET # BLD AUTO: 237 K/UL — SIGNIFICANT CHANGE UP (ref 150–400)
POTASSIUM SERPL-MCNC: 5.1 MMOL/L — SIGNIFICANT CHANGE UP (ref 3.5–5.3)
POTASSIUM SERPL-SCNC: 5.1 MMOL/L — SIGNIFICANT CHANGE UP (ref 3.5–5.3)
RBC # BLD: 2.12 M/UL — LOW (ref 4.2–5.8)
RBC # BLD: 2.4 M/UL — LOW (ref 4.2–5.8)
RBC # FLD: 15.9 % — HIGH (ref 10.3–14.5)
RBC # FLD: 16 % — HIGH (ref 10.3–14.5)
SODIUM SERPL-SCNC: 138 MMOL/L — SIGNIFICANT CHANGE UP (ref 135–145)
WBC # BLD: 3.65 K/UL — LOW (ref 3.8–10.5)
WBC # BLD: 4.64 K/UL — SIGNIFICANT CHANGE UP (ref 3.8–10.5)
WBC # FLD AUTO: 3.65 K/UL — LOW (ref 3.8–10.5)
WBC # FLD AUTO: 4.64 K/UL — SIGNIFICANT CHANGE UP (ref 3.8–10.5)

## 2024-04-15 PROCEDURE — 99232 SBSQ HOSP IP/OBS MODERATE 35: CPT

## 2024-04-15 PROCEDURE — 99232 SBSQ HOSP IP/OBS MODERATE 35: CPT | Mod: GC

## 2024-04-15 PROCEDURE — 86077 PHYS BLOOD BANK SERV XMATCH: CPT

## 2024-04-15 RX ORDER — SEVELAMER CARBONATE 2400 MG/1
2 POWDER, FOR SUSPENSION ORAL
Qty: 0 | Refills: 0 | DISCHARGE
Start: 2024-04-15

## 2024-04-15 RX ORDER — SENNA PLUS 8.6 MG/1
2 TABLET ORAL
Qty: 0 | Refills: 0 | DISCHARGE
Start: 2024-04-15

## 2024-04-15 RX ORDER — METOPROLOL TARTRATE 50 MG
25 TABLET ORAL ONCE
Refills: 0 | Status: COMPLETED | OUTPATIENT
Start: 2024-04-15 | End: 2024-04-15

## 2024-04-15 RX ORDER — ASPIRIN/CALCIUM CARB/MAGNESIUM 324 MG
81 TABLET ORAL ONCE
Refills: 0 | Status: COMPLETED | OUTPATIENT
Start: 2024-04-15 | End: 2024-04-15

## 2024-04-15 RX ORDER — SENNA PLUS 8.6 MG/1
2 TABLET ORAL
Refills: 0 | DISCHARGE

## 2024-04-15 RX ADMIN — Medication 975 MILLIGRAM(S): at 20:41

## 2024-04-15 RX ADMIN — Medication 1 TABLET(S): at 12:22

## 2024-04-15 RX ADMIN — Medication 81 MILLIGRAM(S): at 23:08

## 2024-04-15 RX ADMIN — Medication 25 MILLIGRAM(S): at 06:15

## 2024-04-15 RX ADMIN — SEVELAMER CARBONATE 1600 MILLIGRAM(S): 2400 POWDER, FOR SUSPENSION ORAL at 12:22

## 2024-04-15 RX ADMIN — Medication 25 MILLIGRAM(S): at 23:08

## 2024-04-15 RX ADMIN — ERYTHROPOIETIN 10000 UNIT(S): 10000 INJECTION, SOLUTION INTRAVENOUS; SUBCUTANEOUS at 17:19

## 2024-04-15 RX ADMIN — SEVELAMER CARBONATE 1600 MILLIGRAM(S): 2400 POWDER, FOR SUSPENSION ORAL at 18:08

## 2024-04-15 RX ADMIN — Medication 25 MILLIGRAM(S): at 18:08

## 2024-04-15 RX ADMIN — Medication 975 MILLIGRAM(S): at 06:14

## 2024-04-15 RX ADMIN — Medication 81 MILLIGRAM(S): at 18:09

## 2024-04-15 RX ADMIN — Medication 1 APPLICATION(S): at 18:11

## 2024-04-15 RX ADMIN — SEVELAMER CARBONATE 1600 MILLIGRAM(S): 2400 POWDER, FOR SUSPENSION ORAL at 08:15

## 2024-04-15 RX ADMIN — Medication 81 MILLIGRAM(S): at 06:16

## 2024-04-15 RX ADMIN — APIXABAN 2.5 MILLIGRAM(S): 2.5 TABLET, FILM COATED ORAL at 06:15

## 2024-04-15 NOTE — DISCHARGE NOTE PROVIDER - CARE PROVIDER_API CALL
(1) Other Medications/None Jason León  Orthopaedic Surgery  [address], [address]  [place] [address]  Phone: [phone]  Fax: [phone]  Follow Up Time:     Jaclyn Williamson  Internal Medicine  [address] [place] [address]  Phone: [phone]  Fax: [phone]  Follow Up Time:     Wm Dumas  Physical/Rehab Medicine  [address] [place] [address]  Phone: [phone]  Fax: [phone]  Follow Up Time:     Lucy Blanco  Nephrology  [address], [address]  [place] [address]  Phone: [phone]  Fax: [phone]  Follow Up Time:

## 2024-04-15 NOTE — DISCHARGE NOTE PROVIDER - NSDCFUADDAPPT_GEN_ALL_CORE_FT
Dr Dumas's office will contact you to schedule follow up appointment within 4-6 weeks.  Dr Dumas's office will contact you to schedule follow up appointment within 4-6 weeks.   Please See Hematology Clinic on DC  secondary to Anemia  Fu with PMD 7-10 days

## 2024-04-15 NOTE — DISCHARGE NOTE PROVIDER - NSDCHHNEEDSERVICE_GEN_ALL_CORE
Medication teaching and assessment/Observation and assessment/Rehabilitation services/Teaching and training/Wound care and assessment Rehabilitation services

## 2024-04-15 NOTE — DISCHARGE NOTE PROVIDER - HOSPITAL COURSE
37yo Male PMHx HTN, anemia of chronic disease, ESRD on HD (M/W/F via right AV fistula), left hip arthroplasty in 8/23, history of right leg fracture presented to [facility] from [facility] on 3/6 after falling during a wheelchair transfer landing on his right knee. He is wheelchair bound since his left hip surgery. CT hip with acute garden type 1 minimally valgus impacted transcervical right femoral neck fracture and subacute right pubic body and inferior rami fractures. He is s/p right hip hemiarthroplasty. Post op course complicated by anemia s/p transfusion. Hospital course complicated by hypoglycemia after being shifted for hyperkalemia. Further complicated by febrile 101.6F found to be covid positive and completed remdisivir. He was started on eliquis 2.5mg BID for elevated d-dimers and negative LE duplex.     Patient was evaluated by PM&R and therapy for functional deficits, gait/ADL impairments and acute rehabilitation was recommended. Patient was medically optimized for discharge to [facility] IRU on 3/27.  At [facility] rehab patient completed comprehensive PT OT program and reached his rehab goals on 4/16. While at rehab patient evaluated by medicine and renal. HD continued. Cleared for dc home c VNS on 4/16.      37yo Male PMHx HTN, anemia of chronic disease, ESRD on HD (M/W/F via right AV fistula), left hip arthroplasty in 8/23, history of right leg fracture presented to [facility] from [facility] on 3/6 after falling during a wheelchair transfer landing on his right knee. He is wheelchair bound since his left hip surgery. CT hip with acute garden type 1 minimally valgus impacted transcervical right femoral neck fracture and subacute right pubic body and inferior rami fractures. He is s/p right hip hemiarthroplasty. Post op course complicated by anemia s/p transfusion. Hospital course complicated by hypoglycemia after being shifted for hyperkalemia. Further complicated by febrile 101.6F found to be covid positive and completed remdisivir. He was started on eliquis 2.5mg BID for elevated d-dimers and negative LE duplex.     Patient was evaluated by PM&R and therapy for functional deficits, gait/ADL impairments and acute rehabilitation was recommended. Patient was medically optimized for discharge to [facility] IRU on 3/27.  At [facility] rehab patient completed comprehensive PT OT program and reached his rehab goals on 4/16. While at rehab patient evaluated by medicine and renal. HD continued. S/p EGD/colo per GI, acute anemia. Duodenal Ulcer. ASA/Eliquis dc. S/p multiple transfusions. Hematology and renal appreciated. Cleared for dc to [facility] on 4/29      39yo Male PMHx HTN, anemia of chronic disease, ESRD on HD (M/W/F via right AV fistula), left hip arthroplasty in 8/23, history of right leg fracture presented to [facility] from [facility] on 3/6 after falling during a wheelchair transfer landing on his right knee. He is wheelchair bound since his left hip surgery. CT hip with acute garden type 1 minimally valgus impacted transcervical right femoral neck fracture and subacute right pubic body and inferior rami fractures. He is s/p right hip hemiarthroplasty. Post op course complicated by anemia s/p transfusion. Hospital course complicated by hypoglycemia after being shifted for hyperkalemia. Further complicated by febrile 101.6F found to be covid positive and completed remdisivir. He was started on eliquis 2.5mg BID for elevated d-dimers and negative LE duplex.     Patient was evaluated by PM&R and therapy for functional deficits, gait/ADL impairments and acute rehabilitation was recommended. Patient was medically optimized for discharge to [facility] IRU on 3/27.  At [facility] rehab patient completed comprehensive PT OT program and reached his rehab goals on 4/16. While at rehab patient evaluated by medicine and renal. HD continued. S/p EGD/colo per GI, acute anemia. Duodenal Ulcer. ASA/Eliquis dc. S/p multiple transfusions. Hematology and renal appreciated. Cleared for dc to home on 4/30

## 2024-04-15 NOTE — DISCHARGE NOTE PROVIDER - PROVIDER TOKENS
PROVIDER:[TOKEN:[[id number]]],PROVIDER:[TOKEN:[[id number]]],PROVIDER:[TOKEN:[[id number]]],PROVIDER:[TOKEN:[[id number]]]

## 2024-04-15 NOTE — DISCHARGE NOTE PROVIDER - NSDCCPCAREPLAN_GEN_ALL_CORE_FT
PRINCIPAL DISCHARGE DIAGNOSIS  Diagnosis: History of repair of hip fracture  Assessment and Plan of Treatment: continue pain regimen, follow up ortho in 1 week, continue therapy, aspirin ends 4/19.      SECONDARY DISCHARGE DIAGNOSES  Diagnosis: ESRD on dialysis  Assessment and Plan of Treatment: follow up renal, continue renvela    Diagnosis: 2019 novel coronavirus disease (COVID-19)  Assessment and Plan of Treatment: follow up PCP     PRINCIPAL DISCHARGE DIAGNOSIS  Diagnosis: History of repair of hip fracture  Assessment and Plan of Treatment: continue pain regimen, follow up ortho in 1 week, continue therapy, aspirin ends 4/19.      SECONDARY DISCHARGE DIAGNOSES  Diagnosis: ESRD on dialysis  Assessment and Plan of Treatment: follow up renal, continue renvela    Diagnosis: 2019 novel coronavirus disease (COVID-19)  Assessment and Plan of Treatment: follow up PCP    Diagnosis: Anemia secondary to renal failure  Assessment and Plan of Treatment: renal recoomends hematology eval on Dc- Heme should also see regarding elevated d dimer in past s/p DVT proph x 6 weeks     PRINCIPAL DISCHARGE DIAGNOSIS  Diagnosis: History of repair of hip fracture  Assessment and Plan of Treatment: continue pain regimen, follow up ortho in 1 week, continue therapy      SECONDARY DISCHARGE DIAGNOSES  Diagnosis: ESRD on dialysis  Assessment and Plan of Treatment: follow up renal, continue renvela    Diagnosis: 2019 novel coronavirus disease (COVID-19)  Assessment and Plan of Treatment: follow up PCP    Diagnosis: Anemia secondary to renal failure  Assessment and Plan of Treatment: renal recoomends hematology eval on Dc- Heme should also see regarding elevated d dimer in past, off DVT proph for GIB     PRINCIPAL DISCHARGE DIAGNOSIS  Diagnosis: History of repair of hip fracture  Assessment and Plan of Treatment: continue pain regimen, follow up ortho in 1 week, continue therapy  Pain in Left Groin on ambulation- Xray done Shows ? increased Lucency over Greater troch region of implant- ? Loosening  PLEASE HAVE ORTHO EVAL THIS      SECONDARY DISCHARGE DIAGNOSES  Diagnosis: ESRD on dialysis  Assessment and Plan of Treatment: follow up renal, continue renvela    Diagnosis: 2019 novel coronavirus disease (COVID-19)  Assessment and Plan of Treatment: follow up PCP    Diagnosis: Anemia secondary to renal failure  Assessment and Plan of Treatment: renal recoomends hematology eval on Dc- Heme should also see regarding elevated d dimer in past, off DVT proph for GIB

## 2024-04-15 NOTE — DISCHARGE NOTE PROVIDER - CARE PROVIDERS DIRECT ADDRESSES
[email or web address]

## 2024-04-15 NOTE — PROGRESS NOTE ADULT - ASSESSMENT
BRIDGET NORTH is a 38y M with HTN, anemia of chronic disease, ESRD on HD (M/W/F via right AV fistula), left hip arthroplasty in 8/23, history of right leg fracture presented to [facility] on 3/6 after a fall during a wheelchair transfer. He was found to have a right femoral neck fracture and subacute pubic body and inferior rami fractures. He is s/p right hip hemiarthroplasty. Hospital course complicated by post-op anemia s/p transfusion, hypoglycemia, hyperkalemia, febrile 2/2 covid s/p remdisivir, and started on eliquis 2.5mg BID with negative dvts on LE duplex. Patient now admitted for a multidisciplinary rehab program. 03-27-24 @ 16:17      #Right Femoral Neck fracture   - s/p right hip hemiarthroplasty on 3/7  - pain med as below  - Dressing and staples removed on 3/22  - Comprehensive Multidisciplinary Rehab Program:  comprehensive rehab program of PT/OT   - precautions: WBAT RLE. Anterior hip.   - Right Knee pain- arthrosis/ effusion- no Fx- renal osteodystrophy- ICE/ Lidoderm/ Rehab/ ACE  - Social work to confirm DC plan- Need 24x7 assistance at home- ? Brother/ ? Cousin  - stairs not an issue secondary to PERCY- can have ambulance to transfer  ANTICIPATE DC IN AM    #ESRD  - HDS via right AV fistula on m/w/f.  ( Last HD 3/27). Old HDS left UA  - Nephro consult placed. Dr Blanco aware.   - sevelamer 800mg TID  - retacrit m/w/f  Hgb 6.5- hospitalist to transfuse one unit PRBCs    #HTN  - toprol 25mg BID  - Monitor BP    #Pain  - Tylenol PRN  - oxy 10mg moderate, 15mg severe.   Right Knee pain- lidoderm during the day/ICE/ ACE- Better  Left Hip pain - all studies reviewed- recent CT 3/6- no evidence of abnormality in Left prosthesis  will follow clinically-stable, ambulating    #Sleep  - Melatonin PRN     #GI / Bowel  #dyspepsia  - Senna qHS  - Miralax PRN -  - maalox    # / Bladder  - does not void ESRD    #Skin / Pressure injury  - Skin assessment on admission performed : right hip surgical site with steri strips CDI. coccyx small stage 2-using barrier cream  - Monitor Incisions:    - wound care following at [facility] for b/l sacrum/coccyx DTI wth evolution.   - wound care to follow  - turn and reposition q2h      #Diet:  - Diet Consistency: Renal restriction  - Nutrition consult    #DVT prophylaxis:   - eliquis 2.5mg BID  - Last LE Doppler normal on 3/14     PATIENT IS A GOOD CANDIDATE FOR [facility] BED 2/2 UNDERLYING RENAL DISEASE AND ANTONIA HIP FRACTURES, PAIN FROM OSTEODYSTROPHY. PATIENT REQUIRES FREQUENT POSITION CHANGES OR IMMEDIATE NEED FOR A CHANGE OF POSITION -REQUIRES POSITIONING OF THE BODY TO ALLEVIATE PAIN, PREVENT CONTRACTURES AND AVOID RESPIRATORY INFECTIONS IN WAYS NOT FEASIBLE IN ORDINARY BED.

## 2024-04-15 NOTE — PROGRESS NOTE ADULT - ASSESSMENT
38y M with HTN, anemia of chronic disease, ESRD on HD (M/W/F via right AV fistula), left hip arthroplasty in 8/23, history of right leg fracture presented to [facility] on 3/6 after a fall during a wheelchair transfer. He was found to have a right femoral neck fracture and subacute pubic body and inferior rami fractures. He is s/p right hip hemiarthroplasty. Patient now admitted for a multidisciplinary rehab program.    #Right Femoral Neck fracture   - s/p right hip hemiarthroplasty on 3/7  - Comprehensive Multidisciplinary Rehab Program  - precautions: WBAT RLE, fall precautions  - pain management per rehab     #anemia  - hgb 7.1 on am labs  - suspect secondary to chronic disease  - no signs of bleeding, no bloody BM, no melena    #ESRD  #hyperkalemia  - HDS via right AV fistula on m/w/f.  ( Last HD 3/29). Old HDS left UA  - HD per Dr Blanco  - sevelamer 800mg TID  - Retacrit m/w/f    #HTN  - toprol 25mg BID  - BP control per nephrology give likely secondary hypertension    #DVT prophylaxis:   - eliquis 2.5mg BID    unclear why patient is on eliquis - he is post-op>40 days; eliquis started on 3/21 when d-dimer noted to be elevated. given his history of severe anemia, would be cautious with empiric AC  38y M with HTN, anemia of chronic disease, ESRD on HD (M/W/F via right AV fistula), left hip arthroplasty in 8/23, history of right leg fracture presented to [facility] on 3/6 after a fall during a wheelchair transfer. He was found to have a right femoral neck fracture and subacute pubic body and inferior rami fractures. He is s/p right hip hemiarthroplasty. Patient now admitted for a multidisciplinary rehab program.    #Right Femoral Neck fracture   - s/p right hip hemiarthroplasty on 3/7  - Comprehensive Multidisciplinary Rehab Program  - precautions: WBAT RLE, fall precautions  - pain management per rehab     #anemia  - hgb 7.1 on am labs  - suspect secondary to chronic disease  - no signs of bleeding, no bloody BM, no melena    #ESRD  #hyperkalemia  - HDS via right AV fistula on m/w/f.  ( Last HD 3/29). Old HDS left UA  - HD per Dr Blanco  - sevelamer 800mg TID  - Retacrit m/w/f    #HTN  - toprol 25mg BID  - BP control per nephrology give likely secondary hypertension    #DVT prophylaxis:   - eliquis 2.5mg BID    unclear why patient is on eliquis - he is post-op>40 days; eliquis started on 3/21; d/w Dr. Blanco. recommend to dc if solely post-op ppx as there is no evidence of VTE or a-fib

## 2024-04-15 NOTE — DISCHARGE NOTE PROVIDER - DETAILS OF MALNUTRITION DIAGNOSIS/DIAGNOSES
This patient has been assessed with a concern for Malnutrition and was treated during this hospitalization for the following Nutrition diagnosis/diagnoses:     -  03/28/2024: Severe protein-calorie malnutrition

## 2024-04-15 NOTE — DISCHARGE NOTE PROVIDER - NSDCMRMEDTOKEN_GEN_ALL_CORE_FT
aspirin 81 mg oral delayed release tablet: 1 tab(s) orally 2 times a day  Colace 100 mg oral capsule: 2 cap(s) orally once a day (at bedtime)  metoprolol tartrate 25 mg oral tablet: 1 tab(s) orally 2 times a day  oxyCODONE 10 mg oral tablet: 1 tab(s) orally every 4 hours As needed Moderate Pain (4 - 6)  Diana-Bianca oral tablet: 1 tab(s) orally once a day as directed  senna leaf extract oral tablet: 2 tab(s) orally once a day (at bedtime)  sevelamer carbonate 800 mg oral tablet: 2 tab(s) orally 3 times a day (with meals)  Tylenol 500 mg oral tablet: 2 tab(s) orally every 8 hours as needed for pain   aspirin 81 mg oral delayed release tablet: 1 tab(s) orally 2 times a day  Colace 100 mg oral capsule: 2 cap(s) orally once a day (at bedtime)  metoprolol tartrate 25 mg oral tablet: 1 tab(s) orally 2 times a day  oxyCODONE 10 mg oral tablet: 1 tab(s) orally every 6 hours as needed for Moderate Pain (4 - 6) MDD: 4 tabs  Diana-Bianca oral tablet: 1 tab(s) orally once a day as directed  senna leaf extract oral tablet: 2 tab(s) orally once a day (at bedtime)  sevelamer carbonate 800 mg oral tablet: 2 tab(s) orally 3 times a day (with meals)  Tylenol 500 mg oral tablet: 2 tab(s) orally every 8 hours as needed for pain   Colace 100 mg oral capsule: 2 cap(s) orally once a day (at bedtime)  metoprolol tartrate 25 mg oral tablet: 1 tab(s) orally 2 times a day  oxyCODONE 10 mg oral tablet: 1 tab(s) orally every 6 hours as needed for Moderate Pain (4 - 6) MDD: 4 tabs  pantoprazole 40 mg oral delayed release tablet: 1 tab(s) orally 2 times a day  Diana-Bianca oral tablet: 1 tab(s) orally once a day as directed  senna leaf extract oral tablet: 2 tab(s) orally once a day (at bedtime)  sevelamer carbonate 800 mg oral tablet: 2 tab(s) orally 3 times a day (with meals)  Tylenol 500 mg oral tablet: 2 tab(s) orally every 8 hours as needed for pain

## 2024-04-15 NOTE — PROGRESS NOTE ADULT - SUBJECTIVE AND OBJECTIVE BOX
Patient is a 38y old  Male who presents with a chief complaint of Right Hip fracture s/p right hip hemiarthroplasty (14 Apr 2024 12:46)    Patient seen and examined at bedside. No acute overnight events.     ALLERGIES:  No Known Drug Allergies  shellfish (Hives)    MEDICATIONS  (STANDING):  acetaminophen     Tablet .. 975 milliGRAM(s) Oral every 8 hours  apixaban 2.5 milliGRAM(s) Oral two times a day  AQUAPHOR (petrolatum Ointment) 1 Application(s) Topical two times a day  aspirin enteric coated 81 milliGRAM(s) Oral two times a day  epoetin carito-epbx (RETACRIT) Injectable 03897 Unit(s) IV Push <User Schedule>  lidocaine   4% Patch 1 Patch Transdermal <User Schedule>  metoprolol tartrate 25 milliGRAM(s) Oral two times a day  Nephro-pat 1 Tablet(s) Oral daily  senna 2 Tablet(s) Oral at bedtime  sevelamer carbonate 1600 milliGRAM(s) Oral three times a day with meals    MEDICATIONS  (PRN):  aluminum hydroxide/magnesium hydroxide/simethicone Suspension 30 milliLiter(s) Oral every 4 hours PRN Dyspepsia  melatonin 6 milliGRAM(s) Oral at bedtime PRN Insomnia  polyethylene glycol 3350 17 Gram(s) Oral daily PRN Constipation    Vital Signs Last 24 Hrs  T(F): 97.7 (15 Apr 2024 08:51), Max: 97.7 (15 Apr 2024 08:51)  HR: 65 (15 Apr 2024 08:51) (65 - 77)  BP: 117/67 (15 Apr 2024 08:51) (116/64 - 143/76)  RR: 15 (15 Apr 2024 08:51) (15 - 16)  SpO2: 98% (15 Apr 2024 08:51) (98% - 100%)  I&O's Summary    PHYSICAL EXAM:  General: NAD, A/O x 3  ENT: MMM, no tonsilar exudate  Neck: Supple, No JVD  Lungs: Clear to auscultation bilaterally, no wheezes. Good air entry bilaterally   Cardio: RRR, S1/S2, No murmurs  Abdomen: Soft, Nontender, Nondistended; Bowel sounds present  Extremities: No calf tenderness, No pitting edema. E AV     LABS:                        7.1    4.03  )-----------( 203      ( 12 Apr 2024 15:37 )             22.0       04-12    141  |  101  |  76  ----------------------------<  86  5.2   |  30  |  9.40    Ca    8.9      12 Apr 2024 15:37  Phos  6.2     04-12    Urinalysis Basic - ( 12 Apr 2024 15:37 )    Color: x / Appearance: x / SG: x / pH: x  Gluc: 86 mg/dL / Ketone: x  / Bili: x / Urobili: x   Blood: x / Protein: x / Nitrite: x   Leuk Esterase: x / RBC: x / WBC x   Sq Epi: x / Non Sq Epi: x / Bacteria: x    COVID-19 PCR: NotDetec (04-11-24 @ 10:30)  COVID-19 PCR: NotDetec (03-27-24 @ 06:47)  COVID-19 PCR: NotDetec (03-26-24 @ 06:57)  COVID-19 PCR: Detected (03-19-24 @ 16:53)    RADIOLOGY & ADDITIONAL TESTS:     Care Discussed with Consultants/Other Providers:

## 2024-04-15 NOTE — PROGRESS NOTE ADULT - SUBJECTIVE AND OBJECTIVE BOX
Stable on HD    Vital Signs Last 24 Hrs  T(C): 37.4 (04-15-24 @ 21:52), Max: 37.4 (04-15-24 @ 21:52)  T(F): 99.3 (04-15-24 @ 21:52), Max: 99.3 (04-15-24 @ 21:52)  HR: 70 (04-15-24 @ 21:52) (65 - 77)  BP: 187/85 (04-15-24 @ 21:52) (117/67 - 187/85)  RR: 17 (04-15-24 @ 21:52) (15 - 17)  SpO2: 99% (04-15-24 @ 21:52) (98% - 100%)    I&O's Detail    15 Apr 2024 07:01  -  15 Apr 2024 22:58  --------------------------------------------------------  IN:    Other (mL): 800 mL  Total IN: 800 mL    OUT:    Other (mL): 3800 mL  Total OUT: 3800 mL    s1s2  b/l air entry  soft, ND  edema                                                                                                   7.4    4.64  )-----------( 237      ( 15 Apr 2024 17:44 )             23.4     15 Apr 2024 14:30    138    |  98     |  83     ----------------------------<  86     5.1     |  28     |  9.90     Ca    8.8        15 Apr 2024 14:30  Phos  6.0       15 Apr 2024 14:30    acetaminophen     Tablet .. 975 milliGRAM(s) Oral every 8 hours  aluminum hydroxide/magnesium hydroxide/simethicone Suspension 30 milliLiter(s) Oral every 4 hours PRN  AQUAPHOR (petrolatum Ointment) 1 Application(s) Topical two times a day  aspirin  chewable 81 milliGRAM(s) Oral once  aspirin enteric coated 81 milliGRAM(s) Oral two times a day  epoetin carito-epbx (RETACRIT) Injectable 99115 Unit(s) IV Push <User Schedule>  lidocaine   4% Patch 1 Patch Transdermal <User Schedule>  melatonin 6 milliGRAM(s) Oral at bedtime PRN  metoprolol tartrate 25 milliGRAM(s) Oral once  metoprolol tartrate 25 milliGRAM(s) Oral two times a day  Nephro-pat 1 Tablet(s) Oral daily  polyethylene glycol 3350 17 Gram(s) Oral daily PRN  senna 2 Tablet(s) Oral at bedtime  sevelamer carbonate 1600 milliGRAM(s) Oral three times a day with meals    A/P:    ESRD on HD   S/p fall, R hip fx  S/p R hip HA 3/7  HD todayw/3kg fluid removal   Epoetin w/HD 3 x week   Bld tx 1u today   Renal diet  Renvela for high Phos    [phone]

## 2024-04-15 NOTE — DISCHARGE NOTE PROVIDER - NSDCHHHOMEBOUND_GEN_ALL_CORE
Chest pain/weakness during/after ambulation   greater than 20 feet/Fall risk/Pain greater than 7 on scale of 10 on ambulation Other, specify...

## 2024-04-15 NOTE — PROGRESS NOTE ADULT - SUBJECTIVE AND OBJECTIVE BOX
HPI:  37yo Male PMHx HTN, anemia of chronic disease, ESRD on HD (M/W/F via right AV fistula), left hip arthroplasty in 8/23, history of right leg fracture presented to [facility] from [facility] on 3/6 after falling during a wheelchair transfer landing on his right knee. He is wheelchair bound since his left hip surgery. CT hip with acute garden type 1 minimally valgus impacted transcervical right femoral neck fracture and subacute right pubic body and inferior rami fractures. He is s/p right hip hemiarthroplasty. Post op course complicated by anemia s/p transfusion. Hospital course complicated by hypoglycemia after being shifted for hyperkalemia. Further complicated by febrile 101.6F found to be covid positive and completed remdisivir. He was started on eliquis 2.5mg BID for elevated d-dimers and negative LE duplex.     Patient was evaluated by PM&R and therapy for functional deficits, gait/ADL impairments and acute rehabilitation was recommended. Patient was medically optimized for discharge to [facility] IRU on 3/27 (27 Mar 2024 15:19)      ROS/subjective:  Patient seen and evaluated bedside, ambulating w/cardiac walker  No events over the weekend   Right knee pain/left hip pain stable  spoke with OT- Awaiting delivery of WC  no dizziness, no chest pain, no nausea, no vomiting, no SOB, no headaches  tolerating HD and Eliquis- can DC Eliquis per hospitalist- being given only for elevated D dimer post COVID/ DVT proph- on ASA  BM 4/13    MEDICATIONS  (STANDING):  acetaminophen     Tablet .. 975 milliGRAM(s) Oral every 8 hours  AQUAPHOR (petrolatum Ointment) 1 Application(s) Topical two times a day  aspirin enteric coated 81 milliGRAM(s) Oral two times a day  epoetin carito-epbx (RETACRIT) Injectable 55147 Unit(s) IV Push <User Schedule>  lidocaine   4% Patch 1 Patch Transdermal <User Schedule>  metoprolol tartrate 25 milliGRAM(s) Oral two times a day  Nephro-pat 1 Tablet(s) Oral daily  senna 2 Tablet(s) Oral at bedtime  sevelamer carbonate 1600 milliGRAM(s) Oral three times a day with meals    MEDICATIONS  (PRN):  aluminum hydroxide/magnesium hydroxide/simethicone Suspension 30 milliLiter(s) Oral every 4 hours PRN Dyspepsia  melatonin 6 milliGRAM(s) Oral at bedtime PRN Insomnia  polyethylene glycol 3350 17 Gram(s) Oral daily PRN Constipation                            6.5    3.65  )-----------( 197      ( 15 Apr 2024 14:30 )             21.2     04-15    138  |  98  |  83<H>  ----------------------------<  86  5.1   |  28  |  9.90<H>    Ca    8.8      15 Apr 2024 14:30  Phos  6.0     04-15      Urinalysis Basic - ( 15 Apr 2024 14:30 )    Color: x / Appearance: x / SG: x / pH: x  Gluc: 86 mg/dL / Ketone: x  / Bili: x / Urobili: x   Blood: x / Protein: x / Nitrite: x   Leuk Esterase: x / RBC: x / WBC x   Sq Epi: x / Non Sq Epi: x / Bacteria: x        CAPILLARY BLOOD GLUCOSE          Vital Signs Last 24 Hrs  T(C): 36.5 (15 Apr 2024 08:51), Max: 36.5 (15 Apr 2024 08:51)  T(F): 97.7 (15 Apr 2024 08:51), Max: 97.7 (15 Apr 2024 08:51)  HR: 65 (15 Apr 2024 08:51) (65 - 77)  BP: 117/67 (15 Apr 2024 08:51) (116/64 - 143/76)  BP(mean): --  RR: 15 (15 Apr 2024 08:51) (15 - 16)  SpO2: 98% (15 Apr 2024 08:51) (98% - 100%)    Parameters below as of 15 Apr 2024 08:51  Patient On (Oxygen Delivery Method): room air      Gen - NAD, Comfortable  HEENT - NCAT, EOMI  Neck - Supple, No limited ROM  Pulm - CTAB, No wheeze, No rhonchi, No crackles  Cardiovascular - RRR, S1S2, No murmurs  Chest - good chest expansion, good respiratory effort  Abdomen - Soft, NT/ND, +BS. BM 3/26  Extremities - Right FA AV fistula + bruit and thrill. GIDEON old AV fistula not active, Right Knee with degenerative chnages  Neuro-     Cognitive - awake, alert, oriented to person, place, date, year, and situation.  Able  to follow command     Communication - Fluent, Comprehensible, No dysarthria, No aphasia        Memory:  Recent/ remote memory intact     Cranial Nerves - CN 2-12 intact. No facial asymmetry, Tongue midline, EOMI, Shoulder shrug intact     Motor -                    LEFT    UE - ShAB 5/5, EF 5/5, EE 5/5,  5/5                    RIGHT UE - ShAB 5/5, EF 5/5, EE 5/5,   5/5                    LEFT    LE - HF 4/5, KE 3+/5, DF 4/5, PF 4/5                    RIGHT LE - HF 3+/5, KE 3/5, DF 4/5, PF 4/5   moves Right knee better     Sensory - Intact bilaterally      Tone - normal  MSK: b/l hip discomfort with movement OA changes Right Knee- minimal pain  Psychiatric - Mood stable, Affect WNL  Skin: right hip surgical site with steri strips CDI. b/l sacrum/coccyx DTI .    IDT Camilla 4/15  tentative Dc  4/16 to home- 24x7 supervision- ? Brother/ Cousin to Provide- awaiting WC- Use platform walker          Continue comprehensive acute rehab program consisting of 3hrs/day of OT/PT.

## 2024-04-16 ENCOUNTER — TRANSCRIPTION ENCOUNTER (OUTPATIENT)
Age: 39
End: 2024-04-16

## 2024-04-16 LAB
HCT VFR BLD CALC: 24.2 % — LOW (ref 39–50)
HGB BLD-MCNC: 7.6 G/DL — LOW (ref 13–17)
MCHC RBC-ENTMCNC: 31.3 PG — SIGNIFICANT CHANGE UP (ref 27–34)
MCHC RBC-ENTMCNC: 31.4 GM/DL — LOW (ref 32–36)
MCV RBC AUTO: 99.6 FL — SIGNIFICANT CHANGE UP (ref 80–100)
NRBC # BLD: 0 /100 WBCS — SIGNIFICANT CHANGE UP (ref 0–0)
PLATELET # BLD AUTO: 224 K/UL — SIGNIFICANT CHANGE UP (ref 150–400)
RBC # BLD: 2.43 M/UL — LOW (ref 4.2–5.8)
RBC # FLD: 17 % — HIGH (ref 10.3–14.5)
WBC # BLD: 3.79 K/UL — LOW (ref 3.8–10.5)
WBC # FLD AUTO: 3.79 K/UL — LOW (ref 3.8–10.5)

## 2024-04-16 PROCEDURE — 99232 SBSQ HOSP IP/OBS MODERATE 35: CPT

## 2024-04-16 PROCEDURE — 99232 SBSQ HOSP IP/OBS MODERATE 35: CPT | Mod: GC

## 2024-04-16 RX ORDER — ASPIRIN/CALCIUM CARB/MAGNESIUM 324 MG
1 TABLET ORAL
Qty: 10 | Refills: 0
Start: 2024-04-16 | End: 2024-04-20

## 2024-04-16 RX ORDER — DOCUSATE SODIUM 100 MG
2 CAPSULE ORAL
Refills: 0 | DISCHARGE

## 2024-04-16 RX ORDER — DOCUSATE SODIUM 100 MG
2 CAPSULE ORAL
Qty: 60 | Refills: 0
Start: 2024-04-16 | End: 2024-05-15

## 2024-04-16 RX ORDER — OXYCODONE HYDROCHLORIDE 5 MG/1
1 TABLET ORAL
Qty: 28 | Refills: 0
Start: 2024-04-16 | End: 2024-04-22

## 2024-04-16 RX ORDER — METOPROLOL TARTRATE 50 MG
1 TABLET ORAL
Qty: 60 | Refills: 0
Start: 2024-04-16 | End: 2024-05-15

## 2024-04-16 RX ORDER — SENNA PLUS 8.6 MG/1
2 TABLET ORAL
Qty: 60 | Refills: 0
Start: 2024-04-16 | End: 2024-05-15

## 2024-04-16 RX ORDER — SEVELAMER CARBONATE 2400 MG/1
2 POWDER, FOR SUSPENSION ORAL
Qty: 180 | Refills: 0
Start: 2024-04-16 | End: 2024-05-15

## 2024-04-16 RX ADMIN — Medication 25 MILLIGRAM(S): at 06:37

## 2024-04-16 RX ADMIN — Medication 975 MILLIGRAM(S): at 22:41

## 2024-04-16 RX ADMIN — Medication 1 APPLICATION(S): at 21:00

## 2024-04-16 RX ADMIN — LIDOCAINE 1 PATCH: 4 CREAM TOPICAL at 19:42

## 2024-04-16 RX ADMIN — Medication 1 APPLICATION(S): at 06:38

## 2024-04-16 RX ADMIN — LIDOCAINE 1 PATCH: 4 CREAM TOPICAL at 21:27

## 2024-04-16 RX ADMIN — Medication 975 MILLIGRAM(S): at 21:41

## 2024-04-16 RX ADMIN — SEVELAMER CARBONATE 1600 MILLIGRAM(S): 2400 POWDER, FOR SUSPENSION ORAL at 17:46

## 2024-04-16 RX ADMIN — Medication 975 MILLIGRAM(S): at 06:38

## 2024-04-16 RX ADMIN — Medication 1 TABLET(S): at 12:26

## 2024-04-16 RX ADMIN — Medication 81 MILLIGRAM(S): at 17:46

## 2024-04-16 RX ADMIN — SENNA PLUS 2 TABLET(S): 8.6 TABLET ORAL at 21:41

## 2024-04-16 RX ADMIN — Medication 975 MILLIGRAM(S): at 07:04

## 2024-04-16 RX ADMIN — SEVELAMER CARBONATE 1600 MILLIGRAM(S): 2400 POWDER, FOR SUSPENSION ORAL at 13:19

## 2024-04-16 RX ADMIN — LIDOCAINE 1 PATCH: 4 CREAM TOPICAL at 08:22

## 2024-04-16 RX ADMIN — SEVELAMER CARBONATE 1600 MILLIGRAM(S): 2400 POWDER, FOR SUSPENSION ORAL at 08:22

## 2024-04-16 RX ADMIN — Medication 25 MILLIGRAM(S): at 17:46

## 2024-04-16 RX ADMIN — Medication 81 MILLIGRAM(S): at 06:37

## 2024-04-16 NOTE — DISCHARGE NOTE NURSING/CASE MANAGEMENT/SOCIAL WORK - SOCIAL WORKER'S NAME
A (Guidewire Runthrough Ns Strgt .014in 300cm Vas) guidewire crossed the lesion.    Shante Velasquez LMSW  Philly Mckeon LMSW

## 2024-04-16 NOTE — PROGRESS NOTE ADULT - SUBJECTIVE AND OBJECTIVE BOX
HPI:  37yo Male PMHx HTN, anemia of chronic disease, ESRD on HD (M/W/F via right AV fistula), left hip arthroplasty in 8/23, history of right leg fracture presented to [facility] from [facility] on 3/6 after falling during a wheelchair transfer landing on his right knee. He is wheelchair bound since his left hip surgery. CT hip with acute garden type 1 minimally valgus impacted transcervical right femoral neck fracture and subacute right pubic body and inferior rami fractures. He is s/p right hip hemiarthroplasty. Post op course complicated by anemia s/p transfusion. Hospital course complicated by hypoglycemia after being shifted for hyperkalemia. Further complicated by febrile 101.6F found to be covid positive and completed remdisivir. He was started on eliquis 2.5mg BID for elevated d-dimers and negative LE duplex.     Patient was evaluated by PM&R and therapy for functional deficits, gait/ADL impairments and acute rehabilitation was recommended. Patient was medically optimized for discharge to [facility] IRU on 3/27 (27 Mar 2024 15:19)      ROS/subjective:  Patient seen and evaluated bedside, ambulated with platform walker yesterday  able to go sit to stand with some assistance and grab onto standard walker  Right knee and left hip pain controlled  Hospital Bed Denied By insurance  Still awaiting Delivery Of WC- cannot leave without WC- may arrive this afternoon and can be Dcd then- otherwise tomorrow  patient wants to take flight to [place] this Weekend- spoke with hospitalist - no medical reasons not to go  no dizziness, no chest pain, no nausea, no vomiting, no SOB, no headaches  tolerating HD and Eliquis- can DC Eliquis per hospitalist- being given only for elevated D dimer post COVID/ DVT proph- on ASA  last BM 4/13    MEDICATIONS  (STANDING):  acetaminophen     Tablet .. 975 milliGRAM(s) Oral every 8 hours  AQUAPHOR (petrolatum Ointment) 1 Application(s) Topical two times a day  aspirin enteric coated 81 milliGRAM(s) Oral two times a day  epoetin carito-epbx (RETACRIT) Injectable 51602 Unit(s) IV Push <User Schedule>  lidocaine   4% Patch 1 Patch Transdermal <User Schedule>  metoprolol tartrate 25 milliGRAM(s) Oral two times a day  Nephro-pat 1 Tablet(s) Oral daily  senna 2 Tablet(s) Oral at bedtime  sevelamer carbonate 1600 milliGRAM(s) Oral three times a day with meals    MEDICATIONS  (PRN):  aluminum hydroxide/magnesium hydroxide/simethicone Suspension 30 milliLiter(s) Oral every 4 hours PRN Dyspepsia  melatonin 6 milliGRAM(s) Oral at bedtime PRN Insomnia  polyethylene glycol 3350 17 Gram(s) Oral daily PRN Constipation                            7.6    3.79  )-----------( 224      ( 16 Apr 2024 08:55 )             24.2     04-15    138  |  98  |  83<H>  ----------------------------<  86  5.1   |  28  |  9.90<H>    Ca    8.8      15 Apr 2024 14:30  Phos  6.0     04-15      Urinalysis Basic - ( 15 Apr 2024 14:30 )    Color: x / Appearance: x / SG: x / pH: x  Gluc: 86 mg/dL / Ketone: x  / Bili: x / Urobili: x   Blood: x / Protein: x / Nitrite: x   Leuk Esterase: x / RBC: x / WBC x   Sq Epi: x / Non Sq Epi: x / Bacteria: x        Vital Signs Last 24 Hrs  T(C): 36.9 (16 Apr 2024 08:50), Max: 37.4 (15 Apr 2024 21:52)  T(F): 98.4 (16 Apr 2024 08:50), Max: 99.3 (15 Apr 2024 21:52)  HR: 70 (16 Apr 2024 08:50) (70 - 83)  BP: 131/73 (16 Apr 2024 08:50) (118/75 - 187/85)  BP(mean): --  RR: 16 (16 Apr 2024 08:50) (15 - 17)  SpO2: 99% (16 Apr 2024 08:50) (98% - 100%)    Parameters below as of 16 Apr 2024 08:50  Patient On (Oxygen Delivery Method): room air      Gen - NAD, Comfortable  HEENT - NCAT, EOMI  Neck - Supple, No limited ROM  Pulm - CTAB, No wheeze, No rhonchi, No crackles  Cardiovascular - RRR, S1S2, No murmurs  Chest - good chest expansion, good respiratory effort  Abdomen - Soft, NT/ND, +BS. BM 3/26  Extremities - Right FA AV fistula + bruit and thrill. GIDEON old AV fistula not active, Right Knee with degenerative chnages  Neuro-     Cognitive - awake, alert, oriented to person, place, date, year, and situation.  Able  to follow command     Communication - Fluent, Comprehensible, No dysarthria, No aphasia        Memory:  Recent/ remote memory intact     Cranial Nerves - CN 2-12 intact. No facial asymmetry, Tongue midline, EOMI, Shoulder shrug intact     Motor -                    LEFT    UE - ShAB 5/5, EF 5/5, EE 5/5,  5/5                    RIGHT UE - ShAB 5/5, EF 5/5, EE 5/5,   5/5                    LEFT    LE - HF 4/5, KE 3+/5, DF 4/5, PF 4/5                    RIGHT LE - HF 3+/5, KE 3/5, DF 4/5, PF 4/5   moves Right knee better     Sensory - Intact bilaterally      Tone - normal  MSK: b/l hip discomfort with movement OA changes Right Knee- minimal pain  Psychiatric - Mood stable, Affect WNL  Skin: right hip surgical site with steri strips CDI. b/l sacrum/coccyx DTI .    IDT Camilla 4/15  tentative Dc  4/16 to home- 24x7 supervision- ? Brother/ Cousin to Provide- awaiting WC- Use platform walker          Continue comprehensive acute rehab program consisting of 3hrs/day of OT/PT.

## 2024-04-16 NOTE — DISCHARGE NOTE NURSING/CASE MANAGEMENT/SOCIAL WORK - NSDCVIVACCINE_GEN_ALL_CORE_FT
Influenza, seasonal, injectable; 30-Dec-2013 15:27; Jamila Hartley (JAYCEE); [id number]; IntraMuscular; Deltoid Right.; 0.5 milliLiter(s);

## 2024-04-16 NOTE — DISCHARGE NOTE NURSING/CASE MANAGEMENT/SOCIAL WORK - NSSCTYPOFSERV_GEN_ALL_CORE
SN, PT, OT, HHN, SW  SN, PT, OT, HHN, SW NOTE:  Please have your PCP make the referral once you are seen by him.

## 2024-04-16 NOTE — DISCHARGE NOTE NURSING/CASE MANAGEMENT/SOCIAL WORK - PATIENT PORTAL LINK FT
You can access the FollowMyHealth Patient Portal offered by [facility] by registering at the following website: [email or web address]. By joining [facility]’s FollowMyHealth portal, you will also be able to view your health information using other applications (apps) compatible with our system.

## 2024-04-16 NOTE — DISCHARGE NOTE NURSING/CASE MANAGEMENT/SOCIAL WORK - NSDCFUADDAPPT_GEN_ALL_CORE_FT
Dr Dumas's office will contact you to schedule follow up appointment within 4-6 weeks.  Dr Dumas's office will contact you to schedule follow up appointment within 4-6 weeks.     REBECCA spoke with PCP Dr. Cruz Gomez [phone], He asked for pt to reach out when he is discharged and he will see him in the office in [place] on either 5/2 Thursday or Monday 5/6 to sign off HC order.  Dr Dumas's office will contact you to schedule follow up appointment within 4-6 weeks.     REBECCA spoke with PCP Dr. Cruz Gomez [phone], He asked for pt to reach out when he is discharged and he will see him in the office in [place] on either 5/2 Thursday or Monday 5/6 to sign off HC order and send to [facility]. Dr Dumas's office will contact you to schedule follow up appointment within 4-6 weeks.     REBECCA spoke with PCP Dr. Cruz Gomez [phone], He asked for pt to reach out when he is discharged and he will see him in the office in [place] on either 5/2 Thursday or Monday 5/6 to sign off HC order and send to [facility].    [facility] Wednesday, 5/1/24 at 2pm  [phone]  [address] [place] [address]

## 2024-04-16 NOTE — PROGRESS NOTE ADULT - SUBJECTIVE AND OBJECTIVE BOX
No complaints    Vital Signs Last 24 Hrs  T(C): 36.9 (04-16-24 @ 08:50), Max: 37.4 (04-15-24 @ 21:52)  T(F): 98.4 (04-16-24 @ 08:50), Max: 99.3 (04-15-24 @ 21:52)  HR: 90 (04-16-24 @ 17:45) (70 - 90)  BP: 128/74 (04-16-24 @ 17:45) (128/74 - 187/85)  RR: 16 (04-16-24 @ 08:50) (16 - 17)  SpO2: 99% (04-16-24 @ 08:50) (98% - 100%)    I&O's Detail    15 Apr 2024 07:01  -  16 Apr 2024 07:00  --------------------------------------------------------  IN:    Other (mL): 800 mL  Total IN: 800 mL    OUT:    Other (mL): 3800 mL  Total OUT: 3800 mL    s1s2  b/l air entry  soft, ND  edema                                                                                                           7.6    3.79  )-----------( 224      ( 16 Apr 2024 08:55 )             24.2     15 Apr 2024 14:30    138    |  98     |  83     ----------------------------<  86     5.1     |  28     |  9.90     Ca    8.8        15 Apr 2024 14:30  Phos  6.0       15 Apr 2024 14:30    acetaminophen     Tablet .. 975 milliGRAM(s) Oral every 8 hours  aluminum hydroxide/magnesium hydroxide/simethicone Suspension 30 milliLiter(s) Oral every 4 hours PRN  AQUAPHOR (petrolatum Ointment) 1 Application(s) Topical two times a day  aspirin enteric coated 81 milliGRAM(s) Oral two times a day  epoetin carito-epbx (RETACRIT) Injectable 78594 Unit(s) IV Push <User Schedule>  lidocaine   4% Patch 1 Patch Transdermal <User Schedule>  melatonin 6 milliGRAM(s) Oral at bedtime PRN  metoprolol tartrate 25 milliGRAM(s) Oral two times a day  Nephro-pat 1 Tablet(s) Oral daily  polyethylene glycol 3350 17 Gram(s) Oral daily PRN  senna 2 Tablet(s) Oral at bedtime  sevelamer carbonate 1600 milliGRAM(s) Oral three times a day with meals    A/P:    ESRD on HD   S/p fall, R hip fx  S/p R hip HA 3/7  HD tomorrow w/2-3kg fluid removal   Epoetin w/HD 3 x week   Bld tx 1u yesterday   Renal diet  Renvela for high Phos    [phone]

## 2024-04-16 NOTE — CHART NOTE - NSCHARTNOTEFT_GEN_A_CORE
Nutrition Follow Up Note  Hospital Course   (Per Electronic Medical Record)    Source:  Patient [X]  Medical Record [X]      Diet:   Renal Diet w/ Thin Liquids (IDDSI Level 0)  Tolerates Diet Consistency Well  Consumes % of Meals (as Per Documentation)   on Sanju 1 Packet Daily (Provides 90kcal & 14grams of Protein)  on Nepro 8oz Daily (Provides 425kcal & 19grams of Protein)     Enteral/Parenteral Nutrition: Not Applicable    Current Weight: 202.8lb on 4/15  Obtain Weights Daily  Weight Fluctuates     Pertinent Medications: MEDICATIONS  (STANDING):  acetaminophen     Tablet .. 975 milliGRAM(s) Oral every 8 hours  AQUAPHOR (petrolatum Ointment) 1 Application(s) Topical two times a day  aspirin enteric coated 81 milliGRAM(s) Oral two times a day  epoetin carito-epbx (RETACRIT) Injectable 27196 Unit(s) IV Push <User Schedule>  lidocaine   4% Patch 1 Patch Transdermal <User Schedule>  metoprolol tartrate 25 milliGRAM(s) Oral two times a day  Nephro-pat 1 Tablet(s) Oral daily  senna 2 Tablet(s) Oral at bedtime  sevelamer carbonate 1600 milliGRAM(s) Oral three times a day with meals    MEDICATIONS  (PRN):  aluminum hydroxide/magnesium hydroxide/simethicone Suspension 30 milliLiter(s) Oral every 4 hours PRN Dyspepsia  melatonin 6 milliGRAM(s) Oral at bedtime PRN Insomnia  polyethylene glycol 3350 17 Gram(s) Oral daily PRN Constipation    Pertinent Labs:  04-15 Na138 mmol/L Glu 86 mg/dL K+ 5.1 mmol/L Cr  9.90 mg/dL<H> BUN 83 mg/dL<H> 04-15 Phos 6.0 mg/dL<H>    Skin: DTI on Coccyx/Sacrum    Edema: None Noted (as Per Documentation)     Last Bowel Movement: on 4/13    Estimated Needs:   [X] No Change Since Previous Assessment    Previous Nutrition Diagnosis:   Severe Malnutrition     Nutrition Diagnosis is [X] Ongoing - Continues on Nutrition Supplements    New Nutrition Diagnosis: [X] Not Applicable    Interventions:   1. Recommend Continue Nutrition Plan of Care     Monitoring & Evaluation:   [X] Weights   [X] PO Intake   [X] Skin Integrity   [X] Follow Up (Per Protocol)  [X] Tolerance to Diet Prescription   [X] Other: Labs    Registered Dietitian/Nutritionist Remains Available.  Marc Francisco, SHANNAN, CDN    Phone# [phone]

## 2024-04-16 NOTE — DISCHARGE NOTE NURSING/CASE MANAGEMENT/SOCIAL WORK - NSDCPEFALRISK_GEN_ALL_CORE
For information on Fall & Injury Prevention, visit: [email or web address] OR  [email or web address] OR  https://www.cdc.gov/steadi/patient.html

## 2024-04-16 NOTE — PROGRESS NOTE ADULT - SUBJECTIVE AND OBJECTIVE BOX
Patient is a 38y old  Male who presents with a chief complaint of Right Hip fracture s/p right hip hemiarthroplasty (15 Apr 2024 22:58)    Patient seen and examined at bedside. No acute overnight events. s/p 1u pRBC yesterday after HD  Excited for dc today     ALLERGIES:  No Known Drug Allergies  shellfish (Hives)    MEDICATIONS  (STANDING):  acetaminophen     Tablet .. 975 milliGRAM(s) Oral every 8 hours  AQUAPHOR (petrolatum Ointment) 1 Application(s) Topical two times a day  aspirin enteric coated 81 milliGRAM(s) Oral two times a day  epoetin carito-epbx (RETACRIT) Injectable 06335 Unit(s) IV Push <User Schedule>  lidocaine   4% Patch 1 Patch Transdermal <User Schedule>  metoprolol tartrate 25 milliGRAM(s) Oral two times a day  Nephro-pat 1 Tablet(s) Oral daily  senna 2 Tablet(s) Oral at bedtime  sevelamer carbonate 1600 milliGRAM(s) Oral three times a day with meals    MEDICATIONS  (PRN):  aluminum hydroxide/magnesium hydroxide/simethicone Suspension 30 milliLiter(s) Oral every 4 hours PRN Dyspepsia  melatonin 6 milliGRAM(s) Oral at bedtime PRN Insomnia  polyethylene glycol 3350 17 Gram(s) Oral daily PRN Constipation    Vital Signs Last 24 Hrs  T(F): 98.4 (16 Apr 2024 08:50), Max: 99.3 (15 Apr 2024 21:52)  HR: 70 (16 Apr 2024 08:50) (70 - 83)  BP: 131/73 (16 Apr 2024 08:50) (118/75 - 187/85)  RR: 16 (16 Apr 2024 08:50) (15 - 17)  SpO2: 99% (16 Apr 2024 08:50) (98% - 100%)  I&O's Summary    15 Apr 2024 07:01  -  16 Apr 2024 07:00  --------------------------------------------------------  IN: 800 mL / OUT: 3800 mL / NET: -3000 mL    PHYSICAL EXAM:  General: NAD, A/O x 3  ENT: MMM, no tonsilar exudate  Neck: Supple, No JVD  Lungs: Clear to auscultation bilaterally, no wheezes. Good air entry bilaterally   Cardio: RRR, S1/S2, No murmurs  Abdomen: Soft, Nontender, Nondistended; Bowel sounds present  Extremities: No calf tenderness, LUE AVF    LABS:                        7.6    3.79  )-----------( 224      ( 16 Apr 2024 08:55 )             24.2       04-15    138  |  98  |  83  ----------------------------<  86  5.1   |  28  |  9.90    Ca    8.8      15 Apr 2024 14:30  Phos  6.0     04-15    Urinalysis Basic - ( 15 Apr 2024 14:30 )    Color: x / Appearance: x / SG: x / pH: x  Gluc: 86 mg/dL / Ketone: x  / Bili: x / Urobili: x   Blood: x / Protein: x / Nitrite: x   Leuk Esterase: x / RBC: x / WBC x   Sq Epi: x / Non Sq Epi: x / Bacteria: x    COVID-19 PCR: NotDetec (04-11-24 @ 10:30)  COVID-19 PCR: NotDetec (03-27-24 @ 06:47)  COVID-19 PCR: NotDetec (03-26-24 @ 06:57)  COVID-19 PCR: Detected (03-19-24 @ 16:53)    RADIOLOGY & ADDITIONAL TESTS:     Care Discussed with Consultants/Other Providers: d/w Dr. Dumas, Dr. Blanco

## 2024-04-16 NOTE — PROGRESS NOTE ADULT - ASSESSMENT
BRIDGET NORTH is a 38y M with HTN, anemia of chronic disease, ESRD on HD (M/W/F via right AV fistula), left hip arthroplasty in 8/23, history of right leg fracture presented to [facility] on 3/6 after a fall during a wheelchair transfer. He was found to have a right femoral neck fracture and subacute pubic body and inferior rami fractures. He is s/p right hip hemiarthroplasty. Hospital course complicated by post-op anemia s/p transfusion, hypoglycemia, hyperkalemia, febrile 2/2 covid s/p remdisivir, and started on eliquis 2.5mg BID with negative dvts on LE duplex. Patient now admitted for a multidisciplinary rehab program. 03-27-24 @ 16:17      #Right Femoral Neck fracture   - s/p right hip hemiarthroplasty on 3/7  - pain med as below  - Dressing and staples removed on 3/22  - Comprehensive Multidisciplinary Rehab Program:  comprehensive rehab program of PT/OT   - precautions: WBAT RLE. Anterior hip.   - Right Knee pain- arthrosis/ effusion- no Fx- renal osteodystrophy- ICE/ Lidoderm/ Rehab/ ACE  - Social work to confirm DC plan- Need 24x7 assistance at home- ? Brother/ ? Cousin  - stairs not an issue secondary to PERCY- can have ambulance to transfer  - DC to home with VN services- ? start on Monday  - patient to take Flight to [place] Thursday and return on Monday for Hemodialysis  FU with PMD/Renal as OPD  FU ortho- Dr León 1-2 weeks  FU PMR Dr Dmuas 4-6 weeks    #ESRD  - HDS via right AV fistula on m/w/f.  ( Last HD 3/27). Old HDS left UA  - Nephro consult placed. Dr Blanco aware.   - sevelamer 800mg TID  - retacrit m/w/f  Hgb 6.5- hospitalist to transfuse one unit PRBCs  Hgb today 7.6  renal recommend OPD Eval with hematology secondary to anemia  continue Epogen in HD      #HTN  - toprol 25mg BID  - Monitor BP    #Pain  - Tylenol PRN  - oxy 10mg moderate, 15mg severe.   Right Knee pain- lidoderm during the day/ICE/ ACE- Better- can Obtain as OPD  Left Hip pain - all studies reviewed- recent CT 3/6- no evidence of abnormality in Left prosthesis  will follow clinically-stable, ambulating- pain less    #Sleep  - Melatonin PRN     #GI / Bowel  #dyspepsia  - Senna qHS  - Miralax PRN -  - maalox    # / Bladder  - does not void ESRD    #Skin / Pressure injury  - Skin assessment on admission performed : right hip surgical site with steri strips CDI. coccyx small stage 2-using barrier cream  - Monitor Incisions:    - wound care following at [facility] for b/l sacrum/coccyx DTI wth evolution.   - wound care to follow  - turn and reposition q2h      #Diet:  - Diet Consistency: Renal restriction  - Nutrition consult    #DVT prophylaxis:   - eliquis 2.5mg BID  - Last LE Doppler normal on 3/14     .    BRIDGET NORTH is a 38y M with HTN, anemia of chronic disease, ESRD on HD (M/W/F via right AV fistula), left hip arthroplasty in 8/23, history of right leg fracture presented to [facility] on 3/6 after a fall during a wheelchair transfer. He was found to have a right femoral neck fracture and subacute pubic body and inferior rami fractures. He is s/p right hip hemiarthroplasty. Hospital course complicated by post-op anemia s/p transfusion, hypoglycemia, hyperkalemia, febrile 2/2 covid s/p remdisivir, and started on eliquis 2.5mg BID with negative dvts on LE duplex. Patient now admitted for a multidisciplinary rehab program. 03-27-24 @ 16:17      #Right Femoral Neck fracture   - s/p right hip hemiarthroplasty on 3/7  - pain med as below  - Dressing and staples removed on 3/22  - Comprehensive Multidisciplinary Rehab Program:  comprehensive rehab program of PT/OT   - precautions: WBAT RLE. Anterior hip.   - Right Knee pain- arthrosis/ effusion- no Fx- renal osteodystrophy- ICE/ Lidoderm/ Rehab/ ACE  - Social work to confirm DC plan- Need 24x7 assistance at home- ? Brother/ ? Cousin  - stairs not an issue secondary to PERCY- can have ambulance to transfer  - DC to home with VN services- ? start on Monday- DC Today if WC available  - patient to take Flight to [place] Thursday and return on Monday for Hemodialysis  FU with PMD/Renal as OPD  FU ortho- Dr León 1-2 weeks  FU PMR Dr Dumas 4-6 weeks    #ESRD  - HDS via right AV fistula on m/w/f.  ( Last HD 3/27). Old HDS left UA  - Nephro consult placed. Dr Blanco aware.   - sevelamer 800mg TID  - retacrit m/w/f  Hgb 6.5- hospitalist to transfuse one unit PRBCs  Hgb today 7.6  renal recommend OPD Eval with hematology secondary to anemia  continue Epogen in HD      #HTN  - toprol 25mg BID  - Monitor BP    #Pain  - Tylenol PRN  - oxy 10mg moderate, 15mg severe.   Right Knee pain- lidoderm during the day/ICE/ ACE- Better- can Obtain as OPD  Left Hip pain - all studies reviewed- recent CT 3/6- no evidence of abnormality in Left prosthesis  will follow clinically-stable, ambulating- pain less    #Sleep  - Melatonin PRN     #GI / Bowel  #dyspepsia  - Senna qHS  - Miralax PRN -  - maalox    # / Bladder  - does not void ESRD    #Skin / Pressure injury  - Skin assessment on admission performed : right hip surgical site with steri strips CDI. coccyx small stage 2-using barrier cream  - Monitor Incisions:    - wound care following at [facility] for b/l sacrum/coccyx DTI wth evolution.   - wound care to follow  - turn and reposition q2h      #Diet:  - Diet Consistency: Renal restriction  - Nutrition consult    #DVT prophylaxis:   - eliquis 2.5mg BID- DCd  - Last LE Doppler normal on 3/14     .

## 2024-04-16 NOTE — DISCHARGE NOTE NURSING/CASE MANAGEMENT/SOCIAL WORK - NSDCDMENAME_GEN_ALL_CORE_FT
[employer]mode, Transfer board  Damion, Transfer board, , Please call in payment to below # if you would like to purchase and have hip kit delivered

## 2024-04-16 NOTE — PROGRESS NOTE ADULT - ASSESSMENT
38y M with HTN, anemia of chronic disease, ESRD on HD (M/W/F via right AV fistula), left hip arthroplasty in 8/23, history of right leg fracture presented to [facility] on 3/6 after a fall during a wheelchair transfer. He was found to have a right femoral neck fracture and subacute pubic body and inferior rami fractures. He is s/p right hip hemiarthroplasty. Patient now admitted for a multidisciplinary rehab program.    #Right Femoral Neck fracture   - s/p right hip hemiarthroplasty on 3/7  - Comprehensive Multidisciplinary Rehab Program  - precautions: WBAT RLE, fall precautions  - pain management per rehab     #anemia  - hgb 7.6 on am labs  - suspect secondary to chronic disease  - no signs of bleeding, no bloody BM, no melena  - outpatient hematology     #ESRD  #hyperkalemia  - HDS via right AV fistula on m/w/f.  ( Last HD 3/29). Old HDS left UA  - HD per Dr Blanco  - sevelamer 800mg TID  - Retacrit m/w/f    #HTN  - toprol 25mg BID  - BP control per nephrology give likely secondary hypertension

## 2024-04-17 DIAGNOSIS — D64.9 ANEMIA, UNSPECIFIED: ICD-10-CM

## 2024-04-17 LAB
ANION GAP SERPL CALC-SCNC: 12 MMOL/L — SIGNIFICANT CHANGE UP (ref 5–17)
BUN SERPL-MCNC: 104 MG/DL — HIGH (ref 7–23)
CALCIUM SERPL-MCNC: 8.7 MG/DL — SIGNIFICANT CHANGE UP (ref 8.4–10.5)
CHLORIDE SERPL-SCNC: 102 MMOL/L — SIGNIFICANT CHANGE UP (ref 96–108)
CO2 SERPL-SCNC: 27 MMOL/L — SIGNIFICANT CHANGE UP (ref 22–31)
CREAT SERPL-MCNC: 8.87 MG/DL — HIGH (ref 0.5–1.3)
EGFR: 7 ML/MIN/1.73M2 — LOW
FERRITIN SERPL-MCNC: 521 NG/ML — HIGH (ref 30–400)
GLUCOSE SERPL-MCNC: 87 MG/DL — SIGNIFICANT CHANGE UP (ref 70–99)
HCT VFR BLD CALC: 18.5 % — CRITICAL LOW (ref 39–50)
HGB BLD-MCNC: 5.9 G/DL — CRITICAL LOW (ref 13–17)
MCHC RBC-ENTMCNC: 30.9 PG — SIGNIFICANT CHANGE UP (ref 27–34)
MCHC RBC-ENTMCNC: 31.9 GM/DL — LOW (ref 32–36)
MCV RBC AUTO: 96.9 FL — SIGNIFICANT CHANGE UP (ref 80–100)
NRBC # BLD: 0 /100 WBCS — SIGNIFICANT CHANGE UP (ref 0–0)
OB PNL STL: POSITIVE
PHOSPHATE SERPL-MCNC: 5.6 MG/DL — HIGH (ref 2.5–4.5)
PLATELET # BLD AUTO: 184 K/UL — SIGNIFICANT CHANGE UP (ref 150–400)
POTASSIUM SERPL-MCNC: 5.2 MMOL/L — SIGNIFICANT CHANGE UP (ref 3.5–5.3)
POTASSIUM SERPL-SCNC: 5.2 MMOL/L — SIGNIFICANT CHANGE UP (ref 3.5–5.3)
RBC # BLD: 1.91 M/UL — LOW (ref 4.2–5.8)
RBC # FLD: 16.4 % — HIGH (ref 10.3–14.5)
SODIUM SERPL-SCNC: 141 MMOL/L — SIGNIFICANT CHANGE UP (ref 135–145)
VIT B12 SERPL-MCNC: 781 PG/ML — SIGNIFICANT CHANGE UP (ref 232–1245)
WBC # BLD: 4.26 K/UL — SIGNIFICANT CHANGE UP (ref 3.8–10.5)
WBC # FLD AUTO: 4.26 K/UL — SIGNIFICANT CHANGE UP (ref 3.8–10.5)

## 2024-04-17 PROCEDURE — 99232 SBSQ HOSP IP/OBS MODERATE 35: CPT | Mod: GC

## 2024-04-17 PROCEDURE — 99232 SBSQ HOSP IP/OBS MODERATE 35: CPT

## 2024-04-17 PROCEDURE — 99223 1ST HOSP IP/OBS HIGH 75: CPT

## 2024-04-17 RX ORDER — PANTOPRAZOLE SODIUM 20 MG/1
40 TABLET, DELAYED RELEASE ORAL
Refills: 0 | Status: DISCONTINUED | OUTPATIENT
Start: 2024-04-17 | End: 2024-04-18

## 2024-04-17 RX ADMIN — LIDOCAINE 1 PATCH: 4 CREAM TOPICAL at 07:04

## 2024-04-17 RX ADMIN — Medication 25 MILLIGRAM(S): at 18:08

## 2024-04-17 RX ADMIN — Medication 1 APPLICATION(S): at 06:48

## 2024-04-17 RX ADMIN — LIDOCAINE 1 PATCH: 4 CREAM TOPICAL at 06:47

## 2024-04-17 RX ADMIN — Medication 975 MILLIGRAM(S): at 13:17

## 2024-04-17 RX ADMIN — Medication 81 MILLIGRAM(S): at 06:47

## 2024-04-17 RX ADMIN — ERYTHROPOIETIN 10000 UNIT(S): 10000 INJECTION, SOLUTION INTRAVENOUS; SUBCUTANEOUS at 10:12

## 2024-04-17 RX ADMIN — Medication 975 MILLIGRAM(S): at 06:47

## 2024-04-17 RX ADMIN — SEVELAMER CARBONATE 1600 MILLIGRAM(S): 2400 POWDER, FOR SUSPENSION ORAL at 18:07

## 2024-04-17 RX ADMIN — SEVELAMER CARBONATE 1600 MILLIGRAM(S): 2400 POWDER, FOR SUSPENSION ORAL at 13:17

## 2024-04-17 RX ADMIN — SEVELAMER CARBONATE 1600 MILLIGRAM(S): 2400 POWDER, FOR SUSPENSION ORAL at 08:06

## 2024-04-17 RX ADMIN — Medication 975 MILLIGRAM(S): at 07:14

## 2024-04-17 RX ADMIN — Medication 81 MILLIGRAM(S): at 18:08

## 2024-04-17 RX ADMIN — Medication 25 MILLIGRAM(S): at 06:47

## 2024-04-17 RX ADMIN — Medication 1 TABLET(S): at 13:18

## 2024-04-17 NOTE — PROGRESS NOTE ADULT - SUBJECTIVE AND OBJECTIVE BOX
S/p stable HD today    Vital Signs Last 24 Hrs  T(C): 36.6 (04-17-24 @ 12:30), Max: 36.7 (04-17-24 @ 09:30)  T(F): 97.9 (04-17-24 @ 12:30), Max: 98 (04-17-24 @ 09:30)  HR: 69 (04-17-24 @ 12:30) (69 - 77)  BP: 115/76 (04-17-24 @ 12:30) (115/76 - 161/67)  RR: 16 (04-17-24 @ 12:30) (16 - 16)  SpO2: 100% (04-17-24 @ 12:30) (98% - 100%)    I&O's Detail    17 Apr 2024 07:01  -  17 Apr 2024 23:22  --------------------------------------------------------  OUT:    Other (mL): 2500 mL  Total OUT: 2500 mL    s1s2  b/l air entry  soft, ND  edema                                                                                                                    5.9    4.26  )-----------( 184      ( 17 Apr 2024 09:10 )             18.5     17 Apr 2024 09:10    141    |  102    |  104    ----------------------------<  87     5.2     |  27     |  8.87     Ca    8.7        17 Apr 2024 09:10  Phos  5.6       17 Apr 2024 09:10    acetaminophen     Tablet .. 975 milliGRAM(s) Oral every 8 hours  aluminum hydroxide/magnesium hydroxide/simethicone Suspension 30 milliLiter(s) Oral every 4 hours PRN  AQUAPHOR (petrolatum Ointment) 1 Application(s) Topical two times a day  aspirin enteric coated 81 milliGRAM(s) Oral two times a day  epoetin carito-epbx (RETACRIT) Injectable 15232 Unit(s) IV Push <User Schedule>  lidocaine   4% Patch 1 Patch Transdermal <User Schedule>  melatonin 6 milliGRAM(s) Oral at bedtime PRN  metoprolol tartrate 25 milliGRAM(s) Oral two times a day  Nephro-pat 1 Tablet(s) Oral daily  pantoprazole    Tablet 40 milliGRAM(s) Oral before breakfast  polyethylene glycol 3350 17 Gram(s) Oral daily PRN  senna 2 Tablet(s) Oral at bedtime  sevelamer carbonate 1600 milliGRAM(s) Oral three times a day with meals    A/P:    ESRD on HD   S/p fall, R hip fx  S/p R hip HA 3/7  HD today w/2.5kg fluid removal  Has been getting Epoetin w/HD 3 x week   Recent decrease in Hgb requiring bld tx on Monday and today can't be explained by ESRD alone  GI f/u noted  F/u CBC, anemia w/up  D/w medicine     [phone]

## 2024-04-17 NOTE — PROGRESS NOTE ADULT - SUBJECTIVE AND OBJECTIVE BOX
Patient is a 38y old  Male who presents with a chief complaint of Right Hip fracture s/p right hip hemiarthroplasty (16 Apr 2024 18:13)    Patient seen and examined at bedside. No acute overnight events. Wheelchair arrived this morning, patient excited for discharge after HD    ALLERGIES:  No Known Drug Allergies  shellfish (Hives)    MEDICATIONS  (STANDING):  acetaminophen     Tablet .. 975 milliGRAM(s) Oral every 8 hours  AQUAPHOR (petrolatum Ointment) 1 Application(s) Topical two times a day  aspirin enteric coated 81 milliGRAM(s) Oral two times a day  epoetin carito-epbx (RETACRIT) Injectable 66337 Unit(s) IV Push <User Schedule>  lidocaine   4% Patch 1 Patch Transdermal <User Schedule>  metoprolol tartrate 25 milliGRAM(s) Oral two times a day  Nephro-pat 1 Tablet(s) Oral daily  senna 2 Tablet(s) Oral at bedtime  sevelamer carbonate 1600 milliGRAM(s) Oral three times a day with meals    MEDICATIONS  (PRN):  aluminum hydroxide/magnesium hydroxide/simethicone Suspension 30 milliLiter(s) Oral every 4 hours PRN Dyspepsia  melatonin 6 milliGRAM(s) Oral at bedtime PRN Insomnia  polyethylene glycol 3350 17 Gram(s) Oral daily PRN Constipation    Vital Signs Last 24 Hrs  T(F): 98.8 (16 Apr 2024 20:00), Max: 98.8 (16 Apr 2024 20:00)  HR: 77 (17 Apr 2024 06:45) (70 - 90)  BP: 132/74 (17 Apr 2024 06:45) (122/69 - 132/74)  RR: 16 (16 Apr 2024 20:00) (16 - 16)  SpO2: 100% (16 Apr 2024 20:00) (99% - 100%)  I&O's Summary    PHYSICAL EXAM:  General: NAD, A/O x 3  ENT: MMM, no tonsilar exudate  Neck: Supple, No JVD  Lungs: Clear to auscultation bilaterally, no wheezes. Good air entry bilaterally   Cardio: RRR, S1/S2, No murmurs  Abdomen: Soft, Nontender, Nondistended; Bowel sounds present  Extremities: No calf tenderness, No pitting edema +LUE AVF    LABS:                        7.6    3.79  )-----------( 224      ( 16 Apr 2024 08:55 )             24.2       04-15    138  |  98  |  83  ----------------------------<  86  5.1   |  28  |  9.90    Ca    8.8      15 Apr 2024 14:30  Phos  6.0     04-15    Urinalysis Basic - ( 15 Apr 2024 14:30 )    Color: x / Appearance: x / SG: x / pH: x  Gluc: 86 mg/dL / Ketone: x  / Bili: x / Urobili: x   Blood: x / Protein: x / Nitrite: x   Leuk Esterase: x / RBC: x / WBC x   Sq Epi: x / Non Sq Epi: x / Bacteria: x    COVID-19 PCR: NotDetec (04-11-24 @ 10:30)  COVID-19 PCR: NotDetec (03-27-24 @ 06:47)  COVID-19 PCR: NotDetec (03-26-24 @ 06:57)  COVID-19 PCR: Detected (03-19-24 @ 16:53)    RADIOLOGY & ADDITIONAL TESTS:     Care Discussed with Consultants/Other Providers:

## 2024-04-17 NOTE — CONSULT NOTE ADULT - SUBJECTIVE AND OBJECTIVE BOX
INTERVAL HPI/OVERNIGHT EVENTS:  HPI:  39yo Male PMH x HTN, anemia of chronic disease, ESRD on HD (M/W/F via right AV fistula), left hip arthroplasty in , history of right leg fracture presented to [facility] from [facility] on 3/6 after falling during a wheelchair transfer landing on his right knee. He is wheelchair bound since his left hip surgery. CT hip with acute garden type 1 minimally valgus impacted transcervical right femoral neck fracture and subacute right pubic body and inferior rami fractures. He is s/p right hip hemiarthroplasty. Post op course complicated by anemia s/p transfusion. Hospital course complicated by hypoglycemia after being shifted for hyperkalemia. Further complicated by febrile 101.6F found to be covid positive and completed remdisivir. He was started on eliquis 2.5mg BID for elevated d-dimers and negative LE duplex.     Patient was evaluated by PM&R and therapy for functional deficits, gait/ADL impairments and acute rehabilitation was recommended. Patient was medically optimized for discharge to [facility] IRU on 3/27 (27 Mar 2024 15:19)    GI consultation for anemia. Patient seen examined at bed side. He had HD today had 1 UPRBC today. He denies hematemesis, hematochezia or melena. Last BM yesterday. He has ESRD on HD since . Denies abdominal pain, nausea, vomiting diarrhea or constipation. He never had EGD or colonoscopy.      MEDICATIONS  (STANDING):  acetaminophen     Tablet .. 975 milliGRAM(s) Oral every 8 hours  AQUAPHOR (petrolatum Ointment) 1 Application(s) Topical two times a day  aspirin enteric coated 81 milliGRAM(s) Oral two times a day  epoetin carito-epbx (RETACRIT) Injectable 70825 Unit(s) IV Push <User Schedule>  lidocaine   4% Patch 1 Patch Transdermal <User Schedule>  metoprolol tartrate 25 milliGRAM(s) Oral two times a day  Nephro-pat 1 Tablet(s) Oral daily  senna 2 Tablet(s) Oral at bedtime  sevelamer carbonate 1600 milliGRAM(s) Oral three times a day with meals    MEDICATIONS  (PRN):  aluminum hydroxide/magnesium hydroxide/simethicone Suspension 30 milliLiter(s) Oral every 4 hours PRN Dyspepsia  melatonin 6 milliGRAM(s) Oral at bedtime PRN Insomnia  polyethylene glycol 3350 17 Gram(s) Oral daily PRN Constipation      Allergies    No Known Drug Allergies  shellfish (Hives)    Intolerances        PAST MEDICAL & SURGICAL HISTORY:  HTN (hypertension)      Stroke  age 10      Sleep apnea      Leg fracture, right      Chronic renal failure      Hemodialysis access, AV graft      ESRD (end stage renal disease) on dialysis  since , M-W-F      Anemia, unspecified type      Other hyperparathyroidism      AV fistula  R arm; L arm clotted      History of left hip hemiarthroplasty      S/P parathyroidectomy          REVIEW OF SYSTEMS  See HPI     PHYSICAL EXAM:   Vital Signs:  Vital Signs Last 24 Hrs  T(C): 36.6 (2024 12:30), Max: 37.1 (2024 20:00)  T(F): 97.9 (2024 12:30), Max: 98.8 (2024 20:00)  HR: 69 (2024 12:30) (69 - 90)  BP: 115/76 (2024 12:30) (115/76 - 161/67)  BP(mean): --  RR: 16 (2024 12:30) (16 - 16)  SpO2: 100% (2024 12:30) (98% - 100%)    Parameters below as of 2024 12:30  Patient On (Oxygen Delivery Method): room air      Daily     Daily Weight in k.6 (2024 12:30)I&O's Summary    2024 07:01  -  2024 13:29  --------------------------------------------------------  IN: 0 mL / OUT: 2500 mL / NET: -2500 mL        GENERAL:  Appears stated age,  HEENT:  NC/AT,  conjunctivae clear and pink  CHEST:  Full & symmetric excursion  HEART:  Regular rhythm, S1, S2  ABDOMEN:  Soft, non-tender, non-distended, normoactive bowel sounds  EXTREMITIES: AV fistulas Rt & Lt UE   SKIN:  No rash/warm/dry  NEURO:  Alert, oriented      LABS:                        5.9    4.26  )-----------( 184      ( 2024 09:10 )             18.5     04-    141  |  102  |  104<H>  ----------------------------<  87  5.2   |  27  |  8.87<H>    Ca    8.7      2024 09:10  Phos  5.6     -        Urinalysis Basic - ( 2024 09:10 )    Color: x / Appearance: x / SG: x / pH: x  Gluc: 87 mg/dL / Ketone: x  / Bili: x / Urobili: x   Blood: x / Protein: x / Nitrite: x   Leuk Esterase: x / RBC: x / WBC x   Sq Epi: x / Non Sq Epi: x / Bacteria: x      amylase   lipase  RADIOLOGY & ADDITIONAL TESTS:   GI Consult    37yo Male PMH x HTN, anemia of chronic disease, ESRD on HD (M/W/F via right AV fistula), left hip arthroplasty in , history of right leg fracture presented to [facility] from [facility] on 3/6 after falling during a wheelchair transfer landing on his right knee. He is wheelchair bound since his left hip surgery. CT hip with acute garden type 1 minimally valgus impacted transcervical right femoral neck fracture and subacute right pubic body and inferior rami fractures. He is s/p right hip hemiarthroplasty. Post op course complicated by anemia s/p transfusion. Hospital course complicated by hypoglycemia after being shifted for hyperkalemia. Further complicated by febrile 101.6F found to be covid positive and completed remdisivir. He was started on eliquis 2.5mg BID for elevated d-dimers and negative LE duplex.     Patient was evaluated by PM&R and therapy for functional deficits, gait/ADL impairments and acute rehabilitation was recommended. Patient was medically optimized for discharge to [facility] IRU on 3/27 (27 Mar 2024 15:19)    GI consultation for acute worsening of chronic anemia (Hb 5.9 g/dL today).   He had HD today and received a unit of PRBC today.   Patient seen and examined at bedside.  He denies hematemesis, hematochezia or melena. Last BM yesterday. He has ESRD and has been on HD since . Denies abdominal pain, nausea, vomiting, diarrhea or constipation. He never had EGD or colonoscopy.      MEDICATIONS  (STANDING):  acetaminophen     Tablet .. 975 milliGRAM(s) Oral every 8 hours  AQUAPHOR (petrolatum Ointment) 1 Application(s) Topical two times a day  aspirin enteric coated 81 milliGRAM(s) Oral two times a day  epoetin carito-epbx (RETACRIT) Injectable 89368 Unit(s) IV Push <User Schedule>  lidocaine   4% Patch 1 Patch Transdermal <User Schedule>  metoprolol tartrate 25 milliGRAM(s) Oral two times a day  Nephro-pat 1 Tablet(s) Oral daily  senna 2 Tablet(s) Oral at bedtime  sevelamer carbonate 1600 milliGRAM(s) Oral three times a day with meals    MEDICATIONS  (PRN):  aluminum hydroxide/magnesium hydroxide/simethicone Suspension 30 milliLiter(s) Oral every 4 hours PRN Dyspepsia  melatonin 6 milliGRAM(s) Oral at bedtime PRN Insomnia  polyethylene glycol 3350 17 Gram(s) Oral daily PRN Constipation      Allergies    No Known Drug Allergies  shellfish (Hives)    Intolerances        PAST MEDICAL & SURGICAL HISTORY:  HTN (hypertension)      Stroke  age 10      Sleep apnea      Leg fracture, right      Chronic renal failure      Hemodialysis access, AV graft      ESRD (end stage renal disease) on dialysis  since , M-W-F      Anemia, unspecified type      Other hyperparathyroidism      AV fistula  R arm; L arm clotted      History of left hip hemiarthroplasty      S/P parathyroidectomy      Social History:  SOCIAL HISTORY  Smoking - denies  EtOH - denies  Drugs - denies   Marital Status - single    FAMILY HISTORY:  denies known family history of CRC, gastric cancer      REVIEW OF SYSTEMS  See HPI for GI ROS      PHYSICAL EXAM:   Vital Signs:  Vital Signs Last 24 Hrs  T(C): 36.6 (2024 12:30), Max: 37.1 (2024 20:00)  T(F): 97.9 (2024 12:30), Max: 98.8 (2024 20:00)  HR: 69 (2024 12:30) (69 - 90)  BP: 115/76 (2024 12:30) (115/76 - 161/67)  BP(mean): --  RR: 16 (2024 12:30) (16 - 16)  SpO2: 100% (2024 12:30) (98% - 100%)    Parameters below as of 2024 12:30  Patient On (Oxygen Delivery Method): room air      Daily     Daily Weight in k.6 (2024 12:30)I&O's Summary    2024 07:01  -  2024 13:29  --------------------------------------------------------  IN: 0 mL / OUT: 2500 mL / NET: -2500 mL        GENERAL:  NAD  HEENT:  NC/AT,  conjunctivae clear  CHEST:  Full & symmetric excursion  HEART:  Regular rhythm, S1, S2  ABDOMEN:  Soft, non-tender, non-distended, +BS  EXTREMITIES: AV fistulas Rt & Lt UE   SKIN:  No rash or jaundice  NEURO:  Alert, oriented      LABS:                        5.9    4.26  )-----------( 184      ( 2024 09:10 )             18.5     Hemoglobin: 5.9 g/dL (24 @ 09:10)   Hemoglobin: 7.6 g/dL (24 @ 08:55)   Hemoglobin: 7.4 g/dL (04.15.24 @ 17:44)   Hemoglobin: 6.5 g/dL (04.15.24 @ 14:30)   Hemoglobin: 7.1 g/dL (24 @ 15:37)   Hemoglobin: 7.1 g/dL (04.10.24 @ 14:30)   Hemoglobin: 7.3 g/dL (24 @ 14:10)   Hemoglobin: 7.8 g/dL (24 @ 05:44)   Hemoglobin: 7.6 g/dL (24 @ 13:45)   Hemoglobin: 7.8 g/dL (24 @ 05:50)   Hemoglobin: 7.3 g/dL (24 @ 14:15)   Hemoglobin: 8.2 g/dL (24 @ 06:20)           141  |  102  |  104<H>  ----------------------------<  87  5.2   |  27  |  8.87<H>    Ca    8.7      2024 09:10  Phos  5.6             Urinalysis Basic - ( 2024 09:10 )    Color: x / Appearance: x / SG: x / pH: x  Gluc: 87 mg/dL / Ketone: x  / Bili: x / Urobili: x   Blood: x / Protein: x / Nitrite: x   Leuk Esterase: x / RBC: x / WBC x   Sq Epi: x / Non Sq Epi: x / Bacteria: x        Occult Blood, Feces: Positive (24 @ 17:15)

## 2024-04-17 NOTE — PROGRESS NOTE ADULT - SUBJECTIVE AND OBJECTIVE BOX
HPI:  37yo Male PMHx HTN, anemia of chronic disease, ESRD on HD (M/W/F via right AV fistula), left hip arthroplasty in 8/23, history of right leg fracture presented to [facility] from [facility] on 3/6 after falling during a wheelchair transfer landing on his right knee. He is wheelchair bound since his left hip surgery. CT hip with acute garden type 1 minimally valgus impacted transcervical right femoral neck fracture and subacute right pubic body and inferior rami fractures. He is s/p right hip hemiarthroplasty. Post op course complicated by anemia s/p transfusion. Hospital course complicated by hypoglycemia after being shifted for hyperkalemia. Further complicated by febrile 101.6F found to be covid positive and completed remdisivir. He was started on eliquis 2.5mg BID for elevated d-dimers and negative LE duplex.     Patient was evaluated by PM&R and therapy for functional deficits, gait/ADL impairments and acute rehabilitation was recommended. Patient was medically optimized for discharge to [facility] IRU on 3/27 (27 Mar 2024 15:19)      ROS/subjective:  Patient seen and evaluated bedside, ambulated with platform walker yesterday  able to go sit to stand with some assistance and grab onto standard walker  Right knee and left hip pain controlled  Hospital Bed Denied By insurance  Still awaiting Delivery Of WC- cannot leave without WC- may arrive this afternoon and can be Dcd then- otherwise tomorrow  patient wants to take flight to [place] this Weekend- spoke with hospitalist - no medical reasons not to go  no dizziness, no chest pain, no nausea, no vomiting, no SOB, no headaches  tolerating HD and Eliquis- can DC Eliquis per hospitalist- being given only for elevated D dimer post COVID/ DVT proph- on ASA  last BM 4/13      MEDICATIONS  (STANDING):  acetaminophen     Tablet .. 975 milliGRAM(s) Oral every 8 hours  AQUAPHOR (petrolatum Ointment) 1 Application(s) Topical two times a day  aspirin enteric coated 81 milliGRAM(s) Oral two times a day  epoetin carito-epbx (RETACRIT) Injectable 80025 Unit(s) IV Push <User Schedule>  lidocaine   4% Patch 1 Patch Transdermal <User Schedule>  metoprolol tartrate 25 milliGRAM(s) Oral two times a day  Nephro-pat 1 Tablet(s) Oral daily  senna 2 Tablet(s) Oral at bedtime  sevelamer carbonate 1600 milliGRAM(s) Oral three times a day with meals    MEDICATIONS  (PRN):  aluminum hydroxide/magnesium hydroxide/simethicone Suspension 30 milliLiter(s) Oral every 4 hours PRN Dyspepsia  melatonin 6 milliGRAM(s) Oral at bedtime PRN Insomnia  polyethylene glycol 3350 17 Gram(s) Oral daily PRN Constipation                            5.9    4.26  )-----------( 184      ( 17 Apr 2024 09:10 )             18.5     04-17    141  |  102  |  104<H>  ----------------------------<  87  5.2   |  27  |  8.87<H>    Ca    8.7      17 Apr 2024 09:10  Phos  5.6     04-17      Urinalysis Basic - ( 17 Apr 2024 09:10 )    Color: x / Appearance: x / SG: x / pH: x  Gluc: 87 mg/dL / Ketone: x  / Bili: x / Urobili: x   Blood: x / Protein: x / Nitrite: x   Leuk Esterase: x / RBC: x / WBC x   Sq Epi: x / Non Sq Epi: x / Bacteria: x        Vital Signs Last 24 Hrs  T(C): 36.7 (17 Apr 2024 09:30), Max: 37.1 (16 Apr 2024 20:00)  T(F): 98 (17 Apr 2024 09:30), Max: 98.8 (16 Apr 2024 20:00)  HR: 74 (17 Apr 2024 09:30) (74 - 90)  BP: 125/81 (17 Apr 2024 09:30) (122/69 - 161/67)  BP(mean): --  RR: 16 (17 Apr 2024 09:30) (16 - 16)  SpO2: 98% (17 Apr 2024 09:30) (98% - 100%)    Parameters below as of 17 Apr 2024 09:30  Patient On (Oxygen Delivery Method): room air      Gen - NAD, Comfortable  HEENT - NCAT, EOMI  Neck - Supple, No limited ROM  Pulm - CTAB, No wheeze, No rhonchi, No crackles  Cardiovascular - RRR, S1S2, No murmurs  Chest - good chest expansion, good respiratory effort  Abdomen - Soft, NT/ND, +BS. BM 3/26  Extremities - Right FA AV fistula + bruit and thrill. GIDEON old AV fistula not active, Right Knee with degenerative chnages  Neuro-     Cognitive - awake, alert, oriented to person, place, date, year, and situation.  Able  to follow command     Communication - Fluent, Comprehensible, No dysarthria, No aphasia        Memory:  Recent/ remote memory intact     Cranial Nerves - CN 2-12 intact. No facial asymmetry, Tongue midline, EOMI, Shoulder shrug intact     Motor -                    LEFT    UE - ShAB 5/5, EF 5/5, EE 5/5,  5/5                    RIGHT UE - ShAB 5/5, EF 5/5, EE 5/5,   5/5                    LEFT    LE - HF 4/5, KE 3+/5, DF 4/5, PF 4/5                    RIGHT LE - HF 3+/5, KE 3/5, DF 4/5, PF 4/5   moves Right knee better     Sensory - Intact bilaterally      Tone - normal  MSK: b/l hip discomfort with movement OA changes Right Knee- minimal pain  Psychiatric - Mood stable, Affect WNL  Skin: right hip surgical site with steri strips CDI. b/l sacrum/coccyx DTI .    IDT Camilla 4/15  tentative Dc  4/16 to home- 24x7 supervision- ? Brother/ Cousin to Provide- awaiting WC- Use platform walker      Continue comprehensive acute rehab program consisting of 3hrs/day of OT/PT. HPI:  37yo Male PMHx HTN, anemia of chronic disease, ESRD on HD (M/W/F via right AV fistula), left hip arthroplasty in 8/23, history of right leg fracture presented to [facility] from [facility] on 3/6 after falling during a wheelchair transfer landing on his right knee. He is wheelchair bound since his left hip surgery. CT hip with acute garden type 1 minimally valgus impacted transcervical right femoral neck fracture and subacute right pubic body and inferior rami fractures. He is s/p right hip hemiarthroplasty. Post op course complicated by anemia s/p transfusion. Hospital course complicated by hypoglycemia after being shifted for hyperkalemia. Further complicated by febrile 101.6F found to be covid positive and completed remdisivir. He was started on eliquis 2.5mg BID for elevated d-dimers and negative LE duplex.     Patient was evaluated by PM&R and therapy for functional deficits, gait/ADL impairments and acute rehabilitation was recommended. Patient was medically optimized for discharge to [facility] IRU on 3/27 (27 Mar 2024 15:19)      ROS/subjective:  Patient seen and evaluated bedside this afternoon, s/p HD, NAD, s/p PRBC x1 U for acute anemia  Right knee and left hip pain controlled  Hospital Bed Denied By insurance  Still awaiting Delivery Of WC- cannot leave without WC-  patient wants to take flight to [place] this Weekend- spoke with hospitalist - no medical reasons not to go  no dizziness, no chest pain, no nausea, no vomiting, no SOB, no headaches  Off Eliquis per hospitalist- being given only for elevated D dimer post COVID/ DVT proph- on ASA  GI and heme consulted for acute anemia-will follow recs.       MEDICATIONS  (STANDING):  acetaminophen     Tablet .. 975 milliGRAM(s) Oral every 8 hours  AQUAPHOR (petrolatum Ointment) 1 Application(s) Topical two times a day  aspirin enteric coated 81 milliGRAM(s) Oral two times a day  epoetin carito-epbx (RETACRIT) Injectable 83823 Unit(s) IV Push <User Schedule>  lidocaine   4% Patch 1 Patch Transdermal <User Schedule>  metoprolol tartrate 25 milliGRAM(s) Oral two times a day  Nephro-pat 1 Tablet(s) Oral daily  senna 2 Tablet(s) Oral at bedtime  sevelamer carbonate 1600 milliGRAM(s) Oral three times a day with meals    MEDICATIONS  (PRN):  aluminum hydroxide/magnesium hydroxide/simethicone Suspension 30 milliLiter(s) Oral every 4 hours PRN Dyspepsia  melatonin 6 milliGRAM(s) Oral at bedtime PRN Insomnia  polyethylene glycol 3350 17 Gram(s) Oral daily PRN Constipation                            5.9    4.26  )-----------( 184      ( 17 Apr 2024 09:10 )             18.5     04-17    141  |  102  |  104<H>  ----------------------------<  87  5.2   |  27  |  8.87<H>    Ca    8.7      17 Apr 2024 09:10  Phos  5.6     04-17      Urinalysis Basic - ( 17 Apr 2024 09:10 )    Color: x / Appearance: x / SG: x / pH: x  Gluc: 87 mg/dL / Ketone: x  / Bili: x / Urobili: x   Blood: x / Protein: x / Nitrite: x   Leuk Esterase: x / RBC: x / WBC x   Sq Epi: x / Non Sq Epi: x / Bacteria: x        Vital Signs Last 24 Hrs  T(C): 36.7 (17 Apr 2024 09:30), Max: 37.1 (16 Apr 2024 20:00)  T(F): 98 (17 Apr 2024 09:30), Max: 98.8 (16 Apr 2024 20:00)  HR: 74 (17 Apr 2024 09:30) (74 - 90)  BP: 125/81 (17 Apr 2024 09:30) (122/69 - 161/67)  BP(mean): --  RR: 16 (17 Apr 2024 09:30) (16 - 16)  SpO2: 98% (17 Apr 2024 09:30) (98% - 100%)    Parameters below as of 17 Apr 2024 09:30  Patient On (Oxygen Delivery Method): room air      Gen - NAD, Comfortable  HEENT - NCAT, EOMI  Neck - Supple, No limited ROM  Pulm - CTAB, No wheeze, No rhonchi, No crackles  Cardiovascular - RRR, S1S2, No murmurs  Chest - good chest expansion, good respiratory effort  Abdomen - Soft, NT/ND, +BS. BM 3/26  Extremities - Right FA AV fistula + bruit and thrill. GIDEON old AV fistula not active, Right Knee with degenerative chnages  Neuro-     Cognitive - awake, alert, oriented to person, place, date, year, and situation.  Able  to follow command     Communication - Fluent, Comprehensible, No dysarthria, No aphasia        Memory:  Recent/ remote memory intact     Cranial Nerves - CN 2-12 intact. No facial asymmetry, Tongue midline, EOMI, Shoulder shrug intact     Motor -                    LEFT    UE - ShAB 5/5, EF 5/5, EE 5/5,  5/5                    RIGHT UE - ShAB 5/5, EF 5/5, EE 5/5,   5/5                    LEFT    LE - HF 4/5, KE 3+/5, DF 4/5, PF 4/5                    RIGHT LE - HF 3+/5, KE 3/5, DF 4/5, PF 4/5   moves Right knee better     Sensory - Intact bilaterally      Tone - normal  MSK: b/l hip discomfort with movement OA changes Right Knee- minimal pain  Psychiatric - Mood stable, Affect WNL  Skin: right hip surgical site with steri strips CDI. b/l sacrum/coccyx DTI .    IDT Camilla 4/15  tentative Dc  4/16 to home- 24x7 supervision- ? Brother/ Cousin to Provide- awaiting WC- Use platform walker      Continue comprehensive acute rehab program consisting of 3hrs/day of OT/PT.

## 2024-04-17 NOTE — CONSULT NOTE ADULT - ASSESSMENT
37yo Male PMHx HTN, anemia of chronic disease, ESRD on HD (M/W/F via right AV fistula), left hip arthroplasty in 8/23, history of right leg fracture presented to [facility] from [facility] on 3/6 after falling during a wheelchair transfer landing on his right knee. He is wheelchair bound since his left hip surgery.     GI consultation for anemia. He had HD & 1 U PRBC today  37yo Male patient  PMHx HTN, anemia of chronic disease, ESRD on HD (M/W/F via right AV fistula), left hip arthroplasty in 8/23, history of right leg fracture presented to [facility] from [facility] on 3/6 after falling during a wheelchair transfer landing on his right knee. He is wheelchair bound since his left hip surgery. Found to have worsening of chronic anemia on AM labs today.

## 2024-04-17 NOTE — PROGRESS NOTE ADULT - ASSESSMENT
BRIDGET NORTH is a 38y M with HTN, anemia of chronic disease, ESRD on HD (M/W/F via right AV fistula), left hip arthroplasty in 8/23, history of right leg fracture presented to [facility] on 3/6 after a fall during a wheelchair transfer. He was found to have a right femoral neck fracture and subacute pubic body and inferior rami fractures. He is s/p right hip hemiarthroplasty. Hospital course complicated by post-op anemia s/p transfusion, hypoglycemia, hyperkalemia, febrile 2/2 covid s/p remdisivir, and started on eliquis 2.5mg BID with negative dvts on LE duplex. Patient now admitted for a multidisciplinary rehab program. 03-27-24 @ 16:17      #Right Femoral Neck fracture   - s/p right hip hemiarthroplasty on 3/7  - pain med as below  - Dressing and staples removed on 3/22  - Comprehensive Multidisciplinary Rehab Program:  comprehensive rehab program of PT/OT   - precautions: WBAT RLE. Anterior hip.   - Right Knee pain- arthrosis/ effusion- no Fx- renal osteodystrophy- ICE/ Lidoderm/ Rehab/ ACE  - Social work to confirm DC plan- Need 24x7 assistance at home- ? Brother/ ? Cousin  - stairs not an issue secondary to PERCY- can have ambulance to transfer  - DC to home with VN services- ? start on Monday  - patient to take Flight to [place] Thursday and return on Monday for Hemodialysis  FU with PMD/Renal as OPD  FU ortho- Dr León 1-2 weeks  FU PMR Dr Dumas 4-6 weeks    #ESRD  - HDS via right AV fistula on m/w/f.  ( Last HD 3/27). Old HDS left UA  - Nephro consult placed. Dr Blanco aware.   - sevelamer 800mg TID  - retacrit m/w/f  Hgb 6.5- hospitalist to transfuse one unit PRBCs  Hgb today 7.6  renal recommend OPD Eval with hematology secondary to anemia  continue Epogen in HD      #HTN  - toprol 25mg BID  - Monitor BP    #Pain  - Tylenol PRN  - oxy 10mg moderate, 15mg severe.   Right Knee pain- lidoderm during the day/ICE/ ACE- Better- can Obtain as OPD  Left Hip pain - all studies reviewed- recent CT 3/6- no evidence of abnormality in Left prosthesis  will follow clinically-stable, ambulating- pain less    #Sleep  - Melatonin PRN     #GI / Bowel  #dyspepsia  - Senna qHS  - Miralax PRN -  - maalox    # / Bladder  - does not void ESRD    #Skin / Pressure injury  - Skin assessment on admission performed : right hip surgical site with steri strips CDI. coccyx small stage 2-using barrier cream  - Monitor Incisions:    - wound care following at [facility] for b/l sacrum/coccyx DTI wth evolution.   - wound care to follow  - turn and reposition q2h      #Diet:  - Diet Consistency: Renal restriction  - Nutrition consult    #DVT prophylaxis:   - eliquis 2.5mg BID- DCd  - Last LE Doppler normal on 3/14     .    BRIDGET NORTH is a 38y M with HTN, anemia of chronic disease, ESRD on HD (M/W/F via right AV fistula), left hip arthroplasty in 8/23, history of right leg fracture presented to [facility] on 3/6 after a fall during a wheelchair transfer. He was found to have a right femoral neck fracture and subacute pubic body and inferior rami fractures. He is s/p right hip hemiarthroplasty. Hospital course complicated by post-op anemia s/p transfusion, hypoglycemia, hyperkalemia, febrile 2/2 covid s/p remdisivir, and started on eliquis 2.5mg BID with negative dvts on LE duplex. Patient now admitted for a multidisciplinary rehab program. 03-27-24 @ 16:17      #Right Femoral Neck fracture   - s/p right hip hemiarthroplasty on 3/7  - pain med as below  - Dressing and staples removed on 3/22  - Comprehensive Multidisciplinary Rehab Program:  comprehensive rehab program of PT/OT   - precautions: WBAT RLE. Anterior hip.   - Right Knee pain- arthrosis/ effusion- no Fx- renal osteodystrophy- ICE/ Lidoderm/ Rehab/ ACE  - Social work to confirm DC plan- Need 24x7 assistance at home- ? Brother/ ? Cousin  - stairs not an issue secondary to PERCY- can have ambulance to transfer  - DC to home with VN services- ? start on Monday  - patient to take Flight to [place] Thursday and return on Monday for Hemodialysis  FU with PMD/Renal as OPD  FU ortho- Dr León 1-2 weeks  FU PMR Dr Dumas 4-6 weeks    #ESRD  - HDS via right AV fistula on m/w/f.  ( Last HD 3/27). Old HDS left UA  - Nephro consult placed. Dr Blanco aware.   - sevelamer 800mg TID  - retacrit m/w/f  renal recommended Eval with hematology secondary to anemia  continue Epogen in HD  ACUTE anemia, s/p PRBC today, dc on hold, GI and heme to see    #HTN  - toprol 25mg BID  - Monitor BP    #Pain  - Tylenol PRN  - oxy 10mg moderate, 15mg severe.   Right Knee pain- lidoderm during the day/ICE/ ACE- Better- can Obtain as OPD  Left Hip pain - all studies reviewed- recent CT 3/6- no evidence of abnormality in Left prosthesis  will follow clinically-stable, ambulating- pain less    #Sleep  - Melatonin PRN     #GI / Bowel  #dyspepsia  - Senna qHS  - Miralax PRN -  - maalox    # / Bladder  - does not void ESRD    #Skin / Pressure injury  - Skin assessment on admission performed : right hip surgical site with steri strips CDI. coccyx small stage 2-using barrier cream  - Monitor Incisions:    - wound care following at [facility] for b/l sacrum/coccyx DTI wth evolution.   - wound care to follow  - turn and reposition q2h      #Diet:  - Diet Consistency: Renal restriction  - Nutrition consult    #DVT prophylaxis:   - eliquis 2.5mg BID- DCd  - Last LE Doppler normal on 3/14     .    BRIDGET NORTH is a 38y M with HTN, anemia of chronic disease, ESRD on HD (M/W/F via right AV fistula), left hip arthroplasty in 8/23, history of right leg fracture presented to [facility] on 3/6 after a fall during a wheelchair transfer. He was found to have a right femoral neck fracture and subacute pubic body and inferior rami fractures. He is s/p right hip hemiarthroplasty. Hospital course complicated by post-op anemia s/p transfusion, hypoglycemia, hyperkalemia, febrile 2/2 covid s/p remdisivir, and started on eliquis 2.5mg BID with negative dvts on LE duplex. Patient now admitted for a multidisciplinary rehab program. 03-27-24 @ 16:17      #Right Femoral Neck fracture   - s/p right hip hemiarthroplasty on 3/7  - pain med as below  - Dressing and staples removed on 3/22  - Comprehensive Multidisciplinary Rehab Program:  comprehensive rehab program of PT/OT   - precautions: WBAT RLE. Anterior hip.   - Right Knee pain- arthrosis/ effusion- no Fx- renal osteodystrophy- ICE/ Lidoderm/ Rehab/ ACE  - Social work to confirm DC plan- Need 24x7 assistance at home- ? Brother/ ? Cousin  - stairs not an issue secondary to PERCY- can have ambulance to transfer  - DC to home with VN services- ? start on Monday  - patient to take Flight to [place] Thursday and return on Monday for Hemodialysis  FU with PMD/Renal as OPD  FU ortho- Dr León 1-2 weeks  FU PMR Dr Dumas 4-6 weeks  NO DC SECONDARY TO NEW ONSET WORSENING ANEMIA- GI/HEME TO SEE    #ESRD  - HDS via right AV fistula on m/w/f.  ( Last HD 3/27). Old HDS left UA  - Nephro consult placed. Dr Blanco aware.   - sevelamer 800mg TID  - retacrit m/w/f  renal recommended Eval with hematology secondary to anemia  continue Epogen in HD  ACUTE anemia, s/p PRBC today, dc on hold, GI and heme to see    #HTN  - toprol 25mg BID  - Monitor BP    #Pain  - Tylenol PRN  - oxy 10mg moderate, 15mg severe.   Right Knee pain- lidoderm during the day/ICE/ ACE- Better- can Obtain as OPD  Left Hip pain - all studies reviewed- recent CT 3/6- no evidence of abnormality in Left prosthesis  will follow clinically-stable, ambulating- pain less    #Sleep  - Melatonin PRN     #GI / Bowel  #dyspepsia  - Senna qHS  - Miralax PRN -  - maalox    # / Bladder  - does not void ESRD    #Skin / Pressure injury  - Skin assessment on admission performed : right hip surgical site with steri strips CDI. coccyx small stage 2-using barrier cream  - Monitor Incisions:    - wound care following at [facility] for b/l sacrum/coccyx DTI wth evolution.   - wound care to follow  - turn and reposition q2h      #Diet:  - Diet Consistency: Renal restriction  - Nutrition consult    #DVT prophylaxis:   - eliquis 2.5mg BID- DCd  - Last LE Doppler normal on 3/14     .

## 2024-04-17 NOTE — PROGRESS NOTE ADULT - NS ATTEND AMEND GEN_ALL_CORE FT
Rehab Attending- Patient seen and examined by me - Case discussed, above note reviewed by me with modifications made    patient seen and evaluated post dialysis  hgb low 5.9- patient asymptomatic but BP low in HD today- SP additional unit of PRBCs  Unable to discharge home- Heme, GI to See  will continue intensive rehab- work on ambulation with RW      Vital Signs Last 24 Hrs  T(C): 36.7 (17 Apr 2024 09:30), Max: 37.1 (16 Apr 2024 20:00)  T(F): 98 (17 Apr 2024 09:30), Max: 98.8 (16 Apr 2024 20:00)  HR: 74 (17 Apr 2024 09:30) (74 - 90)  BP: 125/81 (17 Apr 2024 09:30) (122/69 - 161/67)  BP(mean): --  RR: 16 (17 Apr 2024 09:30) (16 - 16)  SpO2: 98% (17 Apr 2024 09:30) (98% - 100%)    Parameters below as of 17 Apr 2024 09:30  Patient On (Oxygen Delivery Method): room air      Gen - NAD, Comfortable  HEENT - NCAT, EOMI  Neck - Supple, No limited ROM  Pulm - CTAB, No wheeze, No rhonchi, No crackles  Cardiovascular - RRR, S1S2, No murmurs  Chest - good chest expansion, good respiratory effort  Abdomen - Soft, NT/ND, +BS. BM 3/26  Extremities - Right FA AV fistula + bruit and thrill. GIDEON old AV fistula not active, Right Knee with degenerative chnages  Neuro-     Cognitive - awake, alert, oriented to person, place, date, year, and situation.  Able  to follow command     Communication - Fluent, Comprehensible, No dysarthria, No aphasia        Memory:  Recent/ remote memory intact     Cranial Nerves - CN 2-12 intact. No facial asymmetry, Tongue midline, EOMI, Shoulder shrug intact     Motor -                    LEFT    UE - ShAB 5/5, EF 5/5, EE 5/5,  5/5                    RIGHT UE - ShAB 5/5, EF 5/5, EE 5/5,   5/5                    LEFT    LE - HF 4/5, KE 3+/5, DF 4/5, PF 4/5                    RIGHT LE - HF 3+/5, KE 3/5, DF 4/5, PF 4/5   moves Right knee better     Sensory - Intact bilaterally      Tone - normal  MSK: b/l hip discomfort with movement OA changes Right Knee- minimal pain  Psychiatric - Mood stable, Affect WNL  Skin: right hip surgical site with steri strips CDI. b/l sacrum/coccyx DTI .

## 2024-04-17 NOTE — CONSULT NOTE ADULT - PROBLEM SELECTOR RECOMMENDATION 9
Anemia multifactorial, Chronic disease including ESRD in HD.  Unlikely GI source (erosive gastritis, PUD, AVM/ angiectasia, malignancy).  Keep active T&S  Monitor CBC  Transfuse for Hb <7  Check iron study   Monitor Bowel movement for melena or hematochezia.   If develop active GI bleed Will discuss need for urgent EGD /Colonoscopy Anemia likely not GI source of blood loss (a loss of 1 unit of blood would be expected to manifest with blood or altered stool). Suspect more likely to ESRD in HD.   Hemoccult stool testing is not useful for inpatient GI bleeding evaluation.  Keep active T&S  Monitor CBC  Transfuse for Hb <7  Check iron study   Monitor Bowel movement for melena or hematochezia.   If develop active GI bleed or persistent worsening of anemia connsider need for urgent EGD /Colonoscopy but will defer for now.

## 2024-04-17 NOTE — PROGRESS NOTE ADULT - ASSESSMENT
38y M with HTN, anemia of chronic disease, ESRD on HD (M/W/F via right AV fistula), left hip arthroplasty in 8/23, history of right leg fracture presented to [facility] on 3/6 after a fall during a wheelchair transfer. He was found to have a right femoral neck fracture and subacute pubic body and inferior rami fractures. He is s/p right hip hemiarthroplasty. Patient now admitted for a multidisciplinary rehab program.    #Right Femoral Neck fracture   - s/p right hip hemiarthroplasty on 3/7  - Comprehensive Multidisciplinary Rehab Program  - precautions: WBAT RLE, fall precautions  - pain management per rehab     #anemia  - hgb 7.6 on am labs  - suspect secondary to chronic disease  - no signs of bleeding, no bloody BM, no melena  - outpatient hematology     #ESRD  #hyperkalemia  - HDS via right AV fistula on m/w/f.  ( Last HD 3/29). Old HDS left UA  - HD per Dr Blanco  - sevelamer 800mg TID  - Retacrit m/w/f    #HTN  - toprol 25mg BID  - BP control per nephrology give likely secondary hypertension

## 2024-04-18 LAB
% ALBUMIN: 53.6 % — SIGNIFICANT CHANGE UP
% ALPHA 1: 6.2 % — SIGNIFICANT CHANGE UP
% ALPHA 2: 13.9 % — SIGNIFICANT CHANGE UP
% BETA: 10.2 % — SIGNIFICANT CHANGE UP
% GAMMA: 16.1 % — SIGNIFICANT CHANGE UP
ALBUMIN SERPL ELPH-MCNC: 3.6 G/DL — SIGNIFICANT CHANGE UP (ref 3.6–5.5)
ALBUMIN/GLOB SERPL ELPH: 1.1 RATIO — SIGNIFICANT CHANGE UP
ALPHA1 GLOB SERPL ELPH-MCNC: 0.4 G/DL — SIGNIFICANT CHANGE UP (ref 0.1–0.4)
ALPHA2 GLOB SERPL ELPH-MCNC: 0.9 G/DL — SIGNIFICANT CHANGE UP (ref 0.5–1)
ANION GAP SERPL CALC-SCNC: 11 MMOL/L — SIGNIFICANT CHANGE UP (ref 5–17)
B-GLOBULIN SERPL ELPH-MCNC: 0.7 G/DL — SIGNIFICANT CHANGE UP (ref 0.5–1)
BASOPHILS # BLD AUTO: 0.02 K/UL — SIGNIFICANT CHANGE UP (ref 0–0.2)
BASOPHILS NFR BLD AUTO: 0.5 % — SIGNIFICANT CHANGE UP (ref 0–2)
BUN SERPL-MCNC: 62 MG/DL — HIGH (ref 7–23)
CALCIUM SERPL-MCNC: 9.1 MG/DL — SIGNIFICANT CHANGE UP (ref 8.4–10.5)
CHLORIDE SERPL-SCNC: 101 MMOL/L — SIGNIFICANT CHANGE UP (ref 96–108)
CO2 SERPL-SCNC: 28 MMOL/L — SIGNIFICANT CHANGE UP (ref 22–31)
CREAT SERPL-MCNC: 5 MG/DL — HIGH (ref 0.5–1.3)
EGFR: 14 ML/MIN/1.73M2 — LOW
EOSINOPHIL # BLD AUTO: 0.19 K/UL — SIGNIFICANT CHANGE UP (ref 0–0.5)
EOSINOPHIL NFR BLD AUTO: 4.7 % — SIGNIFICANT CHANGE UP (ref 0–6)
FOLATE SERPL-MCNC: 11.4 NG/ML — SIGNIFICANT CHANGE UP
GAMMA GLOBULIN: 1.1 G/DL — SIGNIFICANT CHANGE UP (ref 0.6–1.6)
GLUCOSE SERPL-MCNC: 79 MG/DL — SIGNIFICANT CHANGE UP (ref 70–99)
HAPTOGLOB SERPL-MCNC: 255 MG/DL — HIGH (ref 34–200)
HCT VFR BLD CALC: 18.7 % — CRITICAL LOW (ref 39–50)
HCT VFR BLD CALC: 18.9 % — CRITICAL LOW (ref 39–50)
HGB BLD-MCNC: 6 G/DL — CRITICAL LOW (ref 13–17)
HGB BLD-MCNC: 6.1 G/DL — CRITICAL LOW (ref 13–17)
IMM GRANULOCYTES NFR BLD AUTO: 0.2 % — SIGNIFICANT CHANGE UP (ref 0–0.9)
INTERPRETATION SERPL IFE-IMP: SIGNIFICANT CHANGE UP
IRON SATN MFR SERPL: 43 % — SIGNIFICANT CHANGE UP (ref 16–55)
IRON SATN MFR SERPL: 63 UG/DL — SIGNIFICANT CHANGE UP (ref 45–165)
LDH SERPL L TO P-CCNC: 206 U/L — SIGNIFICANT CHANGE UP (ref 50–242)
LYMPHOCYTES # BLD AUTO: 1.15 K/UL — SIGNIFICANT CHANGE UP (ref 1–3.3)
LYMPHOCYTES # BLD AUTO: 28.7 % — SIGNIFICANT CHANGE UP (ref 13–44)
MCHC RBC-ENTMCNC: 30.8 PG — SIGNIFICANT CHANGE UP (ref 27–34)
MCHC RBC-ENTMCNC: 31.3 PG — SIGNIFICANT CHANGE UP (ref 27–34)
MCHC RBC-ENTMCNC: 31.7 GM/DL — LOW (ref 32–36)
MCHC RBC-ENTMCNC: 32.6 GM/DL — SIGNIFICANT CHANGE UP (ref 32–36)
MCV RBC AUTO: 95.9 FL — SIGNIFICANT CHANGE UP (ref 80–100)
MCV RBC AUTO: 96.9 FL — SIGNIFICANT CHANGE UP (ref 80–100)
MONOCYTES # BLD AUTO: 0.37 K/UL — SIGNIFICANT CHANGE UP (ref 0–0.9)
MONOCYTES NFR BLD AUTO: 9.2 % — SIGNIFICANT CHANGE UP (ref 2–14)
NEUTROPHILS # BLD AUTO: 2.27 K/UL — SIGNIFICANT CHANGE UP (ref 1.8–7.4)
NEUTROPHILS NFR BLD AUTO: 56.7 % — SIGNIFICANT CHANGE UP (ref 43–77)
NRBC # BLD: 0 /100 WBCS — SIGNIFICANT CHANGE UP (ref 0–0)
NRBC # BLD: 0 /100 WBCS — SIGNIFICANT CHANGE UP (ref 0–0)
PLATELET # BLD AUTO: 174 K/UL — SIGNIFICANT CHANGE UP (ref 150–400)
PLATELET # BLD AUTO: 196 K/UL — SIGNIFICANT CHANGE UP (ref 150–400)
POTASSIUM SERPL-MCNC: 3.9 MMOL/L — SIGNIFICANT CHANGE UP (ref 3.5–5.3)
POTASSIUM SERPL-SCNC: 3.9 MMOL/L — SIGNIFICANT CHANGE UP (ref 3.5–5.3)
PROT PATTERN SERPL ELPH-IMP: SIGNIFICANT CHANGE UP
PROT SERPL-MCNC: 6.8 G/DL — SIGNIFICANT CHANGE UP (ref 6–8.3)
PROT SERPL-MCNC: 6.8 G/DL — SIGNIFICANT CHANGE UP (ref 6–8.3)
RBC # BLD: 1.95 M/UL — LOW (ref 4.2–5.8)
RBC # BLD: 1.95 M/UL — LOW (ref 4.2–5.8)
RBC # FLD: 16.7 % — HIGH (ref 10.3–14.5)
RBC # FLD: 16.8 % — HIGH (ref 10.3–14.5)
SODIUM SERPL-SCNC: 140 MMOL/L — SIGNIFICANT CHANGE UP (ref 135–145)
TIBC SERPL-MCNC: 147 UG/DL — LOW (ref 220–430)
UIBC SERPL-MCNC: 84 UG/DL — LOW (ref 110–370)
WBC # BLD: 4.01 K/UL — SIGNIFICANT CHANGE UP (ref 3.8–10.5)
WBC # BLD: 4.37 K/UL — SIGNIFICANT CHANGE UP (ref 3.8–10.5)
WBC # FLD AUTO: 4.01 K/UL — SIGNIFICANT CHANGE UP (ref 3.8–10.5)
WBC # FLD AUTO: 4.37 K/UL — SIGNIFICANT CHANGE UP (ref 3.8–10.5)

## 2024-04-18 PROCEDURE — 99233 SBSQ HOSP IP/OBS HIGH 50: CPT

## 2024-04-18 PROCEDURE — 99233 SBSQ HOSP IP/OBS HIGH 50: CPT | Mod: GC

## 2024-04-18 PROCEDURE — 99223 1ST HOSP IP/OBS HIGH 75: CPT

## 2024-04-18 RX ORDER — ERYTHROPOIETIN 10000 [IU]/ML
10000 INJECTION, SOLUTION INTRAVENOUS; SUBCUTANEOUS ONCE
Refills: 0 | Status: COMPLETED | OUTPATIENT
Start: 2024-04-18 | End: 2024-04-18

## 2024-04-18 RX ORDER — SOD SULF/SODIUM/NAHCO3/KCL/PEG
1000 SOLUTION, RECONSTITUTED, ORAL ORAL ONCE
Refills: 0 | Status: COMPLETED | OUTPATIENT
Start: 2024-04-18 | End: 2024-04-18

## 2024-04-18 RX ORDER — PANTOPRAZOLE SODIUM 20 MG/1
40 TABLET, DELAYED RELEASE ORAL
Refills: 0 | Status: DISCONTINUED | OUTPATIENT
Start: 2024-04-18 | End: 2024-04-30

## 2024-04-18 RX ADMIN — Medication 81 MILLIGRAM(S): at 06:24

## 2024-04-18 RX ADMIN — SEVELAMER CARBONATE 1600 MILLIGRAM(S): 2400 POWDER, FOR SUSPENSION ORAL at 08:23

## 2024-04-18 RX ADMIN — Medication 1 APPLICATION(S): at 06:26

## 2024-04-18 RX ADMIN — Medication 25 MILLIGRAM(S): at 17:59

## 2024-04-18 RX ADMIN — Medication 25 MILLIGRAM(S): at 06:24

## 2024-04-18 RX ADMIN — PANTOPRAZOLE SODIUM 40 MILLIGRAM(S): 20 TABLET, DELAYED RELEASE ORAL at 06:23

## 2024-04-18 RX ADMIN — LIDOCAINE 1 PATCH: 4 CREAM TOPICAL at 06:25

## 2024-04-18 RX ADMIN — LIDOCAINE 1 PATCH: 4 CREAM TOPICAL at 07:58

## 2024-04-18 RX ADMIN — LIDOCAINE 1 PATCH: 4 CREAM TOPICAL at 22:19

## 2024-04-18 RX ADMIN — Medication 975 MILLIGRAM(S): at 13:05

## 2024-04-18 RX ADMIN — PANTOPRAZOLE SODIUM 40 MILLIGRAM(S): 20 TABLET, DELAYED RELEASE ORAL at 17:58

## 2024-04-18 RX ADMIN — Medication 975 MILLIGRAM(S): at 06:23

## 2024-04-18 RX ADMIN — ERYTHROPOIETIN 10000 UNIT(S): 10000 INJECTION, SOLUTION INTRAVENOUS; SUBCUTANEOUS at 15:40

## 2024-04-18 RX ADMIN — Medication 1000 MILLILITER(S): at 18:10

## 2024-04-18 RX ADMIN — Medication 1 APPLICATION(S): at 17:51

## 2024-04-18 RX ADMIN — Medication 975 MILLIGRAM(S): at 07:23

## 2024-04-18 RX ADMIN — Medication 975 MILLIGRAM(S): at 14:02

## 2024-04-18 RX ADMIN — Medication 6 MILLIGRAM(S): at 23:51

## 2024-04-18 RX ADMIN — Medication 1000 MILLILITER(S): at 22:20

## 2024-04-18 NOTE — CONSULT NOTE ADULT - ASSESSMENT
37yo Male PMHx HTN, anemia of chronic disease, ESRD on HD (M/W/F via right AV fistula), left hip arthroplasty in 8/23, history of right leg fracture presented to [facility] from [facility] on 3/6 after falling during a wheelchair transfer landing on his right knee. He is wheelchair bound since his left hip surgery. CT hip with acute garden type 1 minimally valgus impacted transcervical right femoral neck fracture and subacute right pubic body and inferior rami fractures. He is s/p right hip hemiarthroplasty. Post op course complicated by anemia s/p transfusion. Hospital course complicated by febrile 101.6F found to be covid positive and completed remdisivir. He was started on eliquis 2.5mg BID for elevated d-dimers and negative LE duplex.     Patient was evaluated by PM&R and therapy for functional deficits, gait/ADL impairments and acute rehabilitation was recommended. Patient was medically optimized for discharge to [facility] IRU on 3/27/24.  Hematology consulted for anemia.

## 2024-04-18 NOTE — PROGRESS NOTE ADULT - SUBJECTIVE AND OBJECTIVE BOX
Events noted, GIB, seen on HD     Vital Signs Last 24 Hrs  T(C): 36.6 (04-18-24 @ 15:40), Max: 37 (04-18-24 @ 06:20)  T(F): 97.9 (04-18-24 @ 15:40), Max: 98.6 (04-18-24 @ 06:20)  HR: 69 (04-18-24 @ 15:40) (69 - 91)  BP: 118/75 (04-18-24 @ 15:40) (117/70 - 142/77)  RR: 16 (04-18-24 @ 15:40) (14 - 16)  SpO2: 98% (04-18-24 @ 15:40) (98% - 100%)    I&O's Detail    17 Apr 2024 07:01  -  18 Apr 2024 07:00  --------------------------------------------------------  OUT:    Other (mL): 2500 mL  Total OUT: 2500 mL    18 Apr 2024 07:01  -  18 Apr 2024 23:42  --------------------------------------------------------  OUT:    Other (mL): 1500 mL  Total OUT: 1500 mL    s1s2  b/l air entry  soft, ND  sm edema                                                                                                                            6.0    4.01  )-----------( 196      ( 18 Apr 2024 15:30 )             18.9     18 Apr 2024 15:30    140    |  101    |  62     ----------------------------<  79     3.9     |  28     |  5.00     Ca    9.1        18 Apr 2024 15:30  Phos  5.6       17 Apr 2024 09:10    TPro  6.8    /  Alb  3.6    /  TBili  x      /  DBili  x      /  AST  x      /  ALT  x      /  AlkPhos  x      17 Apr 2024 12:30    LIVER FUNCTIONS - ( 17 Apr 2024 12:30 )  Alb: 3.6 g/dL / Pro: 6.8 g/dL / ALK PHOS: x     / ALT: x     / AST: x     / GGT: x           acetaminophen     Tablet .. 975 milliGRAM(s) Oral every 8 hours  aluminum hydroxide/magnesium hydroxide/simethicone Suspension 30 milliLiter(s) Oral every 4 hours PRN  AQUAPHOR (petrolatum Ointment) 1 Application(s) Topical two times a day  epoetin carito-epbx (RETACRIT) Injectable 79301 Unit(s) IV Push <User Schedule>  lidocaine   4% Patch 1 Patch Transdermal <User Schedule>  melatonin 6 milliGRAM(s) Oral at bedtime PRN  metoprolol tartrate 25 milliGRAM(s) Oral two times a day  Nephro-pat 1 Tablet(s) Oral daily  pantoprazole    Tablet 40 milliGRAM(s) Oral two times a day  polyethylene glycol 3350 17 Gram(s) Oral daily PRN  senna 2 Tablet(s) Oral at bedtime  sevelamer carbonate 1600 milliGRAM(s) Oral three times a day with meals    A/P:    ESRD on HD   S/p fall, R hip fx  S/p R hip HA 3/7  Adm to rehab   On Epoetin w/HD 3 x week   Worsening anemia, GIB, s/p multiple units of PRBCs  GI following  Plan for EGD/colonoscopy tomorrow  HD today, next HD on Sat     [phone]

## 2024-04-18 NOTE — PROGRESS NOTE ADULT - ASSESSMENT
BRIDGET NORTH is a 38y M with HTN, anemia of chronic disease, ESRD on HD (M/W/F via right AV fistula), left hip arthroplasty in 8/23, history of right leg fracture presented to [facility] on 3/6 after a fall during a wheelchair transfer. He was found to have a right femoral neck fracture and subacute pubic body and inferior rami fractures. He is s/p right hip hemiarthroplasty. Hospital course complicated by post-op anemia s/p transfusion, hypoglycemia, hyperkalemia, febrile 2/2 covid s/p remdisivir, and started on eliquis 2.5mg BID with negative dvts on LE duplex. Patient now admitted for a multidisciplinary rehab program. 03-27-24 @ 16:17      #Right Femoral Neck fracture   - s/p right hip hemiarthroplasty on 3/7  - pain med as below  - Dressing and staples removed on 3/22  - Comprehensive Multidisciplinary Rehab Program:  comprehensive rehab program of PT/OT   - precautions: WBAT RLE. Anterior hip.   - Right Knee pain- arthrosis/ effusion- no Fx- renal osteodystrophy- ICE/ Lidoderm/ Rehab/ ACE  - Social work to confirm DC plan- Need 24x7 assistance at home- ? Brother/ ? Cousin  - stairs not an issue secondary to PERCY- can have ambulance to transfer  - DC to home with VN services- ? start on Monday  - patient to take Flight to [place] Thursday and return on Monday for Hemodialysis  FU with PMD/Renal as OPD  FU ortho- Dr León 1-2 weeks  FU PMR Dr Dumas 4-6 weeks  NO DC SECONDARY TO NEW ONSET WORSENING ANEMIA- GI/HEME TO SEE    #ESRD  - HDS via right AV fistula on m/w/f.  ( Last HD 3/27). Old HDS left UA  - Nephro consult placed. Dr Blanco aware.   - sevelamer 800mg TID  - retacrit m/w/f  renal recommended Eval with hematology secondary to anemia  continue Epogen in HD    Acute Anemia  Guiac +  Hgb down to 6.1 this AM  For 2 units PBRCs today  EGD/Colonoscopy tomorrow  Protonix increased to 2x/day    #HTN  - toprol 25mg BID  - Monitor BP    #Pain  - Tylenol PRN  - oxy 10mg moderate, 15mg severe.   Right Knee pain- lidoderm during the day/ICE/ ACE- Better- can Obtain as OPD  Left Hip pain - all studies reviewed- recent CT 3/6- no evidence of abnormality in Left prosthesis  will follow clinically-stable, ambulating- pain less    #Sleep  - Melatonin PRN     #GI / Bowel  #dyspepsia  - Senna qHS  - Miralax PRN -  - maalox    # / Bladder  - does not void ESRD    #Skin / Pressure injury  - Skin assessment on admission performed : right hip surgical site with steri strips CDI. coccyx small stage 2-using barrier cream  - Monitor Incisions:    - wound care following at [facility] for b/l sacrum/coccyx DTI wth evolution.   - wound care to follow  - turn and reposition q2h      #Diet:  - Diet Consistency: Renal restriction  - Nutrition consult    #DVT prophylaxis:   - eliquis 2.5mg BID- DCd, ASA DCd  - Last LE Doppler normal on 3/14     .

## 2024-04-18 NOTE — CONSULT NOTE ADULT - PROBLEM SELECTOR RECOMMENDATION 9
Patient with h/o chronic anemia associated with chronic disease/ESRD, for which he receives epogen with HD per nephrology. Recently exacerbated by recent hip fracture/surgery. Now black stool yesterday, G+-->GI to evaluate for blood loss. Scenario not suggestive of hemolysis currently. Folate, SPEP pending. Transfusional support as needed-for hemoglobin <7/related symptoms.

## 2024-04-18 NOTE — CONSULT NOTE ADULT - SUBJECTIVE AND OBJECTIVE BOX
LOV 7/5/2023 acne alopecia   BRIDGET NORTH  38y  Male  Admitting: CRIS Dumas    HPI:  39yo Male PMHx HTN, anemia of chronic disease, ESRD on HD (M/W/F via right AV fistula), left hip arthroplasty in 8/23, history of right leg fracture presented to [facility] from [facility] on 3/6 after falling during a wheelchair transfer landing on his right knee. He is wheelchair bound since his left hip surgery. CT hip with acute garden type 1 minimally valgus impacted transcervical right femoral neck fracture and subacute right pubic body and inferior rami fractures. He is s/p right hip hemiarthroplasty. Post op course complicated by anemia s/p transfusion. Hospital course complicated by febrile 101.6F found to be covid positive and completed remdisivir. He was started on eliquis 2.5mg BID for elevated d-dimers and negative LE duplex.     Patient was evaluated by PM&R and therapy for functional deficits, gait/ADL impairments and acute rehabilitation was recommended. Patient was medically optimized for discharge to [facility] IRU on 3/27/24.  Hematology consulted for anemia.    PAST MEDICAL & SURGICAL HISTORY:  HTN (hypertension)      Stroke  age 10      Sleep apnea      Leg fracture, right      Chronic renal failure      Hemodialysis access, AV graft      ESRD (end stage renal disease) on dialysis  since 2013, M-W-F      Anemia, unspecified type      Other hyperparathyroidism      AV fistula  R arm; L arm clotted      History of left hip hemiarthroplasty      S/P parathyroidectomy        HEALTH ISSUES - PROBLEM Dx:  Anemia      MEDICATIONS  (STANDING):  acetaminophen     Tablet .. 975 milliGRAM(s) Oral every 8 hours  AQUAPHOR (petrolatum Ointment) 1 Application(s) Topical two times a day  aspirin enteric coated 81 milliGRAM(s) Oral two times a day  epoetin carito-epbx (RETACRIT) Injectable 47420 Unit(s) IV Push <User Schedule>  lidocaine   4% Patch 1 Patch Transdermal <User Schedule>  metoprolol tartrate 25 milliGRAM(s) Oral two times a day  Nephro-pat 1 Tablet(s) Oral daily  pantoprazole    Tablet 40 milliGRAM(s) Oral before breakfast  senna 2 Tablet(s) Oral at bedtime  sevelamer carbonate 1600 milliGRAM(s) Oral three times a day with meals    MEDICATIONS  (PRN):  aluminum hydroxide/magnesium hydroxide/simethicone Suspension 30 milliLiter(s) Oral every 4 hours PRN Dyspepsia  melatonin 6 milliGRAM(s) Oral at bedtime PRN Insomnia  polyethylene glycol 3350 17 Gram(s) Oral daily PRN Constipation    Allergies    No Known Drug Allergies  shellfish (Hives)    Intolerances        FAMILY HISTORY: No known anemia in first degree relatives      SOCIAL HISTORY: No EtOH, no tobacco    REVIEW OF SYSTEMS:    CONSTITUTIONAL: No fevers or chills  EYES/ENT: No visual changes;  No vertigo or throat pain   NECK: No pain or stiffness  RESPIRATORY: No hemoptysis; No shortness of breath  CARDIOVASCULAR: No chest pain or palpitations  GASTROINTESTINAL: No abdominal or epigastric pain. No nausea, vomiting, or hematemesis; +black stool yesterday, and patient notes occasional streak of blood in stool.  GENITOURINARY: No hematuria  NEUROLOGICAL: +weakness  SKIN: No itching  All other review of systems is negative unless indicated above.        T(F): 98.6 (04-18-24 @ 06:20), Max: 98.6 (04-18-24 @ 06:20)  HR: 91 (04-18-24 @ 06:20)  BP: 142/77 (04-18-24 @ 06:20)  RR: 16 (04-18-24 @ 06:20)  SpO2: 100% (04-18-24 @ 06:20)      GENERAL: NAD, well-nourished  HEAD:  Atraumatic  EYES: EOMI, PERRLA, conjunctiva and sclera clear  NECK: Supple, No JVD  CHEST/LUNG: Clear to auscultation ant.; No wheeze  HEART: Regular rate and rhythm  ABDOMEN: Soft, Nontender, Nondistended; Bowel sounds present  EXTREMITIES:  no calf tenderness elicited  NEUROLOGY: awake, alert        Labs:             6.1    4.37  )-----------( 174      ( 04-18 @ 06:25 )             18.7                5.9    4.26  )-----------( 184      ( 04-17 @ 09:10 )             18.5                7.6    3.79  )-----------( 224      ( 04-16 @ 08:55 )             24.2                7.4    4.64  )-----------( 237      ( 04-15 @ 17:44 )             23.4                6.5    3.65  )-----------( 197      ( 04-15 @ 14:30 )             21.2       04-17    141  |  102  |  104<H>  ----------------------------<  87  5.2   |  27  |  8.87<H>    Ca    8.7      17 Apr 2024 09:10  Phos  5.6     04-17    TPro  6.8  /  Alb  x   /  TBili  x   /  DBili  x   /  AST  x   /  ALT  x   /  AlkPhos  x   04-17    Lactate Dehydrogenase, Serum: 206 U/L [50 - 242] (04-17 @ 12:30)  Phosphorus: 5.6 mg/dL [2.5 - 4.5] (04-17 @ 09:10)      Ferritin: 521 ng/mL (04-17-24 @ 12:30)  Iron Total: 63 ug/dL (04-17-24 @ 12:30)  Iron - Total Binding Capacity.: 147 ug/dL (04-17-24 @ 12:30)  % Saturation, Iron: 43 % (04-17-24 @ 12:30)  Vitamin B12, Serum: 781 pg/mL (04-17-24 @ 12:30)  Haptoglobin, Serum: 255 mg/dL (04-17-24 @ 12:30)    Occult Blood, Feces: Positive (04-17-24 @ 17:15)      Consultant notes reviewed : YES [x ] ; NO [ ]      Radiology and additional tests:  < from: [facility] Duplex Lower Ext Vein Scan, Bilat (03.14.24 @ 16:26) >  IMPRESSION:  No evidence of deep venous thrombosis in either lower extremity.      < end of copied text >  < from: Xray Chest 1 View- PORTABLE-Urgent (Xray Chest 1 View- PORTABLE-Urgent .) (03.11.24 @ 18:44) >  MPRESSION:  Unchanged right basilar atelectasis.      < end of copied text >

## 2024-04-18 NOTE — PROGRESS NOTE ADULT - SUBJECTIVE AND OBJECTIVE BOX
INTERVAL HPI/OVERNIGHT EVENTS:  HPI:    37 y/o male seen and examined, reports of large dark solid stool. No BRBPR.      MEDICATIONS  (STANDING):  acetaminophen     Tablet .. 975 milliGRAM(s) Oral every 8 hours  AQUAPHOR (petrolatum Ointment) 1 Application(s) Topical two times a day  epoetin carito-epbx (RETACRIT) Injectable 69604 Unit(s) IV Push <User Schedule>  lidocaine   4% Patch 1 Patch Transdermal <User Schedule>  metoprolol tartrate 25 milliGRAM(s) Oral two times a day  Nephro-pat 1 Tablet(s) Oral daily  pantoprazole    Tablet 40 milliGRAM(s) Oral two times a day  polyethylene glycol/electrolyte Solution 1000 milliLiter(s) Oral once  polyethylene glycol/electrolyte Solution 1000 milliLiter(s) Oral once  senna 2 Tablet(s) Oral at bedtime  sevelamer carbonate 1600 milliGRAM(s) Oral three times a day with meals    MEDICATIONS  (PRN):  aluminum hydroxide/magnesium hydroxide/simethicone Suspension 30 milliLiter(s) Oral every 4 hours PRN Dyspepsia  melatonin 6 milliGRAM(s) Oral at bedtime PRN Insomnia  polyethylene glycol 3350 17 Gram(s) Oral daily PRN Constipation      Allergies    No Known Drug Allergies  shellfish (Hives)    Intolerances        PAST MEDICAL & SURGICAL HISTORY:  HTN (hypertension)      Stroke  age 10      Sleep apnea      Leg fracture, right      Chronic renal failure      Hemodialysis access, AV graft      ESRD (end stage renal disease) on dialysis  since , M-W-F      Anemia, unspecified type      Other hyperparathyroidism      AV fistula  R arm; L arm clotted      History of left hip hemiarthroplasty      S/P parathyroidectomy      PHYSICAL EXAM:   Vital Signs:  Vital Signs Last 24 Hrs  T(C): 37 (2024 07:16), Max: 37 (2024 06:20)  T(F): 98.6 (2024 07:16), Max: 98.6 (2024 06:20)  HR: 76 (2024 07:16) (69 - 91)  BP: 117/70 (2024 07:16) (115/76 - 142/77)  BP(mean): --  RR: 14 (2024 07:16) (14 - 16)  SpO2: 99% (2024 07:16) (99% - 100%)    Parameters below as of 2024 07:16  Patient On (Oxygen Delivery Method): room air      Daily     Daily Weight in k.6 (2024 12:30)I&O's Summary    2024 07:01  -  2024 07:00  --------------------------------------------------------  IN: 0 mL / OUT: 2500 mL / NET: -2500 mL        GENERAL:  Appears stated age,  HEENT:  NC/AT,  conjunctivae clear and pink,  CHEST:  Full & symmetric excursion, no increased effort, breath sounds clear  HEART:  Regular rhythm, S1, S2, no murmur  ABDOMEN:  Soft, non-tender, non-distended, normoactive bowel sounds,  EXTEREMITIES:  no edema  SKIN:  No rash/warm/dry  NEURO:  Alert, oriented,       LABS:                        6.1    4.37  )-----------( 174      ( 2024 06:25 )             18.7     04-17    141  |  102  |  104<H>  ----------------------------<  87  5.2   |  27  |  8.87<H>    Ca    8.7      2024 09:10  Phos  5.6         TPro  6.8  /  Alb  x   /  TBili  x   /  DBili  x   /  AST  x   /  ALT  x   /  AlkPhos  x         Urinalysis Basic - ( 2024 09:10 )    Color: x / Appearance: x / SG: x / pH: x  Gluc: 87 mg/dL / Ketone: x  / Bili: x / Urobili: x   Blood: x / Protein: x / Nitrite: x   Leuk Esterase: x / RBC: x / WBC x   Sq Epi: x / Non Sq Epi: x / Bacteria: x      amylase   lipase  RADIOLOGY & ADDITIONAL TESTS:   GI Follow up    37 y/o male seen and examined at bedside.  He reports a large dark solid stool. No BRBPR or tarry stool.   Labs noted.      MEDICATIONS  (STANDING):  acetaminophen     Tablet .. 975 milliGRAM(s) Oral every 8 hours  AQUAPHOR (petrolatum Ointment) 1 Application(s) Topical two times a day  epoetin carito-epbx (RETACRIT) Injectable 02541 Unit(s) IV Push <User Schedule>  lidocaine   4% Patch 1 Patch Transdermal <User Schedule>  metoprolol tartrate 25 milliGRAM(s) Oral two times a day  Nephro-pat 1 Tablet(s) Oral daily  pantoprazole    Tablet 40 milliGRAM(s) Oral two times a day  polyethylene glycol/electrolyte Solution 1000 milliLiter(s) Oral once  polyethylene glycol/electrolyte Solution 1000 milliLiter(s) Oral once  senna 2 Tablet(s) Oral at bedtime  sevelamer carbonate 1600 milliGRAM(s) Oral three times a day with meals    MEDICATIONS  (PRN):  aluminum hydroxide/magnesium hydroxide/simethicone Suspension 30 milliLiter(s) Oral every 4 hours PRN Dyspepsia  melatonin 6 milliGRAM(s) Oral at bedtime PRN Insomnia  polyethylene glycol 3350 17 Gram(s) Oral daily PRN Constipation      Allergies    No Known Drug Allergies  shellfish (Hives)    Intolerances      PHYSICAL EXAM:   Vital Signs:  Vital Signs Last 24 Hrs  T(C): 37 (2024 07:16), Max: 37 (2024 06:20)  T(F): 98.6 (2024 07:16), Max: 98.6 (2024 06:20)  HR: 76 (2024 07:16) (69 - 91)  BP: 117/70 (2024 07:16) (115/76 - 142/77)  BP(mean): --  RR: 14 (2024 07:16) (14 - 16)  SpO2: 99% (2024 07:16) (99% - 100%)    Parameters below as of 2024 07:16  Patient On (Oxygen Delivery Method): room air      Daily     Daily Weight in k.6 (2024 12:30)I&O's Summary    2024 07:01  -  2024 07:00  --------------------------------------------------------  IN: 0 mL / OUT: 2500 mL / NET: -2500 mL    GENERAL:  NAD  HEENT:  NC/AT,  conjunctivae clear  CHEST:  Full & symmetric excursion  HEART:  Regular rhythm, S1, S2  ABDOMEN:  Soft, non-tender, non-distended, +BS  EXTREMITIES: AV fistulas Rt & Lt UE   SKIN:  No rash or jaundice  NEURO:  Alert, oriented            LABS:                        6.1    4.37  )-----------( 174      ( 2024 06:25 )             18.7     Hemoglobin: 6.0 g/dL (24 @ 15:30)   Hemoglobin: 6.1: Specimen integrity verified. g/dL (24 @ 06:25)   Hemoglobin: 5.9 g/dL (24 @ 09:10)   Hemoglobin: 7.6 g/dL (24 @ 08:55)   Hemoglobin: 7.4 g/dL (04.15.24 @ 17:44)   Hemoglobin: 6.5 g/dL (04.15.24 @ 14:30)             141  |  102  |  104<H>  ----------------------------<  87  5.2   |  27  |  8.87<H>    Ca    8.7      2024 09:10  Phos  5.6         TPro  6.8  /  Alb  x   /  TBili  x   /  DBili  x   /  AST  x   /  ALT  x   /  AlkPhos  x         Urinalysis Basic - ( 2024 09:10 )    Color: x / Appearance: x / SG: x / pH: x  Gluc: 87 mg/dL / Ketone: x  / Bili: x / Urobili: x   Blood: x / Protein: x / Nitrite: x   Leuk Esterase: x / RBC: x / WBC x   Sq Epi: x / Non Sq Epi: x / Bacteria: x

## 2024-04-18 NOTE — PROGRESS NOTE ADULT - PROBLEM SELECTOR PLAN 1
H/H remains low depsite transfusion given yesterday  Dark stool reported by staff  Hemoccult stool testing is not useful for inpatient GI bleeding evaluation.  Keep active T&S  Monitor CBC  Transfuse for Hb <7  Check iron study   Continue to monitor Bowel movement for melena or hematochezia.   Clear liquid diet  NPO after midnight  Will plan for egd/colonoscopy tomorrow  Prep ordered  Coordinate dialysis with nephrology prior to procedure H/H remains low despite transfusion given yesterday  Dark stool reported by staff  Keep active T&S  Monitor CBC  Transfuse for Hb <7  Check iron study   Continue to monitor Bowel movement for melena or hematochezia.   Clear liquid diet  NPO after midnight  Will plan for EGD and colonoscopy tomorrow  Prep ordered  Coordinate dialysis with nephrology prior to procedure

## 2024-04-18 NOTE — PROGRESS NOTE ADULT - SUBJECTIVE AND OBJECTIVE BOX
Patient is a 38y old  Male who presents with a chief complaint of Right Hip fracture s/p right hip hemiarthroplasty     Patient seen and examined at bedside. c/o intermittent pain in the hips, worse on sitting up, improves on sitting down. pt also c/o 1 dark stool yesterday, no leonila blood. says has had blood transfusions in the past due to anemia but never had dark stools or bleeding before. denies, belly pain, n/v, sob, cp.  s/p 1 unit prbc's yesterday, hb this am 6.1,    Vital Signs Last 24 Hrs  T(C): 37 (18 Apr 2024 06:20), Max: 37 (18 Apr 2024 06:20)  T(F): 98.6 (18 Apr 2024 06:20), Max: 98.6 (18 Apr 2024 06:20)  HR: 91 (18 Apr 2024 06:20) (69 - 91)  BP: 142/77 (18 Apr 2024 06:20) (115/76 - 142/77)  BP(mean): --  RR: 16 (18 Apr 2024 06:20) (16 - 16)  SpO2: 100% (18 Apr 2024 06:20) (98% - 100%)    Parameters below as of 18 Apr 2024 06:20  Patient On (Oxygen Delivery Method): room air    GENERAL- NAD  EAR/NOSE/MOUTH/THROAT -  MMM  EYES- TAB, conjunctiva and Sclera clear  NECK- supple  RESPIRATORY-  clear to auscultation bilaterally, non laboured breathing  CARDIOVASCULAR - SIS2, RRR  GI - soft NT BS present  EXTREMITIES-b/l LE edema  NEUROLOGY- b/l LE weakness  PSYCHIATRY- AAO X 3                  6.1                  x    | x    | x            4.37  >-----------< 174     ------------------------< x                     18.7                 x    | x    | x                                            Ca x     Mg x     Ph x        Occult Blood, Feces: Positive (04.17.24 @ 17:15)    Iron with Total Binding Capacity (04.17.24 @ 12:30)    % Saturation, Iron: 43 %   Iron - Total Binding Capacity.: 147 ug/dL   Iron Total: 63 ug/dL   Unsaturated Iron Binding Capacity: 84 ug/dL      MEDICATIONS  (STANDING):  acetaminophen     Tablet .. 975 milliGRAM(s) Oral every 8 hours  AQUAPHOR (petrolatum Ointment) 1 Application(s) Topical two times a day  aspirin enteric coated 81 milliGRAM(s) Oral two times a day  epoetin carito-epbx (RETACRIT) Injectable 45547 Unit(s) IV Push <User Schedule>  lidocaine   4% Patch 1 Patch Transdermal <User Schedule>  metoprolol tartrate 25 milliGRAM(s) Oral two times a day  Nephro-pat 1 Tablet(s) Oral daily  pantoprazole    Tablet 40 milliGRAM(s) Oral before breakfast  senna 2 Tablet(s) Oral at bedtime  sevelamer carbonate 1600 milliGRAM(s) Oral three times a day with meals    MEDICATIONS  (PRN):  aluminum hydroxide/magnesium hydroxide/simethicone Suspension 30 milliLiter(s) Oral every 4 hours PRN Dyspepsia  melatonin 6 milliGRAM(s) Oral at bedtime PRN Insomnia  polyethylene glycol 3350 17 Gram(s) Oral daily PRN Constipation

## 2024-04-18 NOTE — CONSULT NOTE ADULT - REASON FOR ADMISSION
Right Hip fracture s/p right hip hemiarhtroplasty
Right Hip fracture s/p right hip hemiarthroplasty

## 2024-04-18 NOTE — PROGRESS NOTE ADULT - ASSESSMENT
38y M with HTN, anemia of chronic disease, ESRD on HD (M/W/F via right AV fistula), left hip arthroplasty in 8/23, history of right leg fracture presented to [facility] on 3/6 after a fall during a wheelchair transfer. He was found to have a right femoral neck fracture and subacute pubic body and inferior rami fractures. He is s/p right hip hemiarthroplasty. Patient now admitted for a multidisciplinary rehab program- pt/ot/dvt ppx    #Right Femoral Neck fracture   - s/p right hip hemiarthroplasty on 3/7  - precautions: WBAT RLE, fall precautions  - pain management per rehab     #anemia  /#GI bleed- dark stools x 4/17/24  - hgb 5.9 to 6.1 this am post 1 unit PRBC'S yesterday  - will order 2 unit PRBC'S today- d/w hemeonc  - GI consulted  - plan for GI- to scope  - seen by hemonc - recc noted  - stop asa    #ESRD  #hyperkalemia  - HDS via right AV fistula on m/w/f.  ( Last HD 3/29). Old HDS left UA  - HD per Dr Blanco  - sevelamer   - Retacrit m/w/f  - extra session HD today prior to scope tomorrow- d/w dr. Blanco    #HTN  - Toprol     vte ppx- PAS    will follow  d/w rehab team   time spent in e/m visit- 50 min

## 2024-04-18 NOTE — PROGRESS NOTE ADULT - ASSESSMENT
39yo Male patient  PMHx HTN, anemia of chronic disease, ESRD on HD (M/W/F via right AV fistula), left hip arthroplasty in 8/23, history of right leg fracture presented to [facility] from [facility] on 3/6 after falling during a wheelchair transfer landing on his right knee. He is wheelchair bound since his left hip surgery. Found to have worsening of chronic anemia on AM labs today. 39yo Male patient  PMHx HTN, anemia of chronic disease, ESRD on HD (M/W/F via right AV fistula), left hip arthroplasty in 8/23, history of right leg fracture presented to [facility] from [facility] on 3/6 after falling during a wheelchair transfer landing on his right knee. He is wheelchair bound since his left hip surgery. Found to have acute worsening of chronic anemia on AM labs today.

## 2024-04-18 NOTE — PROGRESS NOTE ADULT - SUBJECTIVE AND OBJECTIVE BOX
HPI:  39yo Male PMHx HTN, anemia of chronic disease, ESRD on HD (M/W/F via right AV fistula), left hip arthroplasty in 8/23, history of right leg fracture presented to [facility] from [facility] on 3/6 after falling during a wheelchair transfer landing on his right knee. He is wheelchair bound since his left hip surgery. CT hip with acute garden type 1 minimally valgus impacted transcervical right femoral neck fracture and subacute right pubic body and inferior rami fractures. He is s/p right hip hemiarthroplasty. Post op course complicated by anemia s/p transfusion. Hospital course complicated by hypoglycemia after being shifted for hyperkalemia. Further complicated by febrile 101.6F found to be covid positive and completed remdisivir. He was started on eliquis 2.5mg BID for elevated d-dimers and negative LE duplex.     Patient was evaluated by PM&R and therapy for functional deficits, gait/ADL impairments and acute rehabilitation was recommended. Patient was medically optimized for discharge to [facility] IRU on 3/27 (27 Mar 2024 15:19)      ROS/subjective:  Patient seen and evaluatedin PT this AM  Feel weak , no dizziness  stool Guiac +- For EGD/ Colonoscopy in AM  For extra run of HD before  to receive additional 2 Units PRBCs today  Right knee and left hip pain controlled  Awaiting WC delivery  no dizziness, no chest pain, no nausea, no vomiting, no SOB, no headaches  ASA Dcd  black stool in Toilet - not melanotic  Heme saw patient-picture not CW Hemolysis      MEDICATIONS  (STANDING):  acetaminophen     Tablet .. 975 milliGRAM(s) Oral every 8 hours  AQUAPHOR (petrolatum Ointment) 1 Application(s) Topical two times a day  epoetin carito-epbx (RETACRIT) Injectable 79611 Unit(s) IV Push <User Schedule>  lidocaine   4% Patch 1 Patch Transdermal <User Schedule>  metoprolol tartrate 25 milliGRAM(s) Oral two times a day  Nephro-pat 1 Tablet(s) Oral daily  pantoprazole    Tablet 40 milliGRAM(s) Oral two times a day  polyethylene glycol/electrolyte Solution 1000 milliLiter(s) Oral once  polyethylene glycol/electrolyte Solution 1000 milliLiter(s) Oral once  senna 2 Tablet(s) Oral at bedtime  sevelamer carbonate 1600 milliGRAM(s) Oral three times a day with meals    MEDICATIONS  (PRN):  aluminum hydroxide/magnesium hydroxide/simethicone Suspension 30 milliLiter(s) Oral every 4 hours PRN Dyspepsia  melatonin 6 milliGRAM(s) Oral at bedtime PRN Insomnia  polyethylene glycol 3350 17 Gram(s) Oral daily PRN Constipation                            6.1    4.37  )-----------( 174      ( 18 Apr 2024 06:25 )             18.7     04-17    141  |  102  |  104<H>  ----------------------------<  87  5.2   |  27  |  8.87<H>    Ca    8.7      17 Apr 2024 09:10  Phos  5.6     04-17    TPro  6.8  /  Alb  x   /  TBili  x   /  DBili  x   /  AST  x   /  ALT  x   /  AlkPhos  x   04-17    Urinalysis Basic - ( 17 Apr 2024 09:10 )    Color: x / Appearance: x / SG: x / pH: x  Gluc: 87 mg/dL / Ketone: x  / Bili: x / Urobili: x   Blood: x / Protein: x / Nitrite: x   Leuk Esterase: x / RBC: x / WBC x   Sq Epi: x / Non Sq Epi: x / Bacteria: x        CAPILLARY BLOOD GLUCOSE          Vital Signs Last 24 Hrs  T(C): 37 (18 Apr 2024 07:16), Max: 37 (18 Apr 2024 06:20)  T(F): 98.6 (18 Apr 2024 07:16), Max: 98.6 (18 Apr 2024 06:20)  HR: 76 (18 Apr 2024 07:16) (72 - 91)  BP: 117/70 (18 Apr 2024 07:16) (117/70 - 142/77)  BP(mean): --  RR: 14 (18 Apr 2024 07:16) (14 - 16)  SpO2: 99% (18 Apr 2024 07:16) (99% - 100%)    Parameters below as of 18 Apr 2024 07:16  Patient On (Oxygen Delivery Method): room air      Gen - NAD, Comfortable  HEENT - NCAT, EOMI  Neck - Supple, No limited ROM  Pulm - CTAB, No wheeze, No rhonchi, No crackles  Cardiovascular - RRR, S1S2, No murmurs  Chest - good chest expansion, good respiratory effort  Abdomen - Soft, NT/ND, +BS. BM 3/26  Extremities - Right FA AV fistula + bruit and thrill. GIDEON old AV fistula not active, Right Knee with degenerative chnages  Neuro-     Cognitive - awake, alert, oriented to person, place, date, year, and situation.  Able  to follow command     Communication - Fluent, Comprehensible, No dysarthria, No aphasia        Memory:  Recent/ remote memory intact     Cranial Nerves - CN 2-12 intact. No facial asymmetry, Tongue midline, EOMI, Shoulder shrug intact     Motor -                    LEFT    UE - ShAB 5/5, EF 5/5, EE 5/5,  5/5                    RIGHT UE - ShAB 5/5, EF 5/5, EE 5/5,   5/5                    LEFT    LE - HF 4/5, KE 3+/5, DF 4/5, PF 4/5                    RIGHT LE - HF 3+/5, KE 3/5, DF 4/5, PF 4/5   moves Right knee better     Sensory - Intact bilaterally      Tone - normal  MSK: b/l hip discomfort with movement OA changes Right Knee- minimal pain  Psychiatric - Mood stable, Affect WNL  Skin: right hip surgical site with steri strips CDI. b/l sacrum/coccyx DTI .    IDT Camilla 4/15  tentative Dc  4/16 to home- 24x7 supervision- ? Brother/ Cousin to Provide- awaiting WC- Use platform walker      Continue comprehensive acute rehab program consisting of 3hrs/day of OT/PT.

## 2024-04-18 NOTE — PROVIDER CONTACT NOTE (CRITICAL VALUE NOTIFICATION) - SITUATION
Pt with HGB: 5.9 & HCT: 18.5
labs drawn predialysis, before blood transfusion initiated
Pt Hemoglobin pre dialysis 6.5

## 2024-04-19 ENCOUNTER — RESULT REVIEW (OUTPATIENT)
Age: 39
End: 2024-04-19

## 2024-04-19 ENCOUNTER — TRANSCRIPTION ENCOUNTER (OUTPATIENT)
Age: 39
End: 2024-04-19

## 2024-04-19 LAB
ANION GAP SERPL CALC-SCNC: 12 MMOL/L — SIGNIFICANT CHANGE UP (ref 5–17)
BUN SERPL-MCNC: 60 MG/DL — HIGH (ref 7–23)
CALCIUM SERPL-MCNC: 9.1 MG/DL — SIGNIFICANT CHANGE UP (ref 8.4–10.5)
CHLORIDE SERPL-SCNC: 99 MMOL/L — SIGNIFICANT CHANGE UP (ref 96–108)
CO2 SERPL-SCNC: 29 MMOL/L — SIGNIFICANT CHANGE UP (ref 22–31)
CREAT SERPL-MCNC: 5.71 MG/DL — HIGH (ref 0.5–1.3)
EGFR: 12 ML/MIN/1.73M2 — LOW
GLUCOSE SERPL-MCNC: 67 MG/DL — LOW (ref 70–99)
HCT VFR BLD CALC: 23.1 % — LOW (ref 39–50)
HGB BLD-MCNC: 7.6 G/DL — LOW (ref 13–17)
MCHC RBC-ENTMCNC: 30.9 PG — SIGNIFICANT CHANGE UP (ref 27–34)
MCHC RBC-ENTMCNC: 32.9 GM/DL — SIGNIFICANT CHANGE UP (ref 32–36)
MCV RBC AUTO: 93.9 FL — SIGNIFICANT CHANGE UP (ref 80–100)
NRBC # BLD: 0 /100 WBCS — SIGNIFICANT CHANGE UP (ref 0–0)
PLATELET # BLD AUTO: 189 K/UL — SIGNIFICANT CHANGE UP (ref 150–400)
POTASSIUM SERPL-MCNC: 4.3 MMOL/L — SIGNIFICANT CHANGE UP (ref 3.5–5.3)
POTASSIUM SERPL-SCNC: 4.3 MMOL/L — SIGNIFICANT CHANGE UP (ref 3.5–5.3)
RBC # BLD: 2.46 M/UL — LOW (ref 4.2–5.8)
RBC # FLD: 16.8 % — HIGH (ref 10.3–14.5)
SODIUM SERPL-SCNC: 140 MMOL/L — SIGNIFICANT CHANGE UP (ref 135–145)
WBC # BLD: 4.12 K/UL — SIGNIFICANT CHANGE UP (ref 3.8–10.5)
WBC # FLD AUTO: 4.12 K/UL — SIGNIFICANT CHANGE UP (ref 3.8–10.5)

## 2024-04-19 PROCEDURE — 88305 TISSUE EXAM BY PATHOLOGIST: CPT | Mod: 26

## 2024-04-19 PROCEDURE — 99232 SBSQ HOSP IP/OBS MODERATE 35: CPT | Mod: GC

## 2024-04-19 PROCEDURE — 99232 SBSQ HOSP IP/OBS MODERATE 35: CPT

## 2024-04-19 PROCEDURE — 88312 SPECIAL STAINS GROUP 1: CPT | Mod: 26

## 2024-04-19 RX ADMIN — Medication 975 MILLIGRAM(S): at 22:17

## 2024-04-19 RX ADMIN — Medication 25 MILLIGRAM(S): at 05:20

## 2024-04-19 RX ADMIN — PANTOPRAZOLE SODIUM 40 MILLIGRAM(S): 20 TABLET, DELAYED RELEASE ORAL at 05:21

## 2024-04-19 RX ADMIN — Medication 1 ENEMA: at 10:32

## 2024-04-19 RX ADMIN — Medication 25 MILLIGRAM(S): at 18:14

## 2024-04-19 RX ADMIN — PANTOPRAZOLE SODIUM 40 MILLIGRAM(S): 20 TABLET, DELAYED RELEASE ORAL at 18:14

## 2024-04-19 RX ADMIN — SEVELAMER CARBONATE 1600 MILLIGRAM(S): 2400 POWDER, FOR SUSPENSION ORAL at 18:14

## 2024-04-19 RX ADMIN — Medication 1 APPLICATION(S): at 06:41

## 2024-04-19 NOTE — PROGRESS NOTE ADULT - SUBJECTIVE AND OBJECTIVE BOX
Patient is a 38y old  Male who presents with a chief complaint of Right Hip fracture s/p right hip hemiarthroplasty (19 Apr 2024 10:29)      Patient seen and examined at bedside.    ALLERGIES:  No Known Drug Allergies  shellfish (Hives)    MEDICATIONS  (STANDING):  acetaminophen     Tablet .. 975 milliGRAM(s) Oral every 8 hours  AQUAPHOR (petrolatum Ointment) 1 Application(s) Topical two times a day  epoetin carito-epbx (RETACRIT) Injectable 54759 Unit(s) IV Push <User Schedule>  lidocaine   4% Patch 1 Patch Transdermal <User Schedule>  metoprolol tartrate 25 milliGRAM(s) Oral two times a day  Nephro-pat 1 Tablet(s) Oral daily  pantoprazole    Tablet 40 milliGRAM(s) Oral two times a day  senna 2 Tablet(s) Oral at bedtime  sevelamer carbonate 1600 milliGRAM(s) Oral three times a day with meals    MEDICATIONS  (PRN):  aluminum hydroxide/magnesium hydroxide/simethicone Suspension 30 milliLiter(s) Oral every 4 hours PRN Dyspepsia  melatonin 6 milliGRAM(s) Oral at bedtime PRN Insomnia  polyethylene glycol 3350 17 Gram(s) Oral daily PRN Constipation    Vital Signs Last 24 Hrs  T(F): 98.2 (19 Apr 2024 08:20), Max: 99.1 (18 Apr 2024 20:46)  HR: 74 (19 Apr 2024 08:20) (69 - 79)  BP: 131/65 (19 Apr 2024 08:20) (118/75 - 149/76)  RR: 17 (19 Apr 2024 08:20) (16 - 17)  SpO2: 99% (19 Apr 2024 08:20) (97% - 100%)  I&O's Summary    18 Apr 2024 07:01  -  19 Apr 2024 07:00  --------------------------------------------------------  IN: 0 mL / OUT: 1500 mL / NET: -1500 mL    GENERAL- NAD  EAR/NOSE/MOUTH/THROAT -  MMM  EYES- TAB, conjunctiva and Sclera clear  NECK- supple  RESPIRATORY-  clear to auscultation bilaterally, non laboured breathing  CARDIOVASCULAR - SIS2, RRR  GI - soft NT BS present  EXTREMITIES-b/l LE edema  NEUROLOGY- b/l LE weakness  PSYCHIATRY- AAO X 3    LABS:                        7.6    4.12  )-----------( 189      ( 19 Apr 2024 05:39 )             23.1       04-19    140  |  99  |  60  ----------------------------<  67  4.3   |  29  |  5.71    Ca    9.1      19 Apr 2024 05:39  Phos  5.6     04-17    TPro  6.8  /  Alb  3.6  /  TBili  x   /  DBili  x   /  AST  x   /  ALT  x   /  AlkPhos  x   04-17     Urinalysis Basic - ( 19 Apr 2024 05:39 )    Color: x / Appearance: x / SG: x / pH: x  Gluc: 67 mg/dL / Ketone: x  / Bili: x / Urobili: x   Blood: x / Protein: x / Nitrite: x   Leuk Esterase: x / RBC: x / WBC x   Sq Epi: x / Non Sq Epi: x / Bacteria: x    COVID-19 PCR: NotDetec (04-11-24 @ 10:30)  COVID-19 PCR: NotDetec (03-27-24 @ 06:47)  COVID-19 PCR: NotDetec (03-26-24 @ 06:57)    COVID-19 PCR: NotDetec (04-11-24 @ 10:30)  COVID-19 PCR: NotDetec (03-27-24 @ 06:47)  COVID-19 PCR: NotDetec (03-26-24 @ 06:57)  COVID-19 PCR: Detected (03-19-24 @ 16:53)  COVID-19 PCR: NotDetec (09-11-23 @ 11:38)  COVID-19 PCR: NotDetec (09-06-23 @ 13:30)  COVID-19 PCR: NotDetec (08-25-23 @ 08:15)  COVID-19 PCR: NotDetec (07-23-23 @ 19:22)  COVID-19 PCR: NotDetec (10-06-22 @ 15:55)

## 2024-04-19 NOTE — PROGRESS NOTE ADULT - ASSESSMENT
37yo Male PMHx HTN, anemia of chronic disease, ESRD on HD (M/W/F via right AV fistula), left hip arthroplasty in 8/23, history of right leg fracture presented to [facility] from [facility] on 3/6 after falling during a wheelchair transfer landing on his right knee. He is wheelchair bound since his left hip surgery. CT hip with acute garden type 1 minimally valgus impacted transcervical right femoral neck fracture and subacute right pubic body and inferior rami fractures. He is s/p right hip hemiarthroplasty. Post op course complicated by anemia s/p transfusion. Hospital course complicated by febrile 101.6F found to be covid positive and completed remdisivir. He was started on eliquis 2.5mg BID for elevated d-dimers and negative LE duplex.     Patient was evaluated by PM&R and therapy for functional deficits, gait/ADL impairments and acute rehabilitation was recommended. Patient was medically optimized for discharge to [facility] IRU on 3/27/24.  Hematology consulted for anemia.

## 2024-04-19 NOTE — PROGRESS NOTE ADULT - SUBJECTIVE AND OBJECTIVE BOX
BRIDGET [place]   38y   Male    Admitting: CRIS Dumas  HPI:    37yo Male PMHx HTN, anemia of chronic disease, ESRD on HD (M/W/F via right AV fistula), left hip arthroplasty in 8/23, history of right leg fracture presented to [facility] from [facility] on 3/6 after falling during a wheelchair transfer landing on his right knee. He is wheelchair bound since his left hip surgery. CT hip with acute garden type 1 minimally valgus impacted transcervical right femoral neck fracture and subacute right pubic body and inferior rami fractures. He is s/p right hip hemiarthroplasty. Post op course complicated by anemia s/p transfusion. Hospital course complicated by febrile 101.6F found to be covid positive and completed remdisivir. He was started on eliquis 2.5mg BID for elevated d-dimers and negative LE duplex.     Patient was evaluated by PM&R and therapy for functional deficits, gait/ADL impairments and acute rehabilitation was recommended. Patient was medically optimized for discharge to [facility] IRU on 3/27/24.  Hematology consulted for anemia.    PAST MEDICAL & SURGICAL HISTORY:  HTN (hypertension)      Stroke  age 10      Sleep apnea      Leg fracture, right      Chronic renal failure      Hemodialysis access, AV graft      ESRD (end stage renal disease) on dialysis  since 2013, M-W-F      Anemia, unspecified type      Other hyperparathyroidism      AV fistula  R arm; L arm clotted      History of left hip hemiarthroplasty      S/P parathyroidectomy        HEALTH ISSUES - PROBLEM Dx:  Anemia      MEDICATIONS  (STANDING):  acetaminophen     Tablet .. 975 milliGRAM(s) Oral every 8 hours  AQUAPHOR (petrolatum Ointment) 1 Application(s) Topical two times a day  epoetin carito-epbx (RETACRIT) Injectable 90597 Unit(s) IV Push <User Schedule>  lidocaine   4% Patch 1 Patch Transdermal <User Schedule>  metoprolol tartrate 25 milliGRAM(s) Oral two times a day  Nephro-pat 1 Tablet(s) Oral daily  pantoprazole    Tablet 40 milliGRAM(s) Oral two times a day  senna 2 Tablet(s) Oral at bedtime  sevelamer carbonate 1600 milliGRAM(s) Oral three times a day with meals    MEDICATIONS  (PRN):  aluminum hydroxide/magnesium hydroxide/simethicone Suspension 30 milliLiter(s) Oral every 4 hours PRN Dyspepsia  melatonin 6 milliGRAM(s) Oral at bedtime PRN Insomnia  polyethylene glycol 3350 17 Gram(s) Oral daily PRN Constipation    Allergies    No Known Drug Allergies  shellfish (Hives)    INTERVAL HPI/OVERNIGHT EVENTS:  Patient S&E at bedside. No c/o CP, SOB. Reports dark stools.     VITAL SIGNS:  T(F): 98.8 (04-19-24 @ 05:15)  HR: 78 (04-19-24 @ 05:15)  BP: 142/83 (04-19-24 @ 05:15)  RR: 16 (04-19-24 @ 05:15)  SpO2: 97% (04-19-24 @ 05:15)      PHYSICAL EXAM:  Constitutional: NAD  Eyes: sclera non-icteric  Neck: no JVD  Respiratory: CTA ant.; no wheezes  Cardiovascular: RRR, no M/R/G  Gastrointestinal: soft, NTND  Extremities: no calf tenderness elicited  Neurological: Awake, alert.    Labs:             7.6    4.12  )-----------( 189      ( 04-19 @ 05:39 )             23.1                6.0    4.01  )-----------( 196      ( 04-18 @ 15:30 )             18.9                6.1    4.37  )-----------( 174      ( 04-18 @ 06:25 )             18.7                5.9    4.26  )-----------( 184      ( 04-17 @ 09:10 )             18.5                7.6    3.79  )-----------( 224      ( 04-16 @ 08:55 )             24.2       04-19    140  |  99  |  60<H>  ----------------------------<  67<L>  4.3   |  29  |  5.71<H>    Ca    9.1      19 Apr 2024 05:39  Phos  5.6     04-17    TPro  6.8  /  Alb  3.6  /  TBili  x   /  DBili  x   /  AST  x   /  ALT  x   /  AlkPhos  x   04-17      Folate, Serum: 11.4 ng/mL (04-18-24 @ 06:25)  Ferritin: 521 ng/mL (04-17-24 @ 12:30)  Iron Total: 63 ug/dL (04-17-24 @ 12:30)  Iron - Total Binding Capacity.: 147 ug/dL (04-17-24 @ 12:30)  % Saturation, Iron: 43 % (04-17-24 @ 12:30)  Vitamin B12, Serum: 781 pg/mL (04-17-24 @ 12:30)  Haptoglobin, Serum: 255 mg/dL (04-17-24 @ 12:30)    Occult Blood, Feces: Positive (04-17-24 @ 17:15)    Consultant notes reviewed : YES [x ] ; NO [ ]

## 2024-04-19 NOTE — PROGRESS NOTE ADULT - SUBJECTIVE AND OBJECTIVE BOX
HPI:  37yo Male PMHx HTN, anemia of chronic disease, ESRD on HD (M/W/F via right AV fistula), left hip arthroplasty in 8/23, history of right leg fracture presented to [facility] from [facility] on 3/6 after falling during a wheelchair transfer landing on his right knee. He is wheelchair bound since his left hip surgery. CT hip with acute garden type 1 minimally valgus impacted transcervical right femoral neck fracture and subacute right pubic body and inferior rami fractures. He is s/p right hip hemiarthroplasty. Post op course complicated by anemia s/p transfusion. Hospital course complicated by hypoglycemia after being shifted for hyperkalemia. Further complicated by febrile 101.6F found to be covid positive and completed remdisivir. He was started on eliquis 2.5mg BID for elevated d-dimers and negative LE duplex.     Patient was evaluated by PM&R and therapy for functional deficits, gait/ADL impairments and acute rehabilitation was recommended. Patient was medically optimized for discharge to [facility] IRU on 3/27 (27 Mar 2024 15:19)      ROS/subjective:  Patient seen and evaluated in bed  Feels weak , no dizziness  stool Guiac +dark stools- For EGD/ Colonoscopy today  additional 2 Units PRBCs yesterday  Right knee and left hip pain controlled  Awaiting WC delivery  no dizziness, no chest pain, no nausea, no vomiting, no SOB, no headaches  ASA Dcd  Heme saw patient-picture not CW Hemolysis      MEDICATIONS  (STANDING):  acetaminophen     Tablet .. 975 milliGRAM(s) Oral every 8 hours  AQUAPHOR (petrolatum Ointment) 1 Application(s) Topical two times a day  epoetin carito-epbx (RETACRIT) Injectable 45853 Unit(s) IV Push <User Schedule>  lidocaine   4% Patch 1 Patch Transdermal <User Schedule>  metoprolol tartrate 25 milliGRAM(s) Oral two times a day  Nephro-pat 1 Tablet(s) Oral daily  pantoprazole    Tablet 40 milliGRAM(s) Oral two times a day  saline laxative (FLEET) Rectal Enema 1 Enema Rectal once  senna 2 Tablet(s) Oral at bedtime  sevelamer carbonate 1600 milliGRAM(s) Oral three times a day with meals    MEDICATIONS  (PRN):  aluminum hydroxide/magnesium hydroxide/simethicone Suspension 30 milliLiter(s) Oral every 4 hours PRN Dyspepsia  melatonin 6 milliGRAM(s) Oral at bedtime PRN Insomnia  polyethylene glycol 3350 17 Gram(s) Oral daily PRN Constipation                            7.6    4.12  )-----------( 189      ( 19 Apr 2024 05:39 )             23.1     04-19    140  |  99  |  60<H>  ----------------------------<  67<L>  4.3   |  29  |  5.71<H>    Ca    9.1      19 Apr 2024 05:39    TPro  6.8  /  Alb  3.6  /  TBili  x   /  DBili  x   /  AST  x   /  ALT  x   /  AlkPhos  x   04-17    Urinalysis Basic - ( 19 Apr 2024 05:39 )    Color: x / Appearance: x / SG: x / pH: x  Gluc: 67 mg/dL / Ketone: x  / Bili: x / Urobili: x   Blood: x / Protein: x / Nitrite: x   Leuk Esterase: x / RBC: x / WBC x   Sq Epi: x / Non Sq Epi: x / Bacteria: x      Vital Signs Last 24 Hrs  T(C): 36.8 (19 Apr 2024 08:20), Max: 37.3 (18 Apr 2024 20:46)  T(F): 98.2 (19 Apr 2024 08:20), Max: 99.1 (18 Apr 2024 20:46)  HR: 74 (19 Apr 2024 08:20) (69 - 79)  BP: 131/65 (19 Apr 2024 08:20) (118/75 - 149/76)  BP(mean): --  RR: 17 (19 Apr 2024 08:20) (16 - 17)  SpO2: 99% (19 Apr 2024 08:20) (97% - 100%)    Parameters below as of 19 Apr 2024 08:20  Patient On (Oxygen Delivery Method): room air      Gen - NAD, Comfortable  HEENT - NCAT, EOMI  Neck - Supple, No limited ROM  Pulm - CTAB, No wheeze, No rhonchi, No crackles  Cardiovascular - RRR, S1S2, No murmurs  Chest - good chest expansion, good respiratory effort  Abdomen - Soft, NT/ND, +BS. BM 3/26  Extremities - Right FA AV fistula + bruit and thrill. GIDEON old AV fistula not active, Right Knee with degenerative chnages  Neuro-     Cognitive - awake, alert, oriented to person, place, date, year, and situation.  Able  to follow command     Communication - Fluent, Comprehensible, No dysarthria, No aphasia        Memory:  Recent/ remote memory intact     Cranial Nerves - CN 2-12 intact. No facial asymmetry, Tongue midline, EOMI, Shoulder shrug intact     Motor -                    LEFT    UE - ShAB 5/5, EF 5/5, EE 5/5,  5/5                    RIGHT UE - ShAB 5/5, EF 5/5, EE 5/5,   5/5                    LEFT    LE - HF 4/5, KE 3+/5, DF 4/5, PF 4/5                    RIGHT LE - HF 3+/5, KE 3/5, DF 4/5, PF 4/5   moves Right knee better     Sensory - Intact bilaterally      Tone - normal  MSK: b/l hip discomfort with movement OA changes Right Knee- minimal pain  Psychiatric - Mood stable, Affect WNL  Skin: right hip surgical site with steri strips CDI. b/l sacrum/coccyx DTI .    IDT [place] 4/15  tentative Dc  4/16 to home- 24x7 supervision- ? Brother/ Cousin to Provide- awaiting WC- Use platform walker        Continue comprehensive acute rehab program consisting of 3hrs/day of OT/PT. HPI:  37yo Male PMHx HTN, anemia of chronic disease, ESRD on HD (M/W/F via right AV fistula), left hip arthroplasty in 8/23, history of right leg fracture presented to [facility] from [facility] on 3/6 after falling during a wheelchair transfer landing on his right knee. He is wheelchair bound since his left hip surgery. CT hip with acute garden type 1 minimally valgus impacted transcervical right femoral neck fracture and subacute right pubic body and inferior rami fractures. He is s/p right hip hemiarthroplasty. Post op course complicated by anemia s/p transfusion. Hospital course complicated by hypoglycemia after being shifted for hyperkalemia. Further complicated by febrile 101.6F found to be covid positive and completed remdisivir. He was started on eliquis 2.5mg BID for elevated d-dimers and negative LE duplex.     Patient was evaluated by PM&R and therapy for functional deficits, gait/ADL impairments and acute rehabilitation was recommended. Patient was medically optimized for discharge to [facility] IRU on 3/27 (27 Mar 2024 15:19)      ROS/subjective:  Patient seen and evaluated in bed  Feels weak , no dizziness  stool Guiac +dark stools- For EGD/ Colonoscopy today  additional 2 Units PRBCs yesterday  Right knee and left hip pain controlled  Awaiting WC delivery  no dizziness, no chest pain, no nausea, no vomiting, no SOB, no headaches  ASA Dcd  Heme saw patient-picture not CW Hemolysis      MEDICATIONS  (STANDING):  acetaminophen     Tablet .. 975 milliGRAM(s) Oral every 8 hours  AQUAPHOR (petrolatum Ointment) 1 Application(s) Topical two times a day  epoetin carito-epbx (RETACRIT) Injectable 35379 Unit(s) IV Push <User Schedule>  lidocaine   4% Patch 1 Patch Transdermal <User Schedule>  metoprolol tartrate 25 milliGRAM(s) Oral two times a day  Nephro-pat 1 Tablet(s) Oral daily  pantoprazole    Tablet 40 milliGRAM(s) Oral two times a day  saline laxative (FLEET) Rectal Enema 1 Enema Rectal once  senna 2 Tablet(s) Oral at bedtime  sevelamer carbonate 1600 milliGRAM(s) Oral three times a day with meals    MEDICATIONS  (PRN):  aluminum hydroxide/magnesium hydroxide/simethicone Suspension 30 milliLiter(s) Oral every 4 hours PRN Dyspepsia  melatonin 6 milliGRAM(s) Oral at bedtime PRN Insomnia  polyethylene glycol 3350 17 Gram(s) Oral daily PRN Constipation                            7.6    4.12  )-----------( 189      ( 19 Apr 2024 05:39 )             23.1     04-19    140  |  99  |  60<H>  ----------------------------<  67<L>  4.3   |  29  |  5.71<H>    Ca    9.1      19 Apr 2024 05:39    TPro  6.8  /  Alb  3.6  /  TBili  x   /  DBili  x   /  AST  x   /  ALT  x   /  AlkPhos  x   04-17    Urinalysis Basic - ( 19 Apr 2024 05:39 )    Color: x / Appearance: x / SG: x / pH: x  Gluc: 67 mg/dL / Ketone: x  / Bili: x / Urobili: x   Blood: x / Protein: x / Nitrite: x   Leuk Esterase: x / RBC: x / WBC x   Sq Epi: x / Non Sq Epi: x / Bacteria: x      Vital Signs Last 24 Hrs  T(C): 36.8 (19 Apr 2024 08:20), Max: 37.3 (18 Apr 2024 20:46)  T(F): 98.2 (19 Apr 2024 08:20), Max: 99.1 (18 Apr 2024 20:46)  HR: 74 (19 Apr 2024 08:20) (69 - 79)  BP: 131/65 (19 Apr 2024 08:20) (118/75 - 149/76)  BP(mean): --  RR: 17 (19 Apr 2024 08:20) (16 - 17)  SpO2: 99% (19 Apr 2024 08:20) (97% - 100%)    Parameters below as of 19 Apr 2024 08:20  Patient On (Oxygen Delivery Method): room air      Gen - NAD, Comfortable  HEENT - NCAT, EOMI  Neck - Supple, No limited ROM  Pulm - CTAB, No wheeze, No rhonchi, No crackles  Cardiovascular - RRR, S1S2, No murmurs  Chest - good chest expansion, good respiratory effort  Abdomen - Soft, NT/ND, +BS. BM 3/26  Extremities - Right FA AV fistula + bruit and thrill. GIDEON old AV fistula not active, Right Knee with degenerative chnages  Neuro-     Cognitive - awake, alert, oriented to person, place, date, year, and situation.  Able  to follow command     Communication - Fluent, Comprehensible, No dysarthria, No aphasia        Memory:  Recent/ remote memory intact     Cranial Nerves - CN 2-12 intact. No facial asymmetry, Tongue midline, EOMI, Shoulder shrug intact     Motor -                    LEFT    UE - ShAB 5/5, EF 5/5, EE 5/5,  5/5                    RIGHT UE - ShAB 5/5, EF 5/5, EE 5/5,   5/5                    LEFT    LE - HF 4/5, KE 3+/5, DF 4/5, PF 4/5                    RIGHT LE - HF 3+/5, KE 3/5, DF 4/5, PF 4/5   moves Right knee better     Sensory - Intact bilaterally      Tone - normal  MSK: b/l hip discomfort with movement OA changes Right Knee- minimal pain  Psychiatric - Mood stable, Affect WNL  Skin: right hip surgical site with steri strips CDI. b/l sacrum/coccyx DTI improving    IDT [place] 4/15  tentative Dc  4/16 to home- 24x7 supervision- ? Brother/ Cousin to Provide- awaiting WC- Use platform walker        Continue comprehensive acute rehab program consisting of 3hrs/day of OT/PT.

## 2024-04-19 NOTE — PROGRESS NOTE ADULT - PROBLEM SELECTOR PLAN 1
H/H remains low despite transfusion given, discussed with aesthesia, ok to proceed with EGD/COL  Dark stool reported by staff  Keep active T&S  Monitor CBC  Transfuse for Hb <7  Check iron study   Continue to monitor Bowel movement for melena or hematochezia.   NPO   EGD and colonoscopy today  Fleet enema x 1 ordered

## 2024-04-19 NOTE — PROGRESS NOTE ADULT - ASSESSMENT
38y M with HTN, anemia of chronic disease, ESRD on HD (M/W/F via right AV fistula), left hip arthroplasty in 8/23, history of right leg fracture presented to [facility] on 3/6 after a fall during a wheelchair transfer. He was found to have a right femoral neck fracture and subacute pubic body and inferior rami fractures. He is s/p right hip hemiarthroplasty. Patient now admitted for a multidisciplinary rehab program- pt/ot/dvt ppx    #Acute blood loss anemia likely due to GI bleed  - S/p two units PRBC yesterday  - Will monitor Hg/hct, transfuse if Hg <7, CBC in AM  - EGD/colonoscopy today  - NPO for procedure  - C/w protonix BID  - Will f/u with GI team  - Appreciate hematology consult team  - Holding ASA/AC for now    #Right Femoral Neck fracture   - s/p right hip hemiarthroplasty on 3/7  - precautions: WBAT RLE, fall precautions  - pain management per rehab     #ESRD  #hyperkalemia  - HDS via right AV fistula on m/w/f.  ( Last HD 3/29). Old HDS left UA  - HD per Dr Blanco  - sevelamer   - Retacrit m/w/f    #HTN  - Metoprolol 25mg BID    vte ppx- PAS

## 2024-04-19 NOTE — PROGRESS NOTE ADULT - NS ATTEND AMEND GEN_ALL_CORE FT
Rehab Attending- Patient seen and examined by me - Case discussed, above note reviewed by me with modifications made    patient seen and evaluated bedside  feels weak- NPO- awaiting EGD/ Colonoscopy  Hgb Better after transfusion- 7.6  noted LE Swelling- Needs TEDS/ Compressive garments in Bed- Off DVT Proph  to check CBC in AM  Continue intensive rehab program- so work on Monday- Confirm DC plans to home    Vital Signs Last 24 Hrs  T(C): 36.8 (19 Apr 2024 08:20), Max: 37.3 (18 Apr 2024 20:46)  T(F): 98.2 (19 Apr 2024 08:20), Max: 99.1 (18 Apr 2024 20:46)  HR: 74 (19 Apr 2024 08:20) (69 - 79)  BP: 131/65 (19 Apr 2024 08:20) (118/75 - 149/76)  BP(mean): --  RR: 17 (19 Apr 2024 08:20) (16 - 17)  SpO2: 99% (19 Apr 2024 08:20) (97% - 100%)    Parameters below as of 19 Apr 2024 08:20  Patient On (Oxygen Delivery Method): room air      Gen - NAD, Comfortable  HEENT - NCAT, EOMI  Neck - Supple, No limited ROM  Pulm - CTAB, No wheeze, No rhonchi, No crackles  Cardiovascular - RRR, S1S2, No murmurs  Chest - good chest expansion, good respiratory effort  Abdomen - Soft, NT/ND, +BS. BM 3/26  Extremities - Right FA AV fistula + bruit and thrill. GIDEON old AV fistula not active, Right Knee with degenerative chnages  Neuro-     Cognitive - awake, alert, oriented to person, place, date, year, and situation.  Able  to follow command     Communication - Fluent, Comprehensible, No dysarthria, No aphasia        Memory:  Recent/ remote memory intact     Cranial Nerves - CN 2-12 intact. No facial asymmetry, Tongue midline, EOMI, Shoulder shrug intact     Motor -                    LEFT    UE - ShAB 5/5, EF 5/5, EE 5/5,  5/5                    RIGHT UE - ShAB 5/5, EF 5/5, EE 5/5,   5/5                    LEFT    LE - HF 4/5, KE 3+/5, DF 4/5, PF 4/5                    RIGHT LE - HF 3+/5, KE 3/5, DF 4/5, PF 4/5   moves Right knee better     Sensory - Intact bilaterally      Tone - normal  MSK: b/l hip discomfort with movement OA changes Right Knee- minimal pain  Psychiatric - Mood stable, Affect WNL  Skin: right hip surgical site with steri strips CDI. b/l sacrum/coccyx DTI improving

## 2024-04-19 NOTE — PROGRESS NOTE ADULT - ASSESSMENT
37yo Male patient  PMHx HTN, anemia of chronic disease, ESRD on HD (M/W/F via right AV fistula), left hip arthroplasty in 8/23, history of right leg fracture presented to [facility] from [facility] on 3/6 after falling during a wheelchair transfer landing on his right knee. He is wheelchair bound since his left hip surgery. Found to have acute worsening of chronic anemia on AM labs today.

## 2024-04-19 NOTE — PROGRESS NOTE ADULT - PROBLEM SELECTOR PLAN 1
Patient with h/o chronic anemia associated with chronic disease/ESRD, for which he receives epogen with HD per nephrology. Recently exacerbated by recent hip fracture/surgery. Now black stools, G+-->GI to evaluate for blood loss-plans for EGD, colonoscopy noted. SPEP with normal pattern. Transfusional support as needed-for hemoglobin <7/related symptoms.

## 2024-04-19 NOTE — PROGRESS NOTE ADULT - ASSESSMENT
BRIDGET NORTH is a 38y M with HTN, anemia of chronic disease, ESRD on HD (M/W/F via right AV fistula), left hip arthroplasty in 8/23, history of right leg fracture presented to [facility] on 3/6 after a fall during a wheelchair transfer. He was found to have a right femoral neck fracture and subacute pubic body and inferior rami fractures. He is s/p right hip hemiarthroplasty. Hospital course complicated by post-op anemia s/p transfusion, hypoglycemia, hyperkalemia, febrile 2/2 covid s/p remdisivir, and started on eliquis 2.5mg BID with negative dvts on LE duplex. Patient now admitted for a multidisciplinary rehab program. 03-27-24 @ 16:17      #Right Femoral Neck fracture   - s/p right hip hemiarthroplasty on 3/7  - pain med as below  - Dressing and staples removed on 3/22  - Comprehensive Multidisciplinary Rehab Program:  comprehensive rehab program of PT/OT   - precautions: WBAT RLE. Anterior hip.   - Right Knee pain- arthrosis/ effusion- no Fx- renal osteodystrophy- ICE/ Lidoderm/ Rehab/ ACE  - Social work to confirm DC plan- Need 24x7 assistance at home- ? Brother/ ? Cousin  - DC to home with VN services  FU with PMD/Renal as OPD  FU ortho- Dr León 1-2 weeks  FU PMR Dr Dumas 4-6 weeks  NO DC SECONDARY TO NEW ONSET WORSENING ANEMIA- EGD/[place] today for acute anemia    #ESRD  - HDS via right AV fistula on m/w/f.  ( Last HD 3/27). Old HDS left UA  - Nephro in  - sevelamer 800mg TID  - retacrit m/w/f  continue Epogen in HD    Acute Anemia  Guiac +  2 units PBRCs 4/18  EGD/Colonoscopy today per GI  Protonix increased to 2x/day    #HTN  - toprol 25mg BID  - Monitor BP    #Pain  - Tylenol PRN  - oxy 10mg moderate, 15mg severe.   Right Knee pain- lidoderm during the day/ICE/ ACE- Better- can Obtain as OPD  Left Hip pain - all studies reviewed- recent CT 3/6- no evidence of abnormality in Left prosthesis  will follow clinically-stable, ambulating- pain less    #Sleep  - Melatonin PRN     #GI / Bowel  #dyspepsia  - Protonix  - Bowel prep overnight  - maalox    # / Bladder  - does not void ESRD    #Skin / Pressure injury  - Skin assessment on admission performed : right hip surgical site with steri strips CDI. coccyx small stage 2-using barrier cream  - Monitor Incisions:    - wound care following at [facility] for b/l sacrum/coccyx DTI wth evolution.   - wound care to follow  - turn and reposition q2h      #Diet:  - Diet Consistency: Renal restriction  - Nutrition consult    #DVT prophylaxis:   - eliquis 2.5mg BID- DCd, ASA DCd  - Last LE Doppler normal on 3/14     .

## 2024-04-19 NOTE — PROGRESS NOTE ADULT - SUBJECTIVE AND OBJECTIVE BOX
INTERVAL HPI/OVERNIGHT EVENTS:  HPI:    37 y/o male seen and examined, reports watery dark stool.         MEDICATIONS  (STANDING):  acetaminophen     Tablet .. 975 milliGRAM(s) Oral every 8 hours  AQUAPHOR (petrolatum Ointment) 1 Application(s) Topical two times a day  epoetin carito-epbx (RETACRIT) Injectable 86464 Unit(s) IV Push <User Schedule>  lidocaine   4% Patch 1 Patch Transdermal <User Schedule>  metoprolol tartrate 25 milliGRAM(s) Oral two times a day  Nephro-pat 1 Tablet(s) Oral daily  pantoprazole    Tablet 40 milliGRAM(s) Oral two times a day  saline laxative (FLEET) Rectal Enema 1 Enema Rectal once  senna 2 Tablet(s) Oral at bedtime  sevelamer carbonate 1600 milliGRAM(s) Oral three times a day with meals    MEDICATIONS  (PRN):  aluminum hydroxide/magnesium hydroxide/simethicone Suspension 30 milliLiter(s) Oral every 4 hours PRN Dyspepsia  melatonin 6 milliGRAM(s) Oral at bedtime PRN Insomnia  polyethylene glycol 3350 17 Gram(s) Oral daily PRN Constipation      Allergies    No Known Drug Allergies  shellfish (Hives)    Intolerances        PAST MEDICAL & SURGICAL HISTORY:  HTN (hypertension)      Stroke  age 10      Sleep apnea      Leg fracture, right      Chronic renal failure      Hemodialysis access, AV graft      ESRD (end stage renal disease) on dialysis  since , M-W-F      Anemia, unspecified type      Other hyperparathyroidism      AV fistula  R arm; L arm clotted      History of left hip hemiarthroplasty      S/P parathyroidectomy        PHYSICAL EXAM:   Vital Signs:  Vital Signs Last 24 Hrs  T(C): 36.8 (2024 08:20), Max: 37.3 (2024 20:46)  T(F): 98.2 (2024 08:20), Max: 99.1 (2024 20:46)  HR: 74 (2024 08:20) (69 - 79)  BP: 131/65 (2024 08:20) (118/75 - 149/76)  BP(mean): --  RR: 17 (2024 08:20) (16 - 17)  SpO2: 99% (2024 08:20) (97% - 100%)    Parameters below as of 2024 08:20  Patient On (Oxygen Delivery Method): room air      Daily     Daily Weight in k.3 (2024 15:40)I&O's Summary    2024 07:01  -  2024 07:00  --------------------------------------------------------  IN: 0 mL / OUT: 1500 mL / NET: -1500 mL        GENERAL:  Appears stated age,  HEENT:  NC/AT,  conjunctivae clear and pink,  CHEST:  Full & symmetric excursion, no increased effort, breath sounds clear  HEART:  Regular rhythm, S1, S2, no murmur  ABDOMEN:  Soft, non-tender, non-distended, normoactive bowel sounds,  EXTEREMITIES:  no edema  SKIN:  No rash/warm/dry  NEURO:  Alert, oriented,       LABS:                        7.6    4.12  )-----------( 189      ( 2024 05:39 )             23.1     04-19    140  |  99  |  60<H>  ----------------------------<  67<L>  4.3   |  29  |  5.71<H>    Ca    9.1      2024 05:39    TPro  6.8  /  Alb  3.6  /  TBili  x   /  DBili  x   /  AST  x   /  ALT  x   /  AlkPhos  x   04-17      Urinalysis Basic - ( 2024 05:39 )    Color: x / Appearance: x / SG: x / pH: x  Gluc: 67 mg/dL / Ketone: x  / Bili: x / Urobili: x   Blood: x / Protein: x / Nitrite: x   Leuk Esterase: x / RBC: x / WBC x   Sq Epi: x / Non Sq Epi: x / Bacteria: x      amylase   lipase  RADIOLOGY & ADDITIONAL TESTS:

## 2024-04-20 LAB
ANION GAP SERPL CALC-SCNC: 10 MMOL/L — SIGNIFICANT CHANGE UP (ref 5–17)
BUN SERPL-MCNC: 73 MG/DL — HIGH (ref 7–23)
CALCIUM SERPL-MCNC: 8.7 MG/DL — SIGNIFICANT CHANGE UP (ref 8.4–10.5)
CHLORIDE SERPL-SCNC: 103 MMOL/L — SIGNIFICANT CHANGE UP (ref 96–108)
CO2 SERPL-SCNC: 28 MMOL/L — SIGNIFICANT CHANGE UP (ref 22–31)
CREAT SERPL-MCNC: 7.49 MG/DL — HIGH (ref 0.5–1.3)
EGFR: 9 ML/MIN/1.73M2 — LOW
GLUCOSE SERPL-MCNC: 78 MG/DL — SIGNIFICANT CHANGE UP (ref 70–99)
HCT VFR BLD CALC: 22.2 % — LOW (ref 39–50)
HGB BLD-MCNC: 7.5 G/DL — LOW (ref 13–17)
MCHC RBC-ENTMCNC: 31.9 PG — SIGNIFICANT CHANGE UP (ref 27–34)
MCHC RBC-ENTMCNC: 33.8 GM/DL — SIGNIFICANT CHANGE UP (ref 32–36)
MCV RBC AUTO: 94.5 FL — SIGNIFICANT CHANGE UP (ref 80–100)
NRBC # BLD: 0 /100 WBCS — SIGNIFICANT CHANGE UP (ref 0–0)
PLATELET # BLD AUTO: 199 K/UL — SIGNIFICANT CHANGE UP (ref 150–400)
POTASSIUM SERPL-MCNC: 4.6 MMOL/L — SIGNIFICANT CHANGE UP (ref 3.5–5.3)
POTASSIUM SERPL-SCNC: 4.6 MMOL/L — SIGNIFICANT CHANGE UP (ref 3.5–5.3)
RBC # BLD: 2.35 M/UL — LOW (ref 4.2–5.8)
RBC # FLD: 16.6 % — HIGH (ref 10.3–14.5)
SODIUM SERPL-SCNC: 141 MMOL/L — SIGNIFICANT CHANGE UP (ref 135–145)
WBC # BLD: 4.48 K/UL — SIGNIFICANT CHANGE UP (ref 3.8–10.5)
WBC # FLD AUTO: 4.48 K/UL — SIGNIFICANT CHANGE UP (ref 3.8–10.5)

## 2024-04-20 PROCEDURE — 99232 SBSQ HOSP IP/OBS MODERATE 35: CPT

## 2024-04-20 RX ADMIN — Medication 25 MILLIGRAM(S): at 05:49

## 2024-04-20 RX ADMIN — PANTOPRAZOLE SODIUM 40 MILLIGRAM(S): 20 TABLET, DELAYED RELEASE ORAL at 18:19

## 2024-04-20 RX ADMIN — SEVELAMER CARBONATE 1600 MILLIGRAM(S): 2400 POWDER, FOR SUSPENSION ORAL at 10:35

## 2024-04-20 RX ADMIN — Medication 1 APPLICATION(S): at 05:56

## 2024-04-20 RX ADMIN — Medication 1 TABLET(S): at 16:13

## 2024-04-20 RX ADMIN — SEVELAMER CARBONATE 1600 MILLIGRAM(S): 2400 POWDER, FOR SUSPENSION ORAL at 22:13

## 2024-04-20 RX ADMIN — SEVELAMER CARBONATE 1600 MILLIGRAM(S): 2400 POWDER, FOR SUSPENSION ORAL at 16:13

## 2024-04-20 RX ADMIN — PANTOPRAZOLE SODIUM 40 MILLIGRAM(S): 20 TABLET, DELAYED RELEASE ORAL at 05:49

## 2024-04-20 RX ADMIN — Medication 6 MILLIGRAM(S): at 00:36

## 2024-04-20 RX ADMIN — LIDOCAINE 1 PATCH: 4 CREAM TOPICAL at 05:52

## 2024-04-20 RX ADMIN — Medication 25 MILLIGRAM(S): at 18:19

## 2024-04-20 RX ADMIN — Medication 975 MILLIGRAM(S): at 22:14

## 2024-04-20 RX ADMIN — LIDOCAINE 1 PATCH: 4 CREAM TOPICAL at 18:31

## 2024-04-20 RX ADMIN — Medication 975 MILLIGRAM(S): at 05:50

## 2024-04-20 NOTE — PROGRESS NOTE ADULT - SUBJECTIVE AND OBJECTIVE BOX
INTERVAL HPI/OVERNIGHT EVENTS:  37yo Male patient  PMHx HTN, anemia of chronic disease, ESRD on HD (M/W/F via right AV fistula), left hip arthroplasty in 8/23, history of right leg fracture presented to [facility] from [facility] on 3/6 after falling during a wheelchair transfer landing on his right knee. He is wheelchair bound since his left hip surgery. Found to have acute worsening of chronic anemia on AM labs.     GI Progress Note: Pt was seen and examined at the bedside. Pt had colonoscopy done on 4/19/24- no bowel movements after the procedure. Remains stable. Continues on clear liquid diet. H/H- 7.5/22.2      MEDICATIONS  (STANDING):  acetaminophen     Tablet .. 975 milliGRAM(s) Oral every 8 hours  AQUAPHOR (petrolatum Ointment) 1 Application(s) Topical two times a day  epoetin carito-epbx (RETACRIT) Injectable 39326 Unit(s) IV Push <User Schedule>  lidocaine   4% Patch 1 Patch Transdermal <User Schedule>  metoprolol tartrate 25 milliGRAM(s) Oral two times a day  Nephro-pat 1 Tablet(s) Oral daily  pantoprazole    Tablet 40 milliGRAM(s) Oral two times a day  senna 2 Tablet(s) Oral at bedtime  sevelamer carbonate 1600 milliGRAM(s) Oral three times a day with meals    MEDICATIONS  (PRN):  aluminum hydroxide/magnesium hydroxide/simethicone Suspension 30 milliLiter(s) Oral every 4 hours PRN Dyspepsia  melatonin 6 milliGRAM(s) Oral at bedtime PRN Insomnia  polyethylene glycol 3350 17 Gram(s) Oral daily PRN Constipation      Allergies    No Known Drug Allergies  shellfish (Hives)    Intolerances        Review of Systems:  Negative unless indicated in HPI        Vital Signs Last 24 Hrs  T(C): 36.8 (19 Apr 2024 22:22), Max: 36.8 (19 Apr 2024 22:22)  T(F): 98.2 (19 Apr 2024 22:22), Max: 98.2 (19 Apr 2024 22:22)  HR: 76 (20 Apr 2024 05:46) (73 - 78)  BP: 148/74 (20 Apr 2024 05:46) (132/69 - 148/74)  BP(mean): --  RR: 16 (19 Apr 2024 22:22) (16 - 16)  SpO2: 99% (19 Apr 2024 22:22) (99% - 100%)    Parameters below as of 19 Apr 2024 22:22  Patient On (Oxygen Delivery Method): room air        PHYSICAL EXAM:    Constitutional: NAD, well-developed  Neck: No LAD, supple  Respiratory: clear to auscultation b/l no rales, rhonchi, wheezing  Cardiovascular: S1 and S2, RRR, no murmur  Gastrointestinal: +BS x4, soft, NT/ND, neg HSM,  Extremities: No peripheral edema, neg clubbing, cyanosis  Vascular: 2+ peripheral pulses  Neurological: A/O x 3, no focal deficits  Psychiatric: Normal mood, normal affect  Skin: No rashes      LABS:                        7.5    4.48  )-----------( 199      ( 20 Apr 2024 08:45 )             22.2     04-20    141  |  103  |  73<H>  ----------------------------<  78  4.6   |  28  |  7.49<H>    Ca    8.7      20 Apr 2024 08:55        Urinalysis Basic - ( 20 Apr 2024 08:55 )    Color: x / Appearance: x / SG: x / pH: x  Gluc: 78 mg/dL / Ketone: x  / Bili: x / Urobili: x   Blood: x / Protein: x / Nitrite: x   Leuk Esterase: x / RBC: x / WBC x   Sq Epi: x / Non Sq Epi: x / Bacteria: x      LIVER FUNCTIONS - ( 17 Apr 2024 12:30 )  Alb: 3.6 g/dL / Pro: 6.8 g/dL / ALK PHOS: x     / ALT: x     / AST: x     / GGT: x             RADIOLOGY & ADDITIONAL TESTS:  EGD-Colonoscopy  Colonoscopy Impressions:  Thick liquid stool was present throughout. The cecum was grossly normal The  Ascending colon was grossly normal The transverse colon was grossly normal The  descending colon was grossly normal The sigmoid colon was grossly normal The  rectum was grossly normal.

## 2024-04-20 NOTE — PROGRESS NOTE ADULT - PROBLEM SELECTOR PLAN 1
EGD and Colonoscopy done on 04/19/2024- ulcerations noted   Keep active T&S  Monitor CBC  Transfuse for Hb <7  Check iron study   Continue to monitor Bowel movement for melena or hematochezia.   Advance diet as tolerated s/p egd / colon 4/19 found to have duodenal ulcer, clean based  Keep active T&S  Monitor CBC  Transfuse for Hb <7  Check iron study   Continue to monitor Bowel movement for melena or hematochezia.   Advance diet as tolerated

## 2024-04-20 NOTE — PROGRESS NOTE ADULT - SUBJECTIVE AND OBJECTIVE BOX
[facility] NEPHROLOGY SERVICES, [facility]  NEPHROLOGY AND HYPERTENSION  [address]  [address]  [place] [address]  [phone]    MD ADRIA GRAMAJO MD YELENA ROSENBERG, MD BINNY KOSHY, MD CHRISTOPHER CAPUTO, MD LUISA BALDERAS MD          Patient events noted  No distress    MEDICATIONS  (STANDING):  acetaminophen     Tablet .. 975 milliGRAM(s) Oral every 8 hours  AQUAPHOR (petrolatum Ointment) 1 Application(s) Topical two times a day  epoetin carito-epbx (RETACRIT) Injectable 83727 Unit(s) IV Push <User Schedule>  lidocaine   4% Patch 1 Patch Transdermal <User Schedule>  metoprolol tartrate 25 milliGRAM(s) Oral two times a day  Nephro-pat 1 Tablet(s) Oral daily  pantoprazole    Tablet 40 milliGRAM(s) Oral two times a day  senna 2 Tablet(s) Oral at bedtime  sevelamer carbonate 1600 milliGRAM(s) Oral three times a day with meals    MEDICATIONS  (PRN):  aluminum hydroxide/magnesium hydroxide/simethicone Suspension 30 milliLiter(s) Oral every 4 hours PRN Dyspepsia  melatonin 6 milliGRAM(s) Oral at bedtime PRN Insomnia  polyethylene glycol 3350 17 Gram(s) Oral daily PRN Constipation      04-20-24 @ 07:01  -  04-20-24 @ 21:57  --------------------------------------------------------  IN: 0 mL / OUT: 1500 mL / NET: -1500 mL      PHYSICAL EXAM:      T(C): 36.7 (04-20-24 @ 15:05), Max: 37 (04-20-24 @ 08:46)  HR: 90 (04-20-24 @ 18:14) (70 - 90)  BP: 119/68 (04-20-24 @ 18:14) (110/64 - 148/74)  RR: 16 (04-20-24 @ 18:14) (16 - 16)  SpO2: 97% (04-20-24 @ 18:14) (97% - 99%)  Wt(kg): --  Lungs clear  Heart S1S2  Abd soft NT ND  Extremities:   tr edema                                    7.5    4.48  )-----------( 199      ( 20 Apr 2024 08:45 )             22.2     04-20    141  |  103  |  73<H>  ----------------------------<  78  4.6   |  28  |  7.49<H>    Ca    8.7      20 Apr 2024 08:55          Creatinine Trend: 7.49<--, 5.71<--, 5.00<--, 8.87<--, 9.90<--, 9.40<--      Assessment   ESRD, maintenance    Plan:  Hd for today  UF as tolerated   Will follow     Darrell Ortiz MD

## 2024-04-20 NOTE — PROGRESS NOTE ADULT - ASSESSMENT
A 39yo Male patient  PMHx HTN, anemia of chronic disease, ESRD on HD (M/W/F via right AV fistula), left hip arthroplasty in 8/23, history of right leg fracture presented to [facility] from [facility] on 3/6 after falling during a wheelchair transfer landing on his right knee. He is wheelchair bound since his left hip surgery. Found to have acute worsening of chronic anemia on AM labs.     Pt had colonoscopy done on 4/19/24- no bowel movements after the procedure. Remains stable. Continues on clear liquid diet. H/H- 7.5/22.2

## 2024-04-20 NOTE — PROGRESS NOTE ADULT - SUBJECTIVE AND OBJECTIVE BOX
Patient is a 38y old  Male who presents with a chief complaint of Right Hip fracture s/p right hip hemiarthroplasty     S/p EGD/colon on 4/19  Found to have a duodenal ulcer  No events overnight      Patient seen and examined at bedside.    ALLERGIES:  No Known Drug Allergies  shellfish (Hives)    MEDICATIONS  (STANDING):  acetaminophen     Tablet .. 975 milliGRAM(s) Oral every 8 hours  AQUAPHOR (petrolatum Ointment) 1 Application(s) Topical two times a day  epoetin carito-epbx (RETACRIT) Injectable 09499 Unit(s) IV Push <User Schedule>  lidocaine   4% Patch 1 Patch Transdermal <User Schedule>  metoprolol tartrate 25 milliGRAM(s) Oral two times a day  Nephro-pat 1 Tablet(s) Oral daily  pantoprazole    Tablet 40 milliGRAM(s) Oral two times a day  senna 2 Tablet(s) Oral at bedtime  sevelamer carbonate 1600 milliGRAM(s) Oral three times a day with meals    MEDICATIONS  (PRN):  aluminum hydroxide/magnesium hydroxide/simethicone Suspension 30 milliLiter(s) Oral every 4 hours PRN Dyspepsia  melatonin 6 milliGRAM(s) Oral at bedtime PRN Insomnia  polyethylene glycol 3350 17 Gram(s) Oral daily PRN Constipation    Vital Signs Last 24 Hrs  T(F): 98.2 (19 Apr 2024 22:22), Max: 98.2 (19 Apr 2024 08:20)  HR: 76 (20 Apr 2024 05:46) (73 - 78)  BP: 148/74 (20 Apr 2024 05:46) (131/65 - 148/74)  RR: 16 (19 Apr 2024 22:22) (16 - 17)  SpO2: 99% (19 Apr 2024 22:22) (99% - 100%)  I&O's Summary      PHYSICAL EXAM:  GENERAL- NAD  EAR/NOSE/MOUTH/THROAT -  MMM  EYES- TAB, conjunctiva and Sclera clear  NECK- supple  RESPIRATORY-  clear to auscultation bilaterally, non laboured breathing  CARDIOVASCULAR - SIS2, RRR  GI - soft NT BS present  EXTREMITIES-b/l LE edema  NEUROLOGY- b/l LE weakness  PSYCHIATRY- AAO X 3    LABS:                        7.6    4.12  )-----------( 189      ( 19 Apr 2024 05:39 )             23.1       04-19    140  |  99  |  60  ----------------------------<  67  4.3   |  29  |  5.71    Ca    9.1      19 Apr 2024 05:39  Phos  5.6     04-17    TPro  6.8  /  Alb  3.6  /  TBili  x   /  DBili  x   /  AST  x   /  ALT  x   /  AlkPhos  x   04-17     Urinalysis Basic - ( 19 Apr 2024 05:39 )    Color: x / Appearance: x / SG: x / pH: x  Gluc: 67 mg/dL / Ketone: x  / Bili: x / Urobili: x   Blood: x / Protein: x / Nitrite: x   Leuk Esterase: x / RBC: x / WBC x   Sq Epi: x / Non Sq Epi: x / Bacteria: x    COVID-19 PCR: NotDetec (04-11-24 @ 10:30)  COVID-19 PCR: NotDetec (03-27-24 @ 06:47)  COVID-19 PCR: NotDetec (03-26-24 @ 06:57)    COVID-19 PCR: NotDetec (04-11-24 @ 10:30)  COVID-19 PCR: NotDetec (03-27-24 @ 06:47)  COVID-19 PCR: NotDetec (03-26-24 @ 06:57)  COVID-19 PCR: Detected (03-19-24 @ 16:53)  COVID-19 PCR: NotDetec (09-11-23 @ 11:38)  COVID-19 PCR: NotDetec (09-06-23 @ 13:30)  COVID-19 PCR: NotDetec (08-25-23 @ 08:15)  COVID-19 PCR: NotDetec (07-23-23 @ 19:22)  COVID-19 PCR: NotDetec (10-06-22 @ 15:55)

## 2024-04-20 NOTE — PROGRESS NOTE ADULT - SUBJECTIVE AND OBJECTIVE BOX
Cc: Gait dysfunction    HPI: Patient seen and examined at bedside. No acute events overnight. Patient eager to eat.   Pain controlled, no chest pain, no N/V, no Fevers/Chills. No other new ROS  Has been tolerating rehabilitation program.    acetaminophen     Tablet .. 975 milliGRAM(s) Oral every 8 hours  aluminum hydroxide/magnesium hydroxide/simethicone Suspension 30 milliLiter(s) Oral every 4 hours PRN  AQUAPHOR (petrolatum Ointment) 1 Application(s) Topical two times a day  epoetin carito-epbx (RETACRIT) Injectable 48242 Unit(s) IV Push <User Schedule>  lidocaine   4% Patch 1 Patch Transdermal <User Schedule>  melatonin 6 milliGRAM(s) Oral at bedtime PRN  metoprolol tartrate 25 milliGRAM(s) Oral two times a day  Nephro-pat 1 Tablet(s) Oral daily  pantoprazole    Tablet 40 milliGRAM(s) Oral two times a day  polyethylene glycol 3350 17 Gram(s) Oral daily PRN  senna 2 Tablet(s) Oral at bedtime  sevelamer carbonate 1600 milliGRAM(s) Oral three times a day with meals      T(C): 36.8 (04-20-24 @ 12:05), Max: 37 (04-20-24 @ 08:46)  HR: 77 (04-20-24 @ 12:05) (70 - 78)  BP: 110/64 (04-20-24 @ 12:05) (110/64 - 148/74)  RR: 16 (04-20-24 @ 12:05) (16 - 16)  SpO2: 98% (04-20-24 @ 12:05) (98% - 100%)    In NAD  HEENT- EOMI  Heart- S1S2  Lungs- CTA bl.  Abd- + BS, NT  Ext- No calf pain  Neuro- Exam unchanged    CBC 4/20/24 reviewed  BMP 4/20/24 reviewed  BMP 4/19/24 reviewed      Imp: Patient with diagnosis of R Hip fx s/p R hip hemiarthroplasty  admitted for comprehensive acute rehabilitation.    Plan:  - Continue PT/OT/SLP as indicated  - DVT prophylaxis  - Skin- Turn q2h, check skin daily  - Continue current medications  -Active issues-   GI Bleed - found to have duodenal ulcer on Endo, monitor Gb, GI recs appreciated, continue protonix  ESRD - Dialysis, renal recs appreciated  - Patient is stable to continue current rehabilitation program.

## 2024-04-20 NOTE — PROGRESS NOTE ADULT - ASSESSMENT
38y M with HTN, anemia of chronic disease, ESRD on HD (M/W/F via right AV fistula), left hip arthroplasty in 8/23, history of right leg fracture presented to [facility] on 3/6 after a fall during a wheelchair transfer. He was found to have a right femoral neck fracture and subacute pubic body and inferior rami fractures. He is s/p right hip hemiarthroplasty. Patient now admitted for a multidisciplinary rehab program- pt/ot/dvt ppx    #Acute blood loss anemia likely due to GI bleed  - S/p two units PRBC 4/18/24  - Will monitor Hg/hct, transfuse if Hg <7, CBC pending  - EGD/colonoscopy 4/19; found to have duodenal ulcer  - C/w protonix BID  - Will f/u with GI team  - Appreciate hematology consult team  - Holding ASA/AC for now    #Right Femoral Neck fracture   - s/p right hip hemiarthroplasty on 3/7  - precautions: WBAT RLE, fall precautions  - pain management per rehab     #ESRD  #hyperkalemia  - HDS via right AV fistula on m/w/f.  ( Last HD 3/29). Old HDS left UA  - HD per Dr Blanco  - sevelamer   - Retacrit m/w/f    #HTN  - Metoprolol 25mg BID    vte ppx- PAS

## 2024-04-21 LAB
HCT VFR BLD CALC: 23.5 % — LOW (ref 39–50)
HGB BLD-MCNC: 7.7 G/DL — LOW (ref 13–17)
MCHC RBC-ENTMCNC: 31.4 PG — SIGNIFICANT CHANGE UP (ref 27–34)
MCHC RBC-ENTMCNC: 32.8 GM/DL — SIGNIFICANT CHANGE UP (ref 32–36)
MCV RBC AUTO: 95.9 FL — SIGNIFICANT CHANGE UP (ref 80–100)
NRBC # BLD: 0 /100 WBCS — SIGNIFICANT CHANGE UP (ref 0–0)
PLATELET # BLD AUTO: 209 K/UL — SIGNIFICANT CHANGE UP (ref 150–400)
RBC # BLD: 2.45 M/UL — LOW (ref 4.2–5.8)
RBC # FLD: 17.2 % — HIGH (ref 10.3–14.5)
WBC # BLD: 4.63 K/UL — SIGNIFICANT CHANGE UP (ref 3.8–10.5)
WBC # FLD AUTO: 4.63 K/UL — SIGNIFICANT CHANGE UP (ref 3.8–10.5)

## 2024-04-21 PROCEDURE — 99232 SBSQ HOSP IP/OBS MODERATE 35: CPT

## 2024-04-21 RX ORDER — OXYCODONE HYDROCHLORIDE 5 MG/1
5 TABLET ORAL ONCE
Refills: 0 | Status: DISCONTINUED | OUTPATIENT
Start: 2024-04-21 | End: 2024-04-21

## 2024-04-21 RX ADMIN — Medication 1 TABLET(S): at 11:21

## 2024-04-21 RX ADMIN — Medication 1 APPLICATION(S): at 06:04

## 2024-04-21 RX ADMIN — Medication 25 MILLIGRAM(S): at 18:37

## 2024-04-21 RX ADMIN — PANTOPRAZOLE SODIUM 40 MILLIGRAM(S): 20 TABLET, DELAYED RELEASE ORAL at 18:36

## 2024-04-21 RX ADMIN — LIDOCAINE 1 PATCH: 4 CREAM TOPICAL at 06:05

## 2024-04-21 RX ADMIN — LIDOCAINE 1 PATCH: 4 CREAM TOPICAL at 18:17

## 2024-04-21 RX ADMIN — OXYCODONE HYDROCHLORIDE 5 MILLIGRAM(S): 5 TABLET ORAL at 23:29

## 2024-04-21 RX ADMIN — SEVELAMER CARBONATE 1600 MILLIGRAM(S): 2400 POWDER, FOR SUSPENSION ORAL at 14:29

## 2024-04-21 RX ADMIN — PANTOPRAZOLE SODIUM 40 MILLIGRAM(S): 20 TABLET, DELAYED RELEASE ORAL at 05:58

## 2024-04-21 RX ADMIN — Medication 25 MILLIGRAM(S): at 05:58

## 2024-04-21 RX ADMIN — Medication 975 MILLIGRAM(S): at 05:59

## 2024-04-21 RX ADMIN — Medication 975 MILLIGRAM(S): at 22:53

## 2024-04-21 RX ADMIN — SEVELAMER CARBONATE 1600 MILLIGRAM(S): 2400 POWDER, FOR SUSPENSION ORAL at 18:36

## 2024-04-21 RX ADMIN — Medication 1 APPLICATION(S): at 18:37

## 2024-04-21 RX ADMIN — SEVELAMER CARBONATE 1600 MILLIGRAM(S): 2400 POWDER, FOR SUSPENSION ORAL at 10:22

## 2024-04-21 RX ADMIN — LIDOCAINE 1 PATCH: 4 CREAM TOPICAL at 09:39

## 2024-04-21 NOTE — PROGRESS NOTE ADULT - SUBJECTIVE AND OBJECTIVE BOX
INTERVAL HPI/OVERNIGHT EVENTS:    A 37yo Male patient  PMHx HTN, anemia of chronic disease, ESRD on HD (M/W/F via right AV fistula), left hip arthroplasty in 8/23, history of right leg fracture presented to [facility] from [facility] on 3/6 after falling during a wheelchair transfer landing on his right knee. He is wheelchair bound since his left hip surgery. Found to have acute worsening of chronic anemia on AM labs.     GI Progress Note: Pt was seen and examined at the bedside. Pt had colonoscopy done on 4/19/24.  Pt had one BM overnight- dark and solid as per pt. Remains stable. Pt is on renal restriction diet. H/H- 7.7/23.5 this morning.       MEDICATIONS  (STANDING):  acetaminophen     Tablet .. 975 milliGRAM(s) Oral every 8 hours  AQUAPHOR (petrolatum Ointment) 1 Application(s) Topical two times a day  epoetin carito-epbx (RETACRIT) Injectable 45985 Unit(s) IV Push <User Schedule>  lidocaine   4% Patch 1 Patch Transdermal <User Schedule>  metoprolol tartrate 25 milliGRAM(s) Oral two times a day  Nephro-pat 1 Tablet(s) Oral daily  pantoprazole    Tablet 40 milliGRAM(s) Oral two times a day  senna 2 Tablet(s) Oral at bedtime  sevelamer carbonate 1600 milliGRAM(s) Oral three times a day with meals    MEDICATIONS  (PRN):  aluminum hydroxide/magnesium hydroxide/simethicone Suspension 30 milliLiter(s) Oral every 4 hours PRN Dyspepsia  melatonin 6 milliGRAM(s) Oral at bedtime PRN Insomnia  polyethylene glycol 3350 17 Gram(s) Oral daily PRN Constipation      Allergies    No Known Drug Allergies  shellfish (Hives)    Intolerances        Review of Systems:    General:  No wt loss, fevers, chills, night sweats,fatigue,   ENT:  No sore throat, pain, runny nose, dysphagia  CV:  No pain, palpitatioins, hypo/hypertension  Resp:  No dyspnea, cough, tachypnea, wheezing  Neuro:  No weakness, tingling, memory problems  Heme:  No petechiae, ecchymosis, easy bruisability        Vital Signs Last 24 Hrs  T(C): 37.1 (21 Apr 2024 08:53), Max: 37.1 (21 Apr 2024 08:53)  T(F): 98.8 (21 Apr 2024 08:53), Max: 98.8 (21 Apr 2024 08:53)  HR: 73 (21 Apr 2024 08:53) (73 - 90)  BP: 118/70 (21 Apr 2024 08:53) (110/64 - 148/83)  BP(mean): --  RR: 16 (21 Apr 2024 08:53) (16 - 16)  SpO2: 99% (21 Apr 2024 08:53) (95% - 99%)    Parameters below as of 21 Apr 2024 08:53  Patient On (Oxygen Delivery Method): room air        PHYSICAL EXAM:    Constitutional: NAD, well-developed  Neck: No LAD, supple  Respiratory: clear to auscultation b/l no rales, rhonchi, wheezing  Cardiovascular: S1 and S2, RRR, no murmur  Gastrointestinal: +BS x4, soft, NT/ND, neg HSM,  Extremities: No peripheral edema, neg clubbing, cyanosis  Vascular: 2+ peripheral pulses  Neurological: A/O x 3, no focal deficits  Psychiatric: Normal mood, normal affect  Skin: No rashes      LABS:                        7.7    4.63  )-----------( 209      ( 21 Apr 2024 07:40 )             23.5     04-20    141  |  103  |  73<H>  ----------------------------<  78  4.6   |  28  |  7.49<H>    Ca    8.7      20 Apr 2024 08:55        Urinalysis Basic - ( 20 Apr 2024 08:55 )    Color: x / Appearance: x / SG: x / pH: x  Gluc: 78 mg/dL / Ketone: x  / Bili: x / Urobili: x   Blood: x / Protein: x / Nitrite: x   Leuk Esterase: x / RBC: x / WBC x   Sq Epi: x / Non Sq Epi: x / Bacteria: x      LIVER FUNCTIONS - ( 17 Apr 2024 12:30 )  Alb: 3.6 g/dL / Pro: 6.8 g/dL / ALK PHOS: x     / ALT: x     / AST: x     / GGT: x             RADIOLOGY & ADDITIONAL TESTS:

## 2024-04-21 NOTE — PROGRESS NOTE ADULT - SUBJECTIVE AND OBJECTIVE BOX
Cc: Gait dysfunction    HPI: Patient seen and examined at bedside. No acute events overnight.   Pain controlled, no chest pain, no N/V, no Fevers/Chills. No other new ROS  Has been tolerating rehabilitation program.    acetaminophen     Tablet .. 975 milliGRAM(s) Oral every 8 hours  aluminum hydroxide/magnesium hydroxide/simethicone Suspension 30 milliLiter(s) Oral every 4 hours PRN  AQUAPHOR (petrolatum Ointment) 1 Application(s) Topical two times a day  epoetin carito-epbx (RETACRIT) Injectable 49803 Unit(s) IV Push <User Schedule>  lidocaine   4% Patch 1 Patch Transdermal <User Schedule>  melatonin 6 milliGRAM(s) Oral at bedtime PRN  metoprolol tartrate 25 milliGRAM(s) Oral two times a day  Nephro-pat 1 Tablet(s) Oral daily  pantoprazole    Tablet 40 milliGRAM(s) Oral two times a day  polyethylene glycol 3350 17 Gram(s) Oral daily PRN  senna 2 Tablet(s) Oral at bedtime  sevelamer carbonate 1600 milliGRAM(s) Oral three times a day with meals      T(C): 37.1 (04-21-24 @ 08:53), Max: 37.1 (04-21-24 @ 08:53)  HR: 73 (04-21-24 @ 08:53) (73 - 90)  BP: 118/70 (04-21-24 @ 08:53) (110/64 - 148/83)  RR: 16 (04-21-24 @ 08:53) (16 - 16)  SpO2: 99% (04-21-24 @ 08:53) (95% - 99%)      In NAD  HEENT- EOMI  Heart- S1S2  Lungs- CTA bl.  Abd- + BS, NT  Ext- No calf pain  Neuro- Exam unchanged    CBC 4/20/24 reviewed  BMP 4/20/24 reviewed  BMP 4/19/24 reviewed    X-ray Pelvis 3/7/24 reviewed and interpreted by me: s/p R hip hemiarthroplasty    ACC: [id number] EXAM: XR PELVIS AP ONLY 1-2 VIEWS ORDERED BY: MATT CAMARA    PROCEDURE DATE: 03/07/2024        INTERPRETATION: CLINICAL INDICATION: baseline postoperative phalanx status post right hip replacement for displaced femoral neck fracture    EXAM:  AP pelvis and hips from 3/7/2024 at 1057. Compared to previous day CT.    IMPRESSION:  Right hip hemiarthroplasty prosthesis currently implanted. Redemonstrated cemented left hip hemiarthroplasty.    Intact and aligned current and prior hardware and no periprosthetic fractures.    Appearance of a narrow lucent zone at cement bone interface of Gruen zone 1 of the left hip prosthesis indeterminate for possible loosening.    Postoperative soft tissue changes.    Surgical skin staples overlie operative site.    Correlate with intraoperative findings.    Redemonstrated generalized osseous stigmata of renal osteodystrophy.    --- End of Report ---            NAVA ALVES MD; Attending Radiologist  This document has been electronically signed. Mar 7 2024 11:23AM    Imp: Patient with diagnosis of R Hip fx s/p R hip hemiarthroplasty  admitted for comprehensive acute rehabilitation.    Plan:  - Continue PT/OT/SLP as indicated  - DVT prophylaxis  - Skin- Turn q2h, check skin daily  - Continue current medications  -Active issues-   GI Bleed - found to have duodenal ulcer on Endo, monitor Gb, GI recs appreciated, continue protonix  ESRD - Dialysis, renal recs appreciated  - Patient is stable to continue current rehabilitation program.

## 2024-04-21 NOTE — PROGRESS NOTE ADULT - SUBJECTIVE AND OBJECTIVE BOX
Patient is a 38y old  Male who presents with a chief complaint of Right Hip fracture s/p right hip       S/p EGD/colon on 4/19  Found to have a duodenal ulcer  No events overnight    Patient seen and examined at bedside.    ALLERGIES:  No Known Drug Allergies  shellfish (Hives)    MEDICATIONS  (STANDING):  acetaminophen     Tablet .. 975 milliGRAM(s) Oral every 8 hours  AQUAPHOR (petrolatum Ointment) 1 Application(s) Topical two times a day  epoetin carito-epbx (RETACRIT) Injectable 59171 Unit(s) IV Push <User Schedule>  lidocaine   4% Patch 1 Patch Transdermal <User Schedule>  metoprolol tartrate 25 milliGRAM(s) Oral two times a day  Nephro-pat 1 Tablet(s) Oral daily  pantoprazole    Tablet 40 milliGRAM(s) Oral two times a day  senna 2 Tablet(s) Oral at bedtime  sevelamer carbonate 1600 milliGRAM(s) Oral three times a day with meals    MEDICATIONS  (PRN):  aluminum hydroxide/magnesium hydroxide/simethicone Suspension 30 milliLiter(s) Oral every 4 hours PRN Dyspepsia  melatonin 6 milliGRAM(s) Oral at bedtime PRN Insomnia  polyethylene glycol 3350 17 Gram(s) Oral daily PRN Constipation    Vital Signs Last 24 Hrs  T(F): 98.8 (21 Apr 2024 08:53), Max: 98.8 (21 Apr 2024 08:53)  HR: 73 (21 Apr 2024 08:53) (73 - 90)  BP: 118/70 (21 Apr 2024 08:53) (110/64 - 148/83)  RR: 16 (21 Apr 2024 08:53) (16 - 16)  SpO2: 99% (21 Apr 2024 08:53) (95% - 99%)  I&O's Summary    20 Apr 2024 07:01  -  21 Apr 2024 07:00  --------------------------------------------------------  IN: 0 mL / OUT: 1500 mL / NET: -1500 mL      PHYSICAL EXAM:  GENERAL- NAD  EAR/NOSE/MOUTH/THROAT -  MMM  EYES- TAB, conjunctiva and Sclera clear  NECK- supple  RESPIRATORY-  clear to auscultation bilaterally, non laboured breathing  CARDIOVASCULAR - SIS2, RRR  GI - soft NT BS present  EXTREMITIES-b/l LE edema  NEUROLOGY- b/l LE weakness  PSYCHIATRY- AAO X 3    LABS:                        7.7    4.63  )-----------( 209      ( 21 Apr 2024 07:40 )             23.5       04-20    141  |  103  |  73  ----------------------------<  78  4.6   |  28  |  7.49    Ca    8.7      20 Apr 2024 08:55    Urinalysis Basic - ( 20 Apr 2024 08:55 )    Color: x / Appearance: x / SG: x / pH: x  Gluc: 78 mg/dL / Ketone: x  / Bili: x / Urobili: x   Blood: x / Protein: x / Nitrite: x   Leuk Esterase: x / RBC: x / WBC x   Sq Epi: x / Non Sq Epi: x / Bacteria: x    COVID-19 PCR: NotDetec (04-11-24 @ 10:30)  COVID-19 PCR: NotDetec (03-27-24 @ 06:47)  COVID-19 PCR: NotDetec (03-26-24 @ 06:57)  COVID-19 PCR: NotDetec (04-11-24 @ 10:30)  COVID-19 PCR: NotDetec (03-27-24 @ 06:47)  COVID-19 PCR: NotDetec (03-26-24 @ 06:57)  COVID-19 PCR: Detected (03-19-24 @ 16:53)  COVID-19 PCR: NotDetec (09-11-23 @ 11:38)  COVID-19 PCR: NotDetec (09-06-23 @ 13:30)  COVID-19 PCR: NotDetec (08-25-23 @ 08:15)  COVID-19 PCR: NotDetec (07-23-23 @ 19:22)  COVID-19 PCR: NotDetec (10-06-22 @ 15:55)

## 2024-04-21 NOTE — PROGRESS NOTE ADULT - ASSESSMENT
38y M with HTN, anemia of chronic disease, ESRD on HD (M/W/F via right AV fistula), left hip arthroplasty in 8/23, history of right leg fracture presented to [facility] on 3/6 after a fall during a wheelchair transfer. He was found to have a right femoral neck fracture and subacute pubic body and inferior rami fractures. He is s/p right hip hemiarthroplasty. Patient now admitted for a multidisciplinary rehab program- pt/ot/dvt ppx    #Acute blood loss anemia likely due to GI bleed  - S/p two units PRBC 4/18/24  - Will monitor Hg/hct, transfuse if Hg <7, CBC noted improvement  - EGD/colonoscopy 4/19; found to have duodenal ulcer  - C/w protonix BID  - Will f/u with GI team  - Appreciate hematology consult team  - Holding ASA/AC for now    #Right Femoral Neck fracture   - s/p right hip hemiarthroplasty on 3/7  - precautions: WBAT RLE, fall precautions  - pain management per rehab     #ESRD  #hyperkalemia  - HDS via right AV fistula on m/w/f.  ( Last HD 3/29). Old HDS left UA  - HD per Dr Blanco  - sevelamer   - Retacrit m/w/f    #HTN  - Metoprolol 25mg BID    vte ppx- PAS

## 2024-04-21 NOTE — PROGRESS NOTE ADULT - PROBLEM SELECTOR PLAN 1
s/p egd / colon 4/19 found to have duodenal ulcer, clean based  Keep active T&S  Monitor CBC  Transfuse for Hb <7  Check iron study   Continue to monitor Bowel movement for melena or hematochezia.   Advance diet as tolerated

## 2024-04-21 NOTE — PROGRESS NOTE ADULT - ASSESSMENT
A 39yo Male patient  PMHx HTN, anemia of chronic disease, ESRD on HD (M/W/F via right AV fistula), left hip arthroplasty in 8/23, history of right leg fracture presented to [facility] from [facility] on 3/6 after falling during a wheelchair transfer landing on his right knee. He is wheelchair bound since his left hip surgery. Found to have acute worsening of chronic anemia on AM labs.     Pt had colonoscopy done on 4/19/24. One BM overnight- dark and solid as per pt.   Renal restriction diet   H/H- 7.7/23.5

## 2024-04-22 LAB
ANION GAP SERPL CALC-SCNC: 10 MMOL/L — SIGNIFICANT CHANGE UP (ref 5–17)
BUN SERPL-MCNC: 78 MG/DL — HIGH (ref 7–23)
CALCIUM SERPL-MCNC: 9.2 MG/DL — SIGNIFICANT CHANGE UP (ref 8.4–10.5)
CHLORIDE SERPL-SCNC: 103 MMOL/L — SIGNIFICANT CHANGE UP (ref 96–108)
CO2 SERPL-SCNC: 30 MMOL/L — SIGNIFICANT CHANGE UP (ref 22–31)
CREAT SERPL-MCNC: 8.15 MG/DL — HIGH (ref 0.5–1.3)
EGFR: 8 ML/MIN/1.73M2 — LOW
GLUCOSE SERPL-MCNC: 69 MG/DL — LOW (ref 70–99)
HCT VFR BLD CALC: 22.7 % — LOW (ref 39–50)
HGB BLD-MCNC: 7.1 G/DL — LOW (ref 13–17)
MAGNESIUM SERPL-MCNC: 2.4 MG/DL — SIGNIFICANT CHANGE UP (ref 1.6–2.6)
MCHC RBC-ENTMCNC: 31.1 PG — SIGNIFICANT CHANGE UP (ref 27–34)
MCHC RBC-ENTMCNC: 31.3 GM/DL — LOW (ref 32–36)
MCV RBC AUTO: 99.6 FL — SIGNIFICANT CHANGE UP (ref 80–100)
NRBC # BLD: 0 /100 WBCS — SIGNIFICANT CHANGE UP (ref 0–0)
PLATELET # BLD AUTO: 205 K/UL — SIGNIFICANT CHANGE UP (ref 150–400)
POTASSIUM SERPL-MCNC: 4.5 MMOL/L — SIGNIFICANT CHANGE UP (ref 3.5–5.3)
POTASSIUM SERPL-SCNC: 4.5 MMOL/L — SIGNIFICANT CHANGE UP (ref 3.5–5.3)
RBC # BLD: 2.28 M/UL — LOW (ref 4.2–5.8)
RBC # FLD: 17.6 % — HIGH (ref 10.3–14.5)
SODIUM SERPL-SCNC: 143 MMOL/L — SIGNIFICANT CHANGE UP (ref 135–145)
WBC # BLD: 4.87 K/UL — SIGNIFICANT CHANGE UP (ref 3.8–10.5)
WBC # FLD AUTO: 4.87 K/UL — SIGNIFICANT CHANGE UP (ref 3.8–10.5)

## 2024-04-22 PROCEDURE — 99232 SBSQ HOSP IP/OBS MODERATE 35: CPT | Mod: GC

## 2024-04-22 PROCEDURE — 99233 SBSQ HOSP IP/OBS HIGH 50: CPT

## 2024-04-22 PROCEDURE — 99232 SBSQ HOSP IP/OBS MODERATE 35: CPT

## 2024-04-22 RX ADMIN — Medication 975 MILLIGRAM(S): at 14:16

## 2024-04-22 RX ADMIN — Medication 1 TABLET(S): at 12:21

## 2024-04-22 RX ADMIN — SEVELAMER CARBONATE 1600 MILLIGRAM(S): 2400 POWDER, FOR SUSPENSION ORAL at 17:57

## 2024-04-22 RX ADMIN — OXYCODONE HYDROCHLORIDE 5 MILLIGRAM(S): 5 TABLET ORAL at 00:32

## 2024-04-22 RX ADMIN — SEVELAMER CARBONATE 1600 MILLIGRAM(S): 2400 POWDER, FOR SUSPENSION ORAL at 12:21

## 2024-04-22 RX ADMIN — Medication 1 APPLICATION(S): at 17:47

## 2024-04-22 RX ADMIN — PANTOPRAZOLE SODIUM 40 MILLIGRAM(S): 20 TABLET, DELAYED RELEASE ORAL at 06:22

## 2024-04-22 RX ADMIN — Medication 975 MILLIGRAM(S): at 21:44

## 2024-04-22 RX ADMIN — Medication 975 MILLIGRAM(S): at 06:21

## 2024-04-22 RX ADMIN — Medication 1 APPLICATION(S): at 06:28

## 2024-04-22 RX ADMIN — LIDOCAINE 1 PATCH: 4 CREAM TOPICAL at 18:40

## 2024-04-22 RX ADMIN — Medication 25 MILLIGRAM(S): at 17:57

## 2024-04-22 RX ADMIN — PANTOPRAZOLE SODIUM 40 MILLIGRAM(S): 20 TABLET, DELAYED RELEASE ORAL at 17:57

## 2024-04-22 RX ADMIN — Medication 975 MILLIGRAM(S): at 15:31

## 2024-04-22 RX ADMIN — Medication 975 MILLIGRAM(S): at 23:10

## 2024-04-22 RX ADMIN — Medication 25 MILLIGRAM(S): at 06:24

## 2024-04-22 RX ADMIN — LIDOCAINE 1 PATCH: 4 CREAM TOPICAL at 06:29

## 2024-04-22 NOTE — PROGRESS NOTE ADULT - PROBLEM SELECTOR PLAN 1
Anemia–multifactorial, associated with ESRD, on HD.  Continue EPO supplementation open (per nephrology).  Recovering after recent hip fracture/ORIF.  Hemoglobin fluctuant–today at 7.1 g/dL.  Transfusion of PRBC as needed.  GI evaluation in progress for guaiac positive stools; EGD consistent with duodenal carcinoma.  Continue to monitor bowel movements for hematochezia and melena.  Monitor CBC daily , continue transfusion support. Anemia–multifactorial, associated with ESRD, on HD.  Continue EPO supplementation open (per nephrology).  Recovering after recent hip fracture/ORIF.  Hemoglobin fluctuant–today at 7.1 g/dL.  Transfusion of PRBC as needed.  GI evaluation in progress for guaiac positive stools; EGD consistent with duodenal ulcer.  Continue to monitor bowel movements for hematochezia and melena.  Monitor CBC daily , continue transfusion support.

## 2024-04-22 NOTE — PROGRESS NOTE ADULT - SUBJECTIVE AND OBJECTIVE BOX
Chief Complaint:  Patient is a 38y old  Male who presents with a chief complaint of Right Hip fracture s/p right hip hemiarthroplasty (22 Apr 2024 14:00)      HPI/ 24 hr events: Patient seen and examined at bedside. Pt feeling well this morning. Tolerating diet. BM. Denies nausea, vomiting, diarrhea, abdominal pain, hematemesis, hematochezia, melena. Vitals are overall stable, no leukocytosis, Hgb, LFTs stable.       MEDICATIONS:   MEDICATIONS  (STANDING):  acetaminophen     Tablet .. 975 milliGRAM(s) Oral every 8 hours  AQUAPHOR (petrolatum Ointment) 1 Application(s) Topical two times a day  epoetin carito-epbx (RETACRIT) Injectable 00417 Unit(s) IV Push <User Schedule>  lidocaine   4% Patch 1 Patch Transdermal <User Schedule>  metoprolol tartrate 25 milliGRAM(s) Oral two times a day  Nephro-pat 1 Tablet(s) Oral daily  pantoprazole    Tablet 40 milliGRAM(s) Oral two times a day  senna 2 Tablet(s) Oral at bedtime  sevelamer carbonate 1600 milliGRAM(s) Oral three times a day with meals    MEDICATIONS  (PRN):  aluminum hydroxide/magnesium hydroxide/simethicone Suspension 30 milliLiter(s) Oral every 4 hours PRN Dyspepsia  melatonin 6 milliGRAM(s) Oral at bedtime PRN Insomnia  polyethylene glycol 3350 17 Gram(s) Oral daily PRN Constipation      Packed Red Cells Order:  1 Unit  Indication: Hgb <7 gm/dL  Infuse Unit : 3.5 Hours (04-18-24 @ 08:56)  Packed Red Cells Order:  1 Unit  Indication: Hgb <7 gm/dL  Infuse Unit : 3.5 Hours (04-18-24 @ 08:56)  Packed Red Cells Order:  1 Unit  Indication: Hgb <7 gm/dL  Infuse Unit : 30 Minutes (04-17-24 @ 10:08)  Packed Red Cells Order:  1 Unit  Indication: Hgb <7 gm/dL  Infuse Unit : 3 Hours (04-15-24 @ 19:13)        DIET:  Diet, Renal Restrictions:   For patients receiving Renal Replacement - No Protein Restr, No Conc K, No Conc Phos, Low Sodium  Sanju(7 Gm Arginine/7 Gm Glut/1.2 Gm HMB     Qty per Day:  1  Supplement Feeding Modality:  Oral  Nepro Cans or Servings Per Day:  1       Frequency:  Daily (04-20-24 @ 10:01) [Active]          ALLERGIES:   Allergies    No Known Drug Allergies  shellfish (Hives)    Intolerances        VITAL SIGNS:   Vital Signs Last 24 Hrs  T(C): 36.6 (22 Apr 2024 08:43), Max: 36.7 (21 Apr 2024 23:01)  T(F): 97.8 (22 Apr 2024 08:43), Max: 98.1 (21 Apr 2024 23:01)  HR: 73 (22 Apr 2024 08:43) (73 - 91)  BP: 123/72 (22 Apr 2024 08:43) (118/67 - 136/76)  BP(mean): --  RR: 17 (22 Apr 2024 08:43) (16 - 17)  SpO2: 100% (22 Apr 2024 08:43) (98% - 100%)    Parameters below as of 22 Apr 2024 08:43  Patient On (Oxygen Delivery Method): room air      I&O's Summary      PHYSICAL EXAM:   GENERAL:  No acute distress  HEENT:  NC/AT, conjunctiva clear, sclera anicteric  CHEST:  No increased effort  HEART:  Regular rate  ABDOMEN:  Soft, non-tender, non-distended, positive bowel sounds, no rebound or guarding  EXTREMITIES: No edema  SKIN:  Warm, dry  NEURO:  Calm, cooperative    LABS:                        7.1    4.87  )-----------( 205      ( 22 Apr 2024 12:15 )             22.7     Hemoglobin: 7.1 g/dL (04-22-24 @ 12:15)  Hemoglobin: 7.7 g/dL (04-21-24 @ 07:40)  Hemoglobin: 7.5 g/dL (04-20-24 @ 08:45)    04-22    143  |  103  |  78<H>  ----------------------------<  69<L>  4.5   |  30  |  8.15<H>    Ca    9.2      22 Apr 2024 12:15  Mg     2.4     04-22                                                  RADIOLOGY & ADDITIONAL STUDIES:         Seen and examined at bedside. Feels well. Reports normal-appearing BM.      MEDICATIONS:   MEDICATIONS  (STANDING):  acetaminophen     Tablet .. 975 milliGRAM(s) Oral every 8 hours  AQUAPHOR (petrolatum Ointment) 1 Application(s) Topical two times a day  epoetin carito-epbx (RETACRIT) Injectable 37349 Unit(s) IV Push <User Schedule>  lidocaine   4% Patch 1 Patch Transdermal <User Schedule>  metoprolol tartrate 25 milliGRAM(s) Oral two times a day  Nephro-pat 1 Tablet(s) Oral daily  pantoprazole    Tablet 40 milliGRAM(s) Oral two times a day  senna 2 Tablet(s) Oral at bedtime  sevelamer carbonate 1600 milliGRAM(s) Oral three times a day with meals    MEDICATIONS  (PRN):  aluminum hydroxide/magnesium hydroxide/simethicone Suspension 30 milliLiter(s) Oral every 4 hours PRN Dyspepsia  melatonin 6 milliGRAM(s) Oral at bedtime PRN Insomnia  polyethylene glycol 3350 17 Gram(s) Oral daily PRN Constipation      Packed Red Cells Order:  1 Unit  Indication: Hgb <7 gm/dL  Infuse Unit : 3.5 Hours (04-18-24 @ 08:56)  Packed Red Cells Order:  1 Unit  Indication: Hgb <7 gm/dL  Infuse Unit : 3.5 Hours (04-18-24 @ 08:56)  Packed Red Cells Order:  1 Unit  Indication: Hgb <7 gm/dL  Infuse Unit : 30 Minutes (04-17-24 @ 10:08)  Packed Red Cells Order:  1 Unit  Indication: Hgb <7 gm/dL  Infuse Unit : 3 Hours (04-15-24 @ 19:13)        DIET:  Diet, Renal Restrictions:   For patients receiving Renal Replacement - No Protein Restr, No Conc K, No Conc Phos, Low Sodium  Sanju(7 Gm Arginine/7 Gm Glut/1.2 Gm HMB     Qty per Day:  1  Supplement Feeding Modality:  Oral  Nepro Cans or Servings Per Day:  1       Frequency:  Daily (04-20-24 @ 10:01) [Active]          ALLERGIES:   Allergies    No Known Drug Allergies  shellfish (Hives)    Intolerances        VITAL SIGNS:   Vital Signs Last 24 Hrs  T(C): 36.6 (22 Apr 2024 08:43), Max: 36.7 (21 Apr 2024 23:01)  T(F): 97.8 (22 Apr 2024 08:43), Max: 98.1 (21 Apr 2024 23:01)  HR: 73 (22 Apr 2024 08:43) (73 - 91)  BP: 123/72 (22 Apr 2024 08:43) (118/67 - 136/76)  BP(mean): --  RR: 17 (22 Apr 2024 08:43) (16 - 17)  SpO2: 100% (22 Apr 2024 08:43) (98% - 100%)    Parameters below as of 22 Apr 2024 08:43  Patient On (Oxygen Delivery Method): room air      I&O's Summary      PHYSICAL EXAM:   GENERAL:  No acute distress  HEENT:  NC/AT, conjunctiva clear, sclera anicteric  CHEST:  No increased effort  HEART:  Regular rate  ABDOMEN:  Soft, non-tender, non-distended, positive bowel sounds, no rebound or guarding  EXTREMITIES: No edema  SKIN:  Warm, dry  NEURO:  Calm, cooperative    LABS:                        7.1    4.87  )-----------( 205      ( 22 Apr 2024 12:15 )             22.7     Hemoglobin: 7.1 g/dL (04-22-24 @ 12:15)  Hemoglobin: 7.7 g/dL (04-21-24 @ 07:40)  Hemoglobin: 7.5 g/dL (04-20-24 @ 08:45)    04-22    143  |  103  |  78<H>  ----------------------------<  69<L>  4.5   |  30  |  8.15<H>    Ca    9.2      22 Apr 2024 12:15  Mg     2.4     04-22

## 2024-04-22 NOTE — PROGRESS NOTE ADULT - ASSESSMENT
38y M with HTN, anemia of chronic disease, ESRD on HD (M/W/F via right AV fistula), left hip arthroplasty in 8/23, history of right leg fracture presented to [facility] on 3/6 after a fall during a wheelchair transfer. He was found to have a right femoral neck fracture and subacute pubic body and inferior rami fractures. He is s/p right hip hemiarthroplasty. Patient now admitted for a multidisciplinary rehab program- pt/ot/dvt ppx    #Acute blood loss anemia likely due to GI bleed  - S/p two units PRBC 4/18/24  - Will monitor Hg/hct, transfuse if Hg <7, CBC noted improvement  -f/u labs at HD today  - EGD/colonoscopy 4/19; found to have duodenal ulcer  - C/w protonix BID  - f/u with GI team  - hematology recs noted  - Holding ASA/AC for now    #Right Femoral Neck fracture   - s/p right hip hemiarthroplasty on 3/7  - precautions: WBAT RLE, fall precautions  - pain management per rehab     #ESRD  #hyperkalemia  - HDS via right AV fistula on m/w/f.  ( Last HD 3/29). Old HDS left UA  - HD per Dr Blanco  - sevelamer   - Retacrit m/w/f    #HTN  - Metoprolol 25mg BID with hold parameters    VTE PPX- PAS

## 2024-04-22 NOTE — PROGRESS NOTE ADULT - SUBJECTIVE AND OBJECTIVE BOX
BRIDGET NORTH, 38y Male  MRN: [id number]  ATTENDING: Wm Dumas    HPI:    MEDICATIONS:  acetaminophen     Tablet .. 975 milliGRAM(s) Oral every 8 hours  aluminum hydroxide/magnesium hydroxide/simethicone Suspension 30 milliLiter(s) Oral every 4 hours PRN  AQUAPHOR (petrolatum Ointment) 1 Application(s) Topical two times a day  epoetin carito-epbx (RETACRIT) Injectable 17681 Unit(s) IV Push <User Schedule>  lidocaine   4% Patch 1 Patch Transdermal <User Schedule>  melatonin 6 milliGRAM(s) Oral at bedtime PRN  metoprolol tartrate 25 milliGRAM(s) Oral two times a day  Nephro-pat 1 Tablet(s) Oral daily  pantoprazole    Tablet 40 milliGRAM(s) Oral two times a day  polyethylene glycol 3350 17 Gram(s) Oral daily PRN  senna 2 Tablet(s) Oral at bedtime  sevelamer carbonate 1600 milliGRAM(s) Oral three times a day with meals    All other medications reviewed.    SUBJECTIVE:    VITALS:  T(C): 36.6 (04-22-24 @ 08:43), Max: 36.7 (04-21-24 @ 23:01)  T(F): 97.8 (04-22-24 @ 08:43), Max: 98.1 (04-21-24 @ 23:01)  HR: 73 (04-22-24 @ 08:43) (73 - 91)  BP: 123/72 (04-22-24 @ 08:43) (118/67 - 136/76)      PHYSICAL EXAM:  Constitutional: alert, well developed  HEENT: normocephalic, anicteric sclerae, no mucositis or thrush  Respiratory: bilateral clear to auscultation anteriorly  Cardiovascular : S1, S2 regular, rhythmic, no murmurs, gallops or rubs  Abdomen: soft, distended, + normoactive BS, no palpable HS- megaly  Extremities: no tenderness;  -c/c/e, pulses equal bilaterally    LABS:  (04-22) WBC: 4.87 K/uL,Hemoglobin: 7.1 g/dL, Hematocrit: 22.7 %,    Platelet: 205 K/uL    (04-22) Na: 143 mmol/L ; K: 4.5 mmol/L ; BUN: 78 mg/dL ; Cr: 8.15 mg/dL.        RADIOLOGY:     BRIDGET NORTH, 38y Male  MRN: [id number]  ATTENDING: Wm Dumas    HPI:  37yo Male PMHx HTN, anemia of chronic disease, ESRD on HD (M/W/F via right AV fistula), left hip arthroplasty in 8/23, history of right leg fracture presented to [facility] from [facility] on 3/6 after falling during a wheelchair transfer landing on his right knee. He is wheelchair bound since his left hip surgery. CT hip with acute garden type 1 minimally valgus impacted transcervical right femoral neck fracture and subacute right pubic body and inferior rami fractures. He is s/p right hip hemiarthroplasty. Post op course complicated by anemia s/p transfusion. Hospital course complicated by febrile 101.6F found to be covid positive and completed remdisivir. He was started on eliquis 2.5mg BID for elevated d-dimers and negative LE duplex. Patient was evaluated by PM&R and therapy for functional deficits, gait/ADL impairments and acute rehabilitation was recommended. Patient was medically optimized for discharge to [facility] IRU on 3/27/24.  Hematology consulted for anemia.    MEDICATIONS:  acetaminophen     Tablet .. 975 milliGRAM(s) Oral every 8 hours  aluminum hydroxide/magnesium hydroxide/simethicone Suspension 30 milliLiter(s) Oral every 4 hours PRN  AQUAPHOR (petrolatum Ointment) 1 Application(s) Topical two times a day  epoetin carito-epbx (RETACRIT) Injectable 66914 Unit(s) IV Push <User Schedule>  lidocaine   4% Patch 1 Patch Transdermal <User Schedule>  melatonin 6 milliGRAM(s) Oral at bedtime PRN  metoprolol tartrate 25 milliGRAM(s) Oral two times a day  Nephro-pat 1 Tablet(s) Oral daily  pantoprazole    Tablet 40 milliGRAM(s) Oral two times a day  polyethylene glycol 3350 17 Gram(s) Oral daily PRN  senna 2 Tablet(s) Oral at bedtime  sevelamer carbonate 1600 milliGRAM(s) Oral three times a day with meals    All other medications reviewed.    SUBJECTIVE:  Alert, denies pain    VITALS:  T(C): 36.6 (04-22-24 @ 08:43), Max: 36.7 (04-21-24 @ 23:01)  T(F): 97.8 (04-22-24 @ 08:43), Max: 98.1 (04-21-24 @ 23:01)  HR: 73 (04-22-24 @ 08:43) (73 - 91)  BP: 123/72 (04-22-24 @ 08:43) (118/67 - 136/76)    PHYSICAL EXAM:  Constitutional: alert, well developed  HEENT: normocephalic, anicteric sclerae, no mucositis or thrush  Respiratory: bilateral clear to auscultation anteriorly  Cardiovascular : S1, S2 regular, rhythmic, no murmurs, gallops or rubs  Abdomen: soft, distended, + normoactive BS, no palpable HS- megaly  Extremities: no tenderness;  -c/c/e, pulses equal bilaterally    LABS:  (04-22) WBC: 4.87 K/uL,Hemoglobin: 7.1 g/dL, Hematocrit: 22.7 %,  Platelet: 205 K/uL  (04-22) Na: 143 mmol/L ; K: 4.5 mmol/L ; BUN: 78 mg/dL ; Cr: 8.15 mg/dL.    RADIOLOGY:  ACC: [id number] EXAM:  DUPLEX SCAN EXT VEINS LOWER BI   ORDERED BY: KENNA GARCIA     PROCEDURE DATE:  03/14/2024          INTERPRETATION:  CLINICAL INFORMATION: Leg swelling.    COMPARISON: None available.    TECHNIQUE: Duplex sonography of the BILATERAL LOWER extremity veins with   color and spectral Doppler, with and without compression.    FINDINGS:    RIGHT:  Normal compressibility of the RIGHT common femoral, femoral and popliteal   veins.  Doppler examination shows normal spontaneous andphasic flow.  No RIGHT calf vein thrombosis is detected.    LEFT:  Normal compressibility of the LEFT common femoral, femoral and popliteal   veins.  Doppler examination shows normal spontaneous and phasic flow.  No LEFT calf vein thrombosis is detected.    IMPRESSION:  No evidence of deep venous thrombosis in either lower extremity.

## 2024-04-22 NOTE — PROGRESS NOTE ADULT - ASSESSMENT
BRIDGET NORTH is a 38y M with HTN, anemia of chronic disease, ESRD on HD (M/W/F via right AV fistula), left hip arthroplasty in 8/23, history of right leg fracture presented to [facility] on 3/6 after a fall during a wheelchair transfer. He was found to have a right femoral neck fracture and subacute pubic body and inferior rami fractures. He is s/p right hip hemiarthroplasty. Hospital course complicated by post-op anemia s/p transfusion, hypoglycemia, hyperkalemia, febrile 2/2 covid s/p remdisivir, and started on eliquis 2.5mg BID with negative dvts on LE duplex. Patient now admitted for a multidisciplinary rehab program. 03-27-24 @ 16:17      #Right Femoral Neck fracture   - s/p right hip hemiarthroplasty on 3/7  - pain med as below  - Dressing and staples removed on 3/22  - Comprehensive Multidisciplinary Rehab Program:  comprehensive rehab program of PT/OT   - precautions: WBAT RLE. Anterior hip.   - Right Knee pain- arthrosis/ effusion- no Fx- renal osteodystrophy- ICE/ Lidoderm/ Rehab/ ACE  - Social work to confirm DC plan- Need 24x7 assistance at home- ? Brother/ ? Cousin  - DC to home with VN services  FU with PMD/Renal as OPD  FU ortho- Dr León 1-2 weeks  FU PMR Dr Dumas 4-6 weeks  ANTICIPATE DC TO [facility]    #ESRD  - HDS via right AV fistula on m/w/f.  ( Last HD 3/27). Old HDS left UA  - Nephro in  - sevelamer 800mg TID  - retacrit m/w/f  continue Epogen in HD    Acute Anemia  Guiac +  2 units [facility] 4/18  EGD/Colonoscopy today per GI- Duodenal Ulcer- sp cauterization  Protonix increased to 2x/day    #HTN  - toprol 25mg BID  - Monitor BP    #Pain  - Tylenol PRN  - oxy 10mg moderate, 15mg severe.   Right Knee pain- lidoderm during the day/ICE/ ACE- Better- can Obtain as OPD  Left Hip pain - all studies reviewed- recent CT 3/6- no evidence of abnormality in Left prosthesis  will follow clinically-stable, ambulating- pain less    #Sleep  - Melatonin PRN     #GI / Bowel  #dyspepsia  - Protonix  - Bowel prep overnight  - maalox    # / Bladder  - does not void ESRD    #Skin / Pressure injury  - Skin assessment on admission performed : right hip surgical site with steri strips CDI. coccyx small stage 2-using barrier cream  - Monitor Incisions:    - wound care following at [facility] for b/l sacrum/coccyx DTI wth evolution.   - wound care to follow  - turn and reposition q2h      #Diet:  - Diet Consistency: Renal restriction  - Nutrition consult    #DVT prophylaxis:   - eliquis 2.5mg BID- DCd, ASA DCd  - Last LE Doppler normal on 3/14     .

## 2024-04-22 NOTE — PROGRESS NOTE ADULT - SUBJECTIVE AND OBJECTIVE BOX
CC: Patient is a 38y old  Male who presents with a chief complaint of Right Hip fracture s/p right hip hemiarthroplasty (21 Apr 2024 10:42)      Interval History:    Patient seen and examined at bedside.  No overnight events.  No new complaints.    ALLERGIES:  No Known Drug Allergies  shellfish (Hives)    MEDICATIONS  (STANDING):  acetaminophen     Tablet .. 975 milliGRAM(s) Oral every 8 hours  AQUAPHOR (petrolatum Ointment) 1 Application(s) Topical two times a day  epoetin carito-epbx (RETACRIT) Injectable 67397 Unit(s) IV Push <User Schedule>  lidocaine   4% Patch 1 Patch Transdermal <User Schedule>  metoprolol tartrate 25 milliGRAM(s) Oral two times a day  Nephro-pat 1 Tablet(s) Oral daily  pantoprazole    Tablet 40 milliGRAM(s) Oral two times a day  senna 2 Tablet(s) Oral at bedtime  sevelamer carbonate 1600 milliGRAM(s) Oral three times a day with meals    MEDICATIONS  (PRN):  aluminum hydroxide/magnesium hydroxide/simethicone Suspension 30 milliLiter(s) Oral every 4 hours PRN Dyspepsia  melatonin 6 milliGRAM(s) Oral at bedtime PRN Insomnia  polyethylene glycol 3350 17 Gram(s) Oral daily PRN Constipation    Vital Signs Last 24 Hrs  T(F): 97.8 (22 Apr 2024 08:43), Max: 98.1 (21 Apr 2024 23:01)  HR: 73 (22 Apr 2024 08:43) (73 - 91)  BP: 123/72 (22 Apr 2024 08:43) (118/67 - 136/76)  RR: 17 (22 Apr 2024 08:43) (16 - 17)  SpO2: 100% (22 Apr 2024 08:43) (98% - 100%)  I&O's Summary        PHYSICAL EXAM:  GENERAL: NAD  CHEST/LUNG: No rales, rhonchi, wheezing; normal respiratory effort  HEART: RRR; No murmurs, rubs, or gallops  ABDOMEN: Soft, Nontender, Nondistended; Bowel sounds present  MSK/EXT:  No peripheral edema, 2+ Peripheral Pulses, No clubbing or digital cyanosis  PSYCH: Appropriate affect, Alert & Oriented    LABS:                        7.7    4.63  )-----------( 209      ( 21 Apr 2024 07:40 )             23.5       04-20    141  |  103  |  73  ----------------------------<  78  4.6   |  28  |  7.49    Ca    8.7      20 Apr 2024 08:55      Urinalysis Basic - ( 20 Apr 2024 08:55 )    Color: x / Appearance: x / SG: x / pH: x  Gluc: 78 mg/dL / Ketone: x  / Bili: x / Urobili: x   Blood: x / Protein: x / Nitrite: x   Leuk Esterase: x / RBC: x / WBC x   Sq Epi: x / Non Sq Epi: x / Bacteria: x        COVID-19 PCR: NotDetec (04-11-24 @ 10:30)  COVID-19 PCR: NotDetec (03-27-24 @ 06:47)  COVID-19 PCR: NotDetec (03-26-24 @ 06:57)      Care Discussed with Consultants/Other Providers: Yes

## 2024-04-22 NOTE — PROGRESS NOTE ADULT - PROBLEM SELECTOR PLAN 1
s/p egd / colon 4/19 found to have duodenal ulcer, clean based  Keep active T&S  Monitor CBC  Transfuse for Hb <7  Check iron study   Continue to monitor Bowel movement for melena or hematochezia.   Out patient GI F/U   Repeat EGD in 4 week to assess Duodenal ulcer s/p egd / colon 4/19 found to have a duodenal ulcer, clean based  Keep active T&S  Monitor CBC  Transfuse for Hb <7  Check iron study   Continue to monitor Bowel movement for melena or hematochezia.   Out patient GI F/U   Repeat EGD in 4 week to reassess duodenal ulcer

## 2024-04-22 NOTE — PROGRESS NOTE ADULT - ASSESSMENT
A 39yo Male patient  PMHx HTN, anemia of chronic disease, ESRD on HD (M/W/F via right AV fistula), left hip arthroplasty in 8/23, history of right leg fracture presented to [facility] from [facility] on 3/6 after falling during a wheelchair transfer landing on his right knee. He is wheelchair bound since his left hip surgery. Found to have acute worsening of chronic anemia on AM labs.     Pt had colonoscopy done on 4/19/24 poor prep. S/P EGD 4/19 found to have duodenal ulcer, clean based    One BM overnight- dark and solid as per pt.        A 39yo Male patient  PMHx HTN, anemia of chronic disease, ESRD on HD (M/W/F via right AV fistula), left hip arthroplasty in 8/23, history of right leg fracture presented to [facility] from [facility] on 3/6 after falling during a wheelchair transfer landing on his right knee. He is wheelchair bound since his left hip surgery. Found to have acute worsening of chronic anemia on AM labs.   S/P EGD 4/19 found to have duodenal ulcer, clean based  Pt also had colonoscopy done on 4/19/24: suboptimal prep but no gross lesions or bleeding.     One BM overnight- dark and formed as per pt.

## 2024-04-23 LAB
ALLERGY+IMMUNOLOGY DIAG STUDY NOTE: SIGNIFICANT CHANGE UP
ANION GAP SERPL CALC-SCNC: 11 MMOL/L — SIGNIFICANT CHANGE UP (ref 5–17)
BLD GP AB SCN SERPL QL: SIGNIFICANT CHANGE UP
BUN SERPL-MCNC: 95 MG/DL — HIGH (ref 7–23)
CALCIUM SERPL-MCNC: 9.1 MG/DL — SIGNIFICANT CHANGE UP (ref 8.4–10.5)
CHLORIDE SERPL-SCNC: 101 MMOL/L — SIGNIFICANT CHANGE UP (ref 96–108)
CO2 SERPL-SCNC: 26 MMOL/L — SIGNIFICANT CHANGE UP (ref 22–31)
CREAT SERPL-MCNC: 9.28 MG/DL — HIGH (ref 0.5–1.3)
DIR ANTIGLOB POLYSPECIFIC INTERPRETATION: SIGNIFICANT CHANGE UP
EGFR: 7 ML/MIN/1.73M2 — LOW
GLUCOSE SERPL-MCNC: 75 MG/DL — SIGNIFICANT CHANGE UP (ref 70–99)
HCT VFR BLD CALC: 23.4 % — LOW (ref 39–50)
HGB BLD-MCNC: 7.5 G/DL — LOW (ref 13–17)
MCHC RBC-ENTMCNC: 31.3 PG — SIGNIFICANT CHANGE UP (ref 27–34)
MCHC RBC-ENTMCNC: 32.1 GM/DL — SIGNIFICANT CHANGE UP (ref 32–36)
MCV RBC AUTO: 97.5 FL — SIGNIFICANT CHANGE UP (ref 80–100)
NRBC # BLD: 0 /100 WBCS — SIGNIFICANT CHANGE UP (ref 0–0)
PLATELET # BLD AUTO: 235 K/UL — SIGNIFICANT CHANGE UP (ref 150–400)
POTASSIUM SERPL-MCNC: 5.2 MMOL/L — SIGNIFICANT CHANGE UP (ref 3.5–5.3)
POTASSIUM SERPL-SCNC: 5.2 MMOL/L — SIGNIFICANT CHANGE UP (ref 3.5–5.3)
RBC # BLD: 2.4 M/UL — LOW (ref 4.2–5.8)
RBC # FLD: 17.5 % — HIGH (ref 10.3–14.5)
SODIUM SERPL-SCNC: 138 MMOL/L — SIGNIFICANT CHANGE UP (ref 135–145)
WBC # BLD: 5.96 K/UL — SIGNIFICANT CHANGE UP (ref 3.8–10.5)
WBC # FLD AUTO: 5.96 K/UL — SIGNIFICANT CHANGE UP (ref 3.8–10.5)

## 2024-04-23 PROCEDURE — 99232 SBSQ HOSP IP/OBS MODERATE 35: CPT

## 2024-04-23 PROCEDURE — 86077 PHYS BLOOD BANK SERV XMATCH: CPT

## 2024-04-23 PROCEDURE — 99232 SBSQ HOSP IP/OBS MODERATE 35: CPT | Mod: GC

## 2024-04-23 RX ORDER — OXYCODONE HYDROCHLORIDE 5 MG/1
5 TABLET ORAL EVERY 6 HOURS
Refills: 0 | Status: DISCONTINUED | OUTPATIENT
Start: 2024-04-23 | End: 2024-04-24

## 2024-04-23 RX ADMIN — PANTOPRAZOLE SODIUM 40 MILLIGRAM(S): 20 TABLET, DELAYED RELEASE ORAL at 06:47

## 2024-04-23 RX ADMIN — Medication 975 MILLIGRAM(S): at 12:17

## 2024-04-23 RX ADMIN — ERYTHROPOIETIN 10000 UNIT(S): 10000 INJECTION, SOLUTION INTRAVENOUS; SUBCUTANEOUS at 14:12

## 2024-04-23 RX ADMIN — Medication 25 MILLIGRAM(S): at 06:47

## 2024-04-23 RX ADMIN — Medication 1 TABLET(S): at 11:46

## 2024-04-23 RX ADMIN — Medication 975 MILLIGRAM(S): at 11:47

## 2024-04-23 RX ADMIN — Medication 25 MILLIGRAM(S): at 17:35

## 2024-04-23 RX ADMIN — Medication 1 APPLICATION(S): at 06:49

## 2024-04-23 RX ADMIN — Medication 975 MILLIGRAM(S): at 06:46

## 2024-04-23 RX ADMIN — Medication 1 APPLICATION(S): at 17:37

## 2024-04-23 RX ADMIN — SEVELAMER CARBONATE 1600 MILLIGRAM(S): 2400 POWDER, FOR SUSPENSION ORAL at 17:35

## 2024-04-23 RX ADMIN — PANTOPRAZOLE SODIUM 40 MILLIGRAM(S): 20 TABLET, DELAYED RELEASE ORAL at 17:36

## 2024-04-23 RX ADMIN — SEVELAMER CARBONATE 1600 MILLIGRAM(S): 2400 POWDER, FOR SUSPENSION ORAL at 11:46

## 2024-04-23 RX ADMIN — SEVELAMER CARBONATE 1600 MILLIGRAM(S): 2400 POWDER, FOR SUSPENSION ORAL at 08:03

## 2024-04-23 NOTE — PROGRESS NOTE ADULT - ASSESSMENT
38y M with HTN, anemia of chronic disease, ESRD on HD (M/W/F via right AV fistula), left hip arthroplasty in 8/23, history of right leg fracture presented to [facility] on 3/6 after a fall during a wheelchair transfer. He was found to have a right femoral neck fracture and subacute pubic body and inferior rami fractures. He is s/p right hip hemiarthroplasty. Patient now admitted for a multidisciplinary rehab program- pt/ot/dvt ppx    #Acute blood loss anemia likely due to GI bleed  - S/p two units PRBC 4/18/24  - Will monitor Hg/hct, transfuse if Hg <7, CBC noted improvement  - EGD/colonoscopy 4/19; found to have duodenal ulcer  - C/w protonix BID  - f/u with GI team  - hematology recs noted, repeat CBC 4/23, updated type and screen ordered.   - Holding ASA/AC for now    #Right Femoral Neck fracture   - s/p right hip hemiarthroplasty on 3/7  - precautions: WBAT RLE, fall precautions  - pain management per rehab     #ESRD  #hyperkalemia  - HDS via right AV fistula on m/w/f.  ( Last HD 3/29). Old HDS left UA  - HD per Dr Blanco  - sevelamer   - Retacrit m/w/f    #HTN  - Metoprolol 25mg BID with hold parameters    VTE PPX- PAS

## 2024-04-23 NOTE — PROGRESS NOTE ADULT - SUBJECTIVE AND OBJECTIVE BOX
CC: Patient is a 38y old  Male who presents with a chief complaint of Right Hip fracture s/p right hip hemiarthroplasty (23 Apr 2024 07:34)      Interval History:    No overnight events.  No new complaints.    ALLERGIES:  No Known Drug Allergies  shellfish (Hives)    MEDICATIONS  (STANDING):  acetaminophen     Tablet .. 975 milliGRAM(s) Oral every 8 hours  AQUAPHOR (petrolatum Ointment) 1 Application(s) Topical two times a day  epoetin carito-epbx (RETACRIT) Injectable 65946 Unit(s) IV Push <User Schedule>  lidocaine   4% Patch 1 Patch Transdermal <User Schedule>  metoprolol tartrate 25 milliGRAM(s) Oral two times a day  Nephro-pat 1 Tablet(s) Oral daily  pantoprazole    Tablet 40 milliGRAM(s) Oral two times a day  senna 2 Tablet(s) Oral at bedtime  sevelamer carbonate 1600 milliGRAM(s) Oral three times a day with meals    MEDICATIONS  (PRN):  aluminum hydroxide/magnesium hydroxide/simethicone Suspension 30 milliLiter(s) Oral every 4 hours PRN Dyspepsia  melatonin 6 milliGRAM(s) Oral at bedtime PRN Insomnia  polyethylene glycol 3350 17 Gram(s) Oral daily PRN Constipation    Vital Signs Last 24 Hrs  T(F): 98.3 (22 Apr 2024 20:12), Max: 98.3 (22 Apr 2024 20:12)  HR: 90 (22 Apr 2024 20:12) (84 - 90)  BP: 121/64 (22 Apr 2024 20:12) (121/64 - 125/63)  RR: 16 (22 Apr 2024 20:12) (16 - 16)  SpO2: 97% (22 Apr 2024 20:12) (97% - 97%)  I&O's Summary        PHYSICAL EXAM:  GENERAL: NAD  CHEST/LUNG: No rales, rhonchi, wheezing; normal respiratory effort  HEART: RRR; No murmurs, rubs, or gallops  ABDOMEN: Soft, Nontender, Nondistended; Bowel sounds present  MSK/EXT:  No peripheral edema, 2+ Peripheral Pulses, No clubbing or digital cyanosis  PSYCH: Appropriate affect, Alert & Oriented    LABS:                        7.1    4.87  )-----------( 205      ( 22 Apr 2024 12:15 )             22.7       04-22    143  |  103  |  78  ----------------------------<  69  4.5   |  30  |  8.15    Ca    9.2      22 Apr 2024 12:15  Mg     2.4     04-22        Urinalysis Basic - ( 22 Apr 2024 12:15 )    Color: x / Appearance: x / SG: x / pH: x  Gluc: 69 mg/dL / Ketone: x  / Bili: x / Urobili: x   Blood: x / Protein: x / Nitrite: x   Leuk Esterase: x / RBC: x / WBC x   Sq Epi: x / Non Sq Epi: x / Bacteria: x        COVID-19 PCR: NotDetec (04-11-24 @ 10:30)  COVID-19 PCR: NotDetec (03-27-24 @ 06:47)  COVID-19 PCR: NotDetec (03-26-24 @ 06:57)      Care Discussed with Consultants/Other Providers: Yes

## 2024-04-23 NOTE — PROGRESS NOTE ADULT - SUBJECTIVE AND OBJECTIVE BOX
[facility] NEPHROLOGY SERVICES, [facility]  NEPHROLOGY AND HYPERTENSION  [address]  [address]  [place] [address]  [phone]    MD ADRIA GRAMAJO MD YELENA ROSENBERG, MD BINNY KOSHY, MD CHRISTOPHER CAPUTO, MD EDWARD BOVER, MD          Patient events noted    MEDICATIONS  (STANDING):  acetaminophen     Tablet .. 975 milliGRAM(s) Oral every 8 hours  AQUAPHOR (petrolatum Ointment) 1 Application(s) Topical two times a day  epoetin carito-epbx (RETACRIT) Injectable 43466 Unit(s) IV Push <User Schedule>  lidocaine   4% Patch 1 Patch Transdermal <User Schedule>  metoprolol tartrate 25 milliGRAM(s) Oral two times a day  Nephro-pat 1 Tablet(s) Oral daily  pantoprazole    Tablet 40 milliGRAM(s) Oral two times a day  senna 2 Tablet(s) Oral at bedtime  sevelamer carbonate 1600 milliGRAM(s) Oral three times a day with meals    MEDICATIONS  (PRN):  aluminum hydroxide/magnesium hydroxide/simethicone Suspension 30 milliLiter(s) Oral every 4 hours PRN Dyspepsia  melatonin 6 milliGRAM(s) Oral at bedtime PRN Insomnia  oxyCODONE    IR 5 milliGRAM(s) Oral every 6 hours PRN Severe Pain (7 - 10)  polyethylene glycol 3350 17 Gram(s) Oral daily PRN Constipation      04-23-24 @ 07:01  -  04-23-24 @ 22:55  --------------------------------------------------------  IN: 0 mL / OUT: 1500 mL / NET: -1500 mL      PHYSICAL EXAM:      T(C): 36.8 (04-23-24 @ 20:21), Max: 36.8 (04-23-24 @ 11:30)  HR: 92 (04-23-24 @ 20:21) (67 - 92)  BP: 144/79 (04-23-24 @ 20:21) (121/76 - 144/79)  RR: 16 (04-23-24 @ 20:21) (16 - 16)  SpO2: 98% (04-23-24 @ 20:21) (97% - 99%)  Wt(kg): --  Lungs clear  Heart S1S2  Abd soft NT ND  Extremities:   tr edema                                    7.5    5.96  )-----------( 235      ( 23 Apr 2024 10:08 )             23.4     04-23    138  |  101  |  95<H>  ----------------------------<  75  5.2   |  26  |  9.28<H>    Ca    9.1      23 Apr 2024 10:08  Mg     2.4     04-22          Creatinine Trend: 9.28<--, 8.15<--, 7.49<--, 5.71<--, 5.00<--, 8.87<--      Assessment   ESRD, maintenance     Plan:  HD for today  UF as tolerated   Will follow     Darrell Ortiz MD

## 2024-04-23 NOTE — PROGRESS NOTE ADULT - PROBLEM SELECTOR PLAN 1
s/p egd / colon 4/19 found to have a duodenal ulcer, clean based  Keep active T&S  Monitor CBC  Transfuse for Hb <7  Check iron study   Continue to monitor Bowel movement for melena or hematochezia.   Out patient GI F/U   Repeat EGD in 4 week to reassess duodenal ulcer

## 2024-04-23 NOTE — PROGRESS NOTE ADULT - ASSESSMENT
A 39yo Male patient  PMHx HTN, anemia of chronic disease, ESRD on HD (M/W/F via right AV fistula), left hip arthroplasty in 8/23, history of right leg fracture presented to [facility] from [facility] on 3/6 after falling during a wheelchair transfer landing on his right knee. He is wheelchair bound since his left hip surgery. Found to have acute worsening of chronic anemia on AM labs.   S/P EGD 4/19 found to have duodenal ulcer, clean based  Pt also had colonoscopy done on 4/19/24: suboptimal prep but no gross lesions or bleeding.

## 2024-04-23 NOTE — PROGRESS NOTE ADULT - SUBJECTIVE AND OBJECTIVE BOX
HPI:  37yo Male PMHx HTN, anemia of chronic disease, ESRD on HD (M/W/F via right AV fistula), left hip arthroplasty in 8/23, history of right leg fracture presented to [facility] from [facility] on 3/6 after falling during a wheelchair transfer landing on his right knee. He is wheelchair bound since his left hip surgery. CT hip with acute garden type 1 minimally valgus impacted transcervical right femoral neck fracture and subacute right pubic body and inferior rami fractures. He is s/p right hip hemiarthroplasty. Post op course complicated by anemia s/p transfusion. Hospital course complicated by hypoglycemia after being shifted for hyperkalemia. Further complicated by febrile 101.6F found to be covid positive and completed remdisivir. He was started on eliquis 2.5mg BID for elevated d-dimers and negative LE duplex.     Patient was evaluated by PM&R and therapy for functional deficits, gait/ADL impairments and acute rehabilitation was recommended. Patient was medically optimized for discharge to [facility] IRU on 3/27 (27 Mar 2024 15:19)      ROS/subjective:  Patient seen and evaluated in bed  EGD Friday with duodenal ulcer- cauterized  indicates inability to go home to [facility]- cousin now unable to assist  Blood work stable  For HD today  no dizziness, no headaches, no nausea, no vomiting, no SOB, no chest pain    MEDICATIONS  (STANDING):  acetaminophen     Tablet .. 975 milliGRAM(s) Oral every 8 hours  AQUAPHOR (petrolatum Ointment) 1 Application(s) Topical two times a day  epoetin carito-epbx (RETACRIT) Injectable 68362 Unit(s) IV Push <User Schedule>  lidocaine   4% Patch 1 Patch Transdermal <User Schedule>  metoprolol tartrate 25 milliGRAM(s) Oral two times a day  Nephro-pat 1 Tablet(s) Oral daily  pantoprazole    Tablet 40 milliGRAM(s) Oral two times a day  senna 2 Tablet(s) Oral at bedtime  sevelamer carbonate 1600 milliGRAM(s) Oral three times a day with meals    MEDICATIONS  (PRN):  aluminum hydroxide/magnesium hydroxide/simethicone Suspension 30 milliLiter(s) Oral every 4 hours PRN Dyspepsia  melatonin 6 milliGRAM(s) Oral at bedtime PRN Insomnia  oxyCODONE    IR 5 milliGRAM(s) Oral every 6 hours PRN Severe Pain (7 - 10)  polyethylene glycol 3350 17 Gram(s) Oral daily PRN Constipation                            7.5    5.96  )-----------( 235      ( 23 Apr 2024 10:08 )             23.4     04-23    138  |  101  |  95<H>  ----------------------------<  75  5.2   |  26  |  9.28<H>    Ca    9.1      23 Apr 2024 10:08  Mg     2.4     04-22      Urinalysis Basic - ( 23 Apr 2024 10:08 )    Color: x / Appearance: x / SG: x / pH: x  Gluc: 75 mg/dL / Ketone: x  / Bili: x / Urobili: x   Blood: x / Protein: x / Nitrite: x   Leuk Esterase: x / RBC: x / WBC x   Sq Epi: x / Non Sq Epi: x / Bacteria: x        CAPILLARY BLOOD GLUCOSE          Vital Signs Last 24 Hrs  T(C): 36.7 (23 Apr 2024 14:10), Max: 36.8 (22 Apr 2024 20:12)  T(F): 98.1 (23 Apr 2024 14:10), Max: 98.3 (22 Apr 2024 20:12)  HR: 79 (23 Apr 2024 14:10) (67 - 90)  BP: 121/76 (23 Apr 2024 14:10) (121/64 - 142/80)  BP(mean): --  RR: 16 (23 Apr 2024 14:10) (16 - 16)  SpO2: 97% (23 Apr 2024 14:10) (97% - 99%)    Parameters below as of 23 Apr 2024 14:10  Patient On (Oxygen Delivery Method): room air        Gen - NAD, Comfortable  HEENT - NCAT, EOMI  Neck - Supple, No limited ROM  Pulm - CTAB, No wheeze, No rhonchi, No crackles  Cardiovascular - RRR, S1S2, No murmurs  Chest - good chest expansion, good respiratory effort  Abdomen - Soft, NT/ND, +BS. BM 3/26  Extremities - Right FA AV fistula + bruit and thrill. GIDEON old AV fistula not active, Right Knee with degenerative chnages  Neuro-     Cognitive - awake, alert, oriented to person, place, date, year, and situation.  Able  to follow command     Communication - Fluent, Comprehensible, No dysarthria, No aphasia        Memory:  Recent/ remote memory intact     Cranial Nerves - CN 2-12 intact. No facial asymmetry, Tongue midline, EOMI, Shoulder shrug intact     Motor -                    LEFT    UE - ShAB 5/5, EF 5/5, EE 5/5,  5/5                    RIGHT UE - ShAB 5/5, EF 5/5, EE 5/5,   5/5                    LEFT    LE - HF 4/5, KE 3+/5, DF 4/5, PF 4/5                    RIGHT LE - HF 3+/5, KE 3/5, DF 4/5, PF 4/5   moves Right knee better     Sensory - Intact bilaterally      Tone - normal  MSK: b/l hip discomfort with movement OA changes Right Knee- minimal pain  Psychiatric - Mood stable, Affect WNL  Skin: right hip surgical site with steri strips CDI. b/l sacrum/coccyx DTI improving    IDT [facility] 4/22  DC to [facility] 4/23        Continue comprehensive acute rehab program consisting of 3hrs/day of OT/PT.

## 2024-04-23 NOTE — PROGRESS NOTE ADULT - PROBLEM SELECTOR PLAN 1
Complex mechanism for chronic anemia associated with multiple comorbidities including ESRD.  Will continue dialysis 3 times weekly.  Goals of care/rehab meeting yesterday regarding disposition.  Hematologic profile consistent with anemia (hemoglobin 7.1 g/dL yesterday).  Continue transfusion support as needed.  Repeat CBC today.

## 2024-04-23 NOTE — CHART NOTE - NSCHARTNOTEFT_GEN_A_CORE
Nutrition Follow Up Note  Hospital Course   (Per Electronic Medical Record)    Source:  Patient [X]  Medical Record [X]      Diet:   Renal Diet w/ Thin Liquids (IDDSI Level 0)  Tolerates Diet Consistency Well  No Chewing/Swallowing Difficulties   No Recent Nausea, Vomiting, Diarrhea or Constipation (as Per Patient)   Consumes % of Meals (as Per Documentation) - States Good PO Intake/Appetite (Per Patient)  on Nepro 8oz Daily (Provides 425kcal & 19grams of Protein) - Patient Takes Nutrition Supplement   on Sanju 1 Packet Daily (Provides 90kcal & 14grams of Protein)   Declines Nutrition Supplementation - Recommend Discontinue   Obtained Food Preferences from Patient    Enteral/Parenteral Nutrition: Not Applicable    Current Weight: 203.4lb on 4/21  Obtain Weights Daily  Weight Fluctuates  Obtain New Weight to Confirm     Pertinent Medications: MEDICATIONS  (STANDING):  acetaminophen     Tablet .. 975 milliGRAM(s) Oral every 8 hours  AQUAPHOR (petrolatum Ointment) 1 Application(s) Topical two times a day  epoetin carito-epbx (RETACRIT) Injectable 51053 Unit(s) IV Push <User Schedule>  lidocaine   4% Patch 1 Patch Transdermal <User Schedule>  metoprolol tartrate 25 milliGRAM(s) Oral two times a day  Nephro-pat 1 Tablet(s) Oral daily  pantoprazole    Tablet 40 milliGRAM(s) Oral two times a day  senna 2 Tablet(s) Oral at bedtime  sevelamer carbonate 1600 milliGRAM(s) Oral three times a day with meals    MEDICATIONS  (PRN):  aluminum hydroxide/magnesium hydroxide/simethicone Suspension 30 milliLiter(s) Oral every 4 hours PRN Dyspepsia  melatonin 6 milliGRAM(s) Oral at bedtime PRN Insomnia  polyethylene glycol 3350 17 Gram(s) Oral daily PRN Constipation    Pertinent Labs:  04-23 Na138 mmol/L Glu 75 mg/dL K+ 5.2 mmol/L Cr  9.28 mg/dL<H> BUN 95 mg/dL<H> 04-17 Phos 5.6 mg/dL<H> 04-17 Alb 3.6 g/dL    Skin: DTI to Sacrum    Edema: None Noted Recently (as Per Documentation)     Last Bowel Movement: on 4/21    Estimated Needs:   [X] No Change Since Previous Assessment    Previous Nutrition Diagnosis:   Severe Malnutrition  Increased Nutrient Needs - Calories & Protein    Nutrition Diagnosis is [X] Ongoing - Continues on Nepro Nutrition Supplement - DC Sanju (Per Patient Request)     New Nutrition Diagnosis: [X] Not Applicable    Interventions:   1. Recommend Change Sanju  2. Recommend Continue Nutrition Plan of Care     Monitoring & Evaluation:   [X] Weights   [X] PO Intake   [X] Skin Integrity   [X] Follow Up (Per Protocol)  [X] Tolerance to Diet Prescription   [X] Other: Labs     Registered Dietitian/Nutritionist Remains Available.  Marc Francisco, SHANNAN, CDN    Phone# [phone] Nutrition Follow Up Note  Hospital Course   (Per Electronic Medical Record)    Source:  Patient [X]  Medical Record [X]      Diet:   Renal Diet w/ Thin Liquids (IDDSI Level 0)  Tolerates Diet Consistency Well  No Chewing/Swallowing Difficulties   No Recent Nausea, Vomiting, Diarrhea or Constipation (as Per Patient)   Consumes % of Meals (as Per Documentation) - States Good PO Intake/Appetite (Per Patient)  on Nepro 8oz Daily (Provides 425kcal & 19grams of Protein) - Patient Takes Nutrition Supplement   on Sanju 1 Packet Daily (Provides 90kcal & 14grams of Protein)   Declines Nutrition Supplementation - Recommend Discontinue   Obtained Food Preferences from Patient    Enteral/Parenteral Nutrition: Not Applicable    Current Weight: 203.4lb on 4/21  Obtain Weights Daily  Weight Fluctuates  Obtain New Weight to Confirm     Pertinent Medications: MEDICATIONS  (STANDING):  acetaminophen     Tablet .. 975 milliGRAM(s) Oral every 8 hours  AQUAPHOR (petrolatum Ointment) 1 Application(s) Topical two times a day  epoetin carito-epbx (RETACRIT) Injectable 06512 Unit(s) IV Push <User Schedule>  lidocaine   4% Patch 1 Patch Transdermal <User Schedule>  metoprolol tartrate 25 milliGRAM(s) Oral two times a day  Nephro-pat 1 Tablet(s) Oral daily  pantoprazole    Tablet 40 milliGRAM(s) Oral two times a day  senna 2 Tablet(s) Oral at bedtime  sevelamer carbonate 1600 milliGRAM(s) Oral three times a day with meals    MEDICATIONS  (PRN):  aluminum hydroxide/magnesium hydroxide/simethicone Suspension 30 milliLiter(s) Oral every 4 hours PRN Dyspepsia  melatonin 6 milliGRAM(s) Oral at bedtime PRN Insomnia  polyethylene glycol 3350 17 Gram(s) Oral daily PRN Constipation    Pertinent Labs:  04-23 Na138 mmol/L Glu 75 mg/dL K+ 5.2 mmol/L Cr  9.28 mg/dL<H> BUN 95 mg/dL<H> 04-17 Phos 5.6 mg/dL<H> 04-17 Alb 3.6 g/dL    Skin: DTI to Sacrum    Edema: None Noted Recently (as Per Documentation)     Last Bowel Movement: on 4/21    Estimated Needs:   [X] No Change Since Previous Assessment    Previous Nutrition Diagnosis:   Severe Malnutrition  Increased Nutrient Needs - Calories & Protein    Nutrition Diagnosis is [X] Ongoing - Continues on Nepro Nutrition Supplement - DC Sanju (Per Patient Request)     New Nutrition Diagnosis: [X] Not Applicable    Interventions:   1. Recommend DC Sanju  2. Recommend Continue Nutrition Plan of Care     Monitoring & Evaluation:   [X] Weights   [X] PO Intake   [X] Skin Integrity   [X] Follow Up (Per Protocol)  [X] Tolerance to Diet Prescription   [X] Other: Labs     Registered Dietitian/Nutritionist Remains Available.  Marc Francisco, SHANNAN, CDN    Phone# [phone]

## 2024-04-23 NOTE — PROGRESS NOTE ADULT - ASSESSMENT
BRIDGET NORTH is a 38y M with HTN, anemia of chronic disease, ESRD on HD (M/W/F via right AV fistula), left hip arthroplasty in 8/23, history of right leg fracture presented to [facility] on 3/6 after a fall during a wheelchair transfer. He was found to have a right femoral neck fracture and subacute pubic body and inferior rami fractures. He is s/p right hip hemiarthroplasty. Hospital course complicated by post-op anemia s/p transfusion, hypoglycemia, hyperkalemia, febrile 2/2 covid s/p remdisivir, and started on eliquis 2.5mg BID with negative dvts on LE duplex. Patient now admitted for a multidisciplinary rehab program. 03-27-24 @ 16:17      #Right Femoral Neck fracture   - s/p right hip hemiarthroplasty on 3/7  - pain med as below  - Dressing and staples removed on 3/22  - Comprehensive Multidisciplinary Rehab Program:  comprehensive rehab program of PT/OT   - precautions: WBAT RLE. Anterior hip.   - Right Knee pain- arthrosis/ effusion- no Fx- renal osteodystrophy- ICE/ Lidoderm/ Rehab/ ACE  - Social work to confirm DC plan- Need 24x7 assistance at home- ? Brother/ ? Cousin  - DC to home with VN services  FU with PMD/Renal as OPD  FU ortho- Dr León 1-2 weeks  FU PMR Dr Dumas 4-6 weeks  ANTICIPATE DC TO [facility]-   May need Long term care  Bed per Social Work- awaiting placement    #ESRD  - HDS via right AV fistula on m/w/f.  ( Last HD 3/27). 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   Right Knee pain- lidoderm during the day/ICE/ ACE- Better- can Obtain as OPD  Left Hip pain - all studies reviewed- recent CT 3/6- no evidence of abnormality in Left prosthesis  will follow clinically-stable, ambulating- pain less    #Sleep  - Melatonin PRN     #GI / Bowel  #dyspepsia  - Protonix  - Bowel prep overnight  - maalox    # / Bladder  - does not void ESRD    #Skin / Pressure injury  - Skin assessment on admission performed : right hip surgical site with steri strips CDI. coccyx small stage 2-using barrier cream  - Monitor Incisions:    - wound care following at [facility] for b/l sacrum/coccyx DTI wth evolution.   - wound care to follow  - turn and reposition q2h      #Diet:  - Diet Consistency: Renal restriction  - Nutrition consult    #DVT prophylaxis:   - eliquis 2.5mg BID- DCd, ASA DCd  - Last LE Doppler normal on 3/14     .

## 2024-04-23 NOTE — PROGRESS NOTE ADULT - SUBJECTIVE AND OBJECTIVE BOX
Pt Patient seen and examined at bed side. No event over night.     MEDICATIONS:   MEDICATIONS  (STANDING):  acetaminophen     Tablet .. 975 milliGRAM(s) Oral every 8 hours  AQUAPHOR (petrolatum Ointment) 1 Application(s) Topical two times a day  epoetin carito-epbx (RETACRIT) Injectable 77718 Unit(s) IV Push <User Schedule>  lidocaine   4% Patch 1 Patch Transdermal <User Schedule>  metoprolol tartrate 25 milliGRAM(s) Oral two times a day  Nephro-pat 1 Tablet(s) Oral daily  pantoprazole    Tablet 40 milliGRAM(s) Oral two times a day  senna 2 Tablet(s) Oral at bedtime  sevelamer carbonate 1600 milliGRAM(s) Oral three times a day with meals    MEDICATIONS  (PRN):  aluminum hydroxide/magnesium hydroxide/simethicone Suspension 30 milliLiter(s) Oral every 4 hours PRN Dyspepsia  melatonin 6 milliGRAM(s) Oral at bedtime PRN Insomnia  oxyCODONE    IR 5 milliGRAM(s) Oral every 6 hours PRN Severe Pain (7 - 10)  polyethylene glycol 3350 17 Gram(s) Oral daily PRN Constipation      Packed Red Cells Order:  1 Unit  Indication: Hgb <7 gm/dL  Infuse Unit : 3.5 Hours (04-18-24 @ 08:56)  Packed Red Cells Order:  1 Unit  Indication: Hgb <7 gm/dL  Infuse Unit : 3.5 Hours (04-18-24 @ 08:56)  Packed Red Cells Order:  1 Unit  Indication: Hgb <7 gm/dL  Infuse Unit : 30 Minutes (04-17-24 @ 10:08)        DIET:  Diet, Renal Restrictions:   For patients receiving Renal Replacement - No Protein Restr, No Conc K, No Conc Phos, Low Sodium  Supplement Feeding Modality:  Oral  Nepro Cans or Servings Per Day:  1       Frequency:  Daily (04-23-24 @ 11:21) [Pending Verification By Attending]  Diet, Renal Restrictions:   For patients receiving Renal Replacement - No Protein Restr, No Conc K, No Conc Phos, Low Sodium  Sanju(7 Gm Arginine/7 Gm Glut/1.2 Gm HMB     Qty per Day:  1  Supplement Feeding Modality:  Oral  Nepro Cans or Servings Per Day:  1       Frequency:  Daily (04-20-24 @ 10:01) [Active]          ALLERGIES:   Allergies    No Known Drug Allergies  shellfish (Hives)    Intolerances        VITAL SIGNS:   Vital Signs Last 24 Hrs  T(C): 36.8 (23 Apr 2024 11:30), Max: 36.8 (22 Apr 2024 20:12)  T(F): 98.3 (23 Apr 2024 11:30), Max: 98.3 (22 Apr 2024 20:12)  HR: 67 (23 Apr 2024 11:30) (67 - 90)  BP: 142/80 (23 Apr 2024 11:30) (121/64 - 142/80)  BP(mean): --  RR: 16 (23 Apr 2024 11:30) (16 - 16)  SpO2: 99% (23 Apr 2024 11:30) (97% - 99%)    Parameters below as of 23 Apr 2024 11:30  Patient On (Oxygen Delivery Method): room air      I&O's Summary      PHYSICAL EXAM:   GENERAL:  No acute distress  HEENT:  NC/AT, conjunctiva clear, sclera anicteric  CHEST:  No increased effort  HEART:  Regular rate  ABDOMEN:  Soft, non-tender, non-distended, positive bowel sounds   EXTREMITIES: No edema  SKIN:  Warm, dry  NEURO:  Calm, cooperative    LABS:                        7.5    5.96  )-----------( 235      ( 23 Apr 2024 10:08 )             23.4     Hemoglobin: 7.5 g/dL (04-23-24 @ 10:08)  Hemoglobin: 7.1 g/dL (04-22-24 @ 12:15)  Hemoglobin: 7.7 g/dL (04-21-24 @ 07:40)    04-23    138  |  101  |  95<H>  ----------------------------<  75  5.2   |  26  |  9.28<H>    Ca    9.1      23 Apr 2024 10:08  Mg     2.4     04-2        RADIOLOGY & ADDITIONAL STUDIES:

## 2024-04-23 NOTE — PROGRESS NOTE ADULT - SUBJECTIVE AND OBJECTIVE BOX
BRIDGET NORTH, 38y Male  MRN: [id number]  ATTENDING: Wm Dumas    HPI:  39yo Male PMHx HTN, anemia of chronic disease, ESRD on HD (M/W/F via right AV fistula), left hip arthroplasty in 8/23, history of right leg fracture presented to [facility] from [facility] on 3/6 after falling during a wheelchair transfer landing on his right knee. He is wheelchair bound since his left hip surgery. CT hip with acute garden type 1 minimally valgus impacted transcervical right femoral neck fracture and subacute right pubic body and inferior rami fractures. He is s/p right hip hemiarthroplasty. Post op course complicated by anemia s/p transfusion. Hospital course complicated by febrile 101.6F found to be covid positive and completed remdisivir. He was started on eliquis 2.5mg BID for elevated d-dimers and negative LE duplex. Patient was evaluated by PM&R and therapy for functional deficits, gait/ADL impairments and acute rehabilitation was recommended. Patient was medically optimized for discharge to [facility] IRU on 3/27/24.  Hematology consulted for anemia.    MEDICATIONS:  acetaminophen     Tablet .. 975 milliGRAM(s) Oral every 8 hours  aluminum hydroxide/magnesium hydroxide/simethicone Suspension 30 milliLiter(s) Oral every 4 hours PRN  AQUAPHOR (petrolatum Ointment) 1 Application(s) Topical two times a day  epoetin carito-epbx (RETACRIT) Injectable 57002 Unit(s) IV Push <User Schedule>  lidocaine   4% Patch 1 Patch Transdermal <User Schedule>  melatonin 6 milliGRAM(s) Oral at bedtime PRN  metoprolol tartrate 25 milliGRAM(s) Oral two times a day  Nephro-pat 1 Tablet(s) Oral daily  pantoprazole    Tablet 40 milliGRAM(s) Oral two times a day  polyethylene glycol 3350 17 Gram(s) Oral daily PRN  senna 2 Tablet(s) Oral at bedtime  sevelamer carbonate 1600 milliGRAM(s) Oral three times a day with meals    All other medications reviewed.    SUBJECTIVE:  awake; denies pain    VITALS:  T(C): 36.6 (04-22-24 @ 08:43), Max: 36.7 (04-21-24 @ 23:01)  T(F): 97.8 (04-22-24 @ 08:43), Max: 98.1 (04-21-24 @ 23:01)  HR: 73 (04-22-24 @ 08:43) (73 - 91)  BP: 123/72 (04-22-24 @ 08:43) (118/67 - 136/76)    PHYSICAL EXAM:  Constitutional: alert, well developed  HEENT: normocephalic, anicteric sclerae, no mucositis or thrush  Respiratory: bilateral clear to auscultation anteriorly  Cardiovascular : S1, S2 regular, rhythmic, no murmurs, gallops or rubs  Abdomen: soft, distended, + normoactive BS, no palpable HS- megaly  Extremities: no tenderness;  -c/c/e, pulses equal bilaterally    LABS:  (04-22) WBC: 4.87 K/uL,Hemoglobin: 7.1 g/dL, Hematocrit: 22.7 %,  Platelet: 205 K/uL  (04-22) Na: 143 mmol/L ; K: 4.5 mmol/L ; BUN: 78 mg/dL ; Cr: 8.15 mg/dL.    RADIOLOGY:  ACC: [id number] EXAM:  DUPLEX SCAN EXT VEINS LOWER BI   ORDERED BY: KENNA GARCIA     PROCEDURE DATE:  03/14/2024          INTERPRETATION:  CLINICAL INFORMATION: Leg swelling.    COMPARISON: None available.    TECHNIQUE: Duplex sonography of the BILATERAL LOWER extremity veins with   color and spectral Doppler, with and without compression.    FINDINGS:    RIGHT:  Normal compressibility of the RIGHT common femoral, femoral and popliteal   veins.  Doppler examination shows normal spontaneous andphasic flow.  No RIGHT calf vein thrombosis is detected.    LEFT:  Normal compressibility of the LEFT common femoral, femoral and popliteal   veins.  Doppler examination shows normal spontaneous and phasic flow.  No LEFT calf vein thrombosis is detected.    IMPRESSION:  No evidence of deep venous thrombosis in either lower extremity.

## 2024-04-24 LAB — SURGICAL PATHOLOGY STUDY: SIGNIFICANT CHANGE UP

## 2024-04-24 PROCEDURE — 99232 SBSQ HOSP IP/OBS MODERATE 35: CPT

## 2024-04-24 PROCEDURE — 99232 SBSQ HOSP IP/OBS MODERATE 35: CPT | Mod: GC

## 2024-04-24 RX ORDER — OXYCODONE HYDROCHLORIDE 5 MG/1
5 TABLET ORAL EVERY 6 HOURS
Refills: 0 | Status: DISCONTINUED | OUTPATIENT
Start: 2024-04-24 | End: 2024-04-30

## 2024-04-24 RX ORDER — OXYCODONE HYDROCHLORIDE 5 MG/1
10 TABLET ORAL EVERY 6 HOURS
Refills: 0 | Status: DISCONTINUED | OUTPATIENT
Start: 2024-04-24 | End: 2024-04-30

## 2024-04-24 RX ADMIN — Medication 975 MILLIGRAM(S): at 21:44

## 2024-04-24 RX ADMIN — Medication 975 MILLIGRAM(S): at 21:14

## 2024-04-24 RX ADMIN — PANTOPRAZOLE SODIUM 40 MILLIGRAM(S): 20 TABLET, DELAYED RELEASE ORAL at 18:04

## 2024-04-24 RX ADMIN — Medication 25 MILLIGRAM(S): at 18:04

## 2024-04-24 RX ADMIN — SEVELAMER CARBONATE 1600 MILLIGRAM(S): 2400 POWDER, FOR SUSPENSION ORAL at 08:02

## 2024-04-24 RX ADMIN — Medication 25 MILLIGRAM(S): at 07:14

## 2024-04-24 RX ADMIN — OXYCODONE HYDROCHLORIDE 5 MILLIGRAM(S): 5 TABLET ORAL at 07:13

## 2024-04-24 RX ADMIN — Medication 1 APPLICATION(S): at 18:05

## 2024-04-24 RX ADMIN — SEVELAMER CARBONATE 1600 MILLIGRAM(S): 2400 POWDER, FOR SUSPENSION ORAL at 18:04

## 2024-04-24 RX ADMIN — SEVELAMER CARBONATE 1600 MILLIGRAM(S): 2400 POWDER, FOR SUSPENSION ORAL at 12:30

## 2024-04-24 RX ADMIN — PANTOPRAZOLE SODIUM 40 MILLIGRAM(S): 20 TABLET, DELAYED RELEASE ORAL at 07:13

## 2024-04-24 RX ADMIN — Medication 1 TABLET(S): at 11:29

## 2024-04-24 NOTE — PROGRESS NOTE ADULT - SUBJECTIVE AND OBJECTIVE BOX
BRIDGET NORTH, 38y Male  MRN: [id number]  ATTENDING: Wm Dumas    HPI:  39yo Male PMHx HTN, anemia of chronic disease, ESRD on HD (M/W/F via right AV fistula), left hip arthroplasty in 8/23, history of right leg fracture presented to [facility] from [facility] on 3/6 after falling during a wheelchair transfer landing on his right knee. He is wheelchair bound since his left hip surgery. CT hip with acute garden type 1 minimally valgus impacted transcervical right femoral neck fracture and subacute right pubic body and inferior rami fractures. He is s/p right hip hemiarthroplasty. Post op course complicated by anemia s/p transfusion. Hospital course complicated by febrile 101.6F found to be covid positive and completed remdisivir. He was started on eliquis 2.5mg BID for elevated d-dimers and negative LE duplex. Patient was evaluated by PM&R and therapy for functional deficits, gait/ADL impairments and acute rehabilitation was recommended. Patient was medically optimized for discharge to [facility] IRU on 3/27/24.  Hematology consulted for anemia.    MEDICATIONS:  acetaminophen     Tablet .. 975 milliGRAM(s) Oral every 8 hours  aluminum hydroxide/magnesium hydroxide/simethicone Suspension 30 milliLiter(s) Oral every 4 hours PRN  AQUAPHOR (petrolatum Ointment) 1 Application(s) Topical two times a day  epoetin carito-epbx (RETACRIT) Injectable 11749 Unit(s) IV Push <User Schedule>  lidocaine   4% Patch 1 Patch Transdermal <User Schedule>  melatonin 6 milliGRAM(s) Oral at bedtime PRN  metoprolol tartrate 25 milliGRAM(s) Oral two times a day  Nephro-pat 1 Tablet(s) Oral daily  pantoprazole    Tablet 40 milliGRAM(s) Oral two times a day  polyethylene glycol 3350 17 Gram(s) Oral daily PRN  senna 2 Tablet(s) Oral at bedtime  sevelamer carbonate 1600 milliGRAM(s) Oral three times a day with meals    All other medications reviewed.    SUBJECTIVE:  awake; denies pain    VITALS:  T(C): 36.6 (04-22-24 @ 08:43), Max: 36.7 (04-21-24 @ 23:01)  T(F): 97.8 (04-22-24 @ 08:43), Max: 98.1 (04-21-24 @ 23:01)  HR: 73 (04-22-24 @ 08:43) (73 - 91)  BP: 123/72 (04-22-24 @ 08:43) (118/67 - 136/76)    PHYSICAL EXAM:  Constitutional: alert, well developed  HEENT: normocephalic, anicteric sclerae, no mucositis or thrush  Respiratory: bilateral clear to auscultation anteriorly  Cardiovascular : S1, S2 regular, rhythmic, no murmurs, gallops or rubs  Abdomen: soft, distended, + normoactive BS, no palpable HS- megaly  Extremities: no tenderness;  -c/c/e, pulses equal bilaterally    LABS:  (04-22) WBC: 4.87 K/uL,Hemoglobin: 7.1 g/dL, Hematocrit: 22.7 %,  Platelet: 205 K/uL  (04-22) Na: 143 mmol/L ; K: 4.5 mmol/L ; BUN: 78 mg/dL ; Cr: 8.15 mg/dL.    RADIOLOGY:  ACC: [id number] EXAM:  DUPLEX SCAN EXT VEINS LOWER BI   ORDERED BY: KENNA GARCIA     PROCEDURE DATE:  03/14/2024          INTERPRETATION:  CLINICAL INFORMATION: Leg swelling.    COMPARISON: None available.    TECHNIQUE: Duplex sonography of the BILATERAL LOWER extremity veins with   color and spectral Doppler, with and without compression.    FINDINGS:    RIGHT:  Normal compressibility of the RIGHT common femoral, femoral and popliteal   veins.  Doppler examination shows normal spontaneous andphasic flow.  No RIGHT calf vein thrombosis is detected.    LEFT:  Normal compressibility of the LEFT common femoral, femoral and popliteal   veins.  Doppler examination shows normal spontaneous and phasic flow.  No LEFT calf vein thrombosis is detected.    IMPRESSION:  No evidence of deep venous thrombosis in either lower extremity.     BRIDGET NORTH, 38y Male  MRN: [id number]  ATTENDING: Wm Dumas    HPI:  39yo Male PMHx HTN, anemia of chronic disease, ESRD on HD (M/W/F via right AV fistula), left hip arthroplasty in 8/23, history of right leg fracture presented to [facility] from [facility] on 3/6 after falling during a wheelchair transfer landing on his right knee. He is wheelchair bound since his left hip surgery. CT hip with acute garden type 1 minimally valgus impacted transcervical right femoral neck fracture and subacute right pubic body and inferior rami fractures. He is s/p right hip hemiarthroplasty. Post op course complicated by anemia s/p transfusion. Hospital course complicated by febrile 101.6F found to be covid positive and completed remdisivir. He was started on eliquis 2.5mg BID for elevated d-dimers and negative LE duplex. Patient was evaluated by PM&R and therapy for functional deficits, gait/ADL impairments and acute rehabilitation was recommended. Patient was medically optimized for discharge to [facility] IRU on 3/27/24.  Hematology consulted for anemia.    MEDICATIONS:  acetaminophen     Tablet .. 975 milliGRAM(s) Oral every 8 hours  aluminum hydroxide/magnesium hydroxide/simethicone Suspension 30 milliLiter(s) Oral every 4 hours PRN  AQUAPHOR (petrolatum Ointment) 1 Application(s) Topical two times a day  epoetin carito-epbx (RETACRIT) Injectable 02587 Unit(s) IV Push <User Schedule>  lidocaine   4% Patch 1 Patch Transdermal <User Schedule>  melatonin 6 milliGRAM(s) Oral at bedtime PRN  metoprolol tartrate 25 milliGRAM(s) Oral two times a day  Nephro-pat 1 Tablet(s) Oral daily  pantoprazole    Tablet 40 milliGRAM(s) Oral two times a day  polyethylene glycol 3350 17 Gram(s) Oral daily PRN  senna 2 Tablet(s) Oral at bedtime  sevelamer carbonate 1600 milliGRAM(s) Oral three times a day with meals    All other medications reviewed.    SUBJECTIVE:  Alert, reports no new constitutional symptoms.      VITALS:  T(C): 36.6 (04-22-24 @ 08:43), Max: 36.7 (04-21-24 @ 23:01)  T(F): 97.8 (04-22-24 @ 08:43), Max: 98.1 (04-21-24 @ 23:01)  HR: 73 (04-22-24 @ 08:43) (73 - 91)  BP: 123/72 (04-22-24 @ 08:43) (118/67 - 136/76)    PHYSICAL EXAM:  Constitutional: alert, well developed  HEENT: normocephalic, anicteric sclerae, no mucositis or thrush  Respiratory: bilateral clear to auscultation anteriorly  Cardiovascular : S1, S2 regular, rhythmic, no murmurs, gallops or rubs  Abdomen: soft, distended, + normoactive BS, no palpable HS- megaly  Extremities: no tenderness;  -c/c/e, pulses equal bilaterally    LABS:  (04-22) WBC: 4.87 K/uL,Hemoglobin: 7.1 g/dL, Hematocrit: 22.7 %,  Platelet: 205 K/uL  (04-22) Na: 143 mmol/L ; K: 4.5 mmol/L ; BUN: 78 mg/dL ; Cr: 8.15 mg/dL.    RADIOLOGY:  ACC: [id number] EXAM:  DUPLEX SCAN EXT VEINS LOWER BI   ORDERED BY: KENNA GARCIA     PROCEDURE DATE:  03/14/2024          INTERPRETATION:  CLINICAL INFORMATION: Leg swelling.    COMPARISON: None available.    TECHNIQUE: Duplex sonography of the BILATERAL LOWER extremity veins with   color and spectral Doppler, with and without compression.    FINDINGS:    RIGHT:  Normal compressibility of the RIGHT common femoral, femoral and popliteal   veins.  Doppler examination shows normal spontaneous andphasic flow.  No RIGHT calf vein thrombosis is detected.    LEFT:  Normal compressibility of the LEFT common femoral, femoral and popliteal   veins.  Doppler examination shows normal spontaneous and phasic flow.  No LEFT calf vein thrombosis is detected.    IMPRESSION:  No evidence of deep venous thrombosis in either lower extremity.

## 2024-04-24 NOTE — PROGRESS NOTE ADULT - PROBLEM SELECTOR PLAN 1
s/p egd / colon 4/19 found to have a duodenal ulcer, clean based  Keep active T&S  Monitor CBC  Transfuse for Hb <7  Check iron study   Continue to monitor Bowel movement for melena or hematochezia.   Out patient GI F/U   Repeat EGD in 4 week to reassess duodenal ulcer s/p EGD/colon 4/19 found to have a duodenal ulcer, clean based  Keep active T&S  Monitor CBC  Transfuse for Hb <7  Continue to monitor bowel movement for melena or hematochezia.   Outpatient GI F/U, should have repeat EGD in 4 weeks to reassess duodenal ulcer.

## 2024-04-24 NOTE — PROGRESS NOTE ADULT - SUBJECTIVE AND OBJECTIVE BOX
Subjective: no complaints.       MEDICATIONS  (STANDING):  acetaminophen     Tablet .. 975 milliGRAM(s) Oral every 8 hours  AQUAPHOR (petrolatum Ointment) 1 Application(s) Topical two times a day  epoetin carito-epbx (RETACRIT) Injectable 84194 Unit(s) IV Push <User Schedule>  lidocaine   4% Patch 1 Patch Transdermal <User Schedule>  metoprolol tartrate 25 milliGRAM(s) Oral two times a day  Nephro-pat 1 Tablet(s) Oral daily  pantoprazole    Tablet 40 milliGRAM(s) Oral two times a day  senna 2 Tablet(s) Oral at bedtime  sevelamer carbonate 1600 milliGRAM(s) Oral three times a day with meals    MEDICATIONS  (PRN):  aluminum hydroxide/magnesium hydroxide/simethicone Suspension 30 milliLiter(s) Oral every 4 hours PRN Dyspepsia  melatonin 6 milliGRAM(s) Oral at bedtime PRN Insomnia  oxyCODONE    IR 5 milliGRAM(s) Oral every 6 hours PRN Severe Pain (7 - 10)  polyethylene glycol 3350 17 Gram(s) Oral daily PRN Constipation          T(C): 37 (04-24-24 @ 08:34), Max: 37 (04-24-24 @ 08:34)  HR: 83 (04-24-24 @ 08:34) (67 - 92)  BP: 107/66 (04-24-24 @ 08:34) (107/66 - 144/79)  RR: 16 (04-24-24 @ 08:34) (16 - 16)  SpO2: 100% (04-24-24 @ 08:34) (97% - 100%)  Wt(kg): --        I&O's Detail    23 Apr 2024 07:01  -  24 Apr 2024 07:00  --------------------------------------------------------  IN:  Total IN: 0 mL    OUT:    Other (mL): 1500 mL  Total OUT: 1500 mL    Total NET: -1500 mL               PHYSICAL EXAM:    GENERAL: NAD  NECK: Supple, no inc in JVP  CHEST/LUNG: Clear  HEART: S1S2  ABDOMEN: Soft, Nontender, Nondistended; Bowel sounds present  EXTREMITIES:  pos edema.   R AVF t/b      LABS:  CBC Full  -  ( 23 Apr 2024 10:08 )  WBC Count : 5.96 K/uL  RBC Count : 2.40 M/uL  Hemoglobin : 7.5 g/dL  Hematocrit : 23.4 %  Platelet Count - Automated : 235 K/uL  Mean Cell Volume : 97.5 fl  Mean Cell Hemoglobin : 31.3 pg  Mean Cell Hemoglobin Concentration : 32.1 gm/dL  Auto Neutrophil # : x  Auto Lymphocyte # : x  Auto Monocyte # : x  Auto Eosinophil # : x  Auto Basophil # : x  Auto Neutrophil % : x  Auto Lymphocyte % : x  Auto Monocyte % : x  Auto Eosinophil % : x  Auto Basophil % : x    04-23    138  |  101  |  95<H>  ----------------------------<  75  5.2   |  26  |  9.28<H>    Ca    9.1      23 Apr 2024 10:08  Mg     2.4     04-22          Assessment :  HD dependent ESRD  Acute on chronic anemia. DU on EGD  R fem neck Fx    Plan:  Next HD ordered for tomorrow.   Cont [facility]

## 2024-04-24 NOTE — PROGRESS NOTE ADULT - SUBJECTIVE AND OBJECTIVE BOX
HPI:  39yo Male PMHx HTN, anemia of chronic disease, ESRD on HD (M/W/F via right AV fistula), left hip arthroplasty in 8/23, history of right leg fracture presented to [facility] from [facility] on 3/6 after falling during a wheelchair transfer landing on his right knee. He is wheelchair bound since his left hip surgery. CT hip with acute garden type 1 minimally valgus impacted transcervical right femoral neck fracture and subacute right pubic body and inferior rami fractures. He is s/p right hip hemiarthroplasty. Post op course complicated by anemia s/p transfusion. Hospital course complicated by hypoglycemia after being shifted for hyperkalemia. Further complicated by febrile 101.6F found to be covid positive and completed remdisivir. He was started on eliquis 2.5mg BID for elevated d-dimers and negative LE duplex.     Patient was evaluated by PM&R and therapy for functional deficits, gait/ADL impairments and acute rehabilitation was recommended. Patient was medically optimized for discharge to [facility] IRU on 3/27 (27 Mar 2024 15:19)      ROS/subjective:  Patient seen and evaluated in bed  EGD Friday with duodenal ulcer- cauterized  indicates inability to go home to [facility]- cousin now unable to assist  Blood work stable  For HD today  no dizziness, no headaches, no nausea, no vomiting, no SOB, no chest pain    MEDICATIONS  (STANDING):  acetaminophen     Tablet .. 975 milliGRAM(s) Oral every 8 hours  AQUAPHOR (petrolatum Ointment) 1 Application(s) Topical two times a day  epoetin carito-epbx (RETACRIT) Injectable 17571 Unit(s) IV Push <User Schedule>  lidocaine   4% Patch 1 Patch Transdermal <User Schedule>  metoprolol tartrate 25 milliGRAM(s) Oral two times a day  Nephro-pat 1 Tablet(s) Oral daily  pantoprazole    Tablet 40 milliGRAM(s) Oral two times a day  senna 2 Tablet(s) Oral at bedtime  sevelamer carbonate 1600 milliGRAM(s) Oral three times a day with meals    MEDICATIONS  (PRN):  aluminum hydroxide/magnesium hydroxide/simethicone Suspension 30 milliLiter(s) Oral every 4 hours PRN Dyspepsia  melatonin 6 milliGRAM(s) Oral at bedtime PRN Insomnia  oxyCODONE    IR 10 milliGRAM(s) Oral every 6 hours PRN Severe Pain (7 - 10)  oxyCODONE    IR 5 milliGRAM(s) Oral every 6 hours PRN Moderate Pain (4 - 6)  polyethylene glycol 3350 17 Gram(s) Oral daily PRN Constipation                            7.5    5.96  )-----------( 235      ( 23 Apr 2024 10:08 )             23.4     04-23    138  |  101  |  95<H>  ----------------------------<  75  5.2   |  26  |  9.28<H>    Ca    9.1      23 Apr 2024 10:08      Urinalysis Basic - ( 23 Apr 2024 10:08 )    Color: x / Appearance: x / SG: x / pH: x  Gluc: 75 mg/dL / Ketone: x  / Bili: x / Urobili: x   Blood: x / Protein: x / Nitrite: x   Leuk Esterase: x / RBC: x / WBC x   Sq Epi: x / Non Sq Epi: x / Bacteria: x        CAPILLARY BLOOD GLUCOSE          Vital Signs Last 24 Hrs  T(C): 37 (24 Apr 2024 08:34), Max: 37 (24 Apr 2024 08:34)  T(F): 98.6 (24 Apr 2024 08:34), Max: 98.6 (24 Apr 2024 08:34)  HR: 83 (24 Apr 2024 08:34) (70 - 92)  BP: 107/66 (24 Apr 2024 08:34) (107/66 - 144/79)  BP(mean): --  RR: 16 (24 Apr 2024 08:34) (16 - 16)  SpO2: 100% (24 Apr 2024 08:34) (98% - 100%)    Parameters below as of 24 Apr 2024 08:34  Patient On (Oxygen Delivery Method): room air        Gen - NAD, Comfortable  HEENT - NCAT, EOMI  Neck - Supple, No limited ROM  Pulm - CTAB, No wheeze, No rhonchi, No crackles  Cardiovascular - RRR, S1S2, No murmurs  Chest - good chest expansion, good respiratory effort  Abdomen - Soft, NT/ND, +BS. BM 3/26  Extremities - Right FA AV fistula + bruit and thrill. GIDEON old AV fistula not active, Right Knee with degenerative chnages  Neuro-     Cognitive - awake, alert, oriented to person, place, date, year, and situation.  Able  to follow command     Communication - Fluent, Comprehensible, No dysarthria, No aphasia        Memory:  Recent/ remote memory intact     Cranial Nerves - CN 2-12 intact. No facial asymmetry, Tongue midline, EOMI, Shoulder shrug intact     Motor -                    LEFT    UE - ShAB 5/5, EF 5/5, EE 5/5,  5/5                    RIGHT UE - ShAB 5/5, EF 5/5, EE 5/5,   5/5                    LEFT    LE - HF 4/5, KE 3+/5, DF 4/5, PF 4/5                    RIGHT LE - HF 3+/5, KE 3/5, DF 4/5, PF 4/5   moves Right knee better     Sensory - Intact bilaterally      Tone - normal  MSK: b/l hip discomfort with movement OA changes Right Knee- minimal pain  Psychiatric - Mood stable, Affect WNL  Skin: right hip surgical site with steri strips CDI. b/l sacrum/coccyx DTI improving    IDT Camilla 4/22  DC to [facility] 4/23        Continue comprehensive acute rehab program consisting of 3hrs/day of OT/PT.

## 2024-04-24 NOTE — PROGRESS NOTE ADULT - NS ATTEND AMEND GEN_ALL_CORE FT
Patient seen and examined at the bedside. Agree with the assessment and plan of RISHI Ramires.  Hb stable. No apparent GI bleeding.   Will need surveillance EGD in 4 weeks. Patient seen and examined at the bedside. Agree with the assessment and plan of RISHI Ramires.  Hb stable. No apparent GI bleeding.   Will need surveillance EGD in 4 weeks as outpatient.  GI will sign off. Please reconsult as needed.

## 2024-04-24 NOTE — PROGRESS NOTE ADULT - ASSESSMENT
A 39yo Male patient  PMHx HTN, anemia of chronic disease, ESRD on HD (M/W/F via right AV fistula), left hip arthroplasty in 8/23, history of right leg fracture presented to [facility] from [facility] on 3/6 after falling during a wheelchair transfer landing on his right knee. He is wheelchair bound since his left hip surgery. Found to have acute worsening of chronic anemia on AM labs.   S/P EGD 4/19 found to have duodenal ulcer, clean based  Pt also had colonoscopy done on 4/19/24: suboptimal prep but no gross lesions or bleeding.            38 year old male patient with PMHx HTN, anemia of chronic disease, ESRD on HD (M/W/F via right AV fistula), left hip arthroplasty in 8/23, history of right leg fracture presented to [facility] from [facility] on 3/6 after falling during a wheelchair transfer landing on his right knee. He is wheelchair bound since his left hip surgery. Found to have acute worsening of chronic anemia on AM labs.  S/P EGD 4/19 found to have duodenal ulcer, clean based. Pt also had colonoscopy done on 4/19/24: suboptimal prep but no gross lesions or bleeding.

## 2024-04-24 NOTE — PROGRESS NOTE ADULT - PROBLEM SELECTOR PLAN 1
Complex mechanism for chronic anemia associated with multiple comorbidities including ESRD.  Will continue dialysis 3 times weekly.  Goals of care/rehab meeting yesterday regarding disposition.  Hematologic profile consistent with anemia (hemoglobin 7.1 g/dL yesterday).  Continue transfusion support as needed.  Repeat CBC today. Reviewed results of blood work today consistent with slight improvement in hemoglobin at 7.5 g/dL, unchanged WBC and platelets.  Continues hemodialysis; recovering s/p right hip hemiarthroplasty on 3/7/2024.  Discharge planning in progress.  Continue Epogen and hemodialysis.  Stable from hematology perspective for discharge.

## 2024-04-24 NOTE — PROGRESS NOTE ADULT - SUBJECTIVE AND OBJECTIVE BOX
Patient seen and examined at bedside. No event over night.     MEDICATIONS:   MEDICATIONS  (STANDING):  acetaminophen     Tablet .. 975 milliGRAM(s) Oral every 8 hours  AQUAPHOR (petrolatum Ointment) 1 Application(s) Topical two times a day  epoetin carito-epbx (RETACRIT) Injectable 49823 Unit(s) IV Push <User Schedule>  lidocaine   4% Patch 1 Patch Transdermal <User Schedule>  metoprolol tartrate 25 milliGRAM(s) Oral two times a day  Nephro-pat 1 Tablet(s) Oral daily  pantoprazole    Tablet 40 milliGRAM(s) Oral two times a day  senna 2 Tablet(s) Oral at bedtime  sevelamer carbonate 1600 milliGRAM(s) Oral three times a day with meals    MEDICATIONS  (PRN):  aluminum hydroxide/magnesium hydroxide/simethicone Suspension 30 milliLiter(s) Oral every 4 hours PRN Dyspepsia  melatonin 6 milliGRAM(s) Oral at bedtime PRN Insomnia  oxyCODONE    IR 10 milliGRAM(s) Oral every 6 hours PRN Severe Pain (7 - 10)  oxyCODONE    IR 5 milliGRAM(s) Oral every 6 hours PRN Severe Pain (7 - 10)  polyethylene glycol 3350 17 Gram(s) Oral daily PRN Constipation      Packed Red Cells Order:  1 Unit  Indication: Hgb <7 gm/dL  Infuse Unit : 3.5 Hours (04-18-24 @ 08:56)  Packed Red Cells Order:  1 Unit  Indication: Hgb <7 gm/dL  Infuse Unit : 3.5 Hours (04-18-24 @ 08:56)        DIET:  Diet, Renal Restrictions:   For patients receiving Renal Replacement - No Protein Restr, No Conc K, No Conc Phos, Low Sodium  Supplement Feeding Modality:  Oral  Nepro Cans or Servings Per Day:  1       Frequency:  Daily (04-23-24 @ 11:21) [Pending Verification By Attending]  Diet, Renal Restrictions:   For patients receiving Renal Replacement - No Protein Restr, No Conc K, No Conc Phos, Low Sodium  Sanju(7 Gm Arginine/7 Gm Glut/1.2 Gm HMB     Qty per Day:  1  Supplement Feeding Modality:  Oral  Nepro Cans or Servings Per Day:  1       Frequency:  Daily (04-20-24 @ 10:01) [Active]          ALLERGIES:   Allergies    No Known Drug Allergies  shellfish (Hives)    Intolerances        VITAL SIGNS:   Vital Signs Last 24 Hrs  T(C): 37 (24 Apr 2024 08:34), Max: 37 (24 Apr 2024 08:34)  T(F): 98.6 (24 Apr 2024 08:34), Max: 98.6 (24 Apr 2024 08:34)  HR: 83 (24 Apr 2024 08:34) (70 - 92)  BP: 107/66 (24 Apr 2024 08:34) (107/66 - 144/79)  BP(mean): --  RR: 16 (24 Apr 2024 08:34) (16 - 16)  SpO2: 100% (24 Apr 2024 08:34) (97% - 100%)    Parameters below as of 24 Apr 2024 08:34  Patient On (Oxygen Delivery Method): room air      I&O's Summary    23 Apr 2024 07:01  -  24 Apr 2024 07:00  --------------------------------------------------------  IN: 0 mL / OUT: 1500 mL / NET: -1500 mL        PHYSICAL EXAM:   GENERAL:  No acute distress  HEENT:  NC/AT, conjunctiva clear, sclera anicteric  CHEST:  No increased effort  HEART:  Regular rate  ABDOMEN:  Soft, non-tender, non-distended, positive bowel sounds  EXTREMITIES: No edema, Upper extremity with AV shunt   SKIN:  Warm, dry  NEURO:  Calm, cooperative    LABS:                        7.5    5.96  )-----------( 235      ( 23 Apr 2024 10:08 )             23.4     Hemoglobin: 7.5 g/dL (04-23-24 @ 10:08)  Hemoglobin: 7.1 g/dL (04-22-24 @ 12:15)    04-23    138  |  101  |  95<H>  ----------------------------<  75  5.2   |  26  |  9.28<H>    Ca    9.1      23 Apr 2024 10:08                                                  RADIOLOGY & ADDITIONAL STUDIES:          Patient seen and examined at bedside. No event overnight. No apparent GI bleeding.       MEDICATIONS:   MEDICATIONS  (STANDING):  acetaminophen     Tablet .. 975 milliGRAM(s) Oral every 8 hours  AQUAPHOR (petrolatum Ointment) 1 Application(s) Topical two times a day  epoetin carito-epbx (RETACRIT) Injectable 78987 Unit(s) IV Push <User Schedule>  lidocaine   4% Patch 1 Patch Transdermal <User Schedule>  metoprolol tartrate 25 milliGRAM(s) Oral two times a day  Nephro-pat 1 Tablet(s) Oral daily  pantoprazole    Tablet 40 milliGRAM(s) Oral two times a day  senna 2 Tablet(s) Oral at bedtime  sevelamer carbonate 1600 milliGRAM(s) Oral three times a day with meals    MEDICATIONS  (PRN):  aluminum hydroxide/magnesium hydroxide/simethicone Suspension 30 milliLiter(s) Oral every 4 hours PRN Dyspepsia  melatonin 6 milliGRAM(s) Oral at bedtime PRN Insomnia  oxyCODONE    IR 10 milliGRAM(s) Oral every 6 hours PRN Severe Pain (7 - 10)  oxyCODONE    IR 5 milliGRAM(s) Oral every 6 hours PRN Severe Pain (7 - 10)  polyethylene glycol 3350 17 Gram(s) Oral daily PRN Constipation      DIET:  Diet, Renal Restrictions:   For patients receiving Renal Replacement - No Protein Restr, No Conc K, No Conc Phos, Low Sodium  Supplement Feeding Modality:  Oral  Nepro Cans or Servings Per Day:  1       Frequency:  Daily (04-23-24 @ 11:21) [Pending Verification By Attending]  Diet, Renal Restrictions:   For patients receiving Renal Replacement - No Protein Restr, No Conc K, No Conc Phos, Low Sodium  Sanju(7 Gm Arginine/7 Gm Glut/1.2 Gm HMB     Qty per Day:  1  Supplement Feeding Modality:  Oral  Nepro Cans or Servings Per Day:  1       Frequency:  Daily (04-20-24 @ 10:01) [Active]          ALLERGIES:   Allergies    No Known Drug Allergies  shellfish (Hives)    Intolerances        VITAL SIGNS:   Vital Signs Last 24 Hrs  T(C): 37 (24 Apr 2024 08:34), Max: 37 (24 Apr 2024 08:34)  T(F): 98.6 (24 Apr 2024 08:34), Max: 98.6 (24 Apr 2024 08:34)  HR: 83 (24 Apr 2024 08:34) (70 - 92)  BP: 107/66 (24 Apr 2024 08:34) (107/66 - 144/79)  BP(mean): --  RR: 16 (24 Apr 2024 08:34) (16 - 16)  SpO2: 100% (24 Apr 2024 08:34) (97% - 100%)    Parameters below as of 24 Apr 2024 08:34  Patient On (Oxygen Delivery Method): room air      I&O's Summary    23 Apr 2024 07:01  -  24 Apr 2024 07:00  --------------------------------------------------------  IN: 0 mL / OUT: 1500 mL / NET: -1500 mL        PHYSICAL EXAM:   GENERAL:  No acute distress  HEENT:  NC/AT, conjunctiva clear, sclera anicteric  CHEST:  No increased effort  HEART:  Regular rate  ABDOMEN:  Soft, non-tender, non-distended, positive bowel sounds  EXTREMITIES: No edema, Upper extremity with AV shunt   SKIN:  Warm, dry  NEURO:  Calm, cooperative    LABS:                        7.5    5.96  )-----------( 235      ( 23 Apr 2024 10:08 )             23.4     Hemoglobin: 7.5 g/dL (04-23-24 @ 10:08)  Hemoglobin: 7.1 g/dL (04-22-24 @ 12:15)    04-23    138  |  101  |  95<H>  ----------------------------<  75  5.2   |  26  |  9.28<H>    Ca    9.1      23 Apr 2024 10:08

## 2024-04-25 LAB
ANION GAP SERPL CALC-SCNC: 11 MMOL/L — SIGNIFICANT CHANGE UP (ref 5–17)
BUN SERPL-MCNC: 72 MG/DL — HIGH (ref 7–23)
CALCIUM SERPL-MCNC: 8.8 MG/DL — SIGNIFICANT CHANGE UP (ref 8.4–10.5)
CHLORIDE SERPL-SCNC: 103 MMOL/L — SIGNIFICANT CHANGE UP (ref 96–108)
CO2 SERPL-SCNC: 28 MMOL/L — SIGNIFICANT CHANGE UP (ref 22–31)
CREAT SERPL-MCNC: 8.78 MG/DL — HIGH (ref 0.5–1.3)
EGFR: 7 ML/MIN/1.73M2 — LOW
GLUCOSE SERPL-MCNC: 96 MG/DL — SIGNIFICANT CHANGE UP (ref 70–99)
HCT VFR BLD CALC: 21.2 % — LOW (ref 39–50)
HGB BLD-MCNC: 6.8 G/DL — CRITICAL LOW (ref 13–17)
MCHC RBC-ENTMCNC: 31.5 PG — SIGNIFICANT CHANGE UP (ref 27–34)
MCHC RBC-ENTMCNC: 32.1 GM/DL — SIGNIFICANT CHANGE UP (ref 32–36)
MCV RBC AUTO: 98.1 FL — SIGNIFICANT CHANGE UP (ref 80–100)
NRBC # BLD: 0 /100 WBCS — SIGNIFICANT CHANGE UP (ref 0–0)
PHOSPHATE SERPL-MCNC: 5.1 MG/DL — HIGH (ref 2.5–4.5)
PLATELET # BLD AUTO: 214 K/UL — SIGNIFICANT CHANGE UP (ref 150–400)
POTASSIUM SERPL-MCNC: 4.4 MMOL/L — SIGNIFICANT CHANGE UP (ref 3.5–5.3)
POTASSIUM SERPL-SCNC: 4.4 MMOL/L — SIGNIFICANT CHANGE UP (ref 3.5–5.3)
RBC # BLD: 2.16 M/UL — LOW (ref 4.2–5.8)
RBC # FLD: 17.2 % — HIGH (ref 10.3–14.5)
SODIUM SERPL-SCNC: 142 MMOL/L — SIGNIFICANT CHANGE UP (ref 135–145)
WBC # BLD: 4.39 K/UL — SIGNIFICANT CHANGE UP (ref 3.8–10.5)
WBC # FLD AUTO: 4.39 K/UL — SIGNIFICANT CHANGE UP (ref 3.8–10.5)

## 2024-04-25 PROCEDURE — 99232 SBSQ HOSP IP/OBS MODERATE 35: CPT

## 2024-04-25 PROCEDURE — 99233 SBSQ HOSP IP/OBS HIGH 50: CPT

## 2024-04-25 PROCEDURE — 99232 SBSQ HOSP IP/OBS MODERATE 35: CPT | Mod: GC

## 2024-04-25 RX ORDER — TUBERCULIN PURIFIED PROTEIN DERIVATIVE 5 [IU]/.1ML
5 INJECTION, SOLUTION INTRADERMAL ONCE
Refills: 0 | Status: COMPLETED | OUTPATIENT
Start: 2024-04-25 | End: 2024-04-25

## 2024-04-25 RX ADMIN — Medication 25 MILLIGRAM(S): at 18:15

## 2024-04-25 RX ADMIN — PANTOPRAZOLE SODIUM 40 MILLIGRAM(S): 20 TABLET, DELAYED RELEASE ORAL at 06:11

## 2024-04-25 RX ADMIN — Medication 975 MILLIGRAM(S): at 22:10

## 2024-04-25 RX ADMIN — TUBERCULIN PURIFIED PROTEIN DERIVATIVE 5 UNIT(S): 5 INJECTION, SOLUTION INTRADERMAL at 18:28

## 2024-04-25 RX ADMIN — OXYCODONE HYDROCHLORIDE 10 MILLIGRAM(S): 5 TABLET ORAL at 18:20

## 2024-04-25 RX ADMIN — ERYTHROPOIETIN 10000 UNIT(S): 10000 INJECTION, SOLUTION INTRAVENOUS; SUBCUTANEOUS at 17:05

## 2024-04-25 RX ADMIN — SEVELAMER CARBONATE 1600 MILLIGRAM(S): 2400 POWDER, FOR SUSPENSION ORAL at 18:15

## 2024-04-25 RX ADMIN — Medication 25 MILLIGRAM(S): at 06:11

## 2024-04-25 RX ADMIN — Medication 1 TABLET(S): at 12:16

## 2024-04-25 RX ADMIN — PANTOPRAZOLE SODIUM 40 MILLIGRAM(S): 20 TABLET, DELAYED RELEASE ORAL at 18:15

## 2024-04-25 RX ADMIN — Medication 1 APPLICATION(S): at 18:16

## 2024-04-25 RX ADMIN — SEVELAMER CARBONATE 1600 MILLIGRAM(S): 2400 POWDER, FOR SUSPENSION ORAL at 08:31

## 2024-04-25 RX ADMIN — OXYCODONE HYDROCHLORIDE 10 MILLIGRAM(S): 5 TABLET ORAL at 19:00

## 2024-04-25 RX ADMIN — LIDOCAINE 1 PATCH: 4 CREAM TOPICAL at 06:10

## 2024-04-25 RX ADMIN — Medication 1 APPLICATION(S): at 06:13

## 2024-04-25 RX ADMIN — SEVELAMER CARBONATE 1600 MILLIGRAM(S): 2400 POWDER, FOR SUSPENSION ORAL at 12:17

## 2024-04-25 RX ADMIN — OXYCODONE HYDROCHLORIDE 5 MILLIGRAM(S): 5 TABLET ORAL at 22:10

## 2024-04-25 RX ADMIN — Medication 975 MILLIGRAM(S): at 06:41

## 2024-04-25 RX ADMIN — Medication 975 MILLIGRAM(S): at 06:11

## 2024-04-25 NOTE — PROGRESS NOTE ADULT - SUBJECTIVE AND OBJECTIVE BOX
BRIDGET NORTH, 38y Male  MRN: [id number]  ATTENDING: Wm Dumas    HPI:  39yo Male PMHx HTN, anemia of chronic disease, ESRD on HD (M/W/F via right AV fistula), left hip arthroplasty in 8/23, history of right leg fracture presented to [facility] from [facility] on 3/6 after falling during a wheelchair transfer landing on his right knee. He is wheelchair bound since his left hip surgery. CT hip with acute garden type 1 minimally valgus impacted transcervical right femoral neck fracture and subacute right pubic body and inferior rami fractures. He is s/p right hip hemiarthroplasty. Post op course complicated by anemia s/p transfusion. Hospital course complicated by febrile 101.6F found to be covid positive and completed remdisivir. He was started on eliquis 2.5mg BID for elevated d-dimers and negative LE duplex. Patient was evaluated by PM&R and therapy for functional deficits, gait/ADL impairments and acute rehabilitation was recommended. Patient was medically optimized for discharge to [facility] IRU on 3/27/24.  Hematology consulted for anemia.    MEDICATIONS:  acetaminophen     Tablet .. 975 milliGRAM(s) Oral every 8 hours  aluminum hydroxide/magnesium hydroxide/simethicone Suspension 30 milliLiter(s) Oral every 4 hours PRN  AQUAPHOR (petrolatum Ointment) 1 Application(s) Topical two times a day  epoetin carito-epbx (RETACRIT) Injectable 74664 Unit(s) IV Push <User Schedule>  lidocaine   4% Patch 1 Patch Transdermal <User Schedule>  melatonin 6 milliGRAM(s) Oral at bedtime PRN  metoprolol tartrate 25 milliGRAM(s) Oral two times a day  Nephro-pat 1 Tablet(s) Oral daily  pantoprazole    Tablet 40 milliGRAM(s) Oral two times a day  polyethylene glycol 3350 17 Gram(s) Oral daily PRN  senna 2 Tablet(s) Oral at bedtime  sevelamer carbonate 1600 milliGRAM(s) Oral three times a day with meals    All other medications reviewed.    SUBJECTIVE:  Reports no events over night    VITALS:  T(C): 36.6 (04-22-24 @ 08:43), Max: 36.7 (04-21-24 @ 23:01)  T(F): 97.8 (04-22-24 @ 08:43), Max: 98.1 (04-21-24 @ 23:01)  HR: 73 (04-22-24 @ 08:43) (73 - 91)  BP: 123/72 (04-22-24 @ 08:43) (118/67 - 136/76)    PHYSICAL EXAM:  Constitutional: alert, well developed  HEENT: normocephalic, anicteric sclerae, no mucositis or thrush  Respiratory: bilateral clear to auscultation anteriorly  Cardiovascular : S1, S2 regular, rhythmic, no murmurs, gallops or rubs  Abdomen: soft, distended, + normoactive BS, no palpable HS- megaly  Extremities: no tenderness;  -c/c/e, pulses equal bilaterally    LABS:  (04-22) WBC: 4.87 K/uL,Hemoglobin: 7.1 g/dL, Hematocrit: 22.7 %,  Platelet: 205 K/uL  (04-22) Na: 143 mmol/L ; K: 4.5 mmol/L ; BUN: 78 mg/dL ; Cr: 8.15 mg/dL.    RADIOLOGY:  ACC: [id number] EXAM:  DUPLEX SCAN EXT VEINS LOWER BI   ORDERED BY: KENNA GARCIA   PROCEDURE DATE:  03/14/2024    INTERPRETATION:  CLINICAL INFORMATION: Leg swelling.  COMPARISON: None available.  TECHNIQUE: Duplex sonography of the BILATERAL LOWER extremity veins with   color and spectral Doppler, with and without compression.  FINDINGS:  RIGHT:  Normal compressibility of the RIGHT common femoral, femoral and popliteal   veins.  Doppler examination shows normal spontaneous andphasic flow.  No RIGHT calf vein thrombosis is detected.    LEFT:  Normal compressibility of the LEFT common femoral, femoral and popliteal   veins.  Doppler examination shows normal spontaneous and phasic flow.  No LEFT calf vein thrombosis is detected.    IMPRESSION:  No evidence of deep venous thrombosis in either lower extremity.

## 2024-04-25 NOTE — PROGRESS NOTE ADULT - ASSESSMENT
38y M with HTN, anemia of chronic disease, ESRD on HD (M/W/F via right AV fistula), left hip arthroplasty in 8/23, history of right leg fracture presented to [facility] on 3/6 after a fall during a wheelchair transfer. He was found to have a right femoral neck fracture and subacute pubic body and inferior rami fractures. He is s/p right hip hemiarthroplasty. Patient now admitted for a multidisciplinary rehab program- pt/ot/dvt ppx    #Acute blood loss anemia likely due to GI bleed  - S/p two units PRBC 4/18/24, 1 unit PRBC 4/25  - Will monitor Hg/hct, transfuse if Hg <7, CBC noted improvement  - EGD/colonoscopy 4/19; found to have duodenal ulcer  - C/w protonix BID  - f/u with GI team  - hematology recs noted  - Holding ASA/AC for now    #Right Femoral Neck fracture   - s/p right hip hemiarthroplasty on 3/7  - precautions: WBAT RLE, fall precautions  - pain management per rehab     #ESRD  #hyperkalemia  - HDS via right AV fistula on m/w/f.  ( Last HD 3/29). Old HDS left UA  - HD per Dr Blanco  - sevelamer   - Retacrit m/w/f    #HTN  - Metoprolol 25mg BID with hold parameters    VTE PPX- PAS

## 2024-04-25 NOTE — PROGRESS NOTE ADULT - SUBJECTIVE AND OBJECTIVE BOX
HPI:  37yo Male PMHx HTN, anemia of chronic disease, ESRD on HD (M/W/F via right AV fistula), left hip arthroplasty in 8/23, history of right leg fracture presented to [facility] from [facility] on 3/6 after falling during a wheelchair transfer landing on his right knee. He is wheelchair bound since his left hip surgery. CT hip with acute garden type 1 minimally valgus impacted transcervical right femoral neck fracture and subacute right pubic body and inferior rami fractures. He is s/p right hip hemiarthroplasty. Post op course complicated by anemia s/p transfusion. Hospital course complicated by hypoglycemia after being shifted for hyperkalemia. Further complicated by febrile 101.6F found to be covid positive and completed remdisivir. He was started on eliquis 2.5mg BID for elevated d-dimers and negative LE duplex.     Patient was evaluated by PM&R and therapy for functional deficits, gait/ADL impairments and acute rehabilitation was recommended. Patient was medically optimized for discharge to [facility] IRU on 3/27 (27 Mar 2024 15:19)      ROS/subjective:  Patient seen and evaluated in bed  BM yesterday, no melena  indicates inability to go home to [facility]- cousin now unable to assist  Blood work stable  no dizziness, no headaches, no nausea, no vomiting, no SOB, no chest pain  dc planning JUSTINE-PPD ordered      MEDICATIONS  (STANDING):  acetaminophen     Tablet .. 975 milliGRAM(s) Oral every 8 hours  AQUAPHOR (petrolatum Ointment) 1 Application(s) Topical two times a day  epoetin carito-epbx (RETACRIT) Injectable 07322 Unit(s) IV Push <User Schedule>  lidocaine   4% Patch 1 Patch Transdermal <User Schedule>  metoprolol tartrate 25 milliGRAM(s) Oral two times a day  Nephro-pat 1 Tablet(s) Oral daily  pantoprazole    Tablet 40 milliGRAM(s) Oral two times a day  PPD  5 Tuberculin Unit(s) Injectable 5 Unit(s) IntraDermal once  senna 2 Tablet(s) Oral at bedtime  sevelamer carbonate 1600 milliGRAM(s) Oral three times a day with meals    MEDICATIONS  (PRN):  aluminum hydroxide/magnesium hydroxide/simethicone Suspension 30 milliLiter(s) Oral every 4 hours PRN Dyspepsia  melatonin 6 milliGRAM(s) Oral at bedtime PRN Insomnia  oxyCODONE    IR 10 milliGRAM(s) Oral every 6 hours PRN Severe Pain (7 - 10)  oxyCODONE    IR 5 milliGRAM(s) Oral every 6 hours PRN Moderate Pain (4 - 6)  polyethylene glycol 3350 17 Gram(s) Oral daily PRN Constipation      Vital Signs Last 24 Hrs  T(C): 36.9 (25 Apr 2024 08:19), Max: 37 (24 Apr 2024 20:49)  T(F): 98.5 (25 Apr 2024 08:19), Max: 98.6 (24 Apr 2024 20:49)  HR: 73 (25 Apr 2024 08:19) (73 - 91)  BP: 133/72 (25 Apr 2024 08:19) (118/67 - 155/84)  BP(mean): --  RR: 16 (25 Apr 2024 08:19) (16 - 17)  SpO2: 100% (25 Apr 2024 08:19) (99% - 100%)    Parameters below as of 25 Apr 2024 08:19  Patient On (Oxygen Delivery Method): room air      Gen - NAD, Comfortable  HEENT - NCAT, EOMI  Neck - Supple, No limited ROM  Pulm - CTAB, No wheeze, No rhonchi, No crackles  Cardiovascular - RRR, S1S2, No murmurs  Chest - good chest expansion, good respiratory effort  Abdomen - Soft, NT/ND, +BS. BM 3/26  Extremities - Right FA AV fistula + bruit and thrill. GIDEON old AV fistula not active, Right Knee with degenerative chnages  Neuro-     Cognitive - awake, alert, oriented to person, place, date, year, and situation.  Able  to follow command     Communication - Fluent, Comprehensible, No dysarthria, No aphasia        Memory:  Recent/ remote memory intact     Cranial Nerves - CN 2-12 intact. No facial asymmetry, Tongue midline, EOMI, Shoulder shrug intact     Motor -                    LEFT    UE - ShAB 5/5, EF 5/5, EE 5/5,  5/5                    RIGHT UE - ShAB 5/5, EF 5/5, EE 5/5,   5/5                    LEFT    LE - HF 4/5, KE 3+/5, DF 4/5, PF 4/5                    RIGHT LE - HF 3+/5, KE 3/5, DF 4/5, PF 4/5   moves Right knee better     Sensory - Intact bilaterally      Tone - normal  MSK: b/l hip discomfort with movement OA changes Right Knee- minimal pain  Psychiatric - Mood stable, Affect WNL  Skin: right hip surgical site with steri strips CDI. b/l sacrum/coccyx DTI improving    IDT [place] 4/22  DC to [facility] 4/23      Continue comprehensive acute rehab program consisting of 3hrs/day of OT/PT. HPI:  37yo Male PMHx HTN, anemia of chronic disease, ESRD on HD (M/W/F via right AV fistula), left hip arthroplasty in 8/23, history of right leg fracture presented to [facility] from [facility] on 3/6 after falling during a wheelchair transfer landing on his right knee. He is wheelchair bound since his left hip surgery. CT hip with acute garden type 1 minimally valgus impacted transcervical right femoral neck fracture and subacute right pubic body and inferior rami fractures. He is s/p right hip hemiarthroplasty. Post op course complicated by anemia s/p transfusion. Hospital course complicated by hypoglycemia after being shifted for hyperkalemia. Further complicated by febrile 101.6F found to be covid positive and completed remdisivir. He was started on eliquis 2.5mg BID for elevated d-dimers and negative LE duplex.     Patient was evaluated by PM&R and therapy for functional deficits, gait/ADL impairments and acute rehabilitation was recommended. Patient was medically optimized for discharge to [facility] IRU on 3/27 (27 Mar 2024 15:19)      ROS/subjective:  Patient seen and evaluated in bed  BM yesterday, no melena  indicates inability to go home to [facility]- cousin now unable to assist  Blood work stable  no dizziness, no headaches, no nausea, no vomiting, no SOB, no chest pain  dc planning JUSTINE-PPD ordered      MEDICATIONS  (STANDING):  acetaminophen     Tablet .. 975 milliGRAM(s) Oral every 8 hours  AQUAPHOR (petrolatum Ointment) 1 Application(s) Topical two times a day  epoetin carito-epbx (RETACRIT) Injectable 45292 Unit(s) IV Push <User Schedule>  lidocaine   4% Patch 1 Patch Transdermal <User Schedule>  metoprolol tartrate 25 milliGRAM(s) Oral two times a day  Nephro-pat 1 Tablet(s) Oral daily  pantoprazole    Tablet 40 milliGRAM(s) Oral two times a day  PPD  5 Tuberculin Unit(s) Injectable 5 Unit(s) IntraDermal once  senna 2 Tablet(s) Oral at bedtime  sevelamer carbonate 1600 milliGRAM(s) Oral three times a day with meals    MEDICATIONS  (PRN):  aluminum hydroxide/magnesium hydroxide/simethicone Suspension 30 milliLiter(s) Oral every 4 hours PRN Dyspepsia  melatonin 6 milliGRAM(s) Oral at bedtime PRN Insomnia  oxyCODONE    IR 10 milliGRAM(s) Oral every 6 hours PRN Severe Pain (7 - 10)  oxyCODONE    IR 5 milliGRAM(s) Oral every 6 hours PRN Moderate Pain (4 - 6)  polyethylene glycol 3350 17 Gram(s) Oral daily PRN Constipation      Vital Signs Last 24 Hrs  T(C): 36.9 (25 Apr 2024 08:19), Max: 37 (24 Apr 2024 20:49)  T(F): 98.5 (25 Apr 2024 08:19), Max: 98.6 (24 Apr 2024 20:49)  HR: 73 (25 Apr 2024 08:19) (73 - 91)  BP: 133/72 (25 Apr 2024 08:19) (118/67 - 155/84)  BP(mean): --  RR: 16 (25 Apr 2024 08:19) (16 - 17)  SpO2: 100% (25 Apr 2024 08:19) (99% - 100%)    Parameters below as of 25 Apr 2024 08:19  Patient On (Oxygen Delivery Method): room air      Gen - NAD, Comfortable  HEENT - NCAT, EOMI  Neck - Supple, No limited ROM  Pulm - CTAB, No wheeze, No rhonchi, No crackles  Cardiovascular - RRR, S1S2, No murmurs  Chest - good chest expansion, good respiratory effort  Abdomen - Soft, NT/ND, +BS. BM 3/26  Extremities - Right FA AV fistula + bruit and thrill. GIDEON old AV fistula not active, Right Knee with degenerative chnages  Neuro-     Cognitive - awake, alert, oriented to person, place, date, year, and situation.  Able  to follow command     Communication - Fluent, Comprehensible, No dysarthria, No aphasia        Memory:  Recent/ remote memory intact     Cranial Nerves - CN 2-12 intact. No facial asymmetry, Tongue midline, EOMI, Shoulder shrug intact     Motor -                    LEFT    UE - ShAB 5/5, EF 5/5, EE 5/5,  5/5                    RIGHT UE - ShAB 5/5, EF 5/5, EE 5/5,   5/5                    LEFT    LE - HF 4/5, KE 3+/5, DF 4/5, PF 4/5                    RIGHT LE - HF 3+/5, KE 3/5, DF 4/5, PF 4/5   moves Right knee better     Sensory - Intact bilaterally      Tone - normal  MSK: b/l hip discomfort with movement OA changes Right Knee- minimal pain  Psychiatric - Mood stable, Affect WNL  Skin: right hip surgical site with steri strips CDI. b/l sacrum/coccyx DTI healed    IDT [place] 4/22  DC to [facility] 4/23      Continue comprehensive acute rehab program consisting of 3hrs/day of OT/PT.

## 2024-04-25 NOTE — PROGRESS NOTE ADULT - ASSESSMENT
38 year old male patient with PMHx HTN, anemia of chronic disease, ESRD on HD (M/W/F via right AV fistula), left hip arthroplasty in 8/23, history of right leg fracture presented to [facility] from [facility] on 3/6 after falling during a wheelchair transfer landing on his right knee. He is wheelchair bound since his left hip surgery. Found to have acute worsening of chronic anemia on AM labs.  S/P EGD 4/19 found to have duodenal ulcer, clean based. Pt also had colonoscopy done on 4/19/24: suboptimal prep but no gross lesions or bleeding.

## 2024-04-25 NOTE — PROGRESS NOTE ADULT - TIME BILLING
- Ordering, reviewing, and interpreting labs, testing, and imaging.  - Independently obtaining a review of systems and performing a physical exam  - Reviewing prior hospitalization and where necessary, outpatient records.  - Counselling and educating patient and family regarding interpretation of aforementioned items and plan of care.
- Ordering, reviewing, and interpreting labs, testing, and imaging.  - Independently obtaining a review of systems and performing a physical exam  - Reviewing prior hospitalization and where necessary, outpatient records.  - Counselling and educating patient and family regarding interpretation of aforementioned items and plan of care.
Time spent includes direct patient care (interview and examination of patient), discussion with other providers, support staff and/or patient's family members, review of medical records, ordering diagnostic tests and analyzing results, and documentation.
- Ordering, reviewing, and interpreting labs, testing, and imaging.  - Independently obtaining a review of systems and performing a physical exam  - Reviewing prior hospitalization and where necessary, outpatient records.  - Counselling and educating patient and family regarding interpretation of aforementioned items and plan of care.
- Ordering, reviewing, and interpreting labs, testing, and imaging.  - Independently obtaining a review of systems and performing a physical exam  - Reviewing prior hospitalization and where necessary, outpatient records.  - Counselling and educating patient and family regarding interpretation of aforementioned items and plan of care.
Time spent includes direct patient care  (interview and examination of patient), discussion with other providers, support staff and/or patient's family members, review of medical records, ordering diagnostic tests and analyzing results, and documentation.

## 2024-04-25 NOTE — PROVIDER CONTACT NOTE (CRITICAL VALUE NOTIFICATION) - ACTION/TREATMENT ORDERED:
MD notified and spoke with patient. Patient pending blood transfusion
pt received 2 units of PRBC in dialysis. awaiting repeat labs.
1 Unit PRBC's, Stool for OB
1 unit of PRBC ordered, to be given in dialysis.

## 2024-04-25 NOTE — PROGRESS NOTE ADULT - ASSESSMENT
BRIDGET NORTH is a 38y M with HTN, anemia of chronic disease, ESRD on HD (M/W/F via right AV fistula), left hip arthroplasty in 8/23, history of right leg fracture presented to [facility] on 3/6 after a fall during a wheelchair transfer. He was found to have a right femoral neck fracture and subacute pubic body and inferior rami fractures. He is s/p right hip hemiarthroplasty. Hospital course complicated by post-op anemia s/p transfusion, hypoglycemia, hyperkalemia, febrile 2/2 covid s/p remdisivir, and started on eliquis 2.5mg BID with negative dvts on LE duplex. Patient now admitted for a multidisciplinary rehab program. 03-27-24 @ 16:17      #Right Femoral Neck fracture   - s/p right hip hemiarthroplasty on 3/7  - pain med as below  - Dressing and staples removed on 3/22  - Comprehensive Multidisciplinary Rehab Program:  comprehensive rehab program of PT/OT   - precautions: WBAT RLE. Anterior hip.   - Right Knee pain- arthrosis/ effusion- no Fx- renal osteodystrophy- ICE/ Lidoderm/ Rehab/ ACE  - Social work to confirm DC plan- DC plan JUSTINE c HD  FU with PMD/Renal as OPD  FU ortho- Dr León 1-2 weeks  FU PMR Dr Dumas 4-6 weeks      #ESRD  - HDS via right AV fistula on m/w/f.  ( Last HD 3/27). Old HDS left UA  - Nephro in  - sevelamer 800mg TID  - retacrit m/w/f  continue Epogen in HD    Acute Anemia  Guiac +  2 units [facility] 4/18  EGD/Colonoscopy today per GI- Duodenal Ulcer- sp cauterization  Protonix increased to 2x/day  Hgb stable- 7.5  needs FU appt with GI for repeat EGD in 4 weeks    #HTN  - toprol 25mg BID  - Monitor BP    #Pain  - Tylenol PRN  - oxy 10mg moderate, 15mg severe.   Right Knee pain- lidoderm during the day/ICE/ ACE- Better- can Obtain as OPD  Left Hip pain - all studies reviewed- recent CT 3/6- no evidence of abnormality in Left prosthesis  will follow clinically-stable, ambulating- pain less- Occ Oxycodone taken    #Sleep  - Melatonin PRN     #GI / Bowel  #dyspepsia  - Protonix  - maalox  - formed, brown stool yesterday, HH stable    # / Bladder  - does not void ESRD    #Skin / Pressure injury  - Skin assessment on admission performed : right hip surgical site with steri strips CDI. coccyx small stage 2-using barrier cream  - Monitor Incisions:    - wound care following at [facility] for b/l sacrum/coccyx DTI wth evolution.   - wound care to follow  - turn and reposition q2h      #Diet:  - Diet Consistency: Renal restriction  - Nutrition consult    #DVT prophylaxis:   - eliquis 2.5mg BID- DCd, ASA DCd  - Last LE Doppler normal on 3/14     .

## 2024-04-25 NOTE — PROGRESS NOTE ADULT - SUBJECTIVE AND OBJECTIVE BOX
CC: Patient is a 38y old  Male who presents with a chief complaint of Right Hip fracture s/p right hip hemiarthroplasty (26 Apr 2024 06:30)      Interval History:  Patient seen and examined at bedside.  No overnight events  No complaints this morning. Hg < 7 during HD will transfuse 1 unit PRBC    ALLERGIES:  No Known Drug Allergies  shellfish (Hives)    MEDICATIONS  (STANDING):  acetaminophen     Tablet .. 975 milliGRAM(s) Oral every 8 hours  AQUAPHOR (petrolatum Ointment) 1 Application(s) Topical two times a day  epoetin carito-epbx (RETACRIT) Injectable 52456 Unit(s) IV Push <User Schedule>  lidocaine   4% Patch 1 Patch Transdermal <User Schedule>  metoprolol tartrate 25 milliGRAM(s) Oral two times a day  Nephro-pat 1 Tablet(s) Oral daily  pantoprazole    Tablet 40 milliGRAM(s) Oral two times a day  senna 2 Tablet(s) Oral at bedtime  sevelamer carbonate 1600 milliGRAM(s) Oral three times a day with meals    MEDICATIONS  (PRN):  aluminum hydroxide/magnesium hydroxide/simethicone Suspension 30 milliLiter(s) Oral every 4 hours PRN Dyspepsia  melatonin 6 milliGRAM(s) Oral at bedtime PRN Insomnia  oxyCODONE    IR 10 milliGRAM(s) Oral every 6 hours PRN Severe Pain (7 - 10)  oxyCODONE    IR 5 milliGRAM(s) Oral every 6 hours PRN Moderate Pain (4 - 6)  polyethylene glycol 3350 17 Gram(s) Oral daily PRN Constipation    Vital Signs Last 24 Hrs  T(F): 98.7 (25 Apr 2024 20:03), Max: 98.7 (25 Apr 2024 20:03)  HR: 79 (26 Apr 2024 06:10) (73 - 93)  BP: 131/75 (26 Apr 2024 06:10) (124/69 - 137/76)  RR: 16 (26 Apr 2024 06:10) (16 - 17)  SpO2: 100% (26 Apr 2024 06:10) (97% - 100%)  I&O's Summary    25 Apr 2024 07:01  -  26 Apr 2024 06:39  --------------------------------------------------------  IN: 1136 mL / OUT: 2636 mL / NET: -1500 mL      PHYSICAL EXAM:  GENERAL: NAD  CHEST/LUNG: Clear to percussion bilaterally; No rales, rhonchi, wheezing, or rubs; normal respiratory effort, no intercostal retractions; HD access in left forearm  HEART: Regular rate and rhythm; No murmurs, rubs, or gallops; No pitting edema  ABDOMEN: Soft, Nontender, Nondistended; Bowel sounds present; No HSM or masses  PSYCH: Appropriate affect, Alert & Oriented x 3, Good Memory; Good insight    LABS:                        6.8    4.39  )-----------( 214      ( 25 Apr 2024 14:15 )             21.2       04-25    142  |  103  |  72  ----------------------------<  96  4.4   |  28  |  8.78    Ca    8.8      25 Apr 2024 14:15  Phos  5.1     04-25     Urinalysis Basic - ( 25 Apr 2024 14:15 )  Color: x / Appearance: x / SG: x / pH: x  Gluc: 96 mg/dL / Ketone: x  / Bili: x / Urobili: x   Blood: x / Protein: x / Nitrite: x   Leuk Esterase: x / RBC: x / WBC x   Sq Epi: x / Non Sq Epi: x / Bacteria: x    COVID-19 PCR: NotDetec (04-11-24 @ 10:30)  COVID-19 PCR: NotDetec (03-27-24 @ 06:47)    Care Discussed with Consultants/Other Providers: Yes

## 2024-04-25 NOTE — PROGRESS NOTE ADULT - SUBJECTIVE AND OBJECTIVE BOX
Subjective: no complaints.       MEDICATIONS  (STANDING):  acetaminophen     Tablet .. 975 milliGRAM(s) Oral every 8 hours  AQUAPHOR (petrolatum Ointment) 1 Application(s) Topical two times a day  epoetin carito-epbx (RETACRIT) Injectable 99050 Unit(s) IV Push <User Schedule>  lidocaine   4% Patch 1 Patch Transdermal <User Schedule>  metoprolol tartrate 25 milliGRAM(s) Oral two times a day  Nephro-pat 1 Tablet(s) Oral daily  pantoprazole    Tablet 40 milliGRAM(s) Oral two times a day  PPD  5 Tuberculin Unit(s) Injectable 5 Unit(s) IntraDermal once  senna 2 Tablet(s) Oral at bedtime  sevelamer carbonate 1600 milliGRAM(s) Oral three times a day with meals    MEDICATIONS  (PRN):  aluminum hydroxide/magnesium hydroxide/simethicone Suspension 30 milliLiter(s) Oral every 4 hours PRN Dyspepsia  melatonin 6 milliGRAM(s) Oral at bedtime PRN Insomnia  oxyCODONE    IR 10 milliGRAM(s) Oral every 6 hours PRN Severe Pain (7 - 10)  oxyCODONE    IR 5 milliGRAM(s) Oral every 6 hours PRN Moderate Pain (4 - 6)  polyethylene glycol 3350 17 Gram(s) Oral daily PRN Constipation          T(C): 36.9 (04-25-24 @ 08:19), Max: 37 (04-24-24 @ 20:49)  HR: 73 (04-25-24 @ 08:19) (73 - 91)  BP: 133/72 (04-25-24 @ 08:19) (118/67 - 155/84)  RR: 16 (04-25-24 @ 08:19) (16 - 17)  SpO2: 100% (04-25-24 @ 08:19) (99% - 100%)  Wt(kg): --        I&O's Detail           PHYSICAL EXAM:    GENERAL: NAD  NECK: Supple, no inc in JVP  CHEST/LUNG: Clear  HEART: S1S2  ABDOMEN: Soft, Nontender, Nondistended; Bowel sounds present  EXTREMITIES:  edema  AVF t/b        Assessment :  HD dependent ESRD  Acute on chronic anemia. DU on EGD  R fem neck Fx    Plan:  HD today as Rxed.   Cont [facility]CA

## 2024-04-25 NOTE — PROGRESS NOTE ADULT - PROBLEM SELECTOR PLAN 1
Anemia–complex mechanism in the aftermath of right femoral neck fracture/right hip hemiarthroplasty (performed 3/7/2024).  Hemoglobin <8 g/dL; patient receiving Epogen and transfusion on as-needed basis.  Continues treatment of duodenal ulcer with Protonix BID.  Plan to discharge to [facility].  Pending CBC today.

## 2024-04-25 NOTE — PROGRESS NOTE ADULT - SUBJECTIVE AND OBJECTIVE BOX
INTERVAL HPI/OVERNIGHT EVENTS:  HPI:    39 y/o male seen and examined.     MEDICATIONS  (STANDING):  acetaminophen     Tablet .. 975 milliGRAM(s) Oral every 8 hours  AQUAPHOR (petrolatum Ointment) 1 Application(s) Topical two times a day  epoetin carito-epbx (RETACRIT) Injectable 88034 Unit(s) IV Push <User Schedule>  lidocaine   4% Patch 1 Patch Transdermal <User Schedule>  metoprolol tartrate 25 milliGRAM(s) Oral two times a day  Nephro-pat 1 Tablet(s) Oral daily  pantoprazole    Tablet 40 milliGRAM(s) Oral two times a day  senna 2 Tablet(s) Oral at bedtime  sevelamer carbonate 1600 milliGRAM(s) Oral three times a day with meals    MEDICATIONS  (PRN):  aluminum hydroxide/magnesium hydroxide/simethicone Suspension 30 milliLiter(s) Oral every 4 hours PRN Dyspepsia  melatonin 6 milliGRAM(s) Oral at bedtime PRN Insomnia  oxyCODONE    IR 5 milliGRAM(s) Oral every 6 hours PRN Moderate Pain (4 - 6)  oxyCODONE    IR 10 milliGRAM(s) Oral every 6 hours PRN Severe Pain (7 - 10)  polyethylene glycol 3350 17 Gram(s) Oral daily PRN Constipation      Allergies    No Known Drug Allergies  shellfish (Hives)    Intolerances        PAST MEDICAL & SURGICAL HISTORY:  HTN (hypertension)      Stroke  age 10      Sleep apnea      Leg fracture, right      Chronic renal failure      Hemodialysis access, AV graft      ESRD (end stage renal disease) on dialysis  since 2013, M-W-F      Anemia, unspecified type      Other hyperparathyroidism      AV fistula  R arm; L arm clotted      History of left hip hemiarthroplasty      S/P parathyroidectomy    PHYSICAL EXAM:   Vital Signs:  Vital Signs Last 24 Hrs  T(C): 36.9 (25 Apr 2024 08:19), Max: 37 (24 Apr 2024 20:49)  T(F): 98.5 (25 Apr 2024 08:19), Max: 98.6 (24 Apr 2024 20:49)  HR: 73 (25 Apr 2024 08:19) (73 - 91)  BP: 133/72 (25 Apr 2024 08:19) (118/67 - 155/84)  BP(mean): --  RR: 16 (25 Apr 2024 08:19) (16 - 17)  SpO2: 100% (25 Apr 2024 08:19) (99% - 100%)    Parameters below as of 25 Apr 2024 08:19  Patient On (Oxygen Delivery Method): room air      Daily     Daily I&O's Summary      GENERAL:  Appears stated age, well-groomed, well-nourished, no distress  HEENT:  NC/AT,  conjunctivae clear and pink, no thyromegaly, nodules, adenopathy, no JVD, sclera -anicteric  CHEST:  Full & symmetric excursion, no increased effort, breath sounds clear  HEART:  Regular rhythm, S1, S2, no murmur/rub/S3/S4, no abdominal bruit, no edema  ABDOMEN:  Soft, non-tender, non-distended, normoactive bowel sounds,  no masses ,no hepato-splenomegaly, no signs of chronic liver disease  EXTEREMITIES:  no cyanosis,clubbing or edema  SKIN:  No rash/erythema/ecchymoses/petechiae/wounds/abscess/warm/dry  NEURO:  Alert, oriented, no asterixis, no tremor, no encephalopathy      LABS:              amylase   lipase  RADIOLOGY & ADDITIONAL TESTS:   GI Follow up    Patient seen and examined.     MEDICATIONS  (STANDING):  acetaminophen     Tablet .. 975 milliGRAM(s) Oral every 8 hours  AQUAPHOR (petrolatum Ointment) 1 Application(s) Topical two times a day  epoetin carito-epbx (RETACRIT) Injectable 67379 Unit(s) IV Push <User Schedule>  lidocaine   4% Patch 1 Patch Transdermal <User Schedule>  metoprolol tartrate 25 milliGRAM(s) Oral two times a day  Nephro-pat 1 Tablet(s) Oral daily  pantoprazole    Tablet 40 milliGRAM(s) Oral two times a day  senna 2 Tablet(s) Oral at bedtime  sevelamer carbonate 1600 milliGRAM(s) Oral three times a day with meals    MEDICATIONS  (PRN):  aluminum hydroxide/magnesium hydroxide/simethicone Suspension 30 milliLiter(s) Oral every 4 hours PRN Dyspepsia  melatonin 6 milliGRAM(s) Oral at bedtime PRN Insomnia  oxyCODONE    IR 5 milliGRAM(s) Oral every 6 hours PRN Moderate Pain (4 - 6)  oxyCODONE    IR 10 milliGRAM(s) Oral every 6 hours PRN Severe Pain (7 - 10)  polyethylene glycol 3350 17 Gram(s) Oral daily PRN Constipation      Allergies    No Known Drug Allergies  shellfish (Hives)    Intolerances            PHYSICAL EXAM:   Vital Signs:  Vital Signs Last 24 Hrs  T(C): 36.9 (25 Apr 2024 08:19), Max: 37 (24 Apr 2024 20:49)  T(F): 98.5 (25 Apr 2024 08:19), Max: 98.6 (24 Apr 2024 20:49)  HR: 73 (25 Apr 2024 08:19) (73 - 91)  BP: 133/72 (25 Apr 2024 08:19) (118/67 - 155/84)  BP(mean): --  RR: 16 (25 Apr 2024 08:19) (16 - 17)  SpO2: 100% (25 Apr 2024 08:19) (99% - 100%)    Parameters below as of 25 Apr 2024 08:19  Patient On (Oxygen Delivery Method): room air      Daily     Daily I&O's Summary        PHYSICAL EXAM:   GENERAL:  No acute distress  HEENT:  NC/AT, conjunctiva clear, sclera anicteric  CHEST:  No increased effort  HEART:  Regular rate  ABDOMEN:  Soft, non-tender, non-distended, positive bowel sounds  EXTREMITIES: No edema, Upper extremity with AV shunt   SKIN:  Warm, dry  NEURO:  Calm, cooperative          LABS:

## 2024-04-25 NOTE — PROGRESS NOTE ADULT - NS ATTEND AMEND GEN_ALL_CORE FT
Rehab Attending- Patient seen and examined by me- Case discussed, above note reviewed by me with modifications made    patient seen and evaluated in PT  BM Yesterday- no overnight issues  Hgb before dialysis 6.8- will transfuse additional unit PRBCs  awaiting placement in [facility] unit  Ambulated 20-30 feet RW cg today  to continue intensive rehab program    Vital Signs Last 24 Hrs  T(C): 36.9 (25 Apr 2024 08:19), Max: 37 (24 Apr 2024 20:49)  T(F): 98.5 (25 Apr 2024 08:19), Max: 98.6 (24 Apr 2024 20:49)  HR: 73 (25 Apr 2024 08:19) (73 - 91)  BP: 133/72 (25 Apr 2024 08:19) (118/67 - 155/84)  BP(mean): --  RR: 16 (25 Apr 2024 08:19) (16 - 17)  SpO2: 100% (25 Apr 2024 08:19) (99% - 100%)    Parameters below as of 25 Apr 2024 08:19  Patient On (Oxygen Delivery Method): room air      Gen - NAD, Comfortable  HEENT - NCAT, EOMI  Neck - Supple, No limited ROM  Pulm - CTAB, No wheeze, No rhonchi, No crackles  Cardiovascular - RRR, S1S2, No murmurs  Chest - good chest expansion, good respiratory effort  Abdomen - Soft, NT/ND, +BS. BM 3/26  Extremities - Right FA AV fistula + bruit and thrill. GIDEON old AV fistula not active, Right Knee with degenerative chnages  Neuro-     Cognitive - awake, alert, oriented to person, place, date, year, and situation.  Able  to follow command     Communication - Fluent, Comprehensible, No dysarthria, No aphasia        Memory:  Recent/ remote memory intact     Cranial Nerves - CN 2-12 intact. No facial asymmetry, Tongue midline, EOMI, Shoulder shrug intact     Motor -                    LEFT    UE - ShAB 5/5, EF 5/5, EE 5/5,  5/5                    RIGHT UE - ShAB 5/5, EF 5/5, EE 5/5,   5/5                    LEFT    LE - HF 4/5, KE 3+/5, DF 4/5, PF 4/5                    RIGHT LE - HF 3+/5, KE 3/5, DF 4/5, PF 4/5   moves Right knee better     Sensory - Intact bilaterally      Tone - normal  MSK: b/l hip discomfort with movement OA changes Right Knee- minimal pain  Psychiatric - Mood stable, Affect WNL  Skin: right hip surgical site with steri strips CDI. b/l sacrum/coccyx DTI healed

## 2024-04-25 NOTE — PROGRESS NOTE ADULT - NS ATTEND AMEND GEN_ALL_CORE FT
Agree with the assessment and plan of RISHI Breen.  Noted drop in Hb today. Monitor for signs of GI bleeding.

## 2024-04-25 NOTE — PROGRESS NOTE ADULT - PROBLEM SELECTOR PLAN 1
s/p EGD/colon 4/19 found to have a duodenal ulcer, clean based  Keep active T&S  Monitor CBC  Transfuse for Hb <7  Continue to monitor bowel movement for melena or hematochezia.   Outpatient GI F/U, should have repeat EGD in 4 weeks to reassess duodenal ulcer.

## 2024-04-26 LAB
HCT VFR BLD CALC: 25.5 % — LOW (ref 39–50)
HGB BLD-MCNC: 8.2 G/DL — LOW (ref 13–17)
MCHC RBC-ENTMCNC: 31.3 PG — SIGNIFICANT CHANGE UP (ref 27–34)
MCHC RBC-ENTMCNC: 32.2 GM/DL — SIGNIFICANT CHANGE UP (ref 32–36)
MCV RBC AUTO: 97.3 FL — SIGNIFICANT CHANGE UP (ref 80–100)
NRBC # BLD: 0 /100 WBCS — SIGNIFICANT CHANGE UP (ref 0–0)
PLATELET # BLD AUTO: 189 K/UL — SIGNIFICANT CHANGE UP (ref 150–400)
RBC # BLD: 2.62 M/UL — LOW (ref 4.2–5.8)
RBC # FLD: 18.3 % — HIGH (ref 10.3–14.5)
WBC # BLD: 4.48 K/UL — SIGNIFICANT CHANGE UP (ref 3.8–10.5)
WBC # FLD AUTO: 4.48 K/UL — SIGNIFICANT CHANGE UP (ref 3.8–10.5)

## 2024-04-26 PROCEDURE — 93970 EXTREMITY STUDY: CPT | Mod: 26

## 2024-04-26 PROCEDURE — 99232 SBSQ HOSP IP/OBS MODERATE 35: CPT

## 2024-04-26 PROCEDURE — 99233 SBSQ HOSP IP/OBS HIGH 50: CPT

## 2024-04-26 PROCEDURE — 99232 SBSQ HOSP IP/OBS MODERATE 35: CPT | Mod: GC

## 2024-04-26 RX ORDER — PANTOPRAZOLE SODIUM 20 MG/1
1 TABLET, DELAYED RELEASE ORAL
Qty: 0 | Refills: 0 | DISCHARGE
Start: 2024-04-26

## 2024-04-26 RX ADMIN — PANTOPRAZOLE SODIUM 40 MILLIGRAM(S): 20 TABLET, DELAYED RELEASE ORAL at 19:49

## 2024-04-26 RX ADMIN — OXYCODONE HYDROCHLORIDE 10 MILLIGRAM(S): 5 TABLET ORAL at 00:52

## 2024-04-26 RX ADMIN — Medication 1 APPLICATION(S): at 18:15

## 2024-04-26 RX ADMIN — PANTOPRAZOLE SODIUM 40 MILLIGRAM(S): 20 TABLET, DELAYED RELEASE ORAL at 06:13

## 2024-04-26 RX ADMIN — OXYCODONE HYDROCHLORIDE 10 MILLIGRAM(S): 5 TABLET ORAL at 08:33

## 2024-04-26 RX ADMIN — OXYCODONE HYDROCHLORIDE 10 MILLIGRAM(S): 5 TABLET ORAL at 09:15

## 2024-04-26 RX ADMIN — Medication 25 MILLIGRAM(S): at 19:50

## 2024-04-26 RX ADMIN — SEVELAMER CARBONATE 1600 MILLIGRAM(S): 2400 POWDER, FOR SUSPENSION ORAL at 17:41

## 2024-04-26 RX ADMIN — SEVELAMER CARBONATE 1600 MILLIGRAM(S): 2400 POWDER, FOR SUSPENSION ORAL at 08:26

## 2024-04-26 RX ADMIN — Medication 25 MILLIGRAM(S): at 06:13

## 2024-04-26 RX ADMIN — Medication 975 MILLIGRAM(S): at 06:12

## 2024-04-26 RX ADMIN — Medication 6 MILLIGRAM(S): at 23:55

## 2024-04-26 RX ADMIN — Medication 1 APPLICATION(S): at 06:13

## 2024-04-26 RX ADMIN — SEVELAMER CARBONATE 1600 MILLIGRAM(S): 2400 POWDER, FOR SUSPENSION ORAL at 19:49

## 2024-04-26 RX ADMIN — Medication 1 TABLET(S): at 17:40

## 2024-04-26 NOTE — PROGRESS NOTE ADULT - SUBJECTIVE AND OBJECTIVE BOX
INTERVAL HPI/OVERNIGHT EVENTS:  HPI:    38 y.o male seen and examined. no reports of GI bleed noted.     MEDICATIONS  (STANDING):  acetaminophen     Tablet .. 975 milliGRAM(s) Oral every 8 hours  AQUAPHOR (petrolatum Ointment) 1 Application(s) Topical two times a day  epoetin carito-epbx (RETACRIT) Injectable 27590 Unit(s) IV Push <User Schedule>  lidocaine   4% Patch 1 Patch Transdermal <User Schedule>  metoprolol tartrate 25 milliGRAM(s) Oral two times a day  Nephro-pat 1 Tablet(s) Oral daily  pantoprazole    Tablet 40 milliGRAM(s) Oral two times a day  senna 2 Tablet(s) Oral at bedtime  sevelamer carbonate 1600 milliGRAM(s) Oral three times a day with meals    MEDICATIONS  (PRN):  aluminum hydroxide/magnesium hydroxide/simethicone Suspension 30 milliLiter(s) Oral every 4 hours PRN Dyspepsia  melatonin 6 milliGRAM(s) Oral at bedtime PRN Insomnia  oxyCODONE    IR 10 milliGRAM(s) Oral every 6 hours PRN Severe Pain (7 - 10)  oxyCODONE    IR 5 milliGRAM(s) Oral every 6 hours PRN Moderate Pain (4 - 6)  polyethylene glycol 3350 17 Gram(s) Oral daily PRN Constipation      Allergies    No Known Drug Allergies  shellfish (Hives)    Intolerances        PAST MEDICAL & SURGICAL HISTORY:  HTN (hypertension)      Stroke  age 10      Sleep apnea      Leg fracture, right      Chronic renal failure      Hemodialysis access, AV graft      ESRD (end stage renal disease) on dialysis  since , M-W-F      Anemia, unspecified type      Other hyperparathyroidism      AV fistula  R arm; L arm clotted      History of left hip hemiarthroplasty      S/P parathyroidectomy	    PHYSICAL EXAM:   Vital Signs:  Vital Signs Last 24 Hrs  T(C): 36.9 (2024 07:55), Max: 37.1 (2024 20:03)  T(F): 98.4 (2024 07:55), Max: 98.7 (2024 20:03)  HR: 77 (2024 07:55) (74 - 93)  BP: 125/68 (2024 07:55) (124/69 - 137/76)  BP(mean): --  RR: 16 (2024 07:55) (16 - 17)  SpO2: 99% (2024 07:55) (97% - 100%)    Parameters below as of 2024 07:55  Patient On (Oxygen Delivery Method): room air      Daily     Daily Weight in k.5 (2024 17:15)I&O's Summary    2024 07:01  -  2024 07:00  --------------------------------------------------------  IN: 1136 mL / OUT: 2636 mL / NET: -1500 mL        GENERAL:  Appears stated age,   HEENT:  NC/AT,  conjunctivae clear and pink,   CHEST:  Full & symmetric excursion, no increased effort, breath sounds clear  HEART:  Regular rhythm, S1, S2, no murmur  ABDOMEN:  Soft, non-tender, non-distended, normoactive bowel sounds,   EXTEREMITIES:  no edema  SKIN:  No rash/warm/dry  NEURO:  Alert, oriented,       LABS:                        6.8    4.39  )-----------( 214      ( 2024 14:15 )             21.2     04-25    142  |  103  |  72<H>  ----------------------------<  96  4.4   |  28  |  8.78<H>    Ca    8.8      2024 14:15  Phos  5.1     04-25        Urinalysis Basic - ( 2024 14:15 )    Color: x / Appearance: x / SG: x / pH: x  Gluc: 96 mg/dL / Ketone: x  / Bili: x / Urobili: x   Blood: x / Protein: x / Nitrite: x   Leuk Esterase: x / RBC: x / WBC x   Sq Epi: x / Non Sq Epi: x / Bacteria: x      amylase   lipase  RADIOLOGY & ADDITIONAL TESTS:   GI follow up    Patient seen and examined at the bedside. No reported GI bleed.         MEDICATIONS  (STANDING):  acetaminophen     Tablet .. 975 milliGRAM(s) Oral every 8 hours  AQUAPHOR (petrolatum Ointment) 1 Application(s) Topical two times a day  epoetin carito-epbx (RETACRIT) Injectable 39914 Unit(s) IV Push <User Schedule>  lidocaine   4% Patch 1 Patch Transdermal <User Schedule>  metoprolol tartrate 25 milliGRAM(s) Oral two times a day  Nephro-pat 1 Tablet(s) Oral daily  pantoprazole    Tablet 40 milliGRAM(s) Oral two times a day  senna 2 Tablet(s) Oral at bedtime  sevelamer carbonate 1600 milliGRAM(s) Oral three times a day with meals    MEDICATIONS  (PRN):  aluminum hydroxide/magnesium hydroxide/simethicone Suspension 30 milliLiter(s) Oral every 4 hours PRN Dyspepsia  melatonin 6 milliGRAM(s) Oral at bedtime PRN Insomnia  oxyCODONE    IR 10 milliGRAM(s) Oral every 6 hours PRN Severe Pain (7 - 10)  oxyCODONE    IR 5 milliGRAM(s) Oral every 6 hours PRN Moderate Pain (4 - 6)  polyethylene glycol 3350 17 Gram(s) Oral daily PRN Constipation      Allergies    No Known Drug Allergies  shellfish (Hives)    Intolerances            PHYSICAL EXAM:   Vital Signs:  Vital Signs Last 24 Hrs  T(C): 36.9 (2024 07:55), Max: 37.1 (2024 20:03)  T(F): 98.4 (2024 07:55), Max: 98.7 (2024 20:03)  HR: 77 (2024 07:55) (74 - 93)  BP: 125/68 (2024 07:55) (124/69 - 137/76)  BP(mean): --  RR: 16 (2024 07:55) (16 - 17)  SpO2: 99% (2024 07:55) (97% - 100%)    Parameters below as of 2024 07:55  Patient On (Oxygen Delivery Method): room air      Daily     Daily Weight in k.5 (2024 17:15)I&O's Summary    2024 07: 07:00  --------------------------------------------------------  IN: 1136 mL / OUT: 2636 mL / NET: -1500 mL        GENERAL:  No acute distress  HEENT:  NC/AT, conjunctiva clear, sclera anicteric  CHEST:  No increased effort  HEART:  Regular rate  ABDOMEN:  Soft, non-tender, non-distended, positive bowel sounds   EXTREMITIES: No edema  SKIN:  Warm, dry  NEURO:  Calm, cooperative        LABS:                   Hemoglobin: 8.2 g/dL (24 @ 14:50)   Hemoglobin: 6.8 g/dL (24 @ 14:15)   Hemoglobin: 7.5 g/dL (24 @ 10:08)   Hemoglobin: 7.1 g/dL (24 @ 12:15)           142  |  103  |  72<H>  ----------------------------<  96  4.4   |  28  |  8.78<H>    Ca    8.8      2024 14:15  Phos  5.1

## 2024-04-26 NOTE — PROGRESS NOTE ADULT - ASSESSMENT
38y M with HTN, anemia of chronic disease, ESRD on HD (M/W/F via right AV fistula), left hip arthroplasty in 8/23, history of right leg fracture presented to [facility] on 3/6 after a fall during a wheelchair transfer. He was found to have a right femoral neck fracture and subacute pubic body and inferior rami fractures. He is s/p right hip hemiarthroplasty. Patient now admitted for a multidisciplinary rehab program- pt/ot/dvt ppx    #Acute blood loss anemia likely due to GI bleed  - s/p 3 units PRBCs to date   - Transfuse for Hb < 7, will f/u H+H today   - EGD/colonoscopy 4/19; found to have duodenal ulcer   - GI recs appreciated - repeat EGD in 4 weeks to reassess duodenal ulcer   - c/w protonix BID  - c/w epogen during HD   - Holding ASA/AC for now    #Right Femoral Neck fracture   - s/p right hip hemiarthroplasty on 3/7  - precautions: WBAT RLE, fall precautions  - Pain control     #ESRD  #hyperkalemia  - HDS via right AV fistula on m/w/f (last HD 4/25/24), next HD scheduled for 4/27/24  - Left AV fistula is old/non-functioning   - HD per Dr Blanco  - Sevelamer   - Retacrit m/w/f    #HTN  - Metoprolol 25mg BID with hold parameters    DVT ppx - SCDs

## 2024-04-26 NOTE — PROGRESS NOTE ADULT - PROBLEM SELECTOR PLAN 1
Chronic anemia in the setting of orthopedic surgery (right femoral neck fracture/right hip hemiarthroplasty) in early March 2024.  Noticed hemoglobin dropped from 7.5 to 6.8 g/dL; 1 unit PRBC ordered during hemodialysis.  Continue to monitor CBC while on erythropoietin supplementation TIW.  Discharge planning in progress.

## 2024-04-26 NOTE — PROGRESS NOTE ADULT - SUBJECTIVE AND OBJECTIVE BOX
HPI:  37yo Male PMHx HTN, anemia of chronic disease, ESRD on HD (M/W/F via right AV fistula), left hip arthroplasty in 8/23, history of right leg fracture presented to [facility] from [facility] on 3/6 after falling during a wheelchair transfer landing on his right knee. He is wheelchair bound since his left hip surgery. CT hip with acute garden type 1 minimally valgus impacted transcervical right femoral neck fracture and subacute right pubic body and inferior rami fractures. He is s/p right hip hemiarthroplasty. Post op course complicated by anemia s/p transfusion. Hospital course complicated by hypoglycemia after being shifted for hyperkalemia. Further complicated by febrile 101.6F found to be covid positive and completed remdisivir. He was started on eliquis 2.5mg BID for elevated d-dimers and negative LE duplex.     Patient was evaluated by PM&R and therapy for functional deficits, gait/ADL impairments and acute rehabilitation was recommended. Patient was medically optimized for discharge to [facility] IRU on 3/27 (27 Mar 2024 15:19)      ROS/subjective:  Patient seen and evaluated in bed  BM yesterday, no melena- stool brown but now hard  indicates inability to go home to [facility]- cousin now unable to assist  Blood work stable  no dizziness, no headaches, no nausea, no vomiting, no SOB, no chest pain  dc planning [facility]-PPD ordered      MEDICATIONS  (STANDING):  acetaminophen     Tablet .. 975 milliGRAM(s) Oral every 8 hours  AQUAPHOR (petrolatum Ointment) 1 Application(s) Topical two times a day  epoetin carito-epbx (RETACRIT) Injectable 64215 Unit(s) IV Push <User Schedule>  lidocaine   4% Patch 1 Patch Transdermal <User Schedule>  metoprolol tartrate 25 milliGRAM(s) Oral two times a day  Nephro-pat 1 Tablet(s) Oral daily  pantoprazole    Tablet 40 milliGRAM(s) Oral two times a day  senna 2 Tablet(s) Oral at bedtime  sevelamer carbonate 1600 milliGRAM(s) Oral three times a day with meals    MEDICATIONS  (PRN):  aluminum hydroxide/magnesium hydroxide/simethicone Suspension 30 milliLiter(s) Oral every 4 hours PRN Dyspepsia  melatonin 6 milliGRAM(s) Oral at bedtime PRN Insomnia  oxyCODONE    IR 5 milliGRAM(s) Oral every 6 hours PRN Moderate Pain (4 - 6)  oxyCODONE    IR 10 milliGRAM(s) Oral every 6 hours PRN Severe Pain (7 - 10)  polyethylene glycol 3350 17 Gram(s) Oral daily PRN Constipation                            6.8    4.39  )-----------( 214      ( 25 Apr 2024 14:15 )             21.2     04-25    142  |  103  |  72<H>  ----------------------------<  96  4.4   |  28  |  8.78<H>    Ca    8.8      25 Apr 2024 14:15  Phos  5.1     04-25      Urinalysis Basic - ( 25 Apr 2024 14:15 )    Color: x / Appearance: x / SG: x / pH: x  Gluc: 96 mg/dL / Ketone: x  / Bili: x / Urobili: x   Blood: x / Protein: x / Nitrite: x   Leuk Esterase: x / RBC: x / WBC x   Sq Epi: x / Non Sq Epi: x / Bacteria: x        CAPILLARY BLOOD GLUCOSE          Vital Signs Last 24 Hrs  T(C): 36.9 (26 Apr 2024 07:55), Max: 37.1 (25 Apr 2024 20:03)  T(F): 98.4 (26 Apr 2024 07:55), Max: 98.7 (25 Apr 2024 20:03)  HR: 77 (26 Apr 2024 07:55) (74 - 93)  BP: 125/68 (26 Apr 2024 07:55) (124/69 - 137/76)  BP(mean): --  RR: 16 (26 Apr 2024 07:55) (16 - 17)  SpO2: 99% (26 Apr 2024 07:55) (97% - 100%)    Parameters below as of 26 Apr 2024 07:55   Patient On (Oxygen Delivery Method): room air      Gen - NAD, Comfortable  HEENT - NCAT, EOMI  Neck - Supple, No limited ROM  Pulm - CTAB, No wheeze, No rhonchi, No crackles  Cardiovascular - RRR, S1S2, No murmurs  Chest - good chest expansion, good respiratory effort  Abdomen - Soft, NT/ND, +BS. BM 3/26  Extremities - Right FA AV fistula + bruit and thrill. GIDEON old AV fistula not active, Right Knee with degenerative changes  Neuro-     Cognitive - awake, alert, oriented to person, place, date, year, and situation.  Able  to follow command     Communication - Fluent, Comprehensible, No dysarthria, No aphasia        Memory:  Recent/ remote memory intact     Cranial Nerves - CN 2-12 intact. No facial asymmetry, Tongue midline, EOMI, Shoulder shrug intact     Motor -                    LEFT    UE - ShAB 5/5, EF 5/5, EE 5/5,  5/5                    RIGHT UE - ShAB 5/5, EF 5/5, EE 5/5,   5/5                    LEFT    LE - HF 4/5, KE 3+/5, DF 4/5, PF 4/5- NO pain with ROM Left Hip-only pain in groin on Wt bearing                    RIGHT LE - HF 3+/5, KE 3/5, DF 4/5, PF 4/5   moves Right knee better     Sensory - Intact bilaterally      Tone - normal  MSK: b/l hip discomfort with movement OA changes Right Knee- minimal pain  Psychiatric - Mood stable, Affect WNL  Skin: right hip surgical site with steri strips CDI. b/l sacrum/coccyx DTI healed    IDT [facility] 4/22  DC to [facility] 4/23      Continue comprehensive acute rehab program consisting of 3hrs/day of OT/PT.

## 2024-04-26 NOTE — PROGRESS NOTE ADULT - PROBLEM SELECTOR PLAN 1
s/p EGD/colon 4/19 found to have a duodenal ulcer, clean based  Keep active T&S  Monitor CBC  Monitor bowel movements   Transfuse for Hb <7  Continue to monitor bowel movement for melena or hematochezia.   Outpatient GI F/U, should have repeat EGD in 4 weeks to reassess duodenal ulcer.

## 2024-04-26 NOTE — PROGRESS NOTE ADULT - NS ATTEND AMEND GEN_ALL_CORE FT
Patient seen and examined at the bedside. Agree with the assessment and plan of RISHI Breen.  Drop in Hb responded to transfusion. No GI bleeding noted. Suspect this is his normal variation due to underlying renal disease.  GI will sign off. Please reconsult as needed.

## 2024-04-26 NOTE — PROGRESS NOTE ADULT - SUBJECTIVE AND OBJECTIVE BOX
CC: Patient is a 38y old  Male who presents with a chief complaint of Right Hip fracture s/p right hip hemiarthroplasty (26 Apr 2024 12:11)    Interval History:  Patient seen and examined at bedside. No overnight events per RN. Pt received 1 unit of PRBCs yesterday during HD. Pt reports left groin pain which has been present for a while now. He states it has been evaluated and a cause has not been determined. No other current complaints.     ALLERGIES:  No Known Drug Allergies  shellfish (Hives)    MEDICATIONS  (STANDING):  acetaminophen     Tablet .. 975 milliGRAM(s) Oral every 8 hours  AQUAPHOR (petrolatum Ointment) 1 Application(s) Topical two times a day  epoetin carito-epbx (RETACRIT) Injectable 73248 Unit(s) IV Push <User Schedule>  lidocaine   4% Patch 1 Patch Transdermal <User Schedule>  metoprolol tartrate 25 milliGRAM(s) Oral two times a day  Nephro-pat 1 Tablet(s) Oral daily  pantoprazole    Tablet 40 milliGRAM(s) Oral two times a day  senna 2 Tablet(s) Oral at bedtime  sevelamer carbonate 1600 milliGRAM(s) Oral three times a day with meals    MEDICATIONS  (PRN):  aluminum hydroxide/magnesium hydroxide/simethicone Suspension 30 milliLiter(s) Oral every 4 hours PRN Dyspepsia  melatonin 6 milliGRAM(s) Oral at bedtime PRN Insomnia  oxyCODONE    IR 5 milliGRAM(s) Oral every 6 hours PRN Moderate Pain (4 - 6)  oxyCODONE    IR 10 milliGRAM(s) Oral every 6 hours PRN Severe Pain (7 - 10)  polyethylene glycol 3350 17 Gram(s) Oral daily PRN Constipation    Vital Signs Last 24 Hrs  T(F): 98.4 (26 Apr 2024 07:55), Max: 98.7 (25 Apr 2024 20:03)  HR: 77 (26 Apr 2024 07:55) (74 - 93)  BP: 125/68 (26 Apr 2024 07:55) (124/69 - 137/76)  RR: 16 (26 Apr 2024 07:55) (16 - 17)  SpO2: 99% (26 Apr 2024 07:55) (97% - 100%)  I&O's Summary    25 Apr 2024 07:01 - 26 Apr 2024 07:00  --------------------------------------------------------  IN: 1136 mL / OUT: 2636 mL / NET: -1500 mL    Review of Systems:   Constitutional: negative for fatigue, negative for fever, negative for chills, negative for decreased appetite.  Skin: negative for rashes, negative for open wounds, negative for jaundice.   Eyes: negative for blurry vision, negative for double vision.   Ears, nose, throat: negative for ear pain, negative for nasal congestion, negative for sore throat, negative for lymph node swelling.   Cardiovascular: negative for chest pain, negative for palpitations, negative for lower extremity swelling.   Respiratory: negative for shortness of breath, negative for wheezing, negative for cough.   Gastrointestinal: negative for abdominal pain, negative for nausea, negative for vomiting, negative for diarrhea, negative for constipation, negative for blood in the stool, negative for black tarry stools.   Genitourinary: negative for burning on urination, negative for urinary urgency or frequency, negative for blood in the urine.   Endocrine: negative for cold intolerance, negative for heat intolerance, negative for increased thirst.   Hematologic: negative for easy bruising or bleeding.   Musculoskeletal: negative for muscle/joint pain, negative for decreased range of motion, positive left groin pain   Neurological: negative for dizziness, negative for headaches, negative for loss of consciousness, negative for motor weakness, negative for sensory deficits.   Psychiatric: negative for depression, negative for anxiety.     PHYSICAL EXAM:  General: Awake and alert, cooperative with exam. No acute distress.   Skin: Warm, dry, and pink.   Eyes: Pupils equal and reactive to light. Extraocular eye movements intact. No conjunctival injection, discharge, or scleral icterus.   HEENT: Atraumatic, normocephalic. Moist mucus membranes.   Cardiology: Normal S1, S2. No murmurs, rubs, or gallops. Regular rate and rhythm.   Respiratory: Lungs clear to ascultation bilaterally. Good air exchange. No wheezes, rales, or rhonchi. Normal chest expansion.   Gastrointestinal: Positive bowel sounds. Soft, non-tender, non-distended. No guarding, rigidity, or rebound tenderness. No hepatosplenomegaly. No hernia left groin.   Extremities: No peripheral edema bilaterally. Dorsalis pedis pulses 2+ bilaterally. RUE AVF with palpable thrill   Neurological: A+Ox3 (person, place, and time). Cranial nerves 2-12 intact. Normal speech. No facial droop. No focal neurological deficits   Psychiatric: Normal affect. Normal mood.     LABS:                        6.8    4.39  )-----------( 214      ( 25 Apr 2024 14:15 )             21.2       04-25    142  |  103  |  72  ----------------------------<  96  4.4   |  28  |  8.78    Ca    8.8      25 Apr 2024 14:15  Phos  5.1     04-25    Urinalysis Basic - ( 25 Apr 2024 14:15 )    Color: x / Appearance: x / SG: x / pH: x  Gluc: 96 mg/dL / Ketone: x  / Bili: x / Urobili: x   Blood: x / Protein: x / Nitrite: x   Leuk Esterase: x / RBC: x / WBC x   Sq Epi: x / Non Sq Epi: x / Bacteria: x    COVID-19 PCR: NotDetec (04-11-24 @ 10:30)    Care Discussed with Consultants/Other Providers: Yes

## 2024-04-26 NOTE — PROGRESS NOTE ADULT - SUBJECTIVE AND OBJECTIVE BOX
BRIDGET NORTH, 38y Male  MRN: [id number]  ATTENDING: Wm Dumas    HPI:  37yo Male PMHx HTN, anemia of chronic disease, ESRD on HD (M/W/F via right AV fistula), left hip arthroplasty in 8/23, history of right leg fracture presented to [facility] from [facility] on 3/6 after falling during a wheelchair transfer landing on his right knee. He is wheelchair bound since his left hip surgery. CT hip with acute garden type 1 minimally valgus impacted transcervical right femoral neck fracture and subacute right pubic body and inferior rami fractures. He is s/p right hip hemiarthroplasty. Post op course complicated by anemia s/p transfusion. Hospital course complicated by febrile 101.6F found to be covid positive and completed remdisivir. He was started on eliquis 2.5mg BID for elevated d-dimers and negative LE duplex. Patient was evaluated by PM&R and therapy for functional deficits, gait/ADL impairments and acute rehabilitation was recommended. Patient was medically optimized for discharge to [facility] IRU on 3/27/24.  Hematology consulted for anemia.    MEDICATIONS:  acetaminophen     Tablet .. 975 milliGRAM(s) Oral every 8 hours  aluminum hydroxide/magnesium hydroxide/simethicone Suspension 30 milliLiter(s) Oral every 4 hours PRN  AQUAPHOR (petrolatum Ointment) 1 Application(s) Topical two times a day  epoetin carito-epbx (RETACRIT) Injectable 35100 Unit(s) IV Push <User Schedule>  lidocaine   4% Patch 1 Patch Transdermal <User Schedule>  melatonin 6 milliGRAM(s) Oral at bedtime PRN  metoprolol tartrate 25 milliGRAM(s) Oral two times a day  Nephro-pat 1 Tablet(s) Oral daily  pantoprazole    Tablet 40 milliGRAM(s) Oral two times a day  polyethylene glycol 3350 17 Gram(s) Oral daily PRN  senna 2 Tablet(s) Oral at bedtime  sevelamer carbonate 1600 milliGRAM(s) Oral three times a day with meals    All other medications reviewed.    SUBJECTIVE:      VITALS:  T(C): 36.6 (04-22-24 @ 08:43), Max: 36.7 (04-21-24 @ 23:01)  T(F): 97.8 (04-22-24 @ 08:43), Max: 98.1 (04-21-24 @ 23:01)  HR: 73 (04-22-24 @ 08:43) (73 - 91)  BP: 123/72 (04-22-24 @ 08:43) (118/67 - 136/76)    PHYSICAL EXAM:  Constitutional: alert, well developed  HEENT: normocephalic, anicteric sclerae, no mucositis or thrush  Respiratory: bilateral clear to auscultation anteriorly  Cardiovascular : S1, S2 regular, rhythmic, no murmurs, gallops or rubs  Abdomen: soft, distended, + normoactive BS, no palpable HS- megaly  Extremities: no tenderness;  -c/c/e, pulses equal bilaterally    LABS:  (04-22) WBC: 4.87 K/uL,Hemoglobin: 7.1 g/dL, Hematocrit: 22.7 %,  Platelet: 205 K/uL  (04-22) Na: 143 mmol/L ; K: 4.5 mmol/L ; BUN: 78 mg/dL ; Cr: 8.15 mg/dL.    RADIOLOGY:  ACC: [id number] EXAM:  DUPLEX SCAN EXT VEINS LOWER BI   ORDERED BY: KENNA GARCIA   PROCEDURE DATE:  03/14/2024    INTERPRETATION:  CLINICAL INFORMATION: Leg swelling.  COMPARISON: None available.  TECHNIQUE: Duplex sonography of the BILATERAL LOWER extremity veins with   color and spectral Doppler, with and without compression.  FINDINGS:  RIGHT:  Normal compressibility of the RIGHT common femoral, femoral and popliteal   veins.  Doppler examination shows normal spontaneous andphasic flow.  No RIGHT calf vein thrombosis is detected.    LEFT:  Normal compressibility of the LEFT common femoral, femoral and popliteal   veins.  Doppler examination shows normal spontaneous and phasic flow.  No LEFT calf vein thrombosis is detected.    IMPRESSION:  No evidence of deep venous thrombosis in either lower extremity.

## 2024-04-26 NOTE — PROGRESS NOTE ADULT - ASSESSMENT
BRIDGET NORTH is a 38y M with HTN, anemia of chronic disease, ESRD on HD (M/W/F via right AV fistula), left hip arthroplasty in 8/23, history of right leg fracture presented to [facility] on 3/6 after a fall during a wheelchair transfer. He was found to have a right femoral neck fracture and subacute pubic body and inferior rami fractures. He is s/p right hip hemiarthroplasty. Hospital course complicated by post-op anemia s/p transfusion, hypoglycemia, hyperkalemia, febrile 2/2 covid s/p remdisivir, and started on eliquis 2.5mg BID with negative dvts on LE duplex. Patient now admitted for a multidisciplinary rehab program. 03-27-24 @ 16:17      #Right Femoral Neck fracture   - s/p right hip hemiarthroplasty on 3/7  - pain med as below  - Dressing and staples removed on 3/22  - Comprehensive Multidisciplinary Rehab Program:  comprehensive rehab program of PT/OT   - precautions: WBAT RLE. Anterior hip.   - Right Knee pain- arthrosis/ effusion- no Fx- renal osteodystrophy- ICE/ Lidoderm/ Rehab/ ACE  - Social work to confirm DC plan- DC plan JUSTINE c HD once bed available  FU with PMD/Renal as OPD  FU ortho- Dr León 1-2 weeks  FU PMR Dr Dumas 4-6 weeks      #ESRD  - HDS via right AV fistula on m/w/f.  ( Last HD 3/27). Old HDS left UA  - Nephro in  - sevelamer 800mg TID  - retacrit m/w/f  continue Epogen in HD    Acute Anemia  Guiac +  2 units PBRCs 4/18  EGD/Colonoscopy today per GI- Duodenal Ulcer- sp cauterization  Protonix increased to 2x/day  Hgb6.8 yesterday SP One unit PRBcs  for HD today- check CBC  needs FU appt with GI for repeat EGD in 4 weeks    #HTN  - toprol 25mg BID  - Monitor BP    #Pain  - Tylenol PRN  - oxy 10mg moderate, 15mg severe.   Right Knee pain- lidoderm during the day/ICE/ ACE- Better- can Obtain as OPD  Left Hip pain - all studies reviewed- recent CT 3/6- no evidence of abnormality in Left prosthesis  will follow clinically-stable, ambulating 20 ' RW- pain left Groin on Wt Bearing  OPD Ortho FU regarding Left Hip pain on DC    #Sleep  - Melatonin PRN     #GI / Bowel  #dyspepsia  - Protonix  - maalox  - formed, brown stool yesterday    # / Bladder  - does not void ESRD    #Skin / Pressure injury  - Skin assessment on admission performed : right hip surgical site with steri strips CDI. coccyx small stage 2-using barrier cream  - Monitor Incisions:    - wound care following at [facility] for b/l sacrum/coccyx DTI wth evolution.   - wound care to follow  - turn and reposition q2h      #Diet:  - Diet Consistency: Renal restriction  - Nutrition consult    #DVT prophylaxis:   - eliquis 2.5mg BID- DCd, ASA DCd  - Last LE Doppler normal on 3/14   Check BLE Duplex today- off AC    .

## 2024-04-26 NOTE — PROGRESS NOTE ADULT - SUBJECTIVE AND OBJECTIVE BOX
Subjective: HD tolerated well yest. He is without complaints today.       MEDICATIONS  (STANDING):  acetaminophen     Tablet .. 975 milliGRAM(s) Oral every 8 hours  AQUAPHOR (petrolatum Ointment) 1 Application(s) Topical two times a day  epoetin carito-epbx (RETACRIT) Injectable 24715 Unit(s) IV Push <User Schedule>  lidocaine   4% Patch 1 Patch Transdermal <User Schedule>  metoprolol tartrate 25 milliGRAM(s) Oral two times a day  Nephro-pat 1 Tablet(s) Oral daily  pantoprazole    Tablet 40 milliGRAM(s) Oral two times a day  senna 2 Tablet(s) Oral at bedtime  sevelamer carbonate 1600 milliGRAM(s) Oral three times a day with meals    MEDICATIONS  (PRN):  aluminum hydroxide/magnesium hydroxide/simethicone Suspension 30 milliLiter(s) Oral every 4 hours PRN Dyspepsia  melatonin 6 milliGRAM(s) Oral at bedtime PRN Insomnia  oxyCODONE    IR 5 milliGRAM(s) Oral every 6 hours PRN Moderate Pain (4 - 6)  oxyCODONE    IR 10 milliGRAM(s) Oral every 6 hours PRN Severe Pain (7 - 10)  polyethylene glycol 3350 17 Gram(s) Oral daily PRN Constipation          T(C): 36.9 (04-26-24 @ 07:55), Max: 37.1 (04-25-24 @ 20:03)  HR: 77 (04-26-24 @ 07:55) (74 - 93)  BP: 125/68 (04-26-24 @ 07:55) (124/69 - 137/76)  RR: 16 (04-26-24 @ 07:55) (16 - 17)  SpO2: 99% (04-26-24 @ 07:55) (97% - 100%)  Wt(kg): --        I&O's Detail    25 Apr 2024 07:01  -  26 Apr 2024 07:00  --------------------------------------------------------  IN:    Other (mL): 1136 mL  Total IN: 1136 mL    OUT:    Other (mL): 2636 mL  Total OUT: 2636 mL    Total NET: -1500 mL               PHYSICAL EXAM:    GENERAL: NAD  NECK: Supple, no inc in JVP  CHEST/LUNG: Clear  HEART: S1S2  ABDOMEN: Soft, Nontender, Nondistended; Bowel sounds present  EXTREMITIES:  edema  AVF t/b        LABS:  CBC Full  -  ( 25 Apr 2024 14:15 )  WBC Count : 4.39 K/uL  RBC Count : 2.16 M/uL  Hemoglobin : 6.8 g/dL  Hematocrit : 21.2 %  Platelet Count - Automated : 214 K/uL  Mean Cell Volume : 98.1 fl  Mean Cell Hemoglobin : 31.5 pg  Mean Cell Hemoglobin Concentration : 32.1 gm/dL  Auto Neutrophil # : x  Auto Lymphocyte # : x  Auto Monocyte # : x  Auto Eosinophil # : x  Auto Basophil # : x  Auto Neutrophil % : x  Auto Lymphocyte % : x  Auto Monocyte % : x  Auto Eosinophil % : x  Auto Basophil % : x    04-25    142  |  103  |  72<H>  ----------------------------<  96  4.4   |  28  |  8.78<H>    Ca    8.8      25 Apr 2024 14:15  Phos  5.1     04-25      Assessment :  HD dependent ESRD  Acute on chronic anemia. DU on EGD  R fem neck Fx    Plan:  Next HD tomorrow as Rxed.   Cont CUCA

## 2024-04-27 LAB
ANION GAP SERPL CALC-SCNC: 9 MMOL/L — SIGNIFICANT CHANGE UP (ref 5–17)
BUN SERPL-MCNC: 61 MG/DL — HIGH (ref 7–23)
CALCIUM SERPL-MCNC: 8.6 MG/DL — SIGNIFICANT CHANGE UP (ref 8.4–10.5)
CHLORIDE SERPL-SCNC: 103 MMOL/L — SIGNIFICANT CHANGE UP (ref 96–108)
CO2 SERPL-SCNC: 31 MMOL/L — SIGNIFICANT CHANGE UP (ref 22–31)
CREAT SERPL-MCNC: 8.5 MG/DL — HIGH (ref 0.5–1.3)
EGFR: 8 ML/MIN/1.73M2 — LOW
GLUCOSE SERPL-MCNC: 88 MG/DL — SIGNIFICANT CHANGE UP (ref 70–99)
HCT VFR BLD CALC: 23.4 % — LOW (ref 39–50)
HGB BLD-MCNC: 7.7 G/DL — LOW (ref 13–17)
MCHC RBC-ENTMCNC: 31.7 PG — SIGNIFICANT CHANGE UP (ref 27–34)
MCHC RBC-ENTMCNC: 32.9 GM/DL — SIGNIFICANT CHANGE UP (ref 32–36)
MCV RBC AUTO: 96.3 FL — SIGNIFICANT CHANGE UP (ref 80–100)
NRBC # BLD: 0 /100 WBCS — SIGNIFICANT CHANGE UP (ref 0–0)
PHOSPHATE SERPL-MCNC: 5.3 MG/DL — HIGH (ref 2.5–4.5)
PLATELET # BLD AUTO: 224 K/UL — SIGNIFICANT CHANGE UP (ref 150–400)
POTASSIUM SERPL-MCNC: 4.4 MMOL/L — SIGNIFICANT CHANGE UP (ref 3.5–5.3)
POTASSIUM SERPL-SCNC: 4.4 MMOL/L — SIGNIFICANT CHANGE UP (ref 3.5–5.3)
RBC # BLD: 2.43 M/UL — LOW (ref 4.2–5.8)
RBC # FLD: 17.8 % — HIGH (ref 10.3–14.5)
SODIUM SERPL-SCNC: 143 MMOL/L — SIGNIFICANT CHANGE UP (ref 135–145)
WBC # BLD: 4.6 K/UL — SIGNIFICANT CHANGE UP (ref 3.8–10.5)
WBC # FLD AUTO: 4.6 K/UL — SIGNIFICANT CHANGE UP (ref 3.8–10.5)

## 2024-04-27 PROCEDURE — 99232 SBSQ HOSP IP/OBS MODERATE 35: CPT

## 2024-04-27 RX ADMIN — Medication 1 APPLICATION(S): at 19:46

## 2024-04-27 RX ADMIN — PANTOPRAZOLE SODIUM 40 MILLIGRAM(S): 20 TABLET, DELAYED RELEASE ORAL at 06:43

## 2024-04-27 RX ADMIN — PANTOPRAZOLE SODIUM 40 MILLIGRAM(S): 20 TABLET, DELAYED RELEASE ORAL at 19:45

## 2024-04-27 RX ADMIN — SEVELAMER CARBONATE 1600 MILLIGRAM(S): 2400 POWDER, FOR SUSPENSION ORAL at 08:16

## 2024-04-27 RX ADMIN — SEVELAMER CARBONATE 1600 MILLIGRAM(S): 2400 POWDER, FOR SUSPENSION ORAL at 12:33

## 2024-04-27 RX ADMIN — Medication 25 MILLIGRAM(S): at 06:43

## 2024-04-27 RX ADMIN — LIDOCAINE 1 PATCH: 4 CREAM TOPICAL at 07:28

## 2024-04-27 RX ADMIN — Medication 1 TABLET(S): at 12:32

## 2024-04-27 RX ADMIN — LIDOCAINE 1 PATCH: 4 CREAM TOPICAL at 06:43

## 2024-04-27 RX ADMIN — SEVELAMER CARBONATE 1600 MILLIGRAM(S): 2400 POWDER, FOR SUSPENSION ORAL at 19:44

## 2024-04-27 RX ADMIN — ERYTHROPOIETIN 10000 UNIT(S): 10000 INJECTION, SOLUTION INTRAVENOUS; SUBCUTANEOUS at 18:14

## 2024-04-27 RX ADMIN — Medication 25 MILLIGRAM(S): at 19:45

## 2024-04-27 RX ADMIN — LIDOCAINE 1 PATCH: 4 CREAM TOPICAL at 17:28

## 2024-04-27 RX ADMIN — TUBERCULIN PURIFIED PROTEIN DERIVATIVE 5 UNIT(S): 5 INJECTION, SOLUTION INTRADERMAL at 19:55

## 2024-04-27 NOTE — PROGRESS NOTE ADULT - SUBJECTIVE AND OBJECTIVE BOX
Subjective: seen on HD. No complaints.       MEDICATIONS  (STANDING):  acetaminophen     Tablet .. 975 milliGRAM(s) Oral every 8 hours  AQUAPHOR (petrolatum Ointment) 1 Application(s) Topical two times a day  epoetin carito-epbx (RETACRIT) Injectable 85212 Unit(s) IV Push <User Schedule>  lidocaine   4% Patch 1 Patch Transdermal <User Schedule>  metoprolol tartrate 25 milliGRAM(s) Oral two times a day  Nephro-pat 1 Tablet(s) Oral daily  pantoprazole    Tablet 40 milliGRAM(s) Oral two times a day  senna 2 Tablet(s) Oral at bedtime  sevelamer carbonate 1600 milliGRAM(s) Oral three times a day with meals    MEDICATIONS  (PRN):  aluminum hydroxide/magnesium hydroxide/simethicone Suspension 30 milliLiter(s) Oral every 4 hours PRN Dyspepsia  melatonin 6 milliGRAM(s) Oral at bedtime PRN Insomnia  oxyCODONE    IR 5 milliGRAM(s) Oral every 6 hours PRN Moderate Pain (4 - 6)  oxyCODONE    IR 10 milliGRAM(s) Oral every 6 hours PRN Severe Pain (7 - 10)  polyethylene glycol 3350 17 Gram(s) Oral daily PRN Constipation          T(C): 36.8 (04-27-24 @ 07:21), Max: 37.2 (04-26-24 @ 19:45)  HR: 78 (04-27-24 @ 07:21) (78 - 94)  BP: 149/83 (04-27-24 @ 07:21) (149/83 - 151/74)  RR: 16 (04-27-24 @ 07:21) (16 - 16)  SpO2: 97% (04-27-24 @ 07:21) (97% - 99%)  Wt(kg): --        I&O's Detail           PHYSICAL EXAM:    GENERAL: NAD  NECK: Supple, no inc in JVP  CHEST/LUNG: Clear  HEART: S1S2  ABDOMEN: Soft, Nontender, Nondistended; Bowel sounds present  EXTREMITIES:  edema  AVF t/b        LABS:  CBC Full  -  ( 27 Apr 2024 15:15 )  WBC Count : 4.60 K/uL  RBC Count : 2.43 M/uL  Hemoglobin : 7.7 g/dL  Hematocrit : 23.4 %  Platelet Count - Automated : 224 K/uL  Mean Cell Volume : 96.3 fl  Mean Cell Hemoglobin : 31.7 pg  Mean Cell Hemoglobin Concentration : 32.9 gm/dL  Auto Neutrophil # : x  Auto Lymphocyte # : x  Auto Monocyte # : x  Auto Eosinophil # : x  Auto Basophil # : x  Auto Neutrophil % : x  Auto Lymphocyte % : x  Auto Monocyte % : x  Auto Eosinophil % : x  Auto Basophil % : x    04-27    143  |  103  |  61<H>  ----------------------------<  88  4.4   |  31  |  8.50<H>    Ca    8.6      27 Apr 2024 15:15  Phos  5.3     04-27        Assessment :  HD dependent ESRD  Acute on chronic anemia. DU on EGD  R fem neck Fx    Plan:  Complete HD today as Rxed. UF goal 2kg   Cont CUCA

## 2024-04-27 NOTE — PROGRESS NOTE ADULT - SUBJECTIVE AND OBJECTIVE BOX
Cc: Gait dysfunction    HPI: Patient with no new medical issues today.  Pain controlled, no chest pain, no N/V, no Fevers/Chills. No other new ROS  Has been tolerating rehabilitation program.    acetaminophen     Tablet .. 975 milliGRAM(s) Oral every 8 hours  aluminum hydroxide/magnesium hydroxide/simethicone Suspension 30 milliLiter(s) Oral every 4 hours PRN  AQUAPHOR (petrolatum Ointment) 1 Application(s) Topical two times a day  epoetin carito-epbx (RETACRIT) Injectable 35681 Unit(s) IV Push <User Schedule>  lidocaine   4% Patch 1 Patch Transdermal <User Schedule>  melatonin 6 milliGRAM(s) Oral at bedtime PRN  metoprolol tartrate 25 milliGRAM(s) Oral two times a day  Nephro-pat 1 Tablet(s) Oral daily  oxyCODONE    IR 10 milliGRAM(s) Oral every 6 hours PRN  oxyCODONE    IR 5 milliGRAM(s) Oral every 6 hours PRN  pantoprazole    Tablet 40 milliGRAM(s) Oral two times a day  polyethylene glycol 3350 17 Gram(s) Oral daily PRN  senna 2 Tablet(s) Oral at bedtime  sevelamer carbonate 1600 milliGRAM(s) Oral three times a day with meals      T(C): 36.8 (04-27-24 @ 07:21), Max: 37.2 (04-26-24 @ 19:45)  HR: 78 (04-27-24 @ 07:21) (74 - 94)  BP: 149/83 (04-27-24 @ 07:21) (135/71 - 151/74)  RR: 16 (04-27-24 @ 07:21) (16 - 16)  SpO2: 97% (04-27-24 @ 07:21) (95% - 99%)    In NAD  HEENT- EOMI  Heart- No cyanosis   Lungs- No respiratory distress   Abd- + BS, NT  Ext- No calf pain  Neuro- Exam unchanged                          8.2    4.48  )-----------( 189      ( 26 Apr 2024 14:50 )             25.5     04-25    142  |  103  |  72<H>  ----------------------------<  96  4.4   |  28  |  8.78<H>    Ca    8.8      25 Apr 2024 14:15  Phos  5.1     04-25          Imp: Patient with diagnosis of Right Femoral Neck fracture admitted for comprehensive acute rehabilitation.    Plan:  -Impaired mobility - Continue PT/OT  - Skin- Turn q2h, check skin daily  - Continue current medications; patient medically stable.   -Active issues-  -HTN -  metoprolol  -Pain - tylenol, lidocaine, oxycodone prn    - Patient is stable to continue current rehabilitation program.

## 2024-04-28 PROCEDURE — 99232 SBSQ HOSP IP/OBS MODERATE 35: CPT

## 2024-04-28 RX ADMIN — SEVELAMER CARBONATE 1600 MILLIGRAM(S): 2400 POWDER, FOR SUSPENSION ORAL at 17:33

## 2024-04-28 RX ADMIN — PANTOPRAZOLE SODIUM 40 MILLIGRAM(S): 20 TABLET, DELAYED RELEASE ORAL at 05:42

## 2024-04-28 RX ADMIN — SEVELAMER CARBONATE 1600 MILLIGRAM(S): 2400 POWDER, FOR SUSPENSION ORAL at 12:14

## 2024-04-28 RX ADMIN — Medication 1 TABLET(S): at 12:14

## 2024-04-28 RX ADMIN — Medication 25 MILLIGRAM(S): at 05:42

## 2024-04-28 RX ADMIN — Medication 1 APPLICATION(S): at 05:43

## 2024-04-28 RX ADMIN — LIDOCAINE 1 PATCH: 4 CREAM TOPICAL at 08:18

## 2024-04-28 RX ADMIN — Medication 25 MILLIGRAM(S): at 17:50

## 2024-04-28 RX ADMIN — Medication 975 MILLIGRAM(S): at 05:42

## 2024-04-28 RX ADMIN — Medication 1 APPLICATION(S): at 17:34

## 2024-04-28 RX ADMIN — SEVELAMER CARBONATE 1600 MILLIGRAM(S): 2400 POWDER, FOR SUSPENSION ORAL at 08:18

## 2024-04-28 RX ADMIN — PANTOPRAZOLE SODIUM 40 MILLIGRAM(S): 20 TABLET, DELAYED RELEASE ORAL at 17:33

## 2024-04-28 RX ADMIN — Medication 975 MILLIGRAM(S): at 22:18

## 2024-04-28 NOTE — PROGRESS NOTE ADULT - SUBJECTIVE AND OBJECTIVE BOX
Cc: Gait dysfunction    HPI: Patient with no new medical issues today.  Pain controlled, no chest pain, no N/V, no Fevers/Chills. No other new ROS  Has been tolerating rehabilitation program.    acetaminophen     Tablet .. 975 milliGRAM(s) Oral every 8 hours  aluminum hydroxide/magnesium hydroxide/simethicone Suspension 30 milliLiter(s) Oral every 4 hours PRN  AQUAPHOR (petrolatum Ointment) 1 Application(s) Topical two times a day  epoetin carito-epbx (RETACRIT) Injectable 11616 Unit(s) IV Push <User Schedule>  lidocaine   4% Patch 1 Patch Transdermal <User Schedule>  melatonin 6 milliGRAM(s) Oral at bedtime PRN  metoprolol tartrate 25 milliGRAM(s) Oral two times a day  Nephro-pat 1 Tablet(s) Oral daily  oxyCODONE    IR 10 milliGRAM(s) Oral every 6 hours PRN  oxyCODONE    IR 5 milliGRAM(s) Oral every 6 hours PRN  pantoprazole    Tablet 40 milliGRAM(s) Oral two times a day  polyethylene glycol 3350 17 Gram(s) Oral daily PRN  senna 2 Tablet(s) Oral at bedtime  sevelamer carbonate 1600 milliGRAM(s) Oral three times a day with meals      T(C): 36.9 (04-27-24 @ 19:41), Max: 36.9 (04-27-24 @ 15:15)  HR: 84 (04-28-24 @ 05:34) (71 - 87)  BP: 145/81 (04-28-24 @ 05:34) (137/77 - 145/81)  RR: 16 (04-27-24 @ 19:41) (16 - 16)  SpO2: 99% (04-27-24 @ 19:41) (99% - 99%)    In NAD  HEENT- EOMI  Heart- No cyanosis   Lungs- No respiratory distress   Abd- + BS, NT  Ext- No calf pain  Neuro- Exam unchanged                          7.7    4.60  )-----------( 224      ( 27 Apr 2024 15:15 )             23.4     04-27    143  |  103  |  61<H>  ----------------------------<  88  4.4   |  31  |  8.50<H>    Ca    8.6      27 Apr 2024 15:15  Phos  5.3     04-27        Imp: Patient with diagnosis of Right Femoral Neck fracture admitted for comprehensive acute rehabilitation.    Plan:  -Impaired mobility - Continue PT/OT  - Skin- Turn q2h, check skin daily  - Continue current medications; patient medically stable.   -Active issues-  -HTN -  metoprolol  -Pain - tylenol, lidocaine, oxycodone prn    -ESRD - nephrology following   - Patient is stable to continue current rehabilitation program.    Cc: Gait dysfunction    HPI: Patient with no new medical issues today.  Pain controlled, no chest pain, no N/V, no Fevers/Chills. No other new ROS  Has been tolerating rehabilitation program.    acetaminophen     Tablet .. 975 milliGRAM(s) Oral every 8 hours  aluminum hydroxide/magnesium hydroxide/simethicone Suspension 30 milliLiter(s) Oral every 4 hours PRN  AQUAPHOR (petrolatum Ointment) 1 Application(s) Topical two times a day  epoetin carito-epbx (RETACRIT) Injectable 77919 Unit(s) IV Push <User Schedule>  lidocaine   4% Patch 1 Patch Transdermal <User Schedule>  melatonin 6 milliGRAM(s) Oral at bedtime PRN  metoprolol tartrate 25 milliGRAM(s) Oral two times a day  Nephro-pat 1 Tablet(s) Oral daily  oxyCODONE    IR 10 milliGRAM(s) Oral every 6 hours PRN  oxyCODONE    IR 5 milliGRAM(s) Oral every 6 hours PRN  pantoprazole    Tablet 40 milliGRAM(s) Oral two times a day  polyethylene glycol 3350 17 Gram(s) Oral daily PRN  senna 2 Tablet(s) Oral at bedtime  sevelamer carbonate 1600 milliGRAM(s) Oral three times a day with meals      T(C): 36.9 (04-27-24 @ 19:41), Max: 36.9 (04-27-24 @ 15:15)  HR: 84 (04-28-24 @ 05:34) (71 - 87)  BP: 145/81 (04-28-24 @ 05:34) (137/77 - 145/81)  RR: 16 (04-27-24 @ 19:41) (16 - 16)  SpO2: 99% (04-27-24 @ 19:41) (99% - 99%)    In NAD  HEENT- EOMI  Heart- No cyanosis   Lungs- No respiratory distress   Abd- + BS, NT  Ext- No calf pain, No induration from PPD LUE  Neuro- Exam unchanged                          7.7    4.60  )-----------( 224      ( 27 Apr 2024 15:15 )             23.4     04-27    143  |  103  |  61<H>  ----------------------------<  88  4.4   |  31  |  8.50<H>    Ca    8.6      27 Apr 2024 15:15  Phos  5.3     04-27        Imp: Patient with diagnosis of Right Femoral Neck fracture admitted for comprehensive acute rehabilitation.    Plan:  -Impaired mobility - Continue PT/OT  - Skin- Turn q2h, check skin daily  - Continue current medications; patient medically stable.   -Active issues-  -HTN -  metoprolol  -Pain - tylenol, lidocaine, oxycodone prn    -ESRD - nephrology following   -Checked PPD placement site on LUE, no reaction/elevation, negative   - Patient is stable to continue current rehabilitation program.

## 2024-04-28 NOTE — PROGRESS NOTE ADULT - SUBJECTIVE AND OBJECTIVE BOX
Patient is a 38y old  Male who presents with a chief complaint of Right Hip fracture s/p right hip hemiarthroplasty (28 Apr 2024 10:14)      Subjective and overnight events:  Patient seen and examined at bedside. no new complaints. no fever, chills, headache, sob, cp, abd pain. bloody stool/ melena resolved per pt.    ALLERGIES:  No Known Drug Allergies  shellfish (Hives)    MEDICATIONS  (STANDING):  acetaminophen     Tablet .. 975 milliGRAM(s) Oral every 8 hours  AQUAPHOR (petrolatum Ointment) 1 Application(s) Topical two times a day  epoetin carito-epbx (RETACRIT) Injectable 73285 Unit(s) IV Push <User Schedule>  lidocaine   4% Patch 1 Patch Transdermal <User Schedule>  metoprolol tartrate 25 milliGRAM(s) Oral two times a day  Nephro-pat 1 Tablet(s) Oral daily  pantoprazole    Tablet 40 milliGRAM(s) Oral two times a day  senna 2 Tablet(s) Oral at bedtime  sevelamer carbonate 1600 milliGRAM(s) Oral three times a day with meals    MEDICATIONS  (PRN):  aluminum hydroxide/magnesium hydroxide/simethicone Suspension 30 milliLiter(s) Oral every 4 hours PRN Dyspepsia  melatonin 6 milliGRAM(s) Oral at bedtime PRN Insomnia  oxyCODONE    IR 10 milliGRAM(s) Oral every 6 hours PRN Severe Pain (7 - 10)  oxyCODONE    IR 5 milliGRAM(s) Oral every 6 hours PRN Moderate Pain (4 - 6)  polyethylene glycol 3350 17 Gram(s) Oral daily PRN Constipation    Vital Signs Last 24 Hrs  T(F): 98.7 (28 Apr 2024 12:08), Max: 98.7 (28 Apr 2024 12:08)  HR: 72 (28 Apr 2024 12:08) (71 - 87)  BP: 143/76 (28 Apr 2024 12:08) (137/77 - 145/81)  RR: 16 (28 Apr 2024 12:08) (16 - 16)  SpO2: 99% (28 Apr 2024 12:08) (99% - 99%)  I&O's Summary    27 Apr 2024 07:01  -  28 Apr 2024 07:00  --------------------------------------------------------  IN: 800 mL / OUT: 2800 mL / NET: -2000 mL      PHYSICAL EXAM:  General: NAD, Awake alert. answering questions appropriately   ENT: MMM  Neck: Supple, No JVD  Lungs: Clear to auscultation bilaterally  Cardio: RRR, S1/S2, No murmurs  Abdomen: Soft, Nontender, Nondistended; Bowel sounds present  Extremities: No calf tenderness, No pitting edema    LABS:                        7.7    4.60  )-----------( 224      ( 27 Apr 2024 15:15 )             23.4     04-27    143  |  103  |  61  ----------------------------<  88  4.4   |  31  |  8.50    Ca    8.6      27 Apr 2024 15:15  Phos  5.3     04-27          Urinalysis Basic - ( 27 Apr 2024 15:15 )    Color: x / Appearance: x / SG: x / pH: x  Gluc: 88 mg/dL / Ketone: x  / Bili: x / Urobili: x   Blood: x / Protein: x / Nitrite: x   Leuk Esterase: x / RBC: x / WBC x   Sq Epi: x / Non Sq Epi: x / Bacteria: x        COVID-19 PCR: NotDetec (04-11-24 @ 10:30)      RADIOLOGY & ADDITIONAL TESTS:    Care Discussed with Consultants/Other Providers:

## 2024-04-28 NOTE — PROGRESS NOTE ADULT - ASSESSMENT
38y M with HTN, anemia of chronic disease, ESRD on HD (M/W/F via right AV fistula), left hip arthroplasty in 8/23, history of right leg fracture presented to [facility] on 3/6 after a fall during a wheelchair transfer. He was found to have a right femoral neck fracture and subacute pubic body and inferior rami fractures. He is s/p right hip hemiarthroplasty. Patient now admitted for a multidisciplinary rehab program- pt/ot/dvt ppx    #Acute blood loss anemia likely due to GI bleed due to duodenal ulcer   - s/p 3 units PRBCs to date   - Transfuse for Hb < 7, will f/u H+H today   - EGD/colonoscopy 4/19; found to have duodenal ulcer   - GI recs appreciated - repeat EGD in 4 weeks to reassess duodenal ulcer   - c/w protonix 40mg BID  - c/w epogen during HD   - Holding ASA/AC for now    #Right Femoral Neck fracture   - s/p right hip hemiarthroplasty on 3/7  - precautions: WBAT RLE, fall precautions  - Pain control     #ESRD  #hyperkalemia- improved  - HDS via right AV fistula on m/w/f (last HD 4/25/24), next HD scheduled for 4/27/24  - Left AV fistula is old/non-functioning   - HD per Dr Blanco  - Sevelamer   - Retacrit m/w/f    #HTN  - Metoprolol 25mg BID with hold parameters    DVT ppx - SCDs

## 2024-04-29 PROCEDURE — 99232 SBSQ HOSP IP/OBS MODERATE 35: CPT | Mod: GC

## 2024-04-29 PROCEDURE — 99232 SBSQ HOSP IP/OBS MODERATE 35: CPT

## 2024-04-29 RX ADMIN — SEVELAMER CARBONATE 1600 MILLIGRAM(S): 2400 POWDER, FOR SUSPENSION ORAL at 17:43

## 2024-04-29 RX ADMIN — Medication 1 TABLET(S): at 12:15

## 2024-04-29 RX ADMIN — Medication 25 MILLIGRAM(S): at 05:46

## 2024-04-29 RX ADMIN — PANTOPRAZOLE SODIUM 40 MILLIGRAM(S): 20 TABLET, DELAYED RELEASE ORAL at 05:46

## 2024-04-29 RX ADMIN — Medication 25 MILLIGRAM(S): at 17:44

## 2024-04-29 RX ADMIN — PANTOPRAZOLE SODIUM 40 MILLIGRAM(S): 20 TABLET, DELAYED RELEASE ORAL at 17:43

## 2024-04-29 RX ADMIN — Medication 1 APPLICATION(S): at 05:49

## 2024-04-29 RX ADMIN — SEVELAMER CARBONATE 1600 MILLIGRAM(S): 2400 POWDER, FOR SUSPENSION ORAL at 12:15

## 2024-04-29 RX ADMIN — SEVELAMER CARBONATE 1600 MILLIGRAM(S): 2400 POWDER, FOR SUSPENSION ORAL at 07:58

## 2024-04-29 NOTE — PROGRESS NOTE ADULT - SUBJECTIVE AND OBJECTIVE BOX
BRIDGET NORTH, 38y Male  MRN: [id number]  ATTENDING: Wm Dumas    HPI:  37yo Male PMHx HTN, anemia of chronic disease, ESRD on HD (M/W/F via right AV fistula), left hip arthroplasty in 8/23, history of right leg fracture presented to [facility] from [facility] on 3/6 after falling during a wheelchair transfer landing on his right knee. He is wheelchair bound since his left hip surgery. CT hip with acute garden type 1 minimally valgus impacted transcervical right femoral neck fracture and subacute right pubic body and inferior rami fractures. He is s/p right hip hemiarthroplasty. Post op course complicated by anemia s/p transfusion. Hospital course complicated by febrile 101.6F found to be covid positive and completed remdisivir. He was started on eliquis 2.5mg BID for elevated d-dimers and negative LE duplex. Patient was evaluated by PM&R and therapy for functional deficits, gait/ADL impairments and acute rehabilitation was recommended. Patient was medically optimized for discharge to [facility] IRU on 3/27/24.  Hematology consulted for anemia.    MEDICATIONS:  acetaminophen     Tablet .. 975 milliGRAM(s) Oral every 8 hours  aluminum hydroxide/magnesium hydroxide/simethicone Suspension 30 milliLiter(s) Oral every 4 hours PRN  AQUAPHOR (petrolatum Ointment) 1 Application(s) Topical two times a day  epoetin carito-epbx (RETACRIT) Injectable 42172 Unit(s) IV Push <User Schedule>  lidocaine   4% Patch 1 Patch Transdermal <User Schedule>  melatonin 6 milliGRAM(s) Oral at bedtime PRN  metoprolol tartrate 25 milliGRAM(s) Oral two times a day  Nephro-pat 1 Tablet(s) Oral daily  pantoprazole    Tablet 40 milliGRAM(s) Oral two times a day  polyethylene glycol 3350 17 Gram(s) Oral daily PRN  senna 2 Tablet(s) Oral at bedtime  sevelamer carbonate 1600 milliGRAM(s) Oral three times a day with meals    All other medications reviewed.    SUBJECTIVE:  stable clinically; offers no new complaints.    VITALS:  T(C): 36.6 (04-22-24 @ 08:43), Max: 36.7 (04-21-24 @ 23:01)  T(F): 97.8 (04-22-24 @ 08:43), Max: 98.1 (04-21-24 @ 23:01)  HR: 73 (04-22-24 @ 08:43) (73 - 91)  BP: 123/72 (04-22-24 @ 08:43) (118/67 - 136/76)    PHYSICAL EXAM:  Constitutional: alert, well developed  HEENT: normocephalic, anicteric sclerae, no mucositis or thrush  Respiratory: bilateral clear to auscultation anteriorly  Cardiovascular : S1, S2 regular, rhythmic, no murmurs, gallops or rubs  Abdomen: soft, distended, + normoactive BS, no palpable HS- megaly  Extremities: no tenderness;  -c/c/e, pulses equal bilaterally    LABS:  (04-22) WBC: 4.87 K/uL,Hemoglobin: 7.1 g/dL, Hematocrit: 22.7 %,  Platelet: 205 K/uL  (04-22) Na: 143 mmol/L ; K: 4.5 mmol/L ; BUN: 78 mg/dL ; Cr: 8.15 mg/dL.    RADIOLOGY:  ACC: [id number] EXAM:  DUPLEX SCAN EXT VEINS LOWER BI   ORDERED BY: KENNA GARCIA   PROCEDURE DATE:  03/14/2024    INTERPRETATION:  CLINICAL INFORMATION: Leg swelling.  COMPARISON: None available.  TECHNIQUE: Duplex sonography of the BILATERAL LOWER extremity veins with   color and spectral Doppler, with and without compression.  FINDINGS:  RIGHT:  Normal compressibility of the RIGHT common femoral, femoral and popliteal   veins.  Doppler examination shows normal spontaneous andphasic flow.  No RIGHT calf vein thrombosis is detected.    LEFT:  Normal compressibility of the LEFT common femoral, femoral and popliteal   veins.  Doppler examination shows normal spontaneous and phasic flow.  No LEFT calf vein thrombosis is detected.    IMPRESSION:  No evidence of deep venous thrombosis in either lower extremity.

## 2024-04-29 NOTE — CHART NOTE - NSCHARTNOTEFT_GEN_A_CORE
Nutrition Follow Up Note  Hospital Course   (Per Electronic Medical Record)    Source:  Patient [X]  Medical Record [X]      Diet:   Renal Diet w/ Thin Liquids (IDDSI Level 0)  Tolerates Diet Consistency Well  No Chewing/Swallowing Difficulties  No Recent Nausea, Vomiting, Diarrhea or Constipation (as Per Patient)  Consumes % of Meals (as Per Documentation) - States Good PO Intake/Appetite (Per Patient)   on Nepro 8oz Daily (Provides 425kcal & 19grams of Protein)  Patient Takes Nutrition Supplement Well    Enteral/Parenteral Nutrition: Not Applicable    Current Weight: 218.2lb on 4/28  Obtain Weights Daily  Weight Fluctuates     Pertinent Medications: MEDICATIONS  (STANDING):  acetaminophen     Tablet .. 975 milliGRAM(s) Oral every 8 hours  AQUAPHOR (petrolatum Ointment) 1 Application(s) Topical two times a day  epoetin carito-epbx (RETACRIT) Injectable 14251 Unit(s) IV Push <User Schedule>  lidocaine   4% Patch 1 Patch Transdermal <User Schedule>  metoprolol tartrate 25 milliGRAM(s) Oral two times a day  Nephro-pat 1 Tablet(s) Oral daily  pantoprazole    Tablet 40 milliGRAM(s) Oral two times a day  senna 2 Tablet(s) Oral at bedtime  sevelamer carbonate 1600 milliGRAM(s) Oral three times a day with meals    MEDICATIONS  (PRN):  aluminum hydroxide/magnesium hydroxide/simethicone Suspension 30 milliLiter(s) Oral every 4 hours PRN Dyspepsia  melatonin 6 milliGRAM(s) Oral at bedtime PRN Insomnia  oxyCODONE    IR 10 milliGRAM(s) Oral every 6 hours PRN Severe Pain (7 - 10)  oxyCODONE    IR 5 milliGRAM(s) Oral every 6 hours PRN Moderate Pain (4 - 6)  polyethylene glycol 3350 17 Gram(s) Oral daily PRN Constipation    Pertinent Labs:  04-27 Na143 mmol/L Glu 88 mg/dL K+ 4.4 mmol/L Cr  8.50 mg/dL<H> BUN 61 mg/dL<H> 04-27 Phos 5.3 mg/dL<H>    Skin: DTI on Sacrum/Coccyx    Edema: +1 Edema Noted to Bilateral Lower Extremities   (as Per Documentation)     Last Bowel Movement: on 4/27    Estimated Needs:   [X] No Change Since Previous Assessment    Previous Nutrition Diagnosis:   Severe Malnutrition  Increased Nutrient Needs - Calories & Protein     Nutrition Diagnosis is [X] Ongoing - Continues on Nutrition Supplement & Patient Takes Nutrition Supplement Well    New Nutrition Diagnosis: [X] Not Applicable    Interventions:   1. Recommend Continue Nutrition Plan of Care     Monitoring & Evaluation:   [X] Weights   [X] PO Intake   [X] Skin Integrity   [X] Follow Up (Per Protocol)  [X] Tolerance to Diet Prescription   [X] Other: Labs    Registered Dietitian/Nutritionist Remains Available.  Marc Francisco, SHANNAN, CDN    Phone# [phone]

## 2024-04-29 NOTE — PROGRESS NOTE ADULT - SUBJECTIVE AND OBJECTIVE BOX
HPI:  39yo Male PMHx HTN, anemia of chronic disease, ESRD on HD (M/W/F via right AV fistula), left hip arthroplasty in 8/23, history of right leg fracture presented to [facility] from [facility] on 3/6 after falling during a wheelchair transfer landing on his right knee. He is wheelchair bound since his left hip surgery. CT hip with acute garden type 1 minimally valgus impacted transcervical right femoral neck fracture and subacute right pubic body and inferior rami fractures. He is s/p right hip hemiarthroplasty. Post op course complicated by anemia s/p transfusion. Hospital course complicated by hypoglycemia after being shifted for hyperkalemia. Further complicated by febrile 101.6F found to be covid positive and completed remdisivir. He was started on eliquis 2.5mg BID for elevated d-dimers and negative LE duplex.     Patient was evaluated by PM&R and therapy for functional deficits, gait/ADL impairments and acute rehabilitation was recommended. Patient was medically optimized for discharge to [facility] IRU on 3/27 (27 Mar 2024 15:19)      ROS/subjective:  Patient seen and evaluated in PT  ambulating 35' with RW - pain in Left Groin  BM yesterday, no melena- stool brown but now hard  Social Work arranging possible Dc to friend home with VN  no dizziness, no headaches, no nausea, no vomiting, no SOB, no chest pain  dc planning now to friend's home with services  PPD Read over weekend 4/27- negative      MEDICATIONS  (STANDING):  acetaminophen     Tablet .. 975 milliGRAM(s) Oral every 8 hours  AQUAPHOR (petrolatum Ointment) 1 Application(s) Topical two times a day  epoetin carito-epbx (RETACRIT) Injectable 00218 Unit(s) IV Push <User Schedule>  lidocaine   4% Patch 1 Patch Transdermal <User Schedule>  metoprolol tartrate 25 milliGRAM(s) Oral two times a day  Nephro-pat 1 Tablet(s) Oral daily  pantoprazole    Tablet 40 milliGRAM(s) Oral two times a day  senna 2 Tablet(s) Oral at bedtime  sevelamer carbonate 1600 milliGRAM(s) Oral three times a day with meals    MEDICATIONS  (PRN):  aluminum hydroxide/magnesium hydroxide/simethicone Suspension 30 milliLiter(s) Oral every 4 hours PRN Dyspepsia  melatonin 6 milliGRAM(s) Oral at bedtime PRN Insomnia  oxyCODONE    IR 5 milliGRAM(s) Oral every 6 hours PRN Moderate Pain (4 - 6)  oxyCODONE    IR 10 milliGRAM(s) Oral every 6 hours PRN Severe Pain (7 - 10)  polyethylene glycol 3350 17 Gram(s) Oral daily PRN Constipation                  CAPILLARY BLOOD GLUCOSE          Vital Signs Last 24 Hrs  T(C): 36.7 (29 Apr 2024 08:57), Max: 36.9 (28 Apr 2024 20:11)  T(F): 98.1 (29 Apr 2024 08:57), Max: 98.5 (28 Apr 2024 20:11)  HR: 68 (29 Apr 2024 08:57) (68 - 78)  BP: 153/75 (29 Apr 2024 08:57) (138/84 - 160/91)  BP(mean): --  RR: 15 (29 Apr 2024 08:57) (15 - 16)  SpO2: 100% (29 Apr 2024 08:57) (100% - 100%)    Parameters below as of 29 Apr 2024 08:57  Patient On (Oxygen Delivery Method): room air      Gen - NAD, Comfortable  HEENT - NCAT, EOMI  Neck - Supple, No limited ROM  Pulm - CTAB, No wheeze, No rhonchi, No crackles  Cardiovascular - RRR, S1S2, No murmurs  Chest - good chest expansion, good respiratory effort  Abdomen - Soft, NT/ND, +BS. BM 3/26  Extremities - Right FA AV fistula + bruit and thrill. GIDEON old AV fistula not active, Right Knee with degenerative changes  Neuro-     Cognitive - awake, alert, oriented to person, place, date, year, and situation.  Able  to follow command     Communication - Fluent, Comprehensible, No dysarthria, No aphasia        Memory:  Recent/ remote memory intact     Cranial Nerves - CN 2-12 intact. No facial asymmetry, Tongue midline, EOMI, Shoulder shrug intact     Motor -                    LEFT    UE - ShAB 5/5, EF 5/5, EE 5/5,  5/5                    RIGHT UE - ShAB 5/5, EF 5/5, EE 5/5,   5/5                    LEFT    LE - HF 4/5, KE 3+/5, DF 4/5, PF 4/5- NO pain with ROM Left Hip-only pain in groin on Wt bearing                    RIGHT LE - HF 3+/5, KE 3/5, DF 4/5, PF 4/5   moves Right knee better     Sensory - Intact bilaterally      Tone - normal  MSK: b/l hip discomfort with movement OA changes Right Knee- minimal pain  Psychiatric - Mood stable, Affect WNL  Skin: right hip surgical site with steri strips CDI. b/l sacrum/coccyx DTI healed    IDT Camilla 4/29  DC to home with services- friend's Apt- 5/1      Continue comprehensive acute rehab program consisting of 3hrs/day of OT/PT.

## 2024-04-29 NOTE — PROGRESS NOTE ADULT - ASSESSMENT
BRIDGET NORTH is a 38y M with HTN, anemia of chronic disease, ESRD on HD (M/W/F via right AV fistula), left hip arthroplasty in 8/23, history of right leg fracture presented to [facility] on 3/6 after a fall during a wheelchair transfer. He was found to have a right femoral neck fracture and subacute pubic body and inferior rami fractures. He is s/p right hip hemiarthroplasty. Hospital course complicated by post-op anemia s/p transfusion, hypoglycemia, hyperkalemia, febrile 2/2 covid s/p remdisivir, and started on eliquis 2.5mg BID with negative dvts on LE duplex. Patient now admitted for a multidisciplinary rehab program. 03-27-24 @ 16:17      #Right Femoral Neck fracture   - s/p right hip hemiarthroplasty on 3/7  - pain med as below  - Dressing and staples removed on 3/22  - Comprehensive Multidisciplinary Rehab Program:  comprehensive rehab program of PT/OT   - precautions: WBAT RLE. Anterior hip.   - Right Knee pain- arthrosis/ effusion- no Fx- renal osteodystrophy- ICE/ Lidoderm/ Rehab/ ACE  - Social work to confirm DC plan- DC home with friend- awaiting equipment, VN services  FU with PMD/Renal as OPD  FU ortho- Dr León 1-2 weeks  FU PMR Dr Dumas 4-6 weeks  Possible DC in AM if all DC arrangements made      #ESRD  - HDS via right AV fistula on m/w/f.  ( Last HD 3/27). Old HDS left UA  - Nephro in  - sevelamer 800mg TID  - retacrit m/w/f  continue Epogen in HD    Acute Anemia  Guiac +  2 units PBRCs 4/18  EGD/Colonoscopy today per GI- Duodenal Ulcer- sp cauterization  Protonix increased to 2x/day  Hgb6.8 yesterday SP One unit PRBcs  for HD tomorrow?- check CBC  needs FU appt with GI for repeat EGD  4 weeks after last one    #HTN  - toprol 25mg BID  - Monitor BP    #Pain  - Tylenol PRN  - oxy 10mg moderate, 15mg severe.   Right Knee pain- lidoderm during the day/ICE/ ACE- Better- can Obtain as OPD  Left Hip pain - all studies reviewed- recent CT 3/6- no evidence of abnormality in Left prosthesis  will follow clinically-stable, ambulating 20 ' RW- pain left Groin on Wt Bearing  OPD Ortho FU regarding Left Hip pain on DC    #Sleep  - Melatonin PRN     #GI / Bowel  #dyspepsia  - Protonix  - maalox  - formed, brown stool yesterday    # / Bladder  - does not void ESRD    #Skin / Pressure injury  - Skin assessment on admission performed : right hip surgical site with steri strips CDI. coccyx small stage 2-using barrier cream- Healed  - Monitor Incisions:    - wound care following at [facility] for b/l sacrum/coccyx DTI wth evolution.   - wound care to follow  - turn and reposition q2h      #Diet:  - Diet Consistency: Renal restriction  - Nutrition consult    #DVT prophylaxis:   - eliquis 2.5mg BID- DCd, ASA DCd  - Last LE Doppler normal on 3/14   BLE Duplex 4/26- negative for DVT    .

## 2024-04-29 NOTE — PROGRESS NOTE ADULT - PROBLEM SELECTOR PLAN 1
Recovering post right femoral neck fracture/right hip hemiarthroplasty; anemia of chronic kidney disease (acute on chronic).  Hemoglobin fluctuant around 8 g/dL.  Transfuse as needed.  Continues Epogen during hemodialysis.  Discharge planning in progress.

## 2024-04-29 NOTE — CHART NOTE - NSCHARTNOTESELECT_GEN_ALL_CORE
Nutrition Services
Event Note
Nutrition Services
Nutrition Services

## 2024-04-30 VITALS
SYSTOLIC BLOOD PRESSURE: 161 MMHG | TEMPERATURE: 99 F | OXYGEN SATURATION: 98 % | RESPIRATION RATE: 17 BRPM | DIASTOLIC BLOOD PRESSURE: 71 MMHG | HEART RATE: 88 BPM

## 2024-04-30 LAB
ANION GAP SERPL CALC-SCNC: 14 MMOL/L — SIGNIFICANT CHANGE UP (ref 5–17)
BUN SERPL-MCNC: 74 MG/DL — HIGH (ref 7–23)
CALCIUM SERPL-MCNC: 8.8 MG/DL — SIGNIFICANT CHANGE UP (ref 8.4–10.5)
CHLORIDE SERPL-SCNC: 100 MMOL/L — SIGNIFICANT CHANGE UP (ref 96–108)
CO2 SERPL-SCNC: 27 MMOL/L — SIGNIFICANT CHANGE UP (ref 22–31)
CREAT SERPL-MCNC: 9.67 MG/DL — HIGH (ref 0.5–1.3)
EGFR: 6 ML/MIN/1.73M2 — LOW
GLUCOSE SERPL-MCNC: 82 MG/DL — SIGNIFICANT CHANGE UP (ref 70–99)
HCT VFR BLD CALC: 23.2 % — LOW (ref 39–50)
HGB BLD-MCNC: 7.6 G/DL — LOW (ref 13–17)
MCHC RBC-ENTMCNC: 31.4 PG — SIGNIFICANT CHANGE UP (ref 27–34)
MCHC RBC-ENTMCNC: 32.8 GM/DL — SIGNIFICANT CHANGE UP (ref 32–36)
MCV RBC AUTO: 95.9 FL — SIGNIFICANT CHANGE UP (ref 80–100)
NRBC # BLD: 0 /100 WBCS — SIGNIFICANT CHANGE UP (ref 0–0)
PHOSPHATE SERPL-MCNC: 5.1 MG/DL — HIGH (ref 2.5–4.5)
PLATELET # BLD AUTO: 228 K/UL — SIGNIFICANT CHANGE UP (ref 150–400)
POTASSIUM SERPL-MCNC: 4.6 MMOL/L — SIGNIFICANT CHANGE UP (ref 3.5–5.3)
POTASSIUM SERPL-SCNC: 4.6 MMOL/L — SIGNIFICANT CHANGE UP (ref 3.5–5.3)
RBC # BLD: 2.42 M/UL — LOW (ref 4.2–5.8)
RBC # FLD: 17.2 % — HIGH (ref 10.3–14.5)
SARS-COV-2 RNA SPEC QL NAA+PROBE: SIGNIFICANT CHANGE UP
SODIUM SERPL-SCNC: 141 MMOL/L — SIGNIFICANT CHANGE UP (ref 135–145)
WBC # BLD: 4.6 K/UL — SIGNIFICANT CHANGE UP (ref 3.8–10.5)
WBC # FLD AUTO: 4.6 K/UL — SIGNIFICANT CHANGE UP (ref 3.8–10.5)

## 2024-04-30 PROCEDURE — 86900 BLOOD TYPING SEROLOGIC ABO: CPT

## 2024-04-30 PROCEDURE — 99261: CPT

## 2024-04-30 PROCEDURE — 99232 SBSQ HOSP IP/OBS MODERATE 35: CPT

## 2024-04-30 PROCEDURE — 97542 WHEELCHAIR MNGMENT TRAINING: CPT | Mod: GP

## 2024-04-30 PROCEDURE — 73502 X-RAY EXAM HIP UNI 2-3 VIEWS: CPT

## 2024-04-30 PROCEDURE — 86706 HEP B SURFACE ANTIBODY: CPT

## 2024-04-30 PROCEDURE — 84165 PROTEIN E-PHORESIS SERUM: CPT

## 2024-04-30 PROCEDURE — 97116 GAIT TRAINING THERAPY: CPT | Mod: GP

## 2024-04-30 PROCEDURE — 80048 BASIC METABOLIC PNL TOTAL CA: CPT

## 2024-04-30 PROCEDURE — 88305 TISSUE EXAM BY PATHOLOGIST: CPT

## 2024-04-30 PROCEDURE — 86850 RBC ANTIBODY SCREEN: CPT

## 2024-04-30 PROCEDURE — 88312 SPECIAL STAINS GROUP 1: CPT

## 2024-04-30 PROCEDURE — 73502 X-RAY EXAM HIP UNI 2-3 VIEWS: CPT | Mod: 26,LT

## 2024-04-30 PROCEDURE — 87637 SARSCOV2&INF A&B&RSV AMP PRB: CPT

## 2024-04-30 PROCEDURE — 83550 IRON BINDING TEST: CPT

## 2024-04-30 PROCEDURE — 87340 HEPATITIS B SURFACE AG IA: CPT

## 2024-04-30 PROCEDURE — 97165 OT EVAL LOW COMPLEX 30 MIN: CPT | Mod: GO

## 2024-04-30 PROCEDURE — P9040: CPT

## 2024-04-30 PROCEDURE — 82272 OCCULT BLD FECES 1-3 TESTS: CPT

## 2024-04-30 PROCEDURE — 97530 THERAPEUTIC ACTIVITIES: CPT | Mod: GO

## 2024-04-30 PROCEDURE — 80053 COMPREHEN METABOLIC PANEL: CPT

## 2024-04-30 PROCEDURE — 97535 SELF CARE MNGMENT TRAINING: CPT | Mod: GO

## 2024-04-30 PROCEDURE — 82728 ASSAY OF FERRITIN: CPT

## 2024-04-30 PROCEDURE — 86901 BLOOD TYPING SEROLOGIC RH(D): CPT

## 2024-04-30 PROCEDURE — 86902 BLOOD TYPE ANTIGEN DONOR EA: CPT

## 2024-04-30 PROCEDURE — 83735 ASSAY OF MAGNESIUM: CPT

## 2024-04-30 PROCEDURE — 84100 ASSAY OF PHOSPHORUS: CPT

## 2024-04-30 PROCEDURE — 97161 PT EVAL LOW COMPLEX 20 MIN: CPT | Mod: GP

## 2024-04-30 PROCEDURE — 82607 VITAMIN B-12: CPT

## 2024-04-30 PROCEDURE — 84155 ASSAY OF PROTEIN SERUM: CPT

## 2024-04-30 PROCEDURE — 97110 THERAPEUTIC EXERCISES: CPT | Mod: GO

## 2024-04-30 PROCEDURE — 86704 HEP B CORE ANTIBODY TOTAL: CPT

## 2024-04-30 PROCEDURE — 36415 COLL VENOUS BLD VENIPUNCTURE: CPT

## 2024-04-30 PROCEDURE — 85027 COMPLETE CBC AUTOMATED: CPT

## 2024-04-30 PROCEDURE — 85025 COMPLETE CBC W/AUTO DIFF WBC: CPT

## 2024-04-30 PROCEDURE — 86870 RBC ANTIBODY IDENTIFICATION: CPT

## 2024-04-30 PROCEDURE — 86334 IMMUNOFIX E-PHORESIS SERUM: CPT

## 2024-04-30 PROCEDURE — 36430 TRANSFUSION BLD/BLD COMPNT: CPT

## 2024-04-30 PROCEDURE — 86803 HEPATITIS C AB TEST: CPT

## 2024-04-30 PROCEDURE — 93970 EXTREMITY STUDY: CPT

## 2024-04-30 PROCEDURE — 83010 ASSAY OF HAPTOGLOBIN QUANT: CPT

## 2024-04-30 PROCEDURE — 86922 COMPATIBILITY TEST ANTIGLOB: CPT

## 2024-04-30 PROCEDURE — 87635 SARS-COV-2 COVID-19 AMP PRB: CPT

## 2024-04-30 PROCEDURE — P9016: CPT

## 2024-04-30 PROCEDURE — 83540 ASSAY OF IRON: CPT

## 2024-04-30 PROCEDURE — 0241U: CPT

## 2024-04-30 PROCEDURE — 86880 COOMBS TEST DIRECT: CPT

## 2024-04-30 PROCEDURE — 99238 HOSP IP/OBS DSCHRG MGMT 30/<: CPT | Mod: GC

## 2024-04-30 PROCEDURE — 83615 LACTATE (LD) (LDH) ENZYME: CPT

## 2024-04-30 PROCEDURE — 82746 ASSAY OF FOLIC ACID SERUM: CPT

## 2024-04-30 RX ORDER — OXYCODONE HYDROCHLORIDE 5 MG/1
1 TABLET ORAL
Qty: 28 | Refills: 0
Start: 2024-04-30 | End: 2024-05-06

## 2024-04-30 RX ORDER — METOPROLOL TARTRATE 50 MG
1 TABLET ORAL
Qty: 60 | Refills: 0
Start: 2024-04-30 | End: 2024-05-29

## 2024-04-30 RX ORDER — OXYCODONE HCL 15 MG
1 TABLET ORAL
Qty: 28 | Refills: 0 | DISCHARGE
Start: 2024-04-30 | End: 2024-05-06

## 2024-04-30 RX ORDER — PANTOPRAZOLE SODIUM 20 MG/1
1 TABLET, DELAYED RELEASE ORAL
Qty: 60 | Refills: 0
Start: 2024-04-30 | End: 2024-05-29

## 2024-04-30 RX ORDER — SEVELAMER CARBONATE 2400 MG/1
2 POWDER, FOR SUSPENSION ORAL
Qty: 180 | Refills: 0
Start: 2024-04-30 | End: 2024-05-29

## 2024-04-30 RX ADMIN — PANTOPRAZOLE SODIUM 40 MILLIGRAM(S): 20 TABLET, DELAYED RELEASE ORAL at 17:53

## 2024-04-30 RX ADMIN — Medication 1 TABLET(S): at 11:16

## 2024-04-30 RX ADMIN — SEVELAMER CARBONATE 1600 MILLIGRAM(S): 2400 POWDER, FOR SUSPENSION ORAL at 17:53

## 2024-04-30 RX ADMIN — PANTOPRAZOLE SODIUM 40 MILLIGRAM(S): 20 TABLET, DELAYED RELEASE ORAL at 05:38

## 2024-04-30 RX ADMIN — SEVELAMER CARBONATE 1600 MILLIGRAM(S): 2400 POWDER, FOR SUSPENSION ORAL at 12:33

## 2024-04-30 RX ADMIN — Medication 1 APPLICATION(S): at 05:37

## 2024-04-30 RX ADMIN — Medication 25 MILLIGRAM(S): at 17:54

## 2024-04-30 RX ADMIN — ERYTHROPOIETIN 10000 UNIT(S): 10000 INJECTION, SOLUTION INTRAVENOUS; SUBCUTANEOUS at 14:17

## 2024-04-30 RX ADMIN — Medication 25 MILLIGRAM(S): at 05:38

## 2024-04-30 RX ADMIN — SEVELAMER CARBONATE 1600 MILLIGRAM(S): 2400 POWDER, FOR SUSPENSION ORAL at 08:18

## 2024-04-30 NOTE — PROGRESS NOTE ADULT - PROBLEM SELECTOR PLAN 1
Anemia–complex mechanism: CKD, recent blood loss after right femoral neck fracture/right hip hemiarthroplasty, multiple venipunctures.  Last hemoglobin at 7.7 g/dL.  Continues PT with plans for discharge and follow-up in ambulatory setting.  To receive transfusions if needed as well as Epogen during hemodialysis.  Awaiting discharge.

## 2024-04-30 NOTE — PROGRESS NOTE ADULT - SUBJECTIVE AND OBJECTIVE BOX
BRIDGET NORTH, 38y Male  MRN: [id number]  ATTENDING: Wm Dumas    HPI:  37yo Male PMHx HTN, anemia of chronic disease, ESRD on HD (M/W/F via right AV fistula), left hip arthroplasty in 8/23, history of right leg fracture presented to [facility] from [facility] on 3/6 after falling during a wheelchair transfer landing on his right knee. He is wheelchair bound since his left hip surgery. CT hip with acute garden type 1 minimally valgus impacted transcervical right femoral neck fracture and subacute right pubic body and inferior rami fractures. He is s/p right hip hemiarthroplasty. Post op course complicated by anemia s/p transfusion. Hospital course complicated by febrile 101.6F found to be covid positive and completed remdisivir. He was started on eliquis 2.5mg BID for elevated d-dimers and negative LE duplex. Patient was evaluated by PM&R and therapy for functional deficits, gait/ADL impairments and acute rehabilitation was recommended. Patient was medically optimized for discharge to [facility] IRU on 3/27/24.  Hematology consulted for anemia.    MEDICATIONS:  acetaminophen     Tablet .. 975 milliGRAM(s) Oral every 8 hours  aluminum hydroxide/magnesium hydroxide/simethicone Suspension 30 milliLiter(s) Oral every 4 hours PRN  AQUAPHOR (petrolatum Ointment) 1 Application(s) Topical two times a day  epoetin carito-epbx (RETACRIT) Injectable 00433 Unit(s) IV Push <User Schedule>  lidocaine   4% Patch 1 Patch Transdermal <User Schedule>  melatonin 6 milliGRAM(s) Oral at bedtime PRN  metoprolol tartrate 25 milliGRAM(s) Oral two times a day  Nephro-pat 1 Tablet(s) Oral daily  pantoprazole    Tablet 40 milliGRAM(s) Oral two times a day  polyethylene glycol 3350 17 Gram(s) Oral daily PRN  senna 2 Tablet(s) Oral at bedtime  sevelamer carbonate 1600 milliGRAM(s) Oral three times a day with meals    All other medications reviewed.    SUBJECTIVE:  Stable clinically; complains of hip discomfort, L>R, minimal.  Continues PT    VITALS:  T(C): 36.6 (04-22-24 @ 08:43), Max: 36.7 (04-21-24 @ 23:01)  T(F): 97.8 (04-22-24 @ 08:43), Max: 98.1 (04-21-24 @ 23:01)  HR: 73 (04-22-24 @ 08:43) (73 - 91)  BP: 123/72 (04-22-24 @ 08:43) (118/67 - 136/76)    PHYSICAL EXAM:  Constitutional: alert, well developed  HEENT: normocephalic, anicteric sclerae, no mucositis or thrush  Respiratory: bilateral clear to auscultation anteriorly  Cardiovascular : S1, S2 regular, rhythmic, no murmurs, gallops or rubs  Abdomen: soft, distended, + normoactive BS, no palpable HS- megaly  Extremities: no tenderness;  -c/c/e, pulses equal bilaterally    LABS:  (04-22) WBC: 4.87 K/uL,Hemoglobin: 7.1 g/dL, Hematocrit: 22.7 %,  Platelet: 205 K/uL  (04-22) Na: 143 mmol/L ; K: 4.5 mmol/L ; BUN: 78 mg/dL ; Cr: 8.15 mg/dL.    RADIOLOGY:  ACC: [id number] EXAM:  DUPLEX SCAN EXT VEINS LOWER BI   ORDERED BY: KENNA GARCIA   PROCEDURE DATE:  03/14/2024    INTERPRETATION:  CLINICAL INFORMATION: Leg swelling.  COMPARISON: None available.  TECHNIQUE: Duplex sonography of the BILATERAL LOWER extremity veins with   color and spectral Doppler, with and without compression.  FINDINGS:  RIGHT:  Normal compressibility of the RIGHT common femoral, femoral and popliteal   veins.  Doppler examination shows normal spontaneous andphasic flow.  No RIGHT calf vein thrombosis is detected.    LEFT:  Normal compressibility of the LEFT common femoral, femoral and popliteal   veins.  Doppler examination shows normal spontaneous and phasic flow.  No LEFT calf vein thrombosis is detected.    IMPRESSION:  No evidence of deep venous thrombosis in either lower extremity.

## 2024-04-30 NOTE — PROGRESS NOTE ADULT - NS ATTEND AMEND GEN_ALL_CORE FT
Rehab Attending- Patient seen and examined by me - Case discussed, above note reviewed by me with modifications made    patient seen and evaluated in PT  pain In Left Groin On WB- x-rays done ? Lucency over Gr Troch region of implant- ? loosening  similar findings on Xray in march 24 with CT/ 3d reconstruction showing no evidence of loosening  significant renal osteodystrophy noted- will need OPD FU with Ortho  Continue HD - chair for tomorrow  DC home with friend- onde floor apt - no steps-  equipment to be delivered to unit  FU PMR - 4-6 weeks    Vital Signs Last 24 Hrs  T(C): 37.1 (30 Apr 2024 07:42), Max: 37.1 (30 Apr 2024 07:42)  T(F): 98.8 (30 Apr 2024 07:42), Max: 98.8 (30 Apr 2024 07:42)  HR: 72 (30 Apr 2024 07:42) (68 - 87)  BP: 151/82 (30 Apr 2024 07:42) (135/80 - 151/82)  BP(mean): --  RR: 16 (30 Apr 2024 07:42) (16 - 16)  SpO2: 98% (30 Apr 2024 07:42) (98% - 98%)    Parameters below as of 30 Apr 2024 07:42  Patient On (Oxygen Delivery Method): room air       Gen - NAD, Comfortable  HEENT - NCAT, EOMI  Neck - Supple, No limited ROM  Pulm - CTAB, No wheeze, No rhonchi, No crackles  Cardiovascular - RRR, S1S2, No murmurs  Chest - good chest expansion, good respiratory effort  Abdomen - Soft, NT/ND, +BS. BM 3/26  Extremities - Right FA AV fistula + bruit and thrill. GIDEON old AV fistula not active, Right Knee with degenerative changes  Neuro-     Cognitive - awake, alert, oriented to person, place, date, year, and situation.  Able  to follow command     Communication - Fluent, Comprehensible, No dysarthria, No aphasia        Memory:  Recent/ remote memory intact     Cranial Nerves - CN 2-12 intact. No facial asymmetry, Tongue midline, EOMI, Shoulder shrug intact     Motor -                    LEFT    UE - ShAB 5/5, EF 5/5, EE 5/5,  5/5                    RIGHT UE - ShAB 5/5, EF 5/5, EE 5/5,   5/5                    LEFT    LE - HF 4/5, KE 3+/5, DF 4/5, PF 4/5- NO pain with ROM Left Hip-only pain in groin on Wt bearing                    RIGHT LE - HF 3+/5, KE 3/5, DF 4/5, PF 4/5   moves Right knee better     Sensory - Intact bilaterally      Tone - normal  MSK: b/l hip discomfort with movement OA changes Right Knee- minimal pain  Psychiatric - Mood stable, Affect WNL  Skin: right hip surgical site with steri strips CDI. b/l sacrum/coccyx DTI healed Rehab Attending- Patient seen and examined by me - Case discussed, above note reviewed by me with modifications made    patient seen and evaluated in PT  pain In Left Groin On WB- x-rays done ? Lucency over Gr Troch region of implant- ? loosening  similar findings on Xray in march 24 with CT/ 3d reconstruction showing no evidence of loosening  significant renal osteodystrophy noted- will need OPD FU with Ortho  Continue HD - chair for tomorrow  DC home with friend- onde floor apt - no steps-  equipment to be delivered to unit  FU PMR - 4-6 weeks    30 Minutes spent on Discharge    Vital Signs Last 24 Hrs  T(C): 37.1 (30 Apr 2024 07:42), Max: 37.1 (30 Apr 2024 07:42)  T(F): 98.8 (30 Apr 2024 07:42), Max: 98.8 (30 Apr 2024 07:42)  HR: 72 (30 Apr 2024 07:42) (68 - 87)  BP: 151/82 (30 Apr 2024 07:42) (135/80 - 151/82)  BP(mean): --  RR: 16 (30 Apr 2024 07:42) (16 - 16)  SpO2: 98% (30 Apr 2024 07:42) (98% - 98%)    Parameters below as of 30 Apr 2024 07:42  Patient On (Oxygen Delivery Method): room air       Gen - NAD, Comfortable  HEENT - NCAT, EOMI  Neck - Supple, No limited ROM  Pulm - CTAB, No wheeze, No rhonchi, No crackles  Cardiovascular - RRR, S1S2, No murmurs  Chest - good chest expansion, good respiratory effort  Abdomen - Soft, NT/ND, +BS. BM 3/26  Extremities - Right FA AV fistula + bruit and thrill. GIDEON old AV fistula not active, Right Knee with degenerative changes  Neuro-     Cognitive - awake, alert, oriented to person, place, date, year, and situation.  Able  to follow command     Communication - Fluent, Comprehensible, No dysarthria, No aphasia        Memory:  Recent/ remote memory intact     Cranial Nerves - CN 2-12 intact. No facial asymmetry, Tongue midline, EOMI, Shoulder shrug intact     Motor -                    LEFT    UE - ShAB 5/5, EF 5/5, EE 5/5,  5/5                    RIGHT UE - ShAB 5/5, EF 5/5, EE 5/5,   5/5                    LEFT    LE - HF 4/5, KE 3+/5, DF 4/5, PF 4/5- NO pain with ROM Left Hip-only pain in groin on Wt bearing                    RIGHT LE - HF 3+/5, KE 3/5, DF 4/5, PF 4/5   moves Right knee better     Sensory - Intact bilaterally      Tone - normal  MSK: b/l hip discomfort with movement OA changes Right Knee- minimal pain  Psychiatric - Mood stable, Affect WNL  Skin: right hip surgical site with steri strips CDI. b/l sacrum/coccyx DTI healed

## 2024-04-30 NOTE — PROGRESS NOTE ADULT - SUBJECTIVE AND OBJECTIVE BOX
Seen on HD    Vital Signs Last 24 Hrs  T(C): 37.1 (04-30-24 @ 17:56), Max: 37.1 (04-30-24 @ 07:42)  T(F): 98.8 (04-30-24 @ 17:56), Max: 98.8 (04-30-24 @ 07:42)  HR: 88 (04-30-24 @ 17:56) (72 - 88)  BP: 161/71 (04-30-24 @ 17:56) (135/80 - 167/91)  RR: 17 (04-30-24 @ 17:56) (16 - 20)  SpO2: 98% (04-30-24 @ 17:56) (98% - 99%)    I&O's Detail    30 Apr 2024 07:01  -  30 Apr 2024 23:04  --------------------------------------------------------  OUT:    Other (mL): 3000 mL  Total OUT: 3000 mL    s1s2  b/l air entry  soft, ND  sm edema                                                                                                                                    7.6    4.60  )-----------( 228      ( 30 Apr 2024 14:11 )             23.2     30 Apr 2024 14:11    141    |  100    |  74     ----------------------------<  82     4.6     |  27     |  9.67     Ca    8.8        30 Apr 2024 14:11  Phos  5.1       30 Apr 2024 14:11    acetaminophen     Tablet .. 975 milliGRAM(s) Oral every 8 hours  aluminum hydroxide/magnesium hydroxide/simethicone Suspension 30 milliLiter(s) Oral every 4 hours PRN  AQUAPHOR (petrolatum Ointment) 1 Application(s) Topical two times a day  epoetin carito-epbx (RETACRIT) Injectable 18820 Unit(s) IV Push <User Schedule>  lidocaine   4% Patch 1 Patch Transdermal <User Schedule>  melatonin 6 milliGRAM(s) Oral at bedtime PRN  metoprolol tartrate 25 milliGRAM(s) Oral two times a day  Nephro-pat 1 Tablet(s) Oral daily  oxyCODONE    IR 10 milliGRAM(s) Oral every 6 hours PRN  oxyCODONE    IR 5 milliGRAM(s) Oral every 6 hours PRN  pantoprazole    Tablet 40 milliGRAM(s) Oral two times a day  polyethylene glycol 3350 17 Gram(s) Oral daily PRN  senna 2 Tablet(s) Oral at bedtime  sevelamer carbonate 1600 milliGRAM(s) Oral three times a day with meals    A/P:    ESRD on HD   S/p fall, R hip fx  S/p R hip HA 3/7  Adm to rehab   On Epoetin w/HD 3 x week   GIB, s/p multiple units of PRBCs  S/p EGD/colonoscopy, duodenal ulcer  Hgb fairly stable since  Pt will continue HD as op on [facility] schedule     [phone]

## 2024-04-30 NOTE — PROGRESS NOTE ADULT - REASON FOR ADMISSION
Right Hip fracture s/p right hip hemiarthroplasty

## 2024-04-30 NOTE — PROGRESS NOTE ADULT - NUTRITIONAL ASSESSMENT
This patient has been assessed with a concern for Malnutrition and has been determined to have a diagnosis/diagnoses of Severe protein-calorie malnutrition.    This patient is being managed with:   Diet NPO after Midnight-     NPO Start Date: 18-Apr-2024   NPO Start Time: 23:59  Entered: Apr 18 2024 10:44AM    Diet Clear Liquid-  Entered: Apr 18 2024 10:44AM  
This patient has been assessed with a concern for Malnutrition and has been determined to have a diagnosis/diagnoses of Severe protein-calorie malnutrition.    This patient is being managed with:   Diet Renal Restrictions-  For patients receiving Renal Replacement - No Protein Restr No Conc K No Conc Phos Low Sodium  Sanju(7 Gm Arginine/7 Gm Glut/1.2 Gm HMB     Qty per Day:  1  Supplement Feeding Modality:  Oral  Nepro Cans or Servings Per Day:  1       Frequency:  Daily  Entered: Apr 20 2024 10:01AM  
This patient has been assessed with a concern for Malnutrition and has been determined to have a diagnosis/diagnoses of Severe protein-calorie malnutrition.    This patient is being managed with:   Diet Renal Restrictions-  For patients receiving Renal Replacement - No Protein Restr No Conc K No Conc Phos Low Sodium  Sanju(7 Gm Arginine/7 Gm Glut/1.2 Gm HMB     Qty per Day:  1  Supplement Feeding Modality:  Oral  Nepro Cans or Servings Per Day:  1       Frequency:  Daily  Entered: Apr 9 2024  9:32AM  
This patient has been assessed with a concern for Malnutrition and has been determined to have a diagnosis/diagnoses of Severe protein-calorie malnutrition.    This patient is being managed with:   Diet Renal Restrictions-  For patients receiving Renal Replacement - No Protein Restr No Conc K No Conc Phos Low Sodium  Sanju(7 Gm Arginine/7 Gm Glut/1.2 Gm HMB     Qty per Day:  1  Supplement Feeding Modality:  Oral  Nepro Cans or Servings Per Day:  1       Frequency:  Daily  Entered: Apr 9 2024  9:32AM  
This patient has been assessed with a concern for Malnutrition and has been determined to have a diagnosis/diagnoses of Severe protein-calorie malnutrition.    This patient is being managed with:   Diet Renal Restrictions-  For patients receiving Renal Replacement - No Protein Restr No Conc K No Conc Phos Low Sodium  Sanju(7 Gm Arginine/7 Gm Glut/1.2 Gm HMB     Qty per Day:  1  Supplement Feeding Modality:  Oral  Nepro Cans or Servings Per Day:  1       Frequency:  Two Times a day  Entered: Mar 28 2024 10:29AM  
This patient has been assessed with a concern for Malnutrition and has been determined to have a diagnosis/diagnoses of Severe protein-calorie malnutrition.    This patient is being managed with:   Diet Renal Restrictions-  For patients receiving Renal Replacement - No Protein Restr No Conc K No Conc Phos Low Sodium  Supplement Feeding Modality:  Oral  Nepro Cans or Servings Per Day:  1       Frequency:  Daily  Entered: Apr 23 2024 11:20AM  
This patient has been assessed with a concern for Malnutrition and has been determined to have a diagnosis/diagnoses of Severe protein-calorie malnutrition.    This patient is being managed with:   Diet Renal Restrictions-  For patients receiving Renal Replacement - No Protein Restr No Conc K No Conc Phos Low Sodium  Supplement Feeding Modality:  Oral  Nepro Cans or Servings Per Day:  1       Frequency:  Daily  Entered: Apr 23 2024 11:20AM  
This patient has been assessed with a concern for Malnutrition and has been determined to have a diagnosis/diagnoses of Severe protein-calorie malnutrition.    This patient is being managed with:   Diet NPO after Midnight-     NPO Start Date: 18-Apr-2024   NPO Start Time: 23:59  Entered: Apr 18 2024 10:44AM    Diet Clear Liquid-  Entered: Apr 18 2024 10:44AM  
This patient has been assessed with a concern for Malnutrition and has been determined to have a diagnosis/diagnoses of Severe protein-calorie malnutrition.    This patient is being managed with:   Diet NPO after Midnight-     NPO Start Date: 18-Apr-2024   NPO Start Time: 23:59  Entered: Apr 18 2024 10:44AM    Diet Clear Liquid-  Entered: Apr 18 2024 10:44AM  
This patient has been assessed with a concern for Malnutrition and has been determined to have a diagnosis/diagnoses of Severe protein-calorie malnutrition.    This patient is being managed with:   Diet Renal Restrictions-  For patients receiving Renal Replacement - No Protein Restr No Conc K No Conc Phos Low Sodium  Sanju(7 Gm Arginine/7 Gm Glut/1.2 Gm HMB     Qty per Day:  1  Supplement Feeding Modality:  Oral  Nepro Cans or Servings Per Day:  1       Frequency:  Daily  Entered: Apr 20 2024 10:01AM  
This patient has been assessed with a concern for Malnutrition and has been determined to have a diagnosis/diagnoses of Severe protein-calorie malnutrition.    This patient is being managed with:   Diet Renal Restrictions-  For patients receiving Renal Replacement - No Protein Restr No Conc K No Conc Phos Low Sodium  Sanju(7 Gm Arginine/7 Gm Glut/1.2 Gm HMB     Qty per Day:  1  Supplement Feeding Modality:  Oral  Nepro Cans or Servings Per Day:  1       Frequency:  Daily  Entered: Apr 20 2024 10:01AM  
This patient has been assessed with a concern for Malnutrition and has been determined to have a diagnosis/diagnoses of Severe protein-calorie malnutrition.    This patient is being managed with:   Diet Renal Restrictions-  For patients receiving Renal Replacement - No Protein Restr No Conc K No Conc Phos Low Sodium  Sanju(7 Gm Arginine/7 Gm Glut/1.2 Gm HMB     Qty per Day:  1  Supplement Feeding Modality:  Oral  Nepro Cans or Servings Per Day:  1       Frequency:  Daily  Entered: Apr 9 2024  9:32AM    Diet Renal Restrictions-  For patients receiving Renal Replacement - No Protein Restr No Conc K No Conc Phos Low Sodium  Sanju(7 Gm Arginine/7 Gm Glut/1.2 Gm HMB     Qty per Day:  1  Supplement Feeding Modality:  Oral  Nepro Cans or Servings Per Day:  1       Frequency:  Two Times a day  Entered: Mar 28 2024 10:29AM    The following pending diet order is being considered for treatment of Severe protein-calorie malnutrition:null
This patient has been assessed with a concern for Malnutrition and has been determined to have a diagnosis/diagnoses of Severe protein-calorie malnutrition.    This patient is being managed with:   Diet Renal Restrictions-  For patients receiving Renal Replacement - No Protein Restr No Conc K No Conc Phos Low Sodium  Sanju(7 Gm Arginine/7 Gm Glut/1.2 Gm HMB     Qty per Day:  1  Supplement Feeding Modality:  Oral  Nepro Cans or Servings Per Day:  1       Frequency:  Two Times a day  Entered: Mar 28 2024 10:29AM  
This patient has been assessed with a concern for Malnutrition and has been determined to have a diagnosis/diagnoses of Severe protein-calorie malnutrition.    This patient is being managed with:   Diet Renal Restrictions-  For patients receiving Renal Replacement - No Protein Restr No Conc K No Conc Phos Low Sodium  Sanju(7 Gm Arginine/7 Gm Glut/1.2 Gm HMB     Qty per Day:  1  Supplement Feeding Modality:  Oral  Nepro Cans or Servings Per Day:  1       Frequency:  Two Times a day  Entered: Mar 28 2024 10:29AM  
This patient has been assessed with a concern for Malnutrition and has been determined to have a diagnosis/diagnoses of Severe protein-calorie malnutrition.    This patient is being managed with:   Diet Renal Restrictions-  For patients receiving Renal Replacement - No Protein Restr No Conc K No Conc Phos Low Sodium  Supplement Feeding Modality:  Oral  Nepro Cans or Servings Per Day:  1       Frequency:  Daily  Entered: Apr 23 2024 11:20AM  
This patient has been assessed with a concern for Malnutrition and has been determined to have a diagnosis/diagnoses of Severe protein-calorie malnutrition.    This patient is being managed with:   Diet Renal Restrictions-  For patients receiving Renal Replacement - No Protein Restr No Conc K No Conc Phos Low Sodium  Supplement Feeding Modality:  Oral  Nepro Cans or Servings Per Day:  1       Frequency:  Daily  Entered: Apr 23 2024 11:20AM    Diet Renal Restrictions-  For patients receiving Renal Replacement - No Protein Restr No Conc K No Conc Phos Low Sodium  Sanju(7 Gm Arginine/7 Gm Glut/1.2 Gm HMB     Qty per Day:  1  Supplement Feeding Modality:  Oral  Nepro Cans or Servings Per Day:  1       Frequency:  Daily  Entered: Apr 20 2024 10:01AM    The following pending diet order is being considered for treatment of Severe protein-calorie malnutrition:null
This patient has been assessed with a concern for Malnutrition and has been determined to have a diagnosis/diagnoses of Severe protein-calorie malnutrition.    This patient is being managed with:   Diet Renal Restrictions-  For patients receiving Renal Replacement - No Protein Restr No Conc K No Conc Phos Low Sodium  Supplement Feeding Modality:  Oral  Nepro Cans or Servings Per Day:  1       Frequency:  Daily  Entered: Apr 23 2024 11:20AM    Diet Renal Restrictions-  For patients receiving Renal Replacement - No Protein Restr No Conc K No Conc Phos Low Sodium  Sanju(7 Gm Arginine/7 Gm Glut/1.2 Gm HMB     Qty per Day:  1  Supplement Feeding Modality:  Oral  Nepro Cans or Servings Per Day:  1       Frequency:  Daily  Entered: Apr 20 2024 10:01AM    The following pending diet order is being considered for treatment of Severe protein-calorie malnutrition:null
This patient has been assessed with a concern for Malnutrition and has been determined to have a diagnosis/diagnoses of Severe protein-calorie malnutrition.    This patient is being managed with:   Diet NPO after Midnight-     NPO Start Date: 18-Apr-2024   NPO Start Time: 23:59  Entered: Apr 18 2024 10:44AM    Diet Clear Liquid-  Entered: Apr 18 2024 10:44AM  
This patient has been assessed with a concern for Malnutrition and has been determined to have a diagnosis/diagnoses of Severe protein-calorie malnutrition.    This patient is being managed with:   Diet Renal Restrictions-  For patients receiving Renal Replacement - No Protein Restr No Conc K No Conc Phos Low Sodium  Sanju(7 Gm Arginine/7 Gm Glut/1.2 Gm HMB     Qty per Day:  1  Supplement Feeding Modality:  Oral  Nepro Cans or Servings Per Day:  1       Frequency:  Daily  Entered: Apr 20 2024 10:01AM  
This patient has been assessed with a concern for Malnutrition and has been determined to have a diagnosis/diagnoses of Severe protein-calorie malnutrition.    This patient is being managed with:   Diet Renal Restrictions-  For patients receiving Renal Replacement - No Protein Restr No Conc K No Conc Phos Low Sodium  Sanju(7 Gm Arginine/7 Gm Glut/1.2 Gm HMB     Qty per Day:  1  Supplement Feeding Modality:  Oral  Nepro Cans or Servings Per Day:  1       Frequency:  Daily  Entered: Apr 9 2024  9:32AM  
This patient has been assessed with a concern for Malnutrition and has been determined to have a diagnosis/diagnoses of Severe protein-calorie malnutrition.    This patient is being managed with:   Diet Renal Restrictions-  For patients receiving Renal Replacement - No Protein Restr No Conc K No Conc Phos Low Sodium  Sanju(7 Gm Arginine/7 Gm Glut/1.2 Gm HMB     Qty per Day:  1  Supplement Feeding Modality:  Oral  Nepro Cans or Servings Per Day:  1       Frequency:  Daily  Entered: Apr 9 2024  9:32AM  
This patient has been assessed with a concern for Malnutrition and has been determined to have a diagnosis/diagnoses of Severe protein-calorie malnutrition.    This patient is being managed with:   Diet Renal Restrictions-  For patients receiving Renal Replacement - No Protein Restr No Conc K No Conc Phos Low Sodium  Sanju(7 Gm Arginine/7 Gm Glut/1.2 Gm HMB     Qty per Day:  1  Supplement Feeding Modality:  Oral  Nepro Cans or Servings Per Day:  1       Frequency:  Two Times a day  Entered: Mar 28 2024 10:29AM  
This patient has been assessed with a concern for Malnutrition and has been determined to have a diagnosis/diagnoses of Severe protein-calorie malnutrition.    This patient is being managed with:   Diet Renal Restrictions-  For patients receiving Renal Replacement - No Protein Restr No Conc K No Conc Phos Low Sodium  Supplement Feeding Modality:  Oral  Nepro Cans or Servings Per Day:  1       Frequency:  Daily  Entered: Apr 23 2024 11:20AM  
This patient has been assessed with a concern for Malnutrition and has been determined to have a diagnosis/diagnoses of Severe protein-calorie malnutrition.    This patient is being managed with:   Diet Renal Restrictions-  For patients receiving Renal Replacement - No Protein Restr No Conc K No Conc Phos Low Sodium  Supplement Feeding Modality:  Oral  Nepro Cans or Servings Per Day:  1       Frequency:  Daily  Entered: Apr 23 2024 11:20AM  
This patient has been assessed with a concern for Malnutrition and has been determined to have a diagnosis/diagnoses of Severe protein-calorie malnutrition.    This patient is being managed with:   Diet NPO after Midnight-     NPO Start Date: 18-Apr-2024   NPO Start Time: 23:59  Entered: Apr 18 2024 10:44AM    Diet Clear Liquid-  Entered: Apr 18 2024 10:44AM  
This patient has been assessed with a concern for Malnutrition and has been determined to have a diagnosis/diagnoses of Severe protein-calorie malnutrition.    This patient is being managed with:   Diet Renal Restrictions-  For patients receiving Renal Replacement - No Protein Restr No Conc K No Conc Phos Low Sodium  Sanju(7 Gm Arginine/7 Gm Glut/1.2 Gm HMB     Qty per Day:  1  Supplement Feeding Modality:  Oral  Nepro Cans or Servings Per Day:  1       Frequency:  Daily  Entered: Apr 9 2024  9:32AM  
This patient has been assessed with a concern for Malnutrition and has been determined to have a diagnosis/diagnoses of Severe protein-calorie malnutrition.    This patient is being managed with:   Diet Renal Restrictions-  For patients receiving Renal Replacement - No Protein Restr No Conc K No Conc Phos Low Sodium  Sanju(7 Gm Arginine/7 Gm Glut/1.2 Gm HMB     Qty per Day:  1  Supplement Feeding Modality:  Oral  Nepro Cans or Servings Per Day:  1       Frequency:  Daily  Entered: Apr 9 2024  9:32AM  
This patient has been assessed with a concern for Malnutrition and has been determined to have a diagnosis/diagnoses of Severe protein-calorie malnutrition.    This patient is being managed with:   Diet Renal Restrictions-  For patients receiving Renal Replacement - No Protein Restr No Conc K No Conc Phos Low Sodium  Sanju(7 Gm Arginine/7 Gm Glut/1.2 Gm HMB     Qty per Day:  1  Supplement Feeding Modality:  Oral  Nepro Cans or Servings Per Day:  1       Frequency:  Two Times a day  Entered: Mar 28 2024 10:29AM  
This patient has been assessed with a concern for Malnutrition and has been determined to have a diagnosis/diagnoses of Severe protein-calorie malnutrition.    This patient is being managed with:   Diet Renal Restrictions-  For patients receiving Renal Replacement - No Protein Restr No Conc K No Conc Phos Low Sodium  Supplement Feeding Modality:  Oral  Nepro Cans or Servings Per Day:  1       Frequency:  Daily  Entered: Apr 23 2024 11:20AM  
This patient has been assessed with a concern for Malnutrition and has been determined to have a diagnosis/diagnoses of Severe protein-calorie malnutrition.    This patient is being managed with:   Diet NPO after Midnight-     NPO Start Date: 18-Apr-2024   NPO Start Time: 23:59  Entered: Apr 18 2024 10:44AM    Diet Clear Liquid-  Entered: Apr 18 2024 10:44AM  
This patient has been assessed with a concern for Malnutrition and has been determined to have a diagnosis/diagnoses of Severe protein-calorie malnutrition.    This patient is being managed with:   Diet Renal Restrictions-  For patients receiving Renal Replacement - No Protein Restr No Conc K No Conc Phos Low Sodium  Sanju(7 Gm Arginine/7 Gm Glut/1.2 Gm HMB     Qty per Day:  1  Supplement Feeding Modality:  Oral  Nepro Cans or Servings Per Day:  1       Frequency:  Daily  Entered: Apr 9 2024  9:32AM  
This patient has been assessed with a concern for Malnutrition and has been determined to have a diagnosis/diagnoses of Severe protein-calorie malnutrition.    This patient is being managed with:   Diet Renal Restrictions-  For patients receiving Renal Replacement - No Protein Restr No Conc K No Conc Phos Low Sodium  Sanju(7 Gm Arginine/7 Gm Glut/1.2 Gm HMB     Qty per Day:  1  Supplement Feeding Modality:  Oral  Nepro Cans or Servings Per Day:  1       Frequency:  Two Times a day  Entered: Mar 28 2024 10:29AM  
This patient has been assessed with a concern for Malnutrition and has been determined to have a diagnosis/diagnoses of Severe protein-calorie malnutrition.    This patient is being managed with:   Diet Renal Restrictions-  For patients receiving Renal Replacement - No Protein Restr No Conc K No Conc Phos Low Sodium  Supplement Feeding Modality:  Oral  Nepro Cans or Servings Per Day:  1       Frequency:  Daily  Entered: Apr 23 2024 11:20AM  
This patient has been assessed with a concern for Malnutrition and has been determined to have a diagnosis/diagnoses of Severe protein-calorie malnutrition.    This patient is being managed with:   Diet Renal Restrictions-  For patients receiving Renal Replacement - No Protein Restr No Conc K No Conc Phos Low Sodium  Supplement Feeding Modality:  Oral  Nepro Cans or Servings Per Day:  1       Frequency:  Daily  Entered: Apr 23 2024 11:20AM    Diet Renal Restrictions-  For patients receiving Renal Replacement - No Protein Restr No Conc K No Conc Phos Low Sodium  Sanju(7 Gm Arginine/7 Gm Glut/1.2 Gm HMB     Qty per Day:  1  Supplement Feeding Modality:  Oral  Nepro Cans or Servings Per Day:  1       Frequency:  Daily  Entered: Apr 20 2024 10:01AM    The following pending diet order is being considered for treatment of Severe protein-calorie malnutrition:null
This patient has been assessed with a concern for Malnutrition and has been determined to have a diagnosis/diagnoses of Severe protein-calorie malnutrition.    This patient is being managed with:   Diet NPO after Midnight-     NPO Start Date: 18-Apr-2024   NPO Start Time: 23:59  Entered: Apr 18 2024 10:44AM    Diet Clear Liquid-  Entered: Apr 18 2024 10:44AM  
This patient has been assessed with a concern for Malnutrition and has been determined to have a diagnosis/diagnoses of Severe protein-calorie malnutrition.    This patient is being managed with:   Diet Renal Restrictions-  For patients receiving Renal Replacement - No Protein Restr No Conc K No Conc Phos Low Sodium  Sanju(7 Gm Arginine/7 Gm Glut/1.2 Gm HMB     Qty per Day:  1  Supplement Feeding Modality:  Oral  Nepro Cans or Servings Per Day:  1       Frequency:  Daily  Entered: Apr 20 2024 10:01AM  
This patient has been assessed with a concern for Malnutrition and has been determined to have a diagnosis/diagnoses of Severe protein-calorie malnutrition.    This patient is being managed with:   Diet Renal Restrictions-  For patients receiving Renal Replacement - No Protein Restr No Conc K No Conc Phos Low Sodium  Supplement Feeding Modality:  Oral  Nepro Cans or Servings Per Day:  1       Frequency:  Daily  Entered: Apr 23 2024 11:20AM    Diet Renal Restrictions-  For patients receiving Renal Replacement - No Protein Restr No Conc K No Conc Phos Low Sodium  Sanju(7 Gm Arginine/7 Gm Glut/1.2 Gm HMB     Qty per Day:  1  Supplement Feeding Modality:  Oral  Nepro Cans or Servings Per Day:  1       Frequency:  Daily  Entered: Apr 20 2024 10:01AM    The following pending diet order is being considered for treatment of Severe protein-calorie malnutrition:null
This patient has been assessed with a concern for Malnutrition and has been determined to have a diagnosis/diagnoses of Severe protein-calorie malnutrition.    This patient is being managed with:   Diet Renal Restrictions-  For patients receiving Renal Replacement - No Protein Restr No Conc K No Conc Phos Low Sodium  Sanju(7 Gm Arginine/7 Gm Glut/1.2 Gm HMB     Qty per Day:  1  Supplement Feeding Modality:  Oral  Nepro Cans or Servings Per Day:  1       Frequency:  Two Times a day  Entered: Mar 28 2024 10:29AM  
This patient has been assessed with a concern for Malnutrition and has been determined to have a diagnosis/diagnoses of Severe protein-calorie malnutrition.    This patient is being managed with:   Diet Renal Restrictions-  For patients receiving Renal Replacement - No Protein Restr No Conc K No Conc Phos Low Sodium  Sanju(7 Gm Arginine/7 Gm Glut/1.2 Gm HMB     Qty per Day:  1  Supplement Feeding Modality:  Oral  Nepro Cans or Servings Per Day:  1       Frequency:  Two Times a day  Entered: Mar 28 2024 10:29AM

## 2024-04-30 NOTE — PROGRESS NOTE ADULT - NS ATTEND OPT1 GEN_ALL_CORE
I independently performed the documented:
I independently performed the documented:
I attest my time as attending is greater than 50% of the total combined time spent on qualifying patient care activities by the PA/NP and attending.
I independently performed the documented:
I independently performed the documented:
I attest my time as attending is greater than 50% of the total combined time spent on qualifying patient care activities by the PA/NP and attending.
I independently performed the documented:
I independently performed the documented:
I attest my time as attending is greater than 50% of the total combined time spent on qualifying patient care activities by the PA/NP and attending.
I attest my time as attending is greater than 50% of the total combined time spent on qualifying patient care activities by the PA/NP and attending.
I independently performed the documented:
I attest my time as attending is greater than 50% of the total combined time spent on qualifying patient care activities by the PA/NP and attending.

## 2024-04-30 NOTE — PROGRESS NOTE ADULT - PROVIDER SPECIALTY LIST ADULT
Heme/Onc
Heme/Onc
Hospitalist
Internal Medicine
Internal Medicine
Nephrology
Physiatry
Rehab Medicine
Gastroenterology
Gastroenterology
Heme/Onc
Hospitalist
Nephrology
Physiatry
Rehab Medicine
Rehab Medicine
Gastroenterology
Gastroenterology
Heme/Onc
Hospitalist
Nephrology
Physiatry
Rehab Medicine
Heme/Onc
Hospitalist
Hospitalist
Physiatry
Physiatry
Gastroenterology
Gastroenterology
Heme/Onc
Hospitalist
Internal Medicine
Physiatry
Internal Medicine
Physiatry
Physiatry
Gastroenterology
Hospitalist

## 2024-04-30 NOTE — PROGRESS NOTE ADULT - ASSESSMENT
BRIDGTE NORTH is a 38y M with HTN, anemia of chronic disease, ESRD on HD (M/W/F via right AV fistula), left hip arthroplasty in 8/23, history of right leg fracture presented to [facility] on 3/6 after a fall during a wheelchair transfer. He was found to have a right femoral neck fracture and subacute pubic body and inferior rami fractures. He is s/p right hip hemiarthroplasty. Hospital course complicated by post-op anemia s/p transfusion, hypoglycemia, hyperkalemia, febrile 2/2 covid s/p remdisivir, and started on eliquis 2.5mg BID with negative dvts on LE duplex. Patient now admitted for a multidisciplinary rehab program. 03-27-24 @ 16:17      #Right Femoral Neck fracture   - s/p right hip hemiarthroplasty on 3/7  - pain med as below  - Dressing and staples removed on 3/22  - Comprehensive Multidisciplinary Rehab Program:  comprehensive rehab program of PT/OT   - precautions: WBAT RLE. Anterior hip.   - Right Knee pain- arthrosis/ effusion- no Fx- renal osteodystrophy- ICE/ Lidoderm/ Rehab/ ACE  - Social work to confirm DC plan- DC home with friend- awaiting equipment, VN services  FU with PMD/Renal as OPD  FU ortho- Dr León 1-2 weeks  FU PMR Dr Dumas 4-6 weeks  Possible DC in AM if all DC arrangements made  XR left hip-standing f/up today      #ESRD  - HDS via right AV fistula on m/w/f.  ( Last HD 3/27). Old HDS left UA  - Nephro in  - sevelamer 800mg TID  - retacrit m/w/f  continue Epogen in HD    Acute Anemia  Guiac +  total 3 units PBRCs week prior  EGD/Colonoscopy today per GI- Duodenal Ulcer- sp cauterization  Protonix  2x/day  for HD today- check CBC  needs FU appt with GI for repeat EGD  4 weeks after last one    #HTN  - toprol 25mg BID  - Monitor BP    #Pain  - Tylenol PRN  - oxy 10mg moderate, 15mg severe.   Right Knee pain- lidoderm during the day/ICE/ ACE- Better- can Obtain as OPD  Left Hip pain - all studies reviewed- recent CT 3/6- no evidence of abnormality in Left prosthesis  XR hip for pain left Groin on Wt Bearing  OPD Ortho FU regarding Left Hip pain on DC    #Sleep  - Melatonin PRN     #GI / Bowel  #dyspepsia  - Protonix  - maalox  - formed, brown stool     # / Bladder  - does not void ESRD    #Skin / Pressure injury  - Skin assessment on admission performed : right hip surgical site with steri strips CDI. coccyx small stage 2-using barrier cream- Healed  - Monitor Incisions:    - wound care following at [facility] for b/l sacrum/coccyx DTI wth evolution.   - wound care to follow  - turn and reposition q2h      #Diet:  - Diet Consistency: Renal restriction  - Nutrition consult    #DVT prophylaxis:   - eliquis 2.5mg BID- DCd, ASA DCd  - Last LE Doppler normal on 3/14   BLE Duplex 4/26- negative for DVT    .

## 2024-04-30 NOTE — PROGRESS NOTE ADULT - SUBJECTIVE AND OBJECTIVE BOX
HPI:  37yo Male PMHx HTN, anemia of chronic disease, ESRD on HD (M/W/F via right AV fistula), left hip arthroplasty in 8/23, history of right leg fracture presented to [facility] from [facility] on 3/6 after falling during a wheelchair transfer landing on his right knee. He is wheelchair bound since his left hip surgery. CT hip with acute garden type 1 minimally valgus impacted transcervical right femoral neck fracture and subacute right pubic body and inferior rami fractures. He is s/p right hip hemiarthroplasty. Post op course complicated by anemia s/p transfusion. Hospital course complicated by hypoglycemia after being shifted for hyperkalemia. Further complicated by febrile 101.6F found to be covid positive and completed remdisivir. He was started on eliquis 2.5mg BID for elevated d-dimers and negative LE duplex.     Patient was evaluated by PM&R and therapy for functional deficits, gait/ADL impairments and acute rehabilitation was recommended. Patient was medically optimized for discharge to [facility] IRU on 3/27 (27 Mar 2024 15:19)      ROS/subjective:  Patient seen and evaluated in PT  ambulating 35' with RW - pain in Left Groin-XR hip standing today-f/up  BM no melena- stool brown   Social Work arranging possible Dc to friend home with VN today vs tmrw  no dizziness, no headaches, no nausea, no vomiting, no SOB, no chest pain  PPD Read over weekend 4/27- negative      MEDICATIONS  (STANDING):  acetaminophen     Tablet .. 975 milliGRAM(s) Oral every 8 hours  AQUAPHOR (petrolatum Ointment) 1 Application(s) Topical two times a day  epoetin carito-epbx (RETACRIT) Injectable 34723 Unit(s) IV Push <User Schedule>  lidocaine   4% Patch 1 Patch Transdermal <User Schedule>  metoprolol tartrate 25 milliGRAM(s) Oral two times a day  Nephro-pat 1 Tablet(s) Oral daily  pantoprazole    Tablet 40 milliGRAM(s) Oral two times a day  senna 2 Tablet(s) Oral at bedtime  sevelamer carbonate 1600 milliGRAM(s) Oral three times a day with meals    MEDICATIONS  (PRN):  aluminum hydroxide/magnesium hydroxide/simethicone Suspension 30 milliLiter(s) Oral every 4 hours PRN Dyspepsia  melatonin 6 milliGRAM(s) Oral at bedtime PRN Insomnia  oxyCODONE    IR 10 milliGRAM(s) Oral every 6 hours PRN Severe Pain (7 - 10)  oxyCODONE    IR 5 milliGRAM(s) Oral every 6 hours PRN Moderate Pain (4 - 6)  polyethylene glycol 3350 17 Gram(s) Oral daily PRN Constipation      Vital Signs Last 24 Hrs  T(C): 37.1 (30 Apr 2024 07:42), Max: 37.1 (30 Apr 2024 07:42)  T(F): 98.8 (30 Apr 2024 07:42), Max: 98.8 (30 Apr 2024 07:42)  HR: 72 (30 Apr 2024 07:42) (68 - 87)  BP: 151/82 (30 Apr 2024 07:42) (135/80 - 151/82)  BP(mean): --  RR: 16 (30 Apr 2024 07:42) (16 - 16)  SpO2: 98% (30 Apr 2024 07:42) (98% - 98%)    Parameters below as of 30 Apr 2024 07:42  Patient On (Oxygen Delivery Method): room air       Gen - NAD, Comfortable  HEENT - NCAT, EOMI  Neck - Supple, No limited ROM  Pulm - CTAB, No wheeze, No rhonchi, No crackles  Cardiovascular - RRR, S1S2, No murmurs  Chest - good chest expansion, good respiratory effort  Abdomen - Soft, NT/ND, +BS. BM 3/26  Extremities - Right FA AV fistula + bruit and thrill. GIDEON old AV fistula not active, Right Knee with degenerative changes  Neuro-     Cognitive - awake, alert, oriented to person, place, date, year, and situation.  Able  to follow command     Communication - Fluent, Comprehensible, No dysarthria, No aphasia        Memory:  Recent/ remote memory intact     Cranial Nerves - CN 2-12 intact. No facial asymmetry, Tongue midline, EOMI, Shoulder shrug intact     Motor -                    LEFT    UE - ShAB 5/5, EF 5/5, EE 5/5,  5/5                    RIGHT UE - ShAB 5/5, EF 5/5, EE 5/5,   5/5                    LEFT    LE - HF 4/5, KE 3+/5, DF 4/5, PF 4/5- NO pain with ROM Left Hip-only pain in groin on Wt bearing                    RIGHT LE - HF 3+/5, KE 3/5, DF 4/5, PF 4/5   moves Right knee better     Sensory - Intact bilaterally      Tone - normal  MSK: b/l hip discomfort with movement OA changes Right Knee- minimal pain  Psychiatric - Mood stable, Affect WNL  Skin: right hip surgical site with steri strips CDI. b/l sacrum/coccyx DTI healed    IDT Camilla 4/29  DC to home with services- friend's Apt- 5/1      Continue comprehensive acute rehab program consisting of 3hrs/day of OT/PT.

## 2024-05-01 ENCOUNTER — NON-APPOINTMENT (OUTPATIENT)
Age: 39
End: 2024-05-01

## 2024-06-05 ENCOUNTER — APPOINTMENT (OUTPATIENT)
Dept: PHYSICAL MEDICINE AND REHAB | Facility: CLINIC | Age: 39
End: 2024-06-05

## 2024-06-21 NOTE — PATIENT PROFILE ADULT - NSTRANSFERBELONGINGSDISPO_GEN_A_NUR
BID  Toprol  mg    Diabetic polyneuropathy associated with type 2 diabetes mellitus (HCC)  Continue gabapentin 200 mg 3 times daily  Voltaren as needed    Insomnia  Trazadone 50 mg QHS      Iron supplementation, Kcl 20 meq QD        Outpatient Encounter Medications as of 6/21/2024   Medication Sig Dispense Refill    insulin glargine (LANTUS) 100 UNIT/ML injection vial Inject 40 units in the morning and 20 units at night 10 mL 3    insulin lispro (HUMALOG) 100 UNIT/ML injection vial Inject 12 units 3 times daily with meals plus the following sliding scale. -200 add 3U, -250 add 6U, -300 add 9U, -350 add 12U, -400 add 15U, BS over 400 add 18U 10 mL 3    fexofenadine (ALLEGRA ALLERGY) 180 MG tablet Take 1 tablet by mouth daily      calcium carbonate (TUMS) 500 MG chewable tablet Take 1 tablet by mouth every 4 hours as needed for Heartburn      melatonin 3 MG TABS tablet Take 1 tablet by mouth daily      bisacodyl (DULCOLAX) 10 MG suppository Place 1 suppository rectally daily as needed for Constipation      sodium phosphate (FLEET) 7-19 GM/118ML Place 1 enema rectally once as needed      magnesium hydroxide (MILK OF MAGNESIA) 400 MG/5ML suspension Take by mouth daily as needed for Constipation      metoprolol succinate (TOPROL XL) 100 MG extended release tablet Take 0.5 tablets by mouth 2 times daily (Patient taking differently: Take 0.5 tablets by mouth daily Pt restarted on 100 mg daily by Dr. Fuentes at Nursing facility.) 60 tablet 3    potassium chloride (KLOR-CON M) 20 MEQ extended release tablet Take 1 tablet by mouth 2 times daily (with meals) 60 tablet 3    ferrous sulfate (IRON 325) 325 (65 Fe) MG tablet Take 1 tablet by mouth daily (with breakfast)      empagliflozin (JARDIANCE) 10 MG tablet Take 1 tablet by mouth daily 30 tablet 5    bumetanide (BUMEX) 2 MG tablet Take 1 tablet by mouth daily 30 tablet 3    amiodarone (PACERONE) 100 MG tablet Take 1 tablet by mouth daily 
not applicable

## 2024-08-15 NOTE — ED ADULT NURSE NOTE - PAIN: PRESENCE, MLM
Subjective:       Patient ID: Carol Dewey is a 51 y.o. female.    Chief Complaint: left side pain and Dizziness    HPI  Pt with h/o LS degenerative disc ds  HTN angioedema chronic allergy urticaria  vertigo pt visit today for routine f/u she has 2 major c/o one is lower back apin radiate left leg x 2 weeks no trauma no dysuria hematuria  no leg weakness  se has LS disc ds with lower back pain in past she also c/o left costovetebral tenderness now radiate to stomack epigastric no  n/v/d no abd pain   Review of Systems   Constitutional:  Negative for unexpected weight change.   Respiratory:  Positive for cough. Negative for shortness of breath.    Cardiovascular:  Negative for chest pain and palpitations.   Gastrointestinal:  Negative for abdominal pain.   Musculoskeletal:  Positive for back pain. Negative for arthralgias.   Psychiatric/Behavioral:  Negative for dysphoric mood.        Objective:      Physical Exam  Vitals and nursing note reviewed.   Constitutional:       General: She is not in acute distress.     Appearance: She is well-developed.   HENT:      Head: Normocephalic and atraumatic.      Right Ear: External ear normal.      Left Ear: External ear normal.      Nose: Nose normal.   Eyes:      Extraocular Movements: Extraocular movements intact.      Conjunctiva/sclera: Conjunctivae normal.      Pupils: Pupils are equal, round, and reactive to light.   Neck:      Thyroid: No thyromegaly.   Cardiovascular:      Rate and Rhythm: Normal rate and regular rhythm.      Heart sounds: Normal heart sounds. No murmur heard.     No friction rub. No gallop.   Pulmonary:      Effort: Pulmonary effort is normal. No respiratory distress.      Breath sounds: Normal breath sounds. No wheezing.   Abdominal:      General: Bowel sounds are normal. There is no distension.      Palpations: Abdomen is soft.      Tenderness: There is no abdominal tenderness.   Musculoskeletal:         General: Tenderness (subjectiev tenderness  across lower back radiate into left leg) present. No deformity. Normal range of motion.      Cervical back: Normal range of motion and neck supple.   Lymphadenopathy:      Cervical: No cervical adenopathy.   Skin:     General: Skin is warm and dry.      Findings: No erythema or rash.   Neurological:      Mental Status: She is alert and oriented to person, place, and time.   Psychiatric:         Mood and Affect: Mood normal.         Thought Content: Thought content normal.         Judgment: Judgment normal.         Assessment:       1. Costovertebral angle tenderness    2. Encounter for lipid screening for cardiovascular disease    3. Diabetes mellitus screening    4. Encounter for long-term current use of medication    5. Chronic midline low back pain, unspecified whether sciatica present        Plan:       Costovertebral angle tenderness  -     Lipid Panel; Future; Expected date: 08/15/2024  -     Hemoglobin A1C; Future; Expected date: 08/15/2024  -     POCT URINE DIPSTICK WITHOUT MICROSCOPE    Encounter for lipid screening for cardiovascular disease  -     Lipid Panel; Future; Expected date: 08/15/2024    Diabetes mellitus screening  -     Hemoglobin A1C; Future; Expected date: 08/15/2024  -     predniSONE (DELTASONE) 20 MG tablet; Take 1 tablet (20 mg total) by mouth 2 (two) times daily. for 5 days  Dispense: 10 tablet; Refill: 0    Encounter for long-term current use of medication  -     Hemoglobin A1C; Future; Expected date: 08/15/2024    Chronic midline low back pain, unspecified whether sciatica present  -     predniSONE (DELTASONE) 20 MG tablet; Take 1 tablet (20 mg total) by mouth 2 (two) times daily. for 5 days  Dispense: 10 tablet; Refill: 0  -     gabapentin (NEURONTIN) 300 MG capsule; Take 1 capsule (300 mg total) by mouth 2 (two) times daily.  Dispense: 60 capsule; Refill: 11        Medication List with Changes/Refills   New Medications    ATORVASTATIN (LIPITOR) 20 MG TABLET    Take 1 tablet (20 mg  "total) by mouth once daily.    GABAPENTIN (NEURONTIN) 300 MG CAPSULE    Take 1 capsule (300 mg total) by mouth 2 (two) times daily.    PREDNISONE (DELTASONE) 20 MG TABLET    Take 1 tablet (20 mg total) by mouth 2 (two) times daily. for 5 days   Current Medications    AMLODIPINE (NORVASC) 10 MG TABLET    TAKE ONE TABLET BY MOUTH ONCE DAILY    BORIC ACID, BULK, POWD    Boric acid hydrocortisone powder to [employer] with insufflator.  Keep the ear slightly powdered like a sugar cookie".  Use an otoscope as discussed to be sure.  If filled up with powder use over-the-counter rubbing alcohol to dissolve and restart.    FLUOCINOLONE ACETONIDE OIL 0.01 % DROP    3-4 drops to the affected ear(s) twice daily no more than a week at a time as needed for itching and scaling    FLUTICASONE PROPIONATE (FLONASE) 50 MCG/ACTUATION NASAL SPRAY    1 spray (50 mcg total) by Each Nostril route 2 (two) times daily.    LEVOCETIRIZINE (XYZAL) 5 MG TABLET    Take 1 tablet (5 mg total) by mouth every evening.    MONTELUKAST (SINGULAIR) 10 MG TABLET    TAKE 1 TABLET(10 MG) BY MOUTH EVERY EVENING    MYRBETRIQ 50 MG TB24    TAKE 1 TABLET BY MOUTH EVERY DAY    NORETHINDRONE (MICRONOR) 0.35 MG TABLET    Take 1 tablet (0.35 mg total) by mouth once a week.    OFLOXACIN (OCUFLOX) 0.3 % OPHTHALMIC SOLUTION    4 drops to LEFT EAR BID for one week (NOT FOR OPHTHALMIC USE)    OMEPRAZOLE (PRILOSEC) 40 MG CAPSULE    Take 1 capsule (40 mg total) by mouth once daily.    ONDANSETRON (ZOFRAN-ODT) 4 MG TBDL    Take 1 tablet (4 mg total) by mouth every 8 (eight) hours as needed (nausea/vomiting).    PAROXETINE (PAXIL) 10 MG TABLET    Take 10 mg by mouth.    PREDNISOLONE ACETATE (PRED FORTE) 1 % DRPS    4 drops to LEFT EAR BID for a week (NOT FOR OPHTHALMIC USE)    PROCHLORPERAZINE (COMPAZINE) 10 MG TABLET    Take 1 tablet (10 mg total) by mouth 3 (three) times daily.    PROPRANOLOL (INDERAL) 20 MG TABLET    Take 1 tablet (20 mg total) by mouth every " morning.         denies pain/discomfort (Rating = 0)

## 2024-09-05 NOTE — PROVIDER CONTACT NOTE (MEDICATION) - DATE AND TIME:
28-Feb-2023 00:13
[facility] [place] Procedure Note    Dermatology Problem List:  1. NMSC  - BCC, L temple, s/p Mohs 9/5/24  - SCC, L preauricular cheek, s/p MMS 5/23/23  - SCC, R scaphoid fossa, s/p MMS 5/23/23  - BCC nodular and infiltrating, left nasal side wall, MMS 9/24/20  - BCC, Right upper arm, s/p ED&C 8/27/20  - BCC, Left upper back, s/p ED&C 8/27/20  - SCCIS, left infraorbital, s/p MMS 9/6/18  - BCC, right elbow, s/p ED & C 1/12/16  - BCC, left forearm, s/p excision 1/12/16  - BCC, right posterior shoulder and left posterior shoulder, s/p Aldara 11/2015  - BCC, right side of nose per pathology, (right medial cheek per Dr. Christiansen's note 11/21/11), s/p excision 5/12/09   2. Actinic keratoses  - R tragus s/p LN2 3/13/23; s/p biopsy 9/23/22  - s/p Efudex 2015, Fall 2020 to face, Spring 2021 to forearms  - s/p PDT (x3)  - s/p LN2  3. Seborrheic dermatitis  - clobetasol 0.05% solution for scalp, triam 0.1% cream for ears  4. Relevant medical history: MGUS, myasthenia gravis currently on prednisone  5. Tinea cruris  - current tx: ketoconazole cream  6. 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 Lipoma L bicep, s/p biopsy 9/23/22     ______________________________________       Date of Service:  Sep 5, 2024  Surgery: Mohs micrographic surgery    Case 1  Repair Type: intermediate  Repair Size: 4.5 cm  Suture Material: monocryl 4-0; Fast Absorbing Gut 5-0  Tumor Type: BCC - Basal cell carcinoma  Location: left temple  Derm-Path Accession #: [id number]  PreOp Size: 2.5x2.0 cm  PostOp Size: 3.0x2.5 cm  Mohs Accession #: [id number]  Level of Defect: fascia      Procedure:  We discussed the principles of treatment and most likely complications including scarring, bleeding, infection, swelling, pain, crusting, nerve damage, large wound,  incomplete excision, wound dehiscence,  nerve damage, recurrence, and a second procedure may be recommended to obtain the best cosmetic or functional 
27-Feb-2023 01:25
28-Feb-2023 08:09
result.    Informed consent was obtained and the patient underwent the procedure as follows:  The patient was placed supine on the operating table.  The cancer was identified, outlined with a marker, and verified by the patient.  The entire surgical field was prepped with ChoraPrep.  The surgical site was anesthetized using Lidocaine 1% with epi 1:100,000.      The area of clinically apparent tumor was debulked. The layer of tissue was then surgically excised using a #15 blade and was then transferred onto a specimen sheet maintaining the orientation of the specimen. Hemostasis was obtained using bipolar electrocoagulation. The wound site was then covered with a dressing while the tissue samples were processed for examination.    The excised tissue was transported to the Mohs histology laboratory maintaining the tissue orientation.  The tissue specimen was relaxed so that the entire surgical margin was in a a single horizontal plane for sectioning and inked for precise mapping.  A precise reference map was drawn to reflect the sectioning of the specimen, colored inking of the margins, and orientation on the patient. The tissue was processed using horizontal sectioning of the base and continuous peripheral margins.  The histopathologic sections were reviewed in conjunction with the reference map.    Total blocks: 1    Total slides:  2    There were no cancer cells visualized on examination, therefore Mohs surgery was complete.    Reconstruction: Intermediate Linear Closure      The patient was taken to the operative suite and placed supine on the operating room table. The defect was identified.  Appropriate markings were made with a marking pen to plan the repair. The area was infiltrated with Lidocaine 1% with epi 1:100,000 and prepped with ChoraPrep and draped with sterile towels.     The wound was debeveled and undermined widely. Cones were excised within relaxed skin tension lines on both sides of the defect. 
01-Mar-2023 08:28
Hemostasis was obtained using bipolar electrocoagulation. The deeper layers of subcutaneous and superficial (nonmuscle) fascia tissues were then approximated using 4-0 Monocryl buried vertical mattress sutures (deep layer suturing). The wound edges were then approximated additional  buried sutures were placed in a similar fashion where needed. Fast Absorbing Gut 5-0 simple running sutures (superficial layer suturing) were carefully placed for maximum eversion and meticulous approximation.    Repair Size: 4.5 cm    The wound was cleansed with saline and ointment was applied along the wound surface.     A sterile pressure dressing was applied.  Wound care instructions were given verbally and in writing.  The patient left the operating suite in stable condition.  Patient was informed that additional refinement of the resulting surgical scar may be used as a second stage of this reconstruction.     The attending surgeon was present for key portions of the above major procedure and examination.    Scribe Disclosure:   I, Lauren Clark, am serving as a scribe; to document services personally performed by Dr. Shashank Victor - -based on data collection and the provider's statements to me.     Staff Physician Comments:   I saw and evaluated the patient with the Mohs Surgery Fellow (Dr. Audie Bridges) and I agree with the assessment and plan and the above description of the procedure as documented by the scribe. I was present for the key portions of the procedure and entire exam. I was immediately available in the clinic throughout the procedures.     Shashank Victor DO    Department of Dermatology  [facility]: Phone: [phone], Fax:[phone]  [facility]: Phone: [phone], Fax: [phone]    Care and Laboratory Testing Performed at:  [facility]   [facility]  [id number] [place] [address]  
02-Mar-2023 08:20
27-Feb-2023 18:20

## 2024-10-01 RX ADMIN — Medication 10 MILLIGRAM(S): at 00:00

## 2024-10-08 NOTE — BH CONSULTATION LIAISON ASSESSMENT NOTE - NSBHREFERIPTEAMCONTACT_PSY_A_CORE_FT
[Time Spent: ___ minutes] : I have spent [unfilled] minutes of time on the encounter which excludes teaching and separately reported services. [id number]

## 2024-11-12 ENCOUNTER — EMERGENCY (EMERGENCY)
Facility: HOSPITAL | Age: 39
LOS: 1 days | Discharge: HOME | End: 2024-11-12
Attending: STUDENT IN AN ORGANIZED HEALTH CARE EDUCATION/TRAINING PROGRAM
Payer: COMMERCIAL

## 2024-11-12 VITALS
TEMPERATURE: 98 F | SYSTOLIC BLOOD PRESSURE: 148 MMHG | WEIGHT: 253.53 LBS | RESPIRATION RATE: 16 BRPM | OXYGEN SATURATION: 98 % | HEART RATE: 75 BPM | DIASTOLIC BLOOD PRESSURE: 80 MMHG

## 2024-11-12 DIAGNOSIS — I77.0 ARTERIOVENOUS FISTULA, ACQUIRED: Chronic | ICD-10-CM

## 2024-11-12 DIAGNOSIS — Z98.890 OTHER SPECIFIED POSTPROCEDURAL STATES: Chronic | ICD-10-CM

## 2024-11-12 DIAGNOSIS — Z96.642 PRESENCE OF LEFT ARTIFICIAL HIP JOINT: Chronic | ICD-10-CM

## 2024-11-12 LAB
ALBUMIN SERPL ELPH-MCNC: 3.6 G/DL — SIGNIFICANT CHANGE UP (ref 3.3–5)
ALP SERPL-CCNC: 1285 U/L — HIGH (ref 40–120)
ALT FLD-CCNC: <5 U/L — LOW (ref 10–45)
ANION GAP SERPL CALC-SCNC: 16 MMOL/L — SIGNIFICANT CHANGE UP (ref 5–17)
ANISOCYTOSIS BLD QL: SLIGHT — SIGNIFICANT CHANGE UP
AST SERPL-CCNC: 13 U/L — SIGNIFICANT CHANGE UP (ref 10–40)
BASOPHILS # BLD AUTO: 0.03 K/UL — SIGNIFICANT CHANGE UP (ref 0–0.2)
BASOPHILS NFR BLD AUTO: 0.9 % — SIGNIFICANT CHANGE UP (ref 0–2)
BILIRUB SERPL-MCNC: 0.6 MG/DL — SIGNIFICANT CHANGE UP (ref 0.2–1.2)
BUN SERPL-MCNC: 39 MG/DL — HIGH (ref 7–23)
CALCIUM SERPL-MCNC: 9.9 MG/DL — SIGNIFICANT CHANGE UP (ref 8.4–10.5)
CHLORIDE SERPL-SCNC: 97 MMOL/L — SIGNIFICANT CHANGE UP (ref 96–108)
CO2 SERPL-SCNC: 24 MMOL/L — SIGNIFICANT CHANGE UP (ref 22–31)
CREAT SERPL-MCNC: 5.37 MG/DL — HIGH (ref 0.5–1.3)
EGFR: 13 ML/MIN/1.73M2 — LOW
EOSINOPHIL # BLD AUTO: 0.24 K/UL — SIGNIFICANT CHANGE UP (ref 0–0.5)
EOSINOPHIL NFR BLD AUTO: 7.1 % — HIGH (ref 0–6)
FLUAV AG NPH QL: SIGNIFICANT CHANGE UP
FLUBV AG NPH QL: SIGNIFICANT CHANGE UP
GLUCOSE SERPL-MCNC: 72 MG/DL — SIGNIFICANT CHANGE UP (ref 70–99)
HCT VFR BLD CALC: 29.5 % — LOW (ref 39–50)
HGB BLD-MCNC: 9.2 G/DL — LOW (ref 13–17)
LYMPHOCYTES # BLD AUTO: 0.8 K/UL — LOW (ref 1–3.3)
LYMPHOCYTES # BLD AUTO: 23.9 % — SIGNIFICANT CHANGE UP (ref 13–44)
MANUAL SMEAR VERIFICATION: SIGNIFICANT CHANGE UP
MCHC RBC-ENTMCNC: 30.7 PG — SIGNIFICANT CHANGE UP (ref 27–34)
MCHC RBC-ENTMCNC: 31.2 G/DL — LOW (ref 32–36)
MCV RBC AUTO: 98.3 FL — SIGNIFICANT CHANGE UP (ref 80–100)
MONOCYTES # BLD AUTO: 0.45 K/UL — SIGNIFICANT CHANGE UP (ref 0–0.9)
MONOCYTES NFR BLD AUTO: 13.3 % — SIGNIFICANT CHANGE UP (ref 2–14)
NEUTROPHILS # BLD AUTO: 1.84 K/UL — SIGNIFICANT CHANGE UP (ref 1.8–7.4)
NEUTROPHILS NFR BLD AUTO: 53.1 % — SIGNIFICANT CHANGE UP (ref 43–77)
NEUTS BAND # BLD: 1.7 % — SIGNIFICANT CHANGE UP (ref 0–8)
NRBC # BLD: 1 /100 WBCS — HIGH (ref 0–0)
OVALOCYTES BLD QL SMEAR: SLIGHT — SIGNIFICANT CHANGE UP
PLAT MORPH BLD: NORMAL — SIGNIFICANT CHANGE UP
PLATELET # BLD AUTO: 112 K/UL — LOW (ref 150–400)
POIKILOCYTOSIS BLD QL AUTO: SLIGHT — SIGNIFICANT CHANGE UP
POTASSIUM SERPL-MCNC: 5.3 MMOL/L — SIGNIFICANT CHANGE UP (ref 3.5–5.3)
POTASSIUM SERPL-SCNC: 5.3 MMOL/L — SIGNIFICANT CHANGE UP (ref 3.5–5.3)
PROT SERPL-MCNC: 6.9 G/DL — SIGNIFICANT CHANGE UP (ref 6–8.3)
RBC # BLD: 3 M/UL — LOW (ref 4.2–5.8)
RBC # FLD: 15.6 % — HIGH (ref 10.3–14.5)
RBC BLD AUTO: ABNORMAL
RSV RNA NPH QL NAA+NON-PROBE: SIGNIFICANT CHANGE UP
SARS-COV-2 RNA SPEC QL NAA+PROBE: SIGNIFICANT CHANGE UP
SODIUM SERPL-SCNC: 137 MMOL/L — SIGNIFICANT CHANGE UP (ref 135–145)
WBC # BLD: 3.35 K/UL — LOW (ref 3.8–10.5)
WBC # FLD AUTO: 3.35 K/UL — LOW (ref 3.8–10.5)

## 2024-11-12 PROCEDURE — 76705 ECHO EXAM OF ABDOMEN: CPT | Mod: 26

## 2024-11-12 PROCEDURE — 96374 THER/PROPH/DIAG INJ IV PUSH: CPT

## 2024-11-12 PROCEDURE — 76705 ECHO EXAM OF ABDOMEN: CPT

## 2024-11-12 PROCEDURE — 73552 X-RAY EXAM OF FEMUR 2/>: CPT | Mod: 26,50

## 2024-11-12 PROCEDURE — 84480 ASSAY TRIIODOTHYRONINE (T3): CPT

## 2024-11-12 PROCEDURE — 87637 SARSCOV2&INF A&B&RSV AMP PRB: CPT

## 2024-11-12 PROCEDURE — 73552 X-RAY EXAM OF FEMUR 2/>: CPT

## 2024-11-12 PROCEDURE — 99284 EMERGENCY DEPT VISIT MOD MDM: CPT | Mod: 25

## 2024-11-12 PROCEDURE — 82550 ASSAY OF CK (CPK): CPT

## 2024-11-12 PROCEDURE — 99284 EMERGENCY DEPT VISIT MOD MDM: CPT

## 2024-11-12 PROCEDURE — 84436 ASSAY OF TOTAL THYROXINE: CPT

## 2024-11-12 PROCEDURE — 36415 COLL VENOUS BLD VENIPUNCTURE: CPT

## 2024-11-12 PROCEDURE — 84443 ASSAY THYROID STIM HORMONE: CPT

## 2024-11-12 PROCEDURE — 85025 COMPLETE CBC W/AUTO DIFF WBC: CPT

## 2024-11-12 PROCEDURE — 80053 COMPREHEN METABOLIC PANEL: CPT

## 2024-11-12 RX ORDER — ACETAMINOPHEN 500 MG
1000 TABLET ORAL ONCE
Refills: 0 | Status: COMPLETED | OUTPATIENT
Start: 2024-11-12 | End: 2024-11-12

## 2024-11-12 RX ADMIN — Medication 400 MILLIGRAM(S): at 18:43

## 2024-11-12 NOTE — ED PROVIDER NOTE - NSFOLLOWUPINSTRUCTIONS_ED_ALL_ED_FT
You presented with anterior neck pain. We monitored you for issues with breathing and swallowing during your stay in the ED, including watching You presented with anterior neck pain. We monitored you for issues with breathing and swallowing during your stay in the ED, including watching your oxygen saturation. You did not have any issues with oxygenation while in the ED. We also collected labs to evaluate for infection, which were negative. X ray of your femur was negative for fractures. Ultrasound of your abdomen demonstrated no new findings. Please follow up with your PCP within 1 week.

## 2024-11-12 NOTE — ED PROVIDER NOTE - PROGRESS NOTE DETAILS
Attending Koki Galvan MD: majority of labs similar to baseline  imaging negative   initially alk phose ~700s with increase to 1200 on today's labs with negative RUQ US  Patient advised to continue f/u with endocrinology as planned for multiple issues  Occult injury and infection evaluated today with no concerning findings   Will arrange transport with SW

## 2024-11-12 NOTE — ED ADULT NURSE NOTE - NSFALLRISKINTERV_ED_ALL_ED

## 2024-11-12 NOTE — ED ADULT TRIAGE NOTE - ACCOMPANIED BY
How to take Wellbutrin:  Take one tablet every morning for 3 days.  On day # 4 , you will increase to one tablet twice a day.  On  Day # 8, you will QUIT (Congratulations!!).  Continue taking Wellbutrin (Bupropion) until further notice.(minimum of 4 months).      Chidi Donaldson M.D., F.C.C.P.    Patient Education     How to Quit Smoking  Smoking is one of the hardest habits to break. About half of all people who have ever smoked have been able to quit. Most people who still smoke want to quit. Here are some of the best ways to stop smoking.    Keep trying  It takes most smokers about eight tries before they can quit entirely. It’s important not to give up.  Go cold turkey  Most former smokers quit cold turkey (all at once). Trying to cut back gradually doesn't seem to work as well, perhaps because it continues the smoking habit. Also, it is possible to inhale more while smoking fewer cigarettes. This results in the same amount of nicotine in your body!  Get support  Support programs can be a big help, especially for heavy smokers. These groups offer lectures, ways to change behavior, and peer support. Here are some ways to find a support program:  · Free national quitline: 800-QUIT-NOW (523-745-5914).  · Hospital quit-smoking programs.  · American Lung Association: (110.992.7716).  · American Cancer Society (018-656-3441).  Support at home is important too. Nonsmokers can offer praise and encouragement. If the smoker in your life finds it hard to quit, encourage them to keep trying!  Over-the-counter medicines  Nicotine replacement therapy may make quitting easier. Certain aids, such as the nicotine patch, gum, and lozenges, are available without a prescription. It is best to use these under a doctor’s care, though. The skin patch provides a steady supply of nicotine. Nicotine gum and lozenges give temporary bursts of low levels of nicotine. Both methods reduce the craving for cigarettes. Warning: If you have nausea,  vomiting, dizziness, weakness, or a fast heartbeat, stop using these products and see your doctor.  Prescription medicines  After reviewing your smoking patterns and prior attempts to quit, your doctor may offer a prescription medicine such as bupropion, varenicline, a nicotine inhaler, or nasal spray. Each has advantages and side effects. Your doctor can review these with you.  Health benefits of quitting  The benefits of quitting start right away and keep improving the longer you go without smoking. These benefits occur at any age.  So whether you are 17 or 70, quitting is a good decision. Some of the benefits include:  · 20 minutes: Blood pressure and pulse return to normal.  · 8 hours: Oxygen levels return to normal.  · 2 days: Ability to smell and taste begin to improve as damaged nerves regrow.  · 2 to 3 weeks: Circulation and lung function improve.  · 1 to 9 months: Coughing, congestion, and shortness of breath decrease; tiredness decreases.  · 1 year: Risk of heart attack decreases by half.  · 5 years: Risk of lung cancer decreases by half; risk of stroke becomes the same as a nonsmoker’s.  For more on how to quit smoking, try these online resources:   · Smokefree.gov http://smokefree.gov/  · Clearing the Air” booklet from the National Cancer [place] http://smokefree.gov/sites/default/files/pdf/clearing-the-air-accessible.pdf  © 7573-3185 [employer]. [address] [place] [address]. All rights reserved. This information is not intended as a substitute for professional medical care. Always follow your healthcare professional's instructions.           Patient Education     Coping with Smoking Withdrawal  For the first few days after you quit smoking, you may feel cranky, restless, depressed, or low on energy. These are symptoms of withdrawal. Your body needs time to recover from smoking. Your symptoms should lessen within a few days.    Coping with the urge to smoke  · Deep-breathe.  Inhale through your nose. Count to five. Slowly exhale through your mouth.  · Drink water. Try to drink eight or more 8-ounce glasses of water a day.  · Keep your hands busy. Wash your car. Draw. Do a puzzle. Build a [employer].  · Delay. The urge to smoke lasts only 3 to 5 minutes.  Get support  Individual, group, and telephone counseling can help keep you on track. Ask your healthcare provider for more information about resources available to you.  Control stress  After you quit, you may feel irritable and stressed. Try taking a warm bath or shower. Listen to music. Try yoga or meditate. Call friends or talk with a professional.  Exercise  Exercise helps your body and mind feel better. There are many ways to be more active. Find something you enjoy doing. See if a friend will join you for a walk or a bike ride.  Sleep better  You may feel tired but have trouble falling asleep. Try to relax before bed. Do a few stretching exercises. Read for a while. Also, avoid caffeine for at least a few hours before bedtime.  Get fit, not fat  You may notice an increased appetite. Many people who quit smoking gain a few pounds. To limit weight gain, try to watch what you eat. Cut back on fat in your diet. Snack on low-calorie foods, like fresh fruits and vegetables. Drink low-calorie liquids, especially water. Regular exercise can also help you stay fit. And remember: Your main goal is to be a nonsmoker. Stay focused on that goal.  Quit-smoking products  There are a number of products that can help you quit smoking including medications and nicotine replacement products. They are available over the counter or by prescription. Ask your healthcare provider if any of these could help you quit smoking.        For more information  · smokefree.gov/ihyk-qu-rv-expert  · National Cancer Hope Smoking Quitline: 877-44U-QUIT (226-717-5528)      © 7933-8277 [employer]. [address] [place] [address], [place] [address]. All rights  reserved. This information is not intended as a substitute for professional medical care. Always follow your healthcare professional's instructions.           Patient Education     How to Quit Smoking  Smoking is one of the hardest habits to break. About half of all people who have ever smoked have been able to quit. Most people who still smoke want to quit. Here are some of the best ways to stop smoking.    Keep trying  It takes most smokers about eight tries before they can quit entirely. It’s important not to give up.  Go cold turkey  Most former smokers quit cold turkey (all at once). Trying to cut back gradually doesn't seem to work as well, perhaps because it continues the smoking habit. Also, it is possible to inhale more while smoking fewer cigarettes. This results in the same amount of nicotine in your body!  Get support  Support programs can be a big help, especially for heavy smokers. These groups offer lectures, ways to change behavior, and peer support. Here are some ways to find a support program:  · Free national quitline: 800RostelecomQUIT-NOW (657-205-3803).  · Hospital quit-smoking programs.  · American Lung Association: (647.681.9799).  · American Cancer Society (548-076-8868).  Support at home is important too. Nonsmokers can offer praise and encouragement. If the smoker in your life finds it hard to quit, encourage them to keep trying!  Over-the-counter medicines  Nicotine replacement therapy may make quitting easier. Certain aids, such as the nicotine patch, gum, and lozenges, are available without a prescription. It is best to use these under a doctor’s care, though. The skin patch provides a steady supply of nicotine. Nicotine gum and lozenges give temporary bursts of low levels of nicotine. Both methods reduce the craving for cigarettes. Warning: If you have nausea, vomiting, dizziness, weakness, or a fast heartbeat, stop using these products and see your doctor.  Prescription medicines  After  reviewing your smoking patterns and prior attempts to quit, your doctor may offer a prescription medicine such as bupropion, varenicline, a nicotine inhaler, or nasal spray. Each has advantages and side effects. Your doctor can review these with you.  Health benefits of quitting  The benefits of quitting start right away and keep improving the longer you go without smoking. These benefits occur at any age.  So whether you are 17 or 70, quitting is a good decision. Some of the benefits include:  · 20 minutes: Blood pressure and pulse return to normal.  · 8 hours: Oxygen levels return to normal.  · 2 days: Ability to smell and taste begin to improve as damaged nerves regrow.  · 2 to 3 weeks: Circulation and lung function improve.  · 1 to 9 months: Coughing, congestion, and shortness of breath decrease; tiredness decreases.  · 1 year: Risk of heart attack decreases by half.  · 5 years: Risk of lung cancer decreases by half; risk of stroke becomes the same as a nonsmoker’s.  For more on how to quit smoking, try these online resources:   · Smokefree.gov http://smokefree.gov/  · Clearing the Air” booklet from the National Cancer [place] http://smokefree.gov/sites/default/files/pdf/clearing-the-air-accessible.pdf  © 6630-4394 [employer] [address] [place] [address], [place] [address]. All rights reserved. This information is not intended as a substitute for professional medical care. Always follow your healthcare professional's instructions.            EMT/paramedic

## 2024-11-12 NOTE — ED PROVIDER NOTE - NSICDXPASTSURGICALHX_GEN_ALL_CORE_FT
No
PAST SURGICAL HISTORY:  AV fistula R arm; L arm clotted    History of left hip hemiarthroplasty     S/P parathyroidectomy
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
## 2024-11-12 NOTE — ED PROVIDER NOTE - PATIENT PORTAL LINK FT
You can access the FollowMyHealth Patient Portal offered by [facility] by registering at the following website: [email or web address]. By joining [email or web address]’s FollowMyHealth portal, you will also be able to view your health information using other applications (apps) compatible with our system.
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
## 2024-11-13 VITALS
SYSTOLIC BLOOD PRESSURE: 166 MMHG | RESPIRATION RATE: 18 BRPM | HEART RATE: 70 BPM | OXYGEN SATURATION: 98 % | DIASTOLIC BLOOD PRESSURE: 83 MMHG | TEMPERATURE: 99 F

## 2024-11-13 LAB
T3 SERPL-MCNC: 115 NG/DL — SIGNIFICANT CHANGE UP (ref 80–200)
T4 AB SER-ACNC: 6.3 UG/DL — SIGNIFICANT CHANGE UP (ref 4.6–12)
TSH SERPL-MCNC: 2.43 UIU/ML — SIGNIFICANT CHANGE UP (ref 0.27–4.2)

## 2024-11-13 NOTE — CHART NOTE - NSCHARTNOTEFT_GEN_A_CORE
Emergency Room : Per medical team patient is cleared for discharge.  Patient is 38 year old male with a history of ESRD, HTN, ANTONIA Hip replacements  and CVA presents to the ED for neck pain.  LMSW introduced herself to the patient and he verbalized understanding the role of the . Patient is alert and oriented x 4 spheres.  Demographic information was reviewed and confirmed. Patient informed he resides in a private home with no steps to enter. Patient confirmed he went to dialysis today.    Patient has Medicaid insurance benefit.  LMSW contacted [facility] to arranged discharge home. Patient to be transported home by ambulance. Non-emergent form completed. Trip Number: [id number].  Patient and ED staff provided an update. [facility] will transport the patient home.  No further concerns voiced. LMSW remains available if requested.

## 2024-12-30 ENCOUNTER — INPATIENT (INPATIENT)
Facility: HOSPITAL | Age: 39
LOS: 17 days | Discharge: NURSING, REHAB OR LONG-TERM CARE | DRG: 872 | End: 2025-01-17
Attending: STUDENT IN AN ORGANIZED HEALTH CARE EDUCATION/TRAINING PROGRAM | Admitting: STUDENT IN AN ORGANIZED HEALTH CARE EDUCATION/TRAINING PROGRAM
Payer: MEDICAID

## 2024-12-30 VITALS
TEMPERATURE: 103 F | WEIGHT: 199.96 LBS | HEIGHT: 75 IN | SYSTOLIC BLOOD PRESSURE: 134 MMHG | DIASTOLIC BLOOD PRESSURE: 70 MMHG | OXYGEN SATURATION: 95 % | RESPIRATION RATE: 18 BRPM | HEART RATE: 106 BPM

## 2024-12-30 DIAGNOSIS — Z98.890 OTHER SPECIFIED POSTPROCEDURAL STATES: Chronic | ICD-10-CM

## 2024-12-30 DIAGNOSIS — A41.9 SEPSIS, UNSPECIFIED ORGANISM: ICD-10-CM

## 2024-12-30 DIAGNOSIS — I77.0 ARTERIOVENOUS FISTULA, ACQUIRED: Chronic | ICD-10-CM

## 2024-12-30 DIAGNOSIS — Z96.642 PRESENCE OF LEFT ARTIFICIAL HIP JOINT: Chronic | ICD-10-CM

## 2024-12-30 LAB
ALBUMIN SERPL ELPH-MCNC: 4 G/DL — SIGNIFICANT CHANGE UP (ref 3.3–5)
ALP SERPL-CCNC: 1215 U/L — HIGH (ref 40–120)
ALT FLD-CCNC: <5 U/L — LOW (ref 10–45)
ANION GAP SERPL CALC-SCNC: 17 MMOL/L — SIGNIFICANT CHANGE UP (ref 5–17)
ANISOCYTOSIS BLD QL: SLIGHT — SIGNIFICANT CHANGE UP
APTT BLD: 30 SEC — SIGNIFICANT CHANGE UP (ref 24.5–35.6)
AST SERPL-CCNC: 7 U/L — LOW (ref 10–40)
BASOPHILS # BLD AUTO: 0 K/UL — SIGNIFICANT CHANGE UP (ref 0–0.2)
BASOPHILS NFR BLD AUTO: 0 % — SIGNIFICANT CHANGE UP (ref 0–2)
BILIRUB SERPL-MCNC: 0.4 MG/DL — SIGNIFICANT CHANGE UP (ref 0.2–1.2)
BUN SERPL-MCNC: 36 MG/DL — HIGH (ref 7–23)
CALCIUM SERPL-MCNC: 9.3 MG/DL — SIGNIFICANT CHANGE UP (ref 8.4–10.5)
CHLORIDE SERPL-SCNC: 94 MMOL/L — LOW (ref 96–108)
CO2 SERPL-SCNC: 28 MMOL/L — SIGNIFICANT CHANGE UP (ref 22–31)
CREAT SERPL-MCNC: 5.87 MG/DL — HIGH (ref 0.5–1.3)
EGFR: 12 ML/MIN/1.73M2 — LOW
EOSINOPHIL # BLD AUTO: 0 K/UL — SIGNIFICANT CHANGE UP (ref 0–0.5)
EOSINOPHIL NFR BLD AUTO: 0 % — SIGNIFICANT CHANGE UP (ref 0–6)
FLUAV AG NPH QL: DETECTED
FLUBV AG NPH QL: SIGNIFICANT CHANGE UP
GAS PNL BLDV: SIGNIFICANT CHANGE UP
GLUCOSE SERPL-MCNC: 82 MG/DL — SIGNIFICANT CHANGE UP (ref 70–99)
HCT VFR BLD CALC: 33.5 % — LOW (ref 39–50)
HGB BLD-MCNC: 10.7 G/DL — LOW (ref 13–17)
INR BLD: 1.12 RATIO — SIGNIFICANT CHANGE UP (ref 0.85–1.16)
LACTATE SERPL-SCNC: 1.8 MMOL/L — SIGNIFICANT CHANGE UP (ref 0.5–2)
LYMPHOCYTES # BLD AUTO: 0.61 K/UL — LOW (ref 1–3.3)
LYMPHOCYTES # BLD AUTO: 16.3 % — SIGNIFICANT CHANGE UP (ref 13–44)
MACROCYTES BLD QL: SLIGHT — SIGNIFICANT CHANGE UP
MANUAL SMEAR VERIFICATION: SIGNIFICANT CHANGE UP
MCHC RBC-ENTMCNC: 31.9 G/DL — LOW (ref 32–36)
MCHC RBC-ENTMCNC: 32.4 PG — SIGNIFICANT CHANGE UP (ref 27–34)
MCV RBC AUTO: 101.5 FL — HIGH (ref 80–100)
MONOCYTES # BLD AUTO: 0.98 K/UL — HIGH (ref 0–0.9)
MONOCYTES NFR BLD AUTO: 26.4 % — HIGH (ref 2–14)
NEUTROPHILS # BLD AUTO: 2.13 K/UL — SIGNIFICANT CHANGE UP (ref 1.8–7.4)
NEUTROPHILS NFR BLD AUTO: 57.3 % — SIGNIFICANT CHANGE UP (ref 43–77)
PLAT MORPH BLD: NORMAL — SIGNIFICANT CHANGE UP
PLATELET # BLD AUTO: 143 K/UL — LOW (ref 150–400)
POTASSIUM SERPL-MCNC: 4.4 MMOL/L — SIGNIFICANT CHANGE UP (ref 3.5–5.3)
POTASSIUM SERPL-SCNC: 4.4 MMOL/L — SIGNIFICANT CHANGE UP (ref 3.5–5.3)
PROT SERPL-MCNC: 7.1 G/DL — SIGNIFICANT CHANGE UP (ref 6–8.3)
PROTHROM AB SERPL-ACNC: 12.8 SEC — SIGNIFICANT CHANGE UP (ref 9.9–13.4)
RBC # BLD: 3.3 M/UL — LOW (ref 4.2–5.8)
RBC # FLD: 17.2 % — HIGH (ref 10.3–14.5)
RBC BLD AUTO: SIGNIFICANT CHANGE UP
RSV RNA NPH QL NAA+NON-PROBE: SIGNIFICANT CHANGE UP
SARS-COV-2 RNA SPEC QL NAA+PROBE: SIGNIFICANT CHANGE UP
SODIUM SERPL-SCNC: 139 MMOL/L — SIGNIFICANT CHANGE UP (ref 135–145)
WBC # BLD: 3.72 K/UL — LOW (ref 3.8–10.5)
WBC # FLD AUTO: 3.72 K/UL — LOW (ref 3.8–10.5)

## 2024-12-30 PROCEDURE — 99223 1ST HOSP IP/OBS HIGH 75: CPT | Mod: GC

## 2024-12-30 PROCEDURE — 36410 VNPNXR 3YR/> PHY/QHP DX/THER: CPT

## 2024-12-30 PROCEDURE — 99285 EMERGENCY DEPT VISIT HI MDM: CPT

## 2024-12-30 PROCEDURE — 76937 US GUIDE VASCULAR ACCESS: CPT | Mod: 26

## 2024-12-30 PROCEDURE — 71045 X-RAY EXAM CHEST 1 VIEW: CPT | Mod: 26

## 2024-12-30 RX ORDER — GINKGO BILOBA 40 MG
5 CAPSULE ORAL AT BEDTIME
Refills: 0 | Status: DISCONTINUED | OUTPATIENT
Start: 2024-12-30 | End: 2025-01-17

## 2024-12-30 RX ORDER — PIPERACILLIN AND TAZOBACTAM 3; .375 G/15ML; G/15ML
3.38 INJECTION, POWDER, LYOPHILIZED, FOR SOLUTION INTRAVENOUS ONCE
Refills: 0 | Status: COMPLETED | OUTPATIENT
Start: 2024-12-30 | End: 2024-12-30

## 2024-12-30 RX ORDER — ACETAMINOPHEN 80 MG/.8ML
975 SOLUTION/ DROPS ORAL ONCE
Refills: 0 | Status: COMPLETED | OUTPATIENT
Start: 2024-12-30 | End: 2024-12-30

## 2024-12-30 RX ORDER — VANCOMYCIN HYDROCHLORIDE 5 G/100ML
1000 INJECTION, POWDER, LYOPHILIZED, FOR SOLUTION INTRAVENOUS ONCE
Refills: 0 | Status: COMPLETED | OUTPATIENT
Start: 2024-12-30 | End: 2024-12-30

## 2024-12-30 RX ORDER — OXYCODONE HCL 15 MG
10 TABLET ORAL ONCE
Refills: 0 | Status: DISCONTINUED | OUTPATIENT
Start: 2024-12-30 | End: 2024-12-30

## 2024-12-30 RX ORDER — SODIUM CHLORIDE 9 MG/ML
500 INJECTION, SOLUTION INTRAMUSCULAR; INTRAVENOUS; SUBCUTANEOUS ONCE
Refills: 0 | Status: COMPLETED | OUTPATIENT
Start: 2024-12-30 | End: 2024-12-30

## 2024-12-30 RX ADMIN — ACETAMINOPHEN 975 MILLIGRAM(S): 80 SOLUTION/ DROPS ORAL at 17:42

## 2024-12-30 RX ADMIN — PIPERACILLIN AND TAZOBACTAM 200 GRAM(S): 3; .375 INJECTION, POWDER, LYOPHILIZED, FOR SOLUTION INTRAVENOUS at 17:46

## 2024-12-30 RX ADMIN — VANCOMYCIN HYDROCHLORIDE 250 MILLIGRAM(S): 5 INJECTION, POWDER, LYOPHILIZED, FOR SOLUTION INTRAVENOUS at 18:23

## 2024-12-30 RX ADMIN — SODIUM CHLORIDE 500 MILLILITER(S): 9 INJECTION, SOLUTION INTRAMUSCULAR; INTRAVENOUS; SUBCUTANEOUS at 19:36

## 2024-12-30 NOTE — H&P ADULT - PROBLEM SELECTOR PLAN 3
Patient has ESRD with HD on Tuesday, Thursday, and Saturday.  Getting HD more frequently now.  - Nephrology consult in AM

## 2024-12-30 NOTE — ED PROVIDER NOTE - ATTENDING CONTRIBUTION TO CARE
Attending Felicity: I performed a history and physical exam of the patient and discussed their management with the resident/fellow/student. I have reviewed the resident/fellow/student note and agree with the documented findings and plan of care, except as noted. I have personally performed a substantive portion of the visit including all aspects of the medical decision making. My medical decision making and observations are found above. Please refer to any progress notes for updates on clinical course. My notes supersedes the above resident/fellow/student note in case of discrepancy

## 2024-12-30 NOTE — H&P ADULT - PROBLEM SELECTOR PLAN 1
Patient presents with tachycardia, fevers, and positive for influenza meeting criteria for viral sepsis.  S/p 500cc in ED and vanc/zosyn  - Will be judicious with fluids given patient's ESRD  - IV tylenol and cooling blankets for fevers  - Oseltamivir as below  - Hold vancomycin  - Hold Zosyn for now Patient presents with tachycardia, fevers, and positive for influenza meeting criteria for viral sepsis.  S/p 500cc in ED and vanc/zosyn  - Will be judicious with fluids given patient's ESRD  - IV tylenol and cooling blankets for fevers  - Oseltamivir as below  - Hold vancomycin  - MRSA PCR  - Continue Zosyn for now

## 2024-12-30 NOTE — H&P ADULT - TIME-BASED
Pt states she was in last week. Has Thrush. Mouthwash did not work. Pt states is is terrible. Has not ate in 4 days. 76

## 2024-12-30 NOTE — H&P ADULT - PROBLEM SELECTOR PLAN 2
Patient found to have influenza A, likely contributing to his symptoms. Patient found to have influenza A, likely contributing to his symptoms.  - Oseltamivir 30mg QOD dosed after dialysis x 5 days

## 2024-12-30 NOTE — H&P ADULT - PROBLEM SELECTOR PROBLEM 6
Need for prophylactic measure Skin normal color for race, warm, dry and intact. No evidence of rash.

## 2024-12-30 NOTE — H&P ADULT - NSHPLABSRESULTS_GEN_ALL_CORE
LABS:                        10.7   3.72  )-----------( 143      ( 30 Dec 2024 18:14 )             33.5     30 Dec 2024 18:14    139    |  94     |  36     ----------------------------<  82     4.4     |  28     |  5.87     Ca    9.3        30 Dec 2024 18:14    TPro  7.1    /  Alb  4.0    /  TBili  0.4    /  DBili  x      /  AST  7      /  ALT  <5     /  AlkPhos  1215   30 Dec 2024 18:14    PT/INR - ( 30 Dec 2024 18:14 )   PT: 12.8 sec;   INR: 1.12 ratio         PTT - ( 30 Dec 2024 18:14 )  PTT:30.0 sec  CAPILLARY BLOOD GLUCOSE        BLOOD CULTURE    RADIOLOGY & ADDITIONAL TESTS:    Imaging Personally Reviewed:  [ ] YES

## 2024-12-30 NOTE — H&P ADULT - NSHPPHYSICALEXAM_GEN_ALL_CORE
T(C): 40 (12-31-24 @ 00:05), Max: 40 (12-31-24 @ 00:05)  HR: 115 (12-31-24 @ 00:05) (105 - 115)  BP: 145/68 (12-31-24 @ 00:05) (92/55 - 145/68)  RR: 20 (12-31-24 @ 00:05) (17 - 27)  SpO2: 97% (12-31-24 @ 00:05) (89% - 100%)    CONSTITUTIONAL: Uncomfortable  EYES: PERRL, EOMI, No conjunctival or scleral injection, non-icteric  HENMT: macrocephaly, MMM. poor dentition  NECK: Supple  RESP: No respiratory distress, no use of accessory muscles; CTA b/l, no W/R/R  CV: RRR, 3/6 systolic murmur  GI: Soft, NT, ND  MSK: No clubbing or cyanosis. 1+ BLE edema  SKIN: No rashes or ulcers noted  NEURO: CN II-XII grossly intact; no focal deficits  PSYCH: A&O x 3, affect appropriate

## 2024-12-30 NOTE — H&P ADULT - HISTORY OF PRESENT ILLNESS
Mr David Welch is a 39 year old male with a PMH of ESRD on HD T/R/Sat, HTN, anemia, CVA, b/l hip fracture now wheelchair dependent presenting w/ weakness. Reports had been feeling unwell for the past few days. Went to HD session today, completed treatment, but was sent to ED due to his symptoms. Usually can transfer himself to and from his wheelchair but has been unable to do so. Denies myalgias other than what he feels in his hips chronically, which have worsened because he has not been getting PT as often recently.  Denies fevers, but does report a mild dry cough.  Lives with his cousin and their family, but denies sick contacts. Denies CP, SOB, n/v/c/d. Reports severe leg pain and has been unable to take his oxycodone today.    In the ED patient was found to be febrile. RVP positive for influenza A.  Received vancomycin, zosyn, and 500cc crystalloid.

## 2024-12-30 NOTE — CHART NOTE - NSCHARTNOTEFT_GEN_A_CORE
Confidential Drug Utilization Report  Search Terms: David Welch, 1985Search Date: 12/30/2024 23:34:42 PM  Searching on behalf of: [id number] - [facility]  The Drug Utilization Report below displays all of the controlled substance prescriptions, if any, that your patient has filled in the last twelve months. The information displayed on this report is compiled from pharmacy submissions to the Department, and accurately reflects the information as submitted by the pharmacies.    This report was requested by: Tino Dailey | Reference #: [id number]    Practitioner Count: 1  Pharmacy Count: 1  Current Opioid Prescriptions: 1  Current Benzodiazepine Prescriptions: 0  Current Stimulant Prescriptions: 0      Patient Demographic Information (PDI)       PDI	First Name	Last Name	Birth Date	Gender	Street Address	[facility] Code  A	David Welch	1985	Male	[address] [facility]	[address]  B	David Welch	1985	Male	[address] [facility]	[address]  C	David Welch	1985	Male	[address] 	[facility]	[id number]    Prescription Information      PDI Filter:    PDI	Current Rx	Drug Type	Rx Written	Rx Dispensed	Drug	Quantity	Days Supply	Prescriber Name	Prescriber JOSE #	Payment Method	Dispenser  A	N	O	02/14/2024	02/14/2024	oxycodone hcl (ir) 15 mg tab	28	7	Mart Rios)	[id number]	Cash	[facility]  A	N	O	01/24/2024	01/24/2024	oxycodone hcl (ir) 15 mg tab	28	7	Mart Rios)	[id number]	Cash	[facility]  B	N	O	08/28/2024	08/31/2024	oxycodone-acetaminophen  mg tab	60	30	aDnte Seth MD	[id number]	[facility]  B	N	O	07/10/2024	07/13/2024	oxycodone hcl (ir) 10 mg tab	60	30	Dante Seth MD	[id number]	[facility]  B	N	O	04/16/2024	05/01/2024	oxycodone hcl (ir) 10 mg tab	28	7	Wm Dumas MD	[id number]	[facility]  C	Y	O	12/18/2024	12/20/2024	oxycodone hcl (ir) 15 mg tab	90	30	Dante Seth MD	[id number]	Cash	[id number]  C	N	O	11/18/2024	11/21/2024	oxycodone hcl (ir) 15 mg tab	90	30	Dante Seth MD	[id number]	[place]	[id number]  C	N	O	10/14/2024	10/21/2024	oxycodone hcl (ir) 15 mg tab	90	30	Dante Seth MD	[id number]	[place]	[id number]    * - Details of Drug Type : O = Opioid, B = Benzodiazepine, S = Stimulant    * - Drugs marked with an asterisk are compound drugs. If the compound drug is made up of more than one controlled substance, then each controlled substance will be a separate row in the table.

## 2024-12-30 NOTE — H&P ADULT - ASSESSMENT
Mr David Welch is a 39 year old male with a PMH of ESRD on HD T/R/Sat, HTN, anemia, CVA, b/l hip fracture now wheelchair dependent presenting w/ weakness, found to have sepsis in setting of influenza A

## 2024-12-30 NOTE — ED PROVIDER NOTE - OBJECTIVE STATEMENT
Attending Felicity: 40 y/o M w/ PMH of ESRD on HD T/R/Sat (more frequent sessions lately) HTN, anemia, CVA, b/l hip fracture now wheelchair dependent presenting w/ weakness. Reports had been feeling unwell for the past few days. Went to HD session today, completed treatment, but was sent to ED due to his symptoms. Usually can transfer himself to and from wheelchair but has been unable to do so. Also found to be febrile. Denies any specific infectious symptoms at this time. Denies chills, headache, dizziness, blurred vision, chest pain, cough, shortness of breath, abdominal pain, n/v/d/c, urinary symptoms, MSK pain, rash. Attending Felicity: 40 y/o M w/ PMH of ESRD on HD T/R/Sat (more frequent sessions lately) HTN, anemia, CVA, b/l hip fracture now wheelchair dependent presenting w/ weakness. Reports had been feeling unwell for the past few days. Went to HD session today, completed treatment, but was sent to ED due to his symptoms. Usually can transfer himself to and from wheelchair but has been unable to do so. Also found to be febrile. Endorsing a mild cough, denies other specific infectious symptoms at this time. Denies chills, headache, dizziness, blurred vision, chest pain, shortness of breath, abdominal pain, n/v/d/c, urinary symptoms, MSK pain, rash.

## 2024-12-30 NOTE — H&P ADULT - NSVTERISKASSESS_GEN_ALL_CORE FT
Anesthesia Pre Eval Note    Anesthesia ROS/Med Hx    Overall Review:  EKG was reviewed, Echo was reviewed and Stress test was reviewed       Pulmonary Review:  Positive for pneumonia    Neuro/Psych Review:    Positive for neuromuscular disease - back pain  Positive for psychiatric history - Anxiety and Depression    Cardiovascular Review:  Comments: Report being able to climb 2 FOS slowly without chest pain or SOB    Negative for CHF  Positive for cardiomyopathy - HypertrophicPositive for CAD (mild apical ischemia on cath - managed on beta-blocker)  Negative for angina  Positive for valvular problems/murmurs (Severe AS, JOHNNIE 0.8) - murmur type AS    Negative for dysrhythmias  Positive for hypertension  Positive for hyperlipidemia    GI/HEPATIC/RENAL Review:    Positive for GERDNegative for liver disease  Negative for renal disease    End/Other Review:  Comments: Open distal radial fracture  Positive for obesity class II - 35.00 - 39.99  Positive for arthritis  Additional Results:  Echo:  Ejection Fraction       Date                     Value               Ref Range           Status                01/29/2021               74                  %                   Final            ----------   ALLERGIES:   -- Nsaids     --  Contraindicated secondary to history of GI and             retinal hemorrhage   -- Tape (Adhesive   (Environmental)) -- Dermatitis        Relevant Problems   No relevant active problems       Physical Exam     Airway   Mallampati: II  TM Distance: >3 FB  Neck ROM: Full    Cardiovascular    Cardio Rhythm: Regular  Cardio Rate: Normal  Patient demonstrates: Murmur    Head Assessment  Head assessment: Normocephalic    General Assessment  General Assessment: Alert and oriented    Dental Exam    Patient has:  Edentuous    Pulmonary Exam    Breath sounds clear to auscultation:  Yes    Abdominal Exam    Patient Demonstrates:  Obese      Anesthesia Plan:  Anesthesia Plan    ASA Status:  4  Emergent    Anesthesia Type: Regional    Preferred Airway Type: Nasal Cannula  Premedication: None      Post-op Pain Management: Single Shot Block      Checklist  Reviewed: Lab Results, EKG, Patient Summary, NPO Status, Problem list, Past Med History, Allergies and Consultations  Monitors  Discussed risks of the following Invasive monitors with patient: Arterial Line      Informed Consent  The proposed anesthetic plan, including its risks and benefits, have been discussed with the Patient  - along with the risks and benefits of alternatives.  Questions were encouraged and answered and the patient and/or representative understands and agrees to proceed.       Comments  Plan Comments: I have explained the available anesthetic options and associated procedures with their various benefits and risks.  I discussed short-term side effects such as nausea, vomiting, sore throat as well as potential for trauma to the mouth, nose, teeth, oral cavity, and vocal cords in the event that airway manipulation is needed.    I also discussed the benefits of regional anesthesia (supraclavicular block) as well as its risks including bleeding, infection, and nerve injury.      Consent/Risks Discussed Statement:  I have discussed with the patient, and/or patient's legal representative, the nature and purpose of anesthesia for the planned procedure. I have discussed elements of the patient's history or examination, as noted above and/or as follows, that place the patient at higher risk of complications - -    I discussed with the patient (and/or patient's legal representative) the risks and benefits of the proposed anesthesia plan, Regional, which may include services performed by other anesthesia providers.    Alternative anesthesia plans, if available, were reviewed with the patient (and/or patient's legal representative). Discussion has been held with the patient (and/or patient's legal representative) regarding risks of anesthesia,  which include emergent situations that may require change in anesthesia plan, as well as the following:    The patient (and/or patient's legal representative) has indicated understanding, his/her questions have been answered, and he/she wishes to proceed with the planned anesthetic.     Medical Assessment Completed on: 31-Dec-2024 00:28

## 2024-12-30 NOTE — ED PROVIDER NOTE - CLINICAL SUMMARY MEDICAL DECISION MAKING FREE TEXT BOX
Attending Felicity: 40 y/o M w/ PMH of ESRD on HD T/R/Sat (more frequent sessions lately) HTN, anemia, CVA, b/l hip fracture now wheelchair dependent presenting w/ weakness. Reports had been feeling unwell for the past few days. Went to HD session today, completed treatment, but was sent to ED due to his symptoms. Usually can transfer himself to and from wheelchair but has been unable to do so. Also found to be febrile. Endorsing a mild cough, denies other specific infectious symptoms at this time. Denies chills, headache, dizziness, blurred vision, chest pain, shortness of breath, abdominal pain, n/v/d/c, urinary symptoms, MSK pain, rash. Pt overall no acute distress. Febrile on arrival to ED. Will perform infectious work up. Possible respiratory source such as viral URI/PNA. Considered immunocompromised given HD status. Will treat w/ broad spectrum abx. Plan for labs, imaging, EKG, meds. Limit IVF due to ESRD status. Pt will likely require admission. Will reassess the need for additional interventions as clinically warranted. Refer to any progress notes for updates on clinical course and as a continuation of this MDM.     I, Dr. Paul Blevins, independently interpreted the EKG which showed sinus tachycardia at a rate of 107.

## 2024-12-30 NOTE — ED PROVIDER NOTE - PHYSICAL EXAMINATION
Attending Nello:   Gen: NAD, AOx3, able to make needs known, non-toxic  Head: NCAT  HEENT: EOMI, oral mucosa moist, normal conjunctiva  Lung: no respiratory distress, CTAB, no wheezes/rhonchi/rales B/L, speaking in full sentences  CV: RRR, no murmurs  Abd: non distended, soft, nontender, no guarding, no CVA tenderness  MSK: no visible deformities. RUE AV fistula w/ thrill. LUE AV fistula no thrill  Neuro: Appears non focal  Skin: Warm, well perfused  Psych: normal affect

## 2024-12-30 NOTE — H&P ADULT - ATTENDING COMMENTS
39 year old male with hx of ESRD on HD T/R/Sat, HTN, anemia, CVA, b/l hip fracture now wheelchair dependent presenting w/ generalized weakness, found to be septic on presentation. Noted to influenza A positive. Admitted for sepsis/infectious workup    # Sepsis  # ESRD on HD  # Influenza A  # Functional paraplegia  # Physical debility    Plan:  - Will continue vancomycin and zosyn (renal dosing) for empiric treatment pending culture data. Check vancomycin level prior to HD and dose after HD. Zosyn q12.  - Check MRSA pcr  - F/u blood cultures  - Continue tamiflu given influenza positive  - Continue oxycodone for chronic pain; bowel regimen added  - Need med rec in AM    Plan discussed with resident Dr. Dailey

## 2024-12-30 NOTE — ED ADULT NURSE NOTE - NSFALLHARMRISKINTERV_ED_ALL_ED

## 2024-12-31 DIAGNOSIS — F44.4 CONVERSION DISORDER WITH MOTOR SYMPTOM OR DEFICIT: ICD-10-CM

## 2024-12-31 DIAGNOSIS — Z29.9 ENCOUNTER FOR PROPHYLACTIC MEASURES, UNSPECIFIED: ICD-10-CM

## 2024-12-31 DIAGNOSIS — A41.9 SEPSIS, UNSPECIFIED ORGANISM: ICD-10-CM

## 2024-12-31 DIAGNOSIS — J10.1 INFLUENZA DUE TO OTHER IDENTIFIED INFLUENZA VIRUS WITH OTHER RESPIRATORY MANIFESTATIONS: ICD-10-CM

## 2024-12-31 DIAGNOSIS — N18.6 END STAGE RENAL DISEASE: ICD-10-CM

## 2024-12-31 DIAGNOSIS — Z79.899 OTHER LONG TERM (CURRENT) DRUG THERAPY: ICD-10-CM

## 2024-12-31 LAB
MRSA PCR RESULT.: SIGNIFICANT CHANGE UP
S AUREUS DNA NOSE QL NAA+PROBE: SIGNIFICANT CHANGE UP

## 2024-12-31 PROCEDURE — 99232 SBSQ HOSP IP/OBS MODERATE 35: CPT

## 2024-12-31 RX ORDER — OXYCODONE HCL 15 MG
10 TABLET ORAL EVERY 8 HOURS
Refills: 0 | Status: DISCONTINUED | OUTPATIENT
Start: 2024-12-31 | End: 2025-01-07

## 2024-12-31 RX ORDER — OSELTAMIVIR 75 MG/1
30 CAPSULE ORAL EVERY 24 HOURS
Refills: 0 | Status: COMPLETED | OUTPATIENT
Start: 2024-12-31 | End: 2025-01-04

## 2024-12-31 RX ORDER — OXYCODONE HCL 15 MG
15 TABLET ORAL EVERY 8 HOURS
Refills: 0 | Status: DISCONTINUED | OUTPATIENT
Start: 2024-12-31 | End: 2025-01-07

## 2024-12-31 RX ORDER — BISACODYL 5 MG
5 TABLET, DELAYED RELEASE (ENTERIC COATED) ORAL DAILY
Refills: 0 | Status: DISCONTINUED | OUTPATIENT
Start: 2024-12-31 | End: 2025-01-12

## 2024-12-31 RX ORDER — NALOXONE HCL 0.4 MG/ML
0.4 VIAL (ML) INJECTION ONCE
Refills: 0 | Status: DISCONTINUED | OUTPATIENT
Start: 2024-12-31 | End: 2025-01-17

## 2024-12-31 RX ORDER — ACETAMINOPHEN 80 MG/.8ML
1000 SOLUTION/ DROPS ORAL ONCE
Refills: 0 | Status: COMPLETED | OUTPATIENT
Start: 2024-12-31 | End: 2024-12-31

## 2024-12-31 RX ORDER — SENNOSIDES 8.6 MG/1
2 TABLET, FILM COATED ORAL AT BEDTIME
Refills: 0 | Status: DISCONTINUED | OUTPATIENT
Start: 2024-12-31 | End: 2025-01-17

## 2024-12-31 RX ORDER — INFLUENZA A VIRUS A/WISCONSIN/588/2019 (H1N1) RECOMBINANT HEMAGGLUTININ ANTIGEN, INFLUENZA A VIRUS A/DARWIN/6/2021 (H3N2) RECOMBINANT HEMAGGLUTININ ANTIGEN, INFLUENZA B VIRUS B/AUSTRIA/1359417/2021 RECOMBINANT HEMAGGLUTININ ANTIGEN, AND INFLUENZA B VIRUS B/PHUKET/3073/2013 RECOMBINANT HEMAGGLUTININ ANTIGEN 45; 45; 45; 45 UG/.5ML; UG/.5ML; UG/.5ML; UG/.5ML
0.5 INJECTION INTRAMUSCULAR ONCE
Refills: 0 | Status: DISCONTINUED | OUTPATIENT
Start: 2024-12-31 | End: 2024-12-31

## 2024-12-31 RX ORDER — PIPERACILLIN AND TAZOBACTAM 3; .375 G/15ML; G/15ML
3.38 INJECTION, POWDER, LYOPHILIZED, FOR SOLUTION INTRAVENOUS EVERY 12 HOURS
Refills: 0 | Status: DISCONTINUED | OUTPATIENT
Start: 2024-12-31 | End: 2025-01-02

## 2024-12-31 RX ORDER — HEPARIN SODIUM 1000 [USP'U]/ML
5000 INJECTION, SOLUTION INTRAVENOUS; SUBCUTANEOUS EVERY 12 HOURS
Refills: 0 | Status: DISCONTINUED | OUTPATIENT
Start: 2024-12-31 | End: 2025-01-17

## 2024-12-31 RX ORDER — OSELTAMIVIR 75 MG/1
30 CAPSULE ORAL ONCE
Refills: 0 | Status: COMPLETED | OUTPATIENT
Start: 2024-12-31 | End: 2024-12-31

## 2024-12-31 RX ORDER — POLYETHYLENE GLYCOL 3350 17 G/DOSE
17 POWDER (GRAM) ORAL DAILY
Refills: 0 | Status: DISCONTINUED | OUTPATIENT
Start: 2024-12-31 | End: 2025-01-11

## 2024-12-31 RX ADMIN — Medication 10 MILLIGRAM(S): at 00:10

## 2024-12-31 RX ADMIN — SENNOSIDES 2 TABLET(S): 8.6 TABLET, FILM COATED ORAL at 21:32

## 2024-12-31 RX ADMIN — Medication 10 MILLIGRAM(S): at 08:50

## 2024-12-31 RX ADMIN — ACETAMINOPHEN 400 MILLIGRAM(S): 80 SOLUTION/ DROPS ORAL at 01:00

## 2024-12-31 RX ADMIN — ACETAMINOPHEN 400 MILLIGRAM(S): 80 SOLUTION/ DROPS ORAL at 21:33

## 2024-12-31 RX ADMIN — Medication 10 MILLIGRAM(S): at 17:04

## 2024-12-31 RX ADMIN — PIPERACILLIN AND TAZOBACTAM 25 GRAM(S): 3; .375 INJECTION, POWDER, LYOPHILIZED, FOR SOLUTION INTRAVENOUS at 06:00

## 2024-12-31 RX ADMIN — ACETAMINOPHEN 400 MILLIGRAM(S): 80 SOLUTION/ DROPS ORAL at 14:24

## 2024-12-31 RX ADMIN — PIPERACILLIN AND TAZOBACTAM 25 GRAM(S): 3; .375 INJECTION, POWDER, LYOPHILIZED, FOR SOLUTION INTRAVENOUS at 17:03

## 2024-12-31 RX ADMIN — HEPARIN SODIUM 5000 UNIT(S): 1000 INJECTION, SOLUTION INTRAVENOUS; SUBCUTANEOUS at 17:03

## 2024-12-31 RX ADMIN — OSELTAMIVIR 30 MILLIGRAM(S): 75 CAPSULE ORAL at 00:55

## 2024-12-31 RX ADMIN — Medication 10 MILLIGRAM(S): at 10:53

## 2024-12-31 RX ADMIN — HEPARIN SODIUM 5000 UNIT(S): 1000 INJECTION, SOLUTION INTRAVENOUS; SUBCUTANEOUS at 06:00

## 2024-12-31 RX ADMIN — ACETAMINOPHEN 1000 MILLIGRAM(S): 80 SOLUTION/ DROPS ORAL at 15:57

## 2024-12-31 RX ADMIN — Medication 10 MILLIGRAM(S): at 17:57

## 2024-12-31 NOTE — DIETITIAN INITIAL EVALUATION ADULT - ORAL INTAKE PTA/DIET HISTORY
Patient visited at bedside states he is eating OK. Patient is aware of pressure ulcers; B/L buttocks and sacrum.   Discussed need for increased protein and vitamins/minerals to promote healing. Patient receives HD 3x weekly and is familiar with   Nepro renal supplement. Patient has shellfish allergy but otherwise can find what he likes on the menu.

## 2024-12-31 NOTE — ADVANCED PRACTICE NURSE CONSULT - RECOMMEDATIONS
Impression:     B/L heel hyperpigmentation, cannot rule out a deep tissue injury present on admission   xerosis lower extremities  hypopigmentation over sacrum  B/L buttocks/sacral hyperpigmentation, cannot rule out a deep tissue injury present on admission      Recommendations:     1) turn and position q2 and PRN utilizing offloading assistive devices  2) routine pericare daily and PRN soiling  3) encourage optimal nutrition  4) waffle cushion when oob to chair  5) B/L LE complete cair air fluidized boots or wisam-lock pillow to offload heels/feet  6) ranjana protective barrier cream to B/L buttocks/sacrum daily and PRN soiling  7) incontinence management - consider external urinary catheter to divert urine from skin if incontinent    Plan discussed with JAYCEE Bullard on unit     For questions/comments regarding the recommendations in this consult, please contact Shanice Arriaga via teams. Wound care will not actively follow, please place future consults for new concerns. Thank you!

## 2024-12-31 NOTE — ADVANCED PRACTICE NURSE CONSULT - REASON FOR CONSULT
Wound care consult initiated by RN to assess patient's skin for a possible sacral stage 2 pressure injury and a skin injury on plantar aspect of B/L feet     Reason for Admission: Weakness  History of Present Illness:   Mr David Welch is a 39 year old male with a PMH of ESRD on HD T/R/Sat, HTN, anemia, CVA, b/l hip fracture now wheelchair dependent presenting w/ weakness. Reports had been feeling unwell for the past few days. Went to HD session today, completed treatment, but was sent to ED due to his symptoms. Usually can transfer himself to and from his wheelchair but has been unable to do so. Denies myalgias other than what he feels in his hips chronically, which have worsened because he has not been getting PT as often recently.  Denies fevers, but does report a mild dry cough.  Lives with his cousin and their family, but denies sick contacts. Denies CP, SOB, n/v/c/d. Reports severe leg pain and has been unable to take his oxycodone today.    In the ED patient was found to be febrile. RVP positive for influenza A.  Received vancomycin, zosyn, and 500cc crystalloid.

## 2024-12-31 NOTE — DIETITIAN INITIAL EVALUATION ADULT - NSFNSPHYEXAMSKINFT_GEN_A_CORE
Pressure Injury 1: buttocks, sacrum, Suspected deep tissue injury    Pressure Injury  Pressure Injury 1: sacrum, Stage II

## 2024-12-31 NOTE — DIETITIAN INITIAL EVALUATION ADULT - PERTINENT MEDS FT
MEDICATIONS  (STANDING):  heparin   Injectable 5000 Unit(s) SubCutaneous every 12 hours  naloxone Injectable 0.4 milliGRAM(s) IV Push once  oseltamivir 30 milliGRAM(s) Oral every 24 hours  piperacillin/tazobactam IVPB.. 3.375 Gram(s) IV Intermittent every 12 hours  polyethylene glycol 3350 17 Gram(s) Oral daily  senna 2 Tablet(s) Oral at bedtime    MEDICATIONS  (PRN):  bisacodyl 5 milliGRAM(s) Oral daily PRN Constipation  melatonin 5 milliGRAM(s) Oral at bedtime PRN Insomnia  oxyCODONE    IR 10 milliGRAM(s) Oral every 8 hours PRN Moderate Pain (4 - 6)  oxyCODONE    IR 15 milliGRAM(s) Oral every 8 hours PRN Severe Pain (7 - 10)

## 2024-12-31 NOTE — PROGRESS NOTE ADULT - SUBJECTIVE AND OBJECTIVE BOX
INTERVAL HPI/OVERNIGHT EVENTS:  Pt seen and examined at bedside. No acute overnight events or complaints.    Brief Daily Plan:  -    VITAL SIGNS:  T(F): 100.4 (12-31-24 @ 04:00)  HR: 105 (12-31-24 @ 02:15)  BP: 163/89 (12-31-24 @ 02:15)  RR: 20 (12-31-24 @ 02:15)  SpO2: 97% (12-31-24 @ 02:15)  Wt(kg): --    PHYSICAL EXAM:    CONSTITUTIONAL: Uncomfortable  EYES: PERRL, EOMI, No conjunctival or scleral injection, non-icteric  HENMT: macrocephaly, MMM. poor dentition  NECK: Supple  RESP: No respiratory distress, no use of accessory muscles; CTA b/l, no W/R/R  CV: RRR, 3/6 systolic murmur  GI: Soft, NT, ND  MSK: No clubbing or cyanosis. 1+ BLE edema  SKIN: No rashes or ulcers noted  NEURO: CN II-XII grossly intact; no focal deficits  PSYCH: A&O x 3, affect appropriate    MEDICATIONS  (STANDING):  heparin   Injectable 5000 Unit(s) SubCutaneous every 12 hours  naloxone Injectable 0.4 milliGRAM(s) IV Push once  oseltamivir 30 milliGRAM(s) Oral every 24 hours  piperacillin/tazobactam IVPB.. 3.375 Gram(s) IV Intermittent every 12 hours  polyethylene glycol 3350 17 Gram(s) Oral daily  senna 2 Tablet(s) Oral at bedtime    MEDICATIONS  (PRN):  bisacodyl 5 milliGRAM(s) Oral daily PRN Constipation  melatonin 5 milliGRAM(s) Oral at bedtime PRN Insomnia  oxyCODONE    IR 10 milliGRAM(s) Oral every 8 hours PRN Moderate Pain (4 - 6)  oxyCODONE    IR 15 milliGRAM(s) Oral every 8 hours PRN Severe Pain (7 - 10)      Allergies    shellfish (Hives)  No Known Drug Allergies    Intolerances        LABS:                        10.7   3.72  )-----------( 143      ( 30 Dec 2024 18:14 )             33.5     12-30    139  |  94[L]  |  36[H]  ----------------------------<  82  4.4   |  28  |  5.87[H]    Ca    9.3      30 Dec 2024 18:14    TPro  7.1  /  Alb  4.0  /  TBili  0.4  /  DBili  x   /  AST  7[L]  /  ALT  <5[L]  /  AlkPhos  1215[H]  12-30    PT/INR - ( 30 Dec 2024 18:14 )   PT: 12.8 sec;   INR: 1.12 ratio         PTT - ( 30 Dec 2024 18:14 )  PTT:30.0 sec  Urinalysis Basic - ( 30 Dec 2024 18:14 )    Color: x / Appearance: x / SG: x / pH: x  Gluc: 82 mg/dL / Ketone: x  / Bili: x / Urobili: x   Blood: x / Protein: x / Nitrite: x   Leuk Esterase: x / RBC: x / WBC x   Sq Epi: x / Non Sq Epi: x / Bacteria: x        RADIOLOGY & ADDITIONAL TESTS:  Reviewed  ******************  Authored By: Chaim Buck MD, PGY1  MS Teams Preferred  ******************   INTERVAL HPI/OVERNIGHT EVENTS:  Pt seen and examined at bedside. No acute overnight events or complaints.    Brief Daily Plan:  - continuing to be febrile giving ofirmev and cooling blanket     VITAL SIGNS:  T(F): 100.4 (12-31-24 @ 04:00)  HR: 105 (12-31-24 @ 02:15)  BP: 163/89 (12-31-24 @ 02:15)  RR: 20 (12-31-24 @ 02:15)  SpO2: 97% (12-31-24 @ 02:15)  Wt(kg): --    PHYSICAL EXAM:    CONSTITUTIONAL: Uncomfortable  EYES: PERRL, EOMI, No conjunctival or scleral injection, non-icteric  HENMT: macrocephaly, MMM. poor dentition  NECK: Supple  RESP: No respiratory distress, no use of accessory muscles; CTA b/l, no W/R/R  CV: RRR, 3/6 systolic murmur  GI: Soft, NT, ND  MSK: No clubbing or cyanosis. 1+ BLE edema  SKIN: No rashes or ulcers noted  NEURO: CN II-XII grossly intact; no focal deficits  PSYCH: A&O x 3, affect appropriate    MEDICATIONS  (STANDING):  heparin   Injectable 5000 Unit(s) SubCutaneous every 12 hours  naloxone Injectable 0.4 milliGRAM(s) IV Push once  oseltamivir 30 milliGRAM(s) Oral every 24 hours  piperacillin/tazobactam IVPB.. 3.375 Gram(s) IV Intermittent every 12 hours  polyethylene glycol 3350 17 Gram(s) Oral daily  senna 2 Tablet(s) Oral at bedtime    MEDICATIONS  (PRN):  bisacodyl 5 milliGRAM(s) Oral daily PRN Constipation  melatonin 5 milliGRAM(s) Oral at bedtime PRN Insomnia  oxyCODONE    IR 10 milliGRAM(s) Oral every 8 hours PRN Moderate Pain (4 - 6)  oxyCODONE    IR 15 milliGRAM(s) Oral every 8 hours PRN Severe Pain (7 - 10)      Allergies    shellfish (Hives)  No Known Drug Allergies    Intolerances        LABS:                        10.7   3.72  )-----------( 143      ( 30 Dec 2024 18:14 )             33.5     12-30    139  |  94[L]  |  36[H]  ----------------------------<  82  4.4   |  28  |  5.87[H]    Ca    9.3      30 Dec 2024 18:14    TPro  7.1  /  Alb  4.0  /  TBili  0.4  /  DBili  x   /  AST  7[L]  /  ALT  <5[L]  /  AlkPhos  1215[H]  12-30    PT/INR - ( 30 Dec 2024 18:14 )   PT: 12.8 sec;   INR: 1.12 ratio         PTT - ( 30 Dec 2024 18:14 )  PTT:30.0 sec  Urinalysis Basic - ( 30 Dec 2024 18:14 )    Color: x / Appearance: x / SG: x / pH: x  Gluc: 82 mg/dL / Ketone: x  / Bili: x / Urobili: x   Blood: x / Protein: x / Nitrite: x   Leuk Esterase: x / RBC: x / WBC x   Sq Epi: x / Non Sq Epi: x / Bacteria: x        RADIOLOGY & ADDITIONAL TESTS:  Reviewed  ******************  Authored By: Chaim Buck MD, PGY1  MS Teams Preferred  ******************   INTERVAL HPI/OVERNIGHT EVENTS:  Pt seen and examined at bedside. No acute overnight events or complaints.    Brief Daily Plan:  - continuing to be febrile giving ofirmev and cooling blanket   - HD tomorrow   - c/w zosyn MRSA negative     VITAL SIGNS:  T(F): 100.4 (12-31-24 @ 04:00)  HR: 105 (12-31-24 @ 02:15)  BP: 163/89 (12-31-24 @ 02:15)  RR: 20 (12-31-24 @ 02:15)  SpO2: 97% (12-31-24 @ 02:15)  Wt(kg): --    PHYSICAL EXAM:    CONSTITUTIONAL: Uncomfortable  EYES: PERRL, EOMI, No conjunctival or scleral injection, non-icteric  HENMT: macrocephaly, MMM. poor dentition  NECK: Supple  RESP: No respiratory distress, no use of accessory muscles; CTA b/l, no W/R/R  CV: RRR, 3/6 systolic murmur  GI: Soft, NT, ND  MSK: No clubbing or cyanosis. 1+ BLE edema  SKIN: No rashes or ulcers noted  NEURO: CN II-XII grossly intact; no focal deficits  PSYCH: A&O x 3, affect appropriate    MEDICATIONS  (STANDING):  heparin   Injectable 5000 Unit(s) SubCutaneous every 12 hours  naloxone Injectable 0.4 milliGRAM(s) IV Push once  oseltamivir 30 milliGRAM(s) Oral every 24 hours  piperacillin/tazobactam IVPB.. 3.375 Gram(s) IV Intermittent every 12 hours  polyethylene glycol 3350 17 Gram(s) Oral daily  senna 2 Tablet(s) Oral at bedtime    MEDICATIONS  (PRN):  bisacodyl 5 milliGRAM(s) Oral daily PRN Constipation  melatonin 5 milliGRAM(s) Oral at bedtime PRN Insomnia  oxyCODONE    IR 10 milliGRAM(s) Oral every 8 hours PRN Moderate Pain (4 - 6)  oxyCODONE    IR 15 milliGRAM(s) Oral every 8 hours PRN Severe Pain (7 - 10)      Allergies    shellfish (Hives)  No Known Drug Allergies    Intolerances        LABS:                        10.7   3.72  )-----------( 143      ( 30 Dec 2024 18:14 )             33.5     12-30    139  |  94[L]  |  36[H]  ----------------------------<  82  4.4   |  28  |  5.87[H]    Ca    9.3      30 Dec 2024 18:14    TPro  7.1  /  Alb  4.0  /  TBili  0.4  /  DBili  x   /  AST  7[L]  /  ALT  <5[L]  /  AlkPhos  1215[H]  12-30    PT/INR - ( 30 Dec 2024 18:14 )   PT: 12.8 sec;   INR: 1.12 ratio         PTT - ( 30 Dec 2024 18:14 )  PTT:30.0 sec  Urinalysis Basic - ( 30 Dec 2024 18:14 )    Color: x / Appearance: x / SG: x / pH: x  Gluc: 82 mg/dL / Ketone: x  / Bili: x / Urobili: x   Blood: x / Protein: x / Nitrite: x   Leuk Esterase: x / RBC: x / WBC x   Sq Epi: x / Non Sq Epi: x / Bacteria: x        RADIOLOGY & ADDITIONAL TESTS:  Reviewed  ******************  Authored By: Chaim Buck MD, PGY1  MS Teams Preferred  ******************

## 2024-12-31 NOTE — PHYSICAL THERAPY INITIAL EVALUATION ADULT - PERTINENT HX OF CURRENT PROBLEM, REHAB EVAL
Pt is a 39 year old male with PMH significant for ESRD on HD T/R/Sat, HTN, anemia, CVA, b/l hip fracture now wheelchair dependent.  Pt presents with weakness. Reports had been feeling unwell for the past few days. Went to HD session on 12/30, completed treatment, but was sent to ED due to his symptoms. Usually can transfer himself to and from his wheelchair but has been unable to do so. Denies myalgias other than what he feels in his hips chronically, which have worsened because he has not been getting PT as often recently.  In the ED patient was found to be febrile. RVP positive for influenza A.  Received vancomycin, zosyn, and 500cc crystalloid.  Pt admitted for sepsis in setting of influenza A.  CXR(12/30): Diffuse, central dominant interstitial opacities which likely represents acute mild pulmonary edema. Nonspecific rightward deviation of the lower trachea, similar to prior exam.

## 2024-12-31 NOTE — DIETITIAN INITIAL EVALUATION ADULT - PERTINENT LABORATORY DATA
12-30    139  |  94[L]  |  36[H]  ----------------------------<  82  4.4   |  28  |  5.87[H]    Ca    9.3      30 Dec 2024 18:14    TPro  7.1  /  Alb  4.0  /  TBili  0.4  /  DBili  x   /  AST  7[L]  /  ALT  <5[L]  /  AlkPhos  1215[H]  12-30

## 2024-12-31 NOTE — ADVANCED PRACTICE NURSE CONSULT - ASSESSMENT
Patient encountered on [address] [facility]. When wound care RN arrived on unit, patient was found lying in a low air loss pressure redistribution support surface style bed with family at bedside. Patient is wheelchair bound at home. Patient Yuri was alert and oriented and gave consent to skin consult. He is able to turn upper body independently and staff assistance x 1 was provided to assist movement of lower extremities. Once turned, flatus noted. The wound care RN was able to visualize an area of persistent nonblanchable hyperpigmentation over B/L buttocks/sacral skin, area measures approximately 4cm x 4cm x 0cm. Within this location, there is hypopigmentation measuring approximately 2cm x 1cm x 0cm. Hypopigmentation is indicative of a prior skin injury that has since healed- a skin injury is more likely to occur in the same area as a prior skin injury due to the reduced tensile strength of the replaced tissue, therefore a deep tissue injury cannot be ruled out at this time. B/L plantar aspects of feet with hyperkeratosis. B/L heels with hyperpigmentation measuring approximately 4cm x 4cm x 0cm - cannot rule out a deep tissue injury pressure injury. Once consult was complete, patient and family were educated regarding the need for routine turning and positioning to prevent pressure injuries and patient was placed in a left side-lying position utilizing pillow positioner assistive devices.

## 2024-12-31 NOTE — ED ADULT NURSE REASSESSMENT NOTE - NS ED NURSE REASSESS COMMENT FT1
Pt to be started on cooling blanket and given Ofirmev for fever. Admitting MD aware. Pt AxOx3, maintained on 4L NC and continuous pulse ox. Pt updated on plan of care, awaiting bed. Safety and comfort measures maintained- bed in lowest position, locked, cooling blanket in place.

## 2024-12-31 NOTE — PROGRESS NOTE ADULT - PROBLEM SELECTOR PLAN 2
Patient found to have influenza A, likely contributing to his symptoms.  - Oseltamivir 30mg QOD dosed after dialysis x 5 days

## 2024-12-31 NOTE — PROGRESS NOTE ADULT - PROBLEM SELECTOR PLAN 1
Patient presents with tachycardia, fevers, and positive for influenza meeting criteria for viral sepsis.  S/p 500cc in ED and vanc/zosyn  - Will be judicious with fluids given patient's ESRD  - IV tylenol and cooling blankets for fevers  - Oseltamivir as below  - Hold vancomycin  - MRSA PCR  - Continue Zosyn for now Patient presents with tachycardia, fevers, and positive for influenza meeting criteria for viral sepsis.  S/p 500cc in ED and vanc/zosyn  - Will be judicious with fluids given patient's ESRD  - IV tylenol and cooling blankets for fevers  - Oseltamivir as below  - MRSA PCR negative   - Continue Zosyn for now, holding off on vancomycin

## 2024-12-31 NOTE — DIETITIAN INITIAL EVALUATION ADULT - PROBLEM SELECTOR PLAN 5
Patient returned call to Elle   Unable to complete mediation reconciliation.  Will need follow up in AM.

## 2024-12-31 NOTE — PHYSICAL THERAPY INITIAL EVALUATION ADULT - NSPTDISCHREC_GEN_A_CORE
if home, pt would require assist with all ADLs. Pt will require a 3 in 1 commode as pt will be confined to a room without access to a bathroom./Sub-acute Rehab

## 2024-12-31 NOTE — DIETITIAN INITIAL EVALUATION ADULT - NS FNS DIET ORDER
Diet, Renal Restrictions:   For patients receiving Renal Replacement - No Protein Restr, No Conc K, No Conc Phos, Low Sodium  DASH/TLC {Sodium & Cholesterol Restricted} (DASH)  No Shellfish (12-31-24 @ 01:08)

## 2024-12-31 NOTE — PHYSICAL THERAPY INITIAL EVALUATION ADULT - ADDITIONAL COMMENTS
pt states he lives in an extension of a  with 1 [place] and no steps inside. he was able to ambulate with assist using a rw. he was ind with transfers into his . he owns a rw, . has a HHA 5 hours a day 7 days a week who assists with getting him ready for the day.

## 2024-12-31 NOTE — DIETITIAN INITIAL EVALUATION ADULT - PROBLEM SELECTOR PLAN 1
Patient presents with tachycardia, fevers, and positive for influenza meeting criteria for viral sepsis.  S/p 500cc in ED and vanc/zosyn  - Will be judicious with fluids given patient's ESRD  - IV tylenol and cooling blankets for fevers  - Oseltamivir as below  - Hold vancomycin  - MRSA PCR  - Continue Zosyn for now

## 2024-12-31 NOTE — PATIENT PROFILE ADULT - FALL HARM RISK - HARM RISK INTERVENTIONS

## 2024-12-31 NOTE — DIETITIAN INITIAL EVALUATION ADULT - REASON INDICATOR FOR ASSESSMENT
Pressure Ulcer > st II  a 39 year old male with a PMH of ESRD on HD T/R/Sat, HTN, anemia, CVA, b/l hip fracture now wheelchair dependent presenting w/ weakness, found to have sepsis in setting of influenza A

## 2024-12-31 NOTE — DIETITIAN INITIAL EVALUATION ADULT - ADD RECOMMEND
Add Nepro x2 daily to provide total additional calories 840, protein 38 gm 1.Add Nepro x2 daily to provide total additional calories 840, protein 38 gm  2. add Neprovite renal MVI message left for Dr Buck  3. Monitor/encourage PO intake.

## 2025-01-01 LAB
ANION GAP SERPL CALC-SCNC: 14 MMOL/L — SIGNIFICANT CHANGE UP (ref 5–17)
ANION GAP SERPL CALC-SCNC: 20 MMOL/L — HIGH (ref 5–17)
ANION GAP SERPL CALC-SCNC: 22 MMOL/L — HIGH (ref 5–17)
BASOPHILS # BLD AUTO: 0.02 K/UL — SIGNIFICANT CHANGE UP (ref 0–0.2)
BASOPHILS NFR BLD AUTO: 0.4 % — SIGNIFICANT CHANGE UP (ref 0–2)
BUN SERPL-MCNC: 102 MG/DL — HIGH (ref 7–23)
BUN SERPL-MCNC: 108 MG/DL — HIGH (ref 7–23)
BUN SERPL-MCNC: 50 MG/DL — HIGH (ref 7–23)
CALCIUM SERPL-MCNC: 10.3 MG/DL — SIGNIFICANT CHANGE UP (ref 8.4–10.5)
CALCIUM SERPL-MCNC: 10.7 MG/DL — HIGH (ref 8.4–10.5)
CALCIUM SERPL-MCNC: 9.7 MG/DL — SIGNIFICANT CHANGE UP (ref 8.4–10.5)
CHLORIDE SERPL-SCNC: 87 MMOL/L — LOW (ref 96–108)
CHLORIDE SERPL-SCNC: 91 MMOL/L — LOW (ref 96–108)
CHLORIDE SERPL-SCNC: 99 MMOL/L — SIGNIFICANT CHANGE UP (ref 96–108)
CO2 SERPL-SCNC: 23 MMOL/L — SIGNIFICANT CHANGE UP (ref 22–31)
CO2 SERPL-SCNC: 24 MMOL/L — SIGNIFICANT CHANGE UP (ref 22–31)
CO2 SERPL-SCNC: 27 MMOL/L — SIGNIFICANT CHANGE UP (ref 22–31)
CREAT SERPL-MCNC: 10.17 MG/DL — HIGH (ref 0.5–1.3)
CREAT SERPL-MCNC: 10.35 MG/DL — HIGH (ref 0.5–1.3)
CREAT SERPL-MCNC: 5.05 MG/DL — HIGH (ref 0.5–1.3)
EGFR: 14 ML/MIN/1.73M2 — LOW
EGFR: 6 ML/MIN/1.73M2 — LOW
EGFR: 6 ML/MIN/1.73M2 — LOW
EOSINOPHIL # BLD AUTO: 0 K/UL — SIGNIFICANT CHANGE UP (ref 0–0.5)
EOSINOPHIL NFR BLD AUTO: 0 % — SIGNIFICANT CHANGE UP (ref 0–6)
GLUCOSE SERPL-MCNC: 70 MG/DL — SIGNIFICANT CHANGE UP (ref 70–99)
GLUCOSE SERPL-MCNC: 81 MG/DL — SIGNIFICANT CHANGE UP (ref 70–99)
GLUCOSE SERPL-MCNC: 86 MG/DL — SIGNIFICANT CHANGE UP (ref 70–99)
HCT VFR BLD CALC: 39.4 % — SIGNIFICANT CHANGE UP (ref 39–50)
HGB BLD-MCNC: 12 G/DL — LOW (ref 13–17)
IMM GRANULOCYTES NFR BLD AUTO: 0.2 % — SIGNIFICANT CHANGE UP (ref 0–0.9)
LYMPHOCYTES # BLD AUTO: 1.04 K/UL — SIGNIFICANT CHANGE UP (ref 1–3.3)
LYMPHOCYTES # BLD AUTO: 18.6 % — SIGNIFICANT CHANGE UP (ref 13–44)
MAGNESIUM SERPL-MCNC: 2.1 MG/DL — SIGNIFICANT CHANGE UP (ref 1.6–2.6)
MAGNESIUM SERPL-MCNC: 2.5 MG/DL — SIGNIFICANT CHANGE UP (ref 1.6–2.6)
MCHC RBC-ENTMCNC: 30.5 G/DL — LOW (ref 32–36)
MCHC RBC-ENTMCNC: 31.8 PG — SIGNIFICANT CHANGE UP (ref 27–34)
MCV RBC AUTO: 104.5 FL — HIGH (ref 80–100)
MONOCYTES # BLD AUTO: 0.75 K/UL — SIGNIFICANT CHANGE UP (ref 0–0.9)
MONOCYTES NFR BLD AUTO: 13.4 % — SIGNIFICANT CHANGE UP (ref 2–14)
NEUTROPHILS # BLD AUTO: 3.78 K/UL — SIGNIFICANT CHANGE UP (ref 1.8–7.4)
NEUTROPHILS NFR BLD AUTO: 67.4 % — SIGNIFICANT CHANGE UP (ref 43–77)
NRBC # BLD: 0 /100 WBCS — SIGNIFICANT CHANGE UP (ref 0–0)
PHOSPHATE SERPL-MCNC: 4.8 MG/DL — HIGH (ref 2.5–4.5)
PHOSPHATE SERPL-MCNC: 9.2 MG/DL — HIGH (ref 2.5–4.5)
PLATELET # BLD AUTO: 186 K/UL — SIGNIFICANT CHANGE UP (ref 150–400)
POTASSIUM SERPL-MCNC: 3.8 MMOL/L — SIGNIFICANT CHANGE UP (ref 3.5–5.3)
POTASSIUM SERPL-MCNC: 7 MMOL/L — CRITICAL HIGH (ref 3.5–5.3)
POTASSIUM SERPL-MCNC: 7 MMOL/L — CRITICAL HIGH (ref 3.5–5.3)
POTASSIUM SERPL-SCNC: 3.8 MMOL/L — SIGNIFICANT CHANGE UP (ref 3.5–5.3)
POTASSIUM SERPL-SCNC: 7 MMOL/L — CRITICAL HIGH (ref 3.5–5.3)
POTASSIUM SERPL-SCNC: 7 MMOL/L — CRITICAL HIGH (ref 3.5–5.3)
RBC # BLD: 3.77 M/UL — LOW (ref 4.2–5.8)
RBC # FLD: 16.3 % — HIGH (ref 10.3–14.5)
SODIUM SERPL-SCNC: 133 MMOL/L — LOW (ref 135–145)
SODIUM SERPL-SCNC: 134 MMOL/L — LOW (ref 135–145)
SODIUM SERPL-SCNC: 140 MMOL/L — SIGNIFICANT CHANGE UP (ref 135–145)
WBC # BLD: 5.6 K/UL — SIGNIFICANT CHANGE UP (ref 3.8–10.5)
WBC # FLD AUTO: 5.6 K/UL — SIGNIFICANT CHANGE UP (ref 3.8–10.5)

## 2025-01-01 PROCEDURE — 99232 SBSQ HOSP IP/OBS MODERATE 35: CPT

## 2025-01-01 RX ORDER — MIDODRINE HYDROCHLORIDE 5 MG/1
5 TABLET ORAL ONCE
Refills: 0 | Status: COMPLETED | OUTPATIENT
Start: 2025-01-02 | End: 2025-01-02

## 2025-01-01 RX ORDER — INSULIN HUMAN 100 [IU]/ML
10 INJECTION, SOLUTION SUBCUTANEOUS ONCE
Refills: 0 | Status: COMPLETED | OUTPATIENT
Start: 2025-01-01 | End: 2025-01-01

## 2025-01-01 RX ORDER — CALCIUM GLUCONATE 94 MG/ML
2 INJECTION, SOLUTION INTRAVENOUS ONCE
Refills: 0 | Status: COMPLETED | OUTPATIENT
Start: 2025-01-01 | End: 2025-01-01

## 2025-01-01 RX ORDER — DEXTROSE MONOHYDRATE 25 G/50ML
50 INJECTION, SOLUTION INTRAVENOUS ONCE
Refills: 0 | Status: COMPLETED | OUTPATIENT
Start: 2025-01-01 | End: 2025-01-01

## 2025-01-01 RX ORDER — SODIUM ZIRCONIUM CYCLOSILICATE 10 G/10G
10 POWDER, FOR SUSPENSION ORAL ONCE
Refills: 0 | Status: COMPLETED | OUTPATIENT
Start: 2025-01-01 | End: 2025-01-01

## 2025-01-01 RX ORDER — ACETAMINOPHEN 80 MG/.8ML
1000 SOLUTION/ DROPS ORAL ONCE
Refills: 0 | Status: COMPLETED | OUTPATIENT
Start: 2025-01-01 | End: 2025-01-01

## 2025-01-01 RX ADMIN — SENNOSIDES 2 TABLET(S): 8.6 TABLET, FILM COATED ORAL at 22:55

## 2025-01-01 RX ADMIN — SODIUM ZIRCONIUM CYCLOSILICATE 10 GRAM(S): 10 POWDER, FOR SUSPENSION ORAL at 15:05

## 2025-01-01 RX ADMIN — Medication 10 MILLIGRAM(S): at 06:16

## 2025-01-01 RX ADMIN — ACETAMINOPHEN 1000 MILLIGRAM(S): 80 SOLUTION/ DROPS ORAL at 07:21

## 2025-01-01 RX ADMIN — Medication 10 MILLIGRAM(S): at 05:46

## 2025-01-01 RX ADMIN — PIPERACILLIN AND TAZOBACTAM 25 GRAM(S): 3; .375 INJECTION, POWDER, LYOPHILIZED, FOR SOLUTION INTRAVENOUS at 05:46

## 2025-01-01 RX ADMIN — HEPARIN SODIUM 5000 UNIT(S): 1000 INJECTION, SOLUTION INTRAVENOUS; SUBCUTANEOUS at 18:01

## 2025-01-01 RX ADMIN — HEPARIN SODIUM 5000 UNIT(S): 1000 INJECTION, SOLUTION INTRAVENOUS; SUBCUTANEOUS at 05:46

## 2025-01-01 RX ADMIN — OSELTAMIVIR 30 MILLIGRAM(S): 75 CAPSULE ORAL at 22:55

## 2025-01-01 RX ADMIN — CALCIUM GLUCONATE 200 GRAM(S): 94 INJECTION, SOLUTION INTRAVENOUS at 15:05

## 2025-01-01 RX ADMIN — ACETAMINOPHEN 400 MILLIGRAM(S): 80 SOLUTION/ DROPS ORAL at 04:13

## 2025-01-01 RX ADMIN — Medication 17 GRAM(S): at 13:15

## 2025-01-01 RX ADMIN — PIPERACILLIN AND TAZOBACTAM 25 GRAM(S): 3; .375 INJECTION, POWDER, LYOPHILIZED, FOR SOLUTION INTRAVENOUS at 22:56

## 2025-01-01 RX ADMIN — INSULIN HUMAN 10 UNIT(S): 100 INJECTION, SOLUTION SUBCUTANEOUS at 14:48

## 2025-01-01 RX ADMIN — DEXTROSE MONOHYDRATE 50 MILLILITER(S): 25 INJECTION, SOLUTION INTRAVENOUS at 14:48

## 2025-01-01 RX ADMIN — ACETAMINOPHEN 1000 MILLIGRAM(S): 80 SOLUTION/ DROPS ORAL at 02:42

## 2025-01-01 NOTE — CONSULT NOTE ADULT - SUBJECTIVE AND OBJECTIVE BOX
NEPHROLOGY CONSULTATION    CHIEF COMPLAINT:  ESRD    HPI:  Came to hospital post dialysis feeling too weak to transfer from chair.  [facility] and [facility] not [facility] name of his nephrologist.            PAST MEDICAL & SURGICAL HISTORY:  HTN (hypertension)      Stroke  age 10      Sleep apnea      Leg fracture, right      Chronic renal failure      Hemodialysis access, AV graft      ESRD (end stage renal disease) on dialysis  since 2013, M-W-F      Anemia, unspecified type      Other hyperparathyroidism      AV fistula  R arm; L arm clotted      History of left hip hemiarthroplasty      S/P parathyroidectomy            Social History:  Lives at his cousin's house in a room in the back.  Gets frequent PT for his hips (30 Dec 2024 23:35)          Home Medications:  oxyCODONE 10 mg oral tablet: 1 tab(s) orally every 8 hours as needed for Moderate Pain (4 - 6) MDD: 4 tabs (30 Dec 2024 23:38)  oxyCODONE 15 mg oral tablet: 1 tab(s) orally 3 times a day as needed for  severe pain (30 Dec 2024 23:39)      MEDICATIONS  (STANDING):  heparin   Injectable 5000 Unit(s) SubCutaneous every 12 hours  naloxone Injectable 0.4 milliGRAM(s) IV Push once  oseltamivir 30 milliGRAM(s) Oral every 24 hours  piperacillin/tazobactam IVPB.. 3.375 Gram(s) IV Intermittent every 12 hours  polyethylene glycol 3350 17 Gram(s) Oral daily  senna 2 Tablet(s) Oral at bedtime      PHYSICAL EXAMINATION:  T 100.6    /66  Somnolent, easily arouses and answers questions albeit slowly  PERRLA, pink conjunctivae, no ptosis  Noted alot of oral secretions unable to expectorate  Neck non tender, no mass, no thyromegaly or nodules  Normal respiratory effort, lungs clear to auscultation  Heart with RRR, no murmurs or rubs, no peripheral edema  Abdomen soft, no masses, no organomegaly  RUE AVF has thrill and bruit    LABS:                        10.7   3.72  )-----------( 143      ( 30 Dec 2024 18:14 )             33.5     12-30    139  |  94[L]  |  36[H]  ----------------------------<  82  4.4   |  28  |  5.87[H]    Ca    9.3      30 Dec 2024 18:14    TPro  7.1  /  Alb  4.0  /  TBili  0.4  /  DBili  x   /  AST  7[L]  /  ALT  <5[L]  /  AlkPhos  1215[H]  12-30        RADIOLOGY:  Chest X-Ray personally reviewed and shows centrally located interstitial opacities        ASSESSMENT:  1.  ESRD on hemodialysis TTS  2.  Influenza A    PLAN:  HD ordered for tomorrow          NEPHROLOGY CONSULTATION    CHIEF COMPLAINT:  ESRD    HPI:  Came to hospital post dialysis feeling too weak to transfer from chair.  He dialyzes at [facility] and does not know name of his nephrologist.            PAST MEDICAL & SURGICAL HISTORY:  HTN (hypertension)      Stroke  age 10      Sleep apnea      Leg fracture, right      Chronic renal failure      Hemodialysis access, AV graft      ESRD (end stage renal disease) on dialysis  since 2013, M-W-F      Anemia, unspecified type      Other hyperparathyroidism      AV fistula  R arm; L arm clotted      History of left hip hemiarthroplasty      S/P parathyroidectomy            Social History:  Lives at his cousin's house in a room in the back.  Gets frequent PT for his hips (30 Dec 2024 23:35)          Home Medications:  oxyCODONE 10 mg oral tablet: 1 tab(s) orally every 8 hours as needed for Moderate Pain (4 - 6) MDD: 4 tabs (30 Dec 2024 23:38)  oxyCODONE 15 mg oral tablet: 1 tab(s) orally 3 times a day as needed for  severe pain (30 Dec 2024 23:39)      MEDICATIONS  (STANDING):  heparin   Injectable 5000 Unit(s) SubCutaneous every 12 hours  naloxone Injectable 0.4 milliGRAM(s) IV Push once  oseltamivir 30 milliGRAM(s) Oral every 24 hours  piperacillin/tazobactam IVPB.. 3.375 Gram(s) IV Intermittent every 12 hours  polyethylene glycol 3350 17 Gram(s) Oral daily  senna 2 Tablet(s) Oral at bedtime      PHYSICAL EXAMINATION:  T 100.6    /66  Somnolent, easily arouses and answers questions albeit slowly  PERRLA, pink conjunctivae, no ptosis  Noted alot of oral secretions unable to expectorate  Neck non tender, no mass, no thyromegaly or nodules  Normal respiratory effort, lungs clear to auscultation  Heart with RRR, no murmurs or rubs, no peripheral edema  Abdomen soft, no masses, no organomegaly  RUE AVF has thrill and bruit    LABS:                        10.7   3.72  )-----------( 143      ( 30 Dec 2024 18:14 )             33.5     12-30    139  |  94[L]  |  36[H]  ----------------------------<  82  4.4   |  28  |  5.87[H]    Ca    9.3      30 Dec 2024 18:14    TPro  7.1  /  Alb  4.0  /  TBili  0.4  /  DBili  x   /  AST  7[L]  /  ALT  <5[L]  /  AlkPhos  1215[H]  12-30        RADIOLOGY:  Chest X-Ray personally reviewed and shows centrally located interstitial opacities        ASSESSMENT:  1.  ESRD on hemodialysis TTS  2.  Influenza A    PLAN:  HD ordered for tomorrow       Addendum at 3:13 PM  Notified of today's labs with confirmed K 7.0 so will cause in nurse to dialyze patient today;  discussed with house staff

## 2025-01-01 NOTE — PROGRESS NOTE ADULT - PROBLEM SELECTOR PLAN 1
Patient presents with tachycardia, fevers, and positive for influenza meeting criteria for viral sepsis.  S/p 500cc in ED and vanc/zosyn  - Will be judicious with fluids given patient's ESRD  - IV tylenol and cooling blankets for fevers  - Oseltamivir as below  - MRSA PCR negative   - Continue Zosyn for now, holding off on vancomycin

## 2025-01-01 NOTE — PROGRESS NOTE ADULT - PROBLEM SELECTOR PLAN 3
Patient has ESRD with HD on Tuesday, Thursday, and Saturday.  Getting HD more frequently now.  - Nephrology consult in AM Patient has ESRD with HD on Tuesday, Thursday, and Saturday.  Getting HD more frequently now.  - potassium 7 on BMP with increase of cr 5s to 10s.   - reached out to nephrology to do dialysis today before treating hyperkalemia medically

## 2025-01-02 LAB
ALBUMIN SERPL ELPH-MCNC: 3.1 G/DL — LOW (ref 3.3–5)
ALP SERPL-CCNC: 809 U/L — HIGH (ref 40–120)
ALT FLD-CCNC: 6 U/L — LOW (ref 10–45)
ANION GAP SERPL CALC-SCNC: 15 MMOL/L — SIGNIFICANT CHANGE UP (ref 5–17)
AST SERPL-CCNC: 17 U/L — SIGNIFICANT CHANGE UP (ref 10–40)
BASOPHILS # BLD AUTO: 0.01 K/UL — SIGNIFICANT CHANGE UP (ref 0–0.2)
BASOPHILS NFR BLD AUTO: 0.2 % — SIGNIFICANT CHANGE UP (ref 0–2)
BILIRUB SERPL-MCNC: 0.3 MG/DL — SIGNIFICANT CHANGE UP (ref 0.2–1.2)
BUN SERPL-MCNC: 82 MG/DL — HIGH (ref 7–23)
CALCIUM SERPL-MCNC: 9 MG/DL — SIGNIFICANT CHANGE UP (ref 8.4–10.5)
CHLORIDE SERPL-SCNC: 93 MMOL/L — LOW (ref 96–108)
CO2 SERPL-SCNC: 27 MMOL/L — SIGNIFICANT CHANGE UP (ref 22–31)
CREAT SERPL-MCNC: 8.27 MG/DL — HIGH (ref 0.5–1.3)
EGFR: 8 ML/MIN/1.73M2 — LOW
EOSINOPHIL # BLD AUTO: 0 K/UL — SIGNIFICANT CHANGE UP (ref 0–0.5)
EOSINOPHIL NFR BLD AUTO: 0 % — SIGNIFICANT CHANGE UP (ref 0–6)
GAS PNL BLDV: SIGNIFICANT CHANGE UP
GLUCOSE SERPL-MCNC: 67 MG/DL — LOW (ref 70–99)
HCT VFR BLD CALC: 34.5 % — LOW (ref 39–50)
HGB BLD-MCNC: 10.4 G/DL — LOW (ref 13–17)
IMM GRANULOCYTES NFR BLD AUTO: 0.2 % — SIGNIFICANT CHANGE UP (ref 0–0.9)
LYMPHOCYTES # BLD AUTO: 1.23 K/UL — SIGNIFICANT CHANGE UP (ref 1–3.3)
LYMPHOCYTES # BLD AUTO: 27.7 % — SIGNIFICANT CHANGE UP (ref 13–44)
MAGNESIUM SERPL-MCNC: 2.4 MG/DL — SIGNIFICANT CHANGE UP (ref 1.6–2.6)
MCHC RBC-ENTMCNC: 30.1 G/DL — LOW (ref 32–36)
MCHC RBC-ENTMCNC: 31.5 PG — SIGNIFICANT CHANGE UP (ref 27–34)
MCV RBC AUTO: 104.5 FL — HIGH (ref 80–100)
MONOCYTES # BLD AUTO: 0.55 K/UL — SIGNIFICANT CHANGE UP (ref 0–0.9)
MONOCYTES NFR BLD AUTO: 12.4 % — SIGNIFICANT CHANGE UP (ref 2–14)
NEUTROPHILS # BLD AUTO: 2.64 K/UL — SIGNIFICANT CHANGE UP (ref 1.8–7.4)
NEUTROPHILS NFR BLD AUTO: 59.5 % — SIGNIFICANT CHANGE UP (ref 43–77)
NRBC # BLD: 0 /100 WBCS — SIGNIFICANT CHANGE UP (ref 0–0)
PHOSPHATE SERPL-MCNC: 7.6 MG/DL — HIGH (ref 2.5–4.5)
PLATELET # BLD AUTO: 179 K/UL — SIGNIFICANT CHANGE UP (ref 150–400)
POTASSIUM SERPL-MCNC: 5.4 MMOL/L — HIGH (ref 3.5–5.3)
POTASSIUM SERPL-SCNC: 5.4 MMOL/L — HIGH (ref 3.5–5.3)
PROT SERPL-MCNC: 5.8 G/DL — LOW (ref 6–8.3)
RBC # BLD: 3.3 M/UL — LOW (ref 4.2–5.8)
RBC # FLD: 16 % — HIGH (ref 10.3–14.5)
SODIUM SERPL-SCNC: 135 MMOL/L — SIGNIFICANT CHANGE UP (ref 135–145)
WBC # BLD: 4.44 K/UL — SIGNIFICANT CHANGE UP (ref 3.8–10.5)
WBC # FLD AUTO: 4.44 K/UL — SIGNIFICANT CHANGE UP (ref 3.8–10.5)

## 2025-01-02 PROCEDURE — 99232 SBSQ HOSP IP/OBS MODERATE 35: CPT | Mod: GC

## 2025-01-02 RX ORDER — OXYCODONE HCL 15 MG
1 TABLET ORAL
Refills: 0 | DISCHARGE

## 2025-01-02 RX ORDER — ASPIRIN 81 MG
1 TABLET, DELAYED RELEASE (ENTERIC COATED) ORAL
Refills: 0 | DISCHARGE

## 2025-01-02 RX ORDER — SEVELAMER CARBONATE 800 MG/1
800 TABLET, FILM COATED ORAL
Refills: 0 | Status: DISCONTINUED | OUTPATIENT
Start: 2025-01-02 | End: 2025-01-03

## 2025-01-02 RX ADMIN — PIPERACILLIN AND TAZOBACTAM 25 GRAM(S): 3; .375 INJECTION, POWDER, LYOPHILIZED, FOR SOLUTION INTRAVENOUS at 12:31

## 2025-01-02 RX ADMIN — Medication 10 MILLIGRAM(S): at 17:47

## 2025-01-02 RX ADMIN — HEPARIN SODIUM 5000 UNIT(S): 1000 INJECTION, SOLUTION INTRAVENOUS; SUBCUTANEOUS at 05:56

## 2025-01-02 RX ADMIN — Medication 10 MILLIGRAM(S): at 06:07

## 2025-01-02 RX ADMIN — OSELTAMIVIR 30 MILLIGRAM(S): 75 CAPSULE ORAL at 22:31

## 2025-01-02 RX ADMIN — SEVELAMER CARBONATE 800 MILLIGRAM(S): 800 TABLET, FILM COATED ORAL at 17:48

## 2025-01-02 RX ADMIN — HEPARIN SODIUM 5000 UNIT(S): 1000 INJECTION, SOLUTION INTRAVENOUS; SUBCUTANEOUS at 17:48

## 2025-01-02 RX ADMIN — Medication 10 MILLIGRAM(S): at 02:44

## 2025-01-02 RX ADMIN — MIDODRINE HYDROCHLORIDE 5 MILLIGRAM(S): 5 TABLET ORAL at 05:56

## 2025-01-02 RX ADMIN — Medication 17 GRAM(S): at 12:31

## 2025-01-02 RX ADMIN — Medication 10 MILLIGRAM(S): at 18:23

## 2025-01-02 RX ADMIN — SEVELAMER CARBONATE 800 MILLIGRAM(S): 800 TABLET, FILM COATED ORAL at 12:31

## 2025-01-02 NOTE — PROGRESS NOTE ADULT - SUBJECTIVE AND OBJECTIVE BOX
Brooke Salazar MD   Internal Medicine, PGY 2  Contact via TEAMS.     SUBJECTIVE / OVERNIGHT EVENTS:  - Pt seen and examined at bedside  - HUI    MEDICATIONS  (STANDING):  heparin   Injectable 5000 Unit(s) SubCutaneous every 12 hours  naloxone Injectable 0.4 milliGRAM(s) IV Push once  oseltamivir 30 milliGRAM(s) Oral every 24 hours  piperacillin/tazobactam IVPB.. 3.375 Gram(s) IV Intermittent every 12 hours  polyethylene glycol 3350 17 Gram(s) Oral daily  senna 2 Tablet(s) Oral at bedtime    MEDICATIONS  (PRN):  bisacodyl 5 milliGRAM(s) Oral daily PRN Constipation  melatonin 5 milliGRAM(s) Oral at bedtime PRN Insomnia  oxyCODONE    IR 10 milliGRAM(s) Oral every 8 hours PRN Moderate Pain (4 - 6)  oxyCODONE    IR 15 milliGRAM(s) Oral every 8 hours PRN Severe Pain (7 - 10)        01-01-25 @ 07:01  -  01-02-25 @ 07:00  --------------------------------------------------------  IN: 800 mL / OUT: 1300 mL / NET: -500 mL        PHYSICAL EXAM:  Vital Signs Last 24 Hrs  T(C): 37.4 (02 Jan 2025 07:55), Max: 38.3 (01 Jan 2025 12:00)  T(F): 99.3 (02 Jan 2025 07:55), Max: 100.9 (01 Jan 2025 12:00)  HR: 95 (02 Jan 2025 07:55) (86 - 112)  BP: 115/55 (02 Jan 2025 07:55) (102/56 - 144/69)  BP(mean): --  RR: 20 (02 Jan 2025 07:55) (18 - 20)  SpO2: 99% (02 Jan 2025 07:55) (92% - 100%)    Parameters below as of 02 Jan 2025 07:55  Patient On (Oxygen Delivery Method): nasal cannula  O2 Flow (L/min): 4      CAPILLARY BLOOD GLUCOSE        I&O's Summary    01 Jan 2025 07:01 - 02 Jan 2025 07:00  --------------------------------------------------------  IN: 800 mL / OUT: 1300 mL / NET: -500 mL        CONSTITUTIONAL: NAD  HEENT: NC/AT  RESPIRATORY: Normal respiratory effort; lungs are clear to auscultation bilaterally  CARDIOVASCULAR: Regular rate and rhythm, normal S1 and S2, no murmur/rub/gallop; No lower extremity edema; Peripheral pulses are 2+ bilaterally  ABDOMEN: Nontender to palpation, normoactive bowel sounds, no rebound/guarding; No hepatosplenomegaly  MUSCLOSKELETAL: no clubbing or cyanosis of digits; no joint swelling or tenderness to palpation  EXTREMITIES: no peripheral edema, distal pulses intact   NEURO: no focal deficits   PSYCH: A+O to person, place, and time; affect appropriate    LABS:                        12.0   5.60  )-----------( 186      ( 01 Jan 2025 10:59 )             39.4     01-01    140  |  99  |  50[H]  ----------------------------<  86  3.8   |  27  |  5.05[H]    Ca    9.7      01 Jan 2025 21:42  Phos  4.8     01-01  Mg     2.1     01-01            Urinalysis Basic - ( 01 Jan 2025 21:42 )    Color: x / Appearance: x / SG: x / pH: x  Gluc: 86 mg/dL / Ketone: x  / Bili: x / Urobili: x   Blood: x / Protein: x / Nitrite: x   Leuk Esterase: x / RBC: x / WBC x   Sq Epi: x / Non Sq Epi: x / Bacteria: x        Culture - Blood (collected 30 Dec 2024 17:45)  Source: .Blood BLOOD  Preliminary Report (01 Jan 2025 23:07):    No growth at 48 Hours    Culture - Blood (collected 30 Dec 2024 17:30)  Source: .Blood BLOOD  Preliminary Report (01 Jan 2025 23:07):    No growth at 48 Hours        IMAGING:    [X] All pertinent imaging reviewed by me Brooke Salazar MD   Internal Medicine, PGY 2  Contact via TEAMS.     SUBJECTIVE / OVERNIGHT EVENTS:  - Pt seen and examined at bedside  - HUI. Pt feels better this morning. No acute complaints.     MEDICATIONS  (STANDING):  heparin   Injectable 5000 Unit(s) SubCutaneous every 12 hours  naloxone Injectable 0.4 milliGRAM(s) IV Push once  oseltamivir 30 milliGRAM(s) Oral every 24 hours  piperacillin/tazobactam IVPB.. 3.375 Gram(s) IV Intermittent every 12 hours  polyethylene glycol 3350 17 Gram(s) Oral daily  senna 2 Tablet(s) Oral at bedtime    MEDICATIONS  (PRN):  bisacodyl 5 milliGRAM(s) Oral daily PRN Constipation  melatonin 5 milliGRAM(s) Oral at bedtime PRN Insomnia  oxyCODONE    IR 10 milliGRAM(s) Oral every 8 hours PRN Moderate Pain (4 - 6)  oxyCODONE    IR 15 milliGRAM(s) Oral every 8 hours PRN Severe Pain (7 - 10)        01-01-25 @ 07:01  -  01-02-25 @ 07:00  --------------------------------------------------------  IN: 800 mL / OUT: 1300 mL / NET: -500 mL        PHYSICAL EXAM:  Vital Signs Last 24 Hrs  T(C): 37.4 (02 Jan 2025 07:55), Max: 38.3 (01 Jan 2025 12:00)  T(F): 99.3 (02 Jan 2025 07:55), Max: 100.9 (01 Jan 2025 12:00)  HR: 95 (02 Jan 2025 07:55) (86 - 112)  BP: 115/55 (02 Jan 2025 07:55) (102/56 - 144/69)  BP(mean): --  RR: 20 (02 Jan 2025 07:55) (18 - 20)  SpO2: 99% (02 Jan 2025 07:55) (92% - 100%)    Parameters below as of 02 Jan 2025 07:55  Patient On (Oxygen Delivery Method): nasal cannula  O2 Flow (L/min): 4      CAPILLARY BLOOD GLUCOSE        I&O's Summary    01 Jan 2025 07:01  -  02 Jan 2025 07:00  --------------------------------------------------------  IN: 800 mL / OUT: 1300 mL / NET: -500 mL        CONSTITUTIONAL: no in distress  EYES: PERRL, EOMI, No conjunctival or scleral injection, non-icteric  HENMT: macrocephaly, MMM. poor dentition  NECK: Supple  RESP: No respiratory distress, no use of accessory muscles; CTA b/l, no W/R/R  CV: RRR, 3/6 systolic murmur  GI: Soft, NT, ND  MSK: No clubbing or cyanosis. 1+ BLE edema  SKIN: No rashes or ulcers noted  NEURO: CN II-XII grossly intact; no focal deficits  PSYCH: A&O x 3, affect appropriate    LABS:                        12.0   5.60  )-----------( 186      ( 01 Jan 2025 10:59 )             39.4     01-01    140  |  99  |  50[H]  ----------------------------<  86  3.8   |  27  |  5.05[H]    Ca    9.7      01 Jan 2025 21:42  Phos  4.8     01-01  Mg     2.1     01-01            Urinalysis Basic - ( 01 Jan 2025 21:42 )    Color: x / Appearance: x / SG: x / pH: x  Gluc: 86 mg/dL / Ketone: x  / Bili: x / Urobili: x   Blood: x / Protein: x / Nitrite: x   Leuk Esterase: x / RBC: x / WBC x   Sq Epi: x / Non Sq Epi: x / Bacteria: x        Culture - Blood (collected 30 Dec 2024 17:45)  Source: .Blood BLOOD  Preliminary Report (01 Jan 2025 23:07):    No growth at 48 Hours    Culture - Blood (collected 30 Dec 2024 17:30)  Source: .Blood BLOOD  Preliminary Report (01 Jan 2025 23:07):    No growth at 48 Hours        IMAGING:    [X] All pertinent imaging reviewed by me

## 2025-01-02 NOTE — PROGRESS NOTE ADULT - PROBLEM SELECTOR PLAN 1
Patient presents with tachycardia, fevers, and positive for influenza meeting criteria for viral sepsis.  S/p 500cc in ED and vanc/zosyn  - Will be judicious with fluids given patient's ESRD  - IV tylenol and cooling blankets for fevers  - Oseltamivir as below  - MRSA PCR negative   - Continue Zosyn for now, holding off on vancomycin Patient presents with tachycardia, fevers, and positive for influenza meeting criteria for viral sepsis.  S/p 500cc in ED and vanc/zosyn  - Will be judicious with fluids given patient's ESRD  - IV tylenol and cooling blankets for fevers  - Oseltamivir as below  - MRSA PCR negative   - dc zosyn  - weaned o2

## 2025-01-02 NOTE — PROVIDER CONTACT NOTE (CRITICAL VALUE NOTIFICATION) - ACTION/TREATMENT ORDERED:
MD aware, no new orders at this time
orders to follow to treat hyperkalemia, per MD patient to get HD today

## 2025-01-02 NOTE — PROGRESS NOTE ADULT - PROBLEM SELECTOR PLAN 3
Patient has ESRD with HD on Tuesday, Thursday, and Saturday.  Getting HD more frequently now.  - potassium 7 on BMP with increase of cr 5s to 10s.   - reached out to nephrology to do dialysis today before treating hyperkalemia medically Patient has ESRD with HD on Tuesday, Thursday, and Saturday.  Getting HD more frequently now.  - potassium 7 on BMP with increase of cr 5s to 10s.   - HD today

## 2025-01-02 NOTE — PROGRESS NOTE ADULT - SUBJECTIVE AND OBJECTIVE BOX
S/p stable HD today, comfortable on NC O2     Vital Signs Last 24 Hrs  T(C): 37.3 (01-02-25 @ 10:55), Max: 37.8 (01-01-25 @ 18:12)  T(F): 99.1 (01-02-25 @ 10:55), Max: 100 (01-01-25 @ 18:12)  HR: 96 (01-02-25 @ 10:55) (86 - 101)  BP: 114/55 (01-02-25 @ 10:55) (111/52 - 144/69)  RR: 18 (01-02-25 @ 10:55) (18 - 20)  SpO2: 99% (01-02-25 @ 07:55) (92% - 99%)    I&O's Detail    01 Jan 2025 07:01  -  02 Jan 2025 07:00  --------------------------------------------------------  IN:    Other (mL): 800 mL  Total IN: 800 mL    OUT:    Other (mL): 1300 mL  Total OUT: 1300 mL    02 Jan 2025 07:01  -  02 Jan 2025 14:44  --------------------------------------------------------  OUT:    Other (mL): 2000 mL  Total OUT: 2000 mL    s1s2  b/l air entry  soft, ND  tr edema                                                                                                                                           10.4   4.44  )-----------( 179      ( 02 Jan 2025 08:32 )             34.5     02 Jan 2025 08:32    135    |  93     |  82     ----------------------------<  67     5.4     |  27     |  8.27     Ca    9.0        02 Jan 2025 08:32  Phos  7.6       02 Jan 2025 08:32  Mg     2.4       02 Jan 2025 08:32    TPro  5.8    /  Alb  3.1    /  TBili  0.3    /  DBili  x      /  AST  17     /  ALT  6      /  AlkPhos  809    02 Jan 2025 08:32    LIVER FUNCTIONS - ( 02 Jan 2025 08:32 )  Alb: 3.1 g/dL / Pro: 5.8 g/dL / ALK PHOS: 809 U/L / ALT: 6 U/L / AST: 17 U/L / GGT: x           Culture - Blood (collected 30 Dec 2024 17:45)  Source: .Blood BLOOD  Preliminary Report (01 Jan 2025 23:07):    No growth at 48 Hours    Culture - Blood (collected 30 Dec 2024 17:30)  Source: .Blood BLOOD  Preliminary Report (01 Jan 2025 23:07):    No growth at 48 Hours    bisacodyl 5 milliGRAM(s) Oral daily PRN  heparin   Injectable 5000 Unit(s) SubCutaneous every 12 hours  melatonin 5 milliGRAM(s) Oral at bedtime PRN  naloxone Injectable 0.4 milliGRAM(s) IV Push once  oseltamivir 30 milliGRAM(s) Oral every 24 hours  oxyCODONE    IR 10 milliGRAM(s) Oral every 8 hours PRN  oxyCODONE    IR 15 milliGRAM(s) Oral every 8 hours PRN  polyethylene glycol 3350 17 Gram(s) Oral daily  senna 2 Tablet(s) Oral at bedtime  sevelamer carbonate 800 milliGRAM(s) Oral three times a day with meals    A/P:    ESRD on HD   Adm w/Flu A  S/p stable HD yesterday and today  Next HD tomorrow or Sat pending course  Renvela for high Phos  Midodrine prior to HD   Renal diet     [phone]

## 2025-01-03 DIAGNOSIS — R50.9 FEVER, UNSPECIFIED: ICD-10-CM

## 2025-01-03 DIAGNOSIS — J96.02 ACUTE RESPIRATORY FAILURE WITH HYPERCAPNIA: ICD-10-CM

## 2025-01-03 LAB
ANION GAP SERPL CALC-SCNC: 20 MMOL/L — HIGH (ref 5–17)
BASE EXCESS BLDA CALC-SCNC: -0.6 MMOL/L — SIGNIFICANT CHANGE UP (ref -2–3)
BASOPHILS # BLD AUTO: 0.02 K/UL — SIGNIFICANT CHANGE UP (ref 0–0.2)
BASOPHILS NFR BLD AUTO: 0.4 % — SIGNIFICANT CHANGE UP (ref 0–2)
BUN SERPL-MCNC: 62 MG/DL — HIGH (ref 7–23)
CALCIUM SERPL-MCNC: 8.3 MG/DL — LOW (ref 8.4–10.5)
CHLORIDE SERPL-SCNC: 95 MMOL/L — LOW (ref 96–108)
CO2 BLDA-SCNC: 29 MMOL/L — HIGH (ref 19–24)
CO2 SERPL-SCNC: 22 MMOL/L — SIGNIFICANT CHANGE UP (ref 22–31)
CREAT SERPL-MCNC: 7.47 MG/DL — HIGH (ref 0.5–1.3)
EGFR: 9 ML/MIN/1.73M2 — LOW
EOSINOPHIL # BLD AUTO: 0 K/UL — SIGNIFICANT CHANGE UP (ref 0–0.5)
EOSINOPHIL NFR BLD AUTO: 0 % — SIGNIFICANT CHANGE UP (ref 0–6)
GAS PNL BLDA: SIGNIFICANT CHANGE UP
GLUCOSE SERPL-MCNC: 77 MG/DL — SIGNIFICANT CHANGE UP (ref 70–99)
HCO3 BLDA-SCNC: 28 MMOL/L — SIGNIFICANT CHANGE UP (ref 21–28)
HCT VFR BLD CALC: 36 % — LOW (ref 39–50)
HGB BLD-MCNC: 10.8 G/DL — LOW (ref 13–17)
HOROWITZ INDEX BLDA+IHG-RTO: 28 — SIGNIFICANT CHANGE UP
IMM GRANULOCYTES NFR BLD AUTO: 0.2 % — SIGNIFICANT CHANGE UP (ref 0–0.9)
LYMPHOCYTES # BLD AUTO: 1.13 K/UL — SIGNIFICANT CHANGE UP (ref 1–3.3)
LYMPHOCYTES # BLD AUTO: 23 % — SIGNIFICANT CHANGE UP (ref 13–44)
MAGNESIUM SERPL-MCNC: 2.4 MG/DL — SIGNIFICANT CHANGE UP (ref 1.6–2.6)
MCHC RBC-ENTMCNC: 30 G/DL — LOW (ref 32–36)
MCHC RBC-ENTMCNC: 31.6 PG — SIGNIFICANT CHANGE UP (ref 27–34)
MCV RBC AUTO: 105.3 FL — HIGH (ref 80–100)
MONOCYTES # BLD AUTO: 0.6 K/UL — SIGNIFICANT CHANGE UP (ref 0–0.9)
MONOCYTES NFR BLD AUTO: 12.2 % — SIGNIFICANT CHANGE UP (ref 2–14)
NEUTROPHILS # BLD AUTO: 3.16 K/UL — SIGNIFICANT CHANGE UP (ref 1.8–7.4)
NEUTROPHILS NFR BLD AUTO: 64.2 % — SIGNIFICANT CHANGE UP (ref 43–77)
NRBC # BLD: 0 /100 WBCS — SIGNIFICANT CHANGE UP (ref 0–0)
PCO2 BLDA: 60 MMHG — HIGH (ref 35–48)
PH BLDA: 7.27 — LOW (ref 7.35–7.45)
PHOSPHATE SERPL-MCNC: 6.7 MG/DL — HIGH (ref 2.5–4.5)
PLATELET # BLD AUTO: 195 K/UL — SIGNIFICANT CHANGE UP (ref 150–400)
PO2 BLDA: 79 MMHG — LOW (ref 83–108)
POTASSIUM SERPL-MCNC: 5.4 MMOL/L — HIGH (ref 3.5–5.3)
POTASSIUM SERPL-SCNC: 5.4 MMOL/L — HIGH (ref 3.5–5.3)
RBC # BLD: 3.42 M/UL — LOW (ref 4.2–5.8)
RBC # FLD: 16.1 % — HIGH (ref 10.3–14.5)
SAO2 % BLDA: 96.6 % — SIGNIFICANT CHANGE UP (ref 94–98)
SODIUM SERPL-SCNC: 137 MMOL/L — SIGNIFICANT CHANGE UP (ref 135–145)
WBC # BLD: 4.92 K/UL — SIGNIFICANT CHANGE UP (ref 3.8–10.5)
WBC # FLD AUTO: 4.92 K/UL — SIGNIFICANT CHANGE UP (ref 3.8–10.5)

## 2025-01-03 PROCEDURE — G0545: CPT

## 2025-01-03 PROCEDURE — 99222 1ST HOSP IP/OBS MODERATE 55: CPT

## 2025-01-03 PROCEDURE — 99232 SBSQ HOSP IP/OBS MODERATE 35: CPT | Mod: GC

## 2025-01-03 PROCEDURE — 71045 X-RAY EXAM CHEST 1 VIEW: CPT | Mod: 26

## 2025-01-03 RX ORDER — MIDODRINE HYDROCHLORIDE 5 MG/1
5 TABLET ORAL ONCE
Refills: 0 | Status: COMPLETED | OUTPATIENT
Start: 2025-01-04 | End: 2025-01-04

## 2025-01-03 RX ORDER — SEVELAMER CARBONATE 800 MG/1
1600 TABLET, FILM COATED ORAL
Refills: 0 | Status: DISCONTINUED | OUTPATIENT
Start: 2025-01-03 | End: 2025-01-10

## 2025-01-03 RX ORDER — ACETAMINOPHEN 80 MG/.8ML
1000 SOLUTION/ DROPS ORAL ONCE
Refills: 0 | Status: COMPLETED | OUTPATIENT
Start: 2025-01-03 | End: 2025-01-03

## 2025-01-03 RX ADMIN — HEPARIN SODIUM 5000 UNIT(S): 1000 INJECTION, SOLUTION INTRAVENOUS; SUBCUTANEOUS at 04:32

## 2025-01-03 RX ADMIN — OSELTAMIVIR 30 MILLIGRAM(S): 75 CAPSULE ORAL at 22:01

## 2025-01-03 RX ADMIN — HEPARIN SODIUM 5000 UNIT(S): 1000 INJECTION, SOLUTION INTRAVENOUS; SUBCUTANEOUS at 17:29

## 2025-01-03 RX ADMIN — Medication 15 MILLIGRAM(S): at 05:30

## 2025-01-03 RX ADMIN — ACETAMINOPHEN 400 MILLIGRAM(S): 80 SOLUTION/ DROPS ORAL at 16:08

## 2025-01-03 RX ADMIN — SEVELAMER CARBONATE 1600 MILLIGRAM(S): 800 TABLET, FILM COATED ORAL at 17:29

## 2025-01-03 RX ADMIN — Medication 5 MILLIGRAM(S): at 22:01

## 2025-01-03 RX ADMIN — SEVELAMER CARBONATE 800 MILLIGRAM(S): 800 TABLET, FILM COATED ORAL at 09:20

## 2025-01-03 RX ADMIN — Medication 15 MILLIGRAM(S): at 04:31

## 2025-01-03 NOTE — PROGRESS NOTE ADULT - SUBJECTIVE AND OBJECTIVE BOX
Comfortable on RA     Vital Signs Last 24 Hrs  T(C): 38.8 (01-03-25 @ 12:00), Max: 38.8 (01-03-25 @ 12:00)  T(F): 101.8 (01-03-25 @ 12:00), Max: 101.8 (01-03-25 @ 12:00)  HR: 102 (01-03-25 @ 13:00) (97 - 102)  BP: 121/72 (01-03-25 @ 12:00) (121/72 - 169/89)  RR: 20 (01-03-25 @ 12:00) (20 - 20)  SpO2: 97% (01-03-25 @ 13:00) (94% - 100%)    I&O's Detail    02 Jan 2025 07:01  -  03 Jan 2025 07:00  --------------------------------------------------------  OUT:    Other (mL): 2000 mL  Total OUT: 2000 mL    s1s2  b/l air entry  soft, ND  tr edema b/l LE, RUE AVF patent, LUE edema                                                                                                                                                    10.8   4.92  )-----------( 195      ( 03 Jan 2025 07:11 )             36.0     03 Jan 2025 07:12    137    |  95     |  62     ----------------------------<  77     5.4     |  22     |  7.47     Ca    8.3        03 Jan 2025 07:12  Phos  6.7       03 Jan 2025 07:12  Mg     2.4       03 Jan 2025 07:12    TPro  5.8    /  Alb  3.1    /  TBili  0.3    /  DBili  x      /  AST  17     /  ALT  6      /  AlkPhos  809    02 Jan 2025 08:32    LIVER FUNCTIONS - ( 02 Jan 2025 08:32 )  Alb: 3.1 g/dL / Pro: 5.8 g/dL / ALK PHOS: 809 U/L / ALT: 6 U/L / AST: 17 U/L / GGT: x           acetaminophen   IVPB .. 1000 milliGRAM(s) IV Intermittent once  bisacodyl 5 milliGRAM(s) Oral daily PRN  heparin   Injectable 5000 Unit(s) SubCutaneous every 12 hours  melatonin 5 milliGRAM(s) Oral at bedtime PRN  naloxone Injectable 0.4 milliGRAM(s) IV Push once  oseltamivir 30 milliGRAM(s) Oral every 24 hours  oxyCODONE    IR 10 milliGRAM(s) Oral every 8 hours PRN  oxyCODONE    IR 15 milliGRAM(s) Oral every 8 hours PRN  polyethylene glycol 3350 17 Gram(s) Oral daily  senna 2 Tablet(s) Oral at bedtime  sevelamer carbonate 800 milliGRAM(s) Oral three times a day with meals    A/P:    ESRD on HD   Adm w/Flu A  S/p stable HD yesterday   Fever, ID input appr  Next HD tomorrow as ordered   Renvela for high Phos  Midodrine prior to HD   Renal diet     [phone]

## 2025-01-03 NOTE — PROGRESS NOTE ADULT - PROBLEM SELECTOR PLAN 1
Patient presents with tachycardia, fevers, and positive for influenza meeting criteria for viral sepsis.  S/p 500cc in ED and vanc/zosyn  - Will be judicious with fluids given patient's ESRD  - IV tylenol and cooling blankets for fevers  - Oseltamivir as below  - MRSA PCR negative   - dc zosyn  - weaned o2 - patient increasingly somnolent on clinical exam on 1/3  - usually snoring, definitely some component of SOLITARIO  - ABG 1/3 PCO2 60 pH 7.27 and PO2 80s    Plan:   - BIPAP 1/3 and monitor ABGs qd  - ctm mental status

## 2025-01-03 NOTE — CONSULT NOTE ADULT - ASSESSMENT
39 year old with ESRD on HD, history of CVA, admitted with weakness from Influenza, also received piperacillin/tazobactam since admission (plus one dose of Vancomycin on presentation), who had been running mildly elevated temperature through the admission, now with new fever to 101.8, no obviously localizing signs/symptoms on exam other than significant LUE swelling which patient reports is new but accuracy of history is unclear. Normal WBC this morning.    Patient at risk for post-influenza PNA, aspiration PNA, bacteremia from ESRD status, as well as have some concerns about LUE swelling    Recommend:  - Patient needs standard "fever workup" with  ·	Blood cultures x 2 sets  ·	Urinalysis / urine culture (may need to be done via straight cath)  ·	Chest X-Ray   - If LUE swelling is new/changed, would get upper extremity doppler to r/o deeper thrombus beyond just thrombosed fistula  - Would hold off on empiric antibiotics (agree with D/C of Zosyn) for now unless develops clinical instability

## 2025-01-03 NOTE — CONSULT NOTE ADULT - NSCONSULTADDITIONALINFOA_GEN_ALL_CORE
Michael I. Oppenheim, MD  Division of Infectious Diseases  Reachable on Teams  Pager: [phone]  O: [phone] if nights/weekends/no response    ID Service covering over weekend  ID Team member to assume care next week.

## 2025-01-03 NOTE — PROGRESS NOTE ADULT - PROBLEM SELECTOR PLAN 2
Patient found to have influenza A, likely contributing to his symptoms.  - Oseltamivir 30mg QOD dosed after dialysis x 5 days Patient presents with tachycardia, fevers, and positive for influenza meeting criteria for viral sepsis.  S/p 500cc in ED and vanc/zosyn  - Will be judicious with fluids given patient's ESRD  - IV tylenol and cooling blankets for fevers  - Oseltamivir as below  - MRSA PCR negative   - dc zosyn  - weaned o2    PLAN:   - continuing to spike fevers in the AM  - monitoring off abx but ID consult placed will f/u recs

## 2025-01-03 NOTE — PROGRESS NOTE ADULT - PROBLEM SELECTOR PLAN 3
Patient has ESRD with HD on Tuesday, Thursday, and Saturday.  Getting HD more frequently now.  - potassium 7 on BMP with increase of cr 5s to 10s.   - HD today Patient found to have influenza A, likely contributing to his symptoms.  - Oseltamivir 30mg QOD dosed after dialysis x 5 days

## 2025-01-03 NOTE — PROGRESS NOTE ADULT - SUBJECTIVE AND OBJECTIVE BOX
INTERVAL HPI/OVERNIGHT EVENTS:  Pt seen and examined at bedside. No acute overnight events or complaints.    Brief Daily Plan:  -    VITAL SIGNS:  T(F): 99.6 (01-03-25 @ 04:52)  HR: 98 (01-03-25 @ 04:52)  BP: 169/89 (01-03-25 @ 04:52)  RR: 20 (01-03-25 @ 04:52)  SpO2: 100% (01-03-25 @ 04:52)  Wt(kg): --    PHYSICAL EXAM:    CONSTITUTIONAL: Uncomfortable  EYES: PERRL, EOMI, No conjunctival or scleral injection, non-icteric  HENMT: macrocephaly, MMM. poor dentition  NECK: Supple  RESP: No respiratory distress, no use of accessory muscles; CTA b/l, no W/R/R  CV: RRR, 3/6 systolic murmur  GI: Soft, NT, ND  MSK: No clubbing or cyanosis. 1+ BLE edema  SKIN: No rashes or ulcers noted  NEURO: CN II-XII grossly intact; no focal deficits  PSYCH: A&O x 3, affect appropriate    MEDICATIONS  (STANDING):  heparin   Injectable 5000 Unit(s) SubCutaneous every 12 hours  naloxone Injectable 0.4 milliGRAM(s) IV Push once  oseltamivir 30 milliGRAM(s) Oral every 24 hours  polyethylene glycol 3350 17 Gram(s) Oral daily  senna 2 Tablet(s) Oral at bedtime  sevelamer carbonate 800 milliGRAM(s) Oral three times a day with meals    MEDICATIONS  (PRN):  bisacodyl 5 milliGRAM(s) Oral daily PRN Constipation  melatonin 5 milliGRAM(s) Oral at bedtime PRN Insomnia  oxyCODONE    IR 10 milliGRAM(s) Oral every 8 hours PRN Moderate Pain (4 - 6)  oxyCODONE    IR 15 milliGRAM(s) Oral every 8 hours PRN Severe Pain (7 - 10)      Allergies    shellfish (Hives)  No Known Drug Allergies    Intolerances        LABS:                        10.4   4.44  )-----------( 179      ( 02 Jan 2025 08:32 )             34.5     01-02    135  |  93[L]  |  82[H]  ----------------------------<  67[L]  5.4[H]   |  27  |  8.27[H]    Ca    9.0      02 Jan 2025 08:32  Phos  7.6     01-02  Mg     2.4     01-02    TPro  5.8[L]  /  Alb  3.1[L]  /  TBili  0.3  /  DBili  x   /  AST  17  /  ALT  6[L]  /  AlkPhos  809[H]  01-02      Urinalysis Basic - ( 02 Jan 2025 08:32 )    Color: x / Appearance: x / SG: x / pH: x  Gluc: 67 mg/dL / Ketone: x  / Bili: x / Urobili: x   Blood: x / Protein: x / Nitrite: x   Leuk Esterase: x / RBC: x / WBC x   Sq Epi: x / Non Sq Epi: x / Bacteria: x        RADIOLOGY & ADDITIONAL TESTS:  Reviewed  ******************  Authored By: Chaim Buck MD, PGY1  MS Teams Preferred  ******************   INTERVAL HPI/OVERNIGHT EVENTS:  Pt seen and examined at bedside. S/p dialysis yesterday. Increasingly somnolent today without receiving any pain meds. Responsive to painful stimuli.     Brief Daily Plan:  - ABG showing CO2 retention to pCO2 60 with pH 7.27  - Placed on BIPAP  - f/u nephro for HD recs     VITAL SIGNS:  T(F): 99.6 (01-03-25 @ 04:52)  HR: 98 (01-03-25 @ 04:52)  BP: 169/89 (01-03-25 @ 04:52)  RR: 20 (01-03-25 @ 04:52)  SpO2: 100% (01-03-25 @ 04:52)  Wt(kg): --    PHYSICAL EXAM:    CONSTITUTIONAL: Uncomfortable, increased lethargy   EYES: PERRL, EOMI, No conjunctival or scleral injection, non-icteric  HENMT: macrocephaly, MMM. poor dentition  NECK: Supple  RESP: No respiratory distress, no use of accessory muscles; CTA b/l, no W/R/R  CV: RRR, 3/6 systolic murmur  GI: Soft, NT, ND  MSK: No clubbing or cyanosis. 1+ BLE edema  SKIN: No rashes or ulcers noted  NEURO: CN II-XII grossly intact; no focal deficits  PSYCH: A&O x 2, affect appropriate    MEDICATIONS  (STANDING):  heparin   Injectable 5000 Unit(s) SubCutaneous every 12 hours  naloxone Injectable 0.4 milliGRAM(s) IV Push once  oseltamivir 30 milliGRAM(s) Oral every 24 hours  polyethylene glycol 3350 17 Gram(s) Oral daily  senna 2 Tablet(s) Oral at bedtime  sevelamer carbonate 800 milliGRAM(s) Oral three times a day with meals    MEDICATIONS  (PRN):  bisacodyl 5 milliGRAM(s) Oral daily PRN Constipation  melatonin 5 milliGRAM(s) Oral at bedtime PRN Insomnia  oxyCODONE    IR 10 milliGRAM(s) Oral every 8 hours PRN Moderate Pain (4 - 6)  oxyCODONE    IR 15 milliGRAM(s) Oral every 8 hours PRN Severe Pain (7 - 10)    Allergies    shellfish (Hives)  No Known Drug Allergies    Intolerances    LABS:                        10.4   4.44  )-----------( 179      ( 02 Jan 2025 08:32 )             34.5     01-02    135  |  93[L]  |  82[H]  ----------------------------<  67[L]  5.4[H]   |  27  |  8.27[H]    Ca    9.0      02 Jan 2025 08:32  Phos  7.6     01-02  Mg     2.4     01-02    TPro  5.8[L]  /  Alb  3.1[L]  /  TBili  0.3  /  DBili  x   /  AST  17  /  ALT  6[L]  /  AlkPhos  809[H]  01-02    Urinalysis Basic - ( 02 Jan 2025 08:32 )    Color: x / Appearance: x / SG: x / pH: x  Gluc: 67 mg/dL / Ketone: x  / Bili: x / Urobili: x   Blood: x / Protein: x / Nitrite: x   Leuk Esterase: x / RBC: x / WBC x   Sq Epi: x / Non Sq Epi: x / Bacteria: x    RADIOLOGY & ADDITIONAL TESTS:  Reviewed  ******************  Authored By: Chaim Buck MD, PGY1  MS Teams Preferred  ******************

## 2025-01-03 NOTE — PROGRESS NOTE ADULT - PROBLEM SELECTOR PLAN 6
DVT ppx:  SQH    Diet: Renal    Dispo: Pending PT    Code Status:  FULL CODE Unable to complete mediation reconciliation.  Will need follow up in AM.

## 2025-01-03 NOTE — CONSULT NOTE ADULT - SUBJECTIVE AND OBJECTIVE BOX
HPI:  39y old Male with ESRD on HD, wheelchair-dependent from CVA and hip fractures, admitted 12-30 with sepsis-like presentation, found to be InfluenzaA+, no bacterial source identified. Was treated with Oseltamavir and piperacillin/tazobactam.Temperature since admission has been in the  range, today spiked to 101.8, ID consulted. Patient denies localizing complaints - no respiratory complaints, denies headache, pain at fistula site, abdominal pain, diarrhea. Patient reports that he does produce urine, but no urine output documented in nursing flowsheets (has UA from admission), denies dysuria. Patient somewhat somnolent and falling asleep during interview, which, per notes, seems new for patient. Was seen by wound care for some pressure related injuries during this admission, no signs of infection reported. Patient had tolerated HD during this admission without issues.  Patient reports that he does produce urine, but no urine output documented in nursing flowsheets. Has UA from admission. Denies dysuria.      PAST MEDICAL & SURGICAL HISTORY:  HTN (hypertension)  Stroke age 10  Sleep apnea  Leg fracture, right  Chronic renal failure  Hemodialysis access, AV graft  ESRD (end stage renal disease) on dialysis since 2013, M-W-F  Anemia, unspecified type  Other hyperparathyroidism  AV fistula R arm; L arm clotted  History of left hip hemiarthroplasty  S/P parathyroidectomy          Allergies    shellfish (Hives)  No Known Drug Allergies    Intolerances        ANTIMICROBIALS:    oseltamivir 30 every 24 hours  Piperacillin/Tazobactam (12/31-1/2), now D/C'd    OTHER MEDS:  acetaminophen   IVPB .. 1000 milliGRAM(s) IV Intermittent once  bisacodyl 5 milliGRAM(s) Oral daily PRN  heparin   Injectable 5000 Unit(s) SubCutaneous every 12 hours  melatonin 5 milliGRAM(s) Oral at bedtime PRN  naloxone Injectable 0.4 milliGRAM(s) IV Push once  oxyCODONE    IR 10 milliGRAM(s) Oral every 8 hours PRN  oxyCODONE    IR 15 milliGRAM(s) Oral every 8 hours PRN  polyethylene glycol 3350 17 Gram(s) Oral daily  senna 2 Tablet(s) Oral at bedtime  sevelamer carbonate 800 milliGRAM(s) Oral three times a day with meals      SOCIAL HISTORY / FAMILY HISTORY:  Per admission notes, reviewed    Drug Dosing Weight  Height (cm): 190.5 (30 Dec 2024 16:12)  Weight (kg): 92.1 (31 Dec 2024 02:15)  BMI (kg/m2): 25.4 (31 Dec 2024 02:15)  BSA (m2): 2.21 (31 Dec 2024 02:15)    Vital Signs Last 24 Hrs  T(F): 101.8 (01-03-25 @ 12:00), Max: 104 (12-31-24 @ 00:05)  Vital Signs Last 24 Hrs  HR: 102 (01-03-25 @ 13:00) (97 - 102)  BP: 121/72 (01-03-25 @ 12:00) (121/72 - 169/89)  RR: 20 (01-03-25 @ 12:00)  SpO2: 97% (01-03-25 @ 13:00) (94% - 100%)  Wt(kg): --    PE:  GENERAL: Awake, somnolent. Intermittently eating  HEENT: NC/AT. O/P not well visualized because of patient cooperation. No photophobia  NECK: Full ROM. NT. No masses. No cervical/supraclavicular XIOMARA  CV: RRR. I/VI Apical murmur, No G/R  LUNGS: Coarse breath sounds at bilateral bases  BACK: So spinal point tenderness.  ABD: Soft, +BS. NT/ND. No HSM  EXTREM: RUE Fistula with +thrill. No erythema/warmth. Left UE old clotted fistula NT. Significant swelling of proximal LUE, NT                            10.8   4.92  )-----------( 195      ( 03 Jan 2025 07:11 )             36.0       01-03    137  |  95[L]  |  62[H]  ----------------------------<  77  5.4[H]   |  22  |  7.47[H]    Ca    8.3[L]      03 Jan 2025 07:12  Phos  6.7     01-03  Mg     2.4     01-03    TPro  5.8[L]  /  Alb  3.1[L]  /  TBili  0.3  /  DBili  x   /  AST  17  /  ALT  6[L]  /  AlkPhos  809[H]  01-02      Urinalysis (12.26.13 @ 13:49)   pH Urine: 6.0  Blood, Urine: Trace  Glucose Qualitative, Urine: Negative  Color: PL Yellow  Urine Appearance: Clear  Bilirubin: Negative  Ketone - Urine: Negative  Specific Gravity: 1.010 mg/dL  Protein, Urine: 150 mg/dL  Urobilinogen: Normal  Nitrite: Negative  Leukocyte Esterase Concentration: Negative    Urine Microscopic-Add On (NC) (12.26.13 @ 13:49)   Bacteria: Few  Epithelial Cells: Occasional  White Blood Cell - Urine: 2-5 /HPF  Red Blood Cell - Urine: 0-2 /HPF

## 2025-01-03 NOTE — PROGRESS NOTE ADULT - PROBLEM SELECTOR PLAN 5
Unable to complete mediation reconciliation.  Will need follow up in AM. - Pt consult  - Oxycodone 10mg TID for moderate pain  - Oxycodone 15mg TID for severe pain

## 2025-01-03 NOTE — PROGRESS NOTE ADULT - PROBLEM SELECTOR PLAN 4
- Pt consult  - Oxycodone 10mg TID for moderate pain  - Oxycodone 15mg TID for severe pain Patient has ESRD with HD on Tuesday, Thursday, and Saturday.  Getting HD more frequently now.  - potassium 7 on BMP with increase of cr 5s to 10s.   - HD today

## 2025-01-04 DIAGNOSIS — I82.409 ACUTE EMBOLISM AND THROMBOSIS OF UNSPECIFIED DEEP VEINS OF UNSPECIFIED LOWER EXTREMITY: ICD-10-CM

## 2025-01-04 LAB
ALBUMIN SERPL ELPH-MCNC: 3.2 G/DL — LOW (ref 3.3–5)
ALP SERPL-CCNC: 633 U/L — HIGH (ref 40–120)
ALT FLD-CCNC: 7 U/L — LOW (ref 10–45)
ANION GAP SERPL CALC-SCNC: 18 MMOL/L — HIGH (ref 5–17)
AST SERPL-CCNC: 22 U/L — SIGNIFICANT CHANGE UP (ref 10–40)
BASE EXCESS BLDV CALC-SCNC: -0.9 MMOL/L — SIGNIFICANT CHANGE UP (ref -2–3)
BASOPHILS # BLD AUTO: 0.01 K/UL — SIGNIFICANT CHANGE UP (ref 0–0.2)
BASOPHILS NFR BLD AUTO: 0.2 % — SIGNIFICANT CHANGE UP (ref 0–2)
BILIRUB SERPL-MCNC: 0.3 MG/DL — SIGNIFICANT CHANGE UP (ref 0.2–1.2)
BUN SERPL-MCNC: 79 MG/DL — HIGH (ref 7–23)
CALCIUM SERPL-MCNC: 9.2 MG/DL — SIGNIFICANT CHANGE UP (ref 8.4–10.5)
CHLORIDE SERPL-SCNC: 95 MMOL/L — LOW (ref 96–108)
CO2 BLDV-SCNC: 31 MMOL/L — HIGH (ref 22–26)
CO2 SERPL-SCNC: 25 MMOL/L — SIGNIFICANT CHANGE UP (ref 22–31)
CREAT SERPL-MCNC: 9.72 MG/DL — HIGH (ref 0.5–1.3)
CULTURE RESULTS: SIGNIFICANT CHANGE UP
CULTURE RESULTS: SIGNIFICANT CHANGE UP
EGFR: 6 ML/MIN/1.73M2 — LOW
EOSINOPHIL # BLD AUTO: 0 K/UL — SIGNIFICANT CHANGE UP (ref 0–0.5)
EOSINOPHIL NFR BLD AUTO: 0 % — SIGNIFICANT CHANGE UP (ref 0–6)
GAS PNL BLDV: SIGNIFICANT CHANGE UP
GLUCOSE SERPL-MCNC: 82 MG/DL — SIGNIFICANT CHANGE UP (ref 70–99)
HCO3 BLDV-SCNC: 29 MMOL/L — SIGNIFICANT CHANGE UP (ref 22–29)
HCT VFR BLD CALC: 31.7 % — LOW (ref 39–50)
HGB BLD-MCNC: 9.8 G/DL — LOW (ref 13–17)
IMM GRANULOCYTES NFR BLD AUTO: 0.5 % — SIGNIFICANT CHANGE UP (ref 0–0.9)
LYMPHOCYTES # BLD AUTO: 1.07 K/UL — SIGNIFICANT CHANGE UP (ref 1–3.3)
LYMPHOCYTES # BLD AUTO: 26.5 % — SIGNIFICANT CHANGE UP (ref 13–44)
MAGNESIUM SERPL-MCNC: 2.5 MG/DL — SIGNIFICANT CHANGE UP (ref 1.6–2.6)
MCHC RBC-ENTMCNC: 30.9 G/DL — LOW (ref 32–36)
MCHC RBC-ENTMCNC: 31.7 PG — SIGNIFICANT CHANGE UP (ref 27–34)
MCV RBC AUTO: 102.6 FL — HIGH (ref 80–100)
MONOCYTES # BLD AUTO: 0.43 K/UL — SIGNIFICANT CHANGE UP (ref 0–0.9)
MONOCYTES NFR BLD AUTO: 10.6 % — SIGNIFICANT CHANGE UP (ref 2–14)
NEUTROPHILS # BLD AUTO: 2.51 K/UL — SIGNIFICANT CHANGE UP (ref 1.8–7.4)
NEUTROPHILS NFR BLD AUTO: 62.2 % — SIGNIFICANT CHANGE UP (ref 43–77)
NRBC # BLD: 0 /100 WBCS — SIGNIFICANT CHANGE UP (ref 0–0)
PCO2 BLDV: 70 MMHG — CRITICAL HIGH (ref 42–55)
PH BLDV: 7.22 — LOW (ref 7.32–7.43)
PHOSPHATE SERPL-MCNC: 7.6 MG/DL — HIGH (ref 2.5–4.5)
PLATELET # BLD AUTO: 185 K/UL — SIGNIFICANT CHANGE UP (ref 150–400)
PO2 BLDV: 63 MMHG — HIGH (ref 25–45)
POTASSIUM SERPL-MCNC: 5.4 MMOL/L — HIGH (ref 3.5–5.3)
POTASSIUM SERPL-SCNC: 5.4 MMOL/L — HIGH (ref 3.5–5.3)
PROT SERPL-MCNC: 6.5 G/DL — SIGNIFICANT CHANGE UP (ref 6–8.3)
RBC # BLD: 3.09 M/UL — LOW (ref 4.2–5.8)
RBC # FLD: 16 % — HIGH (ref 10.3–14.5)
SAO2 % BLDV: 90.8 % — HIGH (ref 67–88)
SODIUM SERPL-SCNC: 138 MMOL/L — SIGNIFICANT CHANGE UP (ref 135–145)
SPECIMEN SOURCE: SIGNIFICANT CHANGE UP
SPECIMEN SOURCE: SIGNIFICANT CHANGE UP
WBC # BLD: 4.04 K/UL — SIGNIFICANT CHANGE UP (ref 3.8–10.5)
WBC # FLD AUTO: 4.04 K/UL — SIGNIFICANT CHANGE UP (ref 3.8–10.5)

## 2025-01-04 PROCEDURE — 99233 SBSQ HOSP IP/OBS HIGH 50: CPT

## 2025-01-04 PROCEDURE — G0545: CPT

## 2025-01-04 RX ORDER — ACETAMINOPHEN 80 MG/.8ML
1000 SOLUTION/ DROPS ORAL ONCE
Refills: 0 | Status: DISCONTINUED | OUTPATIENT
Start: 2025-01-04 | End: 2025-01-04

## 2025-01-04 RX ADMIN — Medication 15 MILLIGRAM(S): at 05:15

## 2025-01-04 RX ADMIN — MIDODRINE HYDROCHLORIDE 5 MILLIGRAM(S): 5 TABLET ORAL at 09:12

## 2025-01-04 RX ADMIN — OSELTAMIVIR 30 MILLIGRAM(S): 75 CAPSULE ORAL at 21:25

## 2025-01-04 RX ADMIN — SEVELAMER CARBONATE 1600 MILLIGRAM(S): 800 TABLET, FILM COATED ORAL at 13:06

## 2025-01-04 RX ADMIN — SENNOSIDES 2 TABLET(S): 8.6 TABLET, FILM COATED ORAL at 21:25

## 2025-01-04 RX ADMIN — SEVELAMER CARBONATE 1600 MILLIGRAM(S): 800 TABLET, FILM COATED ORAL at 17:07

## 2025-01-04 RX ADMIN — Medication 17 GRAM(S): at 13:05

## 2025-01-04 RX ADMIN — HEPARIN SODIUM 5000 UNIT(S): 1000 INJECTION, SOLUTION INTRAVENOUS; SUBCUTANEOUS at 04:20

## 2025-01-04 RX ADMIN — Medication 15 MILLIGRAM(S): at 04:18

## 2025-01-04 RX ADMIN — HEPARIN SODIUM 5000 UNIT(S): 1000 INJECTION, SOLUTION INTRAVENOUS; SUBCUTANEOUS at 17:19

## 2025-01-04 RX ADMIN — Medication 5 MILLIGRAM(S): at 21:25

## 2025-01-04 NOTE — PROGRESS NOTE ADULT - PROBLEM SELECTOR PLAN 2
Patient presents with tachycardia, fevers, and positive for influenza meeting criteria for viral sepsis.  S/p 500cc in ED and vanc/zosyn  - Will be judicious with fluids given patient's ESRD  - IV tylenol and cooling blankets for fevers  - Oseltamivir as below  - MRSA PCR negative   - dc zosyn  - weaned o2    PLAN:   - continuing to spike fevers in the AM  - monitoring off abx   - repeat blood cultures 1/3  - CXR 1/3 Patient presents with tachycardia, fevers, and positive for influenza meeting criteria for viral sepsis.  S/p 500cc in ED and vanc/zosyn  - Will be judicious with fluids given patient's ESRD  - IV tylenol and cooling blankets for fevers  - Oseltamivir as below  - MRSA PCR negative   - dc zosyn  - weaned o2    PLAN:   - continuing to spike fevers in the AM  - added ct chest abd pelv w/ IV contrast for monday with HD to assess for abscess  - monitoring off abx   - repeat blood cultures 1/3  - CXR 1/3 showing continued pulmonary edema

## 2025-01-04 NOTE — PROGRESS NOTE ADULT - ASSESSMENT
39 year old with ESRD on HD, history of CVA, admitted with weakness from Influenza, also received piperacillin/tazobactam since admission (plus one dose of Vancomycin on presentation), who had been running mildly elevated temperature through the admission, now with new fever to 101.8, no obviously localizing signs/symptoms on exam other than significant LUE swelling which patient reports is new but accuracy of history is unclear. Normal WBC this morning.    Patient at risk for post-influenza PNA, aspiration PNA, bacteremia from ESRD status, as well as have some concerns about LUE swelling  Overall influenza infection, persistent fever, abnormal CXR  pt clinically stable.       Recommend:  blood cx in lab   recommend CT chest/abd/pelvis with contrast to look for source  MRSA PCR negative   trend cbc, no leucocytosis   u/s Lt UE pending.        Plan discussed with Medicine      Ivan Hdez  Please contact through MS Teams   If no response or past 5 pm/weekend call [phone].

## 2025-01-04 NOTE — PROGRESS NOTE ADULT - PROBLEM SELECTOR PLAN 5
- Pt consult  - Oxycodone 10mg TID for moderate pain  - Oxycodone 15mg TID for severe pain Patient has ESRD with HD on Tuesday, Thursday, and Saturday.  Getting HD more frequently now.  - potassium 7 on BMP with increase of cr 5s to 10s.   - HD planned for 1/4

## 2025-01-04 NOTE — DISCHARGE NOTE PROVIDER - CARE PROVIDER_API CALL
Cruz Gomez  Internal Medicine  [address] [place] [address]  Phone: [phone]  Fax: [phone]  Established Patient  Follow Up Time: 2 weeks

## 2025-01-04 NOTE — PROGRESS NOTE ADULT - PROBLEM SELECTOR PLAN 6
Pt pulled up list of medications from portal for [facility]. Active Rx from [facility] include calcium acetate, labetalol 100 mg BID, as well as metoprolol tartrate 25 mg BID, oxycodone 15 mg IR q8H (MDD 45 mg), ASA 81 mg daily, epoetin injections, lidocaine patch. Unclear why on two BB, verified with pt's pharmacy ([facility]) - note active Rx for sevelamer carbonate 1600 mg TID, metoprolol tartrate 25 mg BID, ASA 81 mg and naloxone. BB held in setting of normal/soft BP - Pt consult  - Oxycodone 10mg TID for moderate pain  - Oxycodone 15mg TID for severe pain

## 2025-01-04 NOTE — DISCHARGE NOTE PROVIDER - NSDCCPTREATMENT_GEN_ALL_CORE_FT
PRINCIPAL PROCEDURE  Procedure: XR chest AP  Findings and Treatment: 1/3 IMPRESSION:  Minimal improvement in pulmonary edema changes.  No focal consolidations.

## 2025-01-04 NOTE — PROGRESS NOTE ADULT - PROBLEM SELECTOR PLAN 1
- patient increasingly somnolent on clinical exam on 1/3  - usually snoring, definitely some component of SOLITARIO  - ABG 1/3 PCO2 60 pH 7.27 and PO2 80s    Plan:   - BIPAP 1/3 and monitor ABGs qd  - ctm mental status - patient increasingly somnolent on clinical exam on 1/3  - usually snoring, definitely some component of SOLITARIO  - ABG 1/3 PCO2 60 pH 7.27 and PO2 80s    Plan:   - BIPAP 1/3 and monitor ABGs qd  - ctm mental status  - still with pulmonary edema needs more fluid off in dialysis if possible

## 2025-01-04 NOTE — PROGRESS NOTE ADULT - PROBLEM SELECTOR PLAN 4
Patient has ESRD with HD on Tuesday, Thursday, and Saturday.  Getting HD more frequently now.  - potassium 7 on BMP with increase of cr 5s to 10s.   - HD planned for 1/4 Patient found to have influenza A, likely contributing to his symptoms.  - Oseltamivir 30mg QOD dosed after dialysis x 5 days

## 2025-01-04 NOTE — DISCHARGE NOTE PROVIDER - NSDCMRMEDTOKEN_GEN_ALL_CORE_FT
aspirin 81 mg oral tablet, chewable: 1 tab(s) chewed  metoprolol tartrate 25 mg oral tablet: 1 tab(s) orally 2 times a day  oxyCODONE 10 mg oral tablet: 1 tab(s) orally every 8 hours as needed for Moderate Pain (4 - 6) MDD: 4 tabs  oxyCODONE 15 mg oral tablet: 1 tab(s) orally 3 times a day as needed for  severe pain  sevelamer carbonate 800 mg oral tablet: 2 tab(s) orally 3 times a day (with meals)   aspirin 81 mg oral tablet, chewable: 1 tab(s) chewed  metoprolol tartrate 25 mg oral tablet: 1 tab(s) orally 2 times a day  oxyCODONE 10 mg oral tablet: 1 tab(s) orally every 8 hours as needed for Moderate Pain (4 - 6) MDD: 4 tabs  oxyCODONE 15 mg oral tablet: 1 tab(s) orally 3 times a day as needed for  severe pain  sevelamer carbonate 800 mg oral tablet: 2 tab(s) orally 3 times a day (with meals)  TLSO Brace: Please wear TLSO Brace for Compression Fracture   aspirin 81 mg oral tablet, chewable: 1 tab(s) chewed  metoprolol tartrate 25 mg oral tablet: 1 tab(s) orally 2 times a day  naloxone 4 mg/0.25 mL nasal spray: 1 spray(s) intranasally once a day  oxyCODONE 10 mg oral tablet: 1 tab(s) orally every 8 hours as needed for Moderate Pain (4 - 6) MDD: 4 tabs  oxyCODONE 15 mg oral tablet: 1 tab(s) orally 3 times a day as needed for  severe pain  polyethylene glycol 3350 oral powder for reconstitution: 17 gram(s) orally 2 times a day  senna leaf extract oral tablet: 2 tab(s) orally once a day (at bedtime) as needed for  constipation  sevelamer carbonate 800 mg oral tablet: 2 tab(s) orally 3 times a day (with meals)  TLSO Brace: Please wear TLSO Brace for Compression Fracture   aspirin 81 mg oral tablet, chewable: 1 tab(s) chewed  cinacalcet 30 mg oral tablet: 1 tab(s) orally once a day  epoetin carito: 10,000 unit(s) orally 3 times a week Please take three times a week during dialysis  metoprolol tartrate 25 mg oral tablet: 1 tab(s) orally 2 times a day  midodrine 5 mg oral tablet: 1 tab(s) orally 3 times a week Please take 3 times a week before HD  naloxone 4 mg/0.25 mL nasal spray: 1 spray(s) intranasally once a day  oxyCODONE 10 mg oral tablet: 1 tab(s) orally every 8 hours as needed for Moderate Pain (4 - 6) MDD: 4 tabs  oxyCODONE 15 mg oral tablet: 1 tab(s) orally 3 times a day as needed for  severe pain  polyethylene glycol 3350 oral powder for reconstitution: 17 gram(s) orally 2 times a day  senna leaf extract oral tablet: 2 tab(s) orally once a day (at bedtime) as needed for  constipation  sevelamer carbonate 800 mg oral tablet: 2 tab(s) orally 3 times a day (with meals)  TLSO Brace: Please wear TLSO Brace for Compression Fracture   aspirin 81 mg oral tablet, chewable: 1 tab(s) chewed  cinacalcet 30 mg oral tablet: 1 tab(s) orally once a day  epoetin carito: 10,000 unit(s) orally 3 times a week Please take three times a week during dialysis  metoprolol tartrate 25 mg oral tablet: 1 tab(s) orally 2 times a day  midodrine 5 mg oral tablet: 1 tab(s) orally 3 times a week Please take 3 times a week before HD  naloxone 4 mg/0.25 mL nasal spray: 1 spray(s) intranasally once as needed for  opioid overdose If administering for opioid overdose, please call 911.  oxyCODONE 10 mg oral tablet: 1 tab(s) orally every 8 hours as needed for Moderate Pain (4 - 6) MDD: 4 tabs  oxyCODONE 15 mg oral tablet: 1 tab(s) orally 3 times a day as needed for  severe pain  polyethylene glycol 3350 oral powder for reconstitution: 17 gram(s) orally 2 times a day  senna leaf extract oral tablet: 2 tab(s) orally once a day (at bedtime) as needed for  constipation  sevelamer carbonate 800 mg oral tablet: 2 tab(s) orally 3 times a day (with meals)  TLSO Brace: Please wear TLSO Brace for Compression Fracture

## 2025-01-04 NOTE — PROGRESS NOTE ADULT - PROBLEM SELECTOR PLAN 3
Patient found to have influenza A, likely contributing to his symptoms.  - Oseltamivir 30mg QOD dosed after dialysis x 5 days increased swelling of left upper extremity concern for dvt at the site of the AV fistula, but no problems with HD  - US LUE to rule out DVT 1/4

## 2025-01-04 NOTE — DISCHARGE NOTE PROVIDER - NSDCCPCAREPLAN_GEN_ALL_CORE_FT
PRINCIPAL DISCHARGE DIAGNOSIS  Diagnosis: Influenza  Assessment and Plan of Treatment: You recently had the flu and developed a serious complication called sepsis. Sepsis occurs when your body's response to an infection, in this case, the flu, spirals out of control and starts damaging your own tissues and organs. Although you've completed your course of Tamiflu, which helped fight the flu virus itself, sepsis can still have lingering effects. It's crucial to monitor your health closely now that you're home. If you experience any difficulty breathing, chest pain, confusion or disorientation, a high fever that returns, or if you just feel like you're getting significantly worse, please return to the emergency room immediately. Even though you finished the Tamiflu, these symptoms could indicate that the sepsis isn't fully resolved and you need further treatment.  TO DO:  1. Please follow up with your PCP, Dr. Gomez, [phone], within 2 weeks of discharge

## 2025-01-04 NOTE — DISCHARGE NOTE PROVIDER - NSDCFUSCHEDAPPT_GEN_ALL_CORE_FT
Jason León  [facility]  ORTHOSURG [address] [facility]  Scheduled Appointment: 02/03/2025

## 2025-01-04 NOTE — PROGRESS NOTE ADULT - PROBLEM SELECTOR PLAN 7
DVT ppx:  SQH  Diet: Renal  Dispo: Pending PT  Code Status:  FULL CODE Pt pulled up list of medications from portal for [facility]. Active Rx from [facility] include calcium acetate, labetalol 100 mg BID, as well as metoprolol tartrate 25 mg BID, oxycodone 15 mg IR q8H (MDD 45 mg), ASA 81 mg daily, epoetin injections, lidocaine patch. Unclear why on two BB, verified with pt's pharmacy ([facility]) - note active Rx for sevelamer carbonate 1600 mg TID, metoprolol tartrate 25 mg BID, ASA 81 mg and naloxone. BB held in setting of normal/soft BP

## 2025-01-04 NOTE — DISCHARGE NOTE PROVIDER - NSDCCAREPROVSEEN_GEN_ALL_CORE_FT
[facility] TEAM 5 [facility] TEAM 5  Javy Otoole [facility] TEAM 5  Javy Otoole  [phone]

## 2025-01-04 NOTE — PROGRESS NOTE ADULT - SUBJECTIVE AND OBJECTIVE BOX
[facility]                                                       Dr. Vasquez, Dr. Ortiz, Dr. Daugherty, Dr. Blanco, Dr. Mckoy, Dr. Melton                                      [facility], [facility], [address]                                                 [address] [address]                                      Ph: [phone]  Fax: [phone]                                         Ph: [phone]  Fax: [phone]      Patient is a 39y old  Male who presents with a chief complaint of Weakness (04 Jan 2025 07:21)  Patient seen in follow up for ESRD on HD.        PAST MEDICAL HISTORY:  HTN (hypertension)    Stroke    Morbid obesity    Sleep apnea    Leg fracture, right    Chronic renal failure    Hemodialysis access, AV graft    ESRD (end stage renal disease) on dialysis    Anemia, unspecified type    Hypothyroidism    Other hyperparathyroidism      MEDICATIONS  (STANDING):  heparin   Injectable 5000 Unit(s) SubCutaneous every 12 hours  naloxone Injectable 0.4 milliGRAM(s) IV Push once  oseltamivir 30 milliGRAM(s) Oral every 24 hours  polyethylene glycol 3350 17 Gram(s) Oral daily  senna 2 Tablet(s) Oral at bedtime  sevelamer carbonate 1600 milliGRAM(s) Oral three times a day with meals    MEDICATIONS  (PRN):  bisacodyl 5 milliGRAM(s) Oral daily PRN Constipation  melatonin 5 milliGRAM(s) Oral at bedtime PRN Insomnia  oxyCODONE    IR 10 milliGRAM(s) Oral every 8 hours PRN Moderate Pain (4 - 6)  oxyCODONE    IR 15 milliGRAM(s) Oral every 8 hours PRN Severe Pain (7 - 10)    T(C): 37.6 (01-04-25 @ 12:45), Max: 38.8 (01-03-25 @ 12:00)  HR: 100 (01-04-25 @ 12:45) (92 - 105)  BP: 103/64 (01-04-25 @ 12:45) (103/64 - 165/69)  RR: 17 (01-04-25 @ 12:45) (17 - 20)  SpO2: 95% (01-04-25 @ 12:45) (93% - 97%)  Wt(kg): --  I&O's Detail    03 Jan 2025 07:01  -  04 Jan 2025 07:00  --------------------------------------------------------  IN:  Total IN: 0 mL    OUT:    Voided (mL): 200 mL  Total OUT: 200 mL    Total NET: -200 mL      04 Jan 2025 07:01  -  04 Jan 2025 17:20  --------------------------------------------------------  IN:  Total IN: 0 mL    OUT:    Other (mL): 2000 mL  Total OUT: 2000 mL    Total NET: -2000 mL          PHYSICAL EXAM:  General: No distress  Respiratory: b/l air entry  Cardiovascular: S1 S2  Gastrointestinal: soft  Extremities:  edema                              9.8    4.04  )-----------( 185      ( 04 Jan 2025 08:42 )             31.7     01-04    138  |  95[L]  |  79[H]  ----------------------------<  82  5.4[H]   |  25  |  9.72[H]    Ca    9.2      04 Jan 2025 08:42  Phos  7.6     01-04  Mg     2.5     01-04    TPro  6.5  /  Alb  3.2[L]  /  TBili  0.3  /  DBili  x   /  AST  22  /  ALT  7[L]  /  AlkPhos  633[H]  01-04        LIVER FUNCTIONS - ( 04 Jan 2025 08:42 )  Alb: 3.2 g/dL / Pro: 6.5 g/dL / ALK PHOS: 633 U/L / ALT: 7 U/L / AST: 22 U/L / GGT: x           Urinalysis Basic - ( 04 Jan 2025 08:42 )    Color: x / Appearance: x / SG: x / pH: x  Gluc: 82 mg/dL / Ketone: x  / Bili: x / Urobili: x   Blood: x / Protein: x / Nitrite: x   Leuk Esterase: x / RBC: x / WBC x   Sq Epi: x / Non Sq Epi: x / Bacteria: x      ABG - ( 03 Jan 2025 11:20 )  pH, Arterial: 7.27  pH, Blood: x     /  pCO2: 60    /  pO2: 79    / HCO3: 28    / Base Excess: -0.6  /  SaO2: 96.6              Sodium, Serum: 138 (01-04 @ 08:42)  Sodium, Serum: 137 (01-03 @ 07:12)  Sodium, Serum: 135 (01-02 @ 08:32)  Sodium, Serum: 140 (01-01 @ 21:42)    Creatinine, Serum: 9.72 (01-04 @ 08:42)  Creatinine, Serum: 7.47 (01-03 @ 07:12)  Creatinine, Serum: 8.27 (01-02 @ 08:32)  Creatinine, Serum: 5.05 (01-01 @ 21:42)  Creatinine, Serum: 10.17 (01-01 @ 13:26)  Creatinine, Serum: 10.35 (01-01 @ 10:59)    Potassium, Serum: 5.4 (01-04 @ 08:42)  Potassium, Serum: 5.4 (01-03 @ 07:12)  Potassium, Serum: 5.4 (01-02 @ 08:32)  Potassium, Serum: 3.8 (01-01 @ 21:42)    Hemoglobin: 9.8 (01-04 @ 08:42)  Hemoglobin: 10.8 (01-03 @ 07:11)  Hemoglobin: 10.4 (01-02 @ 08:32)  Hemoglobin: 12.0 (01-01 @ 10:59)

## 2025-01-04 NOTE — PROGRESS NOTE ADULT - ASSESSMENT
ESRD on HD   Adm w/Flu A.   HD today. To continue current meds. Low K bath.   Dietary and PO fluid restriction. On phos binders.

## 2025-01-04 NOTE — PROGRESS NOTE ADULT - ASSESSMENT
Mr David Welch is a 39 year old male with a PMH of ESRD on HD T/R/Sat, HTN, anemia, CVA, b/l hip fracture now wheelchair dependent presenting w/ weakness, found to have sepsis in setting of influenza A Mr David Welch is a 39 year old male with a PMH of ESRD on HD T/R/Sat, HTN, anemia, CVA, b/l hip fracture now wheelchair dependent presenting w/ weakness, found to have sepsis in setting of influenza A.

## 2025-01-04 NOTE — PROGRESS NOTE ADULT - SUBJECTIVE AND OBJECTIVE BOX
39yPatient is a 39y old  Male who presents with a chief complaint of Weakness (04 Jan 2025 17:20)      Interval history:  Febrile, pt denies any pain, not much cough.         Allergies:   shellfish (Hives)  No Known Drug Allergies      Antimicrobials:  oseltamivir 30 milliGRAM(s) Oral every 24 hours      REVIEW OF SYSTEMS:  No chest pain   No abdominal pain  No rash.     Vital Signs Last 24 Hrs  T(C): 37.6 (01-04-25 @ 12:45), Max: 38.3 (01-04-25 @ 05:01)  T(F): 99.6 (01-04-25 @ 12:45), Max: 101 (01-04-25 @ 05:01)  HR: 90 (01-04-25 @ 18:37) (90 - 103)  BP: 103/64 (01-04-25 @ 12:45) (103/64 - 165/69)  BP(mean): --  RR: 17 (01-04-25 @ 12:45) (17 - 20)  SpO2: 95% (01-04-25 @ 18:37) (93% - 96%)      PHYSICAL EXAM:  Pt in no acute distress, alert, awake.   + air entry b/l  non distended abdomen  + fistula   + edema LE   no phlebitis                             9.8    4.04  )-----------( 185      ( 04 Jan 2025 08:42 )             31.7   01-04    138  |  95[L]  |  79[H]  ----------------------------<  82  5.4[H]   |  25  |  9.72[H]    Ca    9.2      04 Jan 2025 08:42  Phos  7.6     01-04  Mg     2.5     01-04    TPro  6.5  /  Alb  3.2[L]  /  TBili  0.3  /  DBili  x   /  AST  22  /  ALT  7[L]  /  AlkPhos  633[H]  01-04      LIVER FUNCTIONS - ( 04 Jan 2025 08:42 )  Alb: 3.2 g/dL / Pro: 6.5 g/dL / ALK PHOS: 633 U/L / ALT: 7 U/L / AST: 22 U/L / GGT: x             Culture - Blood (12.30.24 @ 17:45)   Specimen Source: .Blood BLOOD  Culture Results:   No growth at 4 days          < from: Xray Chest 1 View- PORTABLE-Routine (Xray Chest 1 View- PORTABLE-Routine .) (01.03.25 @ 20:26) >  IMPRESSION:  Minimal improvement in pulmonary edema changes.  No focal consolidations.

## 2025-01-04 NOTE — DISCHARGE NOTE PROVIDER - HOSPITAL COURSE
HPI:  Mr David Welch is a 39 year old male with a PMH of ESRD on HD T/R/Sat, HTN, anemia, CVA, b/l hip fracture now wheelchair dependent presenting w/ weakness. Reports had been feeling unwell for the past few days. Went to HD session today, completed treatment, but was sent to ED due to his symptoms. Usually can transfer himself to and from his wheelchair but has been unable to do so. Denies myalgias other than what he feels in his hips chronically, which have worsened because he has not been getting PT as often recently.  Denies fevers, but does report a mild dry cough.  Lives with his cousin and their family, but denies sick contacts. Denies CP, SOB, n/v/c/d. Reports severe leg pain and has been unable to take his oxycodone today.    In the ED patient was found to be febrile. RVP positive for influenza A.  Received vancomycin, zosyn, and 500cc crystalloid. (30 Dec 2024 23:35)    Hospital Course: Patient was initially febrile to the 104s persistently. It took multiple modalities including Tylenol, motrin, and cooling blankets along with broad antibiotic coverage in order to keep his temperature down. Once it broke, it would sporadically come back up, but his cultures, MRSA pcr, and urine strep/legionella were all negative. The patient was hyperkalemic at one point to 7 requiring immediate hemodialysis because his EKG was showing peaked T waves but that resolved with dialysis. Patient was also found to be increasingly lethargic during this visit, so we got an ABG that showed he was retaining CO2 and it was likely due to SOLITARIO as the patient was snoring very loudly when multiple staff members came to check on him. He was started on BIPAP and improved, but he would likely need CPAP evaluation for decrease hypercapnia overnight when being discharged. Even after a course of Oseltamivir for the influenza, the patient would continue to get febrile, but was monitored off antibiotics considering his cultures were negative. We ordered an CT abdomen pelvis and chest with IV contrast on 1/6 which showed ____    Important Medication Changes and Reason:    Active or Pending Issues Requiring Follow-up:  - Obstructive sleep apnea    Advanced Directives:   [ ] Full code  [ ] DNR  [ ] Hospice    Discharge Diagnoses:  - AHRF 2/2 influenza A  - Obstructive sleep apnea  - ESRD  - b/l hip fractures          HPI:  Mr David Welch is a 39 year old male with a PMH of ESRD on HD T/R/Sat, HTN, anemia, CVA, b/l hip fracture now wheelchair dependent presenting w/ weakness. Reports had been feeling unwell for the past few days. Went to HD session today, completed treatment, but was sent to ED due to his symptoms. Usually can transfer himself to and from his wheelchair but has been unable to do so. Denies myalgias other than what he feels in his hips chronically, which have worsened because he has not been getting PT as often recently.  Denies fevers, but does report a mild dry cough.  Lives with his cousin and their family, but denies sick contacts. Denies CP, SOB, n/v/c/d. Reports severe leg pain and has been unable to take his oxycodone today.    In the ED patient was found to be febrile. RVP positive for influenza A.  Received vancomycin, zosyn, and 500cc crystalloid. (30 Dec 2024 23:35)    Hospital Course: Patient was initially febrile to the 104s persistently. It took multiple modalities including Tylenol, motrin, and cooling blankets along with broad antibiotic coverage in order to keep his temperature down. Once it broke, it would sporadically come back up, but his cultures, MRSA pcr, and urine strep/legionella were all negative. The patient was hyperkalemic at one point to 7 requiring immediate hemodialysis because his EKG was showing peaked T waves but that resolved with dialysis. Patient was also found to be increasingly lethargic during this visit, so we got an ABG that showed he was retaining CO2 and it was likely due to SOLITARIO as the patient was snoring very loudly when multiple staff members came to check on him. He was started on BIPAP and improved, but he would likely need CPAP evaluation for decrease hypercapnia overnight when being discharged. Even after a course of Oseltamivir for the influenza, the patient would continue to get febrile, but was monitored off antibiotics considering his cultures were negative. We ordered an CT abdomen pelvis and chest with IV contrast on 1/6 which showed ____. Patient was also complaining of left upper arm increased warmth and swelling after his dialysis sessions. He was found to have left cephalic vein superficial thrombophlebitis that was managed conservatively with pain control and lidocaine patches.     Important Medication Changes and Reason:    Active or Pending Issues Requiring Follow-up:  - Obstructive sleep apnea    Advanced Directives:   [ ] Full code  [ ] DNR  [ ] Hospice    Discharge Diagnoses:  - AHRF 2/2 influenza A  - Obstructive sleep apnea  - Left cephalic vein superficial thrombophlebitis  - ESRD  - b/l hip fractures          HPI:  Mr David Welch is a 39 year old male with a PMH of ESRD on HD T/R/Sat, HTN, anemia, CVA, b/l hip fracture now wheelchair dependent presenting w/ weakness. Reports had been feeling unwell for the past few days. Went to HD session today, completed treatment, but was sent to ED due to his symptoms. Usually can transfer himself to and from his wheelchair but has been unable to do so. Denies myalgias other than what he feels in his hips chronically, which have worsened because he has not been getting PT as often recently.  Denies fevers, but does report a mild dry cough.  Lives with his cousin and their family, but denies sick contacts. Denies CP, SOB, n/v/c/d. Reports severe leg pain and has been unable to take his oxycodone today.    In the ED patient was found to be febrile. RVP positive for influenza A.  Received vancomycin, zosyn, and 500cc crystalloid. (30 Dec 2024 23:35)    Hospital Course: Patient was initially febrile to the 104s persistently. It took multiple modalities including Tylenol, motrin, and cooling blankets along with broad antibiotic coverage in order to keep his temperature down. Once it broke, it would sporadically come back up, but his cultures, MRSA pcr, and urine strep/legionella were all negative. The patient was hyperkalemic at one point to 7 requiring immediate hemodialysis because his EKG was showing peaked T waves but that resolved with dialysis. Patient was also found to be increasingly lethargic during this visit, so we got an ABG that showed he was retaining CO2 and it was likely due to SOLITARIO as the patient was snoring very loudly when multiple staff members came to check on him. He was started on BIPAP and improved, but he would likely need CPAP evaluation for decrease hypercapnia overnight when being discharged. Even after a course of Oseltamivir for the influenza, the patient would continue to get febrile, but was monitored off antibiotics considering his cultures were negative. We ordered an CT abdomen pelvis and chest with IV contrast on 1/6 which was negative for any sources of infection. Patient was also complaining of left upper arm increased warmth and swelling after his dialysis sessions. He was found to have left cephalic vein superficial thrombophlebitis that was managed conservatively with pain control and lidocaine patches.     Important Medication Changes and Reason:    Active or Pending Issues Requiring Follow-up:  - Obstructive sleep apnea    Advanced Directives:   [ ] Full code  [ ] DNR  [ ] Hospice    Discharge Diagnoses:  - AHRF 2/2 influenza A  - Obstructive sleep apnea  - Left cephalic vein superficial thrombophlebitis  - ESRD  - b/l hip fractures          HPI:  Mr David Welch is a 39 year old male with a PMH of ESRD on HD T/R/Sat, HTN, anemia, CVA, b/l hip fracture now wheelchair dependent presenting w/ weakness. Reports had been feeling unwell for the past few days. Went to HD session today, completed treatment, but was sent to ED due to his symptoms. Usually can transfer himself to and from his wheelchair but has been unable to do so. Denies myalgias other than what he feels in his hips chronically, which have worsened because he has not been getting PT as often recently.  Denies fevers, but does report a mild dry cough.  Lives with his cousin and their family, but denies sick contacts. Denies CP, SOB, n/v/c/d. Reports severe leg pain and has been unable to take his oxycodone today.    In the ED patient was found to be febrile. RVP positive for influenza A.  Received vancomycin, zosyn, and 500cc crystalloid. (30 Dec 2024 23:35)    Hospital Course: Patient was initially febrile to the 104s persistently. It took multiple modalities including Tylenol, motrin, and cooling blankets along with broad antibiotic coverage in order to keep his temperature down. Once it broke, it would sporadically come back up, but his cultures, MRSA pcr, and urine strep/legionella were all negative. The patient was hyperkalemic at one point to 7 requiring immediate hemodialysis because his EKG was showing peaked T waves but that resolved with dialysis. Patient was also found to be increasingly lethargic during this visit, so we got an ABG that showed he was retaining CO2 and it was likely due to SOLITARIO as the patient was snoring very loudly when multiple staff members came to check on him. He was started on BIPAP and improved, but he would likely need CPAP evaluation for decrease hypercapnia overnight when being discharged. Even after a course of Oseltamivir for the influenza, the patient would continue to get febrile, but was monitored off antibiotics considering his cultures were negative. We ordered an CT abdomen pelvis and chest with IV contrast on 1/6 which was negative for any sources of infection. Patient was also complaining of left upper arm increased warmth and swelling after his dialysis sessions. He was found to have left cephalic vein superficial thrombophlebitis that was managed conservatively with pain control and lidocaine patches. Patient spiked a fever of 101 on 1/6, started on 7 day course of Ceftriaxone, FOU work up negative. Patient returned to baseline, HD sessions switched to MWF.    Important Medication Changes and Reason:    Active or Pending Issues Requiring Follow-up:  - Obstructive sleep apnea    Advanced Directives:   [ ] Full code  [ ] DNR  [ ] Hospice    Discharge Diagnoses:  - AHRF 2/2 influenza A  - Obstructive sleep apnea  - Left cephalic vein superficial thrombophlebitis  - ESRD  - b/l hip fractures          HPI:  Mr David Welch is a 39 year old male with a PMH of ESRD on HD T/R/Sat, HTN, anemia, CVA, b/l hip fracture now wheelchair dependent presenting w/ weakness. Reports had been feeling unwell for the past few days. Went to HD session today, completed treatment, but was sent to ED due to his symptoms. Usually can transfer himself to and from his wheelchair but has been unable to do so. Denies myalgias other than what he feels in his hips chronically, which have worsened because he has not been getting PT as often recently.  Denies fevers, but does report a mild dry cough.  Lives with his cousin and their family, but denies sick contacts. Denies CP, SOB, n/v/c/d. Reports severe leg pain and has been unable to take his oxycodone today.    In the ED patient was found to be febrile. RVP positive for influenza A.  Received vancomycin, zosyn, and 500cc crystalloid. (30 Dec 2024 23:35)    Hospital Course: Patient was initially febrile to the 104s persistently. It took multiple modalities including Tylenol, motrin, and cooling blankets along with broad antibiotic coverage in order to keep his temperature down. Once it broke, it would sporadically come back up, but his cultures, MRSA pcr, and urine strep/legionella were all negative. The patient was hyperkalemic at one point to 7 requiring immediate hemodialysis because his EKG was showing peaked T waves but that resolved with dialysis. Patient was also found to be increasingly lethargic during this visit, so we got an ABG that showed he was retaining CO2 and it was likely due to SOLITARIO as the patient was snoring very loudly when multiple staff members came to check on him. He was started on BIPAP and improved, but he would likely need CPAP evaluation for decrease hypercapnia overnight when being discharged. Even after a course of Oseltamivir for the influenza, the patient would continue to get febrile, but was monitored off antibiotics considering his cultures were negative. We ordered an CT abdomen pelvis and chest with IV contrast on 1/6 which was negative for any sources of infection. Patient was also complaining of left upper arm increased warmth and swelling after his dialysis sessions. He was found to have left cephalic vein superficial thrombophlebitis that was managed conservatively with pain control and lidocaine patches. Patient spiked a fever of 101 on 1/6, started on 7 day course of Ceftriaxone, FOU work up negative. Patient returned to baseline, HD sessions switched to MWF.    Important Medication Changes and Reason:    Active or Pending Issues Requiring Follow-up:  - Obstructive sleep apnea    Advanced Directives:   [X] Full code  [ ] DNR  [ ] Hospice    Discharge Diagnoses:  - AHRF 2/2 influenza A  - Obstructive sleep apnea  - Left cephalic vein superficial thrombophlebitis  - ESRD  - b/l hip fractures          HPI:  Mr David Welch is a 39 year old male with a PMH of ESRD on HD T/R/Sat, HTN, anemia, CVA, b/l hip fracture now wheelchair dependent presenting w/ weakness. Reports had been feeling unwell for the past few days. Went to HD session today, completed treatment, but was sent to ED due to his symptoms. Usually can transfer himself to and from his wheelchair but has been unable to do so. Denies myalgias other than what he feels in his hips chronically, which have worsened because he has not been getting PT as often recently.  Denies fevers, but does report a mild dry cough.  Lives with his cousin and their family, but denies sick contacts. Denies CP, SOB, n/v/c/d. Reports severe leg pain and has been unable to take his oxycodone today.    In the ED patient was found to be febrile. RVP positive for influenza A.  Received vancomycin, zosyn, and 500cc crystalloid. (30 Dec 2024 23:35)    Hospital Course: Patient was initially febrile to the 104s persistently. It took multiple modalities including Tylenol, motrin, and cooling blankets along with broad antibiotic coverage in order to keep his temperature down. Once it broke, it would sporadically come back up, but his cultures, MRSA pcr, and urine strep/legionella were all negative. The patient was hyperkalemic at one point to 7 requiring immediate hemodialysis because his EKG was showing peaked T waves but that resolved with dialysis. Patient was also found to be increasingly lethargic during this visit, so we got an ABG that showed he was retaining CO2 and it was likely due to SOLITARIO as the patient was snoring very loudly when multiple staff members came to check on him. He was started on BIPAP and improved, but he would likely need CPAP evaluation for decrease hypercapnia overnight when being discharged. Even after a course of Oseltamivir for the influenza, the patient would continue to get febrile, but was monitored off antibiotics considering his cultures were negative. We ordered an CT abdomen pelvis and chest with IV contrast on 1/6 which was negative for any sources of infection. Patient was also complaining of left upper arm increased warmth and swelling after his dialysis sessions. He was found to have left cephalic vein superficial thrombophlebitis that was managed conservatively with pain control and lidocaine patches. Patient spiked a fever of 101 on 1/6, started on 7 day course of Ceftriaxone, FOU work up negative. Patient returned to baseline, HD sessions switched to MWF.      On day of discharge, patient is clinically stable with no new exam findings or acute symptoms compared to prior. The patient was seen by the attending physician on the date of discharge and deemed stable and acceptable for discharge. The patient's chronic medical conditions were treated accordingly per the patient's home medication regimen. The patient's medication reconciliation (with changes made to chronic medications), follow up appointments, discharge orders, instructions, and significant lab and diagnostic studies are as noted.      Important Medication Changes and Reason:    Active or Pending Issues Requiring Follow-up:  - Obstructive sleep apnea    Advanced Directives:   [X] Full code  [ ] DNR  [ ] Hospice    Discharge Diagnoses:  - AHRF 2/2 influenza A  - Obstructive sleep apnea  - Left cephalic vein superficial thrombophlebitis  - ESRD  - b/l hip fractures

## 2025-01-04 NOTE — DISCHARGE NOTE PROVIDER - NSDCFUADDAPPT_GEN_ALL_CORE_FT
APPTS ARE READY TO BE MADE: [ ] YES    Luda: [phone]  Mu: [phone]  Best Family or Patient Contact (if needed):    Additional Information about above appointments (if needed):    1: Please follow up with your PCP, Dr. Gomez, [phone], within 2 weeks of discharge  2:   3:     Other comments or requests:    APPTS ARE READY TO BE MADE: [X] YES    Luda: [phone]  Mu: [phone]  Best Family or Patient Contact (if needed):    Additional Information about above appointments (if needed):    1: Please follow up with your PCP, Dr. Gomez, [phone], within 2 weeks of discharge  2:   3:     Other comments or requests:    APPTS ARE READY TO BE MADE: [X] YES    Luda: [phone]  Mu: [phone]  Best Family or Patient Contact (if needed):    Additional Information about above appointments (if needed):    1: Please follow up with your PCP, Dr. Gomez, [phone], within 2 weeks of discharge  2:   3:     Other comments or requests:   Patient is being discharged to [facility]. Patient/caregiver will arrange follow up appointments.

## 2025-01-04 NOTE — PROGRESS NOTE ADULT - SUBJECTIVE AND OBJECTIVE BOX
INTERVAL HPI/OVERNIGHT EVENTS:  Pt seen and examined at bedside. No acute overnight events or complaints.    Brief Daily Plan:  -    VITAL SIGNS:  T(F): 101 (01-04-25 @ 05:01)  HR: 103 (01-04-25 @ 06:07)  BP: 128/63 (01-04-25 @ 05:01)  RR: 20 (01-04-25 @ 05:01)  SpO2: 95% (01-04-25 @ 06:07)  Wt(kg): --    PHYSICAL EXAM:    CONSTITUTIONAL: Uncomfortable, increased lethargy   EYES: PERRL, EOMI, No conjunctival or scleral injection, non-icteric  HENMT: macrocephaly, MMM. poor dentition  NECK: Supple  RESP: No respiratory distress, no use of accessory muscles; CTA b/l, no W/R/R  CV: RRR, 3/6 systolic murmur  GI: Soft, NT, ND  MSK: No clubbing or cyanosis. 1+ BLE edema  SKIN: No rashes or ulcers noted  NEURO: CN II-XII grossly intact; no focal deficits  PSYCH: A&O x 2, affect appropriate    MEDICATIONS  (STANDING):  heparin   Injectable 5000 Unit(s) SubCutaneous every 12 hours  midodrine. 5 milliGRAM(s) Oral once  naloxone Injectable 0.4 milliGRAM(s) IV Push once  oseltamivir 30 milliGRAM(s) Oral every 24 hours  polyethylene glycol 3350 17 Gram(s) Oral daily  senna 2 Tablet(s) Oral at bedtime  sevelamer carbonate 1600 milliGRAM(s) Oral three times a day with meals    MEDICATIONS  (PRN):  bisacodyl 5 milliGRAM(s) Oral daily PRN Constipation  melatonin 5 milliGRAM(s) Oral at bedtime PRN Insomnia  oxyCODONE    IR 10 milliGRAM(s) Oral every 8 hours PRN Moderate Pain (4 - 6)  oxyCODONE    IR 15 milliGRAM(s) Oral every 8 hours PRN Severe Pain (7 - 10)      Allergies    shellfish (Hives)  No Known Drug Allergies    Intolerances        LABS:                        10.8   4.92  )-----------( 195      ( 03 Jan 2025 07:11 )             36.0     01-03    137  |  95[L]  |  62[H]  ----------------------------<  77  5.4[H]   |  22  |  7.47[H]    Ca    8.3[L]      03 Jan 2025 07:12  Phos  6.7     01-03  Mg     2.4     01-03    TPro  5.8[L]  /  Alb  3.1[L]  /  TBili  0.3  /  DBili  x   /  AST  17  /  ALT  6[L]  /  AlkPhos  809[H]  01-02      Urinalysis Basic - ( 03 Jan 2025 07:12 )    Color: x / Appearance: x / SG: x / pH: x  Gluc: 77 mg/dL / Ketone: x  / Bili: x / Urobili: x   Blood: x / Protein: x / Nitrite: x   Leuk Esterase: x / RBC: x / WBC x   Sq Epi: x / Non Sq Epi: x / Bacteria: x        RADIOLOGY & ADDITIONAL TESTS:  Reviewed  ******************  Authored By: Chaim Buck MD, PGY1  MS Teams Preferred  ******************   INTERVAL HPI/OVERNIGHT EVENTS:  Pt seen and examined at bedside. No acute overnight events or complaints.    Brief Daily Plan:  - added ct chest abd pelv w/ IV contrast for monday with HD to assess for abscess  - US LUE to rule out DVT   - still with pulmonary edema needs more fluid off in dialysis if possible     VITAL SIGNS:  T(F): 101 (01-04-25 @ 05:01)  HR: 103 (01-04-25 @ 06:07)  BP: 128/63 (01-04-25 @ 05:01)  RR: 20 (01-04-25 @ 05:01)  SpO2: 95% (01-04-25 @ 06:07)  Wt(kg): --    PHYSICAL EXAM:    CONSTITUTIONAL: Improving mental status   EYES: PERRL, EOMI, No conjunctival or scleral injection, non-icteric  HENMT: macrocephaly, MMM. poor dentition  NECK: Supple  RESP: No respiratory distress, no use of accessory muscles; CTA b/l, no W/R/R  CV: RRR, 3/6 systolic murmur  GI: Soft, NT, ND  MSK: No clubbing or cyanosis. 1+ BLE edema  SKIN: No rashes or ulcers noted  NEURO: CN II-XII grossly intact; no focal deficits  PSYCH: A&O x3, affect appropriate    MEDICATIONS  (STANDING):  heparin   Injectable 5000 Unit(s) SubCutaneous every 12 hours  midodrine. 5 milliGRAM(s) Oral once  naloxone Injectable 0.4 milliGRAM(s) IV Push once  oseltamivir 30 milliGRAM(s) Oral every 24 hours  polyethylene glycol 3350 17 Gram(s) Oral daily  senna 2 Tablet(s) Oral at bedtime  sevelamer carbonate 1600 milliGRAM(s) Oral three times a day with meals    MEDICATIONS  (PRN):  bisacodyl 5 milliGRAM(s) Oral daily PRN Constipation  melatonin 5 milliGRAM(s) Oral at bedtime PRN Insomnia  oxyCODONE    IR 10 milliGRAM(s) Oral every 8 hours PRN Moderate Pain (4 - 6)  oxyCODONE    IR 15 milliGRAM(s) Oral every 8 hours PRN Severe Pain (7 - 10)      Allergies    shellfish (Hives)  No Known Drug Allergies    Intolerances        LABS:                        10.8   4.92  )-----------( 195      ( 03 Jan 2025 07:11 )             36.0     01-03    137  |  95[L]  |  62[H]  ----------------------------<  77  5.4[H]   |  22  |  7.47[H]    Ca    8.3[L]      03 Jan 2025 07:12  Phos  6.7     01-03  Mg     2.4     01-03    TPro  5.8[L]  /  Alb  3.1[L]  /  TBili  0.3  /  DBili  x   /  AST  17  /  ALT  6[L]  /  AlkPhos  809[H]  01-02      Urinalysis Basic - ( 03 Jan 2025 07:12 )    Color: x / Appearance: x / SG: x / pH: x  Gluc: 77 mg/dL / Ketone: x  / Bili: x / Urobili: x   Blood: x / Protein: x / Nitrite: x   Leuk Esterase: x / RBC: x / WBC x   Sq Epi: x / Non Sq Epi: x / Bacteria: x        RADIOLOGY & ADDITIONAL TESTS:  Reviewed  ******************  Authored By: Chaim Buck MD, PGY1  MS Teams Preferred  ******************

## 2025-01-05 DIAGNOSIS — I80.8 PHLEBITIS AND THROMBOPHLEBITIS OF OTHER SITES: ICD-10-CM

## 2025-01-05 LAB
ANION GAP SERPL CALC-SCNC: 19 MMOL/L — HIGH (ref 5–17)
BASE EXCESS BLDA CALC-SCNC: 2.8 MMOL/L — SIGNIFICANT CHANGE UP (ref -2–3)
BASOPHILS # BLD AUTO: 0 K/UL — SIGNIFICANT CHANGE UP (ref 0–0.2)
BASOPHILS NFR BLD AUTO: 0 % — SIGNIFICANT CHANGE UP (ref 0–2)
BUN SERPL-MCNC: 52 MG/DL — HIGH (ref 7–23)
CALCIUM SERPL-MCNC: 9.1 MG/DL — SIGNIFICANT CHANGE UP (ref 8.4–10.5)
CHLORIDE SERPL-SCNC: 94 MMOL/L — LOW (ref 96–108)
CO2 BLDA-SCNC: 30 MMOL/L — HIGH (ref 19–24)
CO2 SERPL-SCNC: 23 MMOL/L — SIGNIFICANT CHANGE UP (ref 22–31)
CREAT SERPL-MCNC: 7.57 MG/DL — HIGH (ref 0.5–1.3)
EGFR: 9 ML/MIN/1.73M2 — LOW
EOSINOPHIL # BLD AUTO: 0 K/UL — SIGNIFICANT CHANGE UP (ref 0–0.5)
EOSINOPHIL NFR BLD AUTO: 0 % — SIGNIFICANT CHANGE UP (ref 0–6)
GAS PNL BLDA: SIGNIFICANT CHANGE UP
GIANT PLATELETS BLD QL SMEAR: PRESENT — SIGNIFICANT CHANGE UP
GLUCOSE SERPL-MCNC: 67 MG/DL — LOW (ref 70–99)
HCO3 BLDA-SCNC: 28 MMOL/L — SIGNIFICANT CHANGE UP (ref 21–28)
HCT VFR BLD CALC: 29.9 % — LOW (ref 39–50)
HGB BLD-MCNC: 9.4 G/DL — LOW (ref 13–17)
HOROWITZ INDEX BLDA+IHG-RTO: 21 — SIGNIFICANT CHANGE UP
LYMPHOCYTES # BLD AUTO: 1.09 K/UL — SIGNIFICANT CHANGE UP (ref 1–3.3)
LYMPHOCYTES # BLD AUTO: 32.2 % — SIGNIFICANT CHANGE UP (ref 13–44)
MAGNESIUM SERPL-MCNC: 2.4 MG/DL — SIGNIFICANT CHANGE UP (ref 1.6–2.6)
MANUAL SMEAR VERIFICATION: SIGNIFICANT CHANGE UP
MCHC RBC-ENTMCNC: 31.4 G/DL — LOW (ref 32–36)
MCHC RBC-ENTMCNC: 32.3 PG — SIGNIFICANT CHANGE UP (ref 27–34)
MCV RBC AUTO: 102.7 FL — HIGH (ref 80–100)
MONOCYTES # BLD AUTO: 0.23 K/UL — SIGNIFICANT CHANGE UP (ref 0–0.9)
MONOCYTES NFR BLD AUTO: 6.9 % — SIGNIFICANT CHANGE UP (ref 2–14)
NEUTROPHILS # BLD AUTO: 2.05 K/UL — SIGNIFICANT CHANGE UP (ref 1.8–7.4)
NEUTROPHILS NFR BLD AUTO: 60.9 % — SIGNIFICANT CHANGE UP (ref 43–77)
PCO2 BLDA: 47 MMHG — SIGNIFICANT CHANGE UP (ref 35–48)
PH BLDA: 7.39 — SIGNIFICANT CHANGE UP (ref 7.35–7.45)
PHOSPHATE SERPL-MCNC: 6.6 MG/DL — HIGH (ref 2.5–4.5)
PLAT MORPH BLD: NORMAL — SIGNIFICANT CHANGE UP
PLATELET # BLD AUTO: 171 K/UL — SIGNIFICANT CHANGE UP (ref 150–400)
PO2 BLDA: 62 MMHG — LOW (ref 83–108)
POTASSIUM SERPL-MCNC: 4.8 MMOL/L — SIGNIFICANT CHANGE UP (ref 3.5–5.3)
POTASSIUM SERPL-SCNC: 4.8 MMOL/L — SIGNIFICANT CHANGE UP (ref 3.5–5.3)
RBC # BLD: 2.91 M/UL — LOW (ref 4.2–5.8)
RBC # FLD: 15.7 % — HIGH (ref 10.3–14.5)
RBC BLD AUTO: SIGNIFICANT CHANGE UP
SAO2 % BLDA: 93.5 % — LOW (ref 94–98)
SODIUM SERPL-SCNC: 136 MMOL/L — SIGNIFICANT CHANGE UP (ref 135–145)
WBC # BLD: 3.37 K/UL — LOW (ref 3.8–10.5)
WBC # FLD AUTO: 3.37 K/UL — LOW (ref 3.8–10.5)

## 2025-01-05 PROCEDURE — 93971 EXTREMITY STUDY: CPT | Mod: 26,LT

## 2025-01-05 PROCEDURE — 93970 EXTREMITY STUDY: CPT | Mod: 26

## 2025-01-05 RX ORDER — LIDOCAINE 50 MG/G
1 OINTMENT TOPICAL EVERY 24 HOURS
Refills: 0 | Status: DISCONTINUED | OUTPATIENT
Start: 2025-01-05 | End: 2025-01-17

## 2025-01-05 RX ADMIN — SEVELAMER CARBONATE 1600 MILLIGRAM(S): 800 TABLET, FILM COATED ORAL at 12:21

## 2025-01-05 RX ADMIN — SEVELAMER CARBONATE 1600 MILLIGRAM(S): 800 TABLET, FILM COATED ORAL at 12:49

## 2025-01-05 RX ADMIN — Medication 10 MILLIGRAM(S): at 21:50

## 2025-01-05 RX ADMIN — HEPARIN SODIUM 5000 UNIT(S): 1000 INJECTION, SOLUTION INTRAVENOUS; SUBCUTANEOUS at 18:25

## 2025-01-05 RX ADMIN — HEPARIN SODIUM 5000 UNIT(S): 1000 INJECTION, SOLUTION INTRAVENOUS; SUBCUTANEOUS at 05:05

## 2025-01-05 RX ADMIN — Medication 10 MILLIGRAM(S): at 21:20

## 2025-01-05 RX ADMIN — SEVELAMER CARBONATE 1600 MILLIGRAM(S): 800 TABLET, FILM COATED ORAL at 17:46

## 2025-01-05 NOTE — PROGRESS NOTE ADULT - PROBLEM SELECTOR PLAN 6
- Pt consult  - Oxycodone 10mg TID for moderate pain  - Oxycodone 15mg TID for severe pain Patient has ESRD with HD on Tuesday, Thursday, and Saturday.  Getting HD more frequently now.  - potassium 7 on BMP with increase of cr 5s to 10s.   - HD planned for 1/6

## 2025-01-05 NOTE — PROGRESS NOTE ADULT - PROBLEM SELECTOR PLAN 3
increased swelling of left upper extremity concern for dvt at the site of the AV fistula, but no problems with HD  - US LUE to rule out DVT 1/4 Patient presents with tachycardia, fevers, and positive for influenza meeting criteria for viral sepsis.  S/p 500cc in ED and vanc/zosyn  - Will be judicious with fluids given patient's ESRD  - IV tylenol and cooling blankets for fevers  - Oseltamivir as below  - MRSA PCR negative   - dc zosyn  - weaned o2    PLAN:   - If not spiking fevers between 1/5 and 1/6 can consider deferring the ct scans   - added ct chest abd pelv w/ IV contrast for monday with HD to assess for abscess  - monitoring off abx   - repeat blood cultures 1/3 negative   - CXR 1/3 showing continued pulmonary edema

## 2025-01-05 NOTE — PROGRESS NOTE ADULT - PROBLEM SELECTOR PLAN 2
Patient presents with tachycardia, fevers, and positive for influenza meeting criteria for viral sepsis.  S/p 500cc in ED and vanc/zosyn  - Will be judicious with fluids given patient's ESRD  - IV tylenol and cooling blankets for fevers  - Oseltamivir as below  - MRSA PCR negative   - dc zosyn  - weaned o2    PLAN:   - continuing to spike fevers in the AM  - added ct chest abd pelv w/ IV contrast for monday with HD to assess for abscess  - monitoring off abx   - repeat blood cultures 1/3  - CXR 1/3 showing continued pulmonary edema Patient complaining of increased warmth, pain, and swelling near the left upper extremity  Found to have left cephalic vein superficial thrombophlebitis on US 1/5  Will treat supportively with pain management   - consider anticoagulation if symptoms worsen

## 2025-01-05 NOTE — PROGRESS NOTE ADULT - ASSESSMENT
Mr David Welch is a 39 year old male with a PMH of ESRD on HD T/R/Sat, HTN, anemia, CVA, b/l hip fracture now wheelchair dependent presenting w/ weakness, found to have sepsis in setting of influenza A. Mr David Welch is a 39 year old male with a PMH of ESRD on HD T/R/Sat, HTN, anemia, CVA, b/l hip fracture now wheelchair dependent presenting w/ weakness, found to have sepsis in setting of influenza A. Concern for SOLITARIO so placed on BIPAP. 1/5 had increased pain, warmth, swelling near the left AV fistula found to have superficial thrombophlebitis near the left cephalic vein.

## 2025-01-05 NOTE — PROGRESS NOTE ADULT - PROBLEM SELECTOR PLAN 1
- patient increasingly somnolent on clinical exam on 1/3  - usually snoring, definitely some component of SOLITARIO  - ABG 1/3 PCO2 60 pH 7.27 and PO2 80s    Plan:   - BIPAP 1/3 and monitor ABGs qd  - ctm mental status  - still with pulmonary edema needs more fluid off in dialysis if possible - patient increasingly somnolent on clinical exam on 1/3  - usually snoring, definitely some component of SOLITARIO  - ABG 1/3 PCO2 60 pH 7.27 and PO2 80s    Plan:   - BIPAP 1/3 and monitor ABGs qd  - ABG on 1/5 showing less CO2 retention despite minimal use of bipap  - ctm mental status  - still with pulmonary edema needs more fluid off in dialysis if possible

## 2025-01-05 NOTE — PROGRESS NOTE ADULT - PROBLEM SELECTOR PLAN 4
Patient found to have influenza A, likely contributing to his symptoms.  - Oseltamivir 30mg QOD dosed after dialysis x 5 days increased swelling of left upper extremity concern for dvt at the site of the AV fistula, but no problems with HD  - US LUE to rule out DVT 1/4--> performed 1/5 showing left cephalic vein superficial thrombophlebitis   - US BLE ordered 1/5 patient complaining of pain in extremities and concerned for DVT

## 2025-01-05 NOTE — PROGRESS NOTE ADULT - PROBLEM SELECTOR PLAN 7
Pt pulled up list of medications from portal for [facility]. Active Rx from [facility] include calcium acetate, labetalol 100 mg BID, as well as metoprolol tartrate 25 mg BID, oxycodone 15 mg IR q8H (MDD 45 mg), ASA 81 mg daily, epoetin injections, lidocaine patch. Unclear why on two BB, verified with pt's pharmacy ([facility], [facility]) - note active Rx for sevelamer carbonate 1600 mg TID, metoprolol tartrate 25 mg BID, ASA 81 mg and naloxone. BB held in setting of normal/soft BP - Pt consult  - Oxycodone 10mg TID for moderate pain  - Oxycodone 15mg TID for severe pain

## 2025-01-05 NOTE — PROGRESS NOTE ADULT - PROBLEM SELECTOR PLAN 8
DVT ppx:  SQH  Diet: Renal  Dispo: Pending PT  Code Status:  FULL CODE Pt pulled up list of medications from portal for [facility]. Active Rx from [facility] include calcium acetate, labetalol 100 mg BID, as well as metoprolol tartrate 25 mg BID, oxycodone 15 mg IR q8H (MDD 45 mg), ASA 81 mg daily, epoetin injections, lidocaine patch. Unclear why on two BB, verified with pt's pharmacy ([facility]) - note active Rx for sevelamer carbonate 1600 mg TID, metoprolol tartrate 25 mg BID, ASA 81 mg and naloxone. BB held in setting of normal/soft BP

## 2025-01-05 NOTE — PROGRESS NOTE ADULT - SUBJECTIVE AND OBJECTIVE BOX
INTERVAL HPI/OVERNIGHT EVENTS:  Pt seen and examined at bedside. No acute overnight events or complaints.    Brief Daily Plan:  -    VITAL SIGNS:  T(F): 97.9 (01-05-25 @ 04:28)  HR: 81 (01-05-25 @ 04:28)  BP: 134/79 (01-05-25 @ 04:28)  RR: 16 (01-05-25 @ 04:28)  SpO2: 97% (01-05-25 @ 04:28)  Wt(kg): --    PHYSICAL EXAM:    CONSTITUTIONAL: Improving mental status   EYES: PERRL, EOMI, No conjunctival or scleral injection, non-icteric  HENMT: macrocephaly, MMM. poor dentition  NECK: Supple  RESP: No respiratory distress, no use of accessory muscles; CTA b/l, no W/R/R  CV: RRR, 3/6 systolic murmur  GI: Soft, NT, ND  MSK: No clubbing or cyanosis. 1+ BLE edema  SKIN: No rashes or ulcers noted  NEURO: CN II-XII grossly intact; no focal deficits  PSYCH: A&O x3, affect appropriate    MEDICATIONS  (STANDING):  heparin   Injectable 5000 Unit(s) SubCutaneous every 12 hours  naloxone Injectable 0.4 milliGRAM(s) IV Push once  polyethylene glycol 3350 17 Gram(s) Oral daily  senna 2 Tablet(s) Oral at bedtime  sevelamer carbonate 1600 milliGRAM(s) Oral three times a day with meals    MEDICATIONS  (PRN):  bisacodyl 5 milliGRAM(s) Oral daily PRN Constipation  melatonin 5 milliGRAM(s) Oral at bedtime PRN Insomnia  oxyCODONE    IR 10 milliGRAM(s) Oral every 8 hours PRN Moderate Pain (4 - 6)  oxyCODONE    IR 15 milliGRAM(s) Oral every 8 hours PRN Severe Pain (7 - 10)      Allergies    shellfish (Hives)  No Known Drug Allergies    Intolerances        LABS:                        9.8    4.04  )-----------( 185      ( 04 Jan 2025 08:42 )             31.7     01-04    138  |  95[L]  |  79[H]  ----------------------------<  82  5.4[H]   |  25  |  9.72[H]    Ca    9.2      04 Jan 2025 08:42  Phos  7.6     01-04  Mg     2.5     01-04    TPro  6.5  /  Alb  3.2[L]  /  TBili  0.3  /  DBili  x   /  AST  22  /  ALT  7[L]  /  AlkPhos  633[H]  01-04      Urinalysis Basic - ( 04 Jan 2025 08:42 )    Color: x / Appearance: x / SG: x / pH: x  Gluc: 82 mg/dL / Ketone: x  / Bili: x / Urobili: x   Blood: x / Protein: x / Nitrite: x   Leuk Esterase: x / RBC: x / WBC x   Sq Epi: x / Non Sq Epi: x / Bacteria: x        RADIOLOGY & ADDITIONAL TESTS:  Reviewed  ******************  Authored By: Chaim Buck MD, PGY1  MS Teams Preferred  ******************   INTERVAL HPI/OVERNIGHT EVENTS:  Pt seen and examined at bedside. No acute overnight events or complaints. Only used BIPAP for one hour yesterday because he feels it is too cumbersome to keep on.     Brief Daily Plan:  - Ultrasound of the LUE showing superficial thrombophlebitis near the AV fistula site  - ordered BLE duplex to assess for DVT   - ABG improving CO2 down to the 40s even without BIPAP at night   - CT with contrast tomorrow if still spiking fevers     VITAL SIGNS:  T(F): 97.9 (01-05-25 @ 04:28)  HR: 81 (01-05-25 @ 04:28)  BP: 134/79 (01-05-25 @ 04:28)  RR: 16 (01-05-25 @ 04:28)  SpO2: 97% (01-05-25 @ 04:28)  Wt(kg): --    PHYSICAL EXAM:    CONSTITUTIONAL: Improving mental status   EYES: PERRL, EOMI, No conjunctival or scleral injection, non-icteric  HENMT: macrocephaly, MMM. poor dentition  NECK: Supple  RESP: No respiratory distress, no use of accessory muscles; CTA b/l, no W/R/R  CV: RRR, 3/6 systolic murmur  GI: Soft, NT, ND  MSK: No clubbing or cyanosis. 1+ BLE edema  SKIN: No rashes or ulcers noted. Tenderness and warmth around the left AV fistula.   NEURO: CN II-XII grossly intact; no focal deficits  PSYCH: A&O x3, affect appropriate    MEDICATIONS  (STANDING):  heparin   Injectable 5000 Unit(s) SubCutaneous every 12 hours  naloxone Injectable 0.4 milliGRAM(s) IV Push once  polyethylene glycol 3350 17 Gram(s) Oral daily  senna 2 Tablet(s) Oral at bedtime  sevelamer carbonate 1600 milliGRAM(s) Oral three times a day with meals    MEDICATIONS  (PRN):  bisacodyl 5 milliGRAM(s) Oral daily PRN Constipation  melatonin 5 milliGRAM(s) Oral at bedtime PRN Insomnia  oxyCODONE    IR 10 milliGRAM(s) Oral every 8 hours PRN Moderate Pain (4 - 6)  oxyCODONE    IR 15 milliGRAM(s) Oral every 8 hours PRN Severe Pain (7 - 10)      Allergies    shellfish (Hives)  No Known Drug Allergies    Intolerances        LABS:                        9.8    4.04  )-----------( 185      ( 04 Jan 2025 08:42 )             31.7     01-04    138  |  95[L]  |  79[H]  ----------------------------<  82  5.4[H]   |  25  |  9.72[H]    Ca    9.2      04 Jan 2025 08:42  Phos  7.6     01-04  Mg     2.5     01-04    TPro  6.5  /  Alb  3.2[L]  /  TBili  0.3  /  DBili  x   /  AST  22  /  ALT  7[L]  /  AlkPhos  633[H]  01-04      Urinalysis Basic - ( 04 Jan 2025 08:42 )    Color: x / Appearance: x / SG: x / pH: x  Gluc: 82 mg/dL / Ketone: x  / Bili: x / Urobili: x   Blood: x / Protein: x / Nitrite: x   Leuk Esterase: x / RBC: x / WBC x   Sq Epi: x / Non Sq Epi: x / Bacteria: x        RADIOLOGY & ADDITIONAL TESTS:  Reviewed  ******************  Authored By: Chaim Buck MD, PGY1  MS Teams Preferred  ******************

## 2025-01-06 LAB
ANION GAP SERPL CALC-SCNC: 20 MMOL/L — HIGH (ref 5–17)
BASOPHILS # BLD AUTO: 0 K/UL — SIGNIFICANT CHANGE UP (ref 0–0.2)
BASOPHILS NFR BLD AUTO: 0 % — SIGNIFICANT CHANGE UP (ref 0–2)
BUN SERPL-MCNC: 62 MG/DL — HIGH (ref 7–23)
CALCIUM SERPL-MCNC: 9.3 MG/DL — SIGNIFICANT CHANGE UP (ref 8.4–10.5)
CHLORIDE SERPL-SCNC: 93 MMOL/L — LOW (ref 96–108)
CO2 SERPL-SCNC: 22 MMOL/L — SIGNIFICANT CHANGE UP (ref 22–31)
CREAT SERPL-MCNC: 9.18 MG/DL — HIGH (ref 0.5–1.3)
EGFR: 7 ML/MIN/1.73M2 — LOW
EOSINOPHIL # BLD AUTO: 0.05 K/UL — SIGNIFICANT CHANGE UP (ref 0–0.5)
EOSINOPHIL NFR BLD AUTO: 1.4 % — SIGNIFICANT CHANGE UP (ref 0–6)
GAS PNL BLDV: SIGNIFICANT CHANGE UP
GLUCOSE SERPL-MCNC: 65 MG/DL — LOW (ref 70–99)
HCT VFR BLD CALC: 30.6 % — LOW (ref 39–50)
HGB BLD-MCNC: 9.7 G/DL — LOW (ref 13–17)
IMM GRANULOCYTES NFR BLD AUTO: 0.3 % — SIGNIFICANT CHANGE UP (ref 0–0.9)
LYMPHOCYTES # BLD AUTO: 0.89 K/UL — LOW (ref 1–3.3)
LYMPHOCYTES # BLD AUTO: 24.7 % — SIGNIFICANT CHANGE UP (ref 13–44)
MAGNESIUM SERPL-MCNC: 2.5 MG/DL — SIGNIFICANT CHANGE UP (ref 1.6–2.6)
MCHC RBC-ENTMCNC: 31.7 G/DL — LOW (ref 32–36)
MCHC RBC-ENTMCNC: 32.1 PG — SIGNIFICANT CHANGE UP (ref 27–34)
MCV RBC AUTO: 101.3 FL — HIGH (ref 80–100)
MONOCYTES # BLD AUTO: 0.43 K/UL — SIGNIFICANT CHANGE UP (ref 0–0.9)
MONOCYTES NFR BLD AUTO: 11.9 % — SIGNIFICANT CHANGE UP (ref 2–14)
NEUTROPHILS # BLD AUTO: 2.22 K/UL — SIGNIFICANT CHANGE UP (ref 1.8–7.4)
NEUTROPHILS NFR BLD AUTO: 61.7 % — SIGNIFICANT CHANGE UP (ref 43–77)
NRBC # BLD: 0 /100 WBCS — SIGNIFICANT CHANGE UP (ref 0–0)
PHOSPHATE SERPL-MCNC: 7.1 MG/DL — HIGH (ref 2.5–4.5)
PLATELET # BLD AUTO: 191 K/UL — SIGNIFICANT CHANGE UP (ref 150–400)
POTASSIUM SERPL-MCNC: 5.1 MMOL/L — SIGNIFICANT CHANGE UP (ref 3.5–5.3)
POTASSIUM SERPL-SCNC: 5.1 MMOL/L — SIGNIFICANT CHANGE UP (ref 3.5–5.3)
RBC # BLD: 3.02 M/UL — LOW (ref 4.2–5.8)
RBC # FLD: 15.4 % — HIGH (ref 10.3–14.5)
SODIUM SERPL-SCNC: 135 MMOL/L — SIGNIFICANT CHANGE UP (ref 135–145)
WBC # BLD: 3.6 K/UL — LOW (ref 3.8–10.5)
WBC # FLD AUTO: 3.6 K/UL — LOW (ref 3.8–10.5)

## 2025-01-06 PROCEDURE — 99232 SBSQ HOSP IP/OBS MODERATE 35: CPT | Mod: GC

## 2025-01-06 PROCEDURE — 74177 CT ABD & PELVIS W/CONTRAST: CPT | Mod: 26

## 2025-01-06 PROCEDURE — 99232 SBSQ HOSP IP/OBS MODERATE 35: CPT

## 2025-01-06 PROCEDURE — G0545: CPT

## 2025-01-06 PROCEDURE — 71260 CT THORAX DX C+: CPT | Mod: 26

## 2025-01-06 RX ORDER — ACETAMINOPHEN 80 MG/.8ML
1000 SOLUTION/ DROPS ORAL ONCE
Refills: 0 | Status: COMPLETED | OUTPATIENT
Start: 2025-01-06 | End: 2025-01-06

## 2025-01-06 RX ORDER — EPOETIN ALFA 2000 [IU]/ML
10000 SOLUTION INTRAVENOUS; SUBCUTANEOUS ONCE
Refills: 0 | Status: COMPLETED | OUTPATIENT
Start: 2025-01-07 | End: 2025-01-07

## 2025-01-06 RX ORDER — EPOETIN ALFA 2000 [IU]/ML
10000 SOLUTION INTRAVENOUS; SUBCUTANEOUS
Refills: 0 | Status: DISCONTINUED | OUTPATIENT
Start: 2025-01-06 | End: 2025-01-06

## 2025-01-06 RX ORDER — MIDODRINE HYDROCHLORIDE 5 MG/1
5 TABLET ORAL ONCE
Refills: 0 | Status: COMPLETED | OUTPATIENT
Start: 2025-01-07 | End: 2025-01-08

## 2025-01-06 RX ADMIN — ACETAMINOPHEN 400 MILLIGRAM(S): 80 SOLUTION/ DROPS ORAL at 20:39

## 2025-01-06 RX ADMIN — HEPARIN SODIUM 5000 UNIT(S): 1000 INJECTION, SOLUTION INTRAVENOUS; SUBCUTANEOUS at 17:08

## 2025-01-06 RX ADMIN — Medication 17 GRAM(S): at 11:01

## 2025-01-06 RX ADMIN — HEPARIN SODIUM 5000 UNIT(S): 1000 INJECTION, SOLUTION INTRAVENOUS; SUBCUTANEOUS at 06:35

## 2025-01-06 RX ADMIN — ACETAMINOPHEN 1000 MILLIGRAM(S): 80 SOLUTION/ DROPS ORAL at 21:39

## 2025-01-06 RX ADMIN — SEVELAMER CARBONATE 1600 MILLIGRAM(S): 800 TABLET, FILM COATED ORAL at 08:43

## 2025-01-06 RX ADMIN — SENNOSIDES 2 TABLET(S): 8.6 TABLET, FILM COATED ORAL at 21:32

## 2025-01-06 RX ADMIN — SEVELAMER CARBONATE 1600 MILLIGRAM(S): 800 TABLET, FILM COATED ORAL at 17:08

## 2025-01-06 RX ADMIN — SEVELAMER CARBONATE 1600 MILLIGRAM(S): 800 TABLET, FILM COATED ORAL at 11:01

## 2025-01-06 NOTE — PROGRESS NOTE ADULT - PROBLEM SELECTOR PLAN 6
Patient has ESRD with HD on Tuesday, Thursday, and Saturday.  Getting HD more frequently now.  - potassium 7 on BMP with increase of cr 5s to 10s.   - HD planned for 1/6 - Oxycodone 10/15mg TID for moderate/severe pain  - PT consulted

## 2025-01-06 NOTE — PROGRESS NOTE ADULT - SUBJECTIVE AND OBJECTIVE BOX
CC: F/U for Fever    Saw/spoke to patient. Generally well appearing. No new complaints.    Allergies  shellfish (Hives)  No Known Drug Allergies    ANTIMICROBIALS:      PE:    Vital Signs Last 24 Hrs  T(C): 37.7 (06 Jan 2025 14:22), Max: 37.7 (06 Jan 2025 14:22)  T(F): 99.9 (06 Jan 2025 14:22), Max: 99.9 (06 Jan 2025 14:22)  HR: 99 (06 Jan 2025 14:22) (82 - 99)  BP: 143/78 (06 Jan 2025 14:22) (132/77 - 162/79)  RR: 18 (06 Jan 2025 14:22) (17 - 18)  SpO2: 95% (06 Jan 2025 14:22) (95% - 100%)    Gen: AOx3, NAD, non-toxic  CV: Nontachycardic  Resp: Breathing comfortably, RA  Abd: Soft, nontender  IV/Skin: No thrombophlebitis    LABS:                        9.7    3.60  )-----------( 191      ( 06 Jan 2025 07:29 )             30.6     01-06    135  |  93[L]  |  62[H]  ----------------------------<  65[L]  5.1   |  22  |  9.18[H]    Ca    9.3      06 Jan 2025 07:28  Phos  7.1     01-06  Mg     2.5     01-06    Urinalysis Basic - ( 06 Jan 2025 07:28 )    Color: x / Appearance: x / SG: x / pH: x  Gluc: 65 mg/dL / Ketone: x  / Bili: x / Urobili: x   Blood: x / Protein: x / Nitrite: x   Leuk Esterase: x / RBC: x / WBC x   Sq Epi: x / Non Sq Epi: x / Bacteria: x    MICROBIOLOGY:    .Blood BLOOD  01-03-25   No growth at 48 Hours  --  --    .Blood BLOOD  01-03-25   No growth at 48 Hours  --  --    .Blood BLOOD  12-30-24   No growth at 5 days  --  --    .Blood BLOOD  12-30-24   No growth at 5 days  --  --    RADIOLOGY:    1/3    FINDINGS:  The the heart is stably enlarged.  Minimal improvement in pulmonary edema changes.  No acute consolidations.  No pleural effusion or pneumothorax.    IMPRESSION:  Minimal improvement in pulmonary edema changes.  No focal consolidations.

## 2025-01-06 NOTE — PROGRESS NOTE ADULT - PROBLEM SELECTOR PLAN 1
- patient increasingly somnolent on clinical exam on 1/3  - usually snoring, definitely some component of SOLITARIO  - ABG 1/3 PCO2 60 pH 7.27 and PO2 80s    Plan:   - BIPAP 1/3 and monitor ABGs qd  - ABG on 1/5 showing less CO2 retention despite minimal use of bipap  - ctm mental status  - still with pulmonary edema needs more fluid off in dialysis if possible - patient increasingly somnolent on clinical exam on 1/3  - usually snoring, definitely some component of SOLITARIO  - ABG 1/3 PCO2 60 pH 7.27 and PO2 80s    Plan:   - BIPAP 1/3 ON, NC during day  - ABG on 1/5 showing less CO2 retention despite minimal use of bipap  - CT C/A/P for evaluation of new sources of infection

## 2025-01-06 NOTE — PROGRESS NOTE ADULT - PROBLEM SELECTOR PLAN 8
Pt pulled up list of medications from portal for [facility]. Active Rx from [facility] include calcium acetate, labetalol 100 mg BID, as well as metoprolol tartrate 25 mg BID, oxycodone 15 mg IR q8H (MDD 45 mg), ASA 81 mg daily, epoetin injections, lidocaine patch. Unclear why on two BB, verified with pt's pharmacy ([facility], [facility]) - note active Rx for sevelamer carbonate 1600 mg TID, metoprolol tartrate 25 mg BID, ASA 81 mg and naloxone. BB held in setting of normal/soft BP - Fluids: None  - Electrolytes: Will replete to maintain K>4, Phos>3, and Mag>2  - Nutrition: Renal Restricted  - Code: FULL  - Activity: As tolerated, pending PT eval  - DVT Prophylaxis: heparin Sub q,   - Stress Ulcer/GI Prophylaxis: N/A  - Disposition: Admit to medicine

## 2025-01-06 NOTE — PROGRESS NOTE ADULT - PROBLEM SELECTOR PLAN 9
DVT ppx:  SQH  Diet: Renal  Dispo: Pending PT  Code Status:  FULL CODE
DVT ppx:  SQH  Diet: Renal  Dispo: Pending PT  Code Status:  FULL CODE

## 2025-01-06 NOTE — CHART NOTE - NSCHARTNOTEFT_GEN_A_CORE
Medical team is managing IV infiltrate.     Sadaf TAVAREZ-BC, CWON, [facility]  [phone].

## 2025-01-06 NOTE — PROGRESS NOTE ADULT - ASSESSMENT
Mr David Welch is a 39 year old male with a PMH of ESRD on HD T/R/Sat, HTN, anemia, CVA, b/l hip fracture now wheelchair dependent presenting w/ weakness, found to have sepsis in setting of influenza A. Concern for SOLITARIO so placed on BIPAP. 1/5 had increased pain, warmth, swelling near the left AV fistula found to have superficial thrombophlebitis near the left cephalic vein.

## 2025-01-06 NOTE — PROGRESS NOTE ADULT - PROBLEM SELECTOR PLAN 5
Patient found to have influenza A, likely contributing to his symptoms.  - Oseltamivir 30mg QOD dosed after dialysis x 5 days Patient has ESRD with HD on Tuesday, Thursday, and Saturday.  Getting HD more frequently now.  - potassium 7 on BMP with increase of cr 5s to 10s.   - HD planned for 1/7

## 2025-01-06 NOTE — PROGRESS NOTE ADULT - PROBLEM SELECTOR PLAN 3
Patient presents with tachycardia, fevers, and positive for influenza meeting criteria for viral sepsis.  S/p 500cc in ED and vanc/zosyn  - Will be judicious with fluids given patient's ESRD  - IV tylenol and cooling blankets for fevers  - Oseltamivir as below  - MRSA PCR negative   - dc zosyn  - weaned o2    PLAN:   - If not spiking fevers between 1/5 and 1/6 can consider deferring the ct scans   - added ct chest abd pelv w/ IV contrast for monday with HD to assess for abscess  - monitoring off abx   - repeat blood cultures 1/3 negative   - CXR 1/3 showing continued pulmonary edema Patient found to have influenza A, likely contributing to his symptoms.  - Oseltamivir 30mg QOD dosed after dialysis x 5 days

## 2025-01-06 NOTE — PROGRESS NOTE ADULT - PROBLEM SELECTOR PLAN 4
increased swelling of left upper extremity concern for dvt at the site of the AV fistula, but no problems with HD  - US LUE to rule out DVT 1/4--> performed 1/5 showing left cephalic vein superficial thrombophlebitis   - US BLE ordered 1/5 patient complaining of pain in extremities and concerned for DVT

## 2025-01-06 NOTE — PROGRESS NOTE ADULT - PROBLEM SELECTOR PLAN 2
Patient complaining of increased warmth, pain, and swelling near the left upper extremity  Found to have left cephalic vein superficial thrombophlebitis on US 1/5  Will treat supportively with pain management   - consider anticoagulation if symptoms worsen Patient complaining of increased warmth, pain, and swelling near the left upper extremity  Found to have left cephalic vein superficial thrombophlebitis on US 1/5  Will treat supportively with pain management   - consider anticoagulation if symptoms worsen    Plan  - Wound care consulted

## 2025-01-06 NOTE — PROGRESS NOTE ADULT - SUBJECTIVE AND OBJECTIVE BOX
Brent Mejia MD  Available on TEAMS    Patient is a 39y old  Male who presents with a chief complaint of Weakness (05 Jan 2025 06:58)      SUBJECTIVE / OVERNIGHT EVENTS: No acute events overnight. Patient was assessed at bedside.       REVIEW OF SYSTEMS:  CONSTITUTIONAL: No weakness, fevers, or chills  EYES/ENT: No visual changes; No vertigo, throat pain, or dysphagia  NECK: No pain or stiffness  RESPIRATORY: No cough, wheezing, hemoptysis, or shortness of breath  CARDIOVASCULAR: No chest pain or palpitations  GASTROINTESTINAL: No abdominal or epigastric pain. No nausea, vomiting, or hematemesis; No diarrhea or constipation. No melena or hematochezia.  GENITOURINARY: No dysuria, frequency, or hematuria  MUSCULOSKELETAL: No joint or muscle pain or aches  NEUROLOGICAL: No numbness or weakness  SKIN: No itching or rashes      MEDICATIONS  (STANDING):  heparin   Injectable 5000 Unit(s) SubCutaneous every 12 hours  naloxone Injectable 0.4 milliGRAM(s) IV Push once  polyethylene glycol 3350 17 Gram(s) Oral daily  senna 2 Tablet(s) Oral at bedtime  sevelamer carbonate 1600 milliGRAM(s) Oral three times a day with meals    MEDICATIONS  (PRN):  bisacodyl 5 milliGRAM(s) Oral daily PRN Constipation  lidocaine   4% Patch 1 Patch Transdermal every 24 hours PRN pain in the left upper arm  melatonin 5 milliGRAM(s) Oral at bedtime PRN Insomnia  oxyCODONE    IR 10 milliGRAM(s) Oral every 8 hours PRN Moderate Pain (4 - 6)  oxyCODONE    IR 15 milliGRAM(s) Oral every 8 hours PRN Severe Pain (7 - 10)      CAPILLARY BLOOD GLUCOSE        I&O's Summary      Vital Signs Last 24 Hrs  T(C): 36.9 (06 Jan 2025 05:25), Max: 37.3 (05 Jan 2025 22:09)  T(F): 98.4 (06 Jan 2025 05:25), Max: 99.1 (05 Jan 2025 22:09)  HR: 82 (06 Jan 2025 05:44) (82 - 93)  BP: 162/79 (06 Jan 2025 05:25) (132/77 - 162/79)  BP(mean): --  RR: 18 (06 Jan 2025 05:25) (16 - 18)  SpO2: 96% (06 Jan 2025 05:44) (96% - 100%)    Parameters below as of 06 Jan 2025 05:25  Patient On (Oxygen Delivery Method): nasal cannula        PHYSICAL EXAM:  GENERAL: NAD, well-developed, well-nourished  HEAD: Atraumatic, Normocephalic  EYES: EOMI, PERRLA, conjunctiva and sclera clear  NECK: Supple, No JVD  CHEST/LUNG: Clear to auscultation bilaterally; No wheezes or crackles  HEART: Normal S1/S2; Regular rate and rhythm; No murmurs, rubs, or gallops  ABDOMEN: Soft, Nontender, Nondistended; Bowel sounds present  EXTREMITIES: 2+ Peripheral Pulses; No clubbing, cyanosis, or edema  PSYCH: A&Ox3  NEUROLOGY: no focal neurologic deficit  SKIN: No rashes or lesions    LABS:                        9.4    3.37  )-----------( 171      ( 05 Jan 2025 07:18 )             29.9      01-05    136  |  94[L]  |  52[H]  ----------------------------<  67[L]  4.8   |  23  |  7.57[H]    Ca    9.1      05 Jan 2025 07:18  Phos  6.6     01-05  Mg     2.4     01-05    TPro  6.5  /  Alb  3.2[L]  /  TBili  0.3  /  DBili  x   /  AST  22  /  ALT  7[L]  /  AlkPhos  633[H]  01-04          Urinalysis Basic - ( 05 Jan 2025 07:18 )    Color: x / Appearance: x / SG: x / pH: x  Gluc: 67 mg/dL / Ketone: x  / Bili: x / Urobili: x   Blood: x / Protein: x / Nitrite: x   Leuk Esterase: x / RBC: x / WBC x   Sq Epi: x / Non Sq Epi: x / Bacteria: x        RADIOLOGY & ADDITIONAL TESTS:    Imaging Personally Reviewed:    Consultant(s) Notes Reviewed:      Care Discussed with Consultants/Other Providers:   Brent Mejia MD  Available on TEAMS    Patient is a 39y old  Male who presents with a chief complaint of Weakness (05 Jan 2025 06:58)      SUBJECTIVE / OVERNIGHT EVENTS: No acute events overnight. Patient was assessed at bedside. Continues to have pain in bilateral lower extremities      REVIEW OF SYSTEMS:  CONSTITUTIONAL: No weakness, fevers, or chills  EYES/ENT: No visual changes; No vertigo, throat pain, or dysphagia  NECK: No pain or stiffness  RESPIRATORY: No cough, wheezing, hemoptysis, or shortness of breath  CARDIOVASCULAR: No chest pain or palpitations  GASTROINTESTINAL: No abdominal or epigastric pain. No nausea, vomiting, or hematemesis; No diarrhea or constipation. No melena or hematochezia.  GENITOURINARY: No dysuria, frequency, or hematuria  MUSCULOSKELETAL: No joint or muscle pain or aches  NEUROLOGICAL: No numbness or weakness  SKIN: No itching or rashes      MEDICATIONS  (STANDING):  heparin   Injectable 5000 Unit(s) SubCutaneous every 12 hours  naloxone Injectable 0.4 milliGRAM(s) IV Push once  polyethylene glycol 3350 17 Gram(s) Oral daily  senna 2 Tablet(s) Oral at bedtime  sevelamer carbonate 1600 milliGRAM(s) Oral three times a day with meals    MEDICATIONS  (PRN):  bisacodyl 5 milliGRAM(s) Oral daily PRN Constipation  lidocaine   4% Patch 1 Patch Transdermal every 24 hours PRN pain in the left upper arm  melatonin 5 milliGRAM(s) Oral at bedtime PRN Insomnia  oxyCODONE    IR 10 milliGRAM(s) Oral every 8 hours PRN Moderate Pain (4 - 6)  oxyCODONE    IR 15 milliGRAM(s) Oral every 8 hours PRN Severe Pain (7 - 10)      CAPILLARY BLOOD GLUCOSE        I&O's Summary      Vital Signs Last 24 Hrs  T(C): 36.9 (06 Jan 2025 05:25), Max: 37.3 (05 Jan 2025 22:09)  T(F): 98.4 (06 Jan 2025 05:25), Max: 99.1 (05 Jan 2025 22:09)  HR: 82 (06 Jan 2025 05:44) (82 - 93)  BP: 162/79 (06 Jan 2025 05:25) (132/77 - 162/79)  BP(mean): --  RR: 18 (06 Jan 2025 05:25) (16 - 18)  SpO2: 96% (06 Jan 2025 05:44) (96% - 100%)    Parameters below as of 06 Jan 2025 05:25  Patient On (Oxygen Delivery Method): nasal cannula        PHYSICAL EXAM:  GENERAL: NAD, well-developed, well-nourished  HEAD: Atraumatic, Normocephalic  EYES: EOMI, PERRLA, conjunctiva and sclera clear  NECK: Supple, No JVD  CHEST/LUNG: Clear to auscultation bilaterally; No wheezes or crackles  HEART: Normal S1/S2; Regular rate and rhythm; No murmurs, rubs, or gallops  ABDOMEN: Soft, Nontender, Nondistended; Bowel sounds present  EXTREMITIES: 2+ Peripheral Pulses; No clubbing, cyanosis, or edema  PSYCH: A&Ox3  NEUROLOGY: no focal neurologic deficit  SKIN: No rashes or lesions    LABS:                        9.4    3.37  )-----------( 171      ( 05 Jan 2025 07:18 )             29.9      01-05    136  |  94[L]  |  52[H]  ----------------------------<  67[L]  4.8   |  23  |  7.57[H]    Ca    9.1      05 Jan 2025 07:18  Phos  6.6     01-05  Mg     2.4     01-05    TPro  6.5  /  Alb  3.2[L]  /  TBili  0.3  /  DBili  x   /  AST  22  /  ALT  7[L]  /  AlkPhos  633[H]  01-04          Urinalysis Basic - ( 05 Jan 2025 07:18 )    Color: x / Appearance: x / SG: x / pH: x  Gluc: 67 mg/dL / Ketone: x  / Bili: x / Urobili: x   Blood: x / Protein: x / Nitrite: x   Leuk Esterase: x / RBC: x / WBC x   Sq Epi: x / Non Sq Epi: x / Bacteria: x        RADIOLOGY & ADDITIONAL TESTS:    Imaging Personally Reviewed:    Consultant(s) Notes Reviewed:      Care Discussed with Consultants/Other Providers:

## 2025-01-06 NOTE — PROGRESS NOTE ADULT - PROBLEM SELECTOR PLAN 7
- Pt consult  - Oxycodone 10mg TID for moderate pain  - Oxycodone 15mg TID for severe pain Pt pulled up list of medications from portal for [facility]. Active Rx from [place] include calcium acetate, labetalol 100 mg BID, as well as metoprolol tartrate 25 mg BID, oxycodone 15 mg IR q8H (MDD 45 mg), ASA 81 mg daily, epoetin injections, lidocaine patch. Unclear why on two BB, verified with pt's pharmacy ([facility], [facility]) - note active Rx for sevelamer carbonate 1600 mg TID, metoprolol tartrate 25 mg BID, ASA 81 mg and naloxone. BB held in setting of normal/soft BP

## 2025-01-06 NOTE — PROGRESS NOTE ADULT - SUBJECTIVE AND OBJECTIVE BOX
Comfortable on RA     Vital Signs Last 24 Hrs  T(C): 37.7 (01-06-25 @ 14:22), Max: 37.7 (01-06-25 @ 14:22)  T(F): 99.9 (01-06-25 @ 14:22), Max: 99.9 (01-06-25 @ 14:22)  HR: 99 (01-06-25 @ 14:22) (82 - 99)  BP: 143/78 (01-06-25 @ 14:22) (132/77 - 162/79)  RR: 18 (01-06-25 @ 14:22) (17 - 18)  SpO2: 95% (01-06-25 @ 14:22) (95% - 100%)    s1s2  b/l air entry  soft, ND  tr edema b/l LE, RUE AVF patent, LUE edema                                                                                                                                                            9.7    3.60  )-----------( 191      ( 06 Jan 2025 07:29 )             30.6     06 Jan 2025 07:28    135    |  93     |  62     ----------------------------<  65     5.1     |  22     |  9.18     Ca    9.3        06 Jan 2025 07:28  Phos  7.1       06 Jan 2025 07:28  Mg     2.5       06 Jan 2025 07:28    bisacodyl 5 milliGRAM(s) Oral daily PRN  epoetin carito (EPOGEN) Injectable 03936 Unit(s) IV Push <User Schedule>  heparin   Injectable 5000 Unit(s) SubCutaneous every 12 hours  lidocaine   4% Patch 1 Patch Transdermal every 24 hours PRN  melatonin 5 milliGRAM(s) Oral at bedtime PRN  naloxone Injectable 0.4 milliGRAM(s) IV Push once  oxyCODONE    IR 10 milliGRAM(s) Oral every 8 hours PRN  oxyCODONE    IR 15 milliGRAM(s) Oral every 8 hours PRN  polyethylene glycol 3350 17 Gram(s) Oral daily  senna 2 Tablet(s) Oral at bedtime  sevelamer carbonate 1600 milliGRAM(s) Oral three times a day with meals    A/P:    ESRD on HD   Adm w/Flu A  S/p stable HD on Sat   S/p fever 1/4, plan for CT w/IV dye tonight  Next HD in am as ordered   Renvela for high Phos  Midodrine prior to HD to allow fluid removal   Renal diet     [phone]

## 2025-01-06 NOTE — PROGRESS NOTE ADULT - ASSESSMENT
39 year old with ESRD on HD, history of CVA, admitted with weakness from Influenza, also received piperacillin/tazobactam since admission (plus one dose of Vancomycin on presentation), who had been running mildly elevated temperature through the admission, now with new fever to 101.8, no obviously localizing signs/symptoms on exam other than significant LUE swelling which patient reports is new but accuracy of history is unclear  Normal WBC this morning  Patient at risk for post-influenza PNA, aspiration PNA, bacteremia from ESRD status, as well as have some concerns about LUE swelling  pt clinically stable  Overall influenza infection, persistent fever, abnormal CXR    Recommend:  blood cx in lab   recommend CT chest/abd/pelvis with contrast to look for source  MRSA PCR negative   trend cbc, no leucocytosis     Tino Webber MD  Contact on TEAMS messaging from 9am - 5pm  From 5pm-9am, on weekends, or if no response call [phone]

## 2025-01-07 DIAGNOSIS — R50.9 FEVER, UNSPECIFIED: ICD-10-CM

## 2025-01-07 DIAGNOSIS — I10 ESSENTIAL (PRIMARY) HYPERTENSION: ICD-10-CM

## 2025-01-07 LAB
ANION GAP SERPL CALC-SCNC: 20 MMOL/L — HIGH (ref 5–17)
BUN SERPL-MCNC: 77 MG/DL — HIGH (ref 7–23)
CALCIUM SERPL-MCNC: 9.7 MG/DL — SIGNIFICANT CHANGE UP (ref 8.4–10.5)
CHLORIDE SERPL-SCNC: 91 MMOL/L — LOW (ref 96–108)
CO2 SERPL-SCNC: 22 MMOL/L — SIGNIFICANT CHANGE UP (ref 22–31)
CREAT SERPL-MCNC: 11.21 MG/DL — HIGH (ref 0.5–1.3)
EGFR: 5 ML/MIN/1.73M2 — LOW
FOLATE SERPL-MCNC: 10.7 NG/ML — SIGNIFICANT CHANGE UP
GLUCOSE SERPL-MCNC: 73 MG/DL — SIGNIFICANT CHANGE UP (ref 70–99)
HCT VFR BLD CALC: 31 % — LOW (ref 39–50)
HGB BLD-MCNC: 9.9 G/DL — LOW (ref 13–17)
MCHC RBC-ENTMCNC: 31.4 PG — SIGNIFICANT CHANGE UP (ref 27–34)
MCHC RBC-ENTMCNC: 31.9 G/DL — LOW (ref 32–36)
MCV RBC AUTO: 98.4 FL — SIGNIFICANT CHANGE UP (ref 80–100)
NRBC # BLD: 0 /100 WBCS — SIGNIFICANT CHANGE UP (ref 0–0)
PLATELET # BLD AUTO: 252 K/UL — SIGNIFICANT CHANGE UP (ref 150–400)
POTASSIUM SERPL-MCNC: 5.3 MMOL/L — SIGNIFICANT CHANGE UP (ref 3.5–5.3)
POTASSIUM SERPL-SCNC: 5.3 MMOL/L — SIGNIFICANT CHANGE UP (ref 3.5–5.3)
RBC # BLD: 3.15 M/UL — LOW (ref 4.2–5.8)
RBC # FLD: 15.7 % — HIGH (ref 10.3–14.5)
SODIUM SERPL-SCNC: 133 MMOL/L — LOW (ref 135–145)
VIT B12 SERPL-MCNC: 1300 PG/ML — HIGH (ref 232–1245)
WBC # BLD: 4.53 K/UL — SIGNIFICANT CHANGE UP (ref 3.8–10.5)
WBC # FLD AUTO: 4.53 K/UL — SIGNIFICANT CHANGE UP (ref 3.8–10.5)

## 2025-01-07 PROCEDURE — G0545: CPT

## 2025-01-07 PROCEDURE — 99233 SBSQ HOSP IP/OBS HIGH 50: CPT | Mod: GC

## 2025-01-07 PROCEDURE — 99232 SBSQ HOSP IP/OBS MODERATE 35: CPT

## 2025-01-07 RX ORDER — CEFAZOLIN SODIUM 1 G
1000 VIAL (EA) INJECTION EVERY 24 HOURS
Refills: 0 | Status: DISCONTINUED | OUTPATIENT
Start: 2025-01-07 | End: 2025-01-08

## 2025-01-07 RX ORDER — MIDODRINE HYDROCHLORIDE 5 MG/1
5 TABLET ORAL ONCE
Refills: 0 | Status: COMPLETED | OUTPATIENT
Start: 2025-01-08 | End: 2025-01-08

## 2025-01-07 RX ORDER — OXYCODONE HCL 15 MG
15 TABLET ORAL EVERY 8 HOURS
Refills: 0 | Status: DISCONTINUED | OUTPATIENT
Start: 2025-01-07 | End: 2025-01-13

## 2025-01-07 RX ORDER — OXYCODONE HCL 15 MG
10 TABLET ORAL EVERY 8 HOURS
Refills: 0 | Status: DISCONTINUED | OUTPATIENT
Start: 2025-01-07 | End: 2025-01-13

## 2025-01-07 RX ADMIN — EPOETIN ALFA 10000 UNIT(S): 2000 SOLUTION INTRAVENOUS; SUBCUTANEOUS at 10:00

## 2025-01-07 RX ADMIN — SEVELAMER CARBONATE 1600 MILLIGRAM(S): 800 TABLET, FILM COATED ORAL at 07:07

## 2025-01-07 RX ADMIN — Medication 15 MILLIGRAM(S): at 14:12

## 2025-01-07 RX ADMIN — Medication 100 MILLIGRAM(S): at 14:10

## 2025-01-07 RX ADMIN — Medication 17 GRAM(S): at 11:42

## 2025-01-07 RX ADMIN — Medication 10 MILLIGRAM(S): at 02:10

## 2025-01-07 RX ADMIN — HEPARIN SODIUM 5000 UNIT(S): 1000 INJECTION, SOLUTION INTRAVENOUS; SUBCUTANEOUS at 17:17

## 2025-01-07 RX ADMIN — HEPARIN SODIUM 5000 UNIT(S): 1000 INJECTION, SOLUTION INTRAVENOUS; SUBCUTANEOUS at 05:07

## 2025-01-07 RX ADMIN — SEVELAMER CARBONATE 1600 MILLIGRAM(S): 800 TABLET, FILM COATED ORAL at 11:42

## 2025-01-07 RX ADMIN — Medication 10 MILLIGRAM(S): at 01:29

## 2025-01-07 RX ADMIN — SEVELAMER CARBONATE 1600 MILLIGRAM(S): 800 TABLET, FILM COATED ORAL at 17:17

## 2025-01-07 RX ADMIN — SENNOSIDES 2 TABLET(S): 8.6 TABLET, FILM COATED ORAL at 21:45

## 2025-01-07 NOTE — PROGRESS NOTE ADULT - SUBJECTIVE AND OBJECTIVE BOX
Comfortable on RA     Vital Signs Last 24 Hrs  T(C): 37.2 (01-07-25 @ 11:00), Max: 37.9 (01-06-25 @ 20:00)  T(F): 99 (01-07-25 @ 11:00), Max: 100.2 (01-06-25 @ 20:00)  HR: 92 (01-07-25 @ 11:00) (92 - 104)  BP: 124/71 (01-07-25 @ 11:00) (124/71 - 173/85)  RR: 18 (01-07-25 @ 11:00) (18 - 18)  SpO2: 96% (01-07-25 @ 11:00) (94% - 96%)    I&O's Detail    06 Jan 2025 07:01  -  07 Jan 2025 07:00  --------------------------------------------------------  IN:    IV PiggyBack: 100 mL    Oral Fluid: 700 mL  Total IN: 800 mL    OUT:  Total OUT: 0 mL    07 Jan 2025 07:01  -  07 Jan 2025 16:31  --------------------------------------------------------  OUT:    Other (mL): 2000 mL  Total OUT: 2000 mL    s1s2  b/l air entry  soft, ND  tr edema b/l CATHY KELLER AVF patent, LUE edema                                                                                                                                                                    9.9    4.53  )-----------( 252      ( 07 Jan 2025 08:40 )             31.0     07 Jan 2025 08:40    133    |  91     |  77     ----------------------------<  73     5.3     |  22     |  11.21    Ca    9.7        07 Jan 2025 08:40  Phos  7.1       06 Jan 2025 07:28  Mg     2.5       06 Jan 2025 07:28    bisacodyl 5 milliGRAM(s) Oral daily PRN  ceFAZolin   IVPB 1000 milliGRAM(s) IV Intermittent every 24 hours  heparin   Injectable 5000 Unit(s) SubCutaneous every 12 hours  lidocaine   4% Patch 1 Patch Transdermal every 24 hours PRN  melatonin 5 milliGRAM(s) Oral at bedtime PRN  midodrine. 5 milliGRAM(s) Oral once  naloxone Injectable 0.4 milliGRAM(s) IV Push once  oxyCODONE    IR 10 milliGRAM(s) Oral every 8 hours PRN  oxyCODONE    IR 15 milliGRAM(s) Oral every 8 hours PRN  polyethylene glycol 3350 17 Gram(s) Oral daily  senna 2 Tablet(s) Oral at bedtime  sevelamer carbonate 1600 milliGRAM(s) Oral three times a day with meals    A/P:    ESRD on HD   Adm w/Flu A  Low grade temps, w/up per primary team  S/p stable HD today  Renvela for high Phos  Midodrine prior to each HD to allow fluid removal   Renal diet     [phone]

## 2025-01-07 NOTE — PROGRESS NOTE ADULT - SUBJECTIVE AND OBJECTIVE BOX
Brent Mejia MD  Available on TEAMS    Patient is a 39y old  Male who presents with a chief complaint of Weakness (06 Jan 2025 19:42)      SUBJECTIVE / OVERNIGHT EVENTS: No acute events overnight. Patient was assessed at bedside.       REVIEW OF SYSTEMS:  CONSTITUTIONAL: No weakness, fevers, or chills  EYES/ENT: No visual changes; No vertigo, throat pain, or dysphagia  NECK: No pain or stiffness  RESPIRATORY: No cough, wheezing, hemoptysis, or shortness of breath  CARDIOVASCULAR: No chest pain or palpitations  GASTROINTESTINAL: No abdominal or epigastric pain. No nausea, vomiting, or hematemesis; No diarrhea or constipation. No melena or hematochezia.  GENITOURINARY: No dysuria, frequency, or hematuria  MUSCULOSKELETAL: No joint or muscle pain or aches  NEUROLOGICAL: No numbness or weakness  SKIN: No itching or rashes      MEDICATIONS  (STANDING):  epoetin carito (EPOGEN) Injectable 22960 Unit(s) IV Push once  heparin   Injectable 5000 Unit(s) SubCutaneous every 12 hours  midodrine. 5 milliGRAM(s) Oral once  naloxone Injectable 0.4 milliGRAM(s) IV Push once  polyethylene glycol 3350 17 Gram(s) Oral daily  senna 2 Tablet(s) Oral at bedtime  sevelamer carbonate 1600 milliGRAM(s) Oral three times a day with meals    MEDICATIONS  (PRN):  bisacodyl 5 milliGRAM(s) Oral daily PRN Constipation  lidocaine   4% Patch 1 Patch Transdermal every 24 hours PRN pain in the left upper arm  melatonin 5 milliGRAM(s) Oral at bedtime PRN Insomnia  oxyCODONE    IR 10 milliGRAM(s) Oral every 8 hours PRN Moderate Pain (4 - 6)  oxyCODONE    IR 15 milliGRAM(s) Oral every 8 hours PRN Severe Pain (7 - 10)      CAPILLARY BLOOD GLUCOSE        I&O's Summary    06 Jan 2025 07:01  -  07 Jan 2025 07:00  --------------------------------------------------------  IN: 800 mL / OUT: 0 mL / NET: 800 mL        Vital Signs Last 24 Hrs  T(C): 37.6 (07 Jan 2025 04:44), Max: 37.9 (06 Jan 2025 20:00)  T(F): 99.7 (07 Jan 2025 04:44), Max: 100.2 (06 Jan 2025 20:00)  HR: 95 (07 Jan 2025 04:44) (95 - 104)  BP: 153/88 (07 Jan 2025 04:44) (143/78 - 173/85)  BP(mean): --  RR: 18 (07 Jan 2025 04:44) (18 - 18)  SpO2: 94% (07 Jan 2025 04:44) (94% - 95%)    Parameters below as of 07 Jan 2025 04:44  Patient On (Oxygen Delivery Method): room air        PHYSICAL EXAM:  GENERAL: NAD, well-developed, well-nourished  HEAD: Atraumatic, Normocephalic  EYES: EOMI, PERRLA, conjunctiva and sclera clear  NECK: Supple, No JVD  CHEST/LUNG: Clear to auscultation bilaterally; No wheezes or crackles  HEART: Normal S1/S2; Regular rate and rhythm; No murmurs, rubs, or gallops  ABDOMEN: Soft, Nontender, Nondistended; Bowel sounds present  EXTREMITIES: 2+ Peripheral Pulses; No clubbing, cyanosis, or edema  PSYCH: A&Ox3  NEUROLOGY: no focal neurologic deficit  SKIN: No rashes or lesions    LABS:                        9.7    3.60  )-----------( 191      ( 06 Jan 2025 07:29 )             30.6      01-06    135  |  93[L]  |  62[H]  ----------------------------<  65[L]  5.1   |  22  |  9.18[H]    Ca    9.3      06 Jan 2025 07:28  Phos  7.1     01-06  Mg     2.5     01-06            Urinalysis Basic - ( 06 Jan 2025 07:28 )    Color: x / Appearance: x / SG: x / pH: x  Gluc: 65 mg/dL / Ketone: x  / Bili: x / Urobili: x   Blood: x / Protein: x / Nitrite: x   Leuk Esterase: x / RBC: x / WBC x   Sq Epi: x / Non Sq Epi: x / Bacteria: x        RADIOLOGY & ADDITIONAL TESTS:    Imaging Personally Reviewed:    Consultant(s) Notes Reviewed:      Care Discussed with Consultants/Other Providers:   Brent Mejia MD  Available on TEAMS    Patient is a 39y old  Male who presents with a chief complaint of Weakness (06 Jan 2025 19:42)      SUBJECTIVE / OVERNIGHT EVENTS: No acute events overnight. Patient was assessed at bedside. Appreciates continued pain in left arm. HD today      REVIEW OF SYSTEMS:  CONSTITUTIONAL: No weakness, fevers, or chills  EYES/ENT: No visual changes; No vertigo, throat pain, or dysphagia  NECK: No pain or stiffness  RESPIRATORY: No cough, wheezing, hemoptysis, or shortness of breath  CARDIOVASCULAR: No chest pain or palpitations  GASTROINTESTINAL: No abdominal or epigastric pain. No nausea, vomiting, or hematemesis; No diarrhea or constipation. No melena or hematochezia.  GENITOURINARY: No dysuria, frequency, or hematuria  MUSCULOSKELETAL: No joint or muscle pain or aches  NEUROLOGICAL: No numbness or weakness  SKIN: No itching or rashes      MEDICATIONS  (STANDING):  epoetin carito (EPOGEN) Injectable 41771 Unit(s) IV Push once  heparin   Injectable 5000 Unit(s) SubCutaneous every 12 hours  midodrine. 5 milliGRAM(s) Oral once  naloxone Injectable 0.4 milliGRAM(s) IV Push once  polyethylene glycol 3350 17 Gram(s) Oral daily  senna 2 Tablet(s) Oral at bedtime  sevelamer carbonate 1600 milliGRAM(s) Oral three times a day with meals    MEDICATIONS  (PRN):  bisacodyl 5 milliGRAM(s) Oral daily PRN Constipation  lidocaine   4% Patch 1 Patch Transdermal every 24 hours PRN pain in the left upper arm  melatonin 5 milliGRAM(s) Oral at bedtime PRN Insomnia  oxyCODONE    IR 10 milliGRAM(s) Oral every 8 hours PRN Moderate Pain (4 - 6)  oxyCODONE    IR 15 milliGRAM(s) Oral every 8 hours PRN Severe Pain (7 - 10)      CAPILLARY BLOOD GLUCOSE        I&O's Summary    06 Jan 2025 07:01  -  07 Jan 2025 07:00  --------------------------------------------------------  IN: 800 mL / OUT: 0 mL / NET: 800 mL        Vital Signs Last 24 Hrs  T(C): 37.6 (07 Jan 2025 04:44), Max: 37.9 (06 Jan 2025 20:00)  T(F): 99.7 (07 Jan 2025 04:44), Max: 100.2 (06 Jan 2025 20:00)  HR: 95 (07 Jan 2025 04:44) (95 - 104)  BP: 153/88 (07 Jan 2025 04:44) (143/78 - 173/85)  BP(mean): --  RR: 18 (07 Jan 2025 04:44) (18 - 18)  SpO2: 94% (07 Jan 2025 04:44) (94% - 95%)    Parameters below as of 07 Jan 2025 04:44  Patient On (Oxygen Delivery Method): room air        PHYSICAL EXAM:  GENERAL: NAD, well-developed, well-nourished  HEAD: Atraumatic, Normocephalic  EYES: EOMI, PERRLA, conjunctiva and sclera clear  NECK: Supple, No JVD  CHEST/LUNG: Clear to auscultation bilaterally; No wheezes or crackles  HEART: Normal S1/S2; Regular rate and rhythm; No murmurs, rubs, or gallops  ABDOMEN: Soft, Nontender, Nondistended; Bowel sounds present  EXTREMITIES: 2+ Peripheral Pulses; No clubbing, cyanosis, or edema  PSYCH: A&Ox3  NEUROLOGY: no focal neurologic deficit  SKIN: No rashes or lesions    LABS:                        9.7    3.60  )-----------( 191      ( 06 Jan 2025 07:29 )             30.6      01-06    135  |  93[L]  |  62[H]  ----------------------------<  65[L]  5.1   |  22  |  9.18[H]    Ca    9.3      06 Jan 2025 07:28  Phos  7.1     01-06  Mg     2.5     01-06            Urinalysis Basic - ( 06 Jan 2025 07:28 )    Color: x / Appearance: x / SG: x / pH: x  Gluc: 65 mg/dL / Ketone: x  / Bili: x / Urobili: x   Blood: x / Protein: x / Nitrite: x   Leuk Esterase: x / RBC: x / WBC x   Sq Epi: x / Non Sq Epi: x / Bacteria: x        RADIOLOGY & ADDITIONAL TESTS:    Imaging Personally Reviewed:    Consultant(s) Notes Reviewed:      Care Discussed with Consultants/Other Providers:

## 2025-01-07 NOTE — CHART NOTE - NSCHARTNOTEFT_GEN_A_CORE
During this hospitalization, patient was placed on BiPap for hypercarbia. Patient's hypercarbia resolved and he has no need for nocturnal CPAP/BiPap at this time. He can be discharged without CPAP/BiPap.

## 2025-01-07 NOTE — PROGRESS NOTE ADULT - PROBLEM SELECTOR PLAN 2
Patient complaining of increased warmth, pain, and swelling near the left upper extremity  Found to have left cephalic vein superficial thrombophlebitis on US 1/5  Will treat supportively with pain management   - consider anticoagulation if symptoms worsen    Plan  - Wound care consulted - patient increasingly somnolent on clinical exam on 1/3  - usually snoring, definitely some component of SOLITARIO  - ABG 1/3 PCO2 60 pH 7.27 and PO2 80s  - Patient has been non adherent to CPAP, will likely not need on DC    PLAN  - Will attempt BIPAP 1/3 ON, NC during day

## 2025-01-07 NOTE — PROVIDER CONTACT NOTE (OTHER) - ASSESSMENT
Pt awake alert oriented x4 complained he can't breathe with the BIPAP  Resp rate 20 O2Sat 100% no difficulty breathing noted while BIPAP is on
Temp 100.7 /85  RR 19 SPO2 95
pt temperature 101.8, other VSS, pt showing no signs and symptoms

## 2025-01-07 NOTE — PROGRESS NOTE ADULT - PROBLEM SELECTOR PLAN 3
Patient found to have influenza A, likely contributing to his symptoms.  - Oseltamivir 30mg QOD dosed after dialysis x 5 days Patient complaining of increased warmth, pain, and swelling near the left upper extremity  Found to have left cephalic vein superficial thrombophlebitis on US 1/5  Will treat supportively with pain management   - consider anticoagulation if symptoms worsen    PLAN  - Start Ancef [id number]

## 2025-01-07 NOTE — PROGRESS NOTE ADULT - PROBLEM SELECTOR PLAN 8
- Fluids: None  - Electrolytes: Will replete to maintain K>4, Phos>3, and Mag>2  - Nutrition: Renal Restricted  - Code: FULL  - Activity: As tolerated, pending PT eval  - DVT Prophylaxis: heparin Sub q,   - Stress Ulcer/GI Prophylaxis: N/A  - Disposition: Admit to medicine

## 2025-01-07 NOTE — PROGRESS NOTE ADULT - PROBLEM SELECTOR PLAN 5
Patient has ESRD with HD on Tuesday, Thursday, and Saturday.  Getting HD more frequently now.  - potassium 7 on BMP with increase of cr 5s to 10s.   - HD planned for 1/7 History of primary hypertension home meds: Metoprolol 25mg  - Unclear history of concommitant Labetalol use? Clarified, patient was transitioned to metoprolol  - no evidence of end-organ damage  - continue to monitor SBP  - DASH diet    Plan  - Will hold Metoprolol due to post HD pressure normotensive  - Will restart metoprolol if needed

## 2025-01-07 NOTE — PROGRESS NOTE ADULT - ASSESSMENT
39 year old with ESRD on HD, history of CVA, admitted with weakness from Influenza, also received piperacillin/tazobactam since admission (plus one dose of Vancomycin on presentation), who had been running mildly elevated temperature through the admission, now with new fever to 101.8, no obviously localizing signs/symptoms on exam other than significant LUE swelling which patient reports is new but accuracy of history is unclear  Normal WBC this morning  Patient at risk for post-influenza PNA, aspiration PNA, bacteremia from ESRD status, as well as have some concerns about LUE swelling  pt clinically stable  CT with GGO, atelectasis? No other signs of infection  Noninfectious process? Resolved infection?  In interim started on Cefazolin for LUE thrombophlebitis  Overall influenza infection, persistent fever, abnormal CXR    Cefazolin, 5 day course, consider PO Keflex if DC planning  blood cx in lab   trend cbc, no leucocytosis   Monitor for alternate sources infection    Tino Webber MD  Contact on TEAMS messaging from 9am - 5pm  From 5pm-9am, on weekends, or if no response call [phone]

## 2025-01-07 NOTE — PROGRESS NOTE ADULT - SUBJECTIVE AND OBJECTIVE BOX
CC: F/U for Thrombophlebitis    Saw/spoke to patient. Unchanged. Low grade temp elevation, started on cefazolin in the interim.    Allergies  shellfish (Hives)  No Known Drug Allergies    ANTIMICROBIALS:  ceFAZolin   IVPB 1000 every 24 hours    PE:    Vital Signs Last 24 Hrs  T(C): 37.2 (07 Jan 2025 11:00), Max: 37.9 (06 Jan 2025 20:00)  T(F): 99 (07 Jan 2025 11:00), Max: 100.2 (06 Jan 2025 20:00)  HR: 92 (07 Jan 2025 11:00) (92 - 104)  BP: 124/71 (07 Jan 2025 11:00) (124/71 - 173/85)  RR: 18 (07 Jan 2025 11:00) (18 - 18)  SpO2: 96% (07 Jan 2025 11:00) (94% - 96%)    Gen: AOx3, NAD, non-toxic  CV: Nontachycardic  Resp: Breathing comfortably, RA  Abd: Soft, nontender  IV/Skin: No thrombophlebitis    LABS:                        9.9    4.53  )-----------( 252      ( 07 Jan 2025 08:40 )             31.0     01-07    133[L]  |  91[L]  |  77[H]  ----------------------------<  73  5.3   |  22  |  11.21[H]    Ca    9.7      07 Jan 2025 08:40  Phos  7.1     01-06  Mg     2.5     01-06    Urinalysis Basic - ( 07 Jan 2025 08:40 )    Color: x / Appearance: x / SG: x / pH: x  Gluc: 73 mg/dL / Ketone: x  / Bili: x / Urobili: x   Blood: x / Protein: x / Nitrite: x   Leuk Esterase: x / RBC: x / WBC x   Sq Epi: x / Non Sq Epi: x / Bacteria: x    MICROBIOLOGY:    .Blood BLOOD  01-03-25   No growth at 72 Hours  --  --    .Blood BLOOD  01-03-25   No growth at 72 Hours  --  --    .Blood BLOOD  12-30-24   No growth at 5 days  --  --    .Blood BLOOD  12-30-24   No growth at 5 days  --  --    RADIOLOGY:    1/6 CT    IMPRESSION:  Scattered bilateral subsegmental atelectasis predominantly within the   lower lobes. Mild groundglass airspace opacity within the right lower   lobe, for which superimposed infection/inflammation is not excluded.    No evidence of intra-abdominal drainable collection or acute pathology.    Severe stigmata related to renal osteodystrophy.    Lucency through the right superior and inferior pubic rami consistent   with age indeterminant fracture.    Anterior wedge shaped fracture deformity of the T12 vertebral body with   associated kyphosis of the thoracolumbar spine.

## 2025-01-07 NOTE — PROGRESS NOTE ADULT - PROBLEM SELECTOR PLAN 7
Pt pulled up list of medications from portal for [facility]. Active Rx from [place] include calcium acetate, labetalol 100 mg BID, as well as metoprolol tartrate 25 mg BID, oxycodone 15 mg IR q8H (MDD 45 mg), ASA 81 mg daily, epoetin injections, lidocaine patch. Unclear why on two BB, verified with pt's pharmacy ([facility], [place] [facility]) - note active Rx for sevelamer carbonate 1600 mg TID, metoprolol tartrate 25 mg BID, ASA 81 mg and naloxone. BB held in setting of normal/soft BP - Fluids: None  - Electrolytes: Will replete to maintain K>4, Phos>3, and Mag>2  - Nutrition: Renal Restricted  - Code: FULL  - Activity: As tolerated, pending PT eval  - DVT Prophylaxis: heparin Sub q,   - Stress Ulcer/GI Prophylaxis: N/A  - Disposition: Admit to medicine

## 2025-01-08 PROCEDURE — 99232 SBSQ HOSP IP/OBS MODERATE 35: CPT

## 2025-01-08 PROCEDURE — G0545: CPT

## 2025-01-08 RX ORDER — CEFTRIAXONE SODIUM 1 G/1
1000 INJECTION, POWDER, FOR SOLUTION INTRAMUSCULAR; INTRAVENOUS EVERY 24 HOURS
Refills: 0 | Status: COMPLETED | OUTPATIENT
Start: 2025-01-08 | End: 2025-01-10

## 2025-01-08 RX ORDER — ACETAMINOPHEN 80 MG/.8ML
1000 SOLUTION/ DROPS ORAL ONCE
Refills: 0 | Status: COMPLETED | OUTPATIENT
Start: 2025-01-08 | End: 2025-01-08

## 2025-01-08 RX ADMIN — SEVELAMER CARBONATE 1600 MILLIGRAM(S): 800 TABLET, FILM COATED ORAL at 18:18

## 2025-01-08 RX ADMIN — HEPARIN SODIUM 5000 UNIT(S): 1000 INJECTION, SOLUTION INTRAVENOUS; SUBCUTANEOUS at 18:18

## 2025-01-08 RX ADMIN — Medication 100 MILLIGRAM(S): at 15:36

## 2025-01-08 RX ADMIN — HEPARIN SODIUM 5000 UNIT(S): 1000 INJECTION, SOLUTION INTRAVENOUS; SUBCUTANEOUS at 05:17

## 2025-01-08 RX ADMIN — Medication 17 GRAM(S): at 12:43

## 2025-01-08 RX ADMIN — MIDODRINE HYDROCHLORIDE 5 MILLIGRAM(S): 5 TABLET ORAL at 10:37

## 2025-01-08 RX ADMIN — MIDODRINE HYDROCHLORIDE 5 MILLIGRAM(S): 5 TABLET ORAL at 07:05

## 2025-01-08 RX ADMIN — ACETAMINOPHEN 1000 MILLIGRAM(S): 80 SOLUTION/ DROPS ORAL at 16:45

## 2025-01-08 RX ADMIN — SEVELAMER CARBONATE 1600 MILLIGRAM(S): 800 TABLET, FILM COATED ORAL at 12:43

## 2025-01-08 RX ADMIN — ACETAMINOPHEN 400 MILLIGRAM(S): 80 SOLUTION/ DROPS ORAL at 15:41

## 2025-01-08 RX ADMIN — CEFTRIAXONE SODIUM 100 MILLIGRAM(S): 1 INJECTION, POWDER, FOR SOLUTION INTRAMUSCULAR; INTRAVENOUS at 18:18

## 2025-01-08 NOTE — PROGRESS NOTE ADULT - PROBLEM SELECTOR PLAN 5
History of primary hypertension home meds: Metoprolol 25mg  - Unclear history of concommitant Labetalol use? Clarified, patient was transitioned to metoprolol  - no evidence of end-organ damage  - continue to monitor SBP  - DASH diet    Plan  - Will hold Metoprolol due to post HD pressure normotensive  - Will restart metoprolol if needed History of primary hypertension home meds: Metoprolol 25mg  - Unclear history of concommitant Labetalol use? Clarified, patient was transitioned to metoprolol  - no evidence of end-organ damage  - continue to monitor SBP  - DASH diet  - Will hold Metoprolol due to post HD pressure normotensive  - Will restart metoprolol if needed

## 2025-01-08 NOTE — PROGRESS NOTE ADULT - SUBJECTIVE AND OBJECTIVE BOX
S/p HD today     Vital Signs Last 24 Hrs  T(C): 37.6 (01-08-25 @ 21:23), Max: 38.1 (01-08-25 @ 15:40)  T(F): 99.7 (01-08-25 @ 21:23), Max: 100.5 (01-08-25 @ 15:40)  HR: 110 (01-08-25 @ 21:23) (71 - 110)  BP: 120/71 (01-08-25 @ 21:23) (99/58 - 156/74)  RR: 16 (01-08-25 @ 21:23) (16 - 18)  SpO2: 96% (01-08-25 @ 21:23) (91% - 100%)    I&O's Detail    07 Jan 2025 07:01  -  08 Jan 2025 07:00  --------------------------------------------------------  IN:    Oral Fluid: 550 mL  Total IN: 550 mL    OUT:    Other (mL): 2000 mL  Total OUT: 2000 mL    s1s2  b/l air entry  soft, ND  tr edema b/l CATHY KELLER AVF patent, LUE edema                                                                                                                                                                            9.9    4.53  )-----------( 252      ( 07 Jan 2025 08:40 )             31.0     07 Jan 2025 08:40    133    |  91     |  77     ----------------------------<  73     5.3     |  22     |  11.21    Ca    9.7        07 Jan 2025 08:40    Culture - Blood (collected 07 Jan 2025 14:25)  Source: .Blood BLOOD  Preliminary Report (08 Jan 2025 20:01):    No growth at 24 hours    bisacodyl 5 milliGRAM(s) Oral daily PRN  cefTRIAXone   IVPB 1000 milliGRAM(s) IV Intermittent every 24 hours  heparin   Injectable 5000 Unit(s) SubCutaneous every 12 hours  lidocaine   4% Patch 1 Patch Transdermal every 24 hours PRN  melatonin 5 milliGRAM(s) Oral at bedtime PRN  naloxone Injectable 0.4 milliGRAM(s) IV Push once  oxyCODONE    IR 10 milliGRAM(s) Oral every 8 hours PRN  oxyCODONE    IR 15 milliGRAM(s) Oral every 8 hours PRN  polyethylene glycol 3350 17 Gram(s) Oral daily  senna 2 Tablet(s) Oral at bedtime  sevelamer carbonate 1600 milliGRAM(s) Oral three times a day with meals    A/P:    ESRD on HD   Adm w/Flu A  Low grade temps, w/up, Abx per ID  HD MWF  Renvela for high Phos  Midodrine prior to each HD to allow fluid removal   Renal diet     [phone]

## 2025-01-08 NOTE — PROGRESS NOTE ADULT - PROBLEM SELECTOR PLAN 7
- Fluids: None  - Electrolytes: Will replete to maintain K>4, Phos>3, and Mag>2  - Nutrition: Renal Restricted  - Code: FULL  - Activity: As tolerated, pending PT eval  - DVT Prophylaxis: heparin Sub q,   - Stress Ulcer/GI Prophylaxis: N/A  - Disposition: Admit to medicine - Fluids: None  - Electrolytes: Will replete to maintain K>4, Phos>3, and Mag>2  - Nutrition: Renal Restricted  - Code: FULL  - Activity: As tolerated, pending PT eval  - DVT Prophylaxis: heparin Sub q,   - Stress Ulcer/GI Prophylaxis: N/A  - Disposition: Admit to medicine, pending PT/ JUSTINE placement

## 2025-01-08 NOTE — PROGRESS NOTE ADULT - SUBJECTIVE AND OBJECTIVE BOX
Brent Mejia MD  Available on TEAMS    Patient is a 39y old  Male who presents with a chief complaint of Weakness (07 Jan 2025 16:30)      SUBJECTIVE / OVERNIGHT EVENTS: No acute events overnight. Patient was assessed at bedside.       REVIEW OF SYSTEMS:  CONSTITUTIONAL: No weakness, fevers, or chills  EYES/ENT: No visual changes; No vertigo, throat pain, or dysphagia  NECK: No pain or stiffness  RESPIRATORY: No cough, wheezing, hemoptysis, or shortness of breath  CARDIOVASCULAR: No chest pain or palpitations  GASTROINTESTINAL: No abdominal or epigastric pain. No nausea, vomiting, or hematemesis; No diarrhea or constipation. No melena or hematochezia.  GENITOURINARY: No dysuria, frequency, or hematuria  MUSCULOSKELETAL: No joint or muscle pain or aches  NEUROLOGICAL: No numbness or weakness  SKIN: No itching or rashes      MEDICATIONS  (STANDING):  ceFAZolin   IVPB 1000 milliGRAM(s) IV Intermittent every 24 hours  heparin   Injectable 5000 Unit(s) SubCutaneous every 12 hours  midodrine. 5 milliGRAM(s) Oral once  naloxone Injectable 0.4 milliGRAM(s) IV Push once  polyethylene glycol 3350 17 Gram(s) Oral daily  senna 2 Tablet(s) Oral at bedtime  sevelamer carbonate 1600 milliGRAM(s) Oral three times a day with meals    MEDICATIONS  (PRN):  bisacodyl 5 milliGRAM(s) Oral daily PRN Constipation  lidocaine   4% Patch 1 Patch Transdermal every 24 hours PRN pain in the left upper arm  melatonin 5 milliGRAM(s) Oral at bedtime PRN Insomnia  oxyCODONE    IR 10 milliGRAM(s) Oral every 8 hours PRN Moderate Pain (4 - 6)  oxyCODONE    IR 15 milliGRAM(s) Oral every 8 hours PRN Severe Pain (7 - 10)      CAPILLARY BLOOD GLUCOSE        I&O's Summary    07 Jan 2025 07:01  -  08 Jan 2025 07:00  --------------------------------------------------------  IN: 550 mL / OUT: 2000 mL / NET: -1450 mL        Vital Signs Last 24 Hrs  T(C): 37.5 (08 Jan 2025 05:00), Max: 37.5 (08 Jan 2025 05:00)  T(F): 99.5 (08 Jan 2025 05:00), Max: 99.5 (08 Jan 2025 05:00)  HR: 102 (08 Jan 2025 05:00) (92 - 102)  BP: 119/66 (08 Jan 2025 05:00) (119/66 - 159/92)  BP(mean): --  RR: 18 (08 Jan 2025 05:00) (18 - 18)  SpO2: 91% (08 Jan 2025 05:00) (91% - 97%)    Parameters below as of 08 Jan 2025 05:00  Patient On (Oxygen Delivery Method): room air        PHYSICAL EXAM:  GENERAL: NAD, well-developed, well-nourished  HEAD: Atraumatic, Normocephalic  EYES: EOMI, PERRLA, conjunctiva and sclera clear  NECK: Supple, No JVD  CHEST/LUNG: Clear to auscultation bilaterally; No wheezes or crackles  HEART: Normal S1/S2; Regular rate and rhythm; No murmurs, rubs, or gallops  ABDOMEN: Soft, Nontender, Nondistended; Bowel sounds present  EXTREMITIES: 2+ Peripheral Pulses; No clubbing, cyanosis, or edema  PSYCH: A&Ox3  NEUROLOGY: no focal neurologic deficit  SKIN: No rashes or lesions    LABS:                        9.9    4.53  )-----------( 252      ( 07 Jan 2025 08:40 )             31.0      01-07    133[L]  |  91[L]  |  77[H]  ----------------------------<  73  5.3   |  22  |  11.21[H]    Ca    9.7      07 Jan 2025 08:40  Phos  7.1     01-06  Mg     2.5     01-06            Urinalysis Basic - ( 07 Jan 2025 08:40 )    Color: x / Appearance: x / SG: x / pH: x  Gluc: 73 mg/dL / Ketone: x  / Bili: x / Urobili: x   Blood: x / Protein: x / Nitrite: x   Leuk Esterase: x / RBC: x / WBC x   Sq Epi: x / Non Sq Epi: x / Bacteria: x        RADIOLOGY & ADDITIONAL TESTS:    Imaging Personally Reviewed:    Consultant(s) Notes Reviewed:      Care Discussed with Consultants/Other Providers:   Improved Brent Mejia MD  Available on TEAMS    Patient is a 39y old  Male who presents with a chief complaint of Weakness (07 Jan 2025 16:30)      SUBJECTIVE / OVERNIGHT EVENTS: No acute events overnight. Patient was assessed at bedside. Continued pain over old left AV fistula      REVIEW OF SYSTEMS:  CONSTITUTIONAL: No weakness, fevers, or chills  EYES/ENT: No visual changes; No vertigo, throat pain, or dysphagia  NECK: No pain or stiffness  RESPIRATORY: No cough, wheezing, hemoptysis, or shortness of breath  CARDIOVASCULAR: No chest pain or palpitations  GASTROINTESTINAL: No abdominal or epigastric pain. No nausea, vomiting, or hematemesis; No diarrhea or constipation. No melena or hematochezia.  GENITOURINARY: No dysuria, frequency, or hematuria  MUSCULOSKELETAL: No joint or muscle pain or aches  NEUROLOGICAL: No numbness or weakness  SKIN: No itching or rashes      MEDICATIONS  (STANDING):  ceFAZolin   IVPB 1000 milliGRAM(s) IV Intermittent every 24 hours  heparin   Injectable 5000 Unit(s) SubCutaneous every 12 hours  midodrine. 5 milliGRAM(s) Oral once  naloxone Injectable 0.4 milliGRAM(s) IV Push once  polyethylene glycol 3350 17 Gram(s) Oral daily  senna 2 Tablet(s) Oral at bedtime  sevelamer carbonate 1600 milliGRAM(s) Oral three times a day with meals    MEDICATIONS  (PRN):  bisacodyl 5 milliGRAM(s) Oral daily PRN Constipation  lidocaine   4% Patch 1 Patch Transdermal every 24 hours PRN pain in the left upper arm  melatonin 5 milliGRAM(s) Oral at bedtime PRN Insomnia  oxyCODONE    IR 10 milliGRAM(s) Oral every 8 hours PRN Moderate Pain (4 - 6)  oxyCODONE    IR 15 milliGRAM(s) Oral every 8 hours PRN Severe Pain (7 - 10)      CAPILLARY BLOOD GLUCOSE        I&O's Summary    07 Jan 2025 07:01  -  08 Jan 2025 07:00  --------------------------------------------------------  IN: 550 mL / OUT: 2000 mL / NET: -1450 mL        Vital Signs Last 24 Hrs  T(C): 37.5 (08 Jan 2025 05:00), Max: 37.5 (08 Jan 2025 05:00)  T(F): 99.5 (08 Jan 2025 05:00), Max: 99.5 (08 Jan 2025 05:00)  HR: 102 (08 Jan 2025 05:00) (92 - 102)  BP: 119/66 (08 Jan 2025 05:00) (119/66 - 159/92)  BP(mean): --  RR: 18 (08 Jan 2025 05:00) (18 - 18)  SpO2: 91% (08 Jan 2025 05:00) (91% - 97%)    Parameters below as of 08 Jan 2025 05:00  Patient On (Oxygen Delivery Method): room air        PHYSICAL EXAM:  GENERAL: NAD, well-developed, well-nourished  HEAD: Atraumatic, Normocephalic  EYES: EOMI, PERRLA, conjunctiva and sclera clear  NECK: Supple, No JVD  CHEST/LUNG: Clear to auscultation bilaterally; No wheezes or crackles  HEART: Normal S1/S2; Regular rate and rhythm; No murmurs, rubs, or gallops  ABDOMEN: Soft, Nontender, Nondistended; Bowel sounds present  EXTREMITIES: 2+ Peripheral Pulses; No clubbing, cyanosis, or edema  PSYCH: A&Ox3  NEUROLOGY: no focal neurologic deficit  SKIN: No rashes or lesions    LABS:                        9.9    4.53  )-----------( 252      ( 07 Jan 2025 08:40 )             31.0      01-07    133[L]  |  91[L]  |  77[H]  ----------------------------<  73  5.3   |  22  |  11.21[H]    Ca    9.7      07 Jan 2025 08:40  Phos  7.1     01-06  Mg     2.5     01-06            Urinalysis Basic - ( 07 Jan 2025 08:40 )    Color: x / Appearance: x / SG: x / pH: x  Gluc: 73 mg/dL / Ketone: x  / Bili: x / Urobili: x   Blood: x / Protein: x / Nitrite: x   Leuk Esterase: x / RBC: x / WBC x   Sq Epi: x / Non Sq Epi: x / Bacteria: x        RADIOLOGY & ADDITIONAL TESTS:    Imaging Personally Reviewed:    Consultant(s) Notes Reviewed:      Care Discussed with Consultants/Other Providers:

## 2025-01-08 NOTE — PROGRESS NOTE ADULT - SUBJECTIVE AND OBJECTIVE BOX
CC: F/U for Thrombophlebitis    Saw/spoke to patient. Intermittent low grade fevers, but no other source found, no other symptoms.    Allergies  shellfish (Hives)  No Known Drug Allergies    ANTIMICROBIALS:  ceFAZolin   IVPB 1000 every 24 hours    PE:    Vital Signs Last 24 Hrs  T(C): 38.1 (08 Jan 2025 15:40), Max: 38.1 (08 Jan 2025 15:40)  T(F): 100.5 (08 Jan 2025 15:40), Max: 100.5 (08 Jan 2025 15:40)  HR: 77 (08 Jan 2025 15:40) (71 - 102)  BP: 99/58 (08 Jan 2025 12:03) (99/58 - 156/74)  RR: 16 (08 Jan 2025 07:40) (16 - 18)  SpO2: 98% (08 Jan 2025 12:03) (91% - 98%)    Gen: AOx3, NAD, non-toxic  CV: Nontachycardic  Resp: Breathing comfortably, RA  Abd: Soft, nontender  IV/Skin: No thrombophlebitis    LABS:                        9.9    4.53  )-----------( 252      ( 07 Jan 2025 08:40 )             31.0     01-07    133[L]  |  91[L]  |  77[H]  ----------------------------<  73  5.3   |  22  |  11.21[H]    Ca    9.7      07 Jan 2025 08:40    Urinalysis Basic - ( 07 Jan 2025 08:40 )    Color: x / Appearance: x / SG: x / pH: x  Gluc: 73 mg/dL / Ketone: x  / Bili: x / Urobili: x   Blood: x / Protein: x / Nitrite: x   Leuk Esterase: x / RBC: x / WBC x   Sq Epi: x / Non Sq Epi: x / Bacteria: x    MICROBIOLOGY:    .Blood BLOOD  01-03-25   No growth at 4 days  --  --    .Blood BLOOD  01-03-25   No growth at 4 days  --  --    .Blood BLOOD  12-30-24   No growth at 5 days  --  --    .Blood BLOOD  12-30-24   No growth at 5 days  --  --    RADIOLOGY:    1/6 CT    IMPRESSION:  Scattered bilateral subsegmental atelectasis predominantly within the   lower lobes. Mild groundglass airspace opacity within the right lower   lobe, for which superimposed infection/inflammation is not excluded.    No evidence of intra-abdominal drainable collection or acute pathology.    Severe stigmata related to renal osteodystrophy.    Lucency through the right superior and inferior pubic rami consistent   with age indeterminant fracture.    Anterior wedge shaped fracture deformity of the T12 vertebral body with   associated kyphosis of the thoracolumbar spine.

## 2025-01-08 NOTE — PROGRESS NOTE ADULT - PROBLEM SELECTOR PLAN 3
Patient complaining of increased warmth, pain, and swelling near the left upper extremity  Found to have left cephalic vein superficial thrombophlebitis on US 1/5  Will treat supportively with pain management   - consider anticoagulation if symptoms worsen    PLAN  - Start Ancef [id number]

## 2025-01-08 NOTE — PROGRESS NOTE ADULT - PROBLEM SELECTOR PLAN 2
- patient increasingly somnolent on clinical exam on 1/3  - usually snoring, definitely some component of SOLITARIO  - ABG 1/3 PCO2 60 pH 7.27 and PO2 80s  - Patient has been non adherent to CPAP, will likely not need on DC    PLAN  - Will attempt BIPAP 1/3 ON, NC during day - patient increasingly somnolent on clinical exam on 1/3  - usually snoring, definitely some component of SOLITARIO  - ABG 1/3 PCO2 60 pH 7.27 and PO2 80s  - Patient has been non adherent to CPAP, will likely not need on DC  - Will attempt BIPAP 1/3 ON, NC during day

## 2025-01-08 NOTE — PROGRESS NOTE ADULT - PROBLEM SELECTOR PLAN 4
Patient has ESRD with HD on Tuesday, Thursday, and Saturday  - Nephro consulted and T/Th/Sat dialysis in place Patient has ESRD with HD on Tuesday, Thursday, and Saturday  - Nephro consulted and T/Th/Sat dialysis in place    Plan  - HD session today iso increase IV contrast from previous imaging

## 2025-01-08 NOTE — PROGRESS NOTE ADULT - PROBLEM SELECTOR PLAN 1
Patient spiked fever of 100.7 1/6 ON, s/p Tylenol  BxCx (1/3): NGTD,   CT Chest: Scattered bilateral subsegmental atelectasis predominantly within the   lower lobes. Mild groundglass airspace opacity within the right lower   lobe, for which superimposed infection/inflammation is not excluded.  DDx: pneumonia vs. cellulitis from upper extremity     PLAN  - BxCx x2  - Start Ancef [id number]  - Pending ID recommendations Patient spiked fever of 100.7 1/6 ON, s/p Tylenol  BxCx (1/3): NGTD,   CT Chest: Scattered bilateral subsegmental atelectasis predominantly within the   lower lobes. Mild groundglass airspace opacity within the right lower   lobe, for which superimposed infection/inflammation is not excluded.  DDx: pneumonia vs. cellulitis from upper extremity   Pending repeat Cx    PLAN  - Start Ancef [id number] for 5 day course

## 2025-01-08 NOTE — PROGRESS NOTE ADULT - ASSESSMENT
39 year old with ESRD on HD, history of CVA, admitted with weakness from Influenza, also received piperacillin/tazobactam since admission (plus one dose of Vancomycin on presentation), who had been running mildly elevated temperature through the admission, now with new fever to 101.8, no obviously localizing signs/symptoms on exam other than significant LUE swelling which patient reports is new but accuracy of history is unclear  Normal WBC this morning  Patient at risk for post-influenza PNA, aspiration PNA, bacteremia from ESRD status, as well as have some concerns about LUE swelling  pt clinically stable  CT with GGO, atelectasis? No other signs of infection  Noninfectious process? Resolved infection?  In interim started on Cefazolin for LUE thrombophlebitis  Overall influenza infection, persistent fever, abnormal CXR    Change Cefazolin to Ceftriaxone 1g q 24  Obtain dental eval  F/U BCX  trend cbc, no leucocytosis   Monitor for alternate sources infection    Tino Webber MD  Contact on TEAMS messaging from 9am - 5pm  From 5pm-9am, on weekends, or if no response call [phone]

## 2025-01-09 LAB
ALBUMIN SERPL ELPH-MCNC: 3.2 G/DL — LOW (ref 3.3–5)
ALP SERPL-CCNC: 554 U/L — HIGH (ref 40–120)
ALT FLD-CCNC: <5 U/L — LOW (ref 10–45)
ANION GAP SERPL CALC-SCNC: 19 MMOL/L — HIGH (ref 5–17)
AST SERPL-CCNC: 11 U/L — SIGNIFICANT CHANGE UP (ref 10–40)
BILIRUB SERPL-MCNC: 0.3 MG/DL — SIGNIFICANT CHANGE UP (ref 0.2–1.2)
BUN SERPL-MCNC: 45 MG/DL — HIGH (ref 7–23)
CALCIUM SERPL-MCNC: 10 MG/DL — SIGNIFICANT CHANGE UP (ref 8.4–10.5)
CHLORIDE SERPL-SCNC: 94 MMOL/L — LOW (ref 96–108)
CO2 SERPL-SCNC: 25 MMOL/L — SIGNIFICANT CHANGE UP (ref 22–31)
CREAT SERPL-MCNC: 7.34 MG/DL — HIGH (ref 0.5–1.3)
EGFR: 9 ML/MIN/1.73M2 — LOW
GLUCOSE SERPL-MCNC: 81 MG/DL — SIGNIFICANT CHANGE UP (ref 70–99)
HCT VFR BLD CALC: 33.2 % — LOW (ref 39–50)
HGB BLD-MCNC: 10.4 G/DL — LOW (ref 13–17)
MAGNESIUM SERPL-MCNC: 2.4 MG/DL — SIGNIFICANT CHANGE UP (ref 1.6–2.6)
MCHC RBC-ENTMCNC: 31.3 G/DL — LOW (ref 32–36)
MCHC RBC-ENTMCNC: 32 PG — SIGNIFICANT CHANGE UP (ref 27–34)
MCV RBC AUTO: 102.2 FL — HIGH (ref 80–100)
NRBC # BLD: 0 /100 WBCS — SIGNIFICANT CHANGE UP (ref 0–0)
PHOSPHATE SERPL-MCNC: 6.2 MG/DL — HIGH (ref 2.5–4.5)
PLATELET # BLD AUTO: 315 K/UL — SIGNIFICANT CHANGE UP (ref 150–400)
POTASSIUM SERPL-MCNC: 4.4 MMOL/L — SIGNIFICANT CHANGE UP (ref 3.5–5.3)
POTASSIUM SERPL-SCNC: 4.4 MMOL/L — SIGNIFICANT CHANGE UP (ref 3.5–5.3)
PROT SERPL-MCNC: 7.1 G/DL — SIGNIFICANT CHANGE UP (ref 6–8.3)
RBC # BLD: 3.25 M/UL — LOW (ref 4.2–5.8)
RBC # FLD: 15.4 % — HIGH (ref 10.3–14.5)
SODIUM SERPL-SCNC: 138 MMOL/L — SIGNIFICANT CHANGE UP (ref 135–145)
WBC # BLD: 3.74 K/UL — LOW (ref 3.8–10.5)
WBC # FLD AUTO: 3.74 K/UL — LOW (ref 3.8–10.5)

## 2025-01-09 PROCEDURE — G0545: CPT

## 2025-01-09 PROCEDURE — 99232 SBSQ HOSP IP/OBS MODERATE 35: CPT | Mod: GC

## 2025-01-09 PROCEDURE — 99232 SBSQ HOSP IP/OBS MODERATE 35: CPT

## 2025-01-09 RX ADMIN — SEVELAMER CARBONATE 1600 MILLIGRAM(S): 800 TABLET, FILM COATED ORAL at 18:02

## 2025-01-09 RX ADMIN — SENNOSIDES 2 TABLET(S): 8.6 TABLET, FILM COATED ORAL at 22:51

## 2025-01-09 RX ADMIN — Medication 15 MILLIGRAM(S): at 18:02

## 2025-01-09 RX ADMIN — SEVELAMER CARBONATE 1600 MILLIGRAM(S): 800 TABLET, FILM COATED ORAL at 13:16

## 2025-01-09 RX ADMIN — HEPARIN SODIUM 5000 UNIT(S): 1000 INJECTION, SOLUTION INTRAVENOUS; SUBCUTANEOUS at 05:17

## 2025-01-09 RX ADMIN — SEVELAMER CARBONATE 1600 MILLIGRAM(S): 800 TABLET, FILM COATED ORAL at 10:57

## 2025-01-09 RX ADMIN — Medication 10 MILLIGRAM(S): at 02:28

## 2025-01-09 RX ADMIN — CEFTRIAXONE SODIUM 100 MILLIGRAM(S): 1 INJECTION, POWDER, FOR SOLUTION INTRAMUSCULAR; INTRAVENOUS at 18:03

## 2025-01-09 NOTE — PROGRESS NOTE ADULT - SUBJECTIVE AND OBJECTIVE BOX
No distress    Vital Signs Last 24 Hrs  T(C): 36.9 (01-09-25 @ 21:08), Max: 37.2 (01-09-25 @ 12:00)  T(F): 98.5 (01-09-25 @ 21:08), Max: 98.9 (01-09-25 @ 12:00)  HR: 98 (01-09-25 @ 21:08) (92 - 98)  BP: 125/72 (01-09-25 @ 21:08) (121/70 - 135/79)  RR: 17 (01-09-25 @ 21:08) (16 - 17)  SpO2: 97% (01-09-25 @ 21:08) (95% - 97%)    s1s2  b/l air entry  soft, ND  RUE AVF patent                                                                                                                                                                                10.4   3.74  )-----------( 315      ( 09 Jan 2025 11:19 )             33.2     09 Jan 2025 11:19    138    |  94     |  45     ----------------------------<  81     4.4     |  25     |  7.34     Ca    10.0       09 Jan 2025 11:19  Phos  6.2       09 Jan 2025 11:19  Mg     2.4       09 Jan 2025 11:19    TPro  7.1    /  Alb  3.2    /  TBili  0.3    /  DBili  x      /  AST  11     /  ALT  <5     /  AlkPhos  554    09 Jan 2025 11:19    LIVER FUNCTIONS - ( 09 Jan 2025 11:19 )  Alb: 3.2 g/dL / Pro: 7.1 g/dL / ALK PHOS: 554 U/L / ALT: <5 U/L / AST: 11 U/L / GGT: x           Culture - Blood (collected 07 Jan 2025 14:25)  Source: .Blood BLOOD  Preliminary Report (09 Jan 2025 20:01):    No growth at 48 Hours    bisacodyl 5 milliGRAM(s) Oral daily PRN  cefTRIAXone   IVPB 1000 milliGRAM(s) IV Intermittent every 24 hours  heparin   Injectable 5000 Unit(s) SubCutaneous every 12 hours  lidocaine   4% Patch 1 Patch Transdermal every 24 hours PRN  melatonin 5 milliGRAM(s) Oral at bedtime PRN  naloxone Injectable 0.4 milliGRAM(s) IV Push once  oxyCODONE    IR 10 milliGRAM(s) Oral every 8 hours PRN  oxyCODONE    IR 15 milliGRAM(s) Oral every 8 hours PRN  polyethylene glycol 3350 17 Gram(s) Oral daily  senna 2 Tablet(s) Oral at bedtime  sevelamer carbonate 1600 milliGRAM(s) Oral three times a day with meals    A/P:    ESRD on HD   Adm w/Flu A  HD tomorrow as ordered   Renvela for high Phos  Renal diet     [phone]

## 2025-01-09 NOTE — PROGRESS NOTE ADULT - PROBLEM SELECTOR PLAN 4
Patient has ESRD with HD on Tuesday, Thursday, and Saturday  - Nephro consulted and T/Th/Sat dialysis in place    Plan  - HD session today iso increase IV contrast from previous imaging Patient has ESRD with HD on Tuesday, Thursday, and Saturday  - Nephro consulted and T/Th/Sat dialysis in place    Plan  - HD TTE Patient has ESRD with HD on Tuesday, Thursday, and Saturday  - Nephro consulted and T/Th/Sat dialysis in place  - Additional session 1/8 for increased contrast material  Plan  - HD T/Th/Sa

## 2025-01-09 NOTE — PROGRESS NOTE ADULT - SUBJECTIVE AND OBJECTIVE BOX
CC: F/U for FEver    Saw/spoke to patient. No fevers, no chills. No new complaints.    Allergies  shellfish (Hives)  No Known Drug Allergies    ANTIMICROBIALS:  cefTRIAXone   IVPB 1000 every 24 hours    PE:    Vital Signs Last 24 Hrs  T(C): 37.2 (09 Jan 2025 12:00), Max: 38.1 (08 Jan 2025 15:40)  T(F): 98.9 (09 Jan 2025 12:00), Max: 100.5 (08 Jan 2025 15:40)  HR: 93 (09 Jan 2025 12:00) (77 - 110)  BP: 135/79 (09 Jan 2025 12:00) (120/71 - 135/79)  RR: 17 (09 Jan 2025 12:00) (16 - 17)  SpO2: 96% (09 Jan 2025 12:00) (95% - 96%)    Gen: AOx3, NAD, non-toxic  CV: Nontachycardic  Resp: Breathing comfortably, RA  Abd: Soft, nontender  IV/Skin: No thrombophlebitis    LABS:                        10.4   3.74  )-----------( 315      ( 09 Jan 2025 11:19 )             33.2     01-09    138  |  94[L]  |  45[H]  ----------------------------<  81  4.4   |  25  |  7.34[H]    Ca    10.0      09 Jan 2025 11:19  Phos  6.2     01-09  Mg     2.4     01-09    TPro  7.1  /  Alb  3.2[L]  /  TBili  0.3  /  DBili  x   /  AST  11  /  ALT  <5[L]  /  AlkPhos  554[H]  01-09    Urinalysis Basic - ( 09 Jan 2025 11:19 )    Color: x / Appearance: x / SG: x / pH: x  Gluc: 81 mg/dL / Ketone: x  / Bili: x / Urobili: x   Blood: x / Protein: x / Nitrite: x   Leuk Esterase: x / RBC: x / WBC x   Sq Epi: x / Non Sq Epi: x / Bacteria: x    MICROBIOLOGY:    .Blood BLOOD  01-07-25   No growth at 24 hours  --  --    .Blood BLOOD  01-03-25   No growth at 5 days  --  --    .Blood BLOOD  01-03-25   No growth at 5 days  --  --    .Blood BLOOD  12-30-24   No growth at 5 days  --  --    .Blood BLOOD  12-30-24   No growth at 5 days  --  --    RADIOLOGY:    1/6 CT    IMPRESSION:  Scattered bilateral subsegmental atelectasis predominantly within the   lower lobes. Mild groundglass airspace opacity within the right lower   lobe, for which superimposed infection/inflammation is not excluded.    No evidence of intra-abdominal drainable collection or acute pathology.    Severe stigmata related to renal osteodystrophy.    Lucency through the right superior and inferior pubic rami consistent   with age indeterminant fracture.    Anterior wedge shaped fracture deformity of the T12 vertebral body with   associated kyphosis of the thoracolumbar spine. Patient did not show for pft/ct follow up appointment  with Dr. Derek Jesus on 6/27/19    Same Day Cancellation: No    Patient rescheduled:  No    Patient was also no show on: 6/19/19    LOV 6/5/19

## 2025-01-09 NOTE — PROGRESS NOTE ADULT - SUBJECTIVE AND OBJECTIVE BOX
Brent Mejia MD  Available on TEAMS    Patient is a 39y old  Male who presents with a chief complaint of Weakness (08 Jan 2025 21:37)      SUBJECTIVE / OVERNIGHT EVENTS: No acute events overnight. Patient was assessed at bedside.       REVIEW OF SYSTEMS:  CONSTITUTIONAL: No weakness, fevers, or chills  EYES/ENT: No visual changes; No vertigo, throat pain, or dysphagia  NECK: No pain or stiffness  RESPIRATORY: No cough, wheezing, hemoptysis, or shortness of breath  CARDIOVASCULAR: No chest pain or palpitations  GASTROINTESTINAL: No abdominal or epigastric pain. No nausea, vomiting, or hematemesis; No diarrhea or constipation. No melena or hematochezia.  GENITOURINARY: No dysuria, frequency, or hematuria  MUSCULOSKELETAL: No joint or muscle pain or aches  NEUROLOGICAL: No numbness or weakness  SKIN: No itching or rashes      MEDICATIONS  (STANDING):  cefTRIAXone   IVPB 1000 milliGRAM(s) IV Intermittent every 24 hours  heparin   Injectable 5000 Unit(s) SubCutaneous every 12 hours  naloxone Injectable 0.4 milliGRAM(s) IV Push once  polyethylene glycol 3350 17 Gram(s) Oral daily  senna 2 Tablet(s) Oral at bedtime  sevelamer carbonate 1600 milliGRAM(s) Oral three times a day with meals    MEDICATIONS  (PRN):  bisacodyl 5 milliGRAM(s) Oral daily PRN Constipation  lidocaine   4% Patch 1 Patch Transdermal every 24 hours PRN pain in the left upper arm  melatonin 5 milliGRAM(s) Oral at bedtime PRN Insomnia  oxyCODONE    IR 10 milliGRAM(s) Oral every 8 hours PRN Moderate Pain (4 - 6)  oxyCODONE    IR 15 milliGRAM(s) Oral every 8 hours PRN Severe Pain (7 - 10)      CAPILLARY BLOOD GLUCOSE        I&O's Summary    08 Jan 2025 07:01  -  09 Jan 2025 07:00  --------------------------------------------------------  IN: 200 mL / OUT: 0 mL / NET: 200 mL        Vital Signs Last 24 Hrs  T(C): 37.1 (09 Jan 2025 04:36), Max: 38.1 (08 Jan 2025 15:40)  T(F): 98.7 (09 Jan 2025 04:36), Max: 100.5 (08 Jan 2025 15:40)  HR: 92 (09 Jan 2025 04:36) (71 - 110)  BP: 121/70 (09 Jan 2025 04:36) (99/58 - 121/70)  BP(mean): --  RR: 16 (09 Jan 2025 04:36) (16 - 16)  SpO2: 95% (09 Jan 2025 04:36) (95% - 100%)    Parameters below as of 09 Jan 2025 04:36  Patient On (Oxygen Delivery Method): room air        PHYSICAL EXAM:  GENERAL: NAD, well-developed, well-nourished  HEAD: Atraumatic, Normocephalic  EYES: EOMI, PERRLA, conjunctiva and sclera clear  NECK: Supple, No JVD  CHEST/LUNG: Clear to auscultation bilaterally; No wheezes or crackles  HEART: Normal S1/S2; Regular rate and rhythm; No murmurs, rubs, or gallops  ABDOMEN: Soft, Nontender, Nondistended; Bowel sounds present  EXTREMITIES: 2+ Peripheral Pulses; No clubbing, cyanosis, or edema  PSYCH: A&Ox3  NEUROLOGY: no focal neurologic deficit  SKIN: No rashes or lesions    LABS:                        9.9    4.53  )-----------( 252      ( 07 Jan 2025 08:40 )             31.0      01-07    133[L]  |  91[L]  |  77[H]  ----------------------------<  73  5.3   |  22  |  11.21[H]    Ca    9.7      07 Jan 2025 08:40            Urinalysis Basic - ( 07 Jan 2025 08:40 )    Color: x / Appearance: x / SG: x / pH: x  Gluc: 73 mg/dL / Ketone: x  / Bili: x / Urobili: x   Blood: x / Protein: x / Nitrite: x   Leuk Esterase: x / RBC: x / WBC x   Sq Epi: x / Non Sq Epi: x / Bacteria: x        RADIOLOGY & ADDITIONAL TESTS:    Imaging Personally Reviewed:    Consultant(s) Notes Reviewed:      Care Discussed with Consultants/Other Providers:   Brent Mejia MD  Available on TEAMS    Patient is a 39y old  Male who presents with a chief complaint of Weakness (08 Jan 2025 21:37)      SUBJECTIVE / OVERNIGHT EVENTS: No acute events overnight. Patient was assessed at bedside. Continues to endorse pain in the right upper extremity, unchanged from baseline      REVIEW OF SYSTEMS:  CONSTITUTIONAL: No weakness, fevers, or chills  EYES/ENT: No visual changes; No vertigo, throat pain, or dysphagia  NECK: No pain or stiffness  RESPIRATORY: No cough, wheezing, hemoptysis, or shortness of breath  CARDIOVASCULAR: No chest pain or palpitations  GASTROINTESTINAL: No abdominal or epigastric pain. No nausea, vomiting, or hematemesis; No diarrhea or constipation. No melena or hematochezia.  GENITOURINARY: No dysuria, frequency, or hematuria  MUSCULOSKELETAL: No joint or muscle pain or aches  NEUROLOGICAL: No numbness or weakness  SKIN: No itching or rashes      MEDICATIONS  (STANDING):  cefTRIAXone   IVPB 1000 milliGRAM(s) IV Intermittent every 24 hours  heparin   Injectable 5000 Unit(s) SubCutaneous every 12 hours  naloxone Injectable 0.4 milliGRAM(s) IV Push once  polyethylene glycol 3350 17 Gram(s) Oral daily  senna 2 Tablet(s) Oral at bedtime  sevelamer carbonate 1600 milliGRAM(s) Oral three times a day with meals    MEDICATIONS  (PRN):  bisacodyl 5 milliGRAM(s) Oral daily PRN Constipation  lidocaine   4% Patch 1 Patch Transdermal every 24 hours PRN pain in the left upper arm  melatonin 5 milliGRAM(s) Oral at bedtime PRN Insomnia  oxyCODONE    IR 10 milliGRAM(s) Oral every 8 hours PRN Moderate Pain (4 - 6)  oxyCODONE    IR 15 milliGRAM(s) Oral every 8 hours PRN Severe Pain (7 - 10)      CAPILLARY BLOOD GLUCOSE        I&O's Summary    08 Jan 2025 07:01  -  09 Jan 2025 07:00  --------------------------------------------------------  IN: 200 mL / OUT: 0 mL / NET: 200 mL        Vital Signs Last 24 Hrs  T(C): 37.1 (09 Jan 2025 04:36), Max: 38.1 (08 Jan 2025 15:40)  T(F): 98.7 (09 Jan 2025 04:36), Max: 100.5 (08 Jan 2025 15:40)  HR: 92 (09 Jan 2025 04:36) (71 - 110)  BP: 121/70 (09 Jan 2025 04:36) (99/58 - 121/70)  BP(mean): --  RR: 16 (09 Jan 2025 04:36) (16 - 16)  SpO2: 95% (09 Jan 2025 04:36) (95% - 100%)    Parameters below as of 09 Jan 2025 04:36  Patient On (Oxygen Delivery Method): room air        PHYSICAL EXAM:  GENERAL: NAD, well-developed, well-nourished  HEAD: Atraumatic, Normocephalic  EYES: EOMI, PERRLA, conjunctiva and sclera clear  NECK: Supple, No JVD  CHEST/LUNG: Clear to auscultation bilaterally; No wheezes or crackles  HEART: Normal S1/S2; Regular rate and rhythm; No murmurs, rubs, or gallops  ABDOMEN: Soft, Nontender, Nondistended; Bowel sounds present  EXTREMITIES: 2+ Peripheral Pulses; No clubbing, cyanosis, or edema  PSYCH: A&Ox3  NEUROLOGY: no focal neurologic deficit  SKIN: No rashes or lesions    LABS:                        9.9    4.53  )-----------( 252      ( 07 Jan 2025 08:40 )             31.0      01-07    133[L]  |  91[L]  |  77[H]  ----------------------------<  73  5.3   |  22  |  11.21[H]    Ca    9.7      07 Jan 2025 08:40            Urinalysis Basic - ( 07 Jan 2025 08:40 )    Color: x / Appearance: x / SG: x / pH: x  Gluc: 73 mg/dL / Ketone: x  / Bili: x / Urobili: x   Blood: x / Protein: x / Nitrite: x   Leuk Esterase: x / RBC: x / WBC x   Sq Epi: x / Non Sq Epi: x / Bacteria: x        RADIOLOGY & ADDITIONAL TESTS:    Imaging Personally Reviewed:    Consultant(s) Notes Reviewed:      Care Discussed with Consultants/Other Providers:

## 2025-01-09 NOTE — PROGRESS NOTE ADULT - PROBLEM SELECTOR PLAN 5
History of primary hypertension home meds: Metoprolol 25mg  - Unclear history of concommitant Labetalol use? Clarified, patient was transitioned to metoprolol  - no evidence of end-organ damage  - continue to monitor SBP  - DASH diet  - Will hold Metoprolol due to post HD pressure normotensive  - Will restart metoprolol if needed

## 2025-01-09 NOTE — PROGRESS NOTE ADULT - PROBLEM SELECTOR PLAN 2
- patient increasingly somnolent on clinical exam on 1/3  - usually snoring, definitely some component of SOLITARIO  - ABG 1/3 PCO2 60 pH 7.27 and PO2 80s  - Patient has been non adherent to CPAP, will likely not need on DC  - Will attempt BIPAP 1/3 ON, NC during day - patient increasingly somnolent on clinical exam on 1/3  - usually snoring, definitely some component of SOLITARIO  - ABG 1/3 PCO2 60 pH 7.27 and PO2 80s  - Patient has been non adherent to CPAP, will likely not need on DC  - Will attempt BIPAP 1/3 ON, NC during day    RESOLVED

## 2025-01-09 NOTE — PROGRESS NOTE ADULT - PROBLEM SELECTOR PLAN 3
Patient complaining of increased warmth, pain, and swelling near the left upper extremity  Found to have left cephalic vein superficial thrombophlebitis on US 1/5  Will treat supportively with pain management   - consider anticoagulation if symptoms worsen    PLAN  - Start Ancef [id number] Patient complaining of increased warmth, pain, and swelling near the left upper extremity  Found to have left cephalic vein superficial thrombophlebitis on US 1/5  Will treat supportively with pain management   - consider anticoagulation if symptoms worsen    PLAN  - Ceftriaxone 1gq24h (1/8-

## 2025-01-09 NOTE — PROGRESS NOTE ADULT - ASSESSMENT
39 year old with ESRD on HD, history of CVA, admitted with weakness from Influenza, also received piperacillin/tazobactam since admission (plus one dose of Vancomycin on presentation), who had been running mildly elevated temperature through the admission, now with new fever to 101.8, no obviously localizing signs/symptoms on exam other than significant LUE swelling which patient reports is new but accuracy of history is unclear  Normal WBC this morning  Patient at risk for post-influenza PNA, aspiration PNA, bacteremia from ESRD status, as well as have some concerns about LUE swelling  pt clinically stable  CT with GGO, atelectasis? No other signs of infection  Noninfectious process? Resolved infection?  In interim started on Cefazolin for LUE thrombophlebitis  Overall influenza infection, persistent fever, abnormal CXR    Ceftriaxone 1g q 24  Obtain dental eval  F/U BCX  trend cbc, no leucocytosis   Monitor for alternate sources infection    Tino Webber MD  Contact on TEAMS messaging from 9am - 5pm  From 5pm-9am, on weekends, or if no response call [phone]

## 2025-01-09 NOTE — PROGRESS NOTE ADULT - PROBLEM SELECTOR PLAN 1
Patient spiked fever of 100.7 1/6 ON, s/p Tylenol  BxCx (1/3): NGTD,   CT Chest: Scattered bilateral subsegmental atelectasis predominantly within the   lower lobes. Mild groundglass airspace opacity within the right lower   lobe, for which superimposed infection/inflammation is not excluded.  DDx: pneumonia vs. cellulitis from upper extremity   Pending repeat Cx    PLAN  - Start Ancef [id number] for 5 day course Patient spiked fever of 100.7 1/6 ON, s/p Tylenol  CT Chest: Scattered bilateral subsegmental atelectasis predominantly within the   lower lobes. Mild groundglass airspace opacity within the right lower   lobe, for which superimposed infection/inflammation is not excluded.  DDx: pneumonia vs. cellulitis from upper extremity   BxCx (1/3): NGTD  BxCx: (1/7): NGTD    PLAN  - Ceftriaxone 1gq24h (1/8-

## 2025-01-09 NOTE — PROGRESS NOTE ADULT - PROBLEM SELECTOR PLAN 7
- Fluids: None  - Electrolytes: Will replete to maintain K>4, Phos>3, and Mag>2  - Nutrition: Renal Restricted  - Code: FULL  - Activity: As tolerated, pending PT eval  - DVT Prophylaxis: heparin Sub q,   - Stress Ulcer/GI Prophylaxis: N/A  - Disposition: Admit to medicine, pending PT/ JUSTINE placement

## 2025-01-10 LAB
ANION GAP SERPL CALC-SCNC: 19 MMOL/L — HIGH (ref 5–17)
BUN SERPL-MCNC: 54 MG/DL — HIGH (ref 7–23)
CALCIUM SERPL-MCNC: 9.8 MG/DL — SIGNIFICANT CHANGE UP (ref 8.4–10.5)
CHLORIDE SERPL-SCNC: 93 MMOL/L — LOW (ref 96–108)
CO2 SERPL-SCNC: 24 MMOL/L — SIGNIFICANT CHANGE UP (ref 22–31)
CREAT SERPL-MCNC: 8.74 MG/DL — HIGH (ref 0.5–1.3)
EGFR: 7 ML/MIN/1.73M2 — LOW
GLUCOSE SERPL-MCNC: 73 MG/DL — SIGNIFICANT CHANGE UP (ref 70–99)
HBV SURFACE AG SER-ACNC: SIGNIFICANT CHANGE UP
HCT VFR BLD CALC: 30 % — LOW (ref 39–50)
HCV AB S/CO SERPL IA: 0.05 S/CO — SIGNIFICANT CHANGE UP
HCV AB SERPL-IMP: SIGNIFICANT CHANGE UP
HGB BLD-MCNC: 9.6 G/DL — LOW (ref 13–17)
MAGNESIUM SERPL-MCNC: 2.3 MG/DL — SIGNIFICANT CHANGE UP (ref 1.6–2.6)
MCHC RBC-ENTMCNC: 32 G/DL — SIGNIFICANT CHANGE UP (ref 32–36)
MCHC RBC-ENTMCNC: 32.7 PG — SIGNIFICANT CHANGE UP (ref 27–34)
MCV RBC AUTO: 102 FL — HIGH (ref 80–100)
NRBC # BLD: 0 /100 WBCS — SIGNIFICANT CHANGE UP (ref 0–0)
PHOSPHATE SERPL-MCNC: 6.8 MG/DL — HIGH (ref 2.5–4.5)
PLATELET # BLD AUTO: 337 K/UL — SIGNIFICANT CHANGE UP (ref 150–400)
POTASSIUM SERPL-MCNC: 4.8 MMOL/L — SIGNIFICANT CHANGE UP (ref 3.5–5.3)
POTASSIUM SERPL-SCNC: 4.8 MMOL/L — SIGNIFICANT CHANGE UP (ref 3.5–5.3)
RBC # BLD: 2.94 M/UL — LOW (ref 4.2–5.8)
RBC # FLD: 15.2 % — HIGH (ref 10.3–14.5)
SODIUM SERPL-SCNC: 136 MMOL/L — SIGNIFICANT CHANGE UP (ref 135–145)
WBC # BLD: 3.66 K/UL — LOW (ref 3.8–10.5)
WBC # FLD AUTO: 3.66 K/UL — LOW (ref 3.8–10.5)

## 2025-01-10 PROCEDURE — G0545: CPT

## 2025-01-10 PROCEDURE — 99232 SBSQ HOSP IP/OBS MODERATE 35: CPT | Mod: GC

## 2025-01-10 PROCEDURE — 99232 SBSQ HOSP IP/OBS MODERATE 35: CPT

## 2025-01-10 RX ORDER — OLANZAPINE 15 MG/1
2.5 TABLET ORAL ONCE
Refills: 0 | Status: DISCONTINUED | OUTPATIENT
Start: 2025-01-10 | End: 2025-01-10

## 2025-01-10 RX ORDER — SEVELAMER CARBONATE 800 MG/1
2400 TABLET, FILM COATED ORAL
Refills: 0 | Status: DISCONTINUED | OUTPATIENT
Start: 2025-01-10 | End: 2025-01-17

## 2025-01-10 RX ADMIN — SENNOSIDES 2 TABLET(S): 8.6 TABLET, FILM COATED ORAL at 21:32

## 2025-01-10 RX ADMIN — CEFTRIAXONE SODIUM 100 MILLIGRAM(S): 1 INJECTION, POWDER, FOR SOLUTION INTRAMUSCULAR; INTRAVENOUS at 17:47

## 2025-01-10 RX ADMIN — Medication 10 MILLIGRAM(S): at 01:58

## 2025-01-10 RX ADMIN — HEPARIN SODIUM 5000 UNIT(S): 1000 INJECTION, SOLUTION INTRAVENOUS; SUBCUTANEOUS at 06:34

## 2025-01-10 RX ADMIN — Medication 10 MILLIGRAM(S): at 15:09

## 2025-01-10 RX ADMIN — SEVELAMER CARBONATE 2400 MILLIGRAM(S): 800 TABLET, FILM COATED ORAL at 17:47

## 2025-01-10 RX ADMIN — HEPARIN SODIUM 5000 UNIT(S): 1000 INJECTION, SOLUTION INTRAVENOUS; SUBCUTANEOUS at 17:47

## 2025-01-10 NOTE — PROGRESS NOTE ADULT - SUBJECTIVE AND OBJECTIVE BOX
S/p HD today    Vital Signs Last 24 Hrs  T(C): 36.7 (01-10-25 @ 13:00), Max: 36.9 (01-09-25 @ 21:08)  T(F): 98.1 (01-10-25 @ 13:00), Max: 98.5 (01-09-25 @ 21:08)  HR: 77 (01-10-25 @ 13:00) (77 - 98)  BP: 118/70 (01-10-25 @ 13:00) (101/69 - 152/74)  RR: 18 (01-10-25 @ 13:00) (17 - 18)  SpO2: 97% (01-10-25 @ 13:00) (94% - 100%)    I&O's Detail    10 Rafael 2025 07:01  -  10 Rafael 2025 13:33  --------------------------------------------------------  OUT:    Other (mL): 1500 mL  Total OUT: 1500 mL    s1s2  b/l air entry  soft, ND  RUE AVF patent, sm edema b/l LE                                                                                                                                                                                        9.6    3.66  )-----------( 337      ( 10 Rafael 2025 09:19 )             30.0     10 Rafael 2025 09:19    136    |  93     |  54     ----------------------------<  73     4.8     |  24     |  8.74     Ca    9.8        10 Rafael 2025 09:19  Phos  6.8       10 Rafael 2025 09:19  Mg     2.3       10 Rafael 2025 09:19    TPro  7.1    /  Alb  3.2    /  TBili  0.3    /  DBili  x      /  AST  11     /  ALT  <5     /  AlkPhos  554    09 Jan 2025 11:19    LIVER FUNCTIONS - ( 09 Jan 2025 11:19 )  Alb: 3.2 g/dL / Pro: 7.1 g/dL / ALK PHOS: 554 U/L / ALT: <5 U/L / AST: 11 U/L / GGT: x           Culture - Blood (collected 07 Jan 2025 14:25)  Source: .Blood BLOOD  Preliminary Report (09 Jan 2025 20:01):    No growth at 48 Hours    bisacodyl 5 milliGRAM(s) Oral daily PRN  cefTRIAXone   IVPB 1000 milliGRAM(s) IV Intermittent every 24 hours  heparin   Injectable 5000 Unit(s) SubCutaneous every 12 hours  lidocaine   4% Patch 1 Patch Transdermal every 24 hours PRN  melatonin 5 milliGRAM(s) Oral at bedtime PRN  naloxone Injectable 0.4 milliGRAM(s) IV Push once  oxyCODONE    IR 10 milliGRAM(s) Oral every 8 hours PRN  oxyCODONE    IR 15 milliGRAM(s) Oral every 8 hours PRN  polyethylene glycol 3350 17 Gram(s) Oral daily  senna 2 Tablet(s) Oral at bedtime  sevelamer carbonate 1600 milliGRAM(s) Oral three times a day with meals    A/P:    ESRD on HD   Adm w/Flu A  S/p HD today   Concern for volume overload  UF tomorrow for up to 2kg   Renvela for high Phos  Renal diet, FR 1L/day     [phone]

## 2025-01-10 NOTE — PROGRESS NOTE ADULT - ASSESSMENT
39 year old with ESRD on HD, history of CVA, admitted with weakness from Influenza, also received piperacillin/tazobactam since admission (plus one dose of Vancomycin on presentation), who had been running mildly elevated temperature through the admission, now with new fever to 101.8, no obviously localizing signs/symptoms on exam other than significant LUE swelling which patient reports is new but accuracy of history is unclear  Normal WBC this morning  Patient at risk for post-influenza PNA, aspiration PNA, bacteremia from ESRD status, as well as have some concerns about LUE swelling  pt clinically stable  CT with GGO, atelectasis? No other signs of infection  Noninfectious process? Resolved infection?  In interim started on Cefazolin for LUE thrombophlebitis  Overall influenza infection, persistent fever, abnormal CXR    Ceftriaxone 1g q 24, if DC planning can send out on Ceftin 250mg q 24 (give every day but after HD on HD days) to complete 7 days  F/U BCX  trend cbc, no leucocytosis   Monitor for alternate sources infection    Tino Webber MD  Contact on TEAMS messaging from 9am - 5pm  From 5pm-9am, on weekends, or if no response call [phone]

## 2025-01-10 NOTE — PROGRESS NOTE ADULT - PROBLEM SELECTOR PLAN 5
History of primary hypertension home meds: Metoprolol 25mg  - Unclear history of concommitant Labetalol use? Clarified, patient was transitioned to metoprolol  - no evidence of end-organ damage  - continue to monitor SBP  - DASH diet  - Will hold Metoprolol due to post HD pressure normotensive  - Will restart metoprolol if needed - Oxycodone 10/15mg TID for moderate/severe pain  - PT consulted

## 2025-01-10 NOTE — PROGRESS NOTE ADULT - PROBLEM SELECTOR PLAN 3
Patient complaining of increased warmth, pain, and swelling near the left upper extremity  Found to have left cephalic vein superficial thrombophlebitis on US 1/5  Will treat supportively with pain management   - consider anticoagulation if symptoms worsen    PLAN  - Ceftriaxone 1gq24h (1/8- Patient has ESRD with HD on Tuesday, Thursday, and Saturday  - Nephro consulted and T/Th/Sat dialysis in place  - Additional session 1/8 for increased contrast material  Plan  - HD T/Th/Sa  - Will clarify schedule with Nephrologist

## 2025-01-10 NOTE — PROGRESS NOTE ADULT - SUBJECTIVE AND OBJECTIVE BOX
Brent Mejia MD  Available on TEAMS    Patient is a 39y old  Male who presents with a chief complaint of Weakness (09 Jan 2025 21:59)      SUBJECTIVE / OVERNIGHT EVENTS: No acute events overnight. Patient was assessed at bedside.       REVIEW OF SYSTEMS:  CONSTITUTIONAL: No weakness, fevers, or chills  EYES/ENT: No visual changes; No vertigo, throat pain, or dysphagia  NECK: No pain or stiffness  RESPIRATORY: No cough, wheezing, hemoptysis, or shortness of breath  CARDIOVASCULAR: No chest pain or palpitations  GASTROINTESTINAL: No abdominal or epigastric pain. No nausea, vomiting, or hematemesis; No diarrhea or constipation. No melena or hematochezia.  GENITOURINARY: No dysuria, frequency, or hematuria  MUSCULOSKELETAL: No joint or muscle pain or aches  NEUROLOGICAL: No numbness or weakness  SKIN: No itching or rashes      MEDICATIONS  (STANDING):  cefTRIAXone   IVPB 1000 milliGRAM(s) IV Intermittent every 24 hours  heparin   Injectable 5000 Unit(s) SubCutaneous every 12 hours  naloxone Injectable 0.4 milliGRAM(s) IV Push once  polyethylene glycol 3350 17 Gram(s) Oral daily  senna 2 Tablet(s) Oral at bedtime  sevelamer carbonate 1600 milliGRAM(s) Oral three times a day with meals    MEDICATIONS  (PRN):  bisacodyl 5 milliGRAM(s) Oral daily PRN Constipation  lidocaine   4% Patch 1 Patch Transdermal every 24 hours PRN pain in the left upper arm  melatonin 5 milliGRAM(s) Oral at bedtime PRN Insomnia  oxyCODONE    IR 10 milliGRAM(s) Oral every 8 hours PRN Moderate Pain (4 - 6)  oxyCODONE    IR 15 milliGRAM(s) Oral every 8 hours PRN Severe Pain (7 - 10)      CAPILLARY BLOOD GLUCOSE        I&O's Summary      Vital Signs Last 24 Hrs  T(C): 36.4 (10 Rafael 2025 04:19), Max: 37.2 (09 Jan 2025 12:00)  T(F): 97.6 (10 Rafael 2025 04:19), Max: 98.9 (09 Jan 2025 12:00)  HR: 88 (10 Rafael 2025 04:19) (88 - 98)  BP: 101/69 (10 Rafael 2025 04:19) (101/69 - 135/79)  BP(mean): --  RR: 18 (10 Rafael 2025 04:19) (17 - 18)  SpO2: 94% (10 Rafael 2025 04:19) (94% - 97%)    Parameters below as of 10 Rafael 2025 04:19  Patient On (Oxygen Delivery Method): room air        PHYSICAL EXAM:  GENERAL: NAD, well-developed, well-nourished  HEAD: Atraumatic, Normocephalic  EYES: EOMI, PERRLA, conjunctiva and sclera clear  NECK: Supple, No JVD  CHEST/LUNG: Clear to auscultation bilaterally; No wheezes or crackles  HEART: Normal S1/S2; Regular rate and rhythm; No murmurs, rubs, or gallops  ABDOMEN: Soft, Nontender, Nondistended; Bowel sounds present  EXTREMITIES: 2+ Peripheral Pulses; No clubbing, cyanosis, or edema  PSYCH: A&Ox3  NEUROLOGY: no focal neurologic deficit  SKIN: No rashes or lesions    LABS:                        10.4   3.74  )-----------( 315      ( 09 Jan 2025 11:19 )             33.2      01-09    138  |  94[L]  |  45[H]  ----------------------------<  81  4.4   |  25  |  7.34[H]    Ca    10.0      09 Jan 2025 11:19  Phos  6.2     01-09  Mg     2.4     01-09    TPro  7.1  /  Alb  3.2[L]  /  TBili  0.3  /  DBili  x   /  AST  11  /  ALT  <5[L]  /  AlkPhos  554[H]  01-09          Urinalysis Basic - ( 09 Jan 2025 11:19 )    Color: x / Appearance: x / SG: x / pH: x  Gluc: 81 mg/dL / Ketone: x  / Bili: x / Urobili: x   Blood: x / Protein: x / Nitrite: x   Leuk Esterase: x / RBC: x / WBC x   Sq Epi: x / Non Sq Epi: x / Bacteria: x        RADIOLOGY & ADDITIONAL TESTS:    Imaging Personally Reviewed:    Consultant(s) Notes Reviewed:      Care Discussed with Consultants/Other Providers:

## 2025-01-10 NOTE — PROGRESS NOTE ADULT - SUBJECTIVE AND OBJECTIVE BOX
CC: F/U for Fever    Saw/spoke to patient. No fevers, no chills. No new complaints.    Allergies  shellfish (Hives)  No Known Drug Allergies    ANTIMICROBIALS:  cefTRIAXone   IVPB 1000 every 24 hours    PE:    Vital Signs Last 24 Hrs  T(C): 36.7 (10 Rafael 2025 13:00), Max: 36.9 (09 Jan 2025 21:08)  T(F): 98.1 (10 Rafael 2025 13:00), Max: 98.5 (09 Jan 2025 21:08)  HR: 77 (10 Rafael 2025 13:00) (77 - 98)  BP: 118/70 (10 Rafael 2025 13:00) (101/69 - 152/74)  RR: 18 (10 Rafael 2025 13:00) (17 - 18)  SpO2: 97% (10 Rafael 2025 13:00) (94% - 100%)    Gen: AOx3, NAD, non-toxic  CV: Nontachycardic  Resp: Breathing comfortably, RA  Abd: Soft, nontender  IV/Skin: No thrombophlebitis    LABS:                        9.6    3.66  )-----------( 337      ( 10 Rafael 2025 09:19 )             30.0     01-10    136  |  93[L]  |  54[H]  ----------------------------<  73  4.8   |  24  |  8.74[H]    Ca    9.8      10 Rafael 2025 09:19  Phos  6.8     01-10  Mg     2.3     01-10    TPro  7.1  /  Alb  3.2[L]  /  TBili  0.3  /  DBili  x   /  AST  11  /  ALT  <5[L]  /  AlkPhos  554[H]  01-09    Urinalysis Basic - ( 10 Rafael 2025 09:19 )    Color: x / Appearance: x / SG: x / pH: x  Gluc: 73 mg/dL / Ketone: x  / Bili: x / Urobili: x   Blood: x / Protein: x / Nitrite: x   Leuk Esterase: x / RBC: x / WBC x   Sq Epi: x / Non Sq Epi: x / Bacteria: x    MICROBIOLOGY:    .Blood BLOOD  01-07-25   No growth at 48 Hours  --  --    .Blood BLOOD  01-03-25   No growth at 5 days  --  --    .Blood BLOOD  01-03-25   No growth at 5 days  --  --    .Blood BLOOD  12-30-24   No growth at 5 days  --  --    .Blood BLOOD  12-30-24   No growth at 5 days  --  --    RADIOLOGY:    1/6    IMPRESSION:  Scattered bilateral subsegmental atelectasis predominantly within the   lower lobes. Mild groundglass airspace opacity within the right lower   lobe, for which superimposed infection/inflammation is not excluded.    No evidence of intra-abdominal drainable collection or acute pathology.    Severe stigmata related to renal osteodystrophy.    Lucency through the right superior and inferior pubic rami consistent   with age indeterminant fracture.    Anterior wedge shaped fracture deformity of the T12 vertebral body with   associated kyphosis of the thoracolumbar spine.

## 2025-01-10 NOTE — PROGRESS NOTE ADULT - PROBLEM SELECTOR PLAN 4
Patient has ESRD with HD on Tuesday, Thursday, and Saturday  - Nephro consulted and T/Th/Sat dialysis in place  - Additional session 1/8 for increased contrast material  Plan  - HD T/Th/Sa History of primary hypertension home meds: Metoprolol 25mg  - Unclear history of concommitant Labetalol use? Clarified, patient was transitioned to metoprolol  - no evidence of end-organ damage  - continue to monitor SBP  - DASH diet  - Will hold Metoprolol due to post HD pressure normotensive  - Will restart metoprolol if needed

## 2025-01-10 NOTE — PROGRESS NOTE ADULT - PROBLEM SELECTOR PLAN 1
Patient spiked fever of 100.7 1/6 ON, s/p Tylenol  CT Chest: Scattered bilateral subsegmental atelectasis predominantly within the   lower lobes. Mild groundglass airspace opacity within the right lower   lobe, for which superimposed infection/inflammation is not excluded.  DDx: pneumonia vs. cellulitis from upper extremity   BxCx (1/3): NGTD  BxCx: (1/7): NGTD    PLAN  - Ceftriaxone 1gq24h (1/8- Patient spiked fever of 100.7 1/6 ON, s/p Tylenol  CT Chest: Scattered bilateral subsegmental atelectasis predominantly within the   lower lobes. Mild groundglass airspace opacity within the right lower   lobe, for which superimposed infection/inflammation is not excluded.  DDx: pneumonia vs. cellulitis from upper extremity   BxCx (1/3): NGTD  BxCx: (1/7): NGTD    PLAN  - Ceftriaxone 1gq24h (1/8-  - ID for duration of treatment

## 2025-01-10 NOTE — CHART NOTE - NSCHARTNOTEFT_GEN_A_CORE
Patient was measured and dispensed a TLSO rigid posterior panel extending from the sacrococcygeal junction to the scapular spine with a soft apron front. The orthosis will stabilize the spine when OOB abd safely protect the fracture. care use and function were explained. contact info was provided. All went without incident.   [facility]  [phone]

## 2025-01-10 NOTE — PROGRESS NOTE ADULT - PROBLEM SELECTOR PLAN 2
- patient increasingly somnolent on clinical exam on 1/3  - usually snoring, definitely some component of SOLITARIO  - ABG 1/3 PCO2 60 pH 7.27 and PO2 80s  - Patient has been non adherent to CPAP, will likely not need on DC  - Will attempt BIPAP 1/3 ON, NC during day    RESOLVED Patient complaining of increased warmth, pain, and swelling near the left upper extremity  Found to have left cephalic vein superficial thrombophlebitis on US 1/5  Will treat supportively with pain management   - consider anticoagulation if symptoms worsen    PLAN  - Ceftriaxone 1gq24h (1/8-  - ID for duration of treatment

## 2025-01-10 NOTE — PROGRESS NOTE ADULT - PROBLEM SELECTOR PLAN 6
- Oxycodone 10/15mg TID for moderate/severe pain  - PT consulted - Fluids: None  - Electrolytes: Will replete to maintain K>4, Phos>3, and Mag>2  - Nutrition: Renal Restricted  - Code: FULL  - Activity: As tolerated, pending PT eval  - DVT Prophylaxis: heparin Sub q,   - Stress Ulcer/GI Prophylaxis: N/A  - Disposition: Admit to medicine, pending PT/ JUSTINE placement

## 2025-01-11 LAB
HBV CORE AB SER-ACNC: SIGNIFICANT CHANGE UP
HBV SURFACE AB SER-ACNC: REACTIVE — SIGNIFICANT CHANGE UP

## 2025-01-11 PROCEDURE — 99232 SBSQ HOSP IP/OBS MODERATE 35: CPT | Mod: GC

## 2025-01-11 RX ORDER — POLYETHYLENE GLYCOL 3350 17 G/DOSE
17 POWDER (GRAM) ORAL
Refills: 0 | Status: DISCONTINUED | OUTPATIENT
Start: 2025-01-11 | End: 2025-01-17

## 2025-01-11 RX ADMIN — Medication 10 MILLIGRAM(S): at 21:46

## 2025-01-11 RX ADMIN — Medication 10 MILLIGRAM(S): at 21:16

## 2025-01-11 RX ADMIN — Medication 5 MILLIGRAM(S): at 21:16

## 2025-01-11 RX ADMIN — SEVELAMER CARBONATE 2400 MILLIGRAM(S): 800 TABLET, FILM COATED ORAL at 13:33

## 2025-01-11 RX ADMIN — HEPARIN SODIUM 5000 UNIT(S): 1000 INJECTION, SOLUTION INTRAVENOUS; SUBCUTANEOUS at 17:45

## 2025-01-11 RX ADMIN — SEVELAMER CARBONATE 2400 MILLIGRAM(S): 800 TABLET, FILM COATED ORAL at 09:08

## 2025-01-11 RX ADMIN — HEPARIN SODIUM 5000 UNIT(S): 1000 INJECTION, SOLUTION INTRAVENOUS; SUBCUTANEOUS at 06:02

## 2025-01-11 RX ADMIN — SENNOSIDES 2 TABLET(S): 8.6 TABLET, FILM COATED ORAL at 21:04

## 2025-01-11 RX ADMIN — SEVELAMER CARBONATE 2400 MILLIGRAM(S): 800 TABLET, FILM COATED ORAL at 17:45

## 2025-01-11 NOTE — PROGRESS NOTE ADULT - PROBLEM SELECTOR PLAN 4
History of primary hypertension home meds: Metoprolol 25mg  - Unclear history of concommitant Labetalol use? Clarified, patient was transitioned to metoprolol  - no evidence of end-organ damage  - continue to monitor SBP  - DASH diet  - Will hold Metoprolol due to post HD pressure normotensive  - Will restart metoprolol if needed History of primary hypertension home meds: Metoprolol 25mg  - Unclear history of concomitant Labetalol use? Clarified, patient was transitioned to metoprolol  - no evidence of end-organ damage  - continue to monitor SBP  - DASH diet  - Will hold Metoprolol due to post HD pressure normotensive  - Will restart metoprolol if needed

## 2025-01-11 NOTE — PROGRESS NOTE ADULT - PROBLEM SELECTOR PLAN 1
Patient spiked fever of 100.7 1/6 ON, s/p Tylenol  CT Chest: Scattered bilateral subsegmental atelectasis predominantly within the   lower lobes. Mild groundglass airspace opacity within the right lower   lobe, for which superimposed infection/inflammation is not excluded.  DDx: pneumonia vs. cellulitis from upper extremity   BxCx (1/3): NGTD  BxCx: (1/7): NGTD    PLAN  - Ceftriaxone 1gq24h (1/8-  - ID for duration of treatment

## 2025-01-11 NOTE — PROGRESS NOTE ADULT - SUBJECTIVE AND OBJECTIVE BOX
Internal Medicine   Yulia Ruelas | PGY-3    OVERNIGHT EVENTS: No acute overnight events.    SUBJECTIVE:       MEDICATIONS  (STANDING):  heparin   Injectable 5000 Unit(s) SubCutaneous every 12 hours  naloxone Injectable 0.4 milliGRAM(s) IV Push once  polyethylene glycol 3350 17 Gram(s) Oral daily  senna 2 Tablet(s) Oral at bedtime  sevelamer carbonate 2400 milliGRAM(s) Oral three times a day with meals    MEDICATIONS  (PRN):  bisacodyl 5 milliGRAM(s) Oral daily PRN Constipation  lidocaine   4% Patch 1 Patch Transdermal every 24 hours PRN pain in the left upper arm  melatonin 5 milliGRAM(s) Oral at bedtime PRN Insomnia  oxyCODONE    IR 10 milliGRAM(s) Oral every 8 hours PRN Moderate Pain (4 - 6)  oxyCODONE    IR 15 milliGRAM(s) Oral every 8 hours PRN Severe Pain (7 - 10)        T(F): 98.8 (01-11-25 @ 04:12), Max: 99 (01-10-25 @ 20:55)  HR: 94 (01-11-25 @ 04:12) (77 - 97)  BP: 103/52 (01-11-25 @ 04:12) (103/52 - 152/74)  BP(mean): --  RR: 16 (01-11-25 @ 04:12) (16 - 18)  SpO2: 97% (01-11-25 @ 04:12) (97% - 100%)    PHYSICAL EXAM:     GENERAL: NAD, lying in bed comfortably  HEAD:  Atraumatic, Normocephalic  EYES: EOMI, PERRLA, conjunctiva and sclera clear, no nystagmus noted  ENT: Moist mucous membranes,   NECK: Supple, No JVD, trachea midline  CHEST/LUNG: Clear to auscultation bilaterally; No rales, rhonchi, wheezing, or rubs. Unlabored respirations  HEART: Regular rate and rhythm; No murmurs, rubs, or gallops, normal S1/S2  ABDOMEN: normal bowel sounds; Soft, nontender, nondistended, no organomegaly   EXTREMITIES:  2+ Peripheral Pulses, brisk capillary refill. No clubbing, cyanosis, or edema  MSK: No gross deformities noted   Neurological:  A&Ox3, no focal deficits   SKIN: No rashes or lesions  PSYCH: Normal mood, affect     TELEMETRY:    LABS:                        9.6    3.66  )-----------( 337      ( 10 Rafael 2025 09:19 )             30.0     01-10    136  |  93[L]  |  54[H]  ----------------------------<  73  4.8   |  24  |  8.74[H]    Ca    9.8      10 Rafael 2025 09:19  Phos  6.8     01-10  Mg     2.3     01-10    TPro  7.1  /  Alb  3.2[L]  /  TBili  0.3  /  DBili  x   /  AST  11  /  ALT  <5[L]  /  AlkPhos  554[H]  01-09            Creatinine Trend: 8.74<--, 7.34<--, 11.21<--, 9.18<--, 7.57<--, 9.72<--  I&O's Summary    10 Rafael 2025 07:01  -  11 Jan 2025 06:49  --------------------------------------------------------  IN: 240 mL / OUT: 1500 mL / NET: -1260 mL      BNP    RADIOLOGY & ADDITIONAL STUDIES:             Internal Medicine   Yulia Jessenia | PGY-3    OVERNIGHT EVENTS: No acute overnight events.    SUBJECTIVE: Patient was seen and examined at bedside this morning. Pt reports chronic sammi feet pain. Pt reports not having BM in the past 2 days. Denies any nausea/vomiting/diarrhea, headache, shortness of breath, abdominal pain or chest pain/palpitations. Patient responding appropriately to questions and able to make needs known. Vital signs/imaging/telemetry events reviewed.       MEDICATIONS  (STANDING):  heparin   Injectable 5000 Unit(s) SubCutaneous every 12 hours  naloxone Injectable 0.4 milliGRAM(s) IV Push once  polyethylene glycol 3350 17 Gram(s) Oral daily  senna 2 Tablet(s) Oral at bedtime  sevelamer carbonate 2400 milliGRAM(s) Oral three times a day with meals    MEDICATIONS  (PRN):  bisacodyl 5 milliGRAM(s) Oral daily PRN Constipation  lidocaine   4% Patch 1 Patch Transdermal every 24 hours PRN pain in the left upper arm  melatonin 5 milliGRAM(s) Oral at bedtime PRN Insomnia  oxyCODONE    IR 10 milliGRAM(s) Oral every 8 hours PRN Moderate Pain (4 - 6)  oxyCODONE    IR 15 milliGRAM(s) Oral every 8 hours PRN Severe Pain (7 - 10)        T(F): 98.8 (01-11-25 @ 04:12), Max: 99 (01-10-25 @ 20:55)  HR: 94 (01-11-25 @ 04:12) (77 - 97)  BP: 103/52 (01-11-25 @ 04:12) (103/52 - 152/74)  BP(mean): --  RR: 16 (01-11-25 @ 04:12) (16 - 18)  SpO2: 97% (01-11-25 @ 04:12) (97% - 100%)    PHYSICAL EXAM:     GENERAL: NAD, sitting in bed eating breakfast  HEAD:  Atraumatic, Normocephalic  EYES: EOMI, PERRLA, conjunctiva and sclera clear, no nystagmus noted  NECK: Supple, No JVD, trachea midline  CHEST/LUNG: Clear to auscultation bilaterally; No rales, rhonchi, wheezing, or rubs. Unlabored respirations  HEART: Regular rate and rhythm; No murmurs, rubs, or gallops, normal S1/S2  ABDOMEN: normal bowel sounds; Soft, nontender, nondistended, no organomegaly   EXTREMITIES:  2+ Peripheral Pulses, brisk capillary refill. No clubbing, cyanosis, or edema  MSK: No gross deformities noted   Neurological:  A&Ox3, no focal deficits     TELEMETRY:    LABS:                        9.6    3.66  )-----------( 337      ( 10 Rafael 2025 09:19 )             30.0     01-10    136  |  93[L]  |  54[H]  ----------------------------<  73  4.8   |  24  |  8.74[H]    Ca    9.8      10 Rafael 2025 09:19  Phos  6.8     01-10  Mg     2.3     01-10    TPro  7.1  /  Alb  3.2[L]  /  TBili  0.3  /  DBili  x   /  AST  11  /  ALT  <5[L]  /  AlkPhos  554[H]  01-09            Creatinine Trend: 8.74<--, 7.34<--, 11.21<--, 9.18<--, 7.57<--, 9.72<--  I&O's Summary    10 Rafael 2025 07:01  -  11 Jan 2025 06:49  --------------------------------------------------------  IN: 240 mL / OUT: 1500 mL / NET: -1260 mL      BNP    RADIOLOGY & ADDITIONAL STUDIES:

## 2025-01-11 NOTE — PROGRESS NOTE ADULT - PROBLEM SELECTOR PLAN 2
Patient complaining of increased warmth, pain, and swelling near the left upper extremity  Found to have left cephalic vein superficial thrombophlebitis on US 1/5  Will treat supportively with pain management   - consider anticoagulation if symptoms worsen    PLAN  - Ceftriaxone 1gq24h (1/8-  - ID for duration of treatment

## 2025-01-11 NOTE — PROGRESS NOTE ADULT - SUBJECTIVE AND OBJECTIVE BOX
[facility] NEPHROLOGY SERVICES, [facility]  NEPHROLOGY AND HYPERTENSION  [address]  [address]  [place] [address]  [phone]    MD ADRIA GRAMAJO, MD CAMDEN NIÑO MD CHRISTOPHER CAPUTO, MD LUISA BALDERAS MD          Patient events noted    MEDICATIONS  (STANDING):  heparin   Injectable 5000 Unit(s) SubCutaneous every 12 hours  naloxone Injectable 0.4 milliGRAM(s) IV Push once  polyethylene glycol 3350 17 Gram(s) Oral two times a day  senna 2 Tablet(s) Oral at bedtime  sevelamer carbonate 2400 milliGRAM(s) Oral three times a day with meals    MEDICATIONS  (PRN):  bisacodyl 5 milliGRAM(s) Oral daily PRN Constipation  lidocaine   4% Patch 1 Patch Transdermal every 24 hours PRN pain in the left upper arm  melatonin 5 milliGRAM(s) Oral at bedtime PRN Insomnia  oxyCODONE    IR 10 milliGRAM(s) Oral every 8 hours PRN Moderate Pain (4 - 6)  oxyCODONE    IR 15 milliGRAM(s) Oral every 8 hours PRN Severe Pain (7 - 10)      01-10-25 @ 07:01  -  01-11-25 @ 07:00  --------------------------------------------------------  IN: 480 mL / OUT: 1500 mL / NET: -1020 mL    01-11-25 @ 07:01  -  01-11-25 @ 21:46  --------------------------------------------------------  IN: 600 mL / OUT: 2600 mL / NET: -2000 mL      PHYSICAL EXAM:      T(C): 37.2 (01-11-25 @ 20:39), Max: 37.2 (01-11-25 @ 20:39)  HR: 104 (01-11-25 @ 20:39) (87 - 104)  BP: 90/59 (01-11-25 @ 20:39) (90/59 - 113/60)  RR: 16 (01-11-25 @ 20:39) (16 - 18)  SpO2: 94% (01-11-25 @ 20:39) (94% - 99%)  Wt(kg): --  Lungs clear  Heart S1S2  Abd soft NT ND  Extremities:   tr edema                                    9.6    3.66  )-----------( 337      ( 10 Rafael 2025 09:19 )             30.0     01-10    136  |  93[L]  |  54[H]  ----------------------------<  73  4.8   |  24  |  8.74[H]    Ca    9.8      10 Rafael 2025 09:19  Phos  6.8     01-10  Mg     2.3     01-10          Creatinine Trend: 8.74<--, 7.34<--, 11.21<--, 9.18<--, 7.57<--, 9.72<--      A/P:    ESRD on HD   Adm w/Flu A  Isolated UF today  Concern for volume overload  Renvela for high Phos  Renal diet, FR 1L/day   Darrell Ortiz MD

## 2025-01-11 NOTE — PROGRESS NOTE ADULT - PROBLEM SELECTOR PLAN 3
Patient has ESRD with HD on Tuesday, Thursday, and Saturday  - Nephro consulted and T/Th/Sat dialysis in place  - Additional session 1/8 for increased contrast material  Plan  - HD T/Th/Sa  - Will clarify schedule with Nephrologist

## 2025-01-12 LAB
CULTURE RESULTS: SIGNIFICANT CHANGE UP
SPECIMEN SOURCE: SIGNIFICANT CHANGE UP

## 2025-01-12 PROCEDURE — 99232 SBSQ HOSP IP/OBS MODERATE 35: CPT | Mod: GC

## 2025-01-12 RX ORDER — BISACODYL 5 MG
5 TABLET, DELAYED RELEASE (ENTERIC COATED) ORAL AT BEDTIME
Refills: 0 | Status: DISCONTINUED | OUTPATIENT
Start: 2025-01-12 | End: 2025-01-17

## 2025-01-12 RX ORDER — CEFTRIAXONE SODIUM 1 G/1
1000 INJECTION, POWDER, FOR SOLUTION INTRAMUSCULAR; INTRAVENOUS EVERY 24 HOURS
Refills: 0 | Status: DISCONTINUED | OUTPATIENT
Start: 2025-01-12 | End: 2025-01-13

## 2025-01-12 RX ADMIN — SEVELAMER CARBONATE 2400 MILLIGRAM(S): 800 TABLET, FILM COATED ORAL at 08:14

## 2025-01-12 RX ADMIN — Medication 17 GRAM(S): at 18:11

## 2025-01-12 RX ADMIN — SENNOSIDES 2 TABLET(S): 8.6 TABLET, FILM COATED ORAL at 21:35

## 2025-01-12 RX ADMIN — Medication 15 MILLIGRAM(S): at 22:25

## 2025-01-12 RX ADMIN — Medication 15 MILLIGRAM(S): at 21:35

## 2025-01-12 RX ADMIN — HEPARIN SODIUM 5000 UNIT(S): 1000 INJECTION, SOLUTION INTRAVENOUS; SUBCUTANEOUS at 06:10

## 2025-01-12 RX ADMIN — Medication 5 MILLIGRAM(S): at 21:35

## 2025-01-12 RX ADMIN — SEVELAMER CARBONATE 2400 MILLIGRAM(S): 800 TABLET, FILM COATED ORAL at 18:11

## 2025-01-12 RX ADMIN — HEPARIN SODIUM 5000 UNIT(S): 1000 INJECTION, SOLUTION INTRAVENOUS; SUBCUTANEOUS at 18:12

## 2025-01-12 RX ADMIN — SEVELAMER CARBONATE 2400 MILLIGRAM(S): 800 TABLET, FILM COATED ORAL at 12:17

## 2025-01-12 NOTE — PROVIDER CONTACT NOTE (OTHER) - REASON
Fever
pt temp 103 orally
pt bp 62/38
pt temperature 101.8
Refused to keep the  BIPAP on  complained he can't breathe with  it
potassium 7
patient refusing IV access
Pt spiked a temp of 100.7

## 2025-01-12 NOTE — PROGRESS NOTE ADULT - PROBLEM SELECTOR PLAN 2
Patient complaining of increased warmth, pain, and swelling near the left upper extremity  Found to have left cephalic vein superficial thrombophlebitis on US 1/5  Will treat supportively with pain management   - consider anticoagulation if symptoms worsen    PLAN  - Ceftriaxone 1gq24h (1/8-  - ID for duration of treatment Patient complaining of increased warmth, pain, and swelling near the left upper extremity  Found to have left cephalic vein superficial thrombophlebitis on US 1/5  Will treat supportively with pain management   - consider anticoagulation if symptoms worsen    PLAN  - Ceftriaxone 1gq24h (1/8-1/15) 7 day course

## 2025-01-12 NOTE — PROVIDER CONTACT NOTE (OTHER) - RECOMMENDATIONS
MD made aware. does provider want a repeat lab?
Placed back on 2lnc sat 98%
no intervention. reassess pt bp 145/71
MD made aware
MD made aware. IV Tylenol suggested.

## 2025-01-12 NOTE — PROGRESS NOTE ADULT - SUBJECTIVE AND OBJECTIVE BOX
Brent Mejia MD  Available on TEAMS    Patient is a 39y old  Male who presents with a chief complaint of Weakness (11 Jan 2025 21:45)      SUBJECTIVE / OVERNIGHT EVENTS: No acute events overnight. Patient was assessed at bedside.       REVIEW OF SYSTEMS:  CONSTITUTIONAL: No weakness, fevers, or chills  EYES/ENT: No visual changes; No vertigo, throat pain, or dysphagia  NECK: No pain or stiffness  RESPIRATORY: No cough, wheezing, hemoptysis, or shortness of breath  CARDIOVASCULAR: No chest pain or palpitations  GASTROINTESTINAL: No abdominal or epigastric pain. No nausea, vomiting, or hematemesis; No diarrhea or constipation. No melena or hematochezia.  GENITOURINARY: No dysuria, frequency, or hematuria  MUSCULOSKELETAL: No joint or muscle pain or aches  NEUROLOGICAL: No numbness or weakness  SKIN: No itching or rashes      MEDICATIONS  (STANDING):  heparin   Injectable 5000 Unit(s) SubCutaneous every 12 hours  naloxone Injectable 0.4 milliGRAM(s) IV Push once  polyethylene glycol 3350 17 Gram(s) Oral two times a day  senna 2 Tablet(s) Oral at bedtime  sevelamer carbonate 2400 milliGRAM(s) Oral three times a day with meals    MEDICATIONS  (PRN):  bisacodyl 5 milliGRAM(s) Oral daily PRN Constipation  lidocaine   4% Patch 1 Patch Transdermal every 24 hours PRN pain in the left upper arm  melatonin 5 milliGRAM(s) Oral at bedtime PRN Insomnia  oxyCODONE    IR 10 milliGRAM(s) Oral every 8 hours PRN Moderate Pain (4 - 6)  oxyCODONE    IR 15 milliGRAM(s) Oral every 8 hours PRN Severe Pain (7 - 10)      CAPILLARY BLOOD GLUCOSE        I&O's Summary    11 Jan 2025 07:01  -  12 Jan 2025 07:00  --------------------------------------------------------  IN: 600 mL / OUT: 2600 mL / NET: -2000 mL        Vital Signs Last 24 Hrs  T(C): 36.8 (12 Jan 2025 06:14), Max: 37.2 (11 Jan 2025 20:39)  T(F): 98.2 (12 Jan 2025 06:14), Max: 99 (11 Jan 2025 20:39)  HR: 99 (12 Jan 2025 06:14) (87 - 104)  BP: 126/74 (12 Jan 2025 06:14) (90/59 - 126/74)  BP(mean): --  RR: 16 (12 Jan 2025 06:14) (16 - 18)  SpO2: 97% (12 Jan 2025 06:14) (94% - 99%)    Parameters below as of 12 Jan 2025 06:14  Patient On (Oxygen Delivery Method): room air        PHYSICAL EXAM:  GENERAL: NAD, well-developed, well-nourished  HEAD: Atraumatic, Normocephalic  EYES: EOMI, PERRLA, conjunctiva and sclera clear  NECK: Supple, No JVD  CHEST/LUNG: Clear to auscultation bilaterally; No wheezes or crackles  HEART: Normal S1/S2; Regular rate and rhythm; No murmurs, rubs, or gallops  ABDOMEN: Soft, Nontender, Nondistended; Bowel sounds present  EXTREMITIES: 2+ Peripheral Pulses; No clubbing, cyanosis, or edema  PSYCH: A&Ox3  NEUROLOGY: no focal neurologic deficit  SKIN: No rashes or lesions    LABS:                        9.6    3.66  )-----------( 337      ( 10 Rafael 2025 09:19 )             30.0      01-10    136  |  93[L]  |  54[H]  ----------------------------<  73  4.8   |  24  |  8.74[H]    Ca    9.8      10 Rafael 2025 09:19  Phos  6.8     01-10  Mg     2.3     01-10            Urinalysis Basic - ( 10 Rafael 2025 09:19 )    Color: x / Appearance: x / SG: x / pH: x  Gluc: 73 mg/dL / Ketone: x  / Bili: x / Urobili: x   Blood: x / Protein: x / Nitrite: x   Leuk Esterase: x / RBC: x / WBC x   Sq Epi: x / Non Sq Epi: x / Bacteria: x        RADIOLOGY & ADDITIONAL TESTS:    Imaging Personally Reviewed:    Consultant(s) Notes Reviewed:      Care Discussed with Consultants/Other Providers:   Brent Mejia MD  Available on TEAMS    Patient is a 39y old  Male who presents with a chief complaint of Weakness (11 Jan 2025 21:45)      SUBJECTIVE / OVERNIGHT EVENTS: No acute events overnight. Patient was assessed at bedside. No concerns at this time      REVIEW OF SYSTEMS:  CONSTITUTIONAL: No weakness, fevers, or chills  EYES/ENT: No visual changes; No vertigo, throat pain, or dysphagia  NECK: No pain or stiffness  RESPIRATORY: No cough, wheezing, hemoptysis, or shortness of breath  CARDIOVASCULAR: No chest pain or palpitations  GASTROINTESTINAL: No abdominal or epigastric pain. No nausea, vomiting, or hematemesis; No diarrhea or constipation. No melena or hematochezia.  GENITOURINARY: No dysuria, frequency, or hematuria  MUSCULOSKELETAL: No joint or muscle pain or aches  NEUROLOGICAL: No numbness or weakness  SKIN: No itching or rashes      MEDICATIONS  (STANDING):  heparin   Injectable 5000 Unit(s) SubCutaneous every 12 hours  naloxone Injectable 0.4 milliGRAM(s) IV Push once  polyethylene glycol 3350 17 Gram(s) Oral two times a day  senna 2 Tablet(s) Oral at bedtime  sevelamer carbonate 2400 milliGRAM(s) Oral three times a day with meals    MEDICATIONS  (PRN):  bisacodyl 5 milliGRAM(s) Oral daily PRN Constipation  lidocaine   4% Patch 1 Patch Transdermal every 24 hours PRN pain in the left upper arm  melatonin 5 milliGRAM(s) Oral at bedtime PRN Insomnia  oxyCODONE    IR 10 milliGRAM(s) Oral every 8 hours PRN Moderate Pain (4 - 6)  oxyCODONE    IR 15 milliGRAM(s) Oral every 8 hours PRN Severe Pain (7 - 10)      CAPILLARY BLOOD GLUCOSE        I&O's Summary    11 Jan 2025 07:01  -  12 Jan 2025 07:00  --------------------------------------------------------  IN: 600 mL / OUT: 2600 mL / NET: -2000 mL        Vital Signs Last 24 Hrs  T(C): 36.8 (12 Jan 2025 06:14), Max: 37.2 (11 Jan 2025 20:39)  T(F): 98.2 (12 Jan 2025 06:14), Max: 99 (11 Jan 2025 20:39)  HR: 99 (12 Jan 2025 06:14) (87 - 104)  BP: 126/74 (12 Jan 2025 06:14) (90/59 - 126/74)  BP(mean): --  RR: 16 (12 Jan 2025 06:14) (16 - 18)  SpO2: 97% (12 Jan 2025 06:14) (94% - 99%)    Parameters below as of 12 Jan 2025 06:14  Patient On (Oxygen Delivery Method): room air        PHYSICAL EXAM:  GENERAL: NAD, well-developed, well-nourished  HEAD: Atraumatic, Normocephalic  EYES: EOMI, PERRLA, conjunctiva and sclera clear  NECK: Supple, No JVD  CHEST/LUNG: Clear to auscultation bilaterally; No wheezes or crackles  HEART: Normal S1/S2; Regular rate and rhythm; No murmurs, rubs, or gallops  ABDOMEN: Soft, Nontender, Nondistended; Bowel sounds present  EXTREMITIES: 2+ Peripheral Pulses; No clubbing, cyanosis, or edema  PSYCH: A&Ox3  NEUROLOGY: no focal neurologic deficit  SKIN: No rashes or lesions    LABS:                        9.6    3.66  )-----------( 337      ( 10 Rafael 2025 09:19 )             30.0      01-10    136  |  93[L]  |  54[H]  ----------------------------<  73  4.8   |  24  |  8.74[H]    Ca    9.8      10 Rafael 2025 09:19  Phos  6.8     01-10  Mg     2.3     01-10            Urinalysis Basic - ( 10 Rafael 2025 09:19 )    Color: x / Appearance: x / SG: x / pH: x  Gluc: 73 mg/dL / Ketone: x  / Bili: x / Urobili: x   Blood: x / Protein: x / Nitrite: x   Leuk Esterase: x / RBC: x / WBC x   Sq Epi: x / Non Sq Epi: x / Bacteria: x        RADIOLOGY & ADDITIONAL TESTS:    Imaging Personally Reviewed:    Consultant(s) Notes Reviewed:      Care Discussed with Consultants/Other Providers:

## 2025-01-12 NOTE — PROVIDER CONTACT NOTE (OTHER) - DATE AND TIME:
31-Dec-2024 14:11
08-Jan-2025 12:05
03-Jan-2025 11:24
12-Jan-2025 17:20
31-Dec-2024 19:17
07-Jan-2025 02:19
01-Jan-2025 11:57
05-Jan-2025 01:07

## 2025-01-12 NOTE — PROVIDER CONTACT NOTE (OTHER) - SITUATION
Patient spiked a temp of 100.7
pt temperature 101.8
patient refusing IV access
pt temp of 103
Pt is febrile. 100.3 F orally.  Pt doesn't have Tylenol  ordered at this time.
potassium 7
Refused  to keep the BIPAP on

## 2025-01-12 NOTE — PROVIDER CONTACT NOTE (OTHER) - NAME OF MD/NP/PA/DO NOTIFIED:
Chaim Buck
Dr Mejia
MD Brent Mejia
MD Chaim Buck
Chaim Buck
Chaim Buck
DR Felicia Hernandez
Kailash Corado

## 2025-01-12 NOTE — PROGRESS NOTE ADULT - PROBLEM SELECTOR PLAN 4
History of primary hypertension home meds: Metoprolol 25mg  - Unclear history of concomitant Labetalol use? Clarified, patient was transitioned to metoprolol  - no evidence of end-organ damage  - continue to monitor SBP  - DASH diet  - Will hold Metoprolol due to post HD pressure normotensive  - Will restart metoprolol if needed

## 2025-01-12 NOTE — PROVIDER CONTACT NOTE (OTHER) - BACKGROUND
Patient is a 39y old  Male who presents with a chief complaint of Weakness
patient p/w sepsis in setting of influenza A
pt admitted for sepsis
Admitted with sepsis Hx of Acute respiratory failure with hypercapnia
Patient was admitted for sepsis, PMH ESRD on HD, HTN, anemia.

## 2025-01-12 NOTE — PROGRESS NOTE ADULT - PROBLEM SELECTOR PLAN 1
Patient spiked fever of 100.7 1/6 ON, s/p Tylenol  CT Chest: Scattered bilateral subsegmental atelectasis predominantly within the   lower lobes. Mild groundglass airspace opacity within the right lower   lobe, for which superimposed infection/inflammation is not excluded.  DDx: pneumonia vs. cellulitis from upper extremity   BxCx (1/3): NGTD  BxCx: (1/7): NGTD    PLAN  - Ceftriaxone 1gq24h (1/8-  - ID for duration of treatment Patient spiked fever of 100.7 1/6 ON, s/p Tylenol  CT Chest: Scattered bilateral subsegmental atelectasis predominantly within the   lower lobes. Mild groundglass airspace opacity within the right lower   lobe, for which superimposed infection/inflammation is not excluded.  DDx: pneumonia vs. cellulitis from upper extremity   BxCx (1/3): NGTD  BxCx: (1/7): NGTD    PLAN  - Ceftriaxone 1gq24h (1/8-1/15) 7 day course

## 2025-01-12 NOTE — PROVIDER CONTACT NOTE (OTHER) - ACTION/TREATMENT ORDERED:
Back on 2l/nc  will continue to monitor
Iv tylenol, cooling therapy
MD made aware. IV Tylenol ordered and given.
MD to order repeat lab, per MD possible HD today
Md made aware
no new orders at this time
MD aware, IV tylenol given

## 2025-01-12 NOTE — PROGRESS NOTE ADULT - PROBLEM SELECTOR PLAN 3
Patient has ESRD with HD on Tuesday, Thursday, and Saturday  - Nephro consulted and T/Th/Sat dialysis in place  - Additional session 1/8 for increased contrast material  Plan  - HD T/Th/Sa  - Will clarify schedule with Nephrologist Patient has ESRD with HD on Tuesday, Thursday, and Saturday  - Nephro consulted and T/Th/Sat dialysis in place  - Additional session 1/8 for increased contrast material  Plan  - HD M/W/F

## 2025-01-12 NOTE — PROGRESS NOTE ADULT - TIME BILLING
review of labs, imaging, notes, discussion of plan with team, which are independent of time spent teaching
review of labs, imaging, notes, discussion of plan with team, which are independent of time spent teaching.
review of labs, imaging, notes, discussion of plan with team, which are independent of time spent teaching
time spent reviewing prior charts, meds, discussing plan with patient= 52 minutes. Time spent does not include time spent teaching
time spent reviewing prior charts, meds, discussing plan with patient= 36 minutes. Time spent does not include time spent teaching

## 2025-01-13 LAB
ALBUMIN SERPL ELPH-MCNC: 3.2 G/DL — LOW (ref 3.3–5)
ANION GAP SERPL CALC-SCNC: 20 MMOL/L — HIGH (ref 5–17)
BASOPHILS # BLD AUTO: 0.02 K/UL — SIGNIFICANT CHANGE UP (ref 0–0.2)
BASOPHILS NFR BLD AUTO: 0.4 % — SIGNIFICANT CHANGE UP (ref 0–2)
BUN SERPL-MCNC: 77 MG/DL — HIGH (ref 7–23)
CALCIUM SERPL-MCNC: 9.6 MG/DL — SIGNIFICANT CHANGE UP (ref 8.4–10.5)
CHLORIDE SERPL-SCNC: 95 MMOL/L — LOW (ref 96–108)
CO2 SERPL-SCNC: 22 MMOL/L — SIGNIFICANT CHANGE UP (ref 22–31)
CREAT SERPL-MCNC: 12.07 MG/DL — HIGH (ref 0.5–1.3)
EGFR: 5 ML/MIN/1.73M2 — LOW
EOSINOPHIL # BLD AUTO: 0.1 K/UL — SIGNIFICANT CHANGE UP (ref 0–0.5)
EOSINOPHIL NFR BLD AUTO: 2 % — SIGNIFICANT CHANGE UP (ref 0–6)
GLUCOSE SERPL-MCNC: 109 MG/DL — HIGH (ref 70–99)
HCT VFR BLD CALC: 30.1 % — LOW (ref 39–50)
HGB BLD-MCNC: 9.3 G/DL — LOW (ref 13–17)
IMM GRANULOCYTES NFR BLD AUTO: 0.6 % — SIGNIFICANT CHANGE UP (ref 0–0.9)
LYMPHOCYTES # BLD AUTO: 0.99 K/UL — LOW (ref 1–3.3)
LYMPHOCYTES # BLD AUTO: 20.3 % — SIGNIFICANT CHANGE UP (ref 13–44)
MCHC RBC-ENTMCNC: 30.9 G/DL — LOW (ref 32–36)
MCHC RBC-ENTMCNC: 31.1 PG — SIGNIFICANT CHANGE UP (ref 27–34)
MCV RBC AUTO: 100.7 FL — HIGH (ref 80–100)
MONOCYTES # BLD AUTO: 0.59 K/UL — SIGNIFICANT CHANGE UP (ref 0–0.9)
MONOCYTES NFR BLD AUTO: 12.1 % — SIGNIFICANT CHANGE UP (ref 2–14)
NEUTROPHILS # BLD AUTO: 3.15 K/UL — SIGNIFICANT CHANGE UP (ref 1.8–7.4)
NEUTROPHILS NFR BLD AUTO: 64.6 % — SIGNIFICANT CHANGE UP (ref 43–77)
NRBC # BLD: 0 /100 WBCS — SIGNIFICANT CHANGE UP (ref 0–0)
PHOSPHATE SERPL-MCNC: 6.2 MG/DL — HIGH (ref 2.5–4.5)
PLATELET # BLD AUTO: 339 K/UL — SIGNIFICANT CHANGE UP (ref 150–400)
POTASSIUM SERPL-MCNC: 4.7 MMOL/L — SIGNIFICANT CHANGE UP (ref 3.5–5.3)
POTASSIUM SERPL-SCNC: 4.7 MMOL/L — SIGNIFICANT CHANGE UP (ref 3.5–5.3)
RBC # BLD: 2.99 M/UL — LOW (ref 4.2–5.8)
RBC # FLD: 15.3 % — HIGH (ref 10.3–14.5)
SODIUM SERPL-SCNC: 137 MMOL/L — SIGNIFICANT CHANGE UP (ref 135–145)
WBC # BLD: 4.88 K/UL — SIGNIFICANT CHANGE UP (ref 3.8–10.5)
WBC # FLD AUTO: 4.88 K/UL — SIGNIFICANT CHANGE UP (ref 3.8–10.5)

## 2025-01-13 PROCEDURE — 99232 SBSQ HOSP IP/OBS MODERATE 35: CPT | Mod: GC

## 2025-01-13 PROCEDURE — G0545: CPT

## 2025-01-13 PROCEDURE — 99232 SBSQ HOSP IP/OBS MODERATE 35: CPT

## 2025-01-13 RX ORDER — HEPARIN SODIUM 1000 [USP'U]/ML
1000 INJECTION, SOLUTION INTRAVENOUS; SUBCUTANEOUS
Refills: 0 | Status: COMPLETED | OUTPATIENT
Start: 2025-01-13 | End: 2025-01-13

## 2025-01-13 RX ORDER — HEPARIN SODIUM 1000 [USP'U]/ML
1000 INJECTION, SOLUTION INTRAVENOUS; SUBCUTANEOUS ONCE
Refills: 0 | Status: COMPLETED | OUTPATIENT
Start: 2025-01-13 | End: 2025-01-13

## 2025-01-13 RX ORDER — CEFUROXIME AXETIL 250 MG
250 TABLET ORAL EVERY 24 HOURS
Refills: 0 | Status: DISCONTINUED | OUTPATIENT
Start: 2025-01-13 | End: 2025-01-14

## 2025-01-13 RX ORDER — OXYCODONE HCL 15 MG
10 TABLET ORAL EVERY 8 HOURS
Refills: 0 | Status: DISCONTINUED | OUTPATIENT
Start: 2025-01-13 | End: 2025-01-17

## 2025-01-13 RX ORDER — SODIUM CHLORIDE 9 MG/ML
500 INJECTION, SOLUTION INTRAMUSCULAR; INTRAVENOUS; SUBCUTANEOUS ONCE
Refills: 0 | Status: COMPLETED | OUTPATIENT
Start: 2025-01-13 | End: 2025-01-13

## 2025-01-13 RX ORDER — OXYCODONE HCL 15 MG
15 TABLET ORAL EVERY 8 HOURS
Refills: 0 | Status: DISCONTINUED | OUTPATIENT
Start: 2025-01-13 | End: 2025-01-17

## 2025-01-13 RX ADMIN — HEPARIN SODIUM 5000 UNIT(S): 1000 INJECTION, SOLUTION INTRAVENOUS; SUBCUTANEOUS at 18:53

## 2025-01-13 RX ADMIN — HEPARIN SODIUM 1000 UNIT(S): 1000 INJECTION, SOLUTION INTRAVENOUS; SUBCUTANEOUS at 15:20

## 2025-01-13 RX ADMIN — SEVELAMER CARBONATE 2400 MILLIGRAM(S): 800 TABLET, FILM COATED ORAL at 08:39

## 2025-01-13 RX ADMIN — HEPARIN SODIUM 1000 UNIT(S): 1000 INJECTION, SOLUTION INTRAVENOUS; SUBCUTANEOUS at 17:20

## 2025-01-13 RX ADMIN — Medication 250 MILLIGRAM(S): at 13:48

## 2025-01-13 RX ADMIN — SEVELAMER CARBONATE 2400 MILLIGRAM(S): 800 TABLET, FILM COATED ORAL at 12:57

## 2025-01-13 RX ADMIN — HEPARIN SODIUM 1000 UNIT(S): 1000 INJECTION, SOLUTION INTRAVENOUS; SUBCUTANEOUS at 16:20

## 2025-01-13 NOTE — PROGRESS NOTE ADULT - PROBLEM SELECTOR PLAN 1
Patient spiked fever of 100.7 1/6 ON, s/p Tylenol  CT Chest: Scattered bilateral subsegmental atelectasis predominantly within the   lower lobes. Mild groundglass airspace opacity within the right lower   lobe, for which superimposed infection/inflammation is not excluded.  DDx: pneumonia vs. cellulitis from upper extremity   BxCx (1/3): NGTD  BxCx: (1/7): NGTD    PLAN  - Ceftriaxone 1gq24h (1/8-1/15) 7 day course Patient spiked fever of 100.7 1/6 ON, s/p Tylenol  CT Chest: Scattered bilateral subsegmental atelectasis predominantly within the   lower lobes. Mild groundglass airspace opacity within the right lower   lobe, for which superimposed infection/inflammation is not excluded.  DDx: pneumonia vs. cellulitis from upper extremity   BxCx (1/3): NGTD  BxCx: (1/7): NGTD    PLAN  - Ceftin 250mg q24h 4 day course per ID 5

## 2025-01-13 NOTE — PROGRESS NOTE ADULT - PROBLEM SELECTOR PLAN 2
Patient complaining of increased warmth, pain, and swelling near the left upper extremity  Found to have left cephalic vein superficial thrombophlebitis on US 1/5  Will treat supportively with pain management   - consider anticoagulation if symptoms worsen    PLAN  - Ceftriaxone 1gq24h (1/8-1/15) 7 day course Patient complaining of increased warmth, pain, and swelling near the left upper extremity  Found to have left cephalic vein superficial thrombophlebitis on US 1/5  Will treat supportively with pain management   - consider anticoagulation if symptoms worsen    PLAN  - Ceftin 250mg q24h 4 day course per ID

## 2025-01-13 NOTE — PROGRESS NOTE ADULT - SUBJECTIVE AND OBJECTIVE BOX
No CP, SOB    Vital Signs Last 24 Hrs  T(C): 36.9 (01-13-25 @ 15:12), Max: 37.3 (01-13-25 @ 12:00)  T(F): 98.4 (01-13-25 @ 15:12), Max: 99.1 (01-13-25 @ 12:00)  HR: 96 (01-13-25 @ 15:12) (95 - 102)  BP: 92/50 (01-13-25 @ 15:12) (92/50 - 113/69)  RR: 18 (01-13-25 @ 15:12) (18 - 18)  SpO2: 97% (01-13-25 @ 15:12) (96% - 98%)    s1s2  b/l air entry  soft, ND  RUE AVF patent, sm edema b/l LE                                                                                                                                                                             bisacodyl 5 milliGRAM(s) Oral at bedtime  cefuroxime   Tablet 250 milliGRAM(s) Oral every 24 hours  heparin   Injectable 5000 Unit(s) SubCutaneous every 12 hours  lidocaine   4% Patch 1 Patch Transdermal every 24 hours PRN  melatonin 5 milliGRAM(s) Oral at bedtime PRN  naloxone Injectable 0.4 milliGRAM(s) IV Push once  oxyCODONE    IR 10 milliGRAM(s) Oral every 8 hours PRN  oxyCODONE    IR 15 milliGRAM(s) Oral every 8 hours PRN  polyethylene glycol 3350 17 Gram(s) Oral two times a day  senna 2 Tablet(s) Oral at bedtime  sevelamer carbonate 2400 milliGRAM(s) Oral three times a day with meals    A/P:    ESRD on HD   Adm w/Flu A  HD today   TMP up to 2kg as able  Renvela for high Phos  Renal diet, FR 1L/day   Bld work w/HD 3 x week     [phone]

## 2025-01-13 NOTE — PROGRESS NOTE ADULT - PROBLEM SELECTOR PLAN 3
Patient has ESRD with HD on Tuesday, Thursday, and Saturday  - Nephro consulted and T/Th/Sat dialysis in place  - Additional session 1/8 for increased contrast material  Plan  - HD M/W/F

## 2025-01-13 NOTE — PROGRESS NOTE ADULT - SUBJECTIVE AND OBJECTIVE BOX
CC: F/U for Fever    Saw/spoke to patient. No fevers, no chills. No new complaints.    Allergies  shellfish (Hives)  No Known Drug Allergies    ANTIMICROBIALS:  cefuroxime   Tablet 250 every 24 hours    PE:    Vital Signs Last 24 Hrs  T(C): 36.9 (13 Jan 2025 15:12), Max: 37.3 (13 Jan 2025 12:00)  T(F): 98.4 (13 Jan 2025 15:12), Max: 99.1 (13 Jan 2025 12:00)  HR: 96 (13 Jan 2025 15:12) (95 - 102)  BP: 92/50 (13 Jan 2025 15:12) (92/50 - 113/69)  RR: 18 (13 Jan 2025 15:12) (18 - 18)  SpO2: 97% (13 Jan 2025 15:12) (96% - 98%)    Gen: AOx3, NAD, non-toxic  CV: Nontachycardic  Resp: Breathing comfortably, RA  Abd: Soft, nontender  IV/Skin: No thrombophlebitis    LABS:                        9.3    4.88  )-----------( 339      ( 13 Jan 2025 15:33 )             30.1     01-13    137  |  95[L]  |  77[H]  ----------------------------<  109[H]  4.7   |  22  |  12.07[H]    Ca    9.6      13 Jan 2025 15:33  Phos  6.2     01-13    TPro  x   /  Alb  3.2[L]  /  TBili  x   /  DBili  x   /  AST  x   /  ALT  x   /  AlkPhos  x   01-13    Urinalysis Basic - ( 13 Jan 2025 15:33 )    Color: x / Appearance: x / SG: x / pH: x  Gluc: 109 mg/dL / Ketone: x  / Bili: x / Urobili: x   Blood: x / Protein: x / Nitrite: x   Leuk Esterase: x / RBC: x / WBC x   Sq Epi: x / Non Sq Epi: x / Bacteria: x    MICROBIOLOGY:    .Blood BLOOD  01-07-25   No growth at 5 days  --  --    .Blood BLOOD  01-03-25   No growth at 5 days  --  --    .Blood BLOOD  01-03-25   No growth at 5 days  --  --    .Blood BLOOD  12-30-24   No growth at 5 days  --  --    .Blood BLOOD  12-30-24   No growth at 5 days  --  --    RADIOLOGY:    1/6    IMPRESSION:  Scattered bilateral subsegmental atelectasis predominantly within the   lower lobes. Mild groundglass airspace opacity within the right lower   lobe, for which superimposed infection/inflammation is not excluded.    No evidence of intra-abdominal drainable collection or acute pathology.    Severe stigmata related to renal osteodystrophy.    Lucency through the right superior and inferior pubic rami consistent   with age indeterminant fracture.    Anterior wedge shaped fracture deformity of the T12 vertebral body with   associated kyphosis of the thoracolumbar spine.

## 2025-01-13 NOTE — PROGRESS NOTE ADULT - SUBJECTIVE AND OBJECTIVE BOX
Brent Mejia MD  Available on TEAMS    Patient is a 39y old  Male who presents with a chief complaint of Weakness (12 Jan 2025 07:10)      SUBJECTIVE / OVERNIGHT EVENTS: No acute events overnight. Patient was assessed at bedside.       REVIEW OF SYSTEMS:  CONSTITUTIONAL: No weakness, fevers, or chills  EYES/ENT: No visual changes; No vertigo, throat pain, or dysphagia  NECK: No pain or stiffness  RESPIRATORY: No cough, wheezing, hemoptysis, or shortness of breath  CARDIOVASCULAR: No chest pain or palpitations  GASTROINTESTINAL: No abdominal or epigastric pain. No nausea, vomiting, or hematemesis; No diarrhea or constipation. No melena or hematochezia.  GENITOURINARY: No dysuria, frequency, or hematuria  MUSCULOSKELETAL: No joint or muscle pain or aches  NEUROLOGICAL: No numbness or weakness  SKIN: No itching or rashes      MEDICATIONS  (STANDING):  bisacodyl 5 milliGRAM(s) Oral at bedtime  cefTRIAXone   IVPB 1000 milliGRAM(s) IV Intermittent every 24 hours  heparin   Injectable 5000 Unit(s) SubCutaneous every 12 hours  naloxone Injectable 0.4 milliGRAM(s) IV Push once  polyethylene glycol 3350 17 Gram(s) Oral two times a day  senna 2 Tablet(s) Oral at bedtime  sevelamer carbonate 2400 milliGRAM(s) Oral three times a day with meals    MEDICATIONS  (PRN):  lidocaine   4% Patch 1 Patch Transdermal every 24 hours PRN pain in the left upper arm  melatonin 5 milliGRAM(s) Oral at bedtime PRN Insomnia  oxyCODONE    IR 10 milliGRAM(s) Oral every 8 hours PRN Moderate Pain (4 - 6)  oxyCODONE    IR 15 milliGRAM(s) Oral every 8 hours PRN Severe Pain (7 - 10)      CAPILLARY BLOOD GLUCOSE        I&O's Summary    12 Jan 2025 07:01  -  13 Jan 2025 07:00  --------------------------------------------------------  IN: 450 mL / OUT: 0 mL / NET: 450 mL        Vital Signs Last 24 Hrs  T(C): 37.1 (12 Jan 2025 21:57), Max: 37.1 (12 Jan 2025 21:57)  T(F): 98.7 (12 Jan 2025 21:57), Max: 98.7 (12 Jan 2025 21:57)  HR: 102 (12 Jan 2025 21:57) (97 - 102)  BP: 106/67 (12 Jan 2025 21:57) (106/67 - 148/74)  BP(mean): --  RR: 18 (12 Jan 2025 21:57) (18 - 18)  SpO2: 98% (12 Jan 2025 21:57) (96% - 98%)    Parameters below as of 12 Jan 2025 21:57  Patient On (Oxygen Delivery Method): room air        PHYSICAL EXAM:  GENERAL: NAD, well-developed, well-nourished  HEAD: Atraumatic, Normocephalic  EYES: EOMI, PERRLA, conjunctiva and sclera clear  NECK: Supple, No JVD  CHEST/LUNG: Clear to auscultation bilaterally; No wheezes or crackles  HEART: Normal S1/S2; Regular rate and rhythm; No murmurs, rubs, or gallops  ABDOMEN: Soft, Nontender, Nondistended; Bowel sounds present  EXTREMITIES: 2+ Peripheral Pulses; No clubbing, cyanosis, or edema  PSYCH: A&Ox3  NEUROLOGY: no focal neurologic deficit  SKIN: No rashes or lesions    LABS:                     RADIOLOGY & ADDITIONAL TESTS:    Imaging Personally Reviewed:    Consultant(s) Notes Reviewed:      Care Discussed with Consultants/Other Providers:

## 2025-01-13 NOTE — PROGRESS NOTE ADULT - ASSESSMENT
39 year old with ESRD on HD, history of CVA, admitted with weakness from Influenza, also received piperacillin/tazobactam since admission (plus one dose of Vancomycin on presentation), who had been running mildly elevated temperature through the admission, now with new fever to 101.8, no obviously localizing signs/symptoms on exam other than significant LUE swelling which patient reports is new but accuracy of history is unclear  Normal WBC this morning  Patient at risk for post-influenza PNA, aspiration PNA, bacteremia from ESRD status, as well as have some concerns about LUE swelling  pt clinically stable  CT with GGO, atelectasis? No other signs of infection  Noninfectious process? Resolved infection?  In interim started on Cefazolin for LUE thrombophlebitis  Overall influenza infection, persistent fever, abnormal CXR    Ceftriaxone 1g q 24, if DC planning can send out on Ceftin 250mg q 24 (give every day but after HD on HD days) to complete 7 days  F/U BCX  trend cbc, no leucocytosis   Monitor for alternate sources infection    Signing off. Please call [phone] or on call fellow with further questions or change in status.     Tino Webber MD  Contact on TEAMS messaging from 9am - 5pm  From 5pm-9am, on weekends, or if no response call [phone]

## 2025-01-14 LAB
FERRITIN SERPL-MCNC: 1154 NG/ML — HIGH (ref 30–400)
IRON SATN MFR SERPL: 33 % — SIGNIFICANT CHANGE UP (ref 16–55)
IRON SATN MFR SERPL: 49 UG/DL — SIGNIFICANT CHANGE UP (ref 45–165)
PTH-INTACT FLD-MCNC: 4125 PG/ML — HIGH (ref 15–65)
TIBC SERPL-MCNC: 149 UG/DL — LOW (ref 220–430)
UIBC SERPL-MCNC: 100 UG/DL — LOW (ref 110–370)

## 2025-01-14 PROCEDURE — 99232 SBSQ HOSP IP/OBS MODERATE 35: CPT | Mod: GC

## 2025-01-14 RX ORDER — POLYETHYLENE GLYCOL 3350 17 G/DOSE
17 POWDER (GRAM) ORAL
Qty: 510 | Refills: 1
Start: 2025-01-14 | End: 2025-02-12

## 2025-01-14 RX ORDER — POLYETHYLENE GLYCOL 3350 17 G/17G
17 POWDER, FOR SOLUTION ORAL
Qty: 510 | Refills: 1 | DISCHARGE
Start: 2025-01-14 | End: 2025-02-12

## 2025-01-14 RX ORDER — CINACALCET 30 MG/1
30 TABLET, FILM COATED ORAL DAILY
Refills: 0 | Status: DISCONTINUED | OUTPATIENT
Start: 2025-01-14 | End: 2025-01-17

## 2025-01-14 RX ORDER — MIDODRINE HYDROCHLORIDE 5 MG/1
5 TABLET ORAL
Refills: 0 | Status: DISCONTINUED | OUTPATIENT
Start: 2025-01-14 | End: 2025-01-17

## 2025-01-14 RX ORDER — NALOXONE HCL 0.4 MG/ML
1 VIAL (ML) INJECTION
Qty: 1 | Refills: 0
Start: 2025-01-14 | End: 2025-01-14

## 2025-01-14 RX ORDER — SENNA 187 MG
2 TABLET ORAL
Qty: 30 | Refills: 0 | DISCHARGE
Start: 2025-01-14 | End: 2025-01-28

## 2025-01-14 RX ORDER — SENNOSIDES 8.6 MG/1
2 TABLET, FILM COATED ORAL
Qty: 30 | Refills: 0
Start: 2025-01-14 | End: 2025-01-28

## 2025-01-14 RX ORDER — EPOETIN ALFA 2000 [IU]/ML
10000 SOLUTION INTRAVENOUS; SUBCUTANEOUS
Refills: 0 | Status: DISCONTINUED | OUTPATIENT
Start: 2025-01-14 | End: 2025-01-17

## 2025-01-14 RX ORDER — CEFUROXIME AXETIL 250 MG
250 TABLET ORAL EVERY 24 HOURS
Refills: 0 | Status: COMPLETED | OUTPATIENT
Start: 2025-01-14 | End: 2025-01-16

## 2025-01-14 RX ADMIN — Medication 250 MILLIGRAM(S): at 13:28

## 2025-01-14 RX ADMIN — CINACALCET 30 MILLIGRAM(S): 30 TABLET, FILM COATED ORAL at 13:29

## 2025-01-14 RX ADMIN — SEVELAMER CARBONATE 2400 MILLIGRAM(S): 800 TABLET, FILM COATED ORAL at 18:18

## 2025-01-14 RX ADMIN — SEVELAMER CARBONATE 2400 MILLIGRAM(S): 800 TABLET, FILM COATED ORAL at 09:29

## 2025-01-14 NOTE — PROGRESS NOTE ADULT - SUBJECTIVE AND OBJECTIVE BOX
No CP, SOB    Vital Signs Last 24 Hrs  T(C): 37.3 (01-14-25 @ 12:00), Max: 37.4 (01-13-25 @ 20:24)  T(F): 99.2 (01-14-25 @ 12:00), Max: 99.4 (01-13-25 @ 20:24)  HR: 92 (01-14-25 @ 12:00) (64 - 100)  BP: 121/68 (01-14-25 @ 12:00) (89/51 - 121/68)  RR: 18 (01-14-25 @ 12:00) (18 - 18)  SpO2: 98% (01-14-25 @ 12:00) (96% - 98%)    I&O's Detail    13 Jan 2025 07:01  -  14 Jan 2025 07:00  --------------------------------------------------------  OUT:    Other (mL): 1200 mL  Total OUT: 1200 mL    s1s2  b/l air entry  soft, ND  RUE AVF patent, sm edema b/l LE                                                                                                                                                                                        9.3    4.88  )-----------( 339      ( 13 Jan 2025 15:33 )             30.1     13 Jan 2025 15:33    137    |  95     |  77     ----------------------------<  109    4.7     |  22     |  12.07    Ca    9.6        13 Jan 2025 15:33  Phos  6.2       13 Jan 2025 15:33    TPro  x      /  Alb  3.2    /  TBili  x      /  DBili  x      /  AST  x      /  ALT  x      /  AlkPhos  x      13 Jan 2025 15:33    LIVER FUNCTIONS - ( 13 Jan 2025 15:33 )  Alb: 3.2 g/dL / Pro: x     / ALK PHOS: x     / ALT: x     / AST: x     / GGT: x           bisacodyl 5 milliGRAM(s) Oral at bedtime  cefuroxime   Tablet 250 milliGRAM(s) Oral every 24 hours  cinacalcet 30 milliGRAM(s) Oral daily  epoetin carito (EPOGEN) Injectable 04220 Unit(s) IV Push <User Schedule>  heparin   Injectable 5000 Unit(s) SubCutaneous every 12 hours  lidocaine   4% Patch 1 Patch Transdermal every 24 hours PRN  melatonin 5 milliGRAM(s) Oral at bedtime PRN  midodrine. 5 milliGRAM(s) Oral <User Schedule>  naloxone Injectable 0.4 milliGRAM(s) IV Push once  oxyCODONE    IR 10 milliGRAM(s) Oral every 8 hours PRN  oxyCODONE    IR 15 milliGRAM(s) Oral every 8 hours PRN  polyethylene glycol 3350 17 Gram(s) Oral two times a day  senna 2 Tablet(s) Oral at bedtime  sevelamer carbonate 2400 milliGRAM(s) Oral three times a day with meals    A/P:    ESRD on HD   Adm w/Flu A  HD tomorrow as ordered   TMP up to 2kg as able  Renvela for high Phos  Renal diet, FR 1L/day   Bld work w/HD 3 x week     [phone]
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
## 2025-01-14 NOTE — PROGRESS NOTE ADULT - PROBLEM SELECTOR PLAN 2
Patient complaining of increased warmth, pain, and swelling near the left upper extremity  Found to have left cephalic vein superficial thrombophlebitis on US 1/5  Will treat supportively with pain management   - consider anticoagulation if symptoms worsen    PLAN  - Ceftin 250mg q24h 4 day course per ID Patient complaining of increased warmth, pain, and swelling near the left upper extremity  Found to have left cephalic vein superficial thrombophlebitis on US 1/5  Will treat supportively with pain management   - consider anticoagulation if symptoms worsen    PLAN  - Ceftin 250mg q24h 4 day course per ID (1/17)

## 2025-01-14 NOTE — CHART NOTE - NSCHARTNOTEFT_GEN_A_CORE
NUTRITION FOLLOW UP NOTE    PATIENT SEEN FOR: Follow Up    SOURCE: [x] Patient  [x] Current Medical Record  [] RN  [] Family/support person at bedside  [] Patient unavailable/inappropriate  [] Other:    CHART REVIEWED/EVENTS NOTED.  [] No changes to nutrition care plan to note  [x] Nutrition Status:  - Pt with ESRD on HD, last HD 1/13 -> 1,200 mL out (per flow sheets).     DIET ORDER:   Diet, Renal Restrictions:   For patients receiving Renal Replacement - No Protein Restr, No Conc K, No Conc Phos, Low Sodium  DASH/TLC {Sodium & Cholesterol Restricted} (DASH)  1000mL Fluid Restriction ([id number])  No Shellfish (01-06-25)    CURRENT DIET ORDER IS:  [] Appropriate:  [x] Inadequate: See recommendations below for further details.   [] Other:    NUTRITION INTAKE/PROVISION:  [x] PO: Pt reported good appetite/po intake. Stated he is consuming ~90% of meals. Endorsed sometimes consuming ~2 meals per day, stated it depends on how he's feeling. Per nursing flow sheets, pt noted to be consuming % of meals since 1/8. Pt requesting 2x portions of protein with meals in setting of increased hunger, RD to honor as able. Pt requesting Nepro 1x/day, RD to add as able to optimize protein-energy intake and assist with meeting estimated nutrient needs. RD obtained food preferences to optimize PO intake, will honor as able. Pt with menu at bedside, stated he has not been ordering down for meals. Aware of meal ordering procedures.   [] Enteral Nutrition:  [] Parenteral Nutrition:    ANTHROPOMETRICS:  Drug Dosing Weight  Height (cm): 190.5 (30 Dec 2024 16:12)  Weight (kg): 92.1 (31 Dec 2024 02:15)  BMI (kg/m2): 25.4 (31 Dec 2024 02:15)    Weights:  - Dosing Weight (per chart): 203 pounds (12/31)(bed)  - Daily Weights (per flow sheets): 174.3 pounds (1/11), 178.1 pounds (1/10), 180.7 pounds (1/8), 190.6 pounds (1/4), 202.8 pounds (1/2), 206.7 pounds (1/1)  - Bed Scale Weight (taken by RD): 180.6 pounds (1/14)(bed)  - Per [facility] HIE: 199.15 pounds (12/30/24), 206.6 pounds (3/30/24), 199.15 pounds (1/23/24)  Daily weights noted with ~32 pound weight loss x 2 weeks? Question accuracy? Pt previously noted with 3+ edema, now 2+. Possible fluid shifts vs bed scale discrepancies in-house? RD to continue to monitor weights and trends as able.     MEDICATIONS:  MEDICATIONS  (STANDING):  bisacodyl 5 milliGRAM(s) Oral at bedtime  cefuroxime   Tablet 250 milliGRAM(s) Oral every 24 hours  cinacalcet 30 milliGRAM(s) Oral daily  epoetin carito (EPOGEN) Injectable 86808 Unit(s) IV Push <User Schedule>  heparin   Injectable 5000 Unit(s) SubCutaneous every 12 hours  midodrine. 5 milliGRAM(s) Oral <User Schedule>  naloxone Injectable 0.4 milliGRAM(s) IV Push once  polyethylene glycol 3350 17 Gram(s) Oral two times a day  senna 2 Tablet(s) Oral at bedtime  sevelamer carbonate 2400 milliGRAM(s) Oral three times a day with meals    MEDICATIONS  (PRN):  lidocaine   4% Patch 1 Patch Transdermal every 24 hours PRN pain in the left upper arm  melatonin 5 milliGRAM(s) Oral at bedtime PRN Insomnia  oxyCODONE    IR 10 milliGRAM(s) Oral every 8 hours PRN Moderate Pain (4 - 6)  oxyCODONE    IR 15 milliGRAM(s) Oral every 8 hours PRN Severe Pain (7 - 10)      NUTRITIONALLY PERTINENT LABS:  01-13 Na137 mmol/L Glu 109 mg/dL[H] K+ 4.7 mmol/L Cr  12.07 mg/dL[H] BUN 77 mg/dL[H] 01-13 Phos 6.2 mg/dL[H] 01-13 Alb 3.2 g/dL[L]01-09 ALT <5 U/L[L] AST 11 U/L Alkaline Phosphatase 554 U/L[H]    Finger Sticks:    NUTRITIONALLY PERTINENT MEDICATIONS/LABS:  [x] Reviewed  [x] Relevant notes on medications/labs:  - Ordered for antibiotics in-house (per orders).  - Pt with ESRD on HD, potassium within normal limits, Phosphorous elevated in-house. Ordered for Renvela 3x/day with meals (per orders).   - Ordered for EPOGEN and Cinacalcet (per orders).  - Hypoglycemia noted 1/5 and 1/6, continue to monitor.     EDEMA:  [x] Reviewed  [x] Relevant notes: 2+ generalized edema (per flow sheets).     GI/ I&O:  [x] Reviewed  [x] Relevant notes: Pt reported no nausea or vomiting at this time. Pt reported no diarrhea or constipation. Last BM: 1/12 (per patient).   [x] Other: Ordered for Dulcolax and Miralax (per orders).   Continue to monitor bowel movements and bowel movement regularity. Adjust bowel regimen as needed.     SKIN:   [] No pressure injuries documented, per nursing flowsheet  [x] Pressure injury previously noted  [] Change in pressure injury documentation:  [x] Other:   - Per Wound Care Note on 12/31:   "B/L heel hyperpigmentation, cannot rule out a deep tissue injury present on admission   xerosis lower extremities  hypopigmentation over sacrum  B/L buttocks/sacral hyperpigmentation, cannot rule out a deep tissue injury present on admission"    ESTIMATED NEEDS:  [] No change:  [x] Updated:  Energy:  2,373-2,769 kcal/day (30-35 kcal/kg)  Protein:  103-119 g/day (1.3-1.5 g/kg)  Fluid:   ml/day or [X] defer to team  Based on: lowest in-house weight of 174.3 pounds (79.1 kg)(1/11) with consideration for pressure injuries, ESRD on HD.     NUTRITION DIAGNOSIS:  [x] Prior Dx:   1) Increased nutrient needs   [x] New Dx:  1)   2) Altered nutrition related labs, related to ESRD on dialysis, as evidenced by pt with elevated phosphorous levels in-house.   - GOAL: Pt will be able to adhere to nutrition related recommendations to improve nutritional status and labs.     EDUCATION:  [] Yes:  [x] Not appropriate/warranted - Pt declined nutrition education at this time. Declined all RD handouts at this time. RD offered to leave handouts at bedside, pt declined. Pt made aware RD remains available if amenable to education in the future.     NUTRITION CARE PLAN:  1. Recommend liberalizing diet to Renal, No Fish as tolerated to optimize po intake. Defer fluid restriction and diet texture/consistency to medical team.   2. Recommend adding Nepro Oral Nutrition Supplement 1x/day to optimize protein-energy intake and assist with wound healing. RD to allow 2x protein as able to assist with meeting needs.   3. Recommend adding Nephro-Bianca daily pending no contraindications for micronutrient coverage and wound healing.   4. Monitor electrolytes, replete as needed. Monitor glucose levels in setting of previous hypoglycemia in-house. Monitor renal indices and phosphorous levels.   5. Monitor and encourage adequate PO intake, RD obtained food preferences to optimize PO intake, will honor as able.   6. Monitor nutritional intake, tolerance to diet prescription, bowel movements, weights, labs, and skin integrity.     [] Achieved - Continue current nutrition intervention(s)  [] Current medical condition precludes nutrition intervention at this time.    MONITORING AND EVALUATION:   RD remains available upon request and will follow up per protocol.    Name  Available on [facility] TEAMS NUTRITION FOLLOW UP NOTE    PATIENT SEEN FOR: Follow Up    SOURCE: [x] Patient  [x] Current Medical Record  [] RN  [] Family/support person at bedside  [] Patient unavailable/inappropriate  [] Other:    CHART REVIEWED/EVENTS NOTED.  [] No changes to nutrition care plan to note  [x] Nutrition Status:  - Pt with ESRD on HD, last HD 1/13 -> 1,200 mL out (per flow sheets).     DIET ORDER:   Diet, Renal Restrictions:   For patients receiving Renal Replacement - No Protein Restr, No Conc K, No Conc Phos, Low Sodium  DASH/TLC {Sodium & Cholesterol Restricted} (DASH)  1000mL Fluid Restriction ([id number])  No Shellfish (01-06-25)    CURRENT DIET ORDER IS:  [] Appropriate:  [x] Inadequate: See recommendations below for further details.   [] Other:    NUTRITION INTAKE/PROVISION:  [x] PO: Pt reported good appetite. Stated he is consuming ~90% of meals when he is feeling good. Endorsed sometimes consuming 1-2 meals per day, stated it depends on how he's feeling. Per nursing flow sheets, pt noted to be consuming % of meals since 1/8. Pt requesting 2x portions of protein with meals in setting of increased hunger, RD to honor as able. Pt requesting Nepro 1x/day, RD to add as able to optimize protein-energy intake and assist with meeting estimated nutrient needs. RD obtained food preferences to optimize PO intake, will honor as able. Pt with menu at bedside, stated he has not been ordering down for meals. Aware of meal ordering procedures.   [] Enteral Nutrition:  [] Parenteral Nutrition:    ANTHROPOMETRICS:  Drug Dosing Weight  Height (cm): 190.5 (30 Dec 2024 16:12)  Weight (kg): 92.1 (31 Dec 2024 02:15)  BMI (kg/m2): 25.4 (31 Dec 2024 02:15)    Weights:  - Dosing Weight (per chart): 203 pounds (12/31)(bed)  - Daily Weights (per flow sheets): 174.3 pounds (1/11), 178.1 pounds (1/10), 180.7 pounds (1/8), 190.6 pounds (1/4), 202.8 pounds (1/2), 206.7 pounds (1/1)  - Bed Scale Weight (taken by RD): 180.6 pounds (1/14)(bed)  - Per [facility] HIE: 199.15 pounds (12/30/24), 206.6 pounds (3/30/24), 199.15 pounds (1/23/24)  Daily weights noted with ~32 pound weight loss x 2 weeks? Question accuracy? Pt previously noted with 3+ edema, now 2+. Possible fluid shifts vs bed scale discrepancies in-house? RD to continue to monitor weights and trends as able.     MEDICATIONS:  MEDICATIONS  (STANDING):  bisacodyl 5 milliGRAM(s) Oral at bedtime  cefuroxime   Tablet 250 milliGRAM(s) Oral every 24 hours  cinacalcet 30 milliGRAM(s) Oral daily  epoetin carito (EPOGEN) Injectable 52041 Unit(s) IV Push <User Schedule>  heparin   Injectable 5000 Unit(s) SubCutaneous every 12 hours  midodrine. 5 milliGRAM(s) Oral <User Schedule>  naloxone Injectable 0.4 milliGRAM(s) IV Push once  polyethylene glycol 3350 17 Gram(s) Oral two times a day  senna 2 Tablet(s) Oral at bedtime  sevelamer carbonate 2400 milliGRAM(s) Oral three times a day with meals    MEDICATIONS  (PRN):  lidocaine   4% Patch 1 Patch Transdermal every 24 hours PRN pain in the left upper arm  melatonin 5 milliGRAM(s) Oral at bedtime PRN Insomnia  oxyCODONE    IR 10 milliGRAM(s) Oral every 8 hours PRN Moderate Pain (4 - 6)  oxyCODONE    IR 15 milliGRAM(s) Oral every 8 hours PRN Severe Pain (7 - 10)      NUTRITIONALLY PERTINENT LABS:  01-13 Na137 mmol/L Glu 109 mg/dL[H] K+ 4.7 mmol/L Cr  12.07 mg/dL[H] BUN 77 mg/dL[H] 01-13 Phos 6.2 mg/dL[H] 01-13 Alb 3.2 g/dL[L]01-09 ALT <5 U/L[L] AST 11 U/L Alkaline Phosphatase 554 U/L[H]    Finger Sticks:    NUTRITIONALLY PERTINENT MEDICATIONS/LABS:  [x] Reviewed  [x] Relevant notes on medications/labs:  - Ordered for antibiotics in-house (per orders).  - Pt with ESRD on HD, potassium within normal limits, Phosphorous elevated in-house. Ordered for Renvela 3x/day with meals (per orders).   - Ordered for EPOGEN and Cinacalcet (per orders).  - Hypoglycemia noted 1/5 and 1/6, continue to monitor.     EDEMA:  [x] Reviewed  [x] Relevant notes: 2+ generalized edema (per flow sheets).     GI/ I&O:  [x] Reviewed  [x] Relevant notes: Pt reported no nausea or vomiting at this time. Pt reported no diarrhea or constipation. Last BM: 1/12 (per patient).   [x] Other: Ordered for Dulcolax and Miralax (per orders).   Continue to monitor bowel movements and bowel movement regularity. Adjust bowel regimen as needed.     SKIN:   [] No pressure injuries documented, per nursing flowsheet  [x] Pressure injury previously noted  [] Change in pressure injury documentation:  [x] Other:   - Per Wound Care Note on 12/31:   "B/L heel hyperpigmentation, cannot rule out a deep tissue injury present on admission   xerosis lower extremities  hypopigmentation over sacrum  B/L buttocks/sacral hyperpigmentation, cannot rule out a deep tissue injury present on admission"    ESTIMATED NEEDS:  [] No change:  [x] Updated:  Energy:  2,373-2,769 kcal/day (30-35 kcal/kg)  Protein:  103-119 g/day (1.3-1.5 g/kg)  Fluid:   ml/day or [X] defer to team  Based on: lowest in-house weight of 174.3 pounds (79.1 kg)(1/11) with consideration for pressure injuries, ESRD on HD.     NUTRITION DIAGNOSIS:  [x] Prior Dx:   1) Increased nutrient needs   [x] New Dx:  1) Inadequate protein-energy intake related to inability to meet sufficient protein-energy needs in setting of variable intake, as evidenced by pt reporting consuming 1-2 meals/day in-house at times.   - GOAL: Pt will be able to meet >75% of estimated nutrient needs.   2) Altered nutrition related labs, related to ESRD on dialysis, as evidenced by pt with elevated phosphorous levels in-house.   - GOAL: Pt will be able to adhere to nutrition related recommendations to improve nutritional status and labs.     EDUCATION:  [] Yes:  [x] Not appropriate/warranted - Pt declined nutrition education at this time. Declined all RD handouts at this time. RD offered to leave handouts at bedside, pt declined. Pt made aware RD remains available if amenable to education in the future.     NUTRITION CARE PLAN:  1. Recommend liberalizing diet to Renal, No Fish as tolerated to optimize po intake. Defer fluid restriction and diet texture/consistency to medical team.   2. Recommend adding Nepro Oral Nutrition Supplement 1x/day to optimize protein-energy intake and assist with wound healing. RD to allow 2x protein as able to assist with meeting needs.   3. Recommend adding Nephro-Bianca daily pending no contraindications for micronutrient coverage and wound healing.   4. Monitor electrolytes, replete as needed. Monitor glucose levels in setting of previous hypoglycemia in-house. Monitor renal indices and phosphorous levels.   5. Monitor and encourage adequate PO intake, RD obtained food preferences to optimize PO intake, will honor as able.   6. Monitor nutritional intake, tolerance to diet prescription, bowel movements, weights, labs, and skin integrity.     [] Achieved - Continue current nutrition intervention(s)  [] Current medical condition precludes nutrition intervention at this time.    MONITORING AND EVALUATION:   RD remains available upon request and will follow up per protocol.    Name  Available on [facility] TEAMS

## 2025-01-14 NOTE — PROGRESS NOTE ADULT - PROBLEM SELECTOR PLAN 1
Patient spiked fever of 100.7 1/6 ON, s/p Tylenol  CT Chest: Scattered bilateral subsegmental atelectasis predominantly within the   lower lobes. Mild groundglass airspace opacity within the right lower   lobe, for which superimposed infection/inflammation is not excluded.  DDx: pneumonia vs. cellulitis from upper extremity   BxCx (1/3): NGTD  BxCx: (1/7): NGTD    PLAN  - Ceftin 250mg q24h 4 day course per ID Patient spiked fever of 100.7 1/6 ON, s/p Tylenol  CT Chest: Scattered bilateral subsegmental atelectasis predominantly within the   lower lobes. Mild groundglass airspace opacity within the right lower   lobe, for which superimposed infection/inflammation is not excluded.  DDx: pneumonia vs. cellulitis from upper extremity   BxCx (1/3): NGTD  BxCx: (1/7): NGTD    PLAN  - Ceftin 250mg q24h 4 day course per ID (1/17)

## 2025-01-14 NOTE — PROGRESS NOTE ADULT - SUBJECTIVE AND OBJECTIVE BOX
Brent Mejia MD  Available on TEAMS    Patient is a 39y old  Male who presents with a chief complaint of Weakness (13 Jan 2025 15:40)      SUBJECTIVE / OVERNIGHT EVENTS: No acute events overnight. Patient was assessed at bedside.       REVIEW OF SYSTEMS:  CONSTITUTIONAL: No weakness, fevers, or chills  EYES/ENT: No visual changes; No vertigo, throat pain, or dysphagia  NECK: No pain or stiffness  RESPIRATORY: No cough, wheezing, hemoptysis, or shortness of breath  CARDIOVASCULAR: No chest pain or palpitations  GASTROINTESTINAL: No abdominal or epigastric pain. No nausea, vomiting, or hematemesis; No diarrhea or constipation. No melena or hematochezia.  GENITOURINARY: No dysuria, frequency, or hematuria  MUSCULOSKELETAL: No joint or muscle pain or aches  NEUROLOGICAL: No numbness or weakness  SKIN: No itching or rashes      MEDICATIONS  (STANDING):  bisacodyl 5 milliGRAM(s) Oral at bedtime  cefuroxime   Tablet 250 milliGRAM(s) Oral every 24 hours  heparin   Injectable 5000 Unit(s) SubCutaneous every 12 hours  naloxone Injectable 0.4 milliGRAM(s) IV Push once  polyethylene glycol 3350 17 Gram(s) Oral two times a day  senna 2 Tablet(s) Oral at bedtime  sevelamer carbonate 2400 milliGRAM(s) Oral three times a day with meals    MEDICATIONS  (PRN):  lidocaine   4% Patch 1 Patch Transdermal every 24 hours PRN pain in the left upper arm  melatonin 5 milliGRAM(s) Oral at bedtime PRN Insomnia  oxyCODONE    IR 10 milliGRAM(s) Oral every 8 hours PRN Moderate Pain (4 - 6)  oxyCODONE    IR 15 milliGRAM(s) Oral every 8 hours PRN Severe Pain (7 - 10)      CAPILLARY BLOOD GLUCOSE        I&O's Summary    13 Jan 2025 07:01  -  14 Jan 2025 07:00  --------------------------------------------------------  IN: 240 mL / OUT: 1200 mL / NET: -960 mL        Vital Signs Last 24 Hrs  T(C): 36.9 (14 Jan 2025 06:19), Max: 37.4 (13 Jan 2025 20:24)  T(F): 98.4 (14 Jan 2025 06:19), Max: 99.4 (13 Jan 2025 20:24)  HR: 98 (14 Jan 2025 06:19) (64 - 100)  BP: 94/52 (14 Jan 2025 06:19) (89/51 - 113/69)  BP(mean): --  RR: 18 (14 Jan 2025 06:19) (18 - 18)  SpO2: 98% (14 Jan 2025 06:19) (96% - 98%)    Parameters below as of 14 Jan 2025 06:19  Patient On (Oxygen Delivery Method): room air        PHYSICAL EXAM:  GENERAL: NAD, well-developed, well-nourished  HEAD: Atraumatic, Normocephalic  EYES: EOMI, PERRLA, conjunctiva and sclera clear  NECK: Supple, No JVD  CHEST/LUNG: Clear to auscultation bilaterally; No wheezes or crackles  HEART: Normal S1/S2; Regular rate and rhythm; No murmurs, rubs, or gallops  ABDOMEN: Soft, Nontender, Nondistended; Bowel sounds present  EXTREMITIES: 2+ Peripheral Pulses; No clubbing, cyanosis, or edema  PSYCH: A&Ox3  NEUROLOGY: no focal neurologic deficit  SKIN: No rashes or lesions    LABS:                        9.3    4.88  )-----------( 339      ( 13 Jan 2025 15:33 )             30.1      01-13    137  |  95[L]  |  77[H]  ----------------------------<  109[H]  4.7   |  22  |  12.07[H]    Ca    9.6      13 Jan 2025 15:33  Phos  6.2     01-13    TPro  x   /  Alb  3.2[L]  /  TBili  x   /  DBili  x   /  AST  x   /  ALT  x   /  AlkPhos  x   01-13          Urinalysis Basic - ( 13 Jan 2025 15:33 )    Color: x / Appearance: x / SG: x / pH: x  Gluc: 109 mg/dL / Ketone: x  / Bili: x / Urobili: x   Blood: x / Protein: x / Nitrite: x   Leuk Esterase: x / RBC: x / WBC x   Sq Epi: x / Non Sq Epi: x / Bacteria: x        RADIOLOGY & ADDITIONAL TESTS:    Imaging Personally Reviewed:    Consultant(s) Notes Reviewed:      Care Discussed with Consultants/Other Providers:   Brent Mejia MD  Available on TEAMS    Patient is a 39y old  Male who presents with a chief complaint of Weakness (13 Jan 2025 15:40)      SUBJECTIVE / OVERNIGHT EVENTS: No acute events overnight. Patient was assessed at bedside. No concerns at bedside.      REVIEW OF SYSTEMS:  CONSTITUTIONAL: No weakness, fevers, or chills  EYES/ENT: No visual changes; No vertigo, throat pain, or dysphagia  NECK: No pain or stiffness  RESPIRATORY: No cough, wheezing, hemoptysis, or shortness of breath  CARDIOVASCULAR: No chest pain or palpitations  GASTROINTESTINAL: No abdominal or epigastric pain. No nausea, vomiting, or hematemesis; No diarrhea or constipation. No melena or hematochezia.  GENITOURINARY: No dysuria, frequency, or hematuria  MUSCULOSKELETAL: No joint or muscle pain or aches  NEUROLOGICAL: No numbness or weakness  SKIN: No itching or rashes      MEDICATIONS  (STANDING):  bisacodyl 5 milliGRAM(s) Oral at bedtime  cefuroxime   Tablet 250 milliGRAM(s) Oral every 24 hours  heparin   Injectable 5000 Unit(s) SubCutaneous every 12 hours  naloxone Injectable 0.4 milliGRAM(s) IV Push once  polyethylene glycol 3350 17 Gram(s) Oral two times a day  senna 2 Tablet(s) Oral at bedtime  sevelamer carbonate 2400 milliGRAM(s) Oral three times a day with meals    MEDICATIONS  (PRN):  lidocaine   4% Patch 1 Patch Transdermal every 24 hours PRN pain in the left upper arm  melatonin 5 milliGRAM(s) Oral at bedtime PRN Insomnia  oxyCODONE    IR 10 milliGRAM(s) Oral every 8 hours PRN Moderate Pain (4 - 6)  oxyCODONE    IR 15 milliGRAM(s) Oral every 8 hours PRN Severe Pain (7 - 10)      CAPILLARY BLOOD GLUCOSE        I&O's Summary    13 Jan 2025 07:01  -  14 Jan 2025 07:00  --------------------------------------------------------  IN: 240 mL / OUT: 1200 mL / NET: -960 mL        Vital Signs Last 24 Hrs  T(C): 36.9 (14 Jan 2025 06:19), Max: 37.4 (13 Jan 2025 20:24)  T(F): 98.4 (14 Jan 2025 06:19), Max: 99.4 (13 Jan 2025 20:24)  HR: 98 (14 Jan 2025 06:19) (64 - 100)  BP: 94/52 (14 Jan 2025 06:19) (89/51 - 113/69)  BP(mean): --  RR: 18 (14 Jan 2025 06:19) (18 - 18)  SpO2: 98% (14 Jan 2025 06:19) (96% - 98%)    Parameters below as of 14 Jan 2025 06:19  Patient On (Oxygen Delivery Method): room air        PHYSICAL EXAM:  GENERAL: NAD, well-developed, well-nourished  HEAD: Atraumatic, Normocephalic  EYES: EOMI, PERRLA, conjunctiva and sclera clear  NECK: Supple, No JVD  CHEST/LUNG: Clear to auscultation bilaterally; No wheezes or crackles  HEART: Normal S1/S2; Regular rate and rhythm; No murmurs, rubs, or gallops  ABDOMEN: Soft, Nontender, Nondistended; Bowel sounds present  EXTREMITIES: 2+ Peripheral Pulses; No clubbing, cyanosis, or edema  PSYCH: A&Ox3  NEUROLOGY: no focal neurologic deficit  SKIN: No rashes or lesions    LABS:                        9.3    4.88  )-----------( 339      ( 13 Jan 2025 15:33 )             30.1      01-13    137  |  95[L]  |  77[H]  ----------------------------<  109[H]  4.7   |  22  |  12.07[H]    Ca    9.6      13 Jan 2025 15:33  Phos  6.2     01-13    TPro  x   /  Alb  3.2[L]  /  TBili  x   /  DBili  x   /  AST  x   /  ALT  x   /  AlkPhos  x   01-13          Urinalysis Basic - ( 13 Jan 2025 15:33 )    Color: x / Appearance: x / SG: x / pH: x  Gluc: 109 mg/dL / Ketone: x  / Bili: x / Urobili: x   Blood: x / Protein: x / Nitrite: x   Leuk Esterase: x / RBC: x / WBC x   Sq Epi: x / Non Sq Epi: x / Bacteria: x        RADIOLOGY & ADDITIONAL TESTS:    Imaging Personally Reviewed:    Consultant(s) Notes Reviewed:      Care Discussed with Consultants/Other Providers:

## 2025-01-15 LAB
ALBUMIN SERPL ELPH-MCNC: 3 G/DL — LOW (ref 3.3–5)
ANION GAP SERPL CALC-SCNC: 17 MMOL/L — SIGNIFICANT CHANGE UP (ref 5–17)
BASOPHILS # BLD AUTO: 0.04 K/UL — SIGNIFICANT CHANGE UP (ref 0–0.2)
BASOPHILS NFR BLD AUTO: 1 % — SIGNIFICANT CHANGE UP (ref 0–2)
BUN SERPL-MCNC: 81 MG/DL — HIGH (ref 7–23)
CALCIUM SERPL-MCNC: 8.7 MG/DL — SIGNIFICANT CHANGE UP (ref 8.4–10.5)
CHLORIDE SERPL-SCNC: 97 MMOL/L — SIGNIFICANT CHANGE UP (ref 96–108)
CO2 SERPL-SCNC: 22 MMOL/L — SIGNIFICANT CHANGE UP (ref 22–31)
CREAT SERPL-MCNC: 11.44 MG/DL — HIGH (ref 0.5–1.3)
EGFR: 5 ML/MIN/1.73M2 — LOW
EOSINOPHIL # BLD AUTO: 0.09 K/UL — SIGNIFICANT CHANGE UP (ref 0–0.5)
EOSINOPHIL NFR BLD AUTO: 2.2 % — SIGNIFICANT CHANGE UP (ref 0–6)
GLUCOSE SERPL-MCNC: 78 MG/DL — SIGNIFICANT CHANGE UP (ref 70–99)
HCT VFR BLD CALC: 30.6 % — LOW (ref 39–50)
HGB BLD-MCNC: 9.3 G/DL — LOW (ref 13–17)
IMM GRANULOCYTES NFR BLD AUTO: 0.2 % — SIGNIFICANT CHANGE UP (ref 0–0.9)
LYMPHOCYTES # BLD AUTO: 1 K/UL — SIGNIFICANT CHANGE UP (ref 1–3.3)
LYMPHOCYTES # BLD AUTO: 24 % — SIGNIFICANT CHANGE UP (ref 13–44)
MCHC RBC-ENTMCNC: 30.4 G/DL — LOW (ref 32–36)
MCHC RBC-ENTMCNC: 31.2 PG — SIGNIFICANT CHANGE UP (ref 27–34)
MCV RBC AUTO: 102.7 FL — HIGH (ref 80–100)
MONOCYTES # BLD AUTO: 0.44 K/UL — SIGNIFICANT CHANGE UP (ref 0–0.9)
MONOCYTES NFR BLD AUTO: 10.6 % — SIGNIFICANT CHANGE UP (ref 2–14)
NEUTROPHILS # BLD AUTO: 2.58 K/UL — SIGNIFICANT CHANGE UP (ref 1.8–7.4)
NEUTROPHILS NFR BLD AUTO: 62 % — SIGNIFICANT CHANGE UP (ref 43–77)
NRBC # BLD: 0 /100 WBCS — SIGNIFICANT CHANGE UP (ref 0–0)
PHOSPHATE SERPL-MCNC: 5.9 MG/DL — HIGH (ref 2.5–4.5)
PLATELET # BLD AUTO: 305 K/UL — SIGNIFICANT CHANGE UP (ref 150–400)
POTASSIUM SERPL-MCNC: 4.7 MMOL/L — SIGNIFICANT CHANGE UP (ref 3.5–5.3)
POTASSIUM SERPL-SCNC: 4.7 MMOL/L — SIGNIFICANT CHANGE UP (ref 3.5–5.3)
RBC # BLD: 2.98 M/UL — LOW (ref 4.2–5.8)
RBC # FLD: 15.4 % — HIGH (ref 10.3–14.5)
SODIUM SERPL-SCNC: 136 MMOL/L — SIGNIFICANT CHANGE UP (ref 135–145)
WBC # BLD: 4.16 K/UL — SIGNIFICANT CHANGE UP (ref 3.8–10.5)
WBC # FLD AUTO: 4.16 K/UL — SIGNIFICANT CHANGE UP (ref 3.8–10.5)

## 2025-01-15 PROCEDURE — 99232 SBSQ HOSP IP/OBS MODERATE 35: CPT | Mod: GC

## 2025-01-15 RX ORDER — HEPARIN SODIUM 1000 [USP'U]/ML
2000 INJECTION, SOLUTION INTRAVENOUS; SUBCUTANEOUS ONCE
Refills: 0 | Status: COMPLETED | OUTPATIENT
Start: 2025-01-15 | End: 2025-01-15

## 2025-01-15 RX ORDER — HEPARIN SODIUM 1000 [USP'U]/ML
1000 INJECTION, SOLUTION INTRAVENOUS; SUBCUTANEOUS
Refills: 0 | Status: COMPLETED | OUTPATIENT
Start: 2025-01-15 | End: 2025-01-15

## 2025-01-15 RX ADMIN — SENNOSIDES 2 TABLET(S): 8.6 TABLET, FILM COATED ORAL at 21:13

## 2025-01-15 RX ADMIN — Medication 10 MILLIGRAM(S): at 21:12

## 2025-01-15 RX ADMIN — SEVELAMER CARBONATE 2400 MILLIGRAM(S): 800 TABLET, FILM COATED ORAL at 17:04

## 2025-01-15 RX ADMIN — HEPARIN SODIUM 5000 UNIT(S): 1000 INJECTION, SOLUTION INTRAVENOUS; SUBCUTANEOUS at 17:04

## 2025-01-15 RX ADMIN — HEPARIN SODIUM 1000 UNIT(S): 1000 INJECTION, SOLUTION INTRAVENOUS; SUBCUTANEOUS at 10:46

## 2025-01-15 RX ADMIN — SEVELAMER CARBONATE 2400 MILLIGRAM(S): 800 TABLET, FILM COATED ORAL at 14:33

## 2025-01-15 RX ADMIN — EPOETIN ALFA 10000 UNIT(S): 2000 SOLUTION INTRAVENOUS; SUBCUTANEOUS at 09:30

## 2025-01-15 RX ADMIN — HEPARIN SODIUM 5000 UNIT(S): 1000 INJECTION, SOLUTION INTRAVENOUS; SUBCUTANEOUS at 05:35

## 2025-01-15 RX ADMIN — MIDODRINE HYDROCHLORIDE 5 MILLIGRAM(S): 5 TABLET ORAL at 08:49

## 2025-01-15 RX ADMIN — Medication 5 MILLIGRAM(S): at 21:12

## 2025-01-15 RX ADMIN — CINACALCET 30 MILLIGRAM(S): 30 TABLET, FILM COATED ORAL at 14:34

## 2025-01-15 RX ADMIN — Medication 10 MILLIGRAM(S): at 21:40

## 2025-01-15 RX ADMIN — HEPARIN SODIUM 1000 UNIT(S): 1000 INJECTION, SOLUTION INTRAVENOUS; SUBCUTANEOUS at 11:48

## 2025-01-15 RX ADMIN — HEPARIN SODIUM 2000 UNIT(S): 1000 INJECTION, SOLUTION INTRAVENOUS; SUBCUTANEOUS at 09:37

## 2025-01-15 RX ADMIN — Medication 250 MILLIGRAM(S): at 14:33

## 2025-01-15 NOTE — PROGRESS NOTE ADULT - PROBLEM SELECTOR PLAN 4
History of primary hypertension home meds: Metoprolol 25mg  - Unclear history of concomitant Labetalol use? Clarified, patient was transitioned to metoprolol  - no evidence of end-organ damage  - continue to monitor SBP  - DASH diet  - Will hold Metoprolol due to post HD pressure normotensive  - Will restart metoprolol if needed History of primary hypertension home meds: Metoprolol 25mg  - Unclear history of concomitant Labetalol use? Clarified, patient was transitioned to metoprolol  - no evidence of end-organ damage  - continue to monitor SBP  - DASH diet  - Metoprolol held

## 2025-01-15 NOTE — PROGRESS NOTE ADULT - SUBJECTIVE AND OBJECTIVE BOX
S/p HD today     Vital Signs Last 24 Hrs  T(C): 36.3 (01-15-25 @ 20:30), Max: 37.4 (01-15-25 @ 14:31)  T(F): 97.4 (01-15-25 @ 20:30), Max: 99.3 (01-15-25 @ 14:31)  HR: 98 (01-15-25 @ 20:30) (76 - 99)  BP: 95/49 (01-15-25 @ 20:30) (79/47 - 121/69)  RR: 18 (01-15-25 @ 20:30) (18 - 18)  SpO2: 99% (01-15-25 @ 20:30) (99% - 100%)    I&O's Detail    15 Rafael 2025 07:01  -  15 Rafael 2025 21:39  --------------------------------------------------------  OUT:    Other (mL): 2000 mL  Total OUT: 2000 mL    s1s2  b/l air entry  soft, ND  RUE AVF patent, sm edema b/l LE                                                                                                                                                                                                9.3    4.16  )-----------( 305      ( 15 Rafael 2025 10:36 )             30.6     15 Rafael 2025 10:37    136    |  97     |  81     ----------------------------<  78     4.7     |  22     |  11.44    Ca    8.7        15 Rafael 2025 10:37  Phos  5.9       15 Rafael 2025 10:37    TPro  x      /  Alb  3.0    /  TBili  x      /  DBili  x      /  AST  x      /  ALT  x      /  AlkPhos  x      15 Rafael 2025 10:37    LIVER FUNCTIONS - ( 15 Rafael 2025 10:37 )  Alb: 3.0 g/dL / Pro: x     / ALK PHOS: x     / ALT: x     / AST: x     / GGT: x           bisacodyl 5 milliGRAM(s) Oral at bedtime  cefuroxime   Tablet 250 milliGRAM(s) Oral every 24 hours  cinacalcet 30 milliGRAM(s) Oral daily  epoetin carito (EPOGEN) Injectable 34830 Unit(s) IV Push <User Schedule>  heparin   Injectable 5000 Unit(s) SubCutaneous every 12 hours  lidocaine   4% Patch 1 Patch Transdermal every 24 hours PRN  melatonin 5 milliGRAM(s) Oral at bedtime PRN  midodrine. 5 milliGRAM(s) Oral <User Schedule>  naloxone Injectable 0.4 milliGRAM(s) IV Push once  oxyCODONE    IR 10 milliGRAM(s) Oral every 8 hours PRN  oxyCODONE    IR 15 milliGRAM(s) Oral every 8 hours PRN  polyethylene glycol 3350 17 Gram(s) Oral two times a day  senna 2 Tablet(s) Oral at bedtime  sevelamer carbonate 2400 milliGRAM(s) Oral three times a day with meals    A/P:    ESRD on HD MWF  Adm w/Flu A  TMP up to 2kg as able w/HD  Midodrine prior to HD due to borderline BP   Renvela for high Phos  Epoetin w/HD 3 x week    [phone]

## 2025-01-15 NOTE — PROGRESS NOTE ADULT - PROBLEM SELECTOR PLAN 3
Patient has ESRD with HD on Tuesday, Thursday, and Saturday  - Nephro consulted and T/Th/Sat dialysis in place  - Additional session 1/8 for increased contrast material  Plan  - HD M/W/F Patient has ESRD with HD on Tuesday, Thursday, and Saturday  - Nephro consulted and T/Th/Sat dialysis in place  - Additional session 1/8 for increased contrast material  Plan  - HD M/W/F, HD today

## 2025-01-15 NOTE — PROGRESS NOTE ADULT - PROBLEM SELECTOR PLAN 1
Patient spiked fever of 100.7 1/6 ON, s/p Tylenol  CT Chest: Scattered bilateral subsegmental atelectasis predominantly within the   lower lobes. Mild groundglass airspace opacity within the right lower   lobe, for which superimposed infection/inflammation is not excluded.  DDx: pneumonia vs. cellulitis from upper extremity   BxCx (1/3): NGTD  BxCx: (1/7): NGTD    PLAN  - Ceftin 250mg q24h 4 day course per ID (1/17)

## 2025-01-15 NOTE — PROGRESS NOTE ADULT - PROBLEM SELECTOR PLAN 2
Patient complaining of increased warmth, pain, and swelling near the left upper extremity  Found to have left cephalic vein superficial thrombophlebitis on US 1/5  Will treat supportively with pain management   - consider anticoagulation if symptoms worsen    PLAN  - Ceftin 250mg q24h 4 day course per ID (1/17)

## 2025-01-15 NOTE — PROGRESS NOTE ADULT - SUBJECTIVE AND OBJECTIVE BOX
Brent Mejia MD  Available on TEAMS    Patient is a 39y old  Male who presents with a chief complaint of Weakness (14 Jan 2025 17:01)      SUBJECTIVE / OVERNIGHT EVENTS: No acute events overnight. Patient was assessed at bedside.       REVIEW OF SYSTEMS:  CONSTITUTIONAL: No weakness, fevers, or chills  EYES/ENT: No visual changes; No vertigo, throat pain, or dysphagia  NECK: No pain or stiffness  RESPIRATORY: No cough, wheezing, hemoptysis, or shortness of breath  CARDIOVASCULAR: No chest pain or palpitations  GASTROINTESTINAL: No abdominal or epigastric pain. No nausea, vomiting, or hematemesis; No diarrhea or constipation. No melena or hematochezia.  GENITOURINARY: No dysuria, frequency, or hematuria  MUSCULOSKELETAL: No joint or muscle pain or aches  NEUROLOGICAL: No numbness or weakness  SKIN: No itching or rashes      MEDICATIONS  (STANDING):  bisacodyl 5 milliGRAM(s) Oral at bedtime  cefuroxime   Tablet 250 milliGRAM(s) Oral every 24 hours  cinacalcet 30 milliGRAM(s) Oral daily  epoetin carito (EPOGEN) Injectable 39316 Unit(s) IV Push <User Schedule>  heparin   Injectable 5000 Unit(s) SubCutaneous every 12 hours  midodrine. 5 milliGRAM(s) Oral <User Schedule>  naloxone Injectable 0.4 milliGRAM(s) IV Push once  polyethylene glycol 3350 17 Gram(s) Oral two times a day  senna 2 Tablet(s) Oral at bedtime  sevelamer carbonate 2400 milliGRAM(s) Oral three times a day with meals    MEDICATIONS  (PRN):  lidocaine   4% Patch 1 Patch Transdermal every 24 hours PRN pain in the left upper arm  melatonin 5 milliGRAM(s) Oral at bedtime PRN Insomnia  oxyCODONE    IR 10 milliGRAM(s) Oral every 8 hours PRN Moderate Pain (4 - 6)  oxyCODONE    IR 15 milliGRAM(s) Oral every 8 hours PRN Severe Pain (7 - 10)      CAPILLARY BLOOD GLUCOSE        I&O's Summary    14 Jan 2025 07:01  -  15 Rafael 2025 07:00  --------------------------------------------------------  IN: 120 mL / OUT: 0 mL / NET: 120 mL        Vital Signs Last 24 Hrs  T(C): 36.4 (15 Rafael 2025 05:45), Max: 37.3 (14 Jan 2025 12:00)  T(F): 97.5 (15 Rafael 2025 05:45), Max: 99.2 (14 Jan 2025 12:00)  HR: 98 (15 Rafael 2025 05:45) (87 - 98)  BP: 121/69 (15 Rafael 2025 05:45) (105/51 - 121/69)  BP(mean): --  RR: 18 (15 Rafael 2025 05:45) (18 - 18)  SpO2: 100% (15 Rafael 2025 05:45) (98% - 100%)    Parameters below as of 15 Rafael 2025 05:45  Patient On (Oxygen Delivery Method): room air        PHYSICAL EXAM:  GENERAL: NAD, well-developed, well-nourished  HEAD: Atraumatic, Normocephalic  EYES: EOMI, PERRLA, conjunctiva and sclera clear  NECK: Supple, No JVD  CHEST/LUNG: Clear to auscultation bilaterally; No wheezes or crackles  HEART: Normal S1/S2; Regular rate and rhythm; No murmurs, rubs, or gallops  ABDOMEN: Soft, Nontender, Nondistended; Bowel sounds present  EXTREMITIES: 2+ Peripheral Pulses; No clubbing, cyanosis, or edema  PSYCH: A&Ox3  NEUROLOGY: no focal neurologic deficit  SKIN: No rashes or lesions    LABS:                        9.3    4.88  )-----------( 339      ( 13 Jan 2025 15:33 )             30.1      01-13    137  |  95[L]  |  77[H]  ----------------------------<  109[H]  4.7   |  22  |  12.07[H]    Ca    9.6      13 Jan 2025 15:33  Phos  6.2     01-13    TPro  x   /  Alb  3.2[L]  /  TBili  x   /  DBili  x   /  AST  x   /  ALT  x   /  AlkPhos  x   01-13          Urinalysis Basic - ( 13 Jan 2025 15:33 )    Color: x / Appearance: x / SG: x / pH: x  Gluc: 109 mg/dL / Ketone: x  / Bili: x / Urobili: x   Blood: x / Protein: x / Nitrite: x   Leuk Esterase: x / RBC: x / WBC x   Sq Epi: x / Non Sq Epi: x / Bacteria: x        RADIOLOGY & ADDITIONAL TESTS:    Imaging Personally Reviewed:    Consultant(s) Notes Reviewed:      Care Discussed with Consultants/Other Providers:   Brent Mejia MD  Available on TEAMS    Patient is a 39y old  Male who presents with a chief complaint of Weakness (14 Jan 2025 17:01)      SUBJECTIVE / OVERNIGHT EVENTS: No acute events overnight. Patient was assessed at bedside. Continues to have pain in proximal left arm. Denies weakness, fevers, chills.       REVIEW OF SYSTEMS:  EYES/ENT: No visual changes; No vertigo, throat pain, or dysphagia  NECK: No pain or stiffness  RESPIRATORY: No cough, wheezing, hemoptysis, or shortness of breath  CARDIOVASCULAR: No chest pain or palpitations  GASTROINTESTINAL: No abdominal or epigastric pain. No nausea, vomiting, or hematemesis; No diarrhea or constipation. No melena or hematochezia.  GENITOURINARY: No dysuria, frequency, or hematuria  MUSCULOSKELETAL: No joint or muscle pain or aches  NEUROLOGICAL: No numbness or weakness  SKIN: No itching or rashes      MEDICATIONS  (STANDING):  bisacodyl 5 milliGRAM(s) Oral at bedtime  cefuroxime   Tablet 250 milliGRAM(s) Oral every 24 hours  cinacalcet 30 milliGRAM(s) Oral daily  epoetin carito (EPOGEN) Injectable 10599 Unit(s) IV Push <User Schedule>  heparin   Injectable 5000 Unit(s) SubCutaneous every 12 hours  midodrine. 5 milliGRAM(s) Oral <User Schedule>  naloxone Injectable 0.4 milliGRAM(s) IV Push once  polyethylene glycol 3350 17 Gram(s) Oral two times a day  senna 2 Tablet(s) Oral at bedtime  sevelamer carbonate 2400 milliGRAM(s) Oral three times a day with meals    MEDICATIONS  (PRN):  lidocaine   4% Patch 1 Patch Transdermal every 24 hours PRN pain in the left upper arm  melatonin 5 milliGRAM(s) Oral at bedtime PRN Insomnia  oxyCODONE    IR 10 milliGRAM(s) Oral every 8 hours PRN Moderate Pain (4 - 6)  oxyCODONE    IR 15 milliGRAM(s) Oral every 8 hours PRN Severe Pain (7 - 10)      CAPILLARY BLOOD GLUCOSE        I&O's Summary    14 Jan 2025 07:01  -  15 Rafael 2025 07:00  --------------------------------------------------------  IN: 120 mL / OUT: 0 mL / NET: 120 mL        Vital Signs Last 24 Hrs  T(C): 36.4 (15 Rafael 2025 05:45), Max: 37.3 (14 Jan 2025 12:00)  T(F): 97.5 (15 Rafael 2025 05:45), Max: 99.2 (14 Jan 2025 12:00)  HR: 98 (15 Rafael 2025 05:45) (87 - 98)  BP: 121/69 (15 Rafael 2025 05:45) (105/51 - 121/69)  BP(mean): --  RR: 18 (15 Rafael 2025 05:45) (18 - 18)  SpO2: 100% (15 Rafael 2025 05:45) (98% - 100%)    Parameters below as of 15 Rafael 2025 05:45  Patient On (Oxygen Delivery Method): room air        PHYSICAL EXAM:  GENERAL: NAD, well-developed, well-nourished  HEAD: Atraumatic, Normocephalic  EYES: EOMI, PERRLA, conjunctiva and sclera clear  NECK: Supple, No JVD  CHEST/LUNG: Clear to auscultation bilaterally; No wheezes or crackles  HEART: Normal S1/S2; Regular rate and rhythm; No murmurs, rubs, or gallops  ABDOMEN: Soft, Nontender, Nondistended; Bowel sounds present  EXTREMITIES: 2+ Peripheral Pulses; No clubbing, cyanosis, or edema  PSYCH: A&Ox3  NEUROLOGY: no focal neurologic deficit  SKIN: No rashes or lesions    LABS:                        9.3    4.88  )-----------( 339      ( 13 Jan 2025 15:33 )             30.1      01-13    137  |  95[L]  |  77[H]  ----------------------------<  109[H]  4.7   |  22  |  12.07[H]    Ca    9.6      13 Jan 2025 15:33  Phos  6.2     01-13    TPro  x   /  Alb  3.2[L]  /  TBili  x   /  DBili  x   /  AST  x   /  ALT  x   /  AlkPhos  x   01-13          Urinalysis Basic - ( 13 Jan 2025 15:33 )    Color: x / Appearance: x / SG: x / pH: x  Gluc: 109 mg/dL / Ketone: x  / Bili: x / Urobili: x   Blood: x / Protein: x / Nitrite: x   Leuk Esterase: x / RBC: x / WBC x   Sq Epi: x / Non Sq Epi: x / Bacteria: x        RADIOLOGY & ADDITIONAL TESTS:    Imaging Personally Reviewed:    Consultant(s) Notes Reviewed:      Care Discussed with Consultants/Other Providers:

## 2025-01-15 NOTE — PROGRESS NOTE ADULT - PROBLEM SELECTOR PLAN 6
- Fluids: None  - Electrolytes: Will replete to maintain K>4, Phos>3, and Mag>2  - Nutrition: Renal Restricted  - Code: FULL  - Activity: As tolerated, pending PT eval  - DVT Prophylaxis: heparin Sub q,   - Stress Ulcer/GI Prophylaxis: N/A  - Disposition: Admit to medicine, pending PT/ JUSTINE placement - Fluids: None  - Electrolytes: Will replete to maintain K>4, Phos>3, and Mag>2  - Nutrition: Renal Restricted  - Code: FULL  - Activity: As tolerated, pending PT eval  - DVT Prophylaxis: heparin Sub q,   - Stress Ulcer/GI Prophylaxis: N/A  - Disposition: Admit to medicine, pending PT/ JUSTINE placement, accepting to [facility], pending auth and bed

## 2025-01-16 PROCEDURE — 99239 HOSP IP/OBS DSCHRG MGMT >30: CPT | Mod: GC

## 2025-01-16 RX ADMIN — Medication 10 MILLIGRAM(S): at 12:38

## 2025-01-16 RX ADMIN — SENNOSIDES 2 TABLET(S): 8.6 TABLET, FILM COATED ORAL at 22:43

## 2025-01-16 RX ADMIN — CINACALCET 30 MILLIGRAM(S): 30 TABLET, FILM COATED ORAL at 12:37

## 2025-01-16 RX ADMIN — Medication 5 MILLIGRAM(S): at 22:44

## 2025-01-16 RX ADMIN — Medication 10 MILLIGRAM(S): at 13:08

## 2025-01-16 RX ADMIN — Medication 250 MILLIGRAM(S): at 12:38

## 2025-01-16 RX ADMIN — Medication 17 GRAM(S): at 18:23

## 2025-01-16 RX ADMIN — HEPARIN SODIUM 5000 UNIT(S): 1000 INJECTION, SOLUTION INTRAVENOUS; SUBCUTANEOUS at 18:22

## 2025-01-16 RX ADMIN — SEVELAMER CARBONATE 2400 MILLIGRAM(S): 800 TABLET, FILM COATED ORAL at 18:22

## 2025-01-16 RX ADMIN — Medication 10 MILLIGRAM(S): at 22:37

## 2025-01-16 RX ADMIN — HEPARIN SODIUM 5000 UNIT(S): 1000 INJECTION, SOLUTION INTRAVENOUS; SUBCUTANEOUS at 05:55

## 2025-01-16 RX ADMIN — Medication 10 MILLIGRAM(S): at 22:07

## 2025-01-16 NOTE — PROGRESS NOTE ADULT - PROBLEM SELECTOR PLAN 6
HCT  40.9  36.6   MCV  90.5  90.2   MCH  29.8  29.7   MCHC  32.9  33.0   RDW  13.2  13.2   PLT  176  200   MPV  9.3  8.4     Chemistry:  Recent Labs      09/10/18   1605  09/11/18   0533   NA  134*  139   K  3.8  3.3*   CL  95*  102   CO2  24  24   GLUCOSE  136*  104*   BUN  13  8   CREATININE  0.70  0.70   MG   --   1.9   ANIONGAP  15  13   LABGLOM  >60  >60   GFRAA  >60  >60   CALCIUM  9.2  8.5*     Recent Labs      09/10/18   1605   PROT  7.1   LABALBU  3.6   AST  107*   ALT  111*   ALKPHOS  128*   BILITOT  0.40         Lab Results   Component Value Date/Time    SPECIAL LEFT HAND 15CC 09/10/2018 05:53 PM     Lab Results   Component Value Date/Time    CULTURE NO GROWTH 15 HOURS 09/10/2018 05:53 PM       No results found for: POCPH, PHART, PH, POCPCO2, KAV3AEF, PCO2, POCPO2, PO2ART, PO2, POCHCO3, YIC3HXJ, HCO3, NBEA, PBEA, BEART, BE, THGBART, THB, IVE2CAD, YSMT0HZP, M7KSTCHS, O2SAT, FIO2    Radiology:    Nothing new      Physical Examination:        General appearance:  alert, cooperative and no distress  Mental Status:  oriented to person, place and time and normal affect  Lungs:  clear to auscultation bilaterally, normal effort  Heart:  regular rate and rhythm, no murmur  Abdomen:  soft, nontender, nondistended, normal bowel sounds, no masses, hepatomegaly, splenomegaly  Extremities:  no edema, redness, tenderness in the calves  Skin:  cornelia medial eyelids swollen but not red; left ear swollen with red-violet coloration    Assessment:        Primary Problem  Facial cellulitis    Active Hospital Problems    Diagnosis Date Noted    Hypertension [I10] 09/11/2018    Fever [R50.9] 09/11/2018    Nausea vomiting and diarrhea [R11.2, R19.7] 09/11/2018    Facial cellulitis [F64.316]        Plan:        1. Cont iv vanco today  2. Probably dc home 24h  3.  Dc ivf    Ruben DURBIN Blood, DO  9/11/2018  2:14 PM - Fluids: None  - Electrolytes: Will replete to maintain K>4, Phos>3, and Mag>2  - Nutrition: Renal Restricted  - Code: FULL  - Activity: As tolerated, pending PT eval  - DVT Prophylaxis: heparin Sub q,   - Stress Ulcer/GI Prophylaxis: N/A  - Disposition: Admit to medicine, pending PT/ JUSTINE placement, accepting to [facility], pending auth and bed

## 2025-01-16 NOTE — PROGRESS NOTE ADULT - PROBLEM SELECTOR PLAN 4
History of primary hypertension home meds: Metoprolol 25mg  - Unclear history of concomitant Labetalol use? Clarified, patient was transitioned to metoprolol  - no evidence of end-organ damage  - continue to monitor SBP  - DASH diet  - Metoprolol held

## 2025-01-16 NOTE — PROGRESS NOTE ADULT - SUBJECTIVE AND OBJECTIVE BOX
Brent Mejia MD  Available on TEAMS    Patient is a 39y old  Male who presents with a chief complaint of Weakness (15 Rafael 2025 21:38)      SUBJECTIVE / OVERNIGHT EVENTS: No acute events overnight. Patient was assessed at bedside.       REVIEW OF SYSTEMS:  CONSTITUTIONAL: No weakness, fevers, or chills  EYES/ENT: No visual changes; No vertigo, throat pain, or dysphagia  NECK: No pain or stiffness  RESPIRATORY: No cough, wheezing, hemoptysis, or shortness of breath  CARDIOVASCULAR: No chest pain or palpitations  GASTROINTESTINAL: No abdominal or epigastric pain. No nausea, vomiting, or hematemesis; No diarrhea or constipation. No melena or hematochezia.  GENITOURINARY: No dysuria, frequency, or hematuria  MUSCULOSKELETAL: No joint or muscle pain or aches  NEUROLOGICAL: No numbness or weakness  SKIN: No itching or rashes      MEDICATIONS  (STANDING):  bisacodyl 5 milliGRAM(s) Oral at bedtime  cefuroxime   Tablet 250 milliGRAM(s) Oral every 24 hours  cinacalcet 30 milliGRAM(s) Oral daily  epoetin carito (EPOGEN) Injectable 91145 Unit(s) IV Push <User Schedule>  heparin   Injectable 5000 Unit(s) SubCutaneous every 12 hours  midodrine. 5 milliGRAM(s) Oral <User Schedule>  naloxone Injectable 0.4 milliGRAM(s) IV Push once  polyethylene glycol 3350 17 Gram(s) Oral two times a day  senna 2 Tablet(s) Oral at bedtime  sevelamer carbonate 2400 milliGRAM(s) Oral three times a day with meals    MEDICATIONS  (PRN):  lidocaine   4% Patch 1 Patch Transdermal every 24 hours PRN pain in the left upper arm  melatonin 5 milliGRAM(s) Oral at bedtime PRN Insomnia  oxyCODONE    IR 10 milliGRAM(s) Oral every 8 hours PRN Moderate Pain (4 - 6)  oxyCODONE    IR 15 milliGRAM(s) Oral every 8 hours PRN Severe Pain (7 - 10)      CAPILLARY BLOOD GLUCOSE        I&O's Summary    15 Rafael 2025 07:01  -  16 Jan 2025 07:00  --------------------------------------------------------  IN: 240 mL / OUT: 2000 mL / NET: -1760 mL        Vital Signs Last 24 Hrs  T(C): 36.3 (15 Rafael 2025 20:30), Max: 37.4 (15 Rafael 2025 14:31)  T(F): 97.4 (15 Rafael 2025 20:30), Max: 99.3 (15 Rafael 2025 14:31)  HR: 98 (15 Rafael 2025 20:30) (76 - 99)  BP: 95/49 (15 Rafael 2025 20:30) (79/47 - 117/64)  BP(mean): --  RR: 18 (15 Rafael 2025 20:30) (18 - 18)  SpO2: 99% (15 Rafael 2025 20:30) (99% - 100%)    Parameters below as of 15 Rafael 2025 20:30  Patient On (Oxygen Delivery Method): room air        PHYSICAL EXAM:  GENERAL: NAD, well-developed, well-nourished  HEAD: Atraumatic, Normocephalic  EYES: EOMI, PERRLA, conjunctiva and sclera clear  NECK: Supple, No JVD  CHEST/LUNG: Clear to auscultation bilaterally; No wheezes or crackles  HEART: Normal S1/S2; Regular rate and rhythm; No murmurs, rubs, or gallops  ABDOMEN: Soft, Nontender, Nondistended; Bowel sounds present  EXTREMITIES: 2+ Peripheral Pulses; No clubbing, cyanosis, or edema  PSYCH: A&Ox3  NEUROLOGY: no focal neurologic deficit  SKIN: No rashes or lesions    LABS:                        9.3    4.16  )-----------( 305      ( 15 Rafael 2025 10:36 )             30.6      01-15    136  |  97  |  81[H]  ----------------------------<  78  4.7   |  22  |  11.44[H]    Ca    8.7      15 Rafael 2025 10:37  Phos  5.9     01-15    TPro  x   /  Alb  3.0[L]  /  TBili  x   /  DBili  x   /  AST  x   /  ALT  x   /  AlkPhos  x   01-15          Urinalysis Basic - ( 15 Rafael 2025 10:37 )    Color: x / Appearance: x / SG: x / pH: x  Gluc: 78 mg/dL / Ketone: x  / Bili: x / Urobili: x   Blood: x / Protein: x / Nitrite: x   Leuk Esterase: x / RBC: x / WBC x   Sq Epi: x / Non Sq Epi: x / Bacteria: x        RADIOLOGY & ADDITIONAL TESTS:    Imaging Personally Reviewed:    Consultant(s) Notes Reviewed:      Care Discussed with Consultants/Other Providers:

## 2025-01-16 NOTE — PROGRESS NOTE ADULT - SUBJECTIVE AND OBJECTIVE BOX
No distress    Vital Signs Last 24 Hrs  T(C): 36.9 (01-16-25 @ 12:00), Max: 36.9 (01-16-25 @ 12:00)  T(F): 98.5 (01-16-25 @ 12:00), Max: 98.5 (01-16-25 @ 12:00)  HR: 99 (01-16-25 @ 12:00) (98 - 99)  BP: 119/81 (01-16-25 @ 12:00) (95/49 - 119/81)  RR: 18 (01-16-25 @ 12:00) (18 - 18)  SpO2: 100% (01-16-25 @ 12:00) (99% - 100%)    I&O's Detail    15 Rafael 2025 07:01  -  16 Jan 2025 07:00  --------------------------------------------------------  IN:    Oral Fluid: 240 mL  Total IN: 240 mL    OUT:    Other (mL): 2000 mL  Total OUT: 2000 mL    s1s2  b/l air entry  soft, ND  RUE AVF patent, sm edema b/l LE                                                                                                                                                                                                       9.3    4.16  )-----------( 305      ( 15 Rafael 2025 10:36 )             30.6     15 Rafael 2025 10:37    136    |  97     |  81     ----------------------------<  78     4.7     |  22     |  11.44    Ca    8.7        15 Rafael 2025 10:37  Phos  5.9       15 Rafael 2025 10:37    TPro  x      /  Alb  3.0    /  TBili  x      /  DBili  x      /  AST  x      /  ALT  x      /  AlkPhos  x      15 Rafael 2025 10:37    LIVER FUNCTIONS - ( 15 Rafael 2025 10:37 )  Alb: 3.0 g/dL / Pro: x     / ALK PHOS: x     / ALT: x     / AST: x     / GGT: x           bisacodyl 5 milliGRAM(s) Oral at bedtime  cinacalcet 30 milliGRAM(s) Oral daily  epoetin carito (EPOGEN) Injectable 21810 Unit(s) IV Push <User Schedule>  heparin   Injectable 5000 Unit(s) SubCutaneous every 12 hours  lidocaine   4% Patch 1 Patch Transdermal every 24 hours PRN  melatonin 5 milliGRAM(s) Oral at bedtime PRN  midodrine. 5 milliGRAM(s) Oral <User Schedule>  naloxone Injectable 0.4 milliGRAM(s) IV Push once  oxyCODONE    IR 10 milliGRAM(s) Oral every 8 hours PRN  oxyCODONE    IR 15 milliGRAM(s) Oral every 8 hours PRN  polyethylene glycol 3350 17 Gram(s) Oral two times a day  senna 2 Tablet(s) Oral at bedtime  sevelamer carbonate 2400 milliGRAM(s) Oral three times a day with meals    A/P:    ESRD on HD MWF  Adm w/Flu A  TMP up to 2kg as able w/HD  Midodrine prior to HD due to borderline BP   Renvela for high Phos  Epoetin w/HD 3 x week    [phone]

## 2025-01-16 NOTE — PROGRESS NOTE ADULT - PROBLEM SELECTOR PLAN 3
Patient has ESRD with HD on Tuesday, Thursday, and Saturday  - Nephro consulted and T/Th/Sat dialysis in place  - Additional session 1/8 for increased contrast material  Plan  - HD M/W/F, HD today

## 2025-01-17 ENCOUNTER — TRANSCRIPTION ENCOUNTER (OUTPATIENT)
Age: 40
End: 2025-01-17

## 2025-01-17 VITALS
HEART RATE: 97 BPM | OXYGEN SATURATION: 96 % | DIASTOLIC BLOOD PRESSURE: 69 MMHG | TEMPERATURE: 99 F | RESPIRATION RATE: 17 BRPM | SYSTOLIC BLOOD PRESSURE: 124 MMHG

## 2025-01-17 LAB
ALBUMIN SERPL ELPH-MCNC: 3.2 G/DL — LOW (ref 3.3–5)
ANION GAP SERPL CALC-SCNC: 19 MMOL/L — HIGH (ref 5–17)
BASOPHILS # BLD AUTO: 0.06 K/UL — SIGNIFICANT CHANGE UP (ref 0–0.2)
BASOPHILS NFR BLD AUTO: 1.2 % — SIGNIFICANT CHANGE UP (ref 0–2)
BUN SERPL-MCNC: 67 MG/DL — HIGH (ref 7–23)
CALCIUM SERPL-MCNC: 8.9 MG/DL — SIGNIFICANT CHANGE UP (ref 8.4–10.5)
CHLORIDE SERPL-SCNC: 97 MMOL/L — SIGNIFICANT CHANGE UP (ref 96–108)
CO2 SERPL-SCNC: 22 MMOL/L — SIGNIFICANT CHANGE UP (ref 22–31)
CREAT SERPL-MCNC: 10.11 MG/DL — HIGH (ref 0.5–1.3)
EGFR: 6 ML/MIN/1.73M2 — LOW
EOSINOPHIL # BLD AUTO: 0.12 K/UL — SIGNIFICANT CHANGE UP (ref 0–0.5)
EOSINOPHIL NFR BLD AUTO: 2.3 % — SIGNIFICANT CHANGE UP (ref 0–6)
FERRITIN SERPL-MCNC: 1125 NG/ML — HIGH (ref 30–400)
GLUCOSE SERPL-MCNC: 76 MG/DL — SIGNIFICANT CHANGE UP (ref 70–99)
HCT VFR BLD CALC: 29.3 % — LOW (ref 39–50)
HGB BLD-MCNC: 9 G/DL — LOW (ref 13–17)
IMM GRANULOCYTES NFR BLD AUTO: 0.6 % — SIGNIFICANT CHANGE UP (ref 0–0.9)
LYMPHOCYTES # BLD AUTO: 1.19 K/UL — SIGNIFICANT CHANGE UP (ref 1–3.3)
LYMPHOCYTES # BLD AUTO: 23.2 % — SIGNIFICANT CHANGE UP (ref 13–44)
MCHC RBC-ENTMCNC: 30.7 G/DL — LOW (ref 32–36)
MCHC RBC-ENTMCNC: 31 PG — SIGNIFICANT CHANGE UP (ref 27–34)
MCV RBC AUTO: 101 FL — HIGH (ref 80–100)
MONOCYTES # BLD AUTO: 0.64 K/UL — SIGNIFICANT CHANGE UP (ref 0–0.9)
MONOCYTES NFR BLD AUTO: 12.5 % — SIGNIFICANT CHANGE UP (ref 2–14)
NEUTROPHILS # BLD AUTO: 3.08 K/UL — SIGNIFICANT CHANGE UP (ref 1.8–7.4)
NEUTROPHILS NFR BLD AUTO: 60.2 % — SIGNIFICANT CHANGE UP (ref 43–77)
NRBC # BLD: 0 /100 WBCS — SIGNIFICANT CHANGE UP (ref 0–0)
PHOSPHATE SERPL-MCNC: 5.9 MG/DL — HIGH (ref 2.5–4.5)
PLATELET # BLD AUTO: 263 K/UL — SIGNIFICANT CHANGE UP (ref 150–400)
POTASSIUM SERPL-MCNC: 4.8 MMOL/L — SIGNIFICANT CHANGE UP (ref 3.5–5.3)
POTASSIUM SERPL-SCNC: 4.8 MMOL/L — SIGNIFICANT CHANGE UP (ref 3.5–5.3)
RBC # BLD: 2.9 M/UL — LOW (ref 4.2–5.8)
RBC # FLD: 15.6 % — HIGH (ref 10.3–14.5)
SODIUM SERPL-SCNC: 138 MMOL/L — SIGNIFICANT CHANGE UP (ref 135–145)
WBC # BLD: 5.12 K/UL — SIGNIFICANT CHANGE UP (ref 3.8–10.5)
WBC # FLD AUTO: 5.12 K/UL — SIGNIFICANT CHANGE UP (ref 3.8–10.5)

## 2025-01-17 PROCEDURE — 99232 SBSQ HOSP IP/OBS MODERATE 35: CPT | Mod: GC

## 2025-01-17 RX ORDER — MIDODRINE HYDROCHLORIDE 5 MG/1
1 TABLET ORAL
Qty: 0 | Refills: 0 | DISCHARGE
Start: 2025-01-17

## 2025-01-17 RX ORDER — EPOETIN ALFA 2000 [IU]/ML
10000 SOLUTION INTRAVENOUS; SUBCUTANEOUS
Qty: 0 | Refills: 0 | DISCHARGE
Start: 2025-01-17

## 2025-01-17 RX ORDER — HEPARIN SODIUM 1000 [USP'U]/ML
1000 INJECTION, SOLUTION INTRAVENOUS; SUBCUTANEOUS
Refills: 0 | Status: COMPLETED | OUTPATIENT
Start: 2025-01-17 | End: 2025-01-17

## 2025-01-17 RX ORDER — NALOXONE HCL 0.4 MG/ML
1 VIAL (ML) INJECTION
Qty: 1 | Refills: 0
Start: 2025-01-17 | End: 2025-01-17

## 2025-01-17 RX ORDER — CINACALCET 30 MG/1
1 TABLET, FILM COATED ORAL
Qty: 0 | Refills: 0 | DISCHARGE
Start: 2025-01-17

## 2025-01-17 RX ADMIN — HEPARIN SODIUM 1000 UNIT(S): 1000 INJECTION, SOLUTION INTRAVENOUS; SUBCUTANEOUS at 10:41

## 2025-01-17 RX ADMIN — EPOETIN ALFA 10000 UNIT(S): 2000 SOLUTION INTRAVENOUS; SUBCUTANEOUS at 09:30

## 2025-01-17 RX ADMIN — CINACALCET 30 MILLIGRAM(S): 30 TABLET, FILM COATED ORAL at 13:50

## 2025-01-17 RX ADMIN — SEVELAMER CARBONATE 2400 MILLIGRAM(S): 800 TABLET, FILM COATED ORAL at 13:50

## 2025-01-17 RX ADMIN — MIDODRINE HYDROCHLORIDE 5 MILLIGRAM(S): 5 TABLET ORAL at 06:52

## 2025-01-17 RX ADMIN — HEPARIN SODIUM 1000 UNIT(S): 1000 INJECTION, SOLUTION INTRAVENOUS; SUBCUTANEOUS at 09:42

## 2025-01-17 NOTE — PROGRESS NOTE ADULT - PROBLEM SELECTOR PLAN 1
Patient spiked fever of 100.7 1/6 ON, s/p Tylenol  CT Chest: Scattered bilateral subsegmental atelectasis predominantly within the   lower lobes. Mild groundglass airspace opacity within the right lower   lobe, for which superimposed infection/inflammation is not excluded.  DDx: pneumonia vs. cellulitis from upper extremity   BxCx (1/3): NGTD  BxCx: (1/7): NGTD    PLAN  - Ceftin 250mg q24h 4 day course per ID (1/17) Patient spiked fever of 100.7 1/6 ON, s/p Tylenol  CT Chest: Scattered bilateral subsegmental atelectasis predominantly within the   lower lobes. Mild groundglass airspace opacity within the right lower   lobe, for which superimposed infection/inflammation is not excluded.  DDx: pneumonia vs. cellulitis from upper extremity   BxCx (1/3): NGTD  BxCx: (1/7): NGTD  Ceftin 250mg q24h completed 1/17    PLAN

## 2025-01-17 NOTE — PROGRESS NOTE ADULT - PROBLEM SELECTOR PLAN 6
- Fluids: None  - Electrolytes: Will replete to maintain K>4, Phos>3, and Mag>2  - Nutrition: Renal Restricted  - Code: FULL  - Activity: As tolerated, pending PT eval  - DVT Prophylaxis: heparin Sub q,   - Stress Ulcer/GI Prophylaxis: N/A  - Disposition: Admit to medicine, pending PT/ JUSTINE placement, accepting to [facility], pending auth and bed

## 2025-01-17 NOTE — DISCHARGE NOTE NURSING/CASE MANAGEMENT/SOCIAL WORK - PATIENT PORTAL LINK FT
You can access the FollowMyHealth Patient Portal offered by [facility] by registering at the following website: [email or web address]. By joining [facility]’s FollowMyHealth portal, you will also be able to view your health information using other applications (apps) compatible with our system.

## 2025-01-17 NOTE — DISCHARGE NOTE NURSING/CASE MANAGEMENT/SOCIAL WORK - NSDCVIVACCINE_GEN_ALL_CORE_FT
Influenza, seasonal, injectable; 30-Dec-2013 15:27; Jamila Hartley (JAYCEE); [id number]; IntraMuscular; Deltoid Right.; 0.5 milliLiter(s);

## 2025-01-17 NOTE — PROGRESS NOTE ADULT - ATTENDING COMMENTS
Patient seen and examined at bedside. No acute events overnight. No complaints. Medically ready for JUSTINE.    Discharge Time: 35 minutes
Patient seen and examined at bedside. No acute events overnight. No complaints. Left arm skin looks okay around fistula site. Finishing antibiotics for cellulitis. Discharge planning to [facility].
39 y.o. M w/pmhx of ESRD on HD (Tu/Th/Sat), anemia, hyperparathyroidism 2/2 ESRD s/p partial parathyroidectomy, HTN, chronic hip pain, wheelchair bound, who presented for generalized weakness, admitted for acute hypoxic respiratory failure secondary to influenza A. Course c/b recurrent fevers despite 5d course of oseltamivir.     # Recurrent fevers - s/p 5d Tamiflu for influenza A, no longer on supplemental oxygen, weaned to RA. Fever of 101.8 on 1/3, 100.5 1/8 PM; repeat infectious workup largely negative, BLE dopplers negative, does have LUE swelling with superficial thrombophlebitis on doppler. Fevers may be in setting of thrombophlebitis. No fevers overnight. At risk of post-influenza PNA, aspiration PNA. CT C/A/P imaging with GGO, atelectasis, but no other signs of infection. Blood cx 1/7 with NGTD.  Per ID c/w CTX while inpatient, transition to ceftin 250 mg q24 hours to complete 7d duration on discharge. Pending facility and final outpat HD plan (was Tu/Th/Sat, appears may transiton to M/W/F) for [facility].
39 y.o. M w/pmhx of ESRD on HD (Tu/Th/Sat), anemia, hyperparathyroidism 2/2 ESRD s/p partial parathyroidectomy, HTN, chronic hip pain, wheelchair bound, who presented for generalized weakness, found to be septic and influenza A positive.    # Sepsis / influenza A - Blood cx with NGTD, MRSA nares negative. CXR with some interstitial congestion, no focal opacity/consolidation. RVP notable for influenza. LA 1.7. At this time likely all in setting of influenza A, c/w tamiflu renally dosed. Monitor off abx.     # Acute hypoxic respiratory failure - pt requiring 4L NC, likely secondary to viral infection. CXR with some interstitial edema, did receive IVF in ED. Caution with further IVF with ESRD. Wean oxygen as tolerated - down to 2L today, trial on RA. Pt does note being told potential dx of sleep apnea, not found in [facility] notes shown on portal or HIE. Does not wear BiPAP/CPAP unable to provide further detail why. Will see if pt qualifies for BiPAP therapy, otherwise will likely require o/p sleep eval.   # ESRD on HD Tu/Th/F - pt unable to name nephrologist/dialysis center, previously seen by house nephro. Underwent emergent HD yesterday given persistent hyperK (7, not hemolyzed), evaluated during HD today, more awake than on initial assessment.     # Hyperparathyroidism s/p partial parathyroidectomy in setting of ESRD - F/u with endocrinology at [facility]. On calcium acetate, sevelamer TID.    # Need for medication reconciliation - pt pulled up list of medications from portal for [facility]. Active Rx from [facility] include calcium acetate, labetalol 100 mg BID, as well as metoprolol tartrate 25 mg BID, oxycodone 15 mg IR q8H (MDD 45 mg), ASA 81 mg daily, epoetin injections, lidocaine patch. Unclear why on two BB, [facility] - note active Rx for sevelamer carbonate 1600 mg TID, metoprolol tartrate 25 mg BID, ASA 81 mg and naloxone. BB held in setting of normal/soft BP.    PT eval - JUSTINE to which pt is agreeable. Anticipate medical readiness in 1-2 days.
Patient seen and examined at bedside. No acute events overnight. No signs of cellulitis. Plan for [facility]. 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
Patient seen and examined at bedside. No acute events overnight. At baseline health. Patient receiving HD today. Pending [facility].
Patient seen and examined at bedside. No acute events overnight. Pending [facility]
39 y.o. M w/pmhx of ESRD on HD (Tu/Th/Sat), anemia, hyperparathyroidism 2/2 ESRD s/p partial parathyroidectomy, HTN, chronic hip pain, wheelchair bound, who presented for generalized weakness, found to be septic and influenza A positive.    # Sepsis - c/w zosyn for now, MRSA nares negative, vanc discontinued. CXR with some interstitial congestion, no focal opacity/consolidation. RVP notable for influenza. Remainder of cx pending. LA 1.7.     # Influenza A - c/w supportive therapy, tamiflu renally dosed.     # Acute hypoxic respiratory failure - pt requiring 4L NC, likely secondary to viral infection. CXR with some interstitial edema, did receive IVF in ED. Caution with further IVF with ESRD. Wean oxygen as tolerated.     # ESRD on HD Tu/Th/F - pt unable to name nephrologist, previously seen by house nephro. Consulted, to see pt in AM. HD likely tomorrow.     # Hyperparathyroidism s/p partial parathyroidectomy in setting of ESRD - F/u with endocrinology at [facility]. On calcium acetate.    # Need for medication reconciliation - pt pulled up list of medications from portal for [facility]. Active Rx from [facility] include calcium acetate, labetalol 100 mg BID, as well as metoprolol tartrate 25 mg BID, oxycodone 15 mg IR q8H (MDD 45 mg), ASA 81 mg daily, epoetin injections, lidocaine patch. To verify with patient pharmacy; pt unsure of his medications apart from [facility] with unclear indications/why pt on two beta-blockers.
Mr David Welch is a 39 year old male with a PMH of ESRD on HD T/R/Sat, HTN, anemia, CVA, b/l hip fracture now wheelchair dependent presenting w/ weakness, found to have sepsis in setting of influenza A. K 7.0. Plan for dialysis today. PT/OT. Treatment will be dependent on clinical course.     Agree with progress note as outlined above, edited where appropriate.
39 y.o. M w/pmhx of ESRD on HD (Tu/Th/Sat), anemia, hyperparathyroidism 2/2 ESRD s/p partial parathyroidectomy, HTN, chronic hip pain, wheelchair bound, who presented for generalized weakness, admitted for acute hypoxic respiratory failure secondary to influenza A. Course c/b recurrent fevers despite 5d course of oseltamivir.     # Recurrent fevers - s/p 5d Tamiflu for influenza A, no longer on supplemental oxygen, weaned to RA. No fevers since 1/8. Repeat infectious workup largely negative, BLE dopplers negative, does have LUE swelling with superficial thrombophlebitis on doppler. Fevers may be in setting of thrombophlebitis. No fevers overnight. At risk of post-influenza PNA, aspiration PNA. CT C/A/P imaging with GGO, atelectasis, but no other signs of infection. Blood cx 1/7 with NGTD.  Per ID c/w CTX while inpatient, transition to ceftin 250 mg q24 hours to complete 7d duration on discharge. Pending facility and final outptt HD plan (was Tu/Th/Sat, appears may transition to M/W/F) for JUSTINE.
39 y.o. M w/pmhx of ESRD on HD (Tu/Th/Sat), anemia, hyperparathyroidism 2/2 ESRD s/p partial parathyroidectomy, HTN, chronic hip pain, wheelchair bound, who presented for generalized weakness, admitted for acute hypoxic respiratory failure secondary to influenza A. Course c/b recurrent fevers despite 5d course of oseltamivir.     # Recurrent fevers - s/p 5d Tamiflu for influenza A, no longer on supplemental oxygen, weaned to RA. Fever of 101.8 on 1/3; repeat infectious workup largely negative, BLE dopplers negative, does have LUE swelling with superficial thrombophlebitis on doppler. At risk of post-influenza PNA, aspiration PNA. CT C/A/P imaging with GGO, atelectasis, but no other signs of infection. C/w cefazolin for now (transition to keflex on dc), blood cx obtained and pending.
39 y.o. M w/pmhx of ESRD on HD (Tu/Th/Sat), anemia, hyperparathyroidism 2/2 ESRD s/p partial parathyroidectomy, HTN, chronic hip pain, wheelchair bound, who presented for generalized weakness, admitted for acute hypoxic respiratory failure secondary to influenza A. Course c/b recurrent fevers despite 5d course of oseltamivir.     # Recurrent fevers - s/p 5d Tamiflu for influenza A, no longer on supplemental oxygen, weaned to RA. Fever of 101.8 on 1/3, 100.5 1/8 PM; repeat infectious workup largely negative, BLE dopplers negative, does have LUE swelling with superficial thrombophlebitis on doppler. Fevers may be in setting of thrombophlebitis. At risk of post-influenza PNA, aspiration PNA. CT C/A/P imaging with GGO, atelectasis, but no other signs of infection. Abx adjusted to ceftriaxone for now, blood cx 1/7 with NGTD.
39 y.o. M w/pmhx of ESRD on HD (Tu/Th/Sat), anemia, hyperparathyroidism 2/2 ESRD s/p partial parathyroidectomy, HTN, chronic hip pain, wheelchair bound, who presented for generalized weakness, admitted for acute hypoxic respiratory failure secondary to influenza A. Course c/b recurrent fevers despite 5d course of oseltamivir.     # Recurrent fevers - s/p 5d Tamiflu for influenza A, no longer on supplemental oxygen, weaned to RA. Fever of 101.8 on 1/3, 100.5 1/8 PM; repeat infectious workup largely negative, BLE dopplers negative, does have LUE swelling with superficial thrombophlebitis on doppler. Fevers may be in setting of thrombophlebitis. No fevers overnight. At risk of post-influenza PNA, aspiration PNA. CT C/A/P imaging with GGO, atelectasis, but no other signs of infection. Blood cx 1/7 with NGTD.  Per ID c/w CTX while inpatient, transition to ceftin 250 mg q24 hours to complete 7d duration on discharge. Pending facility for [facility].
39 y.o. M w/pmhx of ESRD on HD (Tu/Th/Sat), anemia, hyperparathyroidism 2/2 ESRD s/p partial parathyroidectomy, HTN, chronic hip pain, wheelchair bound, who presented for generalized weakness, found to be septic and influenza A positive.    # Sepsis / influenza A - Blood cx from admission with NGTD, MRSA nares negative. CXR with some interstitial congestion, no focal opacity/consolidation. RVP notable for influenza. LA 1.7. Zosyn discontinued days prior; still spiking fevers (101 today). Repeat sepsis workup. Appreciate ID recs; hold off on abx at this time.     # Acute hypoxic respiratory failure - pt requiring 4L NC, likely secondary to viral infection. CXR with some interstitial edema, did receive IVF in ED. Caution with further IVF with ESRD. Wean oxygen as tolerated - down to 2L today, trial on RA. Pt does note being told potential dx of sleep apnea, not found in [facility] notes shown on portal or HIE. Does not wear BiPAP/CPAP unable to provide further detail why. [facility] also contacted; notes [facility] suggested in past but never underwent formal evaluation. More lethargic today, has not been receiving oxycodone per nursing; ABG with signs of retention, placed on BiPAP. CM contacted to try and coordinate CPAP on discharge.     # ESRD on HD Tu/Th/Sat - pt unable to name nephrologist/dialysis center, previously seen by house nephro. Underwent emergent HD 12/31 given persistent hyperK (7, not hemolyzed), now back on typical dialysis schedule.     # Hyperparathyroidism s/p partial parathyroidectomy in setting of ESRD - F/u with endocrinology at [facility]. On calcium acetate, sevelamer TID.    # Need for medication reconciliation - pt pulled up list of medications from portal for [facility]. Active Rx from [facility] include calcium acetate, labetalol 100 mg BID, as well as metoprolol tartrate 25 mg BID, oxycodone 15 mg IR q8H (MDD 45 mg), ASA 81 mg daily, epoetin injections, lidocaine patch. Unclear why on two BB, [facility] - note active Rx for sevelamer carbonate 1600 mg TID, metoprolol tartrate 25 mg BID, ASA 81 mg and naloxone. BB held in setting of normal/soft BP.    PT eval - JUSTINE to which pt is agreeable.
Initial attending contact date 01/04/25. See resident note written above for details. I reviewed the resident documentation. I have personally seen and examined this patient. I reviewed vitals, labs, medications, and additional imaging. I agree with the above resident's findings and plans as written above with the following additions/statements.    40 yo M Admitted with hypoxic respiratory failure due to influenza A. High grade fevers despite Oseltamivir (5 days).   There may be a component of pulm edema which would require additional volume removal during HD   - f/u CT C/A/P   - LUE U/s  - ID recs appreciated
38 yo M Admitted with hypoxic respiratory failure due to influenza A. Course complicated by high grade fevers despite Oseltamivir (5 days).   Respiratory status and pulm edema improved with increased fluid removal during HD.   - f/u CT C/A/P   - Monitor LUE infiltrate   - ID recs appreciated .     Rest as above, d/w HS 5
38 yo M Admitted with hypoxic respiratory failure due to influenza A. Course complicated by high grade fevers despite Oseltamivir (5 days).   Respiratory status and pulm edema improved with increased fluid removal during HD. Temp of 100.7 overnight    -given fever, blood cultures sent  -LUE infiltrate tender with mild induration, start ancef for now, can transition to keflex for discharge planning  - ID recs appreciated  - CT A/P reviewed, T12 compression deformity, TLSO brace    Rest as above, d/w HS 5 .
40 yo M Admitted with hypoxic respiratory failure due to influenza A. Course complicated by high grade fevers despite Oseltamivir (5 days).   Respiratory status and pulm edema improved with increased fluid removal during HD.   - f/u CT C/A/P (may not need given clinical improvement, no longer having fevers today)   - LUE U/s  - ID recs appreciated .

## 2025-01-17 NOTE — PROGRESS NOTE ADULT - SUBJECTIVE AND OBJECTIVE BOX
Brent Mejia MD  Available on TEAMS    Patient is a 39y old  Male who presents with a chief complaint of Weakness (16 Jan 2025 15:42)      SUBJECTIVE / OVERNIGHT EVENTS: No acute events overnight. Patient was assessed at bedside.       REVIEW OF SYSTEMS:  CONSTITUTIONAL: No weakness, fevers, or chills  EYES/ENT: No visual changes; No vertigo, throat pain, or dysphagia  NECK: No pain or stiffness  RESPIRATORY: No cough, wheezing, hemoptysis, or shortness of breath  CARDIOVASCULAR: No chest pain or palpitations  GASTROINTESTINAL: No abdominal or epigastric pain. No nausea, vomiting, or hematemesis; No diarrhea or constipation. No melena or hematochezia.  GENITOURINARY: No dysuria, frequency, or hematuria  MUSCULOSKELETAL: No joint or muscle pain or aches  NEUROLOGICAL: No numbness or weakness  SKIN: No itching or rashes      MEDICATIONS  (STANDING):  bisacodyl 5 milliGRAM(s) Oral at bedtime  cinacalcet 30 milliGRAM(s) Oral daily  epoetin carito (EPOGEN) Injectable 74314 Unit(s) IV Push <User Schedule>  heparin   Injectable 5000 Unit(s) SubCutaneous every 12 hours  midodrine. 5 milliGRAM(s) Oral <User Schedule>  naloxone Injectable 0.4 milliGRAM(s) IV Push once  polyethylene glycol 3350 17 Gram(s) Oral two times a day  senna 2 Tablet(s) Oral at bedtime  sevelamer carbonate 2400 milliGRAM(s) Oral three times a day with meals    MEDICATIONS  (PRN):  lidocaine   4% Patch 1 Patch Transdermal every 24 hours PRN pain in the left upper arm  melatonin 5 milliGRAM(s) Oral at bedtime PRN Insomnia  oxyCODONE    IR 10 milliGRAM(s) Oral every 8 hours PRN Moderate Pain (4 - 6)  oxyCODONE    IR 15 milliGRAM(s) Oral every 8 hours PRN Severe Pain (7 - 10)      CAPILLARY BLOOD GLUCOSE        I&O's Summary    16 Jan 2025 07:01  -  17 Jan 2025 07:00  --------------------------------------------------------  IN: 120 mL / OUT: 0 mL / NET: 120 mL        Vital Signs Last 24 Hrs  T(C): 36.7 (17 Jan 2025 06:58), Max: 37.1 (16 Jan 2025 21:29)  T(F): 98 (17 Jan 2025 06:58), Max: 98.8 (16 Jan 2025 21:29)  HR: 79 (17 Jan 2025 06:58) (79 - 99)  BP: 111/62 (17 Jan 2025 06:58) (100/66 - 119/81)  BP(mean): --  RR: 18 (17 Jan 2025 06:58) (18 - 20)  SpO2: 98% (17 Jan 2025 06:58) (97% - 100%)    Parameters below as of 16 Jan 2025 21:29  Patient On (Oxygen Delivery Method): room air        PHYSICAL EXAM:  GENERAL: NAD, well-developed, well-nourished  HEAD: Atraumatic, Normocephalic  EYES: EOMI, PERRLA, conjunctiva and sclera clear  NECK: Supple, No JVD  CHEST/LUNG: Clear to auscultation bilaterally; No wheezes or crackles  HEART: Normal S1/S2; Regular rate and rhythm; No murmurs, rubs, or gallops  ABDOMEN: Soft, Nontender, Nondistended; Bowel sounds present  EXTREMITIES: 2+ Peripheral Pulses; No clubbing, cyanosis, or edema  PSYCH: A&Ox3  NEUROLOGY: no focal neurologic deficit  SKIN: No rashes or lesions    LABS:                        9.3    4.16  )-----------( 305      ( 15 Rafael 2025 10:36 )             30.6      01-15    136  |  97  |  81[H]  ----------------------------<  78  4.7   |  22  |  11.44[H]    Ca    8.7      15 Rafael 2025 10:37  Phos  5.9     01-15    TPro  x   /  Alb  3.0[L]  /  TBili  x   /  DBili  x   /  AST  x   /  ALT  x   /  AlkPhos  x   01-15          Urinalysis Basic - ( 15 Rafael 2025 10:37 )    Color: x / Appearance: x / SG: x / pH: x  Gluc: 78 mg/dL / Ketone: x  / Bili: x / Urobili: x   Blood: x / Protein: x / Nitrite: x   Leuk Esterase: x / RBC: x / WBC x   Sq Epi: x / Non Sq Epi: x / Bacteria: x        RADIOLOGY & ADDITIONAL TESTS:    Imaging Personally Reviewed:    Consultant(s) Notes Reviewed:      Care Discussed with Consultants/Other Providers:   Brent Mejia MD  Available on TEAMS    Patient is a 39y old  Male who presents with a chief complaint of Weakness (16 Jan 2025 15:42)      SUBJECTIVE / OVERNIGHT EVENTS: No acute events overnight. Patient was assessed at bedside. No new concerns at bedside.      REVIEW OF SYSTEMS:  CONSTITUTIONAL: No weakness, fevers, or chills  EYES/ENT: No visual changes; No vertigo, throat pain, or dysphagia  NECK: No pain or stiffness  RESPIRATORY: No cough, wheezing, hemoptysis, or shortness of breath  CARDIOVASCULAR: No chest pain or palpitations  GASTROINTESTINAL: No abdominal or epigastric pain. No nausea, vomiting, or hematemesis; No diarrhea or constipation. No melena or hematochezia.  GENITOURINARY: No dysuria, frequency, or hematuria  MUSCULOSKELETAL: No joint or muscle pain or aches  NEUROLOGICAL: No numbness or weakness  SKIN: No itching or rashes      MEDICATIONS  (STANDING):  bisacodyl 5 milliGRAM(s) Oral at bedtime  cinacalcet 30 milliGRAM(s) Oral daily  epoetin carito (EPOGEN) Injectable 18285 Unit(s) IV Push <User Schedule>  heparin   Injectable 5000 Unit(s) SubCutaneous every 12 hours  midodrine. 5 milliGRAM(s) Oral <User Schedule>  naloxone Injectable 0.4 milliGRAM(s) IV Push once  polyethylene glycol 3350 17 Gram(s) Oral two times a day  senna 2 Tablet(s) Oral at bedtime  sevelamer carbonate 2400 milliGRAM(s) Oral three times a day with meals    MEDICATIONS  (PRN):  lidocaine   4% Patch 1 Patch Transdermal every 24 hours PRN pain in the left upper arm  melatonin 5 milliGRAM(s) Oral at bedtime PRN Insomnia  oxyCODONE    IR 10 milliGRAM(s) Oral every 8 hours PRN Moderate Pain (4 - 6)  oxyCODONE    IR 15 milliGRAM(s) Oral every 8 hours PRN Severe Pain (7 - 10)      CAPILLARY BLOOD GLUCOSE        I&O's Summary    16 Jan 2025 07:01  -  17 Jan 2025 07:00  --------------------------------------------------------  IN: 120 mL / OUT: 0 mL / NET: 120 mL        Vital Signs Last 24 Hrs  T(C): 36.7 (17 Jan 2025 06:58), Max: 37.1 (16 Jan 2025 21:29)  T(F): 98 (17 Jan 2025 06:58), Max: 98.8 (16 Jan 2025 21:29)  HR: 79 (17 Jan 2025 06:58) (79 - 99)  BP: 111/62 (17 Jan 2025 06:58) (100/66 - 119/81)  BP(mean): --  RR: 18 (17 Jan 2025 06:58) (18 - 20)  SpO2: 98% (17 Jan 2025 06:58) (97% - 100%)    Parameters below as of 16 Jan 2025 21:29  Patient On (Oxygen Delivery Method): room air        PHYSICAL EXAM:  GENERAL: NAD, well-developed, well-nourished  HEAD: Atraumatic, Normocephalic  EYES: EOMI, PERRLA, conjunctiva and sclera clear  NECK: Supple, No JVD  CHEST/LUNG: Clear to auscultation bilaterally; No wheezes or crackles  HEART: Normal S1/S2; Regular rate and rhythm; No murmurs, rubs, or gallops  ABDOMEN: Soft, Nontender, Nondistended; Bowel sounds present  EXTREMITIES: 2+ Peripheral Pulses; No clubbing, cyanosis, or edema  PSYCH: A&Ox3  NEUROLOGY: no focal neurologic deficit  SKIN: No rashes or lesions    LABS:                        9.3    4.16  )-----------( 305      ( 15 Rafael 2025 10:36 )             30.6      01-15    136  |  97  |  81[H]  ----------------------------<  78  4.7   |  22  |  11.44[H]    Ca    8.7      15 Rafael 2025 10:37  Phos  5.9     01-15    TPro  x   /  Alb  3.0[L]  /  TBili  x   /  DBili  x   /  AST  x   /  ALT  x   /  AlkPhos  x   01-15          Urinalysis Basic - ( 15 Rafael 2025 10:37 )    Color: x / Appearance: x / SG: x / pH: x  Gluc: 78 mg/dL / Ketone: x  / Bili: x / Urobili: x   Blood: x / Protein: x / Nitrite: x   Leuk Esterase: x / RBC: x / WBC x   Sq Epi: x / Non Sq Epi: x / Bacteria: x        RADIOLOGY & ADDITIONAL TESTS:    Imaging Personally Reviewed:    Consultant(s) Notes Reviewed:      Care Discussed with Consultants/Other Providers:

## 2025-01-17 NOTE — PROGRESS NOTE ADULT - SUBJECTIVE AND OBJECTIVE BOX
S/p HD today     Vital Signs Last 24 Hrs  T(C): 36.6 (01-17-25 @ 10:58), Max: 37.1 (01-16-25 @ 21:29)  T(F): 97.8 (01-17-25 @ 10:58), Max: 98.8 (01-16-25 @ 21:29)  HR: 89 (01-17-25 @ 10:58) (79 - 94)  BP: 95/43 (01-17-25 @ 10:58) (95/43 - 126/66)  RR: 17 (01-17-25 @ 10:58) (17 - 20)  SpO2: 96% (01-17-25 @ 10:58) (96% - 98%)    I&O's Detail    17 Jan 2025 07:01  -  17 Jan 2025 17:34  --------------------------------------------------------  OUT:    Other (mL): 900 mL  Total OUT: 900 mL    s1s2  b/l air entry  soft, ND  RUE AVF patent, sm edema b/l LE                                                                                                                                                                                                                9.0    5.12  )-----------( 263      ( 17 Jan 2025 09:01 )             29.3     17 Jan 2025 09:01    138    |  97     |  67     ----------------------------<  76     4.8     |  22     |  10.11    Ca    8.9        17 Jan 2025 09:01  Phos  5.9       17 Jan 2025 09:01    TPro  x      /  Alb  3.2    /  TBili  x      /  DBili  x      /  AST  x      /  ALT  x      /  AlkPhos  x      17 Jan 2025 09:01    LIVER FUNCTIONS - ( 17 Jan 2025 09:01 )  Alb: 3.2 g/dL / Pro: x     / ALK PHOS: x     / ALT: x     / AST: x     / GGT: x           bisacodyl 5 milliGRAM(s) Oral at bedtime  cinacalcet 30 milliGRAM(s) Oral daily  epoetin carito (EPOGEN) Injectable 99016 Unit(s) IV Push <User Schedule>  heparin   Injectable 5000 Unit(s) SubCutaneous every 12 hours  lidocaine   4% Patch 1 Patch Transdermal every 24 hours PRN  melatonin 5 milliGRAM(s) Oral at bedtime PRN  midodrine. 5 milliGRAM(s) Oral <User Schedule>  naloxone Injectable 0.4 milliGRAM(s) IV Push once  oxyCODONE    IR 15 milliGRAM(s) Oral every 8 hours PRN  oxyCODONE    IR 10 milliGRAM(s) Oral every 8 hours PRN  polyethylene glycol 3350 17 Gram(s) Oral two times a day  senna 2 Tablet(s) Oral at bedtime  sevelamer carbonate 2400 milliGRAM(s) Oral three times a day with meals    A/P:    ESRD on HD MWF  Adm w/Flu A  Midodrine prior to HD due to borderline BP   Renvela for high Phos  Epoetin w/HD 3 x week  [facility]INE    [phone]

## 2025-01-17 NOTE — DISCHARGE NOTE NURSING/CASE MANAGEMENT/SOCIAL WORK - FINANCIAL ASSISTANCE
[facility] provides services at a reduced cost to those who are determined to be eligible through [facility]’s financial assistance program. Information regarding [facility]’s financial assistance program can be found by going to [email or web address] or by calling [phone].

## 2025-01-17 NOTE — PROGRESS NOTE ADULT - PROBLEM SELECTOR PLAN 3
Patient has ESRD with HD on Tuesday, Thursday, and Saturday  - Nephro consulted and T/Th/Sat dialysis in place  - Additional session 1/8 for increased contrast material  Plan  - HD M/W/F, HD today Patient has ESRD with HD on Tuesday, Thursday, and Saturday  - Nephro consulted and T/Th/Sat dialysis in place  - Additional session 1/8 for increased contrast material    Plan  - HD M/W/F, HD today

## 2025-01-17 NOTE — PROGRESS NOTE ADULT - PROVIDER SPECIALTY LIST ADULT
Infectious Disease
Infectious Disease
Nephrology
Infectious Disease
Infectious Disease
Nephrology
Infectious Disease
Internal Medicine
Nephrology
Internal Medicine

## 2025-01-17 NOTE — PROGRESS NOTE ADULT - PROBLEM SELECTOR PLAN 2
Patient complaining of increased warmth, pain, and swelling near the left upper extremity  Found to have left cephalic vein superficial thrombophlebitis on US 1/5  Will treat supportively with pain management   - consider anticoagulation if symptoms worsen    PLAN  - Ceftin 250mg q24h 4 day course per ID (1/17) Patient complaining of increased warmth, pain, and swelling near the left upper extremity  Found to have left cephalic vein superficial thrombophlebitis on US 1/5  Will treat supportively with pain management   - consider anticoagulation if symptoms worsen  - Ceftin 250mg q24h completed 1/17  PLAN

## 2025-01-17 NOTE — DISCHARGE NOTE NURSING/CASE MANAGEMENT/SOCIAL WORK - NURSING SECTION COMPLETE
Patient: Antwan Taylor    Procedure Summary     Date: 05/11/22 Room / Location:  [facility]  /  [facility]    Anesthesia Start: 1047 Anesthesia Stop: 1058    Procedure: ESOPHAGOGASTRODUODENOSCOPY WITH BIOPSY (N/A Esophagus) Diagnosis:       Nausea and vomiting, unspecified vomiting type      (Nausea and vomiting, unspecified vomiting type [R11.2])    Surgeons: Erma Millan MD Provider: Joshua Ferrer MD    Anesthesia Type: general ASA Status: 3          Anesthesia Type: general    Vitals  Vitals Value Taken Time   /91 05/11/22 1130   Temp 97.8 °F (36.6 °C) 05/11/22 1100   Pulse 51 05/11/22 1130   Resp 16 05/11/22 1130   SpO2 99 % 05/11/22 1130           Post Anesthesia Care and Evaluation    Patient location during evaluation: PACU  Patient participation: complete - patient participated  Level of consciousness: awake  Pain score: 1  Pain management: adequate  Airway patency: patent  Anesthetic complications: No anesthetic complications  PONV Status: none  Cardiovascular status: acceptable  Respiratory status: acceptable  Hydration status: acceptable       Patient/Caregiver provided printed discharge information.

## 2025-01-17 NOTE — DIETITIAN INITIAL EVALUATION ADULT - BUCCAL DEPLETION IS
Spoke to patient and confirmed she can discontinue compression socks. Patient was assured that x-rays were reviewed and there was no concerns. She confirmed understanding and had no further questions or concerns.    moderate

## 2025-01-17 NOTE — DISCHARGE NOTE NURSING/CASE MANAGEMENT/SOCIAL WORK - NSDCFUADDAPPT_GEN_ALL_CORE_FT
APPTS ARE READY TO BE MADE: [X] YES    Luda: [phone]  Mu: [phone]  Best Family or Patient Contact (if needed):    Additional Information about above appointments (if needed):    1: Please follow up with your PCP, Dr. Gomez, [phone], within 2 weeks of discharge  2:   3:     Other comments or requests:

## 2025-02-03 ENCOUNTER — APPOINTMENT (OUTPATIENT)
Dept: ORTHOPEDIC SURGERY | Facility: CLINIC | Age: 40
End: 2025-02-03

## 2025-03-03 PROCEDURE — 82040 ASSAY OF SERUM ALBUMIN: CPT

## 2025-03-03 PROCEDURE — 96375 TX/PRO/DX INJ NEW DRUG ADDON: CPT | Mod: XU

## 2025-03-03 PROCEDURE — 97161 PT EVAL LOW COMPLEX 20 MIN: CPT

## 2025-03-03 PROCEDURE — C1751: CPT

## 2025-03-03 PROCEDURE — 97530 THERAPEUTIC ACTIVITIES: CPT

## 2025-03-03 PROCEDURE — 86704 HEP B CORE ANTIBODY TOTAL: CPT

## 2025-03-03 PROCEDURE — 82947 ASSAY GLUCOSE BLOOD QUANT: CPT

## 2025-03-03 PROCEDURE — 87640 STAPH A DNA AMP PROBE: CPT

## 2025-03-03 PROCEDURE — 76937 US GUIDE VASCULAR ACCESS: CPT

## 2025-03-03 PROCEDURE — 93970 EXTREMITY STUDY: CPT

## 2025-03-03 PROCEDURE — 85018 HEMOGLOBIN: CPT

## 2025-03-03 PROCEDURE — 86706 HEP B SURFACE ANTIBODY: CPT

## 2025-03-03 PROCEDURE — 83540 ASSAY OF IRON: CPT

## 2025-03-03 PROCEDURE — 71260 CT THORAX DX C+: CPT | Mod: MC

## 2025-03-03 PROCEDURE — 80053 COMPREHEN METABOLIC PANEL: CPT

## 2025-03-03 PROCEDURE — 96374 THER/PROPH/DIAG INJ IV PUSH: CPT

## 2025-03-03 PROCEDURE — 85025 COMPLETE CBC W/AUTO DIFF WBC: CPT

## 2025-03-03 PROCEDURE — 83605 ASSAY OF LACTIC ACID: CPT

## 2025-03-03 PROCEDURE — 84295 ASSAY OF SERUM SODIUM: CPT

## 2025-03-03 PROCEDURE — 85730 THROMBOPLASTIN TIME PARTIAL: CPT

## 2025-03-03 PROCEDURE — 99285 EMERGENCY DEPT VISIT HI MDM: CPT

## 2025-03-03 PROCEDURE — 36415 COLL VENOUS BLD VENIPUNCTURE: CPT

## 2025-03-03 PROCEDURE — 87641 MR-STAPH DNA AMP PROBE: CPT

## 2025-03-03 PROCEDURE — 36600 WITHDRAWAL OF ARTERIAL BLOOD: CPT

## 2025-03-03 PROCEDURE — 84100 ASSAY OF PHOSPHORUS: CPT

## 2025-03-03 PROCEDURE — 82330 ASSAY OF CALCIUM: CPT

## 2025-03-03 PROCEDURE — 86803 HEPATITIS C AB TEST: CPT

## 2025-03-03 PROCEDURE — 84132 ASSAY OF SERUM POTASSIUM: CPT

## 2025-03-03 PROCEDURE — 97112 NEUROMUSCULAR REEDUCATION: CPT

## 2025-03-03 PROCEDURE — 93971 EXTREMITY STUDY: CPT

## 2025-03-03 PROCEDURE — 74177 CT ABD & PELVIS W/CONTRAST: CPT | Mod: MC

## 2025-03-03 PROCEDURE — 83550 IRON BINDING TEST: CPT

## 2025-03-03 PROCEDURE — 82803 BLOOD GASES ANY COMBINATION: CPT

## 2025-03-03 PROCEDURE — 82607 VITAMIN B-12: CPT

## 2025-03-03 PROCEDURE — 83735 ASSAY OF MAGNESIUM: CPT

## 2025-03-03 PROCEDURE — 83970 ASSAY OF PARATHORMONE: CPT

## 2025-03-03 PROCEDURE — 85027 COMPLETE CBC AUTOMATED: CPT

## 2025-03-03 PROCEDURE — 82746 ASSAY OF FOLIC ACID SERUM: CPT

## 2025-03-03 PROCEDURE — 82435 ASSAY OF BLOOD CHLORIDE: CPT

## 2025-03-03 PROCEDURE — 97110 THERAPEUTIC EXERCISES: CPT

## 2025-03-03 PROCEDURE — 71045 X-RAY EXAM CHEST 1 VIEW: CPT

## 2025-03-03 PROCEDURE — 87040 BLOOD CULTURE FOR BACTERIA: CPT

## 2025-03-03 PROCEDURE — 99261: CPT

## 2025-03-03 PROCEDURE — 80048 BASIC METABOLIC PNL TOTAL CA: CPT

## 2025-03-03 PROCEDURE — 94660 CPAP INITIATION&MGMT: CPT

## 2025-03-03 PROCEDURE — 85610 PROTHROMBIN TIME: CPT

## 2025-03-03 PROCEDURE — 87637 SARSCOV2&INF A&B&RSV AMP PRB: CPT

## 2025-03-03 PROCEDURE — 85014 HEMATOCRIT: CPT

## 2025-03-03 PROCEDURE — 87340 HEPATITIS B SURFACE AG IA: CPT

## 2025-03-03 PROCEDURE — 82728 ASSAY OF FERRITIN: CPT

## 2025-03-03 PROCEDURE — 36556 INSERT NON-TUNNEL CV CATH: CPT

## 2025-03-24 NOTE — PATIENT PROFILE ADULT - MEDICATIONS/VISITS
Subjective  Danny Espinoza, 54 y.o. male presents today with:  Chief Complaint   Patient presents with    Follow-up     Patient presents today for f/up from walk in 3/14 - due to UTI, currently taking Cipro.        HPI  Patient is here for urgent care follow-up for diagnosis of E. coli cystitis   he is currently on Cipro and symptoms are resolving-no longer having flank pain or chills  He does report frequent urination but admits to large quantities of water and occasional pop throughout the day and evening  Questioning if he will need to see a urologist patient reminded of pended labs including PSA      Review of Systems   All other systems reviewed and are negative.        Past Medical History:   Diagnosis Date    Anxiety disorder 2009    Nephropathy 1979    following a bout of strep pharyngitis     Past Surgical History:   Procedure Laterality Date    COLONOSCOPY N/A 3/13/2023    Colonoscopy w/ polypectomy performed by Hunter Rendon MD at [facility]     Social History     Socioeconomic History    Marital status:      Spouse name: Not on file    Number of children: Not on file    Years of education: Not on file    Highest education level: Not on file   Occupational History    Not on file   Tobacco Use    Smoking status: Never    Smokeless tobacco: Current     Types: Chew   Vaping Use    Vaping status: Never Used   Substance and Sexual Activity    Alcohol use: Yes     Comment: recovering alcoholic sober since 1999    Drug use: Not Currently     Types: Cocaine     Comment: recovering sober since 1999    Sexual activity: Yes     Partners: Female   Other Topics Concern    Not on file   Social History Narrative    Not on file     Social Drivers of Health     Financial Resource Strain: Low Risk  (12/3/2024)    Overall Financial Resource Strain (CARDIA)     Difficulty of Paying Living Expenses: Not hard at all   Food Insecurity: No Food Insecurity (3/14/2025)    Hunger Vital Sign     Worried About  no

## 2025-04-09 NOTE — PHYSICAL THERAPY INITIAL EVALUATION ADULT - MANUAL MUSCLE TESTING RESULTS, REHAB EVAL
[facility]  Surgery  [address] [place] [address]  Phone: [phone]  Fax: [phone]  Follow Up Time: 1 week   BUE 3/5 BLE -3/5/grossly assessed due to [facility]  Surgery  [address] [address]  Phone: [phone]  Fax: [phone]  Follow Up Time: 1 week    Nidhi Vargas  Gastroenterology  [address]  [place] [address]  Phone: [phone]  Fax: [phone]  Follow Up Time:

## 2025-04-14 ENCOUNTER — APPOINTMENT (OUTPATIENT)
Age: 40
End: 2025-04-14

## 2025-04-14 NOTE — PROGRESS NOTE ADULT - NS ATTEND OPT1A GEN_ALL_CORE
-- DO NOT REPLY / DO NOT REPLY ALL --  -- This inbox is not monitored. If this was sent to the wrong provider or department, reroute message to P ECO Reroute pool. --  -- Message is from Engagement Center Operations (ECO) --    Patient Name: Carter Alejandratamiko    Specialist or PCP Name: Angel Louise MDPCP - General     Symptoms: vomiting, nausea, diarrhea, tired, intense right shoulder pain a couple days ago    Pregnant (females aged 13-60. If Yes, how long?) : n/a    Call Back # : [phone]    Which State are you currently located in[place]     Name of Clinic Site / Acct# : [facility] - [address]       Copied from CRM #[id number]. Topic: MW Clinical Concerns -  Routine RN Triage  >> Apr 14, 2025 12:07 PM Kimmy SPANGLER wrote:  Erica Jessi called during working hours and mentioned a symptom(s) not on the Emergent symptom list.    Routine RN Triage provided by Care Site.  Selected 'Wrap Up CRM' and created new Telephone Encounter after clicking 'Convert to Clinical Call'. Selected Reason For Call by searching symptom. Sent Routine Triage-Site message template and routed as routine priority per Care Site Dept KB page for Routine Triage Working Hrs support to appropriate clinician pool.  Readback full message.  
Exam

## 2025-05-28 ENCOUNTER — INPATIENT (INPATIENT)
Facility: HOSPITAL | Age: 40
LOS: 15 days | Discharge: HOME HEALTH | End: 2025-06-13
Attending: INTERNAL MEDICINE | Admitting: INTERNAL MEDICINE
Payer: MEDICAID

## 2025-05-28 VITALS
RESPIRATION RATE: 16 BRPM | OXYGEN SATURATION: 99 % | SYSTOLIC BLOOD PRESSURE: 150 MMHG | HEART RATE: 88 BPM | DIASTOLIC BLOOD PRESSURE: 90 MMHG | WEIGHT: 156.09 LBS | TEMPERATURE: 98 F

## 2025-05-28 DIAGNOSIS — D64.9 ANEMIA, UNSPECIFIED: ICD-10-CM

## 2025-05-28 DIAGNOSIS — Z98.890 OTHER SPECIFIED POSTPROCEDURAL STATES: Chronic | ICD-10-CM

## 2025-05-28 DIAGNOSIS — N18.6 END STAGE RENAL DISEASE: ICD-10-CM

## 2025-05-28 DIAGNOSIS — E87.8 OTHER DISORDERS OF ELECTROLYTE AND FLUID BALANCE, NOT ELSEWHERE CLASSIFIED: ICD-10-CM

## 2025-05-28 DIAGNOSIS — E87.5 HYPERKALEMIA: ICD-10-CM

## 2025-05-28 DIAGNOSIS — Z96.642 PRESENCE OF LEFT ARTIFICIAL HIP JOINT: Chronic | ICD-10-CM

## 2025-05-28 DIAGNOSIS — I10 ESSENTIAL (PRIMARY) HYPERTENSION: ICD-10-CM

## 2025-05-28 DIAGNOSIS — I77.0 ARTERIOVENOUS FISTULA, ACQUIRED: Chronic | ICD-10-CM

## 2025-05-28 DIAGNOSIS — R53.1 WEAKNESS: ICD-10-CM

## 2025-05-28 DIAGNOSIS — E83.39 OTHER DISORDERS OF PHOSPHORUS METABOLISM: ICD-10-CM

## 2025-05-28 LAB
ALBUMIN SERPL ELPH-MCNC: 3.7 G/DL — SIGNIFICANT CHANGE UP (ref 3.3–5)
ALP SERPL-CCNC: 1376 U/L — HIGH (ref 40–120)
ALT FLD-CCNC: <5 U/L — SIGNIFICANT CHANGE UP (ref 4–41)
ANION GAP SERPL CALC-SCNC: 21 MMOL/L — HIGH (ref 7–14)
ANION GAP SERPL CALC-SCNC: 24 MMOL/L — HIGH (ref 7–14)
ANISOCYTOSIS BLD QL: SIGNIFICANT CHANGE UP
AST SERPL-CCNC: 6 U/L — SIGNIFICANT CHANGE UP (ref 4–40)
BASOPHILS # BLD AUTO: 0.03 K/UL — SIGNIFICANT CHANGE UP (ref 0–0.2)
BASOPHILS # BLD AUTO: 0.03 K/UL — SIGNIFICANT CHANGE UP (ref 0–0.2)
BASOPHILS NFR BLD AUTO: 0.6 % — SIGNIFICANT CHANGE UP (ref 0–2)
BASOPHILS NFR BLD AUTO: 0.9 % — SIGNIFICANT CHANGE UP (ref 0–2)
BILIRUB SERPL-MCNC: 0.2 MG/DL — SIGNIFICANT CHANGE UP (ref 0.2–1.2)
BLOOD GAS VENOUS COMPREHENSIVE RESULT: SIGNIFICANT CHANGE UP
BUN SERPL-MCNC: 140 MG/DL — HIGH (ref 7–23)
BUN SERPL-MCNC: 165 MG/DL — HIGH (ref 7–23)
CA-I BLD-SCNC: 0.87 MMOL/L — LOW (ref 1.15–1.29)
CALCIUM SERPL-MCNC: 7.5 MG/DL — LOW (ref 8.4–10.5)
CALCIUM SERPL-MCNC: 7.8 MG/DL — LOW (ref 8.4–10.5)
CHLORIDE SERPL-SCNC: 100 MMOL/L — SIGNIFICANT CHANGE UP (ref 98–107)
CHLORIDE SERPL-SCNC: 101 MMOL/L — SIGNIFICANT CHANGE UP (ref 98–107)
CO2 SERPL-SCNC: 16 MMOL/L — LOW (ref 22–31)
CO2 SERPL-SCNC: 18 MMOL/L — LOW (ref 22–31)
CREAT SERPL-MCNC: 19.46 MG/DL — HIGH (ref 0.5–1.3)
CREAT SERPL-MCNC: 22.26 MG/DL — HIGH (ref 0.5–1.3)
DACRYOCYTES BLD QL SMEAR: SLIGHT — SIGNIFICANT CHANGE UP
DIALYSIS INSTRUMENT RESULT - HEPATITIS B SURFACE ANTIGEN: NEGATIVE — SIGNIFICANT CHANGE UP
EGFR: 2 ML/MIN/1.73M2 — LOW
EGFR: 2 ML/MIN/1.73M2 — LOW
EGFR: 3 ML/MIN/1.73M2 — LOW
EGFR: 3 ML/MIN/1.73M2 — LOW
EOSINOPHIL # BLD AUTO: 0.03 K/UL — SIGNIFICANT CHANGE UP (ref 0–0.5)
EOSINOPHIL # BLD AUTO: 0.06 K/UL — SIGNIFICANT CHANGE UP (ref 0–0.5)
EOSINOPHIL NFR BLD AUTO: 0.9 % — SIGNIFICANT CHANGE UP (ref 0–6)
EOSINOPHIL NFR BLD AUTO: 1.3 % — SIGNIFICANT CHANGE UP (ref 0–6)
FERRITIN SERPL-MCNC: 755 NG/ML — HIGH (ref 30–400)
GLUCOSE BLDC GLUCOMTR-MCNC: 127 MG/DL — HIGH (ref 70–99)
GLUCOSE BLDC GLUCOMTR-MCNC: 89 MG/DL — SIGNIFICANT CHANGE UP (ref 70–99)
GLUCOSE BLDC GLUCOMTR-MCNC: 90 MG/DL — SIGNIFICANT CHANGE UP (ref 70–99)
GLUCOSE BLDC GLUCOMTR-MCNC: 90 MG/DL — SIGNIFICANT CHANGE UP (ref 70–99)
GLUCOSE SERPL-MCNC: 74 MG/DL — SIGNIFICANT CHANGE UP (ref 70–99)
GLUCOSE SERPL-MCNC: 77 MG/DL — SIGNIFICANT CHANGE UP (ref 70–99)
HCT VFR BLD CALC: 26.5 % — LOW (ref 39–50)
HCT VFR BLD CALC: 30.5 % — LOW (ref 39–50)
HGB BLD-MCNC: 8.8 G/DL — LOW (ref 13–17)
HGB BLD-MCNC: 9.8 G/DL — LOW (ref 13–17)
IANC: 2.41 K/UL — SIGNIFICANT CHANGE UP (ref 1.8–7.4)
IANC: 3.52 K/UL — SIGNIFICANT CHANGE UP (ref 1.8–7.4)
IMM GRANULOCYTES NFR BLD AUTO: 0.2 % — SIGNIFICANT CHANGE UP (ref 0–0.9)
IRON SATN MFR SERPL: 149 UG/DL — SIGNIFICANT CHANGE UP (ref 45–165)
LYMPHOCYTES # BLD AUTO: 0.83 K/UL — LOW (ref 1–3.3)
LYMPHOCYTES # BLD AUTO: 0.99 K/UL — LOW (ref 1–3.3)
LYMPHOCYTES # BLD AUTO: 17.3 % — SIGNIFICANT CHANGE UP (ref 13–44)
LYMPHOCYTES # BLD AUTO: 27 % — SIGNIFICANT CHANGE UP (ref 13–44)
MACROCYTES BLD QL: SIGNIFICANT CHANGE UP
MAGNESIUM SERPL-MCNC: 2.5 MG/DL — SIGNIFICANT CHANGE UP (ref 1.6–2.6)
MAGNESIUM SERPL-MCNC: 2.7 MG/DL — HIGH (ref 1.6–2.6)
MCHC RBC-ENTMCNC: 32.1 G/DL — SIGNIFICANT CHANGE UP (ref 32–36)
MCHC RBC-ENTMCNC: 32.8 PG — SIGNIFICANT CHANGE UP (ref 27–34)
MCHC RBC-ENTMCNC: 33.2 G/DL — SIGNIFICANT CHANGE UP (ref 32–36)
MCHC RBC-ENTMCNC: 33.5 PG — SIGNIFICANT CHANGE UP (ref 27–34)
MCV RBC AUTO: 100.8 FL — HIGH (ref 80–100)
MCV RBC AUTO: 102 FL — HIGH (ref 80–100)
MONOCYTES # BLD AUTO: 0.16 K/UL — SIGNIFICANT CHANGE UP (ref 0–0.9)
MONOCYTES # BLD AUTO: 0.35 K/UL — SIGNIFICANT CHANGE UP (ref 0–0.9)
MONOCYTES NFR BLD AUTO: 4.3 % — SIGNIFICANT CHANGE UP (ref 2–14)
MONOCYTES NFR BLD AUTO: 7.3 % — SIGNIFICANT CHANGE UP (ref 2–14)
NEUTROPHILS # BLD AUTO: 2.46 K/UL — SIGNIFICANT CHANGE UP (ref 1.8–7.4)
NEUTROPHILS # BLD AUTO: 3.52 K/UL — SIGNIFICANT CHANGE UP (ref 1.8–7.4)
NEUTROPHILS NFR BLD AUTO: 66.9 % — SIGNIFICANT CHANGE UP (ref 43–77)
NEUTROPHILS NFR BLD AUTO: 73.3 % — SIGNIFICANT CHANGE UP (ref 43–77)
NRBC # BLD AUTO: 0 K/UL — SIGNIFICANT CHANGE UP (ref 0–0)
NRBC # FLD: 0 K/UL — SIGNIFICANT CHANGE UP (ref 0–0)
NRBC BLD AUTO-RTO: 0 /100 WBCS — SIGNIFICANT CHANGE UP (ref 0–0)
OVALOCYTES BLD QL SMEAR: SLIGHT — SIGNIFICANT CHANGE UP
PHOSPHATE SERPL-MCNC: 7 MG/DL — HIGH (ref 2.5–4.5)
PHOSPHATE SERPL-MCNC: 8.7 MG/DL — HIGH (ref 2.5–4.5)
PLAT MORPH BLD: NORMAL — SIGNIFICANT CHANGE UP
PLATELET # BLD AUTO: 104 K/UL — LOW (ref 150–400)
PLATELET # BLD AUTO: 92 K/UL — LOW (ref 150–400)
PLATELET COUNT - ESTIMATE: ABNORMAL
POIKILOCYTOSIS BLD QL AUTO: SLIGHT — SIGNIFICANT CHANGE UP
POLYCHROMASIA BLD QL SMEAR: SLIGHT — SIGNIFICANT CHANGE UP
POTASSIUM SERPL-MCNC: 5.6 MMOL/L — HIGH (ref 3.5–5.3)
POTASSIUM SERPL-MCNC: 7.1 MMOL/L — CRITICAL HIGH (ref 3.5–5.3)
POTASSIUM SERPL-SCNC: 5.6 MMOL/L — HIGH (ref 3.5–5.3)
POTASSIUM SERPL-SCNC: 7.1 MMOL/L — CRITICAL HIGH (ref 3.5–5.3)
PROT SERPL-MCNC: 6.7 G/DL — SIGNIFICANT CHANGE UP (ref 6–8.3)
RBC # BLD: 2.63 M/UL — LOW (ref 4.2–5.8)
RBC # BLD: 2.99 M/UL — LOW (ref 4.2–5.8)
RBC # FLD: 15 % — HIGH (ref 10.3–14.5)
RBC # FLD: 15.1 % — HIGH (ref 10.3–14.5)
RBC BLD AUTO: ABNORMAL
SODIUM SERPL-SCNC: 140 MMOL/L — SIGNIFICANT CHANGE UP (ref 135–145)
SODIUM SERPL-SCNC: 140 MMOL/L — SIGNIFICANT CHANGE UP (ref 135–145)
TIBC SERPL-MCNC: <179 UG/DL — LOW (ref 220–430)
UIBC SERPL-MCNC: <30 UG/DL — LOW (ref 110–370)
WBC # BLD: 3.67 K/UL — LOW (ref 3.8–10.5)
WBC # BLD: 4.8 K/UL — SIGNIFICANT CHANGE UP (ref 3.8–10.5)
WBC # FLD AUTO: 3.67 K/UL — LOW (ref 3.8–10.5)
WBC # FLD AUTO: 4.8 K/UL — SIGNIFICANT CHANGE UP (ref 3.8–10.5)

## 2025-05-28 PROCEDURE — 70450 CT HEAD/BRAIN W/O DYE: CPT | Mod: 26

## 2025-05-28 PROCEDURE — 99223 1ST HOSP IP/OBS HIGH 75: CPT

## 2025-05-28 PROCEDURE — 99291 CRITICAL CARE FIRST HOUR: CPT

## 2025-05-28 RX ORDER — DEXTROSE 50 % IN WATER 50 %
25 SYRINGE (ML) INTRAVENOUS ONCE
Refills: 0 | Status: DISCONTINUED | OUTPATIENT
Start: 2025-05-28 | End: 2025-06-13

## 2025-05-28 RX ORDER — CINACALCET 30 MG/1
30 TABLET, FILM COATED ORAL DAILY
Refills: 0 | Status: DISCONTINUED | OUTPATIENT
Start: 2025-05-28 | End: 2025-05-31

## 2025-05-28 RX ORDER — ASPIRIN 325 MG
81 TABLET ORAL DAILY
Refills: 0 | Status: DISCONTINUED | OUTPATIENT
Start: 2025-05-28 | End: 2025-06-13

## 2025-05-28 RX ORDER — MIDODRINE HYDROCHLORIDE 5 MG/1
5 TABLET ORAL
Refills: 0 | Status: DISCONTINUED | OUTPATIENT
Start: 2025-05-28 | End: 2025-05-30

## 2025-05-28 RX ORDER — HEPARIN SODIUM 1000 [USP'U]/ML
5000 INJECTION INTRAVENOUS; SUBCUTANEOUS EVERY 8 HOURS
Refills: 0 | Status: DISCONTINUED | OUTPATIENT
Start: 2025-05-29 | End: 2025-06-05

## 2025-05-28 RX ORDER — SENNA 187 MG
2 TABLET ORAL AT BEDTIME
Refills: 0 | Status: DISCONTINUED | OUTPATIENT
Start: 2025-05-28 | End: 2025-06-13

## 2025-05-28 RX ORDER — ONDANSETRON HCL/PF 4 MG/2 ML
4 VIAL (ML) INJECTION EVERY 8 HOURS
Refills: 0 | Status: DISCONTINUED | OUTPATIENT
Start: 2025-05-28 | End: 2025-06-13

## 2025-05-28 RX ORDER — MELATONIN 5 MG
1 TABLET ORAL
Refills: 0 | DISCHARGE

## 2025-05-28 RX ORDER — ACETAMINOPHEN 500 MG/5ML
650 LIQUID (ML) ORAL EVERY 6 HOURS
Refills: 0 | Status: DISCONTINUED | OUTPATIENT
Start: 2025-05-28 | End: 2025-06-13

## 2025-05-28 RX ORDER — SODIUM BICARBONATE 1 MEQ/ML
100 SYRINGE (ML) INTRAVENOUS ONCE
Refills: 0 | Status: COMPLETED | OUTPATIENT
Start: 2025-05-28 | End: 2025-05-28

## 2025-05-28 RX ORDER — ASPIRIN 325 MG
1 TABLET ORAL
Refills: 0 | DISCHARGE

## 2025-05-28 RX ORDER — SODIUM ZIRCONIUM CYCLOSILICATE 5 G/5G
10 POWDER, FOR SUSPENSION ORAL ONCE
Refills: 0 | Status: COMPLETED | OUTPATIENT
Start: 2025-05-28 | End: 2025-05-28

## 2025-05-28 RX ORDER — SODIUM ZIRCONIUM CYCLOSILICATE 5 G/5G
15 POWDER, FOR SUSPENSION ORAL DAILY
Refills: 0 | Status: DISCONTINUED | OUTPATIENT
Start: 2025-05-28 | End: 2025-05-28

## 2025-05-28 RX ORDER — POLYETHYLENE GLYCOL 3350 17 G/17G
17 POWDER, FOR SOLUTION ORAL DAILY
Refills: 0 | Status: DISCONTINUED | OUTPATIENT
Start: 2025-05-28 | End: 2025-06-13

## 2025-05-28 RX ORDER — EPOETIN ALFA 10000 [IU]/ML
10000 SOLUTION INTRAVENOUS; SUBCUTANEOUS
Refills: 0 | Status: DISCONTINUED | OUTPATIENT
Start: 2025-05-28 | End: 2025-06-05

## 2025-05-28 RX ORDER — SEVELAMER HYDROCHLORIDE 800 MG/1
1600 TABLET ORAL
Refills: 0 | Status: DISCONTINUED | OUTPATIENT
Start: 2025-05-28 | End: 2025-06-13

## 2025-05-28 RX ORDER — BACLOFEN 10 MG/20ML
1 INJECTION INTRATHECAL
Refills: 0 | DISCHARGE

## 2025-05-28 RX ORDER — SODIUM CHLORIDE 9 G/1000ML
1000 INJECTION, SOLUTION INTRAVENOUS
Refills: 0 | Status: DISCONTINUED | OUTPATIENT
Start: 2025-05-28 | End: 2025-06-13

## 2025-05-28 RX ORDER — MAGNESIUM, ALUMINUM HYDROXIDE 200-200 MG
30 TABLET,CHEWABLE ORAL EVERY 4 HOURS
Refills: 0 | Status: DISCONTINUED | OUTPATIENT
Start: 2025-05-28 | End: 2025-06-13

## 2025-05-28 RX ORDER — ACETAMINOPHEN 500 MG/5ML
2 LIQUID (ML) ORAL
Refills: 0 | DISCHARGE

## 2025-05-28 RX ORDER — CALCIUM GLUCONATE 20 MG/ML
2 INJECTION, SOLUTION INTRAVENOUS ONCE
Refills: 0 | Status: COMPLETED | OUTPATIENT
Start: 2025-05-28 | End: 2025-05-28

## 2025-05-28 RX ORDER — BACLOFEN 10 MG/20ML
10 INJECTION INTRATHECAL EVERY 12 HOURS
Refills: 0 | Status: DISCONTINUED | OUTPATIENT
Start: 2025-05-28 | End: 2025-06-13

## 2025-05-28 RX ORDER — METOPROLOL SUCCINATE 50 MG/1
25 TABLET, EXTENDED RELEASE ORAL
Refills: 0 | Status: DISCONTINUED | OUTPATIENT
Start: 2025-05-29 | End: 2025-06-10

## 2025-05-28 RX ORDER — DEXTROSE 50 % IN WATER 50 %
50 SYRINGE (ML) INTRAVENOUS ONCE
Refills: 0 | Status: COMPLETED | OUTPATIENT
Start: 2025-05-28 | End: 2025-05-28

## 2025-05-28 RX ORDER — MELATONIN 5 MG
9 TABLET ORAL AT BEDTIME
Refills: 0 | Status: DISCONTINUED | OUTPATIENT
Start: 2025-05-28 | End: 2025-06-13

## 2025-05-28 RX ORDER — OXYCODONE HYDROCHLORIDE 30 MG/1
5 TABLET ORAL EVERY 6 HOURS
Refills: 0 | Status: DISCONTINUED | OUTPATIENT
Start: 2025-05-28 | End: 2025-06-04

## 2025-05-28 RX ORDER — MELATONIN 5 MG
3 TABLET ORAL AT BEDTIME
Refills: 0 | Status: DISCONTINUED | OUTPATIENT
Start: 2025-05-28 | End: 2025-05-28

## 2025-05-28 RX ADMIN — CALCIUM GLUCONATE 200 GRAM(S): 20 INJECTION, SOLUTION INTRAVENOUS at 13:58

## 2025-05-28 RX ADMIN — Medication 650 MILLIGRAM(S): at 22:04

## 2025-05-28 RX ADMIN — Medication 650 MILLIGRAM(S): at 22:34

## 2025-05-28 RX ADMIN — Medication 9 MILLIGRAM(S): at 22:42

## 2025-05-28 RX ADMIN — SODIUM ZIRCONIUM CYCLOSILICATE 10 GRAM(S): 5 POWDER, FOR SUSPENSION ORAL at 22:32

## 2025-05-28 RX ADMIN — OXYCODONE HYDROCHLORIDE 5 MILLIGRAM(S): 30 TABLET ORAL at 23:34

## 2025-05-28 RX ADMIN — MIDODRINE HYDROCHLORIDE 5 MILLIGRAM(S): 5 TABLET ORAL at 18:13

## 2025-05-28 RX ADMIN — Medication 50 MILLILITER(S): at 15:48

## 2025-05-28 RX ADMIN — Medication 5 UNIT(S): at 15:54

## 2025-05-28 RX ADMIN — EPOETIN ALFA 10000 UNIT(S): 10000 SOLUTION INTRAVENOUS; SUBCUTANEOUS at 21:31

## 2025-05-28 RX ADMIN — Medication 100 MILLIEQUIVALENT(S): at 15:43

## 2025-05-28 RX ADMIN — OXYCODONE HYDROCHLORIDE 5 MILLIGRAM(S): 30 TABLET ORAL at 22:46

## 2025-05-28 NOTE — CONSULT NOTE ADULT - PROBLEM SELECTOR RECOMMENDATION 3
Phos elevated 8.7. resume sevelamer 1600mg TID with meals. Monitor phos    If you have any questions, please feel free to contact me  Landry Davila MD  Nephrology Fellow  Page [phone] / Microsoft Teams (Preferred); Please PAGE for urgent consults only.  (After 5pm or on weekends please page the on-call fellow) Phos elevated 8.7. resume sevelamer 1600mg TID with meals. Monitor phos

## 2025-05-28 NOTE — H&P ADULT - HISTORY OF PRESENT ILLNESS
39 yr old male with a pmh of ESRD on hemodialysis, HTN, orthostatic hypotension, CVA, hypertension, renal osteodystrophy who presents with       Vitals: T 98.2, HR 75, /65, RR 17 satting 98% RA 39 yr old male with a pmh of ESRD on hemodialysis, HTN, orthostatic hypotension, CVA, renal osteodystrophy who presents with right sided waxing and waning upper and lower extremity weakness as well as intermittent tongue heaviness that has been going on since 12/2024 per the pt (ED note states 1 week).   Endorsing intermittent nausea today.   Pt states he has been having difficulty at [facility] as " they keep telling me my potassium and calcium are one day too high and one day too low".   Denies  headache, dizziness, chest pain, palpitations, new SOB, abdominal pain, joint pain, diarrhea/constipation, urinary symptoms (does not make urine).    Vitals: T 98.2, HR 75, /65, RR 17 satting 98% RA

## 2025-05-28 NOTE — ED PROVIDER NOTE - CRITICAL CARE ATTENDING CONTRIBUTION TO CARE
39-year-old male past medical history of ESRD on hemodialysis, CVA, hypertension, renal osteodystrophy presents with right arm weakness for 1 week.  Is residing in a rehab facility currently.  Of note patient is wheelchair bound, patient was 5 out of 5 in the bilateral upper extremities, able to flex bilateral calves 4 out of 5 but unable to hip flex bilaterally.  Of note patient has hypertrophic facies consistent with osteodystrophy, patient was not in the window for stroke code.  Fistula with palpable thrill.  CBC with anemia likely renal in nature, EKG had moderately peaked T waves, patient was given calcium empirically, noted to have potassium of 7.1.  Patient was shifted with insulin.  Of note patient also had pH of 7.11.  Patient required insulin and dextrose.  Nephrology consulted for dialysis in an urgent fashion.  The patient's condition was not amenable to outpatient treatment due to either the lack of feasibility of outpatient care coordination, possibility for further decompensation with adverse outcome if discharge, or treatments and diagnostic  modalities only available during an inpatient hospitalization.  Additional time as above spent by myself, separate from billable procedures, included coordination of patient care with consultants/admitting team, performing reassessments on the patient, documentation, and counseling patient/family members on the care provided.    A physician assistant was also involved in this patients care and participated in critical care actions and execution of medical decision making.

## 2025-05-28 NOTE — H&P ADULT - PROBLEM SELECTOR PLAN 3
How Severe Is Your Skin Lesion?: mild Have Your Skin Lesions Been Treated?: not been treated Is This A New Presentation, Or A Follow-Up?: Skin Lesions Chronic unstable given electrolyte disturbances   HD on MWF last HD 5/26/25  Nephrology consulted: HD today  Continue cinacalcet 30mg daily, sevelamer 1600mg TID  BMP in AM

## 2025-05-28 NOTE — ED ADULT NURSE NOTE - OBJECTIVE STATEMENT
39 year old male c/o r lower extremity weakness with additional N/V x 2 days. Pt states past history of hyperkalemia, and since then has been experiencing muscle tightness, nausea and multiple episodes of nonbloody emesis. Pt states he did not get his dialysis treatment today. Pt denies any chest pain, SOB, palpitations, headache, urinary symptoms, constipation, diarrhea, or any dizziness at this time.    AxOx4. RR even and unlabored on room air. Lung sounds clear to auscultation. S1 and S2 noted, rate and rhythm regular. Pt on cardiac monitor, NSR. Abdomen soft, nontender, and (-) TTP. Normoactive bowel sounds heard in all 4 quadrants. Right AV fistula noted in right upper extremity. Thrill present.

## 2025-05-28 NOTE — ED ADULT NURSE REASSESSMENT NOTE - NS ED NURSE REASSESS COMMENT FT1
Received report from ivette RN. Pt laying in stretcher comfortably with family member at the bedside. NAD. Pt endorses weakness at this time. Pt denies any chest pain, SOB, palpitations, dizziness, urinary symptoms, N/V/D, or any pain at this time. AxOx4. RR even and unlabored on room air. Pt on cardiac monitor, NSR noted. Abdomen soft, nondistended, and (-) TTP. Safety maintained.

## 2025-05-28 NOTE — ED ADULT NURSE NOTE - NSFALLHARMRISKINTERV_ED_ALL_ED

## 2025-05-28 NOTE — CONSULT NOTE ADULT - PROBLEM SELECTOR RECOMMENDATION 4
serum potassium elevated at 7.1. Get ECG, medically treat with Lokelma as needed. Will arrange Hemodialysis. tele monitoring    If you have any questions, please feel free to contact me  Landry Davila MD  Nephrology Fellow  Page [phone] / Microsoft Teams (Preferred); Please PAGE for urgent consults only.  (After 5pm or on weekends please page the on-call fellow)

## 2025-05-28 NOTE — CONSULT NOTE ADULT - SUBJECTIVE AND OBJECTIVE BOX
[facility] DIVISION OF KIDNEY DISEASES AND HYPERTENSION -- [phone]  -- INITIAL CONSULT NOTE  --------------------------------------------------------------------------------  HPI: 39M with PMH of ESRD on hemodialysis, orthostatic hypotension, CVA, hypertension, renal osteodystrophy presents with right arm weakness x 1 week. Nephrology consulted for ESRD on Hemodialysis    Pt. with ESRD on HD TIW (MWF schedule) via RUE AVF at [facility]. Last outpatient HD treatment was on 5/26/25. Patient has been on Hemodialysis for 13 yrs. Hemodialysis prescription is for 3.5 to 4 hrs, 3L UF. Dry weight is 83.7 Kg per the patient. Patient with serum potassium elevated at 7.1, Scr 22.26, SCa low at 7.5 with alb 3.7. Patient seen and examined in the ED. Patient reports developing right arm weakness and numbness of the right side of tongue, right arm and right leg. Patient denies any fevers, chills, chest pain, dizziness, headache.     PAST HISTORY  --------------------------------------------------------------------------------  PAST MEDICAL & SURGICAL HISTORY:  HTN (hypertension)  Stroke  age 10  Sleep apnea  Leg fracture, right  Chronic renal failure  Hemodialysis access, AV graft  ESRD (end stage renal disease) on dialysis  since 2013, M-W-F  Anemia, unspecified type  Other hyperparathyroidism  AV fistula  R arm; L arm clotted  History of left hip hemiarthroplasty  S/P parathyroidectomy    FAMILY HISTORY:    PAST SOCIAL HISTORY:    ALLERGIES & MEDICATIONS  --------------------------------------------------------------------------------  Allergies    No Known Drug Allergies  shellfish (Hives)    Intolerances      Standing Inpatient Medications  dextrose 5%. 1000 milliLiter(s) IV Continuous <Continuous>  dextrose 50% Injectable 25 Gram(s) IV Push once    PRN Inpatient Medications      REVIEW OF SYSTEMS  --------------------------------------------------------------------------------  Gen: No fevers/chills  Head/Eyes/Ears: No Headaches  Respiratory: + dyspnea, No cough  CV: No chest pain  GI: No abdominal pain, diarrhea  : No dysuria, hematuria  MSK: +edema, difficulty moving his Rt arm      All other systems were reviewed and are negative, except as noted.    VITALS/PHYSICAL EXAM  --------------------------------------------------------------------------------  T(C): 36.8 (05-28-25 @ 12:07), Max: 36.8 (05-28-25 @ 12:07)  HR: 88 (05-28-25 @ 12:07) (88 - 88)  BP: 150/90 (05-28-25 @ 12:07) (150/90 - 150/90)  RR: 16 (05-28-25 @ 12:07) (16 - 16)  SpO2: 99% (05-28-25 @ 12:07) (99% - 99%)  Wt(kg): --    Weight (kg): 70.8 (05-28-25 @ 12:07)      PHYSICAL EXAM:  Gen: On nasal cannula supplemental oxygen   HEENT: MMM  Pulm: CTA B/L  CV: S1S2  Abd: Soft, +BS   Ext: + LE edema bilateral, difficulty moving his Rt arm,  Neuro: Awake  Skin: Warm and dry  Vascular access: R UE AVF, + bruit and thrill    LABS/STUDIES  --------------------------------------------------------------------------------              9.8    4.80  >-----------<  104      [05-28-25 @ 13:58]              30.5     140  |  100  |  165  ----------------------------<  77      [05-28-25 @ 13:58]  7.1   |  16  |  22.26        Ca     7.5     [05-28-25 @ 13:58]      iCa    0.87     [05-28 @ 13:58]      Mg     2.70     [05-28-25 @ 13:58]      Phos  8.7     [05-28-25 @ 13:58]    TPro  6.7  /  Alb  3.7  /  TBili  0.2  /  DBili  x   /  AST  6   /  ALT  <5  /  AlkPhos  1376  [05-28-25 @ 13:58]    Creatinine Trend:  SCr 22.26 [05-28 @ 13:58]      Iron 49, TIBC 149, %sat 33      [01-13-25 @ 15:33]  Ferritin 1125      [01-17-25 @ 09:01]  PTH -- (Ca --)      [01-13-25 @ 15:33]   4125  TSH 2.43      [11-12-24 @ 18:29]    HBsAb 51.5      [04-10-24 @ 14:30]  HBsAb Reactive      [01-10-25 @ 09:19]  HBsAg Nonreact      [01-10-25 @ 09:21]  HBcAb Nonreact      [01-10-25 @ 09:19]  HCV 0.05, Nonreact      [01-10-25 @ 09:21]    Immunofixation Serum:   No Monoclonal Band Identified      Reference Range: None Detected      [04-17-24 @ 12:30]  SPEP Interpretation: Normal Electrophoresis Pattern      [04-17-24 @ 12:30]

## 2025-05-28 NOTE — H&P ADULT - PROBLEM SELECTOR PLAN 2
Acute on chronic   Pt reports waxing and waning symptoms since 12/2024  CT head: as above  If symptoms do not improve following electrolyte disturbance correction and following HD would consider Neurology consult in AM   B12, Folate ordered 65M w/ hx CHF  EF 20%, AICD, HTN, HLD, GERD, Rheumatoid arthritis, PAD, CAD and MI w/9  stents, anterior STEMI c/b cardiogenic shock req restent LAD (in-stent thrombosis) and Impella (9/11/2011), hx of LV thrombus, 4/15 pt had NSTEMI with RODRIGO to pCX with Impella support c/o chest pain since 9 am with associated sob, dipahoresis, well appearing now, vss, ekg with no changes, cardiac work up likely admission, last cath'17 with patent stents.

## 2025-05-28 NOTE — ED ADULT NURSE NOTE - CHIEF COMPLAINT QUOTE
Pt history of ESRD, last full dialysis session was Monday, presents from [facility] for weakness to rt upper extremity progressing over the past 2 days, accompanied with n/v, pt has AV fistula to Rt arm.

## 2025-05-28 NOTE — H&P ADULT - NSHPLABSRESULTS_GEN_ALL_CORE
9.8    4.80  )-----------( 104      ( 28 May 2025 13:58 )             30.5     140  |  100  |  165[H]  ----------------------------<  77     05-28  7.1[HH]   |  16[L]  |  22.26[H]    Ca    7.5[L]      28 May 2025 13:58  Phos  8.7     05-28  Mg     2.70     05-28    TPro  6.7  /  Alb  3.7  /  TBili  0.2  /  DBili  x   /  AST  6   /  ALT  <5  /  AlkPhos  1376[H]  05-28        13:58 - VBG - pH: 7.11  | pCO2: 56    | pO2: 41    | Lactate: 1.0          EKG interpreted by myself: nsr with peaked T waves    CT head interpreted by radiology: Extensive diffuse heterogeneously decreased mineralization throughout the calvaria. Increased mineralization throughout the skull base and visualized cervical spine. 9.8    4.80  )-----------( 104      ( 28 May 2025 13:58 )             30.5     140  |  100  |  165[H]  ----------------------------<  77     05-28  7.1[HH]   |  16[L]  |  22.26[H]    Ca    7.5[L]      28 May 2025 13:58  Phos  8.7     05-28  Mg     2.70     05-28    TPro  6.7  /  Alb  3.7  /  TBili  0.2  /  DBili  x   /  AST  6   /  ALT  <5  /  AlkPhos  1376[H]  05-28        13:58 - VBG - pH: 7.11  | pCO2: 56    | pO2: 41    | Lactate: 1.0          EKG interpreted by myself: nsr with peaked T waves    CT head interpreted by radiology: Extensive diffuse heterogeneously decreased mineralization throughout the calvaria. Increased mineralization throughout the skull base and visualized cervical spine. This is most likely due to to patient's history of dialysis for 13 years and represent renal osteodystrophy rather than new malignancy. Correlate clinically.    MRI may be helpful for further evaluation, if clinically indicated.

## 2025-05-28 NOTE — ED PROVIDER NOTE - PROGRESS NOTE DETAILS
MAXWELL ACOSTA:  Nephrology consulted at this time. MAXWELL ACOSTA:  Pt accepted for admission under Dr. Sanders at this time.

## 2025-05-28 NOTE — H&P ADULT - NSHPREVIEWOFSYSTEMS_GEN_ALL_CORE
REVIEW OF SYSTEMS:    CONSTITUTIONAL: No weakness, fevers or chills  EYES/ENT: No visual changes;  No dysphagia; No sore throat; No rhinorrhea; No sinus pain/pressure  NECK: No pain or stiffness  RESPIRATORY: No cough, wheezing, hemoptysis; No shortness of breath  CARDIOVASCULAR: No chest pain or palpitations; No lower extremity edema  GASTROINTESTINAL: No abdominal or epigastric pain. + nausea, no vomiting, or hematemesis; No diarrhea or constipation. No melena or hematochezia.  GENITOURINARY: No dysuria, frequency or hematuria  NEUROLOGICAL: +right sided waxing and waning upper and lower extremity weakness as well as intermittent tongue heaviness  MSK: ambulates with a walker   SKIN: No itching, burning, rashes, or lesions   All other review of systems is negative unless indicated above.

## 2025-05-28 NOTE — PHARMACOTHERAPY INTERVENTION NOTE - COMMENTS
Medication history is complete. Medication list updated in Outpatient Medication Record (OMR). Please call spectra [id number] if you have any questions. Source: [facility] med list    Home Medications:  acetaminophen 325 mg oral tablet: 2 tab(s) orally every 6 hours as needed for  mild pain and moderate pain (28 May 2025 16:53)  aspirin 81 mg oral delayed release tablet: 1 tab(s) orally once a day (28 May 2025 16:53)  baclofen 10 mg oral tablet: 1 tab(s) orally 2 times a day as needed for  muscle spasm (28 May 2025 16:53)  cinacalcet 30 mg oral tablet: 1 tab(s) orally once a day (28 May 2025 16:53)  Lokelma 5 g oral powder for reconstitution: 3 packet(s) orally once a day (28 May 2025 16:56)  melatonin 10 mg oral tablet, disintegratin tab(s) orally once a day (at bedtime) (28 May 2025 16:56)  metoprolol tartrate 25 mg oral tablet: 1 tab(s) orally 2 times a day (28 May 2025 16:53)  midodrine 5 mg oral tablet: 1 tab(s) orally 3 times a week before HD (28 May 2025 16:53)  oxyCODONE 5 mg oral tablet: 1 tab(s) orally every 6 hours as needed for pain (28 May 2025 16:56)  polyethylene glycol 3350 oral powder for reconstitution: 17 gram(s) orally once a day (28 May 2025 16:53)  sevelamer carbonate 800 mg oral tablet: 2 tab(s) orally 3 times a day (with meals) (28 May 2025 16:56)  senna leaf extract oral tablet: 2 tab(s) orally once a day (at bedtime) (28 May 2025 16:49)

## 2025-05-28 NOTE — ED PROVIDER NOTE - CLINICAL SUMMARY MEDICAL DECISION MAKING FREE TEXT BOX
PCP: Quincy Beebe MD    Last appt: 10/12/2021  Future Appointments   Date Time Provider Segundo Wood   4/11/2022  8:30 AM Quincy Beebe MD [facility] BS AMB       Requested Prescriptions     Pending Prescriptions Disp Refills    glucose blood VI test strips (OneTouch Ultra Test) strip 300 Strip 3     Sig: USE TO TEST BLOOD SUGAR THREE TIMES DAILY    lancets (OneTouch UltraSoft Lancets) misc 300 Each 5     Sig: USE TO TEST BLOOD SUGAR THREE TIMES A DAY
Recent cauterizations/clippings @ [facility] last month as per daughter after reported blood in colostomy. No blood in colostomy at present. Will sent FOBT. Repeat CBC q6h. Hold chemical DVT ppx for now. DVT ppx with SCDs. Will attempt to get records of procedures from [facility]. BID protonix.
Recent cauterizations/clippings @ [facility] last month as per daughter after reported blood in colostomy. No blood in colostomy at present. Will sent FOBT. Repeat CBC q6h. On apixaban at home, will hold a/c and chemical DVT ppx for now until we can rule out GI bleed. Plan to restart ASAP. DVT ppx with SCDs. Will attempt to get records of procedures from [facility]. BID protonix.
Patient is a 39-year-old male with past medical history of end-stage renal disease on hemodialysis (Monday Wednesdays and Fridays with last dialysis 2 days ago), orthostatic hypotension, CVA, hypertension, renal osteodystrophy presents with right arm weakness x 1 week.  Patient reports developing right arm weakness described as heaviness associated with numbness of the right side of tongue, right arm and right leg.  Patient denies any fevers, chills, chest pain, shortness of breath, syncope, change in vision or hearing, dizziness, headache.  Accompanying documentation from [facility] shows lab work from yesterday showing a calcium of 7.5.  This is a patient with subjective right-sided numbness, right arm heaviness.  This is a patient with hypocalcemia on outpatient labs.  Plan to order labs, CT head, EKG, telemetry.  Disposition pending workup.

## 2025-05-28 NOTE — ED PROVIDER NOTE - OBJECTIVE STATEMENT
Patient is a 39-year-old male with past medical history of end-stage renal disease on hemodialysis (Monday Wednesdays and Fridays with last dialysis 2 days ago), orthostatic hypotension, CVA, hypertension, renal osteodystrophy presents with right arm weakness x 1 week.  Patient reports developing right arm weakness described as heaviness associated with numbness of the right side of tongue, right arm and right leg.  Patient denies any fevers, chills, chest pain, shortness of breath, syncope, change in vision or hearing, dizziness, headache.  Accompanying documentation from [facility] shows lab work from yesterday showing a calcium of 7.5.  This is a patient with subjective right-sided numbness, right arm heaviness.

## 2025-05-28 NOTE — H&P ADULT - NSHPSOCIALHISTORY_GEN_ALL_CORE
Does not use tobacco products, consume alcohol or partake in illicit drug use   Currently residing at [facility]

## 2025-05-28 NOTE — H&P ADULT - NSHPPHYSICALEXAM_GEN_ALL_CORE
PHYSICAL EXAM:  GENERAL: NAD, comfortable at bedside   HEAD:  Atraumatic, Normocephalic  EYES: EOMI, PERRL, conjunctiva and sclera clear  NECK: Supple, No JVD  CHEST/LUNG: Clear to auscultation bilaterally; No wheezes, rales or rhonchi  HEART: Regular rate and rhythm; No murmurs, rubs, or gallops, (+)S1, S2  ABDOMEN: Soft, Nontender, Nondistended; Normal Bowel sounds   EXTREMITIES:  2+ Peripheral Pulses, No clubbing, cyanosis, or edema  PSYCH: normal mood and affect  NEUROLOGY: AAOx3, CN 2-12 grossly intact, 5/5 strength in bilateral upper extremities, strength exam limited in lower extremities as pt has chronic hip pain though 5/5 with dorsiflexion/plantarflexion, Equal sensation throughout upper and lower extremities, no slurred speech/facial droop/nystagmus noted, gait not assessed   SKIN: No rashes or lesions

## 2025-05-28 NOTE — H&P ADULT - PROBLEM SELECTOR PLAN 1
New  K: 7.1, ICa: 0.87  s/p Ca gluconate 2g IV x1, insulin/dextrose, lokelma 10mg x1 -> repeat K 5.6 prior to HD completion   Monitor electrolytes closes and replete/treat elevations as appropriate   Tele

## 2025-05-28 NOTE — CONSULT NOTE ADULT - PROBLEM SELECTOR RECOMMENDATION 2
Patient with anemia in the setting of ESRD. Hgb is 9.8, get iron labs and ferritin. Monitor Hgb. will resume epo 10K with HD

## 2025-05-28 NOTE — H&P ADULT - PROBLEM SELECTOR PLAN 5
Chronic stable  /65  Continue metoprolol tartrate 25mg BID with hold parameters   Continue midodrine 5mg daily on HD days prior to HD  Monitor

## 2025-05-28 NOTE — H&P ADULT - PROBLEM SELECTOR PLAN 4
Chronic stable  Hb 9.8  Nephrology consulted: "Patient with anemia in the setting of ESRD. Hgb is 9.8, get iron labs and ferritin. Monitor Hgb. will resume epo 10K with HD"  Monitor

## 2025-05-28 NOTE — ED ADULT NURSE NOTE - CODE STROKE ACTIVE YN
No AS per previous provider : medically optimized for planned procedure if optimized from cards perspective. would try to avoid hypotensive episodes given ALEKSANDR.

## 2025-05-28 NOTE — ED ADULT TRIAGE NOTE - CHIEF COMPLAINT QUOTE
Pt history of ESRD, last full dialysis session was Monday, presents from [facility] for weakness to rt upper extremity progressing over the past 2 days, accompanied with n/v, pt has AV fistula to Rt arm.

## 2025-05-28 NOTE — H&P ADULT - NSICDXPASTMEDICALHX_GEN_ALL_CORE_FT
PAST MEDICAL HISTORY:  Anemia, unspecified type     ESRD (end stage renal disease) on dialysis since 2013, M-W-F    Hemodialysis access, AV graft     HTN (hypertension)     Leg fracture, right     Orthostatic hypotension     Other hyperparathyroidism     Renal osteodystrophy     Sleep apnea     Stroke age 10

## 2025-05-28 NOTE — ED ADULT TRIAGE NOTE - ESI TRIAGE ACUITY LEVEL, MLM
"Patient's wife, Cande, calling on behalf of patient. No CTC on file, received verbal ok from patient over the phone to speak with Cande about PHI.     Cande states patient was seen in ED on 1/1/2023 for lightheadedness and dizziness, particularly upon standing and walking up stairs. EKG and labs came back normal at ED however patient was able to recreate symptoms and episode with ED provider when walking and he was advised to get stress test completed. Soonest available appointment for stress test is 1/18/2023.    Cande requesting a follow up with patient's PCP, to discuss current ED visit as well as options to complete stress test sooner. RN notes soonest appointment with PCP is not until next Wednesday 1/11/2023 and is a same day/approval required slot. Patient prefers to see PCP for follow up.    Cande also requesting to know if there is a medication for patient to help with dizziness, \"He can hardly get up from the couch, he gets dizzy so often\".     Routing to provider to review and advise.     Cecille Smiley RN    [facility]    "
Fit him in Wed 1/4/23. At the 1 PM slot.    Thanks.    Jose Willson M.D.    
Scheduled.    Emily Caruso, Patient Representative - [facility]       
2

## 2025-05-28 NOTE — ED PROVIDER NOTE - PHYSICAL EXAMINATION
+palpable thrill at R AV fistula    -Cranial Nerves:  --CN II: PERRLA  --CN III, IV, VI: EOMI b/l  --CN V1-3: Facial sensation intact to touch  --CN VII: No facial asymmetry or droop  --CN VIII: Hearing intact to rubbing fingers b/l  --CN IX, X: Palate elevates symmetrically. Normal phonation  --CN XI: Heading turning and shoulder shrug intact b/l  --CN XII: Tongue midline with normal movements     Normal bilateral FTN.  Rapid alternating movements intact.      5/5 strength to bilateral UE  Strength exam at bilateral hips/knees limited 2/2 chronic pain from hip fractures, however 5/5 at foot and ankle

## 2025-05-28 NOTE — H&P ADULT - NSHPADDITIONALINFOADULT_GEN_ALL_CORE
medication list reviewed with pt and medrec completed by pharmacy medication list from [facility] and [facility] completed by pharmacy

## 2025-05-28 NOTE — CONSULT NOTE ADULT - PROBLEM SELECTOR RECOMMENDATION 9
Pt. with ESRD on HD TIW (MWF schedule) via RUE AVF at [facility]. Last outpatient HD treatment was on 5/26/25. Patient has been on Hemodialysis for 13 yrs. Hemodialysis prescription is for 3.5 to 4 hrs, 3L UF. Dry weight is 83.7 Kg per the patient. Patient with serum potassium elevated at 7.1, Scr 22.26, SCa low at 7.5 with alb 3.7. Plan for maintenance Hemodialysis today with 2K and 3.5Ca bath. Pt. is euvolemic on exam. Pt. clinically stable. HD consent obtained and placed in pt's chart. Monitor BP and labs. Dose medications to ESRD/HD.

## 2025-05-29 LAB
ANION GAP SERPL CALC-SCNC: 20 MMOL/L — HIGH (ref 7–14)
BLOOD GAS VENOUS COMPREHENSIVE RESULT: SIGNIFICANT CHANGE UP
BUN SERPL-MCNC: 95 MG/DL — HIGH (ref 7–23)
CALCIUM SERPL-MCNC: 7.7 MG/DL — LOW (ref 8.4–10.5)
CHLORIDE SERPL-SCNC: 101 MMOL/L — SIGNIFICANT CHANGE UP (ref 98–107)
CO2 SERPL-SCNC: 21 MMOL/L — LOW (ref 22–31)
CREAT SERPL-MCNC: 14.32 MG/DL — HIGH (ref 0.5–1.3)
EGFR: 4 ML/MIN/1.73M2 — LOW
EGFR: 4 ML/MIN/1.73M2 — LOW
GLUCOSE SERPL-MCNC: 98 MG/DL — SIGNIFICANT CHANGE UP (ref 70–99)
HAV IGM SER-ACNC: SIGNIFICANT CHANGE UP
HBV CORE AB SER-ACNC: SIGNIFICANT CHANGE UP
HBV CORE IGM SER-ACNC: SIGNIFICANT CHANGE UP
HBV SURFACE AB SER-ACNC: 18.4 MIU/ML — SIGNIFICANT CHANGE UP
HBV SURFACE AG SER-ACNC: SIGNIFICANT CHANGE UP
HCT VFR BLD CALC: 27.4 % — LOW (ref 39–50)
HCV AB S/CO SERPL IA: 0.43 S/CO — SIGNIFICANT CHANGE UP (ref 0–0.79)
HCV AB SERPL-IMP: SIGNIFICANT CHANGE UP
HGB BLD-MCNC: 8.6 G/DL — LOW (ref 13–17)
MAGNESIUM SERPL-MCNC: 2.3 MG/DL — SIGNIFICANT CHANGE UP (ref 1.6–2.6)
MCHC RBC-ENTMCNC: 31.4 G/DL — LOW (ref 32–36)
MCHC RBC-ENTMCNC: 32.8 PG — SIGNIFICANT CHANGE UP (ref 27–34)
MCV RBC AUTO: 104.6 FL — HIGH (ref 80–100)
MRSA PCR RESULT.: SIGNIFICANT CHANGE UP
NRBC # BLD AUTO: 0 K/UL — SIGNIFICANT CHANGE UP (ref 0–0)
NRBC # FLD: 0 K/UL — SIGNIFICANT CHANGE UP (ref 0–0)
NRBC BLD AUTO-RTO: 0 /100 WBCS — SIGNIFICANT CHANGE UP (ref 0–0)
PHOSPHATE SERPL-MCNC: 6.7 MG/DL — HIGH (ref 2.5–4.5)
PLATELET # BLD AUTO: 79 K/UL — LOW (ref 150–400)
POTASSIUM SERPL-MCNC: 4.8 MMOL/L — SIGNIFICANT CHANGE UP (ref 3.5–5.3)
POTASSIUM SERPL-SCNC: 4.8 MMOL/L — SIGNIFICANT CHANGE UP (ref 3.5–5.3)
RBC # BLD: 2.62 M/UL — LOW (ref 4.2–5.8)
RBC # FLD: 15.1 % — HIGH (ref 10.3–14.5)
S AUREUS DNA NOSE QL NAA+PROBE: SIGNIFICANT CHANGE UP
SODIUM SERPL-SCNC: 142 MMOL/L — SIGNIFICANT CHANGE UP (ref 135–145)
WBC # BLD: 3.49 K/UL — LOW (ref 3.8–10.5)
WBC # FLD AUTO: 3.49 K/UL — LOW (ref 3.8–10.5)

## 2025-05-29 PROCEDURE — 99255 IP/OBS CONSLTJ NEW/EST HI 80: CPT | Mod: GC

## 2025-05-29 PROCEDURE — 93010 ELECTROCARDIOGRAM REPORT: CPT

## 2025-05-29 RX ADMIN — SEVELAMER HYDROCHLORIDE 1600 MILLIGRAM(S): 800 TABLET ORAL at 17:19

## 2025-05-29 RX ADMIN — OXYCODONE HYDROCHLORIDE 5 MILLIGRAM(S): 30 TABLET ORAL at 05:03

## 2025-05-29 RX ADMIN — Medication 9 MILLIGRAM(S): at 23:01

## 2025-05-29 RX ADMIN — OXYCODONE HYDROCHLORIDE 5 MILLIGRAM(S): 30 TABLET ORAL at 05:33

## 2025-05-29 RX ADMIN — Medication 1 APPLICATION(S): at 13:02

## 2025-05-29 RX ADMIN — OXYCODONE HYDROCHLORIDE 5 MILLIGRAM(S): 30 TABLET ORAL at 23:02

## 2025-05-29 RX ADMIN — POLYETHYLENE GLYCOL 3350 17 GRAM(S): 17 POWDER, FOR SOLUTION ORAL at 12:51

## 2025-05-29 RX ADMIN — METOPROLOL SUCCINATE 25 MILLIGRAM(S): 50 TABLET, EXTENDED RELEASE ORAL at 05:03

## 2025-05-29 RX ADMIN — SEVELAMER HYDROCHLORIDE 1600 MILLIGRAM(S): 800 TABLET ORAL at 12:51

## 2025-05-29 RX ADMIN — OXYCODONE HYDROCHLORIDE 5 MILLIGRAM(S): 30 TABLET ORAL at 13:44

## 2025-05-29 RX ADMIN — CINACALCET 30 MILLIGRAM(S): 30 TABLET, FILM COATED ORAL at 12:51

## 2025-05-29 RX ADMIN — METOPROLOL SUCCINATE 25 MILLIGRAM(S): 50 TABLET, EXTENDED RELEASE ORAL at 17:20

## 2025-05-29 RX ADMIN — HEPARIN SODIUM 5000 UNIT(S): 1000 INJECTION INTRAVENOUS; SUBCUTANEOUS at 17:19

## 2025-05-29 RX ADMIN — OXYCODONE HYDROCHLORIDE 5 MILLIGRAM(S): 30 TABLET ORAL at 23:32

## 2025-05-29 RX ADMIN — Medication 81 MILLIGRAM(S): at 12:51

## 2025-05-29 NOTE — PROGRESS NOTE ADULT - ASSESSMENT
39 yr old male presenting with electrolyte abnormalities, right sided heaviness      Problem/Plan - 1:  ·  Problem: Electrolyte disturbance.   ·  Plan: New  s/p Ca gluconate 2g IV x1, insulin/dextrose, lokelma 10mg x1   monitor HD     Problem/Plan - 2:  ·  Problem: Right sided weakness.   ·  Plan: Acute on chronic   Pt reports waxing and waning symptoms since 12/2024  CT head: as above  neuro consult     Problem/Plan - 3:  ·  Problem: ESRD on hemodialysis.   ·  Plan: Chronic unstable given electrolyte disturbances   HD as scheduled   renal fu     Problem/Plan - 4:  ·  Problem: Anemia.   ·  Plan: Chronic stable  likely AOCD  monitor cbc     Problem/Plan - 5:  ·  Problem: Benign essential HTN.   ·  Plan: Chronic stable  cw home meds

## 2025-05-29 NOTE — CONSULT NOTE ADULT - SUBJECTIVE AND OBJECTIVE BOX
date of consult 5/29/25    HISTORY OF PRESENT ILLNESS: HPI:    39 yr old male with a pmh of ESRD on hemodialysis, HTN, orthostatic hypotension, CVA, and renal osteodystrophy who presents with right sided waxing and waning upper and lower extremity weakness as well as intermittent tongue heaviness that has been going on since 12/2024 per the pt.  Denies chest pain, SOB or Palps.  Intermittent nausea.  Reports potassium has been high s/p Kayexalate last week.  Endorsing intermittent nausea today.     Denies  headache, dizziness, chest pain, palpitations, new SOB, abdominal pain, joint pain, diarrhea/constipation, urinary symptoms (does not make urine).    Cardiology consulted for abnormal EKG.    PAST MEDICAL & SURGICAL HISTORY:  HTN (hypertension)      Stroke  age 10      Sleep apnea      Leg fracture, right      Hemodialysis access, AV graft      ESRD (end stage renal disease) on dialysis  since 2013, M-W-F      Anemia, unspecified type      Other hyperparathyroidism      Orthostatic hypotension      Renal osteodystrophy      AV fistula  R arm; L arm clotted      History of left hip hemiarthroplasty      S/P parathyroidectomy    MEDICATIONS  (STANDING):  aspirin enteric coated 81 milliGRAM(s) Oral daily  chlorhexidine 2% Cloths 1 Application(s) Topical daily  cinacalcet 30 milliGRAM(s) Oral daily  dextrose 5%. 1000 milliLiter(s) (100 mL/Hr) IV Continuous <Continuous>  dextrose 50% Injectable 25 Gram(s) IV Push once  epoetin carito-epbx (RETACRIT) Injectable 60099 Unit(s) IV Push <User Schedule>  heparin   Injectable 5000 Unit(s) SubCutaneous every 8 hours  melatonin 9 milliGRAM(s) Oral at bedtime  metoprolol tartrate 25 milliGRAM(s) Oral two times a day  midodrine 5 milliGRAM(s) Oral <User Schedule>  polyethylene glycol 3350 17 Gram(s) Oral daily  senna 2 Tablet(s) Oral at bedtime  sevelamer carbonate 1600 milliGRAM(s) Oral three times a day with meals      Allergies  No Known Drug Allergies  shellfish (Hives)      FAMILY HISTORY:  No pertinent family history in first degree relatives  Noncontributory for premature coronary disease or sudden cardiac death    SOCIAL HISTORY:    [x] Non-smoker  [ ] Smoker  [ ] Alcohol    FLU VACCINE THIS YEAR STARTS IN AUGUST:  [ ] Yes    [ ] No    IF OVER 65 HAVE YOU EVER HAD A PNA VACCINE:  [ ] Yes    [ ] No       [ ] N/A      REVIEW OF SYSTEMS:  [ ]chest pain  [  ]shortness of breath  [  ]palpitations  [  ]syncope  [ ]near syncope [ ]upper extremity weakness   [ ] lower extremity weakness  [  ]diplopia  [  ]altered mental status   [  ]fevers  [ ]chills [ ]nausea  [ ]vomiting  [  ]dysphagia    [ ]abdominal pain  [ ]melena  [ ]BRBPR    [  ]epistaxis  [  ]rash    [ ]lower extremity edema        [ x] All others negative	  [ ] Unable to obtain      LABS:	 	             8.6    3.49  )-----------( 79       ( 29 May 2025 10:43 )             27.4    142  |  101  |  95[H]  ----------------------------<  98  4.8   |  21[L]  |  14.32[H]    Ca    7.7[L]      29 May 2025 10:43  Phos  6.7     05-29  Mg     2.30     05-29    TPro  6.7  /  Alb  3.7  /  TBili  0.2  /  DBili  x   /  AST  6   /  ALT  <5  /  AlkPhos  1376[H]  05-28    Creatinine Trend: 14.32<--, 19.46<--, 22.26<--    PHYSICAL EXAM:  T(C): 37.1 (05-29-25 @ 13:03), Max: 37.1 (05-29-25 @ 13:03)  HR: 80 (05-29-25 @ 13:03) (66 - 100)  BP: 123/52 (05-29-25 @ 13:03) (104/53 - 126/68)  RR: 18 (05-29-25 @ 13:03) (17 - 18)  SpO2: 98% (05-29-25 @ 13:03) (97% - 99%)  Wt(kg): --   BMI (kg/m2): 19.5 (05-28-25 @ 22:23)  I&O's Summary    28 May 2025 07:01  -  29 May 2025 07:00  --------------------------------------------------------  IN: 400 mL / OUT: 1400 mL / NET: -1000 mL    29 May 2025 07:01  -  29 May 2025 14:11  --------------------------------------------------------  IN: 150 mL / OUT: 0 mL / NET: 150 mL      Gen: Appears well in NAD  HEENT:  (-)icterus (-)pallor  CV: N S1 S2 1/6 VIRAL (+)2 Pulses B/l  Resp:  Clear to ausculatation B/L, normal effort  GI: (+) BS Soft, NT, ND  Lymph:  (-)Edema, (-)obvious lymphadenopathy  Skin: Warm to touch, Normal turgor  Psych: Appropriate mood and affect      TELEMETRY: SR	      ECG:  	SR peaked T waves    < from: TTE Limited W or WO Ultrasound Enhancing Agent (11.28.23 @ 16:28) >     CONCLUSIONS:      1. Left ventricular cavity is normal. Left ventricular wall thickness is normal. Left ventricular systolic function is normal with an ejection fraction visually estimated at 55 to 60 %. There are no regional wall motion abnormalities seen.   2. The left ventricular diastolic function is indeterminate, with indeterminant filling pressure.   3. Normal right ventricular cavity size, wall thickness, and systolic function.   4. There is severe calcification of the mitral valve annulus.   5. Estimated pulmonary artery systolic pressure is 32 mmHg.   6. Mildto moderate mitral valve stenosis.   7. No pericardial effusion seen.   8. No prior echocardiogram is available for comparison.    < end of copied text >      ASSESSMENT/PLAN: 	  39 yr old male with a pmh of ESRD on hemodialysis, HTN, orthostatic hypotension, CVA, and renal osteodystrophy who presents with right sided waxing and waning upper and lower extremity weakness as well as intermittent tongue heaviness that has been going on since 12/2024 per the pt.  Cardiology consulted for abnormal EKG.    --Repeat EKG after HD and K normalizes--today  --HD per Renal  --Pt asking about low Potassium diet   --hx Normal LV function on last TTE 11/2023  --Not in clinical CHF, no anginal symptoms, no further cardiology work up planned    Micaela ARREOLA  [phone]

## 2025-05-29 NOTE — CHART NOTE - NSCHARTNOTEFT_GEN_A_CORE
Called by RN as pt having visual disturbances. Pt seen and examined at bedside, pt describes he is occasionally seeing green and yellow filter over vision intermittently over the past 2 weeks. Pt states his vision is currently normal but earlier today he experienced episode where objects appear green for a couple seconds then resolved. Pt also endorses one spot/black floater in L eye that has also been present for few weeks. Pt denies headache, pain in eyes, flashing lights, blurry vision. He does wear glasses for reading. Pt states he does not follow with an ophthalmologist and has no history of eye problems. Perrla +, no focal deficits, no limb drift, no facial palsy or asymmetry. Pt has chronic b/l LE weakness over past year, pt reports improved movement in his right leg compared to normal. Pt also reporting some decreased sensation to b/l UE that has been ongoing past few days. Discussed with Dr. Sanders, will check CT head although low suspicion for acute event. Will request Ophtho eval in AM. Pt also with likely neuropathy will consider neuro c/s.

## 2025-05-30 LAB
ANION GAP SERPL CALC-SCNC: 22 MMOL/L — HIGH (ref 7–14)
BUN SERPL-MCNC: 107 MG/DL — HIGH (ref 7–23)
CALCIUM SERPL-MCNC: 7.7 MG/DL — LOW (ref 8.4–10.5)
CALCIUM SERPL-MCNC: 7.7 MG/DL — LOW (ref 8.4–10.5)
CHLORIDE SERPL-SCNC: 98 MMOL/L — SIGNIFICANT CHANGE UP (ref 98–107)
CO2 SERPL-SCNC: 19 MMOL/L — LOW (ref 22–31)
CREAT SERPL-MCNC: 15.06 MG/DL — HIGH (ref 0.5–1.3)
EGFR: 4 ML/MIN/1.73M2 — LOW
EGFR: 4 ML/MIN/1.73M2 — LOW
GAS PNL BLDV: SIGNIFICANT CHANGE UP
GLUCOSE SERPL-MCNC: 78 MG/DL — SIGNIFICANT CHANGE UP (ref 70–99)
HCT VFR BLD CALC: 25.8 % — LOW (ref 39–50)
HGB BLD-MCNC: 8.1 G/DL — LOW (ref 13–17)
MAGNESIUM SERPL-MCNC: 2.3 MG/DL — SIGNIFICANT CHANGE UP (ref 1.6–2.6)
MCHC RBC-ENTMCNC: 31.4 G/DL — LOW (ref 32–36)
MCHC RBC-ENTMCNC: 32 PG — SIGNIFICANT CHANGE UP (ref 27–34)
MCV RBC AUTO: 102 FL — HIGH (ref 80–100)
NRBC # BLD AUTO: 0 K/UL — SIGNIFICANT CHANGE UP (ref 0–0)
NRBC # FLD: 0 K/UL — SIGNIFICANT CHANGE UP (ref 0–0)
NRBC BLD AUTO-RTO: 0 /100 WBCS — SIGNIFICANT CHANGE UP (ref 0–0)
PHOSPHATE SERPL-MCNC: 7.6 MG/DL — HIGH (ref 2.5–4.5)
PLATELET # BLD AUTO: 99 K/UL — LOW (ref 150–400)
POTASSIUM SERPL-MCNC: 5 MMOL/L — SIGNIFICANT CHANGE UP (ref 3.5–5.3)
POTASSIUM SERPL-SCNC: 5 MMOL/L — SIGNIFICANT CHANGE UP (ref 3.5–5.3)
PTH-INTACT FLD-MCNC: >5000 PG/ML — HIGH (ref 15–65)
RBC # BLD: 2.53 M/UL — LOW (ref 4.2–5.8)
RBC # FLD: 14.9 % — HIGH (ref 10.3–14.5)
SODIUM SERPL-SCNC: 139 MMOL/L — SIGNIFICANT CHANGE UP (ref 135–145)
WBC # BLD: 4.28 K/UL — SIGNIFICANT CHANGE UP (ref 3.8–10.5)
WBC # FLD AUTO: 4.28 K/UL — SIGNIFICANT CHANGE UP (ref 3.8–10.5)

## 2025-05-30 PROCEDURE — 99233 SBSQ HOSP IP/OBS HIGH 50: CPT | Mod: GC

## 2025-05-30 PROCEDURE — 70450 CT HEAD/BRAIN W/O DYE: CPT | Mod: 26

## 2025-05-30 RX ORDER — CALCITRIOL 0.5 UG/1
0.5 CAPSULE, GELATIN COATED ORAL DAILY
Refills: 0 | Status: DISCONTINUED | OUTPATIENT
Start: 2025-05-30 | End: 2025-06-01

## 2025-05-30 RX ORDER — MIDODRINE HYDROCHLORIDE 5 MG/1
20 TABLET ORAL
Refills: 0 | Status: DISCONTINUED | OUTPATIENT
Start: 2025-05-30 | End: 2025-06-13

## 2025-05-30 RX ORDER — MIDODRINE HYDROCHLORIDE 5 MG/1
10 TABLET ORAL
Refills: 0 | Status: DISCONTINUED | OUTPATIENT
Start: 2025-05-30 | End: 2025-05-30

## 2025-05-30 RX ADMIN — Medication 2 TABLET(S): at 22:19

## 2025-05-30 RX ADMIN — Medication 9 MILLIGRAM(S): at 22:19

## 2025-05-30 RX ADMIN — HEPARIN SODIUM 5000 UNIT(S): 1000 INJECTION INTRAVENOUS; SUBCUTANEOUS at 11:37

## 2025-05-30 RX ADMIN — OXYCODONE HYDROCHLORIDE 5 MILLIGRAM(S): 30 TABLET ORAL at 23:30

## 2025-05-30 RX ADMIN — Medication 1334 MILLIGRAM(S): at 22:17

## 2025-05-30 RX ADMIN — Medication 650 MILLIGRAM(S): at 09:02

## 2025-05-30 RX ADMIN — OXYCODONE HYDROCHLORIDE 5 MILLIGRAM(S): 30 TABLET ORAL at 11:37

## 2025-05-30 RX ADMIN — HEPARIN SODIUM 5000 UNIT(S): 1000 INJECTION INTRAVENOUS; SUBCUTANEOUS at 18:05

## 2025-05-30 RX ADMIN — METOPROLOL SUCCINATE 25 MILLIGRAM(S): 50 TABLET, EXTENDED RELEASE ORAL at 05:20

## 2025-05-30 RX ADMIN — Medication 650 MILLIGRAM(S): at 22:48

## 2025-05-30 RX ADMIN — POLYETHYLENE GLYCOL 3350 17 GRAM(S): 17 POWDER, FOR SOLUTION ORAL at 11:38

## 2025-05-30 RX ADMIN — SEVELAMER HYDROCHLORIDE 1600 MILLIGRAM(S): 800 TABLET ORAL at 18:03

## 2025-05-30 RX ADMIN — HEPARIN SODIUM 5000 UNIT(S): 1000 INJECTION INTRAVENOUS; SUBCUTANEOUS at 01:43

## 2025-05-30 RX ADMIN — MIDODRINE HYDROCHLORIDE 5 MILLIGRAM(S): 5 TABLET ORAL at 05:20

## 2025-05-30 RX ADMIN — CINACALCET 30 MILLIGRAM(S): 30 TABLET, FILM COATED ORAL at 11:39

## 2025-05-30 RX ADMIN — EPOETIN ALFA 10000 UNIT(S): 10000 SOLUTION INTRAVENOUS; SUBCUTANEOUS at 07:12

## 2025-05-30 RX ADMIN — Medication 1 APPLICATION(S): at 11:38

## 2025-05-30 RX ADMIN — Medication 81 MILLIGRAM(S): at 11:39

## 2025-05-30 RX ADMIN — Medication 650 MILLIGRAM(S): at 22:18

## 2025-05-30 RX ADMIN — SEVELAMER HYDROCHLORIDE 1600 MILLIGRAM(S): 800 TABLET ORAL at 11:39

## 2025-05-30 RX ADMIN — Medication 1334 MILLIGRAM(S): at 18:14

## 2025-05-30 NOTE — CONSULT NOTE ADULT - SUBJECTIVE AND OBJECTIVE BOX
Neurology Consult    Reason for Consult: Patient is a 39y old  Male who presents with a chief complaint of electrolyte abnormalities, right sided heaviness (30 May 2025 12:49)      HPI:  39 yr old male with a pmh of ESRD on hemodialysis, HTN, orthostatic hypotension, CVA, renal osteodystrophy who presents with right sided waxing and waning upper and lower extremity weakness as well as intermittent tongue heaviness that has been going on since 12/2024 per the pt (ED note states 1 week).   Endorsing intermittent nausea   Pt states he has been having difficulty at [employer] as " they keep telling me my potassium and calcium are one day too high and one day too low".   Denies  headache, dizziness, chest pain, palpitations, new SOB, abdominal pain, joint pain, diarrhea/constipation, urinary symptoms (does not make urine).  Also with concern for vision changes   Neuro called to further eval     Vitals: T 98.2, HR 75, /65, RR 17 satting 98% RA (28 May 2025 18:36)       PAST MEDICAL & SURGICAL HISTORY:  HTN (hypertension)      Stroke  age 10      Sleep apnea      Leg fracture, right      Hemodialysis access, AV graft      ESRD (end stage renal disease) on dialysis  since 2013, M-W-F      Anemia, unspecified type      Other hyperparathyroidism      Orthostatic hypotension      Renal osteodystrophy      AV fistula  R arm; L arm clotted      History of left hip hemiarthroplasty      S/P parathyroidectomy          Allergies: Allergies    No Known Drug Allergies  shellfish (Hives)    Intolerances        Social History: Denies toxic habits including tobacco, ETOH or illicit drugs.    Family History: FAMILY HISTORY:  No pertinent family history in first degree relatives    . No family history of strokes    Medications: MEDICATIONS  (STANDING):  aspirin enteric coated 81 milliGRAM(s) Oral daily  calcitriol   Capsule 0.5 MICROGram(s) Oral daily  calcium acetate 1334 milliGRAM(s) Oral four times a day with meals  chlorhexidine 2% Cloths 1 Application(s) Topical daily  cinacalcet 30 milliGRAM(s) Oral daily  dextrose 5%. 1000 milliLiter(s) (100 mL/Hr) IV Continuous <Continuous>  dextrose 50% Injectable 25 Gram(s) IV Push once  epoetin carito-epbx (RETACRIT) Injectable 42298 Unit(s) IV Push <User Schedule>  heparin   Injectable 5000 Unit(s) SubCutaneous every 8 hours  melatonin 9 milliGRAM(s) Oral at bedtime  metoprolol tartrate 25 milliGRAM(s) Oral two times a day  midodrine 10 milliGRAM(s) Oral <User Schedule>  polyethylene glycol 3350 17 Gram(s) Oral daily  senna 2 Tablet(s) Oral at bedtime  sevelamer carbonate 1600 milliGRAM(s) Oral three times a day with meals    MEDICATIONS  (PRN):  acetaminophen     Tablet .. 650 milliGRAM(s) Oral every 6 hours PRN Temp greater or equal to 38C (100.4F), Mild Pain (1 - 3)  aluminum hydroxide/magnesium hydroxide/simethicone Suspension 30 milliLiter(s) Oral every 4 hours PRN Dyspepsia  baclofen 10 milliGRAM(s) Oral every 12 hours PRN Muscle Spasm  ondansetron Injectable 4 milliGRAM(s) IV Push every 8 hours PRN Nausea and/or Vomiting  oxyCODONE    IR 5 milliGRAM(s) Oral every 6 hours PRN Severe Pain (7 - 10)      Review of Systems:  CONSTITUTIONAL:  No weight loss, fever, chills, weakness or fatigue.  HEENT:  Eyes:  No visual loss, blurred vision, double vision or yellow sclera. Ears, Nose, Throat:  No hearing loss, sneezing, congestion, runny nose or sore throat.  SKIN:  No rash or itching.  CARDIOVASCULAR:  No chest pain, chest pressure or chest discomfort. No palpitations or edema.  RESPIRATORY:  No shortness of breath, cough or sputum.  GASTROINTESTINAL:  No anorexia, nausea, vomiting or diarrhea. No abdominal pain or blood.  GENITOURINARY:  No burning on urination or incontinence   NEUROLOGICAL:  No headache, dizziness, syncope, paralysis, ataxia, numbness or tingling in the extremities. No change in bowel or bladder control. no limb weakness. no vision changes.   MUSCULOSKELETAL:  No muscle, back pain, joint pain or stiffness.  HEMATOLOGIC:  No anemia, bleeding or bruising.  LYMPHATICS:  No enlarged nodes. No history of splenectomy.  PSYCHIATRIC:  No history of depression or anxiety.  ENDOCRINOLOGIC:  No reports of sweating, cold or heat intolerance. No polyuria or polydipsia.      Vitals:  Vital Signs Last 24 Hrs  T(C): 37 (30 May 2025 11:35), Max: 37.2 (30 May 2025 06:15)  T(F): 98.6 (30 May 2025 11:35), Max: 98.9 (30 May 2025 06:15)  HR: 74 (30 May 2025 11:35) (64 - 83)  BP: 83/44 (30 May 2025 11:35) (83/44 - 123/52)  BP(mean): --  RR: 18 (30 May 2025 11:35) (16 - 18)  SpO2: 98% (30 May 2025 11:35) (97% - 99%)    Parameters below as of 30 May 2025 11:35  Patient On (Oxygen Delivery Method): nasal cannula  O2 Flow (L/min): 2      General Exam:   General Appearance: Appropriately dressed and in no acute distress       Head: Normocephalic, atraumatic and no dysmorphic features  Ear, Nose, and Throat: Moist mucous membranes  CVS: S1S2+  Resp: No SOB, no wheeze or rhonchi  GI: soft NT/ND  Extremities: No edema or cyanosis  Skin: No bruises or rashes     Neurological Exam:  Mental Status: Awake, alert and oriented x 3.  Able to follow simple and complex verbal commands. Able to name and repeat. fluent speech. No obvious aphasia or dysarthria noted.   Cranial Nerves: PERRL, EOMI, VFFC, sensation V1-V3 intact,  no obvious facial asymmetry, equal elevation of palate, scm/trap 5/5, tongue is midline on protrusion. hearing is grossly intact.   Motor: Normal bulk, tone and strength throughout. Fine finger movements were intact and symmetric. no tremors or drift noted.    Sensation: Intact to light touch and pinprick throughout. no right/left confusion. no extinction to tactile on DSS.   Reflexes: 1+ throughout at biceps, brachioradialis, triceps, patellars and ankles bilaterally and equal. No clonus. R toe and L toe were both downgoing.  Coordination: No dysmetria on FNF or HKS  Gait: deferred    Data/Labs/Imaging which I personally reviewed.     Labs:     CBC Full  -  ( 30 May 2025 01:14 )  WBC Count : 4.28 K/uL  RBC Count : 2.53 M/uL  Hemoglobin : 8.1 g/dL  Hematocrit : 25.8 %  Platelet Count - Automated : 99 K/uL  Mean Cell Volume : 102.0 fL  Mean Cell Hemoglobin : 32.0 pg  Mean Cell Hemoglobin Concentration : 31.4 g/dL  Auto Neutrophil # : x  Auto Lymphocyte # : x  Auto Monocyte # : x  Auto Eosinophil # : x  Auto Basophil # : x  Auto Neutrophil % : x  Auto Lymphocyte % : x  Auto Monocyte % : x  Auto Eosinophil % : x  Auto Basophil % : x    05-30    139  |  98  |  107[H]  ----------------------------<  78  5.0   |  19[L]  |  15.06[H]    Ca    7.7[L]      30 May 2025 01:14  Phos  7.6     05-30  Mg     2.30     05-30    TPro  6.7  /  Alb  3.7  /  TBili  0.2  /  DBili  x   /  AST  6   /  ALT  <5  /  AlkPhos  1376[H]  05-28    LIVER FUNCTIONS - ( 28 May 2025 13:58 )  Alb: 3.7 g/dL / Pro: 6.7 g/dL / ALK PHOS: 1376 U/L / ALT: <5 U/L / AST: 6 U/L / GGT: x             Urinalysis Basic - ( 30 May 2025 01:14 )    Color: x / Appearance: x / SG: x / pH: x  Gluc: 78 mg/dL / Ketone: x  / Bili: x / Urobili: x   Blood: x / Protein: x / Nitrite: x   Leuk Esterase: x / RBC: x / WBC x   Sq Epi: x / Non Sq Epi: x / Bacteria: x    < from: CT Head No Cont (05.28.25 @ 14:42) >    ACC: [id number] EXAM:  CT BRAIN   ORDERED BY: GRETTA ACOSTA     PROCEDURE DATE:  05/28/2025          INTERPRETATION:  CLINICAL INFORMATION: right tongue, arm, leg numbness,   right arm heaviness, dialysis for 13 years. 39-year-old male with past   medical history of end-stage renal disease on hemodialysis (Monday Wednesdays and Fridays with last dialysis 2 days ago), orthostatic   hypotension, CVA, hypertension, renal osteodystrophy presents with right   arm weakness x 1 week. Patient reports developing right arm weakness   described as heaviness associated with numbness of the right side of   tongue, right arm and right leg. Patient denies any fevers, chills, chest   pain, shortness of breath, syncope, change in vision or hearing,   dizziness, headache. Accompanying documentation from [facility] shows lab work from yesterday showing a calcium of 7.5. This is   a patient with subjective right-sided numbness, right arm heaviness.      COMPARISON: Head CT 4/30/2014    CONTRAST:  IV Contrast: NONE    TECHNIQUE:  Serial axial images were obtained from the skull base to the   vertex using multi-slice helical technique. Sagittal and coronal   reformats were obtained.    FINDINGS:    VENTRICLES AND SULCI: Normal in size and configuration.  INTRA-AXIAL: No mass effect, acute hemorrhage, or midline shift.  EXTRA-AXIAL: No mass or fluid collection. Basal cisterns are normal in   appearance.    VISUALIZED SINUSES:  Clear.  TYMPANOMASTOID CAVITIES:  Clear.  VISUALIZED ORBITS: Normal.  CALVARIUM: Extensive heterogeneous bone thickening and demineralization   throughout nearly the entire skull. Increased mineralization throughout   the skull base partially visualized cervical spine. This is new compared   to 2014.    MISCELLANEOUS: None.      IMPRESSION:  Extensive diffuse heterogeneously decreased mineralization throughout the   calvaria. Increased mineralization throughout the skull base and   visualized cervical spine. This is most likely due to to patient's   history of dialysis for 13 years and represent renal osteodystrophy   rather than new malignancy. Correlate clinically.    MRI may be helpful for further evaluation, if clinically indicated.    Findings were discussed with Dr. GRETTA ACOSTA [phone] 5/28/2025 3:17 PM   by Dr. Thurman with read back confirmation.      < end of copied text >         Neurology Consult    Reason for Consult: Patient is a 39y old  Male who presents with a chief complaint of electrolyte abnormalities, right sided heaviness (30 May 2025 12:49)      HPI:  39 yr old male with a pmh of ESRD on hemodialysis, HTN, orthostatic hypotension, CVA, renal osteodystrophy who presents with right sided waxing and waning upper and lower extremity weakness as well as intermittent tongue heaviness that has been going on since 12/2024 per the pt (ED note states 1 week).   Endorsing intermittent nausea   Pt states he has been having difficulty at [employer] as " they keep telling me my potassium and calcium are one day too high and one day too low".   Denies  headache, dizziness, chest pain, palpitations, new SOB, abdominal pain, joint pain, diarrhea/constipation, urinary symptoms (does not make urine).  Also with concern for vision changes and L sided weakness  Neuro called to further eval     Vitals: T 98.2, HR 75, /65, RR 17 satting 98% RA (28 May 2025 18:36)       PAST MEDICAL & SURGICAL HISTORY:  HTN (hypertension)      Stroke  age 10      Sleep apnea      Leg fracture, right      Hemodialysis access, AV graft      ESRD (end stage renal disease) on dialysis  since 2013, M-W-F      Anemia, unspecified type      Other hyperparathyroidism      Orthostatic hypotension      Renal osteodystrophy      AV fistula  R arm; L arm clotted      History of left hip hemiarthroplasty      S/P parathyroidectomy          Allergies: Allergies    No Known Drug Allergies  shellfish (Hives)    Intolerances        Social History: Denies toxic habits including tobacco, ETOH or illicit drugs.    Family History: FAMILY HISTORY:  No pertinent family history in first degree relatives    . No family history of strokes    Medications: MEDICATIONS  (STANDING):  aspirin enteric coated 81 milliGRAM(s) Oral daily  calcitriol   Capsule 0.5 MICROGram(s) Oral daily  calcium acetate 1334 milliGRAM(s) Oral four times a day with meals  chlorhexidine 2% Cloths 1 Application(s) Topical daily  cinacalcet 30 milliGRAM(s) Oral daily  dextrose 5%. 1000 milliLiter(s) (100 mL/Hr) IV Continuous <Continuous>  dextrose 50% Injectable 25 Gram(s) IV Push once  epoetin carito-epbx (RETACRIT) Injectable 94089 Unit(s) IV Push <User Schedule>  heparin   Injectable 5000 Unit(s) SubCutaneous every 8 hours  melatonin 9 milliGRAM(s) Oral at bedtime  metoprolol tartrate 25 milliGRAM(s) Oral two times a day  midodrine 10 milliGRAM(s) Oral <User Schedule>  polyethylene glycol 3350 17 Gram(s) Oral daily  senna 2 Tablet(s) Oral at bedtime  sevelamer carbonate 1600 milliGRAM(s) Oral three times a day with meals    MEDICATIONS  (PRN):  acetaminophen     Tablet .. 650 milliGRAM(s) Oral every 6 hours PRN Temp greater or equal to 38C (100.4F), Mild Pain (1 - 3)  aluminum hydroxide/magnesium hydroxide/simethicone Suspension 30 milliLiter(s) Oral every 4 hours PRN Dyspepsia  baclofen 10 milliGRAM(s) Oral every 12 hours PRN Muscle Spasm  ondansetron Injectable 4 milliGRAM(s) IV Push every 8 hours PRN Nausea and/or Vomiting  oxyCODONE    IR 5 milliGRAM(s) Oral every 6 hours PRN Severe Pain (7 - 10)      Review of Systems:  CONSTITUTIONAL:  No weight loss, fever, chills, weakness or fatigue.  HEENT:  Eyes:  No visual loss, blurred vision, double vision or yellow sclera. Ears, Nose, Throat:  No hearing loss, sneezing, congestion, runny nose or sore throat.  SKIN:  No rash or itching.  CARDIOVASCULAR:  No chest pain, chest pressure or chest discomfort. No palpitations or edema.  RESPIRATORY:  No shortness of breath, cough or sputum.  GASTROINTESTINAL:  No anorexia, nausea, vomiting or diarrhea. No abdominal pain or blood.  GENITOURINARY:  No burning on urination or incontinence   NEUROLOGICAL:  No headache, dizziness, syncope, paralysis, ataxia, numbness or tingling in the extremities. No change in bowel or bladder control. no limb weakness. no vision changes.   MUSCULOSKELETAL:  No muscle, back pain, joint pain or stiffness.  HEMATOLOGIC:  No anemia, bleeding or bruising.  LYMPHATICS:  No enlarged nodes. No history of splenectomy.  PSYCHIATRIC:  No history of depression or anxiety.  ENDOCRINOLOGIC:  No reports of sweating, cold or heat intolerance. No polyuria or polydipsia.      Vitals:  Vital Signs Last 24 Hrs  T(C): 37 (30 May 2025 11:35), Max: 37.2 (30 May 2025 06:15)  T(F): 98.6 (30 May 2025 11:35), Max: 98.9 (30 May 2025 06:15)  HR: 74 (30 May 2025 11:35) (64 - 83)  BP: 83/44 (30 May 2025 11:35) (83/44 - 123/52)  BP(mean): --  RR: 18 (30 May 2025 11:35) (16 - 18)  SpO2: 98% (30 May 2025 11:35) (97% - 99%)    Parameters below as of 30 May 2025 11:35  Patient On (Oxygen Delivery Method): nasal cannula  O2 Flow (L/min): 2      General Exam:   General Appearance: Appropriately dressed and in no acute distress       Head: Normocephalic, atraumatic and no dysmorphic features  Ear, Nose, and Throat: Moist mucous membranes  CVS: S1S2+  Resp: No SOB, no wheeze or rhonchi  GI: soft NT/ND  Extremities: No edema or cyanosis  Skin: No bruises or rashes     Neurological Exam:  Mental Status: Awake, alert and oriented x 3.  Able to follow simple commands. Psychomotor slowing, mild dysarthria   Cranial Nerves: PERRL, EOMI, VFFC, sensation V1-V3 intact,  no obvious facial asymmetry, equal elevation of palate, scm/trap 5/5, tongue is midline on protrusion. hearing is grossly intact.   Motor: dec bulk throughout   Sensation: Intact to light touch and pinprick throughout. no right/left confusion. no extinction to tactile on DSS.   Reflexes: 1+ throughout at biceps, brachioradialis, triceps, patellars and ankles bilaterally and equal. No clonus. R toe and L toe were both downgoing.  Coordination: No dysmetria on FNF or HKS  Gait: deferred    Data/Labs/Imaging which I personally reviewed.     Labs:     CBC Full  -  ( 30 May 2025 01:14 )  WBC Count : 4.28 K/uL  RBC Count : 2.53 M/uL  Hemoglobin : 8.1 g/dL  Hematocrit : 25.8 %  Platelet Count - Automated : 99 K/uL  Mean Cell Volume : 102.0 fL  Mean Cell Hemoglobin : 32.0 pg  Mean Cell Hemoglobin Concentration : 31.4 g/dL  Auto Neutrophil # : x  Auto Lymphocyte # : x  Auto Monocyte # : x  Auto Eosinophil # : x  Auto Basophil # : x  Auto Neutrophil % : x  Auto Lymphocyte % : x  Auto Monocyte % : x  Auto Eosinophil % : x  Auto Basophil % : x    05-30    139  |  98  |  107[H]  ----------------------------<  78  5.0   |  19[L]  |  15.06[H]    Ca    7.7[L]      30 May 2025 01:14  Phos  7.6     05-30  Mg     2.30     05-30    TPro  6.7  /  Alb  3.7  /  TBili  0.2  /  DBili  x   /  AST  6   /  ALT  <5  /  AlkPhos  1376[H]  05-28    LIVER FUNCTIONS - ( 28 May 2025 13:58 )  Alb: 3.7 g/dL / Pro: 6.7 g/dL / ALK PHOS: 1376 U/L / ALT: <5 U/L / AST: 6 U/L / GGT: x             Urinalysis Basic - ( 30 May 2025 01:14 )    Color: x / Appearance: x / SG: x / pH: x  Gluc: 78 mg/dL / Ketone: x  / Bili: x / Urobili: x   Blood: x / Protein: x / Nitrite: x   Leuk Esterase: x / RBC: x / WBC x   Sq Epi: x / Non Sq Epi: x / Bacteria: x    < from: CT Head No Cont (05.28.25 @ 14:42) >    ACC: [id number] EXAM:  CT BRAIN   ORDERED BY: GRETTA ACOSTA     PROCEDURE DATE:  05/28/2025          INTERPRETATION:  CLINICAL INFORMATION: right tongue, arm, leg numbness,   right arm heaviness, dialysis for 13 years. 39-year-old male with past   medical history of end-stage renal disease on hemodialysis (Monday Wednesdays and Fridays with last dialysis 2 days ago), orthostatic   hypotension, CVA, hypertension, renal osteodystrophy presents with right   arm weakness x 1 week. Patient reports developing right arm weakness   described as heaviness associated with numbness of the right side of   tongue, right arm and right leg. Patient denies any fevers, chills, chest   pain, shortness of breath, syncope, change in vision or hearing,   dizziness, headache. Accompanying documentation from [facility] shows lab work from yesterday showing a calcium of 7.5. This is   a patient with subjective right-sided numbness, right arm heaviness.      COMPARISON: Head CT 4/30/2014    CONTRAST:  IV Contrast: NONE    TECHNIQUE:  Serial axial images were obtained from the skull base to the   vertex using multi-slice helical technique. Sagittal and coronal   reformats were obtained.    FINDINGS:    VENTRICLES AND SULCI: Normal in size and configuration.  INTRA-AXIAL: No mass effect, acute hemorrhage, or midline shift.  EXTRA-AXIAL: No mass or fluid collection. Basal cisterns are normal in   appearance.    VISUALIZED SINUSES:  Clear.  TYMPANOMASTOID CAVITIES:  Clear.  VISUALIZED ORBITS: Normal.  CALVARIUM: Extensive heterogeneous bone thickening and demineralization   throughout nearly the entire skull. Increased mineralization throughout   the skull base partially visualized cervical spine. This is new compared   to 2014.    MISCELLANEOUS: None.      IMPRESSION:  Extensive diffuse heterogeneously decreased mineralization throughout the   calvaria. Increased mineralization throughout the skull base and   visualized cervical spine. This is most likely due to to patient's   history of dialysis for 13 years and represent renal osteodystrophy   rather than new malignancy. Correlate clinically.    MRI may be helpful for further evaluation, if clinically indicated.    Findings were discussed with Dr. GRETTA ACOSTA [phone] 5/28/2025 3:17 PM   by Dr. Thurman with read back confirmation.      < end of copied text >         Neurology Consult    Reason for Consult: Patient is a 39y old  Male who presents with a chief complaint of electrolyte abnormalities, right sided heaviness (30 May 2025 12:49)      HPI:  39 yr old male with a pmh of ESRD on hemodialysis, HTN, orthostatic hypotension, CVA, renal osteodystrophy who presents with right sided waxing and waning upper and lower extremity weakness as well as intermittent tongue heaviness that has been going on since 12/2024 per the pt (ED note states 1 week).   Endorsing intermittent nausea   Pt states he has been having difficulty at [employer] as " they keep telling me my potassium and calcium are one day too high and one day too low".   Denies  headache, dizziness, chest pain, palpitations, new SOB, abdominal pain, joint pain, diarrhea/constipation, urinary symptoms (does not make urine).  Also with concern for vision changes and L sided weakness  Neuro called to further eval     Vitals: T 98.2, HR 75, /65, RR 17 satting 98% RA (28 May 2025 18:36)       PAST MEDICAL & SURGICAL HISTORY:  HTN (hypertension)      Stroke  age 10      Sleep apnea      Leg fracture, right      Hemodialysis access, AV graft      ESRD (end stage renal disease) on dialysis  since 2013, M-W-F      Anemia, unspecified type      Other hyperparathyroidism      Orthostatic hypotension      Renal osteodystrophy      AV fistula  R arm; L arm clotted      History of left hip hemiarthroplasty      S/P parathyroidectomy          Allergies: Allergies    No Known Drug Allergies  shellfish (Hives)    Intolerances        Social History: Denies toxic habits including tobacco, ETOH or illicit drugs.    Family History: FAMILY HISTORY:  No pertinent family history in first degree relatives    . No family history of strokes    Medications: MEDICATIONS  (STANDING):  aspirin enteric coated 81 milliGRAM(s) Oral daily  calcitriol   Capsule 0.5 MICROGram(s) Oral daily  calcium acetate 1334 milliGRAM(s) Oral four times a day with meals  chlorhexidine 2% Cloths 1 Application(s) Topical daily  cinacalcet 30 milliGRAM(s) Oral daily  dextrose 5%. 1000 milliLiter(s) (100 mL/Hr) IV Continuous <Continuous>  dextrose 50% Injectable 25 Gram(s) IV Push once  epoetin carito-epbx (RETACRIT) Injectable 71205 Unit(s) IV Push <User Schedule>  heparin   Injectable 5000 Unit(s) SubCutaneous every 8 hours  melatonin 9 milliGRAM(s) Oral at bedtime  metoprolol tartrate 25 milliGRAM(s) Oral two times a day  midodrine 10 milliGRAM(s) Oral <User Schedule>  polyethylene glycol 3350 17 Gram(s) Oral daily  senna 2 Tablet(s) Oral at bedtime  sevelamer carbonate 1600 milliGRAM(s) Oral three times a day with meals    MEDICATIONS  (PRN):  acetaminophen     Tablet .. 650 milliGRAM(s) Oral every 6 hours PRN Temp greater or equal to 38C (100.4F), Mild Pain (1 - 3)  aluminum hydroxide/magnesium hydroxide/simethicone Suspension 30 milliLiter(s) Oral every 4 hours PRN Dyspepsia  baclofen 10 milliGRAM(s) Oral every 12 hours PRN Muscle Spasm  ondansetron Injectable 4 milliGRAM(s) IV Push every 8 hours PRN Nausea and/or Vomiting  oxyCODONE    IR 5 milliGRAM(s) Oral every 6 hours PRN Severe Pain (7 - 10)      Review of Systems:  CONSTITUTIONAL:  No weight loss, fever, chills, weakness or fatigue.  HEENT:  Eyes:  No visual loss, blurred vision, double vision or yellow sclera. Ears, Nose, Throat:  No hearing loss, sneezing, congestion, runny nose or sore throat.  SKIN:  No rash or itching.  CARDIOVASCULAR:  No chest pain, chest pressure or chest discomfort. No palpitations or edema.  RESPIRATORY:  No shortness of breath, cough or sputum.  GASTROINTESTINAL:  No anorexia, nausea, vomiting or diarrhea. No abdominal pain or blood.  GENITOURINARY:  No burning on urination or incontinence   NEUROLOGICAL:  No headache, dizziness, syncope, paralysis, ataxia, numbness or tingling in the extremities. No change in bowel or bladder control. no limb weakness. no vision changes.   MUSCULOSKELETAL:  No muscle, back pain, joint pain or stiffness.  HEMATOLOGIC:  No anemia, bleeding or bruising.  LYMPHATICS:  No enlarged nodes. No history of splenectomy.  PSYCHIATRIC:  No history of depression or anxiety.  ENDOCRINOLOGIC:  No reports of sweating, cold or heat intolerance. No polyuria or polydipsia.      Vitals:  Vital Signs Last 24 Hrs  T(C): 37 (30 May 2025 11:35), Max: 37.2 (30 May 2025 06:15)  T(F): 98.6 (30 May 2025 11:35), Max: 98.9 (30 May 2025 06:15)  HR: 74 (30 May 2025 11:35) (64 - 83)  BP: 83/44 (30 May 2025 11:35) (83/44 - 123/52)  BP(mean): --  RR: 18 (30 May 2025 11:35) (16 - 18)  SpO2: 98% (30 May 2025 11:35) (97% - 99%)    Parameters below as of 30 May 2025 11:35  Patient On (Oxygen Delivery Method): nasal cannula  O2 Flow (L/min): 2      General Exam:   General Appearance: Appropriately dressed and in no acute distress       Head: Normocephalic, atraumatic and no dysmorphic features  Ear, Nose, and Throat: Moist mucous membranes  CVS: S1S2+  Resp: No SOB, no wheeze or rhonchi  GI: soft NT/ND  Extremities: No edema or cyanosis  Skin: No bruises or rashes     Neurological Exam:  Mental Status: Awake, alert and oriented x 3.  Able to follow simple commands. Psychomotor slowing, mild dysarthria   Cranial Nerves: PERRL, EOMI, VFFC, sensation V1-V3 intact,  no obvious facial asymmetry, equal elevation of palate, scm/trap 5/5, tongue is midline on protrusion. hearing is grossly intact.   Motor: dec bulk throughout , proximal weakness, LUE 4/5 compared to R with weaker  strength, Lowers move slgihtly in plane of bed  Sensation: dec on the L compared to R  Reflexes: 1+ throughout at biceps, brachioradialis, triceps, 0 patellars and ankles bilaterally and equal. No clonus. R toe and L toe were both downgoing.  Coordination: unable  Gait: deferred    Data/Labs/Imaging which I personally reviewed.     Labs:     CBC Full  -  ( 30 May 2025 01:14 )  WBC Count : 4.28 K/uL  RBC Count : 2.53 M/uL  Hemoglobin : 8.1 g/dL  Hematocrit : 25.8 %  Platelet Count - Automated : 99 K/uL  Mean Cell Volume : 102.0 fL  Mean Cell Hemoglobin : 32.0 pg  Mean Cell Hemoglobin Concentration : 31.4 g/dL  Auto Neutrophil # : x  Auto Lymphocyte # : x  Auto Monocyte # : x  Auto Eosinophil # : x  Auto Basophil # : x  Auto Neutrophil % : x  Auto Lymphocyte % : x  Auto Monocyte % : x  Auto Eosinophil % : x  Auto Basophil % : x    05-30    139  |  98  |  107[H]  ----------------------------<  78  5.0   |  19[L]  |  15.06[H]    Ca    7.7[L]      30 May 2025 01:14  Phos  7.6     05-30  Mg     2.30     05-30    TPro  6.7  /  Alb  3.7  /  TBili  0.2  /  DBili  x   /  AST  6   /  ALT  <5  /  AlkPhos  1376[H]  05-28    LIVER FUNCTIONS - ( 28 May 2025 13:58 )  Alb: 3.7 g/dL / Pro: 6.7 g/dL / ALK PHOS: 1376 U/L / ALT: <5 U/L / AST: 6 U/L / GGT: x             Urinalysis Basic - ( 30 May 2025 01:14 )    Color: x / Appearance: x / SG: x / pH: x  Gluc: 78 mg/dL / Ketone: x  / Bili: x / Urobili: x   Blood: x / Protein: x / Nitrite: x   Leuk Esterase: x / RBC: x / WBC x   Sq Epi: x / Non Sq Epi: x / Bacteria: x    < from: CT Head No Cont (05.28.25 @ 14:42) >    ACC: [id number] EXAM:  CT BRAIN   ORDERED BY: GRETTA INTERIANO DATE:  05/28/2025          INTERPRETATION:  CLINICAL INFORMATION: right tongue, arm, leg numbness,   right arm heaviness, dialysis for 13 years. 39-year-old male with past   medical history of end-stage renal disease on hemodialysis (Monday Wednesdays and Fridays with last dialysis 2 days ago), orthostatic   hypotension, CVA, hypertension, renal osteodystrophy presents with right   arm weakness x 1 week. Patient reports developing right arm weakness   described as heaviness associated with numbness of the right side of   tongue, right arm and right leg. Patient denies any fevers, chills, chest   pain, shortness of breath, syncope, change in vision or hearing,   dizziness, headache. Accompanying documentation from [facility] shows lab work from yesterday showing a calcium of 7.5. This is   a patient with subjective right-sided numbness, right arm heaviness.      COMPARISON: Head CT 4/30/2014    CONTRAST:  IV Contrast: NONE    TECHNIQUE:  Serial axial images were obtained from the skull base to the   vertex using multi-slice helical technique. Sagittal and coronal   reformats were obtained.    FINDINGS:    VENTRICLES AND SULCI: Normal in size and configuration.  INTRA-AXIAL: No mass effect, acute hemorrhage, or midline shift.  EXTRA-AXIAL: No mass or fluid collection. Basal cisterns are normal in   appearance.    VISUALIZED SINUSES:  Clear.  TYMPANOMASTOID CAVITIES:  Clear.  VISUALIZED ORBITS: Normal.  CALVARIUM: Extensive heterogeneous bone thickening and demineralization   throughout nearly the entire skull. Increased mineralization throughout   the skull base partially visualized cervical spine. This is new compared   to 2014.    MISCELLANEOUS: None.      IMPRESSION:  Extensive diffuse heterogeneously decreased mineralization throughout the   calvaria. Increased mineralization throughout the skull base and   visualized cervical spine. This is most likely due to to patient's   history of dialysis for 13 years and represent renal osteodystrophy   rather than new malignancy. Correlate clinically.    MRI may be helpful for further evaluation, if clinically indicated.    Findings were discussed with Dr. GRETTA ACOSTA [phone] 5/28/2025 3:17 PM   by Dr. Thurman with read back confirmation.      < end of copied text >

## 2025-05-30 NOTE — PROGRESS NOTE ADULT - ASSESSMENT
39 yr old male presenting with electrolyte abnormalities, right sided heaviness      Problem/Plan - 1:  ·  Problem: Electrolyte disturbance.   ·  Plan: New  s/p Ca gluconate 2g IV x1, insulin/dextrose, lokelma 10mg x1   monitor HD     Problem/Plan - 2:  ·  Problem: Right sided weakness.   ·  Plan: Acute on chronic   Pt reports waxing and waning symptoms since 12/2024  CT head: as above  neuro consult     Problem/Plan - 3:  ·  Problem: ESRD on hemodialysis.   ·  Plan: Chronic unstable given electrolyte disturbances   HD as scheduled   renal fu     Problem/Plan - 4:  ·  Problem: Blurry vision  ·  Plan: neuro fu appreciated  - CT reviewed   - ophthalmology fu     Problem/Plan - 5:  ·  Problem: hyperparathyroid  ·  Plan: ENT consult called

## 2025-05-30 NOTE — PHYSICAL THERAPY INITIAL EVALUATION ADULT - ADDITIONAL COMMENTS
Pt states he was admitted from rehab, was able to transfer bed to/from wheelchair with assistance however had gotten weaker and needed more assistance a week prior to admission. At baseline pt was independent in ADLs and ambulation however has not been able to ambulate for several months as he has been in the hospital and rehab.    Following evaluation, pt was left semireclined in bed in no distress, all lines in tact, call bell in reach.

## 2025-05-30 NOTE — PHYSICAL THERAPY INITIAL EVALUATION ADULT - PERTINENT HX OF CURRENT PROBLEM, REHAB EVAL
39 year old male presenting with electrolyte abnormalities, right sided heaviness. CT Head - No acute intracranial hemorrhage, mass effect, or shift of the midline structures.

## 2025-05-30 NOTE — CONSULT NOTE ADULT - ASSESSMENT
39 yr old male with a pmh of ESRD on hemodialysis, HTN, orthostatic hypotension, prior CVA, and renal osteodystrophy who presents with right sided waxing and waning upper and lower extremity weakness as well as intermittent tongue heaviness that has been going on since 12/2024 per the pt.  Neuro called to eval for vision changes and R sided weakness  CTH 5/28 with extensive heterogeneous bone thickening and demineralization throughout skull new from 2014  39 yr old male with a pmh of ESRD on hemodialysis, HTN, orthostatic hypotension, prior CVA, and renal osteodystrophy who presents with right sided waxing and waning upper and lower extremity weakness as well as intermittent tongue heaviness that has been going on since 12/2024 per the pt.  Neuro called to eval for vision changes and R sided weakness  CTH 5/28 with extensive heterogeneous bone thickening and demineralization throughout skull new from 2014   currently denies vision chanegs on exam, no floaters  Uppers diffuse weakness proximally, LUE 4/5 compared to R with sensory deficits. No face or leg involvement but bedbound baseline with atrophied LE.   In [place] had superficial thrombophlebitis of LUE with diffuse pain.     Vision changes   - possibly related to chronic vascular disease, may have retinal involvement. Suggest Optho eval if returns.   - doubt neurologic cause    LUE weakness  - diffuse atrophy  - unclear if stroke vs cspine related as appears to be isolated to LUE. Already on aspirin for secondary strkoe prevention and CTH neg, doubt MRI will change significant management  - can consider MR Jules to evaluate due to underlying osteodystrophy   - outpt emg    pt/ot as tolerated  dvt ppx    Macy Churchill DO  Vascular Neurology  Office [phone]  Available via Microsoft Teams

## 2025-05-30 NOTE — PHYSICAL THERAPY INITIAL EVALUATION ADULT - DIAGNOSIS, PT EVAL
The patient is a 53y Male complaining of
Bilateral LE weakness, impaired mobility, impaired balance.

## 2025-05-30 NOTE — PROGRESS NOTE ADULT - ASSESSMENT
with ESRD on HD TIW (MWF schedule) via RUE AVF at [facility]. Last outpatient HD treatment was on 5/26/25. Patient has been on Hemodialysis for 13 yrs. presents with hyperkalemia.      ESRD on HD . HD today ongoing while pt was evaluated during HD session this am. BP and Blood flow during dialysis are acceptable  IDH-> increase Midodrine to 20mg TID   -HD again tomorrow as high BUN    Anemia.    Patient with anemia in the setting of ESRD. Hgb is 9.8-->8.1, noted iron labs and ferritin. Monitor Hgb. will resume epo 10K with HD.      ·  Problem: Hyperphosphatemia.   ·  Recommendation: Phos elevated 8.7.--->7.6 resume sevelamer 1600mg TID with meals. Monitor phos. add Ca acetate given Hypocalcemia as well       ·  Problem: Hyperkalemia.   ·  Recommendation: serum potassium  improved. continue with HD     MBD-CKD  Hyperparathyroidism. Known to pt, on cinacalcet but with Hypocalcemia. add Ca acetate and Calcitriol

## 2025-05-31 LAB
ANION GAP SERPL CALC-SCNC: 20 MMOL/L — HIGH (ref 7–14)
BUN SERPL-MCNC: 67 MG/DL — HIGH (ref 7–23)
CALCIUM SERPL-MCNC: 7.9 MG/DL — LOW (ref 8.4–10.5)
CHLORIDE SERPL-SCNC: 98 MMOL/L — SIGNIFICANT CHANGE UP (ref 98–107)
CO2 SERPL-SCNC: 22 MMOL/L — SIGNIFICANT CHANGE UP (ref 22–31)
CREAT SERPL-MCNC: 10.6 MG/DL — HIGH (ref 0.5–1.3)
EGFR: 6 ML/MIN/1.73M2 — LOW
EGFR: 6 ML/MIN/1.73M2 — LOW
GLUCOSE SERPL-MCNC: 78 MG/DL — SIGNIFICANT CHANGE UP (ref 70–99)
HCT VFR BLD CALC: 27.2 % — LOW (ref 39–50)
HGB BLD-MCNC: 8.4 G/DL — LOW (ref 13–17)
MAGNESIUM SERPL-MCNC: 2.1 MG/DL — SIGNIFICANT CHANGE UP (ref 1.6–2.6)
MCHC RBC-ENTMCNC: 30.9 G/DL — LOW (ref 32–36)
MCHC RBC-ENTMCNC: 32.7 PG — SIGNIFICANT CHANGE UP (ref 27–34)
MCV RBC AUTO: 105.8 FL — HIGH (ref 80–100)
NRBC # BLD AUTO: 0 K/UL — SIGNIFICANT CHANGE UP (ref 0–0)
NRBC # FLD: 0 K/UL — SIGNIFICANT CHANGE UP (ref 0–0)
NRBC BLD AUTO-RTO: 0 /100 WBCS — SIGNIFICANT CHANGE UP (ref 0–0)
PHOSPHATE SERPL-MCNC: 6.9 MG/DL — HIGH (ref 2.5–4.5)
PLATELET # BLD AUTO: 137 K/UL — LOW (ref 150–400)
POTASSIUM SERPL-MCNC: 4.8 MMOL/L — SIGNIFICANT CHANGE UP (ref 3.5–5.3)
POTASSIUM SERPL-SCNC: 4.8 MMOL/L — SIGNIFICANT CHANGE UP (ref 3.5–5.3)
RBC # BLD: 2.57 M/UL — LOW (ref 4.2–5.8)
RBC # FLD: 14.6 % — HIGH (ref 10.3–14.5)
SODIUM SERPL-SCNC: 140 MMOL/L — SIGNIFICANT CHANGE UP (ref 135–145)
WBC # BLD: 4.8 K/UL — SIGNIFICANT CHANGE UP (ref 3.8–10.5)
WBC # FLD AUTO: 4.8 K/UL — SIGNIFICANT CHANGE UP (ref 3.8–10.5)

## 2025-05-31 PROCEDURE — 93010 ELECTROCARDIOGRAM REPORT: CPT

## 2025-05-31 PROCEDURE — 90935 HEMODIALYSIS ONE EVALUATION: CPT | Mod: GC

## 2025-05-31 RX ORDER — MIDODRINE HYDROCHLORIDE 5 MG/1
20 TABLET ORAL ONCE
Refills: 0 | Status: COMPLETED | OUTPATIENT
Start: 2025-05-31 | End: 2025-05-31

## 2025-05-31 RX ADMIN — POLYETHYLENE GLYCOL 3350 17 GRAM(S): 17 POWDER, FOR SOLUTION ORAL at 11:17

## 2025-05-31 RX ADMIN — Medication 81 MILLIGRAM(S): at 11:17

## 2025-05-31 RX ADMIN — OXYCODONE HYDROCHLORIDE 5 MILLIGRAM(S): 30 TABLET ORAL at 00:00

## 2025-05-31 RX ADMIN — CALCITRIOL 0.5 MICROGRAM(S): 0.5 CAPSULE, GELATIN COATED ORAL at 14:10

## 2025-05-31 RX ADMIN — Medication 9 MILLIGRAM(S): at 22:19

## 2025-05-31 RX ADMIN — SEVELAMER HYDROCHLORIDE 1600 MILLIGRAM(S): 800 TABLET ORAL at 18:39

## 2025-05-31 RX ADMIN — HEPARIN SODIUM 5000 UNIT(S): 1000 INJECTION INTRAVENOUS; SUBCUTANEOUS at 18:27

## 2025-05-31 RX ADMIN — METOPROLOL SUCCINATE 25 MILLIGRAM(S): 50 TABLET, EXTENDED RELEASE ORAL at 18:26

## 2025-05-31 RX ADMIN — HEPARIN SODIUM 5000 UNIT(S): 1000 INJECTION INTRAVENOUS; SUBCUTANEOUS at 11:03

## 2025-05-31 RX ADMIN — Medication 1334 MILLIGRAM(S): at 18:25

## 2025-05-31 RX ADMIN — MIDODRINE HYDROCHLORIDE 20 MILLIGRAM(S): 5 TABLET ORAL at 05:10

## 2025-05-31 RX ADMIN — Medication 1334 MILLIGRAM(S): at 22:20

## 2025-05-31 RX ADMIN — HEPARIN SODIUM 5000 UNIT(S): 1000 INJECTION INTRAVENOUS; SUBCUTANEOUS at 01:35

## 2025-05-31 RX ADMIN — METOPROLOL SUCCINATE 25 MILLIGRAM(S): 50 TABLET, EXTENDED RELEASE ORAL at 11:06

## 2025-05-31 RX ADMIN — CINACALCET 30 MILLIGRAM(S): 30 TABLET, FILM COATED ORAL at 14:10

## 2025-05-31 RX ADMIN — Medication 1334 MILLIGRAM(S): at 14:10

## 2025-05-31 RX ADMIN — Medication 1 APPLICATION(S): at 11:05

## 2025-05-31 RX ADMIN — SEVELAMER HYDROCHLORIDE 1600 MILLIGRAM(S): 800 TABLET ORAL at 14:11

## 2025-05-31 NOTE — CHART NOTE - NSCHARTNOTEFT_GEN_A_CORE
Consult received for hyperparathyroidism    39M with history of ESRD on hemodialysis, hyperparathyroidism s/p 3 gland parathyroidectomy in 2021, CVA, renal osteodystrophy who presents with right sided waxing and waning upper and lower extremity weakness as well as intermittent tongue heaviness.    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    Since calcium levels are not elevated, less likely to be parathyroid adenoma or carcinoma.    Preliminary recommendations:  - Please monitor calcium WITH albumin to calculate corrected calcium levels as well as phosphorus levels  - Please check vitamin D 25OH and 1,25OH levels  - can consider neck US to evaluate for parathyroid adenoma or carcinoma although less likely the clinical picture    Gerald Angeles MD  Endocrine Fellow  Can be reached via teams. For follow up questions, discharge recommendations, or new consults, please call answering service at [phone] (weekdays); [phone] (nights/weekends). Consult received for hyperparathyroidism    39M with history of ESRD on hemodialysis, hyperparathyroidism s/p 3 gland parathyroidectomy in 2021, CVA, renal osteodystrophy who presents with right sided waxing and waning upper and lower extremity weakness as well as intermittent tongue heaviness.    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    Since calcium levels are not elevated, less likely to be parathyroid adenoma or carcinoma.    Preliminary recommendations:  - Please monitor calcium WITH albumin to calculate corrected calcium levels as well as phosphorus levels  - Agree with calcium acetate 1334mg QID and calcitriol 0.5mcg daily (started 5/30)  - Agree with stopping cinacalcet 30mg daily (d/c'd today) given the hypocalcemia   - Please check vitamin D 25OH and 1,25OH levels and make sure it is replete  - can consider neck US to evaluate for parathyroid adenoma or carcinoma although less likely the clinical picture    Gerald Angeles MD  Endocrine Fellow  Can be reached via teams. For follow up questions, discharge recommendations, or new consults, please call answering service at [phone] (weekdays); [phone] (nights/weekends).

## 2025-05-31 NOTE — PROGRESS NOTE ADULT - ASSESSMENT
with ESRD on HD TIW (MWF schedule) via RUE AVF at [facility]. Last outpatient HD treatment was on 5/26/25. Patient has been on Hemodialysis for 13 yrs. presents with hyperkalemia.        ESRD on HD . HD today ongoing while pt was evaluated during HD session this am. BP and Blood flow during dialysis are acceptable.   IDH-> increase Midodrine to 20mg TID. Plan for additional session of HD today as his BUN was previously elevated.     Anemia.  Patient with anemia in the setting of ESRD. Hgb is 8.4, noted iron labs and ferritin. Monitor Hgb. will resume epo 10K with HD.      Problem: Hyperphosphatemia.   Recommendation: Phos elevated 8.7.--->7.6 resume sevelamer 1600mg TID with meals. Monitor phos. Ca acetate added on 5/30. given Hypocalcemia as well        Problem: Hyperkalemia.    Recommendation: serum potassium  improved. continue with HD     MBD-CKD  Hyperparathyroidism. Known to pt, on cinacalcet but with Hypocalcemia. Rec to stop Cinacalcet on 5/31. Ca acetate and Calcitriol were added. Would rec to increase Phosp binders. Rec to get iCa level on 6/1.       Please call if you have any questions  Shon Pereira, PGY4  Nephrology Fellow  [id number]/Teams preferred  (After 5PM or weekends, reach out to fellow on call)  with ESRD on HD TIW (MWF schedule) via RUE AVF at [facility]. Last outpatient HD treatment was on 5/26/25. Patient has been on Hemodialysis for 13 yrs. presents with hyperkalemia.        ESRD on HD . HD today ongoing while pt was evaluated during HD session this am. BP and Blood flow during dialysis are acceptable.   IDH-> increase Midodrine to 20mg TID. Plan for additional session of HD today as his BUN was previously elevated.     Anemia.  Patient with anemia in the setting of ESRD. Hgb is 8.4, noted iron labs and ferritin. Monitor Hgb. will resume epo 10K with HD.      Problem: Hyperphosphatemia.   Recommendation: Phos elevated 8.7.--->7.6 resume sevelamer 1600mg TID with meals. Monitor phos. Ca acetate added on 5/30. given Hypocalcemia as well        Problem: Hyperkalemia.    Recommendation: serum potassium  improved. continue with HD     MBD-CKD  Hyperparathyroidism. Known to pt, on cinacalcet but with Hypocalcemia. Rec to stop Cinacalcet on 5/31. Ca acetate and Calcitriol were added. Would rec to increase Phosp binders. Rec to get iCa level on 6/1. Would rec parathyroidectomy as he admits to occasional extremity pain, can be contributing.      Please call if you have any questions  Shon Pereira, PGY4  Nephrology Fellow  [id number]/Teams preferred  (After 5PM or weekends, reach out to fellow on call)

## 2025-05-31 NOTE — CHART NOTE - NSCHARTNOTEFT_GEN_A_CORE
Discussed with ENT for possible parathyroidectomy  for PTH >5000, per patient he had 3 glands removed previously. Per ENT since patient is not symptomatic or hypercalcemic, it is recommended to follow-up outpatient. Discussed with Attending Discussed with ENT resident Dr. Naegele ( for possible parathyroidectomy  for PTH >5000, per patient he had 3 glands removed previously. Per ENT since patient is not symptomatic or hypercalcemic, it is recommended to follow-up outpatient. Discussed with Attending

## 2025-06-01 DIAGNOSIS — N25.81 SECONDARY HYPERPARATHYROIDISM OF RENAL ORIGIN: ICD-10-CM

## 2025-06-01 DIAGNOSIS — E83.51 HYPOCALCEMIA: ICD-10-CM

## 2025-06-01 LAB
ADD ON TEST-SPECIMEN IN LAB: SIGNIFICANT CHANGE UP
ADD ON TEST-SPECIMEN IN LAB: SIGNIFICANT CHANGE UP
ALBUMIN SERPL ELPH-MCNC: 3.4 G/DL — SIGNIFICANT CHANGE UP (ref 3.3–5)
ALBUMIN SERPL ELPH-MCNC: 3.6 G/DL — SIGNIFICANT CHANGE UP (ref 3.3–5)
ANION GAP SERPL CALC-SCNC: 15 MMOL/L — HIGH (ref 7–14)
BUN SERPL-MCNC: 56 MG/DL — HIGH (ref 7–23)
CALCIUM SERPL-MCNC: 7.8 MG/DL — LOW (ref 8.4–10.5)
CHLORIDE SERPL-SCNC: 99 MMOL/L — SIGNIFICANT CHANGE UP (ref 98–107)
CO2 SERPL-SCNC: 23 MMOL/L — SIGNIFICANT CHANGE UP (ref 22–31)
CREAT SERPL-MCNC: 8.73 MG/DL — HIGH (ref 0.5–1.3)
EGFR: 7 ML/MIN/1.73M2 — LOW
EGFR: 7 ML/MIN/1.73M2 — LOW
GLUCOSE SERPL-MCNC: 76 MG/DL — SIGNIFICANT CHANGE UP (ref 70–99)
HCT VFR BLD CALC: 28.1 % — LOW (ref 39–50)
HGB BLD-MCNC: 8.6 G/DL — LOW (ref 13–17)
MAGNESIUM SERPL-MCNC: 2.2 MG/DL — SIGNIFICANT CHANGE UP (ref 1.6–2.6)
MCHC RBC-ENTMCNC: 30.6 G/DL — LOW (ref 32–36)
MCHC RBC-ENTMCNC: 31.6 PG — SIGNIFICANT CHANGE UP (ref 27–34)
MCV RBC AUTO: 103.3 FL — HIGH (ref 80–100)
NRBC # BLD AUTO: 0 K/UL — SIGNIFICANT CHANGE UP (ref 0–0)
NRBC # FLD: 0 K/UL — SIGNIFICANT CHANGE UP (ref 0–0)
NRBC BLD AUTO-RTO: 0 /100 WBCS — SIGNIFICANT CHANGE UP (ref 0–0)
PHOSPHATE SERPL-MCNC: 6.3 MG/DL — HIGH (ref 2.5–4.5)
PLATELET # BLD AUTO: 154 K/UL — SIGNIFICANT CHANGE UP (ref 150–400)
POTASSIUM SERPL-MCNC: 4.7 MMOL/L — SIGNIFICANT CHANGE UP (ref 3.5–5.3)
POTASSIUM SERPL-SCNC: 4.7 MMOL/L — SIGNIFICANT CHANGE UP (ref 3.5–5.3)
RBC # BLD: 2.72 M/UL — LOW (ref 4.2–5.8)
RBC # FLD: 14 % — SIGNIFICANT CHANGE UP (ref 10.3–14.5)
SODIUM SERPL-SCNC: 137 MMOL/L — SIGNIFICANT CHANGE UP (ref 135–145)
T3 SERPL-MCNC: 75 NG/DL — LOW (ref 80–200)
T4 AB SER-ACNC: 4.07 UG/DL — LOW (ref 5.1–13)
TSH SERPL-MCNC: 4.94 UIU/ML — HIGH (ref 0.27–4.2)
WBC # BLD: 3.75 K/UL — LOW (ref 3.8–10.5)
WBC # FLD AUTO: 3.75 K/UL — LOW (ref 3.8–10.5)

## 2025-06-01 PROCEDURE — 76536 US EXAM OF HEAD AND NECK: CPT | Mod: 26

## 2025-06-01 PROCEDURE — 70490 CT SOFT TISSUE NECK W/O DYE: CPT | Mod: 26

## 2025-06-01 PROCEDURE — 70450 CT HEAD/BRAIN W/O DYE: CPT | Mod: 26

## 2025-06-01 PROCEDURE — 99254 IP/OBS CNSLTJ NEW/EST MOD 60: CPT | Mod: GC

## 2025-06-01 RX ORDER — CALCITRIOL 0.5 UG/1
0.25 CAPSULE, GELATIN COATED ORAL DAILY
Refills: 0 | Status: DISCONTINUED | OUTPATIENT
Start: 2025-06-02 | End: 2025-06-13

## 2025-06-01 RX ADMIN — Medication 1 APPLICATION(S): at 12:30

## 2025-06-01 RX ADMIN — SEVELAMER HYDROCHLORIDE 1600 MILLIGRAM(S): 800 TABLET ORAL at 18:06

## 2025-06-01 RX ADMIN — SEVELAMER HYDROCHLORIDE 1600 MILLIGRAM(S): 800 TABLET ORAL at 12:28

## 2025-06-01 RX ADMIN — CALCITRIOL 0.5 MICROGRAM(S): 0.5 CAPSULE, GELATIN COATED ORAL at 12:27

## 2025-06-01 RX ADMIN — HEPARIN SODIUM 5000 UNIT(S): 1000 INJECTION INTRAVENOUS; SUBCUTANEOUS at 00:38

## 2025-06-01 RX ADMIN — HEPARIN SODIUM 5000 UNIT(S): 1000 INJECTION INTRAVENOUS; SUBCUTANEOUS at 18:05

## 2025-06-01 RX ADMIN — METOPROLOL SUCCINATE 25 MILLIGRAM(S): 50 TABLET, EXTENDED RELEASE ORAL at 05:13

## 2025-06-01 RX ADMIN — Medication 9 MILLIGRAM(S): at 21:38

## 2025-06-01 RX ADMIN — Medication 1334 MILLIGRAM(S): at 12:29

## 2025-06-01 RX ADMIN — OXYCODONE HYDROCHLORIDE 5 MILLIGRAM(S): 30 TABLET ORAL at 22:36

## 2025-06-01 RX ADMIN — SEVELAMER HYDROCHLORIDE 1600 MILLIGRAM(S): 800 TABLET ORAL at 09:00

## 2025-06-01 RX ADMIN — Medication 81 MILLIGRAM(S): at 12:27

## 2025-06-01 RX ADMIN — Medication 1334 MILLIGRAM(S): at 21:34

## 2025-06-01 RX ADMIN — HEPARIN SODIUM 5000 UNIT(S): 1000 INJECTION INTRAVENOUS; SUBCUTANEOUS at 10:35

## 2025-06-01 RX ADMIN — OXYCODONE HYDROCHLORIDE 5 MILLIGRAM(S): 30 TABLET ORAL at 21:36

## 2025-06-01 RX ADMIN — Medication 1334 MILLIGRAM(S): at 18:05

## 2025-06-01 RX ADMIN — OXYCODONE HYDROCHLORIDE 5 MILLIGRAM(S): 30 TABLET ORAL at 00:38

## 2025-06-01 RX ADMIN — Medication 1334 MILLIGRAM(S): at 09:00

## 2025-06-01 NOTE — CONSULT NOTE ADULT - ATTENDING COMMENTS
Agree with assessment and plan as above by Dr. Davidson. Reviewed all pertinent labs, glucose values, and imaging studies. Modifications made as indicated above. Pt. with elevated PTH level in the setting of ESRD and hypocalcemia. Would defer management to renal who is already following the patient for ESRD. No need for 2 specialties to be co-managing. Will sign off at this time. Please reconsult if needed.    Yovanny Urbina D.O  [phone]
ESRD on HD. s/p hip fracture. Hyperkalemia, Hyperphosphatemia, and anemia  s/p HD and Lokelma   check K again today and arrange for HD  epo for anemia   Binders for Hi Phos  check PTH

## 2025-06-01 NOTE — CONSULT NOTE ADULT - ASSESSMENT
39M with history of ESRD on hemodialysis, hyperparathyroidism s/p 3 gland parathyroidectomy in 2021, CVA, renal osteodystrophy who presents with right sided waxing and waning upper and lower extremity weakness as well as intermittent tongue heaviness.    #Hyperparathyroidsim  - Likely secondary i/s/o ESRD and significantly elevated PTH >5000  - Also with hypocalcemia and phos 6.9   - 5/31 Sensipar d/c, calcium acetate increased to 1334 qid, calcitriol 0.5 mcg daily added  - CT neck showed exophytic nodule arising posteriorly off the right thyroid lobe which may represent a parathyroid adenoma or exophytic thyroid nodule  - US thyroid/parathyroid ordered by primary team (pending)    PLAN:  - Please monitor calcium WITH albumin to calculate corrected calcium levels as well as phosphorus levels  - c/w calcium acetate 1334mg QID (started 5/30)  - Would decrease calcitriol to 0.25 mcg daily as patient is already severely hyperphosphatemic on phosphate lowering medication  - Please check vitamin D 25OH and 1,25OH levels - patient has discomfort with certain puncture sites, please try to obtain via L hand  -- however, vitamin D repletion is contraindicated in persistent and severe hyperphosphatemia   - can consider neck US to evaluate for parathyroid adenoma or carcinoma although less likely the clinical picture  - Patient should obtain OP bone density scan if not already done. Given b/l hip fx hx would consider     D/w primary team     Mignon Davidson MD  Endocrinology Fellow  Can be reached via Microsoft Teams    For follow up questions, discharge recommendations, or new consults, please email [email or web address] ([facility]) or [email or web address] ([facility]) or call answering service at [phone] (weekdays); [phone] (nights/weekends).  For emergencies please page fellow on call.     **************************RECOMMENDATIONS NOT FINAL UNTIL SIGNED BY ATTENDING**************************  39M with history of ESRD on hemodialysis, hyperparathyroidism s/p 3 gland parathyroidectomy in 2021, CVA, renal osteodystrophy who presents with right sided waxing and waning upper and lower extremity weakness as well as intermittent tongue heaviness.    #Hyperparathyroidsim  - Likely secondary i/s/o ESRD and significantly elevated PTH >5000  - Also with hypocalcemia and phos 6.9   - 5/31 Sensipar d/c, calcium acetate increased to 1334 qid, calcitriol 0.5 mcg daily added  - CT neck showed exophytic nodule arising posteriorly off the right thyroid lobe which may represent a parathyroid adenoma or exophytic thyroid nodule  - US thyroid/parathyroid ordered by primary team (pending)    PLAN:  - Please monitor calcium WITH albumin to calculate corrected calcium levels as well as phosphorus levels  - c/w calcium acetate 1334mg QID (started 5/30)  - Would decrease calcitriol to 0.25 mcg daily as patient is already severely hyperphosphatemic on phosphate lowering medication  - Please check vitamin D 25OH and 1,25OH levels - patient has discomfort with certain puncture sites, please try to obtain via L hand  -- however, vitamin D repletion is contraindicated in persistent and severe hyperphosphatemia   - can consider neck US to evaluate for parathyroid adenoma or carcinoma although less likely the clinical picture  - Patient should obtain OP bone density scan if not already done.      D/w primary team     Mignon Davidson MD  Endocrinology Fellow  Can be reached via Microsoft Teams    For follow up questions, discharge recommendations, or new consults, please email [email or web address] ([facility]) or [email or web address] ([facility]) or call answering service at [phone] (weekdays); [phone] (nights/weekends).  For emergencies please page fellow on call.     **************************RECOMMENDATIONS NOT FINAL UNTIL SIGNED BY ATTENDING**************************  39M with history of ESRD on hemodialysis, hyperparathyroidism s/p 3 gland parathyroidectomy in 2021, CVA, renal osteodystrophy who presents with right sided waxing and waning upper and lower extremity weakness as well as intermittent tongue heaviness.    #Hyperparathyroidsim  - Likely secondary i/s/o ESRD and significantly elevated PTH >5000  - Also with hypocalcemia and phos 6.9   - 5/31 Sensipar d/c, calcium acetate increased to 1334 qid, calcitriol 0.5 mcg daily added  - CT neck showed exophytic nodule arising posteriorly off the right thyroid lobe which may represent a parathyroid adenoma or exophytic thyroid nodule  - US thyroid/parathyroid ordered by primary team (pending)    PLAN:  - Please monitor calcium WITH albumin to calculate corrected calcium levels as well as phosphorus levels  - c/w calcium acetate 1334mg QID (started 5/30)  - Would decrease calcitriol to 0.25 mcg daily as patient is already severely hyperphosphatemic on phosphate lowering medication  - Please check vitamin D 25OH and 1,25OH levels - patient has discomfort with certain puncture sites, please try to obtain via L hand  -- however, vitamin D repletion is contraindicated in persistent and severe hyperphosphatemia   - can consider neck US to evaluate for parathyroid adenoma or carcinoma although less likely the clinical picture  - Patient should obtain OP bone density scan if not already done.    -Will defer management to renal as hyperparathyroidism is related mostly to renal disease.  -Will sign off at this time. Please reconsult if needed.    D/w primary team     Mignon Davidson MD  Endocrinology Fellow  Can be reached via Microsoft Teams    For follow up questions, discharge recommendations, or new consults, please email [email or web address] ([facility]) or [email or web address] ([facility]) or call answering service at [phone] (weekdays); [phone] (nights/weekends).  For emergencies please page fellow on call.

## 2025-06-01 NOTE — CONSULT NOTE ADULT - SUBJECTIVE AND OBJECTIVE BOX
Mignon Davidson MD   |   PGY-4  Endocrinology Fellow  Available on Microsoft Teams    HPI:  39M PMH ESRD on hemodialysis, HTN, orthostatic hypotension, CVA, renal osteodystrophy, hyperparathyroidism who presents with right sided waxing and waning upper and lower extremity weakness as well as intermittent tongue heaviness that has been going on since 12/2024 per the pt (ED note states 1 week). Patient with known hyperparthyroidism, found to have PTH >5000 with CA 7.7. Endocrine consulted for further management.     Endocrine History:    s/p 3 gland parathyroidectomy in 2021, ESRD c/b renal osteodystrophy   PTH >5000  5/30 calcium 7.7 with Phos 6.9   CT neck showed an exophytic nodule arising posteriorly off the right thyroid lobe which may represent a parathyroid adenoma or exophytic thyroid nodule  D25OH, 1,25OH pending    Patient had been on Sensipar, which was discontinued by nephrology on 5/31 due to hypocalcemia  Started on calcium acetate 1334 QID and calcitriol 0.5 mcg daily    Patient has b/l hip fx (8/2023 and 3/2024) s/p b/l arthroplasty. Reports he had DEXA done within last year, but no bone density imaging found in HIE   Not currently on osteoporosis tx    Patient currently reports globus sensation but denies dysphagia, dysphonia, SOB      PAST MEDICAL & SURGICAL HISTORY:  HTN (hypertension)      Stroke  age 10      Sleep apnea      Leg fracture, right      Hemodialysis access, AV graft      ESRD (end stage renal disease) on dialysis  since 2013, M-W-F      Anemia, unspecified type      Other hyperparathyroidism      Orthostatic hypotension      Renal osteodystrophy      AV fistula  R arm; L arm clotted      History of left hip hemiarthroplasty      S/P parathyroidectomy          FAMILY HISTORY:      SOCIAL HISTORY:        MEDICATIONS  (STANDING):  aspirin enteric coated 81 milliGRAM(s) Oral daily  calcitriol   Capsule 0.5 MICROGram(s) Oral daily  calcium acetate 1334 milliGRAM(s) Oral four times a day with meals  chlorhexidine 2% Cloths 1 Application(s) Topical daily  dextrose 5%. 1000 milliLiter(s) (100 mL/Hr) IV Continuous <Continuous>  dextrose 50% Injectable 25 Gram(s) IV Push once  epoetin carito-epbx (RETACRIT) Injectable 54571 Unit(s) IV Push <User Schedule>  heparin   Injectable 5000 Unit(s) SubCutaneous every 8 hours  melatonin 9 milliGRAM(s) Oral at bedtime  metoprolol tartrate 25 milliGRAM(s) Oral two times a day  midodrine 20 milliGRAM(s) Oral <User Schedule>  polyethylene glycol 3350 17 Gram(s) Oral daily  senna 2 Tablet(s) Oral at bedtime  sevelamer carbonate 1600 milliGRAM(s) Oral three times a day with meals    MEDICATIONS  (PRN):  acetaminophen     Tablet .. 650 milliGRAM(s) Oral every 6 hours PRN Temp greater or equal to 38C (100.4F), Mild Pain (1 - 3)  aluminum hydroxide/magnesium hydroxide/simethicone Suspension 30 milliLiter(s) Oral every 4 hours PRN Dyspepsia  baclofen 10 milliGRAM(s) Oral every 12 hours PRN Muscle Spasm  ondansetron Injectable 4 milliGRAM(s) IV Push every 8 hours PRN Nausea and/or Vomiting  oxyCODONE    IR 5 milliGRAM(s) Oral every 6 hours PRN Severe Pain (7 - 10)      Allergies    No Known Drug Allergies  shellfish (Hives)    Intolerances      Review of Systems:  Constitutional: No fatigue  Eyes: No blurry vision  Neuro: No tremors, numbness/tingling  HEENT: No pain, dysphagia, dysphonia  Cardiovascular: No chest pain, palpitations  Respiratory: No SOB  GI: No nausea, vomiting, abdominal pain, constipation, diarrhea  : No dysuria  Skin: No rash  Psych: No depression  Endocrine: No polyuria, polydipsia, heat/cold intolerance  Hem/lymph: No swelling  Osteoporosis: No fractures    ===================PHYSICAL EXAM=======================  VITALS: T(C): 36.7 (06-01-25 @ 08:51)  T(F): 98.1 (06-01-25 @ 08:51), Max: 99.3 (05-31-25 @ 22:10)  HR: 70 (06-01-25 @ 08:55) (34 - 82)  BP: 116/57 (06-01-25 @ 08:51) (107/56 - 116/63)  RR:  (18 - 18)  SpO2:  (98% - 99%)  Wt(kg): --  GENERAL: NAD  EYES: No proptosis, no lid lag, anicteric  THYROID: Normal size, no palpable nodules  RESPIRATORY: Clear to auscultation bilaterally  CARDIOVASCULAR: Regular rate and rhythm  GI: Soft, nontender, non distended  EXT: b/l feet without wounds, 2+ pulses, no edema  PSYCH: Alert and oriented x 3, reactive mood  ======================================================                            8.4    4.80  )-----------( 137      ( 31 May 2025 06:35 )             27.2       05-31    140  |  98  |  67[H]  ----------------------------<  78  4.8   |  22  |  10.60[H]    eGFR: 6[L]    Ca    7.9[L]      05-31  Mg     2.10     05-31  Phos  6.9     05-31    TPro  x   /  Alb  3.6  /  TBili  x   /  DBili  x   /  AST  x   /  ALT  x   /  AlkPhos  x   05-31      Thyroid Function Tests:              Radiology:

## 2025-06-01 NOTE — PROGRESS NOTE ADULT - ASSESSMENT
39 yr old male presenting with electrolyte abnormalities, right sided heaviness      Problem/Plan - 1:  ·  Problem: Electrolyte disturbance.   ·  Plan: New  s/p Ca gluconate 2g IV x1, insulin/dextrose, lokelma 10mg x1   monitor HD     Problem/Plan - 2:  ·  Problem: Right sided weakness.   ·  Plan: Acute on chronic   Pt reports waxing and waning symptoms since 12/2024  CT head: as above  neuro consult appreciated     Problem/Plan - 3:  ·  Problem: ESRD on hemodialysis.   ·  Plan: Chronic unstable given electrolyte disturbances   HD as scheduled   renal fu     Problem/Plan - 4:  ·  Problem: Blurry vision  ·  Plan: neuro fu appreciated  - CT reviewed     Problem/Plan - 5:  ·  Problem: hyperparathyroid  ·  Plan: ENT consult called   endo fu appreciated  - check US neck     dw pt at length

## 2025-06-02 LAB
24R-OH-CALCIDIOL SERPL-MCNC: 6.5 NG/ML — SIGNIFICANT CHANGE UP
ALBUMIN SERPL ELPH-MCNC: 3.5 G/DL — SIGNIFICANT CHANGE UP (ref 3.3–5)
ALP SERPL-CCNC: 1121 U/L — HIGH (ref 40–120)
ALT FLD-CCNC: <5 U/L — SIGNIFICANT CHANGE UP (ref 4–41)
ANION GAP SERPL CALC-SCNC: 20 MMOL/L — HIGH (ref 7–14)
AST SERPL-CCNC: 9 U/L — SIGNIFICANT CHANGE UP (ref 4–40)
BILIRUB SERPL-MCNC: 0.3 MG/DL — SIGNIFICANT CHANGE UP (ref 0.2–1.2)
BUN SERPL-MCNC: 72 MG/DL — HIGH (ref 7–23)
CALCIUM SERPL-MCNC: 8.2 MG/DL — LOW (ref 8.4–10.5)
CHLORIDE SERPL-SCNC: 96 MMOL/L — LOW (ref 98–107)
CO2 SERPL-SCNC: 20 MMOL/L — LOW (ref 22–31)
CREAT SERPL-MCNC: 9.63 MG/DL — HIGH (ref 0.5–1.3)
EGFR: 6 ML/MIN/1.73M2 — LOW
EGFR: 6 ML/MIN/1.73M2 — LOW
GLUCOSE SERPL-MCNC: 74 MG/DL — SIGNIFICANT CHANGE UP (ref 70–99)
HCT VFR BLD CALC: 28.1 % — LOW (ref 39–50)
HGB BLD-MCNC: 8.4 G/DL — LOW (ref 13–17)
MAGNESIUM SERPL-MCNC: 2.2 MG/DL — SIGNIFICANT CHANGE UP (ref 1.6–2.6)
MCHC RBC-ENTMCNC: 29.9 G/DL — LOW (ref 32–36)
MCHC RBC-ENTMCNC: 32.2 PG — SIGNIFICANT CHANGE UP (ref 27–34)
MCV RBC AUTO: 107.7 FL — HIGH (ref 80–100)
NRBC # BLD AUTO: 0 K/UL — SIGNIFICANT CHANGE UP (ref 0–0)
NRBC # FLD: 0 K/UL — SIGNIFICANT CHANGE UP (ref 0–0)
NRBC BLD AUTO-RTO: 0 /100 WBCS — SIGNIFICANT CHANGE UP (ref 0–0)
PLATELET # BLD AUTO: 190 K/UL — SIGNIFICANT CHANGE UP (ref 150–400)
POTASSIUM SERPL-MCNC: 4.6 MMOL/L — SIGNIFICANT CHANGE UP (ref 3.5–5.3)
POTASSIUM SERPL-SCNC: 4.6 MMOL/L — SIGNIFICANT CHANGE UP (ref 3.5–5.3)
PROT SERPL-MCNC: 6.3 G/DL — SIGNIFICANT CHANGE UP (ref 6–8.3)
RBC # BLD: 2.61 M/UL — LOW (ref 4.2–5.8)
RBC # FLD: 14.2 % — SIGNIFICANT CHANGE UP (ref 10.3–14.5)
SODIUM SERPL-SCNC: 136 MMOL/L — SIGNIFICANT CHANGE UP (ref 135–145)
VIT B12 SERPL-MCNC: 678 PG/ML — SIGNIFICANT CHANGE UP (ref 200–900)
WBC # BLD: 4.6 K/UL — SIGNIFICANT CHANGE UP (ref 3.8–10.5)
WBC # FLD AUTO: 4.6 K/UL — SIGNIFICANT CHANGE UP (ref 3.8–10.5)

## 2025-06-02 PROCEDURE — 90935 HEMODIALYSIS ONE EVALUATION: CPT

## 2025-06-02 RX ORDER — BISACODYL 5 MG
5 TABLET, DELAYED RELEASE (ENTERIC COATED) ORAL EVERY 12 HOURS
Refills: 0 | Status: DISCONTINUED | OUTPATIENT
Start: 2025-06-02 | End: 2025-06-13

## 2025-06-02 RX ORDER — SIMETHICONE 80 MG
80 TABLET,CHEWABLE ORAL ONCE
Refills: 0 | Status: COMPLETED | OUTPATIENT
Start: 2025-06-02 | End: 2025-06-02

## 2025-06-02 RX ADMIN — OXYCODONE HYDROCHLORIDE 5 MILLIGRAM(S): 30 TABLET ORAL at 03:52

## 2025-06-02 RX ADMIN — HEPARIN SODIUM 5000 UNIT(S): 1000 INJECTION INTRAVENOUS; SUBCUTANEOUS at 03:04

## 2025-06-02 RX ADMIN — OXYCODONE HYDROCHLORIDE 5 MILLIGRAM(S): 30 TABLET ORAL at 04:52

## 2025-06-02 RX ADMIN — Medication 81 MILLIGRAM(S): at 13:25

## 2025-06-02 RX ADMIN — Medication 80 MILLIGRAM(S): at 18:28

## 2025-06-02 RX ADMIN — SEVELAMER HYDROCHLORIDE 1600 MILLIGRAM(S): 800 TABLET ORAL at 13:25

## 2025-06-02 RX ADMIN — Medication 1 APPLICATION(S): at 13:20

## 2025-06-02 RX ADMIN — CALCITRIOL 0.25 MICROGRAM(S): 0.5 CAPSULE, GELATIN COATED ORAL at 13:22

## 2025-06-02 RX ADMIN — HEPARIN SODIUM 5000 UNIT(S): 1000 INJECTION INTRAVENOUS; SUBCUTANEOUS at 11:00

## 2025-06-02 RX ADMIN — Medication 1334 MILLIGRAM(S): at 13:21

## 2025-06-02 RX ADMIN — MIDODRINE HYDROCHLORIDE 20 MILLIGRAM(S): 5 TABLET ORAL at 05:31

## 2025-06-02 RX ADMIN — METOPROLOL SUCCINATE 25 MILLIGRAM(S): 50 TABLET, EXTENDED RELEASE ORAL at 05:31

## 2025-06-02 RX ADMIN — SEVELAMER HYDROCHLORIDE 1600 MILLIGRAM(S): 800 TABLET ORAL at 18:25

## 2025-06-02 RX ADMIN — EPOETIN ALFA 10000 UNIT(S): 10000 SOLUTION INTRAVENOUS; SUBCUTANEOUS at 07:27

## 2025-06-02 RX ADMIN — Medication 1334 MILLIGRAM(S): at 18:24

## 2025-06-02 RX ADMIN — HEPARIN SODIUM 5000 UNIT(S): 1000 INJECTION INTRAVENOUS; SUBCUTANEOUS at 18:24

## 2025-06-02 RX ADMIN — Medication 9 MILLIGRAM(S): at 21:31

## 2025-06-02 NOTE — PROGRESS NOTE ADULT - ASSESSMENT
39 yr old male presenting with electrolyte abnormalities, right sided heaviness      Problem/Plan - 1:  ·  Problem: Electrolyte disturbance.   ·  Plan: New  s/p Ca gluconate 2g IV x1, insulin/dextrose, lokelma 10mg x1   monitor with HD     Problem/Plan - 2:  ·  Problem: Right sided weakness.   ·  Plan: Acute on chronic   Pt reports waxing and waning symptoms since 12/2024  CT head: as above  neuro consult appreciated     Problem/Plan - 3:  ·  Problem: ESRD on hemodialysis.   ·  Plan: Chronic unstable given electrolyte disturbances   HD as scheduled   renal fu     Problem/Plan - 4:  ·  Problem: Blurry vision  ·  Plan: neuro fu appreciated  - CT reviewed     Problem/Plan - 5:  ·  Problem: hyperparathyroid  ·  Plan: ENT consult called   endo fu pending  - check US neck     dw pt at length

## 2025-06-03 LAB — VIT D25+D1,25 OH+D1,25 PNL SERPL-MCNC: 12.9 PG/ML — LOW (ref 19.9–79.3)

## 2025-06-03 RX ADMIN — SEVELAMER HYDROCHLORIDE 1600 MILLIGRAM(S): 800 TABLET ORAL at 08:22

## 2025-06-03 RX ADMIN — Medication 1334 MILLIGRAM(S): at 00:20

## 2025-06-03 RX ADMIN — HEPARIN SODIUM 5000 UNIT(S): 1000 INJECTION INTRAVENOUS; SUBCUTANEOUS at 11:20

## 2025-06-03 RX ADMIN — METOPROLOL SUCCINATE 25 MILLIGRAM(S): 50 TABLET, EXTENDED RELEASE ORAL at 05:45

## 2025-06-03 RX ADMIN — HEPARIN SODIUM 5000 UNIT(S): 1000 INJECTION INTRAVENOUS; SUBCUTANEOUS at 17:21

## 2025-06-03 RX ADMIN — Medication 9 MILLIGRAM(S): at 22:27

## 2025-06-03 RX ADMIN — Medication 1 APPLICATION(S): at 11:19

## 2025-06-03 RX ADMIN — Medication 1334 MILLIGRAM(S): at 22:27

## 2025-06-03 RX ADMIN — Medication 1334 MILLIGRAM(S): at 11:50

## 2025-06-03 RX ADMIN — METOPROLOL SUCCINATE 25 MILLIGRAM(S): 50 TABLET, EXTENDED RELEASE ORAL at 17:21

## 2025-06-03 RX ADMIN — CALCITRIOL 0.25 MICROGRAM(S): 0.5 CAPSULE, GELATIN COATED ORAL at 11:21

## 2025-06-03 RX ADMIN — HEPARIN SODIUM 5000 UNIT(S): 1000 INJECTION INTRAVENOUS; SUBCUTANEOUS at 02:14

## 2025-06-03 RX ADMIN — Medication 1334 MILLIGRAM(S): at 17:21

## 2025-06-03 RX ADMIN — SEVELAMER HYDROCHLORIDE 1600 MILLIGRAM(S): 800 TABLET ORAL at 17:21

## 2025-06-03 RX ADMIN — Medication 1334 MILLIGRAM(S): at 05:49

## 2025-06-03 RX ADMIN — SEVELAMER HYDROCHLORIDE 1600 MILLIGRAM(S): 800 TABLET ORAL at 11:50

## 2025-06-03 RX ADMIN — Medication 81 MILLIGRAM(S): at 11:21

## 2025-06-03 NOTE — PROGRESS NOTE ADULT - ASSESSMENT
39 yr old male presenting with electrolyte abnormalities, right sided heaviness      Problem/Plan - 1:  ·  Problem: Electrolyte disturbance.   ·  Plan: New  s/p Ca gluconate 2g IV x1, insulin/dextrose, lokelma 10mg x1   monitor with HD     Problem/Plan - 2:  ·  Problem: Right sided weakness.   ·  Plan: Acute on chronic   Pt reports waxing and waning symptoms since 12/2024  CT head: as above  neuro consult appreciated     Problem/Plan - 3:  ·  Problem: ESRD on hemodialysis.   ·  Plan: Chronic unstable given electrolyte disturbances   HD as scheduled   renal fu     Problem/Plan - 4:  ·  Problem: Blurry vision  ·  Plan: neuro fu appreciated  - CT reviewed     Problem/Plan - 5:  ·  Problem: hyperparathyroid  ·  Plan: ENT consult reviewed  endo fu pending      dw pt at length

## 2025-06-04 LAB
24R-OH-CALCIDIOL SERPL-MCNC: 6.9 NG/ML — SIGNIFICANT CHANGE UP
ALBUMIN SERPL ELPH-MCNC: 3.4 G/DL — SIGNIFICANT CHANGE UP (ref 3.3–5)
ANION GAP SERPL CALC-SCNC: 16 MMOL/L — HIGH (ref 7–14)
BUN SERPL-MCNC: 70 MG/DL — HIGH (ref 7–23)
CALCIUM SERPL-MCNC: 8.4 MG/DL — SIGNIFICANT CHANGE UP (ref 8.4–10.5)
CHLORIDE SERPL-SCNC: 98 MMOL/L — SIGNIFICANT CHANGE UP (ref 98–107)
CO2 SERPL-SCNC: 24 MMOL/L — SIGNIFICANT CHANGE UP (ref 22–31)
CREAT SERPL-MCNC: 9.4 MG/DL — HIGH (ref 0.5–1.3)
EGFR: 7 ML/MIN/1.73M2 — LOW
EGFR: 7 ML/MIN/1.73M2 — LOW
GLUCOSE SERPL-MCNC: 86 MG/DL — SIGNIFICANT CHANGE UP (ref 70–99)
HCT VFR BLD CALC: 25.6 % — LOW (ref 39–50)
HGB BLD-MCNC: 7.9 G/DL — LOW (ref 13–17)
MAGNESIUM SERPL-MCNC: 2.3 MG/DL — SIGNIFICANT CHANGE UP (ref 1.6–2.6)
MCHC RBC-ENTMCNC: 30.9 G/DL — LOW (ref 32–36)
MCHC RBC-ENTMCNC: 32.4 PG — SIGNIFICANT CHANGE UP (ref 27–34)
MCV RBC AUTO: 104.9 FL — HIGH (ref 80–100)
NRBC # BLD AUTO: 0 K/UL — SIGNIFICANT CHANGE UP (ref 0–0)
NRBC # FLD: 0 K/UL — SIGNIFICANT CHANGE UP (ref 0–0)
NRBC BLD AUTO-RTO: 0 /100 WBCS — SIGNIFICANT CHANGE UP (ref 0–0)
PHOSPHATE SERPL-MCNC: 6.5 MG/DL — HIGH (ref 2.5–4.5)
PLATELET # BLD AUTO: 197 K/UL — SIGNIFICANT CHANGE UP (ref 150–400)
POTASSIUM SERPL-MCNC: 4.9 MMOL/L — SIGNIFICANT CHANGE UP (ref 3.5–5.3)
POTASSIUM SERPL-SCNC: 4.9 MMOL/L — SIGNIFICANT CHANGE UP (ref 3.5–5.3)
RBC # BLD: 2.44 M/UL — LOW (ref 4.2–5.8)
RBC # FLD: 13.8 % — SIGNIFICANT CHANGE UP (ref 10.3–14.5)
SODIUM SERPL-SCNC: 138 MMOL/L — SIGNIFICANT CHANGE UP (ref 135–145)
WBC # BLD: 3.89 K/UL — SIGNIFICANT CHANGE UP (ref 3.8–10.5)
WBC # FLD AUTO: 3.89 K/UL — SIGNIFICANT CHANGE UP (ref 3.8–10.5)

## 2025-06-04 PROCEDURE — 90935 HEMODIALYSIS ONE EVALUATION: CPT

## 2025-06-04 RX ADMIN — Medication 1 APPLICATION(S): at 12:23

## 2025-06-04 RX ADMIN — Medication 650 MILLIGRAM(S): at 08:30

## 2025-06-04 RX ADMIN — Medication 1334 MILLIGRAM(S): at 17:10

## 2025-06-04 RX ADMIN — Medication 1334 MILLIGRAM(S): at 12:15

## 2025-06-04 RX ADMIN — SEVELAMER HYDROCHLORIDE 1600 MILLIGRAM(S): 800 TABLET ORAL at 12:16

## 2025-06-04 RX ADMIN — OXYCODONE HYDROCHLORIDE 5 MILLIGRAM(S): 30 TABLET ORAL at 22:16

## 2025-06-04 RX ADMIN — HEPARIN SODIUM 5000 UNIT(S): 1000 INJECTION INTRAVENOUS; SUBCUTANEOUS at 17:09

## 2025-06-04 RX ADMIN — HEPARIN SODIUM 5000 UNIT(S): 1000 INJECTION INTRAVENOUS; SUBCUTANEOUS at 01:20

## 2025-06-04 RX ADMIN — Medication 1334 MILLIGRAM(S): at 22:15

## 2025-06-04 RX ADMIN — CALCITRIOL 0.25 MICROGRAM(S): 0.5 CAPSULE, GELATIN COATED ORAL at 12:14

## 2025-06-04 RX ADMIN — Medication 81 MILLIGRAM(S): at 12:15

## 2025-06-04 RX ADMIN — HEPARIN SODIUM 5000 UNIT(S): 1000 INJECTION INTRAVENOUS; SUBCUTANEOUS at 11:00

## 2025-06-04 RX ADMIN — OXYCODONE HYDROCHLORIDE 5 MILLIGRAM(S): 30 TABLET ORAL at 03:00

## 2025-06-04 RX ADMIN — EPOETIN ALFA 10000 UNIT(S): 10000 SOLUTION INTRAVENOUS; SUBCUTANEOUS at 08:30

## 2025-06-04 RX ADMIN — POLYETHYLENE GLYCOL 3350 17 GRAM(S): 17 POWDER, FOR SOLUTION ORAL at 12:14

## 2025-06-04 RX ADMIN — METOPROLOL SUCCINATE 25 MILLIGRAM(S): 50 TABLET, EXTENDED RELEASE ORAL at 05:31

## 2025-06-04 RX ADMIN — SEVELAMER HYDROCHLORIDE 1600 MILLIGRAM(S): 800 TABLET ORAL at 17:10

## 2025-06-04 RX ADMIN — OXYCODONE HYDROCHLORIDE 5 MILLIGRAM(S): 30 TABLET ORAL at 22:46

## 2025-06-04 RX ADMIN — OXYCODONE HYDROCHLORIDE 5 MILLIGRAM(S): 30 TABLET ORAL at 04:00

## 2025-06-04 RX ADMIN — MIDODRINE HYDROCHLORIDE 20 MILLIGRAM(S): 5 TABLET ORAL at 05:31

## 2025-06-04 RX ADMIN — METOPROLOL SUCCINATE 25 MILLIGRAM(S): 50 TABLET, EXTENDED RELEASE ORAL at 17:10

## 2025-06-04 NOTE — DIETITIAN INITIAL EVALUATION ADULT - PERTINENT LABORATORY DATA
06-04    138  |  98  |  70[H]  ----------------------------<  86  4.9   |  24  |  9.40[H]    Ca    8.4      04 Jun 2025 06:30  Phos  6.5     06-04  Mg     2.30     06-04    TPro  x   /  Alb  3.4  /  TBili  x   /  DBili  x   /  AST  x   /  ALT  x   /  AlkPhos  x   06-04

## 2025-06-04 NOTE — DIETITIAN INITIAL EVALUATION ADULT - PERTINENT MEDS FT
MEDICATIONS  (STANDING):  aspirin enteric coated 81 milliGRAM(s) Oral daily  calcitriol   Capsule 0.25 MICROGram(s) Oral daily  calcium acetate 1334 milliGRAM(s) Oral four times a day with meals  chlorhexidine 2% Cloths 1 Application(s) Topical daily  dextrose 5%. 1000 milliLiter(s) (100 mL/Hr) IV Continuous <Continuous>  dextrose 50% Injectable 25 Gram(s) IV Push once  epoetin carito-epbx (RETACRIT) Injectable 56377 Unit(s) IV Push <User Schedule>  heparin   Injectable 5000 Unit(s) SubCutaneous every 8 hours  melatonin 9 milliGRAM(s) Oral at bedtime  metoprolol tartrate 25 milliGRAM(s) Oral two times a day  midodrine 20 milliGRAM(s) Oral <User Schedule>  polyethylene glycol 3350 17 Gram(s) Oral daily  senna 2 Tablet(s) Oral at bedtime  sevelamer carbonate 1600 milliGRAM(s) Oral three times a day with meals    MEDICATIONS  (PRN):  acetaminophen     Tablet .. 650 milliGRAM(s) Oral every 6 hours PRN Temp greater or equal to 38C (100.4F), Mild Pain (1 - 3)  aluminum hydroxide/magnesium hydroxide/simethicone Suspension 30 milliLiter(s) Oral every 4 hours PRN Dyspepsia  baclofen 10 milliGRAM(s) Oral every 12 hours PRN Muscle Spasm  bisacodyl 5 milliGRAM(s) Oral every 12 hours PRN Constipation  ondansetron Injectable 4 milliGRAM(s) IV Push every 8 hours PRN Nausea and/or Vomiting  oxyCODONE    IR 5 milliGRAM(s) Oral every 6 hours PRN Severe Pain (7 - 10)

## 2025-06-04 NOTE — DIETITIAN INITIAL EVALUATION ADULT - NS FNS DIET ORDER
Diet, Regular:   DASH/TLC {Sodium & Cholesterol Restricted} (DASH)  1200mL Fluid Restriction ([id number])  For patients receiving Renal Replacement - No Protein Restr, No Conc K, No Conc Phos, Low Sodium (RENAL) (05-28-25 @ 18:34) [Active]

## 2025-06-04 NOTE — DIETITIAN INITIAL EVALUATION ADULT - ORAL INTAKE PTA/DIET HISTORY
Pt seen sleeping at the dialysis unit, did not respond to his name when called out. Collateral obtained via chart review and RN.  92.1 KG (1/14/25)

## 2025-06-04 NOTE — DIETITIAN INITIAL EVALUATION ADULT - PROBLEM SELECTOR PLAN 3
Chronic unstable given electrolyte disturbances   HD on MWF last HD 5/26/25  Nephrology consulted: HD today  Continue cinacalcet 30mg daily, sevelamer 1600mg TID  BMP in AM

## 2025-06-04 NOTE — DIETITIAN INITIAL EVALUATION ADULT - PROBLEM SELECTOR PLAN 2
Acute on chronic   Pt reports waxing and waning symptoms since 12/2024  CT head: as above  If symptoms do not improve following electrolyte disturbance correction and following HD would consider Neurology consult in AM   B12, Folate ordered

## 2025-06-04 NOTE — DIETITIAN INITIAL EVALUATION ADULT - OTHER INFO
39 yr old male presenting with electrolyte abnormalities, right sided heaviness.    Pt seen sleeping at the dialysis unit, did not respond to his name when called out. RN reports pt with good appetite and consumes 75% meals . No GI distress (nausea/vomiting/diarrhea/constipation.) at present. Noted skin ecchymosis, +1 Rt & Lt leg edema per nursing flow sheet. Noted fluid related wt fluctuations in past 6 months per UBW-202.6# (1/14/25). Rec supplement Nepro 1x daily (425 kcals, 19.1g protein).

## 2025-06-05 PROCEDURE — 99233 SBSQ HOSP IP/OBS HIGH 50: CPT

## 2025-06-05 RX ORDER — EPOETIN ALFA 10000 [IU]/ML
10000 SOLUTION INTRAVENOUS; SUBCUTANEOUS
Refills: 0 | Status: DISCONTINUED | OUTPATIENT
Start: 2025-06-05 | End: 2025-06-13

## 2025-06-05 RX ORDER — HEPARIN SODIUM 1000 [USP'U]/ML
5000 INJECTION INTRAVENOUS; SUBCUTANEOUS EVERY 8 HOURS
Refills: 0 | Status: DISCONTINUED | OUTPATIENT
Start: 2025-06-05 | End: 2025-06-13

## 2025-06-05 RX ADMIN — Medication 650 MILLIGRAM(S): at 05:05

## 2025-06-05 RX ADMIN — Medication 650 MILLIGRAM(S): at 22:36

## 2025-06-05 RX ADMIN — Medication 1334 MILLIGRAM(S): at 12:05

## 2025-06-05 RX ADMIN — Medication 81 MILLIGRAM(S): at 12:05

## 2025-06-05 RX ADMIN — Medication 1334 MILLIGRAM(S): at 22:33

## 2025-06-05 RX ADMIN — METOPROLOL SUCCINATE 25 MILLIGRAM(S): 50 TABLET, EXTENDED RELEASE ORAL at 17:16

## 2025-06-05 RX ADMIN — Medication 1334 MILLIGRAM(S): at 17:17

## 2025-06-05 RX ADMIN — HEPARIN SODIUM 5000 UNIT(S): 1000 INJECTION INTRAVENOUS; SUBCUTANEOUS at 22:34

## 2025-06-05 RX ADMIN — METOPROLOL SUCCINATE 25 MILLIGRAM(S): 50 TABLET, EXTENDED RELEASE ORAL at 05:06

## 2025-06-05 RX ADMIN — Medication 9 MILLIGRAM(S): at 22:35

## 2025-06-05 RX ADMIN — SEVELAMER HYDROCHLORIDE 1600 MILLIGRAM(S): 800 TABLET ORAL at 17:17

## 2025-06-05 RX ADMIN — CALCITRIOL 0.25 MICROGRAM(S): 0.5 CAPSULE, GELATIN COATED ORAL at 12:05

## 2025-06-05 RX ADMIN — Medication 650 MILLIGRAM(S): at 05:35

## 2025-06-05 RX ADMIN — HEPARIN SODIUM 5000 UNIT(S): 1000 INJECTION INTRAVENOUS; SUBCUTANEOUS at 01:00

## 2025-06-05 RX ADMIN — Medication 1 APPLICATION(S): at 12:05

## 2025-06-05 RX ADMIN — HEPARIN SODIUM 5000 UNIT(S): 1000 INJECTION INTRAVENOUS; SUBCUTANEOUS at 14:02

## 2025-06-05 RX ADMIN — SEVELAMER HYDROCHLORIDE 1600 MILLIGRAM(S): 800 TABLET ORAL at 08:22

## 2025-06-05 RX ADMIN — HEPARIN SODIUM 5000 UNIT(S): 1000 INJECTION INTRAVENOUS; SUBCUTANEOUS at 08:23

## 2025-06-05 RX ADMIN — Medication 1334 MILLIGRAM(S): at 08:22

## 2025-06-05 RX ADMIN — SEVELAMER HYDROCHLORIDE 1600 MILLIGRAM(S): 800 TABLET ORAL at 12:05

## 2025-06-05 NOTE — CONSULT NOTE ADULT - NS ATTEND AMEND GEN_ALL_CORE FT
Patient seen and examined. Agree with plan as detailed in PA/NP Note.     Check repeat TTE to ensure peaked t waves have resolved  Needs education on low potassium diet    Monique Sanders MD  Pager: [phone]  Office: [phone]
autonomic bradycardia, likely vagal from SOLITARIO, no EP intervention at this time.

## 2025-06-05 NOTE — CONSULT NOTE ADULT - ASSESSMENT
Patient is a 39y old  Male with PMH of ESRD on hemodialysis, HTN, orthostatic hypotension, CVA, and renal osteodystrophy who presents with right sided waxing and waning upper and lower extremity weakness as well as intermittent tongue heaviness that has been going on since 12/2024 . Patient found to have hyperkalemia with K 7.1, Ca 0.87, s/p Ca gluconate IVP and HyperK cocktail and normalized after treatment. Patient found to have one episode of bradycardia with 3.16sec pause on Tele monitor this morning and EP is consulted. Tele reviewed: NSR at 70s with episode of sinus bradycardia to 40s and 3.16 sinus pause while patient was sleeping around 6am ( confirmed with RN), and patient remains asymptomatic at that time. Patient denies hx of cardiac disease or bradycardia. Currently on metoprolol 25mg BID. Admits he has SOLITARIO for which he was recommended to use CPAP. TTE in 11/2023 revealed EF 55%-60%.     ## electrolytes derangement  ## bradycardia and pause     RECOMMENDATIONS:  - Continuous telemetric monitoring for symptomatic bradycardia, advanced AVB, prolonged Pause; now in NSR at 70s  - Monitor electrolytes and replete K to 4 and Mg to 2  - SOLITARIO eval and management ( hx of SOLITARIO w/ CPAP use)  - TSH 4.94, T3 75, T4 4.01, endocrinology to eval   - TTE with normal LVEF in 2023  - Continue care per primary team    Patient to be staffed with attending. Please await attending addendum   Patient is a 39y old  Male with PMH of ESRD on hemodialysis, HTN, orthostatic hypotension, CVA, and renal osteodystrophy who presents with right sided waxing and waning upper and lower extremity weakness as well as intermittent tongue heaviness that has been going on since 12/2024 . Patient found to have hyperkalemia with K 7.1, Ca 0.87, s/p Ca gluconate IVP and HyperK cocktail and normalized after treatment. Patient found to have one episode of bradycardia with 3.16sec pause on Tele monitor this morning and EP is consulted. Tele reviewed: NSR at 70s with episode of sinus bradycardia to 40s and 3.16 sinus pause while patient was sleeping around 6am ( confirmed with RN), and patient remains asymptomatic at that time. Patient denies hx of cardiac disease or bradycardia. Currently on metoprolol 25mg BID. Admits he has SOLITARIO for which he was recommended to use CPAP. TTE in 11/2023 revealed EF 55%-60%.     ## electrolytes derangement  ## bradycardia and pause     RECOMMENDATIONS:  - Continuous telemetric monitoring for symptomatic bradycardia, advanced AVB, prolonged Pause; now in NSR at 70s  - Monitor electrolytes and replete K to 4 and Mg to 2  - Please hold AVN blocking agent ( pt. is currently on Metoprolol)  - SOLITARIO eval and management ( hx of SOLITARIO w/ CPAP use)  - TSH 4.94, T3 75, T4 4.01, endocrinology to eval   - TTE with normal LVEF in 2023  - Continue care per primary team    Patient to be staffed with attending. Please await attending addendum

## 2025-06-05 NOTE — CONSULT NOTE ADULT - SUBJECTIVE AND OBJECTIVE BOX
Source: patient and Chart    HPI:  Patient is a 39y old  Male with PMH of ESRD on hemodialysis, HTN, orthostatic hypotension, CVA, and renal osteodystrophy who presents with right sided waxing and waning upper and lower extremity weakness as well as intermittent tongue heaviness that has been going on since 12/2024 . Patient found to have hyperkalemia with K 7.1, Ca 0.87, s/p Ca gluconate IVP and HyperK cocktail and normalized after treatment. Patient found to have one episode of bradycardia with 3.16sec pause on Tele monitor this morning and EP is consulted. Tele reviewed: NSR at 70s with episode of sinus bradycardia to 40s and 3.16 sinus pause while patient was sleeping around 6am ( confirmed with RN), and patient remains asymptomatic at that time. Patient denies hx of cardiac disease or bradycardia. Admits he has SOLITARIO for which he was recommended to use CPAP. TTE in 11/2023 revealed EF 55%-60%.          PAST MEDICAL & SURGICAL HISTORY:  HTN (hypertension)      Stroke  age 10      Sleep apnea      Leg fracture, right      Hemodialysis access, AV graft      ESRD (end stage renal disease) on dialysis  since 2013, M-W-F      Anemia, unspecified type      Other hyperparathyroidism      Orthostatic hypotension      Renal osteodystrophy      AV fistula  R arm; L arm clotted      History of left hip hemiarthroplasty      S/P parathyroidectomy            MEDICATIONS  (STANDING):  aspirin enteric coated 81 milliGRAM(s) Oral daily  calcitriol   Capsule 0.25 MICROGram(s) Oral daily  calcium acetate 1334 milliGRAM(s) Oral four times a day with meals  chlorhexidine 2% Cloths 1 Application(s) Topical daily  dextrose 5%. 1000 milliLiter(s) (100 mL/Hr) IV Continuous <Continuous>  dextrose 50% Injectable 25 Gram(s) IV Push once  epoetin carito-epbx (RETACRIT) Injectable 85141 Unit(s) IV Push <User Schedule>  heparin   Injectable 5000 Unit(s) SubCutaneous every 8 hours  melatonin 9 milliGRAM(s) Oral at bedtime  metoprolol tartrate 25 milliGRAM(s) Oral two times a day  midodrine 20 milliGRAM(s) Oral <User Schedule>  polyethylene glycol 3350 17 Gram(s) Oral daily  senna 2 Tablet(s) Oral at bedtime  sevelamer carbonate 1600 milliGRAM(s) Oral three times a day with meals    MEDICATIONS  (PRN):  acetaminophen     Tablet .. 650 milliGRAM(s) Oral every 6 hours PRN Temp greater or equal to 38C (100.4F), Mild Pain (1 - 3)  aluminum hydroxide/magnesium hydroxide/simethicone Suspension 30 milliLiter(s) Oral every 4 hours PRN Dyspepsia  baclofen 10 milliGRAM(s) Oral every 12 hours PRN Muscle Spasm  bisacodyl 5 milliGRAM(s) Oral every 12 hours PRN Constipation  ondansetron Injectable 4 milliGRAM(s) IV Push every 8 hours PRN Nausea and/or Vomiting      FAMILY HISTORY:  No pertinent family history in first degree relatives        SOCIAL HISTORY:    LIVING SITUATION:  CIGARETTES: Denied  ALCOHOL: denied   ILLICIT DRUG USES: denied    REVIEW OF SYSTEMS:  CONSTITUTIONAL: No fever, weight loss, chills, shakes, or fatigue, + extremities weakness, + tongue heaviness  EYES: No eye pain, visual disturbances, or discharge  ENMT:  No difficulty hearing, tinnitus, vertigo; No sinus or throat pain  NECK: No pain or stiffness  RESPIRATORY: No cough, wheezing, hemoptysis, or shortness of breath  CARDIOVASCULAR: No chest pain, dyspnea, palpitations, dizziness, syncope, paroxysmal nocturnal dyspnea, orthopnea, or arm or leg swelling  GASTROINTESTINAL: No abdominal  or epigastric pain, nausea, vomiting, hematemesis, diarrhea, constipation, melena or bright red blood.  GENITOURINARY: No dysuria, nocturia, hematuria, or urinary incontinence  NEUROLOGICAL: No headaches, memory loss, slurred speech, limb weakness, loss of strength, numbness, or tremors  MUSCULOSKELETAL: No joint pain or swelling, muscle, back, or extremity pain  PSYCHIATRIC: No depression, anxiety, or difficulty sleeping        Vital Signs Last 24 Hrs  T(C): 36.9 (05 Jun 2025 11:00), Max: 37.4 (05 Jun 2025 05:00)  T(F): 98.4 (05 Jun 2025 11:00), Max: 99.3 (05 Jun 2025 05:00)  HR: 65 (05 Jun 2025 11:00) (65 - 79)  BP: 109/72 (05 Jun 2025 11:00) (107/61 - 123/68)  BP(mean): --  RR: 17 (05 Jun 2025 11:00) (17 - 18)  SpO2: 98% (05 Jun 2025 11:00) (96% - 98%)    Parameters below as of 05 Jun 2025 11:00  Patient On (Oxygen Delivery Method): room air        PHYSICAL EXAM:  GENERAL: Well appearing, in NAD  HEAD:  Atraumatic, Normocephalic  EYES: EOMI, PERRLA, conjunctiva and sclera clear  ENMT: No tonsillar erythema, exudates, or enlargement; Moist mucous membranes, Good dentition, No lesions  NECK: Supple and normal thyroid.  No JVD or carotid bruit.    HEART: S1S2 RRR; No murmurs, rubs, or gallops appreciated .  PULMONARY:CTABL, normal respiratory effort.    ABDOMEN: Bowel sounds present, soft, NDNT  EXTREMITIES:  Warm, well -perfused, no pedal edema, distal pulses present  NEUROLOGICAL:AOx3     INTERPRETATION OF TELEMETRY: NSR at 70s with episode of sinus bradycardia and 3.61 sinus pause     ECG: NSR at 71bpm, peak T wave ( 5/28/2025)    I&O's Detail    04 Jun 2025 07:01  -  05 Jun 2025 07:00  --------------------------------------------------------  IN:    Other (mL): 400 mL  Total IN: 400 mL    OUT:    Other (mL): 2400 mL  Total OUT: 2400 mL    Total NET: -2000 mL          LABS:                        7.9    3.89  )-----------( 197      ( 04 Jun 2025 06:30 )             25.6     06-04    138  |  98  |  70[H]  ----------------------------<  86  4.9   |  24  |  9.40[H]    Ca    8.4      04 Jun 2025 06:30  Phos  6.5     06-04  Mg     2.30     06-04    TPro  x   /  Alb  3.4  /  TBili  x   /  DBili  x   /  AST  x   /  ALT  x   /  AlkPhos  x   06-04          Urinalysis Basic - ( 04 Jun 2025 06:30 )    Color: x / Appearance: x / SG: x / pH: x  Gluc: 86 mg/dL / Ketone: x  / Bili: x / Urobili: x   Blood: x / Protein: x / Nitrite: x   Leuk Esterase: x / RBC: x / WBC x   Sq Epi: x / Non Sq Epi: x / Bacteria: x      BNP  I&O's Detail    04 Jun 2025 07:01  -  05 Jun 2025 07:00  --------------------------------------------------------  IN:    Other (mL): 400 mL  Total IN: 400 mL    OUT:    Other (mL): 2400 mL  Total OUT: 2400 mL    Total NET: -2000 mL        Daily     Daily     RADIOLOGY & ADDITIONAL STUDIES:

## 2025-06-05 NOTE — PROGRESS NOTE ADULT - ASSESSMENT
39 yr old male presenting with electrolyte abnormalities, right sided heaviness      Problem/Plan - 1:  ·  Problem: Electrolyte disturbance.   ·  Plan: New  s/p Ca gluconate 2g IV x1, insulin/dextrose, lokelma 10mg x1   monitor with HD     Problem/Plan - 2:  ·  Problem: Right sided weakness.   ·  Plan: Acute on chronic   Pt reports waxing and waning symptoms since 12/2024  CT head: as above  neuro consult appreciated     Problem/Plan - 3:  ·  Problem: ESRD on hemodialysis.   ·  Plan: Chronic unstable given electrolyte disturbances   HD as scheduled   renal fu     Problem/Plan - 4:  ·  Problem: Blurry vision  ·  Plan: neuro fu appreciated  - CT reviewed     Problem/Plan - 5:  ·  Problem: hyperparathyroid  ·  Plan: ENT consult reviewed  endo fu pending  outpt fu       dw pt at length

## 2025-06-05 NOTE — CONSULT NOTE ADULT - REASON FOR ADMISSION
electrolyte abnormalities, right sided heaviness

## 2025-06-05 NOTE — CHART NOTE - NSCHARTNOTEFT_GEN_A_CORE
Medicine PA Note    Called by nurse for several episodes of bradycardia noted on tele. Pt noted to have several intermittent episodes of renan down to the 30s. House EP c/s called for a consult.    Venkatesh Dang PA-C  x [phone]

## 2025-06-06 LAB
ALBUMIN SERPL ELPH-MCNC: 3.3 G/DL — SIGNIFICANT CHANGE UP (ref 3.3–5)
ALP SERPL-CCNC: 1087 U/L — HIGH (ref 40–120)
ALT FLD-CCNC: <5 U/L — SIGNIFICANT CHANGE UP (ref 4–41)
ANION GAP SERPL CALC-SCNC: 16 MMOL/L — HIGH (ref 7–14)
ANISOCYTOSIS BLD QL: SLIGHT — SIGNIFICANT CHANGE UP
AST SERPL-CCNC: 6 U/L — SIGNIFICANT CHANGE UP (ref 4–40)
BASOPHILS # BLD AUTO: 0 K/UL — SIGNIFICANT CHANGE UP (ref 0–0.2)
BASOPHILS NFR BLD AUTO: 0 % — SIGNIFICANT CHANGE UP (ref 0–2)
BILIRUB SERPL-MCNC: 0.3 MG/DL — SIGNIFICANT CHANGE UP (ref 0.2–1.2)
BUN SERPL-MCNC: 69 MG/DL — HIGH (ref 7–23)
CALCIUM SERPL-MCNC: 8.6 MG/DL — SIGNIFICANT CHANGE UP (ref 8.4–10.5)
CHLORIDE SERPL-SCNC: 97 MMOL/L — LOW (ref 98–107)
CO2 SERPL-SCNC: 23 MMOL/L — SIGNIFICANT CHANGE UP (ref 22–31)
CREAT SERPL-MCNC: 8.71 MG/DL — HIGH (ref 0.5–1.3)
EGFR: 7 ML/MIN/1.73M2 — LOW
EGFR: 7 ML/MIN/1.73M2 — LOW
EOSINOPHIL # BLD AUTO: 0.18 K/UL — SIGNIFICANT CHANGE UP (ref 0–0.5)
EOSINOPHIL NFR BLD AUTO: 6.1 % — HIGH (ref 0–6)
GIANT PLATELETS BLD QL SMEAR: PRESENT — SIGNIFICANT CHANGE UP
GLUCOSE SERPL-MCNC: 70 MG/DL — SIGNIFICANT CHANGE UP (ref 70–99)
HCT VFR BLD CALC: 24.6 % — LOW (ref 39–50)
HGB BLD-MCNC: 7.7 G/DL — LOW (ref 13–17)
IANC: 1.28 K/UL — LOW (ref 1.8–7.4)
LYMPHOCYTES # BLD AUTO: 0.91 K/UL — LOW (ref 1–3.3)
LYMPHOCYTES # BLD AUTO: 31.6 % — SIGNIFICANT CHANGE UP (ref 13–44)
MACROCYTES BLD QL: SLIGHT — SIGNIFICANT CHANGE UP
MAGNESIUM SERPL-MCNC: 2.3 MG/DL — SIGNIFICANT CHANGE UP (ref 1.6–2.6)
MANUAL SMEAR VERIFICATION: SIGNIFICANT CHANGE UP
MCHC RBC-ENTMCNC: 31.3 G/DL — LOW (ref 32–36)
MCHC RBC-ENTMCNC: 32.9 PG — SIGNIFICANT CHANGE UP (ref 27–34)
MCV RBC AUTO: 105.1 FL — HIGH (ref 80–100)
MONOCYTES # BLD AUTO: 0.23 K/UL — SIGNIFICANT CHANGE UP (ref 0–0.9)
MONOCYTES NFR BLD AUTO: 7.9 % — SIGNIFICANT CHANGE UP (ref 2–14)
NEUTROPHILS # BLD AUTO: 1.5 K/UL — LOW (ref 1.8–7.4)
NEUTROPHILS NFR BLD AUTO: 51.8 % — SIGNIFICANT CHANGE UP (ref 43–77)
OVALOCYTES BLD QL SMEAR: SLIGHT — SIGNIFICANT CHANGE UP
PHOSPHATE SERPL-MCNC: 6.1 MG/DL — HIGH (ref 2.5–4.5)
PLAT MORPH BLD: ABNORMAL
PLATELET # BLD AUTO: 185 K/UL — SIGNIFICANT CHANGE UP (ref 150–400)
PLATELET COUNT - ESTIMATE: NORMAL — SIGNIFICANT CHANGE UP
POLYCHROMASIA BLD QL SMEAR: SLIGHT — SIGNIFICANT CHANGE UP
POTASSIUM SERPL-MCNC: 5.2 MMOL/L — SIGNIFICANT CHANGE UP (ref 3.5–5.3)
POTASSIUM SERPL-SCNC: 5.2 MMOL/L — SIGNIFICANT CHANGE UP (ref 3.5–5.3)
PROT SERPL-MCNC: 6.1 G/DL — SIGNIFICANT CHANGE UP (ref 6–8.3)
RBC # BLD: 2.34 M/UL — LOW (ref 4.2–5.8)
RBC # FLD: 14.2 % — SIGNIFICANT CHANGE UP (ref 10.3–14.5)
RBC BLD AUTO: ABNORMAL
SCHISTOCYTES BLD QL AUTO: SLIGHT — SIGNIFICANT CHANGE UP
SMUDGE CELLS # BLD: PRESENT — SIGNIFICANT CHANGE UP
SODIUM SERPL-SCNC: 136 MMOL/L — SIGNIFICANT CHANGE UP (ref 135–145)
VARIANT LYMPHS # BLD: 2.6 % — SIGNIFICANT CHANGE UP (ref 0–6)
VARIANT LYMPHS NFR BLD MANUAL: 2.6 % — SIGNIFICANT CHANGE UP (ref 0–6)
WBC # BLD: 2.89 K/UL — LOW (ref 3.8–10.5)
WBC # FLD AUTO: 2.89 K/UL — LOW (ref 3.8–10.5)

## 2025-06-06 PROCEDURE — 99231 SBSQ HOSP IP/OBS SF/LOW 25: CPT

## 2025-06-06 PROCEDURE — 90935 HEMODIALYSIS ONE EVALUATION: CPT

## 2025-06-06 RX ADMIN — HEPARIN SODIUM 5000 UNIT(S): 1000 INJECTION INTRAVENOUS; SUBCUTANEOUS at 13:15

## 2025-06-06 RX ADMIN — METOPROLOL SUCCINATE 25 MILLIGRAM(S): 50 TABLET, EXTENDED RELEASE ORAL at 18:00

## 2025-06-06 RX ADMIN — HEPARIN SODIUM 5000 UNIT(S): 1000 INJECTION INTRAVENOUS; SUBCUTANEOUS at 05:32

## 2025-06-06 RX ADMIN — Medication 1334 MILLIGRAM(S): at 13:14

## 2025-06-06 RX ADMIN — SEVELAMER HYDROCHLORIDE 1600 MILLIGRAM(S): 800 TABLET ORAL at 13:15

## 2025-06-06 RX ADMIN — MIDODRINE HYDROCHLORIDE 20 MILLIGRAM(S): 5 TABLET ORAL at 05:32

## 2025-06-06 RX ADMIN — Medication 1334 MILLIGRAM(S): at 22:22

## 2025-06-06 RX ADMIN — SEVELAMER HYDROCHLORIDE 1600 MILLIGRAM(S): 800 TABLET ORAL at 18:00

## 2025-06-06 RX ADMIN — Medication 9 MILLIGRAM(S): at 22:21

## 2025-06-06 RX ADMIN — Medication 650 MILLIGRAM(S): at 22:50

## 2025-06-06 RX ADMIN — HEPARIN SODIUM 5000 UNIT(S): 1000 INJECTION INTRAVENOUS; SUBCUTANEOUS at 22:23

## 2025-06-06 RX ADMIN — Medication 650 MILLIGRAM(S): at 22:20

## 2025-06-06 RX ADMIN — CALCITRIOL 0.25 MICROGRAM(S): 0.5 CAPSULE, GELATIN COATED ORAL at 13:16

## 2025-06-06 RX ADMIN — METOPROLOL SUCCINATE 25 MILLIGRAM(S): 50 TABLET, EXTENDED RELEASE ORAL at 05:33

## 2025-06-06 RX ADMIN — Medication 1334 MILLIGRAM(S): at 17:59

## 2025-06-06 RX ADMIN — Medication 1 APPLICATION(S): at 13:22

## 2025-06-06 RX ADMIN — Medication 81 MILLIGRAM(S): at 13:15

## 2025-06-06 RX ADMIN — EPOETIN ALFA 10000 UNIT(S): 10000 SOLUTION INTRAVENOUS; SUBCUTANEOUS at 08:20

## 2025-06-06 NOTE — PROGRESS NOTE ADULT - ASSESSMENT
Phone call to mother and she states patient has possible bronchitis and was requesting an antibiotic and nebulizer treatment solution.    appt scheduled with Dr Vega this am for a telephone visit.   39 yr old male presenting with electrolyte abnormalities, right sided heaviness      Problem/Plan - 1:  ·  Problem: Electrolyte disturbance.   ·  Plan: New  s/p Ca gluconate 2g IV x1, insulin/dextrose, lokelma 10mg x1   monitor with HD     Problem/Plan - 2:  ·  Problem: Right sided weakness.   ·  Plan: Acute on chronic   Pt reports waxing and waning symptoms since 12/2024  CT head: as above  neuro consult appreciated     Problem/Plan - 3:  ·  Problem: ESRD on hemodialysis.   ·  Plan: Chronic unstable given electrolyte disturbances   HD as scheduled   renal fu     Problem/Plan - 4:  ·  Problem: bradycardia  ·  Plan: telemonitor  EP fu     Problem/Plan - 5:  ·  Problem: hyperparathyroid·  Plan: ENT consult reviewed  endo fu pending  outpt fu       dw pt at length

## 2025-06-06 NOTE — PROGRESS NOTE ADULT - ASSESSMENT
Patient is a 39y old  Male with PMH of ESRD on hemodialysis, HTN, orthostatic hypotension, CVA, and renal osteodystrophy who presents with right sided waxing and waning upper and lower extremity weakness as well as intermittent tongue heaviness that has been going on since 12/2024 . Patient found to have hyperkalemia with K 7.1, Ca 0.87, s/p Ca gluconate IVP and HyperK cocktail and normalized after treatment. Patient found to have one episode of bradycardia with 3.16sec pause on Tele monitor this morning and EP is consulted. Tele reviewed: NSR at 70s with episode of sinus bradycardia to 40s and 3.16 sinus pause while patient was sleeping around 6am ( confirmed with RN), and patient remains asymptomatic at that time. Patient denies hx of cardiac disease or bradycardia. Currently on metoprolol 25mg BID. Admits he has SOLITAIRO for which he was recommended to use CPAP. TTE in 11/2023 revealed EF 55%-60%.     ## electrolytes derangement  ## bradycardia and pause     RECOMMENDATIONS:  - Continuous telemetric monitoring for symptomatic bradycardia, advanced AVB, prolonged Pause; now in NSR at 70s- likely vagal mediated   - Monitor electrolytes and replete K to 4 and Mg to 2  - continue to hold AV geronimo blockers   - SOLITARIO eval and management ( hx of SOLITARIO w/ CPAP use)  - TSH 4.94, T3 75, T4 4.01, endocrinology to eval   - TTE with normal LVEF in 2023  - Continue care per primary team    Patient to be staffed with attending. Please await attending addendum

## 2025-06-07 PROCEDURE — 90935 HEMODIALYSIS ONE EVALUATION: CPT | Mod: GC

## 2025-06-07 RX ORDER — MIDODRINE HYDROCHLORIDE 5 MG/1
20 TABLET ORAL ONCE
Refills: 0 | Status: COMPLETED | OUTPATIENT
Start: 2025-06-07 | End: 2025-06-07

## 2025-06-07 RX ADMIN — POLYETHYLENE GLYCOL 3350 17 GRAM(S): 17 POWDER, FOR SOLUTION ORAL at 13:56

## 2025-06-07 RX ADMIN — SEVELAMER HYDROCHLORIDE 1600 MILLIGRAM(S): 800 TABLET ORAL at 13:59

## 2025-06-07 RX ADMIN — Medication 650 MILLIGRAM(S): at 08:37

## 2025-06-07 RX ADMIN — Medication 1334 MILLIGRAM(S): at 13:57

## 2025-06-07 RX ADMIN — MIDODRINE HYDROCHLORIDE 20 MILLIGRAM(S): 5 TABLET ORAL at 05:24

## 2025-06-07 RX ADMIN — METOPROLOL SUCCINATE 25 MILLIGRAM(S): 50 TABLET, EXTENDED RELEASE ORAL at 14:08

## 2025-06-07 RX ADMIN — Medication 9 MILLIGRAM(S): at 22:23

## 2025-06-07 RX ADMIN — CALCITRIOL 0.25 MICROGRAM(S): 0.5 CAPSULE, GELATIN COATED ORAL at 13:58

## 2025-06-07 RX ADMIN — Medication 1334 MILLIGRAM(S): at 18:39

## 2025-06-07 RX ADMIN — METOPROLOL SUCCINATE 25 MILLIGRAM(S): 50 TABLET, EXTENDED RELEASE ORAL at 22:23

## 2025-06-07 RX ADMIN — HEPARIN SODIUM 5000 UNIT(S): 1000 INJECTION INTRAVENOUS; SUBCUTANEOUS at 05:24

## 2025-06-07 RX ADMIN — Medication 1334 MILLIGRAM(S): at 22:22

## 2025-06-07 RX ADMIN — HEPARIN SODIUM 5000 UNIT(S): 1000 INJECTION INTRAVENOUS; SUBCUTANEOUS at 22:23

## 2025-06-07 RX ADMIN — Medication 81 MILLIGRAM(S): at 13:58

## 2025-06-07 RX ADMIN — Medication 1 APPLICATION(S): at 14:05

## 2025-06-07 RX ADMIN — HEPARIN SODIUM 5000 UNIT(S): 1000 INJECTION INTRAVENOUS; SUBCUTANEOUS at 13:58

## 2025-06-07 RX ADMIN — SEVELAMER HYDROCHLORIDE 1600 MILLIGRAM(S): 800 TABLET ORAL at 18:39

## 2025-06-07 RX ADMIN — Medication 650 MILLIGRAM(S): at 09:37

## 2025-06-07 NOTE — PROVIDER CONTACT NOTE (CHANGE IN STATUS NOTIFICATION) - SITUATION
RN received call from [employer] that patient HR 33 on monitor; tele strip printed out shows 31 when counting HR; pt appears lethargic in bed s/p dialysis; vital signs stable upon arrival in room as per flowsheet; NSR on monitor after SB episode

## 2025-06-07 NOTE — PROGRESS NOTE ADULT - ASSESSMENT
39 yr old male presenting with electrolyte abnormalities, right sided heaviness      Problem/Plan - 1:  ·  Problem: Electrolyte disturbance.   ·  Plan: New  s/p Ca gluconate 2g IV x1, insulin/dextrose, lokelma 10mg x1   monitor with HD   renal fu     Problem/Plan - 2:  ·  Problem: Right sided weakness.   ·  Plan: Acute on chronic   Pt reports waxing and waning symptoms since 12/2024  CT head: as above  neuro consult appreciated     Problem/Plan - 3:  ·  Problem: ESRD on hemodialysis.   ·  Plan: Chronic unstable given electrolyte disturbances   HD as scheduled   renal fu     Problem/Plan - 4:  ·  Problem: Blurry vision  ·  Plan: neuro fu appreciated  - CT reviewed     Problem/Plan - 5:  ·  Problem: hyperparathyroid  ·  Plan: ENT consult reviewed  endo fu pending  outpt fu       dw pt at length

## 2025-06-07 NOTE — PROVIDER CONTACT NOTE (CHANGE IN STATUS NOTIFICATION) - ASSESSMENT
lethargic; vitals stable as per flowsheet A&OX4, but lethargic; patient able to verbalize that he is sleeping in and out because he is tired and just had dialysis; vitals stable as per flowsheet

## 2025-06-07 NOTE — PROGRESS NOTE ADULT - ATTENDING COMMENTS
1. ESRD - patient seen while on dialysis, tolerating treatment at time of visit.  Midodrine as ordered.  2. Anemia - Retacrit ordered.  Monitor response.  3. Hyperphosphatemia - on calcium acetate and Sevelamer.  Today's reading 6.9.   4. Hyperparathyroidism - PTH noted over 5000.  Patient aware with parathyroidectomy recommended.  Would continue with Rocaltrol but will need to hold Cinacalcet given calcium levels under 8.0.  Can restart when calcium levels appropriate.
Patient examined and ROS reviewed. A case of ESRD examined during HD. Patient is tolerating dialysis well. Blood flow through access is adequate. Advised to continue UF.

## 2025-06-07 NOTE — PROVIDER CONTACT NOTE (CHANGE IN STATUS NOTIFICATION) - ACTION/TREATMENT ORDERED:
Venkatesh ARREOLA made aware; NO further intervention for now, advised to continue to monitor; patient has history of Sinus bradycardia and EP is following patient.

## 2025-06-08 RX ADMIN — SEVELAMER HYDROCHLORIDE 1600 MILLIGRAM(S): 800 TABLET ORAL at 10:07

## 2025-06-08 RX ADMIN — Medication 1 APPLICATION(S): at 12:30

## 2025-06-08 RX ADMIN — Medication 81 MILLIGRAM(S): at 12:25

## 2025-06-08 RX ADMIN — Medication 9 MILLIGRAM(S): at 21:57

## 2025-06-08 RX ADMIN — Medication 1334 MILLIGRAM(S): at 10:08

## 2025-06-08 RX ADMIN — Medication 1334 MILLIGRAM(S): at 12:25

## 2025-06-08 RX ADMIN — METOPROLOL SUCCINATE 25 MILLIGRAM(S): 50 TABLET, EXTENDED RELEASE ORAL at 17:35

## 2025-06-08 RX ADMIN — CALCITRIOL 0.25 MICROGRAM(S): 0.5 CAPSULE, GELATIN COATED ORAL at 12:25

## 2025-06-08 RX ADMIN — Medication 1334 MILLIGRAM(S): at 21:57

## 2025-06-08 RX ADMIN — Medication 1334 MILLIGRAM(S): at 17:35

## 2025-06-08 RX ADMIN — HEPARIN SODIUM 5000 UNIT(S): 1000 INJECTION INTRAVENOUS; SUBCUTANEOUS at 22:01

## 2025-06-08 RX ADMIN — SEVELAMER HYDROCHLORIDE 1600 MILLIGRAM(S): 800 TABLET ORAL at 12:25

## 2025-06-08 RX ADMIN — HEPARIN SODIUM 5000 UNIT(S): 1000 INJECTION INTRAVENOUS; SUBCUTANEOUS at 05:46

## 2025-06-08 RX ADMIN — POLYETHYLENE GLYCOL 3350 17 GRAM(S): 17 POWDER, FOR SOLUTION ORAL at 12:24

## 2025-06-08 RX ADMIN — HEPARIN SODIUM 5000 UNIT(S): 1000 INJECTION INTRAVENOUS; SUBCUTANEOUS at 12:24

## 2025-06-08 RX ADMIN — METOPROLOL SUCCINATE 25 MILLIGRAM(S): 50 TABLET, EXTENDED RELEASE ORAL at 05:44

## 2025-06-08 RX ADMIN — SEVELAMER HYDROCHLORIDE 1600 MILLIGRAM(S): 800 TABLET ORAL at 17:34

## 2025-06-08 NOTE — PROGRESS NOTE ADULT - ASSESSMENT
39 yr old male presenting with electrolyte abnormalities, right sided heaviness      Problem/Plan - 1:  ·  Problem: Electrolyte disturbance.   ·  Plan: New  s/p Ca gluconate 2g IV x1, insulin/dextrose, lokelma 10mg x1   monitor with HD   renal fu     Problem/Plan - 2:  ·  Problem: Right sided weakness.   ·  Plan: Acute on chronic   Pt reports waxing and waning symptoms since 12/2024  CT head: as above  neuro consult appreciated     Problem/Plan - 3:  ·  Problem: ESRD on hemodialysis.   ·  Plan: Chronic unstable given electrolyte disturbances   HD as scheduled   renal fu     Problem/Plan - 4:  ·  Problem: Blurry vision  ·  Plan: neuro fu appreciated- CT reviewed     Problem/Plan - 5:  ·  Problem: hyperparathyroid  ·  Plan: ENT consult reviewed  endo fu pending  outpt fu       dw pt at length

## 2025-06-09 LAB
ALBUMIN SERPL ELPH-MCNC: 3.4 G/DL — SIGNIFICANT CHANGE UP (ref 3.3–5)
ALP SERPL-CCNC: 1130 U/L — HIGH (ref 40–120)
ALT FLD-CCNC: 6 U/L — SIGNIFICANT CHANGE UP (ref 4–41)
ANION GAP SERPL CALC-SCNC: 16 MMOL/L — HIGH (ref 7–14)
AST SERPL-CCNC: 9 U/L — SIGNIFICANT CHANGE UP (ref 4–40)
BASOPHILS # BLD AUTO: 0.02 K/UL — SIGNIFICANT CHANGE UP (ref 0–0.2)
BASOPHILS NFR BLD AUTO: 0.5 % — SIGNIFICANT CHANGE UP (ref 0–2)
BILIRUB SERPL-MCNC: 0.4 MG/DL — SIGNIFICANT CHANGE UP (ref 0.2–1.2)
BUN SERPL-MCNC: 59 MG/DL — HIGH (ref 7–23)
CALCIUM SERPL-MCNC: 9.2 MG/DL — SIGNIFICANT CHANGE UP (ref 8.4–10.5)
CHLORIDE SERPL-SCNC: 97 MMOL/L — LOW (ref 98–107)
CO2 SERPL-SCNC: 23 MMOL/L — SIGNIFICANT CHANGE UP (ref 22–31)
CREAT SERPL-MCNC: 8.02 MG/DL — HIGH (ref 0.5–1.3)
EGFR: 8 ML/MIN/1.73M2 — LOW
EGFR: 8 ML/MIN/1.73M2 — LOW
EOSINOPHIL # BLD AUTO: 0.15 K/UL — SIGNIFICANT CHANGE UP (ref 0–0.5)
EOSINOPHIL NFR BLD AUTO: 3.7 % — SIGNIFICANT CHANGE UP (ref 0–6)
GLUCOSE SERPL-MCNC: 67 MG/DL — LOW (ref 70–99)
HCT VFR BLD CALC: 26.8 % — LOW (ref 39–50)
HGB BLD-MCNC: 8.5 G/DL — LOW (ref 13–17)
IANC: 2.32 K/UL — SIGNIFICANT CHANGE UP (ref 1.8–7.4)
IMM GRANULOCYTES NFR BLD AUTO: 0.2 % — SIGNIFICANT CHANGE UP (ref 0–0.9)
LYMPHOCYTES # BLD AUTO: 1.05 K/UL — SIGNIFICANT CHANGE UP (ref 1–3.3)
LYMPHOCYTES # BLD AUTO: 26.2 % — SIGNIFICANT CHANGE UP (ref 13–44)
MAGNESIUM SERPL-MCNC: 2.4 MG/DL — SIGNIFICANT CHANGE UP (ref 1.6–2.6)
MCHC RBC-ENTMCNC: 31.7 G/DL — LOW (ref 32–36)
MCHC RBC-ENTMCNC: 33.2 PG — SIGNIFICANT CHANGE UP (ref 27–34)
MCV RBC AUTO: 104.7 FL — HIGH (ref 80–100)
MONOCYTES # BLD AUTO: 0.46 K/UL — SIGNIFICANT CHANGE UP (ref 0–0.9)
MONOCYTES NFR BLD AUTO: 11.5 % — SIGNIFICANT CHANGE UP (ref 2–14)
NEUTROPHILS # BLD AUTO: 2.32 K/UL — SIGNIFICANT CHANGE UP (ref 1.8–7.4)
NEUTROPHILS NFR BLD AUTO: 57.9 % — SIGNIFICANT CHANGE UP (ref 43–77)
NRBC # BLD AUTO: 0 K/UL — SIGNIFICANT CHANGE UP (ref 0–0)
NRBC # FLD: 0 K/UL — SIGNIFICANT CHANGE UP (ref 0–0)
NRBC BLD AUTO-RTO: 0 /100 WBCS — SIGNIFICANT CHANGE UP (ref 0–0)
PHOSPHATE SERPL-MCNC: 5.4 MG/DL — HIGH (ref 2.5–4.5)
PLATELET # BLD AUTO: 180 K/UL — SIGNIFICANT CHANGE UP (ref 150–400)
POTASSIUM SERPL-MCNC: 5.5 MMOL/L — HIGH (ref 3.5–5.3)
POTASSIUM SERPL-SCNC: 5.5 MMOL/L — HIGH (ref 3.5–5.3)
PROT SERPL-MCNC: 6.1 G/DL — SIGNIFICANT CHANGE UP (ref 6–8.3)
RBC # BLD: 2.56 M/UL — LOW (ref 4.2–5.8)
RBC # FLD: 14.6 % — HIGH (ref 10.3–14.5)
SODIUM SERPL-SCNC: 136 MMOL/L — SIGNIFICANT CHANGE UP (ref 135–145)
VIT D25+D1,25 OH+D1,25 PNL SERPL-MCNC: 13.8 PG/ML — LOW (ref 19.9–79.3)
WBC # BLD: 4.01 K/UL — SIGNIFICANT CHANGE UP (ref 3.8–10.5)
WBC # FLD AUTO: 4.01 K/UL — SIGNIFICANT CHANGE UP (ref 3.8–10.5)

## 2025-06-09 PROCEDURE — 90935 HEMODIALYSIS ONE EVALUATION: CPT

## 2025-06-09 RX ORDER — SODIUM ZIRCONIUM CYCLOSILICATE 5 G/5G
10 POWDER, FOR SUSPENSION ORAL ONCE
Refills: 0 | Status: DISCONTINUED | OUTPATIENT
Start: 2025-06-09 | End: 2025-06-09

## 2025-06-09 RX ADMIN — CALCITRIOL 0.25 MICROGRAM(S): 0.5 CAPSULE, GELATIN COATED ORAL at 12:30

## 2025-06-09 RX ADMIN — Medication 650 MILLIGRAM(S): at 09:24

## 2025-06-09 RX ADMIN — Medication 2 TABLET(S): at 22:10

## 2025-06-09 RX ADMIN — Medication 1334 MILLIGRAM(S): at 12:30

## 2025-06-09 RX ADMIN — SEVELAMER HYDROCHLORIDE 1600 MILLIGRAM(S): 800 TABLET ORAL at 17:34

## 2025-06-09 RX ADMIN — SEVELAMER HYDROCHLORIDE 1600 MILLIGRAM(S): 800 TABLET ORAL at 12:30

## 2025-06-09 RX ADMIN — Medication 1334 MILLIGRAM(S): at 17:35

## 2025-06-09 RX ADMIN — HEPARIN SODIUM 5000 UNIT(S): 1000 INJECTION INTRAVENOUS; SUBCUTANEOUS at 12:30

## 2025-06-09 RX ADMIN — Medication 9 MILLIGRAM(S): at 22:10

## 2025-06-09 RX ADMIN — MIDODRINE HYDROCHLORIDE 20 MILLIGRAM(S): 5 TABLET ORAL at 05:19

## 2025-06-09 RX ADMIN — Medication 650 MILLIGRAM(S): at 02:41

## 2025-06-09 RX ADMIN — Medication 650 MILLIGRAM(S): at 03:41

## 2025-06-09 RX ADMIN — EPOETIN ALFA 10000 UNIT(S): 10000 SOLUTION INTRAVENOUS; SUBCUTANEOUS at 07:07

## 2025-06-09 RX ADMIN — METOPROLOL SUCCINATE 25 MILLIGRAM(S): 50 TABLET, EXTENDED RELEASE ORAL at 05:19

## 2025-06-09 RX ADMIN — Medication 81 MILLIGRAM(S): at 12:31

## 2025-06-09 RX ADMIN — METOPROLOL SUCCINATE 25 MILLIGRAM(S): 50 TABLET, EXTENDED RELEASE ORAL at 17:35

## 2025-06-09 RX ADMIN — HEPARIN SODIUM 5000 UNIT(S): 1000 INJECTION INTRAVENOUS; SUBCUTANEOUS at 22:13

## 2025-06-09 RX ADMIN — HEPARIN SODIUM 5000 UNIT(S): 1000 INJECTION INTRAVENOUS; SUBCUTANEOUS at 05:19

## 2025-06-09 RX ADMIN — Medication 1 APPLICATION(S): at 17:35

## 2025-06-09 NOTE — PROGRESS NOTE ADULT - ASSESSMENT
39 yr old male presenting with electrolyte abnormalities, right sided heaviness      Problem/Plan - 1:  ·  Problem: Electrolyte disturbance.   ·  Plan: New  s/p Ca gluconate 2g IV x1, insulin/dextrose, lokelma 10mg x1   monitor with HD   renal fu     Problem/Plan - 2:  ·  Problem: Right sided weakness.   ·  Plan: Acute on chronic   Pt reports waxing and waning symptoms since 12/2024  CT head: as above  neuro consult appreciated     Problem/Plan - 3:  ·  Problem: ESRD on hemodialysis.   ·  Plan: Chronic unstable given electrolyte disturbances   HD as scheduled   renal fu     Problem/Plan - 4:  ·  Problem: Blurry vision  ·  Plan: neuro fu appreciated- CT reviewed     Problem/Plan - 5:  ·  Problem: hyperparathyroid  ·  Plan: ENT consult reviewedendo fu pending  outpt fu       nneka pt at length

## 2025-06-10 LAB
ANION GAP SERPL CALC-SCNC: 16 MMOL/L — HIGH (ref 7–14)
BUN SERPL-MCNC: 44 MG/DL — HIGH (ref 7–23)
CALCIUM SERPL-MCNC: 8.8 MG/DL — SIGNIFICANT CHANGE UP (ref 8.4–10.5)
CHLORIDE SERPL-SCNC: 102 MMOL/L — SIGNIFICANT CHANGE UP (ref 98–107)
CO2 SERPL-SCNC: 25 MMOL/L — SIGNIFICANT CHANGE UP (ref 22–31)
CREAT SERPL-MCNC: 6.91 MG/DL — HIGH (ref 0.5–1.3)
EGFR: 10 ML/MIN/1.73M2 — LOW
EGFR: 10 ML/MIN/1.73M2 — LOW
GLUCOSE SERPL-MCNC: 81 MG/DL — SIGNIFICANT CHANGE UP (ref 70–99)
HCT VFR BLD CALC: 28 % — LOW (ref 39–50)
HGB BLD-MCNC: 8.6 G/DL — LOW (ref 13–17)
MAGNESIUM SERPL-MCNC: 2.3 MG/DL — SIGNIFICANT CHANGE UP (ref 1.6–2.6)
MCHC RBC-ENTMCNC: 30.7 G/DL — LOW (ref 32–36)
MCHC RBC-ENTMCNC: 32.8 PG — SIGNIFICANT CHANGE UP (ref 27–34)
MCV RBC AUTO: 106.9 FL — HIGH (ref 80–100)
NRBC # BLD AUTO: 0 K/UL — SIGNIFICANT CHANGE UP (ref 0–0)
NRBC # FLD: 0 K/UL — SIGNIFICANT CHANGE UP (ref 0–0)
NRBC BLD AUTO-RTO: 0 /100 WBCS — SIGNIFICANT CHANGE UP (ref 0–0)
PHOSPHATE SERPL-MCNC: 5.1 MG/DL — HIGH (ref 2.5–4.5)
PLATELET # BLD AUTO: 157 K/UL — SIGNIFICANT CHANGE UP (ref 150–400)
POTASSIUM SERPL-MCNC: 5 MMOL/L — SIGNIFICANT CHANGE UP (ref 3.5–5.3)
POTASSIUM SERPL-SCNC: 5 MMOL/L — SIGNIFICANT CHANGE UP (ref 3.5–5.3)
RBC # BLD: 2.62 M/UL — LOW (ref 4.2–5.8)
RBC # FLD: 14.8 % — HIGH (ref 10.3–14.5)
SODIUM SERPL-SCNC: 143 MMOL/L — SIGNIFICANT CHANGE UP (ref 135–145)
WBC # BLD: 3.39 K/UL — LOW (ref 3.8–10.5)
WBC # FLD AUTO: 3.39 K/UL — LOW (ref 3.8–10.5)

## 2025-06-10 PROCEDURE — 99232 SBSQ HOSP IP/OBS MODERATE 35: CPT

## 2025-06-10 RX ADMIN — SEVELAMER HYDROCHLORIDE 1600 MILLIGRAM(S): 800 TABLET ORAL at 12:53

## 2025-06-10 RX ADMIN — HEPARIN SODIUM 5000 UNIT(S): 1000 INJECTION INTRAVENOUS; SUBCUTANEOUS at 05:22

## 2025-06-10 RX ADMIN — Medication 9 MILLIGRAM(S): at 22:27

## 2025-06-10 RX ADMIN — Medication 1334 MILLIGRAM(S): at 08:44

## 2025-06-10 RX ADMIN — HEPARIN SODIUM 5000 UNIT(S): 1000 INJECTION INTRAVENOUS; SUBCUTANEOUS at 22:23

## 2025-06-10 RX ADMIN — METOPROLOL SUCCINATE 25 MILLIGRAM(S): 50 TABLET, EXTENDED RELEASE ORAL at 05:22

## 2025-06-10 RX ADMIN — CALCITRIOL 0.25 MICROGRAM(S): 0.5 CAPSULE, GELATIN COATED ORAL at 12:53

## 2025-06-10 RX ADMIN — Medication 81 MILLIGRAM(S): at 12:53

## 2025-06-10 RX ADMIN — Medication 1 APPLICATION(S): at 13:01

## 2025-06-10 RX ADMIN — HEPARIN SODIUM 5000 UNIT(S): 1000 INJECTION INTRAVENOUS; SUBCUTANEOUS at 12:53

## 2025-06-10 RX ADMIN — Medication 1334 MILLIGRAM(S): at 17:26

## 2025-06-10 RX ADMIN — Medication 1334 MILLIGRAM(S): at 22:25

## 2025-06-10 RX ADMIN — Medication 650 MILLIGRAM(S): at 08:48

## 2025-06-10 RX ADMIN — Medication 1334 MILLIGRAM(S): at 12:53

## 2025-06-10 RX ADMIN — SEVELAMER HYDROCHLORIDE 1600 MILLIGRAM(S): 800 TABLET ORAL at 08:44

## 2025-06-10 RX ADMIN — SEVELAMER HYDROCHLORIDE 1600 MILLIGRAM(S): 800 TABLET ORAL at 17:26

## 2025-06-10 RX ADMIN — Medication 2 TABLET(S): at 22:29

## 2025-06-10 NOTE — CHART NOTE - NSCHARTNOTEFT_GEN_A_CORE
Notified patient had a 3 second pause , and renan to 29.   Patient is asymptomatic and back to NS 70s .  Per Dr. Sanders will re-consult EP.

## 2025-06-10 NOTE — PROGRESS NOTE ADULT - ASSESSMENT
39 yr old male presenting with electrolyte abnormalities, right sided heaviness      Problem/Plan - 1:  ·  Problem: Electrolyte disturbance.   ·  Plan: New  s/p Ca gluconate 2g IV x1, insulin/dextrose, lokelma 10mg x1   monitor with HD   renal fu     Problem/Plan - 2:  ·  Problem: Right sided weakness.   ·  Plan: Acute on chronic   Pt reports waxing and waning symptoms since 12/2024  CT head: as above  neuro consult appreciated     Problem/Plan - 3:  ·  Problem: ESRD on hemodialysis.   ·  Plan: Chronic unstable given electrolyte disturbances   HD as scheduled   renal fu     Problem/Plan - 4:  ·  Problem: bradycardia  ·  Plan: hold BB  EP fu appreciated  no indication for PPM as per EP     Problem/Plan - 5:·  Problem: hyperparathyroid  ·  Plan: ENT consult reviewed  endo fu pending  outpt fu       nneka pt at length

## 2025-06-10 NOTE — PROGRESS NOTE ADULT - ASSESSMENT
Patient is a 39y old male with PMHx of ESRD on hemodialysis since 2013 ([facility]-W-Fr), HTN, orthostatic hypotension, CVA age 10,  renal osteodystrophy and SOLITARIO who presents with right sided waxing and waning upper and lower extremity weakness and intermittent tongue heaviness since 12/2024 in the setting of hyperkalemia with K 7.1, Ca 0.87.  Treated with Ca gluconate,  HyperK cocktail and dialysis. Telemetry reviewed and demonstrates sinus rhythm with intermittent slowing of the sinus node (P to P prolongation) consistent with vagal hypertonia. They occur during sleep and are likely secondary to obstructive sleep apnea and intermittent electrolyte disturbances. Patient is asymptomatic and hemodynamically stable.  Now off AV geronimo blockers. Refusing CPAP.  ECHO 11/2023 revealed EF 55%-60%.   TSH: 4.94     There is no indication for a pacemaker at this time.   Continue to hold AV geronimo blockers.   Dietary counseling for renal diet.   Discussed with Dr. Prieto.

## 2025-06-10 NOTE — PROGRESS NOTE ADULT - NS ATTEND AMEND GEN_ALL_CORE FT
39 year old man with asymptomatic, vagally mediated pauses overnight in the setting of CPAP noncompliance. No indication for pacing. EP to sign off.

## 2025-06-11 ENCOUNTER — TRANSCRIPTION ENCOUNTER (OUTPATIENT)
Age: 40
End: 2025-06-11

## 2025-06-11 LAB
ALBUMIN SERPL ELPH-MCNC: 3.7 G/DL — SIGNIFICANT CHANGE UP (ref 3.3–5)
ALP SERPL-CCNC: 1159 U/L — HIGH (ref 40–120)
ALT FLD-CCNC: 5 U/L — SIGNIFICANT CHANGE UP (ref 4–41)
ANION GAP SERPL CALC-SCNC: 17 MMOL/L — HIGH (ref 7–14)
AST SERPL-CCNC: 6 U/L — SIGNIFICANT CHANGE UP (ref 4–40)
BILIRUB SERPL-MCNC: 0.3 MG/DL — SIGNIFICANT CHANGE UP (ref 0.2–1.2)
BUN SERPL-MCNC: 59 MG/DL — HIGH (ref 7–23)
CALCIUM SERPL-MCNC: 9.1 MG/DL — SIGNIFICANT CHANGE UP (ref 8.4–10.5)
CHLORIDE SERPL-SCNC: 101 MMOL/L — SIGNIFICANT CHANGE UP (ref 98–107)
CO2 SERPL-SCNC: 22 MMOL/L — SIGNIFICANT CHANGE UP (ref 22–31)
CREAT SERPL-MCNC: 8.09 MG/DL — HIGH (ref 0.5–1.3)
EGFR: 8 ML/MIN/1.73M2 — LOW
EGFR: 8 ML/MIN/1.73M2 — LOW
GLUCOSE SERPL-MCNC: 66 MG/DL — LOW (ref 70–99)
HCT VFR BLD CALC: 28.3 % — LOW (ref 39–50)
HGB BLD-MCNC: 8.8 G/DL — LOW (ref 13–17)
MAGNESIUM SERPL-MCNC: 2.5 MG/DL — SIGNIFICANT CHANGE UP (ref 1.6–2.6)
MCHC RBC-ENTMCNC: 31.1 G/DL — LOW (ref 32–36)
MCHC RBC-ENTMCNC: 33.2 PG — SIGNIFICANT CHANGE UP (ref 27–34)
MCV RBC AUTO: 106.8 FL — HIGH (ref 80–100)
NRBC # BLD AUTO: 0 K/UL — SIGNIFICANT CHANGE UP (ref 0–0)
NRBC # FLD: 0 K/UL — SIGNIFICANT CHANGE UP (ref 0–0)
NRBC BLD AUTO-RTO: 0 /100 WBCS — SIGNIFICANT CHANGE UP (ref 0–0)
PHOSPHATE SERPL-MCNC: 5.6 MG/DL — HIGH (ref 2.5–4.5)
PLATELET # BLD AUTO: 171 K/UL — SIGNIFICANT CHANGE UP (ref 150–400)
POTASSIUM SERPL-MCNC: 5.3 MMOL/L — SIGNIFICANT CHANGE UP (ref 3.5–5.3)
POTASSIUM SERPL-SCNC: 5.3 MMOL/L — SIGNIFICANT CHANGE UP (ref 3.5–5.3)
PROT SERPL-MCNC: 6.3 G/DL — SIGNIFICANT CHANGE UP (ref 6–8.3)
RBC # BLD: 2.65 M/UL — LOW (ref 4.2–5.8)
RBC # FLD: 14.8 % — HIGH (ref 10.3–14.5)
SODIUM SERPL-SCNC: 140 MMOL/L — SIGNIFICANT CHANGE UP (ref 135–145)
WBC # BLD: 3.75 K/UL — LOW (ref 3.8–10.5)
WBC # FLD AUTO: 3.75 K/UL — LOW (ref 3.8–10.5)

## 2025-06-11 PROCEDURE — 71045 X-RAY EXAM CHEST 1 VIEW: CPT | Mod: 26

## 2025-06-11 PROCEDURE — 90935 HEMODIALYSIS ONE EVALUATION: CPT

## 2025-06-11 RX ADMIN — Medication 1334 MILLIGRAM(S): at 17:01

## 2025-06-11 RX ADMIN — Medication 81 MILLIGRAM(S): at 11:45

## 2025-06-11 RX ADMIN — Medication 1334 MILLIGRAM(S): at 11:44

## 2025-06-11 RX ADMIN — CALCITRIOL 0.25 MICROGRAM(S): 0.5 CAPSULE, GELATIN COATED ORAL at 11:44

## 2025-06-11 RX ADMIN — SEVELAMER HYDROCHLORIDE 1600 MILLIGRAM(S): 800 TABLET ORAL at 11:45

## 2025-06-11 RX ADMIN — Medication 2 TABLET(S): at 21:34

## 2025-06-11 RX ADMIN — Medication 1334 MILLIGRAM(S): at 21:33

## 2025-06-11 RX ADMIN — HEPARIN SODIUM 5000 UNIT(S): 1000 INJECTION INTRAVENOUS; SUBCUTANEOUS at 05:09

## 2025-06-11 RX ADMIN — Medication 9 MILLIGRAM(S): at 21:33

## 2025-06-11 RX ADMIN — HEPARIN SODIUM 5000 UNIT(S): 1000 INJECTION INTRAVENOUS; SUBCUTANEOUS at 21:34

## 2025-06-11 RX ADMIN — HEPARIN SODIUM 5000 UNIT(S): 1000 INJECTION INTRAVENOUS; SUBCUTANEOUS at 13:06

## 2025-06-11 RX ADMIN — Medication 1 APPLICATION(S): at 11:47

## 2025-06-11 RX ADMIN — MIDODRINE HYDROCHLORIDE 20 MILLIGRAM(S): 5 TABLET ORAL at 05:09

## 2025-06-11 RX ADMIN — EPOETIN ALFA 10000 UNIT(S): 10000 SOLUTION INTRAVENOUS; SUBCUTANEOUS at 07:40

## 2025-06-11 RX ADMIN — SEVELAMER HYDROCHLORIDE 1600 MILLIGRAM(S): 800 TABLET ORAL at 17:01

## 2025-06-11 RX ADMIN — Medication 650 MILLIGRAM(S): at 21:38

## 2025-06-11 NOTE — DISCHARGE NOTE NURSING/CASE MANAGEMENT/SOCIAL WORK - PATIENT PORTAL LINK FT
You can access the FollowMyHealth Patient Portal offered by [facility] by registering at the following website: [email or web address]. By joining [employer]’s FollowMyHealth portal, you will also be able to view your health information using other applications (apps) compatible with our system.

## 2025-06-11 NOTE — PROVIDER CONTACT NOTE (OTHER) - ASSESSMENT
Refusing new IV, says hes going home tmrrw and that his IV looks fine
Denies chest pain, SOB, headache, lightheadedness.
Denies chest pain, SOB, headache, lightheadedness.
Pts HR dropped to 34 non sustaining. /57, pt asymptomatic, denies CP or SOB
Refusing STAT labwork, only does labs with CAITLIN REN

## 2025-06-11 NOTE — PROVIDER CONTACT NOTE (OTHER) - BACKGROUND
pt admitted for heart failure
pt admitted for heart failure
Pt has a PMHx of ESRD, HD, CVA was admitted for hyperkalemia
pt admitted for heart failure

## 2025-06-11 NOTE — DISCHARGE NOTE PROVIDER - PROVIDER TOKENS
PROVIDER:[TOKEN:[[id number]]],FREE:[LAST:[Dr. Karla Wallace],PHONE:[(   )    -],FAX:[(   )    -],ADDRESS:[[phone]]],PROVIDER:[TOKEN:[[id number]]] PROVIDER:[TOKEN:[[id number]]],PROVIDER:[TOKEN:[[id number]]],FREE:[LAST:[Dr. Karla Wallace],PHONE:[(   )    -],FAX:[(   )    -],ADDRESS:[[phone]]],PROVIDER:[TOKEN:[[id number]]]

## 2025-06-11 NOTE — DISCHARGE NOTE NURSING/CASE MANAGEMENT/SOCIAL WORK - NSDCVIVACCINE_GEN_ALL_CORE_FT
Influenza, seasonal, injectable; 30-Dec-2013 15:27; Jamila Hartley (JAYCEE); [id number]; IntraMuscular; Deltoid Right.; 0.5 milliLiter(s);

## 2025-06-11 NOTE — DISCHARGE NOTE PROVIDER - HOSPITAL COURSE
39 yr old male on HD presenting with electrolyte abnormalities, right sided heaviness      Problem/Plan - 1:  ·  Problem: Electrolyte disturbance.   ·  Plan: New  s/p Ca gluconate 2g IV x1, insulin/dextrose, lokelma 10mg x1   monitor with HD   renal fu     Problem/Plan - 2:  ·  Problem: Right sided weakness.   ·  Plan: Acute on chronic   Pt reports waxing and waning symptoms since 12/2024  CT head: as above  neuro consult appreciated     Problem/Plan - 3:  ·  Problem: ESRD on hemodialysis.   ·  Plan: Chronic unstable given electrolyte disturbances   HD as scheduled   renal fu     Problem/Plan - 4:  ·  Problem: bradycardia  ·  Plan: hold BB  EP fu appreciated  no indication for PPM as per EP     Problem/Plan - 5:·  Problem: hyperparathyroid  ·  Plan: ENT consult reviewed  endo fu pending  outpt fu    39M PMHx of ESRD on hemodialysis, HTN, orthostatic hypotension, CVA, renal osteodystrophy who presents with right sided waxing and waning upper and lower extremity weakness as well as intermittent tongue heaviness that has been going on since 12/2024 per the pt (ED note states 1 week). Endorsing intermittent nausea today. Pt states he has been having difficulty at [facility] as " they keep telling me my potassium and calcium are one day too high and one day too low".     Electrolyte disturbance: Hyperkalemic on admisison, s/p Ca gluconate 2g IV x1, insulin/dextrose, lokelma 10mg x1. Monitor with HD     Right sided weakness; acute on chronic . Pt reports waxing and waning symptoms since 12/2024. CT head: No acute hemorrhage, mass effect or midline shift. Grossly unchanged since CT head 5/30/2025. Diffuse calvarial thickening with groundglass appearance most compatible with renal osteodystrophy, stable. Thickening and sclerosis of the   mandible and maxilla is unchanged. CTA NECK: Exophytic nodule arising posteriorly off the right thyroid lobe which may represent a parathyroid adenoma or exophytic thyroid nodule. This can be further evaluated with a CT 4D parathyroid imaging study or nuclear medicine sestamibi study. Mixed sclerotic and lucent density of the skeleton with associated bony thickening most compatible renal osteodystrophy, that appears overall slightly increased since CT chest 1/6/2025.  - Neuro following     ESRD on hemodialysis: Chronic unstable given electrolyte disturbances. HD as scheduled. Renal following    Bradycardia: BB on hold. EP appreciated. No indication for PPM as per EP     Hyperparathyroid: ENT consulted, endocrine following, outpt follow up.     Case discussed with Dr. Sanders, labs/vitals/medications reviewed, Pt medically cleared for dischargee to home with home care and outpt follow up as directed.    39M PMHx of ESRD on hemodialysis, HTN, orthostatic hypotension, CVA, renal osteodystrophy who presents with right sided waxing and waning upper and lower extremity weakness as well as intermittent tongue heaviness that has been going on since 12/2024 per the pt (ED note states 1 week). Endorsing intermittent nausea today. Pt states he has been having difficulty at [facility] as " they keep telling me my potassium and calcium are one day too high and one day too low".     Electrolyte disturbance: Hyperkalemic on admission, s/p Ca gluconate 2g IV x1, insulin/dextrose, Lokelma 10mg x1. Monitor with HD     Right sided weakness; acute on chronic . Pt reports waxing and waning symptoms since 12/2024. CT head: No acute hemorrhage, mass effect or midline shift. Grossly unchanged since CT head 5/30/2025. Diffuse calvarial thickening with groundglass appearance most compatible with renal osteodystrophy, stable. Thickening and sclerosis of the   mandible and maxilla is unchanged. CTA NECK: Exophytic nodule arising posteriorly off the right thyroid lobe which may represent a parathyroid adenoma or exophytic thyroid nodule. This can be further evaluated with a CT 4D parathyroid imaging study or nuclear medicine sestamibi study. Mixed sclerotic and lucent density of the skeleton with associated bony thickening most compatible renal osteodystrophy, that appears overall slightly increased since CT chest 1/6/2025.  - Neuro following     ESRD on hemodialysis: Chronic unstable given electrolyte disturbances. HD as scheduled. Renal following    Bradycardia: BB on hold. EP appreciated. No indication for PPM as per EP     Hyperparathyroid: ENT consulted, endocrine following, outpt follow up.     Case discussed with Dr. Sanders, labs/vitals/medications reviewed, Pt medically cleared for dischargee to home with home care and outpt follow up as directed.    39M PMHx of ESRD on hemodialysis, HTN, orthostatic hypotension, CVA, renal osteodystrophy who presents with right sided waxing and waning upper and lower extremity weakness as well as intermittent tongue heaviness that has been going on since 12/2024 per the pt (ED note states 1 week). Endorsing intermittent nausea today. Pt states he has been having difficulty at [facility] as " they keep telling me my potassium and calcium are one day too high and one day too low".     Electrolyte disturbance: Hyperkalemic on admission, s/p Ca gluconate 2g IV x1, insulin/dextrose, Lokelma 10mg x1. Monitor with HD     Right sided weakness; acute on chronic . Pt reports waxing and waning symptoms since 12/2024. CT head: No acute hemorrhage, mass effect or midline shift. Grossly unchanged since CT head 5/30/2025. Diffuse calvarial thickening with groundglass appearance most compatible with renal osteodystrophy, stable. Thickening and sclerosis of the mandible and maxilla is unchanged. CTA NECK: Exophytic nodule arising posteriorly off the right thyroid lobe which may represent a parathyroid adenoma or exophytic thyroid nodule. This can be further evaluated with a CT 4D parathyroid imaging study or nuclear medicine sestamibi study. Mixed sclerotic and lucent density of the skeleton with associated bony thickening most compatible renal osteodystrophy, that appears overall slightly increased since CT chest 1/6/2025.  - Neuro following     ESRD on hemodialysis: Chronic unstable given electrolyte disturbances. HD as scheduled. Renal following    Bradycardia: BB on hold. EP appreciated. No indication for PPM as per EP     Hyperparathyroid: ENT consulted, endocrine following, outpt follow up.     Case discussed with Dr. Sanders, labs/vitals/medications reviewed, Pt medically cleared for dischargee to home with home care and outpt follow up as directed.

## 2025-06-11 NOTE — PROVIDER CONTACT NOTE (OTHER) - ACTION/TREATMENT ORDERED:
ACP aware - no new orders at this time
acp notified.
acp notified. Will continue to monitor.
acp notified. stat labs to be collected
ACP made aware

## 2025-06-11 NOTE — DISCHARGE NOTE PROVIDER - CARE PROVIDER_API CALL
John Glass  Nephrology  [address] [place] [address], [address]  [place] [address]  Phone: [phone]  Fax: [phone]  Follow Up Time:     Dr. Karla Wallace,   [phone]  Phone: (   )    -  Fax: (   )    -  Follow Up Time:     Eric Royal  Surgery  [address], [address]  [place] [address]  Phone: [phone]  Fax: [phone]  Follow Up Time:    John Glass  Nephrology  [address], [address]  [place] [address]  Phone: [phone]  Fax: [phone]  Follow Up Time:     Eric Royal  Surgery  [address], [address]  [place] [address]  Phone: [phone]  Fax: [phone]  Follow Up Time:     Dr. Karla Wallace,   [phone]  Phone: (   )    -  Fax: (   )    -  Follow Up Time:     Macy Churchill  Neurology  [address] [facility] [address]  [place] [address]  Phone: [phone]  Fax: [phone]  Follow Up Time:

## 2025-06-11 NOTE — DISCHARGE NOTE PROVIDER - NSDCCPCAREPLAN_GEN_ALL_CORE_FT
PRINCIPAL DISCHARGE DIAGNOSIS  Diagnosis: Hyperkalemia  Assessment and Plan of Treatment: Resolved. Ensure compliance with hemodialysis per your routine. Follow up with your primary care physician and nephrologist for further monitoring in 1-2 weeks. Please call to arrange appointment.      SECONDARY DISCHARGE DIAGNOSES  Diagnosis: ESRD on hemodialysis  Assessment and Plan of Treatment: Resolved. Ensure compliance with hemodialysis per your routine. Follow up with your primary care physician and nephrologist for further monitoring in 1-2 weeks. Please call to arrange appointment.    Diagnosis: Secondary hyperparathyroidism  Assessment and Plan of Treatment: Follow up with your Endocrinologi for further monitoring in 1-2 weeks. Please call to arrange appointment.Patient should obtain OP bone density scan if not already done.    Diagnosis: Benign essential HTN  Assessment and Plan of Treatment: Follow up with your primary care physician for further monitoring in 1-2 weeks. Please call to arrange appointment. Low salt diet.  Ensure compliance with your medications as directed.     PRINCIPAL DISCHARGE DIAGNOSIS  Diagnosis: Hyperkalemia  Assessment and Plan of Treatment: Resolved. Ensure compliance with hemodialysis per your routine. Follow up with your primary care physician and nephrologist for further monitoring in 1-2 weeks. Please call to arrange appointment.      SECONDARY DISCHARGE DIAGNOSES  Diagnosis: ESRD on hemodialysis  Assessment and Plan of Treatment: Resolved. Ensure compliance with hemodialysis per your routine. Follow up with your primary care physician and nephrologist for further monitoring in 1-2 weeks. Please call to arrange appointment.    Diagnosis: Secondary hyperparathyroidism  Assessment and Plan of Treatment: Follow up with your Endocrinologist for further monitoring in 1-2 weeks. Please call to arrange appointment.Patient should obtain OP bone density scan if not already done.   You were also seen by ENT and underwent 4D CT as outpt, follow up with Dr. Royal for further work up and management in 1-2 weeks.    Diagnosis: Right sided weakness  Assessment and Plan of Treatment: You were seen by neurology and recommended for outpatient EMG. Follow up with Neurologist Dr. Churchill for further monitoring in 1-2 weeks. Please call to arrange appointment.    Diagnosis: Benign essential HTN  Assessment and Plan of Treatment: Follow up with your primary care physician for further monitoring in 1-2 weeks. Please call to arrange appointment. Low salt diet.  Ensure compliance with your medications as directed.

## 2025-06-11 NOTE — PROVIDER CONTACT NOTE (OTHER) - SITUATION
Hr dropped to 35 unsustained asymptomatic
Refusing new IV, says hes going home tmrrw and that his IV looks fine
Hr dropped to 39 unsustained and 3 second pause, asymptomatic
Pts HR dropped to 34 non sustaining. /57, pt asymptomatic, denies CP or SOB
Refusing STAT labwork, only does labs with CAITLIN REN

## 2025-06-11 NOTE — CHART NOTE - NSCHARTNOTEFT_GEN_A_CORE
NUTRITION FOLLOW-UP:    39 yr old male presenting with electrolyte abnormalities, right sided heaviness. Pt. with ESRD on HD TIW.    Pt f/u per protocol, consuming 75% meals with No GI distress (nausea/vomiting/diarrhea/constipation.) at present. Noted skin ecchymosis, +1 edema Lt & Rt foot and leg per nursing flow sheet. Noted fluid related wt fluctuations. Re-suggest Nepro 1x daily (425 kcals, 19.1g protein).     Weight: 191.8# (6/11/25), 197.3# (6/4/25)    Labs:  06-11 Na 140 mmol/L Glu 66 mg/dL[L] K+ 5.3 mmol/L Cr 8.09 mg/dL[H] BUN 59 mg/dL[H] Phos 5.6 mg/dL[H]      Current Diet: Diet, Regular:   DASH/TLC {Sodium & Cholesterol Restricted} (DASH)  1200mL Fluid Restriction ([id number])  For patients receiving Renal Replacement - No Protein Restr, No Conc K, No Conc Phos, Low Sodium (RENAL) (05-28-25 @ 18:34) [Active]    Skin-  ecchymosis, +1 edema Lt & Rt foot and leg as per RN flow sheet    MEDICATIONS  (STANDING):  aspirin enteric coated 81 milliGRAM(s) Oral daily  calcitriol   Capsule 0.25 MICROGram(s) Oral daily  calcium acetate 1334 milliGRAM(s) Oral four times a day with meals  chlorhexidine 2% Cloths 1 Application(s) Topical daily  dextrose 5%. 1000 milliLiter(s) (100 mL/Hr) IV Continuous <Continuous>  dextrose 50% Injectable 25 Gram(s) IV Push once  epoetin carito-epbx (RETACRIT) Injectable 18311 Unit(s) IV Push <User Schedule>  heparin   Injectable 5000 Unit(s) SubCutaneous every 8 hours  melatonin 9 milliGRAM(s) Oral at bedtime  midodrine 20 milliGRAM(s) Oral <User Schedule>  polyethylene glycol 3350 17 Gram(s) Oral daily  senna 2 Tablet(s) Oral at bedtime  sevelamer carbonate 1600 milliGRAM(s) Oral three times a day with meals    Estimated needs:  No changes since previous assessment    Nutrition Diagnosis:  Ongoing    RD to Remain Available:    Additional Recommendations:   Re-suggest Nepro 1x daily (425 kcals, 19.1g protein).  Monitor PO intake, weight trends, nutrition related lab values, BMs/GI distress, hydration status, skin integrity.       Lynette Mares RDN #[id number]

## 2025-06-11 NOTE — DISCHARGE NOTE PROVIDER - NSDCMRMEDTOKEN_GEN_ALL_CORE_FT
acetaminophen 325 mg oral tablet: 2 tab(s) orally every 6 hours as needed for  mild pain and moderate pain  aspirin 81 mg oral delayed release tablet: 1 tab(s) orally once a day  baclofen 10 mg oral tablet: 1 tab(s) orally 2 times a day as needed for  muscle spasm  cinacalcet 30 mg oral tablet: 1 tab(s) orally once a day  Lokelma 5 g oral powder for reconstitution: 3 packet(s) orally once a day  melatonin 10 mg oral tablet, disintegratin tab(s) orally once a day (at bedtime)  metoprolol tartrate 25 mg oral tablet: 1 tab(s) orally 2 times a day  midodrine 5 mg oral tablet: 1 tab(s) orally 3 times a week before HD  oxyCODONE 5 mg oral tablet: 1 tab(s) orally every 6 hours as needed for pain  polyethylene glycol 3350 oral powder for reconstitution: 17 gram(s) orally once a day  senna leaf extract oral tablet: 2 tab(s) orally once a day (at bedtime)  sevelamer carbonate 800 mg oral tablet: 2 tab(s) orally 3 times a day (with meals)   acetaminophen 325 mg oral tablet: 2 tab(s) orally every 6 hours as needed for  mild pain and moderate pain  aspirin 81 mg oral delayed release tablet: 1 tab(s) orally once a day  baclofen 10 mg oral tablet: 1 tab(s) orally 2 times a day as needed for  muscle spasm  calcitriol 0.25 mcg oral capsule: 1 cap(s) orally once a day  calcium acetate 667 mg oral tablet: 2 tab(s) orally 3 times a day (with meals)  melatonin 10 mg oral tablet, disintegratin tab(s) orally once a day (at bedtime)  midodrine 10 mg oral tablet: 2 tab(s) orally 3 times a week Take before hemodialysis on , , and .  polyethylene glycol 3350 oral powder for reconstitution: 17 gram(s) orally once a day  senna leaf extract oral tablet: 2 tab(s) orally once a day (at bedtime) as needed for  constipation  sevelamer carbonate 800 mg oral tablet: 2 tab(s) orally 3 times a day (with meals)

## 2025-06-11 NOTE — PROVIDER CONTACT NOTE (OTHER) - REASON
Hr dropped to 39 unsustained and 3 second pause, asymptomatic
Pts HR dropped to 34, /57,
Hr dropped to 35 unsustained asymptomatic
Refusing STAT labwork, only does labs with [facility]
Refusing new IV, says hes going home tmrrw and that his IV looks fine

## 2025-06-11 NOTE — DISCHARGE NOTE PROVIDER - CARE PROVIDERS DIRECT ADDRESSES
[email or web address],DirectAddress_Unknown,DirectAddress_Unknown

## 2025-06-11 NOTE — DISCHARGE NOTE NURSING/CASE MANAGEMENT/SOCIAL WORK - FINANCIAL ASSISTANCE
[facility] provides services at a reduced cost to those who are determined to be eligible through [facility]’s financial assistance program. Information regarding [facility]’s financial assistance program can be found by going to [email or web address] or by calling [phone].

## 2025-06-12 LAB — GLUCOSE BLDC GLUCOMTR-MCNC: 91 MG/DL — SIGNIFICANT CHANGE UP (ref 70–99)

## 2025-06-12 RX ORDER — CALCITRIOL 0.5 UG/1
1 CAPSULE, GELATIN COATED ORAL
Qty: 30 | Refills: 0
Start: 2025-06-12 | End: 2025-07-11

## 2025-06-12 RX ORDER — MIDODRINE HYDROCHLORIDE 5 MG/1
2 TABLET ORAL
Qty: 26 | Refills: 0
Start: 2025-06-12 | End: 2025-07-11

## 2025-06-12 RX ORDER — OXYCODONE HYDROCHLORIDE 30 MG/1
1 TABLET ORAL
Refills: 0 | DISCHARGE

## 2025-06-12 RX ORDER — SODIUM ZIRCONIUM CYCLOSILICATE 5 G/5G
3 POWDER, FOR SUSPENSION ORAL
Refills: 0 | DISCHARGE

## 2025-06-12 RX ADMIN — Medication 1334 MILLIGRAM(S): at 22:21

## 2025-06-12 RX ADMIN — Medication 1334 MILLIGRAM(S): at 13:32

## 2025-06-12 RX ADMIN — HEPARIN SODIUM 5000 UNIT(S): 1000 INJECTION INTRAVENOUS; SUBCUTANEOUS at 13:33

## 2025-06-12 RX ADMIN — SEVELAMER HYDROCHLORIDE 1600 MILLIGRAM(S): 800 TABLET ORAL at 17:30

## 2025-06-12 RX ADMIN — CALCITRIOL 0.25 MICROGRAM(S): 0.5 CAPSULE, GELATIN COATED ORAL at 13:32

## 2025-06-12 RX ADMIN — Medication 1334 MILLIGRAM(S): at 17:30

## 2025-06-12 RX ADMIN — Medication 1334 MILLIGRAM(S): at 08:31

## 2025-06-12 RX ADMIN — HEPARIN SODIUM 5000 UNIT(S): 1000 INJECTION INTRAVENOUS; SUBCUTANEOUS at 22:21

## 2025-06-12 RX ADMIN — SEVELAMER HYDROCHLORIDE 1600 MILLIGRAM(S): 800 TABLET ORAL at 13:33

## 2025-06-12 RX ADMIN — SEVELAMER HYDROCHLORIDE 1600 MILLIGRAM(S): 800 TABLET ORAL at 08:31

## 2025-06-12 RX ADMIN — Medication 81 MILLIGRAM(S): at 13:32

## 2025-06-12 RX ADMIN — Medication 9 MILLIGRAM(S): at 22:21

## 2025-06-12 RX ADMIN — HEPARIN SODIUM 5000 UNIT(S): 1000 INJECTION INTRAVENOUS; SUBCUTANEOUS at 05:52

## 2025-06-12 RX ADMIN — Medication 1 APPLICATION(S): at 13:42

## 2025-06-12 NOTE — PROGRESS NOTE ADULT - ASSESSMENT
Problem/Plan - 1:  ·  Problem: Electrolyte disturbance.   ·  Plan: monitor BMP   s/p Ca gluconate 2g IV x1, insulin/dextrose, lokelma 10mg x1   monitor with HD   renal fu     Problem/Plan - 2:  ·  Problem: Right sided weakness.   ·  Plan: Acute on chronic   Pt reports waxing and waning symptoms since 12/2024  CT head: as above  neuro consult appreciated     Problem/Plan - 3:  ·  Problem: ESRD on hemodialysis.   ·  Plan: Chronic unstable given electrolyte disturbances   HD as scheduled   renal fu     Problem/Plan - 4:  ·  Problem: bradycardia  ·  Plan: hold BB  EP fu appreciated  no indication for PPM as per EP     Problem/Plan - 5:·  Problem: hyperparathyroid  ·  Plan: ENT consult reviewed  endo fu pending  outpt fu       dw pt at length   awaiting dc in am pending resolution of dispo issues

## 2025-06-13 VITALS
TEMPERATURE: 99 F | RESPIRATION RATE: 18 BRPM | SYSTOLIC BLOOD PRESSURE: 141 MMHG | OXYGEN SATURATION: 98 % | HEART RATE: 83 BPM | DIASTOLIC BLOOD PRESSURE: 67 MMHG

## 2025-06-13 LAB
ANION GAP SERPL CALC-SCNC: 17 MMOL/L — HIGH (ref 7–14)
BASOPHILS # BLD AUTO: 0.05 K/UL — SIGNIFICANT CHANGE UP (ref 0–0.2)
BASOPHILS NFR BLD AUTO: 1.1 % — SIGNIFICANT CHANGE UP (ref 0–2)
BUN SERPL-MCNC: 68 MG/DL — HIGH (ref 7–23)
CALCIUM SERPL-MCNC: 9.6 MG/DL — SIGNIFICANT CHANGE UP (ref 8.4–10.5)
CHLORIDE SERPL-SCNC: 99 MMOL/L — SIGNIFICANT CHANGE UP (ref 98–107)
CO2 SERPL-SCNC: 22 MMOL/L — SIGNIFICANT CHANGE UP (ref 22–31)
CREAT SERPL-MCNC: 8.18 MG/DL — HIGH (ref 0.5–1.3)
EGFR: 8 ML/MIN/1.73M2 — LOW
EGFR: 8 ML/MIN/1.73M2 — LOW
EOSINOPHIL # BLD AUTO: 0.13 K/UL — SIGNIFICANT CHANGE UP (ref 0–0.5)
EOSINOPHIL NFR BLD AUTO: 2.9 % — SIGNIFICANT CHANGE UP (ref 0–6)
GLUCOSE SERPL-MCNC: 65 MG/DL — LOW (ref 70–99)
HCT VFR BLD CALC: 28.8 % — LOW (ref 39–50)
HGB BLD-MCNC: 8.9 G/DL — LOW (ref 13–17)
IANC: 2.7 K/UL — SIGNIFICANT CHANGE UP (ref 1.8–7.4)
IMM GRANULOCYTES NFR BLD AUTO: 0 % — SIGNIFICANT CHANGE UP (ref 0–0.9)
LYMPHOCYTES # BLD AUTO: 1.13 K/UL — SIGNIFICANT CHANGE UP (ref 1–3.3)
LYMPHOCYTES # BLD AUTO: 25.3 % — SIGNIFICANT CHANGE UP (ref 13–44)
MAGNESIUM SERPL-MCNC: 2.5 MG/DL — SIGNIFICANT CHANGE UP (ref 1.6–2.6)
MCHC RBC-ENTMCNC: 30.9 G/DL — LOW (ref 32–36)
MCHC RBC-ENTMCNC: 32.8 PG — SIGNIFICANT CHANGE UP (ref 27–34)
MCV RBC AUTO: 106.3 FL — HIGH (ref 80–100)
MONOCYTES # BLD AUTO: 0.46 K/UL — SIGNIFICANT CHANGE UP (ref 0–0.9)
MONOCYTES NFR BLD AUTO: 10.3 % — SIGNIFICANT CHANGE UP (ref 2–14)
NEUTROPHILS # BLD AUTO: 2.7 K/UL — SIGNIFICANT CHANGE UP (ref 1.8–7.4)
NEUTROPHILS NFR BLD AUTO: 60.4 % — SIGNIFICANT CHANGE UP (ref 43–77)
NRBC # BLD AUTO: 0 K/UL — SIGNIFICANT CHANGE UP (ref 0–0)
NRBC # FLD: 0 K/UL — SIGNIFICANT CHANGE UP (ref 0–0)
NRBC BLD AUTO-RTO: 0 /100 WBCS — SIGNIFICANT CHANGE UP (ref 0–0)
PHOSPHATE SERPL-MCNC: 5.8 MG/DL — HIGH (ref 2.5–4.5)
PLATELET # BLD AUTO: 207 K/UL — SIGNIFICANT CHANGE UP (ref 150–400)
POTASSIUM SERPL-MCNC: 5.5 MMOL/L — HIGH (ref 3.5–5.3)
POTASSIUM SERPL-SCNC: 5.5 MMOL/L — HIGH (ref 3.5–5.3)
RBC # BLD: 2.71 M/UL — LOW (ref 4.2–5.8)
RBC # FLD: 15 % — HIGH (ref 10.3–14.5)
SODIUM SERPL-SCNC: 138 MMOL/L — SIGNIFICANT CHANGE UP (ref 135–145)
WBC # BLD: 4.47 K/UL — SIGNIFICANT CHANGE UP (ref 3.8–10.5)
WBC # FLD AUTO: 4.47 K/UL — SIGNIFICANT CHANGE UP (ref 3.8–10.5)

## 2025-06-13 PROCEDURE — 90935 HEMODIALYSIS ONE EVALUATION: CPT

## 2025-06-13 RX ORDER — OXYCODONE HYDROCHLORIDE 30 MG/1
5 TABLET ORAL ONCE
Refills: 0 | Status: DISCONTINUED | OUTPATIENT
Start: 2025-06-13 | End: 2025-06-13

## 2025-06-13 RX ADMIN — MIDODRINE HYDROCHLORIDE 20 MILLIGRAM(S): 5 TABLET ORAL at 05:10

## 2025-06-13 RX ADMIN — Medication 1 APPLICATION(S): at 12:24

## 2025-06-13 RX ADMIN — Medication 650 MILLIGRAM(S): at 05:14

## 2025-06-13 RX ADMIN — SEVELAMER HYDROCHLORIDE 1600 MILLIGRAM(S): 800 TABLET ORAL at 12:23

## 2025-06-13 RX ADMIN — CALCITRIOL 0.25 MICROGRAM(S): 0.5 CAPSULE, GELATIN COATED ORAL at 12:23

## 2025-06-13 RX ADMIN — HEPARIN SODIUM 5000 UNIT(S): 1000 INJECTION INTRAVENOUS; SUBCUTANEOUS at 12:22

## 2025-06-13 RX ADMIN — Medication 81 MILLIGRAM(S): at 12:23

## 2025-06-13 RX ADMIN — HEPARIN SODIUM 5000 UNIT(S): 1000 INJECTION INTRAVENOUS; SUBCUTANEOUS at 05:11

## 2025-06-13 RX ADMIN — EPOETIN ALFA 10000 UNIT(S): 10000 SOLUTION INTRAVENOUS; SUBCUTANEOUS at 07:34

## 2025-06-13 RX ADMIN — Medication 1334 MILLIGRAM(S): at 12:22

## 2025-06-13 NOTE — PROGRESS NOTE ADULT - SUBJECTIVE AND OBJECTIVE BOX
Called back to see patient with bradycardia noted on telemetry.  Patient sitting up in bed in no acute distress.   Denies chest pain, shortness of breath, palpitations or lightheadedness.   Denies symptoms during renan episodes.         Vital Signs Last 24 Hrs  T(C): 36.9 (10 Antony 2025 12:50), Max: 36.9 (10 Antony 2025 05:00)  T(F): 98.5 (10 Antony 2025 12:50), Max: 98.5 (10 Antoyn 2025 12:50)  HR: 64 (10 Antoyn 2025 12:50) (64 - 84)  BP: 142/90 (10 Antony 2025 12:50) (119/78 - 142/90)  BP(mean): --  RR: 18 (10 Antony 2025 12:50) (18 - 18)  SpO2: 100% (10 Antony 2025 12:50) (98% - 100%)    Parameters below as of 10 Antony 2025 12:50  Patient On (Oxygen Delivery Method): room air      Telemetry: Normal sinus rhythm with episode of p to p prolongation with longest pause 3.2 seconds.     MEDICATIONS  (STANDING):  aspirin enteric coated 81 milliGRAM(s) Oral daily  calcitriol   Capsule 0.25 MICROGram(s) Oral daily  calcium acetate 1334 milliGRAM(s) Oral four times a day with meals  chlorhexidine 2% Cloths 1 Application(s) Topical daily  dextrose 5%. 1000 milliLiter(s) (100 mL/Hr) IV Continuous <Continuous>  dextrose 50% Injectable 25 Gram(s) IV Push once  epoetin carito-epbx (RETACRIT) Injectable 57880 Unit(s) IV Push <User Schedule>  heparin   Injectable 5000 Unit(s) SubCutaneous every 8 hours  melatonin 9 milliGRAM(s) Oral at bedtime  midodrine 20 milliGRAM(s) Oral <User Schedule>  polyethylene glycol 3350 17 Gram(s) Oral daily  senna 2 Tablet(s) Oral at bedtime  sevelamer carbonate 1600 milliGRAM(s) Oral three times a day with meals    MEDICATIONS  (PRN):  acetaminophen     Tablet .. 650 milliGRAM(s) Oral every 6 hours PRN Temp greater or equal to 38C (100.4F), Mild Pain (1 - 3)  aluminum hydroxide/magnesium hydroxide/simethicone Suspension 30 milliLiter(s) Oral every 4 hours PRN Dyspepsia  baclofen 10 milliGRAM(s) Oral every 12 hours PRN Muscle Spasm  bisacodyl 5 milliGRAM(s) Oral every 12 hours PRN Constipation  ondansetron Injectable 4 milliGRAM(s) IV Push every 8 hours PRN Nausea and/or Vomiting          Physical exam:   Gen- well nourished alert. NAD  Resp- clear to auscultation. No wheezing, rales or rhonchi  CV- S1 and S2 RRR. No murmurs, gallops or rubs  ABD- soft nontender + bowel sounds   EXT- no edema no calf tenderness.  Right arm AV graft  Left arm clotted.  Neuro- grossly nonfocal                            8.6    3.39  )-----------( 157      ( 10 Antony 2025 09:59 )             28.0       06-10    143  |  102  |  44[H]  ----------------------------<  81  5.0   |  25  |  6.91[H]    Ca    8.8      10 Antony 2025 09:59  Phos  5.1     06-10  Mg     2.30     06-10    TPro  6.1  /  Alb  3.4  /  TBili  0.4  /  DBili  x   /  AST  9   /  ALT  6   /  AlkPhos  1130[H]  06-09      < from: TTE Limited W or WO Ultrasound Enhancing Agent (11.28.23 @ 16:28) >  TRANSTHORACIC ECHOCARDIOGRAM REPORT  ________________________________________________________________________________                                      _______       Pt. Name:       BRIDGET NORTH Study Date:    11/28/2023  MRN:            [id number]     YOB: 1985  Accession #:    [id number]      Age:           37 years  Account#:       [id number]   Gender:        M  Heart Rate:     78 bpm         Height:        75.00 in (190.50 cm)  Rhythm:         sinus rhythm   Weight:    198.00 lb (89.81 kg)  Blood Pressure: 120/72 mmHg    BSA/BMI:       2.19 m² / 24.75 kg/m²  ________________________________________________________________________________________  Referring Physician:    [id number] Jaclyn Williamson  Interpreting Physician: Pamela Navarrete MD  Primary Sonographer:    Jenniffer Srinivasan RDCS    CPT:               3D RECONST W/O WKSTATION - 50711.m;ECHO TTE WO CON COMP W                     DOPP - 60095.m  Indication(s):     Dyspnea, unspecified - R06.00  Procedure:         Transthoracic echocardiogram with 2-D, M-mode and complete                     spectral and color flow Doppler. Real time and full volume                     3-dimensional imaging performed at the echo machine.  Ordering Location: [facility]  Admission Status:  Inpatient  Study Information: Image quality for this study is fair.    _______________________________________________________________________________________     CONCLUSIONS:      1. Left ventricular cavity is normal. Left ventricular wall thickness is normal. Left ventricular systolic function is normal with an ejection fraction visually estimated at 55 to 60 %. There are no regional wall motion abnormalities seen.   2. The left ventricular diastolic function is indeterminate, with indeterminant filling pressure.   3. Normal right ventricular cavity size, wall thickness, and systolic function.   4. There is severe calcification of the mitral valve annulus.   5. Estimated pulmonary artery systolic pressure is 32 mmHg.   6. Mildto moderate mitral valve stenosis.   7. No pericardial effusion seen.   8. No prior echocardiogram is available for comparison.    ________________________________________________________________________________________  FINDINGS:     Left Ventricle:  The left ventricular cavity is normal. Left ventricular wall thickness is normal. Left ventricular systolic function is normal with an ejection fraction visually estimated at 55 to 60%. There are no regional wall motion abnormalities seen. The left ventricular diastolic function is indeterminate, with indeterminant filling pressure.     Right Ventricle:  The right ventricular cavity is normal in size, normal wall thickness and normal systolic function. Tricuspid annular plane systolic excursion (TAPSE) is 2.8 cm (normal >=1.7 cm).    Left Atrium:  The left atrium is severely dilated with an indexed volume of 64.53 ml/m².     Right Atrium:  The right atrium is normal in size with an indexed volume of 19.68 ml/m² and an indexed area of 7.78 cm²/m².     Interatrial Septum:  The interatrial septum appears intact.     Aortic Valve:  The aortic valve is tricuspid with normal leaflet excursion. There is mild calcification of the aortic valve leaflets. There is fibrocalcific aortic valve sclerosis without stenosis. There is trace aortic regurgitation.     Mitral Valve:  Structurally normal mitral valve with normal leaflet excursion. There is severe calcification of the mitral valve annulus. There is mild to moderate mitral valve stenosis. The transmitral peak gradient is 12.8 mmHg and mean gradient is 7.00 mmHg at a heart rate of 73 bpm. There is mild mitral regurgitation.     Tricuspid Valve:  Structurally normal tricuspid valve with normal leaflet excursion. There is mild tricuspid regurgitation. Estimated pulmonary artery systolic pressure is 32 mmHg.     Pulmonic Valve:  Structurally normal pulmonic valve with normal leaflet excursion. There is trace pulmonic regurgitation.     Aorta:  The aortic annulus and aortic root appear normal in size.     Pericardium:  No pericardial effusion seen.     Systemic Veins:  The inferior vena cava is normal in size (normal <2.1cm) with normal inspiratory collapse (normal >50%) consistent with normal right atrial pressure (~3, range 0-5mmHg).  ____________________________________________________________________          
Interval History:  No acute events overnight  Telemetry: NSR     MEDICATIONS  (STANDING):  aspirin enteric coated 81 milliGRAM(s) Oral daily  calcitriol   Capsule 0.25 MICROGram(s) Oral daily  calcium acetate 1334 milliGRAM(s) Oral four times a day with meals  chlorhexidine 2% Cloths 1 Application(s) Topical daily  dextrose 5%. 1000 milliLiter(s) (100 mL/Hr) IV Continuous <Continuous>  dextrose 50% Injectable 25 Gram(s) IV Push once  epoetin carito-epbx (RETACRIT) Injectable 07803 Unit(s) IV Push <User Schedule>  heparin   Injectable 5000 Unit(s) SubCutaneous every 8 hours  melatonin 9 milliGRAM(s) Oral at bedtime  metoprolol tartrate 25 milliGRAM(s) Oral two times a day  midodrine 20 milliGRAM(s) Oral <User Schedule>  polyethylene glycol 3350 17 Gram(s) Oral daily  senna 2 Tablet(s) Oral at bedtime  sevelamer carbonate 1600 milliGRAM(s) Oral three times a day with meals    MEDICATIONS  (PRN):  acetaminophen     Tablet .. 650 milliGRAM(s) Oral every 6 hours PRN Temp greater or equal to 38C (100.4F), Mild Pain (1 - 3)  aluminum hydroxide/magnesium hydroxide/simethicone Suspension 30 milliLiter(s) Oral every 4 hours PRN Dyspepsia  baclofen 10 milliGRAM(s) Oral every 12 hours PRN Muscle Spasm  bisacodyl 5 milliGRAM(s) Oral every 12 hours PRN Constipation  ondansetron Injectable 4 milliGRAM(s) IV Push every 8 hours PRN Nausea and/or Vomiting    Vital Signs Last 24 Hrs  T(C): 37.2 (06-06-25 @ 09:00), Max: 37.5 (06-05-25 @ 22:15)  T(F): 98.9 (06-06-25 @ 09:00), Max: 99.5 (06-05-25 @ 22:15)  HR: 64 (06-06-25 @ 09:00) (64 - 81)  BP: 101/62 (06-06-25 @ 09:00) (101/62 - 138/63)  BP(mean): --  RR: 18 (06-06-25 @ 09:00) (17 - 18)  SpO2: 97% (06-06-25 @ 09:00) (96% - 98%)    Appearance: Normal	  HEENT:   Normal oral mucosa, PERRL, EOMI	  Lymphatic: No lymphadenopathy  Cardiovascular: Normal S1 S2, No JVD, No murmurs, No edema  Respiratory: Lungs clear to auscultation	  Psychiatry: A & O x 3, Mood & affect appropriate  Gastrointestinal:  Soft, Non-tender, + BS	  Skin: No rashes, No ecchymoses, No cyanosis	  Neurologic: Non-focal  Extremities: Normal range of motion, No clubbing, cyanosis or edema  Vascular: Peripheral pulses palpable 2+ bilaterally    LABS:	 	    CBC Full  -  ( 06 Jun 2025 06:20 )  WBC Count : 2.89 K/uL  Hemoglobin : 7.7 g/dL  Hematocrit : 24.6 %  Platelet Count - Automated : 185 K/uL  Mean Cell Volume : 105.1 fL  Mean Cell Hemoglobin : 32.9 pg  Mean Cell Hemoglobin Concentration : 31.3 g/dL  Auto Neutrophil # : x  Auto Lymphocyte # : x  Auto Monocyte # : x  Auto Eosinophil # : x  Auto Basophil # : x  Auto Neutrophil % : x  Auto Lymphocyte % : x  Auto Monocyte % : x  Auto Eosinophil % : x  Auto Basophil % : x    06-06    136  |  97[L]  |  69[H]  ----------------------------<  70  5.2   |  23  |  8.71[H]    Ca    8.6      06 Jun 2025 06:20  Phos  6.1     06-06  Mg     2.30     06-06    TPro  6.1  /  Alb  3.3  /  TBili  0.3  /  DBili  x   /  AST  6   /  ALT  <5  /  AlkPhos  1087[H]  06-06  LIVER FUNCTIONS - ( 06 Jun 2025 06:20 )  Alb: 3.3 g/dL / Pro: 6.1 g/dL / ALK PHOS: 1087 U/L / ALT: <5 U/L / AST: 6 U/L / GGT: x                     
Patient is a 39y old  Male who presents with a chief complaint of electrolyte abnormalities, right sided heaviness (11 Jun 2025 14:52)    Date of servie : 06-11-25 @ 17:00  INTERVAL HPI/OVERNIGHT EVENTS:  T(C): 36.9 (06-11-25 @ 11:40), Max: 36.9 (06-11-25 @ 11:40)  HR: 72 (06-11-25 @ 11:40) (67 - 83)  BP: 135/78 (06-11-25 @ 11:40) (121/78 - 154/78)  RR: 18 (06-11-25 @ 11:40) (18 - 18)  SpO2: 99% (06-11-25 @ 11:40) (99% - 100%)  Wt(kg): --  I&O's Summary    10 Antony 2025 07:01  -  11 Jun 2025 07:00  --------------------------------------------------------  IN: 520 mL / OUT: 0 mL / NET: 520 mL    11 Jun 2025 07:01  -  11 Jun 2025 17:00  --------------------------------------------------------  IN: 550 mL / OUT: 2900 mL / NET: -2350 mL        LABS:                        8.8    3.75  )-----------( 171      ( 11 Jun 2025 06:07 )             28.3     06-11    140  |  101  |  59[H]  ----------------------------<  66[L]  5.3   |  22  |  8.09[H]    Ca    9.1      11 Jun 2025 06:07  Phos  5.6     06-11  Mg     2.50     06-11    TPro  6.3  /  Alb  3.7  /  TBili  0.3  /  DBili  x   /  AST  6   /  ALT  5   /  AlkPhos  1159[H]  06-11      Urinalysis Basic - ( 11 Jun 2025 06:07 )    Color: x / Appearance: x / SG: x / pH: x  Gluc: 66 mg/dL / Ketone: x  / Bili: x / Urobili: x   Blood: x / Protein: x / Nitrite: x   Leuk Esterase: x / RBC: x / WBC x   Sq Epi: x / Non Sq Epi: x / Bacteria: x      CAPILLARY BLOOD GLUCOSE            Urinalysis Basic - ( 11 Jun 2025 06:07 )    Color: x / Appearance: x / SG: x / pH: x  Gluc: 66 mg/dL / Ketone: x  / Bili: x / Urobili: x   Blood: x / Protein: x / Nitrite: x   Leuk Esterase: x / RBC: x / WBC x   Sq Epi: x / Non Sq Epi: x / Bacteria: x        MEDICATIONS  (STANDING):  aspirin enteric coated 81 milliGRAM(s) Oral daily  calcitriol   Capsule 0.25 MICROGram(s) Oral daily  calcium acetate 1334 milliGRAM(s) Oral four times a day with meals  chlorhexidine 2% Cloths 1 Application(s) Topical daily  dextrose 5%. 1000 milliLiter(s) (100 mL/Hr) IV Continuous <Continuous>  dextrose 50% Injectable 25 Gram(s) IV Push once  epoetin carito-epbx (RETACRIT) Injectable 21222 Unit(s) IV Push <User Schedule>  heparin   Injectable 5000 Unit(s) SubCutaneous every 8 hours  melatonin 9 milliGRAM(s) Oral at bedtime  midodrine 20 milliGRAM(s) Oral <User Schedule>  polyethylene glycol 3350 17 Gram(s) Oral daily  senna 2 Tablet(s) Oral at bedtime  sevelamer carbonate 1600 milliGRAM(s) Oral three times a day with meals    MEDICATIONS  (PRN):  acetaminophen     Tablet .. 650 milliGRAM(s) Oral every 6 hours PRN Temp greater or equal to 38C (100.4F), Mild Pain (1 - 3)  aluminum hydroxide/magnesium hydroxide/simethicone Suspension 30 milliLiter(s) Oral every 4 hours PRN Dyspepsia  baclofen 10 milliGRAM(s) Oral every 12 hours PRN Muscle Spasm  bisacodyl 5 milliGRAM(s) Oral every 12 hours PRN Constipation  ondansetron Injectable 4 milliGRAM(s) IV Push every 8 hours PRN Nausea and/or Vomiting          PHYSICAL EXAM:  GENERAL: NAD, well-groomed, well-developed  HEAD:  Atraumatic, Normocephalic  CHEST/LUNG: Clear to percussion bilaterally; No rales, rhonchi, wheezing, or rubs  HEART: Regular rate and rhythm; No murmurs, rubs, or gallops  ABDOMEN: Soft, Nontender, Nondistended; Bowel sounds present  EXTREMITIES:  edema +  Care Discussed with Consultants/Other Providers [ ] YES  [ ] NO
Patient is a 39y old  Male who presents with a chief complaint of electrolyte abnormalities, right sided heaviness (29 May 2025 14:11)    Date of servie : 05-29-25 @ 14:54  INTERVAL HPI/OVERNIGHT EVENTS:  T(C): 37.1 (05-29-25 @ 13:03), Max: 37.1 (05-29-25 @ 13:03)  HR: 80 (05-29-25 @ 13:03) (66 - 100)  BP: 123/52 (05-29-25 @ 13:03) (104/53 - 126/68)  RR: 18 (05-29-25 @ 13:03) (17 - 18)  SpO2: 98% (05-29-25 @ 13:03) (97% - 99%)  Wt(kg): --  I&O's Summary    28 May 2025 07:01  -  29 May 2025 07:00  --------------------------------------------------------  IN: 400 mL / OUT: 1400 mL / NET: -1000 mL    29 May 2025 07:01  -  29 May 2025 14:54  --------------------------------------------------------  IN: 150 mL / OUT: 0 mL / NET: 150 mL        LABS:                        8.6    3.49  )-----------( 79       ( 29 May 2025 10:43 )             27.4     05-29    142  |  101  |  95[H]  ----------------------------<  98  4.8   |  21[L]  |  14.32[H]    Ca    7.7[L]      29 May 2025 10:43  Phos  6.7     05-29  Mg     2.30     05-29    TPro  6.7  /  Alb  3.7  /  TBili  0.2  /  DBili  x   /  AST  6   /  ALT  <5  /  AlkPhos  1376[H]  05-28      Urinalysis Basic - ( 29 May 2025 10:43 )    Color: x / Appearance: x / SG: x / pH: x  Gluc: 98 mg/dL / Ketone: x  / Bili: x / Urobili: x   Blood: x / Protein: x / Nitrite: x   Leuk Esterase: x / RBC: x / WBC x   Sq Epi: x / Non Sq Epi: x / Bacteria: x      CAPILLARY BLOOD GLUCOSE      POCT Blood Glucose.: 90 mg/dL (28 May 2025 21:18)  POCT Blood Glucose.: 89 mg/dL (28 May 2025 21:02)  POCT Blood Glucose.: 90 mg/dL (28 May 2025 18:18)  POCT Blood Glucose.: 127 mg/dL (28 May 2025 17:09)  POCT Blood Glucose.: 91 mg/dL (28 May 2025 15:40)        Urinalysis Basic - ( 29 May 2025 10:43 )    Color: x / Appearance: x / SG: x / pH: x  Gluc: 98 mg/dL / Ketone: x  / Bili: x / Urobili: x   Blood: x / Protein: x / Nitrite: x   Leuk Esterase: x / RBC: x / WBC x   Sq Epi: x / Non Sq Epi: x / Bacteria: x        MEDICATIONS  (STANDING):  aspirin enteric coated 81 milliGRAM(s) Oral daily  chlorhexidine 2% Cloths 1 Application(s) Topical daily  cinacalcet 30 milliGRAM(s) Oral daily  dextrose 5%. 1000 milliLiter(s) (100 mL/Hr) IV Continuous <Continuous>  dextrose 50% Injectable 25 Gram(s) IV Push once  epoetin carito-epbx (RETACRIT) Injectable 39771 Unit(s) IV Push <User Schedule>  heparin   Injectable 5000 Unit(s) SubCutaneous every 8 hours  melatonin 9 milliGRAM(s) Oral at bedtime  metoprolol tartrate 25 milliGRAM(s) Oral two times a day  midodrine 5 milliGRAM(s) Oral <User Schedule>  polyethylene glycol 3350 17 Gram(s) Oral daily  senna 2 Tablet(s) Oral at bedtime  sevelamer carbonate 1600 milliGRAM(s) Oral three times a day with meals    MEDICATIONS  (PRN):  acetaminophen     Tablet .. 650 milliGRAM(s) Oral every 6 hours PRN Temp greater or equal to 38C (100.4F), Mild Pain (1 - 3)  aluminum hydroxide/magnesium hydroxide/simethicone Suspension 30 milliLiter(s) Oral every 4 hours PRN Dyspepsia  baclofen 10 milliGRAM(s) Oral every 12 hours PRN Muscle Spasm  ondansetron Injectable 4 milliGRAM(s) IV Push every 8 hours PRN Nausea and/or Vomiting  oxyCODONE    IR 5 milliGRAM(s) Oral every 6 hours PRN Severe Pain (7 - 10)          PHYSICAL EXAM:  GENERAL: NAD, well-groomed, well-developed  HEAD:  Atraumatic, Normocephalic  CHEST/LUNG: Clear to percussion bilaterally; No rales, rhonchi, wheezing, or rubs  HEART: Regular rate and rhythm; No murmurs, rubs, or gallops  ABDOMEN: Soft, Nontender, Nondistended; Bowel sounds present  EXTREMITIES:  2+ Peripheral Pulses, No clubbing, cyanosis, or edema  LYMPH: No lymphadenopathy noted  SKIN: No rashes or lesions    Care Discussed with Consultants/Other Providers [ ] YES  [ ] NO
[facility] Division of Kidney Diseases & Hypertension  FOLLOW UP NOTE  [phone]--------------------------------------------------------------------------------  Chief Complaint: ESRD/Ongoing hemodialysis requirement    24 hour events/subjective: Pt. seen and examined during HD today. Pt. reports feeling  better, gives history of B/L LE swelling. No fever, CP, SOB, HA or dizziness during HD rounds.    PAST HISTORY  --------------------------------------------------------------------------------  No significant changes to PMH, PSH, FHx, SHx, unless otherwise noted    ALLERGIES & MEDICATIONS  --------------------------------------------------------------------------------  Allergies    No Known Drug Allergies  shellfish (Hives)    Intolerances    Standing Inpatient Medications  aspirin enteric coated 81 milliGRAM(s) Oral daily  calcitriol   Capsule 0.25 MICROGram(s) Oral daily  calcium acetate 1334 milliGRAM(s) Oral four times a day with meals  chlorhexidine 2% Cloths 1 Application(s) Topical daily  dextrose 5%. 1000 milliLiter(s) IV Continuous <Continuous>  dextrose 50% Injectable 25 Gram(s) IV Push once  epoetin carito-epbx (RETACRIT) Injectable 48974 Unit(s) IV Push <User Schedule>  heparin   Injectable 5000 Unit(s) SubCutaneous every 8 hours  melatonin 9 milliGRAM(s) Oral at bedtime  metoprolol tartrate 25 milliGRAM(s) Oral two times a day  midodrine 20 milliGRAM(s) Oral <User Schedule>  polyethylene glycol 3350 17 Gram(s) Oral daily  senna 2 Tablet(s) Oral at bedtime  sevelamer carbonate 1600 milliGRAM(s) Oral three times a day with meals    PRN Inpatient Medications  acetaminophen     Tablet .. 650 milliGRAM(s) Oral every 6 hours PRN  aluminum hydroxide/magnesium hydroxide/simethicone Suspension 30 milliLiter(s) Oral every 4 hours PRN  baclofen 10 milliGRAM(s) Oral every 12 hours PRN  bisacodyl 5 milliGRAM(s) Oral every 12 hours PRN  ondansetron Injectable 4 milliGRAM(s) IV Push every 8 hours PRN    REVIEW OF SYSTEMS  --------------------------------------------------------------------------------  Gen: No fever  Respiratory: No dyspnea  CV: No chest pain  GI: No abdominal pain  MSK: +B/L LE edema  Neuro: No dizziness    VITALS/PHYSICAL EXAM  --------------------------------------------------------------------------------  T(C): 36.8 (06-07-25 @ 10:19), Max: 37.3 (06-06-25 @ 13:15)  HR: 71 (06-07-25 @ 10:19) (33 - 91)  BP: 130/68 (06-07-25 @ 10:19) (121/62 - 134/72)  RR: 18 (06-07-25 @ 10:19) (18 - 18)  SpO2: 97% (06-07-25 @ 10:19) (95% - 97%)  Wt(kg): --    06-06-25 @ 07:01  -  06-07-25 @ 07:00  --------------------------------------------------------  IN: 860 mL / OUT: 2500 mL / NET: -1640 mL    06-07-25 @ 07:01  -  06-07-25 @ 12:54  --------------------------------------------------------  IN: 400 mL / OUT: 2800 mL / NET: -2400 mL    PHYSICAL EXAM:  	Gen: resting, NAD  	HEENT: No JVD  	Pulm: CTA B/L  	CV: S1S2+  	Abd: Soft  	LE: +B/L LE edema  	Vascular access: RUE AVF being used for Hemodialysis    LABS/STUDIES  --------------------------------------------------------------------------------              7.7    2.89  >-----------<  185      [06-06-25 @ 06:20]              24.6     136  |  97  |  69  ----------------------------<  70      [06-06-25 @ 06:20]  5.2   |  23  |  8.71        Ca     8.6     [06-06-25 @ 06:20]      Mg     2.30     [06-06-25 @ 06:20]      Phos  6.1     [06-06-25 @ 06:20]    TPro  6.1  /  Alb  3.3  /  TBili  0.3  /  DBili  x   /  AST  6   /  ALT  <5  /  AlkPhos  1087  [06-06-25 @ 06:20]    Creatinine Trend:  SCr 8.71 [06-06 @ 06:20]  SCr 9.40 [06-04 @ 06:30]  SCr 9.63 [06-02 @ 06:30]  SCr 8.73 [06-01 @ 16:55]  SCr 10.60 [05-31 @ 06:35]  
Patient is a 39y old  Male who presents with a chief complaint of electrolyte abnormalities, right sided heaviness (02 Jun 2025 10:26)    Date of servie : 06-02-25 @ 14:41  INTERVAL HPI/OVERNIGHT EVENTS:  T(C): 37.3 (06-02-25 @ 13:00), Max: 37.3 (06-02-25 @ 13:00)  HR: 72 (06-02-25 @ 13:00) (72 - 90)  BP: 119/67 (06-02-25 @ 13:00) (101/44 - 130/75)  RR: 18 (06-02-25 @ 13:00) (17 - 19)  SpO2: 99% (06-02-25 @ 13:00) (95% - 100%)  Wt(kg): --  I&O's Summary    01 Jun 2025 07:01  -  02 Jun 2025 07:00  --------------------------------------------------------  IN: 720 mL / OUT: 0 mL / NET: 720 mL    02 Jun 2025 07:01  -  02 Jun 2025 14:41  --------------------------------------------------------  IN: 1140 mL / OUT: 2600 mL / NET: -1460 mL        LABS:                        8.4    4.60  )-----------( 190      ( 02 Jun 2025 06:30 )             28.1     06-02    136  |  96[L]  |  72[H]  ----------------------------<  74  4.6   |  20[L]  |  9.63[H]    Ca    8.2[L]      02 Jun 2025 06:30  Phos  6.3     06-01  Mg     2.20     06-02    TPro  6.3  /  Alb  3.5  /  TBili  0.3  /  DBili  x   /  AST  9   /  ALT  <5  /  AlkPhos  1121[H]  06-02      Urinalysis Basic - ( 02 Jun 2025 06:30 )    Color: x / Appearance: x / SG: x / pH: x  Gluc: 74 mg/dL / Ketone: x  / Bili: x / Urobili: x   Blood: x / Protein: x / Nitrite: x   Leuk Esterase: x / RBC: x / WBC x   Sq Epi: x / Non Sq Epi: x / Bacteria: x      CAPILLARY BLOOD GLUCOSE            Urinalysis Basic - ( 02 Jun 2025 06:30 )    Color: x / Appearance: x / SG: x / pH: x  Gluc: 74 mg/dL / Ketone: x  / Bili: x / Urobili: x   Blood: x / Protein: x / Nitrite: x   Leuk Esterase: x / RBC: x / WBC x   Sq Epi: x / Non Sq Epi: x / Bacteria: x        MEDICATIONS  (STANDING):  aspirin enteric coated 81 milliGRAM(s) Oral daily  calcitriol   Capsule 0.25 MICROGram(s) Oral daily  calcium acetate 1334 milliGRAM(s) Oral four times a day with meals  chlorhexidine 2% Cloths 1 Application(s) Topical daily  dextrose 5%. 1000 milliLiter(s) (100 mL/Hr) IV Continuous <Continuous>  dextrose 50% Injectable 25 Gram(s) IV Push once  epoetin carito-epbx (RETACRIT) Injectable 34008 Unit(s) IV Push <User Schedule>  heparin   Injectable 5000 Unit(s) SubCutaneous every 8 hours  melatonin 9 milliGRAM(s) Oral at bedtime  metoprolol tartrate 25 milliGRAM(s) Oral two times a day  midodrine 20 milliGRAM(s) Oral <User Schedule>  polyethylene glycol 3350 17 Gram(s) Oral daily  senna 2 Tablet(s) Oral at bedtime  sevelamer carbonate 1600 milliGRAM(s) Oral three times a day with meals    MEDICATIONS  (PRN):  acetaminophen     Tablet .. 650 milliGRAM(s) Oral every 6 hours PRN Temp greater or equal to 38C (100.4F), Mild Pain (1 - 3)  aluminum hydroxide/magnesium hydroxide/simethicone Suspension 30 milliLiter(s) Oral every 4 hours PRN Dyspepsia  baclofen 10 milliGRAM(s) Oral every 12 hours PRN Muscle Spasm  ondansetron Injectable 4 milliGRAM(s) IV Push every 8 hours PRN Nausea and/or Vomiting  oxyCODONE    IR 5 milliGRAM(s) Oral every 6 hours PRN Severe Pain (7 - 10)          PHYSICAL EXAM:  GENERAL: NAD, well-groomed, well-developed  HEAD:  Atraumatic, Normocephalic  CHEST/LUNG: Clear to percussion bilaterally; No rales, rhonchi, wheezing, or rubs  HEART: Regular rate and rhythm; No murmurs, rubs, or gallops  ABDOMEN: Soft, Nontender, Nondistended; Bowel sounds present  EXTREMITIES:  2+ Peripheral Pulses, No clubbing, cyanosis, or edema  LYMPH: No lymphadenopathy noted  SKIN: No rashes or lesions    Care Discussed with Consultants/Other Providers [ ] YES  [ ] NO
Patient is a 39y old  Male who presents with a chief complaint of electrolyte abnormalities, right sided heaviness (04 Jun 2025 14:49)    Date of servie : 06-05-25 @ 13:14  INTERVAL HPI/OVERNIGHT EVENTS:  T(C): 36.9 (06-05-25 @ 11:00), Max: 37.4 (06-05-25 @ 05:00)  HR: 65 (06-05-25 @ 11:00) (65 - 79)  BP: 109/72 (06-05-25 @ 11:00) (107/61 - 123/68)  RR: 17 (06-05-25 @ 11:00) (17 - 18)  SpO2: 98% (06-05-25 @ 11:00) (96% - 98%)  Wt(kg): --  I&O's Summary    04 Jun 2025 07:01  -  05 Jun 2025 07:00  --------------------------------------------------------  IN: 400 mL / OUT: 2400 mL / NET: -2000 mL        LABS:                        7.9    3.89  )-----------( 197      ( 04 Jun 2025 06:30 )             25.6     06-04    138  |  98  |  70[H]  ----------------------------<  86  4.9   |  24  |  9.40[H]    Ca    8.4      04 Jun 2025 06:30  Phos  6.5     06-04  Mg     2.30     06-04    TPro  x   /  Alb  3.4  /  TBili  x   /  DBili  x   /  AST  x   /  ALT  x   /  AlkPhos  x   06-04      Urinalysis Basic - ( 04 Jun 2025 06:30 )    Color: x / Appearance: x / SG: x / pH: x  Gluc: 86 mg/dL / Ketone: x  / Bili: x / Urobili: x   Blood: x / Protein: x / Nitrite: x   Leuk Esterase: x / RBC: x / WBC x   Sq Epi: x / Non Sq Epi: x / Bacteria: x      CAPILLARY BLOOD GLUCOSE            Urinalysis Basic - ( 04 Jun 2025 06:30 )    Color: x / Appearance: x / SG: x / pH: x  Gluc: 86 mg/dL / Ketone: x  / Bili: x / Urobili: x   Blood: x / Protein: x / Nitrite: x   Leuk Esterase: x / RBC: x / WBC x   Sq Epi: x / Non Sq Epi: x / Bacteria: x        MEDICATIONS  (STANDING):  aspirin enteric coated 81 milliGRAM(s) Oral daily  calcitriol   Capsule 0.25 MICROGram(s) Oral daily  calcium acetate 1334 milliGRAM(s) Oral four times a day with meals  chlorhexidine 2% Cloths 1 Application(s) Topical daily  dextrose 5%. 1000 milliLiter(s) (100 mL/Hr) IV Continuous <Continuous>  dextrose 50% Injectable 25 Gram(s) IV Push once  epoetin carito-epbx (RETACRIT) Injectable 06479 Unit(s) IV Push <User Schedule>  heparin   Injectable 5000 Unit(s) SubCutaneous every 8 hours  melatonin 9 milliGRAM(s) Oral at bedtime  metoprolol tartrate 25 milliGRAM(s) Oral two times a day  midodrine 20 milliGRAM(s) Oral <User Schedule>  polyethylene glycol 3350 17 Gram(s) Oral daily  senna 2 Tablet(s) Oral at bedtime  sevelamer carbonate 1600 milliGRAM(s) Oral three times a day with meals    MEDICATIONS  (PRN):  acetaminophen     Tablet .. 650 milliGRAM(s) Oral every 6 hours PRN Temp greater or equal to 38C (100.4F), Mild Pain (1 - 3)  aluminum hydroxide/magnesium hydroxide/simethicone Suspension 30 milliLiter(s) Oral every 4 hours PRN Dyspepsia  baclofen 10 milliGRAM(s) Oral every 12 hours PRN Muscle Spasm  bisacodyl 5 milliGRAM(s) Oral every 12 hours PRN Constipation  ondansetron Injectable 4 milliGRAM(s) IV Push every 8 hours PRN Nausea and/or Vomiting          PHYSICAL EXAM:  GENERAL: NAD, well-groomed, well-developed  HEAD:  Atraumatic, Normocephalic  CHEST/LUNG: Clear to percussion bilaterally; No rales, rhonchi, wheezing, or rubs  HEART: Regular rate and rhythm; No murmurs, rubs, or gallops  ABDOMEN: Soft, Nontender, Nondistended; Bowel sounds present  EXTREMITIES:  2+ Peripheral Pulses, No clubbing, cyanosis, or edema  LYMPH: No lymphadenopathy noted  SKIN: No rashes or lesions    Care Discussed with Consultants/Other Providers [ ] YES  [ ] NO
Patient is a 39y old  Male who presents with a chief complaint of electrolyte abnormalities, right sided heaviness (06 Jun 2025 10:00)    Date of servie : 06-06-25 @ 14:22  INTERVAL HPI/OVERNIGHT EVENTS:  T(C): 37.2 (06-06-25 @ 09:00), Max: 37.5 (06-05-25 @ 22:15)  HR: 64 (06-06-25 @ 09:00) (64 - 81)  BP: 101/62 (06-06-25 @ 09:00) (101/62 - 138/63)  RR: 18 (06-06-25 @ 09:00) (17 - 18)  SpO2: 97% (06-06-25 @ 09:00) (96% - 97%)  Wt(kg): --  I&O's Summary    06 Jun 2025 07:01  -  06 Jun 2025 14:22  --------------------------------------------------------  IN: 400 mL / OUT: 2500 mL / NET: -2100 mL        LABS:                        7.7    2.89  )-----------( 185      ( 06 Jun 2025 06:20 )             24.6     06-06    136  |  97[L]  |  69[H]  ----------------------------<  70  5.2   |  23  |  8.71[H]    Ca    8.6      06 Jun 2025 06:20  Phos  6.1     06-06  Mg     2.30     06-06    TPro  6.1  /  Alb  3.3  /  TBili  0.3  /  DBili  x   /  AST  6   /  ALT  <5  /  AlkPhos  1087[H]  06-06      Urinalysis Basic - ( 06 Jun 2025 06:20 )    Color: x / Appearance: x / SG: x / pH: x  Gluc: 70 mg/dL / Ketone: x  / Bili: x / Urobili: x   Blood: x / Protein: x / Nitrite: x   Leuk Esterase: x / RBC: x / WBC x   Sq Epi: x / Non Sq Epi: x / Bacteria: x      CAPILLARY BLOOD GLUCOSE            Urinalysis Basic - ( 06 Jun 2025 06:20 )    Color: x / Appearance: x / SG: x / pH: x  Gluc: 70 mg/dL / Ketone: x  / Bili: x / Urobili: x   Blood: x / Protein: x / Nitrite: x   Leuk Esterase: x / RBC: x / WBC x   Sq Epi: x / Non Sq Epi: x / Bacteria: x        MEDICATIONS  (STANDING):  aspirin enteric coated 81 milliGRAM(s) Oral daily  calcitriol   Capsule 0.25 MICROGram(s) Oral daily  calcium acetate 1334 milliGRAM(s) Oral four times a day with meals  chlorhexidine 2% Cloths 1 Application(s) Topical daily  dextrose 5%. 1000 milliLiter(s) (100 mL/Hr) IV Continuous <Continuous>  dextrose 50% Injectable 25 Gram(s) IV Push once  epoetin carito-epbx (RETACRIT) Injectable 43238 Unit(s) IV Push <User Schedule>  heparin   Injectable 5000 Unit(s) SubCutaneous every 8 hours  melatonin 9 milliGRAM(s) Oral at bedtime  metoprolol tartrate 25 milliGRAM(s) Oral two times a day  midodrine 20 milliGRAM(s) Oral <User Schedule>  polyethylene glycol 3350 17 Gram(s) Oral daily  senna 2 Tablet(s) Oral at bedtime  sevelamer carbonate 1600 milliGRAM(s) Oral three times a day with meals    MEDICATIONS  (PRN):  acetaminophen     Tablet .. 650 milliGRAM(s) Oral every 6 hours PRN Temp greater or equal to 38C (100.4F), Mild Pain (1 - 3)  aluminum hydroxide/magnesium hydroxide/simethicone Suspension 30 milliLiter(s) Oral every 4 hours PRN Dyspepsia  baclofen 10 milliGRAM(s) Oral every 12 hours PRN Muscle Spasm  bisacodyl 5 milliGRAM(s) Oral every 12 hours PRN Constipation  ondansetron Injectable 4 milliGRAM(s) IV Push every 8 hours PRN Nausea and/or Vomiting          PHYSICAL EXAM:  GENERAL: NAD, well-groomed, well-developed  HEAD:  Atraumatic, Normocephalic  CHEST/LUNG: Clear to percussion bilaterally; No rales, rhonchi, wheezing, or rubs  HEART: Regular rate and rhythm; No murmurs, rubs, or gallops  ABDOMEN: Soft, Nontender, Nondistended; Bowel sounds present  EXTREMITIES:  2+ Peripheral Pulses, No clubbing, cyanosis, or edema  LYMPH: No lymphadenopathy noted  SKIN: No rashes or lesions    Care Discussed with Consultants/Other Providers [ ] YES  [ ] NO
Patient is a 39y old  Male who presents with a chief complaint of electrolyte abnormalities, right sided heaviness (06 Jun 2025 14:22)    Date of servie : 06-07-25 @ 08:24  INTERVAL HPI/OVERNIGHT EVENTS:  T(C): 36.9 (06-07-25 @ 06:15), Max: 37.3 (06-06-25 @ 13:15)  HR: 72 (06-07-25 @ 06:15) (64 - 91)  BP: 121/76 (06-07-25 @ 06:15) (101/62 - 134/72)  RR: 18 (06-07-25 @ 06:15) (18 - 18)  SpO2: 97% (06-07-25 @ 05:15) (95% - 97%)  Wt(kg): --  I&O's Summary    06 Jun 2025 07:01  -  07 Jun 2025 07:00  --------------------------------------------------------  IN: 860 mL / OUT: 2500 mL / NET: -1640 mL        LABS:                        7.7    2.89  )-----------( 185      ( 06 Jun 2025 06:20 )             24.6     06-06    136  |  97[L]  |  69[H]  ----------------------------<  70  5.2   |  23  |  8.71[H]    Ca    8.6      06 Jun 2025 06:20  Phos  6.1     06-06  Mg     2.30     06-06    TPro  6.1  /  Alb  3.3  /  TBili  0.3  /  DBili  x   /  AST  6   /  ALT  <5  /  AlkPhos  1087[H]  06-06      Urinalysis Basic - ( 06 Jun 2025 06:20 )    Color: x / Appearance: x / SG: x / pH: x  Gluc: 70 mg/dL / Ketone: x  / Bili: x / Urobili: x   Blood: x / Protein: x / Nitrite: x   Leuk Esterase: x / RBC: x / WBC x   Sq Epi: x / Non Sq Epi: x / Bacteria: x      CAPILLARY BLOOD GLUCOSE            Urinalysis Basic - ( 06 Jun 2025 06:20 )    Color: x / Appearance: x / SG: x / pH: x  Gluc: 70 mg/dL / Ketone: x  / Bili: x / Urobili: x   Blood: x / Protein: x / Nitrite: x   Leuk Esterase: x / RBC: x / WBC x   Sq Epi: x / Non Sq Epi: x / Bacteria: x        MEDICATIONS  (STANDING):  aspirin enteric coated 81 milliGRAM(s) Oral daily  calcitriol   Capsule 0.25 MICROGram(s) Oral daily  calcium acetate 1334 milliGRAM(s) Oral four times a day with meals  chlorhexidine 2% Cloths 1 Application(s) Topical daily  dextrose 5%. 1000 milliLiter(s) (100 mL/Hr) IV Continuous <Continuous>  dextrose 50% Injectable 25 Gram(s) IV Push once  epoetin carito-epbx (RETACRIT) Injectable 43223 Unit(s) IV Push <User Schedule>  heparin   Injectable 5000 Unit(s) SubCutaneous every 8 hours  melatonin 9 milliGRAM(s) Oral at bedtime  metoprolol tartrate 25 milliGRAM(s) Oral two times a day  midodrine 20 milliGRAM(s) Oral <User Schedule>  polyethylene glycol 3350 17 Gram(s) Oral daily  senna 2 Tablet(s) Oral at bedtime  sevelamer carbonate 1600 milliGRAM(s) Oral three times a day with meals    MEDICATIONS  (PRN):  acetaminophen     Tablet .. 650 milliGRAM(s) Oral every 6 hours PRN Temp greater or equal to 38C (100.4F), Mild Pain (1 - 3)  aluminum hydroxide/magnesium hydroxide/simethicone Suspension 30 milliLiter(s) Oral every 4 hours PRN Dyspepsia  baclofen 10 milliGRAM(s) Oral every 12 hours PRN Muscle Spasm  bisacodyl 5 milliGRAM(s) Oral every 12 hours PRN Constipation  ondansetron Injectable 4 milliGRAM(s) IV Push every 8 hours PRN Nausea and/or Vomiting          PHYSICAL EXAM:  GENERAL: NAD, well-groomed, well-developed  HEAD:  Atraumatic, Normocephalic  CHEST/LUNG: Clear to percussion bilaterally; No rales, rhonchi, wheezing, or rubs  HEART: Regular rate and rhythm; No murmurs, rubs, or gallops  ABDOMEN: Soft, Nontender, Nondistended; Bowel sounds present  EXTREMITIES:  edema +    Care Discussed with Consultants/Other Providers [ x] YES  [ ] NO
Patient is a 39y old  Male who presents with a chief complaint of electrolyte abnormalities, right sided heaviness (10 Antony 2025 13:19)    Date of servie : 06-10-25 @ 14:08  INTERVAL HPI/OVERNIGHT EVENTS:  T(C): 36.9 (06-10-25 @ 12:50), Max: 36.9 (06-10-25 @ 05:00)  HR: 64 (06-10-25 @ 12:50) (64 - 84)  BP: 142/90 (06-10-25 @ 12:50) (119/78 - 142/90)  RR: 18 (06-10-25 @ 12:50) (18 - 18)  SpO2: 100% (06-10-25 @ 12:50) (98% - 100%)  Wt(kg): --  I&O's Summary    09 Jun 2025 07:01  -  10 Antony 2025 07:00  --------------------------------------------------------  IN: 780 mL / OUT: 2900 mL / NET: -2120 mL    10 Antony 2025 07:01  -  10 Antony 2025 14:08  --------------------------------------------------------  IN: 140 mL / OUT: 0 mL / NET: 140 mL        LABS:                        8.6    3.39  )-----------( 157      ( 10 Antony 2025 09:59 )             28.0     06-10    143  |  102  |  44[H]  ----------------------------<  81  5.0   |  25  |  6.91[H]    Ca    8.8      10 Antony 2025 09:59  Phos  5.1     06-10  Mg     2.30     06-10    TPro  6.1  /  Alb  3.4  /  TBili  0.4  /  DBili  x   /  AST  9   /  ALT  6   /  AlkPhos  1130[H]  06-09      Urinalysis Basic - ( 10 Antony 2025 09:59 )    Color: x / Appearance: x / SG: x / pH: x  Gluc: 81 mg/dL / Ketone: x  / Bili: x / Urobili: x   Blood: x / Protein: x / Nitrite: x   Leuk Esterase: x / RBC: x / WBC x   Sq Epi: x / Non Sq Epi: x / Bacteria: x      CAPILLARY BLOOD GLUCOSE            Urinalysis Basic - ( 10 Antony 2025 09:59 )    Color: x / Appearance: x / SG: x / pH: x  Gluc: 81 mg/dL / Ketone: x  / Bili: x / Urobili: x   Blood: x / Protein: x / Nitrite: x   Leuk Esterase: x / RBC: x / WBC x   Sq Epi: x / Non Sq Epi: x / Bacteria: x        MEDICATIONS  (STANDING):  aspirin enteric coated 81 milliGRAM(s) Oral daily  calcitriol   Capsule 0.25 MICROGram(s) Oral daily  calcium acetate 1334 milliGRAM(s) Oral four times a day with meals  chlorhexidine 2% Cloths 1 Application(s) Topical daily  dextrose 5%. 1000 milliLiter(s) (100 mL/Hr) IV Continuous <Continuous>  dextrose 50% Injectable 25 Gram(s) IV Push once  epoetin carito-epbx (RETACRIT) Injectable 28860 Unit(s) IV Push <User Schedule>  heparin   Injectable 5000 Unit(s) SubCutaneous every 8 hours  melatonin 9 milliGRAM(s) Oral at bedtime  midodrine 20 milliGRAM(s) Oral <User Schedule>  polyethylene glycol 3350 17 Gram(s) Oral daily  senna 2 Tablet(s) Oral at bedtime  sevelamer carbonate 1600 milliGRAM(s) Oral three times a day with meals    MEDICATIONS  (PRN):  acetaminophen     Tablet .. 650 milliGRAM(s) Oral every 6 hours PRN Temp greater or equal to 38C (100.4F), Mild Pain (1 - 3)  aluminum hydroxide/magnesium hydroxide/simethicone Suspension 30 milliLiter(s) Oral every 4 hours PRN Dyspepsia  baclofen 10 milliGRAM(s) Oral every 12 hours PRN Muscle Spasm  bisacodyl 5 milliGRAM(s) Oral every 12 hours PRN Constipation  ondansetron Injectable 4 milliGRAM(s) IV Push every 8 hours PRN Nausea and/or Vomiting          PHYSICAL EXAM:  GENERAL: NAD, well-groomed, well-developed  HEAD:  Atraumatic, Normocephalic  CHEST/LUNG: Clear to percussion bilaterally; No rales, rhonchi, wheezing, or rubs  HEART: Regular rate and rhythm; No murmurs, rubs, or gallops  ABDOMEN: Soft, Nontender, Nondistended; Bowel sounds present  EXTREMITIES:  2+ Peripheral Pulses, No clubbing, cyanosis, or edema  LYMPH: No lymphadenopathy noted  SKIN: No rashes or lesions    Care Discussed with Consultants/Other Providers [ ] YES  [ ] NO
Patient is a 39y old  Male who presents with a chief complaint of electrolyte abnormalities, right sided heaviness (30 May 2025 12:59)    Date of servie : 05-30-25 @ 15:41  INTERVAL HPI/OVERNIGHT EVENTS:  T(C): 37 (05-30-25 @ 11:35), Max: 37.2 (05-30-25 @ 06:15)  HR: 74 (05-30-25 @ 11:35) (64 - 83)  BP: 83/44 (05-30-25 @ 11:35) (83/44 - 117/65)  RR: 18 (05-30-25 @ 11:35) (16 - 18)  SpO2: 98% (05-30-25 @ 11:35) (97% - 99%)  Wt(kg): --  I&O's Summary    29 May 2025 07:01  -  30 May 2025 07:00  --------------------------------------------------------  IN: 850 mL / OUT: 2400 mL / NET: -1550 mL    30 May 2025 07:01  -  30 May 2025 15:41  --------------------------------------------------------  IN: 400 mL / OUT: 2400 mL / NET: -2000 mL        LABS:                        8.1    4.28  )-----------( 99       ( 30 May 2025 01:14 )             25.8     05-30    139  |  98  |  107[H]  ----------------------------<  78  5.0   |  19[L]  |  15.06[H]    Ca    7.7[L]      30 May 2025 01:14  Phos  7.6     05-30  Mg     2.30     05-30        Urinalysis Basic - ( 30 May 2025 01:14 )    Color: x / Appearance: x / SG: x / pH: x  Gluc: 78 mg/dL / Ketone: x  / Bili: x / Urobili: x   Blood: x / Protein: x / Nitrite: x   Leuk Esterase: x / RBC: x / WBC x   Sq Epi: x / Non Sq Epi: x / Bacteria: x      CAPILLARY BLOOD GLUCOSE            Urinalysis Basic - ( 30 May 2025 01:14 )    Color: x / Appearance: x / SG: x / pH: x  Gluc: 78 mg/dL / Ketone: x  / Bili: x / Urobili: x   Blood: x / Protein: x / Nitrite: x   Leuk Esterase: x / RBC: x / WBC x   Sq Epi: x / Non Sq Epi: x / Bacteria: x        MEDICATIONS  (STANDING):  aspirin enteric coated 81 milliGRAM(s) Oral daily  calcitriol   Capsule 0.5 MICROGram(s) Oral daily  calcium acetate 1334 milliGRAM(s) Oral four times a day with meals  chlorhexidine 2% Cloths 1 Application(s) Topical daily  cinacalcet 30 milliGRAM(s) Oral daily  dextrose 5%. 1000 milliLiter(s) (100 mL/Hr) IV Continuous <Continuous>  dextrose 50% Injectable 25 Gram(s) IV Push once  epoetin carito-epbx (RETACRIT) Injectable 32084 Unit(s) IV Push <User Schedule>  heparin   Injectable 5000 Unit(s) SubCutaneous every 8 hours  melatonin 9 milliGRAM(s) Oral at bedtime  metoprolol tartrate 25 milliGRAM(s) Oral two times a day  midodrine 10 milliGRAM(s) Oral <User Schedule>  polyethylene glycol 3350 17 Gram(s) Oral daily  senna 2 Tablet(s) Oral at bedtime  sevelamer carbonate 1600 milliGRAM(s) Oral three times a day with meals    MEDICATIONS  (PRN):  acetaminophen     Tablet .. 650 milliGRAM(s) Oral every 6 hours PRN Temp greater or equal to 38C (100.4F), Mild Pain (1 - 3)  aluminum hydroxide/magnesium hydroxide/simethicone Suspension 30 milliLiter(s) Oral every 4 hours PRN Dyspepsia  baclofen 10 milliGRAM(s) Oral every 12 hours PRN Muscle Spasm  ondansetron Injectable 4 milliGRAM(s) IV Push every 8 hours PRN Nausea and/or Vomiting  oxyCODONE    IR 5 milliGRAM(s) Oral every 6 hours PRN Severe Pain (7 - 10)          PHYSICAL EXAM:  GENERAL: NAD, well-groomed, well-developed  HEAD:  Atraumatic, Normocephalic  CHEST/LUNG: Clear to percussion bilaterally; No rales, rhonchi, wheezing, or rubs  HEART: Regular rate and rhythm; No murmurs, rubs, or gallops  ABDOMEN: Soft, Nontender, Nondistended; Bowel sounds present  EXTREMITIES:  2+ Peripheral Pulses, No clubbing, cyanosis, or edema  LYMPH: No lymphadenopathy noted  SKIN: No rashes or lesions    Care Discussed with Consultants/Other Providers [ ] YES  [ ] NO
Patient is a 39y old  Male who presents with a chief complaint of electrolyte abnormalities, right sided heaviness (31 May 2025 13:48)    Date of servie : 06-01-25 @ 14:29  INTERVAL HPI/OVERNIGHT EVENTS:  T(C): 36.7 (06-01-25 @ 08:51), Max: 37.4 (05-31-25 @ 22:10)  HR: 70 (06-01-25 @ 08:55) (34 - 82)  BP: 116/57 (06-01-25 @ 08:51) (107/56 - 116/63)  RR: 18 (06-01-25 @ 08:51) (18 - 18)  SpO2: 99% (06-01-25 @ 08:51) (98% - 99%)  Wt(kg): --  I&O's Summary    31 May 2025 07:01  -  01 Jun 2025 07:00  --------------------------------------------------------  IN: 400 mL / OUT: 2400 mL / NET: -2000 mL    01 Jun 2025 07:01  -  01 Jun 2025 14:29  --------------------------------------------------------  IN: 240 mL / OUT: 0 mL / NET: 240 mL        LABS:                        8.4    4.80  )-----------( 137      ( 31 May 2025 06:35 )             27.2     05-31    140  |  98  |  67[H]  ----------------------------<  78  4.8   |  22  |  10.60[H]    Ca    7.9[L]      31 May 2025 06:35  Phos  6.9     05-31  Mg     2.10     05-31    TPro  x   /  Alb  3.6  /  TBili  x   /  DBili  x   /  AST  x   /  ALT  x   /  AlkPhos  x   05-31      Urinalysis Basic - ( 31 May 2025 06:35 )    Color: x / Appearance: x / SG: x / pH: x  Gluc: 78 mg/dL / Ketone: x  / Bili: x / Urobili: x   Blood: x / Protein: x / Nitrite: x   Leuk Esterase: x / RBC: x / WBC x   Sq Epi: x / Non Sq Epi: x / Bacteria: x      CAPILLARY BLOOD GLUCOSE            Urinalysis Basic - ( 31 May 2025 06:35 )    Color: x / Appearance: x / SG: x / pH: x  Gluc: 78 mg/dL / Ketone: x  / Bili: x / Urobili: x   Blood: x / Protein: x / Nitrite: x   Leuk Esterase: x / RBC: x / WBC x   Sq Epi: x / Non Sq Epi: x / Bacteria: x        MEDICATIONS  (STANDING):  aspirin enteric coated 81 milliGRAM(s) Oral daily  calcitriol   Capsule 0.5 MICROGram(s) Oral daily  calcium acetate 1334 milliGRAM(s) Oral four times a day with meals  chlorhexidine 2% Cloths 1 Application(s) Topical daily  dextrose 5%. 1000 milliLiter(s) (100 mL/Hr) IV Continuous <Continuous>  dextrose 50% Injectable 25 Gram(s) IV Push once  epoetin carito-epbx (RETACRIT) Injectable 35741 Unit(s) IV Push <User Schedule>  heparin   Injectable 5000 Unit(s) SubCutaneous every 8 hours  melatonin 9 milliGRAM(s) Oral at bedtime  metoprolol tartrate 25 milliGRAM(s) Oral two times a day  midodrine 20 milliGRAM(s) Oral <User Schedule>  polyethylene glycol 3350 17 Gram(s) Oral daily  senna 2 Tablet(s) Oral at bedtime  sevelamer carbonate 1600 milliGRAM(s) Oral three times a day with meals    MEDICATIONS  (PRN):  acetaminophen     Tablet .. 650 milliGRAM(s) Oral every 6 hours PRN Temp greater or equal to 38C (100.4F), Mild Pain (1 - 3)  aluminum hydroxide/magnesium hydroxide/simethicone Suspension 30 milliLiter(s) Oral every 4 hours PRN Dyspepsia  baclofen 10 milliGRAM(s) Oral every 12 hours PRN Muscle Spasm  ondansetron Injectable 4 milliGRAM(s) IV Push every 8 hours PRN Nausea and/or Vomiting  oxyCODONE    IR 5 milliGRAM(s) Oral every 6 hours PRN Severe Pain (7 - 10)          PHYSICAL EXAM:  GENERAL: NAD, well-groomed, well-developed  HEAD:  Atraumatic, Normocephalic  CHEST/LUNG: Clear to percussion bilaterally; No rales, rhonchi, wheezing, or rubs  HEART: Regular rate and rhythm; No murmurs, rubs, or gallops  ABDOMEN: Soft, Nontender, Nondistended; Bowel sounds present  EXTREMITIES:  2+ Peripheral Pulses, No clubbing, cyanosis, or edema  LYMPH: No lymphadenopathy noted  SKIN: No rashes or lesions    Care Discussed with Consultants/Other Providers [ ] YES  [ ] NO
[facility] DIVISION OF KIDNEY DISEASES AND HYPERTENSION --   --------------------------------------------------------------------------------  Chief Complaint: ESRD/Ongoing hemodialysis requirement    24 hour events/subjective: Pt. seen and examined during HD today. Pt. feels better, denies fever, CP, SOB, HA or dizziness during HD rounds.    PAST HISTORY  --------------------------------------------------------------------------------  No significant changes to PMH, PSH, FHx, SHx, unless otherwise noted    ALLERGIES & MEDICATIONS  --------------------------------------------------------------------------------  Allergies    No Known Drug Allergies  shellfish (Hives)    Intolerances    Standing Inpatient Medications  aspirin enteric coated 81 milliGRAM(s) Oral daily  calcitriol   Capsule 0.25 MICROGram(s) Oral daily  calcium acetate 1334 milliGRAM(s) Oral four times a day with meals  chlorhexidine 2% Cloths 1 Application(s) Topical daily  dextrose 5%. 1000 milliLiter(s) IV Continuous <Continuous>  dextrose 50% Injectable 25 Gram(s) IV Push once  epoetin carito-epbx (RETACRIT) Injectable 21576 Unit(s) IV Push <User Schedule>  heparin   Injectable 5000 Unit(s) SubCutaneous every 8 hours  melatonin 9 milliGRAM(s) Oral at bedtime  metoprolol tartrate 25 milliGRAM(s) Oral two times a day  midodrine 20 milliGRAM(s) Oral <User Schedule>  polyethylene glycol 3350 17 Gram(s) Oral daily  senna 2 Tablet(s) Oral at bedtime  sevelamer carbonate 1600 milliGRAM(s) Oral three times a day with meals    PRN Inpatient Medications  acetaminophen     Tablet .. 650 milliGRAM(s) Oral every 6 hours PRN  aluminum hydroxide/magnesium hydroxide/simethicone Suspension 30 milliLiter(s) Oral every 4 hours PRN  baclofen 10 milliGRAM(s) Oral every 12 hours PRN  ondansetron Injectable 4 milliGRAM(s) IV Push every 8 hours PRN  oxyCODONE    IR 5 milliGRAM(s) Oral every 6 hours PRN    REVIEW OF SYSTEMS  --------------------------------------------------------------------------------  Gen: No fever  Respiratory: No dyspnea  CV: No chest pain  GI: No abdominal pain  MSK: +B/L LE edema  Neuro: No dizziness    VITALS/PHYSICAL EXAM  --------------------------------------------------------------------------------  T(C): 36.9 (06-02-25 @ 09:30), Max: 37.1 (06-02-25 @ 06:15)  HR: 88 (06-02-25 @ 09:30) (83 - 90)  BP: 112/62 (06-02-25 @ 09:30) (101/44 - 130/75)  RR: 18 (06-02-25 @ 09:30) (17 - 19)  SpO2: 96% (06-02-25 @ 09:30) (95% - 100%)  Wt(kg): --    06-01-25 @ 07:01  -  06-02-25 @ 07:00  --------------------------------------------------------  IN: 720 mL / OUT: 0 mL / NET: 720 mL    06-02-25 @ 07:01  -  06-02-25 @ 10:27  --------------------------------------------------------  IN: 800 mL / OUT: 2600 mL / NET: -1800 mL    Physical Exam:  	Gen: resting, NAD  	HEENT: No JVD  	Pulm: CTA B/L  	CV: S1S2+  	Abd: Soft  	LE: +B/L LE edema  	Vascular access: RUE AVF being used for HD    LABS/STUDIES  --------------------------------------------------------------------------------              8.4    4.60  >-----------<  190      [06-02-25 @ 06:30]              28.1     136  |  96  |  72  ----------------------------<  74      [06-02-25 @ 06:30]  4.6   |  20  |  9.63        Ca     8.2     [06-02-25 @ 06:30]      Mg     2.20     [06-02-25 @ 06:30]      Phos  6.3     [06-01-25 @ 16:55]    TPro  6.3  /  Alb  3.5  /  TBili  0.3  /  DBili  x   /  AST  9   /  ALT  <5  /  AlkPhos  1121  [06-02-25 @ 06:30]    HBsAb 18.4      [05-28-25 @ 18:45]  HBsAg Nonreact      [05-28-25 @ 18:45]  HBcAb Nonreact      [05-28-25 @ 18:45]  HCV 0.43, Nonreact      [05-28-25 @ 18:45]
[facility] DIVISION OF KIDNEY DISEASES AND HYPERTENSION --   --------------------------------------------------------------------------------  Chief Complaint: ESRD/Ongoing hemodialysis requirement    24 hour events/subjective: Pt. seen and examined during HD today. Pt. reports feeling  better, gives history of B/L LE swelling. No fever, CP, SOB, HA or dizziness during HD rounds.    PAST HISTORY  --------------------------------------------------------------------------------  No significant changes to PMH, PSH, FHx, SHx, unless otherwise noted    ALLERGIES & MEDICATIONS  --------------------------------------------------------------------------------  Allergies    No Known Drug Allergies  shellfish (Hives)    Intolerances    Standing Inpatient Medications  aspirin enteric coated 81 milliGRAM(s) Oral daily  calcitriol   Capsule 0.25 MICROGram(s) Oral daily  calcium acetate 1334 milliGRAM(s) Oral four times a day with meals  chlorhexidine 2% Cloths 1 Application(s) Topical daily  dextrose 5%. 1000 milliLiter(s) IV Continuous <Continuous>  dextrose 50% Injectable 25 Gram(s) IV Push once  epoetin carito-epbx (RETACRIT) Injectable 81322 Unit(s) IV Push <User Schedule>  heparin   Injectable 5000 Unit(s) SubCutaneous every 8 hours  melatonin 9 milliGRAM(s) Oral at bedtime  metoprolol tartrate 25 milliGRAM(s) Oral two times a day  midodrine 20 milliGRAM(s) Oral <User Schedule>  polyethylene glycol 3350 17 Gram(s) Oral daily  senna 2 Tablet(s) Oral at bedtime  sevelamer carbonate 1600 milliGRAM(s) Oral three times a day with meals    PRN Inpatient Medications  acetaminophen     Tablet .. 650 milliGRAM(s) Oral every 6 hours PRN  aluminum hydroxide/magnesium hydroxide/simethicone Suspension 30 milliLiter(s) Oral every 4 hours PRN  baclofen 10 milliGRAM(s) Oral every 12 hours PRN  bisacodyl 5 milliGRAM(s) Oral every 12 hours PRN  ondansetron Injectable 4 milliGRAM(s) IV Push every 8 hours PRN    REVIEW OF SYSTEMS  --------------------------------------------------------------------------------  Gen: No fever  Respiratory: No dyspnea  CV: No chest pain  GI: No abdominal pain  MSK: +B/L LE edema  Neuro: No dizziness    VITALS/PHYSICAL EXAM  --------------------------------------------------------------------------------  T(C): 37.2 (06-06-25 @ 09:00), Max: 37.5 (06-05-25 @ 22:15)  HR: 64 (06-06-25 @ 09:00) (64 - 81)  BP: 101/62 (06-06-25 @ 09:00) (101/62 - 138/63)  RR: 18 (06-06-25 @ 09:00) (17 - 18)  SpO2: 97% (06-06-25 @ 09:00) (96% - 98%)  Wt(kg): --    06-06-25 @ 07:01  -  06-06-25 @ 10:00  --------------------------------------------------------  IN: 400 mL / OUT: 2500 mL / NET: -2100 mL    Physical Exam:  	Gen: resting, NAD  	HEENT: No JVD  	Pulm: CTA B/L  	CV: S1S2+  	Abd: Soft  	LE: +B/L LE edema  	Vascular access: RUE AVF being used for HD    LABS/STUDIES  --------------------------------------------------------------------------------              7.7    2.89  >-----------<  185      [06-06-25 @ 06:20]              24.6     136  |  97  |  69  ----------------------------<  70      [06-06-25 @ 06:20]  5.2   |  23  |  8.71        Ca     8.6     [06-06-25 @ 06:20]      Mg     2.30     [06-06-25 @ 06:20]      Phos  6.1     [06-06-25 @ 06:20]    TPro  6.1  /  Alb  3.3  /  TBili  0.3  /  DBili  x   /  AST  6   /  ALT  <5  /  AlkPhos  1087  [06-06-25 @ 06:20]
Patient admitted for electrolyte abnormalities, Right sided weakness. Hx of hyperparathyroidism with 3 gland parathyroidectomy. History of hip fractures. Endocrinology has evaluated patient recommending renal to manage hyperparathyroidism being Renal diease id most likely cause. ENT. Patient has been evaluated by Dr. Royal (ENT head and neck) in the past. Would recommend obtaining 4DCT scan results as patient states he has had in the past or repeating one now. Also call Dr. Royal directly for ENT plan. 
Patient is a 39y old  Male who presents with a chief complaint of electrolyte abnormalities, right sided heaviness (04 Jun 2025 09:54)    Date of servie : 06-04-25 @ 14:50  INTERVAL HPI/OVERNIGHT EVENTS:  T(C): 36.7 (06-04-25 @ 11:00), Max: 37.4 (06-04-25 @ 06:30)  HR: 76 (06-04-25 @ 11:00) (60 - 80)  BP: 122/62 (06-04-25 @ 11:00) (116/65 - 145/81)  RR: 18 (06-04-25 @ 11:00) (17 - 18)  SpO2: 94% (06-04-25 @ 11:00) (94% - 98%)  Wt(kg): --  I&O's Summary    04 Jun 2025 07:01  -  04 Jun 2025 14:50  --------------------------------------------------------  IN: 400 mL / OUT: 2400 mL / NET: -2000 mL        LABS:                        7.9    3.89  )-----------( 197      ( 04 Jun 2025 06:30 )             25.6     06-04    138  |  98  |  70[H]  ----------------------------<  86  4.9   |  24  |  9.40[H]    Ca    8.4      04 Jun 2025 06:30  Phos  6.5     06-04  Mg     2.30     06-04    TPro  x   /  Alb  3.4  /  TBili  x   /  DBili  x   /  AST  x   /  ALT  x   /  AlkPhos  x   06-04      Urinalysis Basic - ( 04 Jun 2025 06:30 )    Color: x / Appearance: x / SG: x / pH: x  Gluc: 86 mg/dL / Ketone: x  / Bili: x / Urobili: x   Blood: x / Protein: x / Nitrite: x   Leuk Esterase: x / RBC: x / WBC x   Sq Epi: x / Non Sq Epi: x / Bacteria: x      CAPILLARY BLOOD GLUCOSE            Urinalysis Basic - ( 04 Jun 2025 06:30 )    Color: x / Appearance: x / SG: x / pH: x  Gluc: 86 mg/dL / Ketone: x  / Bili: x / Urobili: x   Blood: x / Protein: x / Nitrite: x   Leuk Esterase: x / RBC: x / WBC x   Sq Epi: x / Non Sq Epi: x / Bacteria: x        MEDICATIONS  (STANDING):  aspirin enteric coated 81 milliGRAM(s) Oral daily  calcitriol   Capsule 0.25 MICROGram(s) Oral daily  calcium acetate 1334 milliGRAM(s) Oral four times a day with meals  chlorhexidine 2% Cloths 1 Application(s) Topical daily  dextrose 5%. 1000 milliLiter(s) (100 mL/Hr) IV Continuous <Continuous>  dextrose 50% Injectable 25 Gram(s) IV Push once  epoetin carito-epbx (RETACRIT) Injectable 78397 Unit(s) IV Push <User Schedule>  heparin   Injectable 5000 Unit(s) SubCutaneous every 8 hours  melatonin 9 milliGRAM(s) Oral at bedtime  metoprolol tartrate 25 milliGRAM(s) Oral two times a day  midodrine 20 milliGRAM(s) Oral <User Schedule>  polyethylene glycol 3350 17 Gram(s) Oral daily  senna 2 Tablet(s) Oral at bedtime  sevelamer carbonate 1600 milliGRAM(s) Oral three times a day with meals    MEDICATIONS  (PRN):  acetaminophen     Tablet .. 650 milliGRAM(s) Oral every 6 hours PRN Temp greater or equal to 38C (100.4F), Mild Pain (1 - 3)  aluminum hydroxide/magnesium hydroxide/simethicone Suspension 30 milliLiter(s) Oral every 4 hours PRN Dyspepsia  baclofen 10 milliGRAM(s) Oral every 12 hours PRN Muscle Spasm  bisacodyl 5 milliGRAM(s) Oral every 12 hours PRN Constipation  ondansetron Injectable 4 milliGRAM(s) IV Push every 8 hours PRN Nausea and/or Vomiting  oxyCODONE    IR 5 milliGRAM(s) Oral every 6 hours PRN Severe Pain (7 - 10)          PHYSICAL EXAM:  GENERAL: NAD, well-groomed, well-developed  HEAD:  Atraumatic, Normocephalic  CHEST/LUNG: Clear to percussion bilaterally; No rales, rhonchi, wheezing, or rubs  HEART: Regular rate and rhythm; No murmurs, rubs, or gallops  ABDOMEN: Soft, Nontender, Nondistended; Bowel sounds present  EXTREMITIES:  2+ Peripheral Pulses, No clubbing, cyanosis, or edema  LYMPH: No lymphadenopathy noted  SKIN: No rashes or lesions    Care Discussed with Consultants/Other Providers [ ] YES  [ ] NO
[facility] Division of Kidney Diseases & Hypertension  FOLLOW UP NOTE  [phone]--------------------------------------------------------------------------------  Chief Complaint:Hyperkalemia        24 hour events/subjective: Pt seen on HD today, admits to some generalized pain in arms and legs, otherwise tolerating HD well.     PAST HISTORY  --------------------------------------------------------------------------------  No significant changes to PMH, PSH, FHx, SHx, unless otherwise noted    ALLERGIES & MEDICATIONS  --------------------------------------------------------------------------------  Allergies    No Known Drug Allergies  shellfish (Hives)    Intolerances      Standing Inpatient Medications  aspirin enteric coated 81 milliGRAM(s) Oral daily  calcitriol   Capsule 0.5 MICROGram(s) Oral daily  calcium acetate 1334 milliGRAM(s) Oral four times a day with meals  chlorhexidine 2% Cloths 1 Application(s) Topical daily  cinacalcet 30 milliGRAM(s) Oral daily  dextrose 5%. 1000 milliLiter(s) IV Continuous <Continuous>  dextrose 50% Injectable 25 Gram(s) IV Push once  epoetin carito-epbx (RETACRIT) Injectable 99385 Unit(s) IV Push <User Schedule>  heparin   Injectable 5000 Unit(s) SubCutaneous every 8 hours  melatonin 9 milliGRAM(s) Oral at bedtime  metoprolol tartrate 25 milliGRAM(s) Oral two times a day  midodrine 20 milliGRAM(s) Oral <User Schedule>  polyethylene glycol 3350 17 Gram(s) Oral daily  senna 2 Tablet(s) Oral at bedtime  sevelamer carbonate 1600 milliGRAM(s) Oral three times a day with meals    PRN Inpatient Medications  acetaminophen     Tablet .. 650 milliGRAM(s) Oral every 6 hours PRN  aluminum hydroxide/magnesium hydroxide/simethicone Suspension 30 milliLiter(s) Oral every 4 hours PRN  baclofen 10 milliGRAM(s) Oral every 12 hours PRN  ondansetron Injectable 4 milliGRAM(s) IV Push every 8 hours PRN  oxyCODONE    IR 5 milliGRAM(s) Oral every 6 hours PRN      REVIEW OF SYSTEMS  --------------------------------------------------------------------------------  Gen: No  fevers/chills  Respiratory: No dyspnea, cough, wheezing, hemoptysis  CV: No chest pain, PND, orthopnea  GI: No abdominal pain, diarrhea, constipation, nausea, vomiting  : No increased frequency, dysuria, hematuria, nocturia  MSK: Mild arm and leg pain      All other systems were reviewed and are negative, except as noted.    VITALS/PHYSICAL EXAM  --------------------------------------------------------------------------------  T(C): 36.7 (05-31-25 @ 09:50), Max: 37.5 (05-31-25 @ 05:00)  HR: 18 (05-31-25 @ 09:50) (18 - 84)  BP: 123/71 (05-31-25 @ 09:50) (89/52 - 130/63)  RR: 18 (05-31-25 @ 09:50) (18 - 18)  SpO2: 97% (05-31-25 @ 05:00) (96% - 98%)  Wt(kg): --        05-30-25 @ 07:01  -  05-31-25 @ 07:00  --------------------------------------------------------  IN: 400 mL / OUT: 2400 mL / NET: -2000 mL    05-31-25 @ 07:01  -  05-31-25 @ 13:48  --------------------------------------------------------  IN: 400 mL / OUT: 2400 mL / NET: -2000 mL      Physical Exam:  	Gen NAD  	HEENT: no JVD  	Pulm: CTABL  	CV: S1S2,  	Abd: Soft,   	Ext:   - edema B/L LE   	Neuro: Awake and alert  	Skin: Warm and dry              Vascular:   AVF cannulated     LABS/STUDIES  --------------------------------------------------------------------------------              8.4    4.80  >-----------<  137      [05-31-25 @ 06:35]              27.2     140  |  98  |  67  ----------------------------<  78      [05-31-25 @ 06:35]  4.8   |  22  |  10.60        Ca     7.9     [05-31-25 @ 06:35]      Mg     2.10     [05-31-25 @ 06:35]      Phos  6.9     [05-31-25 @ 06:35]            Creatinine Trend:  SCr 10.60 [05-31 @ 06:35]  SCr 15.06 [05-30 @ 01:14]  SCr 14.32 [05-29 @ 10:43]  SCr 19.46 [05-28 @ 18:45]  SCr 22.26 [05-28 @ 13:58]    Urinalysis - [05-31-25 @ 06:35]      Color  / Appearance  / SG  / pH       Gluc 78 / Ketone   / Bili  / Urobili        Blood  / Protein  / Leuk Est  / Nitrite       RBC  / WBC  / Hyaline  / Gran  / Sq Epi  / Non Sq Epi  / Bacteria       Iron 149, TIBC <179, %sat --      [05-28-25 @ 18:45]  Ferritin 755      [05-28-25 @ 18:45]  PTH -- (Ca 7.7)      [05-30-25 @ 01:14]   >5000    HBsAb 18.4      [05-28-25 @ 18:45]  HBsAg Nonreact      [05-28-25 @ 18:45]  HBcAb Nonreact      [05-28-25 @ 18:45]  HCV 0.43, Nonreact      [05-28-25 @ 18:45]    
[facility] DIVISION OF KIDNEY DISEASES AND HYPERTENSION --   --------------------------------------------------------------------------------  Chief Complaint: ESRD/Ongoing hemodialysis requirement    24 hour events/subjective: Pt. seen and examined during HD today. Pt. reports feeling  better, says his B/L LE swelling has decreased with additional HD/UF treatment on 6/7/25. No fever, CP, SOB, HA or dizziness during HD rounds.    PAST HISTORY  --------------------------------------------------------------------------------  No significant changes to PMH, PSH, FHx, SHx, unless otherwise noted    ALLERGIES & MEDICATIONS  --------------------------------------------------------------------------------  Allergies    No Known Drug Allergies  shellfish (Hives)    Intolerances    Standing Inpatient Medications  aspirin enteric coated 81 milliGRAM(s) Oral daily  calcitriol   Capsule 0.25 MICROGram(s) Oral daily  calcium acetate 1334 milliGRAM(s) Oral four times a day with meals  chlorhexidine 2% Cloths 1 Application(s) Topical daily  dextrose 5%. 1000 milliLiter(s) IV Continuous <Continuous>  dextrose 50% Injectable 25 Gram(s) IV Push once  epoetin carito-epbx (RETACRIT) Injectable 52231 Unit(s) IV Push <User Schedule>  heparin   Injectable 5000 Unit(s) SubCutaneous every 8 hours  melatonin 9 milliGRAM(s) Oral at bedtime  metoprolol tartrate 25 milliGRAM(s) Oral two times a day  midodrine 20 milliGRAM(s) Oral <User Schedule>  polyethylene glycol 3350 17 Gram(s) Oral daily  senna 2 Tablet(s) Oral at bedtime  sevelamer carbonate 1600 milliGRAM(s) Oral three times a day with meals    PRN Inpatient Medications  acetaminophen     Tablet .. 650 milliGRAM(s) Oral every 6 hours PRN  aluminum hydroxide/magnesium hydroxide/simethicone Suspension 30 milliLiter(s) Oral every 4 hours PRN  baclofen 10 milliGRAM(s) Oral every 12 hours PRN  bisacodyl 5 milliGRAM(s) Oral every 12 hours PRN  ondansetron Injectable 4 milliGRAM(s) IV Push every 8 hours PRN    REVIEW OF SYSTEMS  --------------------------------------------------------------------------------  Gen: No fever  Respiratory: No dyspnea  CV: No chest pain  GI: No abdominal pain  MSK: +Decreased B/L LE edema  Neuro: No dizziness    VITALS/PHYSICAL EXAM  --------------------------------------------------------------------------------  T(C): 36.4 (06-09-25 @ 09:35), Max: 37 (06-08-25 @ 12:20)  HR: 66 (06-09-25 @ 09:35) (66 - 76)  BP: 121/72 (06-09-25 @ 09:35) (107/65 - 133/76)  RR: 18 (06-09-25 @ 09:35) (17 - 18)  SpO2: 100% (06-09-25 @ 09:35) (98% - 100%)  Wt(kg): --    06-09-25 @ 07:01  -  06-09-25 @ 10:27  --------------------------------------------------------  IN: 400 mL / OUT: 2900 mL / NET: -2500 mL    Physical Exam:  	Gen: resting, NAD  	HEENT: No JVD  	Pulm: CTA B/L  	CV: S1S2+  	Abd: Soft  	LE: +B/L LE edema  	Vascular access: RUE AVF being used for HD    LABS/STUDIES  --------------------------------------------------------------------------------              8.5    4.01  >-----------<  180      [06-09-25 @ 06:30]              26.8     136  |  97  |  59  ----------------------------<  67      [06-09-25 @ 06:30]  5.5   |  23  |  8.02        Ca     9.2     [06-09-25 @ 06:30]      Mg     2.40     [06-09-25 @ 06:30]      Phos  5.4     [06-09-25 @ 06:30]    TPro  6.1  /  Alb  3.4  /  TBili  0.4  /  DBili  x   /  AST  9   /  ALT  6   /  AlkPhos  1130  [06-09-25 @ 06:30]    HBsAb 18.4      [05-28-25 @ 18:45]  HBsAg Nonreact      [05-28-25 @ 18:45]  HBcAb Nonreact      [05-28-25 @ 18:45]  HCV 0.43, Nonreact      [05-28-25 @ 18:45]
[facility] DIVISION OF KIDNEY DISEASES AND HYPERTENSION -- HEMODIALYSIS NOTE   Nehrology Office [phone]  available on [employer] teams--> Tremaine Longo   --------------------------------------------------------------------------------  Chief Complaint: ESRD/Ongoing hemodialysis requirement  pt was evaluated during HD session this am.Blood flow during dialysis are acceptable   BP low   24 hour events/subjective:      ALLERGIES & MEDICATIONS  --------------------------------------------------------------------------------  Allergies    No Known Drug Allergies  shellfish (Hives)    Intolerances      Standing Inpatient Medications  aspirin enteric coated 81 milliGRAM(s) Oral daily  chlorhexidine 2% Cloths 1 Application(s) Topical daily  cinacalcet 30 milliGRAM(s) Oral daily  dextrose 5%. 1000 milliLiter(s) IV Continuous <Continuous>  dextrose 50% Injectable 25 Gram(s) IV Push once  epoetin carito-epbx (RETACRIT) Injectable 53846 Unit(s) IV Push <User Schedule>  heparin   Injectable 5000 Unit(s) SubCutaneous every 8 hours  melatonin 9 milliGRAM(s) Oral at bedtime  metoprolol tartrate 25 milliGRAM(s) Oral two times a day  midodrine 10 milliGRAM(s) Oral <User Schedule>  polyethylene glycol 3350 17 Gram(s) Oral daily  senna 2 Tablet(s) Oral at bedtime  sevelamer carbonate 1600 milliGRAM(s) Oral three times a day with meals    PRN Inpatient Medications  acetaminophen     Tablet .. 650 milliGRAM(s) Oral every 6 hours PRN  aluminum hydroxide/magnesium hydroxide/simethicone Suspension 30 milliLiter(s) Oral every 4 hours PRN  baclofen 10 milliGRAM(s) Oral every 12 hours PRN  ondansetron Injectable 4 milliGRAM(s) IV Push every 8 hours PRN  oxyCODONE    IR 5 milliGRAM(s) Oral every 6 hours PRN      VITALS/PHYSICAL EXAM  --------------------------------------------------------------------------------  T(C): 37 (05-30-25 @ 11:35), Max: 37.2 (05-30-25 @ 06:15)  HR: 74 (05-30-25 @ 11:35) (64 - 83)  BP: 83/44 (05-30-25 @ 11:35) (83/44 - 123/52)  RR: 18 (05-30-25 @ 11:35) (16 - 18)  SpO2: 98% (05-30-25 @ 11:35) (97% - 99%)  Wt(kg): --  Height (cm): 190.5 (05-28-25 @ 22:23)      05-29-25 @ 07:01  -  05-30-25 @ 07:00  --------------------------------------------------------  IN: 850 mL / OUT: 2400 mL / NET: -1550 mL    05-30-25 @ 07:01  -  05-30-25 @ 12:50  --------------------------------------------------------  IN: 400 mL / OUT: 2400 mL / NET: -2000 mL      Physical Exam:  	Gen NAD  	HEENT: no JVD  	Pulm: CTABL  	CV: S1S2,  	Abd: Soft,   	Ext:   - edema B/L LE   	Neuro: Awake and alert  	Skin: Warm and dry          Vascular:   AVF cannulated     LABS/STUDIES  --------------------------------------------------------------------------------              8.1    4.28  >-----------<  99       [05-30-25 @ 01:14]              25.8     139  |  98  |  107  ----------------------------<  78      [05-30-25 @ 01:14]  5.0   |  19  |  15.06        Ca     7.7     [05-30-25 @ 01:14]      iCa    0.87     [05-28 @ 13:58]      Mg     2.30     [05-30-25 @ 01:14]      Phos  7.6     [05-30-25 @ 01:14]    TPro  6.7  /  Alb  3.7  /  TBili  0.2  /  DBili  x   /  AST  6   /  ALT  <5  /  AlkPhos  1376  [05-28-25 @ 13:58]          Iron 149, TIBC <179, %sat --      [05-28-25 @ 18:45]  Ferritin 755      [05-28-25 @ 18:45]  PTH -- (Ca 7.7)      [05-30-25 @ 01:14]   >5000  PTH -- (Ca --)      [01-13-25 @ 15:33]   4125  TSH 2.43      [11-12-24 @ 18:29]    HBsAb 18.4      [05-28-25 @ 18:45]  HBsAg Nonreact      [05-28-25 @ 18:45]  HBcAb Nonreact      [05-28-25 @ 18:45]  HCV 0.43, Nonreact      [05-28-25 @ 18:45]  
Patient is a 39y old  Male who presents with a chief complaint of electrolyte abnormalities, right sided heaviness (03 Jun 2025 14:13)    Date of servie : 06-03-25 @ 15:25  INTERVAL HPI/OVERNIGHT EVENTS:  T(C): 36.9 (06-03-25 @ 11:15), Max: 37.3 (06-02-25 @ 18:20)  HR: 74 (06-03-25 @ 11:15) (74 - 91)  BP: 122/68 (06-03-25 @ 11:15) (95/55 - 125/78)  RR: 18 (06-03-25 @ 11:15) (17 - 18)  SpO2: 97% (06-03-25 @ 11:15) (97% - 100%)  Wt(kg): --  I&O's Summary    02 Jun 2025 07:01  -  03 Jun 2025 07:00  --------------------------------------------------------  IN: 1380 mL / OUT: 2600 mL / NET: -1220 mL        LABS:                        8.4    4.60  )-----------( 190      ( 02 Jun 2025 06:30 )             28.1     06-02    136  |  96[L]  |  72[H]  ----------------------------<  74  4.6   |  20[L]  |  9.63[H]    Ca    8.2[L]      02 Jun 2025 06:30  Phos  6.3     06-01  Mg     2.20     06-02    TPro  6.3  /  Alb  3.5  /  TBili  0.3  /  DBili  x   /  AST  9   /  ALT  <5  /  AlkPhos  1121[H]  06-02      Urinalysis Basic - ( 02 Jun 2025 06:30 )    Color: x / Appearance: x / SG: x / pH: x  Gluc: 74 mg/dL / Ketone: x  / Bili: x / Urobili: x   Blood: x / Protein: x / Nitrite: x   Leuk Esterase: x / RBC: x / WBC x   Sq Epi: x / Non Sq Epi: x / Bacteria: x      CAPILLARY BLOOD GLUCOSE            Urinalysis Basic - ( 02 Jun 2025 06:30 )    Color: x / Appearance: x / SG: x / pH: x  Gluc: 74 mg/dL / Ketone: x  / Bili: x / Urobili: x   Blood: x / Protein: x / Nitrite: x   Leuk Esterase: x / RBC: x / WBC x   Sq Epi: x / Non Sq Epi: x / Bacteria: x        MEDICATIONS  (STANDING):  aspirin enteric coated 81 milliGRAM(s) Oral daily  calcitriol   Capsule 0.25 MICROGram(s) Oral daily  calcium acetate 1334 milliGRAM(s) Oral four times a day with meals  chlorhexidine 2% Cloths 1 Application(s) Topical daily  dextrose 5%. 1000 milliLiter(s) (100 mL/Hr) IV Continuous <Continuous>  dextrose 50% Injectable 25 Gram(s) IV Push once  epoetin carito-epbx (RETACRIT) Injectable 02448 Unit(s) IV Push <User Schedule>  heparin   Injectable 5000 Unit(s) SubCutaneous every 8 hours  melatonin 9 milliGRAM(s) Oral at bedtime  metoprolol tartrate 25 milliGRAM(s) Oral two times a day  midodrine 20 milliGRAM(s) Oral <User Schedule>  polyethylene glycol 3350 17 Gram(s) Oral daily  senna 2 Tablet(s) Oral at bedtime  sevelamer carbonate 1600 milliGRAM(s) Oral three times a day with meals    MEDICATIONS  (PRN):  acetaminophen     Tablet .. 650 milliGRAM(s) Oral every 6 hours PRN Temp greater or equal to 38C (100.4F), Mild Pain (1 - 3)  aluminum hydroxide/magnesium hydroxide/simethicone Suspension 30 milliLiter(s) Oral every 4 hours PRN Dyspepsia  baclofen 10 milliGRAM(s) Oral every 12 hours PRN Muscle Spasm  bisacodyl 5 milliGRAM(s) Oral every 12 hours PRN Constipation  ondansetron Injectable 4 milliGRAM(s) IV Push every 8 hours PRN Nausea and/or Vomiting  oxyCODONE    IR 5 milliGRAM(s) Oral every 6 hours PRN Severe Pain (7 - 10)          PHYSICAL EXAM:  GENERAL: NAD, well-groomed, well-developed  HEAD:  Atraumatic, Normocephalic  CHEST/LUNG: Clear to percussion bilaterally; No rales, rhonchi, wheezing, or rubs  HEART: Regular rate and rhythm; No murmurs, rubs, or gallops  ABDOMEN: Soft, Nontender, Nondistended; Bowel sounds present  EXTREMITIES:  2+ Peripheral Pulses, No clubbing, cyanosis, or edema  LYMPH: No lymphadenopathy noted  SKIN: No rashes or lesions    Care Discussed with Consultants/Other Providers [ ] YES  [ ] NO
Patient is a 39y old  Male who presents with a chief complaint of electrolyte abnormalities, right sided heaviness (07 Jun 2025 12:53)    Date of servie : 06-08-25 @ 15:35  INTERVAL HPI/OVERNIGHT EVENTS:  T(C): 37 (06-08-25 @ 12:20), Max: 37.1 (06-07-25 @ 18:33)  HR: 72 (06-08-25 @ 12:20) (72 - 79)  BP: 107/65 (06-08-25 @ 12:20) (107/65 - 132/69)  RR: 18 (06-08-25 @ 12:20) (18 - 18)  SpO2: 98% (06-08-25 @ 12:20) (96% - 100%)  Wt(kg): --  I&O's Summary    07 Jun 2025 07:01  -  08 Jun 2025 07:00  --------------------------------------------------------  IN: 600 mL / OUT: 2800 mL / NET: -2200 mL        LABS:              CAPILLARY BLOOD GLUCOSE                MEDICATIONS  (STANDING):  aspirin enteric coated 81 milliGRAM(s) Oral daily  calcitriol   Capsule 0.25 MICROGram(s) Oral daily  calcium acetate 1334 milliGRAM(s) Oral four times a day with meals  chlorhexidine 2% Cloths 1 Application(s) Topical daily  dextrose 5%. 1000 milliLiter(s) (100 mL/Hr) IV Continuous <Continuous>  dextrose 50% Injectable 25 Gram(s) IV Push once  epoetin carito-epbx (RETACRIT) Injectable 18935 Unit(s) IV Push <User Schedule>  heparin   Injectable 5000 Unit(s) SubCutaneous every 8 hours  melatonin 9 milliGRAM(s) Oral at bedtime  metoprolol tartrate 25 milliGRAM(s) Oral two times a day  midodrine 20 milliGRAM(s) Oral <User Schedule>  polyethylene glycol 3350 17 Gram(s) Oral daily  senna 2 Tablet(s) Oral at bedtime  sevelamer carbonate 1600 milliGRAM(s) Oral three times a day with meals    MEDICATIONS  (PRN):  acetaminophen     Tablet .. 650 milliGRAM(s) Oral every 6 hours PRN Temp greater or equal to 38C (100.4F), Mild Pain (1 - 3)  aluminum hydroxide/magnesium hydroxide/simethicone Suspension 30 milliLiter(s) Oral every 4 hours PRN Dyspepsia  baclofen 10 milliGRAM(s) Oral every 12 hours PRN Muscle Spasm  bisacodyl 5 milliGRAM(s) Oral every 12 hours PRN Constipation  ondansetron Injectable 4 milliGRAM(s) IV Push every 8 hours PRN Nausea and/or Vomiting          PHYSICAL EXAM:  GENERAL: NAD, well-groomed, well-developed  HEAD:  Atraumatic, Normocephalic  CHEST/LUNG: Clear to percussion bilaterally; No rales, rhonchi, wheezing, or rubs  HEART: Regular rate and rhythm; No murmurs, rubs, or gallops  ABDOMEN: Soft, Nontender, Nondistended; Bowel sounds present  EXTREMITIES:  2+ Peripheral Pulses, No clubbing, cyanosis, or edema  LYMPH: No lymphadenopathy noted  SKIN: No rashes or lesions    Care Discussed with Consultants/Other Providers [ ] YES  [ ] NO
Patient is a 39y old  Male who presents with a chief complaint of electrolyte abnormalities, right sided heaviness (09 Jun 2025 10:27)    Date of servie : 06-09-25 @ 14:18  INTERVAL HPI/OVERNIGHT EVENTS:  T(C): 36.4 (06-09-25 @ 12:20), Max: 36.9 (06-08-25 @ 21:30)  HR: 56 (06-09-25 @ 12:20) (56 - 76)  BP: 136/86 (06-09-25 @ 12:20) (120/76 - 136/86)  RR: 18 (06-09-25 @ 12:20) (17 - 18)  SpO2: 100% (06-09-25 @ 12:20) (99% - 100%)  Wt(kg): --  I&O's Summary    09 Jun 2025 07:01  -  09 Jun 2025 14:18  --------------------------------------------------------  IN: 640 mL / OUT: 2900 mL / NET: -2260 mL        LABS:                        8.5    4.01  )-----------( 180      ( 09 Jun 2025 06:30 )             26.8     06-09    136  |  97[L]  |  59[H]  ----------------------------<  67[L]  5.5[H]   |  23  |  8.02[H]    Ca    9.2      09 Jun 2025 06:30  Phos  5.4     06-09  Mg     2.40     06-09    TPro  6.1  /  Alb  3.4  /  TBili  0.4  /  DBili  x   /  AST  9   /  ALT  6   /  AlkPhos  1130[H]  06-09      Urinalysis Basic - ( 09 Jun 2025 06:30 )    Color: x / Appearance: x / SG: x / pH: x  Gluc: 67 mg/dL / Ketone: x  / Bili: x / Urobili: x   Blood: x / Protein: x / Nitrite: x   Leuk Esterase: x / RBC: x / WBC x   Sq Epi: x / Non Sq Epi: x / Bacteria: x      CAPILLARY BLOOD GLUCOSE            Urinalysis Basic - ( 09 Jun 2025 06:30 )    Color: x / Appearance: x / SG: x / pH: x  Gluc: 67 mg/dL / Ketone: x  / Bili: x / Urobili: x   Blood: x / Protein: x / Nitrite: x   Leuk Esterase: x / RBC: x / WBC x   Sq Epi: x / Non Sq Epi: x / Bacteria: x        MEDICATIONS  (STANDING):  aspirin enteric coated 81 milliGRAM(s) Oral daily  calcitriol   Capsule 0.25 MICROGram(s) Oral daily  calcium acetate 1334 milliGRAM(s) Oral four times a day with meals  chlorhexidine 2% Cloths 1 Application(s) Topical daily  dextrose 5%. 1000 milliLiter(s) (100 mL/Hr) IV Continuous <Continuous>  dextrose 50% Injectable 25 Gram(s) IV Push once  epoetin carito-epbx (RETACRIT) Injectable 90703 Unit(s) IV Push <User Schedule>  heparin   Injectable 5000 Unit(s) SubCutaneous every 8 hours  melatonin 9 milliGRAM(s) Oral at bedtime  metoprolol tartrate 25 milliGRAM(s) Oral two times a day  midodrine 20 milliGRAM(s) Oral <User Schedule>  polyethylene glycol 3350 17 Gram(s) Oral daily  senna 2 Tablet(s) Oral at bedtime  sevelamer carbonate 1600 milliGRAM(s) Oral three times a day with meals    MEDICATIONS  (PRN):  acetaminophen     Tablet .. 650 milliGRAM(s) Oral every 6 hours PRN Temp greater or equal to 38C (100.4F), Mild Pain (1 - 3)  aluminum hydroxide/magnesium hydroxide/simethicone Suspension 30 milliLiter(s) Oral every 4 hours PRN Dyspepsia  baclofen 10 milliGRAM(s) Oral every 12 hours PRN Muscle Spasm  bisacodyl 5 milliGRAM(s) Oral every 12 hours PRN Constipation  ondansetron Injectable 4 milliGRAM(s) IV Push every 8 hours PRN Nausea and/or Vomiting          PHYSICAL EXAM:  GENERAL: NAD, well-groomed, well-developed  HEAD:  Atraumatic, Normocephalic  CHEST/LUNG: Clear to percussion bilaterally; No rales, rhonchi, wheezing, or rubs  HEART: Regular rate and rhythm; No murmurs, rubs, or gallops  ABDOMEN: Soft, Nontender, Nondistended; Bowel sounds present  EXTREMITIES:  2+ Peripheral Pulses, No clubbing, cyanosis, or edema  LYMPH: No lymphadenopathy noted  SKIN: No rashes or lesions    Care Discussed with Consultants/Other Providers [ ] YES  [ ] NO
Patient is a 39y old  Male who presents with a chief complaint of electrolyte abnormalities, right sided heaviness (11 Jun 2025 17:00)    Date of servie : 06-12-25 @ 16:28  INTERVAL HPI/OVERNIGHT EVENTS:  T(C): 36.4 (06-12-25 @ 13:22), Max: 36.8 (06-11-25 @ 17:00)  HR: 56 (06-12-25 @ 13:22) (56 - 80)  BP: 150/95 (06-12-25 @ 13:22) (130/74 - 150/95)  RR: 18 (06-12-25 @ 13:22) (17 - 18)  SpO2: 98% (06-12-25 @ 13:22) (98% - 99%)  Wt(kg): --  I&O's Summary    11 Jun 2025 07:01  -  12 Jun 2025 07:00  --------------------------------------------------------  IN: 700 mL / OUT: 2975 mL / NET: -2275 mL        LABS:                        8.8    3.75  )-----------( 171      ( 11 Jun 2025 06:07 )             28.3     06-11    140  |  101  |  59[H]  ----------------------------<  66[L]  5.3   |  22  |  8.09[H]    Ca    9.1      11 Jun 2025 06:07  Phos  5.6     06-11  Mg     2.50     06-11    TPro  6.3  /  Alb  3.7  /  TBili  0.3  /  DBili  x   /  AST  6   /  ALT  5   /  AlkPhos  1159[H]  06-11      Urinalysis Basic - ( 11 Jun 2025 06:07 )    Color: x / Appearance: x / SG: x / pH: x  Gluc: 66 mg/dL / Ketone: x  / Bili: x / Urobili: x   Blood: x / Protein: x / Nitrite: x   Leuk Esterase: x / RBC: x / WBC x   Sq Epi: x / Non Sq Epi: x / Bacteria: x      CAPILLARY BLOOD GLUCOSE      POCT Blood Glucose.: 91 mg/dL (12 Jun 2025 15:03)        Urinalysis Basic - ( 11 Jun 2025 06:07 )    Color: x / Appearance: x / SG: x / pH: x  Gluc: 66 mg/dL / Ketone: x  / Bili: x / Urobili: x   Blood: x / Protein: x / Nitrite: x   Leuk Esterase: x / RBC: x / WBC x   Sq Epi: x / Non Sq Epi: x / Bacteria: x        MEDICATIONS  (STANDING):  aspirin enteric coated 81 milliGRAM(s) Oral daily  calcitriol   Capsule 0.25 MICROGram(s) Oral daily  calcium acetate 1334 milliGRAM(s) Oral four times a day with meals  chlorhexidine 2% Cloths 1 Application(s) Topical daily  dextrose 5%. 1000 milliLiter(s) (100 mL/Hr) IV Continuous <Continuous>  dextrose 50% Injectable 25 Gram(s) IV Push once  epoetin carito-epbx (RETACRIT) Injectable 65572 Unit(s) IV Push <User Schedule>  heparin   Injectable 5000 Unit(s) SubCutaneous every 8 hours  melatonin 9 milliGRAM(s) Oral at bedtime  midodrine 20 milliGRAM(s) Oral <User Schedule>  polyethylene glycol 3350 17 Gram(s) Oral daily  senna 2 Tablet(s) Oral at bedtime  sevelamer carbonate 1600 milliGRAM(s) Oral three times a day with meals    MEDICATIONS  (PRN):  acetaminophen     Tablet .. 650 milliGRAM(s) Oral every 6 hours PRN Temp greater or equal to 38C (100.4F), Mild Pain (1 - 3)  aluminum hydroxide/magnesium hydroxide/simethicone Suspension 30 milliLiter(s) Oral every 4 hours PRN Dyspepsia  baclofen 10 milliGRAM(s) Oral every 12 hours PRN Muscle Spasm  bisacodyl 5 milliGRAM(s) Oral every 12 hours PRN Constipation  ondansetron Injectable 4 milliGRAM(s) IV Push every 8 hours PRN Nausea and/or Vomiting          PHYSICAL EXAM:  GENERAL: NAD, well-groomed, well-developed  HEAD:  Atraumatic, Normocephalic  CHEST/LUNG: Clear to percussion bilaterally; No rales, rhonchi, wheezing, or rubs  HEART: Regular rate and rhythm; No murmurs, rubs, or gallops  ABDOMEN: Soft, Nontender, Nondistended; Bowel sounds present  EXTREMITIES:  2+ Peripheral Pulses, No clubbing, cyanosis, or edema  LYMPH: No lymphadenopathy noted  SKIN: No rashes or lesions    Care Discussed with Consultants/Other Providers [ ] YES  [ ] NO
[facility] DIVISION OF KIDNEY DISEASES AND HYPERTENSION --   --------------------------------------------------------------------------------  Chief Complaint: ESRD/Ongoing hemodialysis requirement    24 hour events/subjective:  Pt. seen and examined during HD today. Pt. reports feeling better, says he will be discharged home today. No fever, CP, SOB, HA or dizziness during HD rounds.    PAST HISTORY  --------------------------------------------------------------------------------  No significant changes to PMH, PSH, FHx, SHx, unless otherwise noted    ALLERGIES & MEDICATIONS  --------------------------------------------------------------------------------  Allergies    No Known Drug Allergies  shellfish (Hives)    Intolerances    Standing Inpatient Medications  aspirin enteric coated 81 milliGRAM(s) Oral daily  calcitriol   Capsule 0.25 MICROGram(s) Oral daily  calcium acetate 1334 milliGRAM(s) Oral four times a day with meals  chlorhexidine 2% Cloths 1 Application(s) Topical daily  dextrose 5%. 1000 milliLiter(s) IV Continuous <Continuous>  dextrose 50% Injectable 25 Gram(s) IV Push once  epoetin carito-epbx (RETACRIT) Injectable 76444 Unit(s) IV Push <User Schedule>  heparin   Injectable 5000 Unit(s) SubCutaneous every 8 hours  melatonin 9 milliGRAM(s) Oral at bedtime  midodrine 20 milliGRAM(s) Oral <User Schedule>  polyethylene glycol 3350 17 Gram(s) Oral daily  senna 2 Tablet(s) Oral at bedtime  sevelamer carbonate 1600 milliGRAM(s) Oral three times a day with meals    PRN Inpatient Medications  acetaminophen     Tablet .. 650 milliGRAM(s) Oral every 6 hours PRN  aluminum hydroxide/magnesium hydroxide/simethicone Suspension 30 milliLiter(s) Oral every 4 hours PRN  baclofen 10 milliGRAM(s) Oral every 12 hours PRN  bisacodyl 5 milliGRAM(s) Oral every 12 hours PRN  ondansetron Injectable 4 milliGRAM(s) IV Push every 8 hours PRN    REVIEW OF SYSTEMS  --------------------------------------------------------------------------------  Gen: No fever  Respiratory: No dyspnea  CV: No chest pain  GI: No abdominal pain  MSK: +Decreased B/L LE edema  Neuro: No dizziness    VITALS/PHYSICAL EXAM  --------------------------------------------------------------------------------  T(C): 36.1 (06-13-25 @ 06:15), Max: 36.9 (06-13-25 @ 04:14)  HR: 69 (06-13-25 @ 06:15) (56 - 103)  BP: 161/67 (06-13-25 @ 06:15) (133/83 - 161/67)  RR: 18 (06-13-25 @ 06:15) (18 - 18)  SpO2: 97% (06-13-25 @ 04:14) (97% - 100%)  Wt(kg): --    Physical Exam:  	Gen: resting, NAD  	HEENT: No JVD  	Pulm: CTA B/L  	CV: S1S2+  	Abd: Soft  	LE: +B/L LE edema  	Vascular access: RUE AVF being used for HD    LABS/STUDIES  --------------------------------------------------------------------------------              8.9    4.47  >-----------<  207      [06-13-25 @ 06:15]              28.8     138  |  99  |  68  ----------------------------<  65      [06-13-25 @ 06:15]  5.5   |  22  |  8.18        Ca     9.6     [06-13-25 @ 06:15]      Mg     2.50     [06-13-25 @ 06:15]      Phos  5.8     [06-13-25 @ 06:15]    HBsAb 18.4      [05-28-25 @ 18:45]  HBsAg Nonreact      [05-28-25 @ 18:45]  HBcAb Nonreact      [05-28-25 @ 18:45]  HCV 0.43, Nonreact      [05-28-25 @ 18:45]
[facility] DIVISION OF KIDNEY DISEASES AND HYPERTENSION --   --------------------------------------------------------------------------------  Chief Complaint: ESRD/Ongoing hemodialysis requirement    24 hour events/subjective: Pt. seen and examined during HD today. Pt. reports feeling  tired, did not sleep well last night. No fever, CP, SOB, HA or dizziness during HD rounds.    PAST HISTORY  --------------------------------------------------------------------------------  No significant changes to PMH, PSH, FHx, SHx, unless otherwise noted    ALLERGIES & MEDICATIONS  --------------------------------------------------------------------------------  Allergies    No Known Drug Allergies  shellfish (Hives)    Intolerances    Standing Inpatient Medications  aspirin enteric coated 81 milliGRAM(s) Oral daily  calcitriol   Capsule 0.25 MICROGram(s) Oral daily  calcium acetate 1334 milliGRAM(s) Oral four times a day with meals  chlorhexidine 2% Cloths 1 Application(s) Topical daily  dextrose 5%. 1000 milliLiter(s) IV Continuous <Continuous>  dextrose 50% Injectable 25 Gram(s) IV Push once  epoetin carito-epbx (RETACRIT) Injectable 04053 Unit(s) IV Push <User Schedule>  heparin   Injectable 5000 Unit(s) SubCutaneous every 8 hours  melatonin 9 milliGRAM(s) Oral at bedtime  midodrine 20 milliGRAM(s) Oral <User Schedule>  polyethylene glycol 3350 17 Gram(s) Oral daily  senna 2 Tablet(s) Oral at bedtime  sevelamer carbonate 1600 milliGRAM(s) Oral three times a day with meals    PRN Inpatient Medications  acetaminophen     Tablet .. 650 milliGRAM(s) Oral every 6 hours PRN  aluminum hydroxide/magnesium hydroxide/simethicone Suspension 30 milliLiter(s) Oral every 4 hours PRN  baclofen 10 milliGRAM(s) Oral every 12 hours PRN  bisacodyl 5 milliGRAM(s) Oral every 12 hours PRN  ondansetron Injectable 4 milliGRAM(s) IV Push every 8 hours PRN    REVIEW OF SYSTEMS  --------------------------------------------------------------------------------  Gen: No fever, +tired  Respiratory: No dyspnea  CV: No chest pain  GI: No abdominal pain  MSK: +Decreased B/L LE edema  Neuro: No dizziness    VITALS/PHYSICAL EXAM  --------------------------------------------------------------------------------  T(C): 36.5 (06-11-25 @ 06:00), Max: 36.9 (06-10-25 @ 12:50)  HR: 67 (06-11-25 @ 06:00) (64 - 81)  BP: 154/78 (06-11-25 @ 06:00) (121/78 - 154/78)  RR: 18 (06-11-25 @ 06:00) (18 - 18)  SpO2: 100% (06-11-25 @ 04:50) (100% - 100%)  Wt(kg): --    06-10-25 @ 07:01  -  06-11-25 @ 07:00  --------------------------------------------------------  IN: 520 mL / OUT: 0 mL / NET: 520 mL    Physical Exam:  	Gen: resting, NAD  	HEENT: No JVD  	Pulm: CTA B/L  	CV: S1S2+  	Abd: Soft  	LE: +B/L LE edema  	Vascular access: RUE AVF being used for HD    LABS/STUDIES  --------------------------------------------------------------------------------              8.8    3.75  >-----------<  171      [06-11-25 @ 06:07]              28.3     140  |  101  |  59  ----------------------------<  66      [06-11-25 @ 06:07]  5.3   |  22  |  8.09        Ca     9.1     [06-11-25 @ 06:07]      Mg     2.50     [06-11-25 @ 06:07]      Phos  5.6     [06-11-25 @ 06:07]    TPro  6.3  /  Alb  3.7  /  TBili  0.3  /  DBili  x   /  AST  6   /  ALT  5   /  AlkPhos  1159  [06-11-25 @ 06:07]    HBsAb 18.4      [05-28-25 @ 18:45]  HBsAg Nonreact      [05-28-25 @ 18:45]  HBcAb Nonreact      [05-28-25 @ 18:45]  HCV 0.43, Nonreact      [05-28-25 @ 18:45]
[facility] DIVISION OF KIDNEY DISEASES AND HYPERTENSION --   --------------------------------------------------------------------------------  Chief Complaint: ESRD/Ongoing hemodialysis requirement    24 hour events/subjective: Pt. seen and examined during HD today. Pt. feels better, gives history of HA. Pt. denies fever, CP, SOB or dizziness during HD rounds.    PAST HISTORY  --------------------------------------------------------------------------------  No significant changes to PMH, PSH, FHx, SHx, unless otherwise noted    ALLERGIES & MEDICATIONS  --------------------------------------------------------------------------------  Allergies    No Known Drug Allergies  shellfish (Hives)    Intolerances    Standing Inpatient Medications  aspirin enteric coated 81 milliGRAM(s) Oral daily  calcitriol   Capsule 0.25 MICROGram(s) Oral daily  calcium acetate 1334 milliGRAM(s) Oral four times a day with meals  chlorhexidine 2% Cloths 1 Application(s) Topical daily  dextrose 5%. 1000 milliLiter(s) IV Continuous <Continuous>  dextrose 50% Injectable 25 Gram(s) IV Push once  epoetin carito-epbx (RETACRIT) Injectable 85986 Unit(s) IV Push <User Schedule>  heparin   Injectable 5000 Unit(s) SubCutaneous every 8 hours  melatonin 9 milliGRAM(s) Oral at bedtime  metoprolol tartrate 25 milliGRAM(s) Oral two times a day  midodrine 20 milliGRAM(s) Oral <User Schedule>  polyethylene glycol 3350 17 Gram(s) Oral daily  senna 2 Tablet(s) Oral at bedtime  sevelamer carbonate 1600 milliGRAM(s) Oral three times a day with meals    PRN Inpatient Medications  acetaminophen     Tablet .. 650 milliGRAM(s) Oral every 6 hours PRN  aluminum hydroxide/magnesium hydroxide/simethicone Suspension 30 milliLiter(s) Oral every 4 hours PRN  baclofen 10 milliGRAM(s) Oral every 12 hours PRN  bisacodyl 5 milliGRAM(s) Oral every 12 hours PRN  ondansetron Injectable 4 milliGRAM(s) IV Push every 8 hours PRN  oxyCODONE    IR 5 milliGRAM(s) Oral every 6 hours PRN    REVIEW OF SYSTEMS  --------------------------------------------------------------------------------  Gen: No fever  Respiratory: No dyspnea  CV: No chest pain  GI: No abdominal pain  MSK: +B/L LE edema  Neuro: No dizziness    VITALS/PHYSICAL EXAM  --------------------------------------------------------------------------------  T(C): 37.4 (06-04-25 @ 06:30), Max: 37.4 (06-04-25 @ 06:30)  HR: 67 (06-04-25 @ 06:30) (67 - 80)  BP: 145/81 (06-04-25 @ 06:30) (120/71 - 145/81)  RR: 18 (06-04-25 @ 06:30) (17 - 18)  SpO2: 96% (06-04-25 @ 06:30) (94% - 98%)  Wt(kg): --    Physical Exam:  	Gen: resting, NAD  	HEENT: No JVD  	Pulm: CTA B/L  	CV: S1S2+  	Abd: Soft  	LE: +B/L LE edema  	Vascular access: RUE AVF being used for HD    LABS/STUDIES  --------------------------------------------------------------------------------              7.9    3.89  >-----------<  197      [06-04-25 @ 06:30]              25.6     138  |  98  |  70  ----------------------------<  86      [06-04-25 @ 06:30]  4.9   |  24  |  9.40        Ca     8.4     [06-04-25 @ 06:30]      Mg     2.30     [06-04-25 @ 06:30]      Phos  6.5     [06-04-25 @ 06:30]    TPro  x   /  Alb  3.4  /  TBili  x   /  DBili  x   /  AST  x   /  ALT  x   /  AlkPhos  x   [06-04-25 @ 06:30]    HBsAb 18.4      [05-28-25 @ 18:45]  HBsAg Nonreact      [05-28-25 @ 18:45]  HBcAb Nonreact      [05-28-25 @ 18:45]

## 2025-06-13 NOTE — PROGRESS NOTE ADULT - PROVIDER SPECIALTY LIST ADULT
Hospitalist
Hospitalist
Nephrology
Nephrology
ENT
Hospitalist
Nephrology
Electrophysiology
Hospitalist
Electrophysiology
Hospitalist
Nephrology

## 2025-06-13 NOTE — PROGRESS NOTE ADULT - PROBLEM SELECTOR PLAN 1
Pt. tolerating HD. BP stable during HD rounds. AVF functioning well. Pt. with anemia, on CUCA treatment. Hemoglobin level below target range (7.9) today. Pt. with hyperphosphatemia, on oral Calcium Acetate and Sevelamer therapy. Serum phosphorus above target range (6.5) today. Low phosphorus/renal diet. Monitor BP and labs.
Pt. tolerating HD. BP stable during HD rounds. AVF functioning well. Pt. with anemia, on CUCA treatment. Hemoglobin level below target range (8.4) today. Pt. with hyperphosphatemia, on oral Calcium Acetate therapy. Serum phosphorus above target range (6.3) today. Low phosphorus/renal diet. Monitor BP and labs.
Pt. seen during HD today. Pt. tolerating HD. BP elevated during HD rounds. Continue with current UF goal as tolerated. AVF functioning well. Pt. with anemia, on CUCA treatment. Hemoglobin level below target range (8.9) today. Pt. with hyperphosphatemia, on oral Calcium Acetate and Sevelamer therapy. Serum phosphorus above target range (5.8) today. Low phosphorus/renal diet. Monitor BP and labs.
Pt. tolerating HD. BP stable during HD rounds. AVF functioning well. Pt. with anemia, on CUCA treatment. Hemoglobin level below target range (7.7) today. Pt. with hyperphosphatemia, on oral Calcium Acetate and Sevelamer therapy. Serum phosphorus above target range (6.1) today. Low phosphorus/renal diet. Monitor BP and labs.
Pt. tolerating HD. BP stable during HD rounds. AVF functioning well. Pt. with anemia, on CUCA treatment. Hemoglobin level below target range (8.8) today. Pt. with hyperphosphatemia, on oral Calcium Acetate and Sevelamer therapy. Serum phosphorus above target range (5.6) today. Low phosphorus/renal diet. Monitor BP and labs.
Pt. tolerating HD. BP stable during HD rounds. AVF functioning well. Pt. with anemia, on CUCA treatment. Hemoglobin level below target range (7.7) on 6/6. Pt. with hyperphosphatemia, on oral Calcium Acetate and Sevelamer therapy. Serum phosphorus above target range (6.1) on 6/6. Low phosphorus/renal diet. Monitor BP and labs.
Pt. tolerating HD. BP stable during HD rounds. AVF functioning well. Pt. with anemia, on CUCA treatment. Hemoglobin level below target range (8.5) today. Pt. with hyperphosphatemia, on oral Calcium Acetate and Sevelamer therapy. Serum phosphorus in target range (5.4) today. Low phosphorus/renal diet. Monitor BP and labs.

## 2025-06-13 NOTE — PROGRESS NOTE ADULT - REASON FOR ADMISSION
electrolyte abnormalities, right sided heaviness

## 2025-06-17 PROBLEM — N25.0 RENAL OSTEODYSTROPHY: Chronic | Status: ACTIVE | Noted: 2025-05-28

## 2025-06-17 PROBLEM — I95.1 ORTHOSTATIC HYPOTENSION: Chronic | Status: ACTIVE | Noted: 2025-05-28

## 2025-06-27 NOTE — ED ADULT NURSE NOTE - PAIN RATING/NUMBER SCALE (0-10): ACTIVITY
06/27/25 1017   Discharge Planning   Assistance Needed Patient currently at [facility] SNF for PT/OT. Patient and family would like to return to [facility]. Prior to [facility], patient was at home with his sister and niece. Per staff at [facility], patient is dependent on ADL's, does not get out of bed.   Type of Residence Skilled nursing facility   Who is requesting discharge planning? Provider   Home or Post Acute Services Post acute facilities (Rehab/SNF/etc)   Type of Post Acute Facility Services Skilled nursing   Expected Discharge Disposition SNF  (Patient will return to [facility], pending the need for IV antibiotics.)   Does the patient need discharge transport arranged? Yes   RoundTrip coordination needed? Yes   Has discharge transport been arranged? No   Patient Choice   Provider Choice list and CMS website (https://medicare.gov/care-compare#search) for post-acute Quality and Resource Measure Data were provided and reviewed with: Family;Patient   Patient / Family choosing to utilize agency / facility established prior to hospitalization Yes     6/27/25 1511 Infectious disease states patient will need IV antibiotics. Patient will need line placed. Per nursing, will be done on Monday.    10 (severe pain)

## 2025-08-26 ENCOUNTER — APPOINTMENT (OUTPATIENT)
Dept: SURGERY | Facility: CLINIC | Age: 40
End: 2025-08-26

## (undated) DEVICE — GLV 7.5 PROTEXIS (WHITE)

## (undated) DEVICE — DRAPE HIP W POUCHES 87X115X134"

## (undated) DEVICE — VENODYNE/SCD SLEEVE CALF LARGE

## (undated) DEVICE — HOOD FLYTE STRYKER HELMET SHIELD

## (undated) DEVICE — SOL IRR BAG NS 0.9% 3000ML

## (undated) DEVICE — VENODYNE/SCD SLEEVE CALF MEDIUM

## (undated) DEVICE — FORCEP RADIAL JAW 4 240CM DISP

## (undated) DEVICE — CONTAINER FORMALIN BUFF 10% 60ML

## (undated) DEVICE — SUCTION YANKAUER NO CONTROL VENT

## (undated) DEVICE — TUBING CAP SET ERBEFLO CLEVERCAP HYBRID CO2 FOR OLYMPUS SCOPES AND UCR

## (undated) DEVICE — SUT POLYSORB 1 36" GS-21 UNDYED

## (undated) DEVICE — SAW BLADE STRYKER RECIPROCATING 77.6X0.77X11.2MM

## (undated) DEVICE — DRSG AQUACEL 3.5 X 14"

## (undated) DEVICE — POSITIONER FOAM EGG CRATE ULNAR 2PCS (PINK)

## (undated) DEVICE — GLV 8 PROTEXIS (WHITE)

## (undated) DEVICE — SNARE POLYP SENS SM 13MM 240CM

## (undated) DEVICE — GLV 8.5 PROTEXIS (WHITE)

## (undated) DEVICE — STRYKER FEMORAL CANAL BRUSH

## (undated) DEVICE — Device

## (undated) DEVICE — SPECIMEN CONTAINER 100ML

## (undated) DEVICE — DRAPE 3/4 SHEET W REINFORCEMENT 56X77"

## (undated) DEVICE — SUT POLYSORB 2-0 36" GS-21 UNDYED

## (undated) DEVICE — ELCTR BOVIE PENCIL SMOKE EVACUATION

## (undated) DEVICE — SOL IRR POUR H2O 1000ML

## (undated) DEVICE — DRSG TAPE MICROFOAM 3"

## (undated) DEVICE — DRSG AQUACEL 3.5 X 10"

## (undated) DEVICE — GLV 6.5 PROTEXIS (WHITE)

## (undated) DEVICE — WOUND IRR SURGIPHOR

## (undated) DEVICE — SNARE EXACTO COLD 9MMX230CM

## (undated) DEVICE — GLV 7 PROTEXIS (WHITE)

## (undated) DEVICE — WARMING BLANKET UPPER ADULT

## (undated) DEVICE — STAPLER SKIN VISI-STAT 35 WIDE

## (undated) DEVICE — SOL IRR POUR NS 0.9% 500ML

## (undated) DEVICE — KIT ENDO PROCEDURE CUST W/VLV

## (undated) DEVICE — SUT POLYSORB 0 30" GS-21 UNDYED

## (undated) DEVICE — POSITIONER FOAM ABDUCTION PILLOW MED (PINK)

## (undated) DEVICE — PRESSURIZER FEM CNL W/O HUB MED

## (undated) DEVICE — PLATE NESSY 170

## (undated) DEVICE — PACK TOTAL HIP (2 PACKS)

## (undated) DEVICE — POLY TRAP ETRAP

## (undated) DEVICE — FORCEP RADIAL JAW 4 W NDL 2.4MM 2.8MM 240CM ORANGE DISP

## (undated) DEVICE — DRSG AQUACEL 3.5 X 12"

## (undated) DEVICE — ENDOCUFF VISION SZ 2 LG GRN

## (undated) DEVICE — POSITIONER FOAM HEADREST (PINK)